# Patient Record
Sex: FEMALE | Race: WHITE | NOT HISPANIC OR LATINO | Employment: OTHER | URBAN - METROPOLITAN AREA
[De-identification: names, ages, dates, MRNs, and addresses within clinical notes are randomized per-mention and may not be internally consistent; named-entity substitution may affect disease eponyms.]

---

## 2017-10-09 ENCOUNTER — HOSPITAL ENCOUNTER (OUTPATIENT)
Dept: GENERAL RADIOLOGY | Facility: HOSPITAL | Age: 77
Discharge: HOME | End: 2017-10-09
Admitting: ORTHOPAEDIC SURGERY

## 2017-10-09 ENCOUNTER — HOSPITAL ENCOUNTER (OUTPATIENT)
Dept: GENERAL RADIOLOGY | Facility: HOSPITAL | Age: 77
Discharge: HOME | End: 2017-10-09

## 2017-10-09 ENCOUNTER — APPOINTMENT (OUTPATIENT)
Dept: PREADMISSION TESTING | Facility: HOSPITAL | Age: 77
End: 2017-10-09

## 2017-10-09 VITALS
BODY MASS INDEX: 26.89 KG/M2 | HEART RATE: 57 BPM | SYSTOLIC BLOOD PRESSURE: 100 MMHG | RESPIRATION RATE: 20 BRPM | OXYGEN SATURATION: 94 % | TEMPERATURE: 98.5 F | HEIGHT: 61 IN | DIASTOLIC BLOOD PRESSURE: 61 MMHG | WEIGHT: 142.44 LBS

## 2017-10-09 LAB
ABO GROUP BLD: NORMAL
ALBUMIN SERPL-MCNC: 3.9 G/DL (ref 3.5–5.2)
ALBUMIN/GLOB SERPL: 1.5 G/DL
ALP SERPL-CCNC: 61 U/L (ref 39–117)
ALT SERPL W P-5'-P-CCNC: 7 U/L (ref 1–33)
ANION GAP SERPL CALCULATED.3IONS-SCNC: 9.7 MMOL/L
AST SERPL-CCNC: 13 U/L (ref 1–32)
BILIRUB SERPL-MCNC: 0.2 MG/DL (ref 0.1–1.2)
BILIRUB UR QL STRIP: NEGATIVE
BLD GP AB SCN SERPL QL: NEGATIVE
BUN BLD-MCNC: 20 MG/DL (ref 8–23)
BUN/CREAT SERPL: 12.8 (ref 7–25)
CALCIUM SPEC-SCNC: 8.8 MG/DL (ref 8.6–10.5)
CHLORIDE SERPL-SCNC: 102 MMOL/L (ref 98–107)
CLARITY UR: CLEAR
CO2 SERPL-SCNC: 30.3 MMOL/L (ref 22–29)
COLOR UR: YELLOW
CREAT BLD-MCNC: 1.56 MG/DL (ref 0.57–1)
DEPRECATED RDW RBC AUTO: 58.6 FL (ref 37–54)
ERYTHROCYTE [DISTWIDTH] IN BLOOD BY AUTOMATED COUNT: 15.2 % (ref 11.7–13)
GFR SERPL CREATININE-BSD FRML MDRD: 32 ML/MIN/1.73
GLOBULIN UR ELPH-MCNC: 2.6 GM/DL
GLUCOSE BLD-MCNC: 74 MG/DL (ref 65–99)
GLUCOSE UR STRIP-MCNC: NEGATIVE MG/DL
HCT VFR BLD AUTO: 31.8 % (ref 35.6–45.5)
HGB BLD-MCNC: 9.6 G/DL (ref 11.9–15.5)
HGB UR QL STRIP.AUTO: NEGATIVE
INR PPP: 0.98 (ref 0.9–1.1)
KETONES UR QL STRIP: NEGATIVE
LEUKOCYTE ESTERASE UR QL STRIP.AUTO: NEGATIVE
MCH RBC QN AUTO: 31.9 PG (ref 26.9–32)
MCHC RBC AUTO-ENTMCNC: 30.2 G/DL (ref 32.4–36.3)
MCV RBC AUTO: 105.6 FL (ref 80.5–98.2)
NITRITE UR QL STRIP: NEGATIVE
PH UR STRIP.AUTO: 6 [PH] (ref 5–8)
PLATELET # BLD AUTO: 120 10*3/MM3 (ref 140–500)
PMV BLD AUTO: 10.7 FL (ref 6–12)
POTASSIUM BLD-SCNC: 4.5 MMOL/L (ref 3.5–5.2)
PROT SERPL-MCNC: 6.5 G/DL (ref 6–8.5)
PROT UR QL STRIP: NEGATIVE
PROTHROMBIN TIME: 12.6 SECONDS (ref 11.7–14.2)
RBC # BLD AUTO: 3.01 10*6/MM3 (ref 3.9–5.2)
RH BLD: POSITIVE
SODIUM BLD-SCNC: 142 MMOL/L (ref 136–145)
SP GR UR STRIP: 1.01 (ref 1–1.03)
UROBILINOGEN UR QL STRIP: NORMAL
WBC NRBC COR # BLD: 5.05 10*3/MM3 (ref 4.5–10.7)

## 2017-10-09 PROCEDURE — 86850 RBC ANTIBODY SCREEN: CPT | Performed by: ORTHOPAEDIC SURGERY

## 2017-10-09 PROCEDURE — 80053 COMPREHEN METABOLIC PANEL: CPT | Performed by: ORTHOPAEDIC SURGERY

## 2017-10-09 PROCEDURE — 86900 BLOOD TYPING SEROLOGIC ABO: CPT | Performed by: ORTHOPAEDIC SURGERY

## 2017-10-09 PROCEDURE — 71020 HC CHEST PA AND LATERAL: CPT

## 2017-10-09 PROCEDURE — 36415 COLL VENOUS BLD VENIPUNCTURE: CPT

## 2017-10-09 PROCEDURE — 93005 ELECTROCARDIOGRAM TRACING: CPT

## 2017-10-09 PROCEDURE — 85610 PROTHROMBIN TIME: CPT | Performed by: ORTHOPAEDIC SURGERY

## 2017-10-09 PROCEDURE — 86901 BLOOD TYPING SEROLOGIC RH(D): CPT | Performed by: ORTHOPAEDIC SURGERY

## 2017-10-09 PROCEDURE — 85027 COMPLETE CBC AUTOMATED: CPT | Performed by: ORTHOPAEDIC SURGERY

## 2017-10-09 PROCEDURE — 81003 URINALYSIS AUTO W/O SCOPE: CPT | Performed by: ORTHOPAEDIC SURGERY

## 2017-10-09 PROCEDURE — 93010 ELECTROCARDIOGRAM REPORT: CPT | Performed by: INTERNAL MEDICINE

## 2017-10-09 PROCEDURE — 73560 X-RAY EXAM OF KNEE 1 OR 2: CPT

## 2017-10-09 RX ORDER — TRAZODONE HYDROCHLORIDE 100 MG/1
100 TABLET ORAL NIGHTLY
Status: ON HOLD | COMMUNITY
End: 2017-12-06

## 2017-10-09 RX ORDER — DIVALPROEX SODIUM 500 MG/1
500 TABLET, EXTENDED RELEASE ORAL EVERY 12 HOURS
Status: ON HOLD | COMMUNITY
End: 2019-06-17

## 2017-10-09 RX ORDER — CHLORHEXIDINE GLUCONATE 500 MG/1
CLOTH TOPICAL
Status: ON HOLD | COMMUNITY
End: 2017-11-17

## 2017-10-09 RX ORDER — LEVOTHYROXINE SODIUM 0.12 MG/1
125 TABLET ORAL EVERY MORNING
COMMUNITY
End: 2017-11-15

## 2017-10-09 RX ORDER — ZOLPIDEM TARTRATE 12.5 MG/1
12.5 TABLET, FILM COATED, EXTENDED RELEASE ORAL NIGHTLY PRN
COMMUNITY
End: 2017-11-15

## 2017-10-09 RX ORDER — DIVALPROEX SODIUM 250 MG
250 TABLET, EXTENDED RELEASE 24 HR ORAL DAILY
COMMUNITY
End: 2020-07-10

## 2017-10-09 RX ORDER — IRBESARTAN AND HYDROCHLOROTHIAZIDE 300; 12.5 MG/1; MG/1
1 TABLET, FILM COATED ORAL EVERY MORNING
Status: ON HOLD | COMMUNITY
End: 2017-11-17 | Stop reason: ALTCHOICE

## 2017-10-09 RX ORDER — GABAPENTIN 600 MG/1
300 TABLET ORAL 3 TIMES DAILY
COMMUNITY
End: 2017-11-15

## 2017-10-09 RX ORDER — HYDROCODONE BITARTRATE AND ACETAMINOPHEN 7.5; 325 MG/1; MG/1
1 TABLET ORAL EVERY 4 HOURS PRN
Status: ON HOLD | COMMUNITY
End: 2019-06-17

## 2017-10-09 RX ORDER — ALLOPURINOL 300 MG/1
300 TABLET ORAL EVERY MORNING
COMMUNITY
End: 2017-12-06 | Stop reason: HOSPADM

## 2017-10-09 RX ORDER — VENLAFAXINE HYDROCHLORIDE 150 MG/1
150 CAPSULE, EXTENDED RELEASE ORAL DAILY
COMMUNITY
End: 2017-12-06 | Stop reason: HOSPADM

## 2017-10-09 RX ORDER — ONDANSETRON 4 MG/1
4 TABLET, FILM COATED ORAL EVERY 8 HOURS PRN
COMMUNITY
End: 2017-12-06 | Stop reason: HOSPADM

## 2017-10-09 ASSESSMENT — KOOS JR: KOOS JR SCORE: 16

## 2017-10-09 NOTE — DISCHARGE INSTRUCTIONS
Take the following medications the morning of surgery with a small sip of water:    ATROVENT ,  IRBESARTAN,  SYNTHROID    MAY HAVE PAIN MEDICATION IF  NEEDED    General Instructions:  • Do not eat solid food after midnight the night before surgery.  • You may drink clear liquids day of surgery but must stop at least one hour before your hospital arrival time.  ( 0530 AM )  • It is beneficial for you to have a clear drink that contains carbohydrates the day of surgery.  We suggest a 20 ounce bottle of Gatorade or Powerade for non-diabetic patients     Clear liquids are liquids you can see through.  Nothing red in color.     Plain water                               Sports drinks  Sodas                                   Gelatin (Jell-O)  Fruit juices without pulp such as white grape juice and apple juice  Popsicles that contain no fruit or yogurt  Tea or coffee (no cream or milk added)  Gatorade / Powerade  G2 / Powerade Zero    • Bring any papers given to you in the doctor’s office.  • Wear clean comfortable clothes and socks.  • Do not wear contact lenses or make-up.  Bring a case for your glasses.   • Remove all piercings.  Leave jewelry and any other valuables at home.  • The Pre-Admission Testing nurse will instruct you to bring medications if unable to obtain an accurate list in Pre-Admission Testing.        If you were given a blood bank ID arm band remember to bring it with you the day of surgery.    Preventing a Surgical Site Infection:  • For 2 to 3 days before surgery, avoid shaving with a razor because the razor can irritate skin and make it easier to develop an infection.  • The night prior to surgery sleep in a clean bed with clean clothing.  Do not allow pets to sleep with you.  • Shower on the morning of surgery using a fresh bar of anti-bacterial soap (such as Dial) and clean washcloth.  Dry with a clean towel and dress in clean clothing.  • Ask your surgeon if you will be receiving antibiotics prior  to surgery.  • Make sure you, your family, and all healthcare providers clean their hands with soap and water or an alcohol based hand  before caring for you or your wound.    Day of surgery:  Upon arrival, a Pre-op nurse and Anesthesiologist will review your health history, obtain vital signs, and answer questions you may have.  The only belongings needed at this time will be your home medications and if applicable your C-PAP/BI-PAP machine.  If you are staying overnight your family can leave the rest of your belongings in the car and bring them to your room later.  A Pre-op nurse will start an IV and you may receive medication in preparation for surgery, including something to help you relax.  Your family will be able to see you in the Pre-op area.  While you are in surgery your family should notify the waiting room  if they leave the waiting room area and provide a contact phone number.    Please be aware that surgery does come with discomfort.  We want to make every effort to control your discomfort so please discuss any uncontrolled symptoms with your nurse.   Your doctor will most likely have prescribed pain medications.      If you are staying overnight following surgery, you will be transported to your hospital room following the recovery period.  [facility] has all private rooms.    If you have any questions please call Pre-Admission Testing at [phone].      2% CHLORAHEXIDINE GLUCONATE* CLOTH  Preparing or “prepping” skin before surgery can reduce the risk of infection at the surgical site. To make the process easier, [facility] has chosen disposable cloths moistened with a rinse-free, 2% Chlorhexidine Gluconate (CHG) antiseptic solution. The steps below outline the prepping process and should be carefully followed.        Use the prep cloth on the area that is circled in the diagram             Directions Night before Surgery  1) Shower using a fresh bar  of anti-bacterial soap (such as Dial) and clean washcloth.  Use a clean towel to completely dry your skin.  2) Do not use any lotions, oils or creams on your skin.  3) Open the package and remove 1 cloth, wipe your skin for 30 seconds in a circular motion.  Allow to dry for 3 minutes.  4) Repeat #3 with second cloth.  5) Do not touch your eyes, ears, or mouth with the prep cloth.  6) Allow the wet prep solution to air dry.  7) Discard the prep cloth and wash your hands with soap and water.   8) Dress in clean bed clothes and sleep on fresh clean bed sheets.   9) You may experience some temporary itching after the prep.    Directions Day of Surgery  1) Repeat steps 1,2,3,4,5,6,7, and 9.   2) Dress in clean clothes before coming to the hospital.    BACTROBAN NASAL OINTMENT  There are many germs normally in your nose. Bactroban is an ointment that will help reduce these germs. Please follow these instructions for Bactroban use:  X 2    _1___The day before surgery in the morning  Chyt__[id number]______    2____The day before surgery in the evening              Ossv__[id number]______    __3__The day of surgery in the morning    Rjac__[id number]______    **Squirt ½ package of Bactroban Ointment onto a cotton applicator and apply to inside of 1st nostril.  Squirt the remaining Bactroban and apply to the inside of the other nostril.    Deductibles and co-payments are collected on the day of service. Please be prepared to pay the required co-pay, deductible or deposit on the day of service as defined by your plan.

## 2017-10-11 ENCOUNTER — TRANSCRIBE ORDERS (OUTPATIENT)
Dept: ADMINISTRATIVE | Facility: HOSPITAL | Age: 77
End: 2017-10-11

## 2017-10-11 DIAGNOSIS — D64.9 LOW HEMOGLOBIN: Primary | ICD-10-CM

## 2017-10-19 ENCOUNTER — HOSPITAL ENCOUNTER (OUTPATIENT)
Dept: INFUSION THERAPY | Facility: HOSPITAL | Age: 77
Discharge: HOME | End: 2017-10-19
Attending: ORTHOPAEDIC SURGERY | Admitting: ORTHOPAEDIC SURGERY

## 2017-10-19 VITALS
OXYGEN SATURATION: 90 % | DIASTOLIC BLOOD PRESSURE: 62 MMHG | HEART RATE: 67 BPM | TEMPERATURE: 99.3 F | RESPIRATION RATE: 20 BRPM | SYSTOLIC BLOOD PRESSURE: 128 MMHG

## 2017-10-19 DIAGNOSIS — D64.9 ANEMIA, UNSPECIFIED TYPE: ICD-10-CM

## 2017-10-19 DIAGNOSIS — M25.569 ARTHRALGIA OF KNEE, UNSPECIFIED LATERALITY: Primary | ICD-10-CM

## 2017-10-19 PROCEDURE — 63410000001 EPOETIN ALFA PER 1000 UNITS: Performed by: ORTHOPAEDIC SURGERY

## 2017-10-19 PROCEDURE — 96372 THER/PROPH/DIAG INJ SC/IM: CPT

## 2017-10-19 RX ADMIN — ERYTHROPOIETIN 2000 UNITS: 2000 INJECTION, SOLUTION INTRAVENOUS; SUBCUTANEOUS at 14:38

## 2017-10-19 NOTE — PATIENT INSTRUCTIONS
Epoetin Tavo injection  What is this medicine?  EPOETIN TAVO (e CT e tin AL fa) helps your body make more red blood cells. This medicine is used to treat anemia caused by chronic kidney failure, cancer chemotherapy, or HIV-therapy. It may also be used before surgery if you have anemia.  This medicine may be used for other purposes; ask your health care provider or pharmacist if you have questions.  COMMON BRAND NAME(S): Epogen, Procrit  What should I tell my health care provider before I take this medicine?  They need to know if you have any of these conditions:  -blood clotting disorders  -cancer patient not on chemotherapy  -cystic fibrosis  -heart disease, such as angina or heart failure  -hemoglobin level of 12 g/dL or greater  -high blood pressure  -low levels of folate, iron, or vitamin B12  -seizures  -an unusual or allergic reaction to erythropoietin, albumin, benzyl alcohol, hamster proteins, other medicines, foods, dyes, or preservatives  -pregnant or trying to get pregnant  -breast-feeding  How should I use this medicine?  This medicine is for injection into a vein or under the skin. It is usually given by a health care professional in a hospital or clinic setting.  If you get this medicine at home, you will be taught how to prepare and give this medicine. Use exactly as directed. Take your medicine at regular intervals. Do not take your medicine more often than directed.  It is important that you put your used needles and syringes in a special sharps container. Do not put them in a trash can. If you do not have a sharps container, call your pharmacist or healthcare provider to get one.  Talk to your pediatrician regarding the use of this medicine in children. While this drug may be prescribed for selected conditions, precautions do apply.  Overdosage: If you think you have taken too much of this medicine contact a poison control center or emergency room at once.  NOTE: This medicine is only for you. Do  not share this medicine with others.  What if I miss a dose?  If you miss a dose, take it as soon as you can. If it is almost time for your next dose, take only that dose. Do not take double or extra doses.  What may interact with this medicine?  Do not take this medicine with any of the following medications:  -darbepoetin rafia  This list may not describe all possible interactions. Give your health care provider a list of all the medicines, herbs, non-prescription drugs, or dietary supplements you use. Also tell them if you smoke, drink alcohol, or use illegal drugs. Some items may interact with your medicine.  What should I watch for while using this medicine?  Visit your prescriber or health care professional for regular checks on your progress and for the needed blood tests and blood pressure measurements. It is especially important for the doctor to make sure your hemoglobin level is in the desired range, to limit the risk of potential side effects and to give you the best benefit. Keep all appointments for any recommended tests. Check your blood pressure as directed. Ask your doctor what your blood pressure should be and when you should contact him or her.  As your body makes more red blood cells, you may need to take iron, folic acid, or vitamin B supplements. Ask your doctor or health care provider which products are right for you. If you have kidney disease continue dietary restrictions, even though this medication can make you feel better. Talk with your doctor or health care professional about the foods you eat and the vitamins that you take.  What side effects may I notice from receiving this medicine?  Side effects that you should report to your doctor or health care professional as soon as possible:  -allergic reactions like skin rash, itching or hives, swelling of the face, lips, or tongue  -breathing problems  -changes in vision  -chest pain  -confusion, trouble speaking or understanding  -feeling  faint or lightheaded, falls  -high blood pressure  -muscle aches or pains  -pain, swelling, warmth in the leg  -rapid weight gain  -severe headaches  -sudden numbness or weakness of the face, arm or leg  -trouble walking, dizziness, loss of balance or coordination  -seizures (convulsions)  -swelling of the ankles, feet, hands  -unusually weak or tired  Side effects that usually do not require medical attention (report to your doctor or health care professional if they continue or are bothersome):  -diarrhea  -fever, chills (flu-like symptoms)  -headaches  -nausea, vomiting  -redness, stinging, or swelling at site where injected  This list may not describe all possible side effects. Call your doctor for medical advice about side effects. You may report side effects to FDA at 7-077-FDA-3097.  Where should I keep my medicine?  Keep out of the reach of children.  Store in a refrigerator between 2 and 8 degrees C (36 and 46 degrees F). Do not freeze or shake. Throw away any unused portion if using a single-dose vial. Multi-dose vials can be kept in the refrigerator for up to 21 days after the initial dose. Throw away unused medicine.  NOTE: This sheet is a summary. It may not cover all possible information. If you have questions about this medicine, talk to your doctor, pharmacist, or health care provider.     © 2017, [employer]. (2009-12-01 10:25:44)

## 2017-10-26 ENCOUNTER — HOSPITAL ENCOUNTER (OUTPATIENT)
Dept: INFUSION THERAPY | Facility: HOSPITAL | Age: 77
Discharge: HOME | End: 2017-10-26
Attending: ORTHOPAEDIC SURGERY | Admitting: ORTHOPAEDIC SURGERY

## 2017-10-26 VITALS
SYSTOLIC BLOOD PRESSURE: 131 MMHG | TEMPERATURE: 98.7 F | OXYGEN SATURATION: 100 % | HEART RATE: 69 BPM | RESPIRATION RATE: 20 BRPM | DIASTOLIC BLOOD PRESSURE: 72 MMHG

## 2017-10-26 DIAGNOSIS — D64.9 ANEMIA, UNSPECIFIED TYPE: ICD-10-CM

## 2017-10-26 PROCEDURE — 96372 THER/PROPH/DIAG INJ SC/IM: CPT

## 2017-10-26 PROCEDURE — 63410000001 EPOETIN ALFA PER 1000 UNITS: Performed by: ORTHOPAEDIC SURGERY

## 2017-10-26 RX ADMIN — ERYTHROPOIETIN 2000 UNITS: 2000 INJECTION, SOLUTION INTRAVENOUS; SUBCUTANEOUS at 14:53

## 2017-10-26 NOTE — PROGRESS NOTES
Pt tolerated injection without complaints.  Injection site x 1 with band aide applied. Pt D/C per ambulation @ 4366.

## 2017-11-02 ENCOUNTER — HOSPITAL ENCOUNTER (OUTPATIENT)
Dept: INFUSION THERAPY | Facility: HOSPITAL | Age: 77
Discharge: HOME | End: 2017-11-02
Attending: ORTHOPAEDIC SURGERY | Admitting: ORTHOPAEDIC SURGERY

## 2017-11-02 VITALS
HEART RATE: 82 BPM | TEMPERATURE: 99.6 F | RESPIRATION RATE: 20 BRPM | SYSTOLIC BLOOD PRESSURE: 131 MMHG | DIASTOLIC BLOOD PRESSURE: 66 MMHG | OXYGEN SATURATION: 93 %

## 2017-11-02 DIAGNOSIS — D64.9 ANEMIA, UNSPECIFIED TYPE: ICD-10-CM

## 2017-11-02 PROCEDURE — 63410000001 EPOETIN ALFA PER 1000 UNITS: Performed by: ORTHOPAEDIC SURGERY

## 2017-11-02 PROCEDURE — 96372 THER/PROPH/DIAG INJ SC/IM: CPT

## 2017-11-02 RX ADMIN — ERYTHROPOIETIN 2000 UNITS: 2000 INJECTION, SOLUTION INTRAVENOUS; SUBCUTANEOUS at 15:46

## 2017-11-15 RX ORDER — GABAPENTIN 300 MG/1
300 CAPSULE ORAL 3 TIMES DAILY
Status: ON HOLD | COMMUNITY
End: 2017-12-06

## 2017-11-15 RX ORDER — OMEPRAZOLE 20 MG/1
20 CAPSULE, DELAYED RELEASE ORAL DAILY PRN
Status: ON HOLD | COMMUNITY
End: 2019-06-17

## 2017-11-15 RX ORDER — BUDESONIDE AND FORMOTEROL FUMARATE DIHYDRATE 160; 4.5 UG/1; UG/1
2 AEROSOL RESPIRATORY (INHALATION) 2 TIMES DAILY
Status: ON HOLD | COMMUNITY
End: 2019-06-17

## 2017-11-15 RX ORDER — LEVOTHYROXINE SODIUM 88 UG/1
88 TABLET ORAL
Status: ON HOLD | COMMUNITY
End: 2021-10-19

## 2017-11-15 RX ORDER — ALBUTEROL SULFATE 1.25 MG/3ML
1 SOLUTION RESPIRATORY (INHALATION) EVERY 4 HOURS
Status: ON HOLD | COMMUNITY
End: 2019-06-17

## 2017-11-16 ENCOUNTER — HOSPITAL ENCOUNTER (INPATIENT)
Facility: HOSPITAL | Age: 77
LOS: 6 days | Discharge: NURSING, REHAB OR LONG-TERM CARE | End: 2017-11-22
Attending: ORTHOPAEDIC SURGERY | Admitting: ORTHOPAEDIC SURGERY

## 2017-11-16 ENCOUNTER — APPOINTMENT (OUTPATIENT)
Dept: GENERAL RADIOLOGY | Facility: HOSPITAL | Age: 77
End: 2017-11-16

## 2017-11-16 ENCOUNTER — ANESTHESIA EVENT (OUTPATIENT)
Dept: PERIOP | Facility: HOSPITAL | Age: 77
End: 2017-11-16

## 2017-11-16 ENCOUNTER — ANESTHESIA (OUTPATIENT)
Dept: PERIOP | Facility: HOSPITAL | Age: 77
End: 2017-11-16

## 2017-11-16 DIAGNOSIS — R26.2 DIFFICULTY WALKING: Primary | ICD-10-CM

## 2017-11-16 PROBLEM — M17.9 OA (OSTEOARTHRITIS) OF KNEE: Status: ACTIVE | Noted: 2017-11-16

## 2017-11-16 LAB
ABO GROUP BLD: NORMAL
ALBUMIN SERPL-MCNC: 3.9 G/DL (ref 3.5–5.2)
ALBUMIN/GLOB SERPL: 1.3 G/DL
ALP SERPL-CCNC: 67 U/L (ref 39–117)
ALT SERPL W P-5'-P-CCNC: 7 U/L (ref 1–33)
ANION GAP SERPL CALCULATED.3IONS-SCNC: 12.2 MMOL/L
AST SERPL-CCNC: 13 U/L (ref 1–32)
BASOPHILS # BLD AUTO: 0.01 10*3/MM3 (ref 0–0.2)
BASOPHILS NFR BLD AUTO: 0.2 % (ref 0–1.5)
BILIRUB SERPL-MCNC: 0.4 MG/DL (ref 0.1–1.2)
BILIRUB UR QL STRIP: NEGATIVE
BLD GP AB SCN SERPL QL: NEGATIVE
BUN BLD-MCNC: 15 MG/DL (ref 8–23)
BUN/CREAT SERPL: 12.4 (ref 7–25)
CALCIUM SPEC-SCNC: 8.9 MG/DL (ref 8.6–10.5)
CHLORIDE SERPL-SCNC: 101 MMOL/L (ref 98–107)
CLARITY UR: CLEAR
CO2 SERPL-SCNC: 28.8 MMOL/L (ref 22–29)
COLOR UR: YELLOW
CREAT BLD-MCNC: 1.21 MG/DL (ref 0.57–1)
DEPRECATED RDW RBC AUTO: 58.6 FL (ref 37–54)
EOSINOPHIL # BLD AUTO: 0.12 10*3/MM3 (ref 0–0.7)
EOSINOPHIL NFR BLD AUTO: 2.3 % (ref 0.3–6.2)
ERYTHROCYTE [DISTWIDTH] IN BLOOD BY AUTOMATED COUNT: 15.6 % (ref 11.7–13)
GFR SERPL CREATININE-BSD FRML MDRD: 43 ML/MIN/1.73
GLOBULIN UR ELPH-MCNC: 3 GM/DL
GLUCOSE BLD-MCNC: 94 MG/DL (ref 65–99)
GLUCOSE UR STRIP-MCNC: NEGATIVE MG/DL
HCT VFR BLD AUTO: 32.2 % (ref 35.6–45.5)
HGB BLD-MCNC: 10.1 G/DL (ref 11.9–15.5)
HGB UR QL STRIP.AUTO: NEGATIVE
IMM GRANULOCYTES # BLD: 0.02 10*3/MM3 (ref 0–0.03)
IMM GRANULOCYTES NFR BLD: 0.4 % (ref 0–0.5)
INR PPP: 0.97 (ref 0.9–1.1)
KETONES UR QL STRIP: NEGATIVE
LEUKOCYTE ESTERASE UR QL STRIP.AUTO: NEGATIVE
LYMPHOCYTES # BLD AUTO: 1.6 10*3/MM3 (ref 0.9–4.8)
LYMPHOCYTES NFR BLD AUTO: 30.4 % (ref 19.6–45.3)
MCH RBC QN AUTO: 32.5 PG (ref 26.9–32)
MCHC RBC AUTO-ENTMCNC: 31.4 G/DL (ref 32.4–36.3)
MCV RBC AUTO: 103.5 FL (ref 80.5–98.2)
MONOCYTES # BLD AUTO: 0.69 10*3/MM3 (ref 0.2–1.2)
MONOCYTES NFR BLD AUTO: 13.1 % (ref 5–12)
NEUTROPHILS # BLD AUTO: 2.83 10*3/MM3 (ref 1.9–8.1)
NEUTROPHILS NFR BLD AUTO: 53.6 % (ref 42.7–76)
NITRITE UR QL STRIP: NEGATIVE
NRBC BLD MANUAL-RTO: 0 /100 WBC (ref 0–0)
PH UR STRIP.AUTO: 6.5 [PH] (ref 5–8)
PLATELET # BLD AUTO: 155 10*3/MM3 (ref 140–500)
PMV BLD AUTO: 9.3 FL (ref 6–12)
POTASSIUM BLD-SCNC: 4.1 MMOL/L (ref 3.5–5.2)
PROT SERPL-MCNC: 6.9 G/DL (ref 6–8.5)
PROT UR QL STRIP: NEGATIVE
PROTHROMBIN TIME: 12.5 SECONDS (ref 11.7–14.2)
RBC # BLD AUTO: 3.11 10*6/MM3 (ref 3.9–5.2)
RH BLD: POSITIVE
SODIUM BLD-SCNC: 142 MMOL/L (ref 136–145)
SP GR UR STRIP: 1.01 (ref 1–1.03)
UROBILINOGEN UR QL STRIP: NORMAL
WBC NRBC COR # BLD: 5.27 10*3/MM3 (ref 4.5–10.7)

## 2017-11-16 PROCEDURE — 86850 RBC ANTIBODY SCREEN: CPT | Performed by: ORTHOPAEDIC SURGERY

## 2017-11-16 PROCEDURE — 25010000002 PROPOFOL 10 MG/ML EMULSION: Performed by: NURSE ANESTHETIST, CERTIFIED REGISTERED

## 2017-11-16 PROCEDURE — 25010000002 HYDROMORPHONE PER 4 MG: Performed by: NURSE ANESTHETIST, CERTIFIED REGISTERED

## 2017-11-16 PROCEDURE — C1713 ANCHOR/SCREW BN/BN,TIS/BN: HCPCS | Performed by: ORTHOPAEDIC SURGERY

## 2017-11-16 PROCEDURE — 25010000002 FENTANYL CITRATE (PF) 100 MCG/2ML SOLUTION: Performed by: ANESTHESIOLOGY

## 2017-11-16 PROCEDURE — 0SRC0J9 REPLACEMENT OF RIGHT KNEE JOINT WITH SYNTHETIC SUBSTITUTE, CEMENTED, OPEN APPROACH: ICD-10-PCS | Performed by: ORTHOPAEDIC SURGERY

## 2017-11-16 PROCEDURE — 73560 X-RAY EXAM OF KNEE 1 OR 2: CPT

## 2017-11-16 PROCEDURE — 25010000002 EPINEPHRINE PER 0.1 MG: Performed by: ORTHOPAEDIC SURGERY

## 2017-11-16 PROCEDURE — 85025 COMPLETE CBC W/AUTO DIFF WBC: CPT | Performed by: ORTHOPAEDIC SURGERY

## 2017-11-16 PROCEDURE — 25010000002 FENTANYL CITRATE (PF) 100 MCG/2ML SOLUTION: Performed by: NURSE ANESTHETIST, CERTIFIED REGISTERED

## 2017-11-16 PROCEDURE — 25010000002 MIDAZOLAM PER 1 MG: Performed by: ANESTHESIOLOGY

## 2017-11-16 PROCEDURE — C1776 JOINT DEVICE (IMPLANTABLE): HCPCS | Performed by: ORTHOPAEDIC SURGERY

## 2017-11-16 PROCEDURE — 94799 UNLISTED PULMONARY SVC/PX: CPT

## 2017-11-16 PROCEDURE — 94640 AIRWAY INHALATION TREATMENT: CPT

## 2017-11-16 PROCEDURE — 25010000002 KETOROLAC TROMETHAMINE PER 15 MG: Performed by: ORTHOPAEDIC SURGERY

## 2017-11-16 PROCEDURE — 25010000002 ONDANSETRON PER 1 MG: Performed by: NURSE ANESTHETIST, CERTIFIED REGISTERED

## 2017-11-16 PROCEDURE — 81003 URINALYSIS AUTO W/O SCOPE: CPT | Performed by: ORTHOPAEDIC SURGERY

## 2017-11-16 PROCEDURE — 80053 COMPREHEN METABOLIC PANEL: CPT | Performed by: ORTHOPAEDIC SURGERY

## 2017-11-16 PROCEDURE — 85610 PROTHROMBIN TIME: CPT | Performed by: ORTHOPAEDIC SURGERY

## 2017-11-16 PROCEDURE — 86901 BLOOD TYPING SEROLOGIC RH(D): CPT | Performed by: ORTHOPAEDIC SURGERY

## 2017-11-16 PROCEDURE — 86900 BLOOD TYPING SEROLOGIC ABO: CPT | Performed by: ORTHOPAEDIC SURGERY

## 2017-11-16 PROCEDURE — 25010000002 ROPIVACAINE PER 1 MG: Performed by: ORTHOPAEDIC SURGERY

## 2017-11-16 PROCEDURE — 25010000002 DEXAMETHASONE PER 1 MG: Performed by: NURSE ANESTHETIST, CERTIFIED REGISTERED

## 2017-11-16 DEVICE — STEM TIB PRI FINN 46X40MM: Type: IMPLANTABLE DEVICE | Site: KNEE | Status: FUNCTIONAL

## 2017-11-16 DEVICE — CAP TOTL KN CMT PREMIUM: Type: IMPLANTABLE DEVICE | Site: KNEE | Status: FUNCTIONAL

## 2017-11-16 DEVICE — TRY TIB INTERLK PRI 67MM: Type: IMPLANTABLE DEVICE | Site: KNEE | Status: FUNCTIONAL

## 2017-11-16 DEVICE — CMT BONE PALACOS 120001: Type: IMPLANTABLE DEVICE | Site: PATELLA | Status: FUNCTIONAL

## 2017-11-16 DEVICE — COMP FEM/KN VANGUARD INTLK CR 60MM NS RT: Type: IMPLANTABLE DEVICE | Site: KNEE | Status: FUNCTIONAL

## 2017-11-16 DEVICE — PAT 3PEG THN 28X6.2 28MM: Type: IMPLANTABLE DEVICE | Site: PATELLA | Status: FUNCTIONAL

## 2017-11-16 DEVICE — BEAR TIB/KN VANGUARD AS 10X67MM NS: Type: IMPLANTABLE DEVICE | Site: KNEE | Status: FUNCTIONAL

## 2017-11-16 RX ORDER — LIDOCAINE HYDROCHLORIDE 20 MG/ML
INJECTION, SOLUTION INFILTRATION; PERINEURAL AS NEEDED
Status: DISCONTINUED | OUTPATIENT
Start: 2017-11-16 | End: 2017-11-16 | Stop reason: SURG

## 2017-11-16 RX ORDER — IRBESARTAN AND HYDROCHLOROTHIAZIDE 300; 12.5 MG/1; MG/1
1 TABLET, FILM COATED ORAL EVERY MORNING
Status: DISCONTINUED | OUTPATIENT
Start: 2017-11-17 | End: 2017-11-16 | Stop reason: CLARIF

## 2017-11-16 RX ORDER — TRANEXAMIC ACID 100 MG/ML
INJECTION, SOLUTION INTRAVENOUS AS NEEDED
Status: DISCONTINUED | OUTPATIENT
Start: 2017-11-16 | End: 2017-11-16 | Stop reason: SURG

## 2017-11-16 RX ORDER — ACETAMINOPHEN 325 MG/1
325 TABLET ORAL EVERY 4 HOURS PRN
Status: DISCONTINUED | OUTPATIENT
Start: 2017-11-16 | End: 2017-11-22 | Stop reason: HOSPADM

## 2017-11-16 RX ORDER — MIDAZOLAM HYDROCHLORIDE 1 MG/ML
1 INJECTION INTRAMUSCULAR; INTRAVENOUS
Status: DISCONTINUED | OUTPATIENT
Start: 2017-11-16 | End: 2017-11-16 | Stop reason: HOSPADM

## 2017-11-16 RX ORDER — OXYCODONE HYDROCHLORIDE AND ACETAMINOPHEN 5; 325 MG/1; MG/1
2 TABLET ORAL EVERY 4 HOURS PRN
Status: DISCONTINUED | OUTPATIENT
Start: 2017-11-16 | End: 2017-11-17

## 2017-11-16 RX ORDER — SODIUM CHLORIDE, SODIUM LACTATE, POTASSIUM CHLORIDE, CALCIUM CHLORIDE 600; 310; 30; 20 MG/100ML; MG/100ML; MG/100ML; MG/100ML
9 INJECTION, SOLUTION INTRAVENOUS CONTINUOUS
Status: DISCONTINUED | OUTPATIENT
Start: 2017-11-16 | End: 2017-11-22 | Stop reason: HOSPADM

## 2017-11-16 RX ORDER — ONDANSETRON 2 MG/ML
4 INJECTION INTRAMUSCULAR; INTRAVENOUS ONCE AS NEEDED
Status: DISCONTINUED | OUTPATIENT
Start: 2017-11-16 | End: 2017-11-16 | Stop reason: HOSPADM

## 2017-11-16 RX ORDER — UREA 10 %
1 LOTION (ML) TOPICAL NIGHTLY PRN
Status: DISCONTINUED | OUTPATIENT
Start: 2017-11-16 | End: 2017-11-22 | Stop reason: HOSPADM

## 2017-11-16 RX ORDER — SENNA AND DOCUSATE SODIUM 50; 8.6 MG/1; MG/1
2 TABLET, FILM COATED ORAL 2 TIMES DAILY
Status: DISCONTINUED | OUTPATIENT
Start: 2017-11-16 | End: 2017-11-22 | Stop reason: HOSPADM

## 2017-11-16 RX ORDER — FENTANYL CITRATE 50 UG/ML
50 INJECTION, SOLUTION INTRAMUSCULAR; INTRAVENOUS
Status: DISCONTINUED | OUTPATIENT
Start: 2017-11-16 | End: 2017-11-16 | Stop reason: HOSPADM

## 2017-11-16 RX ORDER — DIAZEPAM 5 MG/1
5 TABLET ORAL EVERY 6 HOURS PRN
Status: DISPENSED | OUTPATIENT
Start: 2017-11-16 | End: 2017-11-20

## 2017-11-16 RX ORDER — ALBUTEROL SULFATE 1.25 MG/3ML
1 SOLUTION RESPIRATORY (INHALATION) EVERY 4 HOURS
Status: DISCONTINUED | OUTPATIENT
Start: 2017-11-16 | End: 2017-11-22 | Stop reason: HOSPADM

## 2017-11-16 RX ORDER — ONDANSETRON 4 MG/1
4 TABLET, ORALLY DISINTEGRATING ORAL EVERY 6 HOURS PRN
Status: DISCONTINUED | OUTPATIENT
Start: 2017-11-16 | End: 2017-11-22 | Stop reason: HOSPADM

## 2017-11-16 RX ORDER — OXYCODONE HYDROCHLORIDE AND ACETAMINOPHEN 5; 325 MG/1; MG/1
1 TABLET ORAL EVERY 4 HOURS PRN
Status: DISCONTINUED | OUTPATIENT
Start: 2017-11-16 | End: 2017-11-17

## 2017-11-16 RX ORDER — PROMETHAZINE HYDROCHLORIDE 25 MG/ML
12.5 INJECTION, SOLUTION INTRAMUSCULAR; INTRAVENOUS ONCE AS NEEDED
Status: DISCONTINUED | OUTPATIENT
Start: 2017-11-16 | End: 2017-11-16 | Stop reason: HOSPADM

## 2017-11-16 RX ORDER — HYDROCHLOROTHIAZIDE 25 MG/1
12.5 TABLET ORAL EVERY MORNING
Status: DISCONTINUED | OUTPATIENT
Start: 2017-11-17 | End: 2017-11-22 | Stop reason: HOSPADM

## 2017-11-16 RX ORDER — CETIRIZINE HYDROCHLORIDE 10 MG/1
10 TABLET ORAL DAILY
Status: DISCONTINUED | OUTPATIENT
Start: 2017-11-16 | End: 2017-11-22 | Stop reason: HOSPADM

## 2017-11-16 RX ORDER — CELECOXIB 200 MG/1
200 CAPSULE ORAL ONCE
Status: COMPLETED | OUTPATIENT
Start: 2017-11-16 | End: 2017-11-16

## 2017-11-16 RX ORDER — SODIUM CHLORIDE 0.9 % (FLUSH) 0.9 %
1-10 SYRINGE (ML) INJECTION AS NEEDED
Status: DISCONTINUED | OUTPATIENT
Start: 2017-11-16 | End: 2017-11-16 | Stop reason: HOSPADM

## 2017-11-16 RX ORDER — PANTOPRAZOLE SODIUM 40 MG/1
40 TABLET, DELAYED RELEASE ORAL EVERY MORNING
Status: DISCONTINUED | OUTPATIENT
Start: 2017-11-17 | End: 2017-11-22 | Stop reason: HOSPADM

## 2017-11-16 RX ORDER — FAMOTIDINE 10 MG/ML
20 INJECTION, SOLUTION INTRAVENOUS ONCE
Status: COMPLETED | OUTPATIENT
Start: 2017-11-16 | End: 2017-11-16

## 2017-11-16 RX ORDER — FLUMAZENIL 0.1 MG/ML
0.2 INJECTION INTRAVENOUS AS NEEDED
Status: DISCONTINUED | OUTPATIENT
Start: 2017-11-16 | End: 2017-11-16 | Stop reason: HOSPADM

## 2017-11-16 RX ORDER — PROMETHAZINE HYDROCHLORIDE 25 MG/1
25 TABLET ORAL ONCE AS NEEDED
Status: DISCONTINUED | OUTPATIENT
Start: 2017-11-16 | End: 2017-11-16 | Stop reason: HOSPADM

## 2017-11-16 RX ORDER — ONDANSETRON 2 MG/ML
INJECTION INTRAMUSCULAR; INTRAVENOUS AS NEEDED
Status: DISCONTINUED | OUTPATIENT
Start: 2017-11-16 | End: 2017-11-16 | Stop reason: SURG

## 2017-11-16 RX ORDER — FENTANYL CITRATE 50 UG/ML
INJECTION, SOLUTION INTRAMUSCULAR; INTRAVENOUS AS NEEDED
Status: DISCONTINUED | OUTPATIENT
Start: 2017-11-16 | End: 2017-11-16 | Stop reason: SURG

## 2017-11-16 RX ORDER — FERROUS SULFATE 325(65) MG
325 TABLET ORAL
Status: DISCONTINUED | OUTPATIENT
Start: 2017-11-17 | End: 2017-11-22 | Stop reason: HOSPADM

## 2017-11-16 RX ORDER — SODIUM CHLORIDE 0.9 % (FLUSH) 0.9 %
1-10 SYRINGE (ML) INJECTION AS NEEDED
Status: DISCONTINUED | OUTPATIENT
Start: 2017-11-16 | End: 2017-11-22 | Stop reason: HOSPADM

## 2017-11-16 RX ORDER — ASPIRIN 325 MG
325 TABLET, DELAYED RELEASE (ENTERIC COATED) ORAL 2 TIMES DAILY WITH MEALS
Status: DISCONTINUED | OUTPATIENT
Start: 2017-11-16 | End: 2017-11-22 | Stop reason: HOSPADM

## 2017-11-16 RX ORDER — LABETALOL HYDROCHLORIDE 5 MG/ML
5 INJECTION, SOLUTION INTRAVENOUS
Status: DISCONTINUED | OUTPATIENT
Start: 2017-11-16 | End: 2017-11-16 | Stop reason: HOSPADM

## 2017-11-16 RX ORDER — GLYCOPYRROLATE 0.2 MG/ML
INJECTION INTRAMUSCULAR; INTRAVENOUS AS NEEDED
Status: DISCONTINUED | OUTPATIENT
Start: 2017-11-16 | End: 2017-11-16 | Stop reason: SURG

## 2017-11-16 RX ORDER — CLINDAMYCIN PHOSPHATE 900 MG/50ML
900 INJECTION INTRAVENOUS ONCE
Status: DISCONTINUED | OUTPATIENT
Start: 2017-11-16 | End: 2017-11-16

## 2017-11-16 RX ORDER — DIAZEPAM 5 MG/ML
5 INJECTION, SOLUTION INTRAMUSCULAR; INTRAVENOUS 2 TIMES DAILY PRN
Status: DISCONTINUED | OUTPATIENT
Start: 2017-11-16 | End: 2017-11-17 | Stop reason: RX

## 2017-11-16 RX ORDER — DIPHENHYDRAMINE HCL 25 MG
50 CAPSULE ORAL EVERY 6 HOURS PRN
Status: DISCONTINUED | OUTPATIENT
Start: 2017-11-16 | End: 2017-11-22 | Stop reason: HOSPADM

## 2017-11-16 RX ORDER — HYDROMORPHONE HCL 110MG/55ML
PATIENT CONTROLLED ANALGESIA SYRINGE INTRAVENOUS AS NEEDED
Status: DISCONTINUED | OUTPATIENT
Start: 2017-11-16 | End: 2017-11-16 | Stop reason: SURG

## 2017-11-16 RX ORDER — ONDANSETRON 2 MG/ML
4 INJECTION INTRAMUSCULAR; INTRAVENOUS EVERY 6 HOURS PRN
Status: DISCONTINUED | OUTPATIENT
Start: 2017-11-16 | End: 2017-11-22 | Stop reason: HOSPADM

## 2017-11-16 RX ORDER — DIVALPROEX SODIUM 500 MG/1
500 TABLET, EXTENDED RELEASE ORAL NIGHTLY
Status: DISCONTINUED | OUTPATIENT
Start: 2017-11-16 | End: 2017-11-22 | Stop reason: HOSPADM

## 2017-11-16 RX ORDER — HYDRALAZINE HYDROCHLORIDE 20 MG/ML
5 INJECTION INTRAMUSCULAR; INTRAVENOUS
Status: DISCONTINUED | OUTPATIENT
Start: 2017-11-16 | End: 2017-11-16 | Stop reason: HOSPADM

## 2017-11-16 RX ORDER — NALOXONE HCL 0.4 MG/ML
0.2 VIAL (ML) INJECTION AS NEEDED
Status: DISCONTINUED | OUTPATIENT
Start: 2017-11-16 | End: 2017-11-16 | Stop reason: HOSPADM

## 2017-11-16 RX ORDER — ONDANSETRON 4 MG/1
4 TABLET, FILM COATED ORAL EVERY 6 HOURS PRN
Status: DISCONTINUED | OUTPATIENT
Start: 2017-11-16 | End: 2017-11-22 | Stop reason: HOSPADM

## 2017-11-16 RX ORDER — KETOROLAC TROMETHAMINE 30 MG/ML
15 INJECTION, SOLUTION INTRAMUSCULAR; INTRAVENOUS EVERY 8 HOURS PRN
Status: DISPENSED | OUTPATIENT
Start: 2017-11-16 | End: 2017-11-19

## 2017-11-16 RX ORDER — MORPHINE SULFATE 2 MG/ML
6 INJECTION, SOLUTION INTRAMUSCULAR; INTRAVENOUS
Status: DISCONTINUED | OUTPATIENT
Start: 2017-11-16 | End: 2017-11-22 | Stop reason: HOSPADM

## 2017-11-16 RX ORDER — ALLOPURINOL 300 MG/1
300 TABLET ORAL EVERY MORNING
Status: DISCONTINUED | OUTPATIENT
Start: 2017-11-17 | End: 2017-11-22 | Stop reason: HOSPADM

## 2017-11-16 RX ORDER — ONDANSETRON 4 MG/1
4 TABLET, FILM COATED ORAL EVERY 8 HOURS PRN
Status: DISCONTINUED | OUTPATIENT
Start: 2017-11-16 | End: 2017-11-22 | Stop reason: HOSPADM

## 2017-11-16 RX ORDER — BUDESONIDE AND FORMOTEROL FUMARATE DIHYDRATE 160; 4.5 UG/1; UG/1
2 AEROSOL RESPIRATORY (INHALATION) 2 TIMES DAILY
Status: DISCONTINUED | OUTPATIENT
Start: 2017-11-16 | End: 2017-11-20

## 2017-11-16 RX ORDER — LOSARTAN POTASSIUM 100 MG/1
100 TABLET ORAL EVERY MORNING
Status: DISCONTINUED | OUTPATIENT
Start: 2017-11-17 | End: 2017-11-22 | Stop reason: HOSPADM

## 2017-11-16 RX ORDER — DIVALPROEX SODIUM 250 MG/1
250 TABLET, EXTENDED RELEASE ORAL EVERY MORNING
Status: DISCONTINUED | OUTPATIENT
Start: 2017-11-17 | End: 2017-11-22 | Stop reason: HOSPADM

## 2017-11-16 RX ORDER — BUPIVACAINE HYDROCHLORIDE AND EPINEPHRINE 5; 5 MG/ML; UG/ML
INJECTION, SOLUTION EPIDURAL; INTRACAUDAL; PERINEURAL AS NEEDED
Status: DISCONTINUED | OUTPATIENT
Start: 2017-11-16 | End: 2017-11-16 | Stop reason: SURG

## 2017-11-16 RX ORDER — GABAPENTIN 300 MG/1
300 CAPSULE ORAL 3 TIMES DAILY
Status: DISCONTINUED | OUTPATIENT
Start: 2017-11-16 | End: 2017-11-22 | Stop reason: HOSPADM

## 2017-11-16 RX ORDER — ACETAMINOPHEN 325 MG/1
650 TABLET ORAL EVERY 4 HOURS PRN
Status: DISCONTINUED | OUTPATIENT
Start: 2017-11-16 | End: 2017-11-22 | Stop reason: HOSPADM

## 2017-11-16 RX ORDER — CLINDAMYCIN PHOSPHATE 900 MG/50ML
900 INJECTION INTRAVENOUS EVERY 8 HOURS
Status: COMPLETED | OUTPATIENT
Start: 2017-11-16 | End: 2017-11-17

## 2017-11-16 RX ORDER — TRAZODONE HYDROCHLORIDE 100 MG/1
100 TABLET ORAL NIGHTLY
Status: DISCONTINUED | OUTPATIENT
Start: 2017-11-16 | End: 2017-11-22 | Stop reason: HOSPADM

## 2017-11-16 RX ORDER — BISACODYL 10 MG
10 SUPPOSITORY, RECTAL RECTAL DAILY PRN
Status: DISCONTINUED | OUTPATIENT
Start: 2017-11-16 | End: 2017-11-22 | Stop reason: HOSPADM

## 2017-11-16 RX ORDER — PROMETHAZINE HYDROCHLORIDE 25 MG/1
25 SUPPOSITORY RECTAL ONCE AS NEEDED
Status: DISCONTINUED | OUTPATIENT
Start: 2017-11-16 | End: 2017-11-16 | Stop reason: HOSPADM

## 2017-11-16 RX ORDER — SODIUM CHLORIDE 450 MG/100ML
100 INJECTION, SOLUTION INTRAVENOUS CONTINUOUS
Status: DISCONTINUED | OUTPATIENT
Start: 2017-11-16 | End: 2017-11-22 | Stop reason: HOSPADM

## 2017-11-16 RX ORDER — DOCUSATE SODIUM 100 MG/1
100 CAPSULE, LIQUID FILLED ORAL 2 TIMES DAILY PRN
Status: DISCONTINUED | OUTPATIENT
Start: 2017-11-16 | End: 2017-11-22 | Stop reason: HOSPADM

## 2017-11-16 RX ORDER — MIDAZOLAM HYDROCHLORIDE 1 MG/ML
2 INJECTION INTRAMUSCULAR; INTRAVENOUS
Status: DISCONTINUED | OUTPATIENT
Start: 2017-11-16 | End: 2017-11-16 | Stop reason: HOSPADM

## 2017-11-16 RX ORDER — DEXAMETHASONE SODIUM PHOSPHATE 10 MG/ML
INJECTION INTRAMUSCULAR; INTRAVENOUS AS NEEDED
Status: DISCONTINUED | OUTPATIENT
Start: 2017-11-16 | End: 2017-11-16 | Stop reason: SURG

## 2017-11-16 RX ORDER — ACETAMINOPHEN 500 MG
1000 TABLET ORAL ONCE
Status: COMPLETED | OUTPATIENT
Start: 2017-11-16 | End: 2017-11-16

## 2017-11-16 RX ORDER — DIPHENHYDRAMINE HYDROCHLORIDE 50 MG/ML
12.5 INJECTION INTRAMUSCULAR; INTRAVENOUS
Status: DISCONTINUED | OUTPATIENT
Start: 2017-11-16 | End: 2017-11-16 | Stop reason: HOSPADM

## 2017-11-16 RX ORDER — HYDROMORPHONE HYDROCHLORIDE 1 MG/ML
0.5 INJECTION, SOLUTION INTRAMUSCULAR; INTRAVENOUS; SUBCUTANEOUS
Status: DISCONTINUED | OUTPATIENT
Start: 2017-11-16 | End: 2017-11-16 | Stop reason: HOSPADM

## 2017-11-16 RX ORDER — LEVOTHYROXINE SODIUM 88 UG/1
88 TABLET ORAL
Status: DISCONTINUED | OUTPATIENT
Start: 2017-11-17 | End: 2017-11-22 | Stop reason: HOSPADM

## 2017-11-16 RX ORDER — PROPOFOL 10 MG/ML
VIAL (ML) INTRAVENOUS AS NEEDED
Status: DISCONTINUED | OUTPATIENT
Start: 2017-11-16 | End: 2017-11-16 | Stop reason: SURG

## 2017-11-16 RX ORDER — NALOXONE HCL 0.4 MG/ML
0.4 VIAL (ML) INJECTION
Status: DISCONTINUED | OUTPATIENT
Start: 2017-11-16 | End: 2017-11-22 | Stop reason: HOSPADM

## 2017-11-16 RX ORDER — VENLAFAXINE HYDROCHLORIDE 150 MG/1
150 CAPSULE, EXTENDED RELEASE ORAL DAILY
Status: DISCONTINUED | OUTPATIENT
Start: 2017-11-16 | End: 2017-11-22 | Stop reason: HOSPADM

## 2017-11-16 RX ORDER — EPHEDRINE SULFATE 50 MG/ML
5 INJECTION, SOLUTION INTRAVENOUS ONCE AS NEEDED
Status: DISCONTINUED | OUTPATIENT
Start: 2017-11-16 | End: 2017-11-16 | Stop reason: HOSPADM

## 2017-11-16 RX ADMIN — ACETAMINOPHEN 1000 MG: 500 TABLET ORAL at 11:27

## 2017-11-16 RX ADMIN — FENTANYL CITRATE 100 MCG: 50 INJECTION, SOLUTION INTRAMUSCULAR; INTRAVENOUS at 16:54

## 2017-11-16 RX ADMIN — FENTANYL CITRATE 50 MCG: 50 INJECTION INTRAMUSCULAR; INTRAVENOUS at 18:11

## 2017-11-16 RX ADMIN — VENLAFAXINE HYDROCHLORIDE 150 MG: 150 CAPSULE, EXTENDED RELEASE ORAL at 22:00

## 2017-11-16 RX ADMIN — DIVALPROEX SODIUM 500 MG: 500 TABLET, EXTENDED RELEASE ORAL at 22:00

## 2017-11-16 RX ADMIN — GLYCOPYRROLATE 0.1 MG: 0.2 INJECTION INTRAMUSCULAR; INTRAVENOUS at 15:39

## 2017-11-16 RX ADMIN — SODIUM CHLORIDE 100 ML/HR: 4.5 INJECTION, SOLUTION INTRAVENOUS at 21:00

## 2017-11-16 RX ADMIN — BUPIVACAINE HYDROCHLORIDE AND EPINEPHRINE BITARTRATE 20 ML: 5; .0091 INJECTION, SOLUTION EPIDURAL; INTRACAUDAL; PERINEURAL at 13:23

## 2017-11-16 RX ADMIN — MIDAZOLAM 1 MG: 1 INJECTION INTRAMUSCULAR; INTRAVENOUS at 12:11

## 2017-11-16 RX ADMIN — FAMOTIDINE 20 MG: 10 INJECTION, SOLUTION INTRAVENOUS at 12:11

## 2017-11-16 RX ADMIN — BUDESONIDE AND FORMOTEROL FUMARATE DIHYDRATE 2 PUFF: 160; 4.5 AEROSOL RESPIRATORY (INHALATION) at 21:55

## 2017-11-16 RX ADMIN — PROPOFOL 120 MG: 10 INJECTION, EMULSION INTRAVENOUS at 14:54

## 2017-11-16 RX ADMIN — CEFAZOLIN SODIUM 2 G: 1 INJECTION, POWDER, FOR SOLUTION INTRAMUSCULAR; INTRAVENOUS at 16:22

## 2017-11-16 RX ADMIN — MIDAZOLAM 1 MG: 1 INJECTION INTRAMUSCULAR; INTRAVENOUS at 13:19

## 2017-11-16 RX ADMIN — TRANEXAMIC ACID 1000 MG: 100 INJECTION, SOLUTION INTRAVENOUS at 15:58

## 2017-11-16 RX ADMIN — FENTANYL CITRATE 50 MCG: 50 INJECTION INTRAMUSCULAR; INTRAVENOUS at 19:22

## 2017-11-16 RX ADMIN — SODIUM CHLORIDE, POTASSIUM CHLORIDE, SODIUM LACTATE AND CALCIUM CHLORIDE 9 ML/HR: 600; 310; 30; 20 INJECTION, SOLUTION INTRAVENOUS at 12:11

## 2017-11-16 RX ADMIN — ALBUTEROL SULFATE 1.25 MG: 1.25 SOLUTION RESPIRATORY (INHALATION) at 23:54

## 2017-11-16 RX ADMIN — ALBUTEROL SULFATE 1.25 MG: 1.25 SOLUTION RESPIRATORY (INHALATION) at 21:56

## 2017-11-16 RX ADMIN — HYDROMORPHONE HYDROCHLORIDE 0.4 MG: 2 INJECTION, SOLUTION INTRAMUSCULAR; INTRAVENOUS; SUBCUTANEOUS at 17:01

## 2017-11-16 RX ADMIN — FENTANYL CITRATE 100 MCG: 50 INJECTION, SOLUTION INTRAMUSCULAR; INTRAVENOUS at 15:26

## 2017-11-16 RX ADMIN — ONDANSETRON 4 MG: 2 INJECTION INTRAMUSCULAR; INTRAVENOUS at 16:36

## 2017-11-16 RX ADMIN — HYDROMORPHONE HYDROCHLORIDE 0.2 MG: 2 INJECTION, SOLUTION INTRAMUSCULAR; INTRAVENOUS; SUBCUTANEOUS at 16:58

## 2017-11-16 RX ADMIN — DIAZEPAM 5 MG: 5 TABLET ORAL at 23:00

## 2017-11-16 RX ADMIN — ASPIRIN 325 MG: 325 TABLET, DELAYED RELEASE ORAL at 22:00

## 2017-11-16 RX ADMIN — FENTANYL CITRATE 50 MCG: 50 INJECTION, SOLUTION INTRAMUSCULAR; INTRAVENOUS at 15:31

## 2017-11-16 RX ADMIN — SODIUM CHLORIDE, POTASSIUM CHLORIDE, SODIUM LACTATE AND CALCIUM CHLORIDE: 600; 310; 30; 20 INJECTION, SOLUTION INTRAVENOUS at 16:50

## 2017-11-16 RX ADMIN — DEXAMETHASONE SODIUM PHOSPHATE 8 MG: 10 INJECTION INTRAMUSCULAR; INTRAVENOUS at 16:11

## 2017-11-16 RX ADMIN — CELECOXIB 200 MG: 200 CAPSULE ORAL at 11:27

## 2017-11-16 RX ADMIN — FENTANYL CITRATE 50 MCG: 50 INJECTION INTRAMUSCULAR; INTRAVENOUS at 13:20

## 2017-11-16 RX ADMIN — TRAZODONE HYDROCHLORIDE 100 MG: 100 TABLET, FILM COATED ORAL at 22:00

## 2017-11-16 RX ADMIN — CETIRIZINE HYDROCHLORIDE 10 MG: 10 TABLET, FILM COATED ORAL at 22:00

## 2017-11-16 RX ADMIN — OXYCODONE HYDROCHLORIDE AND ACETAMINOPHEN 2 TABLET: 5; 325 TABLET ORAL at 20:56

## 2017-11-16 RX ADMIN — GABAPENTIN 300 MG: 300 CAPSULE ORAL at 22:00

## 2017-11-16 RX ADMIN — DOCUSATE SODIUM -SENNOSIDES 2 TABLET: 50; 8.6 TABLET, COATED ORAL at 22:00

## 2017-11-16 RX ADMIN — LIDOCAINE HYDROCHLORIDE 60 MG: 20 INJECTION, SOLUTION INFILTRATION; PERINEURAL at 14:54

## 2017-11-16 RX ADMIN — CLINDAMYCIN PHOSPHATE 900 MG: 18 INJECTION, SOLUTION INTRAMUSCULAR; INTRAVENOUS at 22:00

## 2017-11-16 RX ADMIN — CEFAZOLIN SODIUM 2 G: 1 INJECTION, POWDER, FOR SOLUTION INTRAMUSCULAR; INTRAVENOUS at 15:15

## 2017-11-16 NOTE — ANESTHESIA POSTPROCEDURE EVALUATION
"Patient: Josephine Wolf    Procedure Summary     Date Anesthesia Start Anesthesia Stop Room / Location    11/16/17 1447 1701 [facility] / [facility]       Procedure Diagnosis Surgeon Provider    RT TOTAL KNEE ARTHROPLASTY (Right Knee) No diagnosis on file. MD Ross Abbasi MD          Anesthesia Type: general  Last vitals  BP   120/57 (11/16/17 1815)   Temp   36.9 °C (98.4 °F) (11/16/17 1651)   Pulse   76 (11/16/17 1815)   Resp   16 (11/16/17 1815)     SpO2   97 % (11/16/17 1815)     Post Anesthesia Care and Evaluation    Patient location during evaluation: bedside  Patient participation: complete - patient participated  Level of consciousness: awake  Pain score: 2  Pain management: adequate  Airway patency: patent  Anesthetic complications: No anesthetic complications    Cardiovascular status: acceptable  Respiratory status: acceptable  Hydration status: acceptable    Comments: /57  Pulse 76  Temp 36.9 °C (98.4 °F) (Oral)   Resp 16  Ht 61\" (154.9 cm)  Wt 143 lb (64.9 kg)  SpO2 97%  BMI 27.02 kg/m2        "

## 2017-11-16 NOTE — PLAN OF CARE
Problem: Perioperative Period (Adult)  Goal: Signs and Symptoms of Listed Potential Problems Will be Absent or Manageable (Perioperative Period)  Outcome: Ongoing (interventions implemented as appropriate)    11/16/17 5238   Perioperative Period   Problems Assessed (Perioperative Period) all   Problems Present (Perioperative Period) pain

## 2017-11-16 NOTE — ANESTHESIA PROCEDURE NOTES
Airway  Urgency: elective    Date/Time: 11/16/2017 2:57 PM    General Information and Staff    Patient location during procedure: OR  Anesthesiologist: JAY ALEGRIA  CRNA: SEPIDEH RUIZ    Indications and Patient Condition  Indications for airway management: airway protection    Preoxygenated: yes  Mask difficulty assessment: 1 - vent by mask    Final Airway Details  Final airway type: supraglottic airway      Successful airway: unique  Size 4  Airway Seal Pressure (cm H2O): 60    Number of attempts at approach: 1    Additional Comments  SIVI.  OU TAPED.  LMA PLACED WITH EASE.  APPEARS ATRAUMATIC.  +ETCO.  +SV

## 2017-11-16 NOTE — OP NOTE
Operative Note    Patient Name:  Josephine Wolf  YOB: 1940  Medical Records Number:  [id number]    Date of Procedure:  11/16/2017    Pre-operative Diagnosis:  Primary Osteoarthritis Right Knee    Post-operative Diagnosis:  Primary Osteoarthritis Right Knee    Procedure Performed:  Right Total Knee Arthroplasty    Implants:  Biomet Vanguard TKA, 60 Femoral Component, 67 Tibial Tray with a 40 mm Modular Stem, 28 3-Peg Patella, 10 AS Polyethylene Insert    Surgeon:  Kashif Perez M.D.    Assistant: Maurilio Green (who was present during the critical portions of the case, thereby decreasing operative time and patient morbidity)    Anesthetic Type:  General    Estimated Blood Loss:  250cc's    Specimens:   Order Name Source Comment Collection Info Order Time   COMPREHENSIVE METABOLIC PANEL   Collected By: Isabela Brice RN 11/16/2017 10:36 AM   PROTIME-INR   Collected By: Isabela Brice RN 11/16/2017 10:36 AM   URINALYSIS W/ CULTURE IF INDICATED Urine, Clean Catch  Collected By: Isabela Brice RN 11/16/2017 10:36 AM   TYPE AND SCREEN   Collected By: Isabela Brice RN 11/16/2017 10:36 AM       No Complications      Indications for Procedure:  Josephine Wolf is a 77 y.o. female suffering from end stage degenerative changes in the right knee.  The patients pain is becoming disabling, despite extensive conservative care, including NSAIDS, therapy and injections.  The risks, benefits and alternatives were discussed and the patient wishes to proceed with right total knee arthroplasty.      Procedure Performed:    After informed consent was obtained and pre-operative IV Kefzol given, prior to tourniquet inflation, the patient was taken to the operating room and placed supine on the operating table.  After general anesthesia induced, the patient's right lower extremity was prepped with chloraprep and draped in a sterile fashion.    A midline incision was made overlying the right knee and we sharply  dissected down to expose the parapatellar retinaculum.  A mid-vastus, muscle sparing, parapatellar arthrotomy was performed and we elevated the soft tissue both medially and laterally.  The menisci and ACL were excised.  We everted the patella and measured this to be 22 mm and we removed 7 mm of bone down to 15mm.  We measured the patella to be 28 and drilled our three drill holes.  We protected the patella with the patella protector and turned our attention to the femur and the tibia.    We drilled drill holes into the femoral and the tibial intramedullary canals and proceeded to irrigate the canals to remove the fatty marrow.  We used the intramedullary stefan and the 5 degree valgus cut block to make our distal femoral cut.  Once we had a smooth surface, we measured the femur to be 60.  We assured we had rotational alignment using the epicondylar axis, then we used the four-in-one cut block to make our anterior, posterior, anterior and posterior chamfer cuts.  We placed our femoral trial, had excellent fit, and extended the knee and marked [place]'s line on the tibia.      We then turned our attention to the tibia, where we irrigated the canal again.  We used the intramedullary stefan and the 3 degree posterior sloped cut block to remove 2 mm of bone from the effected side of the tibia.  Prior to making our cut, we assured we had rotational alignment using the external guide and [place]'s line.  Once we had a smooth surface, we measured the tibia to be 67.  We then removed soft tissue and bony debris from the posterior aspect of the knee.    We placed our trial implants and had excellent fit, range of motion, stability and ligament balance, throughout a complete arc of motion with a 10 AS polyethylene liner.  We removed our trial implants, punched the tibial keel, copiously irrigated the knee, gave 1 gm of IV Tranxemic Acid, then cemented our implants in place using palacos cement.  Once the cement cured, we  "trialed again with the 10 and 12 AS, standard and posterior lipped polyethylene liners.  The 10 AS gave us the best range of motion, stability and ligament balance, throughout a complete arc of motion.  We removed the trials, copiously irrigated the knee, gave IV antibiotics and local anesthetic, then placed our permanent polyethylene liner.  We placed a 1/8\" hemovac drain, then closed the arthrotomy with #1 Vicryl pop-off sutures.  The subcutaneous tissue was closed with 2-0 vicryl pop-off sutures.  The skin was closed with staples.  We placed a sterile dressing of Xeroform, 4x4's, abdominal pads, cast padding and an Ace Wrap.  The patient was then awakened from general anesthesia and taken to the recovery room in stable condition.    The patient will be started on Aspirin 325mg twice daily for DVT prophylaxis.  IV antibiotics will be discontinued within 24 hours of surgery.  Immediately prior to surgery, there were no acute Thromboembolic nor Cardiovascular risk factors.  An updated Medical Reconciliation form is on the chart.  "

## 2017-11-16 NOTE — ANESTHESIA PROCEDURE NOTES
Peripheral Block    Patient location during procedure: holding area  Reason for block: at surgeon's request and post-op pain management  Performed by  Anesthesiologist: ELDER PAIZ  Preanesthetic Checklist  Completed: patient identified, site marked, surgical consent, pre-op evaluation, timeout performed, IV checked, risks and benefits discussed and monitors and equipment checked  Prep:  Pt Position: supine  Sterile barriers:gloves  Prep: ChloraPrep  Patient monitoring: blood pressure monitoring, continuous pulse oximetry and EKG  Procedure  Sedation:yes    Guidance:ultrasound guided  ULTRASOUND INTERPRETATION.  Using ultrasound guidance a 21 G gauge needle was placed in close proximity to the femoral nerve, at which point, under ultrasound guidance anesthetic was injected in the area of the nerve and spread of the anesthesia was seen on ultrasound in close proximity thereto.  There were no abnormalities seen on ultrasound; a digital image was taken; and the patient tolerated the procedure with no complications. Images:still images obtained    Laterality:right  Block Type:adductor canal block  Injection Technique:single-shot  Medications  Local Injected:bupivacaine 0.5% with epinephrine Local Amount Injected:20mL

## 2017-11-16 NOTE — ANESTHESIA PREPROCEDURE EVALUATION
Anesthesia Evaluation     Patient summary reviewed and Nursing notes reviewed   no history of anesthetic complications:  NPO Solid Status: > 8 hours       Airway   Mallampati: II  TM distance: >3 FB  Neck ROM: full  no difficulty expected  Dental - normal exam   (+) poor dentition    Pulmonary - normal exam   (+) a smoker Former, COPD mild, shortness of breath, sleep apnea,   Cardiovascular - normal exam  Exercise tolerance: good (4-7 METS)    ECG reviewed  Rhythm: regular  Rate: normal    (+) hypertension,       Neuro/Psych  (+) headaches, psychiatric history Bipolar,      ROS Comment: Hx/o IC aneurysm coiling 2013  GI/Hepatic/Renal/Endo    (+)  GERD well controlled, hypothyroidism,     Musculoskeletal     (+) neck pain,   Abdominal  - normal exam   Substance History - negative use     OB/GYN          Other   (+) arthritis                                     Anesthesia Plan    ASA 3     general   (GA w/ AC block)  intravenous induction   Anesthetic plan and risks discussed with patient and child.    Plan discussed with CRNA and attending.

## 2017-11-16 NOTE — PLAN OF CARE
Problem: Patient Care Overview (Adult)  Goal: Plan of Care Review  Outcome: Ongoing (interventions implemented as appropriate)    11/16/17 1125   Coping/Psychosocial Response Interventions   Plan Of Care Reviewed With patient   Patient Care Overview   Progress no change       Goal: Adult Individualization and Mutuality  Outcome: Ongoing (interventions implemented as appropriate)    11/16/17 1125   Individualization   Patient Specific Preferences Adrián       Goal: Discharge Needs Assessment  Outcome: Ongoing (interventions implemented as appropriate)    Problem: Perioperative Period (Adult)  Goal: Signs and Symptoms of Listed Potential Problems Will be Absent or Manageable (Perioperative Period)  Outcome: Ongoing (interventions implemented as appropriate)    11/16/17 1125   Perioperative Period   Problems Assessed (Perioperative Period) all   Problems Present (Perioperative Period) pain

## 2017-11-16 NOTE — H&P
"History and Physical    Patient Name:  Josephine Wolf  YOB: 1940    Medical Records Number:  [id number]    Date of Admission:  11/16/2017 10:19 AM    Chief Complaint:  OA (osteoarthritis) of knee [M17.10]    Josephine Wolf is a 77 y.o. female who presents c/o severe right knee pain.  The pain has been on and off for many years, worsening to the point where the pain is becoming disabling.  The pain is a constant dull ache with occasional sharp, stabbing pain.  The patient has failed conservative treatment and would like to proceed with right total knee arthroplasty.    /58 (BP Location: Right arm, Patient Position: Lying)  Pulse 73  Temp 98.5 °F (36.9 °C) (Oral)   Resp 20  Ht 61\" (154.9 cm)  Wt 143 lb (64.9 kg)  SpO2 (!) 89% Comment: put pt on O2 for comfort - pt also uses O@ at night  BMI 27.02 kg/m2    Past Medical History:    Past Medical History:   Diagnosis Date   • Acid reflux    • Anemia    • Arthritis    • Bipolar 1 disorder, depressed    • Cataract     MINDY   • Chronic nausea    • Chronic pain of right knee    • Continuous leakage of urine     USES DEPENDS   • COPD (chronic obstructive pulmonary disease)     USES O2 2 LMP PER NC AT NIGHT   • Disease of thyroid gland     HYPOTHYROIDISM   • Diverticulosis    • Fibromyalgia     DX 1994   • Frequent episodes of bronchitis    • Hypertension    • Migraines    • Neck pain    • On home oxygen therapy     2L NC AT NIGHT   • Short of breath on exertion        Social History:    Social History     Social History   • Marital status:      Spouse name: N/A   • Number of children: N/A   • Years of education: N/A     Occupational History   • Not on file.     Social History Main Topics   • Smoking status: Former Smoker     Packs/day: 1.00     Years: 10.00     Types: Cigarettes     Start date: 1959     Quit date: 1969   • Smokeless tobacco: Never Used   • Alcohol use No   • Drug use: No   • Sexual activity: Defer     Other Topics " Concern   • Not on file     Social History Narrative       Family History:    Family History   Problem Relation Age of Onset   • Malig Hyperthermia Neg Hx        Current Medications:    Current Facility-Administered Medications:   •  ceFAZolin (ANCEF) in SWFI 2 g/20ml IV PUSH syringe, 2 g, Intravenous, Once, Kashif Perez MD  •  clindamycin (CLEOCIN) 900 mg in dextrose 5% 50 mL IVPB (premix), 900 mg, Intravenous, Once, Kashif Perez MD  •  fentaNYL citrate (PF) (SUBLIMAZE) injection 50 mcg, 50 mcg, Intravenous, Q10 Min PRN, Gabino Zafar MD  •  lactated ringers infusion, 9 mL/hr, Intravenous, Continuous, Gabino Zafar MD, Last Rate: 9 mL/hr at 11/16/17 1211, 9 mL/hr at 11/16/17 1211  •  midazolam (VERSED) injection 1 mg, 1 mg, Intravenous, Q5 Min PRN, 1 mg at 11/16/17 1211 **OR** midazolam (VERSED) injection 2 mg, 2 mg, Intravenous, Q5 Min PRN, Gabino Zafar MD  •  ropivacaine (NAROPIN) 50 mL, ketorolac (TORADOL) 30 mg, EPINEPHrine PF (ADRENALIN) 0.3 mg in sodium chloride 0.9 % 101.3 mL, , Injection, Once, Kashif Perez MD  •  sodium chloride 0.9 % flush 1-10 mL, 1-10 mL, Intravenous, PRN, Gabino Zafar MD    Allergies:    Allergies   Allergen Reactions   • Morphine And Related Hallucinations     CONFUSION       Review of Systems:   HEENT: Patient denies any headaches, vision changes, change in hearing, or tinnitus, Patient denies any rhinorrhea,epistaxis, sinus pain, mouth or dental problems, sore throat or hoarseness, or dysphagia  Pulmonary: Patient denies any cough, congestion, SOA, or wheezing  Cardiovascular: Patient denies any chest pain, dyspnea, palpitations, weakness, intolerance of exercise, varicosities, swelling of extremities, known murmur  Gastrointestinal:  Patient denies nausea, vomiting, diarrhea, constipation, loss  of appetite, change in appetite, dysphagia, gas, heartburn, melena, change in bowel habits, use of laxatives or other drugs to alter the function of the  gastrointestinal tract.  Genital/Urinary: Patient denies dysuria, change in color of urine, change in frequency of urination, pain with urgency, incontinence, retention, or nocturia.  Musculoskeletal: Patient denies increased warmth; redness; or swelling of joints; limitation of function; deformity; crepitation: pain in a joint or an extremity, the neck, or the back, especially with movement.  Neurological: Patient denies dizziness, tremor, ataxia, difficulty in speaking, change in speech, paresthesia, loss of sensation, seizures, syncope, changes in memory.  Endocrine system: Patient denies tremors, palpitations, intolerance of heat or cold, polyuria, polydipsia, polyphagia, diaphoresis, exophthalmos, or goiter.  Psychological: Patient denies thoughts/plans or harming self or other; depression,  insomnia, night terrors, tiffanie, memory loss, disorientation.  Skin: Patient denies any bruising, rashes, discoloration, pruritus, wounds, ulcers, decubiti, changes in the hair or nails  Hematopoietic: Patient denies history of spontaneous or excessive bleeding, epistaxis, hematuria, melena, fatigue, enlarged or tender lymph nodes, pallor, history of anemia.      Physical Exam:   Awake, A&O x3, affect normal, no acute distress  Ambulating with a limp due to knee pain  Knee ROM is limited due to pain (5-115)  Strength is 4/5 in the quad, hamstring and calf  Cap refill is normal, Sensation intact    Card:  RR, HD Stable  Pulm:  Regular breathing, no S.O.A  Abd:  Soft, NT, ND    Lab Results (last 24 hours)     Procedure Component Value Units Date/Time    Urinalysis With / Culture If Indicated - Urine, Clean Catch [[id number]]  (Normal) Collected:  11/16/17 1037    Specimen:  Urine from Urine, Clean Catch Updated:  11/16/17 1058     Color, UA Yellow     Appearance, UA Clear     pH, UA 6.5     Specific Gravity, UA 1.012     Glucose, UA Negative     Ketones, UA Negative     Bilirubin, UA Negative     Blood, UA Negative     Protein,  UA Negative     Leuk Esterase, UA Negative     Nitrite, UA Negative     Urobilinogen, UA 0.2 E.U./dL    Narrative:       Urine microscopic not indicated.    CBC & Differential [[id number]] Collected:  11/16/17 1037    Specimen:  Blood Updated:  11/16/17 1130    Narrative:       The following orders were created for panel order CBC & Differential.  Procedure                               Abnormality         Status                     ---------                               -----------         ------                     Scan Slide[[id number]]                                                                  CBC Auto Differential[[id number]]        Abnormal            Final result                 Please view results for these tests on the individual orders.    CBC Auto Differential [[id number]]  (Abnormal) Collected:  11/16/17 1037    Specimen:  Blood Updated:  11/16/17 1130     WBC 5.27 10*3/mm3      RBC 3.11 (L) 10*6/mm3      Hemoglobin 10.1 (L) g/dL      Hematocrit 32.2 (L) %      .5 (H) fL      MCH 32.5 (H) pg      MCHC 31.4 (L) g/dL      RDW 15.6 (H) %      RDW-SD 58.6 (H) fl      MPV 9.3 fL      Platelets 155 10*3/mm3      Neutrophil % 53.6 %      Lymphocyte % 30.4 %      Monocyte % 13.1 (H) %      Eosinophil % 2.3 %      Basophil % 0.2 %      Immature Grans % 0.4 %      Neutrophils, Absolute 2.83 10*3/mm3      Lymphocytes, Absolute 1.60 10*3/mm3      Monocytes, Absolute 0.69 10*3/mm3      Eosinophils, Absolute 0.12 10*3/mm3      Basophils, Absolute 0.01 10*3/mm3      Immature Grans, Absolute 0.02 10*3/mm3      nRBC 0.0 /100 WBC     Protime-INR [[id number]]  (Normal) Collected:  11/16/17 1037    Specimen:  Blood Updated:  11/16/17 1131     Protime 12.5 Seconds      INR 0.97    Comprehensive Metabolic Panel [[id number]]  (Abnormal) Collected:  11/16/17 1037    Specimen:  Blood Updated:  11/16/17 1146     Glucose 94 mg/dL      BUN 15 mg/dL      Creatinine 1.21 (H) mg/dL      Sodium 142 mmol/L      Potassium 4.1  mmol/L      Chloride 101 mmol/L      CO2 28.8 mmol/L      Calcium 8.9 mg/dL      Total Protein 6.9 g/dL      Albumin 3.90 g/dL      ALT (SGPT) 7 U/L      AST (SGOT) 13 U/L      Alkaline Phosphatase 67 U/L      Total Bilirubin 0.4 mg/dL      eGFR Non African Amer 43 (L) mL/min/1.73      Globulin 3.0 gm/dL      A/G Ratio 1.3 g/dL      BUN/Creatinine Ratio 12.4     Anion Gap 12.2 mmol/L     Narrative:       The MDRD GFR formula is only valid for adults with stable renal function between ages 18 and 70.          No results found.    Assessment:  End-stage Primary Right Knee Osteoarthritis    Plan:  Patient's pain is becoming disabling, despite extensive conservative treatment.  Radiographs reveal end-stage degenerative changes.  The risks of surgery, including, but not limited to, heart attack, stroke, dying, DVT, arthrofibrosis and infection were discussed.  The alternatives and benefits were also discussed.  All questions answered and the patient wishes to proceed with right total knee arthroplasty.

## 2017-11-17 LAB
ANION GAP SERPL CALCULATED.3IONS-SCNC: 14.9 MMOL/L
BUN BLD-MCNC: 19 MG/DL (ref 8–23)
BUN/CREAT SERPL: 13 (ref 7–25)
CALCIUM SPEC-SCNC: 8.4 MG/DL (ref 8.6–10.5)
CHLORIDE SERPL-SCNC: 100 MMOL/L (ref 98–107)
CO2 SERPL-SCNC: 24.1 MMOL/L (ref 22–29)
CREAT BLD-MCNC: 1.46 MG/DL (ref 0.57–1)
GFR SERPL CREATININE-BSD FRML MDRD: 35 ML/MIN/1.73
GLUCOSE BLD-MCNC: 153 MG/DL (ref 65–99)
HCT VFR BLD AUTO: 28.1 % (ref 35.6–45.5)
HGB BLD-MCNC: 8.8 G/DL (ref 11.9–15.5)
POTASSIUM BLD-SCNC: 4.3 MMOL/L (ref 3.5–5.2)
SODIUM BLD-SCNC: 139 MMOL/L (ref 136–145)

## 2017-11-17 PROCEDURE — 94799 UNLISTED PULMONARY SVC/PX: CPT

## 2017-11-17 PROCEDURE — 80048 BASIC METABOLIC PNL TOTAL CA: CPT | Performed by: ORTHOPAEDIC SURGERY

## 2017-11-17 PROCEDURE — 97110 THERAPEUTIC EXERCISES: CPT

## 2017-11-17 PROCEDURE — 85014 HEMATOCRIT: CPT | Performed by: ORTHOPAEDIC SURGERY

## 2017-11-17 PROCEDURE — 85018 HEMOGLOBIN: CPT | Performed by: ORTHOPAEDIC SURGERY

## 2017-11-17 PROCEDURE — 25010000002 KETOROLAC TROMETHAMINE PER 15 MG: Performed by: ORTHOPAEDIC SURGERY

## 2017-11-17 PROCEDURE — 97161 PT EVAL LOW COMPLEX 20 MIN: CPT

## 2017-11-17 PROCEDURE — 97150 GROUP THERAPEUTIC PROCEDURES: CPT

## 2017-11-17 RX ORDER — AMLODIPINE BESYLATE 10 MG/1
10 TABLET ORAL DAILY
COMMUNITY
End: 2017-12-06 | Stop reason: HOSPADM

## 2017-11-17 RX ORDER — VALSARTAN AND HYDROCHLOROTHIAZIDE 320; 12.5 MG/1; MG/1
1 TABLET, FILM COATED ORAL DAILY
COMMUNITY
End: 2017-12-06 | Stop reason: HOSPADM

## 2017-11-17 RX ORDER — MONTELUKAST SODIUM 10 MG/1
10 TABLET ORAL EVERY EVENING
COMMUNITY
End: 2017-12-06 | Stop reason: HOSPADM

## 2017-11-17 RX ORDER — HYDROCODONE BITARTRATE AND ACETAMINOPHEN 7.5; 325 MG/1; MG/1
1 TABLET ORAL EVERY 4 HOURS PRN
Status: DISCONTINUED | OUTPATIENT
Start: 2017-11-17 | End: 2017-11-22 | Stop reason: HOSPADM

## 2017-11-17 RX ORDER — HYDROCODONE BITARTRATE AND ACETAMINOPHEN 7.5; 325 MG/1; MG/1
2 TABLET ORAL EVERY 4 HOURS PRN
Status: DISCONTINUED | OUTPATIENT
Start: 2017-11-17 | End: 2017-11-22 | Stop reason: HOSPADM

## 2017-11-17 RX ADMIN — CLINDAMYCIN PHOSPHATE 900 MG: 18 INJECTION, SOLUTION INTRAMUSCULAR; INTRAVENOUS at 06:02

## 2017-11-17 RX ADMIN — ALBUTEROL SULFATE 1.25 MG: 1.25 SOLUTION RESPIRATORY (INHALATION) at 07:46

## 2017-11-17 RX ADMIN — CETIRIZINE HYDROCHLORIDE 10 MG: 10 TABLET, FILM COATED ORAL at 09:16

## 2017-11-17 RX ADMIN — GABAPENTIN 300 MG: 300 CAPSULE ORAL at 20:46

## 2017-11-17 RX ADMIN — GABAPENTIN 300 MG: 300 CAPSULE ORAL at 09:15

## 2017-11-17 RX ADMIN — HYDROCODONE BITARTRATE AND ACETAMINOPHEN 2 TABLET: 7.5; 325 TABLET ORAL at 17:25

## 2017-11-17 RX ADMIN — VENLAFAXINE HYDROCHLORIDE 150 MG: 150 CAPSULE, EXTENDED RELEASE ORAL at 09:16

## 2017-11-17 RX ADMIN — ASPIRIN 325 MG: 325 TABLET, DELAYED RELEASE ORAL at 09:17

## 2017-11-17 RX ADMIN — DOCUSATE SODIUM -SENNOSIDES 2 TABLET: 50; 8.6 TABLET, COATED ORAL at 17:24

## 2017-11-17 RX ADMIN — HYDROCODONE BITARTRATE AND ACETAMINOPHEN 1 TABLET: 7.5; 325 TABLET ORAL at 08:45

## 2017-11-17 RX ADMIN — DIVALPROEX SODIUM 500 MG: 500 TABLET, EXTENDED RELEASE ORAL at 20:46

## 2017-11-17 RX ADMIN — BUDESONIDE AND FORMOTEROL FUMARATE DIHYDRATE 2 PUFF: 160; 4.5 AEROSOL RESPIRATORY (INHALATION) at 19:53

## 2017-11-17 RX ADMIN — DIVALPROEX SODIUM 250 MG: 250 TABLET, FILM COATED, EXTENDED RELEASE ORAL at 09:15

## 2017-11-17 RX ADMIN — ALBUTEROL SULFATE 1.25 MG: 1.25 SOLUTION RESPIRATORY (INHALATION) at 19:53

## 2017-11-17 RX ADMIN — OXYCODONE HYDROCHLORIDE AND ACETAMINOPHEN 2 TABLET: 5; 325 TABLET ORAL at 02:55

## 2017-11-17 RX ADMIN — GABAPENTIN 300 MG: 300 CAPSULE ORAL at 16:03

## 2017-11-17 RX ADMIN — PANTOPRAZOLE SODIUM 40 MG: 40 TABLET, DELAYED RELEASE ORAL at 06:58

## 2017-11-17 RX ADMIN — HYDROCODONE BITARTRATE AND ACETAMINOPHEN 2 TABLET: 7.5; 325 TABLET ORAL at 22:26

## 2017-11-17 RX ADMIN — TRAZODONE HYDROCHLORIDE 100 MG: 100 TABLET, FILM COATED ORAL at 20:46

## 2017-11-17 RX ADMIN — OXYCODONE HYDROCHLORIDE AND ACETAMINOPHEN 2 TABLET: 5; 325 TABLET ORAL at 02:54

## 2017-11-17 RX ADMIN — ALBUTEROL SULFATE 1.25 MG: 1.25 SOLUTION RESPIRATORY (INHALATION) at 04:02

## 2017-11-17 RX ADMIN — DOCUSATE SODIUM -SENNOSIDES 2 TABLET: 50; 8.6 TABLET, COATED ORAL at 09:16

## 2017-11-17 RX ADMIN — ALBUTEROL SULFATE 1.25 MG: 1.25 SOLUTION RESPIRATORY (INHALATION) at 11:40

## 2017-11-17 RX ADMIN — HYDROCODONE BITARTRATE AND ACETAMINOPHEN 2 TABLET: 7.5; 325 TABLET ORAL at 13:20

## 2017-11-17 RX ADMIN — KETOROLAC TROMETHAMINE 15 MG: 30 INJECTION, SOLUTION INTRAMUSCULAR at 16:03

## 2017-11-17 RX ADMIN — HYDROCODONE BITARTRATE AND ACETAMINOPHEN 1 TABLET: 7.5; 325 TABLET ORAL at 07:06

## 2017-11-17 RX ADMIN — BUDESONIDE AND FORMOTEROL FUMARATE DIHYDRATE 2 PUFF: 160; 4.5 AEROSOL RESPIRATORY (INHALATION) at 07:46

## 2017-11-17 RX ADMIN — MUPIROCIN: 20 OINTMENT TOPICAL at 17:25

## 2017-11-17 RX ADMIN — DIAZEPAM 5 MG: 5 TABLET ORAL at 13:24

## 2017-11-17 RX ADMIN — MUPIROCIN: 20 OINTMENT TOPICAL at 09:17

## 2017-11-17 RX ADMIN — ASPIRIN 325 MG: 325 TABLET, DELAYED RELEASE ORAL at 17:25

## 2017-11-17 RX ADMIN — LEVOTHYROXINE SODIUM 88 MCG: 88 TABLET ORAL at 06:59

## 2017-11-17 RX ADMIN — ALLOPURINOL 300 MG: 300 TABLET ORAL at 09:16

## 2017-11-17 RX ADMIN — FERROUS SULFATE TAB 325 MG (65 MG ELEMENTAL FE) 325 MG: 325 (65 FE) TAB at 09:16

## 2017-11-17 NOTE — DISCHARGE PLACEMENT REQUEST
"Raheem Roberts (77 y.o. Female)     Date of Birth Social Security Number Address Home Phone MRN    1940  [id number]   [place] [id number] [phone] [id number]    Sikhism Marital Status          Islam        Admission Date Admission Type Admitting Provider Attending Provider Department, Room/Bed    11/16/17 Elective Kashif Perez MD Goodin, Robert A, MD [address], P784/1    Discharge Date Discharge Disposition Discharge Destination                      Attending Provider: Kashif Perez MD     Allergies:  Morphine And Related    Isolation:  None   Infection:  None   Code Status:  FULL    Ht:  61\" (154.9 cm)   Wt:  143 lb (64.9 kg)    Admission Cmt:  None   Principal Problem:  None                Active Insurance as of 11/16/2017     Primary Coverage     Payor Plan Insurance Group Employer/Plan Group    [employer] [place] [employer]ARE REPLACEMENT [facility]      Payor Plan Address Payor Plan Phone Number Effective From Effective To    [address] [address] [phone] 10/9/2017     [place] [address]       Subscriber Name Subscriber Birth Date Member ID       RAHEEM ROBERTS 1940 [id number]           Secondary Coverage     Payor Plan Insurance Group Employer/Plan Group    [place]      Payor Plan Address Payor Plan Phone Number Effective From Effective To    [address] [phone] 7/3/2016     [place] [address]       Subscriber Name Subscriber Birth Date Member ID       RAHEEM ROBERTS 1940 [id number]                 Emergency Contacts      (Rel.) Home Phone Work Phone Mobile Phone    Chino Roberts (Son) [phone] -- --    James Roberts (Son) [phone] -- [phone]              "

## 2017-11-17 NOTE — PLAN OF CARE
Problem: Patient Care Overview (Adult)  Goal: Plan of Care Review    11/17/17 1024   Outcome Evaluation   Outcome Summary/Follow up Plan Pt is POD 1 R TKA. She was independent with 4WW prior. Pt plans to d/c to SNF for rehab. Pt demonstrates increased pain wiht mobility, decreased R knee ROM/strength, decreased balance. She is a fall risk. WIll see pt to improve independence.          Problem: Inpatient Physical Therapy  Goal: Bed Mobility Goal LTG- PT    11/17/17 1024   Bed Mobility PT LTG   Bed Mobility PT LTG, Date Established 11/17/17   Bed Mobility PT LTG, Time to Achieve 3 days   Bed Mobility PT LTG, Activity Type all bed mobility   Bed Mobility PT LTG, [place] Level independent       Goal: Transfer Training Goal 1 LTG- PT    11/17/17 1024   Transfer Training PT LTG   Transfer Training PT LTG, Date Established 11/17/17   Transfer Training PT LTG, Time to Achieve 3 days   Transfer Training PT LTG, Activity Type bed to chair /chair to bed   Transfer Training PT LTG, [place] Level supervision required   Transfer Training PT LTG, Assist Device walker, rolling       Goal: Gait Training Goal LTG- PT    11/17/17 1024   Gait Training PT LTG   Gait Training Goal PT LTG, Date Established 11/17/17   Gait Training Goal PT LTG, Time to Achieve 3 days   Gait Training Goal PT LTG, [place] Level supervision required   Gait Training Goal PT LTG, Assist Device walker, rolling   Gait Training Goal PT LTG, Distance to Achieve 100       Goal: Patient Education Goal LTG- PT    11/17/17 1024   Patient Education PT LTG   Patient Education PT LTG, Date Established 11/17/17   Patient Education PT LTG, Time to Achieve 3 days   Patient Education PT LTG, Education Type HEP   Patient Education PT LTG, Education Understanding demonstrate adequately;verbalize understanding

## 2017-11-17 NOTE — THERAPY EVALUATION
Acute Care - Physical Therapy Initial Evaluation  [facility]     Patient Name: Josephine Wolf  : 1940  MRN: [id number]  Today's Date: 2017   Onset of Illness/Injury or Date of Surgery Date:  (POD 1 R TKA)     Referring Physician: Ana      Admit Date: 2017     Visit Dx:    ICD-10-CM ICD-9-CM   1. Difficulty walking R26.2 719.7     Patient Active Problem List   Diagnosis   • Anemia   • OA (osteoarthritis) of knee     Past Medical History:   Diagnosis Date   • Acid reflux    • Anemia    • Arthritis    • Bipolar 1 disorder, depressed    • Cataract     MINDY   • Chronic nausea    • Chronic pain of right knee    • Continuous leakage of urine     USES DEPENDS   • COPD (chronic obstructive pulmonary disease)     USES O2 2 LMP PER NC AT NIGHT   • Disease of thyroid gland     HYPOTHYROIDISM   • Diverticulosis    • Fibromyalgia     DX    • Frequent episodes of bronchitis    • Hypertension    • Migraines    • Neck pain    • On home oxygen therapy     2L NC AT NIGHT   • Short of breath on exertion      Past Surgical History:   Procedure Laterality Date   • CEREBRAL ANEURYSM REPAIR      WITH STENT   • HYSTERECTOMY     • LAPAROSCOPIC CHOLECYSTECTOMY     • MN TOTAL KNEE ARTHROPLASTY Right 2017    Procedure: RT TOTAL KNEE ARTHROPLASTY;  Surgeon: Kashif Perez MD;  Location: [facility] OR;  Service: Orthopedics          PT ASSESSMENT (last 72 hours)      PT Evaluation       17 1000 17 0327    Rehab Evaluation    Document Type evaluation  -MG     Subjective Information agree to therapy;no complaints  -MG     Patient Effort, Rehab Treatment good  -MG     Symptoms Noted During/After Treatment none  -MG     General Information    Onset of Illness/Injury or Date of Surgery Date --   POD 1 R TKA  -MG     Referring Physician Ana  -     Precautions/Limitations fall precautions  -MG     Prior Level of Function independent:  -MG     Equipment Currently Used at Home --   4WW  -MG  walker, rolling  -JF    Plans/Goals Discussed With patient;family  -MG     Barriers to Rehab none identified  -MG     Living Environment    Transportation Available  car;family or friend will provide  -JF    Living Environment Comment lives alone, no stairs   -MG     Clinical Impression    Patient/Family Goals Statement get stronger  -MG     Criteria for Skilled Therapeutic Interventions Met yes;treatment indicated  -MG     Impairments Found (describe specific impairments) aerobic capacity/endurance;gait, locomotion, and balance;ROM;neuromotor development and sensory integration  -MG     Rehab Potential good, to achieve stated therapy goals  -MG     Pain Assessment    Pain Assessment 0-10  -MG     Pain Score 9  -MG     Post Pain Score 9  -MG     Pain Location Knee  -MG     Pain Orientation Right  -MG     Pain Intervention(s) Repositioned;Ambulation/increased activity  -MG     Cognitive Assessment/Intervention    Current Cognitive/Communication Assessment functional  -MG     Orientation Status oriented x 4  -MG     Follows Commands/Answers Questions 100% of the time  -MG     Personal Safety WNL/WFL  -MG     Personal Safety Interventions fall prevention program maintained;gait belt  -MG     ROM (Range of Motion)    General ROM Detail 0-80deg  -MG     MMT (Manual Muscle Testing)    General MMT Assessment Detail R knee post op weakness  -MG     Bed Mobility, Assessment/Treatment    Bed Mob, Supine to Sit, [place] minimum assist (75% patient effort)  -MG     Bed Mob, Sit to Supine, [place] not tested  -MG     Transfer Assessment/Treatment    Transfers, Sit-Stand [place] minimum assist (75% patient effort)  -MG     Transfers, Stand-Sit [place] minimum assist (75% patient effort)  -MG     Transfers, Sit-Stand-Sit, Assist Device rolling walker  -MG     Transfer, Impairments strength decreased;impaired balance  -MG     Gait Assessment/Treatment    Gait, [place] Level minimum assist (75% patient  effort);2 person assist required  -MG     Gait, Assistive Device rolling walker  -MG     Gait, Distance (Feet) 30  -MG     Gait, Gait Deviations shilpa decreased;step length decreased;antalgic;right:  -MG     Gait, Safety Issues step length decreased;weight-shifting ability decreased;balance decreased during turns;loses balance backward  -MG     Gait, Impairments strength decreased;impaired balance  -MG     Therapy Exercises    Exercise Protocols total knee  -MG     Total Knee Exercises right:;10 reps;completed protocol  -MG     Positioning and Restraints    Pre-Treatment Position in bed  -MG     Post Treatment Position chair  -MG     In Chair reclined;call light within reach;encouraged to call for assist;with family/caregiver  -MG       11/16/17 1120 11/15/17 1207    General Information    Equipment Currently Used at Home walker, rolling  -KR none  -LM    Living Environment    Lives With alone  -KR alone  -LM    Living Arrangements apartment  -KR apartment  -LM    Home Accessibility no concerns  -KR no concerns  -LM    Stair Railings at Home  none  -LM    Type of Financial/Environmental Concern  none  -LM    Transportation Available family or friend will provide  -KR car  -LM      User Key  (r) = Recorded By, (t) = Taken By, (c) = Cosigned By    Initials Name Provider Type    MITESH Brice, RN Registered Nurse    JAVI Traylor, RN Registered Nurse    LACHO Lozoya, RN Registered Nurse    MG Megan Gosselin, MARCO Physical Therapist          Physical Therapy Education     Title: PT OT SLP Therapies (Done)     Topic: Physical Therapy (Done)     Point: Mobility training (Done)    Learning Progress Summary    Learner Readiness Method Response Comment Documented by Status   Patient Acceptance E AQUILES GOMEZ 11/17/17 1024 Done               Point: Home exercise program (Done)    Learning Progress Summary    Learner Readiness Method Response Comment Documented by Status   Patient Acceptance E JASONNR  MG 11/17/17 1024  Done               Point: Body mechanics (Done)    Learning Progress Summary    Learner Readiness Method Response Comment Documented by Status   Patient Acceptance E JASONNR  MG 11/17/17 1024 Done               Point: Precautions (Done)    Learning Progress Summary    Learner Readiness Method Response Comment Documented by Status   Patient Acceptance E AQUILES GOMEZ  MG 11/17/17 1024 Done                      User Key     Initials Effective Dates Name Provider Type Discipline    MG 09/13/17 -  Megan Gosselin, PT Physical Therapist PT                PT Recommendation and Plan  Anticipated Discharge Disposition: skilled nursing facility  Planned Therapy Interventions: balance training, bed mobility training, gait training, home exercise program, patient/family education, ROM (Range of Motion), strengthening, transfer training  PT Frequency: 2 times/day  Plan of Care Review  Outcome Summary/Follow up Plan: Pt is POD 1 R TKA. She was independent with 4WW prior. Pt plans to d/c to SNF for rehab. Pt demonstrates increased pain wiht mobility, decreased R knee ROM/strength, decreased balance. She is a fall risk. WIll see pt to improve independence.           IP PT Goals       11/17/17 1024          Bed Mobility PT LTG    Bed Mobility PT LTG, Date Established 11/17/17  -MG      Bed Mobility PT LTG, Time to Achieve 3 days  -MG      Bed Mobility PT LTG, Activity Type all bed mobility  -MG      Bed Mobility PT LTG, [place] Level independent  -MG      Transfer Training PT LTG    Transfer Training PT LTG, Date Established 11/17/17  -MG      Transfer Training PT LTG, Time to Achieve 3 days  -MG      Transfer Training PT LTG, Activity Type bed to chair /chair to bed  -MG      Transfer Training PT LTG, [place] Level supervision required  -MG      Transfer Training PT LTG, Assist Device walker, rolling  -MG      Gait Training PT LTG    Gait Training Goal PT LTG, Date Established 11/17/17  -MG      Gait Training Goal PT LTG, Time to  Achieve 3 days  -MG      Gait Training Goal PT LTG, [place] Level supervision required  -MG      Gait Training Goal PT LTG, Assist Device walker, rolling  -MG      Gait Training Goal PT LTG, Distance to Achieve 100  -MG      Patient Education PT LTG    Patient Education PT LTG, Date Established 11/17/17  -MG      Patient Education PT LTG, Time to Achieve 3 days  -MG      Patient Education PT LTG, Education Type HEP  -MG      Patient Education PT LTG, Education Understanding demonstrate adequately;verbalize understanding  -MG        User Key  (r) = Recorded By, (t) = Taken By, (c) = Cosigned By    Initials Name Provider Type    MG Megan Gosselin, PT Physical Therapist                Outcome Measures       11/17/17 1000          How much help from another person do you currently need...    Turning from your back to your side while in flat bed without using bedrails? 3  -MG      Moving from lying on back to sitting on the side of a flat bed without bedrails? 3  -MG      Moving to and from a bed to a chair (including a wheelchair)? 3  -MG      Standing up from a chair using your arms (e.g., wheelchair, bedside chair)? 3  -MG      Climbing 3-5 steps with a railing? 2  -MG      To walk in hospital room? 2  -MG      AM-PAC 6 Clicks Score 16  -MG      Functional Assessment    Outcome Measure Options AM-PAC 6 Clicks Basic Mobility (PT)  -MG        User Key  (r) = Recorded By, (t) = Taken By, (c) = Cosigned By    Initials Name Provider Type    MG Megan Gosselin, PT Physical Therapist           Time Calculation:         PT Charges       11/17/17 1032          Time Calculation    Start Time 1014  -MG      Stop Time 1030  -MG      Time Calculation (min) 16 min  -MG      PT Received On 11/17/17  -MG      PT - Next Appointment 11/17/17  -MG      PT Goal Re-Cert Due Date 11/20/17  -MG        User Key  (r) = Recorded By, (t) = Taken By, (c) = Cosigned By    Initials Name Provider Type    MG Megan Gosselin, PT Physical Therapist           Therapy Charges for Today     Code Description Service Date Service Provider Modifiers Qty    12241056823 HC PT EVAL LOW COMPLEXITY 2 11/17/2017 Megan Gosselin, PT GP 1    59428934605 HC PT THER PROC EA 15 MIN 11/17/2017 Megan Gosselin, PT GP 1    72261187880 HC PT THER SUPP EA 15 MIN 11/17/2017 Megan Gosselin, PT GP 1          PT G-Codes  Outcome Measure Options: AM-PAC 6 Clicks Basic Mobility (PT)      Megan Gosselin, PT  11/17/2017

## 2017-11-17 NOTE — THERAPY TREATMENT NOTE
Acute Care - Physical Therapy Treatment Note  [facility]     Patient Name: Josephine Wolf  : 1940  MRN: [id number]  Today's Date: 2017  Onset of Illness/Injury or Date of Surgery Date:  (POD 1 R TKA)     Referring Physician: Ana    Admit Date: 2017    Visit Dx:    ICD-10-CM ICD-9-CM   1. Difficulty walking R26.2 719.7     Patient Active Problem List   Diagnosis   • Anemia   • OA (osteoarthritis) of knee               Adult Rehabilitation Note       17 1636          Rehab Assessment/Intervention    Discipline physical therapy assistant  -      Document Type therapy note (daily note)  -      Subjective Information agree to therapy;complains of;weakness;fatigue;pain;swelling  -      Precautions/Limitations fall precautions  -      Precautions/Limitations, Hearing hearing impairment, bilaterally  -      Specific Treatment Considerations very shaky/nervous; needs one on one educ today  -      Recorded by [DIONTE] Marybeth Hayes PTA      Pain Assessment    Pain Assessment 0-10  -      Pain Score 7  -      Pain Type Surgical pain  -      Pain Location Knee  -      Pain Orientation Right  -      Pain Intervention(s) Medication (See MAR);Repositioned   nsg to get fresh ice pack  -      Response to Interventions esperanza  -      Recorded by [JM] Marybeth Hayes PTA      Bed Mobility, Assessment/Treatment    Bed Mobility, Comment in chair  -      Recorded by [JM] Marybeth Hayes PTA      Transfer Assessment/Treatment    Transfers, Sit-Stand [place] minimum assist (75% patient effort);contact guard assist;verbal cues required  -      Transfers, Stand-Sit [place] contact guard assist;verbal cues required  -      Transfers, Sit-Stand-Sit, Assist Device rolling walker  -      Transfer, Impairments strength decreased;impaired balance;pain  -      Transfer, Comment post lean noted  -      Recorded by [JM] Marybeth Hayes PTA      Gait Assessment/Treatment     Gait, [place] Level minimum assist (75% patient effort);contact guard assist;verbal cues required  -JM      Gait, Assistive Device rolling walker  -JM      Gait, Distance (Feet) 45  -JM      Gait, Gait Deviations antalgic;forward flexed posture;step length decreased;narrow base  -JM      Gait, Safety Issues step length decreased;balance decreased during turns;sequencing ability decreased;weight-shifting ability decreased;loses balance backward  -JM      Gait, Impairments strength decreased;impaired balance;pain  -JM      Recorded by [DIONTE] Marybeth Hayes PTA      Therapy Exercises    Exercise Protocols total knee  -JM      Total Knee Exercises right:;15 reps;completed protocol;with assist;SLR   to initiate  -JM      Recorded by [DIONTE] Marybeth Hayes PTA      Positioning and Restraints    Pre-Treatment Position sitting in chair/recliner  -JM      In Chair reclined;call light within reach;encouraged to call for assist   instructed pt to call for assist for all mobility  -JM      Recorded by [DIONTE] Marybeth Hayes PTA        User Key  (r) = Recorded By, (t) = Taken By, (c) = Cosigned By    Initials Name Effective Dates    DIONTE Hayes PTA 02/18/16 -                 IP PT Goals       11/17/17 1024          Bed Mobility PT LTG    Bed Mobility PT LTG, Date Established 11/17/17  -MG      Bed Mobility PT LTG, Time to Achieve 3 days  -MG      Bed Mobility PT LTG, Activity Type all bed mobility  -MG      Bed Mobility PT LTG, [place] Level independent  -MG      Transfer Training PT LTG    Transfer Training PT LTG, Date Established 11/17/17  -MG      Transfer Training PT LTG, Time to Achieve 3 days  -MG      Transfer Training PT LTG, Activity Type bed to chair /chair to bed  -MG      Transfer Training PT LTG, [place] Level supervision required  -MG      Transfer Training PT LTG, Assist Device walker, rolling  -MG      Gait Training PT LTG    Gait Training Goal PT LTG, Date Established 11/17/17  -MG       Gait Training Goal PT LTG, Time to Achieve 3 days  -MG      Gait Training Goal PT LTG, [place] Level supervision required  -MG      Gait Training Goal PT LTG, Assist Device walker, rolling  -MG      Gait Training Goal PT LTG, Distance to Achieve 100  -MG      Patient Education PT LTG    Patient Education PT LTG, Date Established 11/17/17  -MG      Patient Education PT LTG, Time to Achieve 3 days  -MG      Patient Education PT LTG, Education Type HEP  -MG      Patient Education PT LTG, Education Understanding demonstrate adequately;verbalize understanding  -MG        User Key  (r) = Recorded By, (t) = Taken By, (c) = Cosigned By    Initials Name Provider Type    MG Megan Gosselin, PT Physical Therapist          Physical Therapy Education     Title: PT OT SLP Therapies (Done)     Topic: Physical Therapy (Done)     Point: Mobility training (Done)    Learning Progress Summary    Learner Readiness Method Response Comment Documented by Status   Patient Acceptance E VU,NR  MG 11/17/17 1024 Done               Point: Home exercise program (Done)    Learning Progress Summary    Learner Readiness Method Response Comment Documented by Status   Patient Acceptance E VU,NR  MG 11/17/17 1024 Done               Point: Body mechanics (Done)    Learning Progress Summary    Learner Readiness Method Response Comment Documented by Status   Patient Acceptance E VU,NR  MG 11/17/17 1024 Done               Point: Precautions (Done)    Learning Progress Summary    Learner Readiness Method Response Comment Documented by Status   Patient Acceptance E VU,NR  MG 11/17/17 1024 Done                      User Key     Initials Effective Dates Name Provider Type [facility]    MG 09/13/17 -  Megan Gosselin, PT Physical Therapist PT                    PT Recommendation and Plan  Anticipated Discharge Disposition: skilled nursing facility  Planned Therapy Interventions: balance training, bed mobility training, gait training, home exercise program,  patient/family education, ROM (Range of Motion), strengthening, transfer training  PT Frequency: 2 times/day             Outcome Measures       11/17/17 1000          How much help from another person do you currently need...    Turning from your back to your side while in flat bed without using bedrails? 3  -MG      Moving from lying on back to sitting on the side of a flat bed without bedrails? 3  -MG      Moving to and from a bed to a chair (including a wheelchair)? 3  -MG      Standing up from a chair using your arms (e.g., wheelchair, bedside chair)? 3  -MG      Climbing 3-5 steps with a railing? 2  -MG      To walk in hospital room? 2  -MG      AM-PAC 6 Clicks Score 16  -MG      Functional Assessment    Outcome Measure Options AM-PAC 6 Clicks Basic Mobility (PT)  -MG        User Key  (r) = Recorded By, (t) = Taken By, (c) = Cosigned By    Initials Name Provider Type    MG Megan Gosselin, PT Physical Therapist           Time Calculation:         PT Charges       11/17/17 1641 11/17/17 1032       Time Calculation    Start Time 1505  - 1014  -MG     Stop Time 1600  - 1030  -MG     Time Calculation (min) 55 min  - 16 min  -MG     PT Received On 11/17/17  - 11/17/17  -MG     PT - Next Appointment 11/18/17  - 11/17/17  -MG     PT Goal Re-Cert Due Date  11/20/17  -MG       User Key  (r) = Recorded By, (t) = Taken By, (c) = Cosigned By    Initials Name Provider Type    DIONTE Hayes PTA Physical Therapy Assistant    MG Megan Gosselin, PT Physical Therapist          Therapy Charges for Today     Code Description Service Date Service Provider Modifiers Qty    27625801505 HC PT THER PROC EA 15 MIN 11/17/2017 Marybeth Hayes PTA GP 2    42807322660 HC PT THER PROC GROUP 11/17/2017 Marybeth Hayes PTA GP 1          PT G-Codes  Outcome Measure Options: AM-PAC 6 Clicks Basic Mobility (PT)    Marybeth Hayes PTA  11/17/2017

## 2017-11-17 NOTE — PLAN OF CARE
Problem: Patient Care Overview (Adult)  Goal: Plan of Care Review  Outcome: Ongoing (interventions implemented as appropriate)    11/17/17 0327   Coping/Psychosocial Response Interventions   Plan Of Care Reviewed With patient   Patient Care Overview   Progress progress toward functional goals as expected   Outcome Evaluation   Outcome Summary/Follow up Plan Pt is a post op of a rt total knee. Dressing is clean dry and intact. Pt continues with the ACE wrap and the drain. Pt came up to the floor in a lot of pain. Pt has been up to the bedside commode and has voided. Pt educated on importance of monitoring blood pressure related to comorbidity of HTN. Pt verbalized understanding.       Goal: Adult Individualization and Mutuality  Outcome: Ongoing (interventions implemented as appropriate)  Goal: Discharge Needs Assessment  Outcome: Ongoing (interventions implemented as appropriate)    Problem: Perioperative Period (Adult)  Goal: Signs and Symptoms of Listed Potential Problems Will be Absent or Manageable (Perioperative Period)  Outcome: Ongoing (interventions implemented as appropriate)  Goal: Signs and Symptoms of Listed Potential Problems Will be Absent or Manageable (Perioperative Period)  Outcome: Ongoing (interventions implemented as appropriate)    Problem: Pain, Acute (Adult)  Goal: Identify Related Risk Factors and Signs and Symptoms  Outcome: Ongoing (interventions implemented as appropriate)  Goal: Acceptable Pain Control/Comfort Level  Outcome: Ongoing (interventions implemented as appropriate)    Problem: Fall Risk (Adult)  Goal: Identify Related Risk Factors and Signs and Symptoms  Outcome: Ongoing (interventions implemented as appropriate)  Goal: Absence of Falls  Outcome: Ongoing (interventions implemented as appropriate)

## 2017-11-17 NOTE — PROGRESS NOTES
"Ortho POD 1    Patients Name:  Josephine Wolf  YOB: 1940  Medical Records Number:  [id number]    No complaints except pain    BP 92/49 (BP Location: Right arm, Patient Position: Lying)  Pulse 69  Temp 97.5 °F (36.4 °C) (Oral)   Resp 16  Ht 61\" (154.9 cm)  Wt 143 lb (64.9 kg)  SpO2 95%  BMI 27.02 kg/m2    RLE:  NVI, calf nontender, sensation intact  No signs of DVT    Incision: clean, intact    Lab Results (last 24 hours)     Procedure Component Value Units Date/Time    Urinalysis With / Culture If Indicated - Urine, Clean Catch [[id number]]  (Normal) Collected:  11/16/17 1037    Specimen:  Urine from Urine, Clean Catch Updated:  11/16/17 1058     Color, UA Yellow     Appearance, UA Clear     pH, UA 6.5     Specific Gravity, UA 1.012     Glucose, UA Negative     Ketones, UA Negative     Bilirubin, UA Negative     Blood, UA Negative     Protein, UA Negative     Leuk Esterase, UA Negative     Nitrite, UA Negative     Urobilinogen, UA 0.2 E.U./dL    Narrative:       Urine microscopic not indicated.    CBC & Differential [[id number]] Collected:  11/16/17 1037    Specimen:  Blood Updated:  11/16/17 1130    Narrative:       The following orders were created for panel order CBC & Differential.  Procedure                               Abnormality         Status                     ---------                               -----------         ------                     Scan Slide[[id number]]                                                                  CBC Auto Differential[[id number]]        Abnormal            Final result                 Please view results for these tests on the individual orders.    CBC Auto Differential [[id number]]  (Abnormal) Collected:  11/16/17 1037    Specimen:  Blood Updated:  11/16/17 1130     WBC 5.27 10*3/mm3      RBC 3.11 (L) 10*6/mm3      Hemoglobin 10.1 (L) g/dL      Hematocrit 32.2 (L) %      .5 (H) fL      MCH 32.5 (H) pg      MCHC 31.4 (L) g/dL      RDW 15.6 " (H) %      RDW-SD 58.6 (H) fl      MPV 9.3 fL      Platelets 155 10*3/mm3      Neutrophil % 53.6 %      Lymphocyte % 30.4 %      Monocyte % 13.1 (H) %      Eosinophil % 2.3 %      Basophil % 0.2 %      Immature Grans % 0.4 %      Neutrophils, Absolute 2.83 10*3/mm3      Lymphocytes, Absolute 1.60 10*3/mm3      Monocytes, Absolute 0.69 10*3/mm3      Eosinophils, Absolute 0.12 10*3/mm3      Basophils, Absolute 0.01 10*3/mm3      Immature Grans, Absolute 0.02 10*3/mm3      nRBC 0.0 /100 WBC     Protime-INR [[id number]]  (Normal) Collected:  11/16/17 1037    Specimen:  Blood Updated:  11/16/17 1131     Protime 12.5 Seconds      INR 0.97    Comprehensive Metabolic Panel [[id number]]  (Abnormal) Collected:  11/16/17 1037    Specimen:  Blood Updated:  11/16/17 1146     Glucose 94 mg/dL      BUN 15 mg/dL      Creatinine 1.21 (H) mg/dL      Sodium 142 mmol/L      Potassium 4.1 mmol/L      Chloride 101 mmol/L      CO2 28.8 mmol/L      Calcium 8.9 mg/dL      Total Protein 6.9 g/dL      Albumin 3.90 g/dL      ALT (SGPT) 7 U/L      AST (SGOT) 13 U/L      Alkaline Phosphatase 67 U/L      Total Bilirubin 0.4 mg/dL      eGFR Non African Amer 43 (L) mL/min/1.73      Globulin 3.0 gm/dL      A/G Ratio 1.3 g/dL      BUN/Creatinine Ratio 12.4     Anion Gap 12.2 mmol/L     Narrative:       The MDRD GFR formula is only valid for adults with stable renal function between ages 18 and 70.    Hemoglobin & Hematocrit, Blood [[id number]]  (Abnormal) Collected:  11/17/17 0413    Specimen:  Blood Updated:  11/17/17 0511     Hemoglobin 8.8 (L) g/dL      Hematocrit 28.1 (L) %     Basic Metabolic Panel [[id number]]  (Abnormal) Collected:  11/17/17 0413    Specimen:  Blood Updated:  11/17/17 0539     Glucose 153 (H) mg/dL      BUN 19 mg/dL      Creatinine 1.46 (H) mg/dL      Sodium 139 mmol/L      Potassium 4.3 mmol/L      Chloride 100 mmol/L      CO2 24.1 mmol/L      Calcium 8.4 (L) mg/dL      eGFR Non African Amer 35 (L) mL/min/1.73      BUN/Creatinine  Ratio 13.0     Anion Gap 14.9 mmol/L     Narrative:       The MDRD GFR formula is only valid for adults with stable renal function between ages 18 and 70.          Xr Knee 1 Or 2 View Right    Result Date: 11/16/2017  Narrative: 2 VIEW PORTABLE RIGHT KNEE  HISTORY: Knee replacement for osteoarthritis.  FINDINGS: The patient has had recent total knee replacement and the alignment appears satisfactory.  This report was finalized on 11/16/2017 6:55 PM by Dr. Festus Cummins MD.        S/p Right TKA  PT-WBAT with walker  ASA for DVT prophylaxis

## 2017-11-17 NOTE — PROGRESS NOTES
Continued Stay Note  [facility]     Patient Name: Josephine Wolf  MRN: [id number]  Today's Date: 11/17/2017    Admit Date: 11/16/2017          Discharge Plan       11/17/17 1217    Case Management/Social Work Plan    Plan [facility] pending [facility] pre-cert    Patient/Family In Agreement With Plan yes    Additional Comments IMM letter signed.  Facesheet verified.  Spoke with patient in room.  Introduced self and explained role. Patient lives in an apartment, but is pre-registered at [facility].  Spoke with [facility] and they have initiated pre-cert.  Transfer packet started and in CCP office.                 Discharge Codes     None            Maeve Young RN

## 2017-11-17 NOTE — PROGRESS NOTES
Clinical Pharmacy Services: Medication History    Josephine Wolf is a 77 y.o. female presenting to [facility] for OA (osteoarthritis) of knee [M17.10]    She  has a past medical history of Acid reflux; Anemia; Arthritis; Bipolar 1 disorder, depressed; Cataract; Chronic nausea; Chronic pain of right knee; Continuous leakage of urine; COPD (chronic obstructive pulmonary disease); Disease of thyroid gland; Diverticulosis; Fibromyalgia; Frequent episodes of bronchitis; Hypertension; Migraines; Neck pain; On home oxygen therapy; and Short of breath on exertion.    Allergies as of 09/21/2017 - Car as Reviewed 07/23/2016   Allergen Reaction Noted   • Morphine and related  07/03/2016       Medication information was obtained from: Patient and Pt's pharmacy    Pharmacy and Phone Number: [facility] ([phone])    Prior to Admission Medications     Prescriptions Last Dose Informant Patient Reported? Taking?    albuterol (ACCUNEB) 1.25 MG/3ML nebulizer solution 11/15/2017 Self Yes Yes    Take 1 ampule by nebulization Every 4 (Four) Hours.    allopurinol (ZYLOPRIM) 300 MG tablet 11/16/2017 Pharmacy Yes Yes    Take 300 mg by mouth Every Morning.    amLODIPine (NORVASC) 10 MG tablet  Pharmacy Yes Yes    Take 10 mg by mouth Daily.    budesonide-formoterol (SYMBICORT) 160-4.5 MCG/ACT inhaler 11/16/2017 Pharmacy Yes Yes    Inhale 2 puffs 2 (Two) Times a Day.    Cetirizine HCl 10 MG capsule 11/14/2017 Self Yes Yes    Take 1 tablet by mouth Daily.    Cholecalciferol (VITAMIN D3) 5000 units capsule capsule  Self Yes Yes    Take 5,000 Units by mouth Daily.    divalproex (DEPAKOTE ER) 250 MG 24 hr tablet 11/15/2017 Pharmacy Yes Yes    Take 250 mg by mouth Every Morning.    divalproex (DEPAKOTE ER) 500 MG 24 hr tablet 11/15/2017 Pharmacy Yes Yes    Take 500 mg by mouth Every Night.    gabapentin (NEURONTIN) 300 MG capsule 11/15/2017 Pharmacy Yes Yes    Take 300 mg by mouth 3 (Three) Times a Day.     HYDROcodone-acetaminophen (NORCO) 7.5-325 MG per tablet 11/15/2017 Pharmacy Yes Yes    Take 1 tablet by mouth Every 4 (Four) Hours As Needed for Moderate Pain .    levothyroxine (SYNTHROID, LEVOTHROID) 88 MCG tablet 11/16/2017 Pharmacy Yes Yes    Take 88 mcg by mouth Daily.    montelukast (SINGULAIR) 10 MG tablet  Pharmacy Yes Yes    Take 10 mg by mouth Every Evening.    omeprazole (priLOSEC) 20 MG capsule 11/15/2017 Pharmacy Yes Yes    Take 20 mg by mouth Daily.    ondansetron (ZOFRAN) 4 MG tablet 11/15/2017 Pharmacy Yes Yes    Take 4 mg by mouth Every 8 (Eight) Hours As Needed for Nausea or Vomiting.    traZODone (DESYREL) 100 MG tablet 11/15/2017 Pharmacy Yes Yes    Take 100 mg by mouth Every Night.    valsartan-hydrochlorothiazide (DIOVAN-HCT) 320-12.5 MG per tablet  Pharmacy Yes Yes    Take 1 tablet by mouth Daily.    venlafaxine XR (EFFEXOR XR) 150 MG 24 hr capsule 11/15/2017 Pharmacy Yes Yes    Take 150 mg by mouth Daily.            Medication notes: Based on information obtained from pt and pt's pharmacy, following changes have been made to pt's PTA med list.  1. Pt takes amlodipine 10 mg once daily at home (verified with pharmacy)  2. Pt takes montelukast 10 mg every evening at home (verified with pharmacy)  3. Pt takes valsartan/HCTZ 320/12.5 mg once daily at home (verified with pharmacy)    This medication list is complete to the best of my knowledge as of 11/17/2017    Please call pharmacy if questions.    Aby aWlls, PharmD   11/17/2017 1:11 PM

## 2017-11-17 NOTE — PROGRESS NOTES
Continued Stay Note  [facility]     Patient Name: Josephine Wolf  MRN: [id number]  Today's Date: 11/17/2017    Admit Date: 11/16/2017          Discharge Plan       11/17/17 1606    Case Management/Social Work Plan    Plan [facility] pending [facility] pre-cert    Patient/Family In Agreement With Plan yes    Additional Comments [facility] is still waiting on pre-cert.  CCP will follow up on Monday. Transfer packet in CCP office.                Discharge Codes     None            Maeve Young RN

## 2017-11-18 LAB
HCT VFR BLD AUTO: 25.3 % (ref 35.6–45.5)
HGB BLD-MCNC: 7.8 G/DL (ref 11.9–15.5)

## 2017-11-18 PROCEDURE — 94799 UNLISTED PULMONARY SVC/PX: CPT

## 2017-11-18 PROCEDURE — 97150 GROUP THERAPEUTIC PROCEDURES: CPT

## 2017-11-18 PROCEDURE — 85018 HEMOGLOBIN: CPT | Performed by: ORTHOPAEDIC SURGERY

## 2017-11-18 PROCEDURE — 85014 HEMATOCRIT: CPT | Performed by: ORTHOPAEDIC SURGERY

## 2017-11-18 PROCEDURE — 97110 THERAPEUTIC EXERCISES: CPT

## 2017-11-18 RX ORDER — PSEUDOEPHEDRINE HCL 30 MG
100 TABLET ORAL 2 TIMES DAILY PRN
Qty: 40 CAPSULE | Refills: 1 | Status: ON HOLD | OUTPATIENT
Start: 2017-11-18 | End: 2019-06-17

## 2017-11-18 RX ORDER — HYDROCODONE BITARTRATE AND ACETAMINOPHEN 7.5; 325 MG/1; MG/1
1-2 TABLET ORAL EVERY 4 HOURS PRN
Qty: 80 TABLET | Refills: 0 | Status: SHIPPED | OUTPATIENT
Start: 2017-11-18 | End: 2017-12-06 | Stop reason: HOSPADM

## 2017-11-18 RX ADMIN — ALBUTEROL SULFATE 1.25 MG: 1.25 SOLUTION RESPIRATORY (INHALATION) at 20:28

## 2017-11-18 RX ADMIN — ALBUTEROL SULFATE 1.25 MG: 1.25 SOLUTION RESPIRATORY (INHALATION) at 13:26

## 2017-11-18 RX ADMIN — MUPIROCIN: 20 OINTMENT TOPICAL at 09:23

## 2017-11-18 RX ADMIN — DOCUSATE SODIUM -SENNOSIDES 2 TABLET: 50; 8.6 TABLET, COATED ORAL at 09:22

## 2017-11-18 RX ADMIN — DIVALPROEX SODIUM 500 MG: 500 TABLET, EXTENDED RELEASE ORAL at 20:00

## 2017-11-18 RX ADMIN — GABAPENTIN 300 MG: 300 CAPSULE ORAL at 20:00

## 2017-11-18 RX ADMIN — DIVALPROEX SODIUM 250 MG: 250 TABLET, FILM COATED, EXTENDED RELEASE ORAL at 09:23

## 2017-11-18 RX ADMIN — GABAPENTIN 300 MG: 300 CAPSULE ORAL at 16:17

## 2017-11-18 RX ADMIN — CETIRIZINE HYDROCHLORIDE 10 MG: 10 TABLET, FILM COATED ORAL at 09:22

## 2017-11-18 RX ADMIN — HYDROCODONE BITARTRATE AND ACETAMINOPHEN 2 TABLET: 7.5; 325 TABLET ORAL at 16:18

## 2017-11-18 RX ADMIN — TRAZODONE HYDROCHLORIDE 100 MG: 100 TABLET, FILM COATED ORAL at 20:00

## 2017-11-18 RX ADMIN — VENLAFAXINE HYDROCHLORIDE 150 MG: 150 CAPSULE, EXTENDED RELEASE ORAL at 09:22

## 2017-11-18 RX ADMIN — ASPIRIN 325 MG: 325 TABLET, DELAYED RELEASE ORAL at 16:20

## 2017-11-18 RX ADMIN — ALLOPURINOL 300 MG: 300 TABLET ORAL at 09:23

## 2017-11-18 RX ADMIN — PANTOPRAZOLE SODIUM 40 MG: 40 TABLET, DELAYED RELEASE ORAL at 07:02

## 2017-11-18 RX ADMIN — HYDROCODONE BITARTRATE AND ACETAMINOPHEN 2 TABLET: 7.5; 325 TABLET ORAL at 08:06

## 2017-11-18 RX ADMIN — LEVOTHYROXINE SODIUM 88 MCG: 88 TABLET ORAL at 07:02

## 2017-11-18 RX ADMIN — ASPIRIN 325 MG: 325 TABLET, DELAYED RELEASE ORAL at 09:22

## 2017-11-18 RX ADMIN — HYDROCODONE BITARTRATE AND ACETAMINOPHEN 2 TABLET: 7.5; 325 TABLET ORAL at 03:55

## 2017-11-18 RX ADMIN — BUDESONIDE AND FORMOTEROL FUMARATE DIHYDRATE 2 PUFF: 160; 4.5 AEROSOL RESPIRATORY (INHALATION) at 08:35

## 2017-11-18 RX ADMIN — HYDROCODONE BITARTRATE AND ACETAMINOPHEN 2 TABLET: 7.5; 325 TABLET ORAL at 12:15

## 2017-11-18 RX ADMIN — ALBUTEROL SULFATE 1.25 MG: 1.25 SOLUTION RESPIRATORY (INHALATION) at 08:34

## 2017-11-18 RX ADMIN — HYDROCODONE BITARTRATE AND ACETAMINOPHEN 2 TABLET: 7.5; 325 TABLET ORAL at 20:09

## 2017-11-18 RX ADMIN — GABAPENTIN 300 MG: 300 CAPSULE ORAL at 10:49

## 2017-11-18 RX ADMIN — FERROUS SULFATE TAB 325 MG (65 MG ELEMENTAL FE) 325 MG: 325 (65 FE) TAB at 09:22

## 2017-11-18 NOTE — NURSING NOTE
PT reported bruise to right hand. Observed slight purplish bruising to posterior right hand at 5th knuckle.  PT denies pain/discomfort. Does not recall bumping hand. IV site in LUE. Elevated right hand and applied ice.

## 2017-11-18 NOTE — PLAN OF CARE
Problem: Patient Care Overview (Adult)  Goal: Plan of Care Review  Outcome: Ongoing (interventions implemented as appropriate)    11/18/17 1154   Coping/Psychosocial Response Interventions   Plan Of Care Reviewed With patient   Outcome Evaluation   Outcome Summary/Follow up Plan Pt w/ increase pain today, but improved w/ activity. Increased ambulation distance and exercise tolerance this am. Progressing well w/ PT.

## 2017-11-18 NOTE — THERAPY TREATMENT NOTE
Acute Care - Physical Therapy Treatment Note  [facility]     Patient Name: Josephine Wolf  : 1940  MRN: [id number]  Today's Date: 2017  Onset of Illness/Injury or Date of Surgery Date:  (POD 1 R TKA)     Referring Physician: Ana    Admit Date: 2017    Visit Dx:    ICD-10-CM ICD-9-CM   1. Difficulty walking R26.2 719.7     Patient Active Problem List   Diagnosis   • Anemia   • OA (osteoarthritis) of knee               Adult Rehabilitation Note       17 1052 17 1636       Rehab Assessment/Intervention    Discipline physical therapy assistant  -RW physical therapy assistant  -     Document Type therapy note (daily note)  -RW therapy note (daily note)  -     Subjective Information agree to therapy;complains of;pain  -RW agree to therapy;complains of;weakness;fatigue;pain;swelling  -     Patient Effort, Rehab Treatment good  -RW      Precautions/Limitations fall precautions  -RW fall precautions  -JM     Precautions/Limitations, Hearing hearing impairment, bilaterally  -RW hearing impairment, bilaterally  -     Specific Treatment Considerations  very shaky/nervous; needs one on one educ today  -     Recorded by [RW] Alise Alejo PTA [JM] Marybeth Hayes PTA     Pain Assessment    Pain Assessment 0-10  -RW 0-10  -JM     Pain Score 7  -RW 7  -JM     Pain Type Acute pain;Surgical pain  -RW Surgical pain  -JM     Pain Location Knee  -RW Knee  -JM     Pain Orientation Right  -RW Right  -JM     Pain Intervention(s) Medication (See MAR);Repositioned;Ambulation/increased activity  -RW Medication (See MAR);Repositioned   nsg to get fresh ice pack  -     Response to Interventions improved  -RW esperanza  -JM     Recorded by [RW] Alise Alejo PTA [JM] Marybeth Hayes PTA     Cognitive Assessment/Intervention    Current Cognitive/Communication Assessment functional  -RW      Orientation Status oriented x 4  -RW      Follows Commands/Answers Questions 100% of the time  -RW       Personal Safety WNL/WFL  -RW      Personal Safety Interventions fall prevention program maintained;gait belt;nonskid shoes/slippers when out of bed  -RW      Recorded by [RW] Alise Alejo PTA      ROM (Range of Motion)    General ROM Detail R knee 9-80'  -RW      Recorded by [RW] Alise Alejo PTA      Bed Mobility, Assessment/Treatment    Bed Mob, Supine to Sit, [place] not tested  -RW      Bed Mob, Sit to Supine, [place] not tested  -RW      Bed Mobility, Comment Pt up in chair  -RW in chair  -JM     Recorded by [RW] Alise Alejo PTA [JM] Marybeth Hayes PTA     Transfer Assessment/Treatment    Transfers, Sit-Stand [place] minimum assist (75% patient effort);verbal cues required  -RW minimum assist (75% patient effort);contact guard assist;verbal cues required  -     Transfers, Stand-Sit [place] contact guard assist;verbal cues required  -RW contact guard assist;verbal cues required  -     Transfers, Sit-Stand-Sit, Assist Device rolling walker  -RW rolling walker  -     Transfer, Impairments strength decreased;impaired balance  -RW strength decreased;impaired balance;pain  -     Transfer, Comment  post lean noted  -JM     Recorded by [RW] Alise Alejo PTA [JM] Marybeth Hayes PTA     Gait Assessment/Treatment    Gait, [place] Level contact guard assist;minimum assist (75% patient effort);verbal cues required  -RW minimum assist (75% patient effort);contact guard assist;verbal cues required  -     Gait, Assistive Device rolling walker  -RW rolling walker  -     Gait, Distance (Feet) 75  -RW 45  -JM     Gait, Gait Pattern Analysis swing-through gait  -RW      Gait, Gait Deviations forward flexed posture;right:;antalgic;shilpa decreased;bilateral:;limb motion velocity decreased  -RW antalgic;forward flexed posture;step length decreased;narrow base  -     Gait, Safety Issues  step length decreased;balance decreased during turns;sequencing ability  decreased;weight-shifting ability decreased;loses balance backward  -JM     Gait, Impairments strength decreased;impaired balance  -RW strength decreased;impaired balance;pain  -JM     Recorded by [RW] Alise Alejo PTA [JM] Marybeth Hayes PTA     Therapy Exercises    Exercise Protocols total knee  -RW total knee  -JM     Total Knee Exercises right:;20 reps;completed protocol   cues to stay awake and on task  -RW right:;15 reps;completed protocol;with assist;SLR   to initiate  -JM     Recorded by [RW] Alise Alejo PTA [JM] Marybeth Hayes PTA     Positioning and Restraints    Pre-Treatment Position sitting in chair/recliner  -RW sitting in chair/recliner  -JM     Post Treatment Position chair  -RW      In Chair reclined;call light within reach;encouraged to call for assist   ice applied to R knee  -RW reclined;call light within reach;encouraged to call for assist   instructed pt to call for assist for all mobility  -JM     Recorded by [RW] Alise Alejo PTA [JM] Marybeth Hayes PTA       User Key  (r) = Recorded By, (t) = Taken By, (c) = Cosigned By    Initials Name Effective Dates    DIONTE Hayes PTA 02/18/16 -     RW Alise Alejo PTA 04/06/16 -                 IP PT Goals       11/17/17 1024          Bed Mobility PT LTG    Bed Mobility PT LTG, Date Established 11/17/17  -MG      Bed Mobility PT LTG, Time to Achieve 3 days  -MG      Bed Mobility PT LTG, Activity Type all bed mobility  -MG      Bed Mobility PT LTG, [place] Level independent  -MG      Transfer Training PT LTG    Transfer Training PT LTG, Date Established 11/17/17  -MG      Transfer Training PT LTG, Time to Achieve 3 days  -MG      Transfer Training PT LTG, Activity Type bed to chair /chair to bed  -MG      Transfer Training PT LTG, [place] Level supervision required  -MG      Transfer Training PT LTG, Assist Device walker, rolling  -MG      Gait Training PT LTG    Gait Training Goal PT LTG, Date Established 11/17/17   -MG      Gait Training Goal PT LTG, Time to Achieve 3 days  -MG      Gait Training Goal PT LTG, [place] Level supervision required  -MG      Gait Training Goal PT LTG, Assist Device walker, rolling  -MG      Gait Training Goal PT LTG, Distance to Achieve 100  -MG      Patient Education PT LTG    Patient Education PT LTG, Date Established 11/17/17  -MG      Patient Education PT LTG, Time to Achieve 3 days  -MG      Patient Education PT LTG, Education Type HEP  -MG      Patient Education PT LTG, Education Understanding demonstrate adequately;verbalize understanding  -MG        User Key  (r) = Recorded By, (t) = Taken By, (c) = Cosigned By    Initials Name Provider Type    MG Megan Gosselin, PT Physical Therapist          Physical Therapy Education     Title: PT OT SLP Therapies (Done)     Topic: Physical Therapy (Done)     Point: Mobility training (Done)    Learning Progress Summary    Learner Readiness Method Response Comment Documented by Status   Patient Acceptance E,TB,D VU,NR  RW 11/18/17 1153 Done    Acceptance E VU,NR  MG 11/17/17 1024 Done               Point: Home exercise program (Done)    Learning Progress Summary    Learner Readiness Method Response Comment Documented by Status   Patient Acceptance E,TB,D VU,NR  RW 11/18/17 1153 Done    Acceptance E VU,NR  MG 11/17/17 1024 Done               Point: Body mechanics (Done)    Learning Progress Summary    Learner Readiness Method Response Comment Documented by Status   Patient Acceptance E,TB,D VU,NR  RW 11/18/17 1153 Done    Acceptance E VU,NR  MG 11/17/17 1024 Done               Point: Precautions (Done)    Learning Progress Summary    Learner Readiness Method Response Comment Documented by Status   Patient Acceptance E,TB,D VU,NR  RW 11/18/17 1153 Done    Acceptance E VU,NR  MG 11/17/17 1024 Done                      User Key     Initials Effective Dates Name Provider Type Discipline     04/06/16 -  Alise Alejo, PTA Physical Therapy Assistant PT     MG 09/13/17 -  Megan Gosselin, PT Physical Therapist PT                    PT Recommendation and Plan  Anticipated Discharge Disposition: skilled nursing facility  Planned Therapy Interventions: balance training, bed mobility training, gait training, home exercise program, patient/family education, ROM (Range of Motion), strengthening, transfer training  PT Frequency: 2 times/day  Plan of Care Review  Plan Of Care Reviewed With: patient  Outcome Summary/Follow up Plan: Pt w/ increase pain today, but improved w/ activity. Increased ambulation distance and exercise tolerance this am. Progressing well w/ PT.          Outcome Measures       11/18/17 1052 11/17/17 1000       How much help from another person do you currently need...    Turning from your back to your side while in flat bed without using bedrails? 3  -RW 3  -MG     Moving from lying on back to sitting on the side of a flat bed without bedrails? 3  -RW 3  -MG     Moving to and from a bed to a chair (including a wheelchair)? 3  -RW 3  -MG     Standing up from a chair using your arms (e.g., wheelchair, bedside chair)? 3  -RW 3  -MG     Climbing 3-5 steps with a railing? 2  -RW 2  -MG     To walk in hospital room? 3  -RW 2  -MG     AM-PAC 6 Clicks Score 17  -RW 16  -MG     Functional Assessment    Outcome Measure Options AM-PAC 6 Clicks Basic Mobility (PT)  -RW AM-PAC 6 Clicks Basic Mobility (PT)  -MG       User Key  (r) = Recorded By, (t) = Taken By, (c) = Cosigned By    Initials Name Provider Type     Alise Alejo, PTA Physical Therapy Assistant     Megan Gosselin, PT Physical Therapist           Time Calculation:         PT Charges       11/18/17 1151          Time Calculation    Start Time 1052  -RW      Stop Time 1135  -RW      Time Calculation (min) 43 min  -RW      PT Received On 11/18/17  -RW      PT - Next Appointment 11/18/17  -        User Key  (r) = Recorded By, (t) = Taken By, (c) = Cosigned By    Initials Name Provider Type      Alise Alejo PTA Physical Therapy Assistant          Therapy Charges for Today     Code Description Service Date Service Provider Modifiers Qty    24640471829 HC PT THER PROC EA 15 MIN 11/18/2017 Alise Alejo PTA GP 1    52442050226 HC PT THER PROC GROUP 11/18/2017 Alise Alejo PTA GP 1          PT G-Codes  Outcome Measure Options: AM-PAC 6 Clicks Basic Mobility (PT)    Alise Alejo PTA  11/18/2017

## 2017-11-18 NOTE — PLAN OF CARE
Problem: Patient Care Overview (Adult)  Goal: Plan of Care Review  Outcome: Ongoing (interventions implemented as appropriate)    11/18/17 0415 11/18/17 1154 11/18/17 1541   Coping/Psychosocial Response Interventions   Plan Of Care Reviewed With --  patient --    Patient Care Overview   Progress progress toward functional goals as expected --  --    Outcome Evaluation   Outcome Summary/Follow up Plan --  --  Patient worked with pt in gym x 2 today. Pain still 5 - 7 on scale, 10 with movement. Patient does get groggy b/t doses and sleeps. Room air during day. changed dressing. DC on monday to rehab. Edcuated patient on her HTN and medications and patient acknowledged understanding.        Goal: Adult Individualization and Mutuality  Outcome: Ongoing (interventions implemented as appropriate)  Goal: Discharge Needs Assessment  Outcome: Ongoing (interventions implemented as appropriate)    Problem: Perioperative Period (Adult)  Goal: Signs and Symptoms of Listed Potential Problems Will be Absent or Manageable (Perioperative Period)  Outcome: Outcome(s) achieved Date Met:  11/18/17  Goal: Signs and Symptoms of Listed Potential Problems Will be Absent or Manageable (Perioperative Period)  Outcome: Outcome(s) achieved Date Met:  11/18/17    Problem: Pain, Acute (Adult)  Goal: Acceptable Pain Control/Comfort Level  Outcome: Ongoing (interventions implemented as appropriate)    Problem: Fall Risk (Adult)  Goal: Absence of Falls  Outcome: Ongoing (interventions implemented as appropriate)

## 2017-11-18 NOTE — THERAPY TREATMENT NOTE
Acute Care - Physical Therapy Treatment Note  [facility]     Patient Name: Josephine Wolf  : 1940  MRN: [id number]  Today's Date: 2017  Onset of Illness/Injury or Date of Surgery Date:  (POD 1 R TKA)     Referring Physician: Ana    Admit Date: 2017    Visit Dx:    ICD-10-CM ICD-9-CM   1. Difficulty walking R26.2 719.7     Patient Active Problem List   Diagnosis   • Anemia   • OA (osteoarthritis) of knee               Adult Rehabilitation Note       17 1400 17 1052 17 1636    Rehab Assessment/Intervention    Discipline physical therapy assistant  -RW physical therapy assistant  -RW physical therapy assistant  -    Document Type therapy note (daily note)  -RW therapy note (daily note)  - therapy note (daily note)  -    Subjective Information agree to therapy;complains of;pain  -RW agree to therapy;complains of;pain  -RW agree to therapy;complains of;weakness;fatigue;pain;swelling  -    Patient Effort, Rehab Treatment good  -RW good  -RW     Precautions/Limitations fall precautions  -RW fall precautions  -RW fall precautions  -    Precautions/Limitations, Hearing hearing impairment, bilaterally  -RW hearing impairment, bilaterally  -RW hearing impairment, bilaterally  -JM    Specific Treatment Considerations   very shaky/nervous; needs one on one educ today  -    Recorded by [RW] Alise Alejo, PTA [RW] Alise Alejo, PTA [JM] Marybeth Hayes PTA    Pain Assessment    Pain Assessment 0-10  -RW 0-10  -RW 0-10  -JM    Pain Score 10  -RW 7  -RW 7  -JM    Pain Type Acute pain;Surgical pain  -RW Acute pain;Surgical pain  -RW Surgical pain  -JM    Pain Location Knee  -RW Knee  -RW Knee  -JM    Pain Orientation Right  -RW Right  -RW Right  -JM    Pain Intervention(s) Medication (See MAR);Repositioned;Ambulation/increased activity  -RW Medication (See MAR);Repositioned;Ambulation/increased activity  -RW Medication (See MAR);Repositioned   nsg to get fresh ice pack   -JM    Response to Interventions tolerated, unchanged  -RW improved  -RW esperanza  -JM    Recorded by [RW] Alise Alejo PTA [RW] Alise Alejo PTA [JM] Marybeth Hayes PTA    Cognitive Assessment/Intervention    Current Cognitive/Communication Assessment functional  -RW functional  -RW     Orientation Status oriented x 4  -RW oriented x 4  -RW     Follows Commands/Answers Questions 100% of the time  -% of the time  -RW     Personal Safety WNL/WFL  -RW WNL/WFL  -RW     Personal Safety Interventions fall prevention program maintained;gait belt;nonskid shoes/slippers when out of bed  -RW fall prevention program maintained;gait belt;nonskid shoes/slippers when out of bed  -RW     Recorded by [RW] Alise Alejo PTA [RW] Alise Alejo PTA     ROM (Range of Motion)    General ROM Detail  R knee 9-80'  -RW     Recorded by  [RW] Alise Alejo PTA     Bed Mobility, Assessment/Treatment    Bed Mob, Supine to Sit, [place] not tested  -RW not tested  -RW     Bed Mob, Sit to Supine, [place] not tested  -RW not tested  -RW     Bed Mobility, Comment Pt up in chair  -RW Pt up in chair  -RW in chair  -JM    Recorded by [RW] Alise Alejo PTA [RW] Alise Alejo PTA [JM] Marybeth Hayes PTA    Transfer Assessment/Treatment    Transfers, Sit-Stand [place] contact guard assist;verbal cues required  -RW minimum assist (75% patient effort);verbal cues required  -RW minimum assist (75% patient effort);contact guard assist;verbal cues required  -JM    Transfers, Stand-Sit [place] contact guard assist;verbal cues required  -RW contact guard assist;verbal cues required  -RW contact guard assist;verbal cues required  -JM    Transfers, Sit-Stand-Sit, Assist Device rolling walker  -RW rolling walker  -RW rolling walker  -JM    Transfer, Impairments  strength decreased;impaired balance  -RW strength decreased;impaired balance;pain  -JM    Transfer, Comment   post lean noted  -JM    Recorded by [RW]  Alise Alejo PTA [RW] Alise Alejo PTA [JM] Marybeth Hayes PTA    Gait Assessment/Treatment    Gait, [place] Level contact guard assist;verbal cues required  -RW contact guard assist;minimum assist (75% patient effort);verbal cues required  -RW minimum assist (75% patient effort);contact guard assist;verbal cues required  -JM    Gait, Assistive Device rolling walker  -RW rolling walker  -RW rolling walker  -JM    Gait, Distance (Feet) 40  -RW 75  -RW 45  -JM    Gait, Gait Pattern Analysis swing-through gait  -RW swing-through gait  -RW     Gait, Gait Deviations forward flexed posture;right:;antalgic;shilpa decreased  -RW forward flexed posture;right:;antalgic;shilpa decreased;bilateral:;limb motion velocity decreased  -RW antalgic;forward flexed posture;step length decreased;narrow base  -JM    Gait, Safety Issues   step length decreased;balance decreased during turns;sequencing ability decreased;weight-shifting ability decreased;loses balance backward  -    Gait, Impairments  strength decreased;impaired balance  -RW strength decreased;impaired balance;pain  -JM    Recorded by [RW] Alise Alejo PTA [RW] Alise Alejo PTA [JM] Marybeth Hayes PTA    Therapy Exercises    Exercise Protocols total knee  -RW total knee  -RW total knee  -JM    Total Knee Exercises right:;25 reps;completed protocol  -RW right:;20 reps;completed protocol   cues to stay awake and on task  -RW right:;15 reps;completed protocol;with assist;SLR   to initiate  -JM    Recorded by [RW] Alise Alejo PTA [RW] Alise Alejo PTA [JM] Marybeth Hayes PTA    Positioning and Restraints    Pre-Treatment Position sitting in chair/recliner  -RW sitting in chair/recliner  -RW sitting in chair/recliner  -JM    Post Treatment Position chair  -RW chair  -RW     In Chair reclined;call light within reach;encouraged to call for assist;with family/caregiver  -RW reclined;call light within reach;encouraged to call for assist   ice  applied to R knee  -RW reclined;call light within reach;encouraged to call for assist   instructed pt to call for assist for all mobility  -JM    Recorded by [RW] Alise Alejo, PTA [RW] Alise Alejo, PTA [JM] Marybeth Hayes, SHANNEN      User Key  (r) = Recorded By, (t) = Taken By, (c) = Cosigned By    Initials Name Effective Dates     Marybeth Hayes, PTA 02/18/16 -     RW Alise Alejo, PTA 04/06/16 -                 IP PT Goals       11/17/17 1024          Bed Mobility PT LTG    Bed Mobility PT LTG, Date Established 11/17/17  -MG      Bed Mobility PT LTG, Time to Achieve 3 days  -MG      Bed Mobility PT LTG, Activity Type all bed mobility  -MG      Bed Mobility PT LTG, [place] Level independent  -MG      Transfer Training PT LTG    Transfer Training PT LTG, Date Established 11/17/17  -MG      Transfer Training PT LTG, Time to Achieve 3 days  -MG      Transfer Training PT LTG, Activity Type bed to chair /chair to bed  -MG      Transfer Training PT LTG, [place] Level supervision required  -MG      Transfer Training PT LTG, Assist Device walker, rolling  -MG      Gait Training PT LTG    Gait Training Goal PT LTG, Date Established 11/17/17  -MG      Gait Training Goal PT LTG, Time to Achieve 3 days  -MG      Gait Training Goal PT LTG, [place] Level supervision required  -MG      Gait Training Goal PT LTG, Assist Device walker, rolling  -MG      Gait Training Goal PT LTG, Distance to Achieve 100  -MG      Patient Education PT LTG    Patient Education PT LTG, Date Established 11/17/17  -MG      Patient Education PT LTG, Time to Achieve 3 days  -MG      Patient Education PT LTG, Education Type HEP  -MG      Patient Education PT LTG, Education Understanding demonstrate adequately;verbalize understanding  -MG        User Key  (r) = Recorded By, (t) = Taken By, (c) = Cosigned By    Initials Name Provider Type    MG Megan Gosselin, PT Physical Therapist          Physical Therapy Education     Title: PT  OT SLP Therapies (Done)     Topic: Physical Therapy (Done)     Point: Mobility training (Done)    Learning Progress Summary    Learner Readiness Method Response Comment Documented by Status   Patient Acceptance E,TB,D VU,NR   11/18/17 1153 Done    Acceptance E VU,NR  MG 11/17/17 1024 Done               Point: Home exercise program (Done)    Learning Progress Summary    Learner Readiness Method Response Comment Documented by Status   Patient Acceptance E,TB,D VU,NR   11/18/17 1153 Done    Acceptance E VU,NR  MG 11/17/17 1024 Done               Point: Body mechanics (Done)    Learning Progress Summary    Learner Readiness Method Response Comment Documented by Status   Patient Acceptance E,TB,D VU,NR   11/18/17 1153 Done    Acceptance E VU,NR  MG 11/17/17 1024 Done               Point: Precautions (Done)    Learning Progress Summary    Learner Readiness Method Response Comment Documented by Status   Patient Acceptance E,TB,D VU,NR   11/18/17 1153 Done    Acceptance E VU,NR  MG 11/17/17 1024 Done                      User Key     Initials Effective Dates Name Provider Type Discipline     04/06/16 -  Alise Alejo PTA Physical Therapy Assistant PT     09/13/17 -  Megan Gosselin, PT Physical Therapist PT                    PT Recommendation and Plan  Anticipated Discharge Disposition: skilled nursing facility  Planned Therapy Interventions: balance training, bed mobility training, gait training, home exercise program, patient/family education, ROM (Range of Motion), strengthening, transfer training  PT Frequency: 2 times/day  Plan of Care Review  Plan Of Care Reviewed With: patient  Outcome Summary/Follow up Plan: Pt w/ increase pain today, but improved w/ activity. Increased ambulation distance and exercise tolerance this am. Progressing well w/ PT.          Outcome Measures       11/18/17 1052 11/17/17 1000       How much help from another person do you currently need...    Turning from your back to your  side while in flat bed without using bedrails? 3  -RW 3  -MG     Moving from lying on back to sitting on the side of a flat bed without bedrails? 3  -RW 3  -MG     Moving to and from a bed to a chair (including a wheelchair)? 3  -RW 3  -MG     Standing up from a chair using your arms (e.g., wheelchair, bedside chair)? 3  -RW 3  -MG     Climbing 3-5 steps with a railing? 2  -RW 2  -MG     To walk in hospital room? 3  -RW 2  -MG     AM-PAC 6 Clicks Score 17  -RW 16  -MG     Functional Assessment    Outcome Measure Options AM-PAC 6 Clicks Basic Mobility (PT)  -RW AM-PAC 6 Clicks Basic Mobility (PT)  -MG       User Key  (r) = Recorded By, (t) = Taken By, (c) = Cosigned By    Initials Name Provider Type    RW Alise Alejo PTA Physical Therapy Assistant    MG Megan Gosselin, PT Physical Therapist           Time Calculation:         PT Charges       11/18/17 1407 11/18/17 1151       Time Calculation    Start Time 1402  -RW 1052  -RW     Stop Time 1426  -RW 1135  -RW     Time Calculation (min) 24 min  -RW 43 min  -RW     PT Received On 11/18/17  -RW 11/18/17  -RW     PT - Next Appointment 11/19/17  - 11/18/17  -RW       User Key  (r) = Recorded By, (t) = Taken By, (c) = Cosigned By    Initials Name Provider Type    RW Alise Alejo PTA Physical Therapy Assistant          Therapy Charges for Today     Code Description Service Date Service Provider Modifiers Qty    95435773708 HC PT THER PROC EA 15 MIN 11/18/2017 Alise Alejo PTA GP 1    77450054225 HC PT THER PROC GROUP 11/18/2017 Alise Alejo PTA GP 1    82882156002 HC PT THER PROC EA 15 MIN 11/18/2017 Alise Alejo PTA GP 1    08142929178 HC PT THER PROC GROUP 11/18/2017 Alise Alejo PTA GP 1          PT G-Codes  Outcome Measure Options: AM-PAC 6 Clicks Basic Mobility (PT)    Alise Alejo PTA  11/18/2017

## 2017-11-18 NOTE — PLAN OF CARE
Problem: Patient Care Overview (Adult)  Goal: Plan of Care Review  Outcome: Ongoing (interventions implemented as appropriate)    11/18/17 0415   Coping/Psychosocial Response Interventions   Plan Of Care Reviewed With patient   Patient Care Overview   Progress progress toward functional goals as expected   Outcome Evaluation   Outcome Summary/Follow up Plan VSS. Neurovascular assessments WNL. Voiding well. Ambulating with assist X1 using walker and gait belt. Voices some increased aching but states well controlled with PO analgesics. VErbalizes understandig of b/p medications and monitoring of b/p. Plans for Rehab upon d/c         Problem: Perioperative Period (Adult)  Goal: Signs and Symptoms of Listed Potential Problems Will be Absent or Manageable (Perioperative Period)  Outcome: Ongoing (interventions implemented as appropriate)    11/18/17 0415   Perioperative Period   Problems Assessed (Perioperative Period) all   Problems Present (Perioperative Period) pain         Problem: Pain, Acute (Adult)  Goal: Identify Related Risk Factors and Signs and Symptoms  Outcome: Outcome(s) achieved Date Met:  11/18/17 11/18/17 0415   Pain, Acute   Related Risk Factors (Acute Pain) surgery   Signs and Symptoms (Acute Pain) verbalization of pain descriptors       Goal: Acceptable Pain Control/Comfort Level  Outcome: Ongoing (interventions implemented as appropriate)    11/18/17 0415   Pain, Acute (Adult)   Acceptable Pain Control/Comfort Level achieves outcome         Problem: Fall Risk (Adult)  Goal: Identify Related Risk Factors and Signs and Symptoms  Outcome: Outcome(s) achieved Date Met:  11/18/17 11/18/17 0415   Fall Risk   Fall Risk: Related Risk Factors age-related changes;gait/mobility problems;fatigue/slow reaction   Fall Risk: Signs and Symptoms presence of risk factors       Goal: Absence of Falls  Outcome: Ongoing (interventions implemented as appropriate)    11/18/17 0415   Fall Risk (Adult)   Absence of  Falls achieves outcome

## 2017-11-18 NOTE — PROGRESS NOTES
"Ortho POD 2    Patient Name:  Josephine Wolf  YOB: 1940  Medical Records Number:  [id number]    No complaints except pain    /57 (BP Location: Right arm, Patient Position: Sitting)  Pulse 72  Temp 98 °F (36.7 °C) (Oral)   Resp 16  Ht 61\" (154.9 cm)  Wt 143 lb (64.9 kg)  SpO2 91%  BMI 27.02 kg/m2    RLE:  NVI, calf nontender, sensation intact  No signs of DVT    Incision: clean, no infection    Lab Results (last 24 hours)     Procedure Component Value Units Date/Time    Hemoglobin & Hematocrit, Blood [[id number]]  (Abnormal) Collected:  11/18/17 0334    Specimen:  Blood Updated:  11/18/17 0357     Hemoglobin 7.8 (L) g/dL      Hematocrit 25.3 (L) %           S/p Right TKA  WBAT with walker  ASA for DVT prophylaxis  Hgb 7.8 noted, pre operatively she is around 9, will watch  Home today after PT, if comfortable  "

## 2017-11-18 NOTE — DISCHARGE SUMMARY
Discharge Summary    Patient Name:  Josephine Wolf  YOB: 1940  Medical Records Number:  [id number]    Date of Admission:  11/16/2017  Date of Discharge:  11/18/2017    Primary Discharge Diagnosis:  OA (osteoarthritis) of knee [M17.10]    Secondary Discharge Diagnosis:    Problem List Items Addressed This Visit     None          Procedures Performed:  Right Total Knee Arthroplasty      Hospital Course:    Josephine Wolf is a 77 y.o.  female who underwent successful right tka on 11/16/2017.  Josephine Wolf was started on Aspirin 325mg po twice daily immediately post-operatively for DVT prophylaxis.  On post-op day 1 the patients dressing was changed and their incision was clean, with no signs of infection and their calf was soft, with no signs of DVT.  The patient progressed well with physical therapy and the patients hemoglobin remained stable. On post-operative day 2 the patient was felt ready for discharge.      Total Knee Joint Replacement Discharge Instructions:    I. ACTIVITIES:  1. Exercises:  ? Complete exercise program as taught by the hospital physical therapist 2 times per day  ? Exercise program will be advanced by the physical therapist  ? During the day be up ambulating every 2 hours (while awake) for short distances  ? Complete the ankle pump exercises at least 10 times per hour (while awake)  ? Elevate legs most of the day the first week post operatively and thereafter elevate legs when in bed and for at least 30 minutes during the day. Caution must be taken to avoid pillow placement under the bend of the knee as this can led to flexion contractures of the knee.  ? Use cold packs 20-30 minutes approximately 5 times per day. This should be done before and after completing your exercises and at any time you are experiencing pain/ stiffness in your operative extremity.      2. Activities of Daily Living:  ? No tub baths, hot tubs, or swimming pools for 4 weeks  ? May shower and  let water run over the incision on post-operative day #5 if no drainage. Do not scrub or rub the incision. Simply let the water run over the incision and pat dry.    II. Restrictions  ? Do not cross legs or kneel  ? Your surgeon will discuss with you when you will be able to drive again.  ? Weight bearing is as tolerated  ? First week stay inside on even terrain. May go up and down stairs one stair at a time utilizing the hand rail  ? After one week, you may venture outside.    III. Precautions:  ? Everyone that comes near you should wash their hands  ? No elective dental, genital-urinary, or colon procedures or surgical procedures for 12 weeks after surgery unless absolutely necessary.  ?  If dental work or surgical procedure is deemed absolutely necessary, you will need to contact your surgeon as you will need to take antibiotics 1 hour prior to any dental work (including teeth cleanings).  ? Please discuss with your surgeon prophylactic antibiotics as the length of time this intervention will be necessary for you varies with each patient’s health history and situation.  ? Avoid sick people. If you must be around someone who is ill, they should wear a mask.  ? Avoid visits to the Emergency Room or Urgent Care unless you are having a life threatening event.   ? If ordered stockings are to be placed on in the morning and removed at night. Monitor the stockings to ensure that any swelling is not causing the stockings to become too tight. In this case, remove stockings immediately.    IV. INCISION CARE:  ? Wash your hands prior to dressing changes  ? Change the dressing as needed to keep incision clean and dry. Utilize dry gauze and paper tape. Avoid touching the side of the gauze that goes against the incision with your hands.  ? No creams or ointments to the incision  ? May remove dressing once the incision is free of drainage  ? Do not touch or pick at the incision  ? Check incision every day and notify surgeon  immediately if any of the following signs or symptoms are noted:  o Increase in redness  o Increase in swelling around the incision and of the entire extremity  o Increase in pain  o Drainage oozing from the incision  o Pulling apart of the edges of the incision  o Increase in overall body temperature (greater than 100.5 degrees)  ? Your surgeon will instruct you regarding suture or staple removal    V. Medications:   1. Anticoagulants: You will be discharged on an anticoagulant. This is a prophylactic medication that helps prevent blood clots during your post-operative period. The type and length of dosage varies based on your individual needs, procedure performed, and surgeon’s preference.  ? While taking the anticoagulant, you should avoid taking any additional aspirin, ibuprofen (Advil or Motrin), Aleve (Naprosyn) or other non-steroidal anti-inflammatory medications.   ? Notify surgeon immediately if any elizabeth bleeding is noted in the urine, stool, emesis, or from the nose or the incision. Blood in the stool will often appear as black rather than red. Blood in urine may appear as pink. Blood in emesis may appear as brown/black like coffee grounds.  ? You will need to apply pressure for longer periods of time to any cuts or abrasions to stop bleeding  ? Avoid alcohol while taking anticoagulants    2. Stool Softeners: You will be at greater risk of constipation after surgery due to being less mobile and the pain medications.   ? Take stool softeners as instructed by your surgeon while on pain medications. Over the counter Colace 100 mg 1-2 capsules twice daily.   ? If stools become too loose or too frequent, please decreases the dosage or stop the stool softener.  ? If constipation occurs despite use of stool softeners, you are to continue the stool softeners and add a laxative (Milk of Magnesia 1 ounce daily as needed)  ? Drink plenty of fluids, and eat fruits and vegetables during your recovery time    3. Pain  Medications utilized after surgery are narcotics and the law requires that the following information be given to all patients that are prescribed narcotics:  ? CLASSIFICATION: Pain medications are called Opioids and are narcotics  ? LEGALITIES: It is illegal to share narcotics with others and to drive within 24 hours of taking narcotics  ? POTENTIAL SIDE EFFECTS: Potential side effects of opioids include: nausea, vomiting, itching, dizziness, drowsiness, dry mouth, constipation, and difficulty urinating.  ? POTENTIAL ADVERSE EFFECTS:   o Opioid tolerance can develop with use of pain medications and this simply means that it requires more and more of the medication to control pain; however, this is seen more in patients that use opioids for longer periods of time.  o Opioid dependence can develop with use of Opioids and this simply means that to stop the medication can cause withdrawal symptoms; however, this is seen with patients that use Opioids for longer periods of time.  o Opioid addiction can develop with use of Opioids and the incidence of this is very unlikely in patients who take the medications as ordered and stop the medications as instructed.  o Opioid overdose can be dangerous, but is unlikely when the medication is taken as ordered and stopped when ordered. It is important not to mix opioids with alcohol or with and type of sedative such as Benadryl as this can lead to over sedation and respiratory difficulty.  ? DOSAGE:   o Pain medications will need to be taken consistently for the first week to decrease pain and promote adequate pain relief and participation in physical therapy.  o After the initial surgical pain begins to resolve, you may begin to decrease the pain medication. By the end of 6-8 weeks, you should be off of pain medications.  o Refills will not be given by the office during evening hours, on weekends, or after 6-8 weeks post-op.  o To seek refills on pain medications during the  initial 6 week post-operative period, you must call the office 48 hours in advance to request the refill. The office will then notify you when to  the prescription. DO NOT wait until you are out of the medication to request a refill.    V. FOLLOW-UP VISITS:  ? You will need to follow up in the office with your surgeon in 3 weeks. Please call this number [phone] to schedule this appointment.  If you have any concerns or suspected complications prior to your follow up visit, please call your surgeons office. Do not wait until your appointment time if you suspect complications. These will need to be addressed in the office promptly.      Discharge Medications:     1) Norco 7.5/325 mg  1-2 po q 4-6 hours for pain control  2)  Aspirin 325 mg po twice daily for 2 weeks, then once daily for 4 weeks.    CC:Bill Martino MD:Kashif Perez MD

## 2017-11-19 LAB
ARTERIAL PATENCY WRIST A: ABNORMAL
ATMOSPHERIC PRESS: 749.4 MMHG
BASE EXCESS BLDA CALC-SCNC: 4 MMOL/L (ref 0–2)
BDY SITE: ABNORMAL
CREAT BLD-MCNC: 1.09 MG/DL (ref 0.57–1)
GAS FLOW AIRWAY: 3 LPM
GFR SERPL CREATININE-BSD FRML MDRD: 49 ML/MIN/1.73
HCO3 BLDA-SCNC: 29.9 MMOL/L (ref 22–28)
HCT VFR BLD AUTO: 27.7 % (ref 35.6–45.5)
HGB BLD-MCNC: 8.8 G/DL (ref 11.9–15.5)
MODALITY: ABNORMAL
PCO2 BLDA: 49.8 MM HG (ref 35–45)
PH BLDA: 7.39 PH UNITS (ref 7.35–7.45)
PO2 BLDA: 70.9 MM HG (ref 80–100)
SAO2 % BLDCOA: 93.5 % (ref 92–99)
SET MECH RESP RATE: 20

## 2017-11-19 PROCEDURE — 94799 UNLISTED PULMONARY SVC/PX: CPT

## 2017-11-19 PROCEDURE — 82803 BLOOD GASES ANY COMBINATION: CPT

## 2017-11-19 PROCEDURE — 97150 GROUP THERAPEUTIC PROCEDURES: CPT

## 2017-11-19 PROCEDURE — 85018 HEMOGLOBIN: CPT | Performed by: ORTHOPAEDIC SURGERY

## 2017-11-19 PROCEDURE — 97110 THERAPEUTIC EXERCISES: CPT

## 2017-11-19 PROCEDURE — 85014 HEMATOCRIT: CPT | Performed by: ORTHOPAEDIC SURGERY

## 2017-11-19 PROCEDURE — 36600 WITHDRAWAL OF ARTERIAL BLOOD: CPT

## 2017-11-19 PROCEDURE — 82565 ASSAY OF CREATININE: CPT | Performed by: ORTHOPAEDIC SURGERY

## 2017-11-19 RX ADMIN — ALBUTEROL SULFATE 1.25 MG: 1.25 SOLUTION RESPIRATORY (INHALATION) at 16:16

## 2017-11-19 RX ADMIN — ALBUTEROL SULFATE 1.25 MG: 1.25 SOLUTION RESPIRATORY (INHALATION) at 10:45

## 2017-11-19 RX ADMIN — HYDROCODONE BITARTRATE AND ACETAMINOPHEN 2 TABLET: 7.5; 325 TABLET ORAL at 14:51

## 2017-11-19 RX ADMIN — HYDROCODONE BITARTRATE AND ACETAMINOPHEN 2 TABLET: 7.5; 325 TABLET ORAL at 04:26

## 2017-11-19 RX ADMIN — CETIRIZINE HYDROCHLORIDE 10 MG: 10 TABLET, FILM COATED ORAL at 08:16

## 2017-11-19 RX ADMIN — LOSARTAN POTASSIUM 100 MG: 100 TABLET, FILM COATED ORAL at 08:16

## 2017-11-19 RX ADMIN — HYDROCODONE BITARTRATE AND ACETAMINOPHEN 2 TABLET: 7.5; 325 TABLET ORAL at 08:15

## 2017-11-19 RX ADMIN — ALBUTEROL SULFATE 1.25 MG: 1.25 SOLUTION RESPIRATORY (INHALATION) at 21:59

## 2017-11-19 RX ADMIN — ASPIRIN 325 MG: 325 TABLET, DELAYED RELEASE ORAL at 18:23

## 2017-11-19 RX ADMIN — VENLAFAXINE HYDROCHLORIDE 150 MG: 150 CAPSULE, EXTENDED RELEASE ORAL at 08:16

## 2017-11-19 RX ADMIN — PANTOPRAZOLE SODIUM 40 MG: 40 TABLET, DELAYED RELEASE ORAL at 06:43

## 2017-11-19 RX ADMIN — GABAPENTIN 300 MG: 300 CAPSULE ORAL at 14:54

## 2017-11-19 RX ADMIN — FERROUS SULFATE TAB 325 MG (65 MG ELEMENTAL FE) 325 MG: 325 (65 FE) TAB at 08:16

## 2017-11-19 RX ADMIN — BUDESONIDE AND FORMOTEROL FUMARATE DIHYDRATE 2 PUFF: 160; 4.5 AEROSOL RESPIRATORY (INHALATION) at 11:29

## 2017-11-19 RX ADMIN — ALLOPURINOL 300 MG: 300 TABLET ORAL at 08:17

## 2017-11-19 RX ADMIN — DIVALPROEX SODIUM 250 MG: 250 TABLET, FILM COATED, EXTENDED RELEASE ORAL at 08:16

## 2017-11-19 RX ADMIN — HYDROCODONE BITARTRATE AND ACETAMINOPHEN 1 TABLET: 7.5; 325 TABLET ORAL at 21:29

## 2017-11-19 RX ADMIN — GABAPENTIN 300 MG: 300 CAPSULE ORAL at 08:16

## 2017-11-19 RX ADMIN — BUDESONIDE AND FORMOTEROL FUMARATE DIHYDRATE 2 PUFF: 160; 4.5 AEROSOL RESPIRATORY (INHALATION) at 21:59

## 2017-11-19 RX ADMIN — DIVALPROEX SODIUM 500 MG: 500 TABLET, EXTENDED RELEASE ORAL at 21:38

## 2017-11-19 RX ADMIN — DOCUSATE SODIUM -SENNOSIDES 2 TABLET: 50; 8.6 TABLET, COATED ORAL at 08:16

## 2017-11-19 RX ADMIN — HYDROCHLOROTHIAZIDE 12.5 MG: 25 TABLET ORAL at 08:15

## 2017-11-19 RX ADMIN — ASPIRIN 325 MG: 325 TABLET, DELAYED RELEASE ORAL at 08:17

## 2017-11-19 RX ADMIN — LEVOTHYROXINE SODIUM 88 MCG: 88 TABLET ORAL at 06:43

## 2017-11-19 NOTE — THERAPY TREATMENT NOTE
Acute Care - Physical Therapy Treatment Note  [facility]     Patient Name: Josephine Wolf  : 1940  MRN: [id number]  Today's Date: 2017  Onset of Illness/Injury or Date of Surgery Date:  (POD 1 R TKA)     Referring Physician: Ana    Admit Date: 2017    Visit Dx:    ICD-10-CM ICD-9-CM   1. Difficulty walking R26.2 719.7     Patient Active Problem List   Diagnosis   • Anemia   • OA (osteoarthritis) of knee               Adult Rehabilitation Note       17 1300 17 0900 17 1400    Rehab Assessment/Intervention    Discipline physical therapy assistant  -RW physical therapy assistant  -RW physical therapy assistant  -RW    Document Type therapy note (daily note)  -RW therapy note (daily note)  -RW therapy note (daily note)  -RW    Subjective Information agree to therapy  -RW agree to therapy  -RW agree to therapy;complains of;pain  -RW    Patient Effort, Rehab Treatment good  -RW good  -RW good  -RW    Precautions/Limitations fall precautions  -RW fall precautions  -RW fall precautions  -RW    Precautions/Limitations, Hearing hearing impairment, bilaterally  -RW hearing impairment, bilaterally  -RW hearing impairment, bilaterally  -RW    Recorded by [RW] Alise Alejo PTA [RW] Alise Alejo, SHANNEN [RW] Alise Alejo PTA    Pain Assessment    Pain Assessment 0-10  -RW 0-10  -RW 0-10  -RW    Pain Score 4  -RW 6  -RW 10  -RW    Pain Type Acute pain;Surgical pain  -RW Acute pain;Surgical pain  -RW Acute pain;Surgical pain  -RW    Pain Location Knee  -RW Knee  -RW Knee  -RW    Pain Orientation Right  -RW Right  -RW Right  -RW    Pain Intervention(s) Medication (See MAR);Repositioned;Ambulation/increased activity  -RW Medication (See MAR);Repositioned;Ambulation/increased activity  -RW Medication (See MAR);Repositioned;Ambulation/increased activity  -RW    Response to Interventions tolerated  -RW tolerated  -RW tolerated, unchanged  -RW    Recorded by [RW] Alise Alejo, PTA  [RW] Alise Alejo PTA [RW] Alise Alejo PTA    Cognitive Assessment/Intervention    Current Cognitive/Communication Assessment functional  -RW functional  -RW functional  -RW    Orientation Status oriented x 4  -RW oriented x 4  -RW oriented x 4  -RW    Follows Commands/Answers Questions 100% of the time  -% of the time  -% of the time  -RW    Personal Safety WNL/WFL  -RW WNL/WFL  -RW WNL/WFL  -RW    Personal Safety Interventions fall prevention program maintained;gait belt;nonskid shoes/slippers when out of bed  -RW fall prevention program maintained;gait belt;nonskid shoes/slippers when out of bed  -RW fall prevention program maintained;gait belt;nonskid shoes/slippers when out of bed  -RW    Recorded by [RW] Alise Alejo PTA [RW] Alise Alejo PTA [RW] Alise Alejo PTA    ROM (Range of Motion)    General ROM Detail  R knee 11-81'  -RW     Recorded by  [RW] Alise Alejo PTA     Bed Mobility, Assessment/Treatment    Bed Mob, Supine to Sit, [place] not tested  -RW not tested  -RW not tested  -RW    Bed Mob, Sit to Supine, [place] not tested  -RW not tested  -RW not tested  -RW    Bed Mobility, Comment Pt up in chair  -RW Pt up in chair  -RW Pt up in chair  -RW    Recorded by [RW] Alise Alejo PTA [RW] Alise Alejo, SHANNEN [RW] Alise Alejo PTA    Transfer Assessment/Treatment    Transfers, Sit-Stand [place] contact guard assist;verbal cues required  -RW contact guard assist;verbal cues required  -RW contact guard assist;verbal cues required  -RW    Transfers, Stand-Sit [place] contact guard assist;verbal cues required  -RW contact guard assist;verbal cues required  -RW contact guard assist;verbal cues required  -RW    Transfers, Sit-Stand-Sit, Assist Device rolling walker  -RW rolling walker  -RW rolling walker  -RW    Toilet Transfer, [place] minimum assist (75% patient effort);verbal cues required  -RW contact guard assist;verbal cues required   -RW     Toilet Transfer, Assistive Device rolling walker  -RW rolling walker;other (see comments)   L grab bar  -RW     Transfer, Impairments strength decreased  -RW strength decreased  -RW     Transfer, Comment stood x2  -RW stood x3  -RW     Recorded by [RW] Alise Alejo PTA [RW] Alise Alejo PTA [RW] Alise Alejo PTA    Gait Assessment/Treatment    Gait, [place] Level contact guard assist;verbal cues required  -RW contact guard assist;verbal cues required  -RW contact guard assist;verbal cues required  -RW    Gait, Assistive Device rolling walker  -RW rolling walker  -RW rolling walker  -RW    Gait, Distance (Feet) 55  -RW 70   ambulated additional 30' to restroom in gym  -RW 40  -RW    Gait, Gait Pattern Analysis swing-through gait  -RW swing-through gait  -RW swing-through gait  -RW    Gait, Gait Deviations forward flexed posture;right:;antalgic;[place]il[place] decreased;step length decreased  -RW forward flexed posture;right:;antalgic;[place]il[place] decreased;limb motion velocity decreased  -RW forward flexed posture;right:;antalgic;shilpa decreased  -RW    Gait, Impairments  strength decreased;pain;impaired balance  -RW     Gait, Comment Pt declined further ambulation  -RW      Recorded by [RW] Alise Alejo PTA [RW] Alise Alejo PTA [RW] Alise Alejo PTA    Therapy Exercises    Exercise Protocols total knee  -RW total knee  -RW total knee  -RW    Total Knee Exercises right:;30 reps;completed protocol   cues to stay awake and on task  -RW right:;30 reps;completed protocol   cues to stay on task  -RW right:;25 reps;completed protocol  -RW    Recorded by [RW] Alise Alejo PTA [RW] Alise Alejo PTA [RW] Alise Alejo PTA    Positioning and Restraints    Pre-Treatment Position sitting in chair/recliner  -RW sitting in chair/recliner  -RW sitting in chair/recliner  -RW    Post Treatment Position other  -RW chair  -RW chair  -RW    In Chair  reclined;call light within reach;encouraged to  call for assist  -RW reclined;call light within reach;encouraged to call for assist;with family/caregiver  -RW    Other Position return to room with caregiver   Family pushed pt back to room in chair  -RW      Recorded by [RW] Alise Alejo PTA [RW] Alise Alejo PTA [RW] Alise Alejo PTA      11/18/17 1052 11/17/17 1636       Rehab Assessment/Intervention    Discipline physical therapy assistant  -RW physical therapy assistant  -     Document Type therapy note (daily note)  -RW therapy note (daily note)  -     Subjective Information agree to therapy;complains of;pain  -RW agree to therapy;complains of;weakness;fatigue;pain;swelling  -     Patient Effort, Rehab Treatment good  -RW      Precautions/Limitations fall precautions  -RW fall precautions  -JM     Precautions/Limitations, Hearing hearing impairment, bilaterally  -RW hearing impairment, bilaterally  -JM     Specific Treatment Considerations  very shaky/nervous; needs one on one educ today  -JM     Recorded by [RW] Alise Alejo PTA [JM] Marybeth Hayes PTA     Pain Assessment    Pain Assessment 0-10  -RW 0-10  -JM     Pain Score 7  -RW 7  -JM     Pain Type Acute pain;Surgical pain  -RW Surgical pain  -JM     Pain Location Knee  -RW Knee  -JM     Pain Orientation Right  -RW Right  -JM     Pain Intervention(s) Medication (See MAR);Repositioned;Ambulation/increased activity  -RW Medication (See MAR);Repositioned   nsg to get fresh ice pack  -     Response to Interventions improved  -RW esperanza  -JM     Recorded by [RW] Alise Alejo PTA [JM] Marybeth Hayes PTA     Cognitive Assessment/Intervention    Current Cognitive/Communication Assessment functional  -RW      Orientation Status oriented x 4  -RW      Follows Commands/Answers Questions 100% of the time  -RW      Personal Safety WNL/WFL  -RW      Personal Safety Interventions fall prevention program maintained;gait belt;nonskid shoes/slippers when out of bed  -RW      Recorded by [RW]  Alise Alejo PTA      ROM (Range of Motion)    General ROM Detail R knee 9-80'  -RW      Recorded by [RW] Alise Alejo PTA      Bed Mobility, Assessment/Treatment    Bed Mob, Supine to Sit, [place] not tested  -RW      Bed Mob, Sit to Supine, [place] not tested  -RW      Bed Mobility, Comment Pt up in chair  -RW in chair  -JM     Recorded by [RW] Alise Alejo PTA [JM] Marybeth Hayes PTA     Transfer Assessment/Treatment    Transfers, Sit-Stand [place] minimum assist (75% patient effort);verbal cues required  -RW minimum assist (75% patient effort);contact guard assist;verbal cues required  -     Transfers, Stand-Sit [place] contact guard assist;verbal cues required  -RW contact guard assist;verbal cues required  -     Transfers, Sit-Stand-Sit, Assist Device rolling walker  -RW rolling walker  -     Transfer, Impairments strength decreased;impaired balance  -RW strength decreased;impaired balance;pain  -     Transfer, Comment  post lean noted  -JM     Recorded by [RW] Alise Alejo PTA [JM] Marybeth Hayes PTA     Gait Assessment/Treatment    Gait, [place] Level contact guard assist;minimum assist (75% patient effort);verbal cues required  -RW minimum assist (75% patient effort);contact guard assist;verbal cues required  -     Gait, Assistive Device rolling walker  -RW rolling walker  -     Gait, Distance (Feet) 75  -RW 45  -JM     Gait, Gait Pattern Analysis swing-through gait  -      Gait, Gait Deviations forward flexed posture;right:;antalgic;shilpa decreased;bilateral:;limb motion velocity decreased  -RW antalgic;forward flexed posture;step length decreased;narrow base  -     Gait, Safety Issues  step length decreased;balance decreased during turns;sequencing ability decreased;weight-shifting ability decreased;loses balance backward  -     Gait, Impairments strength decreased;impaired balance  -RW strength decreased;impaired balance;pain  -      Recorded by [RW] Alise Alejo PTA [JM] Marybeth Hayes PTA     Therapy Exercises    Exercise Protocols total knee  -RW total knee  -JM     Total Knee Exercises right:;20 reps;completed protocol   cues to stay awake and on task  -RW right:;15 reps;completed protocol;with assist;SLR   to initiate  -JM     Recorded by [RW] Alise Aleoj PTA [JM] Marybeth Hayes PTA     Positioning and Restraints    Pre-Treatment Position sitting in chair/recliner  -RW sitting in chair/recliner  -JM     Post Treatment Position chair  -RW      In Chair reclined;call light within reach;encouraged to call for assist   ice applied to R knee  -RW reclined;call light within reach;encouraged to call for assist   instructed pt to call for assist for all mobility  -JM     Recorded by [RW] Alise Alejo PTA [JM] Marybeth Hayes PTA       User Key  (r) = Recorded By, (t) = Taken By, (c) = Cosigned By    Initials Name Effective Dates    DIONTE Hayes PTA 02/18/16 -     RW Alise Alejo PTA 04/06/16 -                 IP PT Goals       11/17/17 1024          Bed Mobility PT LTG    Bed Mobility PT LTG, Date Established 11/17/17  -MG      Bed Mobility PT LTG, Time to Achieve 3 days  -MG      Bed Mobility PT LTG, Activity Type all bed mobility  -MG      Bed Mobility PT LTG, [place] Level independent  -MG      Transfer Training PT LTG    Transfer Training PT LTG, Date Established 11/17/17  -MG      Transfer Training PT LTG, Time to Achieve 3 days  -MG      Transfer Training PT LTG, Activity Type bed to chair /chair to bed  -MG      Transfer Training PT LTG, [place] Level supervision required  -MG      Transfer Training PT LTG, Assist Device walker, rolling  -MG      Gait Training PT LTG    Gait Training Goal PT LTG, Date Established 11/17/17  -MG      Gait Training Goal PT LTG, Time to Achieve 3 days  -MG      Gait Training Goal PT LTG, [place] Level supervision required  -MG      Gait Training Goal PT LTG, Assist  Device walker, rolling  -MG      Gait Training Goal PT LTG, Distance to Achieve 100  -MG      Patient Education PT LTG    Patient Education PT LTG, Date Established 11/17/17  -MG      Patient Education PT LTG, Time to Achieve 3 days  -MG      Patient Education PT LTG, Education Type HEP  -MG      Patient Education PT LTG, Education Understanding demonstrate adequately;verbalize understanding  -MG        User Key  (r) = Recorded By, (t) = Taken By, (c) = Cosigned By    Initials Name Provider Type    MG Megan Gosselin, PT Physical Therapist          Physical Therapy Education     Title: PT OT SLP Therapies (Done)     Topic: Physical Therapy (Done)     Point: Mobility training (Done)    Learning Progress Summary    Learner Readiness Method Response Comment Documented by Status   Patient Acceptance E,TB,D VU,NR  RW 11/19/17 0939 Done    Acceptance E,TB,D VU,NR  RW 11/18/17 1153 Done    Acceptance E VU,NR  MG 11/17/17 1024 Done               Point: Home exercise program (Done)    Learning Progress Summary    Learner Readiness Method Response Comment Documented by Status   Patient Acceptance E,TB,D VU,NR  RW 11/19/17 0939 Done    Acceptance E,TB,D VU,NR  RW 11/18/17 1153 Done    Acceptance E VU,NR  MG 11/17/17 1024 Done               Point: Body mechanics (Done)    Learning Progress Summary    Learner Readiness Method Response Comment Documented by Status   Patient Acceptance E,TB,D VU,NR  RW 11/19/17 0939 Done    Acceptance E,TB,D VU,NR  RW 11/18/17 1153 Done    Acceptance E VU,NR  MG 11/17/17 1024 Done               Point: Precautions (Done)    Learning Progress Summary    Learner Readiness Method Response Comment Documented by Status   Patient Acceptance E,TB,D VU,NR  RW 11/19/17 0939 Done    Acceptance E,TB,D VU,NR  RW 11/18/17 1153 Done    Acceptance E VU,NR  MG 11/17/17 1024 Done                      User Key     Initials Effective Dates Name Provider Type Discipline     04/06/16 -  Alise Alejo, PTA Physical  Therapy Assistant PT     09/13/17 -  Megan Gosselin, PT Physical Therapist PT                    PT Recommendation and Plan  Anticipated Discharge Disposition: skilled nursing facility  Planned Therapy Interventions: balance training, bed mobility training, gait training, home exercise program, patient/family education, ROM (Range of Motion), strengthening, transfer training  PT Frequency: 2 times/day  Plan of Care Review  Plan Of Care Reviewed With: patient  Outcome Summary/Follow up Plan: Pt pain seemed better controlled today. Increased exercise tolerance. Progressing well w/ PT.           Outcome Measures       11/19/17 0900 11/18/17 1052 11/17/17 1000    How much help from another person do you currently need...    Turning from your back to your side while in flat bed without using bedrails? 3  -RW 3  -RW 3  -MG    Moving from lying on back to sitting on the side of a flat bed without bedrails? 3  -RW 3  -RW 3  -MG    Moving to and from a bed to a chair (including a wheelchair)? 3  -RW 3  -RW 3  -MG    Standing up from a chair using your arms (e.g., wheelchair, bedside chair)? 3  -RW 3  -RW 3  -MG    Climbing 3-5 steps with a railing? 3  -RW 2  -RW 2  -MG    To walk in hospital room? 3  -RW 3  -RW 2  -MG    AM-PAC 6 Clicks Score 18  -RW 17  -RW 16  -MG    Functional Assessment    Outcome Measure Options AM-PAC 6 Clicks Basic Mobility (PT)  -RW AM-PAC 6 Clicks Basic Mobility (PT)  -RW AM-PAC 6 Clicks Basic Mobility (PT)  -MG      User Key  (r) = Recorded By, (t) = Taken By, (c) = Cosigned By    Initials Name Provider Type     Alise Alejo PTA Physical Therapy Assistant     Megan Gosselin, PT Physical Therapist           Time Calculation:         PT Charges       11/19/17 1314 11/19/17 0938       Time Calculation    Start Time 1306  -RW 0832  -RW     Stop Time 1400  -RW 0920  -RW     Time Calculation (min) 54 min  -RW 48 min  -RW     PT Received On 11/19/17  -RW 11/19/17  -RW     PT - Next Appointment  11/20/17  - 11/19/17  -       User Key  (r) = Recorded By, (t) = Taken By, (c) = Cosigned By    Initials Name Provider Type     Alise Alejo PTA Physical Therapy Assistant          Therapy Charges for Today     Code Description Service Date Service Provider Modifiers Qty    78018110376 HC PT THER PROC EA 15 MIN 11/18/2017 Alise Alejo, PTA GP 1    84825733917 HC PT THER PROC GROUP 11/18/2017 Alise Alejo, PTA GP 1    73389554046 HC PT THER PROC EA 15 MIN 11/18/2017 Alise Alejo, PTA GP 1    59223776546 HC PT THER PROC GROUP 11/18/2017 Alise Alejo, PTA GP 1    80107706871 HC PT THER PROC EA 15 MIN 11/19/2017 Alise Alejo, PTA GP 2    27554662372 HC PT THER PROC GROUP 11/19/2017 Alise Alejo, PTA GP 1    24034573727 HC PT THER PROC EA 15 MIN 11/19/2017 Alise Alejo, PTA GP 1    80954757165 HC PT THER PROC GROUP 11/19/2017 Alise Alejo, PTA GP 1          PT G-Codes  Outcome Measure Options: AM-PAC 6 Clicks Basic Mobility (PT)    Alise Alejo PTA  11/19/2017

## 2017-11-19 NOTE — THERAPY TREATMENT NOTE
Acute Care - Physical Therapy Treatment Note  [facility]     Patient Name: Josephine Wolf  : 1940  MRN: [id number]  Today's Date: 2017  Onset of Illness/Injury or Date of Surgery Date:  (POD 1 R TKA)     Referring Physician: Ana    Admit Date: 2017    Visit Dx:    ICD-10-CM ICD-9-CM   1. Difficulty walking R26.2 719.7     Patient Active Problem List   Diagnosis   • Anemia   • OA (osteoarthritis) of knee               Adult Rehabilitation Note       17 0900 17 1400 17 1052    Rehab Assessment/Intervention    Discipline physical therapy assistant  -RW physical therapy assistant  -RW physical therapy assistant  -RW    Document Type therapy note (daily note)  -RW therapy note (daily note)  -RW therapy note (daily note)  -RW    Subjective Information agree to therapy  -RW agree to therapy;complains of;pain  -RW agree to therapy;complains of;pain  -RW    Patient Effort, Rehab Treatment good  -RW good  -RW good  -RW    Precautions/Limitations fall precautions  -RW fall precautions  -RW fall precautions  -RW    Precautions/Limitations, Hearing hearing impairment, bilaterally  -RW hearing impairment, bilaterally  -RW hearing impairment, bilaterally  -RW    Recorded by [RW] Alise Alejo, PTA [RW] Alise Alejo, PTA [RW] Alise Alejo, PTA    Pain Assessment    Pain Assessment 0-10  -RW 0-10  -RW 0-10  -RW    Pain Score 6  -RW 10  -RW 7  -RW    Pain Type Acute pain;Surgical pain  -RW Acute pain;Surgical pain  -RW Acute pain;Surgical pain  -RW    Pain Location Knee  -RW Knee  -RW Knee  -RW    Pain Orientation Right  -RW Right  -RW Right  -RW    Pain Intervention(s) Medication (See MAR);Repositioned;Ambulation/increased activity  -RW Medication (See MAR);Repositioned;Ambulation/increased activity  -RW Medication (See MAR);Repositioned;Ambulation/increased activity  -RW    Response to Interventions tolerated  -RW tolerated, unchanged  -RW improved  -RW    Recorded by [RW]  Alise Alejo PTA [RW] Alise Alejo PTA [RW] Alise Alejo PTA    Cognitive Assessment/Intervention    Current Cognitive/Communication Assessment functional  -RW functional  -RW functional  -RW    Orientation Status oriented x 4  -RW oriented x 4  -RW oriented x 4  -RW    Follows Commands/Answers Questions 100% of the time  -% of the time  -% of the time  -RW    Personal Safety WNL/WFL  -RW WNL/WFL  -RW WNL/WFL  -RW    Personal Safety Interventions fall prevention program maintained;gait belt;nonskid shoes/slippers when out of bed  -RW fall prevention program maintained;gait belt;nonskid shoes/slippers when out of bed  -RW fall prevention program maintained;gait belt;nonskid shoes/slippers when out of bed  -RW    Recorded by [RW] Alise Alejo PTA [RW] Alise Alejo PTA [RW] Alise Alejo PTA    ROM (Range of Motion)    General ROM Detail R knee 11-81'  -RW  R knee 9-80'  -RW    Recorded by [RW] Alise Alejo PTA  [RW] Alise Alejo PTA    Bed Mobility, Assessment/Treatment    Bed Mob, Supine to Sit, [place] not tested  -RW not tested  -RW not tested  -RW    Bed Mob, Sit to Supine, [place] not tested  -RW not tested  -RW not tested  -RW    Bed Mobility, Comment Pt up in chair  -RW Pt up in chair  -RW Pt up in chair  -RW    Recorded by [RW] Alise Alejo PTA [RW] Alise Alejo, SHANNEN [RW] Alise Alejo PTA    Transfer Assessment/Treatment    Transfers, Sit-Stand [place] contact guard assist;verbal cues required  -RW contact guard assist;verbal cues required  -RW minimum assist (75% patient effort);verbal cues required  -RW    Transfers, Stand-Sit [place] contact guard assist;verbal cues required  -RW contact guard assist;verbal cues required  -RW contact guard assist;verbal cues required  -RW    Transfers, Sit-Stand-Sit, Assist Device rolling walker  -RW rolling walker  -RW rolling walker  -RW    Toilet Transfer, [place] contact guard assist;verbal  cues required  -RW      Toilet Transfer, Assistive Device rolling walker;other (see comments)   L grab bar  -RW      Transfer, Impairments strength decreased  -RW  strength decreased;impaired balance  -RW    Transfer, Comment stood x3  -RW      Recorded by [RW] Alise Alejo PTA [RW] Alise Alejo PTA [RW] Alise Alejo PTA    Gait Assessment/Treatment    Gait, [place] Level contact guard assist;verbal cues required  -RW contact guard assist;verbal cues required  -RW contact guard assist;minimum assist (75% patient effort);verbal cues required  -RW    Gait, Assistive Device rolling walker  -RW rolling walker  -RW rolling walker  -RW    Gait, Distance (Feet) 70   ambulated additional 30' to restroom in gym  -RW 40  -RW 75  -RW    Gait, Gait Pattern Analysis swing-through gait  -RW swing-through gait  -RW swing-through gait  -RW    Gait, Gait Deviations forward flexed posture;right:;antalgic;shil[place] decreased;limb motion velocity decreased  -RW forward flexed posture;right:;antalgic;[place]il[place] decreased  -RW forward flexed posture;right:;antalgic;shil[place] decreased;bilateral:;limb motion velocity decreased  -RW    Gait, Impairments strength decreased;pain;impaired balance  -RW  strength decreased;impaired balance  -RW    Recorded by [RW] Alise Alejo PTA [RW] Alise Alejo PTA [RW] Alise Alejo PTA    Therapy Exercises    Exercise Protocols total knee  -RW total knee  -RW total knee  -RW    Total Knee Exercises right:;30 reps;completed protocol   cues to stay on task  -RW right:;25 reps;completed protocol  -RW right:;20 reps;completed protocol   cues to stay awake and on task  -RW    Recorded by [RW] Alise Alejo PTA [RW] Alise Alejo PTA [RW] Alise Alejo PTA    Positioning and Restraints    Pre-Treatment Position sitting in chair/recliner  -RW sitting in chair/recliner  -RW sitting in chair/recliner  -RW    Post Treatment Position chair  -RW chair  -RW chair  -RW    In Chair  reclined;call light within reach;encouraged to call for assist  -RW reclined;call light within reach;encouraged to call for assist;with family/caregiver  -RW reclined;call light within reach;encouraged to call for assist   ice applied to R knee  -RW    Recorded by [RW] Alise Alejo PTA [RW] Alise Alejo PTA [RW] Alise Alejo, PTA      11/17/17 5920          Rehab Assessment/Intervention    Discipline physical therapy assistant  -      Document Type therapy note (daily note)  -      Subjective Information agree to therapy;complains of;weakness;fatigue;pain;swelling  -      Precautions/Limitations fall precautions  -      Precautions/Limitations, Hearing hearing impairment, bilaterally  -      Specific Treatment Considerations very shaky/nervous; needs one on one educ today  -      Recorded by [] Marybeth Hayes PTA      Pain Assessment    Pain Assessment 0-10  -      Pain Score 7  -      Pain Type Surgical pain  -      Pain Location Knee  -      Pain Orientation Right  -      Pain Intervention(s) Medication (See MAR);Repositioned   nsg to get fresh ice pack  -      Response to Interventions esperanza  -JM      Recorded by [JM] Marybeth Hayes PTA      Bed Mobility, Assessment/Treatment    Bed Mobility, Comment in chair  -JM      Recorded by [DIONTE] Marybeth Hayes PTA      Transfer Assessment/Treatment    Transfers, Sit-Stand [place] minimum assist (75% patient effort);contact guard assist;verbal cues required  -      Transfers, Stand-Sit [place] contact guard assist;verbal cues required  -      Transfers, Sit-Stand-Sit, Assist Device rolling walker  -      Transfer, Impairments strength decreased;impaired balance;pain  -      Transfer, Comment post lean noted  -JM      Recorded by [JM] Marybeth Hayes PTA      Gait Assessment/Treatment    Gait, [place] Level minimum assist (75% patient effort);contact guard assist;verbal cues required  -      Gait, Assistive  Device rolling walker  -JM      Gait, Distance (Feet) 45  -JM      Gait, Gait Deviations antalgic;forward flexed posture;step length decreased;narrow base  -JM      Gait, Safety Issues step length decreased;balance decreased during turns;sequencing ability decreased;weight-shifting ability decreased;loses balance backward  -JM      Gait, Impairments strength decreased;impaired balance;pain  -JM      Recorded by [DIONTE] Marybeth Hayes PTA      Therapy Exercises    Exercise Protocols total knee  -JM      Total Knee Exercises right:;15 reps;completed protocol;with assist;SLR   to initiate  -JM      Recorded by [DIONTE] Marybeth Hayes PTA      Positioning and Restraints    Pre-Treatment Position sitting in chair/recliner  -JM      In Chair reclined;call light within reach;encouraged to call for assist   instructed pt to call for assist for all mobility  -JM      Recorded by [DIONTE] Marybeth Hayes PTA        User Key  (r) = Recorded By, (t) = Taken By, (c) = Cosigned By    Initials Name Effective Dates     Marybeth Hayes, PTA 02/18/16 -     RW Alise Alejo, [facility] [place] 04/06/16 -                 IP PT Goals       11/17/17 1024          Bed Mobility PT LTG    Bed Mobility PT LTG, Date Established 11/17/17  -MG      Bed Mobility PT LTG, Time to Achieve 3 days  -MG      Bed Mobility PT LTG, Activity Type all bed mobility  -MG      Bed Mobility PT LTG, [place] Level independent  -MG      Transfer Training PT LTG    Transfer Training PT LTG, Date Established 11/17/17  -MG      Transfer Training PT LTG, Time to Achieve 3 days  -MG      Transfer Training PT LTG, Activity Type bed to chair /chair to bed  -MG      Transfer Training PT LTG, [place] Level supervision required  -MG      Transfer Training PT LTG, Assist Device walker, rolling  -MG      Gait Training PT LTG    Gait Training Goal PT LTG, Date Established 11/17/17  -MG      Gait Training Goal PT LTG, Time to Achieve 3 days  -MG      Gait Training Goal PT LTG,  [place] Level supervision required  -MG      Gait Training Goal PT LTG, Assist Device walker, rolling  -MG      Gait Training Goal PT LTG, Distance to Achieve 100  -MG      Patient Education PT LTG    Patient Education PT LTG, Date Established 11/17/17  -MG      Patient Education PT LTG, Time to Achieve 3 days  -MG      Patient Education PT LTG, Education Type HEP  -MG      Patient Education PT LTG, Education Understanding demonstrate adequately;verbalize understanding  -MG        User Key  (r) = Recorded By, (t) = Taken By, (c) = Cosigned By    Initials Name Provider Type    MG Megan Gosselin, PT Physical Therapist          Physical Therapy Education     Title: PT OT SLP Therapies (Done)     Topic: Physical Therapy (Done)     Point: Mobility training (Done)    Learning Progress Summary    Learner Readiness Method Response Comment Documented by Status   Patient Acceptance E,TB,D VU,NR   11/19/17 0939 Done    Acceptance E,TB,D VU,NR  RW 11/18/17 1153 Done    Acceptance E VU,NR  MG 11/17/17 1024 Done               Point: Home exercise program (Done)    Learning Progress Summary    Learner Readiness Method Response Comment Documented by Status   Patient Acceptance E,TB,D VU,NR   11/19/17 0939 Done    Acceptance E,TB,D VU,NR  RW 11/18/17 1153 Done    Acceptance E VU,NR  MG 11/17/17 1024 Done               Point: Body mechanics (Done)    Learning Progress Summary    Learner Readiness Method Response Comment Documented by Status   Patient Acceptance E,TB,D VU,NR  RW 11/19/17 0939 Done    Acceptance E,TB,D VU,NR  RW 11/18/17 1153 Done    Acceptance E VU,NR  MG 11/17/17 1024 Done               Point: Precautions (Done)    Learning Progress Summary    Learner Readiness Method Response Comment Documented by Status   Patient Acceptance E,TB,D VU,NR  RW 11/19/17 0939 Done    Acceptance E,TB,D VU,NR  RW 11/18/17 1153 Done    Acceptance E VU,NR  MG 11/17/17 1024 Done                      User Key     Initials Effective  Dates Name Provider Type Discipline    RW 04/06/16 -  Alise Alejo PTA Physical Therapy Assistant PT     09/13/17 -  Megan Gosselin, PT Physical Therapist PT                    PT Recommendation and Plan  Anticipated Discharge Disposition: skilled nursing facility  Planned Therapy Interventions: balance training, bed mobility training, gait training, home exercise program, patient/family education, ROM (Range of Motion), strengthening, transfer training  PT Frequency: 2 times/day  Plan of Care Review  Plan Of Care Reviewed With: patient  Outcome Summary/Follow up Plan: Pt pain seemed better controlled today. Increased exercise tolerance. Progressing well w/ PT.           Outcome Measures       11/19/17 0900 11/18/17 1052 11/17/17 1000    How much help from another person do you currently need...    Turning from your back to your side while in flat bed without using bedrails? 3  -RW 3  -RW 3  -MG    Moving from lying on back to sitting on the side of a flat bed without bedrails? 3  -RW 3  -RW 3  -MG    Moving to and from a bed to a chair (including a wheelchair)? 3  -RW 3  -RW 3  -MG    Standing up from a chair using your arms (e.g., wheelchair, bedside chair)? 3  -RW 3  -RW 3  -MG    Climbing 3-5 steps with a railing? 3  -RW 2  -RW 2  -MG    To walk in hospital room? 3  -RW 3  -RW 2  -MG    AM-PAC 6 Clicks Score 18  -RW 17  -RW 16  -MG    Functional Assessment    Outcome Measure Options AM-PAC 6 Clicks Basic Mobility (PT)  -RW AM-PAC 6 Clicks Basic Mobility (PT)  -RW AM-PAC 6 Clicks Basic Mobility (PT)  -MG      User Key  (r) = Recorded By, (t) = Taken By, (c) = Cosigned By    Initials Name Provider Type    RW Alise Alejo PTA Physical Therapy Assistant     Megan Gosselin, [place] Physical Therapist           Time Calculation:         PT Charges       11/19/17 0938          Time Calculation    Start Time 0832  -RW      Stop Time 0920  -RW      Time Calculation (min) 48 min  -RW      PT Received On 11/19/17   -      PT - Next Appointment 11/19/17  -        User Key  (r) = Recorded By, (t) = Taken By, (c) = Cosigned By    Initials Name Provider Type     Alise Alejo PTA Physical Therapy Assistant          Therapy Charges for Today     Code Description Service Date Service Provider Modifiers Qty    61733600101 HC PT THER PROC EA 15 MIN 11/18/2017 Alise Alejo PTA GP 1    81409107220 HC PT THER PROC GROUP 11/18/2017 Alise Alejo PTA GP 1    26660947640 HC PT THER PROC EA 15 MIN 11/18/2017 Alise Alejo, SHANNEN GP 1    67602074946 HC PT THER PROC GROUP 11/18/2017 Alise Alejo, SHANNEN GP 1    60465850604 HC PT THER PROC EA 15 MIN 11/19/2017 Alise Alejo PTA GP 2    06254733246 HC PT THER PROC GROUP 11/19/2017 Alise Alejo, SHANNEN GP 1          PT G-Codes  Outcome Measure Options: AM-PAC 6 Clicks Basic Mobility (PT)    Alise Alejo PTA  11/19/2017

## 2017-11-19 NOTE — PLAN OF CARE
Problem: Patient Care Overview (Adult)  Goal: Plan of Care Review  Outcome: Ongoing (interventions implemented as appropriate)    11/19/17 7692   Coping/Psychosocial Response Interventions   Plan Of Care Reviewed With patient   Outcome Evaluation   Outcome Summary/Follow up Plan Pt pain seemed better controlled today. Increased exercise tolerance. Progressing well w/ PT.

## 2017-11-19 NOTE — PLAN OF CARE
Problem: Patient Care Overview (Adult)  Goal: Plan of Care Review  Outcome: Ongoing (interventions implemented as appropriate)    11/19/17 0355   Coping/Psychosocial Response Interventions   Plan Of Care Reviewed With patient   Patient Care Overview   Progress improving   Outcome Evaluation   Outcome Summary/Follow up Plan vss, dsg c/d/i, neurovascular status wnl, voiding well, reports adequate pain control, using oxygen at night, educated on importance of monitoring b/p prior to taking b/p meds, DCP to rehab on monday       Goal: Adult Individualization and Mutuality  Outcome: Ongoing (interventions implemented as appropriate)  Goal: Discharge Needs Assessment  Outcome: Ongoing (interventions implemented as appropriate)    Problem: Pain, Acute (Adult)  Goal: Acceptable Pain Control/Comfort Level  Outcome: Ongoing (interventions implemented as appropriate)    Problem: Fall Risk (Adult)  Goal: Absence of Falls  Outcome: Ongoing (interventions implemented as appropriate)    Problem: Knee Replacement, Total (Adult)  Goal: Signs and Symptoms of Listed Potential Problems Will be Absent or Manageable (Knee Replacement, Total)  Outcome: Ongoing (interventions implemented as appropriate)

## 2017-11-19 NOTE — PLAN OF CARE
Problem: Patient Care Overview (Adult)  Goal: Plan of Care Review  Outcome: Ongoing (interventions implemented as appropriate)    11/19/17 1451   Coping/Psychosocial Response Interventions   Plan Of Care Reviewed With patient   Patient Care Overview   Progress improving   Outcome Evaluation   Outcome Summary/Follow up Plan Patient is doing well with ambulating and PT. VSS, Increased pain level but better control with po pain meds. Plan for dc tomorrow to rehab. educated patient on monitoring bp and meds due to hx of HTN       Goal: Adult Individualization and Mutuality  Outcome: Ongoing (interventions implemented as appropriate)  Goal: Discharge Needs Assessment  Outcome: Ongoing (interventions implemented as appropriate)    Problem: Pain, Acute (Adult)  Goal: Acceptable Pain Control/Comfort Level  Outcome: Ongoing (interventions implemented as appropriate)    Problem: Fall Risk (Adult)  Goal: Absence of Falls  Outcome: Ongoing (interventions implemented as appropriate)    Problem: Knee Replacement, Total (Adult)  Goal: Signs and Symptoms of Listed Potential Problems Will be Absent or Manageable (Knee Replacement, Total)  Outcome: Ongoing (interventions implemented as appropriate)

## 2017-11-20 LAB
HCT VFR BLD AUTO: 24.6 % (ref 35.6–45.5)
HGB BLD-MCNC: 7.9 G/DL (ref 11.9–15.5)

## 2017-11-20 PROCEDURE — 85018 HEMOGLOBIN: CPT | Performed by: ORTHOPAEDIC SURGERY

## 2017-11-20 PROCEDURE — 94799 UNLISTED PULMONARY SVC/PX: CPT

## 2017-11-20 PROCEDURE — 97110 THERAPEUTIC EXERCISES: CPT

## 2017-11-20 PROCEDURE — 85014 HEMATOCRIT: CPT | Performed by: ORTHOPAEDIC SURGERY

## 2017-11-20 RX ORDER — BUDESONIDE AND FORMOTEROL FUMARATE DIHYDRATE 160; 4.5 UG/1; UG/1
2 AEROSOL RESPIRATORY (INHALATION)
Status: DISCONTINUED | OUTPATIENT
Start: 2017-11-20 | End: 2017-11-21

## 2017-11-20 RX ADMIN — CETIRIZINE HYDROCHLORIDE 10 MG: 10 TABLET, FILM COATED ORAL at 08:05

## 2017-11-20 RX ADMIN — ALBUTEROL SULFATE 1.25 MG: 1.25 SOLUTION RESPIRATORY (INHALATION) at 00:37

## 2017-11-20 RX ADMIN — HYDROCODONE BITARTRATE AND ACETAMINOPHEN 1 TABLET: 7.5; 325 TABLET ORAL at 17:49

## 2017-11-20 RX ADMIN — DIVALPROEX SODIUM 500 MG: 500 TABLET, EXTENDED RELEASE ORAL at 20:39

## 2017-11-20 RX ADMIN — ASPIRIN 325 MG: 325 TABLET, DELAYED RELEASE ORAL at 08:05

## 2017-11-20 RX ADMIN — ALBUTEROL SULFATE 1.25 MG: 1.25 SOLUTION RESPIRATORY (INHALATION) at 04:13

## 2017-11-20 RX ADMIN — DIVALPROEX SODIUM 250 MG: 250 TABLET, FILM COATED, EXTENDED RELEASE ORAL at 08:02

## 2017-11-20 RX ADMIN — LOSARTAN POTASSIUM 100 MG: 100 TABLET, FILM COATED ORAL at 08:03

## 2017-11-20 RX ADMIN — TRAZODONE HYDROCHLORIDE 100 MG: 100 TABLET, FILM COATED ORAL at 20:38

## 2017-11-20 RX ADMIN — ALLOPURINOL 300 MG: 300 TABLET ORAL at 08:05

## 2017-11-20 RX ADMIN — ALBUTEROL SULFATE 1.25 MG: 1.25 SOLUTION RESPIRATORY (INHALATION) at 08:28

## 2017-11-20 RX ADMIN — HYDROCODONE BITARTRATE AND ACETAMINOPHEN 2 TABLET: 7.5; 325 TABLET ORAL at 13:15

## 2017-11-20 RX ADMIN — FERROUS SULFATE TAB 325 MG (65 MG ELEMENTAL FE) 325 MG: 325 (65 FE) TAB at 08:05

## 2017-11-20 RX ADMIN — GABAPENTIN 300 MG: 300 CAPSULE ORAL at 08:05

## 2017-11-20 RX ADMIN — ASPIRIN 325 MG: 325 TABLET, DELAYED RELEASE ORAL at 17:49

## 2017-11-20 RX ADMIN — HYDROCODONE BITARTRATE AND ACETAMINOPHEN 2 TABLET: 7.5; 325 TABLET ORAL at 01:35

## 2017-11-20 RX ADMIN — BUDESONIDE AND FORMOTEROL FUMARATE DIHYDRATE 2 PUFF: 160; 4.5 AEROSOL RESPIRATORY (INHALATION) at 23:33

## 2017-11-20 RX ADMIN — GABAPENTIN 300 MG: 300 CAPSULE ORAL at 17:49

## 2017-11-20 RX ADMIN — DOCUSATE SODIUM -SENNOSIDES 2 TABLET: 50; 8.6 TABLET, COATED ORAL at 17:49

## 2017-11-20 RX ADMIN — DOCUSATE SODIUM -SENNOSIDES 2 TABLET: 50; 8.6 TABLET, COATED ORAL at 08:02

## 2017-11-20 RX ADMIN — VENLAFAXINE HYDROCHLORIDE 150 MG: 150 CAPSULE, EXTENDED RELEASE ORAL at 08:02

## 2017-11-20 RX ADMIN — HYDROCHLOROTHIAZIDE 12.5 MG: 25 TABLET ORAL at 08:03

## 2017-11-20 RX ADMIN — ALBUTEROL SULFATE 1.25 MG: 1.25 SOLUTION RESPIRATORY (INHALATION) at 23:33

## 2017-11-20 RX ADMIN — PANTOPRAZOLE SODIUM 40 MG: 40 TABLET, DELAYED RELEASE ORAL at 06:44

## 2017-11-20 RX ADMIN — GABAPENTIN 300 MG: 300 CAPSULE ORAL at 20:39

## 2017-11-20 RX ADMIN — LEVOTHYROXINE SODIUM 88 MCG: 88 TABLET ORAL at 06:44

## 2017-11-20 RX ADMIN — BUDESONIDE AND FORMOTEROL FUMARATE DIHYDRATE 2 PUFF: 160; 4.5 AEROSOL RESPIRATORY (INHALATION) at 08:34

## 2017-11-20 RX ADMIN — HYDROCODONE BITARTRATE AND ACETAMINOPHEN 2 TABLET: 7.5; 325 TABLET ORAL at 08:05

## 2017-11-20 NOTE — PROGRESS NOTES
"Ortho POD 4    Patient Name:  Josephine Wolf  YOB: 1940  Medical Records Number:  [id number]    No complaints    /66 (BP Location: Right arm, Patient Position: Sitting)  Pulse 70  Temp 97.3 °F (36.3 °C) (Oral)   Resp 16  Ht 61\" (154.9 cm)  Wt 143 lb (64.9 kg)  SpO2 96%  BMI 27.02 kg/m2    RLE:  NVI, calf nontender, sensation intact  No signs of DVT    Incision: clean, no infection    Lab Results (last 24 hours)     Procedure Component Value Units Date/Time    Blood Gas, Arterial [[id number]]  (Abnormal) Collected:  11/19/17 1721    Specimen:  Arterial Blood Updated:  11/19/17 1724     Site Arterial: right brachial     Zaki's Test N/A     pH, Arterial 7.387 pH units      pCO2, Arterial 49.8 (H) mm Hg      pO2, Arterial 70.9 (L) mm Hg      HCO3, Arterial 29.9 (H) mmol/L      Base Excess, Arterial 4.0 (H) mmol/L      O2 Saturation Calculated 93.5 %      Barometric Pressure for Blood Gas 749.4 mmHg      Modality Cannula     Flow Rate 3 lpm      Set [facility] Resp Rate 20    Narrative:       97sat Meter: [id number] : [id number] Anali Nuno    Hemoglobin & Hematocrit, Blood [[id number]]  (Abnormal) Collected:  11/20/17 0356    Specimen:  Blood Updated:  11/20/17 0424     Hemoglobin 7.9 (L) g/dL      Hematocrit 24.6 (L) %           S/p Right TKA  WBAT with walker  ASA for DVT prophylaxis  Rehab today  "

## 2017-11-20 NOTE — PAYOR COMM NOTE
"UR CONTACT:     BREANN              P: [phone]  F:  [phone]        Raheem Roberts (77 y.o. Female)     Date of Birth Social Security Number Address Home Phone MRN    1940  [id number]   [facility] [address] [phone] [id number]    Anabaptist Marital Status          Alevism        Admission Date Admission Type Admitting Provider Attending Provider Department, Room/Bed    11/16/17 Elective Kashif Perez MD Goodin, Robert A, MD [facility] [address], P877/1    Discharge Date Discharge Disposition Discharge Destination         Skilled Nursing Facility (DC - External)             Attending Provider: Kashif Perez MD     Allergies:  Morphine And Related    Isolation:  None   Infection:  None   Code Status:  FULL    Ht:  61\" (154.9 cm)   Wt:  143 lb (64.9 kg)    Admission Cmt:  None   Principal Problem:  None                Active Insurance as of 11/16/2017     Primary Coverage     Payor Plan Insurance Group Employer/Plan Group    [facility] MEDICARE REPLACEMENT [facility]      Payor Plan Address Payor Plan Phone Number Effective From Effective To    [address] [address] [phone] 10/9/2017     [place] [address]       Subscriber Name Subscriber Birth Date Member ID       RAHEEM ROBERTS 1940 [id number]           Secondary Coverage     Payor Plan Insurance Group Employer/Plan Group    [place]      Payor Plan Address Payor Plan Phone Number Effective From Effective To    [address] [phone] 7/3/2016     [place] [address]       Subscriber Name Subscriber Birth Date Member ID       RAHEEM ROBERTS 1940 [id number]                 Emergency Contacts      (Rel.) Home Phone Work Phone Mobile Phone    LucianoChino daniel (Son) [phone] -- --    MorganvilleJames (Son) [phone] -- [phone]            Lines, Drains & Airways    Active LDAs     Name:   Placement date:   Placement time:   " Site:   Days:    Peripheral IV Line - Single Lumen 11/16/17 1113  18 gauge  11/16/17    1113      3                Hospital Medications (active)       Dose Frequency Start End    acetaminophen (TYLENOL) tablet 325 mg 325 mg Every 4 Hours PRN 11/16/2017     Sig - Route: Take 1 tablet by mouth Every 4 (Four) Hours As Needed for Fever (greater than 101 F). - Oral    acetaminophen (TYLENOL) tablet 650 mg 650 mg Every 4 Hours PRN 11/16/2017     Sig - Route: Take 2 tablets by mouth Every 4 (Four) Hours As Needed for Mild Pain . - Oral    albuterol (PROVENTIL) nebulizer solution 0.042% 1.25 mg/3mL 1 ampule Every 4 Hours 11/16/2017     Sig - Route: Take 3 mL by nebulization Every 4 (Four) Hours. - Nebulization    allopurinol (ZYLOPRIM) tablet 300 mg 300 mg Every Morning 11/17/2017     Sig - Route: Take 1 tablet by mouth Every Morning. - Oral    aspirin EC tablet 325 mg 325 mg 2 Times Daily With Meals 11/16/2017     Sig - Route: Take 1 tablet by mouth 2 (Two) Times a Day With Meals. - Oral    bisacodyl (DULCOLAX) suppository 10 mg 10 mg Daily PRN 11/16/2017     Sig - Route: Insert 1 suppository into the rectum Daily As Needed for Constipation. - Rectal    budesonide-formoterol (SYMBICORT) 160-4.5 MCG/ACT inhaler 2 puff 2 puff 2 Times Daily 11/16/2017     Sig - Route: Inhale 2 puffs 2 (Two) Times a Day. - Inhalation    cetirizine (zyrTEC) tablet 10 mg 10 mg Daily 11/16/2017     Sig - Route: Take 1 tablet by mouth Daily. - Oral    diazePAM (VALIUM) tablet 5 mg 5 mg Every 6 Hours PRN 11/16/2017 11/20/2017    Sig - Route: Take 1 tablet by mouth Every 6 (Six) Hours As Needed for Muscle Spasms. - Oral    diphenhydrAMINE (BENADRYL) capsule 50 mg 50 mg Every 6 Hours PRN 11/16/2017     Sig - Route: Take 2 capsules by mouth Every 6 (Six) Hours As Needed for Itching. - Oral    divalproex (DEPAKOTE) 24 hr tablet 250 mg 250 mg Every Morning 11/17/2017     Sig - Route: Take 1 tablet by mouth Every Morning. - Oral    divalproex (DEPAKOTE)  "24 hr tablet 500 mg 500 mg Nightly 11/16/2017     Sig - Route: Take 1 tablet by mouth Every Night. - Oral    docusate sodium (COLACE) capsule 100 mg 100 mg 2 Times Daily PRN 11/16/2017     Sig - Route: Take 1 capsule by mouth 2 (Two) Times a Day As Needed for Constipation. - Oral    ferrous sulfate tablet 325 mg 325 mg Daily With Breakfast 11/17/2017     Sig - Route: Take 1 tablet by mouth Daily With Breakfast. - Oral    gabapentin (NEURONTIN) capsule 300 mg 300 mg 3 Times Daily 11/16/2017     Sig - Route: Take 1 capsule by mouth 3 (Three) Times a Day. - Oral    hydrochlorothiazide (HYDRODIURIL) tablet 12.5 mg 12.5 mg Every Morning 11/17/2017     Sig - Route: Take 0.5 tablets by mouth Every Morning. - Oral    Linked Group 1:  \"And\" Linked Group Details        HYDROcodone-acetaminophen (NORCO) 7.5-325 MG per tablet 1 tablet 1 tablet Every 4 Hours PRN 11/17/2017 11/27/2017    Sig - Route: Take 1 tablet by mouth Every 4 (Four) Hours As Needed for Moderate Pain . - Oral    Linked Group 2:  \"Or\" Linked Group Details        HYDROcodone-acetaminophen (NORCO) 7.5-325 MG per tablet 2 tablet 2 tablet Every 4 Hours PRN 11/17/2017 11/27/2017    Sig - Route: Take 2 tablets by mouth Every 4 (Four) Hours As Needed for Moderate Pain . - Oral    Linked Group 2:  \"Or\" Linked Group Details        ketorolac (TORADOL) injection 15 mg 15 mg Every 8 Hours PRN 11/16/2017 11/19/2017    Sig - Route: Infuse 15 mg into a venous catheter Every 8 (Eight) Hours As Needed for Moderate Pain . - Intravenous    lactated ringers infusion 9 mL/hr Continuous 11/16/2017     Sig - Route: Infuse 9 mL/hr into a venous catheter Continuous. - Intravenous    levothyroxine (SYNTHROID, LEVOTHROID) tablet 88 mcg 88 mcg Every Early Morning 11/17/2017     Sig - Route: Take 1 tablet by mouth Every Morning. - Oral    losartan (COZAAR) tablet 100 mg 100 mg Every Morning 11/17/2017     Sig - Route: Take 1 tablet by mouth Every Morning. - Oral    Linked Group 1:  \"And\" " "Linked Group Details        magnesium hydroxide (MILK OF MAGNESIA) suspension 2400 mg/10mL 10 mL 10 mL Daily PRN 11/16/2017     Sig - Route: Take 10 mL by mouth Daily As Needed for Constipation. - Oral    melatonin tablet 1 mg 1 mg Nightly PRN 11/16/2017     Sig - Route: Take 1 tablet by mouth At Night As Needed for Sleep. - Oral    Morphine sulfate (PF) injection 6 mg 6 mg Every 2 Hours PRN 11/16/2017 11/26/2017    Sig - Route: Infuse 3 mL into a venous catheter Every 2 (Two) Hours As Needed for Severe Pain . - Intravenous    Linked Group 3:  \"And\" Linked Group Details        naloxone (NARCAN) injection 0.4 mg 0.4 mg Every 5 Minutes PRN 11/16/2017     Sig - Route: Infuse 1 mL into a venous catheter Every 5 (Five) Minutes As Needed for Respiratory Depression. - Intravenous    Linked Group 3:  \"And\" Linked Group Details        ondansetron (ZOFRAN) injection 4 mg 4 mg Every 6 Hours PRN 11/16/2017     Sig - Route: Infuse 2 mL into a venous catheter Every 6 (Six) Hours As Needed for Nausea or Vomiting. - Intravenous    Linked Group 4:  \"Or\" Linked Group Details        ondansetron (ZOFRAN) tablet 4 mg 4 mg Every 8 Hours PRN 11/16/2017     Sig - Route: Take 1 tablet by mouth Every 8 (Eight) Hours As Needed for Nausea or Vomiting. - Oral    ondansetron (ZOFRAN) tablet 4 mg 4 mg Every 6 Hours PRN 11/16/2017     Sig - Route: Take 1 tablet by mouth Every 6 (Six) Hours As Needed for Nausea or Vomiting. - Oral    Linked Group 4:  \"Or\" Linked Group Details        ondansetron ODT (ZOFRAN-ODT) disintegrating tablet 4 mg 4 mg Every 6 Hours PRN 11/16/2017     Sig - Route: Take 1 tablet by mouth Every 6 (Six) Hours As Needed for Nausea or Vomiting. - Oral    Linked Group 4:  \"Or\" Linked Group Details        pantoprazole (PROTONIX) EC tablet 40 mg 40 mg Every Morning 11/17/2017     Sig - Route: Take 1 tablet by mouth Every Morning. - Oral    sennosides-docusate sodium (SENOKOT-S) 8.6-50 MG tablet 2 tablet 2 tablet 2 Times Daily " "11/16/2017     Sig - Route: Take 2 tablets by mouth 2 (Two) Times a Day. - Oral    sodium chloride 0.45 % infusion 100 mL/hr Continuous 11/16/2017     Sig - Route: Infuse 100 mL/hr into a venous catheter Continuous. - Intravenous    sodium chloride 0.9 % flush 1-10 mL 1-10 mL As Needed 11/16/2017     Sig - Route: Infuse 1-10 mL into a venous catheter As Needed for Line Care. - Intravenous    traZODone (DESYREL) tablet 100 mg 100 mg Nightly 11/16/2017     Sig - Route: Take 1 tablet by mouth Every Night. - Oral    venlafaxine XR (EFFEXOR-XR) 24 hr capsule 150 mg 150 mg Daily 11/16/2017     Sig - Route: Take 1 capsule by mouth Daily. - Oral             Physician Progress Notes       Kashif Perez MD at 11/17/2017  7:48 AM  Version 1 of 1         Ortho POD 1    Patients Name:  Josephine Wolf  YOB: 1940  Medical Records Number:  [id number]    No complaints except pain    BP 92/49 (BP Location: Right arm, Patient Position: Lying)  Pulse 69  Temp 97.5 °F (36.4 °C) (Oral)   Resp 16  Ht 61\" (154.9 cm)  Wt 143 lb (64.9 kg)  SpO2 95%  BMI 27.02 kg/m2    RLE:  NVI, calf nontender, sensation intact  No signs of DVT    Incision: clean, intact    Lab Results (last 24 hours)     Procedure Component Value Units Date/Time    Urinalysis With / Culture If Indicated - Urine, Clean Catch [[id number]]  (Normal) Collected:  11/16/17 1037    Specimen:  Urine from Urine, Clean Catch Updated:  11/16/17 1058     Color, UA Yellow     Appearance, UA Clear     pH, UA 6.5     Specific Gravity, UA 1.012     Glucose, UA Negative     Ketones, UA Negative     Bilirubin, UA Negative     Blood, UA Negative     Protein, UA Negative     Leuk Esterase, UA Negative     Nitrite, UA Negative     Urobilinogen, UA 0.2 E.U./dL    Narrative:       Urine microscopic not indicated.    CBC & Differential [[id number]] Collected:  11/16/17 1037    Specimen:  Blood Updated:  11/16/17 1130    Narrative:       The following orders were created " for panel order CBC & Differential.  Procedure                               Abnormality         Status                     ---------                               -----------         ------                     Scan Slide[[id number]]                                                                  CBC Auto Differential[[id number]]        Abnormal            Final result                 Please view results for these tests on the individual orders.    CBC Auto Differential [[id number]]  (Abnormal) Collected:  11/16/17 1037    Specimen:  Blood Updated:  11/16/17 1130     WBC 5.27 10*3/mm3      RBC 3.11 (L) 10*6/mm3      Hemoglobin 10.1 (L) g/dL      Hematocrit 32.2 (L) %      .5 (H) fL      MCH 32.5 (H) pg      MCHC 31.4 (L) g/dL      RDW 15.6 (H) %      RDW-SD 58.6 (H) fl      MPV 9.3 fL      Platelets 155 10*3/mm3      Neutrophil % 53.6 %      Lymphocyte % 30.4 %      Monocyte % 13.1 (H) %      Eosinophil % 2.3 %      Basophil % 0.2 %      Immature Grans % 0.4 %      Neutrophils, Absolute 2.83 10*3/mm3      Lymphocytes, Absolute 1.60 10*3/mm3      Monocytes, Absolute 0.69 10*3/mm3      Eosinophils, Absolute 0.12 10*3/mm3      Basophils, Absolute 0.01 10*3/mm3      Immature Grans, Absolute 0.02 10*3/mm3      nRBC 0.0 /100 WBC     Protime-INR [[id number]]  (Normal) Collected:  11/16/17 1037    Specimen:  Blood Updated:  11/16/17 1131     Protime 12.5 Seconds      INR 0.97    Comprehensive Metabolic Panel [[id number]]  (Abnormal) Collected:  11/16/17 1037    Specimen:  Blood Updated:  11/16/17 1146     Glucose 94 mg/dL      BUN 15 mg/dL      Creatinine 1.21 (H) mg/dL      Sodium 142 mmol/L      Potassium 4.1 mmol/L      Chloride 101 mmol/L      CO2 28.8 mmol/L      Calcium 8.9 mg/dL      Total Protein 6.9 g/dL      Albumin 3.90 g/dL      ALT (SGPT) 7 U/L      AST (SGOT) 13 U/L      Alkaline Phosphatase 67 U/L      Total Bilirubin 0.4 mg/dL      eGFR Non African Amer 43 (L) mL/min/1.73      Globulin 3.0 gm/dL      A/G  "Ratio 1.3 g/dL      BUN/Creatinine Ratio 12.4     Anion Gap 12.2 mmol/L     Narrative:       The MDRD GFR formula is only valid for adults with stable renal function between ages 18 and 70.    Hemoglobin & Hematocrit, Blood [[id number]]  (Abnormal) Collected:  11/17/17 0413    Specimen:  Blood Updated:  11/17/17 0511     Hemoglobin 8.8 (L) g/dL      Hematocrit 28.1 (L) %     Basic Metabolic Panel [[id number]]  (Abnormal) Collected:  11/17/17 0413    Specimen:  Blood Updated:  11/17/17 0539     Glucose 153 (H) mg/dL      BUN 19 mg/dL      Creatinine 1.46 (H) mg/dL      Sodium 139 mmol/L      Potassium 4.3 mmol/L      Chloride 100 mmol/L      CO2 24.1 mmol/L      Calcium 8.4 (L) mg/dL      eGFR Non African Amer 35 (L) mL/min/1.73      BUN/Creatinine Ratio 13.0     Anion Gap 14.9 mmol/L     Narrative:       The MDRD GFR formula is only valid for adults with stable renal function between ages 18 and 70.          Xr Knee 1 Or 2 View Right    Result Date: 11/16/2017  Narrative: 2 VIEW PORTABLE RIGHT KNEE  HISTORY: Knee replacement for osteoarthritis.  FINDINGS: The patient has had recent total knee replacement and the alignment appears satisfactory.  This report was finalized on 11/16/2017 6:55 PM by Dr. Festus Cummins MD.        S/p Right TKA  PT-WBAT with walker  ASA for DVT prophylaxis     Electronically signed by Kashif Perez MD at 11/17/2017  7:48 AM      Kashif Perez MD at 11/18/2017  7:35 AM  Version 1 of 1         Ortho POD 2    Patient Name:  Josephine Wolf  YOB: 1940  Medical Records Number:  [id number]    No complaints except pain    /57 (BP Location: Right arm, Patient Position: Sitting)  Pulse 72  Temp 98 °F (36.7 °C) (Oral)   Resp 16  Ht 61\" (154.9 cm)  Wt 143 lb (64.9 kg)  SpO2 91%  BMI 27.02 kg/m2    RLE:  NVI, calf nontender, sensation intact  No signs of DVT    Incision: clean, no infection    Lab Results (last 24 hours)     Procedure Component Value Units Date/Time " "   Hemoglobin & Hematocrit, Blood [[id number]]  (Abnormal) Collected:  11/18/17 0334    Specimen:  Blood Updated:  11/18/17 0357     Hemoglobin 7.8 (L) g/dL      Hematocrit 25.3 (L) %           S/p Right TKA  WBAT with walker  ASA for DVT prophylaxis  Hgb 7.8 noted, pre operatively she is around 9, will watch  Home today after PT, if comfortable     Electronically signed by Kashif Perez MD at 11/18/2017  7:36 AM      Kashif Perez MD at 11/20/2017  8:01 AM  Version 1 of 1         Ortho POD 4    Patient Name:  Josephine Wolf  YOB: 1940  Medical Records Number:  [id number]    No complaints    /66 (BP Location: Right arm, Patient Position: Sitting)  Pulse 70  Temp 97.3 °F (36.3 °C) (Oral)   Resp 16  Ht 61\" (154.9 cm)  Wt 143 lb (64.9 kg)  SpO2 96%  BMI 27.02 kg/m2    RLE:  NVI, calf nontender, sensation intact  No signs of DVT    Incision: clean, no infection    Lab Results (last 24 hours)     Procedure Component Value Units Date/Time    Blood Gas, Arterial [[id number]]  (Abnormal) Collected:  11/19/17 1721    Specimen:  Arterial Blood Updated:  11/19/17 1724     Site Arterial: right brachial     Zaki's Test N/A     pH, Arterial 7.387 pH units      pCO2, Arterial 49.8 (H) mm Hg      pO2, Arterial 70.9 (L) mm Hg      HCO3, Arterial 29.9 (H) mmol/L      Base Excess, Arterial 4.0 (H) mmol/L      O2 Saturation Calculated 93.5 %      Barometric Pressure for Blood Gas 749.4 mmHg      Modality Cannula     Flow Rate 3 lpm      Set [facility] Resp Rate 20    Narrative:       97sat Meter: [id number] : [id number] Anali Nuno    Hemoglobin & Hematocrit, Blood [[id number]]  (Abnormal) Collected:  11/20/17 0356    Specimen:  Blood Updated:  11/20/17 0424     Hemoglobin 7.9 (L) g/dL      Hematocrit 24.6 (L) %           S/p Right TKA  WBAT with walker  ASA for DVT prophylaxis  Rehab today     Electronically signed by Kashif Perez MD at 11/20/2017  8:01 AM        "

## 2017-11-20 NOTE — PLAN OF CARE
Problem: Patient Care Overview (Adult)  Goal: Plan of Care Review  Outcome: Ongoing (interventions implemented as appropriate)    11/20/17 0251   Coping/Psychosocial Response Interventions   Plan Of Care Reviewed With patient   Patient Care Overview   Progress improving   Outcome Evaluation   Outcome Summary/Follow up Plan pain controlled during shift with PO pain meds; ambulates with assist x 1; voiding; vss; educated pt on importance of BP monitoring r/t hx of HTN and O2 monitoring r/t hx of COPD; plan to d/c to rehab in AM       Goal: Adult Individualization and Mutuality  Outcome: Ongoing (interventions implemented as appropriate)    Problem: Pain, Acute (Adult)  Goal: Acceptable Pain Control/Comfort Level  Outcome: Ongoing (interventions implemented as appropriate)    Problem: Fall Risk (Adult)  Goal: Absence of Falls  Outcome: Ongoing (interventions implemented as appropriate)    Problem: Knee Replacement, Total (Adult)  Goal: Signs and Symptoms of Listed Potential Problems Will be Absent or Manageable (Knee Replacement, Total)  Outcome: Ongoing (interventions implemented as appropriate)

## 2017-11-20 NOTE — PLAN OF CARE
Problem: Inpatient Physical Therapy  Goal: Bed Mobility Goal LTG- PT    11/20/17 0920   Bed Mobility PT LTG   Bed Mobility PT LTG, Date Established 11/20/17   Bed Mobility PT LTG, Outcome goal not met       Goal: Transfer Training Goal 1 LTG- PT    11/20/17 0920   Transfer Training PT LTG   Transfer Training PT LTG, Outcome goal not met       Goal: Gait Training Goal LTG- PT    11/20/17 0920   Gait Training PT LTG   Gait Training Goal PT LTG, Outcome goal not met       Goal: Patient Education Goal LTG- PT    11/20/17 0920   Patient Education PT LTG   Patient Education PT LTG Outcome goal met

## 2017-11-20 NOTE — PROGRESS NOTES
Continued Stay Note  [facility]     Patient Name: Josephine Wolf  MRN: [id number]  Today's Date: 11/20/2017    Admit Date: 11/16/2017          Discharge Plan       11/20/17 1515    Case Management/Social Work Plan    Plan [facility] pending [facility] pre-cert    Patient/Family In Agreement With Plan yes    Additional Comments Spoke with patient at bedside, she is aware that still waiting for pre-cert.  Spoke with Chandrika/[facility] - she will notify CCP or RN as soon as pre-cert approved.  Packet in cubbie at nurses station.               Discharge Codes     None        Expected Discharge Date and Time     Expected Discharge Date Expected Discharge Time    Nov 20, 2017             Becky S. [place], RN

## 2017-11-20 NOTE — PLAN OF CARE
Problem: Patient Care Overview (Adult)  Goal: Discharge Needs Assessment  Outcome: Ongoing (interventions implemented as appropriate)    11/17/17 1216 11/18/17 1541 11/20/17 0936   Discharge Needs Assessment   Concerns To Be Addressed discharge planning concerns --  --    Readmission Within The Last 30 Days no previous admission in last 30 days --  --    Discharge Facility/Level Of Care Needs nursing facility, skilled  (Spoke with patient and she is pre-registered at [facility]. Chandrika aware of admit and pre-cert has been initiated ) --  --    Discharge Disposition --  rehab facility;still a patient --    Discharge Planning Comments --  --  pt to be d/c'd today to rehab pending precert. pt ambulating well with therapy. pain is controlled witih po pain medication. vss. dressing c/d/i. O2-2L-nc in place. discussed wtih pt the importance of O2 administration and increased oxygenation saturations with pt h/o COPD. educated pt on the importace of BP monitoring as taking medications as prescribed with a h/o of HTN. pt demonstrated understanding of the use of IS. will cont. to monitor    Current Health   Anticipated Changes Related to Illness --  --  inability to care for self   Self-Care   Equipment Currently Used at Home --  --  none   Living Environment   Transportation Available car;family or friend will provide --  --          Problem: Pain, Acute (Adult)  Goal: Identify Related Risk Factors and Signs and Symptoms  Outcome: Ongoing (interventions implemented as appropriate)  Goal: Acceptable Pain Control/Comfort Level  Outcome: Ongoing (interventions implemented as appropriate)    Problem: Fall Risk (Adult)  Goal: Identify Related Risk Factors and Signs and Symptoms  Outcome: Ongoing (interventions implemented as appropriate)  Goal: Absence of Falls  Outcome: Ongoing (interventions implemented as appropriate)    Problem: Knee Replacement, Total (Adult)  Goal: Signs and Symptoms of Listed Potential Problems Will be  Absent or Manageable (Knee Replacement, Total)  Outcome: Ongoing (interventions implemented as appropriate)

## 2017-11-20 NOTE — THERAPY DISCHARGE NOTE
Acute Care - Physical Therapy Treatment Note/Discharge  [facility]     Patient Name: Josephine Wolf  : 1940  MRN: [id number]  Today's Date: 2017  Onset of Illness/Injury or Date of Surgery Date:  (POD 1 R TKA)     Referring Physician: Ana    Admit Date: 2017    Visit Dx:    ICD-10-CM ICD-9-CM   1. Difficulty walking R26.2 719.7     Patient Active Problem List   Diagnosis   • Anemia   • OA (osteoarthritis) of knee       Physical Therapy Education     Title: PT OT SLP Therapies (Done)     Topic: Physical Therapy (Done)     Point: Mobility training (Done)    Learning Progress Summary    Learner Readiness Method Response Comment Documented by Status   Patient Acceptance E,D VU,NR  MS 17 09 Done    Acceptance E,TB,D VU,NR   17 0939 Done    Acceptance E,TB,D VU,NR   17 1153 Done    Acceptance E VU,NR  MG 17 1024 Done               Point: Home exercise program (Done)    Learning Progress Summary    Learner Readiness Method Response Comment Documented by Status   Patient Acceptance E,D VU,NR  MS 17 Done    Acceptance E,TB,D VU,NR   17 0939 Done    Acceptance E,TB,D VU,NR   17 1153 Done    Acceptance E VU,NR  MG 17 1024 Done               Point: Body mechanics (Done)    Learning Progress Summary    Learner Readiness Method Response Comment Documented by Status   Patient Acceptance E,D VU,NR  MS 17 Done    Acceptance E,TB,D VU,NR   17 0939 Done    Acceptance E,TB,D VU,NR   17 1153 Done    Acceptance E VU,NR  MG 17 1024 Done               Point: Precautions (Done)    Learning Progress Summary    Learner Readiness Method Response Comment Documented by Status   Patient Acceptance E,D VU,NR  MS 17 0920 Done    Acceptance E,TB,D VU,NR   17 0939 Done    Acceptance E,TB,D VU,NR  RW 17 1153 Done    Acceptance E VU,NR  MG 17 1024 Done                      User Key     Initials Effective  Dates Name Provider Type Discipline    MS 12/01/15 -  Jorge A NICO Cecilio, PT Physical Therapist PT    RW 04/06/16 -  Alise Alejo, PTA Physical Therapy Assistant PT    MG 09/13/17 -  Megan Gosselin, PT Physical Therapist PT                    IP PT Goals       11/20/17 0920 11/17/17 1024       Bed Mobility PT LTG    Bed Mobility PT LTG, Date Established 11/20/17  -MS 11/17/17  -MG     Bed Mobility PT LTG, Time to Achieve  3 days  -MG     Bed Mobility PT LTG, Activity Type  all bed mobility  -MG     Bed Mobility PT LTG, Mobile Level  independent  -MG     Bed Mobility PT LTG, Outcome goal not met  -MS      Transfer Training PT LTG    Transfer Training PT LTG, Date Established  11/17/17  -MG     Transfer Training PT LTG, Time to Achieve  3 days  -MG     Transfer Training PT LTG, Activity Type  bed to chair /chair to bed  -MG     Transfer Training PT LTG, Mobile Level  supervision required  -MG     Transfer Training PT LTG, Assist Device  walker, rolling  -MG     Transfer Training PT LTG, Outcome goal not met  -MS      Gait Training PT LTG    Gait Training Goal PT LTG, Date Established  11/17/17  -MG     Gait Training Goal PT LTG, Time to Achieve  3 days  -MG     Gait Training Goal PT LTG, Mobile Level  supervision required  -MG     Gait Training Goal PT LTG, Assist Device  walker, rolling  -MG     Gait Training Goal PT LTG, Distance to Achieve  100  -MG     Gait Training Goal PT LTG, Outcome goal not met  -MS      Patient Education PT LTG    Patient Education PT LTG, Date Established  11/17/17  -MG     Patient Education PT LTG, Time to Achieve  3 days  -MG     Patient Education PT LTG, Education Type  HEP  -MG     Patient Education PT LTG, Education Understanding  demonstrate adequately;verbalize understanding  -MG     Patient Education PT LTG Outcome goal met  -MS        User Key  (r) = Recorded By, (t) = Taken By, (c) = Cosigned By    Initials Name Provider Type    MS Jorge A Hernandes, PT Physical  Therapist    MG Megan Gosselin, PT Physical Therapist              Adult Rehabilitation Note       11/20/17 0917 11/19/17 1300 11/19/17 0900    Rehab Assessment/Intervention    Discipline physical therapist  -MS physical therapy assistant  -RW physical therapy assistant  -RW    Document Type therapy note (daily note);discharge summary  -MS therapy note (daily note)  -RW therapy note (daily note)  -RW    Subjective Information agree to therapy;complains of;weakness;fatigue;pain  -MS agree to therapy  -RW agree to therapy  -RW    Patient Effort, Rehab Treatment good  -MS good  -RW good  -RW    Symptoms Noted Comment Pt. reports pain/soreness in her Right knee this AM as well as overall fatigue.  -MS      Precautions/Limitations fall precautions   Pt. on 2 liters oxygen.  -MS fall precautions  -RW fall precautions  -RW    Precautions/Limitations, Hearing hearing impairment, bilaterally  -MS hearing impairment, bilaterally  -RW hearing impairment, bilaterally  -RW    Recorded by [MS] Jorge A Hernandes, PT [RW] Alise Alejo, PTA [RW] Alise Alejo PTA    Pain Assessment    Pain Assessment 0-10  -MS 0-10  -RW 0-10  -RW    Pain Score 3  -MS 4  -RW 6  -RW    Post Pain Score 3  -MS      Pain Type Acute pain;Surgical pain  -MS Acute pain;Surgical pain  -RW Acute pain;Surgical pain  -RW    Pain Location Knee  -MS Knee  -RW Knee  -RW    Pain Orientation Right  -MS Right  -RW Right  -RW    Pain Intervention(s)  Medication (See MAR);Repositioned;Ambulation/increased activity  -RW Medication (See MAR);Repositioned;Ambulation/increased activity  -RW    Response to Interventions  tolerated  -RW tolerated  -RW    Recorded by [MS] Jorge A Hernandes, PT [RW] Alise Alejo, PTA [RW] Alise Alejo PTA    Cognitive Assessment/Intervention    Current Cognitive/Communication Assessment functional  -MS functional  -RW functional  -RW    Orientation Status oriented x 4  -MS oriented x 4  -RW oriented x 4  -RW    Follows  Commands/Answers Questions 100% of the time  -% of the time  -% of the time  -RW    Personal Safety WNL/WFL  -MS WNL/WFL  -RW WNL/WFL  -RW    Personal Safety Interventions fall prevention program maintained;gait belt;nonskid shoes/slippers when out of bed;supervised activity  -MS fall prevention program maintained;gait belt;nonskid shoes/slippers when out of bed  -RW fall prevention program maintained;gait belt;nonskid shoes/slippers when out of bed  -RW    Recorded by [MS] Jorge A Hernandes, PT [RW] Alise Alejo PTA [RW] Alise Alejo PTA    ROM (Range of Motion)    General ROM Detail Right knee AROM (8, 77)  -MS  R knee 11-81'  -RW    Recorded by [MS] Jorge A Hernandes, PT  [RW] Alise Alejo PTA    Bed Mobility, Assessment/Treatment    Bed Mob, Supine to Sit, [place]  not tested  -RW not tested  -RW    Bed Mob, Sit to Supine, [place]  not tested  -RW not tested  -RW    Bed Mobility, Comment Pt. up in the chair this AM.  -MS Pt up in chair  -RW Pt up in chair  -RW    Recorded by [MS] Jorge A Hernandes, PT [RW] Alise Alejo, PTA [RW] Alise Alejo PTA    Transfer Assessment/Treatment    Transfers, Sit-Stand [place] contact guard assist  -MS contact guard assist;verbal cues required  -RW contact guard assist;verbal cues required  -RW    Transfers, Stand-Sit [place] contact guard assist  -MS contact guard assist;verbal cues required  -RW contact guard assist;verbal cues required  -RW    Transfers, Sit-Stand-Sit, Assist Device rolling walker  -MS rolling walker  -RW rolling walker  -RW    Toilet Transfer, [place]  minimum assist (75% patient effort);verbal cues required  -RW contact guard assist;verbal cues required  -RW    Toilet Transfer, Assistive Device  rolling walker  -RW rolling walker;other (see comments)   L grab bar  -RW    Transfer, Impairments  strength decreased  -RW strength decreased  -RW    Transfer, Comment  stood x2  -RW stood x3  -RW    Recorded by  [MS] Jorge A Hernandes, PT [RW] Alise Alejo, SHANNEN [RW] Alise Alejo PTA    Gait Assessment/Treatment    Gait, [place] Level contact guard assist  -MS contact guard assist;verbal cues required  -RW contact guard assist;verbal cues required  -RW    Gait, Assistive Device rolling walker  -MS rolling walker  -RW rolling walker  -RW    Gait, Distance (Feet) 70  -MS 55  -RW 70   ambulated additional 30' to restroom in gym  -RW    Gait, Gait Pattern Analysis  swing-through gait  -RW swing-through gait  -RW    Gait, Gait Deviations right:;antalgic;shilpa decreased;forward flexed posture;step length decreased  -MS forward flexed posture;right:;antalgic;shilpa decreased;step length decreased  -RW forward flexed posture;right:;antalgic;shilpa decreased;limb motion velocity decreased  -RW    Gait, Safety Issues supplemental O2   Pt. on 2 liters oxygen.  -MS      Gait, Impairments   strength decreased;pain;impaired balance  -RW    Gait, Comment Pt. requires verbal/tactile cues for posture correction and for proper gait sequencing with use of the RWX.  -MS Pt declined further ambulation  -RW     Recorded by [MS] Jorge A Hernandes, PT [RW] Alise Alejo, PTA [RW] Alise Alejo PTA    Therapy Exercises    Exercise Protocols total knee  -MS total knee  -RW total knee  -RW    Total Knee Exercises right:;30 reps;completed protocol;with assist  -MS right:;30 reps;completed protocol   cues to stay awake and on task  -RW right:;30 reps;completed protocol   cues to stay on task  -RW    Recorded by [MS] Jorge A Hernandes, PT [RW] Alise Alejo, SHANNEN [RW] Alise Alejo PTA    Positioning and Restraints    Pre-Treatment Position sitting in chair/recliner  -MS sitting in chair/recliner  -RW sitting in chair/recliner  -RW    Post Treatment Position chair  -MS other  -RW chair  -RW    In Chair notified nsg;reclined;sitting;call light within reach;encouraged to call for assist   All lines intact.  -MS  reclined;call light  within reach;encouraged to call for assist  -RW    Other Position  return to room with caregiver   Family pushed pt back to room in chair  -RW     Recorded by [MS] Jorge A Hernandes, PT [RW] Alise Alejo PTA [RW] Alise Alejo PTA      11/18/17 1400 11/18/17 1052 11/17/17 1636    Rehab Assessment/Intervention    Discipline physical therapy assistant  -RW physical therapy assistant  -RW physical therapy assistant  -    Document Type therapy note (daily note)  -RW therapy note (daily note)  -RW therapy note (daily note)  -    Subjective Information agree to therapy;complains of;pain  -RW agree to therapy;complains of;pain  -RW agree to therapy;complains of;weakness;fatigue;pain;swelling  -    Patient Effort, Rehab Treatment good  -RW good  -RW     Precautions/Limitations fall precautions  -RW fall precautions  -RW fall precautions  -JM    Precautions/Limitations, Hearing hearing impairment, bilaterally  -RW hearing impairment, bilaterally  -RW hearing impairment, bilaterally  -JM    Specific Treatment Considerations   very shaky/nervous; needs one on one educ today  -    Recorded by [RW] Alise Alejo PTA [RW] Alise Alejo PTA [JM] Marybeth Hayes PTA    Pain Assessment    Pain Assessment 0-10  -RW 0-10  -RW 0-10  -JM    Pain Score 10  -RW 7  -RW 7  -JM    Pain Type Acute pain;Surgical pain  -RW Acute pain;Surgical pain  -RW Surgical pain  -JM    Pain Location Knee  -RW Knee  -RW Knee  -JM    Pain Orientation Right  -RW Right  -RW Right  -JM    Pain Intervention(s) Medication (See MAR);Repositioned;Ambulation/increased activity  -RW Medication (See MAR);Repositioned;Ambulation/increased activity  -RW Medication (See MAR);Repositioned   nsg to get fresh ice pack  -    Response to Interventions tolerated, unchanged  -RW improved  -RW esperanza  -JM    Recorded by [RW] Alise Aljeo PTA [RW] Alise Alejo PTA [JM] Marybeth Hayes PTA    Cognitive Assessment/Intervention    Current  Cognitive/Communication Assessment functional  -RW functional  -RW     Orientation Status oriented x 4  -RW oriented x 4  -RW     Follows Commands/Answers Questions 100% of the time  -% of the time  -RW     Personal Safety WNL/WFL  -RW WNL/WFL  -RW     Personal Safety Interventions fall prevention program maintained;gait belt;nonskid shoes/slippers when out of bed  -RW fall prevention program maintained;gait belt;nonskid shoes/slippers when out of bed  -RW     Recorded by [RW] Alise Alejo PTA [RW] Alise Alejo PTA     ROM (Range of Motion)    General ROM Detail  R knee 9-80'  -RW     Recorded by  [RW] Alise Alejo PTA     Bed Mobility, Assessment/Treatment    Bed Mob, Supine to Sit, [place] not tested  -RW not tested  -RW     Bed Mob, Sit to Supine, [place] not tested  -RW not tested  -RW     Bed Mobility, Comment Pt up in chair  -RW Pt up in chair  -RW in chair  -JM    Recorded by [RW] Alise Alejo PTA [RW] Alise Alejo PTA [JM] Marybeth Hayes PTA    Transfer Assessment/Treatment    Transfers, Sit-Stand [place] contact guard assist;verbal cues required  -RW minimum assist (75% patient effort);verbal cues required  -RW minimum assist (75% patient effort);contact guard assist;verbal cues required  -JM    Transfers, Stand-Sit [place] contact guard assist;verbal cues required  -RW contact guard assist;verbal cues required  -RW contact guard assist;verbal cues required  -JM    Transfers, Sit-Stand-Sit, Assist Device rolling walker  -RW rolling walker  -RW rolling walker  -JM    Transfer, Impairments  strength decreased;impaired balance  -RW strength decreased;impaired balance;pain  -JM    Transfer, Comment   post lean noted  -JM    Recorded by [RW] Alise Alejo PTA [RW] Alise Alejo PTA [JM] Marybeth Hayes PTA    Gait Assessment/Treatment    Gait, [place] Level contact guard assist;verbal cues required  -RW contact guard assist;minimum assist (75% patient  effort);verbal cues required  -RW minimum assist (75% patient effort);contact guard assist;verbal cues required  -JM    Gait, Assistive Device rolling walker  -RW rolling walker  -RW rolling walker  -JM    Gait, Distance (Feet) 40  -RW 75  -RW 45  -JM    Gait, Gait Pattern Analysis swing-through gait  -RW swing-through gait  -RW     Gait, Gait Deviations forward flexed posture;right:;antalgic;shilpa decreased  -RW forward flexed posture;right:;antalgic;shilpa decreased;bilateral:;limb motion velocity decreased  -RW antalgic;forward flexed posture;step length decreased;narrow base  -JM    Gait, Safety Issues   step length decreased;balance decreased during turns;sequencing ability decreased;weight-shifting ability decreased;loses balance backward  -JM    Gait, Impairments  strength decreased;impaired balance  -RW strength decreased;impaired balance;pain  -JM    Recorded by [RW] Alise Alejo PTA [RW] Alise Alejo PTA [JM] Marybeth Hayes PTA    Therapy Exercises    Exercise Protocols total knee  -RW total knee  -RW total knee  -JM    Total Knee Exercises right:;25 reps;completed protocol  -RW right:;20 reps;completed protocol   cues to stay awake and on task  -RW right:;15 reps;completed protocol;with assist;SLR   to initiate  -JM    Recorded by [RW] Alise Alejo PTA [RW] Alise Alejo PTA [] Marybeth Hayes PTA    Positioning and Restraints    Pre-Treatment Position sitting in chair/recliner  -RW sitting in chair/recliner  -RW sitting in chair/recliner  -JM    Post Treatment Position chair  -RW chair  -RW     In Chair reclined;call light within reach;encouraged to call for assist;with family/caregiver  -RW reclined;call light within reach;encouraged to call for assist   ice applied to R knee  -RW reclined;call light within reach;encouraged to call for assist   instructed pt to call for assist for all mobility  -JM    Recorded by [RW] Alise Alejo PTA [RW] Alise Alejo PTA [] Marybeth  Freddy, PTA      User Key  (r) = Recorded By, (t) = Taken By, (c) = Cosigned By    Initials Name Effective Dates    DIONTE Marybeth Freddy, PTA 02/18/16 -     MS Jorge A NICO Hernandes, PT 12/01/15 -     RW Alise NICO Alejo, PTA 04/06/16 -           PT Recommendation and Plan  Anticipated Discharge Disposition: skilled nursing facility  Planned Therapy Interventions: balance training, bed mobility training, gait training, home exercise program, patient/family education, ROM (Range of Motion), strengthening, transfer training  PT Frequency: 2 times/day             Outcome Measures       11/20/17 0900 11/19/17 0900 11/18/17 1052    How much help from another person do you currently need...    Turning from your back to your side while in flat bed without using bedrails? 3  -MS 3  -RW 3  -RW    Moving from lying on back to sitting on the side of a flat bed without bedrails? 3  -MS 3  -RW 3  -RW    Moving to and from a bed to a chair (including a wheelchair)? 3  -MS 3  -RW 3  -RW    Standing up from a chair using your arms (e.g., wheelchair, bedside chair)? 3  -MS 3  -RW 3  -RW    Climbing 3-5 steps with a railing? 3  -MS 3  -RW 2  -RW    To walk in hospital room? 3  -MS 3  -RW 3  -RW    AM-PAC 6 Clicks Score 18  -MS 18  -RW 17  -RW    Functional Assessment    Outcome Measure Options AM-PAC 6 Clicks Basic Mobility (PT)  -MS AM-PAC 6 Clicks Basic Mobility (PT)  -RW AM-PAC 6 Clicks Basic Mobility (PT)  -RW      11/17/17 1000          How much help from another person do you currently need...    Turning from your back to your side while in flat bed without using bedrails? 3  -MG      Moving from lying on back to sitting on the side of a flat bed without bedrails? 3  -MG      Moving to and from a bed to a chair (including a wheelchair)? 3  -MG      Standing up from a chair using your arms (e.g., wheelchair, bedside chair)? 3  -MG      Climbing 3-5 steps with a railing? 2  -MG      To walk in hospital room? 2  -MG      AM-PAC 6 Clicks  Score 16  -MG      Functional Assessment    Outcome Measure Options AM-PAC 6 Clicks Basic Mobility (PT)  -MG        User Key  (r) = Recorded By, (t) = Taken By, (c) = Cosigned By    Initials Name Provider Type    MS Jorge A Hernandes, PT Physical Therapist    RW Alise Alejo, PTA Physical Therapy Assistant    MG Megan Gosselin, PT Physical Therapist           Time Calculation:         PT Charges       11/20/17 0921          Time Calculation    Start Time 0851  -MS      Stop Time 0915  -MS      Time Calculation (min) 24 min  -MS      PT Received On 11/20/17  -MS        User Key  (r) = Recorded By, (t) = Taken By, (c) = Cosigned By    Initials Name Provider Type    MS Jorge A Hernandes, PT Physical Therapist          Therapy Charges for Today     Code Description Service Date Service Provider Modifiers Qty    63399769650 HC PT THER PROC EA 15 MIN 11/20/2017 Jorge A Hernandes, PT GP 2          PT G-Codes  Outcome Measure Options: AM-PAC 6 Clicks Basic Mobility (PT)    PT Discharge Summary  Reason for Discharge: Discharge from facility  Outcomes Achieved: Refer to plan of care for updates on goals achieved  Discharge Destination: SNF    Jorge A Hernandes, PT  11/20/2017

## 2017-11-21 LAB
HCT VFR BLD AUTO: 25.6 % (ref 35.6–45.5)
HGB BLD-MCNC: 8.1 G/DL (ref 11.9–15.5)

## 2017-11-21 PROCEDURE — 94799 UNLISTED PULMONARY SVC/PX: CPT

## 2017-11-21 PROCEDURE — 85018 HEMOGLOBIN: CPT | Performed by: ORTHOPAEDIC SURGERY

## 2017-11-21 PROCEDURE — 97110 THERAPEUTIC EXERCISES: CPT

## 2017-11-21 PROCEDURE — 85014 HEMATOCRIT: CPT | Performed by: ORTHOPAEDIC SURGERY

## 2017-11-21 PROCEDURE — 97150 GROUP THERAPEUTIC PROCEDURES: CPT

## 2017-11-21 RX ORDER — BUDESONIDE AND FORMOTEROL FUMARATE DIHYDRATE 160; 4.5 UG/1; UG/1
2 AEROSOL RESPIRATORY (INHALATION)
Status: DISCONTINUED | OUTPATIENT
Start: 2017-11-21 | End: 2017-11-22 | Stop reason: HOSPADM

## 2017-11-21 RX ADMIN — GABAPENTIN 300 MG: 300 CAPSULE ORAL at 21:01

## 2017-11-21 RX ADMIN — ASPIRIN 325 MG: 325 TABLET, DELAYED RELEASE ORAL at 10:22

## 2017-11-21 RX ADMIN — LOSARTAN POTASSIUM 100 MG: 100 TABLET, FILM COATED ORAL at 10:23

## 2017-11-21 RX ADMIN — ALBUTEROL SULFATE 1.25 MG: 1.25 SOLUTION RESPIRATORY (INHALATION) at 10:12

## 2017-11-21 RX ADMIN — DIVALPROEX SODIUM 250 MG: 250 TABLET, FILM COATED, EXTENDED RELEASE ORAL at 10:23

## 2017-11-21 RX ADMIN — HYDROCODONE BITARTRATE AND ACETAMINOPHEN 2 TABLET: 7.5; 325 TABLET ORAL at 05:50

## 2017-11-21 RX ADMIN — CETIRIZINE HYDROCHLORIDE 10 MG: 10 TABLET, FILM COATED ORAL at 10:23

## 2017-11-21 RX ADMIN — ASPIRIN 325 MG: 325 TABLET, DELAYED RELEASE ORAL at 17:34

## 2017-11-21 RX ADMIN — GABAPENTIN 300 MG: 300 CAPSULE ORAL at 17:32

## 2017-11-21 RX ADMIN — VENLAFAXINE HYDROCHLORIDE 150 MG: 150 CAPSULE, EXTENDED RELEASE ORAL at 10:23

## 2017-11-21 RX ADMIN — HYDROCHLOROTHIAZIDE 12.5 MG: 25 TABLET ORAL at 10:24

## 2017-11-21 RX ADMIN — DIVALPROEX SODIUM 500 MG: 500 TABLET, EXTENDED RELEASE ORAL at 21:01

## 2017-11-21 RX ADMIN — ALBUTEROL SULFATE 1.25 MG: 1.25 SOLUTION RESPIRATORY (INHALATION) at 19:18

## 2017-11-21 RX ADMIN — HYDROCODONE BITARTRATE AND ACETAMINOPHEN 2 TABLET: 7.5; 325 TABLET ORAL at 18:10

## 2017-11-21 RX ADMIN — PANTOPRAZOLE SODIUM 40 MG: 40 TABLET, DELAYED RELEASE ORAL at 06:28

## 2017-11-21 RX ADMIN — LEVOTHYROXINE SODIUM 88 MCG: 88 TABLET ORAL at 06:28

## 2017-11-21 RX ADMIN — BUDESONIDE AND FORMOTEROL FUMARATE DIHYDRATE 2 PUFF: 160; 4.5 AEROSOL RESPIRATORY (INHALATION) at 10:12

## 2017-11-21 RX ADMIN — FERROUS SULFATE TAB 325 MG (65 MG ELEMENTAL FE) 325 MG: 325 (65 FE) TAB at 10:23

## 2017-11-21 RX ADMIN — ALBUTEROL SULFATE 1.25 MG: 1.25 SOLUTION RESPIRATORY (INHALATION) at 16:59

## 2017-11-21 RX ADMIN — TRAZODONE HYDROCHLORIDE 100 MG: 100 TABLET, FILM COATED ORAL at 21:01

## 2017-11-21 RX ADMIN — HYDROCODONE BITARTRATE AND ACETAMINOPHEN 2 TABLET: 7.5; 325 TABLET ORAL at 12:37

## 2017-11-21 RX ADMIN — ALLOPURINOL 300 MG: 300 TABLET ORAL at 10:23

## 2017-11-21 RX ADMIN — GABAPENTIN 300 MG: 300 CAPSULE ORAL at 10:22

## 2017-11-21 RX ADMIN — BUDESONIDE AND FORMOTEROL FUMARATE DIHYDRATE 2 PUFF: 160; 4.5 AEROSOL RESPIRATORY (INHALATION) at 19:18

## 2017-11-21 NOTE — PROGRESS NOTES
"Ortho POD 5    Patient Name:  Josephine Wolf  YOB: 1940  Medical Records Number:  [id number]    No complaints    /53 (BP Location: Left arm, Patient Position: Sitting)  Pulse 69  Temp 98.1 °F (36.7 °C) (Oral)   Resp 20  Ht 61\" (154.9 cm)  Wt 143 lb (64.9 kg)  SpO2 93%  BMI 27.02 kg/m2    RLE:  NVI, calf nontender, sensation intact  No signs of DVT    Incision: clean, no infection    Lab Results (last 24 hours)     Procedure Component Value Units Date/Time    Hemoglobin & Hematocrit, Blood [[id number]]  (Abnormal) Collected:  11/21/17 0340    Specimen:  Blood Updated:  11/21/17 0437     Hemoglobin 8.1 (L) g/dL      Hematocrit 25.6 (L) %           S/p Right TKA  WBAT with walker  ASA for DVT prophylaxis  Rehab when approved by Insurance  "

## 2017-11-21 NOTE — PLAN OF CARE
Problem: Patient Care Overview (Adult)  Goal: Plan of Care Review  Outcome: Ongoing (interventions implemented as appropriate)    11/21/17 2533   Outcome Evaluation   Outcome Summary/Follow up Plan Patient is doing well with ambulation and working with pt. minimal pain reported, VSS, plan is to go to rehab once precert is obtained. educated patient on monitoring bp and meds due to hx of HTN.       Goal: Adult Individualization and Mutuality  Outcome: Ongoing (interventions implemented as appropriate)  Goal: Discharge Needs Assessment  Outcome: Ongoing (interventions implemented as appropriate)    Problem: Pain, Acute (Adult)  Goal: Acceptable Pain Control/Comfort Level  Outcome: Ongoing (interventions implemented as appropriate)    Problem: Fall Risk (Adult)  Goal: Absence of Falls  Outcome: Ongoing (interventions implemented as appropriate)    Problem: Knee Replacement, Total (Adult)  Goal: Signs and Symptoms of Listed Potential Problems Will be Absent or Manageable (Knee Replacement, Total)  Outcome: Ongoing (interventions implemented as appropriate)

## 2017-11-21 NOTE — PLAN OF CARE
"Problem: Patient Care Overview (Adult)  Goal: Plan of Care Review  Outcome: Ongoing (interventions implemented as appropriate)    11/21/17 0536   Coping/Psychosocial Response Interventions   Plan Of Care Reviewed With patient   Patient Care Overview   Progress improving   Outcome Evaluation   Outcome Summary/Follow up Plan VSS, O2 dependant at home, voiding per BRP, pt reports several loose stools, requests stool softners to be held, dressing CDI, plan to dc to reha once precert obtained, educated on the importance of BP monitoring related to history of HTN       Goal: Adult Individualization and Mutuality  Outcome: Ongoing (interventions implemented as appropriate)    11/19/17 0355 11/20/17 0251   Individualization   Patient Specific Preferences prefers to be called \"Adrián' --    Patient Specific Goals --  control pain and increase mobility   Patient Specific Interventions --  administer pain meds PRN and assist with ambulation       Goal: Discharge Needs Assessment  Outcome: Ongoing (interventions implemented as appropriate)    11/21/17 0536   Discharge Needs Assessment   Concerns To Be Addressed basic needs concerns   Readmission Within The Last 30 Days no previous admission in last 30 days   Equipment Needed After Discharge walker, rolling;wound care supplies   Discharge Facility/Level Of Care Needs acute rehab   Discharge Disposition still a patient   Current Health   Anticipated Changes Related to Illness inability to care for self   Self-Care   Equipment Currently Used at Home none   Living Environment   Transportation Available car;family or friend will provide         Problem: Fall Risk (Adult)  Goal: Identify Related Risk Factors and Signs and Symptoms  Outcome: Outcome(s) achieved Date Met:  11/21/17 11/21/17 0536   Fall Risk   Fall Risk: Related Risk Factors culprit medication(s);gait/mobility problems;environment unfamiliar   Fall Risk: Signs and Symptoms presence of risk factors       Goal: Absence " of Falls  Outcome: Ongoing (interventions implemented as appropriate)    11/21/17 0536   Fall Risk (Adult)   Absence of Falls achieves outcome         Problem: Knee Replacement, Total (Adult)  Goal: Signs and Symptoms of Listed Potential Problems Will be Absent or Manageable (Knee Replacement, Total)  Outcome: Ongoing (interventions implemented as appropriate)    11/21/17 0536   Knee Replacement, Total   Problems Assessed (Total Knee Replacement) all   Problems Present (Total Knee Replacement) pain;decreased range of motion;functional decline/self care deficit;situational response

## 2017-11-21 NOTE — PLAN OF CARE
Problem: Patient Care Overview (Adult)  Goal: Plan of Care Review    11/21/17 1543   Coping/Psychosocial Response Interventions   Plan Of Care Reviewed With patient   Patient Care Overview   Progress no change   Outcome Evaluation   Outcome Summary/Follow up Plan Pt able to ambulate 75ft RW CGA as well as transfer CGA secondary to mildly off balance with intial standing. Pt fatigued quickly and reported high pain levels this afternoon. Pt may continue to benefit from skilled PT for strengthening, stretching and improved gait pattern.

## 2017-11-21 NOTE — THERAPY TREATMENT NOTE
Acute Care - Physical Therapy Treatment Note  [facility]     Patient Name: Josephine Wolf  : 1940  MRN: [id number]  Today's Date: 2017  Onset of Illness/Injury or Date of Surgery Date:  (POD 1 R TKA)     Referring Physician: Ana    Admit Date: 2017    Visit Dx:    ICD-10-CM ICD-9-CM   1. Difficulty walking R26.2 719.7     Patient Active Problem List   Diagnosis   • Anemia   • OA (osteoarthritis) of knee               Adult Rehabilitation Note       17 1100 17 0917 17 1300    Rehab Assessment/Intervention    Discipline physical therapy assistant  -JM physical therapist  -MS physical therapy assistant  -RW    Document Type therapy note (daily note)  - therapy note (daily note);discharge summary  -MS therapy note (daily note)  -RW    Subjective Information agree to therapy;complains of;fatigue;pain  - agree to therapy;complains of;weakness;fatigue;pain  -MS agree to therapy  -RW    Patient Effort, Rehab Treatment  good  -MS good  -RW    Symptoms Noted Comment  Pt. reports pain/soreness in her Right knee this AM as well as overall fatigue.  -MS     Precautions/Limitations fall precautions  - fall precautions   Pt. on 2 liters oxygen.  -MS fall precautions  -RW    Precautions/Limitations, Hearing hearing impairment, bilaterally  -JM hearing impairment, bilaterally  -MS hearing impairment, bilaterally  -RW    Recorded by [JM] Marybeth Hayes, PTA [MS] Jorge A Hernandes, PT [RW] Alise Alejo, PTA    Pain Assessment    Pain Assessment 0-10  - 0-10  -MS 0-10  -RW    Pain Score 5  -JM 3  -MS 4  -RW    Post Pain Score  3  -MS     Pain Type Surgical pain  -JM Acute pain;Surgical pain  -MS Acute pain;Surgical pain  -RW    Pain Location Knee  -JM Knee  -MS Knee  -RW    Pain Orientation Right  -JM Right  -MS Right  -RW    Pain Intervention(s) Medication (See MAR);Repositioned;Cold applied  -JM  Medication (See MAR);Repositioned;Ambulation/increased activity  -RW    Response  to Interventions esperanza  -JM  tolerated  -RW    Recorded by [JM] Marybeth Hayes PTA [MS] Jorge A Hernandes, PT [RW] Alise Alejo PTA    Cognitive Assessment/Intervention    Current Cognitive/Communication Assessment  functional  -MS functional  -RW    Orientation Status  oriented x 4  -MS oriented x 4  -RW    Follows Commands/Answers Questions  100% of the time  -% of the time  -RW    Personal Safety  WNL/WFL  -MS WNL/WFL  -RW    Personal Safety Interventions  fall prevention program maintained;gait belt;nonskid shoes/slippers when out of bed;supervised activity  -MS fall prevention program maintained;gait belt;nonskid shoes/slippers when out of bed  -RW    Recorded by  [MS] Jorge A Hernandes, PT [RW] Alise Alejo PTA    ROM (Range of Motion)    General ROM Detail -14-86  - Right knee AROM (8, 77)  -MS     Recorded by [JM] Marybeth Hayes PTA [MS] Jorge A Hernandes PT     Bed Mobility, Assessment/Treatment    Bed Mobility, Scoot/Bridge, [place] conditional independence;verbal cues required;nonverbal cues required (demo/gesture)  -      Bed Mob, Supine to Sit, [place]   not tested  -RW    Bed Mob, Sit to Supine, [place]   not tested  -RW    Bed Mobility, Comment in chair  - Pt. up in the chair this AM.  -MS Pt up in chair  -RW    Recorded by [JM] Marybeth Hayes PTA [MS] Jorge A Hernandes, PT [RW] Alise Alejo PTA    Transfer Assessment/Treatment    Transfers, Sit-Stand [place] contact guard assist;verbal cues required   cues for hand placement  - contact guard assist  -MS contact guard assist;verbal cues required  -RW    Transfers, Stand-Sit [place] stand by assist;verbal cues required  - contact guard assist  -MS contact guard assist;verbal cues required  -    Transfers, Sit-Stand-Sit, Assist Device rolling walker  - rolling walker  -MS rolling walker  -RW    Toilet Transfer, [place]   minimum assist (75% patient effort);verbal cues required  -    Toilet  Transfer, Assistive Device   rolling walker  -RW    Transfer, Impairments strength decreased;pain  -JM  strength decreased  -RW    Transfer, Comment   stood x2  -RW    Recorded by [JM] Marybeth Hayes PTA [MS] Jorge A Hernandes, PT [RW] Alise Alejo PTA    Gait Assessment/Treatment    Gait, [place] Level contact guard assist  -JM contact guard assist  -MS contact guard assist;verbal cues required  -RW    Gait, Assistive Device rolling walker  -JM rolling walker  -MS rolling walker  -RW    Gait, Distance (Feet) 85  -JM 70  -MS 55  -RW    Gait, Gait Pattern Analysis   swing-through gait  -RW    Gait, Gait Deviations antalgic;shilpa decreased;step length decreased  - right:;antalgic;shilpa decreased;forward flexed posture;step length decreased  -MS forward flexed posture;right:;antalgic;shilpa decreased;step length decreased  -RW    Gait, Safety Issues  supplemental O2   Pt. on 2 liters oxygen.  -MS     Gait, Comment fatigues quickly, one standing rest req  - Pt. requires verbal/tactile cues for posture correction and for proper gait sequencing with use of the RWX.  -MS Pt declined further ambulation  -RW    Recorded by [JM] Marybeth Hayes PTA [MS] Jorge A Hernandes, PT [RW] Alise Alejo PTA    Therapy Exercises    Exercise Protocols total knee  -JM total knee  -MS total knee  -RW    Total Knee Exercises right:;30 reps;completed protocol   cues req to continue  -JM right:;30 reps;completed protocol;with assist  -MS right:;30 reps;completed protocol   cues to stay awake and on task  -RW    Recorded by [JM] Marybeth Hayes PTA [MS] Jorge A Hernandes, PT [RW] Alise Alejo PTA    Positioning and Restraints    Pre-Treatment Position sitting in chair/recliner  -JM sitting in chair/recliner  -MS sitting in chair/recliner  -RW    Post Treatment Position  chair  -MS other  -RW    In Chair reclined;call light within reach;encouraged to call for assist  -JM notified nsg;reclined;sitting;call light within  reach;encouraged to call for assist   All lines intact.  -MS     Other Position   return to room with caregiver   Family pushed pt back to room in chair  -RW    Recorded by [JM] Marybeth Hayes PTA [MS] Jorge A Hernandes PT [RW] Alise Alejo PTA      11/19/17 0900 11/18/17 1400       Rehab Assessment/Intervention    Discipline physical therapy assistant  -RW physical therapy assistant  -RW     Document Type therapy note (daily note)  -RW therapy note (daily note)  -RW     Subjective Information agree to therapy  -RW agree to therapy;complains of;pain  -RW     Patient Effort, Rehab Treatment good  -RW good  -RW     Precautions/Limitations fall precautions  -RW fall precautions  -RW     Precautions/Limitations, Hearing hearing impairment, bilaterally  -RW hearing impairment, bilaterally  -RW     Recorded by [RW] Alise Alejo PTA [RW] Alise Alejo PTA     Pain Assessment    Pain Assessment 0-10  -RW 0-10  -RW     Pain Score 6  -RW 10  -RW     Pain Type Acute pain;Surgical pain  -RW Acute pain;Surgical pain  -RW     Pain Location Knee  -RW Knee  -RW     Pain Orientation Right  -RW Right  -RW     Pain Intervention(s) Medication (See MAR);Repositioned;Ambulation/increased activity  -RW Medication (See MAR);Repositioned;Ambulation/increased activity  -RW     Response to Interventions tolerated  -RW tolerated, unchanged  -RW     Recorded by [RW] Alise Alejo PTA [RW] Alise Alejo PTA     Cognitive Assessment/Intervention    Current Cognitive/Communication Assessment functional  -RW functional  -RW     Orientation Status oriented x 4  -RW oriented x 4  -RW     Follows Commands/Answers Questions 100% of the time  -% of the time  -RW     Personal Safety WNL/WFL  -RW WNL/WFL  -RW     Personal Safety Interventions fall prevention program maintained;gait belt;nonskid shoes/slippers when out of bed  -RW fall prevention program maintained;gait belt;nonskid shoes/slippers when out of bed  -RW     Recorded  by [RW] Alise Alejo PTA [RW] Alise Alejo PTA     ROM (Range of Motion)    General ROM Detail R knee 11-81'  -RW      Recorded by [RW] Alise Alejo PTA      Bed Mobility, Assessment/Treatment    Bed Mob, Supine to Sit, [place] not tested  -RW not tested  -RW     Bed Mob, Sit to Supine, [place] not tested  -RW not tested  -RW     Bed Mobility, Comment Pt up in chair  -RW Pt up in chair  -RW     Recorded by [RW] Alise Alejo PTA [RW] Alise Alejo PTA     Transfer Assessment/Treatment    Transfers, Sit-Stand [place] contact guard assist;verbal cues required  -RW contact guard assist;verbal cues required  -RW     Transfers, Stand-Sit [place] contact guard assist;verbal cues required  -RW contact guard assist;verbal cues required  -RW     Transfers, Sit-Stand-Sit, Assist Device rolling walker  -RW rolling walker  -RW     Toilet Transfer, [place] contact guard assist;verbal cues required  -RW      Toilet Transfer, Assistive Device rolling walker;other (see comments)   L grab bar  -RW      Transfer, Impairments strength decreased  -RW      Transfer, Comment stood x3  -RW      Recorded by [RW] Alise Alejo PTA [RW] Alise Alejo PTA     Gait Assessment/Treatment    Gait, [place] Level contact guard assist;verbal cues required  -RW contact guard assist;verbal cues required  -RW     Gait, Assistive Device rolling walker  -RW rolling walker  -RW     Gait, Distance (Feet) 70   ambulated additional 30' to restroom in gym  -RW 40  -RW     Gait, Gait Pattern Analysis swing-through gait  -RW swing-through gait  -RW     Gait, Gait Deviations forward flexed posture;right:;antalgic;shilpa decreased;limb motion velocity decreased  -RW forward flexed posture;right:;antalgic;shilpa decreased  -RW     Gait, Impairments strength decreased;pain;impaired balance  -RW      Recorded by [RW] Alise Alejo PTA [RW] Alise Alejo PTA     Therapy Exercises    Exercise Protocols  total knee  -RW total knee  -RW     Total Knee Exercises right:;30 reps;completed protocol   cues to stay on task  -RW right:;25 reps;completed protocol  -RW     Recorded by [RW] Alise Alejo PTA [RW] Alise Alejo PTA     Positioning and Restraints    Pre-Treatment Position sitting in chair/recliner  -RW sitting in chair/recliner  -RW     Post Treatment Position chair  -RW chair  -RW     In Chair reclined;call light within reach;encouraged to call for assist  -RW reclined;call light within reach;encouraged to call for assist;with family/caregiver  -RW     Recorded by [RW] Alise Alejo PTA [RW] Alise Alejo PTA       User Key  (r) = Recorded By, (t) = Taken By, (c) = Cosigned By    Initials Name Effective Dates    JM Marybeth Hayes, PTA 02/18/16 -     MS Jorge A WALSH Cecilio, PT 12/01/15 -     RW Alise Alejo, PTA 04/06/16 -                 IP PT Goals       11/20/17 0920 11/17/17 1024       Bed Mobility PT LTG    Bed Mobility PT LTG, Date Established 11/20/17  -MS 11/17/17  -MG     Bed Mobility PT LTG, Time to Achieve  3 days  -MG     Bed Mobility PT LTG, Activity Type  all bed mobility  -MG     Bed Mobility PT LTG, [place] Level  independent  -MG     Bed Mobility PT LTG, Outcome goal not met  -MS      Transfer Training PT LTG    Transfer Training PT LTG, Date Established  11/17/17  -MG     Transfer Training PT LTG, Time to Achieve  3 days  -MG     Transfer Training PT LTG, Activity Type  bed to chair /chair to bed  -MG     Transfer Training PT LTG, [place] Level  supervision required  -MG     Transfer Training PT LTG, Assist Device  walker, rolling  -MG     Transfer Training PT LTG, Outcome goal not met  -MS      Gait Training PT LTG    Gait Training Goal PT LTG, Date Established  11/17/17  -MG     Gait Training Goal PT LTG, Time to Achieve  3 days  -MG     Gait Training Goal PT LTG, [place] Level  supervision required  -MG     Gait Training Goal PT LTG, Assist Device  walker, rolling   -MG     Gait Training Goal PT LTG, Distance to Achieve  100  -MG     Gait Training Goal PT LTG, Outcome goal not met  -MS      Patient Education PT LTG    Patient Education PT LTG, Date Established  11/17/17  -MG     Patient Education PT LTG, Time to Achieve  3 days  -MG     Patient Education PT LTG, Education Type  HEP  -MG     Patient Education PT LTG, Education Understanding  demonstrate adequately;verbalize understanding  -MG     Patient Education PT LTG Outcome goal met  -MS        User Key  (r) = Recorded By, (t) = Taken By, (c) = Cosigned By    Initials Name Provider Type    MS Jorge A WALSH Cecilio, PT Physical Therapist    MG Megan Gosselin, PT Physical Therapist          Physical Therapy Education     Title: PT OT SLP Therapies (Resolved)     Topic: Physical Therapy (Resolved)     Point: Mobility training (Resolved)    Learning Progress Summary    Learner Readiness Method Response Comment Documented by Status   Patient Acceptance E,TB,D VU,NR   11/21/17 1108 Done    Acceptance E,D VU,NR  MS 11/20/17 0920 Done    Acceptance E,TB,D VU,NR   11/19/17 0939 Done    Acceptance E,TB,D VU,NR   11/18/17 1153 Done    Acceptance E VU,NR  MG 11/17/17 1024 Done               Point: Home exercise program (Resolved)    Learning Progress Summary    Learner Readiness Method Response Comment Documented by Status   Patient Acceptance E,TB,D VU,NR   11/21/17 1108 Done    Acceptance E,D VU,NR  MS 11/20/17 0920 Done    Acceptance E,TB,D VU,NR   11/19/17 0939 Done    Acceptance E,TB,D VU,NR   11/18/17 1153 Done    Acceptance E VU,NR  MG 11/17/17 1024 Done               Point: Body mechanics (Resolved)    Learning Progress Summary    Learner Readiness Method Response Comment Documented by Status   Patient Acceptance E,TB,D VU,NR   11/21/17 1108 Done    Acceptance E,D VU,NR  MS 11/20/17 0920 Done    Acceptance E,TB,D VU,NR   11/19/17 0939 Done    Acceptance E,TB,D VU,NR  RW 11/18/17 1153 Done    Acceptance E VU,NR  MG  11/17/17 1024 Done               Point: Precautions (Resolved)    Learning Progress Summary    Learner Readiness Method Response Comment Documented by Status   Patient Acceptance E,TB,D VU,NR  JM 11/21/17 1108 Done    Acceptance E,D VU,NR  MS 11/20/17 0920 Done    Acceptance E,TB,D VU,NR  RW 11/19/17 0939 Done    Acceptance E,TB,D VU,NR  RW 11/18/17 1153 Done    Acceptance E VU,NR  MG 11/17/17 1024 Done                      User Key     Initials Effective Dates Name Provider Type Discipline     02/18/16 -  Marybeth Hayes, PTA Physical Therapy Assistant PT    MS 12/01/15 -  Jorge A Hernandes, PT Physical Therapist PT     04/06/16 -  Alise Alejo PTA Physical Therapy Assistant PT     09/13/17 -  Megan Gosselin, PT Physical Therapist PT                    PT Recommendation and Plan  Anticipated Discharge Disposition: skilled nursing facility  Planned Therapy Interventions: balance training, bed mobility training, gait training, home exercise program, patient/family education, ROM (Range of Motion), strengthening, transfer training  PT Frequency: 2 times/day             Outcome Measures       11/21/17 1100 11/20/17 0900 11/19/17 0900    How much help from another person do you currently need...    Turning from your back to your side while in flat bed without using bedrails? 4  - 3  -MS 3  -RW    Moving from lying on back to sitting on the side of a flat bed without bedrails? 3  - 3  -MS 3  -RW    Moving to and from a bed to a chair (including a wheelchair)? 3  - 3  -MS 3  -RW    Standing up from a chair using your arms (e.g., wheelchair, bedside chair)? 3  - 3  -MS 3  -RW    Climbing 3-5 steps with a railing? 3  - 3  -MS 3  -RW    To walk in hospital room? 3  - 3  -MS 3  -RW    AM-PAC 6 Clicks Score 19  - 18  -MS 18  -RW    Functional Assessment    Outcome Measure Options  AM-PAC 6 Clicks Basic Mobility (PT)  -MS AM-PAC 6 Clicks Basic Mobility (PT)  -RW      User Key  (r) = Recorded By, (t) =  Taken By, (c) = Cosigned By    Initials Name Provider Type    DIONTE Hayes PTA Physical Therapy Assistant    MS Jorge A WALSH Cecilio, PT Physical Therapist    RW Alise Alejo, PTA Physical Therapy Assistant           Time Calculation:         PT Charges       11/21/17 1109          Time Calculation    Start Time 0855  -      Stop Time 0945  -      Time Calculation (min) 50 min  -DIONTE      PT Received On 11/21/17  -DIONTE        User Key  (r) = Recorded By, (t) = Taken By, (c) = Cosigned By    Initials Name Provider Type    DIONTE Hayes PTA Physical Therapy Assistant          Therapy Charges for Today     Code Description Service Date Service Provider Modifiers Qty    69326761929 HC PT THER PROC EA 15 MIN 11/21/2017 Marybeth Hayes PTA GP 2    51744122711 HC PT THER PROC GROUP 11/21/2017 Marybeth Hayes PTA GP 1          PT G-Codes  Outcome Measure Options: AM-PAC 6 Clicks Basic Mobility (PT)    Marybeth Hayes PTA  11/21/2017

## 2017-11-21 NOTE — THERAPY TREATMENT NOTE
Acute Care - Physical Therapy Treatment Note  [facility]     Patient Name: Josephine Wolf  : 1940  MRN: [id number]  Today's Date: 2017  Onset of Illness/Injury or Date of Surgery Date:  (POD 1 R TKA)     Referring Physician: Ana    Admit Date: 2017    Visit Dx:    ICD-10-CM ICD-9-CM   1. Difficulty walking R26.2 719.7     Patient Active Problem List   Diagnosis   • Anemia   • OA (osteoarthritis) of knee               Adult Rehabilitation Note       17 1300 17 1100 17 0917    Rehab Assessment/Intervention    Discipline physical therapist  -AA physical therapy assistant  -JM physical therapist  -MS    Document Type therapy note (daily note)  -AA therapy note (daily note)  - therapy note (daily note);discharge summary  -MS    Subjective Information agree to therapy;complains of;fatigue;pain  -AA agree to therapy;complains of;fatigue;pain  - agree to therapy;complains of;weakness;fatigue;pain  -MS    Patient Effort, Rehab Treatment good  -AA  good  -MS    Symptoms Noted During/After Treatment none  -AA      Symptoms Noted Comment   Pt. reports pain/soreness in her Right knee this AM as well as overall fatigue.  -MS    Precautions/Limitations fall precautions  -AA fall precautions  - fall precautions   Pt. on 2 liters oxygen.  -MS    Precautions/Limitations, Hearing  hearing impairment, bilaterally  -JM hearing impairment, bilaterally  -MS    Patient Response to Treatment tolerated  -AA      Recorded by [AA] Mari Price, PT [JM] Marybeth Hayes, PTA [MS] Jorge A Hernandes, PT    Pain Assessment    Pain Assessment 0-10  -AA 0-10  -JM 0-10  -MS    Pain Score 9  -AA 5  -JM 3  -MS    Post Pain Score   3  -MS    Pain Type Surgical pain  -AA Surgical pain  -JM Acute pain;Surgical pain  -MS    Pain Location Knee  -AA Knee  -JM Knee  -MS    Pain Orientation Right  -AA Right  -JM Right  -MS    Pain Intervention(s) Medication (See MAR);Ambulation/increased activity  -AA  Medication (See MAR);Repositioned;Cold applied  -     Response to Interventions tolerated  -AA esperanza  -JM     Recorded by [AA] Mari Price PT [JM] Marybeth Hayes PTA [MS] Jorge A Hernandes, PT    Cognitive Assessment/Intervention    Current Cognitive/Communication Assessment functional  -AA  functional  -MS    Orientation Status oriented x 4  -AA  oriented x 4  -MS    Follows Commands/Answers Questions 100% of the time  -AA  100% of the time  -MS    Personal Safety WNL/WFL  -AA  WNL/WFL  -MS    Personal Safety Interventions gait belt;fall prevention program maintained;nonskid shoes/slippers when out of bed  -AA  fall prevention program maintained;gait belt;nonskid shoes/slippers when out of bed;supervised activity  -MS    Recorded by [AA] Mari Price PT  [MS] Jorge A Hernandes, PT    ROM (Range of Motion)    General ROM Detail  -14-86  - Right knee AROM (8, 77)  -MS    Recorded by  [JM] Marybeth Hayes PTA [MS] Jorge A Hernandes, PT    Bed Mobility, Assessment/Treatment    Bed Mobility, Scoot/Bridge, Sampson  conditional independence;verbal cues required;nonverbal cues required (demo/gesture)  -     Bed Mob, Supine to Sit, [place] not tested  -AA      Bed Mob, Sit to Supine, [place] not tested  -AA      Bed Mobility, Comment in chair  -AA in chair  - Pt. up in the chair this AM.  -MS    Recorded by [AA] Mari Price PT [JM] Marybeth Hayes PTA [MS] Jorge A Hernandes, PT    Transfer Assessment/Treatment    Transfers, Sit-Stand [place] contact guard assist;verbal cues required  - contact guard assist;verbal cues required   cues for hand placement  - contact guard assist  -MS    Transfers, Stand-Sit [place] contact guard assist;verbal cues required  - stand by assist;verbal cues required  - contact guard assist  -MS    Transfers, Sit-Stand-Sit, Assist Device rolling walker  -AA rolling walker  - rolling walker  -MS    Transfer, Safety Issues  weight-shifting ability decreased  -AA      Transfer, Impairments strength decreased;pain;impaired balance  -AA strength decreased;pain  -JM     Transfer, Comment Pt minimally off balance with initial standing  -AA      Recorded by [AA] Mari Price, PT [JM] Marybeth Haeys PTA [MS] Jorge A Hernandes, PT    Gait Assessment/Treatment    Gait, [place] Level contact guard assist  -AA contact guard assist  -JM contact guard assist  -MS    Gait, Assistive Device rolling walker  -AA rolling walker  -JM rolling walker  -MS    Gait, Distance (Feet) 75  -AA 85  -JM 70  -MS    Gait, Gait Deviations antalgic;right:;shilpa decreased;decreased heel strike;weight-shifting ability decreased  -AA antalgic;shilpa decreased;step length decreased  - right:;antalgic;shilpa decreased;forward flexed posture;step length decreased  -MS    Gait, Safety Issues weight-shifting ability decreased  -AA  supplemental O2   Pt. on 2 liters oxygen.  -MS    Gait, Impairments strength decreased;impaired balance;pain  -AA      Gait, Comment Pt fatigued quickly and reported pain post exercise  -AA fatigues quickly, one standing rest req  - Pt. requires verbal/tactile cues for posture correction and for proper gait sequencing with use of the RWX.  -MS    Recorded by [AA] Mari Price PT [JM] Marybeth Hayes PTA [MS] Jorge A Hernandes, PT    Therapy Exercises    Exercise Protocols total knee  -AA total knee  -JM total knee  -MS    Total Knee Exercises right:;20 reps;completed protocol  -AA right:;30 reps;completed protocol   cues req to continue  - right:;30 reps;completed protocol;with assist  -MS    Recorded by [AA] Mari Price, PT [JM] Marybeth Hayes PTA [MS] Jorge A Hernandes, PT    Positioning and Restraints    Pre-Treatment Position sitting in chair/recliner  -AA sitting in chair/recliner  -JM sitting in chair/recliner  -MS    Post Treatment Position chair  -AA  chair  -MS    In Chair sitting;reclined;call light  within reach;encouraged to call for assist  -AA reclined;call light within reach;encouraged to call for assist  -JM notified nsg;reclined;sitting;call light within reach;encouraged to call for assist   All lines intact.  -MS    Recorded by [AA] Mari Price, PT [JM] Marybeth Hayes PTA [MS] Jorge A Pulidoer, PT      11/19/17 1300 11/19/17 0900       Rehab Assessment/Intervention    Discipline physical therapy assistant  -RW physical therapy assistant  -RW     Document Type therapy note (daily note)  -RW therapy note (daily note)  -RW     Subjective Information agree to therapy  -RW agree to therapy  -RW     Patient Effort, Rehab Treatment good  -RW good  -RW     Precautions/Limitations fall precautions  -RW fall precautions  -RW     Precautions/Limitations, Hearing hearing impairment, bilaterally  -RW hearing impairment, bilaterally  -RW     Recorded by [RW] Alise Alejo PTA [RW] Alise Alejo PTA     Pain Assessment    Pain Assessment 0-10  -RW 0-10  -RW     Pain Score 4  -RW 6  -RW     Pain Type Acute pain;Surgical pain  -RW Acute pain;Surgical pain  -RW     Pain Location Knee  -RW Knee  -RW     Pain Orientation Right  -RW Right  -RW     Pain Intervention(s) Medication (See MAR);Repositioned;Ambulation/increased activity  -RW Medication (See MAR);Repositioned;Ambulation/increased activity  -RW     Response to Interventions tolerated  -RW tolerated  -RW     Recorded by [RW] Alise Alejo PTA [RW] Alise Alejo PTA     Cognitive Assessment/Intervention    Current Cognitive/Communication Assessment functional  -RW functional  -RW     Orientation Status oriented x 4  -RW oriented x 4  -RW     Follows Commands/Answers Questions 100% of the time  -% of the time  -RW     Personal Safety WNL/WFL  -RW WNL/WFL  -RW     Personal Safety Interventions fall prevention program maintained;gait belt;nonskid shoes/slippers when out of bed  -RW fall prevention program maintained;gait belt;nonskid  shoes/slippers when out of bed  -RW     Recorded by [RW] Alise Alejo PTA [RW] Alise Alejo PTA     ROM (Range of Motion)    General ROM Detail  R knee 11-81'  -RW     Recorded by  [RW] Alise Alejo PTA     Bed Mobility, Assessment/Treatment    Bed Mob, Supine to Sit, [place] not tested  -RW not tested  -RW     Bed Mob, Sit to Supine, [place] not tested  -RW not tested  -RW     Bed Mobility, Comment Pt up in chair  -RW Pt up in chair  -RW     Recorded by [RW] Alise Alejo PTA [RW] Alise Alejo PTA     Transfer Assessment/Treatment    Transfers, Sit-Stand [place] contact guard assist;verbal cues required  -RW contact guard assist;verbal cues required  -RW     Transfers, Stand-Sit [place] contact guard assist;verbal cues required  -RW contact guard assist;verbal cues required  -RW     Transfers, Sit-Stand-Sit, Assist Device rolling walker  -RW rolling walker  -RW     Toilet Transfer, [place] minimum assist (75% patient effort);verbal cues required  -RW contact guard assist;verbal cues required  -RW     Toilet Transfer, Assistive Device rolling walker  -RW rolling walker;other (see comments)   L grab bar  -RW     Transfer, Impairments strength decreased  -RW strength decreased  -RW     Transfer, Comment stood x2  -RW stood x3  -RW     Recorded by [RW] Alise Alejo PTA [RW] Alise Alejo PTA     Gait Assessment/Treatment    Gait, [place] Level contact guard assist;verbal cues required  -RW contact guard assist;verbal cues required  -RW     Gait, Assistive Device rolling walker  -RW rolling walker  -RW     Gait, Distance (Feet) 55  -RW 70   ambulated additional 30' to restroom in gym  -RW     Gait, Gait Pattern Analysis swing-through gait  -RW swing-through gait  -RW     Gait, Gait Deviations forward flexed posture;right:;antalgic;shilpa decreased;step length decreased  -RW forward flexed posture;right:;antalgic;shilpa decreased;limb motion velocity decreased   -RW     Gait, Impairments  strength decreased;pain;impaired balance  -RW     Gait, Comment Pt declined further ambulation  -RW      Recorded by [RW] Alise Alejo PTA [RW] Alise Alejo PTA     Therapy Exercises    Exercise Protocols total knee  -RW total knee  -RW     Total Knee Exercises right:;30 reps;completed protocol   cues to stay awake and on task  -RW right:;30 reps;completed protocol   cues to stay on task  -RW     Recorded by [RW] Alise Alejo, SHANNEN [RW] Alise Alejo PTA     Positioning and Restraints    Pre-Treatment Position sitting in chair/recliner  -RW sitting in chair/recliner  -RW     Post Treatment Position other  -RW chair  -RW     In Chair  reclined;call light within reach;encouraged to call for assist  -RW     Other Position return to room with caregiver   Family pushed pt back to room in chair  -RW      Recorded by [RW] Alise Alejo, SHANNEN [RW] Alise Alejo PTA       User Key  (r) = Recorded By, (t) = Taken By, (c) = Cosigned By    Initials Name Effective Dates    JM Marybeth Hayes, PTA 02/18/16 -     MS Jorge A NICO Hernandes, PT 12/01/15 -     RW Alise Alejo, PTA 04/06/16 -     AA Mari Price, PT 09/05/17 -                 IP PT Goals       11/20/17 0920 11/17/17 1024       Bed Mobility PT LTG    Bed Mobility PT LTG, Date Established 11/20/17  -MS 11/17/17  -MG     Bed Mobility PT LTG, Time to Achieve  3 days  -MG     Bed Mobility PT LTG, Activity Type  all bed mobility  -MG     Bed Mobility PT LTG, [place] Level  independent  -MG     Bed Mobility PT LTG, Outcome goal not met  -MS      Transfer Training PT LTG    Transfer Training PT LTG, Date Established  11/17/17  -MG     Transfer Training PT LTG, Time to Achieve  3 days  -MG     Transfer Training PT LTG, Activity Type  bed to chair /chair to bed  -MG     Transfer Training PT LTG, [place] Level  supervision required  -MG     Transfer Training PT LTG, Assist Device  walker, rolling  -MG     Transfer Training  PT LTG, Outcome goal not met  -MS      Gait Training PT LTG    Gait Training Goal PT LTG, Date Established  11/17/17  -MG     Gait Training Goal PT LTG, Time to Achieve  3 days  -MG     Gait Training Goal PT LTG, [place] Level  supervision required  -MG     Gait Training Goal PT LTG, Assist Device  walker, rolling  -MG     Gait Training Goal PT LTG, Distance to Achieve  100  -MG     Gait Training Goal PT LTG, Outcome goal not met  -MS      Patient Education PT LTG    Patient Education PT LTG, Date Established  11/17/17  -MG     Patient Education PT LTG, Time to Achieve  3 days  -MG     Patient Education PT LTG, Education Type  HEP  -MG     Patient Education PT LTG, Education Understanding  demonstrate adequately;verbalize understanding  -MG     Patient Education PT LTG Outcome goal met  -MS        User Key  (r) = Recorded By, (t) = Taken By, (c) = Cosigned By    Initials Name Provider Type    MS Jorge A WALSH Cecilio, PT Physical Therapist    MG Megan Gosselin, PT Physical Therapist          Physical Therapy Education     Title: PT OT SLP Therapies (Done)     Topic: Physical Therapy (Done)     Point: Mobility training (Done)    Learning Progress Summary    Learner Readiness Method Response Comment Documented by Status   Patient Acceptance E VU,NR   11/21/17 1543 Done    Acceptance E,TB,D VU,NR   11/21/17 1108 Done    Acceptance E,D VU,NR  MS 11/20/17 0920 Done    Acceptance E,TB,D VU,NR   11/19/17 0939 Done    Acceptance E,TB,D VU,NR   11/18/17 1153 Done    Acceptance E VU,NR   11/17/17 1024 Done               Point: Home exercise program (Done)    Learning Progress Summary    Learner Readiness Method Response Comment Documented by Status   Patient Acceptance E VU,NR   11/21/17 1543 Done    Acceptance E,TB,D VU,NR   11/21/17 1108 Done    Acceptance E,D VU,NR  MS 11/20/17 0920 Done    Acceptance E,TB,D VU,NR   11/19/17 0939 Done    Acceptance E,TB,D VU,NR   11/18/17 1153 Done    Acceptance E VU,NR    11/17/17 1024 Done               Point: Body mechanics (Done)    Learning Progress Summary    Learner Readiness Method Response Comment Documented by Status   Patient Acceptance E VU,NR   11/21/17 1543 Done    Acceptance E,TB,D VU,NR   11/21/17 1108 Done    Acceptance E,D VU,NR  MS 11/20/17 0920 Done    Acceptance E,TB,D VU,NR   11/19/17 0939 Done    Acceptance E,TB,D VU,NR   11/18/17 1153 Done    Acceptance E VU,NR   11/17/17 1024 Done               Point: Precautions (Done)    Learning Progress Summary    Learner Readiness Method Response Comment Documented by Status   Patient Acceptance E VU,NR   11/21/17 1543 Done    Acceptance E,TB,D VU,NR   11/21/17 1108 Done    Acceptance E,D VU,NR  MS 11/20/17 0920 Done    Acceptance E,TB,D VU,NR   11/19/17 0939 Done    Acceptance E,TB,D VU,NR   11/18/17 1153 Done    Acceptance E VU,NR   11/17/17 1024 Done                      User Key     Initials Effective Dates Name Provider Type Discipline     02/18/16 -  Marybeth Hayes, PTA Physical Therapy Assistant PT    MS 12/01/15 -  Jorge A Hernandes, PT Physical Therapist PT     04/06/16 -  Alise Alejo, PTA Physical Therapy Assistant PT     09/05/17 -  Mari Price, PT Physical Therapist PT     09/13/17 -  Megan Gosselin, PT Physical Therapist PT                    PT Recommendation and Plan  Anticipated Discharge Disposition: skilled nursing facility  Planned Therapy Interventions: balance training, bed mobility training, gait training, home exercise program, patient/family education, ROM (Range of Motion), strengthening, transfer training  PT Frequency: 2 times/day  Plan of Care Review  Plan Of Care Reviewed With: patient  Progress: no change  Outcome Summary/Follow up Plan: Pt able to ambulate 75ft RW CGA as well as transfer CGA secondary to mildly off balance with intial standing. Pt fatigued quickly and reported high pain levels this afternoon. Pt may continue to benefit from skilled  PT for strengthening, stretching and improved gait pattern.          Outcome Measures       11/21/17 1500 11/21/17 1100 11/20/17 0900    How much help from another person do you currently need...    Turning from your back to your side while in flat bed without using bedrails? 4  -AA 4  -JM 3  -MS    Moving from lying on back to sitting on the side of a flat bed without bedrails? 3  -AA 3  -JM 3  -MS    Moving to and from a bed to a chair (including a wheelchair)? 3  -AA 3  -JM 3  -MS    Standing up from a chair using your arms (e.g., wheelchair, bedside chair)? 3  -AA 3  -JM 3  -MS    Climbing 3-5 steps with a railing? 2  -AA 3  -JM 3  -MS    To walk in hospital room? 3  -AA 3  -JM 3  -MS    AM-PAC 6 Clicks Score 18  -AA 19  -JM 18  -MS    Functional Assessment    Outcome Measure Options AM-PAC 6 Clicks Basic Mobility (PT)  -AA  AM-PAC 6 Clicks Basic Mobility (PT)  -MS      11/19/17 0900          How much help from another person do you currently need...    Turning from your back to your side while in flat bed without using bedrails? 3  -RW      Moving from lying on back to sitting on the side of a flat bed without bedrails? 3  -RW      Moving to and from a bed to a chair (including a wheelchair)? 3  -RW      Standing up from a chair using your arms (e.g., wheelchair, bedside chair)? 3  -RW      Climbing 3-5 steps with a railing? 3  -RW      To walk in hospital room? 3  -RW      AM-PAC 6 Clicks Score 18  -RW      Functional Assessment    Outcome Measure Options AM-PAC 6 Clicks Basic Mobility (PT)  -RW        User Key  (r) = Recorded By, (t) = Taken By, (c) = Cosigned By    Initials Name Provider Type    DIONTE Hayes, PTA Physical Therapy Assistant    MS Jorge A Hernandes, PT Physical Therapist    RW Alise Alejo, PTA Physical Therapy Assistant    MARYLIN Price, PT Physical Therapist           Time Calculation:         PT Charges       11/21/17 1545 11/21/17 1109       Time Calculation    Start Time  1300  - 0855  -     Stop Time 1415  -AA 0945  -     Time Calculation (min) 75 min  -AA 50 min  -     PT Received On 11/21/17  -AA 11/21/17  -DIONTE     PT - Next Appointment 11/22/17  -        User Key  (r) = Recorded By, (t) = Taken By, (c) = Cosigned By    Initials Name Provider Type    DIONTE Hayes, PTA Physical Therapy Assistant    MARYLIN Price, PT Physical Therapist          Therapy Charges for Today     Code Description Service Date Service Provider Modifiers Qty    13759803542 HC PT THER PROC EA 15 MIN 11/21/2017 Mari Price, PT GP 1    66907879245 HC PT THER PROC GROUP 11/21/2017 Mari Price, PT GP 1          PT G-Codes  Outcome Measure Options: AM-PAC 6 Clicks Basic Mobility (PT)    Mari Price, PT  11/21/2017

## 2017-11-22 VITALS
HEIGHT: 61 IN | DIASTOLIC BLOOD PRESSURE: 60 MMHG | WEIGHT: 143 LBS | BODY MASS INDEX: 27 KG/M2 | SYSTOLIC BLOOD PRESSURE: 117 MMHG | TEMPERATURE: 98.2 F | HEART RATE: 64 BPM | RESPIRATION RATE: 20 BRPM | OXYGEN SATURATION: 94 %

## 2017-11-22 LAB
HCT VFR BLD AUTO: 24.6 % (ref 35.6–45.5)
HGB BLD-MCNC: 7.8 G/DL (ref 11.9–15.5)

## 2017-11-22 PROCEDURE — 85014 HEMATOCRIT: CPT | Performed by: ORTHOPAEDIC SURGERY

## 2017-11-22 PROCEDURE — 94799 UNLISTED PULMONARY SVC/PX: CPT

## 2017-11-22 PROCEDURE — 97150 GROUP THERAPEUTIC PROCEDURES: CPT

## 2017-11-22 PROCEDURE — 97110 THERAPEUTIC EXERCISES: CPT

## 2017-11-22 PROCEDURE — 85018 HEMOGLOBIN: CPT | Performed by: ORTHOPAEDIC SURGERY

## 2017-11-22 RX ADMIN — ALLOPURINOL 300 MG: 300 TABLET ORAL at 08:31

## 2017-11-22 RX ADMIN — ALBUTEROL SULFATE 1.25 MG: 1.25 SOLUTION RESPIRATORY (INHALATION) at 10:02

## 2017-11-22 RX ADMIN — PANTOPRAZOLE SODIUM 40 MG: 40 TABLET, DELAYED RELEASE ORAL at 06:39

## 2017-11-22 RX ADMIN — ALBUTEROL SULFATE 1.25 MG: 1.25 SOLUTION RESPIRATORY (INHALATION) at 13:34

## 2017-11-22 RX ADMIN — FERROUS SULFATE TAB 325 MG (65 MG ELEMENTAL FE) 325 MG: 325 (65 FE) TAB at 08:31

## 2017-11-22 RX ADMIN — HYDROCODONE BITARTRATE AND ACETAMINOPHEN 1 TABLET: 7.5; 325 TABLET ORAL at 17:36

## 2017-11-22 RX ADMIN — HYDROCODONE BITARTRATE AND ACETAMINOPHEN 1 TABLET: 7.5; 325 TABLET ORAL at 12:29

## 2017-11-22 RX ADMIN — VENLAFAXINE HYDROCHLORIDE 150 MG: 150 CAPSULE, EXTENDED RELEASE ORAL at 08:31

## 2017-11-22 RX ADMIN — BUDESONIDE AND FORMOTEROL FUMARATE DIHYDRATE 2 PUFF: 160; 4.5 AEROSOL RESPIRATORY (INHALATION) at 10:03

## 2017-11-22 RX ADMIN — GABAPENTIN 300 MG: 300 CAPSULE ORAL at 08:33

## 2017-11-22 RX ADMIN — DIVALPROEX SODIUM 250 MG: 250 TABLET, FILM COATED, EXTENDED RELEASE ORAL at 08:31

## 2017-11-22 RX ADMIN — CETIRIZINE HYDROCHLORIDE 10 MG: 10 TABLET, FILM COATED ORAL at 08:31

## 2017-11-22 RX ADMIN — ASPIRIN 325 MG: 325 TABLET, DELAYED RELEASE ORAL at 17:36

## 2017-11-22 RX ADMIN — HYDROCODONE BITARTRATE AND ACETAMINOPHEN 1 TABLET: 7.5; 325 TABLET ORAL at 01:14

## 2017-11-22 RX ADMIN — GABAPENTIN 300 MG: 300 CAPSULE ORAL at 17:36

## 2017-11-22 RX ADMIN — ASPIRIN 325 MG: 325 TABLET, DELAYED RELEASE ORAL at 08:31

## 2017-11-22 RX ADMIN — HYDROCHLOROTHIAZIDE 12.5 MG: 25 TABLET ORAL at 08:30

## 2017-11-22 RX ADMIN — HYDROCODONE BITARTRATE AND ACETAMINOPHEN 1 TABLET: 7.5; 325 TABLET ORAL at 08:30

## 2017-11-22 RX ADMIN — LOSARTAN POTASSIUM 100 MG: 100 TABLET, FILM COATED ORAL at 08:31

## 2017-11-22 RX ADMIN — LEVOTHYROXINE SODIUM 88 MCG: 88 TABLET ORAL at 06:39

## 2017-11-22 NOTE — PLAN OF CARE
"Problem: Patient Care Overview (Adult)  Goal: Plan of Care Review  Outcome: Ongoing (interventions implemented as appropriate)    11/22/17 0505   Coping/Psychosocial Response Interventions   Plan Of Care Reviewed With patient   Patient Care Overview   Progress improving   Outcome Evaluation   Outcome Summary/Follow up Plan VSS, pt reports diarhhea resolved, pt reports adequate pain relief, dressing CDI, ambulating at increased distances, voiding per BRP, possible DC to rehab later today pending precert, educated on the importance of BP monitoring related to history of HTN       Goal: Adult Individualization and Mutuality  Outcome: Ongoing (interventions implemented as appropriate)    11/19/17 0355 11/20/17 0251   Individualization   Patient Specific Preferences prefers to be called \"Adrián' --    Patient Specific Goals --  control pain and increase mobility   Patient Specific Interventions --  administer pain meds PRN and assist with ambulation         11/20/17 0251   Individualization   Patient Specific Goals control pain and increase mobility   Patient Specific Interventions administer pain meds PRN and assist with ambulation       Goal: Discharge Needs Assessment  Outcome: Ongoing (interventions implemented as appropriate)    11/17/17 0327 11/21/17 0536   Discharge Needs Assessment   Concerns To Be Addressed --  basic needs concerns   Readmission Within The Last 30 Days --  no previous admission in last 30 days   Equipment Needed After Discharge --  walker, rolling;wound care supplies   Discharge Facility/Level Of Care Needs --  acute rehab   Discharge Disposition --  still a patient   Current Health   Outpatient/Agency/Support Group Needs homecare agency (specify level of care) --    Anticipated Changes Related to Illness --  inability to care for self   Self-Care   Equipment Currently Used at Home --  none   Living Environment   Transportation Available --  car;family or friend will provide         Problem: Fall " Risk (Adult)  Goal: Absence of Falls  Outcome: Ongoing (interventions implemented as appropriate)    11/22/17 0505   Fall Risk (Adult)   Absence of Falls achieves outcome         Problem: Knee Replacement, Total (Adult)  Goal: Signs and Symptoms of Listed Potential Problems Will be Absent or Manageable (Knee Replacement, Total)  Outcome: Ongoing (interventions implemented as appropriate)    11/22/17 0505   Knee Replacement, Total   Problems Assessed (Total Knee Replacement) all   Problems Present (Total Knee Replacement) pain;decreased range of motion;functional decline/self care deficit;situational response

## 2017-11-22 NOTE — PROGRESS NOTES
"Ortho POD 6    Patient Name:  Josephine Wolf  YOB: 1940  Medical Records Number:  [id number]    No complaints    /66 (BP Location: Left arm, Patient Position: Lying)  Pulse 62  Temp 96.6 °F (35.9 °C) (Oral)   Resp 16  Ht 61\" (154.9 cm)  Wt 143 lb (64.9 kg)  SpO2 95%  BMI 27.02 kg/m2    RLE:  NVI, calf nontender, sensation intact  No signs of DVT    Incision: clean, no infection    Lab Results (last 24 hours)     Procedure Component Value Units Date/Time    Hemoglobin & Hematocrit, Blood [[id number]]  (Abnormal) Collected:  11/22/17 0307    Specimen:  Blood Updated:  11/22/17 0416     Hemoglobin 7.8 (L) g/dL      Hematocrit 24.6 (L) %           S/p Right TKA  WBAT with walker  ASA for DVT prophylaxis  Rehab today  "

## 2017-11-22 NOTE — THERAPY TREATMENT NOTE
Acute Care - Physical Therapy Treatment Note  [facility]     Patient Name: Josephine Wolf  : 1940  MRN: [id number]  Today's Date: 2017  Onset of Illness/Injury or Date of Surgery Date:  (POD 1 R TKA)     Referring Physician: Ana    Admit Date: 2017    Visit Dx:    ICD-10-CM ICD-9-CM   1. Difficulty walking R26.2 719.7     Patient Active Problem List   Diagnosis   • Anemia   • OA (osteoarthritis) of knee               Adult Rehabilitation Note       17 1506 17 1239 17 1300    Rehab Assessment/Intervention    Discipline physical therapist  -MS physical therapy assistant  - physical therapist  -AA    Document Type therapy note (daily note)  -MS therapy note (daily note)  - therapy note (daily note)  -AA    Subjective Information agree to therapy;complains of;pain  -MS agree to therapy;complains of;weakness;fatigue;pain;swelling  - agree to therapy;complains of;fatigue;pain  -AA    Patient Effort, Rehab Treatment good  -MS  good  -AA    Symptoms Noted During/After Treatment fatigue  -MS  none  -AA    Precautions/Limitations fall precautions  -MS fall precautions  - fall precautions  -AA    Precautions/Limitations, Hearing hearing impairment, bilaterally  -MS hearing impairment, bilaterally  -JM     Patient Response to Treatment   tolerated  -AA    Recorded by [MS] Jorge A Hernandes, PT [JM] Marybeth Hayes, PTA [AA] Mari Price, MARCO    Pain Assessment    Pain Assessment 0-10  -MS 0-10  -JM 0-10  -AA    Pain Score 4  -MS 6  -JM 9  -AA    Post Pain Score 4  -MS 9  -JM     Pain Type Acute pain;Surgical pain  -MS Surgical pain  -JM Surgical pain  -AA    Pain Location Knee  -MS Knee  - Knee  -AA    Pain Orientation Right  -MS Right  -JM Right  -AA    Pain Intervention(s) Medication (See MAR);Cold applied;Repositioned;Elevated;Rest  -MS Medication (See MAR);Repositioned;Cold applied  - Medication (See MAR);Ambulation/increased activity  -AA    Response to  Interventions  [place]  -JM tolerated  -AA    Recorded by [MS] Jorge A Hernnades, PT [JM] Marybeth Hayes PTA [AA] Mari Price, PT    Cognitive Assessment/Intervention    Current Cognitive/Communication Assessment functional  -MS  functional  -AA    Orientation Status oriented x 4  -MS  oriented x 4  -AA    Follows Commands/Answers Questions 100% of the time  -MS  100% of the time  -AA    Personal Safety WNL/WFL  -MS  WNL/WFL  -AA    Personal Safety Interventions fall prevention program maintained;gait belt;nonskid shoes/slippers when out of bed;supervised activity  -MS  gait belt;fall prevention program maintained;nonskid shoes/slippers when out of bed  -AA    Recorded by [MS] Jorge A Hernandes, PT  [AA] Mari Price, PT    ROM (Range of Motion)    General ROM Detail  -5-85  -     Recorded by  [JM] Marybeth Hayes PTA     Bed Mobility, Assessment/Treatment    Bed Mobility, Scoot/Bridge, [place]  conditional independence;verbal cues required;nonverbal cues required (demo/gesture)  -     Bed Mob, Supine to Sit, [place]   not tested  -AA    Bed Mob, Sit to Supine, [place]   not tested  -AA    Bed Mobility, Comment Up in chair this PM for the gym.  -MS  in chair  -AA    Recorded by [MS] Jorge A Hernandes, PT [JM] Marybeth Hayes, PTA [AA] Mari Price, PT    Transfer Assessment/Treatment    Transfers, Sit-Stand [place] contact guard assist  -MS contact guard assist;verbal cues required   cues for hand placement  - contact guard assist;verbal cues required  -AA    Transfers, Stand-Sit [place] contact guard assist  -MS stand by assist;verbal cues required  - contact guard assist;verbal cues required  -AA    Transfers, Sit-Stand-Sit, Assist Device rolling walker  -MS rolling walker  - rolling walker  -AA    Transfer, Safety Issues  balance decreased during turns;weight-shifting ability decreased  - weight-shifting ability decreased  -AA    Transfer, Impairments  strength  decreased;pain  -JM strength decreased;pain;impaired balance  -AA    Transfer, Comment   Pt minimally off balance with initial standing  -AA    Recorded by [MS] Jorge A Hernandes, PT [JM] Marybeth Hayes PTA [AA] Mari Price, PT    Gait Assessment/Treatment    Gait, [place] Level contact guard assist  -MS contact guard assist  -JM contact guard assist  -AA    Gait, Assistive Device rolling walker  -MS rolling walker  -JM rolling walker  -AA    Gait, Distance (Feet) 90  -MS 90  -JM 75  -AA    Gait, Gait Deviations right:;antalgic;shilpa decreased;forward flexed posture  -MS antalgic;shilpa decreased;forward flexed posture;step length decreased  - antalgic;right:;shilpa decreased;decreased heel strike;weight-shifting ability decreased  -AA    Gait, Safety Issues  balance decreased during turns;sequencing ability decreased;step length decreased  - weight-shifting ability decreased  -AA    Gait, Impairments  strength decreased;coordination impaired;pain  -JM strength decreased;impaired balance;pain  -AA    Gait, Comment Verbal/tactile cues for posture correction.  -MS jane by end of amb, wanted to improve over yesterday  - Pt fatigued quickly and reported pain post exercise  -AA    Recorded by [MS] Jorge A Hernandes, PT [JM] Marybeth Hayes PTA [AA] Mari Price, PT    Therapy Exercises    Exercise Protocols total knee  -MS total knee  -JM total knee  -AA    Total Knee Exercises right:;30 reps;completed protocol  -MS right:;30 reps;completed protocol   cues req to continue  -JM right:;20 reps;completed protocol  -AA    Recorded by [MS] Jorge A Hernandes, PT [JM] Marybeth Hayes, PTA [AA] Mari Price, PT    Positioning and Restraints    Pre-Treatment Position sitting in chair/recliner  -MS sitting in chair/recliner  -JM sitting in chair/recliner  -AA    Post Treatment Position chair  -MS  chair  -AA    In Chair notified nsg;reclined;sitting;call light within reach;encouraged to call for  assist  -MS reclined;call light within reach;encouraged to call for assist   no alarm  -JM sitting;reclined;call light within reach;encouraged to call for assist  -AA    Recorded by [MS] Jorge A Hernandes, PT [JM] Marybeth Hayes PTA [AA] Marisa Price, PT      11/21/17 1100 11/20/17 0917       Rehab Assessment/Intervention    Discipline physical therapy assistant  - physical therapist  -MS     Document Type therapy note (daily note)  - therapy note (daily note);discharge summary  -MS     Subjective Information agree to therapy;complains of;fatigue;pain  - agree to therapy;complains of;weakness;fatigue;pain  -MS     Patient Effort, Rehab Treatment  good  -MS     Symptoms Noted Comment  Pt. reports pain/soreness in her Right knee this AM as well as overall fatigue.  -MS     Precautions/Limitations fall precautions  - fall precautions   Pt. on 2 liters oxygen.  -MS     Precautions/Limitations, Hearing hearing impairment, bilaterally  - hearing impairment, bilaterally  -MS     Recorded by [JM] Marybeth Hayes PTA [MS] Jorge A Hernandes, PT     Pain Assessment    Pain Assessment 0-10  -JM 0-10  -MS     Pain Score 5  -JM 3  -MS     Post Pain Score  3  -MS     Pain Type Surgical pain  - Acute pain;Surgical pain  -MS     Pain Location Knee  - Knee  -MS     Pain Orientation Right  -JM Right  -MS     Pain Intervention(s) Medication (See MAR);Repositioned;Cold applied  -      Response to Interventions esperanza  -JM      Recorded by [JM] Marybeth Hayes PTA [MS] Jorge A Hernandes, PT     Cognitive Assessment/Intervention    Current Cognitive/Communication Assessment  functional  -MS     Orientation Status  oriented x 4  -MS     Follows Commands/Answers Questions  100% of the time  -MS     Personal Safety  WNL/WFL  -MS     Personal Safety Interventions  fall prevention program maintained;gait belt;nonskid shoes/slippers when out of bed;supervised activity  -MS     Recorded by  [MS] Jorge A Hernandes, PT     ROM  (Range of Motion)    General ROM Detail -14-86  - Right knee AROM (8, 77)  -MS     Recorded by [JM] Marybeth Hayes PTA [MS] Jorge A Hernandes, PT     Bed Mobility, Assessment/Treatment    Bed Mobility, Scoot/Bridge, [place] conditional independence;verbal cues required;nonverbal cues required (demo/gesture)  -      Bed Mobility, Comment in chair  - Pt. up in the chair this AM.  -MS     Recorded by [JM] Marybeth Hayes PTA [MS] Jorge A Hernandes, PT     Transfer Assessment/Treatment    Transfers, Sit-Stand [place] contact guard assist;verbal cues required   cues for hand placement  - contact guard assist  -MS     Transfers, Stand-Sit [place] stand by assist;verbal cues required  - contact guard assist  -MS     Transfers, Sit-Stand-Sit, Assist Device rolling walker  - rolling walker  -MS     Transfer, Impairments strength decreased;pain  -JM      Recorded by [JM] Marybeth Hayes PTA [MS] Jorge A Hernandes, PT     Gait Assessment/Treatment    Gait, [place] Level contact guard assist  - contact guard assist  -MS     Gait, Assistive Device rolling walker  - rolling walker  -MS     Gait, Distance (Feet) 85  - 70  -MS     Gait, Gait Deviations antalgic;shilpa decreased;step length decreased  - right:;antalgic;shilpa decreased;forward flexed posture;step length decreased  -MS     Gait, Safety Issues  supplemental O2   Pt. on 2 liters oxygen.  -MS     Gait, Comment fatigues quickly, one standing rest req  - Pt. requires verbal/tactile cues for posture correction and for proper gait sequencing with use of the RWX.  -MS     Recorded by [JM] Marybeth Hayes PTA [MS] Jorge A Hernandes, PT     Therapy Exercises    Exercise Protocols total knee  - total knee  -MS     Total Knee Exercises right:;30 reps;completed protocol   cues req to continue  - right:;30 reps;completed protocol;with assist  -MS     Recorded by [JM] Marybeth Hayes PTA [MS] Jorge A Hernandes, PT     Positioning and  Restraints    Pre-Treatment Position sitting in chair/recliner  -JM sitting in chair/recliner  -MS     Post Treatment Position  chair  -MS     In Chair reclined;call light within reach;encouraged to call for assist  -JM notified nsg;reclined;sitting;call light within reach;encouraged to call for assist   All lines intact.  -MS     Recorded by [JM] Marybeth Hayes, PTA [MS] Jorge A Hernandes, PT       User Key  (r) = Recorded By, (t) = Taken By, (c) = Cosigned By    Initials Name Effective Dates    DIONTE Hayes, PTA 02/18/16 -     MS Jorge A Hernandes, PT 12/01/15 -     AA Mari Price, PT 09/05/17 -                 IP PT Goals       11/20/17 0920 11/17/17 1024       Bed Mobility PT LTG    Bed Mobility PT LTG, Date Established 11/20/17  -MS 11/17/17  -MG     Bed Mobility PT LTG, Time to Achieve  3 days  -MG     Bed Mobility PT LTG, Activity Type  all bed mobility  -MG     Bed Mobility PT LTG, [place] Level  independent  -MG     Bed Mobility PT LTG, Outcome goal not met  -MS      Transfer Training PT LTG    Transfer Training PT LTG, Date Established  11/17/17  -MG     Transfer Training PT LTG, Time to Achieve  3 days  -MG     Transfer Training PT LTG, Activity Type  bed to chair /chair to bed  -MG     Transfer Training PT LTG, [place] Level  supervision required  -MG     Transfer Training PT LTG, Assist Device  walker, rolling  -MG     Transfer Training PT LTG, Outcome goal not met  -MS      Gait Training PT LTG    Gait Training Goal PT LTG, Date Established  11/17/17  -MG     Gait Training Goal PT LTG, Time to Achieve  3 days  -MG     Gait Training Goal PT LTG, [place] Level  supervision required  -MG     Gait Training Goal PT LTG, Assist Device  walker, rolling  -MG     Gait Training Goal PT LTG, Distance to Achieve  100  -MG     Gait Training Goal PT LTG, Outcome goal not met  -MS      Patient Education PT LTG    Patient Education PT LTG, Date Established  11/17/17  -MG     Patient  Education PT LTG, Time to Achieve  3 days  -MG     Patient Education PT LTG, Education Type  HEP  -MG     Patient Education PT LTG, Education Understanding  demonstrate adequately;verbalize understanding  -MG     Patient Education PT LTG Outcome goal met  -MS        User Key  (r) = Recorded By, (t) = Taken By, (c) = Cosigned By    Initials Name Provider Type    MS Jorge A Hernandes, PT Physical Therapist    MG Megan Gosselin, PT Physical Therapist          Physical Therapy Education     Title: PT OT SLP Therapies (Done)     Topic: Physical Therapy (Done)     Point: Mobility training (Done)    Learning Progress Summary    Learner Readiness Method Response Comment Documented by Status   Patient Acceptance E,D VU,NR  MS 11/22/17 1508 Done    Acceptance E,TB,D VU,NR cues for each exer, pt also wants to push herself on amb dist, but educ offered on importance of safe amb w/o sequencing and step length issues  11/22/17 1244 Done    Acceptance E VU,NR   11/21/17 1543 Done    Acceptance E,TB,D VU,NR   11/21/17 1108 Done    Acceptance E,D VU,NR  MS 11/20/17 0920 Done    Acceptance E,TB,D VU,NR   11/19/17 0939 Done    Acceptance E,TB,D VU,NR   11/18/17 1153 Done    Acceptance E VU,NR   11/17/17 1024 Done               Point: Home exercise program (Done)    Learning Progress Summary    Learner Readiness Method Response Comment Documented by Status   Patient Acceptance E,D VU,NR  MS 11/22/17 1508 Done    Acceptance E,TB,D VU,NR cues for each exer, pt also wants to push herself on amb dist, but educ offered on importance of safe amb w/o sequencing and step length issues  11/22/17 1244 Done    Acceptance E VU,NR   11/21/17 1543 Done    Acceptance E,TB,D VU,NR   11/21/17 1108 Done    Acceptance E,D VU,NR  MS 11/20/17 0920 Done    Acceptance E,TB,D VU,NR   11/19/17 0939 Done    Acceptance E,TB,D VU,NR   11/18/17 1153 Done    Acceptance E VU,NR   11/17/17 1024 Done               Point: Body mechanics (Done)     Learning Progress Summary    Learner Readiness Method Response Comment Documented by Status   Patient Acceptance E,D VU,NR  MS 11/22/17 1508 Done    Acceptance E,TB,D VU,NR cues for each exer, pt also wants to push herself on amb dist, but educ offered on importance of safe amb w/o sequencing and step length issues  11/22/17 1244 Done    Acceptance E VU,NR   11/21/17 1543 Done    Acceptance E,TB,D VU,NR   11/21/17 1108 Done    Acceptance E,D VU,NR  MS 11/20/17 0920 Done    Acceptance E,TB,D VU,NR   11/19/17 0939 Done    Acceptance E,TB,D VU,NR   11/18/17 1153 Done    Acceptance E VU,NR   11/17/17 1024 Done               Point: Precautions (Done)    Learning Progress Summary    Learner Readiness Method Response Comment Documented by Status   Patient Acceptance E,D VU,NR  MS 11/22/17 1508 Done    Acceptance E,TB,D VU,NR cues for each exer, pt also wants to push herself on amb dist, but educ offered on importance of safe amb w/o sequencing and step length issues  11/22/17 1244 Done    Acceptance E VU,NR   11/21/17 1543 Done    Acceptance E,TB,D VU,NR   11/21/17 1108 Done    Acceptance E,D VU,NR  MS 11/20/17 0920 Done    Acceptance E,TB,D VU,NR   11/19/17 0939 Done    Acceptance E,TB,D VU,NR   11/18/17 1153 Done    Acceptance E VU,NR   11/17/17 1024 Done                      User Key     Initials Effective Dates Name Provider Type Discipline     02/18/16 -  Marybeth Hayes, PTA Physical Therapy Assistant PT    MS 12/01/15 -  Jorge A Hernandes, PT Physical Therapist PT     04/06/16 -  Alise Alejo, PTA Physical Therapy Assistant PT     09/05/17 -  Mari Price, PT Physical Therapist PT     09/13/17 -  Megan Gosselin, PT Physical Therapist PT                    PT Recommendation and Plan  Anticipated Discharge Disposition: skilled nursing facility  Planned Therapy Interventions: balance training, bed mobility training, gait training, home exercise program, patient/family education,  ROM (Range of Motion), strengthening, transfer training  PT Frequency: 2 times/day  Plan of Care Review  Plan Of Care Reviewed With: patient  Outcome Summary/Follow up Plan: Improved tolerance to functional activity this PM with a continued progression in ther. ex. protocol and independence.  Pt. able to ambulate 90 feet with use of RWX and verbal/tactile cues for posture correction.          Outcome Measures       11/22/17 1500 11/22/17 1200 11/21/17 1500    How much help from another person do you currently need...    Turning from your back to your side while in flat bed without using bedrails? 3  -MS 3  -JM 4  -AA    Moving from lying on back to sitting on the side of a flat bed without bedrails? 3  -MS 3  -JM 3  -AA    Moving to and from a bed to a chair (including a wheelchair)? 3  -MS 3  -JM 3  -AA    Standing up from a chair using your arms (e.g., wheelchair, bedside chair)? 3  -MS 3  -JM 3  -AA    Climbing 3-5 steps with a railing? 3  -MS 2  -JM 2  -AA    To walk in hospital room? 3  -MS 3  -JM 3  -AA    AM-PAC 6 Clicks Score 18  -MS 17  -JM 18  -AA    Functional Assessment    Outcome Measure Options AM-PAC 6 Clicks Basic Mobility (PT)  -MS  AM-PAC 6 Clicks Basic Mobility (PT)  -AA      11/21/17 1100 11/20/17 0900       How much help from another person do you currently need...    Turning from your back to your side while in flat bed without using bedrails? 4  -JM 3  -MS     Moving from lying on back to sitting on the side of a flat bed without bedrails? 3  -JM 3  -MS     Moving to and from a bed to a chair (including a wheelchair)? 3  -JM 3  -MS     Standing up from a chair using your arms (e.g., wheelchair, bedside chair)? 3  -JM 3  -MS     Climbing 3-5 steps with a railing? 3  -JM 3  -MS     To walk in hospital room? 3  -JM 3  -MS     AM-PAC 6 Clicks Score 19  -JM 18  -MS     Functional Assessment    Outcome Measure Options  AM-PAC 6 Clicks Basic Mobility (PT)  -MS       User Key  (r) = Recorded By, (t) =  Taken By, (c) = Cosigned By    Initials Name Provider Type    DIONTE Hayes PTA Physical Therapy Assistant    MS Jorge A Hernandes, PT Physical Therapist    MARYLIN Price, PT Physical Therapist           Time Calculation:         PT Charges       11/22/17 1509 11/22/17 1246       Time Calculation    Start Time 1418  -MS 1028  -JM     Stop Time 1500  -MS 1120  -JM     Time Calculation (min) 42 min  -MS 52 min  -JM     PT Received On 11/22/17  -MS 11/22/17  -DIONTE     PT - Next Appointment 11/23/17  -MS 11/22/17  -DIONTE       User Key  (r) = Recorded By, (t) = Taken By, (c) = Cosigned By    Initials Name Provider Type    DIONTE Hayes PTA Physical Therapy Assistant    MS Jorge A Hernandes, PT Physical Therapist          Therapy Charges for Today     Code Description Service Date Service Provider Modifiers Qty    39269453064 HC PT THER PROC EA 15 MIN 11/22/2017 Jorge A Hernandes, PT GP 1    30742547979 HC PT THER PROC GROUP 11/22/2017 Jorge A Hernandes, PT GP 1          PT G-Codes  Outcome Measure Options: AM-PAC 6 Clicks Basic Mobility (PT)    Jorge A Hernandes, PT  11/22/2017

## 2017-11-22 NOTE — PROGRESS NOTES
Continued Stay Note  [facility]     Patient Name: Josephine Wolf  MRN: [id number]  Today's Date: 11/22/2017    Admit Date: 11/16/2017          Discharge Plan       11/22/17 1600    Case Management/Social Work Plan    Additional Comments Per Gina with [employer] precert has still not been obtained. Pt has [facility]/[place] Medicaid. CP to f/u Friday. Insurance will be closed Thursday and Friday due to the Thanksgiving holiday.              Discharge Codes     None        Expected Discharge Date and Time     Expected Discharge Date Expected Discharge Time    Nov 22, 2017             Litzy Barrera RN

## 2017-11-22 NOTE — PLAN OF CARE
Problem: Patient Care Overview (Adult)  Goal: Plan of Care Review  Outcome: Ongoing (interventions implemented as appropriate)    11/22/17 1243   Coping/Psychosocial Response Interventions   Plan Of Care Reviewed With patient   Patient Care Overview   Progress improving   Outcome Evaluation   Outcome Summary/Follow up Plan incr amb dist , but very weak/shaky when finished

## 2017-11-22 NOTE — PLAN OF CARE
Problem: Patient Care Overview (Adult)  Goal: Plan of Care Review    11/22/17 1501   Coping/Psychosocial Response Interventions   Plan Of Care Reviewed With patient   Outcome Evaluation   Outcome Summary/Follow up Plan Improved tolerance to functional activity this PM with a continued progression in ther. ex. protocol and independence. Pt. able to ambulate 90 feet with use of RWX and verbal/tactile cues for posture correction.

## 2017-11-22 NOTE — THERAPY TREATMENT NOTE
Acute Care - Physical Therapy Treatment Note  [facility]     Patient Name: Josephine Wolf  : 1940  MRN: [id number]  Today's Date: 2017  Onset of Illness/Injury or Date of Surgery Date:  (POD 1 R TKA)     Referring Physician: Ana    Admit Date: 2017    Visit Dx:    ICD-10-CM ICD-9-CM   1. Difficulty walking R26.2 719.7     Patient Active Problem List   Diagnosis   • Anemia   • OA (osteoarthritis) of knee               Adult Rehabilitation Note       17 1239 17 1300 17 1100    Rehab Assessment/Intervention    Discipline physical therapy assistant  - physical therapist  -AA physical therapy assistant  -    Document Type therapy note (daily note)  - therapy note (daily note)  -AA therapy note (daily note)  -    Subjective Information agree to therapy;complains of;weakness;fatigue;pain;swelling  - agree to therapy;complains of;fatigue;pain  -AA agree to therapy;complains of;fatigue;pain  -    Patient Effort, Rehab Treatment  good  -AA     Symptoms Noted During/After Treatment  none  -AA     Precautions/Limitations fall precautions  - fall precautions  -AA fall precautions  -    Precautions/Limitations, Hearing hearing impairment, bilaterally  -  hearing impairment, bilaterally  -    Patient Response to Treatment  tolerated  -AA     Recorded by [JM] Marybeth Hayes, PTA [AA] Mari Price, PT [] Marybeth Hayes, PTA    Pain Assessment    Pain Assessment 0-10  -JM 0-10  -AA 0-10  -JM    Pain Score 6  -JM 9  -AA 5  -JM    Post Pain Score 9  -JM      Pain Type Surgical pain  -JM Surgical pain  -AA Surgical pain  -    Pain Location Knee  -JM Knee  -AA Knee  -JM    Pain Orientation Right  -JM Right  -AA Right  -    Pain Intervention(s) Medication (See MAR);Repositioned;Cold applied  -JM Medication (See MAR);Ambulation/increased activity  -AA Medication (See MAR);Repositioned;Cold applied  -    Response to Interventions esperanza  -JM tolerated  -AA esperanza   -    Recorded by [JM] Marybeth Hayes PTA [AA] Mari Price PT [JM] Marybeth Hayes PTA    Cognitive Assessment/Intervention    Current Cognitive/Communication Assessment  functional  -AA     Orientation Status  oriented x 4  -AA     Follows Commands/Answers Questions  100% of the time  -     Personal Safety  WNL/WFL  -AA     Personal Safety Interventions  gait belt;fall prevention program maintained;nonskid shoes/slippers when out of bed  -AA     Recorded by  [AA] Mari Price PT     ROM (Range of Motion)    General ROM Detail -5-85  -JM  -14-86  -JM    Recorded by [JM] Marybeth Hayes PTA  [JM] Marybeth Hayes PTA    Bed Mobility, Assessment/Treatment    Bed Mobility, Scoot/Bridge, [place] conditional independence;verbal cues required;nonverbal cues required (demo/gesture)  -  conditional independence;verbal cues required;nonverbal cues required (demo/gesture)  -    Bed Mob, Supine to Sit, [place]  not tested  -AA     Bed Mob, Sit to Supine, [place]  not tested  -AA     Bed Mobility, Comment  in chair  -AA in chair  -JM    Recorded by [JM] Marybeth Hayes PTA [AA] Mari Price PT [JM] Marybeth Hayes PTA    Transfer Assessment/Treatment    Transfers, Sit-Stand [place] contact guard assist;verbal cues required   cues for hand placement  - contact guard assist;verbal cues required  -AA contact guard assist;verbal cues required   cues for hand placement  -    Transfers, Stand-Sit [place] stand by assist;verbal cues required  - contact guard assist;verbal cues required  - stand by assist;verbal cues required  -    Transfers, Sit-Stand-Sit, Assist Device rolling walker  - rolling walker  -AA rolling walker  -    Transfer, Safety Issues balance decreased during turns;weight-shifting ability decreased  - weight-shifting ability decreased  -     Transfer, Impairments strength decreased;pain  - strength decreased;pain;impaired balance  -  strength decreased;pain  -JM    Transfer, Comment  Pt minimally off balance with initial standing  -AA     Recorded by [JM] Marybeth Hayes PTA [AA] Mari Price PT [] Marybeth Hayes PTA    Gait Assessment/Treatment    Gait, [place] Level contact guard assist  -JM contact guard assist  -AA contact guard assist  -JM    Gait, Assistive Device rolling walker  -JM rolling walker  -AA rolling walker  -JM    Gait, Distance (Feet) 90  -JM 75  -AA 85  -JM    Gait, Gait Deviations antalgic;shilpa decreased;forward flexed posture;step length decreased  - antalgic;right:;shilpa decreased;decreased heel strike;weight-shifting ability decreased  -AA antalgic;shilpa decreased;step length decreased  -    Gait, Safety Issues balance decreased during turns;sequencing ability decreased;step length decreased  - weight-shifting ability decreased  -AA     Gait, Impairments strength decreased;coordination impaired;pain  -JM strength decreased;impaired balance;pain  -AA     Gait, Comment shaky by end of amb, wanted to improve over yesterday  - Pt fatigued quickly and reported pain post exercise  -AA fatigues quickly, one standing rest req  -JM    Recorded by [] Marybeth Hayes PTA [AA] Mari Price, PT [JM] Marybeth Hayes PTA    Therapy Exercises    Exercise Protocols total knee  - total knee  -AA total knee  -    Total Knee Exercises right:;30 reps;completed protocol   cues req to continue  - right:;20 reps;completed protocol  -AA right:;30 reps;completed protocol   cues req to continue  -    Recorded by [] Marybeth Hayes PTA [AA] Mari Price, PT [JM] Marybeth Hayes PTA    Positioning and Restraints    Pre-Treatment Position sitting in chair/recliner  - sitting in chair/recliner  -AA sitting in chair/recliner  -    Post Treatment Position  chair  -AA     In Chair reclined;call light within reach;encouraged to call for assist   no alarm  - sitting;reclined;call light within  reach;encouraged to call for assist  -AA reclined;call light within reach;encouraged to call for assist  -DIONTE    Recorded by [JM] Marybeth Hayes PTA [AA] Mari Price, PT [JM] Marybeth Hayes, PTA      11/20/17 0917 11/19/17 1300       Rehab Assessment/Intervention    Discipline physical therapist  -MS physical therapy assistant  -RW     Document Type therapy note (daily note);discharge summary  -MS therapy note (daily note)  -RW     Subjective Information agree to therapy;complains of;weakness;fatigue;pain  -MS agree to therapy  -RW     Patient Effort, Rehab Treatment good  -MS good  -RW     Symptoms Noted Comment Pt. reports pain/soreness in her Right knee this AM as well as overall fatigue.  -MS      Precautions/Limitations fall precautions   Pt. on 2 liters oxygen.  -MS fall precautions  -RW     Precautions/Limitations, Hearing hearing impairment, bilaterally  -MS hearing impairment, bilaterally  -RW     Recorded by [MS] Jorge A Hernandes, PT [RW] Alise Aeljo PTA     Pain Assessment    Pain Assessment 0-10  -MS 0-10  -RW     Pain Score 3  -MS 4  -RW     Post Pain Score 3  -MS      Pain Type Acute pain;Surgical pain  -MS Acute pain;Surgical pain  -RW     Pain Location Knee  -MS Knee  -RW     Pain Orientation Right  -MS Right  -RW     Pain Intervention(s)  Medication (See MAR);Repositioned;Ambulation/increased activity  -RW     Response to Interventions  tolerated  -RW     Recorded by [MS] Jorge A Hernandes, PT [RW] Alise Alejo PTA     Cognitive Assessment/Intervention    Current Cognitive/Communication Assessment functional  -MS functional  -RW     Orientation Status oriented x 4  -MS oriented x 4  -RW     Follows Commands/Answers Questions 100% of the time  -% of the time  -RW     Personal Safety WNL/WFL  -MS WNL/WFL  -RW     Personal Safety Interventions fall prevention program maintained;gait belt;nonskid shoes/slippers when out of bed;supervised activity  -MS fall prevention program  maintained;gait belt;nonskid shoes/slippers when out of bed  -RW     Recorded by [MS] Jorge A Hernandes, PT [RW] Alise Alejo PTA     ROM (Range of Motion)    General ROM Detail Right knee AROM (8, 77)  -MS      Recorded by [MS] Jorge A Hernandes PT      Bed Mobility, Assessment/Treatment    Bed Mob, Supine to Sit, [place]  not tested  -RW     Bed Mob, Sit to Supine, [place]  not tested  -RW     Bed Mobility, Comment Pt. up in the chair this AM.  -MS Pt up in chair  -RW     Recorded by [MS] Jorge A Hernandes PT [RW] Alise Alejo PTA     Transfer Assessment/Treatment    Transfers, Sit-Stand [place] contact guard assist  -MS contact guard assist;verbal cues required  -RW     Transfers, Stand-Sit [place] contact guard assist  -MS contact guard assist;verbal cues required  -RW     Transfers, Sit-Stand-Sit, Assist Device rolling walker  -MS rolling walker  -RW     Toilet Transfer, [place]  minimum assist (75% patient effort);verbal cues required  -RW     Toilet Transfer, Assistive Device  rolling walker  -RW     Transfer, Impairments  strength decreased  -RW     Transfer, Comment  stood x2  -RW     Recorded by [MS] Jorge A Hernandes, PT [RW] Alise Alejo PTA     Gait Assessment/Treatment    Gait, [place] Level contact guard assist  -MS contact guard assist;verbal cues required  -RW     Gait, Assistive Device rolling walker  -MS rolling walker  -RW     Gait, Distance (Feet) 70  -MS 55  -RW     Gait, Gait Pattern Analysis  swing-through gait  -RW     Gait, Gait Deviations right:;antalgic;shilpa decreased;forward flexed posture;step length decreased  -MS forward flexed posture;right:;antalgic;shilpa decreased;step length decreased  -RW     Gait, Safety Issues supplemental O2   Pt. on 2 liters oxygen.  -MS      Gait, Comment Pt. requires verbal/tactile cues for posture correction and for proper gait sequencing with use of the RWX.  -MS Pt declined further ambulation  -RW     Recorded  by [MS] Jorge A Hernandes, PT [RW] Alise Alejo PTA     Therapy Exercises    Exercise Protocols total knee  -MS total knee  -RW     Total Knee Exercises right:;30 reps;completed protocol;with assist  -MS right:;30 reps;completed protocol   cues to stay awake and on task  -RW     Recorded by [MS] Jorge A Hernandes, PT [RW] Alise Alejo PTA     Positioning and Restraints    Pre-Treatment Position sitting in chair/recliner  -MS sitting in chair/recliner  -RW     Post Treatment Position chair  -MS other  -RW     In Chair notified nsg;reclined;sitting;call light within reach;encouraged to call for assist   All lines intact.  -MS      Other Position  return to room with caregiver   Family pushed pt back to room in chair  -RW     Recorded by [MS] Jorge A Hernandes, PT [RW] Alise Alejo PTA       User Key  (r) = Recorded By, (t) = Taken By, (c) = Cosigned By    Initials Name Effective Dates    JM Marybeth Hayes, PTA 02/18/16 -     MS Jorge A Hernandes, PT 12/01/15 -     RW Alise Alejo, PTA 04/06/16 -     AA Mari Price, PT 09/05/17 -                 IP PT Goals       11/20/17 0920 11/17/17 1024       Bed Mobility PT LTG    Bed Mobility PT LTG, Date Established 11/20/17  -MS 11/17/17  -MG     Bed Mobility PT LTG, Time to Achieve  3 days  -MG     Bed Mobility PT LTG, Activity Type  all bed mobility  -MG     Bed Mobility PT LTG, [place] Level  independent  -MG     Bed Mobility PT LTG, Outcome goal not met  -MS      Transfer Training PT LTG    Transfer Training PT LTG, Date Established  11/17/17  -MG     Transfer Training PT LTG, Time to Achieve  3 days  -MG     Transfer Training PT LTG, Activity Type  bed to chair /chair to bed  -MG     Transfer Training PT LTG, [place] Level  supervision required  -MG     Transfer Training PT LTG, Assist Device  walker, rolling  -MG     Transfer Training PT LTG, Outcome goal not met  -MS      Gait Training PT LTG    Gait Training Goal PT LTG, Date Established   11/17/17  -MG     Gait Training Goal PT LTG, Time to Achieve  3 days  -MG     Gait Training Goal PT LTG, [place] Level  supervision required  -MG     Gait Training Goal PT LTG, Assist Device  walker, rolling  -MG     Gait Training Goal PT LTG, Distance to Achieve  100  -MG     Gait Training Goal PT LTG, Outcome goal not met  -MS      Patient Education PT LTG    Patient Education PT LTG, Date Established  11/17/17  -MG     Patient Education PT LTG, Time to Achieve  3 days  -MG     Patient Education PT LTG, Education Type  HEP  -MG     Patient Education PT LTG, Education Understanding  demonstrate adequately;verbalize understanding  -MG     Patient Education PT LTG Outcome goal met  -MS        User Key  (r) = Recorded By, (t) = Taken By, (c) = Cosigned By    Initials Name Provider Type    MS Jorge A WALSH Cecilio, PT Physical Therapist    MG Megan Gosselin, PT Physical Therapist          Physical Therapy Education     Title: PT OT SLP Therapies (Done)     Topic: Physical Therapy (Done)     Point: Mobility training (Done)    Learning Progress Summary    Learner Readiness Method Response Comment Documented by Status   Patient Acceptance E,TB,D VU,NR cues for each exer, pt also wants to push herself on amb dist, but educ offered on importance of safe amb w/o sequencing and step length issues  11/22/17 1244 Done    Acceptance E VU,NR  AA 11/21/17 1543 Done    Acceptance E,TB,D VU,NR   11/21/17 1108 Done    Acceptance E,D VU,NR  MS 11/20/17 0920 Done    Acceptance E,TB,D VU,NR   11/19/17 0939 Done    Acceptance E,TB,D VU,NR   11/18/17 1153 Done    Acceptance E VU,NR  MG 11/17/17 1024 Done               Point: Home exercise program (Done)    Learning Progress Summary    Learner Readiness Method Response Comment Documented by Status   Patient Acceptance E,TB,D VU,NR cues for each exer, pt also wants to push herself on amb dist, but educ offered on importance of safe amb w/o sequencing and step length issues   11/22/17 1244 Done    Acceptance E VU,NR   11/21/17 1543 Done    Acceptance E,TB,D VU,NR   11/21/17 1108 Done    Acceptance E,D VU,NR  MS 11/20/17 0920 Done    Acceptance E,TB,D VU,NR   11/19/17 0939 Done    Acceptance E,TB,D VU,NR   11/18/17 1153 Done    Acceptance E VU,NR  MG 11/17/17 1024 Done               Point: Body mechanics (Done)    Learning Progress Summary    Learner Readiness Method Response Comment Documented by Status   Patient Acceptance E,TB,D VU,NR cues for each exer, pt also wants to push herself on amb dist, but educ offered on importance of safe amb w/o sequencing and step length issues  11/22/17 1244 Done    Acceptance E VU,NR   11/21/17 1543 Done    Acceptance E,TB,D VU,NR   11/21/17 1108 Done    Acceptance E,D VU,NR  MS 11/20/17 0920 Done    Acceptance E,TB,D VU,NR   11/19/17 0939 Done    Acceptance E,TB,D VU,NR   11/18/17 1153 Done    Acceptance E VU,NR   11/17/17 1024 Done               Point: Precautions (Done)    Learning Progress Summary    Learner Readiness Method Response Comment Documented by Status   Patient Acceptance E,TB,D VU,NR cues for each exer, pt also wants to push herself on amb dist, but educ offered on importance of safe amb w/o sequencing and step length issues  11/22/17 1244 Done    Acceptance E VU,NR   11/21/17 1543 Done    Acceptance E,TB,D VU,NR   11/21/17 1108 Done    Acceptance E,D VU,NR  MS 11/20/17 0920 Done    Acceptance E,TB,D VU,NR   11/19/17 0939 Done    Acceptance E,TB,D VU,NR   11/18/17 1153 Done    Acceptance E VU,NR   11/17/17 1024 Done                      User Key     Initials Effective Dates Name Provider Type Discipline     02/18/16 -  Marybeth Hayes, PTA Physical Therapy Assistant PT    MS 12/01/15 -  Jorge A Hernandes, PT Physical Therapist PT     04/06/16 -  Alise Alejo, PTA Physical Therapy Assistant PT     09/05/17 -  Mari Price, PT Physical Therapist PT    MG 09/13/17 -  Megan Gosselin, PT Physical  Therapist PT                    PT Recommendation and Plan  Anticipated Discharge Disposition: skilled nursing facility  Planned Therapy Interventions: balance training, bed mobility training, gait training, home exercise program, patient/family education, ROM (Range of Motion), strengthening, transfer training  PT Frequency: 2 times/day  Plan of Care Review  Plan Of Care Reviewed With: patient  Progress: improving  Outcome Summary/Follow up Plan: incr amb dist , but very weak/shaky when finished          Outcome Measures       11/22/17 1200 11/21/17 1500 11/21/17 1100    How much help from another person do you currently need...    Turning from your back to your side while in flat bed without using bedrails? 3  -JM 4  -AA 4  -JM    Moving from lying on back to sitting on the side of a flat bed without bedrails? 3  -JM 3  -AA 3  -JM    Moving to and from a bed to a chair (including a wheelchair)? 3  -JM 3  -AA 3  -JM    Standing up from a chair using your arms (e.g., wheelchair, bedside chair)? 3  -JM 3  -AA 3  -JM    Climbing 3-5 steps with a railing? 2  -JM 2  -AA 3  -JM    To walk in hospital room? 3  -JM 3  -AA 3  -JM    AM-PAC 6 Clicks Score 17  -JM 18  -AA 19  -JM    Functional Assessment    Outcome Measure Options  AM-PAC 6 Clicks Basic Mobility (PT)  -AA       11/20/17 0900          How much help from another person do you currently need...    Turning from your back to your side while in flat bed without using bedrails? 3  -MS      Moving from lying on back to sitting on the side of a flat bed without bedrails? 3  -MS      Moving to and from a bed to a chair (including a wheelchair)? 3  -MS      Standing up from a chair using your arms (e.g., wheelchair, bedside chair)? 3  -MS      Climbing 3-5 steps with a railing? 3  -MS      To walk in hospital room? 3  -MS      AM-PAC 6 Clicks Score 18  -MS      Functional Assessment    Outcome Measure Options AM-PAC 6 Clicks Basic Mobility (PT)  -MS        User Key  (r)  = Recorded By, (t) = Taken By, (c) = Cosigned By    Initials Name Provider Type    DIONTE Hayes PTA Physical Therapy Assistant    MS Jorge A L Cecilio, PT Physical Therapist    MARYLIN Price, PT Physical Therapist           Time Calculation:         PT Charges       11/22/17 1246          Time Calculation    Start Time 1028  -      Stop Time 1120  -      Time Calculation (min) 52 min  -      PT Received On 11/22/17  -DIONTE      PT - Next Appointment 11/22/17  -DIONTE        User Key  (r) = Recorded By, (t) = Taken By, (c) = Cosigned By    Initials Name Provider Type    DIONTE Hayes PTA Physical Therapy Assistant          Therapy Charges for Today     Code Description Service Date Service Provider Modifiers Qty    66835449234 HC PT THER PROC EA 15 MIN 11/21/2017 Marybeth Hayes PTA GP 2    23524332506 HC PT THER PROC GROUP 11/21/2017 Marybeth Hayes PTA GP 1    50672306056 HC PT THER PROC EA 15 MIN 11/22/2017 Marybeth Hayes PTA GP 1    76000110020 HC PT THER PROC GROUP 11/22/2017 Marybeth Hayes PTA GP 1          PT G-Codes  Outcome Measure Options: AM-PAC 6 Clicks Basic Mobility (PT)    Marybeth Hayes PTA  11/22/2017

## 2017-11-27 NOTE — PAYOR COMM NOTE
"Raheem Roberts (77 y.o. Female)     Date of Birth Social Security Number Address Home Phone MRN    1940  [address]   [place] [id number] [phone] [id number]    [place] Marital Status          [facility]        Admission Date Admission Type Admitting Provider Attending Provider Department, Room/Bed    11/16/17 Elective Kashif Perez MD  [facility] [address] [place], P877/1    Discharge Date Discharge Disposition Discharge Destination        11/22/2017 Skilled Nursing Facility (DC - External)             Attending Provider: (none)    Allergies:  Morphine And Related    Isolation:  None   Infection:  None   Code Status:  Prior    Ht:  61\" (154.9 cm)   Wt:  143 lb (64.9 kg)    Admission Cmt:  None   Principal Problem:  None                Active Insurance as of 11/16/2017     Primary Coverage     Payor Plan Insurance Group Employer/Plan Group    [facility] MEDICARE REPLACEMENT [facility]      Payor Plan Address Payor Plan Phone Number Effective From Effective To    [address] [address] [phone] 10/9/2017     [place] [address]       Subscriber Name Subscriber Birth Date Member ID       RAHEEM ROBERTS 1940 [id number]           Secondary Coverage     Payor Plan Insurance Group Employer/Plan Group    [place]      Payor Plan Address Payor Plan Phone Number Effective From Effective To    [address] [phone] 7/3/2016     [place] [address]       Subscriber Name Subscriber Birth Date Member ID       RAHEEM ROBERTS 1940 [id number]                 Emergency Contacts      (Rel.) Home Phone Work Phone Mobile Phone    Chino Roberts (Son) [phone] -- --    James Roberts (Son) [phone] -- [phone]               Physician Progress Notes            Kashif Perez MD at 11/18/2017  7:35 AM  Version 1 of 1         Ortho POD 2    Patient Name:  Raheem Roberts  Date of Birth:  " "1940  Medical Records Number:  [id number]    No complaints except pain    /57 (BP Location: Right arm, Patient Position: Sitting)  Pulse 72  Temp 98 °F (36.7 °C) (Oral)   Resp 16  Ht 61\" (154.9 cm)  Wt 143 lb (64.9 kg)  SpO2 91%  BMI 27.02 kg/m2    RLE:  NVI, calf nontender, sensation intact  No signs of DVT    Incision: clean, no infection    Lab Results (last 24 hours)     Procedure Component Value Units Date/Time    Hemoglobin & Hematocrit, Blood [[id number]]  (Abnormal) Collected:  11/18/17 0334    Specimen:  Blood Updated:  11/18/17 0357     Hemoglobin 7.8 (L) g/dL      Hematocrit 25.3 (L) %           S/p Right TKA  WBAT with walker  ASA for DVT prophylaxis  Hgb 7.8 noted, pre operatively she is around 9, will watch  Home today after PT, if comfortable     Electronically signed by Kashif Perez MD at 11/18/2017  7:36 AM      Kashif Perez MD at 11/20/2017  8:01 AM  Version 1 of 1         Ortho POD 4    Patient Name:  Josephine Wolf  YOB: 1940  Medical Records Number:  [id number]    No complaints    /66 (BP Location: Right arm, Patient Position: Sitting)  Pulse 70  Temp 97.3 °F (36.3 °C) (Oral)   Resp 16  Ht 61\" (154.9 cm)  Wt 143 lb (64.9 kg)  SpO2 96%  BMI 27.02 kg/m2    RLE:  NVI, calf nontender, sensation intact  No signs of DVT    Incision: clean, no infection    Lab Results (last 24 hours)     Procedure Component Value Units Date/Time    Blood Gas, Arterial [[id number]]  (Abnormal) Collected:  11/19/17 1721    Specimen:  Arterial Blood Updated:  11/19/17 1724     Site Arterial: right brachial     Zaki's Test N/A     pH, Arterial 7.387 pH units      pCO2, Arterial 49.8 (H) mm Hg      pO2, Arterial 70.9 (L) mm Hg      HCO3, Arterial 29.9 (H) mmol/L      Base Excess, Arterial 4.0 (H) mmol/L      O2 Saturation Calculated 93.5 %      Barometric Pressure for Blood Gas 749.4 mmHg      Modality Cannula     Flow Rate 3 lpm      Set Mech Resp Rate 20    " "Narrative:       97sat Meter: [id number] : [id number] Anali Nuno    Hemoglobin & Hematocrit, Blood [[id number]]  (Abnormal) Collected:  11/20/17 0356    Specimen:  Blood Updated:  11/20/17 0424     Hemoglobin 7.9 (L) g/dL      Hematocrit 24.6 (L) %           S/p Right TKA  WBAT with walker  ASA for DVT prophylaxis  Rehab today     Electronically signed by Kashif Perez MD at 11/20/2017  8:01 AM      Kashif Perez MD at 11/21/2017  5:39 PM  Version 1 of 1         Ortho POD 5    Patient Name:  Josephine Wolf  YOB: 1940  Medical Records Number:  [id number]    No complaints    /53 (BP Location: Left arm, Patient Position: Sitting)  Pulse 69  Temp 98.1 °F (36.7 °C) (Oral)   Resp 20  Ht 61\" (154.9 cm)  Wt 143 lb (64.9 kg)  SpO2 93%  BMI 27.02 kg/m2    RLE:  NVI, calf nontender, sensation intact  No signs of DVT    Incision: clean, no infection    Lab Results (last 24 hours)     Procedure Component Value Units Date/Time    Hemoglobin & Hematocrit, Blood [[id number]]  (Abnormal) Collected:  11/21/17 0340    Specimen:  Blood Updated:  11/21/17 0437     Hemoglobin 8.1 (L) g/dL      Hematocrit 25.6 (L) %           S/p Right TKA  WBAT with walker  ASA for DVT prophylaxis  Rehab when approved by Insurance     Electronically signed by Kashif Perez MD at 11/21/2017  5:40 PM      Kashif Perez MD at 11/22/2017  7:20 AM  Version 1 of 1         Ortho POD 6    Patient Name:  Josephine Wolf  YOB: 1940  Medical Records Number:  [id number]    No complaints    /66 (BP Location: Left arm, Patient Position: Lying)  Pulse 62  Temp 96.6 °F (35.9 °C) (Oral)   Resp 16  Ht 61\" (154.9 cm)  Wt 143 lb (64.9 kg)  SpO2 95%  BMI 27.02 kg/m2    RLE:  NVI, calf nontender, sensation intact  No signs of DVT    Incision: clean, no infection    Lab Results (last 24 hours)     Procedure Component Value Units Date/Time    Hemoglobin & Hematocrit, Blood [[id number]]  (Abnormal) " Collected:  11/22/17 0307    Specimen:  Blood Updated:  11/22/17 0416     Hemoglobin 7.8 (L) g/dL      Hematocrit 24.6 (L) %           S/p Right TKA  WBAT with walker  ASA for DVT prophylaxis  Rehab today     Electronically signed by Kashif Perez MD at 11/22/2017  7:20 AM

## 2017-11-27 NOTE — PAYOR COMM NOTE
"Macon, Raheem PRANAV (77 y.o. Female)     Date of Birth Social Security Number Address Home Phone MRN    1940  [address]   [place] [address] [phone] [id number]    Ep[employer]op[place] Marital Status          [facility]        Admission Date Admission Type Admitting Provider Attending Provider Department, Room/Bed    11/16/17 Elective Kashif Perez MD  [facility] 8 [place], P877/1    Discharge Date Discharge Disposition Discharge Destination        11/22/2017 Skilled Nursing Facility (DC - External)             Attending Provider: (none)    Allergies:  Morphine And Related    Isolation:  None   Infection:  None   Code Status:  Prior    Ht:  61\" (154.9 cm)   Wt:  143 lb (64.9 kg)    Admission Cmt:  None   Principal Problem:  None                Active Insurance as of 11/16/2017     Primary Coverage     Payor Plan Insurance Group Employer/Plan Group    [facility] MEDICARE REPLACEMENT [facility]      Payor Plan Address Payor Plan Phone Number Effective From Effective To    [address] [address] [phone] 10/9/2017     [place] [address]       Subscriber Name Subscriber Birth Date Member ID       RAHEEM ROBERTS 1940 [id number]           Secondary Coverage     Payor Plan Insurance Group Employer/Plan Group    [place]      Payor Plan Address Payor Plan Phone Number Effective From Effective To    [address] [phone] 7/3/2016     [place] [address]       Subscriber Name Subscriber Birth Date Member ID       RAHEEM ROBERTS 1940 [id number]                 Emergency Contacts      (Rel.) Home Phone Work Phone Mobile Phone    Chino Roberts (Son) [phone] -- --    James Roberts (Son) [phone] -- [phone]            "

## 2017-12-02 ENCOUNTER — APPOINTMENT (OUTPATIENT)
Dept: CT IMAGING | Facility: HOSPITAL | Age: 77
End: 2017-12-02

## 2017-12-02 ENCOUNTER — HOSPITAL ENCOUNTER (INPATIENT)
Facility: HOSPITAL | Age: 77
LOS: 4 days | Discharge: HOME HEALTH | End: 2017-12-06
Attending: EMERGENCY MEDICINE | Admitting: INTERNAL MEDICINE

## 2017-12-02 ENCOUNTER — APPOINTMENT (OUTPATIENT)
Dept: GENERAL RADIOLOGY | Facility: HOSPITAL | Age: 77
End: 2017-12-02

## 2017-12-02 ENCOUNTER — LAB REQUISITION (OUTPATIENT)
Dept: LAB | Facility: HOSPITAL | Age: 77
End: 2017-12-02

## 2017-12-02 DIAGNOSIS — R79.89 ELEVATED D-DIMER: ICD-10-CM

## 2017-12-02 DIAGNOSIS — A41.9 SEPSIS, DUE TO UNSPECIFIED ORGANISM: ICD-10-CM

## 2017-12-02 DIAGNOSIS — N17.9 ACUTE KIDNEY INJURY (HCC): ICD-10-CM

## 2017-12-02 DIAGNOSIS — Z00.00 ROUTINE GENERAL MEDICAL EXAMINATION AT A HEALTH CARE FACILITY: ICD-10-CM

## 2017-12-02 DIAGNOSIS — R09.02 HYPOXIA: Primary | ICD-10-CM

## 2017-12-02 DIAGNOSIS — T81.40XA POSTOPERATIVE INFECTION, INITIAL ENCOUNTER: ICD-10-CM

## 2017-12-02 LAB
ALBUMIN SERPL-MCNC: 3.3 G/DL (ref 3.5–5.2)
ALBUMIN/GLOB SERPL: 1 G/DL
ALP SERPL-CCNC: 92 U/L (ref 39–117)
ALT SERPL W P-5'-P-CCNC: 12 U/L (ref 1–33)
ANION GAP SERPL CALCULATED.3IONS-SCNC: 15.6 MMOL/L
ANISOCYTOSIS BLD QL: NORMAL
AST SERPL-CCNC: 16 U/L (ref 1–32)
ATMOSPHERIC PRESS: 758.1 MMHG
BASE EXCESS BLDV CALC-SCNC: -0.7 MMOL/L
BASOPHILS # BLD AUTO: 0.02 10*3/MM3 (ref 0–0.2)
BASOPHILS # BLD AUTO: 0.02 10*3/MM3 (ref 0–0.2)
BASOPHILS NFR BLD AUTO: 0.3 % (ref 0–1.5)
BASOPHILS NFR BLD AUTO: 0.3 % (ref 0–1.5)
BDY SITE: ABNORMAL
BILIRUB SERPL-MCNC: <0.2 MG/DL (ref 0.1–1.2)
BUN BLD-MCNC: 37 MG/DL (ref 8–23)
BUN/CREAT SERPL: 9.3 (ref 7–25)
CALCIUM SPEC-SCNC: 8 MG/DL (ref 8.6–10.5)
CHLORIDE SERPL-SCNC: 96 MMOL/L (ref 98–107)
CO2 SERPL-SCNC: 25.4 MMOL/L (ref 22–29)
CREAT BLD-MCNC: 3.96 MG/DL (ref 0.57–1)
D DIMER PPP FEU-MCNC: 2.93 MCGFEU/ML (ref 0–0.49)
D-LACTATE SERPL-SCNC: 0.5 MMOL/L (ref 0.5–2)
DEPRECATED RDW RBC AUTO: 62.8 FL (ref 37–54)
DEPRECATED RDW RBC AUTO: 63.7 FL (ref 37–54)
EOSINOPHIL # BLD AUTO: 0.19 10*3/MM3 (ref 0–0.7)
EOSINOPHIL # BLD AUTO: 0.2 10*3/MM3 (ref 0–0.7)
EOSINOPHIL NFR BLD AUTO: 2.8 % (ref 0.3–6.2)
EOSINOPHIL NFR BLD AUTO: 2.8 % (ref 0.3–6.2)
ERYTHROCYTE [DISTWIDTH] IN BLOOD BY AUTOMATED COUNT: 16.1 % (ref 11.7–13)
ERYTHROCYTE [DISTWIDTH] IN BLOOD BY AUTOMATED COUNT: 16.3 % (ref 11.7–13)
FLUAV AG NPH QL: NEGATIVE
FLUBV AG NPH QL IA: NEGATIVE
GAS FLOW AIRWAY: 4 LPM
GFR SERPL CREATININE-BSD FRML MDRD: 11 ML/MIN/1.73
GLOBULIN UR ELPH-MCNC: 3.3 GM/DL
GLUCOSE BLD-MCNC: 91 MG/DL (ref 65–99)
HCO3 BLDV-SCNC: 25.7 MMOL/L (ref 22–28)
HCT VFR BLD AUTO: 23.5 % (ref 35.6–45.5)
HCT VFR BLD AUTO: 24 % (ref 35.6–45.5)
HGB BLD-MCNC: 7.4 G/DL (ref 11.9–15.5)
HGB BLD-MCNC: 7.5 G/DL (ref 11.9–15.5)
IMM GRANULOCYTES # BLD: 0.05 10*3/MM3 (ref 0–0.03)
IMM GRANULOCYTES # BLD: 0.06 10*3/MM3 (ref 0–0.03)
IMM GRANULOCYTES NFR BLD: 0.7 % (ref 0–0.5)
IMM GRANULOCYTES NFR BLD: 0.9 % (ref 0–0.5)
INR PPP: 1.09 (ref 0.9–1.1)
LYMPHOCYTES # BLD AUTO: 1.43 10*3/MM3 (ref 0.9–4.8)
LYMPHOCYTES # BLD AUTO: 1.6 10*3/MM3 (ref 0.9–4.8)
LYMPHOCYTES NFR BLD AUTO: 21.3 % (ref 19.6–45.3)
LYMPHOCYTES NFR BLD AUTO: 22.7 % (ref 19.6–45.3)
MACROCYTES BLD QL SMEAR: NORMAL
MCH RBC QN AUTO: 33.5 PG (ref 26.9–32)
MCH RBC QN AUTO: 33.8 PG (ref 26.9–32)
MCHC RBC AUTO-ENTMCNC: 31.3 G/DL (ref 32.4–36.3)
MCHC RBC AUTO-ENTMCNC: 31.5 G/DL (ref 32.4–36.3)
MCV RBC AUTO: 107.1 FL (ref 80.5–98.2)
MCV RBC AUTO: 107.3 FL (ref 80.5–98.2)
MODALITY: ABNORMAL
MONOCYTES # BLD AUTO: 0.77 10*3/MM3 (ref 0.2–1.2)
MONOCYTES # BLD AUTO: 0.83 10*3/MM3 (ref 0.2–1.2)
MONOCYTES NFR BLD AUTO: 10.9 % (ref 5–12)
MONOCYTES NFR BLD AUTO: 12.4 % (ref 5–12)
NEUTROPHILS # BLD AUTO: 4.19 10*3/MM3 (ref 1.9–8.1)
NEUTROPHILS # BLD AUTO: 4.39 10*3/MM3 (ref 1.9–8.1)
NEUTROPHILS NFR BLD AUTO: 62.4 % (ref 42.7–76)
NEUTROPHILS NFR BLD AUTO: 62.5 % (ref 42.7–76)
NT-PROBNP SERPL-MCNC: 2752 PG/ML (ref 0–1800)
PCO2 BLDV: 51.7 MM HG (ref 41–51)
PH BLDV: 7.3 PH UNITS (ref 7.31–7.41)
PLAT MORPH BLD: NORMAL
PLATELET # BLD AUTO: 260 10*3/MM3 (ref 140–500)
PLATELET # BLD AUTO: 265 10*3/MM3 (ref 140–500)
PMV BLD AUTO: 9.2 FL (ref 6–12)
PMV BLD AUTO: 9.5 FL (ref 6–12)
PO2 BLDV: 33.2 MM HG (ref 35–45)
POLYCHROMASIA BLD QL SMEAR: NORMAL
POTASSIUM BLD-SCNC: 4.4 MMOL/L (ref 3.5–5.2)
PROCALCITONIN SERPL-MCNC: 0.16 NG/ML (ref 0.1–0.25)
PROT SERPL-MCNC: 6.6 G/DL (ref 6–8.5)
PROTHROMBIN TIME: 13.7 SECONDS (ref 11.7–14.2)
RBC # BLD AUTO: 2.19 10*6/MM3 (ref 3.9–5.2)
RBC # BLD AUTO: 2.24 10*6/MM3 (ref 3.9–5.2)
SAO2 % BLDCOA: 57 % (ref 92–99)
SODIUM BLD-SCNC: 137 MMOL/L (ref 136–145)
TOTAL RATE: 20 BREATHS/MINUTE
WBC MORPH BLD: NORMAL
WBC NRBC COR # BLD: 6.71 10*3/MM3 (ref 4.5–10.7)
WBC NRBC COR # BLD: 7.04 10*3/MM3 (ref 4.5–10.7)

## 2017-12-02 PROCEDURE — 82803 BLOOD GASES ANY COMBINATION: CPT

## 2017-12-02 PROCEDURE — 93010 ELECTROCARDIOGRAM REPORT: CPT | Performed by: INTERNAL MEDICINE

## 2017-12-02 PROCEDURE — 83605 ASSAY OF LACTIC ACID: CPT | Performed by: EMERGENCY MEDICINE

## 2017-12-02 PROCEDURE — 87804 INFLUENZA ASSAY W/OPTIC: CPT | Performed by: EMERGENCY MEDICINE

## 2017-12-02 PROCEDURE — 82962 GLUCOSE BLOOD TEST: CPT

## 2017-12-02 PROCEDURE — 85025 COMPLETE CBC W/AUTO DIFF WBC: CPT | Performed by: EMERGENCY MEDICINE

## 2017-12-02 PROCEDURE — 80053 COMPREHEN METABOLIC PANEL: CPT | Performed by: EMERGENCY MEDICINE

## 2017-12-02 PROCEDURE — 85007 BL SMEAR W/DIFF WBC COUNT: CPT

## 2017-12-02 PROCEDURE — 99284 EMERGENCY DEPT VISIT MOD MDM: CPT

## 2017-12-02 PROCEDURE — 71250 CT THORAX DX C-: CPT

## 2017-12-02 PROCEDURE — 83880 ASSAY OF NATRIURETIC PEPTIDE: CPT | Performed by: EMERGENCY MEDICINE

## 2017-12-02 PROCEDURE — 84145 PROCALCITONIN (PCT): CPT | Performed by: EMERGENCY MEDICINE

## 2017-12-02 PROCEDURE — 25010000002 PIPERACILLIN SOD-TAZOBACTAM PER 1 G: Performed by: EMERGENCY MEDICINE

## 2017-12-02 PROCEDURE — 85379 FIBRIN DEGRADATION QUANT: CPT | Performed by: EMERGENCY MEDICINE

## 2017-12-02 PROCEDURE — 71010 HC CHEST PA OR AP: CPT

## 2017-12-02 PROCEDURE — 93005 ELECTROCARDIOGRAM TRACING: CPT | Performed by: EMERGENCY MEDICINE

## 2017-12-02 PROCEDURE — 85025 COMPLETE CBC W/AUTO DIFF WBC: CPT

## 2017-12-02 PROCEDURE — 85610 PROTHROMBIN TIME: CPT | Performed by: EMERGENCY MEDICINE

## 2017-12-02 PROCEDURE — 25010000002 ENOXAPARIN PER 10 MG: Performed by: EMERGENCY MEDICINE

## 2017-12-02 PROCEDURE — 87040 BLOOD CULTURE FOR BACTERIA: CPT | Performed by: EMERGENCY MEDICINE

## 2017-12-02 RX ORDER — SODIUM CHLORIDE 0.9 % (FLUSH) 0.9 %
10 SYRINGE (ML) INJECTION AS NEEDED
Status: DISCONTINUED | OUTPATIENT
Start: 2017-12-02 | End: 2017-12-06 | Stop reason: HOSPADM

## 2017-12-02 RX ORDER — SULFAMETHOXAZOLE AND TRIMETHOPRIM 800; 160 MG/1; MG/1
1 TABLET ORAL 2 TIMES DAILY
COMMUNITY
End: 2017-12-06 | Stop reason: HOSPADM

## 2017-12-02 RX ORDER — SODIUM CHLORIDE 9 MG/ML
150 INJECTION, SOLUTION INTRAVENOUS CONTINUOUS
Status: DISCONTINUED | OUTPATIENT
Start: 2017-12-02 | End: 2017-12-03

## 2017-12-02 RX ORDER — FERROUS SULFATE 325(65) MG
325 TABLET ORAL 2 TIMES DAILY
Status: ON HOLD | COMMUNITY
End: 2019-06-17

## 2017-12-02 RX ORDER — PANTOPRAZOLE SODIUM 40 MG/1
40 TABLET, DELAYED RELEASE ORAL DAILY
COMMUNITY
End: 2017-12-06 | Stop reason: HOSPADM

## 2017-12-02 RX ADMIN — ENOXAPARIN SODIUM 60 MG: 60 INJECTION SUBCUTANEOUS at 23:30

## 2017-12-02 RX ADMIN — SODIUM CHLORIDE 125 ML/HR: 9 INJECTION, SOLUTION INTRAVENOUS at 22:58

## 2017-12-02 RX ADMIN — TAZOBACTAM SODIUM AND PIPERACILLIN SODIUM 3.38 G: 375; 3 INJECTION, SOLUTION INTRAVENOUS at 23:29

## 2017-12-03 ENCOUNTER — APPOINTMENT (OUTPATIENT)
Dept: NUCLEAR MEDICINE | Facility: HOSPITAL | Age: 77
End: 2017-12-03

## 2017-12-03 ENCOUNTER — APPOINTMENT (OUTPATIENT)
Dept: CARDIOLOGY | Facility: HOSPITAL | Age: 77
End: 2017-12-03
Attending: INTERNAL MEDICINE

## 2017-12-03 ENCOUNTER — APPOINTMENT (OUTPATIENT)
Dept: ULTRASOUND IMAGING | Facility: HOSPITAL | Age: 77
End: 2017-12-03

## 2017-12-03 LAB
ABO GROUP BLD: NORMAL
ANION GAP SERPL CALCULATED.3IONS-SCNC: 14.7 MMOL/L
APTT PPP: 51.9 SECONDS (ref 22.7–35.4)
BASOPHILS # BLD AUTO: 0.02 10*3/MM3 (ref 0–0.2)
BASOPHILS NFR BLD AUTO: 0.4 % (ref 0–1.5)
BH CV LOWER VASCULAR LEFT COMMON FEMORAL AUGMENT: NORMAL
BH CV LOWER VASCULAR LEFT COMMON FEMORAL COMPETENT: NORMAL
BH CV LOWER VASCULAR LEFT COMMON FEMORAL COMPRESS: NORMAL
BH CV LOWER VASCULAR LEFT COMMON FEMORAL PHASIC: NORMAL
BH CV LOWER VASCULAR LEFT COMMON FEMORAL SPONT: NORMAL
BH CV LOWER VASCULAR RIGHT COMMON FEMORAL AUGMENT: NORMAL
BH CV LOWER VASCULAR RIGHT COMMON FEMORAL COMPETENT: NORMAL
BH CV LOWER VASCULAR RIGHT COMMON FEMORAL COMPRESS: NORMAL
BH CV LOWER VASCULAR RIGHT COMMON FEMORAL PHASIC: NORMAL
BH CV LOWER VASCULAR RIGHT COMMON FEMORAL SPONT: NORMAL
BH CV LOWER VASCULAR RIGHT DISTAL FEMORAL COMPRESS: NORMAL
BH CV LOWER VASCULAR RIGHT GASTRONEMIUS COMPRESS: NORMAL
BH CV LOWER VASCULAR RIGHT GREATER SAPH AK COMPRESS: NORMAL
BH CV LOWER VASCULAR RIGHT GREATER SAPH BK COMPRESS: NORMAL
BH CV LOWER VASCULAR RIGHT LESSER SAPH COMPRESS: NORMAL
BH CV LOWER VASCULAR RIGHT MID FEMORAL AUGMENT: NORMAL
BH CV LOWER VASCULAR RIGHT MID FEMORAL COMPETENT: NORMAL
BH CV LOWER VASCULAR RIGHT MID FEMORAL COMPRESS: NORMAL
BH CV LOWER VASCULAR RIGHT MID FEMORAL PHASIC: NORMAL
BH CV LOWER VASCULAR RIGHT MID FEMORAL SPONT: NORMAL
BH CV LOWER VASCULAR RIGHT PERONEAL COMPRESS: NORMAL
BH CV LOWER VASCULAR RIGHT POPLITEAL AUGMENT: NORMAL
BH CV LOWER VASCULAR RIGHT POPLITEAL COMPETENT: NORMAL
BH CV LOWER VASCULAR RIGHT POPLITEAL COMPRESS: NORMAL
BH CV LOWER VASCULAR RIGHT POPLITEAL PHASIC: NORMAL
BH CV LOWER VASCULAR RIGHT POPLITEAL SPONT: NORMAL
BH CV LOWER VASCULAR RIGHT POSTERIOR TIBIAL COMPRESS: NORMAL
BH CV LOWER VASCULAR RIGHT PROXIMAL FEMORAL COMPRESS: NORMAL
BH CV LOWER VASCULAR RIGHT SAPHENOFEMORAL JUNCTION COMPRESS: NORMAL
BH CV LOWER VASCULAR RIGHT SAPHENOFEMORAL JUNCTION PHASIC: NORMAL
BH CV LOWER VASCULAR RIGHT SAPHENOFEMORAL JUNCTION SPONT: NORMAL
BILIRUB UR QL STRIP: NEGATIVE
BLD GP AB SCN SERPL QL: NEGATIVE
BUN BLD-MCNC: 32 MG/DL (ref 8–23)
BUN/CREAT SERPL: 10.8 (ref 7–25)
CALCIUM SPEC-SCNC: 7.1 MG/DL (ref 8.6–10.5)
CHLORIDE SERPL-SCNC: 105 MMOL/L (ref 98–107)
CK MB SERPL-CCNC: 2.62 NG/ML
CK SERPL-CCNC: 106 U/L (ref 20–180)
CK SERPL-CCNC: 107 U/L (ref 20–180)
CLARITY UR: CLEAR
CO2 SERPL-SCNC: 18.3 MMOL/L (ref 22–29)
COLOR UR: YELLOW
CREAT BLD-MCNC: 2.96 MG/DL (ref 0.57–1)
CREAT UR-MCNC: 75.3 MG/DL
D-LACTATE SERPL-SCNC: 0.6 MMOL/L (ref 0.5–2)
DEPRECATED RDW RBC AUTO: 63 FL (ref 37–54)
EOSINOPHIL # BLD AUTO: 0.16 10*3/MM3 (ref 0–0.7)
EOSINOPHIL NFR BLD AUTO: 3.4 % (ref 0.3–6.2)
ERYTHROCYTE [DISTWIDTH] IN BLOOD BY AUTOMATED COUNT: 16.2 % (ref 11.7–13)
GFR SERPL CREATININE-BSD FRML MDRD: 15 ML/MIN/1.73
GLUCOSE BLD-MCNC: 71 MG/DL (ref 65–99)
GLUCOSE BLDC GLUCOMTR-MCNC: 101 MG/DL (ref 70–130)
GLUCOSE BLDC GLUCOMTR-MCNC: 136 MG/DL (ref 70–130)
GLUCOSE BLDC GLUCOMTR-MCNC: 77 MG/DL (ref 70–130)
GLUCOSE BLDC GLUCOMTR-MCNC: 82 MG/DL (ref 70–130)
GLUCOSE BLDC GLUCOMTR-MCNC: 93 MG/DL (ref 70–130)
GLUCOSE BLDC GLUCOMTR-MCNC: 93 MG/DL (ref 70–130)
GLUCOSE UR STRIP-MCNC: NEGATIVE MG/DL
HCT VFR BLD AUTO: 19.6 % (ref 35.6–45.5)
HCT VFR BLD AUTO: 26.1 % (ref 35.6–45.5)
HGB BLD-MCNC: 6.3 G/DL (ref 11.9–15.5)
HGB BLD-MCNC: 8.4 G/DL (ref 11.9–15.5)
HGB UR QL STRIP.AUTO: NEGATIVE
IMM GRANULOCYTES # BLD: 0.04 10*3/MM3 (ref 0–0.03)
IMM GRANULOCYTES NFR BLD: 0.8 % (ref 0–0.5)
INR PPP: 1.14 (ref 0.9–1.1)
INR PPP: 1.18 (ref 0.9–1.1)
KETONES UR QL STRIP: NEGATIVE
LEUKOCYTE ESTERASE UR QL STRIP.AUTO: NEGATIVE
LYMPHOCYTES # BLD AUTO: 1.34 10*3/MM3 (ref 0.9–4.8)
LYMPHOCYTES NFR BLD AUTO: 28.3 % (ref 19.6–45.3)
MAGNESIUM SERPL-MCNC: 1.8 MG/DL (ref 1.6–2.4)
MCH RBC QN AUTO: 34.4 PG (ref 26.9–32)
MCHC RBC AUTO-ENTMCNC: 32.1 G/DL (ref 32.4–36.3)
MCV RBC AUTO: 107.1 FL (ref 80.5–98.2)
MONOCYTES # BLD AUTO: 0.49 10*3/MM3 (ref 0.2–1.2)
MONOCYTES NFR BLD AUTO: 10.3 % (ref 5–12)
NEUTROPHILS # BLD AUTO: 2.69 10*3/MM3 (ref 1.9–8.1)
NEUTROPHILS NFR BLD AUTO: 56.8 % (ref 42.7–76)
NITRITE UR QL STRIP: NEGATIVE
NT-PROBNP SERPL-MCNC: 3198 PG/ML (ref 0–1800)
PH UR STRIP.AUTO: 6.5 [PH] (ref 5–8)
PHOSPHATE SERPL-MCNC: 4.9 MG/DL (ref 2.5–4.5)
PLATELET # BLD AUTO: 213 10*3/MM3 (ref 140–500)
PMV BLD AUTO: 9.5 FL (ref 6–12)
POTASSIUM BLD-SCNC: 5.1 MMOL/L (ref 3.5–5.2)
PROT UR QL STRIP: NEGATIVE
PROTHROMBIN TIME: 14.2 SECONDS (ref 11.7–14.2)
PROTHROMBIN TIME: 14.6 SECONDS (ref 11.7–14.2)
RBC # BLD AUTO: 1.83 10*6/MM3 (ref 3.9–5.2)
RH BLD: POSITIVE
SODIUM BLD-SCNC: 138 MMOL/L (ref 136–145)
SODIUM UR-SCNC: 91 MMOL/L
SP GR UR STRIP: 1.02 (ref 1–1.03)
TROPONIN T SERPL-MCNC: <0.01 NG/ML (ref 0–0.03)
UROBILINOGEN UR QL STRIP: NORMAL
VANCOMYCIN SERPL-MCNC: 12.9 MCG/ML (ref 5–40)
VANCOMYCIN SERPL-MCNC: <4 MCG/ML (ref 5–40)
WBC NRBC COR # BLD: 4.74 10*3/MM3 (ref 4.5–10.7)

## 2017-12-03 PROCEDURE — 84300 ASSAY OF URINE SODIUM: CPT | Performed by: INTERNAL MEDICINE

## 2017-12-03 PROCEDURE — 80048 BASIC METABOLIC PNL TOTAL CA: CPT | Performed by: INTERNAL MEDICINE

## 2017-12-03 PROCEDURE — 94799 UNLISTED PULMONARY SVC/PX: CPT

## 2017-12-03 PROCEDURE — 25810000003 SODIUM CHLORIDE 0.9 % WITH KCL 20 MEQ 20-0.9 MEQ/L-% SOLUTION: Performed by: INTERNAL MEDICINE

## 2017-12-03 PROCEDURE — 85610 PROTHROMBIN TIME: CPT | Performed by: INTERNAL MEDICINE

## 2017-12-03 PROCEDURE — 76775 US EXAM ABDO BACK WALL LIM: CPT

## 2017-12-03 PROCEDURE — 25010000002 HEPARIN (PORCINE) PER 1000 UNITS: Performed by: INTERNAL MEDICINE

## 2017-12-03 PROCEDURE — A9558 XE133 XENON 10MCI: HCPCS | Performed by: INTERNAL MEDICINE

## 2017-12-03 PROCEDURE — 81003 URINALYSIS AUTO W/O SCOPE: CPT | Performed by: INTERNAL MEDICINE

## 2017-12-03 PROCEDURE — 25010000002 VANCOMYCIN 10 G RECONSTITUTED SOLUTION: Performed by: INTERNAL MEDICINE

## 2017-12-03 PROCEDURE — 83605 ASSAY OF LACTIC ACID: CPT | Performed by: INTERNAL MEDICINE

## 2017-12-03 PROCEDURE — 85018 HEMOGLOBIN: CPT | Performed by: INTERNAL MEDICINE

## 2017-12-03 PROCEDURE — 82550 ASSAY OF CK (CPK): CPT | Performed by: INTERNAL MEDICINE

## 2017-12-03 PROCEDURE — 85025 COMPLETE CBC W/AUTO DIFF WBC: CPT | Performed by: INTERNAL MEDICINE

## 2017-12-03 PROCEDURE — 80202 ASSAY OF VANCOMYCIN: CPT | Performed by: INTERNAL MEDICINE

## 2017-12-03 PROCEDURE — 84100 ASSAY OF PHOSPHORUS: CPT | Performed by: INTERNAL MEDICINE

## 2017-12-03 PROCEDURE — 86923 COMPATIBILITY TEST ELECTRIC: CPT

## 2017-12-03 PROCEDURE — 82570 ASSAY OF URINE CREATININE: CPT | Performed by: INTERNAL MEDICINE

## 2017-12-03 PROCEDURE — 82553 CREATINE MB FRACTION: CPT | Performed by: INTERNAL MEDICINE

## 2017-12-03 PROCEDURE — 93971 EXTREMITY STUDY: CPT

## 2017-12-03 PROCEDURE — 0 TECHNETIUM ALBUMIN AGGREGATED: Performed by: INTERNAL MEDICINE

## 2017-12-03 PROCEDURE — A9540 TC99M MAA: HCPCS | Performed by: INTERNAL MEDICINE

## 2017-12-03 PROCEDURE — 85730 THROMBOPLASTIN TIME PARTIAL: CPT | Performed by: INTERNAL MEDICINE

## 2017-12-03 PROCEDURE — 36430 TRANSFUSION BLD/BLD COMPNT: CPT

## 2017-12-03 PROCEDURE — 86900 BLOOD TYPING SEROLOGIC ABO: CPT | Performed by: INTERNAL MEDICINE

## 2017-12-03 PROCEDURE — 83880 ASSAY OF NATRIURETIC PEPTIDE: CPT | Performed by: INTERNAL MEDICINE

## 2017-12-03 PROCEDURE — 82962 GLUCOSE BLOOD TEST: CPT

## 2017-12-03 PROCEDURE — P9016 RBC LEUKOCYTES REDUCED: HCPCS

## 2017-12-03 PROCEDURE — 84484 ASSAY OF TROPONIN QUANT: CPT | Performed by: INTERNAL MEDICINE

## 2017-12-03 PROCEDURE — 78582 LUNG VENTILAT&PERFUS IMAGING: CPT

## 2017-12-03 PROCEDURE — 85014 HEMATOCRIT: CPT | Performed by: INTERNAL MEDICINE

## 2017-12-03 PROCEDURE — 94640 AIRWAY INHALATION TREATMENT: CPT

## 2017-12-03 PROCEDURE — 83735 ASSAY OF MAGNESIUM: CPT | Performed by: INTERNAL MEDICINE

## 2017-12-03 PROCEDURE — 87086 URINE CULTURE/COLONY COUNT: CPT | Performed by: INTERNAL MEDICINE

## 2017-12-03 PROCEDURE — 86900 BLOOD TYPING SEROLOGIC ABO: CPT

## 2017-12-03 PROCEDURE — 86850 RBC ANTIBODY SCREEN: CPT | Performed by: INTERNAL MEDICINE

## 2017-12-03 PROCEDURE — 25010000002 VANCOMYCIN: Performed by: EMERGENCY MEDICINE

## 2017-12-03 PROCEDURE — 25010000002 PIPERACILLIN SOD-TAZOBACTAM PER 1 G: Performed by: INTERNAL MEDICINE

## 2017-12-03 PROCEDURE — 86901 BLOOD TYPING SEROLOGIC RH(D): CPT | Performed by: INTERNAL MEDICINE

## 2017-12-03 PROCEDURE — 0 XENON XE 133: Performed by: INTERNAL MEDICINE

## 2017-12-03 RX ORDER — ONDANSETRON 2 MG/ML
4 INJECTION INTRAMUSCULAR; INTRAVENOUS EVERY 6 HOURS PRN
Status: DISCONTINUED | OUTPATIENT
Start: 2017-12-03 | End: 2017-12-06

## 2017-12-03 RX ORDER — FENTANYL CITRATE 50 UG/ML
25 INJECTION, SOLUTION INTRAMUSCULAR; INTRAVENOUS
Status: DISCONTINUED | OUTPATIENT
Start: 2017-12-03 | End: 2017-12-03

## 2017-12-03 RX ORDER — SODIUM CHLORIDE 0.9 % (FLUSH) 0.9 %
1-10 SYRINGE (ML) INJECTION AS NEEDED
Status: DISCONTINUED | OUTPATIENT
Start: 2017-12-03 | End: 2017-12-06

## 2017-12-03 RX ORDER — IPRATROPIUM BROMIDE AND ALBUTEROL SULFATE 2.5; .5 MG/3ML; MG/3ML
3 SOLUTION RESPIRATORY (INHALATION)
Status: DISCONTINUED | OUTPATIENT
Start: 2017-12-03 | End: 2017-12-06

## 2017-12-03 RX ORDER — ONDANSETRON 4 MG/1
4 TABLET, FILM COATED ORAL EVERY 6 HOURS PRN
Status: DISCONTINUED | OUTPATIENT
Start: 2017-12-03 | End: 2017-12-06

## 2017-12-03 RX ORDER — BUDESONIDE AND FORMOTEROL FUMARATE DIHYDRATE 160; 4.5 UG/1; UG/1
2 AEROSOL RESPIRATORY (INHALATION)
Status: DISCONTINUED | OUTPATIENT
Start: 2017-12-03 | End: 2017-12-06 | Stop reason: HOSPADM

## 2017-12-03 RX ORDER — HEPARIN SODIUM 5000 [USP'U]/ML
5000 INJECTION, SOLUTION INTRAVENOUS; SUBCUTANEOUS EVERY 12 HOURS SCHEDULED
Status: DISCONTINUED | OUTPATIENT
Start: 2017-12-03 | End: 2017-12-03

## 2017-12-03 RX ORDER — FENTANYL CITRATE 50 UG/ML
50 INJECTION, SOLUTION INTRAMUSCULAR; INTRAVENOUS
Status: DISCONTINUED | OUTPATIENT
Start: 2017-12-03 | End: 2017-12-06

## 2017-12-03 RX ORDER — DIVALPROEX SODIUM 250 MG/1
250 TABLET, EXTENDED RELEASE ORAL DAILY
Status: DISCONTINUED | OUTPATIENT
Start: 2017-12-03 | End: 2017-12-06 | Stop reason: HOSPADM

## 2017-12-03 RX ORDER — DIVALPROEX SODIUM 500 MG/1
500 TABLET, EXTENDED RELEASE ORAL NIGHTLY
Status: DISCONTINUED | OUTPATIENT
Start: 2017-12-03 | End: 2017-12-04

## 2017-12-03 RX ORDER — LEVOTHYROXINE SODIUM 88 UG/1
88 TABLET ORAL
Status: DISCONTINUED | OUTPATIENT
Start: 2017-12-03 | End: 2017-12-06 | Stop reason: HOSPADM

## 2017-12-03 RX ORDER — SODIUM BICARBONATE 650 MG/1
650 TABLET ORAL 3 TIMES DAILY
Status: DISCONTINUED | OUTPATIENT
Start: 2017-12-03 | End: 2017-12-06

## 2017-12-03 RX ORDER — SODIUM CHLORIDE AND POTASSIUM CHLORIDE 150; 900 MG/100ML; MG/100ML
75 INJECTION, SOLUTION INTRAVENOUS CONTINUOUS
Status: DISCONTINUED | OUTPATIENT
Start: 2017-12-03 | End: 2017-12-04

## 2017-12-03 RX ORDER — ONDANSETRON 4 MG/1
4 TABLET, ORALLY DISINTEGRATING ORAL EVERY 6 HOURS PRN
Status: DISCONTINUED | OUTPATIENT
Start: 2017-12-03 | End: 2017-12-06

## 2017-12-03 RX ORDER — SODIUM CHLORIDE 9 MG/ML
200 INJECTION, SOLUTION INTRAVENOUS CONTINUOUS
Status: ACTIVE | OUTPATIENT
Start: 2017-12-03 | End: 2017-12-04

## 2017-12-03 RX ORDER — SODIUM CHLORIDE 9 MG/ML
125 INJECTION, SOLUTION INTRAVENOUS CONTINUOUS
Status: DISCONTINUED | OUTPATIENT
Start: 2017-12-03 | End: 2017-12-03

## 2017-12-03 RX ORDER — HYDROCODONE BITARTRATE AND ACETAMINOPHEN 7.5; 325 MG/1; MG/1
1 TABLET ORAL EVERY 6 HOURS PRN
Status: DISCONTINUED | OUTPATIENT
Start: 2017-12-03 | End: 2017-12-05

## 2017-12-03 RX ADMIN — SODIUM CHLORIDE 500 ML: 9 INJECTION, SOLUTION INTRAVENOUS at 03:10

## 2017-12-03 RX ADMIN — HYDROCODONE BITARTRATE AND ACETAMINOPHEN 1 TABLET: 7.5; 325 TABLET ORAL at 10:47

## 2017-12-03 RX ADMIN — LEVOTHYROXINE SODIUM 88 MCG: 88 TABLET ORAL at 06:45

## 2017-12-03 RX ADMIN — TAZOBACTAM SODIUM AND PIPERACILLIN SODIUM 3.38 G: 375; 3 INJECTION, SOLUTION INTRAVENOUS at 22:14

## 2017-12-03 RX ADMIN — VANCOMYCIN HYDROCHLORIDE 1250 MG: 1 INJECTION, POWDER, LYOPHILIZED, FOR SOLUTION INTRAVENOUS at 05:40

## 2017-12-03 RX ADMIN — BUDESONIDE AND FORMOTEROL FUMARATE DIHYDRATE 2 PUFF: 160; 4.5 AEROSOL RESPIRATORY (INHALATION) at 07:23

## 2017-12-03 RX ADMIN — DIVALPROEX SODIUM 500 MG: 500 TABLET, FILM COATED, EXTENDED RELEASE ORAL at 21:05

## 2017-12-03 RX ADMIN — DIVALPROEX SODIUM 250 MG: 250 TABLET, FILM COATED, EXTENDED RELEASE ORAL at 10:35

## 2017-12-03 RX ADMIN — HEPARIN SODIUM 5000 UNITS: 5000 INJECTION, SOLUTION INTRAVENOUS; SUBCUTANEOUS at 10:33

## 2017-12-03 RX ADMIN — XENON XE-133 6.5 MILLICURIE: 10 GAS RESPIRATORY (INHALATION) at 01:25

## 2017-12-03 RX ADMIN — VANCOMYCIN HYDROCHLORIDE 1250 MG: 10 INJECTION, POWDER, LYOPHILIZED, FOR SOLUTION INTRAVENOUS at 21:05

## 2017-12-03 RX ADMIN — Medication 1 DOSE: at 01:30

## 2017-12-03 RX ADMIN — SODIUM BICARBONATE 650 MG: 650 TABLET ORAL at 21:00

## 2017-12-03 RX ADMIN — DIVALPROEX SODIUM 500 MG: 500 TABLET, FILM COATED, EXTENDED RELEASE ORAL at 03:19

## 2017-12-03 RX ADMIN — HYDROCODONE BITARTRATE AND ACETAMINOPHEN 1 TABLET: 7.5; 325 TABLET ORAL at 23:04

## 2017-12-03 RX ADMIN — SODIUM CHLORIDE 1000 ML: 9 INJECTION, SOLUTION INTRAVENOUS at 00:39

## 2017-12-03 RX ADMIN — SODIUM CHLORIDE 150 ML/HR: 9 INJECTION, SOLUTION INTRAVENOUS at 08:42

## 2017-12-03 RX ADMIN — POTASSIUM CHLORIDE AND SODIUM CHLORIDE 75 ML/HR: 900; 150 INJECTION, SOLUTION INTRAVENOUS at 20:15

## 2017-12-03 RX ADMIN — IPRATROPIUM BROMIDE AND ALBUTEROL SULFATE 3 ML: .5; 3 SOLUTION RESPIRATORY (INHALATION) at 07:23

## 2017-12-03 RX ADMIN — TAZOBACTAM SODIUM AND PIPERACILLIN SODIUM 3.38 G: 375; 3 INJECTION, SOLUTION INTRAVENOUS at 10:33

## 2017-12-03 RX ADMIN — IPRATROPIUM BROMIDE AND ALBUTEROL SULFATE 3 ML: .5; 3 SOLUTION RESPIRATORY (INHALATION) at 20:37

## 2017-12-03 RX ADMIN — BUDESONIDE AND FORMOTEROL FUMARATE DIHYDRATE 2 PUFF: 160; 4.5 AEROSOL RESPIRATORY (INHALATION) at 20:37

## 2017-12-03 RX ADMIN — HYDROCODONE BITARTRATE AND ACETAMINOPHEN 1 TABLET: 7.5; 325 TABLET ORAL at 16:28

## 2017-12-03 RX ADMIN — IPRATROPIUM BROMIDE AND ALBUTEROL SULFATE 3 ML: .5; 3 SOLUTION RESPIRATORY (INHALATION) at 11:03

## 2017-12-03 NOTE — H&P
Group: [facility]         H/p NOTE    Patient Identification:  Josephine Wolf  77 y.o.  female  1940  [id number]                 CC:     History of Present Illness:  Very pleasant 77-year-old female discharged about 2 weeks ago from the hospital status post right total knee replacement.  Patient was discharged to rehabilitation and was doing reasonably well working with physical therapy.  She presented to the emergency room with confusion and intermittent upper body tremors.  Family also reported right knee swelling and pain.  She has known history of COPD and uses oxygen at baseline.  Per family she was not discharged home with oxygen at the rehabilitation center.  Currently patient is awake and complains of right knee pain.  She denies any chest pain or shortness of breath at this time.  No fever chills as such was reported.      Review of Systems  Constitutional: Positive for fatigue. Negative for chills and fever.   HENT: Negative.  Negative for sore throat.    Eyes: Negative.    Respiratory: Positive for shortness of breath. Negative for cough.    Cardiovascular: Positive for leg swelling (R knee with erythema ). Negative for chest pain.   Gastrointestinal: Negative.    Genitourinary: Negative.  Negative for dysuria.   Musculoskeletal: Negative.  Negative for back pain.   Skin: Negative.  Negative for rash.   Neurological: Positive for tremors. Negative for headaches.   Psychiatric/Behavioral: Positive for confusion.   Past Medical History:  Past Medical History:   Diagnosis Date   • Acid reflux    • Anemia    • Arthritis    • Bipolar 1 disorder, depressed    • Cataract     MINDY   • Chronic nausea    • Chronic pain of right knee    • Continuous leakage of urine     USES DEPENDS   • COPD (chronic obstructive pulmonary disease)     USES O2 2 LMP PER NC AT NIGHT   • Disease of thyroid gland     HYPOTHYROIDISM   • Diverticulosis    • Fibromyalgia     DX 1994   • Frequent episodes of bronchitis     • Hypertension    • Migraines    • Neck pain    • On home oxygen therapy     2L NC AT NIGHT   • Short of breath on exertion        Past Surgical History:  Past Surgical History:   Procedure Laterality Date   • CEREBRAL ANEURYSM REPAIR  2000'S    WITH STENT   • HYSTERECTOMY     • LAPAROSCOPIC CHOLECYSTECTOMY     • [place] TOTAL KNEE ARTHROPLASTY Right 11/16/2017    Procedure: RT TOTAL KNEE ARTHROPLASTY;  Surgeon: Kashif Perez MD;  Location: [facility];  Service: Orthopedics        Home Meds:  Prescriptions Prior to Admission   Medication Sig Dispense Refill Last Dose   • albuterol (ACCUNEB) 1.25 MG/3ML nebulizer solution Take 1 ampule by nebulization Every 4 (Four) Hours.   11/15/2017 at 2100   • allopurinol (ZYLOPRIM) 300 MG tablet Take 300 mg by mouth Every Morning.   11/16/2017 at 0800   • amLODIPine (NORVASC) 10 MG tablet Take 10 mg by mouth Daily.      • aspirin  MG EC tablet Take 1 tablet by mouth 2 (Two) Times a Day With Meals. 60 tablet 0    • budesonide-formoterol (SYMBICORT) 160-4.5 MCG/ACT inhaler Inhale 2 puffs 2 (Two) Times a Day.   11/16/2017 at 0730   • Cetirizine HCl 10 MG capsule Take 1 tablet by mouth Daily.   11/14/2017   • Cholecalciferol (VITAMIN D3) 5000 units capsule capsule Take 5,000 Units by mouth Daily.      • divalproex (DEPAKOTE ER) 250 MG 24 hr tablet Take 250 mg by mouth Every Morning.   11/15/2017 at 1200   • divalproex (DEPAKOTE ER) 500 MG 24 hr tablet Take 500 mg by mouth Every Night.   11/15/2017 at 2100   • docusate sodium 100 MG capsule Take 100 mg by mouth 2 (Two) Times a Day As Needed for Constipation. 40 capsule 1    • ferrous sulfate 325 (65 FE) MG tablet Take 325 mg by mouth 2 (Two) Times a Day.      • gabapentin (NEURONTIN) 300 MG capsule Take 300 mg by mouth 3 (Three) Times a Day.   11/15/2017 at 2100   • HYDROcodone-acetaminophen (NORCO) 7.5-325 MG per tablet Take 1-2 tablets by mouth Every 4 (Four) Hours As Needed for Moderate Pain  (Pain). 80 tablet 0    •  "levothyroxine (SYNTHROID, LEVOTHROID) 88 MCG tablet Take 88 mcg by mouth Daily.   11/16/2017 at 0700   • montelukast (SINGULAIR) 10 MG tablet Take 10 mg by mouth Every Evening.      • ondansetron (ZOFRAN) 4 MG tablet Take 4 mg by mouth Every 8 (Eight) Hours As Needed for Nausea or Vomiting.   11/15/2017 at 0800   • pantoprazole (PROTONIX) 40 MG EC tablet Take 40 mg by mouth Daily.      • sulfamethoxazole-trimethoprim (BACTRIM DS,SEPTRA DS) 800-160 MG per tablet Take 1 tablet by mouth 2 (Two) Times a Day.      • valsartan-hydrochlorothiazide (DIOVAN-HCT) 320-12.5 MG per tablet Take 1 tablet by mouth Daily.      • venlafaxine XR (EFFEXOR XR) 150 MG 24 hr capsule Take 150 mg by mouth Daily.   11/15/2017 at 2100   • HYDROcodone-acetaminophen (NORCO) 7.5-325 MG per tablet Take 1 tablet by mouth Every 4 (Four) Hours As Needed for Moderate Pain .   11/15/2017 at 0800   • omeprazole (priLOSEC) 20 MG capsule Take 20 mg by mouth Daily.   11/15/2017 at 1200   • traZODone (DESYREL) 100 MG tablet Take 100 mg by mouth Every Night.   11/15/2017 at 2100       Allergies:  Allergies   Allergen Reactions   • Morphine And Related Hallucinations     CONFUSION       Social History:   Social History     Social History   • Marital status:      Spouse name: N/A   • Number of children: N/A   • Years of education: N/A     Occupational History   • Not on file.     Social History Main Topics   • Smoking status: Former Smoker     Packs/day: 1.00     Years: 10.00     Types: Cigarettes     Start date: 1959     Quit date: 1969   • Smokeless tobacco: Never Used   • Alcohol use No   • Drug use: No   • Sexual activity: Defer     Other Topics Concern   • Not on file     Social History Narrative       Family History:  Family History   Problem Relation Age of Onset   • Malig Hyperthermia Neg Hx        Physical Exam:  BP 99/51  Pulse 81  Temp 98.2 °F (36.8 °C) (Oral)   Resp 18  Ht 62\" (157.5 cm)  Wt 140 lb (63.5 kg)  SpO2 91%  BMI 25.61 " kg/m2 Body mass index is 25.61 kg/(m^2). 91% 140 lb (63.5 kg)  Physical Exam  Elderly female resting comfortably no distress no labored breathing  Oral cavity moist mucous membrane  Neck is supple no bruit no adenopathy  Chest diminished breath sounds no wheezing or rales  CVS regular rate and rhythm with 2 x 6 systolic ejection murmur left parasternal border  Abdomen is soft nontender bowel sounds positive  Extremities right knee with erythema and increased swelling and tenderness.  Steri-Strips in place  CNS no deficits no myoclonic tremors noted on the upper extremities  No hallucination and delusional joint deformities other than as mentioned above    LABS:  Lab Results   Component Value Date    CALCIUM 8.0 (L) 12/02/2017     Results from last 7 days  Lab Units 12/02/17 2152 12/02/17  1515   SODIUM mmol/L 137  --    POTASSIUM mmol/L 4.4  --    CHLORIDE mmol/L 96*  --    CO2 mmol/L 25.4  --    BUN mg/dL 37*  --    CREATININE mg/dL 3.96*  --    GLUCOSE mg/dL 91  --    CALCIUM mg/dL 8.0*  --    WBC 10*3/mm3 7.04 6.71   HEMOGLOBIN g/dL 7.5* 7.4*   PLATELETS 10*3/mm3 260 265   ALT (SGPT) U/L 12  --    AST (SGOT) U/L 16  --    PROBNP pg/mL 2752.0*  --    PROCALCITONIN ng/mL 0.16  --      Lab Results   Component Value Date    CKTOTAL 351 (H) 05/15/2015    TROPONINT <0.010 07/23/2016               Results from last 7 days  Lab Units 12/02/17  2152   PROCALCITONIN ng/mL 0.16   LACTATE mmol/L 0.5       Results from last 7 days  Lab Units 12/02/17  2159   FLOW RATE lpm 4   MODALITY  Cannula   O2 SATURATION CALC % 57.0*           Results from last 7 days  Lab Units 12/02/17  2152   INR  1.09         Lab Results   Component Value Date    TSH 1.83 05/13/2015     Estimated Creatinine Clearance: 10.8 mL/min (by C-G formula based on Cr of 3.96).         Imaging: I personally visualized the images of scans/x-rays performed within last 3 days.      Assessment:  Sepsis  Suspect right knee postop infection  Hypoxic acute hypoxemic  respiratory failure  Elevated dimer rule out PE  Acute renal failure  COPD      Recommendations:  Initiate antibiotics with Zosyn and vancomycin  IV fluids will be given  Creatinine will be monitored  Due to elevated creatinine CT PE protocol cannot be done at this time.  VQ scan has been ordered per emergency room.  Will start patient empirically on full dose Lovenox be discontinued once pulmonary embolism has been ruled out.  We will order Doppler lower extremities on the right side  Oxygen to keep sats above 90%  Duo nebs  Discussed plan of care in detail with patient's family              John Ca MD  12/3/2017  12:12 AM      Much of this encounter note is an electronic transcription/translation of spoken language to printed text using Dragon Software.

## 2017-12-03 NOTE — PLAN OF CARE
Problem: Patient Care Overview (Adult)  Goal: Plan of Care Review  Outcome: Ongoing (interventions implemented as appropriate)    12/03/17 1727   Coping/Psychosocial Response Interventions   Plan Of Care Reviewed With patient;family   Outcome Evaluation   Outcome Summary/Follow up Plan Pt was transfused 2 unit PRBC with repeat hemoglobin 8.4. Creatine improved slightly. Renal consulted for possible CHRIS. Tolerating diet well. BP improved through shift with MAP >65. Frequent voidingper bedpan. Knee pain manged with home dose of lortab.    Patient Care Overview   Progress improving       Goal: Adult Individualization and Mutuality  Outcome: Ongoing (interventions implemented as appropriate)  Goal: Discharge Needs Assessment  Outcome: Ongoing (interventions implemented as appropriate)    Problem: Fall Risk (Adult)  Goal: Identify Related Risk Factors and Signs and Symptoms  Outcome: Ongoing (interventions implemented as appropriate)    12/03/17 1727   Fall Risk   Fall Risk: Related Risk Factors gait/mobility problems;sleep pattern alteration;slipper/uneven surfaces;environment unfamiliar   Fall Risk: Signs and Symptoms presence of risk factors       Goal: Absence of Falls  Outcome: Ongoing (interventions implemented as appropriate)    12/03/17 1727   Fall Risk (Adult)   Absence of Falls making progress toward outcome         Problem: Pain, Acute (Adult)  Goal: Identify Related Risk Factors and Signs and Symptoms  Outcome: Ongoing (interventions implemented as appropriate)    12/03/17 1727   Pain, Acute   Signs and Symptoms (Acute Pain) verbalization of pain descriptors       Goal: Acceptable Pain Control/Comfort Level  Outcome: Ongoing (interventions implemented as appropriate)    12/03/17 1727   Pain, Acute (Adult)   Acceptable Pain Control/Comfort Level making progress toward outcome         Problem: Infection, Risk/Actual (Adult)  Goal: Identify Related Risk Factors and Signs and Symptoms  Outcome: Ongoing  (interventions implemented as appropriate)    12/03/17 1727   Infection, Risk/Actual   Infection, Risk/Actual: Related Risk Factors medication effects;treatment plan   Signs and Symptoms (Infection, Risk/Actual) lab value changes       Goal: Infection Prevention/Resolution  Outcome: Ongoing (interventions implemented as appropriate)    12/03/17 1727   Infection, Risk/Actual (Adult)   Infection Prevention/Resolution making progress toward outcome         Problem: Pressure Ulcer Risk (Gopi Scale) (Adult,Obstetrics,Pediatric)  Goal: Identify Related Risk Factors and Signs and Symptoms  Outcome: Ongoing (interventions implemented as appropriate)    12/03/17 1727   Pressure Ulcer Risk (Gopi Scale)   Related Risk Factors (Pressure Ulcer Risk (Gopi Scale)) critical care admission;medication       Goal: Skin Integrity  Outcome: Ongoing (interventions implemented as appropriate)    12/03/17 1727   Pressure Ulcer Risk (Gopi Scale) (Adult,Obstetrics,Pediatric)   Skin Integrity making progress toward outcome

## 2017-12-03 NOTE — ED NOTES
Right knee incision red with swelling and warm to touch.  MD notified     Josue Myles, RN  12/02/17 8403

## 2017-12-03 NOTE — PLAN OF CARE
Problem: Patient Care Overview (Adult)  Goal: Plan of Care Review  Outcome: Ongoing (interventions implemented as appropriate)  Goal: Adult Individualization and Mutuality  Outcome: Ongoing (interventions implemented as appropriate)  Goal: Discharge Needs Assessment  Outcome: Ongoing (interventions implemented as appropriate)    Problem: Fall Risk (Adult)  Goal: Identify Related Risk Factors and Signs and Symptoms  Outcome: Ongoing (interventions implemented as appropriate)  Goal: Absence of Falls  Outcome: Ongoing (interventions implemented as appropriate)    Problem: Pain, Acute (Adult)  Goal: Identify Related Risk Factors and Signs and Symptoms  Outcome: Ongoing (interventions implemented as appropriate)  Goal: Acceptable Pain Control/Comfort Level  Outcome: Ongoing (interventions implemented as appropriate)    Problem: Infection, Risk/Actual (Adult)  Goal: Identify Related Risk Factors and Signs and Symptoms  Outcome: Ongoing (interventions implemented as appropriate)  Goal: Infection Prevention/Resolution  Outcome: Ongoing (interventions implemented as appropriate)

## 2017-12-03 NOTE — CONSULTS
Referring Provider: DR. NESTOR HUSTON  Reason for Consultation: ARF    Subjective     Chief complaint   Chief Complaint   Patient presents with   • Shortness of Breath       History of present illness:  This is a 77-year-old white female with a prior history of kidney disease.  She presents to the hospital with complaints of shortness of breath and knee pain beginning several days ago.  She states that she has become progressively confused and disoriented.  In the emergency room she was found to to be hypotensive with blood low-grade temp of 99.2.  She is received liters of fluid was 2 units of packed red blood cells as resuscitation.  Receding this hospitalization the patient had a history of right total knee replacement and was transferred to rehabilitation facility 2 weeks ago.  She has been treated with Bactrim for a urinary tract infection area and she also has a history of hypertension and COPD.  She is currently treated with valsartan and hydrochlorothiazide for management of hypertension.  She has previously been admitted to the hospital for acute renal failure elated to hypotension and urinary tract infection.  This was back in 2015.    Past Medical History:   Diagnosis Date   • Acid reflux    • Anemia    • Arthritis    • Bipolar 1 disorder, depressed    • Cataract     MINDY   • Chronic nausea    • Chronic pain of right knee    • Continuous leakage of urine     USES DEPENDS   • COPD (chronic obstructive pulmonary disease)     USES O2 2 LMP PER NC AT NIGHT   • Disease of thyroid gland     HYPOTHYROIDISM   • Diverticulosis    • Fibromyalgia     DX 1994   • Frequent episodes of bronchitis    • Hypertension    • Migraines    • Neck pain    • On home oxygen therapy     2L NC AT NIGHT   • Short of breath on exertion      Past Surgical History:   Procedure Laterality Date   • CEREBRAL ANEURYSM REPAIR  2000'S    WITH STENT   • HYSTERECTOMY     • LAPAROSCOPIC CHOLECYSTECTOMY     • IA TOTAL KNEE ARTHROPLASTY Right  11/16/2017    Procedure: RT TOTAL KNEE ARTHROPLASTY;  Surgeon: Kashif Perez MD;  Location: [facility] OR;  Service: Orthopedics     Family History   Problem Relation Age of Onset   • Malig Hyperthermia Neg Hx        Social History   Substance Use Topics   • Smoking status: Former Smoker     Packs/day: 1.00     Years: 10.00     Types: Cigarettes     Start date: 1959     Quit date: 1969   • Smokeless tobacco: Never Used   • Alcohol use No     Prescriptions Prior to Admission   Medication Sig Dispense Refill Last Dose   • albuterol (ACCUNEB) 1.25 MG/3ML nebulizer solution Take 1 ampule by nebulization Every 4 (Four) Hours.   11/15/2017 at 2100   • allopurinol (ZYLOPRIM) 300 MG tablet Take 300 mg by mouth Every Morning.   11/16/2017 at 0800   • amLODIPine (NORVASC) 10 MG tablet Take 10 mg by mouth Daily.      • aspirin  MG EC tablet Take 1 tablet by mouth 2 (Two) Times a Day With Meals. 60 tablet 0    • budesonide-formoterol (SYMBICORT) 160-4.5 MCG/ACT inhaler Inhale 2 puffs 2 (Two) Times a Day.   11/16/2017 at 0730   • Cetirizine HCl 10 MG capsule Take 1 tablet by mouth Daily.   11/14/2017   • Cholecalciferol (VITAMIN D3) 5000 units capsule capsule Take 5,000 Units by mouth Daily.      • divalproex (DEPAKOTE ER) 250 MG 24 hr tablet Take 250 mg by mouth Every Morning.   11/15/2017 at 1200   • divalproex (DEPAKOTE ER) 500 MG 24 hr tablet Take 500 mg by mouth Every Night.   11/15/2017 at 2100   • docusate sodium 100 MG capsule Take 100 mg by mouth 2 (Two) Times a Day As Needed for Constipation. 40 capsule 1    • ferrous sulfate 325 (65 FE) MG tablet Take 325 mg by mouth 2 (Two) Times a Day.      • gabapentin (NEURONTIN) 300 MG capsule Take 300 mg by mouth 3 (Three) Times a Day.   11/15/2017 at 2100   • HYDROcodone-acetaminophen (NORCO) 7.5-325 MG per tablet Take 1-2 tablets by mouth Every 4 (Four) Hours As Needed for Moderate Pain  (Pain). 80 tablet 0    • levothyroxine (SYNTHROID, LEVOTHROID) 88 MCG tablet  Take 88 mcg by mouth Daily.   11/16/2017 at 0700   • montelukast (SINGULAIR) 10 MG tablet Take 10 mg by mouth Every Evening.      • ondansetron (ZOFRAN) 4 MG tablet Take 4 mg by mouth Every 8 (Eight) Hours As Needed for Nausea or Vomiting.   11/15/2017 at 0800   • pantoprazole (PROTONIX) 40 MG EC tablet Take 40 mg by mouth Daily.      • sulfamethoxazole-trimethoprim (BACTRIM DS,SEPTRA DS) 800-160 MG per tablet Take 1 tablet by mouth 2 (Two) Times a Day.      • valsartan-hydrochlorothiazide (DIOVAN-HCT) 320-12.5 MG per tablet Take 1 tablet by mouth Daily.      • venlafaxine XR (EFFEXOR XR) 150 MG 24 hr capsule Take 150 mg by mouth Daily.   11/15/2017 at 2100   • HYDROcodone-acetaminophen (NORCO) 7.5-325 MG per tablet Take 1 tablet by mouth Every 4 (Four) Hours As Needed for Moderate Pain .   11/15/2017 at 0800   • omeprazole (priLOSEC) 20 MG capsule Take 20 mg by mouth Daily.   11/15/2017 at 1200   • traZODone (DESYREL) 100 MG tablet Take 100 mg by mouth Every Night.   11/15/2017 at 2100     Allergies:  Morphine and related    Review of Systems  Constitutional: Negative for fever. Negative for chills, diaphoresis, And unexpected weight change.  Positive for fatigue   HENT: Negative for congestion and hearing loss.   Eyes: Negative for redness and visual disturbance.   Respiratory: Increased shortness of breath without cough or chest tightness. Negative for chest pain .    Cardiovascular: Negative for chest pain and palpitations.  Or irregular heartbeats  Gastrointestinal: Loss of appetite with no or vomiting Negative for abdominal distention, abdominal pain and blood in stool.   Endocrine: Negative for cold intolerance and heat intolerance.   Genitourinary: Negative for difficulty urinating, dysuria and frequency.   Musculoskeletal: Severe knee pain but otherwise no arthralgias or myalgias, or back pain  Skin: Negative for color change, rash and wound.   Neurological: Negative for syncope, weakness and headaches.  "  Hematological: Negative for adenopathy. Does not bruise/bleed easily.   Psychiatric/Behavioral: Positive for confusion. The patient is not nervous/anxious.     Objective     Vital Signs  Temp:  [98.2 °F (36.8 °C)-99.5 °F (37.5 °C)] 98.2 °F (36.8 °C)  Heart Rate:  [66-87] 73  Resp:  [16-20] 18  BP: ()/(28-89) 97/48    Flowsheet Rows         First Filed Value    Admission Height  62\" (157.5 cm) Documented at 12/02/2017 2104    Admission Weight  140 lb (63.5 kg) Documented at 12/02/2017 2104           I/O this shift:  In: 4013 [P.O.:480; I.V.:2933; Blood:600]  Out: 1050 [Urine:1050]  I/O last 3 completed shifts:  In: 540 [Blood:540]  Out: 275 [Urine:275]    Intake/Output Summary (Last 24 hours) at 12/03/17 1856  Last data filed at 12/03/17 1655   Gross per 24 hour   Intake             4553 ml   Output             1325 ml   Net             3228 ml       Physical Exam:   General Appearance: alert, appears stated age and cooperative  Head: normocephalic, without obvious abnormality and atraumatic  Eyes: lids and lashes normal, conjunctivae and sclerae normal, no icterus, no pallor, corneas clear and PERRLA  Throat: no oral lesions, no thrush, oral mucosa moist and poor oral dentition  Neck: no adenopathy, suppple, trachea midline, no thyromegaly, no carotid bruit and no JVD  Back: no kyphosis present, no scoliosis present, no skin lesions, erythema, or scars, no tenderness to percussion or palpation, range of motion normal and No presacral edema  Lungs: respirations regular, respirations even, respirations unlabored and Occasional wheezes but no rales or rhonchi  Heart: regular rhythm & normal rate, normal S1, S2, no murmur, no jasson, no rub and no click  Abdomen: normal bowel sounds, no masses, no hepatomegaly, no splenomegaly, soft non-tender, no guarding and no rebound tenderness  Extremities: no edema, no cyanosis, no redness and Right knee is limited related to pain there is a clean midline incision with " associated swelling over the right knee  Pulses: Pulses palpable and equal bilaterally  Skin: no bleeding, bruising or rash  Lymph Nodes: no palpable adenopathy  Neurologic: Mental Status orientated to person, place, time and situation  Psych: normal    Results Review:  Results for orders placed or performed during the hospital encounter of 12/02/17   Blood Culture - Blood,   Result Value Ref Range    Blood Culture No growth at less than 24 hours    Blood Culture - Blood,   Result Value Ref Range    Blood Culture No growth at less than 24 hours    Influenza Antigen, Rapid - Swab, Nasopharynx   Result Value Ref Range    Influenza A Ag, EIA Negative Negative    Influenza B Ag, EIA Negative Negative   Comprehensive Metabolic Panel   Result Value Ref Range    Glucose 91 65 - 99 mg/dL    BUN 37 (H) 8 - 23 mg/dL    Creatinine 3.96 (H) 0.57 - 1.00 mg/dL    Sodium 137 136 - 145 mmol/L    Potassium 4.4 3.5 - 5.2 mmol/L    Chloride 96 (L) 98 - 107 mmol/L    CO2 25.4 22.0 - 29.0 mmol/L    Calcium 8.0 (L) 8.6 - 10.5 mg/dL    Total Protein 6.6 6.0 - 8.5 g/dL    Albumin 3.30 (L) 3.50 - 5.20 g/dL    ALT (SGPT) 12 1 - 33 U/L    AST (SGOT) 16 1 - 32 U/L    Alkaline Phosphatase 92 39 - 117 U/L    Total Bilirubin <0.2 0.1 - 1.2 mg/dL    eGFR Non African Amer 11 (L) >60 mL/min/1.73    Globulin 3.3 gm/dL    A/G Ratio 1.0 g/dL    BUN/Creatinine Ratio 9.3 7.0 - 25.0    Anion Gap 15.6 mmol/L   Protime-INR   Result Value Ref Range    Protime 13.7 11.7 - 14.2 Seconds    INR 1.09 0.90 - 1.10   Procalcitonin   Result Value Ref Range    Procalcitonin 0.16 0.10 - 0.25 ng/mL   Lactic Acid, Plasma   Result Value Ref Range    Lactate 0.5 0.5 - 2.0 mmol/L   BNP   Result Value Ref Range    proBNP 2752.0 (H) 0.0 - 1800.0 pg/mL   D-dimer, Quantitative   Result Value Ref Range    D-Dimer, Quantitative 2.93 (H) 0.00 - 0.49 MCGFEU/mL   CBC Auto Differential   Result Value Ref Range    WBC 7.04 4.50 - 10.70 10*3/mm3    RBC 2.24 (L) 3.90 - 5.20 10*6/mm3     Hemoglobin 7.5 (L) 11.9 - 15.5 g/dL    Hematocrit 24.0 (L) 35.6 - 45.5 %    .1 (H) 80.5 - 98.2 fL    MCH 33.5 (H) 26.9 - 32.0 pg    MCHC 31.3 (L) 32.4 - 36.3 g/dL    RDW 16.1 (H) 11.7 - 13.0 %    RDW-SD 62.8 (H) 37.0 - 54.0 fl    MPV 9.2 6.0 - 12.0 fL    Platelets 260 140 - 500 10*3/mm3    Neutrophil % 62.4 42.7 - 76.0 %    Lymphocyte % 22.7 19.6 - 45.3 %    Monocyte % 10.9 5.0 - 12.0 %    Eosinophil % 2.8 0.3 - 6.2 %    Basophil % 0.3 0.0 - 1.5 %    Immature Grans % 0.9 (H) 0.0 - 0.5 %    Neutrophils, Absolute 4.39 1.90 - 8.10 10*3/mm3    Lymphocytes, Absolute 1.60 0.90 - 4.80 10*3/mm3    Monocytes, Absolute 0.77 0.20 - 1.20 10*3/mm3    Eosinophils, Absolute 0.20 0.00 - 0.70 10*3/mm3    Basophils, Absolute 0.02 0.00 - 0.20 10*3/mm3    Immature Grans, Absolute 0.06 (H) 0.00 - 0.03 10*3/mm3   Blood Gas, Venous   Result Value Ref Range    Site Arterial: right radial     pH, Venous 7.304 (L) 7.310 - 7.410 pH Units    pCO2, Venous 51.7 (H) 41.0 - 51.0 mm Hg    pO2, Venous 33.2 (L) 35.0 - 45.0 mm Hg    HCO3, Venous 25.7 22.0 - 28.0 mmol/L    Base Excess, Venous -0.7 mmol/L    O2 Saturation Calculated 57.0 (L) 92.0 - 99.0 %    Barometric Pressure for Blood Gas 758.1 mmHg    Modality Cannula     Flow Rate 4 lpm    Rate 20 Breaths/minute   Protime-INR   Result Value Ref Range    Protime 14.6 (H) 11.7 - 14.2 Seconds    INR 1.18 (H) 0.90 - 1.10   aPTT   Result Value Ref Range    PTT 51.9 (H) 22.7 - 35.4 seconds   CBC Auto Differential   Result Value Ref Range    WBC 4.74 4.50 - 10.70 10*3/mm3    RBC 1.83 (L) 3.90 - 5.20 10*6/mm3    Hemoglobin 6.3 (C) 11.9 - 15.5 g/dL    Hematocrit 19.6 (C) 35.6 - 45.5 %    .1 (H) 80.5 - 98.2 fL    MCH 34.4 (H) 26.9 - 32.0 pg    MCHC 32.1 (L) 32.4 - 36.3 g/dL    RDW 16.2 (H) 11.7 - 13.0 %    RDW-SD 63.0 (H) 37.0 - 54.0 fl    MPV 9.5 6.0 - 12.0 fL    Platelets 213 140 - 500 10*3/mm3    Neutrophil % 56.8 42.7 - 76.0 %    Lymphocyte % 28.3 19.6 - 45.3 %    Monocyte % 10.3 5.0 - 12.0  %    Eosinophil % 3.4 0.3 - 6.2 %    Basophil % 0.4 0.0 - 1.5 %    Immature Grans % 0.8 (H) 0.0 - 0.5 %    Neutrophils, Absolute 2.69 1.90 - 8.10 10*3/mm3    Lymphocytes, Absolute 1.34 0.90 - 4.80 10*3/mm3    Monocytes, Absolute 0.49 0.20 - 1.20 10*3/mm3    Eosinophils, Absolute 0.16 0.00 - 0.70 10*3/mm3    Basophils, Absolute 0.02 0.00 - 0.20 10*3/mm3    Immature Grans, Absolute 0.04 (H) 0.00 - 0.03 10*3/mm3   Vancomycin, Random   Result Value Ref Range    Vancomycin Random <4.00 (L) 5.00 - 40.00 mcg/mL   BNP   Result Value Ref Range    proBNP 3198.0 (H) 0.0 - 1800.0 pg/mL   CK   Result Value Ref Range    Creatine Kinase 107 20 - 180 U/L   Lactic Acid, Plasma   Result Value Ref Range    Lactate 0.6 0.5 - 2.0 mmol/L   Hemoglobin & Hematocrit, Blood   Result Value Ref Range    Hemoglobin 8.4 (L) 11.9 - 15.5 g/dL    Hematocrit 26.1 (L) 35.6 - 45.5 %   Magnesium   Result Value Ref Range    Magnesium 1.8 1.6 - 2.4 mg/dL   Phosphorus   Result Value Ref Range    Phosphorus 4.9 (H) 2.5 - 4.5 mg/dL   Protime-INR   Result Value Ref Range    Protime 14.2 11.7 - 14.2 Seconds    INR 1.14 (H) 0.90 - 1.10   Troponin   Result Value Ref Range    Troponin T <0.010 0.000 - 0.030 ng/mL   CK   Result Value Ref Range    Creatine Kinase 106 20 - 180 U/L   CK-MB   Result Value Ref Range    CKMB 2.62 <=5.30 ng/mL   Sodium, Urine, Random - Urine, Clean Catch   Result Value Ref Range    Sodium, Urine 91 mmol/L   Creatinine, Urine, Random - Urine, Clean Catch   Result Value Ref Range    Creatinine, Urine 75.3 mg/dL   Urinalysis With / Culture If Indicated - Urine, Clean Catch   Result Value Ref Range    Color, UA Yellow Yellow, Straw    Appearance, UA Clear Clear    pH, UA 6.5 5.0 - 8.0    Specific Gravity, UA 1.016 1.005 - 1.030    Glucose, UA Negative Negative    Ketones, UA Negative Negative    Bilirubin, UA Negative Negative    Blood, UA Negative Negative    Protein, UA Negative Negative    Leuk Esterase, UA Negative Negative    Nitrite,  UA Negative Negative    Urobilinogen, UA 0.2 E.U./dL 0.2 - 1.0 E.U./dL   Basic Metabolic Panel   Result Value Ref Range    Glucose 71 65 - 99 mg/dL    BUN 32 (H) 8 - 23 mg/dL    Creatinine 2.96 (H) 0.57 - 1.00 mg/dL    Sodium 138 136 - 145 mmol/L    Potassium 5.1 3.5 - 5.2 mmol/L    Chloride 105 98 - 107 mmol/L    CO2 18.3 (L) 22.0 - 29.0 mmol/L    Calcium 7.1 (L) 8.6 - 10.5 mg/dL    eGFR Non African Amer 15 (L) >60 mL/min/1.73    BUN/Creatinine Ratio 10.8 7.0 - 25.0    Anion Gap 14.7 mmol/L   POC Glucose Fingerstick   Result Value Ref Range    Glucose 93 70 - 130 mg/dL   POC Glucose Fingerstick   Result Value Ref Range    Glucose 136 (H) 70 - 130 mg/dL   POC Glucose Fingerstick   Result Value Ref Range    Glucose 82 70 - 130 mg/dL   POC Glucose Fingerstick   Result Value Ref Range    Glucose 77 70 - 130 mg/dL   POC Glucose Fingerstick   Result Value Ref Range    Glucose 93 70 - 130 mg/dL   Type & Screen   Result Value Ref Range    ABO Type O     RH type Positive     Antibody Screen Negative    Prepare RBC, 2 Units   Result Value Ref Range    Product Code F4242G72     Unit Number [id number]     UNIT  ABO O     UNIT  RH POS     Dispense Status IS     Blood Type OPOS     Blood Expiration Date 201712182359     Blood Type Barcode 5100     Product Code D6572Y83     Unit Number [id number]     UNIT  ABO O     UNIT  RH POS     Dispense Status PT     Blood Type OPOS     Blood Expiration Date 201712182359     Blood Type Barcode 5100    Duplex Venous Lower Extremity - Right CAR   Result Value Ref Range    Right Common Femoral Spont Y     Right Common Femoral Phasic Y     Right Common Femoral Augment Y     Right Common Femoral Competent Y     Right Common Femoral Compress C     Right Saphenofemoral Junction Spont Y     Right Saphenofemoral Junction Phasic Y     Right Saphenofemoral Junction Compress C     Right Proximal Femoral Compress C     Right Mid Femoral Spont Y     Right Mid Femoral Phasic Y     Right Mid  Femoral Augment Y     Right Mid Femoral Competent Y     Right Mid Femoral Compress C     Right Distal Femoral Compress C     Right Popliteal Spont Y     Right Popliteal Phasic Y     Right Popliteal Augment Y     Right Popliteal Competent Y     Right Popliteal Compress C     Right Posterior Tibial Compress C     Right Peroneal Compress C     Right GastronemiusSoleal Compress C     Right Greater Saph AK Compress C     Right Greater Saph BK Compress C     Right Lesser Saph Compress C     Left Common Femoral Spont Y     Left Common Femoral Phasic Y     Left Common Femoral Augment Y     Left Common Femoral Competent Y     Left Common Femoral Compress C      Imaging Results (last 72 hours)     Procedure Component Value Units Date/Time    XR Chest 1 View [[id number]] Collected:  12/02/17 2222     Updated:  12/02/17 2222    Narrative:       X-RAY CHEST 1 VIEW.     HISTORY: Shortness of breath.     COMPARISON: 10/09/2017.     FINDINGS:  Cardiomediastinal silhouette is within normal limits.         There is no consolidation or effusion. Low lung volumes and mild  bibasilar atelectasis.          Impression:       No acute findings.           CT Chest Without Contrast [[id number]] Collected:  12/02/17 2352     Updated:  12/02/17 2353    Narrative:       CT SCAN OF THE CHEST WITHOUT CONTRAST.     TECHNIQUE: Radiation dose reduction techniques were utilized, including  automated exposure control and exposure modulation based on body size.  Routine axial images of the chest were obtained with reconstructed  images.     HISTORY: Shortness of breath.     COMPARISON: 08/27/2002.     FINDINGS:   There is no mediastinal lymphadenopathy or pericardial effusion.  Aneurysmal descending thoracic aorta measures 3.4 cm, severe  atherosclerotic disease.     No consolidation or effusion. Mild emphysema and mild bibasilar  atelectasis.     In the right lung base anteriorly along the mediastinum there is a new 2  x 1 cm reniform soft tissue nodule  which may be further evaluated.     Upper abdominal viscera are essentially unremarkable.  Postcholecystectomy. 3.2 cm infrarenal aortic dilatation.     Cortical lucencies of the sternum, concerning for age-indeterminate  fracture.       Impression:       1.  No consolidation or effusion. Mild emphysema and bibasilar mild  atelectasis.  2.  New 2 cm soft tissue nodule in the right lung base, further  evaluation is recommended, neoplasm to be excluded.  3.  Severe atherosclerotic disease. 3.4 cm aneurysmal descending  thoracic aorta. 3.2 cm aneurysmal descending abdominal aorta.  4.  Age-indeterminate fractures of the sternum.             NM Lung Ventilation Perfusion [[id number]] Collected:  12/03/17 0202     Updated:  12/03/17 0219    Narrative:       NUCLEAR MEDICINE LUNG VENTILATION/PERFUSION.     HISTORY: Shortness of breath.     COMPARISON: No prior studies for comparison.     FINDINGS:     Patient was given 6.5 mCi of xenon-133 and 5.4 mCi of technetium 99 MAA.        There is slightly decreased perfusion to the right lung base compared to  the left. A cholangiogram demonstrates similar activity distribution.  Minimal residual tracer is seen on the washout images.     Perfusion images do not demonstrate a large mismatch defect.       Impression:       Low probability for pulmonary embolism.                  budesonide-formoterol 2 puff Inhalation BID - RT   divalproex 250 mg Oral Daily   divalproex 500 mg Oral Nightly   ipratropium-albuterol 3 mL Nebulization 4x Daily - RT   levothyroxine 88 mcg Oral Q AM   piperacillin-tazobactam 3.375 g Intravenous Q12H       norepinephrine 0.02-0.3 mcg/kg/min    Pharmacy to dose vancomycin     sodium chloride 200 mL/hr Last Rate: 200 mL/hr (12/03/17 1430)       Assessment/Plan   Acute renal failure which appears to be multifactorial  Metabolic acidosis  Hyperkalemia  Osteoarthritis of the knee status post total knee replacement  COPD  Hypertension    Recommendations:  Agree  with IV fluids  Urinalysis is bland and would suggest ATN or dehydration as a possibility.  We'll obtain ultrasound the kidneys to complete evaluation.  Would avoid Bactrim, hydrochlorothiazide and valsartan for now.  We'll monitor fluid and electrolyte status  We'll follow along with you  Acute for allowing us to participate in this patient's care    I discussed the patients findings and my recommendations with patient, family and nursing staff    Kelton Barreto MD  12/03/17  6:56 PM

## 2017-12-03 NOTE — PROGRESS NOTES
Dr. JUNIOR Stone    [facility] INTENSIVE CARE    12/3/2017    Patient ID:  Name:  Josephine Wolf  MRN:  [id number]  1940  77 y.o.  female            CC/Reason for visit: Follow-up for hypotension, acute blood loss anemia, and many other medical problems listed below.    HPI: The patient is actually quite lethargic.  She is arousable, but does not maintain her eyes open and does not communicate verbally with me.  Family is at bedside.  Nurse is at bedside.  We discussed the patient in detail.  I have reviewed the entire chart including Dr. Efrain Peterson's dictation in the emergency room as well as my colleague's dictation, Dr. Ca at midnight.  The patient remains hypotensive in spite of 2 units of packed red cell transfusion.  She remains on a few liters of oxygen.  Quite somnolent.  Orthopedics rounded on the patient and looked at the right knee and it appears to be in good condition, very doubtful that that is a source of sepsis.    Vitals:  Vitals:    12/03/17 1135 12/03/17 1150 12/03/17 1159 12/03/17 1205   BP: (!) 88/49 105/43 100/48 100/48   Pulse: 72 77 74 73   Resp:   18    Temp:   99.1 °F (37.3 °C)    TempSrc:   Oral    SpO2: 92% 96% 91% 96%   Weight:       Height:               Body mass index is 25.61 kg/(m^2).    Intake/Output Summary (Last 24 hours) at 12/03/17 1253  Last data filed at 12/03/17 1200   Gross per 24 hour   Intake             3795 ml   Output              725 ml   Net             3070 ml       Exam:  GEN:  Arousable but somnolent.  Does not remain awake unless stimulated.  Makes some eye contact but does not verbally communicate with me.  EYES:   anicteric sclera bilat  ENT:    External ears/nose normal, OP clear  NECK:  Supple, midline trachea  LUNGS: Diminished breath sounds but no crackles or wheezing, nonlabored breathing  CV:  Normal S1S2, without murmur, no edema  ABD:  Non tender, no enlarged liver or masses  EXT:  Right knee incision appears well-healed.   There is no erythema of the right knee joint although there is some soft tissue swelling which is expected postoperatively 2 weeks out.  I don't palpate any fluctuation of the joint.  There is some tenderness when I palpate the joint and the patient does grimace a little bit.  The left joint appears normal.  Range of motion is limited in the right knee joint but normal in the left joint of the knee.    Scheduled meds:    budesonide-formoterol 2 puff Inhalation BID - RT   divalproex 250 mg Oral Daily   divalproex 500 mg Oral Nightly   heparin (porcine) 5,000 Units Subcutaneous Q12H   ipratropium-albuterol 3 mL Nebulization 4x Daily - RT   levothyroxine 88 mcg Oral Q AM   piperacillin-tazobactam 3.375 g Intravenous Q12H     IV meds:                        norepinephrine 0.02-0.3 mcg/kg/min    Pharmacy to dose vancomycin     sodium chloride 125 mL/hr Last Rate: 125 mL/hr (12/03/17 0217)       Data Review:   I reviewed the patient's medications and new clinical results.  Lab Results   Component Value Date    CALCIUM 8.0 (L) 12/02/2017    MG 2.0 05/14/2015    MG 1.5 (L) 05/13/2015       Results from last 7 days  Lab Units 12/03/17  0253 12/02/17  2152 12/02/17  1515   SODIUM mmol/L  --  137  --    POTASSIUM mmol/L  --  4.4  --    CHLORIDE mmol/L  --  96*  --    CO2 mmol/L  --  25.4  --    BUN mg/dL  --  37*  --    CREATININE mg/dL  --  3.96*  --    CALCIUM mg/dL  --  8.0*  --    BILIRUBIN mg/dL  --  <0.2  --    ALK PHOS U/L  --  92  --    ALT (SGPT) U/L  --  12  --    AST (SGOT) U/L  --  16  --    GLUCOSE mg/dL  --  91  --    WBC 10*3/mm3 4.74 7.04 6.71   HEMOGLOBIN g/dL 6.3* 7.5* 7.4*   PLATELETS 10*3/mm3 213 260 265   INR  1.18* 1.09  --    PROBNP pg/mL  --  2752.0*  --    PROCALCITONIN ng/mL  --  0.16  --        Results from last 7 days  Lab Units 12/02/17  2253 12/02/17 2153   BLOODCX  No growth at less than 24 hours No growth at less than 24 hours                 Results from last 7 days  Lab Units 12/02/17  2150    FLOW RATE lpm 4   MODALITY  Cannula   O2 SATURATION CALC % 57.0*         ASSESSMENT:   Acute hypoxia  Sepsis criteria, but unsure the patient truly has an infection.  Hypotension due to dehydration versus medication induced, versus blood loss  Acute kidney injury  Altered mental status, most likely metabolic encephalopathy  Acute blood loss anemia on chronic multifactorial anemia  Status post right total knee replacement 2 weeks ago  History of COPD      PLAN:  This patient remains a little lethargic.  She remains hypotensive in spite of blood transfusion.  I am going to continue with IV fluid resuscitation and monitor urine output closely.  If her hypotension is not improved, I will order an echocardiogram.  We will check her cardiac enzymes now to make sure there was no myocardial infarction.    Her creatinine is quite elevated.  That is a new problem.  I will consult nephrology.  We have stopped all home medications which could be nephrotoxic, including Bactrim.  I am ordering a stat CPK level.    Her anemia is quite puzzling.  I'm not sure why or where she has lost the blood, but on physical exam I cannot elicit any pain on the abdominal exam to suspect retroperitoneal bleed.    The knee does not appear infected.  I'm not sure this is hypotension due to sepsis.  This may be hypotension due to prerenal intravascular depletion of volume, or secondary to medications.  We will continue empiric antibiotics and fluids for now.  We will reassess sepsis criteria again in 24 hours.    Nuclear ventilation perfusion scan of the lungs does not suggest pulmonary embolism.  Therefore full anticoagulation has been discontinued.  Low-dose heparin will be used for DVT prophylaxis, but renally adjusted.    Patient remains critically ill.  I explained this very clearly to the family and we will continue to monitor closely.    Total critical care time 35 minutes, excluding any separately billable procedure time    Param Stone,  MD  12/3/2017

## 2017-12-03 NOTE — ED NOTES
EMS was called for SOB to [facility]. The staff had the pt on a nonrebreather with 3L. EMS remove the nonrebreather and placed on 4L nasal canula and pt started feeling better and O2 sats improved.     Jeff Palomares RN  12/02/17 0552

## 2017-12-03 NOTE — CONSULTS
Reason for Consultation: possible infected right knee    Patient Care Team:  Bill Martino MD as PCP - General (Internal Medicine)  No Known Provider as PCP - Family Medicine    Chief complaint hypotension and SOA    Subjective .     History of present illness:  The patient is a 78 y/o female who had a right TKA by Dr. Perez 11/16/17. She was in rehab and readmitted to [facility] via the ER due to complaints of SOA. Apparently her hgb at rehab had been around 6 g/dL.  Her son states she has been confused.  Orthopedics was consulted due to some swelling and redness around her post-operative knee.    Review of Systems  Pertinent items are noted in HPI    History  Past Medical History:   Diagnosis Date   • Acid reflux    • Anemia    • Arthritis    • Bipolar 1 disorder, depressed    • Cataract     MINDY   • Chronic nausea    • Chronic pain of right knee    • Continuous leakage of urine     USES DEPENDS   • COPD (chronic obstructive pulmonary disease)     USES O2 2 LMP PER NC AT NIGHT   • Disease of thyroid gland     HYPOTHYROIDISM   • Diverticulosis    • Fibromyalgia     DX 1994   • Frequent episodes of bronchitis    • Hypertension    • Migraines    • Neck pain    • On home oxygen therapy     2L NC AT NIGHT   • Short of breath on exertion    ,   Past Surgical History:   Procedure Laterality Date   • CEREBRAL ANEURYSM REPAIR  2000'S    WITH STENT   • HYSTERECTOMY     • LAPAROSCOPIC CHOLECYSTECTOMY     • [place] TOTAL KNEE ARTHROPLASTY Right 11/16/2017    Procedure: RT TOTAL KNEE ARTHROPLASTY;  Surgeon: Kashif Perez MD;  Location: [facility];  Service: Orthopedics   ,   Family History   Problem Relation Age of Onset   • Malig Hyperthermia Neg Hx    ,   Social History   Substance Use Topics   • Smoking status: Former Smoker     Packs/day: 1.00     Years: 10.00     Types: Cigarettes     Start date: 1959     Quit date: 1969   • Smokeless tobacco: Never Used   • Alcohol use No   ,   Prescriptions Prior to Admission    Medication Sig Dispense Refill Last Dose   • albuterol (ACCUNEB) 1.25 MG/3ML nebulizer solution Take 1 ampule by nebulization Every 4 (Four) Hours.   11/15/2017 at 2100   • allopurinol (ZYLOPRIM) 300 MG tablet Take 300 mg by mouth Every Morning.   11/16/2017 at 0800   • amLODIPine (NORVASC) 10 MG tablet Take 10 mg by mouth Daily.      • aspirin  MG EC tablet Take 1 tablet by mouth 2 (Two) Times a Day With Meals. 60 tablet 0    • budesonide-formoterol (SYMBICORT) 160-4.5 MCG/ACT inhaler Inhale 2 puffs 2 (Two) Times a Day.   11/16/2017 at 0730   • Cetirizine HCl 10 MG capsule Take 1 tablet by mouth Daily.   11/14/2017   • Cholecalciferol (VITAMIN D3) 5000 units capsule capsule Take 5,000 Units by mouth Daily.      • divalproex (DEPAKOTE ER) 250 MG 24 hr tablet Take 250 mg by mouth Every Morning.   11/15/2017 at 1200   • divalproex (DEPAKOTE ER) 500 MG 24 hr tablet Take 500 mg by mouth Every Night.   11/15/2017 at 2100   • docusate sodium 100 MG capsule Take 100 mg by mouth 2 (Two) Times a Day As Needed for Constipation. 40 capsule 1    • ferrous sulfate 325 (65 FE) MG tablet Take 325 mg by mouth 2 (Two) Times a Day.      • gabapentin (NEURONTIN) 300 MG capsule Take 300 mg by mouth 3 (Three) Times a Day.   11/15/2017 at 2100   • HYDROcodone-acetaminophen (NORCO) 7.5-325 MG per tablet Take 1-2 tablets by mouth Every 4 (Four) Hours As Needed for Moderate Pain  (Pain). 80 tablet 0    • levothyroxine (SYNTHROID, LEVOTHROID) 88 MCG tablet Take 88 mcg by mouth Daily.   11/16/2017 at 0700   • montelukast (SINGULAIR) 10 MG tablet Take 10 mg by mouth Every Evening.      • ondansetron (ZOFRAN) 4 MG tablet Take 4 mg by mouth Every 8 (Eight) Hours As Needed for Nausea or Vomiting.   11/15/2017 at 0800   • pantoprazole (PROTONIX) 40 MG EC tablet Take 40 mg by mouth Daily.      • sulfamethoxazole-trimethoprim (BACTRIM DS,SEPTRA DS) 800-160 MG per tablet Take 1 tablet by mouth 2 (Two) Times a Day.      •  valsartan-hydrochlorothiazide (DIOVAN-HCT) 320-12.5 MG per tablet Take 1 tablet by mouth Daily.      • venlafaxine XR (EFFEXOR XR) 150 MG 24 hr capsule Take 150 mg by mouth Daily.   11/15/2017 at 2100   • HYDROcodone-acetaminophen (NORCO) 7.5-325 MG per tablet Take 1 tablet by mouth Every 4 (Four) Hours As Needed for Moderate Pain .   11/15/2017 at 0800   • omeprazole (priLOSEC) 20 MG capsule Take 20 mg by mouth Daily.   11/15/2017 at 1200   • traZODone (DESYREL) 100 MG tablet Take 100 mg by mouth Every Night.   11/15/2017 at 2100   , Scheduled Meds:    budesonide-formoterol 2 puff Inhalation BID - RT   divalproex 250 mg Oral Daily   divalproex 500 mg Oral Nightly   heparin (porcine) 5,000 Units Subcutaneous Q12H   ipratropium-albuterol 3 mL Nebulization 4x Daily - RT   levothyroxine 88 mcg Oral Q AM   piperacillin-tazobactam 3.375 g Intravenous Q12H   , Continuous Infusions:    norepinephrine 0.02-0.3 mcg/kg/min    Pharmacy to dose vancomycin     sodium chloride 150 mL/hr Last Rate: 150 mL/hr (12/03/17 0331)   sodium chloride 125 mL/hr Last Rate: 125 mL/hr (12/03/17 0217)   , PRN Meds:  fentaNYL citrate (PF)  •  Pharmacy to dose vancomycin  •  Insert peripheral IV **AND** sodium chloride and Allergies:  Morphine and related    Objective     Vital Signs   Temp:  [98.2 °F (36.8 °C)-99.2 °F (37.3 °C)] 99 °F (37.2 °C)  Heart Rate:  [70-81] 75  Resp:  [16-20] 18  BP: ()/(28-89) 87/44    Physical Exam:     General Appearance: appears stated age and is in mild distress   Head: normocephalic, without obvious abnormality and atraumatic  Extremities: moves extremities well, no edema, no cyanosis, no redness, no tenderness and Ruthann's sign negative. There is some swelling around her post-op right knee without fluctuance.   Pulses: Pulses palpable and equal bilaterally  Skin: no bleeding, bruising or rash and well approximated anterior knee incision  Neurologic: Mental Status alertness awake, Motor strength is unable to  be assessed as pt will not follow commands but she moves upper and lower extremities well  Psych: unable to follow commands or answer questions    Results Review:   I reviewed the patient's new clinical results.      Assessment/Plan     Active Problems:    Hypoxia  Anemia  Possible sepsis of unknown origin. 2 weeks s/p right TKA. Blood cultures and urine cultures pending. Pt on vancomycin.  Her knee wound looks great.  No sign of infection or problem with it.      I discussed the patients findings and my recommendations with family, nursing staff and Dr. Martinez. We will monitor culture results. Continue vancomycin for the time being.     PLAN:  Being transfused  No need for antibiotics with regard to the knee    YUSUF Moreira  12/03/17  8:15 AM

## 2017-12-03 NOTE — PROGRESS NOTES
"Pharmacy to Dose Vancomycin IV    Day 1 ADDENDUM  Consult for Dr. Ca  Treating: Sepsis  Goal random: 15-20 mcg/mL  Duration: 7 days    Current Vancomycin dose: Intermittent    IV Anti-Infectives     Ordered     Dose/Rate Route Frequency Start Stop    12/03/17 0044  piperacillin-tazobactam (ZOSYN) 3.375 g in iso-osmotic dextrose 50 ml (premix)     Ordering Provider:  John Ca MD    3.375 g  over 4 Hours Intravenous Every 12 Hours 12/03/17 0900 12/10/17 0859    12/03/17 0022  Pharmacy to dose vancomycin     Ordering Provider:  John Ca MD     Does not apply Continuous PRN 12/03/17 0021 12/10/17 0020    12/02/17 2254  piperacillin-tazobactam (ZOSYN) 3.375 g in iso-osmotic dextrose 50 ml (premix)     Ordering Provider:  Efrain Peterson MD    3.375 g Intravenous Once 12/02/17 2256 12/02/17 2329    12/02/17 2254  vancomycin 1250 mg/250 mL 0.9% NS IVPB ([facility])     Ordering Provider:  Efrain Peterson MD    20 mg/kg × 63.5 kg Intravenous Once 12/02/17 2256 12/03/17 0540      Relevant clinical data and objective history reviewed:  77 y.o. female 62\" (157.5 cm) 140 lb (63.5 kg)  Body mass index is 25.61 kg/(m^2).    Results from last 7 days  Lab Units 12/02/17  2152   CREATININE mg/dL 3.96*     Estimated Creatinine Clearance: 10.8 mL/min (by C-G formula based on Cr of 3.96).    Lab Results   Component Value Date    WBC 4.74 12/03/2017    WBC 7.04 12/02/2017     Temp:  [98.2 °F (36.8 °C)-99.5 °F (37.5 °C)] 99.5 °F (37.5 °C)  Heart Rate:  [70-87] 73  Resp:  [16-20] 16  BP: ()/(28-89) 103/47    Intake/Output Summary (Last 24 hours) at 12/03/17 1114  Last data filed at 12/03/17 1053   Gross per 24 hour   Intake             3065 ml   Output              725 ml   Net             2340 ml     Baseline cultures/labs/radiology:  12/02 Blood cx x2: NG < 24hrs  12/02 Influenza Antigen A/B: Neg  12/02 PCT: 0.16  12/02 Lactic acid: 0.5  12/03 Urine cx: In process    Assessment/Plan:   PMH: Acid reflux; Anemia; Arthritis; " Bipolar 1 disorder, depressed; Cataract; Chronic nausea; Chronic pain of right knee; Continuous leakage of urine; COPD; Disease of thyroid gland; Diverticulosis; Fibromyalgia; Frequent episodes of bronchitis; Hypertension; Migraines; Neck pain; On home oxygen therapy; and Short of breath on exertion.    PMH:  s/p Right total knee replacement on 11/16/17. Discharged to rehab. Presented w confusion and intermittent upper body tremors.    Will check Vanc random level TODAY at 1800. Will leave for evening pharmacist to evaluate results    Lab Results   Component Value Date    VANCO RANDOM <4.00 mcg/mL (prior to any dose given) 12/03/2017 @ 02:53      Vancomycin Dosing History:  12/03 02:53 Vanc random: <4.0 mcg/mL  12/03 0540 Vancomycin 1250 mg IV x1 (19.7 mg/Kg)    Thank you for your consult,    Veronica Rivas RPH  12/03/17 11:14 AM

## 2017-12-03 NOTE — SIGNIFICANT NOTE
notified Dr. Rubin that SBP and MAP were still very low, even after 1000 Bolus given in the ICU. Critical lab values : hgb: 6.3/hct:19.6. 2 units of blood ordered, levophed if needed after transfusion of MAP is still below 65. VQ scan showed low probability of PE, per orders, heparin gtt not needed. Will give heparin subq. Ordered to change fluids of NS from 125 to 150

## 2017-12-03 NOTE — ED PROVIDER NOTES
"EMERGENCY DEPARTMENT ENCOUNTER    CHIEF COMPLAINT  Chief Complaint: SOB  History given by: pt/ family present at bedside   History limited by: N/A  Room Number: 28/28  PMD: Bill Martino MD      HPI:  Pt is a 77 y.o. female who presents via EMS complaining of worsening constant SOB PTA. Pt also c/o R knee swelling/erythema and confusion. Family reports that pt had a recent arthroplasty performed. Family states that pt is normally on oxygen at night. Family reports that pt has been fatigued/ lethargic and has been experiencing intermittent tremors. Pt has MD2U. Pt has a hx of COPD.     Duration: PTA  Onset: gradual   Timing: constant   Location: N/A  Radiation: N/A  Quality: \"SOB\"  Intensity/Severity: moderate   Progression: worsening   Associated Symptoms: confusion, R knee swelling, erythema, fatigue, tremors   Aggravating Factors: None reported   Alleviating Factors: None reported   Previous Episodes: Pt has a hx of COPD and had a recent R knee arthroplasty performed.   Treatment before arrival: Pt is normally on oxygen at night, otherwise no reported medications PTA.     PAST MEDICAL HISTORY  Active Ambulatory Problems     Diagnosis Date Noted   • Anemia 10/19/2017   • OA (osteoarthritis) of knee 11/16/2017     Resolved Ambulatory Problems     Diagnosis Date Noted   • No Resolved Ambulatory Problems     Past Medical History:   Diagnosis Date   • Acid reflux    • Anemia    • Arthritis    • Bipolar 1 disorder, depressed    • Cataract    • Chronic nausea    • Chronic pain of right knee    • Continuous leakage of urine    • COPD (chronic obstructive pulmonary disease)    • Disease of thyroid gland    • Diverticulosis    • Fibromyalgia    • Frequent episodes of bronchitis    • Hypertension    • Migraines    • Neck pain    • On home oxygen therapy    • Short of breath on exertion        PAST SURGICAL HISTORY  Past Surgical History:   Procedure Laterality Date   • CEREBRAL ANEURYSM REPAIR  2000'S    WITH STENT "   • HYSTERECTOMY     • LAPAROSCOPIC CHOLECYSTECTOMY     • HI TOTAL KNEE ARTHROPLASTY Right 11/16/2017    Procedure: RT TOTAL KNEE ARTHROPLASTY;  Surgeon: Kashif Perez MD;  Location: [facility] OR;  Service: Orthopedics       FAMILY HISTORY  Family History   Problem Relation Age of Onset   • Malig Hyperthermia Neg Hx        SOCIAL HISTORY  Social History     Social History   • Marital status:      Spouse name: N/A   • Number of children: N/A   • Years of education: N/A     Occupational History   • Not on file.     Social History Main Topics   • Smoking status: Former Smoker     Packs/day: 1.00     Years: 10.00     Types: Cigarettes     Start date: 1959     Quit date: 1969   • Smokeless tobacco: Never Used   • Alcohol use No   • Drug use: No   • Sexual activity: Defer     Other Topics Concern   • Not on file     Social History Narrative       ALLERGIES  Morphine and related    REVIEW OF SYSTEMS  Review of Systems   Constitutional: Positive for fatigue. Negative for chills and fever.   HENT: Negative.  Negative for sore throat.    Eyes: Negative.    Respiratory: Positive for shortness of breath. Negative for cough.    Cardiovascular: Positive for leg swelling (R knee with erythema ). Negative for chest pain.   Gastrointestinal: Negative.    Genitourinary: Negative.  Negative for dysuria.   Musculoskeletal: Negative.  Negative for back pain.   Skin: Negative.  Negative for rash.   Neurological: Positive for tremors. Negative for headaches.   Psychiatric/Behavioral: Positive for confusion.       PHYSICAL EXAM  ED Triage Vitals   Temp Heart Rate Resp BP SpO2   12/02/17 2104 12/02/17 2104 12/02/17 2104 12/02/17 2104 12/02/17 2104   98.2 °F (36.8 °C) 74 18 105/84 96 %      Temp src Heart Rate Source Patient Position BP Location FiO2 (%)   12/02/17 2104 12/02/17 2104 -- -- --   Oral Monitor          Physical Exam   Constitutional: She is well-developed, well-nourished, and in no distress. No distress.   HENT:    Head: Normocephalic and atraumatic.   Eyes: EOM are normal. Pupils are equal, round, and reactive to light.   Neck: Normal range of motion. Neck supple.   Cardiovascular: Normal rate and regular rhythm.    Murmur (2/6 systolic heard best at left PMI) heard.  Pulmonary/Chest: Effort normal and breath sounds normal. No respiratory distress.   Abdominal: Soft. There is no tenderness. There is no rebound and no guarding.   Musculoskeletal: Normal range of motion. She exhibits edema (R Knee swelling ).   Neurological: She is alert. She has normal sensation and normal strength.   Skin: Skin is warm and dry. No rash noted. There is erythema.   R leg shows healing surgical incision  R knee has surrounding erythema and warmth   RLE appears to be swollen with contralateral appendage     Psychiatric: Mood and affect normal.   Nursing note and vitals reviewed.      LAB RESULTS  Lab Results (last 24 hours)     Procedure Component Value Units Date/Time    CBC & Differential [[id number]] Collected:  12/02/17 1515    Specimen:  Blood Updated:  12/02/17 1810    Narrative:       The following orders were created for panel order CBC & Differential.  Procedure                               Abnormality         Status                     ---------                               -----------         ------                     Scan Slide[[id number]]                                       Final result               CBC Auto Differential[[id number]]        Abnormal            Final result                 Please view results for these tests on the individual orders.    CBC Auto Differential [[id number]]  (Abnormal) Collected:  12/02/17 1515    Specimen:  Blood Updated:  12/02/17 1810     WBC 6.71 10*3/mm3      RBC 2.19 (L) 10*6/mm3      Hemoglobin 7.4 (L) g/dL      Hematocrit 23.5 (L) %      .3 (H) fL      MCH 33.8 (H) pg      MCHC 31.5 (L) g/dL      RDW 16.3 (H) %      RDW-SD 63.7 (H) fl      MPV 9.5 fL      Platelets 265 10*3/mm3       Neutrophil % 62.5 %      Lymphocyte % 21.3 %      Monocyte % 12.4 (H) %      Eosinophil % 2.8 %      Basophil % 0.3 %      Immature Grans % 0.7 (H) %      Neutrophils, Absolute 4.19 10*3/mm3      Lymphocytes, Absolute 1.43 10*3/mm3      Monocytes, Absolute 0.83 10*3/mm3      Eosinophils, Absolute 0.19 10*3/mm3      Basophils, Absolute 0.02 10*3/mm3      Immature Grans, Absolute 0.05 (H) 10*3/mm3     Scan Slide [[id number]] Collected:  12/02/17 1515    Specimen:  Blood Updated:  12/02/17 1810     Anisocytosis Mod/2+     Macrocytes Large/3+     Polychromasia Slight/1+     WBC Morphology Normal     Platelet Morphology Normal    CBC & Differential [[id number]] Collected:  12/02/17 2152    Specimen:  Blood Updated:  12/02/17 2213    Narrative:       The following orders were created for panel order CBC & Differential.  Procedure                               Abnormality         Status                     ---------                               -----------         ------                     Scan Slide[[id number]]                                                                  CBC Auto Differential[[id number]]        Abnormal            Final result                 Please view results for these tests on the individual orders.    Comprehensive Metabolic Panel [[id number]]  (Abnormal) Collected:  12/02/17 2152    Specimen:  Blood Updated:  12/02/17 2240     Glucose 91 mg/dL      BUN 37 (H) mg/dL      Creatinine 3.96 (H) mg/dL      Sodium 137 mmol/L      Potassium 4.4 mmol/L      Chloride 96 (L) mmol/L      CO2 25.4 mmol/L      Calcium 8.0 (L) mg/dL      Total Protein 6.6 g/dL      Albumin 3.30 (L) g/dL      ALT (SGPT) 12 U/L      AST (SGOT) 16 U/L      Alkaline Phosphatase 92 U/L      Total Bilirubin <0.2 mg/dL      eGFR Non African Amer 11 (L) mL/min/1.73      Globulin 3.3 gm/dL      A/G Ratio 1.0 g/dL      BUN/Creatinine Ratio 9.3     Anion Gap 15.6 mmol/L     Narrative:       The MDRD GFR formula is only valid for adults with  "stable renal function between ages 18 and 70.    Protime-INR [[id number]]  (Normal) Collected:  12/02/17 2152    Specimen:  Blood Updated:  12/02/17 2219     Protime 13.7 Seconds      INR 1.09    Procalcitonin [[id number]]  (Normal) Collected:  12/02/17 2152    Specimen:  Blood Updated:  12/02/17 2240     Procalcitonin 0.16 ng/mL     Narrative:       As a Marker for Sepsis (Non-Neonates):   1. <0.5 ng/mL represents a low risk of severe sepsis and/or septic shock.  1. >2 ng/mL represents a high risk of severe sepsis and/or septic shock.    As a Marker for Lower Respiratory Tract Infections that require antibiotic therapy:  PCT on Admission     Antibiotic Therapy             6-12 Hrs later  > 0.5                Strongly Recommended            >0.25 - <0.5         Recommended  0.1 - 0.25           Discouraged                   Remeasure/reassess PCT  <0.1                 Strongly Discouraged          Remeasure/reassess PCT      As 28 day mortality risk marker: \"Change in Procalcitonin Result\" (> 80 % or <=80 %) if Day 0 (or Day 1) and Day 4 values are available. Refer to [email or web address]-pct-[email or web address]/   Change in PCT <=80 %   A decrease of PCT levels below or equal to 80 % defines a positive change in PCT test result representing a higher risk for 28-day all-cause mortality of patients diagnosed with severe sepsis or septic shock.  Change in PCT > 80 %   A decrease of PCT levels of more than 80 % defines a negative change in PCT result representing a lower risk for 28-day all-cause mortality of patients diagnosed with severe sepsis or septic shock.                Lactic Acid, Plasma [[id number]]  (Normal) Collected:  12/02/17 2152    Specimen:  Blood Updated:  12/02/17 2218     Lactate 0.5 mmol/L     BNP [[id number]]  (Abnormal) Collected:  12/02/17 2152    Specimen:  Blood Updated:  12/02/17 2240     proBNP 2752.0 (H) pg/mL     Narrative:       Among patients with dyspnea, NT-proBNP is highly sensitive for the " detection of acute congestive heart failure. In addition NT-proBNP of <300 pg/ml effectively rules out acute congestive heart failure with 99% negative predictive value.    D-dimer, Quantitative [[id number]]  (Abnormal) Collected:  12/02/17 2152    Specimen:  Blood Updated:  12/02/17 2221     D-Dimer, Quantitative 2.93 (H) MCGFEU/mL     Narrative:       The Stago D-Dimer test used in conjunction with a clinical pretest probability (PTP) assessment model, has been approved by the FDA to rule out the presence of venous thromboembolism (VTE) in outpatients suspected of deep venous thrombosis (DVT) or pulmonary embolism (PE).     CBC Auto Differential [[id number]]  (Abnormal) Collected:  12/02/17 2152    Specimen:  Blood Updated:  12/02/17 2213     WBC 7.04 10*3/mm3      RBC 2.24 (L) 10*6/mm3      Hemoglobin 7.5 (L) g/dL      Hematocrit 24.0 (L) %      .1 (H) fL      MCH 33.5 (H) pg      MCHC 31.3 (L) g/dL      RDW 16.1 (H) %      RDW-SD 62.8 (H) fl      MPV 9.2 fL      Platelets 260 10*3/mm3      Neutrophil % 62.4 %      Lymphocyte % 22.7 %      Monocyte % 10.9 %      Eosinophil % 2.8 %      Basophil % 0.3 %      Immature Grans % 0.9 (H) %      Neutrophils, Absolute 4.39 10*3/mm3      Lymphocytes, Absolute 1.60 10*3/mm3      Monocytes, Absolute 0.77 10*3/mm3      Eosinophils, Absolute 0.20 10*3/mm3      Basophils, Absolute 0.02 10*3/mm3      Immature Grans, Absolute 0.06 (H) 10*3/mm3     Blood Culture - Blood, [[id number]] Collected:  12/02/17 2153    Specimen:  Blood from Arm, Left Updated:  12/02/17 2203    Blood Gas, Venous [[id number]]  (Abnormal) Collected:  12/02/17 2159    Specimen:  Venous Blood Updated:  12/02/17 2203     Site Arterial: right radial     pH, Venous 7.304 (L) pH Units      pCO2, Venous 51.7 (H) mm Hg      pO2, Venous 33.2 (L) mm Hg      HCO3, Venous 25.7 mmol/L      Base Excess, Venous -0.7 mmol/L      O2 Saturation Calculated 57.0 (L) %      Barometric Pressure for Blood Gas 758.1 mmHg       Modality Cannula     Flow Rate 4 lpm      Rate 20 Breaths/minute     Narrative:       sat 93 Meter: [id number] : [id number] Jessee Marcusauhien    Influenza Antigen, Rapid - Swab, Nasopharynx [[id number]]  (Normal) Collected:  12/02/17 2203    Specimen:  Swab from Nasopharynx Updated:  12/02/17 2245     Influenza A Ag, EIA Negative     Influenza B Ag, EIA Negative    Blood Culture - Blood, [[id number]] Collected:  12/02/17 2253    Specimen:  Blood from Arm, Right Updated:  12/02/17 2259          I ordered the above labs and reviewed the results    RADIOLOGY  XR Chest 1 View   Preliminary Result   No acute findings.              CT Chest Without Contrast    (Results Pending)   NM Lung Ventilation Perfusion    (Results Pending)        I ordered the above noted radiological studies. Interpreted by radiologist. Reviewed by me in PACS.       PROCEDURES  Critical Care  Performed by: CINTHIA SMITH  Authorized by: CINTHIA SMITH     Critical care provider statement:     Critical care time (minutes):  45    Critical care was necessary to treat or prevent imminent or life-threatening deterioration of the following conditions:  Respiratory failure, metabolic crisis and renal failure    Critical care was time spent personally by me on the following activities:  Development of treatment plan with patient or surrogate, discussions with consultants, evaluation of patient's response to treatment, examination of patient, ordering and performing treatments and interventions, ordering and review of laboratory studies, ordering and review of radiographic studies, re-evaluation of patient's condition and review of old charts        EKG           EKG time: 2144  Rhythm/Rate: sinus rhythm, 76  P waves and KS: normal   QRS, axis: normal    ST and T waves: Normal      Interpreted Contemporaneously by me, independently viewed  Unchanged compared to prior 10/9/17      PROGRESS AND CONSULTS  ED Course     2137  Ordered IVF, labs, Chest XR, and  CT abdomen/ pelvis for further evaluation.     2152  Evaluated pt and discussed that pt will be admitted. Plan to review labs and radiology studies. Pt and family understand and agree to admission plan, all questions addressed at this time.     2200  Nursing staff reports that pt is 90% [O2 Sats] on 4L.     2227  Ordered CT Chest for further evaluation.    2254  Ordered Lovenox, vancomycin, and Zosyn. Placed consult to pulmonology.     2305  Discussed pt's case with Dr. Ca [pulmonology] who agrees to admit.     2308  Pt is being admitted to hospital before all the radiology studies have been reviewed. These studies will be reviewed by pulmonology in ICU.     MEDICAL DECISION MAKING  Results were reviewed/discussed with the patient and they were also made aware of online access. Pt also made aware that some labs, such as cultures, will not be resulted during ER visit and follow up with PMD is necessary.     MDM  Number of Diagnoses or Management Options     Amount and/or Complexity of Data Reviewed  Clinical lab tests: ordered and reviewed (D-dimer = 2.93, proBNP = 2752, BUN = 37, Creatinine = 3.96, Calcium = 8.0, GFR = 11, HGB = 7.5 (pt is chronically anemic)  )  Tests in the radiology section of CPT®: reviewed and ordered (Chest XR: NAD)  Tests in the medicine section of CPT®: ordered and reviewed (Refer to procedure )  Discussion of test results with the performing providers: yes (Dr. Ca (pulmonology))  Decide to obtain previous medical records or to obtain history from someone other than the patient: yes  Independent visualization of images, tracings, or specimens: yes    Critical Care  Total time providing critical care: 30-74 minutes         DIAGNOSIS  Final diagnoses:   Hypoxia   Postoperative infection, initial encounter   Sepsis, due to unspecified organism   Elevated d-dimer   Acute kidney injury       DISPOSITION  ADMISSION    Discussed treatment plan and reason for admission with pt/family and  admitting physician.  Pt/family voiced understanding of the plan for admission for further testing/treatment as needed.         Latest Documented Vital Signs:  As of 11:10 PM  BP- 117/43 HR- 79 Temp- 98.2 °F (36.8 °C) (Oral) O2 sat- 95%    --  Documentation assistance provided by meche Camargo for Dr. Peterson.  Information recorded by the scribe was done at my direction and has been verified and validated by me.          Saran Camargo  12/02/17 0677       Efrain Peterson MD  12/03/17 7290

## 2017-12-03 NOTE — PROGRESS NOTES
"Pharmacokinetic Consult - Vancomycin Dosing (Initial Note)    Josephine Wolf has a consult for pharmacy to dose vancomycin for Sepsis.  Pharmacy dosing vancomycin per Dr. Ca's request.   Goal trough: 15-20 mg/L       Relevant clinical data and objective history reviewed:  77 y.o. female 62\" (157.5 cm) 140 lb (63.5 kg)    Past Medical History:   Diagnosis Date   • Acid reflux    • Anemia    • Arthritis    • Bipolar 1 disorder, depressed    • Cataract     MINDY   • Chronic nausea    • Chronic pain of right knee    • Continuous leakage of urine     USES DEPENDS   • COPD (chronic obstructive pulmonary disease)     USES O2 2 LMP PER NC AT NIGHT   • Disease of thyroid gland     HYPOTHYROIDISM   • Diverticulosis    • Fibromyalgia     DX 1994   • Frequent episodes of bronchitis    • Hypertension    • Migraines    • Neck pain    • On home oxygen therapy     2L NC AT NIGHT   • Short of breath on exertion      Creatinine   Date Value Ref Range Status   12/02/2017 3.96 (H) 0.57 - 1.00 mg/dL Final   11/19/2017 1.09 (H) 0.57 - 1.00 mg/dL Final   11/17/2017 1.46 (H) 0.57 - 1.00 mg/dL Final     BUN   Date Value Ref Range Status   12/02/2017 37 (H) 8 - 23 mg/dL Final     Estimated Creatinine Clearance: 10.8 mL/min (by C-G formula based on Cr of 3.96).    Lab Results   Component Value Date    WBC 7.04 12/02/2017     Temp Readings from Last 3 Encounters:   12/02/17 98.2 °F (36.8 °C) (Oral)   11/22/17 98.2 °F (36.8 °C) (Oral)   11/02/17 99.6 °F (37.6 °C) (Oral)          Assessment/Plan  Patient has active order for Vancomycin 1250 mg tonight.  In light of patient renal function,  will get vanc random level with AM labs Pharmacy will continue to follow daily while on vancomycin and adjust as needed.     Hetal Young [facility]    "

## 2017-12-04 LAB
ABO + RH BLD: NORMAL
ABO + RH BLD: NORMAL
ANION GAP SERPL CALCULATED.3IONS-SCNC: 10.4 MMOL/L
BACTERIA SPEC AEROBE CULT: NO GROWTH
BASOPHILS # BLD AUTO: 0.02 10*3/MM3 (ref 0–0.2)
BASOPHILS NFR BLD AUTO: 0.5 % (ref 0–1.5)
BH BB BLOOD EXPIRATION DATE: NORMAL
BH BB BLOOD EXPIRATION DATE: NORMAL
BH BB BLOOD TYPE BARCODE: 5100
BH BB BLOOD TYPE BARCODE: 5100
BH BB DISPENSE STATUS: NORMAL
BH BB DISPENSE STATUS: NORMAL
BH BB PRODUCT CODE: NORMAL
BH BB PRODUCT CODE: NORMAL
BH BB UNIT NUMBER: NORMAL
BH BB UNIT NUMBER: NORMAL
BUN BLD-MCNC: 28 MG/DL (ref 8–23)
BUN/CREAT SERPL: 12.9 (ref 7–25)
CALCIUM SPEC-SCNC: 8.1 MG/DL (ref 8.6–10.5)
CHLORIDE SERPL-SCNC: 109 MMOL/L (ref 98–107)
CO2 SERPL-SCNC: 21.6 MMOL/L (ref 22–29)
CREAT BLD-MCNC: 2.17 MG/DL (ref 0.57–1)
DEPRECATED RDW RBC AUTO: 68.2 FL (ref 37–54)
EOSINOPHIL # BLD AUTO: 0.1 10*3/MM3 (ref 0–0.7)
EOSINOPHIL NFR BLD AUTO: 2.5 % (ref 0.3–6.2)
ERYTHROCYTE [DISTWIDTH] IN BLOOD BY AUTOMATED COUNT: 18 % (ref 11.7–13)
FERRITIN SERPL-MCNC: 132.7 NG/ML (ref 13–150)
GFR SERPL CREATININE-BSD FRML MDRD: 22 ML/MIN/1.73
GLUCOSE BLD-MCNC: 70 MG/DL (ref 65–99)
GLUCOSE BLDC GLUCOMTR-MCNC: 94 MG/DL (ref 70–130)
HCT VFR BLD AUTO: 28.9 % (ref 35.6–45.5)
HGB BLD-MCNC: 9.1 G/DL (ref 11.9–15.5)
IMM GRANULOCYTES # BLD: 0.03 10*3/MM3 (ref 0–0.03)
IMM GRANULOCYTES NFR BLD: 0.8 % (ref 0–0.5)
IRON 24H UR-MRATE: 25 MCG/DL (ref 37–145)
IRON SATN MFR SERPL: 8 % (ref 20–50)
LYMPHOCYTES # BLD AUTO: 0.83 10*3/MM3 (ref 0.9–4.8)
LYMPHOCYTES NFR BLD AUTO: 21 % (ref 19.6–45.3)
MCH RBC QN AUTO: 32.6 PG (ref 26.9–32)
MCHC RBC AUTO-ENTMCNC: 31.5 G/DL (ref 32.4–36.3)
MCV RBC AUTO: 103.6 FL (ref 80.5–98.2)
MONOCYTES # BLD AUTO: 0.37 10*3/MM3 (ref 0.2–1.2)
MONOCYTES NFR BLD AUTO: 9.3 % (ref 5–12)
NEUTROPHILS # BLD AUTO: 2.61 10*3/MM3 (ref 1.9–8.1)
NEUTROPHILS NFR BLD AUTO: 65.9 % (ref 42.7–76)
PLATELET # BLD AUTO: 189 10*3/MM3 (ref 140–500)
PMV BLD AUTO: 9.6 FL (ref 6–12)
POTASSIUM BLD-SCNC: 5.3 MMOL/L (ref 3.5–5.2)
RBC # BLD AUTO: 2.79 10*6/MM3 (ref 3.9–5.2)
SODIUM BLD-SCNC: 141 MMOL/L (ref 136–145)
TIBC SERPL-MCNC: 298 MCG/DL (ref 298–536)
TRANSFERRIN SERPL-MCNC: 200 MG/DL (ref 200–360)
UNIT  ABO: NORMAL
UNIT  ABO: NORMAL
UNIT  RH: NORMAL
UNIT  RH: NORMAL
VANCOMYCIN SERPL-MCNC: 26.5 MCG/ML (ref 5–40)
WBC NRBC COR # BLD: 3.96 10*3/MM3 (ref 4.5–10.7)

## 2017-12-04 PROCEDURE — 82962 GLUCOSE BLOOD TEST: CPT

## 2017-12-04 PROCEDURE — 84466 ASSAY OF TRANSFERRIN: CPT | Performed by: INTERNAL MEDICINE

## 2017-12-04 PROCEDURE — 80048 BASIC METABOLIC PNL TOTAL CA: CPT | Performed by: INTERNAL MEDICINE

## 2017-12-04 PROCEDURE — 86334 IMMUNOFIX E-PHORESIS SERUM: CPT | Performed by: INTERNAL MEDICINE

## 2017-12-04 PROCEDURE — 25010000002 FENTANYL CITRATE (PF) 100 MCG/2ML SOLUTION: Performed by: INTERNAL MEDICINE

## 2017-12-04 PROCEDURE — 85025 COMPLETE CBC W/AUTO DIFF WBC: CPT | Performed by: INTERNAL MEDICINE

## 2017-12-04 PROCEDURE — 83540 ASSAY OF IRON: CPT | Performed by: INTERNAL MEDICINE

## 2017-12-04 PROCEDURE — 94799 UNLISTED PULMONARY SVC/PX: CPT

## 2017-12-04 PROCEDURE — 94760 N-INVAS EAR/PLS OXIMETRY 1: CPT

## 2017-12-04 PROCEDURE — 97162 PT EVAL MOD COMPLEX 30 MIN: CPT

## 2017-12-04 PROCEDURE — 25010000002 ONDANSETRON PER 1 MG: Performed by: INTERNAL MEDICINE

## 2017-12-04 PROCEDURE — 99222 1ST HOSP IP/OBS MODERATE 55: CPT | Performed by: PSYCHIATRY & NEUROLOGY

## 2017-12-04 PROCEDURE — 82728 ASSAY OF FERRITIN: CPT | Performed by: INTERNAL MEDICINE

## 2017-12-04 PROCEDURE — 97110 THERAPEUTIC EXERCISES: CPT

## 2017-12-04 PROCEDURE — 25010000002 ENOXAPARIN PER 10 MG: Performed by: INTERNAL MEDICINE

## 2017-12-04 PROCEDURE — 80202 ASSAY OF VANCOMYCIN: CPT | Performed by: INTERNAL MEDICINE

## 2017-12-04 RX ORDER — LOPERAMIDE HYDROCHLORIDE 2 MG/1
4 CAPSULE ORAL 4 TIMES DAILY PRN
Status: DISCONTINUED | OUTPATIENT
Start: 2017-12-04 | End: 2017-12-06 | Stop reason: HOSPADM

## 2017-12-04 RX ORDER — DIVALPROEX SODIUM 250 MG/1
250 TABLET, EXTENDED RELEASE ORAL NIGHTLY
Status: DISCONTINUED | OUTPATIENT
Start: 2017-12-04 | End: 2017-12-06 | Stop reason: HOSPADM

## 2017-12-04 RX ORDER — LOPERAMIDE HYDROCHLORIDE 2 MG/1
2 CAPSULE ORAL 4 TIMES DAILY PRN
Status: DISCONTINUED | OUTPATIENT
Start: 2017-12-04 | End: 2017-12-04

## 2017-12-04 RX ORDER — SODIUM CHLORIDE 9 MG/ML
75 INJECTION, SOLUTION INTRAVENOUS CONTINUOUS
Status: DISCONTINUED | OUTPATIENT
Start: 2017-12-04 | End: 2017-12-05

## 2017-12-04 RX ADMIN — DIVALPROEX SODIUM 250 MG: 250 TABLET, FILM COATED, EXTENDED RELEASE ORAL at 10:09

## 2017-12-04 RX ADMIN — LOPERAMIDE HYDROCHLORIDE 2 MG: 2 CAPSULE ORAL at 12:42

## 2017-12-04 RX ADMIN — LOPERAMIDE HYDROCHLORIDE 2 MG: 2 CAPSULE ORAL at 11:59

## 2017-12-04 RX ADMIN — SODIUM BICARBONATE 650 MG: 650 TABLET ORAL at 20:56

## 2017-12-04 RX ADMIN — IPRATROPIUM BROMIDE AND ALBUTEROL SULFATE 3 ML: .5; 3 SOLUTION RESPIRATORY (INHALATION) at 11:45

## 2017-12-04 RX ADMIN — IPRATROPIUM BROMIDE AND ALBUTEROL SULFATE 3 ML: .5; 3 SOLUTION RESPIRATORY (INHALATION) at 15:04

## 2017-12-04 RX ADMIN — HYDROCODONE BITARTRATE AND ACETAMINOPHEN 1 TABLET: 7.5; 325 TABLET ORAL at 18:16

## 2017-12-04 RX ADMIN — SODIUM BICARBONATE 650 MG: 650 TABLET ORAL at 16:14

## 2017-12-04 RX ADMIN — IPRATROPIUM BROMIDE AND ALBUTEROL SULFATE 3 ML: .5; 3 SOLUTION RESPIRATORY (INHALATION) at 07:49

## 2017-12-04 RX ADMIN — FENTANYL CITRATE 50 MCG: 50 INJECTION INTRAMUSCULAR; INTRAVENOUS at 22:33

## 2017-12-04 RX ADMIN — ENOXAPARIN SODIUM 30 MG: 30 INJECTION SUBCUTANEOUS at 20:56

## 2017-12-04 RX ADMIN — SODIUM CHLORIDE 75 ML/HR: 9 INJECTION, SOLUTION INTRAVENOUS at 07:56

## 2017-12-04 RX ADMIN — BUDESONIDE AND FORMOTEROL FUMARATE DIHYDRATE 2 PUFF: 160; 4.5 AEROSOL RESPIRATORY (INHALATION) at 07:52

## 2017-12-04 RX ADMIN — LEVOTHYROXINE SODIUM 88 MCG: 88 TABLET ORAL at 06:15

## 2017-12-04 RX ADMIN — SODIUM BICARBONATE 650 MG: 650 TABLET ORAL at 10:09

## 2017-12-04 RX ADMIN — HYDROCODONE BITARTRATE AND ACETAMINOPHEN 1 TABLET: 7.5; 325 TABLET ORAL at 12:45

## 2017-12-04 RX ADMIN — DIVALPROEX SODIUM 250 MG: 250 TABLET, FILM COATED, EXTENDED RELEASE ORAL at 20:56

## 2017-12-04 RX ADMIN — ONDANSETRON 4 MG: 2 INJECTION INTRAMUSCULAR; INTRAVENOUS at 11:22

## 2017-12-04 NOTE — PROGRESS NOTES
"Pharmacokinetic Consult - Vancomycin Dosing (Follow-up Note)    Josephine Wolf is a 77 y.o. female who is on day 1 pharmacy to dose vancomycin for sepsis.  Pharmacy dosing vancomycin per Dr. Ca's request.   Other antimicrobials: Zosyn  Goal trough: 15-20 mg/L    Current Vancomycin dose: intermittent     Relevant clinical data and objective history reviewed:  62\" (157.5 cm)  140 lb (63.5 kg)  Body mass index is 25.61 kg/(m^2).     She has a past medical history of Acid reflux; Anemia; Arthritis; Bipolar 1 disorder, depressed; Cataract; Chronic nausea; Chronic pain of right knee; Continuous leakage of urine; COPD (chronic obstructive pulmonary disease); Disease of thyroid gland; Diverticulosis; Fibromyalgia; Frequent episodes of bronchitis; Hypertension; Migraines; Neck pain; On home oxygen therapy; and Short of breath on exertion.    Allergies as of 12/02/2017 - Car as Reviewed 12/02/2017   Allergen Reaction Noted   • Morphine and related Hallucinations 07/03/2016     Vital Signs (last 24 hours)       12/02 0700  -  12/03 0659 12/03 0700  -  12/03 1935   Most Recent    Temp (°F) 98.2 -  99.2    98.2 -  99.5     98.2 (36.8)    Heart Rate 70 -  81    66 -  87     73    Resp 16 -  20    16 -  18     18    BP (!)55/28 -  117/43    (!)85/56 -  128/59     97/48    SpO2 (%) (!)82 -  97    (!)69 -  100     92        Estimated Creatinine Clearance: 14.4 mL/min (by C-G formula based on Cr of 2.96).    Results from last 7 days  Lab Units 12/03/17  1211 12/02/17  2152   CREATININE mg/dL 2.96* 3.96*       Results from last 7 days  Lab Units 12/03/17  0253 12/02/17  2152 12/02/17  1515   WBC 10*3/mm3 4.74 7.04 6.71     Baseline culture/source/susceptibility:   BCx x2 (12/2): NGTD  UCx in process     IV Anti-Infectives     Ordered     Dose/Rate Route Frequency Start Stop    12/03/17 0044  piperacillin-tazobactam (ZOSYN) 3.375 g in iso-osmotic dextrose 50 ml (premix)     Ordering Provider:  John Ca MD    3.375 " g  over 4 Hours Intravenous Every 12 Hours 12/03/17 0900 12/10/17 0859    12/03/17 0022  Pharmacy to dose vancomycin     Ordering Provider:  John Ca MD     Does not apply Continuous PRN 12/03/17 0021 12/10/17 0020    12/02/17 2254  piperacillin-tazobactam (ZOSYN) 3.375 g in iso-osmotic dextrose 50 ml (premix)     Ordering Provider:  Efrain Peterson MD    3.375 g Intravenous Once 12/02/17 2256 12/02/17 2329    12/02/17 2254  vancomycin 1250 mg/250 mL 0.9% NS IVPB ([facility])     Ordering Provider:  Efrain Peterson MD    20 mg/kg × 63.5 kg Intravenous Once 12/02/17 2256 12/03/17 0540         No results found for: MICHELLE  Lab Results   Component Value Date    VANCORANDOM 12.90 12/03/2017     Vancomycin Dosing History:  12/03 02:53 Vanc random: <4.0 mcg/mL  12/03 0540 Vancomycin 1250 mg IV x1 (19.7 mg/Kg)  12/03 1829 serum level 12.9 (~12.5h after dose)    Assessment/Plan  Vanc random level low. SCr decreased significantly past 24h.     1) Vancomycin 1250mg (20mg/kg) IV x1. Random with AM labs.   2) Will monitor serum creatinine daily while unstable.  3) Encourage hydration as allowed by MD to help prevent toxic accumulation; monitor for s/sxn of toxicity including increase in SCr and decrease in UOP.    Pharmacy will continue to follow daily while on vancomycin and adjust as needed.     Thank you for this consult,    Wellington Haines, PharmD, DASHA

## 2017-12-04 NOTE — THERAPY EVALUATION
Acute Care - Physical Therapy Initial Evaluation  [facility]     Patient Name: Josephine Wolf  : 1940  MRN: [id number]  Today's Date: 2017   Onset of Illness/Injury or Date of Surgery Date: 17            Admit Date: 2017     Visit Dx:    ICD-10-CM ICD-9-CM   1. Hypoxia R09.02 799.02   2. Postoperative infection, initial encounter T81.4XXA 998.59   3. Sepsis, due to unspecified organism A41.9 038.9     995.91   4. Elevated d-dimer R79.89 790.92   5. Acute kidney injury N17.9 584.9     Patient Active Problem List   Diagnosis   • Anemia   • OA (osteoarthritis) of knee   • Hypoxia     Past Medical History:   Diagnosis Date   • Acid reflux    • Anemia    • Arthritis    • Bipolar 1 disorder, depressed    • Cataract     MINDY   • Chronic nausea    • Chronic pain of right knee    • Continuous leakage of urine     USES DEPENDS   • COPD (chronic obstructive pulmonary disease)     USES O2 2 LMP PER NC AT NIGHT   • Disease of thyroid gland     HYPOTHYROIDISM   • Diverticulosis    • Fibromyalgia     DX    • Frequent episodes of bronchitis    • Hypertension    • Migraines    • Neck pain    • On home oxygen therapy     2L NC AT NIGHT   • Short of breath on exertion      Past Surgical History:   Procedure Laterality Date   • CEREBRAL ANEURYSM REPAIR      WITH STENT   • HYSTERECTOMY     • LAPAROSCOPIC CHOLECYSTECTOMY     • TX TOTAL KNEE ARTHROPLASTY Right 2017    Procedure: RT TOTAL KNEE ARTHROPLASTY;  Surgeon: Kashif Perez MD;  Location: [facility];  Service: Orthopedics          PT ASSESSMENT (last 72 hours)      PT Evaluation       17 1102 17 1800    Rehab Evaluation    Document Type evaluation  -CH     Subjective Information agree to therapy  -CH     Patient Effort, Rehab Treatment good  -CH     Symptoms Noted During/After Treatment none  -CH     General Information    Onset of Illness/Injury or Date of Surgery Date 17  -CH     General Observations supine in  bed, pt sleeping but easily awakens, family present, no acute distress noted at rest  -     Pertinent History Of Current Problem pt admitted from SNF with SOA, respiratory failure, and sepsis, pt is 2 weeks post TKA   -     Precautions/Limitations fall precautions  -     Prior Level of Function min assist:;gait;transfer;bed mobility;ADL's   walks with walker since knee sx  -     Equipment Currently Used at Home walker, rolling  - walker, standard  -BS    Plans/Goals Discussed With patient  -     Barriers to Rehab medically complex  -     Living Environment    Living Arrangements  apartment  -    Home Accessibility  no concerns  -    Stair Railings at Home  none  -    Type of Financial/Environmental Concern  none  -    Transportation Available  car;family or friend will provide  -    Living Environment Comment  currently at rehab  -    Clinical Impression    Patient/Family Goals Statement to return to [facility]  -     Criteria for Skilled Therapeutic Interventions Met treatment indicated  -     Impairments Found (describe specific impairments) gait, locomotion, and balance;muscle performance  -     Rehab Potential good, to achieve stated therapy goals  -     Pain Assessment    Pain Assessment No/denies pain  -     Cognitive Assessment/Intervention    Current Cognitive/Communication Assessment functional  -     Orientation Status oriented x 4  -     Follows Commands/Answers Questions 100% of the time  -     Personal Safety WNL/WFL  -     Personal Safety Interventions fall prevention program maintained;gait belt;nonskid shoes/slippers when out of bed  -     ROM (Range of Motion)    General ROM no range of motion deficits identified   R knee painful at end range  -     MMT (Manual Muscle Testing)    General MMT Assessment other (see comments)   generalized weakness noted with functional mobility  -     Bed Mobility, Assessment/Treatment    Bed Mob, Supine to Sit, [place]  verbal cues required;nonverbal cues required (demo/gesture);minimum assist (75% patient effort);2 person assist required  -     Bed Mob, Sit to Supine, [place] verbal cues required;nonverbal cues required (demo/gesture);minimum assist (75% patient effort);2 person assist required  -CH     Transfer Assessment/Treatment    Transfers, Sit-Stand [place] verbal cues required;nonverbal cues required (demo/gesture);minimum assist (75% patient effort);2 person assist required;hand held assist  -CH     Transfers, Stand-Sit [place] verbal cues required;nonverbal cues required (demo/gesture);minimum assist (75% patient effort);2 person assist required;hand held assist  -     Transfers, Sit-Stand-Sit, Assist Device rolling walker  -     Transfer, Comment tremors noted while sitting EOB  -     Gait Assessment/Treatment    Gait, [place] Level verbal cues required;nonverbal cues required (demo/gesture);minimum assist (75% patient effort);2 person assist required  -     Gait, Assistive Device rolling walker  -     Gait, Distance (Feet) 3   x2, several steps forward and back, then 5 side steps to HOB  -     Gait, Gait Deviations shilpa decreased;forward flexed posture;step length decreased;stride length decreased  -     Gait, Safety Issues step length decreased;balance decreased during turns  -     Gait, Impairments strength decreased;impaired balance  -     Motor Skills/Interventions    Additional Documentation Balance Skills Training (Group)  -     Balance Skills Training    Standing-Level of Assistance Minimum assistance;x2  -CH     Static Standing Balance Support assistive device  -     Gait Balance-Level of Assistance Minimum assistance;x2  -CH     Gait Balance Support assistive device  -     Therapy Exercises    Right Lower Extremity AAROM:;10 reps;ankle pumps/circles;heel slides  -     Positioning and Restraints    Pre-Treatment Position in bed  -     Post Treatment Position  bed  -     In Bed supine;call light within reach;encouraged to call for assist;with family/caregiver;notified nsg  -       12/03/17 0000       Living Environment    Lives With alone;other (see comments)   curerntly at rehab center for knee rehab  -       User Key  (r) = Recorded By, (t) = Taken By, (c) = Cosigned By    Initials Name Provider Type     Bridgett Stevens, PT Physical Therapist    ANGELICA Bose, RN Registered Nurse    DIONTE Garcia, RN Registered Nurse          Physical Therapy Education     Title: PT OT SLP Therapies (Done)     Topic: Physical Therapy (Done)     Point: Mobility training (Done)    Learning Progress Summary    Learner Readiness Method Response Comment Documented by Status   Patient Acceptance E,TB,D VU,NR   12/04/17 1323 Done               Point: Home exercise program (Done)    Learning Progress Summary    Learner Readiness Method Response Comment Documented by Status   Patient Acceptance E,TB,D VU,NR   12/04/17 1323 Done               Point: Body mechanics (Done)    Learning Progress Summary    Learner Readiness Method Response Comment Documented by Status   Patient Acceptance E,TB,D VU,NR   12/04/17 1323 Done               Point: Precautions (Done)    Learning Progress Summary    Learner Readiness Method Response Comment Documented by Status   Patient Acceptance E,TB,D VU,NR   12/04/17 1323 Done                      User Key     Initials Effective Dates Name Provider Type [facility] 12/01/15 -  Bridgett Stevens, PT Physical Therapist PT                PT Recommendation and Plan  Anticipated Discharge Disposition: skilled nursing facility  Planned Therapy Interventions: balance training, bed mobility training, gait training, home exercise program, patient/family education, ROM (Range of Motion), transfer training  PT Frequency: daily  Plan of Care Review  Plan Of Care Reviewed With: patient  Outcome Summary/Follow up Plan: Pt presents with  impaired funcitonal mobility and gait secondary to generalized weakness, impaired balance, and decreased activity tolerance s/p PNA. Pt may benefit from skilled PT to address these deficits.          IP PT Goals       12/04/17 1323          Bed Mobility PT LTG    Bed Mobility PT LTG, Time to Achieve 1 wk  -CH      Bed Mobility PT LTG, Activity Type all bed mobility  -CH      Bed Mobility PT LTG, [place] Level contact guard assist  -CH      Transfer Training PT LTG    Transfer Training PT LTG, Time to Achieve 1 wk  -CH      Transfer Training PT LTG, Activity Type all transfers  -CH      Transfer Training PT LTG, [place] Level contact guard assist  -CH      Transfer Training PT LTG, Assist Device walker, rolling  -CH      Gait Training PT LTG    Gait Training Goal PT LTG, Time to Achieve 1 wk  -CH      Gait Training Goal PT LTG, [place] Level contact guard assist  -CH      Gait Training Goal PT LTG, Assist Device walker, rolling  -CH      Gait Training Goal PT LTG, Distance to Achieve 150  -CH        User Key  (r) = Recorded By, (t) = Taken By, (c) = Cosigned By    Initials Name Provider Type    CH Bridgett Stevens, PT Physical Therapist                Outcome Measures       12/04/17 1300          How much help from another person do you currently need...    Turning from your back to your side while in flat bed without using bedrails? 3  -CH      Moving from lying on back to sitting on the side of a flat bed without bedrails? 3  -CH      Moving to and from a bed to a chair (including a wheelchair)? 3  -CH      Standing up from a chair using your arms (e.g., wheelchair, bedside chair)? 3  -CH      Climbing 3-5 steps with a railing? 2  -CH      To walk in hospital room? 2  -CH      AM-PAC 6 Clicks Score 16  -CH      Functional Assessment    Outcome Measure Options AM-PAC 6 Clicks Basic Mobility (PT)  -CH        User Key  (r) = Recorded By, (t) = Taken By, (c) = Cosigned By    Initials Name Provider Type      Bridgett Stevens, PT Physical Therapist           Time Calculation:         PT Charges       12/04/17 1325          Time Calculation    Start Time 1052  -      Stop Time 1102  -      Time Calculation (min) 10 min  -      PT Received On 12/04/17  -      PT - Next Appointment 12/05/17  -      PT Goal Re-Cert Due Date 12/11/17  -        User Key  (r) = Recorded By, (t) = Taken By, (c) = Cosigned By    Initials Name Provider Type     Bridgett Stevens, PT Physical Therapist          Therapy Charges for Today     Code Description Service Date Service Provider Modifiers Qty    83617472350 HC PT EVAL MOD COMPLEXITY 2 12/4/2017 Bridgett Stevens, PT GP 1    95245418471 HC PT THER PROC EA 15 MIN 12/4/2017 Bridgett Stevens, PT GP 1    20127814258 HC PT THER SUPP EA 15 MIN 12/4/2017 Bridgett Stevens, PT GP 1          PT G-Codes  Outcome Measure Options: AM-PAC 6 Clicks Basic Mobility (PT)      Bridgett Stevens, PT  12/4/2017

## 2017-12-04 NOTE — PLAN OF CARE
Problem: Patient Care Overview (Adult)  Goal: Plan of Care Review  Outcome: Ongoing (interventions implemented as appropriate)  Goal: Adult Individualization and Mutuality  Outcome: Ongoing (interventions implemented as appropriate)  Goal: Discharge Needs Assessment  Outcome: Ongoing (interventions implemented as appropriate)    Problem: Fall Risk (Adult)  Goal: Identify Related Risk Factors and Signs and Symptoms  Outcome: Ongoing (interventions implemented as appropriate)  Goal: Absence of Falls  Outcome: Ongoing (interventions implemented as appropriate)    Problem: Pain, Acute (Adult)  Goal: Identify Related Risk Factors and Signs and Symptoms  Outcome: Ongoing (interventions implemented as appropriate)  Goal: Acceptable Pain Control/Comfort Level  Outcome: Ongoing (interventions implemented as appropriate)    Problem: Infection, Risk/Actual (Adult)  Goal: Infection Prevention/Resolution  Outcome: Ongoing (interventions implemented as appropriate)    Problem: Pressure Ulcer Risk (Gopi Scale) (Adult,Obstetrics,Pediatric)  Goal: Identify Related Risk Factors and Signs and Symptoms  Outcome: Ongoing (interventions implemented as appropriate)  Goal: Skin Integrity  Outcome: Ongoing (interventions implemented as appropriate)

## 2017-12-04 NOTE — PLAN OF CARE
Problem: Patient Care Overview (Adult)  Goal: Plan of Care Review  Outcome: Ongoing (interventions implemented as appropriate)    12/04/17 1549   Coping/Psychosocial Response Interventions   Plan Of Care Reviewed With patient   Outcome Evaluation   Outcome Summary/Follow up Plan pt admitted around 1430 from the icu with hypoxia; on 4LNC (2@home); c/o diarrhea although none since on 4S; had a total R knee on 11/17 and while in rehab had acute resp failure and was admitted to the icu; pt has ticks and tremors visable; hx of bipolar; neuro consulted but found no deficit; cond code; fluids@75; allgy to morphine;    Patient Care Overview   Progress improving         Problem: Fall Risk (Adult)  Goal: Absence of Falls  Outcome: Ongoing (interventions implemented as appropriate)    Problem: Pain, Acute (Adult)  Goal: Acceptable Pain Control/Comfort Level  Outcome: Ongoing (interventions implemented as appropriate)    Problem: Infection, Risk/Actual (Adult)  Goal: Infection Prevention/Resolution  Outcome: Ongoing (interventions implemented as appropriate)    Problem: Pressure Ulcer Risk (Gopi Scale) (Adult,Obstetrics,Pediatric)  Goal: Identify Related Risk Factors and Signs and Symptoms  Outcome: Ongoing (interventions implemented as appropriate)  Goal: Skin Integrity  Outcome: Ongoing (interventions implemented as appropriate)

## 2017-12-04 NOTE — CONSULTS
NEUROLOGY CONSULTATION        PATIENT Josephine Wolf    1940   MRN [id number]   ADMIT DATE 2017   LENGTH OF STAY 2 days   ATTENDING Param Stone MD       HISTORY OF PRESENT ILLNESS:As is a 77-year-old woman who was admitted small sepsis.  She had a recent total knee replacement and there was initial concern about septic knee but that ended up not being the case.  Resolve also hypoxic and hypotensive and reviewing the chart it appears to be that this was mainly a volume depletion.  Stabilized medically and is being moved out of the ICU.    I was asked to see the patient by the family.  Noted involuntary movements for perhaps 6 months have worsened while she is in the hospital.  Describe twitching movements of the trunk and upper extremities predominantly.  He also described tremor.  Both of these movements have worsened while she's been in the hospital.  As also been slightly confused in the hospital.      CHIEF COMPLAINT: Shortness of Breath      DIAGNOSIS: Hypoxia [R09.02]  Acute kidney injury [N17.9]  Elevated d-dimer [R79.89]  Postoperative infection, initial encounter [T81.4XXA]  Sepsis, due to unspecified organism [A41.9]      PAST MEDICAL HISTORY:   Past Medical History:   Diagnosis Date   • Acid reflux    • Anemia    • Arthritis    • Bipolar 1 disorder, depressed    • Cataract     MINDY   • Chronic nausea    • Chronic pain of right knee    • Continuous leakage of urine     USES DEPENDS   • COPD (chronic obstructive pulmonary disease)     USES O2 2 LMP PER NC AT NIGHT   • Disease of thyroid gland     HYPOTHYROIDISM   • Diverticulosis    • Fibromyalgia     DX    • Frequent episodes of bronchitis    • Hypertension    • Migraines    • Neck pain    • On home oxygen therapy     2L NC AT NIGHT   • Short of breath on exertion        PAST SURGICAL HISTORY:  Past Surgical History:   Procedure Laterality Date   • CEREBRAL ANEURYSM REPAIR      WITH STENT   • HYSTERECTOMY     • LAPAROSCOPIC  "CHOLECYSTECTOMY     • SD TOTAL KNEE ARTHROPLASTY Right 11/16/2017    Procedure: RT TOTAL KNEE ARTHROPLASTY;  Surgeon: Kashif Perez MD;  Location: [facility] MAIN OR;  Service: Orthopedics       CURRENT MEDICATIONS:  Scheduled Medications:  budesonide-formoterol 2 puff Inhalation BID - RT   divalproex 250 mg Oral Daily   divalproex 250 mg Oral Nightly   enoxaparin 30 mg Subcutaneous Nightly   ipratropium-albuterol 3 mL Nebulization 4x Daily - RT   levothyroxine 88 mcg Oral Q AM   sodium bicarbonate 650 mg Oral TID     Infusions:   sodium chloride 200 mL/hr Last Rate: 200 mL/hr (12/03/17 1430)   sodium chloride 75 mL/hr Last Rate: 75 mL/hr (12/04/17 0756)     PRN Medications:  fentaNYL citrate (PF)  •  HYDROcodone-acetaminophen  •  loperamide  •  ondansetron **OR** ondansetron ODT **OR** ondansetron  •  sodium chloride  •  Insert peripheral IV **AND** sodium chloride    SOCIAL HISTORY:  Social History     Social History   • Marital status:      Spouse name: N/A   • Number of children: N/A   • Years of education: N/A     Social History Main Topics   • Smoking status: Former Smoker     Packs/day: 1.00     Years: 10.00     Types: Cigarettes     Start date: 1959     Quit date: 1969   • Smokeless tobacco: Never Used   • Alcohol use No   • Drug use: No   • Sexual activity: Defer     Other Topics Concern   • None     Social History Narrative       FAMILY HISTORY:   I have reviewed this patient's family history and it is not significant to the present illness.      REVIEW OF SYSTEMS: 14 system review performed and all other systems are negative except for Shortness of Breath         PHYSICAL EXAM:  GENERAL: Reveals a man/woman who appears his/her stated age, in no acute distress.  VITAL SIGNS: /82  Pulse 99  Temp 98.6 °F (37 °C) (Oral)   Resp 16  Ht 62\" (157.5 cm)  Wt 140 lb (63.5 kg)  SpO2 90%  BMI 25.61 kg/m2  NECK: Carotids are equal without bruits.   HEART: Regular rate and rhythm with no significant " murmur or gallop.   EXTREMITIES: Nonedematous. Pulses are intact. There is no rash. There is no hepatosplenomegaly. No respiratory distress. There is no lymphadenopathy. There are no signs of cutaneous embolization.  NEUROLOGIC: Awake, alert and oriented x3.  She did not know the name of president.  No aphasia or dysarthria. Visual fields are full. Disks are flat. Cranial nerves II through XII are intact. Power is normal in all 4 extremities. Tone is normal. Reflexes are 2 and symmetric in the upper extremities.  Knee jerks are on the left and not tested on the right jerks are absent bilaterally both toes are downgoing.  Toes are downgoing. Sensations intact pinprick and joint position sense in all 4 extremities. Cerebellar function and gait are normal.  She has a very minimal tremor with position both upper extremities.  First came in the room she was doing some multifocal twitching of the shoulders but as I distracted her that stopped.  She did appear to demonstrate asterixis both upper extremities though I couldn't further exclude a functional component.      DIAGNOSTIC DATA:   Labs reviewed and revealed no significant abnormalities.     Radiology images - no neuroimaging has been performed this admission.      IMPRESSION: I see no evidence of significant neurologic disorder.  Nurses observed that the degree of involuntary movements seem to be proportionate to the number of visitors.  Therefore a functional component may well be present.  ID and the my exam will she demonstrated was a mild intentional tremor which doesn't warrant any intervention.  In addition she appears to have bilateral asterixis which is usually a metabolic dysfunction such as uremia or or liver failure or medication related.  Her degree of renal function is not enough to do this and I don't see evidence of hepatic dysfunction.  However she is on medications that could cause asterixis such as her opioids.  Also worthy of note that Depakote  can cause tremor.    Otherwise there is no evidence of a degenerative neurologic disorder such as Parkinson's disease.  Addition I don't think she has a primary tic disorder.    In addition she does not appear to be encephalopathic at this point.    I will start by reducing the Depakote.  I will drop it from 750 mg a day to 250 twice a day.  Check it Depakote level in the morning but one can have a tremor without a higher level.  Also minimize her opioids any other sedating medication.  If this persists she'll have to see an outpatient neurologist MJ srivastava and try different medication trials if indicated.      Thank you for allowing me to see this patient.    Stanford Hartley MD  12/4/2017  2:10 PM

## 2017-12-04 NOTE — PAYOR COMM NOTE
"Luciano Raheem ROCK (77 y.o. Female)             ATTENTION;   SEPIDEH MENA LPN, INITIAL CLINICALS FOR YOUR REVIEW, PATIENT IN ICU LEVEL OF CARE.           REPLY TO UR DEPT, RINA DAMON N  OR UR  [phone]       Date of Birth Social Security Number Address Home Phone MRN    1940  [id number]   [place] [facility] [address] [phone] [id number]    Samaritan Marital Status          Mormon        Admission Date Admission Type Admitting Provider Attending Provider Department, Room/Bed    12/2/17 Emergency John Ca MD Anaya, Param GOMEZ MD [facility] INTENSIVE CARE, 380/1    Discharge Date Discharge Disposition Discharge Destination                      Attending Provider: Param Stone MD     Allergies:  Morphine And Related    Isolation:  None   Infection:  None   Code Status:  Conditional    Ht:  62\" (157.5 cm)   Wt:  140 lb (63.5 kg)    Admission Cmt:  None   Principal Problem:  None                Active Insurance as of 12/2/2017     Primary Coverage     Payor Plan Insurance Group Employer/Plan Group    [facility]      Payor Plan Address Payor Plan Phone Number Effective From Effective To    [address] [address] [phone] 10/9/2017     [place] [address]       Subscriber Name Subscriber Birth Date Member ID       RAHEEM ROBERTS 1940 [id number]           Secondary Coverage     Payor Plan Insurance Group Employer/Plan Group    [place]      Payor Plan Address Payor Plan Phone Number Effective From Effective To    [address] [phone] 7/3/2016     [place] [address]       Subscriber Name Subscriber Birth Date Member ID       RAHEEM ROBERTS 1940 [id number]                 Emergency Contacts      (Rel.) Home Phone Work Phone Mobile Phone    Chino Roberts (Son) [phone] -- --    LucianoMaureeny (Son) [phone] -- [phone]    "       LOC:Acute Adult-General Medical by Mirian Hernandez, RN           Reviewed on 12/3/2017 by Mirian Hernandez, RN        Created Using Review Status Review Entered       InterQual® In Primary 12/3/2017       Criteria Set Name - Subset Criteria Status       LOC:Acute Adult-General Medical Critical Met       Details         REVIEW SUMMARY     Patient: RAHEEM ROBERTS  Review Number: [id number]  Review Status: In Primary     Condition Specific: Yes        OUTCOMES  Outcome Type: Primary           REVIEW DETAILS     Product: LOC:Acute Adult  Subset: General Medical      (Symptom or finding within 24h)         (Excludes PO medications unless noted)          [X] Select Day, One:              [X] Episode Day 1, One:                  [X] CRITICAL, >= One:                      [X] General, >= One:                          [X] IV medication administration, Both:                              [X] Medication, >= One:                                  [X] Vasoactive or inotrope                              [X] Administration, >= One:                                  [X] Continuous and monitoring q1-2h     Version: InterQual® 2016.3  InterQual® and CareEnhance®  © 2016 [employer] and/or one of its subsidiaries.  All Rights Reserved.  CPT only © 2015 American Medical Association.  All Rights Reserved.              Additional Notes       12/3/17 0804       Inpatient bridge order 12/2/17 2306 needs co-signature. Note in EPIC to update MD. Mirian Hernandez, RN       12/4/2017  Inpatient order cosigned by Dr. Stone. Miriam Nixon, RN                History & Physical      John Ca MD at 12/3/2017 12:11 AM          Group: [facility]         H/p NOTE    Patient Identification:  Raheem Roberts  77 y.o.  female  1940  [id number]                 CC:     History of Present Illness:  Very pleasant 77-year-old female discharged about 2 weeks ago from the hospital status post right total  knee replacement.  Patient was discharged to rehabilitation and was doing reasonably well working with physical therapy.  She presented to the emergency room with confusion and intermittent upper body tremors.  Family also reported right knee swelling and pain.  She has known history of COPD and uses oxygen at baseline.  Per family she was not discharged home with oxygen at the rehabilitation center.  Currently patient is awake and complains of right knee pain.  She denies any chest pain or shortness of breath at this time.  No fever chills as such was reported.      Review of Systems  Constitutional: Positive for fatigue. Negative for chills and fever.   HENT: Negative.  Negative for sore throat.    Eyes: Negative.    Respiratory: Positive for shortness of breath. Negative for cough.    Cardiovascular: Positive for leg swelling (R knee with erythema ). Negative for chest pain.   Gastrointestinal: Negative.    Genitourinary: Negative.  Negative for dysuria.   Musculoskeletal: Negative.  Negative for back pain.   Skin: Negative.  Negative for rash.   Neurological: Positive for tremors. Negative for headaches.   Psychiatric/Behavioral: Positive for confusion.   Past Medical History:  Past Medical History:   Diagnosis Date   • Acid reflux    • Anemia    • Arthritis    • Bipolar 1 disorder, depressed    • Cataract     MINDY   • Chronic nausea    • Chronic pain of right knee    • Continuous leakage of urine     USES DEPENDS   • COPD (chronic obstructive pulmonary disease)     USES O2 2 LMP PER NC AT NIGHT   • Disease of thyroid gland     HYPOTHYROIDISM   • Diverticulosis    • Fibromyalgia     DX 1994   • Frequent episodes of bronchitis    • Hypertension    • Migraines    • Neck pain    • On home oxygen therapy     2L NC AT NIGHT   • Short of breath on exertion        Past Surgical History:  Past Surgical History:   Procedure Laterality Date   • CEREBRAL ANEURYSM REPAIR  2000'S    WITH STENT   • HYSTERECTOMY     •  LAPAROSCOPIC CHOLECYSTECTOMY     • KY TOTAL KNEE ARTHROPLASTY Right 11/16/2017    Procedure: RT TOTAL KNEE ARTHROPLASTY;  Surgeon: Kashif Perez MD;  Location: [facility] OR;  Service: Orthopedics        Home Meds:  Prescriptions Prior to Admission   Medication Sig Dispense Refill Last Dose   • albuterol (ACCUNEB) 1.25 MG/3ML nebulizer solution Take 1 ampule by nebulization Every 4 (Four) Hours.   11/15/2017 at 2100   • allopurinol (ZYLOPRIM) 300 MG tablet Take 300 mg by mouth Every Morning.   11/16/2017 at 0800   • amLODIPine (NORVASC) 10 MG tablet Take 10 mg by mouth Daily.      • aspirin  MG EC tablet Take 1 tablet by mouth 2 (Two) Times a Day With Meals. 60 tablet 0    • budesonide-formoterol (SYMBICORT) 160-4.5 MCG/ACT inhaler Inhale 2 puffs 2 (Two) Times a Day.   11/16/2017 at 0730   • Cetirizine HCl 10 MG capsule Take 1 tablet by mouth Daily.   11/14/2017   • Cholecalciferol (VITAMIN D3) 5000 units capsule capsule Take 5,000 Units by mouth Daily.      • divalproex (DEPAKOTE ER) 250 MG 24 hr tablet Take 250 mg by mouth Every Morning.   11/15/2017 at 1200   • divalproex (DEPAKOTE ER) 500 MG 24 hr tablet Take 500 mg by mouth Every Night.   11/15/2017 at 2100   • docusate sodium 100 MG capsule Take 100 mg by mouth 2 (Two) Times a Day As Needed for Constipation. 40 capsule 1    • ferrous sulfate 325 (65 FE) MG tablet Take 325 mg by mouth 2 (Two) Times a Day.      • gabapentin (NEURONTIN) 300 MG capsule Take 300 mg by mouth 3 (Three) Times a Day.   11/15/2017 at 2100   • HYDROcodone-acetaminophen (NORCO) 7.5-325 MG per tablet Take 1-2 tablets by mouth Every 4 (Four) Hours As Needed for Moderate Pain  (Pain). 80 tablet 0    • levothyroxine (SYNTHROID, LEVOTHROID) 88 MCG tablet Take 88 mcg by mouth Daily.   11/16/2017 at 0700   • montelukast (SINGULAIR) 10 MG tablet Take 10 mg by mouth Every Evening.      • ondansetron (ZOFRAN) 4 MG tablet Take 4 mg by mouth Every 8 (Eight) Hours As Needed for Nausea or  "Vomiting.   11/15/2017 at 0800   • pantoprazole (PROTONIX) 40 MG EC tablet Take 40 mg by mouth Daily.      • sulfamethoxazole-trimethoprim (BACTRIM DS,SEPTRA DS) 800-160 MG per tablet Take 1 tablet by mouth 2 (Two) Times a Day.      • valsartan-hydrochlorothiazide (DIOVAN-HCT) 320-12.5 MG per tablet Take 1 tablet by mouth Daily.      • venlafaxine XR (EFFEXOR XR) 150 MG 24 hr capsule Take 150 mg by mouth Daily.   11/15/2017 at 2100   • HYDROcodone-acetaminophen (NORCO) 7.5-325 MG per tablet Take 1 tablet by mouth Every 4 (Four) Hours As Needed for Moderate Pain .   11/15/2017 at 0800   • omeprazole (priLOSEC) 20 MG capsule Take 20 mg by mouth Daily.   11/15/2017 at 1200   • traZODone (DESYREL) 100 MG tablet Take 100 mg by mouth Every Night.   11/15/2017 at 2100       Allergies:  Allergies   Allergen Reactions   • Morphine And Related Hallucinations     CONFUSION       Social History:   Social History     Social History   • Marital status:      Spouse name: N/A   • Number of children: N/A   • Years of education: N/A     Occupational History   • Not on file.     Social History Main Topics   • Smoking status: Former Smoker     Packs/day: 1.00     Years: 10.00     Types: Cigarettes     Start date: 1959     Quit date: 1969   • Smokeless tobacco: Never Used   • Alcohol use No   • Drug use: No   • Sexual activity: Defer     Other Topics Concern   • Not on file     Social History Narrative       Family History:  Family History   Problem Relation Age of Onset   • Malig Hyperthermia Neg Hx        Physical Exam:  BP 99/51  Pulse 81  Temp 98.2 °F (36.8 °C) (Oral)   Resp 18  Ht 62\" (157.5 cm)  Wt 140 lb (63.5 kg)  SpO2 91%  BMI 25.61 kg/m2 Body mass index is 25.61 kg/(m^2). 91% 140 lb (63.5 kg)  Physical Exam  Elderly female resting comfortably no distress no labored breathing  Oral cavity moist mucous membrane  Neck is supple no bruit no adenopathy  Chest diminished breath sounds no wheezing or rales  CVS regular " rate and rhythm with 2 x 6 systolic ejection murmur left parasternal border  Abdomen is soft nontender bowel sounds positive  Extremities right knee with erythema and increased swelling and tenderness.  Steri-Strips in place  CNS no deficits no myoclonic tremors noted on the upper extremities  No hallucination and delusional joint deformities other than as mentioned above    LABS:  Lab Results   Component Value Date    CALCIUM 8.0 (L) 12/02/2017     Results from last 7 days  Lab Units 12/02/17 2152 12/02/17  1515   SODIUM mmol/L 137  --    POTASSIUM mmol/L 4.4  --    CHLORIDE mmol/L 96*  --    CO2 mmol/L 25.4  --    BUN mg/dL 37*  --    CREATININE mg/dL 3.96*  --    GLUCOSE mg/dL 91  --    CALCIUM mg/dL 8.0*  --    WBC 10*3/mm3 7.04 6.71   HEMOGLOBIN g/dL 7.5* 7.4*   PLATELETS 10*3/mm3 260 265   ALT (SGPT) U/L 12  --    AST (SGOT) U/L 16  --    PROBNP pg/mL 2752.0*  --    PROCALCITONIN ng/mL 0.16  --      Lab Results   Component Value Date    CKTOTAL 351 (H) 05/15/2015    TROPONINT <0.010 07/23/2016               Results from last 7 days  Lab Units 12/02/17  2152   PROCALCITONIN ng/mL 0.16   LACTATE mmol/L 0.5       Results from last 7 days  Lab Units 12/02/17  2159   FLOW RATE lpm 4   MODALITY  Cannula   O2 SATURATION CALC % 57.0*           Results from last 7 days  Lab Units 12/02/17  2152   INR  1.09         Lab Results   Component Value Date    TSH 1.83 05/13/2015     Estimated Creatinine Clearance: 10.8 mL/min (by C-G formula based on Cr of 3.96).         Imaging: I personally visualized the images of scans/x-rays performed within last 3 days.      Assessment:  Sepsis  Suspect right knee postop infection  Hypoxic acute hypoxemic respiratory failure  Elevated dimer rule out PE  Acute renal failure  COPD      Recommendations:  Initiate antibiotics with Zosyn and vancomycin  IV fluids will be given  Creatinine will be monitored  Due to elevated creatinine CT PE protocol cannot be done at this time.  VQ scan has  "been ordered per emergency room.  Will start patient empirically on full dose Lovenox be discontinued once pulmonary embolism has been ruled out.  We will order Doppler lower extremities on the right side  Oxygen to keep sats above 90%  Duo nebs  Discussed plan of care in detail with patient's family              John Ca MD  12/3/2017  12:12 AM      Much of this encounter note is an electronic transcription/translation of spoken language to printed text using Dragon Software.     Electronically signed by John Ca MD at 12/3/2017 12:20 AM           Emergency Department Notes      Jeff Palomares RN at 12/2/2017  9:02 PM          EMS was called for SOB to [facility]. The staff had the pt on a nonrebreather with 3L. EMS remove the nonrebreather and placed on 4L nasal canula and pt started feeling better and O2 sats improved.     Jeff Palomares RN  12/02/17 0535       Electronically signed by Jeff Palomares RN at 12/2/2017  9:04 PM      Efrain Peterson MD at 12/2/2017  9:34 PM      Procedure Orders:    1. Critical Care [[id number]] ordered by Efrain Peterson MD at 12/02/17 4473                EMERGENCY DEPARTMENT ENCOUNTER    CHIEF COMPLAINT  Chief Complaint: SOB  History given by: pt/ family present at bedside   History limited by: N/A  Room Number: 28/28  PMD: Bill Martino MD      HPI:  Pt is a 77 y.o. female who presents via EMS complaining of worsening constant SOB PTA. Pt also c/o R knee swelling/erythema and confusion. Family reports that pt had a recent arthroplasty performed. Family states that pt is normally on oxygen at night. Family reports that pt has been fatigued/ lethargic and has been experiencing intermittent tremors. Pt has MD2U. Pt has a hx of COPD.     Duration: PTA  Onset: gradual   Timing: constant   Location: N/A  Radiation: N/A  Quality: \"SOB\"  Intensity/Severity: moderate   Progression: worsening   Associated Symptoms: confusion, R " knee swelling, erythema, fatigue, tremors   Aggravating Factors: None reported   Alleviating Factors: None reported   Previous Episodes: Pt has a hx of COPD and had a recent R knee arthroplasty performed.   Treatment before arrival: Pt is normally on oxygen at night, otherwise no reported medications PTA.     PAST MEDICAL HISTORY  Active Ambulatory Problems     Diagnosis Date Noted   • Anemia 10/19/2017   • OA (osteoarthritis) of knee 11/16/2017     Resolved Ambulatory Problems     Diagnosis Date Noted   • No Resolved Ambulatory Problems     Past Medical History:   Diagnosis Date   • Acid reflux    • Anemia    • Arthritis    • Bipolar 1 disorder, depressed    • Cataract    • Chronic nausea    • Chronic pain of right knee    • Continuous leakage of urine    • COPD (chronic obstructive pulmonary disease)    • Disease of thyroid gland    • Diverticulosis    • Fibromyalgia    • Frequent episodes of bronchitis    • Hypertension    • Migraines    • Neck pain    • On home oxygen therapy    • Short of breath on exertion        PAST SURGICAL HISTORY  Past Surgical History:   Procedure Laterality Date   • CEREBRAL ANEURYSM REPAIR  2000'S    WITH STENT   • HYSTERECTOMY     • LAPAROSCOPIC CHOLECYSTECTOMY     • KY TOTAL KNEE ARTHROPLASTY Right 11/16/2017    Procedure: RT TOTAL KNEE ARTHROPLASTY;  Surgeon: Kashif Perez MD;  Location: [facility];  Service: Orthopedics       FAMILY HISTORY  Family History   Problem Relation Age of Onset   • Malig Hyperthermia Neg Hx        SOCIAL HISTORY  Social History     Social History   • Marital status:      Spouse name: N/A   • Number of children: N/A   • Years of education: N/A     Occupational History   • Not on file.     Social History Main Topics   • Smoking status: Former Smoker     Packs/day: 1.00     Years: 10.00     Types: Cigarettes     Start date: 1959     Quit date: 1969   • Smokeless tobacco: Never Used   • Alcohol use No   • Drug use: No   • Sexual activity: Defer      Other Topics Concern   • Not on file     Social History Narrative       ALLERGIES  Morphine and related    REVIEW OF SYSTEMS  Review of Systems   Constitutional: Positive for fatigue. Negative for chills and fever.   HENT: Negative.  Negative for sore throat.    Eyes: Negative.    Respiratory: Positive for shortness of breath. Negative for cough.    Cardiovascular: Positive for leg swelling (R knee with erythema ). Negative for chest pain.   Gastrointestinal: Negative.    Genitourinary: Negative.  Negative for dysuria.   Musculoskeletal: Negative.  Negative for back pain.   Skin: Negative.  Negative for rash.   Neurological: Positive for tremors. Negative for headaches.   Psychiatric/Behavioral: Positive for confusion.       PHYSICAL EXAM  ED Triage Vitals   Temp Heart Rate Resp BP SpO2   12/02/17 2104 12/02/17 2104 12/02/17 2104 12/02/17 2104 12/02/17 2104   98.2 °F (36.8 °C) 74 18 105/84 96 %      Temp src Heart Rate Source Patient Position BP Location FiO2 (%)   12/02/17 2104 12/02/17 2104 -- -- --   Oral Monitor          Physical Exam   Constitutional: She is well-developed, well-nourished, and in no distress. No distress.   HENT:   Head: Normocephalic and atraumatic.   Eyes: EOM are normal. Pupils are equal, round, and reactive to light.   Neck: Normal range of motion. Neck supple.   Cardiovascular: Normal rate and regular rhythm.    Murmur (2/6 systolic heard best at left PMI) heard.  Pulmonary/Chest: Effort normal and breath sounds normal. No respiratory distress.   Abdominal: Soft. There is no tenderness. There is no rebound and no guarding.   Musculoskeletal: Normal range of motion. She exhibits edema (R Knee swelling ).   Neurological: She is alert. She has normal sensation and normal strength.   Skin: Skin is warm and dry. No rash noted. There is erythema.   R leg shows healing surgical incision  R knee has surrounding erythema and warmth   RLE appears to be swollen with contralateral appendage      Psychiatric: Mood and affect normal.   Nursing note and vitals reviewed.      LAB RESULTS  Lab Results (last 24 hours)     Procedure Component Value Units Date/Time    CBC & Differential [[id number]] Collected:  12/02/17 1515    Specimen:  Blood Updated:  12/02/17 1810    Narrative:       The following orders were created for panel order CBC & Differential.  Procedure                               Abnormality         Status                     ---------                               -----------         ------                     Scan Slide[[id number]]                                       Final result               CBC Auto Differential[[id number]]        Abnormal            Final result                 Please view results for these tests on the individual orders.    CBC Auto Differential [[id number]]  (Abnormal) Collected:  12/02/17 1515    Specimen:  Blood Updated:  12/02/17 1810     WBC 6.71 10*3/mm3      RBC 2.19 (L) 10*6/mm3      Hemoglobin 7.4 (L) g/dL      Hematocrit 23.5 (L) %      .3 (H) fL      MCH 33.8 (H) pg      MCHC 31.5 (L) g/dL      RDW 16.3 (H) %      RDW-SD 63.7 (H) fl      MPV 9.5 fL      Platelets 265 10*3/mm3      Neutrophil % 62.5 %      Lymphocyte % 21.3 %      Monocyte % 12.4 (H) %      Eosinophil % 2.8 %      Basophil % 0.3 %      Immature Grans % 0.7 (H) %      Neutrophils, Absolute 4.19 10*3/mm3      Lymphocytes, Absolute 1.43 10*3/mm3      Monocytes, Absolute 0.83 10*3/mm3      Eosinophils, Absolute 0.19 10*3/mm3      Basophils, Absolute 0.02 10*3/mm3      Immature Grans, Absolute 0.05 (H) 10*3/mm3     Scan Slide [[id number]] Collected:  12/02/17 1515    Specimen:  Blood Updated:  12/02/17 1810     Anisocytosis Mod/2+     Macrocytes Large/3+     Polychromasia Slight/1+     WBC Morphology Normal     Platelet Morphology Normal    CBC & Differential [[id number]] Collected:  12/02/17 2152    Specimen:  Blood Updated:  12/02/17 2213    Narrative:       The following orders were created for  panel order CBC & Differential.  Procedure                               Abnormality         Status                     ---------                               -----------         ------                     Scan Slide[[id number]]                                                                  CBC Auto Differential[[id number]]        Abnormal            Final result                 Please view results for these tests on the individual orders.    Comprehensive Metabolic Panel [[id number]]  (Abnormal) Collected:  12/02/17 2152    Specimen:  Blood Updated:  12/02/17 2240     Glucose 91 mg/dL      BUN 37 (H) mg/dL      Creatinine 3.96 (H) mg/dL      Sodium 137 mmol/L      Potassium 4.4 mmol/L      Chloride 96 (L) mmol/L      CO2 25.4 mmol/L      Calcium 8.0 (L) mg/dL      Total Protein 6.6 g/dL      Albumin 3.30 (L) g/dL      ALT (SGPT) 12 U/L      AST (SGOT) 16 U/L      Alkaline Phosphatase 92 U/L      Total Bilirubin <0.2 mg/dL      eGFR Non African Amer 11 (L) mL/min/1.73      Globulin 3.3 gm/dL      A/G Ratio 1.0 g/dL      BUN/Creatinine Ratio 9.3     Anion Gap 15.6 mmol/L     Narrative:       The MDRD GFR formula is only valid for adults with stable renal function between ages 18 and 70.    Protime-INR [[id number]]  (Normal) Collected:  12/02/17 2152    Specimen:  Blood Updated:  12/02/17 2219     Protime 13.7 Seconds      INR 1.09    Procalcitonin [[id number]]  (Normal) Collected:  12/02/17 2152    Specimen:  Blood Updated:  12/02/17 2240     Procalcitonin 0.16 ng/mL     Narrative:       As a Marker for Sepsis (Non-Neonates):   1. <0.5 ng/mL represents a low risk of severe sepsis and/or septic shock.  1. >2 ng/mL represents a high risk of severe sepsis and/or septic shock.    As a Marker for Lower Respiratory Tract Infections that require antibiotic therapy:  PCT on Admission     Antibiotic Therapy             6-12 Hrs later  > 0.5                Strongly Recommended            >0.25 - <0.5         Recommended  0.1 -  "0.25           Discouraged                   Remeasure/reassess PCT  <0.1                 Strongly Discouraged          Remeasure/reassess PCT      As 28 day mortality risk marker: \"Change in Procalcitonin Result\" (> 80 % or <=80 %) if Day 0 (or Day 1) and Day 4 values are available. Refer to [email or web address] – [email or web address]/   Change in PCT <=80 %   A decrease of PCT levels below or equal to 80 % defines a positive change in PCT test result representing a higher risk for 28-day all-cause mortality of patients diagnosed with severe sepsis or septic shock.  Change in PCT > 80 %   A decrease of PCT levels of more than 80 % defines a negative change in PCT result representing a lower risk for 28-day all-cause mortality of patients diagnosed with severe sepsis or septic shock.                Lactic Acid, Plasma [[id number]]  (Normal) Collected:  12/02/17 2152    Specimen:  Blood Updated:  12/02/17 2218     Lactate 0.5 mmol/L     BNP [[id number]]  (Abnormal) Collected:  12/02/17 2152    Specimen:  Blood Updated:  12/02/17 2240     proBNP 2752.0 (H) pg/mL     Narrative:       Among patients with dyspnea, NT-proBNP is highly sensitive for the detection of acute congestive heart failure. In addition NT-proBNP of <300 pg/ml effectively rules out acute congestive heart failure with 99% negative predictive value.    D-dimer, Quantitative [[id number]]  (Abnormal) Collected:  12/02/17 2152    Specimen:  Blood Updated:  12/02/17 2221     D-Dimer, Quantitative 2.93 (H) MCGFEU/mL     Narrative:       The Stago D-Dimer test used in conjunction with a clinical pretest probability (PTP) assessment model, has been approved by the FDA to rule out the presence of venous thromboembolism (VTE) in outpatients suspected of deep venous thrombosis (DVT) or pulmonary embolism (PE).     CBC Auto Differential [[id number]]  (Abnormal) Collected:  12/02/17 2152    Specimen:  Blood Updated:  12/02/17 2213     WBC 7.04 10*3/mm3      RBC 2.24 (L) " 10*6/mm3      Hemoglobin 7.5 (L) g/dL      Hematocrit 24.0 (L) %      .1 (H) fL      MCH 33.5 (H) pg      MCHC 31.3 (L) g/dL      RDW 16.1 (H) %      RDW-SD 62.8 (H) fl      MPV 9.2 fL      Platelets 260 10*3/mm3      Neutrophil % 62.4 %      Lymphocyte % 22.7 %      Monocyte % 10.9 %      Eosinophil % 2.8 %      Basophil % 0.3 %      Immature Grans % 0.9 (H) %      Neutrophils, Absolute 4.39 10*3/mm3      Lymphocytes, Absolute 1.60 10*3/mm3      Monocytes, Absolute 0.77 10*3/mm3      Eosinophils, Absolute 0.20 10*3/mm3      Basophils, Absolute 0.02 10*3/mm3      Immature Grans, Absolute 0.06 (H) 10*3/mm3     Blood Culture - Blood, [[id number]] Collected:  12/02/17 2153    Specimen:  Blood from Arm, Left Updated:  12/02/17 2203    Blood Gas, Venous [[id number]]  (Abnormal) Collected:  12/02/17 2159    Specimen:  Venous Blood Updated:  12/02/17 2203     Site Arterial: right radial     pH, Venous 7.304 (L) pH Units      pCO2, Venous 51.7 (H) mm Hg      pO2, Venous 33.2 (L) mm Hg      HCO3, Venous 25.7 mmol/L      Base Excess, Venous -0.7 mmol/L      O2 Saturation Calculated 57.0 (L) %      Barometric Pressure for Blood Gas 758.1 mmHg      Modality Cannula     Flow Rate 4 lpm      Rate 20 Breaths/minute     Narrative:       sat 93 Meter: [id number] : [id number] Jessee Bautista    Influenza Antigen, Rapid - Swab, Nasopharynx [[id number]]  (Normal) Collected:  12/02/17 2203    Specimen:  Swab from Nasopharynx Updated:  12/02/17 2245     Influenza A Ag, EIA Negative     Influenza B Ag, EIA Negative    Blood Culture - Blood, [[id number]] Collected:  12/02/17 2253    Specimen:  Blood from Arm, Right Updated:  12/02/17 2259          I ordered the above labs and reviewed the results    RADIOLOGY  XR Chest 1 View   Preliminary Result   No acute findings.              CT Chest Without Contrast    (Results Pending)   NM Lung Ventilation Perfusion    (Results Pending)        I ordered the above noted radiological studies.  Interpreted by radiologist. Reviewed by me in PACS.       PROCEDURES  Critical Care  Performed by: CINTHIA SMITH  Authorized by: CINTHIA SMITH     Critical care provider statement:     Critical care time (minutes):  45    Critical care was necessary to treat or prevent imminent or life-threatening deterioration of the following conditions:  Respiratory failure, metabolic crisis and renal failure    Critical care was time spent personally by me on the following activities:  Development of treatment plan with patient or surrogate, discussions with consultants, evaluation of patient's response to treatment, examination of patient, ordering and performing treatments and interventions, ordering and review of laboratory studies, ordering and review of radiographic studies, re-evaluation of patient's condition and review of old charts        EKG           EKG time: 2144  Rhythm/Rate: sinus rhythm, 76  P waves and WA: normal   QRS, axis: normal    ST and T waves: Normal      Interpreted Contemporaneously by me, independently viewed  Unchanged compared to prior 10/9/17      PROGRESS AND CONSULTS  ED Course     2137  Ordered IVF, labs, Chest XR, and CT abdomen/ pelvis for further evaluation.     2152  Evaluated pt and discussed that pt will be admitted. Plan to review labs and radiology studies. Pt and family understand and agree to admission plan, all questions addressed at this time.     2200  Nursing staff reports that pt is 90% [O2 Sats] on 4L.     2227  Ordered CT Chest for further evaluation.    2254  Ordered Lovenox, vancomycin, and Zosyn. Placed consult to pulmonology.     2305  Discussed pt's case with Dr. Ca [pulmonology] who agrees to admit.     2308  Pt is being admitted to hospital before all the radiology studies have been reviewed. These studies will be reviewed by pulmonology in ICU.     MEDICAL DECISION MAKING  Results were reviewed/discussed with the patient and they were also made aware of online  access. Pt also made aware that some labs, such as cultures, will not be resulted during ER visit and follow up with PMD is necessary.     MDM  Number of Diagnoses or Management Options     Amount and/or Complexity of Data Reviewed  Clinical lab tests: ordered and reviewed (D-dimer = 2.93, proBNP = 2752, BUN = 37, Creatinine = 3.96, Calcium = 8.0, GFR = 11, HGB = 7.5 (pt is chronically anemic)  )  Tests in the radiology section of CPT®:  reviewed and ordered (Chest XR: NAD)  Tests in the medicine section of CPT®:  ordered and reviewed (Refer to procedure )  Discussion of test results with the performing providers: yes (Dr. Ca (pulmonology))  Decide to obtain previous medical records or to obtain history from someone other than the patient: yes  Independent visualization of images, tracings, or specimens: yes    Critical Care  Total time providing critical care: 30-74 minutes         DIAGNOSIS  Final diagnoses:   Hypoxia   Postoperative infection, initial encounter   Sepsis, due to unspecified organism   Elevated d-dimer   Acute kidney injury       DISPOSITION  ADMISSION    Discussed treatment plan and reason for admission with pt/family and admitting physician.  Pt/family voiced understanding of the plan for admission for further testing/treatment as needed.         Latest Documented Vital Signs:  As of 11:10 PM  BP- 117/43 HR- 79 Temp- 98.2 °F (36.8 °C) (Oral) O2 sat- 95%    --  Documentation assistance provided by meche Camargo for Dr. Peterson.  Information recorded by the scribe was done at my direction and has been verified and validated by me.          Saran Camargo  12/02/17 9857       Efrain Peterson MD  12/03/17 1128       Electronically signed by Efrain Peterson MD at 12/3/2017  1:53 AM      Josue Myles RN at 12/2/2017  9:50 PM          Right knee incision red with swelling and warm to touch.  MD notified     Josue Myles RN  12/02/17 [id number]       Electronically signed by Josue MATIAS  CARMINA Myles at 12/2/2017 10:35 PM        Lines, Drains & Airways    Active LDAs     Name:   Placement date:   Placement time:   Site:   Days:    Peripheral IV Line - Single Lumen 12/02/17 2153 median cubital vein (antecubital fossa), left 20 gauge;1 in length  12/02/17 2153      1         Inactive LDAs     Name:   Placement date:   Placement time:   Removal date:   Removal time:   Site:   Days:    [REMOVED] Peripheral IV Line - Single Lumen 12/03/17 0200 basilic vein (medial side of arm), right 20 gauge  12/03/17    0200    12/03/17    1855      less than 1    [REMOVED] Peripheral IV Line - Single Lumen 12/03/17 1620  20 gauge  12/03/17    1620 12/04/17    1115      less than 1                Hospital Medications (all)       Dose Frequency Start End    budesonide-formoterol (SYMBICORT) 160-4.5 MCG/ACT inhaler 2 puff 2 puff 2 Times Daily - RT 12/3/2017     Sig - Route: Inhale 2 puffs 2 (Two) Times a Day. - Inhalation    divalproex (DEPAKOTE) 24 hr tablet 250 mg 250 mg Daily 12/3/2017     Sig - Route: Take 1 tablet by mouth Daily. - Oral    divalproex (DEPAKOTE) 24 hr tablet 500 mg 500 mg Nightly 12/3/2017     Sig - Route: Take 1 tablet by mouth Every Night. - Oral    enoxaparin (LOVENOX) syringe 30 mg 30 mg Nightly 12/4/2017     Sig - Route: Inject 0.3 mL under the skin Every Night. - Subcutaneous    enoxaparin (LOVENOX) syringe 60 mg 1 mg/kg × 63.5 kg Once 12/2/2017 12/2/2017    Sig - Route: Inject 0.6 mL under the skin 1 (One) Time. - Subcutaneous    fentaNYL citrate (PF) (SUBLIMAZE) injection 50 mcg 50 mcg Every 1 Hour PRN 12/3/2017 12/13/2017    Sig - Route: Infuse 1 mL into a venous catheter Every 1 (One) Hour As Needed for Severe Pain . - Intravenous    HYDROcodone-acetaminophen (NORCO) 7.5-325 MG per tablet 1 tablet 1 tablet Every 6 Hours PRN 12/3/2017 12/13/2017    Sig - Route: Take 1 tablet by mouth Every 6 (Six) Hours As Needed for Moderate Pain . - Oral    ipratropium-albuterol (DUO-NEB) nebulizer  "solution 3 mL 3 mL 4 Times Daily - RT 12/3/2017     Sig - Route: Take 3 mL by nebulization 4 (Four) Times a Day. - Nebulization    levothyroxine (SYNTHROID, LEVOTHROID) tablet 88 mcg 88 mcg Every Early Morning 12/3/2017     Sig - Route: Take 1 tablet by mouth Every Morning. - Oral    loperamide (IMODIUM) capsule 2 mg 2 mg 4 Times Daily PRN 12/4/2017     Sig - Route: Take 1 capsule by mouth 4 (Four) Times a Day As Needed for Diarrhea. - Oral    ondansetron (ZOFRAN) injection 4 mg 4 mg Every 6 Hours PRN 12/3/2017     Sig - Route: Infuse 2 mL into a venous catheter Every 6 (Six) Hours As Needed for Nausea or Vomiting. - Intravenous    Linked Group 1:  \"Or\" Linked Group Details        ondansetron (ZOFRAN) tablet 4 mg 4 mg Every 6 Hours PRN 12/3/2017     Sig - Route: Take 1 tablet by mouth Every 6 (Six) Hours As Needed for Nausea or Vomiting. - Oral    Linked Group 1:  \"Or\" Linked Group Details        ondansetron ODT (ZOFRAN-ODT) disintegrating tablet 4 mg 4 mg Every 6 Hours PRN 12/3/2017     Sig - Route: Take 1 tablet by mouth Every 6 (Six) Hours As Needed for Nausea or Vomiting. - Oral    Linked Group 1:  \"Or\" Linked Group Details        piperacillin-tazobactam (ZOSYN) 3.375 g in iso-osmotic dextrose 50 ml (premix) 3.375 g Once 12/2/2017 12/2/2017    Sig - Route: Infuse 50 mL into a venous catheter 1 (One) Time. - Intravenous    sodium bicarbonate tablet 650 mg 650 mg 3 Times Daily 12/3/2017     Sig - Route: Take 1 tablet by mouth 3 (Three) Times a Day. - Oral    sodium chloride 0.9 % bolus 1,000 mL 1,000 mL Once 12/3/2017 12/3/2017    Sig - Route: Infuse 1,000 mL into a venous catheter 1 (One) Time. - Intravenous    sodium chloride 0.9 % bolus 500 mL 500 mL Once 12/3/2017 12/3/2017    Sig - Route: Infuse 500 mL into a venous catheter 1 (One) Time. - Intravenous    sodium chloride 0.9 % flush 1-10 mL 1-10 mL As Needed 12/3/2017     Sig - Route: Infuse 1-10 mL into a venous catheter As Needed for Line Care. - " "Intravenous    sodium chloride 0.9 % flush 10 mL 10 mL As Needed 12/2/2017     Sig - Route: Infuse 10 mL into a venous catheter As Needed for Line Care. - Intravenous    Linked Group 2:  \"And\" Linked Group Details        sodium chloride 0.9 % infusion 200 mL/hr Continuous 12/3/2017 12/4/2017    Sig - Route: Infuse 200 mL/hr into a venous catheter Continuous. - Intravenous    sodium chloride 0.9 % infusion 75 mL/hr Continuous 12/4/2017     Sig - Route: Infuse 75 mL/hr into a venous catheter Continuous. - Intravenous    technetium albumin aggregated (MAA) solution 1 dose 1 dose Once in Imaging 12/3/2017 12/3/2017    Sig - Route: Infuse 1 dose into a venous catheter Once. - Intravenous    vancomycin 1250 mg/250 mL 0.9% NS IVPB ([facility]) 20 mg/kg × 63.5 kg Once 12/2/2017 12/3/2017    Sig - Route: Infuse 250 mL into a venous catheter 1 (One) Time. - Intravenous    vancomycin 1250 mg/250 mL 0.9% NS IVPB ([facility]) 1,250 mg Once 12/3/2017 12/3/2017    Sig - Route: Infuse 250 mL into a venous catheter 1 (One) Time. - Intravenous    xenon xe 133 gas 10 mCi 6.5 millicurie 6.5 millicurie Once in Imaging 12/3/2017 12/3/2017    Sig - Route: Inhale 6.5 millicuries Once. - Inhalation    fentaNYL citrate (PF) (SUBLIMAZE) injection 25 mcg (Discontinued) 25 mcg Every 1 Hour PRN 12/3/2017 12/3/2017    Sig - Route: Infuse 0.5 mL into a venous catheter Every 1 (One) Hour As Needed for Severe Pain . - Intravenous    heparin (porcine) 5000 UNIT/ML injection 5,000 Units (Discontinued) 5,000 Units Every 12 Hours Scheduled 12/3/2017 12/3/2017    Sig - Route: Inject 1 mL under the skin Every 12 (Twelve) Hours. - Subcutaneous    heparin 88297 units/250 mL (100 units/mL) in 0.45 % NaCl infusion (Discontinued) 12 Units/kg/hr × 63.5 kg Titrated 12/3/2017 12/3/2017    Sig - Route: Infuse 762 Units/hr into a venous catheter Dose Adjusted By Provider As Needed. - Intravenous    norepinephrine (LEVOPHED) 8 mg/250 mL (32 mcg/mL) in sodium chloride 0.9% " infusion (premix) (Discontinued) 0.02-0.3 mcg/kg/min × 63.5 kg Titrated 12/3/2017 12/4/2017    Sig - Route: Infuse 1.27-19.05 mcg/min into a venous catheter Dose Adjusted By Provider As Needed. - Intravenous    Pharmacy to dose vancomycin (Discontinued)  Continuous PRN 12/3/2017 12/4/2017    Sig - Route: Continuous As Needed for Consult. - Does not apply    piperacillin-tazobactam (ZOSYN) 3.375 g in iso-osmotic dextrose 50 ml (premix) (Discontinued) 3.375 g Once 12/3/2017 12/3/2017    Sig - Route: Infuse 50 mL into a venous catheter 1 (One) Time. - Intravenous    Reason for Discontinue: Duplicate order    piperacillin-tazobactam (ZOSYN) 3.375 g in iso-osmotic dextrose 50 ml (premix) (Discontinued) 3.375 g Every 12 Hours 12/3/2017 12/4/2017    Sig - Route: Infuse 50 mL into a venous catheter Every 12 (Twelve) Hours. - Intravenous    piperacillin-tazobactam (ZOSYN) in iso-osmotic dextrose IVPB 2.25 g (premix) (Discontinued) 2.25 g Every 8 Hours 12/3/2017 12/3/2017    Sig - Route: Infuse 50 mL into a venous catheter Every 8 (Eight) Hours. - Intravenous    Reason for Discontinue: Formulary change    sodium chloride 0.9 % infusion (Discontinued) 150 mL/hr Continuous 12/2/2017 12/3/2017    Sig - Route: Infuse 150 mL/hr into a venous catheter Continuous. - Intravenous    sodium chloride 0.9 % infusion (Discontinued) 125 mL/hr Continuous 12/3/2017 12/3/2017    Sig - Route: Infuse 125 mL/hr into a venous catheter Continuous. - Intravenous    sodium chloride 0.9 % with KCl 20 mEq/L infusion (Discontinued) 75 mL/hr Continuous 12/3/2017 12/4/2017    Sig - Route: Infuse 75 mL/hr into a venous catheter Continuous. - Intravenous    vancomycin 1250 mg/250 mL 0.9% NS IVPB ([facility]) (Discontinued) 1,250 mg Once 12/3/2017 12/3/2017    Sig - Route: Infuse 250 mL into a venous catheter 1 (One) Time. - Intravenous    Reason for Discontinue: Duplicate order          Orders (last 72 hrs)     Start     Ordered    12/05/17 0430  Uric Acid   Morning Draw      12/04/17 0634    12/05/17 0430  Renal Function Panel  Morning Draw      12/04/17 0634    12/05/17 0430  Magnesium  Morning Draw      12/04/17 0634    12/05/17 0430  CBC & Differential  Morning Draw      12/04/17 0634    12/04/17 2100  enoxaparin (LOVENOX) syringe 30 mg  Nightly      12/04/17 0939    12/04/17 1102  Inpatient Consult to Neurology  Once     Provider:  Stanford Hartley MD    12/04/17 1102    12/04/17 1101  loperamide (IMODIUM) capsule 2 mg  4 Times Daily PRN      12/04/17 1102    12/04/17 0940  PT Consult: Eval & Treat  Once      12/04/17 0939    12/04/17 0715  sodium chloride 0.9 % infusion  Continuous      12/04/17 0634    12/04/17 0635  Immunofixation, Serum  Once      12/04/17 0634    12/04/17 0635  Immunofixation, Urine -  Once      12/04/17 0634    12/04/17 0635  Iron Profile  Once      12/04/17 0634    12/04/17 0635  Ferritin  Once      12/04/17 0634    12/04/17 0600  Basic Metabolic Panel  Morning Draw      12/03/17 1910    12/04/17 0600  CBC (No Diff)  Morning Draw,   Status:  Canceled      12/03/17 1910    12/04/17 0600  Vancomycin, Random  Morning Draw      12/03/17 1946    12/04/17 0600  CBC & Differential  Morning Draw      12/03/17 1946    12/04/17 0600  Basic Metabolic Panel  Morning Draw,   Status:  Canceled      12/03/17 1946    12/04/17 0600  CBC Auto Differential  PROCEDURE ONCE      12/04/17 0001    12/04/17 0430  CBC & Differential  Daily,   Status:  Canceled     Comments:  Discontinue After Heparin Stopped    12/03/17 0022    12/04/17 0430  aPTT  Daily,   Status:  Canceled     Comments:  Cancel If Patient Has Infusion Changes    12/03/17 0022    12/03/17 2100  sodium bicarbonate tablet 650 mg  3 Times Daily      12/03/17 1910    12/03/17 2030  vancomycin 1250 mg/250 mL 0.9% NS IVPB ([facility])  Once      12/03/17 1946 12/03/17 1947  POC Glucose Fingerstick  Once      12/03/17 1946 12/03/17 1945  sodium chloride 0.9 % with KCl 20 mEq/L infusion  Continuous,    Status:  Discontinued      12/03/17 1910    12/03/17 1933  US Renal Bilateral  1 Time Imaging      12/03/17 1910    12/03/17 1911  US Renal Limited  1 Time Imaging,   Status:  Canceled      12/03/17 1910    12/03/17 1800  Vancomycin, Random  Timed      12/03/17 1129    12/03/17 1734  Inpatient Consult to Case Management   Once     Provider:  (Not yet assigned)    12/03/17 1733    12/03/17 1524  POC Glucose Fingerstick  Once      12/03/17 1523    12/03/17 1452  Sodium, Urine, Random - Urine, Clean Catch  Once      12/03/17 1451    12/03/17 1452  Creatinine, Urine, Random - Urine, Clean Catch  Once      12/03/17 1451    12/03/17 1452  Urinalysis With / Culture If Indicated - Urine, Clean Catch  Once      12/03/17 1451    12/03/17 1452  Basic Metabolic Panel  Once      12/03/17 1451    12/03/17 1345  sodium chloride 0.9 % infusion  Continuous      12/03/17 1301    12/03/17 1302  Inpatient Consult to Nephrology  Once     Specialty:  Nephrology  Provider:  Rojas Patterson MD    12/03/17 1302    12/03/17 1156  Duplex Venous Lower Extremity - Right CAR  Once      12/03/17 1156    12/03/17 1151  Troponin  Now Then Every 6 Hours,   Status:  Canceled      12/03/17 1150    12/03/17 1151  CK  STAT      12/03/17 1150    12/03/17 1151  CK-MB  STAT      12/03/17 1150    12/03/17 1136  Daily Weights  Daily      12/03/17 1135    12/03/17 1135  Vital Signs Per hospital policy  Per Hospital Policy      12/03/17 1135    12/03/17 1135  Cardiac Monitoring  Continuous      12/03/17 1135    12/03/17 1135  Continuous Pulse Oximetry  Continuous      12/03/17 1135    12/03/17 1135  Use Mobility Guidelines for Advancement of Activity  Until Discontinued      12/03/17 1135    12/03/17 1135  Height and weight  Once      12/03/17 1135    12/03/17 1135  Intake and Output  Every Shift      12/03/17 1135    12/03/17 1135  If Patient has BG of < 80 and is symptomatic but not on an IV insulin protocol then use the Adult Hypoglycemia  Treatment Orders.  Once      12/03/17 1135    12/03/17 1135  POC Glucose Fingerstick  Once     Comments:  On arrival to unit. If >180, call admitting MD for orders.    12/03/17 1135    12/03/17 1135  Saline Lock  Continuous     Comments:  For standing ICU/CCU standing orders    12/03/17 1135    12/03/17 1135  Oxygen Therapy- Nasal Cannula; Titrate for SPO2: 90%, Greater Than or Equal To  Continuous     Comments:  For unresponsiveness, acute dyspnea, cyanosis or suspected hypoxemia    12/03/17 1135    12/03/17 1135  Continuous Pulse Oximetry  Continuous,   Status:  Canceled     Comments:  Unresponsiveness, Acute Dyspnea, Cyanosis, Hypoxemia    12/03/17 1135    12/03/17 1135  ACLS Protocol For Life Threatening Dysrhythmias (If Not DNR Order)  Continuous      12/03/17 1135    12/03/17 1135  Notify Provider if ACLS Protocol Activated  Once      12/03/17 1135    12/03/17 1135  Place Order to Consult Intensivist For Critical Care Management (If Patient Not Admitted to Cardiology for Primary Cardiology Condition)  Continuous     Comments:  For standing ICU/CCU standing orders    12/03/17 1135    12/03/17 1135  Notify All Physicians When Patient is Transferred  Once     Comments:  For standing ICU/CCU standing orders    12/03/17 1135    12/03/17 1135  POC Glucose Fingerstick  Once      12/03/17 1135    12/03/17 1135  Insert Peripheral IV  Once      12/03/17 1135    12/03/17 1135  Saline Lock & Maintain IV Access  Continuous,   Status:  Canceled      12/03/17 1135    12/03/17 1135  VTE Risk Assessment - Moderate Risk  Once      12/03/17 1135    12/03/17 1135  Pharmacologic VTE Prophylaxis Not Indicated: Already Ordered in This Admission  Once      12/03/17 1135    12/03/17 1135  Mechanical VTE Prophylaxis Not Indicated: Already Ordered in This Admission  Once      12/03/17 1135    12/03/17 1135  Diet Regular  Diet Effective Now      12/03/17 1135    12/03/17 1135  BNP  STAT      12/03/17 1135    12/03/17 1135  CK  STAT       12/03/17 1135    12/03/17 1135  Lactic Acid, Plasma  STAT      12/03/17 1135    12/03/17 1135  Hemoglobin & Hematocrit, Blood  STAT      12/03/17 1135    12/03/17 1135  Magnesium  STAT      12/03/17 1135    12/03/17 1135  Phosphorus  STAT      12/03/17 1135    12/03/17 1135  Protime-INR  STAT      12/03/17 1135    12/03/17 1134  sodium chloride 0.9 % flush 1-10 mL  As Needed      12/03/17 1135    12/03/17 1134  ondansetron (ZOFRAN) tablet 4 mg  Every 6 Hours PRN      12/03/17 1135    12/03/17 1134  ondansetron ODT (ZOFRAN-ODT) disintegrating tablet 4 mg  Every 6 Hours PRN      12/03/17 1135    12/03/17 1134  ondansetron (ZOFRAN) injection 4 mg  Every 6 Hours PRN      12/03/17 1135    12/03/17 1133  fentaNYL citrate (PF) (SUBLIMAZE) injection 50 mcg  Every 1 Hour PRN      12/03/17 1133    12/03/17 1130  VTE Risk Assessment - Moderate Risk  Once      12/03/17 1129    12/03/17 1130  Pharmacologic VTE Prophylaxis Not Indicated: Already Ordered in This Admission  Once      12/03/17 1129    12/03/17 1130  Mechanical VTE Prophylaxis Not Indicated: Already Ordered in This Admission  Once      12/03/17 1129    12/03/17 1115  vancomycin 1250 mg/250 mL 0.9% NS IVPB ([facility])  Once,   Status:  Discontinued      12/03/17 1044    12/03/17 1058  POC Glucose Fingerstick  Once      12/03/17 1057    12/03/17 1042  HYDROcodone-acetaminophen (NORCO) 7.5-325 MG per tablet 1 tablet  Every 6 Hours PRN      12/03/17 1042    12/03/17 0900  divalproex (DEPAKOTE) 24 hr tablet 250 mg  Daily      12/03/17 0024    12/03/17 0900  piperacillin-tazobactam (ZOSYN) 3.375 g in iso-osmotic dextrose 50 ml (premix)  Every 12 Hours,   Status:  Discontinued      12/03/17 0044    12/03/17 0900  heparin (porcine) 5000 UNIT/ML injection 5,000 Units  Every 12 Hours Scheduled,   Status:  Discontinued      12/03/17 0558    12/03/17 0830  ipratropium-albuterol (DUO-NEB) nebulizer solution 3 mL  4 Times Daily - RT      12/03/17 0023    12/03/17 0730  POC Glucose  Fingerstick  Once      12/03/17 0729    12/03/17 0606  Urine Culture - Urine, Urine, Clean Catch  Once      12/03/17 0606    12/03/17 0600  levothyroxine (SYNTHROID, LEVOTHROID) tablet 88 mcg  Every Early Morning      12/03/17 0024    12/03/17 0600  Vancomycin, Random  Morning Draw      12/03/17 0105    12/03/17 0500  piperacillin-tazobactam (ZOSYN) in iso-osmotic dextrose IVPB 2.25 g (premix)  Every 8 Hours,   Status:  Discontinued      12/03/17 0022    12/03/17 0453  POC Glucose Fingerstick  Once      12/03/17 0452    12/03/17 0345  sodium chloride 0.9 % bolus 500 mL  Once      12/03/17 0309    12/03/17 0345  norepinephrine (LEVOPHED) 8 mg/250 mL (32 mcg/mL) in sodium chloride 0.9% infusion (premix)  Titrated,   Status:  Discontinued      12/03/17 0313    12/03/17 0341  Prepare RBC, 2 Units  Blood - Once      12/03/17 0341    12/03/17 0340  Transfuse RBC, 2 Units Infuse Each Unit Over: 4H  Transfusion      12/03/17 0341    12/03/17 0340  Verify Informed Consent for Blood Product Administration  Once      12/03/17 0341    12/03/17 0337  Transfusion Documentation:, 2 Units  Transfusion,   Status:  Canceled      12/03/17 0337    12/03/17 0329  Type & Screen  Once      12/03/17 0328    12/03/17 0309  Scan Slide  Once,   Status:  Canceled     Comments:  Prior to Initial Heparin Bolus    12/03/17 0308    12/03/17 0215  xenon xe 133 gas 10 mCi 6.5 millicurie  Once in Imaging      12/03/17 0132    12/03/17 0215  technetium albumin aggregated (MAA) solution 1 dose  Once in Imaging      12/03/17 0132    12/03/17 0105  Conditional Code  Continuous      12/03/17 0106    12/03/17 0100  piperacillin-tazobactam (ZOSYN) 3.375 g in iso-osmotic dextrose 50 ml (premix)  Once,   Status:  Discontinued      12/03/17 0022    12/03/17 0100  heparin 46753 units/250 mL (100 units/mL) in 0.45 % NaCl infusion  Titrated,   Status:  Discontinued      12/03/17 0022    12/03/17 0100  sodium chloride 0.9 % infusion  Continuous,   Status:   Discontinued      12/03/17 0022    12/03/17 0100  divalproex (DEPAKOTE) 24 hr tablet 500 mg  Nightly      12/03/17 0024    12/03/17 0100  sodium chloride 0.9 % bolus 1,000 mL  Once      12/03/17 0037    12/03/17 0045  budesonide-formoterol (SYMBICORT) 160-4.5 MCG/ACT inhaler 2 puff  2 Times Daily - RT      12/03/17 0024    12/03/17 0023  fentaNYL citrate (PF) (SUBLIMAZE) injection 25 mcg  Every 1 Hour PRN,   Status:  Discontinued      12/03/17 0023    12/03/17 0023  Adjust Heparin Rate Based on aPTT Using Nomogram  Continuous,   Status:  Canceled      12/03/17 0022 12/03/17 0023  Verify enoxaparin (Lovenox) or fondaparinux (Arixtra) Orders Are Discontinued Upon Initiation of Heparin Protocol  Once      12/03/17 0022    12/03/17 0023  CBC & Differential  Once     Comments:  Prior to Initial Heparin Bolus    12/03/17 0022    12/03/17 0023  RN To Release aPTT Order 6 Hours After Heparin Bolus & 6 Hours After Any Heparin Rate Change  Continuous,   Status:  Canceled      12/03/17 0022    12/03/17 0023  Protime-INR  Once     Comments:  Prior to Initial Heparin Bolus    12/03/17 0022    12/03/17 0023  aPTT  Once     Comments:  Prior to Initial Heparin Bolus    12/03/17 0022    12/03/17 0023  CBC Auto Differential  PROCEDURE ONCE     Comments:  Prior to Initial Heparin Bolus    12/03/17 0022    12/03/17 0022  aPTT  As Needed,   Status:  Canceled      12/03/17 0022    12/03/17 0022  Inpatient Consult to Orthopedic Surgery  Once     Specialty:  Orthopedic Surgery  Provider:  Kashif Perez MD    12/03/17 0022    12/03/17 0021  Pharmacy to dose vancomycin  Continuous PRN,   Status:  Discontinued      12/03/17 0022    12/03/17 0007  POC Glucose Fingerstick  Once      12/03/17 0006    12/02/17 2309  Critical Care  Once     Comments:  This order was created via procedure documentation    12/02/17 2309 12/02/17 2306  Inpatient Admission  Once      12/02/17 2306 12/02/17 2256  piperacillin-tazobactam (ZOSYN) 3.375 g in  iso-osmotic dextrose 50 ml (premix)  Once      12/02/17 2254    12/02/17 2256  vancomycin 1250 mg/250 mL 0.9% NS IVPB ([facility])  Once      12/02/17 2254    12/02/17 2256  enoxaparin (LOVENOX) syringe 60 mg  Once      12/02/17 2254    12/02/17 2255  Pulmonology (on-call MD unless specified)  Once     Specialty:  Pulmonary Disease  Provider:  John Ca MD    12/02/17 2254    12/02/17 2254  NM Lung Ventilation Perfusion  1 Time Imaging      12/02/17 2254    12/02/17 2251  CT Chest Without Contrast  1 Time Imaging      12/02/17 2227 12/02/17 2237  sodium chloride 0.9 % infusion  Continuous,   Status:  Discontinued      12/02/17 2235 12/02/17 2226  CT Angiogram Chest With Contrast  1 Time Imaging,   Status:  Canceled      12/02/17 2227    12/02/17 2210  Scan Slide  Once,   Status:  Canceled      12/02/17 2209 12/02/17 2204  Blood Gas, Venous  Once      12/02/17 2203    12/02/17 2138  Blood Gas, Arterial  STAT,   Status:  Canceled      12/02/17 2137    12/02/17 2137  XR Chest 1 View  1 Time Imaging      12/02/17 2137 12/02/17 2137  Insert peripheral IV  Once      12/02/17 2137    12/02/17 2137  BNP  Once      12/02/17 2137    12/02/17 2137  D-dimer, Quantitative  Once      12/02/17 2137    12/02/17 2137  CT Abdomen Pelvis With Contrast  1 Time Imaging,   Status:  Canceled     Comments:  IV CONTRAST ONLY      12/02/17 2137    12/02/17 2136  sodium chloride 0.9 % flush 10 mL  As Needed      12/02/17 2137    12/02/17 2136  CBC & Differential  Once      12/02/17 2137 12/02/17 2136  Comprehensive Metabolic Panel  Once      12/02/17 2137    12/02/17 2136  Protime-INR  Once      12/02/17 2137    12/02/17 2136  Procalcitonin  Once      12/02/17 2137    12/02/17 2136  Lactic Acid, Plasma  Once      12/02/17 2137 12/02/17 2136  Blood Culture - Blood,  Once      12/02/17 2137 12/02/17 2136  Blood Culture - Blood,  Once      12/02/17 2137 12/02/17 2136  Influenza Antigen, Rapid - Swab, Nasopharynx  Once       12/02/17 2137 12/02/17 2136  Blood Gas, Arterial  Once,   Status:  Canceled      12/02/17 2137 12/02/17 2136  ECG 12 Lead  Once      12/02/17 2137 12/02/17 2136  CBC Auto Differential  PROCEDURE ONCE      12/02/17 2137    Unscheduled  ECG 12 Lead  As Needed     Comments:  For standing ICU/CCU Standing Orders.  Nurse to Release if Patient Experiences Acute Chest Pain or Dysrhythmias    12/03/17 1135    Unscheduled  Potassium  As Needed     Comments:  Sudden Ventricular Dysrhythmias.   Notify Physician.    12/03/17 1135    Unscheduled  Magnesium  As Needed     Comments:  For ICU/CCU Standing Orders    12/03/17 1135    --  sulfamethoxazole-trimethoprim (BACTRIM DS,SEPTRA DS) 800-160 MG per tablet  2 Times Daily      12/02/17 2323    --  ferrous sulfate 325 (65 FE) MG tablet  2 Times Daily      12/02/17 2323    --  pantoprazole (PROTONIX) 40 MG EC tablet  Daily      12/02/17 2323             Physician Progress Notes (last 72 hours) (Notes from 12/1/2017 11:49 AM through 12/4/2017 11:49 AM)      Param Stone MD at 12/3/2017 12:53 PM  Version 2 of 2           Dr. JUNIOR Stone    [facility] INTENSIVE CARE    12/3/2017    Patient ID:  Name:  Josephine Wolf  MRN:  [id number]  1940  77 y.o.  female            CC/Reason for visit: Follow-up for hypotension, acute blood loss anemia, and many other medical problems listed below.    HPI: The patient is actually quite lethargic.  She is arousable, but does not maintain her eyes open and does not communicate verbally with me.  Family is at bedside.  Nurse is at bedside.  We discussed the patient in detail.  I have reviewed the entire chart including Dr. Efrain Peterson's dictation in the emergency room as well as my colleague's dictation, Dr. Ca at midnight.  The patient remains hypotensive in spite of 2 units of packed red cell transfusion.  She remains on a few liters of oxygen.  Quite somnolent.  Orthopedics rounded on the patient and looked at  the right knee and it appears to be in good condition, very doubtful that that is a source of sepsis.    Vitals:  Vitals:    12/03/17 1135 12/03/17 1150 12/03/17 1159 12/03/17 1205   BP: (!) 88/49 105/43 100/48 100/48   Pulse: 72 77 74 73   Resp:   18    Temp:   99.1 °F (37.3 °C)    TempSrc:   Oral    SpO2: 92% 96% 91% 96%   Weight:       Height:               Body mass index is 25.61 kg/(m^2).    Intake/Output Summary (Last 24 hours) at 12/03/17 1253  Last data filed at 12/03/17 1200   Gross per 24 hour   Intake             3795 ml   Output              725 ml   Net             3070 ml       Exam:  GEN:  Arousable but somnolent.  Does not remain awake unless stimulated.  Makes some eye contact but does not verbally communicate with me.  EYES:   anicteric sclera bilat  ENT:    External ears/nose normal, OP clear  NECK:  Supple, midline trachea  LUNGS: Diminished breath sounds but no crackles or wheezing, nonlabored breathing  CV:  Normal S1S2, without murmur, no edema  ABD:  Non tender, no enlarged liver or masses  EXT:  Right knee incision appears well-healed.  There is no erythema of the right knee joint although there is some soft tissue swelling which is expected postoperatively 2 weeks out.  I don't palpate any fluctuation of the joint.  There is some tenderness when I palpate the joint and the patient does grimace a little bit.  The left joint appears normal.  Range of motion is limited in the right knee joint but normal in the left joint of the knee.    Scheduled meds:    budesonide-formoterol 2 puff Inhalation BID - RT   divalproex 250 mg Oral Daily   divalproex 500 mg Oral Nightly   heparin (porcine) 5,000 Units Subcutaneous Q12H   ipratropium-albuterol 3 mL Nebulization 4x Daily - RT   levothyroxine 88 mcg Oral Q AM   piperacillin-tazobactam 3.375 g Intravenous Q12H     IV meds:                        norepinephrine 0.02-0.3 mcg/kg/min    Pharmacy to dose vancomycin     sodium chloride 125 mL/hr Last Rate:  125 mL/hr (12/03/17 0217)       Data Review:   I reviewed the patient's medications and new clinical results.  Lab Results   Component Value Date    CALCIUM 8.0 (L) 12/02/2017    MG 2.0 05/14/2015    MG 1.5 (L) 05/13/2015       Results from last 7 days  Lab Units 12/03/17  0253 12/02/17  2152 12/02/17  1515   SODIUM mmol/L  --  137  --    POTASSIUM mmol/L  --  4.4  --    CHLORIDE mmol/L  --  96*  --    CO2 mmol/L  --  25.4  --    BUN mg/dL  --  37*  --    CREATININE mg/dL  --  3.96*  --    CALCIUM mg/dL  --  8.0*  --    BILIRUBIN mg/dL  --  <0.2  --    ALK PHOS U/L  --  92  --    ALT (SGPT) U/L  --  12  --    AST (SGOT) U/L  --  16  --    GLUCOSE mg/dL  --  91  --    WBC 10*3/mm3 4.74 7.04 6.71   HEMOGLOBIN g/dL 6.3* 7.5* 7.4*   PLATELETS 10*3/mm3 213 260 265   INR  1.18* 1.09  --    PROBNP pg/mL  --  2752.0*  --    PROCALCITONIN ng/mL  --  0.16  --        Results from last 7 days  Lab Units 12/02/17  2253 12/02/17  2153   BLOODCX  No growth at less than 24 hours No growth at less than 24 hours                 Results from last 7 days  Lab Units 12/02/17  2159   FLOW RATE lpm 4   MODALITY  Cannula   O2 SATURATION CALC % 57.0*         ASSESSMENT:   Acute hypoxia  Sepsis criteria, but unsure the patient truly has an infection.  Hypotension due to dehydration versus medication induced, versus blood loss  Acute kidney injury  Altered mental status, most likely metabolic encephalopathy  Acute blood loss anemia on chronic multifactorial anemia  Status post right total knee replacement 2 weeks ago  History of COPD      PLAN:  This patient remains a little lethargic.  She remains hypotensive in spite of blood transfusion.  I am going to continue with IV fluid resuscitation and monitor urine output closely.  If her hypotension is not improved, I will order an echocardiogram.  We will check her cardiac enzymes now to make sure there was no myocardial infarction.    Her creatinine is quite elevated.  That is a new problem.  I  will consult nephrology.  We have stopped all home medications which could be nephrotoxic, including Bactrim.  I am ordering a stat CPK level.    Her anemia is quite puzzling.  I'm not sure why or where she has lost the blood, but on physical exam I cannot elicit any pain on the abdominal exam to suspect retroperitoneal bleed.    The knee does not appear infected.  I'm not sure this is hypotension due to sepsis.  This may be hypotension due to prerenal intravascular depletion of volume, or secondary to medications.  We will continue empiric antibiotics and fluids for now.  We will reassess sepsis criteria again in 24 hours.    Nuclear ventilation perfusion scan of the lungs does not suggest pulmonary embolism.  Therefore full anticoagulation has been discontinued.  Low-dose heparin will be used for DVT prophylaxis, but renally adjusted.    Patient remains critically ill.  I explained this very clearly to the family and we will continue to monitor closely.    Total critical care time 35 minutes, excluding any separately billable procedure time    Param Stone MD  12/3/2017     Electronically signed by Param Stone MD at 12/3/2017  1:02 PM      Param Stone MD at 12/3/2017 12:53 PM  Version 1 of 2           Dr. JUNIOR Stone    [facility] INTENSIVE CARE    12/3/2017    Patient ID:  Name:  Josephine Wolf  MRN:  [id number]  1940  77 y.o.  female            CC/Reason for visit: Follow-up for hypotension, acute blood loss anemia, and many other medical problems listed below.    HPI: The patient is actually quite lethargic.  She is arousable, but does not maintain her eyes open and does not communicate verbally with me.  Family is at bedside.  Nurse is at bedside.  We discussed the patient in detail.  I have reviewed the entire chart including Dr. Efrain Peterson's dictation in the emergency room as well as my colleague's dictation, Dr. Ca at midnight.  The patient remains hypotensive in spite  of 2 units of packed red cell transfusion.  She remains on a few liters of oxygen.  Quite somnolent.  Orthopedics rounded on the patient and looked at the right knee and it appears to be in good condition, very doubtful that that is a source of sepsis.    Vitals:  Vitals:    12/03/17 1135 12/03/17 1150 12/03/17 1159 12/03/17 1205   BP: (!) 88/49 105/43 100/48 100/48   Pulse: 72 77 74 73   Resp:   18    Temp:   99.1 °F (37.3 °C)    TempSrc:   Oral    SpO2: 92% 96% 91% 96%   Weight:       Height:               Body mass index is 25.61 kg/(m^2).    Intake/Output Summary (Last 24 hours) at 12/03/17 1253  Last data filed at 12/03/17 1200   Gross per 24 hour   Intake             3795 ml   Output              725 ml   Net             3070 ml       Exam:  GEN:  Arousable but somnolent.  Does not remain awake unless stimulated.  Makes some eye contact but does not verbally communicate with me.  EYES:   anicteric sclera bilat  ENT:    External ears/nose normal, OP clear  NECK:  Supple, midline trachea  LUNGS: Diminished breath sounds but no crackles or wheezing, nonlabored breathing  CV:  Normal S1S2, without murmur, no edema  ABD:  Non tender, no enlarged liver or masses  EXT:  Right knee incision appears well-healed.  There is no erythema of the right knee joint although there is some soft tissue swelling which is expected postoperatively 2 weeks out.  I don't palpate any fluctuation of the joint.  There is some tenderness when I palpate the joint and the patient does grimace a little bit.  The left joint appears normal.  Range of motion is limited in the right knee joint but normal in the left joint of the knee.    Scheduled meds:    budesonide-formoterol 2 puff Inhalation BID - RT   divalproex 250 mg Oral Daily   divalproex 500 mg Oral Nightly   heparin (porcine) 5,000 Units Subcutaneous Q12H   ipratropium-albuterol 3 mL Nebulization 4x Daily - RT   levothyroxine 88 mcg Oral Q AM   piperacillin-tazobactam 3.375 g  Intravenous Q12H     IV meds:                        norepinephrine 0.02-0.3 mcg/kg/min    Pharmacy to dose vancomycin     sodium chloride 125 mL/hr Last Rate: 125 mL/hr (12/03/17 0217)       Data Review:   I reviewed the patient's medications and new clinical results.  Lab Results   Component Value Date    CALCIUM 8.0 (L) 12/02/2017    MG 2.0 05/14/2015    MG 1.5 (L) 05/13/2015       Results from last 7 days  Lab Units 12/03/17  0253 12/02/17 2152 12/02/17  1515   SODIUM mmol/L  --  137  --    POTASSIUM mmol/L  --  4.4  --    CHLORIDE mmol/L  --  96*  --    CO2 mmol/L  --  25.4  --    BUN mg/dL  --  37*  --    CREATININE mg/dL  --  3.96*  --    CALCIUM mg/dL  --  8.0*  --    BILIRUBIN mg/dL  --  <0.2  --    ALK PHOS U/L  --  92  --    ALT (SGPT) U/L  --  12  --    AST (SGOT) U/L  --  16  --    GLUCOSE mg/dL  --  91  --    WBC 10*3/mm3 4.74 7.04 6.71   HEMOGLOBIN g/dL 6.3* 7.5* 7.4*   PLATELETS 10*3/mm3 213 260 265   INR  1.18* 1.09  --    PROBNP pg/mL  --  2752.0*  --    PROCALCITONIN ng/mL  --  0.16  --        Results from last 7 days  Lab Units 12/02/17  2253 12/02/17 2153   BLOODCX  No growth at less than 24 hours No growth at less than 24 hours                 Results from last 7 days  Lab Units 12/02/17 2159   FLOW RATE lpm 4   MODALITY  Cannula   O2 SATURATION CALC % 57.0*         ASSESSMENT:   Acute hypoxia  Sepsis criteria, but unsure the patient truly has an infection.  Hypotension due to dehydration versus medication induced, versus blood loss  Acute kidney injury  Altered mental status, most likely metabolic encephalopathy  Acute blood loss anemia on chronic multifactorial anemia  Status post right total knee replacement 2 weeks ago  History of COPD      PLAN:  This patient remains a little lethargic.  She remains hypotensive in spite of blood transfusion.  I am going to continue with IV fluid resuscitation and monitor urine output closely.  Her creatinine is quite elevated.  That is a new problem.  I  will consult nephrology.    Her anemia is quite puzzling.  I'm not sure why or where she has lost the blood, but on physical exam I cannot elicit any pain on the abdominal exam to suspect retroperitoneal bleed.    The knee does not appear infected.  I'm not sure this is hypotension due to sepsis.  This may be hypotension due to prerenal intravascular depletion of volume, or secondary to medications.  We will continue empiric antibiotics and fluids for now.  We will reassess sepsis criteria again in 24 hours.    Nuclear ventilation perfusion scan of the lungs does not suggest pulmonary embolism.  Therefore full anticoagulation has been discontinued.  Low-dose heparin will be used for DVT prophylaxis, but renally adjusted.    Patient remains critically ill.  I explained this very clearly to the family and we will continue to monitor closely.    Total critical care time 35 minutes, excluding any separately billable procedure time    Param Stone MD  12/3/2017     Electronically signed by Param Stone MD at 12/3/2017 12:59 PM      Willie Bangura MD at 12/4/2017  6:25 AM  Version 1 of 1            LOS: 2 days    Patient Care Team:  Bill Martino MD as PCP - General (Internal Medicine)  No Known Provider as PCP - Family Medicine    Chief Complaint:    Chief Complaint   Patient presents with   • Shortness of Breath       Subjective follow up acute kidney injury on chronic kidney disease.    Interval History:   Patient is feeling weak, has mild nausea but no vomiting, complaining of mild shortness of air but she appears to be very comfortable no chest pain.  No abdominal pain, she had urinary frequency voiding approximately 200-300 cc every 2 hours but no dysuria or gross hematuria.      Review of Systems:   As noted above    Objective     Vital Signs  Temp:  [98.2 °F (36.8 °C)-99.5 °F (37.5 °C)] 98.4 °F (36.9 °C)  Heart Rate:  [66-97] 97  Resp:  [16-18] 18  BP: ()/(38-79) 121/66    Flowsheet  "Rows         First Filed Value    Admission Height  62\" (157.5 cm) Documented at 12/02/2017 2104    Admission Weight  140 lb (63.5 kg) Documented at 12/02/2017 2104          I/O this shift:  In: -   Out: 1250 [Urine:1250]  I/O last 3 completed shifts:  In: 4913 [P.O.:840; I.V.:2933; Blood:1140]  Out: 1325 [Urine:1325]    Intake/Output Summary (Last 24 hours) at 12/04/17 0626  Last data filed at 12/04/17 0400   Gross per 24 hour   Intake             4373 ml   Output             2300 ml   Net             2073 ml       Physical Exam:  General Appearance: alert, oriented x 3, no acute distress, Appears to be chronically ill  Skin: warm and dry  HEENT: Nonicteric sclerae, oral mucosa normal,   Neck: supple, no JVD, trachea midline  Lungs: Lateral rhonchi, unlabored breathing effort  Heart: RRR, normal S1 and S2, no S3, no rub  Abdomen: soft, non-tender,  present bowel sounds to auscultation  : no palpable bladder,  Extremities: no edema, cyanosis or clubbing  Neuro: normal speech and mental status      Results Review:      Results from last 7 days  Lab Units 12/04/17  0537 12/03/17  1211 12/02/17  2152   SODIUM mmol/L 141 138 137   POTASSIUM mmol/L 5.3* 5.1 4.4   CHLORIDE mmol/L 109* 105 96*   CO2 mmol/L 21.6* 18.3* 25.4   BUN mg/dL 28* 32* 37*   CREATININE mg/dL 2.17* 2.96* 3.96*   CALCIUM mg/dL 8.1* 7.1* 8.0*   BILIRUBIN mg/dL  --   --  <0.2   ALK PHOS U/L  --   --  92   ALT (SGPT) U/L  --   --  12   AST (SGOT) U/L  --   --  16   GLUCOSE mg/dL 70 71 91       Estimated Creatinine Clearance: 19.6 mL/min (by C-G formula based on Cr of 2.17).      Results from last 7 days  Lab Units 12/03/17  1211   MAGNESIUM mg/dL 1.8   PHOSPHORUS mg/dL 4.9*               Results from last 7 days  Lab Units 12/04/17  0537 12/03/17  1211 12/03/17  0253 12/02/17  2152 12/02/17  1515   WBC 10*3/mm3 3.96*  --  4.74 7.04 6.71   HEMOGLOBIN g/dL 9.1* 8.4* 6.3* 7.5* 7.4*   PLATELETS 10*3/mm3 189  --  213 260 265         Results from last 7 " days  Lab Units 12/03/17  1211 12/03/17  0253 12/02/17  2152   INR  1.14* 1.18* 1.09         Imaging Results (last 24 hours)     Procedure Component Value Units Date/Time    US Renal Bilateral [[id number]] Collected:  12/03/17 2207     Updated:  12/03/17 2207    Narrative:       BILATERAL RENAL ULTRASOUND     CLINICAL HISTORY: ARF; R09.02-Hypoxemia; T81.4XXA-Infection following a  procedure, initial encounter; A41.9-Sepsis, unspecified organism;  R79.89-Other specified abnormal findings of blood chemistry; N17.9-Acute  kidney failure, unspecified.     FINDINGS: Longitudinal and transverse images of the kidneys were  obtained. The right kidney measures 9.9 cm in length. The left kidney  measures 10.1 cm in length. Cortical echogenicity is normal. No cystic  or solid renal mass. No hydronephrosis. Limited urinary bladder images  are unremarkable.       Impression:       Somewhat small but otherwise normal-appearing kidneys.                 budesonide-formoterol 2 puff Inhalation BID - RT   divalproex 250 mg Oral Daily   divalproex 500 mg Oral Nightly   ipratropium-albuterol 3 mL Nebulization 4x Daily - RT   levothyroxine 88 mcg Oral Q AM   piperacillin-tazobactam 3.375 g Intravenous Q12H   sodium bicarbonate 650 mg Oral TID       norepinephrine 0.02-0.3 mcg/kg/min    Pharmacy to dose vancomycin     sodium chloride 200 mL/hr Last Rate: 200 mL/hr (12/03/17 1430)   sodium chloride 0.9 % with KCl 20 mEq 75 mL/hr Last Rate: 75 mL/hr (12/03/17 2015)       Medication Review:   Current Facility-Administered Medications   Medication Dose Route Frequency Provider Last Rate Last Dose   • budesonide-formoterol (SYMBICORT) 160-4.5 MCG/ACT inhaler 2 puff  2 puff Inhalation BID - RT John Ca MD   2 puff at 12/03/17 2037   • divalproex (DEPAKOTE) 24 hr tablet 250 mg  250 mg Oral Daily John Ca MD   250 mg at 12/03/17 1035   • divalproex (DEPAKOTE) 24 hr tablet 500 mg  500 mg Oral Nightly John Ca MD   500 mg at  12/03/17 2105   • fentaNYL citrate (PF) (SUBLIMAZE) injection 50 mcg  50 mcg Intravenous Q1H PRN Param H. MD Bertha       • HYDROcodone-acetaminophen (NORCO) 7.5-325 MG per tablet 1 tablet  1 tablet Oral Q6H PRN Param H. MD Bertha   1 tablet at 12/03/17 2304   • ipratropium-albuterol (DUO-NEB) nebulizer solution 3 mL  3 mL Nebulization 4x Daily - RT John Ca MD   3 mL at 12/03/17 2037   • levothyroxine (SYNTHROID, LEVOTHROID) tablet 88 mcg  88 mcg Oral Q AM John Ca MD   88 mcg at 12/04/17 0615   • norepinephrine (LEVOPHED) 8 mg/250 mL (32 mcg/mL) in sodium chloride 0.9% infusion (premix)  0.02-0.3 mcg/kg/min Intravenous Titrated John Ca MD       • ondansetron (ZOFRAN) tablet 4 mg  4 mg Oral Q6H PRN Param H. MD Bertha        Or   • ondansetron ODT (ZOFRAN-ODT) disintegrating tablet 4 mg  4 mg Oral Q6H PRN Param H. MD Bertha        Or   • ondansetron (ZOFRAN) injection 4 mg  4 mg Intravenous Q6H PRN Param H. MD Bertha       • Pharmacy to dose vancomycin   Does not apply Continuous PRN John Ca MD       • piperacillin-tazobactam (ZOSYN) 3.375 g in iso-osmotic dextrose 50 ml (premix)  3.375 g Intravenous Q12H John Ca MD   3.375 g at 12/03/17 2214   • sodium bicarbonate tablet 650 mg  650 mg Oral TID Kelton Barreto MD   650 mg at 12/03/17 2100   • sodium chloride 0.9 % flush 1-10 mL  1-10 mL Intravenous PRN Param Stone MD       • sodium chloride 0.9 % flush 10 mL  10 mL Intravenous PRN Efrain Peterson MD       • sodium chloride 0.9 % infusion  200 mL/hr Intravenous Continuous Parambriana Stone  mL/hr at 12/03/17 1430 200 mL/hr at 12/03/17 1430   • sodium chloride 0.9 % with KCl 20 mEq/L infusion  75 mL/hr Intravenous Continuous Kelton Barreto MD 75 mL/hr at 12/03/17 2015 75 mL/hr at 12/03/17 2015       Assessment/Plan   1.  Acute kidney injury on chronic kidney disease and renal function is improving slowly, creatinine down to 2.17.  2.  Chronic kidney disease stage  III at baseline with relatively speaking small kidneys by ultrasounds.  3.  Metabolic acidosis, her total CO2 today is 21.6.    4.  Hyperkalemia, potassium is 5.3.  Patient is receiving IV fluid with potassium.  5.  Chronic anemia, hemoglobin today is 9.1.  6.  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  COPD  8.  Hypertension, reasonably controlled        Plan:  1.  Discontinue potassium and IV fluid, continue IV fluid normal saline at 75 cc an hour.  2.  Surveillance labs  3.  We'll check immunofixation iron stores          Willie Bangura MD  12/04/17  6:26 AM       Electronically signed by Willie Bangura MD at 12/4/2017  6:34 AM           Consult Notes (last 72 hours) (Notes from 12/1/2017 11:49 AM through 12/4/2017 11:49 AM)      Albert Martinez MD at 12/3/2017  8:15 AM  Version 2 of 2     Consult Orders:    1. Inpatient Consult to Orthopedic Surgery [[id number]] ordered by John Ca MD at 12/03/17 0022                      Reason for Consultation: possible infected right knee    Patient Care Team:  Bill Martino MD as PCP - General (Internal Medicine)  No Known Provider as PCP - Family Medicine    Chief complaint hypotension and SOA    Subjective .     History of present illness:  The patient is a 78 y/o female who had a right TKA by Dr. Perez 11/16/17. She was in rehab and readmitted to [facility] via the ER due to complaints of SOA. Apparently her hgb at rehab had been around 6 g/dL.  Her son states she has been confused.  Orthopedics was consulted due to some swelling and redness around her post-operative knee.    Review of Systems  Pertinent items are noted in HPI    History  Past Medical History:   Diagnosis Date   • Acid reflux    • Anemia    • Arthritis    • Bipolar 1 disorder, depressed    • Cataract     MINDY   • Chronic nausea    • Chronic pain of right knee    • Continuous leakage of urine     USES DEPENDS   • COPD (chronic obstructive pulmonary disease)      USES O2 2 LMP PER NC AT NIGHT   • Disease of thyroid gland     HYPOTHYROIDISM   • Diverticulosis    • Fibromyalgia     DX 1994   • Frequent episodes of bronchitis    • Hypertension    • Migraines    • Neck pain    • On home oxygen therapy     2L NC AT NIGHT   • Short of breath on exertion    ,   Past Surgical History:   Procedure Laterality Date   • CEREBRAL ANEURYSM REPAIR  2000'S    WITH STENT   • HYSTERECTOMY     • LAPAROSCOPIC CHOLECYSTECTOMY     • ND TOTAL KNEE ARTHROPLASTY Right 11/16/2017    Procedure: RT TOTAL KNEE ARTHROPLASTY;  Surgeon: Kashif Perez MD;  Location: [facility] OR;  Service: Orthopedics   ,   Family History   Problem Relation Age of Onset   • Malig Hyperthermia Neg Hx    ,   Social History   Substance Use Topics   • Smoking status: Former Smoker     Packs/day: 1.00     Years: 10.00     Types: Cigarettes     Start date: 1959     Quit date: 1969   • Smokeless tobacco: Never Used   • Alcohol use No   ,   Prescriptions Prior to Admission   Medication Sig Dispense Refill Last Dose   • albuterol (ACCUNEB) 1.25 MG/3ML nebulizer solution Take 1 ampule by nebulization Every 4 (Four) Hours.   11/15/2017 at 2100   • allopurinol (ZYLOPRIM) 300 MG tablet Take 300 mg by mouth Every Morning.   11/16/2017 at 0800   • amLODIPine (NORVASC) 10 MG tablet Take 10 mg by mouth Daily.      • aspirin  MG EC tablet Take 1 tablet by mouth 2 (Two) Times a Day With Meals. 60 tablet 0    • budesonide-formoterol (SYMBICORT) 160-4.5 MCG/ACT inhaler Inhale 2 puffs 2 (Two) Times a Day.   11/16/2017 at 0730   • Cetirizine HCl 10 MG capsule Take 1 tablet by mouth Daily.   11/14/2017   • Cholecalciferol (VITAMIN D3) 5000 units capsule capsule Take 5,000 Units by mouth Daily.      • divalproex (DEPAKOTE ER) 250 MG 24 hr tablet Take 250 mg by mouth Every Morning.   11/15/2017 at 1200   • divalproex (DEPAKOTE ER) 500 MG 24 hr tablet Take 500 mg by mouth Every Night.   11/15/2017 at 2100   • docusate sodium 100 MG capsule  Take 100 mg by mouth 2 (Two) Times a Day As Needed for Constipation. 40 capsule 1    • ferrous sulfate 325 (65 FE) MG tablet Take 325 mg by mouth 2 (Two) Times a Day.      • gabapentin (NEURONTIN) 300 MG capsule Take 300 mg by mouth 3 (Three) Times a Day.   11/15/2017 at 2100   • HYDROcodone-acetaminophen (NORCO) 7.5-325 MG per tablet Take 1-2 tablets by mouth Every 4 (Four) Hours As Needed for Moderate Pain  (Pain). 80 tablet 0    • levothyroxine (SYNTHROID, LEVOTHROID) 88 MCG tablet Take 88 mcg by mouth Daily.   11/16/2017 at 0700   • montelukast (SINGULAIR) 10 MG tablet Take 10 mg by mouth Every Evening.      • ondansetron (ZOFRAN) 4 MG tablet Take 4 mg by mouth Every 8 (Eight) Hours As Needed for Nausea or Vomiting.   11/15/2017 at 0800   • pantoprazole (PROTONIX) 40 MG EC tablet Take 40 mg by mouth Daily.      • sulfamethoxazole-trimethoprim (BACTRIM DS,SEPTRA DS) 800-160 MG per tablet Take 1 tablet by mouth 2 (Two) Times a Day.      • valsartan-hydrochlorothiazide (DIOVAN-HCT) 320-12.5 MG per tablet Take 1 tablet by mouth Daily.      • venlafaxine XR (EFFEXOR XR) 150 MG 24 hr capsule Take 150 mg by mouth Daily.   11/15/2017 at 2100   • HYDROcodone-acetaminophen (NORCO) 7.5-325 MG per tablet Take 1 tablet by mouth Every 4 (Four) Hours As Needed for Moderate Pain .   11/15/2017 at 0800   • omeprazole (priLOSEC) 20 MG capsule Take 20 mg by mouth Daily.   11/15/2017 at 1200   • traZODone (DESYREL) 100 MG tablet Take 100 mg by mouth Every Night.   11/15/2017 at 2100   , Scheduled Meds:    budesonide-formoterol 2 puff Inhalation BID - RT   divalproex 250 mg Oral Daily   divalproex 500 mg Oral Nightly   heparin (porcine) 5,000 Units Subcutaneous Q12H   ipratropium-albuterol 3 mL Nebulization 4x Daily - RT   levothyroxine 88 mcg Oral Q AM   piperacillin-tazobactam 3.375 g Intravenous Q12H   , Continuous Infusions:    norepinephrine 0.02-0.3 mcg/kg/min    Pharmacy to dose vancomycin     sodium chloride 150 mL/hr Last  Rate: 150 mL/hr (12/03/17 0331)   sodium chloride 125 mL/hr Last Rate: 125 mL/hr (12/03/17 0217)   , PRN Meds:  fentaNYL citrate (PF)  •  Pharmacy to dose vancomycin  •  Insert peripheral IV **AND** sodium chloride and Allergies:  Morphine and related    Objective     Vital Signs   Temp:  [98.2 °F (36.8 °C)-99.2 °F (37.3 °C)] 99 °F (37.2 °C)  Heart Rate:  [70-81] 75  Resp:  [16-20] 18  BP: ()/(28-89) 87/44    Physical Exam:     General Appearance: appears stated age and is in mild distress   Head: normocephalic, without obvious abnormality and atraumatic  Extremities: moves extremities well, no edema, no cyanosis, no redness, no tenderness and Ruthann's sign negative. There is some swelling around her post-op right knee without fluctuance.   Pulses: Pulses palpable and equal bilaterally  Skin: no bleeding, bruising or rash and well approximated anterior knee incision  Neurologic: Mental Status alertness awake, Motor strength is unable to be assessed as pt will not follow commands but she moves upper and lower extremities well  Psych: unable to follow commands or answer questions    Results Review:   I reviewed the patient's new clinical results.      Assessment/Plan     Active Problems:    Hypoxia  Anemia  Possible sepsis of unknown origin. 2 weeks s/p right TKA. Blood cultures and urine cultures pending. Pt on vancomycin.  Her knee wound looks great.  No sign of infection or problem with it.      I discussed the patients findings and my recommendations with family, nursing staff and Dr. Martinez. We will monitor culture results. Continue vancomycin for the time being.     PLAN:  Being transfused  No need for antibiotics with regard to the knee    YUSUF Moreira  12/03/17  8:15 AM             Electronically signed by Albert Martinez MD at 12/3/2017  9:19 AM

## 2017-12-04 NOTE — PLAN OF CARE
Problem: Patient Care Overview (Adult)  Goal: Plan of Care Review  Outcome: Ongoing (interventions implemented as appropriate)    12/04/17 1323   Coping/Psychosocial Response Interventions   Plan Of Care Reviewed With patient   Outcome Evaluation   Outcome Summary/Follow up Plan Pt presents with impaired funcitonal mobility and gait secondary to generalized weakness, impaired balance, and decreased activity tolerance s/p PNA. Pt may benefit from skilled PT to address these deficits.         Problem: Inpatient Physical Therapy  Goal: Bed Mobility Goal LTG- PT  Outcome: Ongoing (interventions implemented as appropriate)    12/04/17 1323   Bed Mobility PT LTG   Bed Mobility PT LTG, Time to Achieve 1 wk   Bed Mobility PT LTG, Activity Type all bed mobility   Bed Mobility PT LTG, Roca Level contact guard assist       Goal: Transfer Training Goal 1 LTG- PT  Outcome: Ongoing (interventions implemented as appropriate)    12/04/17 1323   Transfer Training PT LTG   Transfer Training PT LTG, Time to Achieve 1 wk   Transfer Training PT LTG, Activity Type all transfers   Transfer Training PT LTG, Roca Level contact guard assist   Transfer Training PT LTG, Assist Device walker, rolling       Goal: Gait Training Goal LTG- PT  Outcome: Ongoing (interventions implemented as appropriate)    12/04/17 1323   Gait Training PT LTG   Gait Training Goal PT LTG, Time to Achieve 1 wk   Gait Training Goal PT LTG, Roca Level contact guard assist   Gait Training Goal PT LTG, Assist Device walker, rolling   Gait Training Goal PT LTG, Distance to Achieve 150

## 2017-12-04 NOTE — DISCHARGE PLACEMENT REQUEST
"Raheem Roberts (77 y.o. Female)     Date of Birth Social Security Number Address Home Phone MRN    1940  [id number]   [place] [address] [phone] [id number]    [employer]ondoism Marital Status          Mandaen        Admission Date Admission Type Admitting Provider Attending Provider Department, Room/Bed    12/2/17 Emergency John Ca MD Anaya, Param GOMEZ MD [address], 402/1    Discharge Date Discharge Disposition Discharge Destination                      Attending Provider: Param Stone MD     Allergies:  Morphine And Related    Isolation:  None   Infection:  None   Code Status:  Conditional    Ht:  62\" (157.5 cm)   Wt:  140 lb (63.5 kg)    Admission Cmt:  None   Principal Problem:  None                Active Insurance as of 12/2/2017     Primary Coverage     Payor Plan Insurance Group Employer/Plan Group    [facility] MEDICARE REPLACEMENT [facility]      Payor Plan Address Payor Plan Phone Number Effective From Effective To    [address] [address] [phone] 10/9/2017     [place] [address]       Subscriber Name Subscriber Birth Date Member ID       RAHEEM ROBERTS 1940 [id number]           Secondary Coverage     Payor Plan Insurance Group Employer/Plan Group    [place]      Payor Plan Address Payor Plan Phone Number Effective From Effective To    [address] [phone] 7/3/2016     [place] [address]       Subscriber Name Subscriber Birth Date Member ID       RAHEEM ROBERTS 1940 [id number]                 Emergency Contacts      (Rel.) Home Phone Work Phone Mobile Phone    Chino Roberts (Son) [phone] -- --    LucianoMaureeny (Son) [phone] -- [phone]            "

## 2017-12-04 NOTE — PROGRESS NOTES
"   LOS: 2 days    Patient Care Team:  Bill Martino MD as PCP - General (Internal Medicine)  No Known Provider as PCP - Family Medicine    Chief Complaint:    Chief Complaint   Patient presents with   • Shortness of Breath       Subjective follow up acute kidney injury on chronic kidney disease.    Interval History:   Patient is feeling weak, has mild nausea but no vomiting, complaining of mild shortness of air but she appears to be very comfortable no chest pain.  No abdominal pain, she had urinary frequency voiding approximately 200-300 cc every 2 hours but no dysuria or gross hematuria.      Review of Systems:   As noted above    Objective     Vital Signs  Temp:  [98.2 °F (36.8 °C)-99.5 °F (37.5 °C)] 98.4 °F (36.9 °C)  Heart Rate:  [66-97] 97  Resp:  [16-18] 18  BP: ()/(38-79) 121/66    Flowsheet Rows         First Filed Value    Admission Height  62\" (157.5 cm) Documented at 12/02/2017 2104    Admission Weight  140 lb (63.5 kg) Documented at 12/02/2017 2104          I/O this shift:  In: -   Out: 1250 [Urine:1250]  I/O last 3 completed shifts:  In: 4913 [P.O.:840; I.V.:2933; Blood:1140]  Out: 1325 [Urine:1325]    Intake/Output Summary (Last 24 hours) at 12/04/17 0626  Last data filed at 12/04/17 0400   Gross per 24 hour   Intake             4373 ml   Output             2300 ml   Net             2073 ml       Physical Exam:  General Appearance: alert, oriented x 3, no acute distress, Appears to be chronically ill  Skin: warm and dry  HEENT: Nonicteric sclerae, oral mucosa normal,   Neck: supple, no JVD, trachea midline  Lungs: Lateral rhonchi, unlabored breathing effort  Heart: RRR, normal S1 and S2, no S3, no rub  Abdomen: soft, non-tender,  present bowel sounds to auscultation  : no palpable bladder,  Extremities: no edema, cyanosis or clubbing  Neuro: normal speech and mental status      Results Review:      Results from last 7 days  Lab Units 12/04/17  0537 12/03/17  1211 12/02/17  2152 "   SODIUM mmol/L 141 138 137   POTASSIUM mmol/L 5.3* 5.1 4.4   CHLORIDE mmol/L 109* 105 96*   CO2 mmol/L 21.6* 18.3* 25.4   BUN mg/dL 28* 32* 37*   CREATININE mg/dL 2.17* 2.96* 3.96*   CALCIUM mg/dL 8.1* 7.1* 8.0*   BILIRUBIN mg/dL  --   --  <0.2   ALK PHOS U/L  --   --  92   ALT (SGPT) U/L  --   --  12   AST (SGOT) U/L  --   --  16   GLUCOSE mg/dL 70 71 91       Estimated Creatinine Clearance: 19.6 mL/min (by C-G formula based on Cr of 2.17).      Results from last 7 days  Lab Units 12/03/17  1211   MAGNESIUM mg/dL 1.8   PHOSPHORUS mg/dL 4.9*               Results from last 7 days  Lab Units 12/04/17  0537 12/03/17  1211 12/03/17  0253 12/02/17  2152 12/02/17  1515   WBC 10*3/mm3 3.96*  --  4.74 7.04 6.71   HEMOGLOBIN g/dL 9.1* 8.4* 6.3* 7.5* 7.4*   PLATELETS 10*3/mm3 189  --  213 260 265         Results from last 7 days  Lab Units 12/03/17  1211 12/03/17  0253 12/02/17  2152   INR  1.14* 1.18* 1.09         Imaging Results (last 24 hours)     Procedure Component Value Units Date/Time    US Renal Bilateral [[id number]] Collected:  12/03/17 2207     Updated:  12/03/17 2207    Narrative:       BILATERAL RENAL ULTRASOUND     CLINICAL HISTORY: ARF; R09.02-Hypoxemia; T81.4XXA-Infection following a  procedure, initial encounter; A41.9-Sepsis, unspecified organism;  R79.89-Other specified abnormal findings of blood chemistry; N17.9-Acute  kidney failure, unspecified.     FINDINGS: Longitudinal and transverse images of the kidneys were  obtained. The right kidney measures 9.9 cm in length. The left kidney  measures 10.1 cm in length. Cortical echogenicity is normal. No cystic  or solid renal mass. No hydronephrosis. Limited urinary bladder images  are unremarkable.       Impression:       Somewhat small but otherwise normal-appearing kidneys.                 budesonide-formoterol 2 puff Inhalation BID - RT   divalproex 250 mg Oral Daily   divalproex 500 mg Oral Nightly   ipratropium-albuterol 3 mL Nebulization 4x Daily - RT    levothyroxine 88 mcg Oral Q AM   piperacillin-tazobactam 3.375 g Intravenous Q12H   sodium bicarbonate 650 mg Oral TID       norepinephrine 0.02-0.3 mcg/kg/min    Pharmacy to dose vancomycin     sodium chloride 200 mL/hr Last Rate: 200 mL/hr (12/03/17 1430)   sodium chloride 0.9 % with KCl 20 mEq 75 mL/hr Last Rate: 75 mL/hr (12/03/17 2015)       Medication Review:   Current Facility-Administered Medications   Medication Dose Route Frequency Provider Last Rate Last Dose   • budesonide-formoterol (SYMBICORT) 160-4.5 MCG/ACT inhaler 2 puff  2 puff Inhalation BID - RT John Ca MD   2 puff at 12/03/17 2037   • divalproex (DEPAKOTE) 24 hr tablet 250 mg  250 mg Oral Daily John Ca MD   250 mg at 12/03/17 1035   • divalproex (DEPAKOTE) 24 hr tablet 500 mg  500 mg Oral Nightly John Ca MD   500 mg at 12/03/17 2105   • fentaNYL citrate (PF) (SUBLIMAZE) injection 50 mcg  50 mcg Intravenous Q1H PRN Param HLucretia Stone MD       • HYDROcodone-acetaminophen (NORCO) 7.5-325 MG per tablet 1 tablet  1 tablet Oral Q6H PRN Param HLucretia Stone MD   1 tablet at 12/03/17 2304   • ipratropium-albuterol (DUO-NEB) nebulizer solution 3 mL  3 mL Nebulization 4x Daily - RT John Ca MD   3 mL at 12/03/17 2037   • levothyroxine (SYNTHROID, LEVOTHROID) tablet 88 mcg  88 mcg Oral Q AM John Ca MD   88 mcg at 12/04/17 0615   • norepinephrine (LEVOPHED) 8 mg/250 mL (32 mcg/mL) in sodium chloride 0.9% infusion (premix)  0.02-0.3 mcg/kg/min Intravenous Titrated John Ca MD       • ondansetron (ZOFRAN) tablet 4 mg  4 mg Oral Q6H PRN Param H. MD Bertha        Or   • ondansetron ODT (ZOFRAN-ODT) disintegrating tablet 4 mg  4 mg Oral Q6H PRN Param H. Stone, MD        Or   • ondansetron (ZOFRAN) injection 4 mg  4 mg Intravenous Q6H PRN Param Stone MD       • Pharmacy to dose vancomycin   Does not apply Continuous PRN John Ca MD       • piperacillin-tazobactam (ZOSYN) 3.375 g in iso-osmotic dextrose 50 ml (premix)   3.375 g Intravenous Q12H John Ca MD   3.375 g at 12/03/17 2214   • sodium bicarbonate tablet 650 mg  650 mg Oral TID Kelton Barreto MD   650 mg at 12/03/17 2100   • sodium chloride 0.9 % flush 1-10 mL  1-10 mL Intravenous PRN Param Stone MD       • sodium chloride 0.9 % flush 10 mL  10 mL Intravenous PRN Efrain Peterson MD       • sodium chloride 0.9 % infusion  200 mL/hr Intravenous Continuous Param Stone  mL/hr at 12/03/17 1430 200 mL/hr at 12/03/17 1430   • sodium chloride 0.9 % with KCl 20 mEq/L infusion  75 mL/hr Intravenous Continuous Kelton Barreto MD 75 mL/hr at 12/03/17 2015 75 mL/hr at 12/03/17 2015       Assessment/Plan   1.  Acute kidney injury on chronic kidney disease and renal function is improving slowly, creatinine down to 2.17.  2.  Chronic kidney disease stage III at baseline with relatively speaking small kidneys by ultrasounds.  3.  Metabolic acidosis, her total CO2 today is 21.6.    4.  Hyperkalemia, potassium is 5.3.  Patient is receiving IV fluid with potassium.  5.  Chronic anemia, hemoglobin today is 9.1.  6.  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  COPD  8.  Hypertension, reasonably controlled        Plan:  1.  Discontinue potassium and IV fluid, continue IV fluid normal saline at 75 cc an hour.  2.  Surveillance labs  3.  We'll check immunofixation iron stores          Willie Bangura MD  12/04/17  6:26 AM

## 2017-12-04 NOTE — PROGRESS NOTES
Dr. JUNIOR Stone    [facility] INTENSIVE CARE    12/4/2017    Patient ID:  Name:  Josephine Wolf  MRN:  [id number]  1940  77 y.o.  female            CC/Reason for visit: Follow-up for hypotension, acute blood loss anemia, and many other medical problems listed below    HPI: Patient's condition has improved significantly.  Having some exacerbation of her usual neurologic tics.  Family is requesting neurology consultation.  Discussed with nurse during rounds    Vitals:  Vitals:    12/04/17 0700 12/04/17 0705 12/04/17 0749 12/04/17 1145   BP:  123/54     Pulse:  87  70   Resp:   18 16   Temp: 98.6 °F (37 °C)      TempSrc: Oral      SpO2:  (!) 86% 90%    Weight:       Height:               Body mass index is 25.61 kg/(m^2).    Intake/Output Summary (Last 24 hours) at 12/04/17 1159  Last data filed at 12/04/17 1000   Gross per 24 hour   Intake             2855 ml   Output             2050 ml   Net              805 ml       Exam:  GEN:  No distress.  Occasionally has some jerking movements  LUNGS: Clear breath sounds bilat, nonlabored breathing  CV:  Normal S1S2, without murmur, no edema      Scheduled meds:    budesonide-formoterol 2 puff Inhalation BID - RT   divalproex 250 mg Oral Daily   divalproex 500 mg Oral Nightly   enoxaparin 30 mg Subcutaneous Nightly   ipratropium-albuterol 3 mL Nebulization 4x Daily - RT   levothyroxine 88 mcg Oral Q AM   sodium bicarbonate 650 mg Oral TID     IV meds:                        sodium chloride 200 mL/hr Last Rate: 200 mL/hr (12/03/17 1430)   sodium chloride 75 mL/hr Last Rate: 75 mL/hr (12/04/17 0756)       Data Review:   I reviewed the patient's medications and new clinical results.      Results from last 7 days  Lab Units 12/04/17  0537 12/03/17  1211 12/03/17  0253 12/02/17  2152   SODIUM mmol/L 141 138  --  137   POTASSIUM mmol/L 5.3* 5.1  --  4.4   CHLORIDE mmol/L 109* 105  --  96*   CO2 mmol/L 21.6* 18.3*  --  25.4   BUN mg/dL 28* 32*  --  37*    CREATININE mg/dL 2.17* 2.96*  --  3.96*   CALCIUM mg/dL 8.1* 7.1*  --  8.0*   BILIRUBIN mg/dL  --   --   --  <0.2   ALK PHOS U/L  --   --   --  92   ALT (SGPT) U/L  --   --   --  12   AST (SGOT) U/L  --   --   --  16   GLUCOSE mg/dL 70 71  --  91   WBC 10*3/mm3 3.96*  --  4.74 7.04   HEMOGLOBIN g/dL 9.1* 8.4* 6.3* 7.5*   PLATELETS 10*3/mm3 189  --  213 260   INR   --  1.14* 1.18* 1.09   PROBNP pg/mL  --  3198.0*  --  2752.0*   PROCALCITONIN ng/mL  --   --   --  0.16         ASSESSMENT:   Acute hypoxia  Sepsis criteria, but unsure the patient truly has an infection.  Hypotension due to dehydration versus medication induced, versus blood loss  Acute kidney injury  Altered mental status, most likely metabolic encephalopathy  Acute blood loss anemia on chronic multifactorial anemia  Status post right total knee replacement 2 weeks ago  History of COPD      PLAN:  Having some exacerbation of her usual neurologic tics.  We will obtain a neurology consultation.    Hyperkalemia will be watched closely.  Repeat electrolytes.  Appreciate input from neurology.  Creatinine seems to be improving on IV fluids.  Oral HCO3 was started today.    Anemia has stabilized after transfusion.  Blood pressure is also stabilized.    Start mobilizing the patient aggressively with physical therapy.    Continue to monitor white count and platelets closely because she is now starting to drop her white count and her platelets.    Doubt any sepsis.  Stop all antibiotics.    Param Stone MD  12/4/2017

## 2017-12-05 LAB
ALBUMIN SERPL-MCNC: 2.9 G/DL (ref 3.5–5.2)
ANION GAP SERPL CALCULATED.3IONS-SCNC: 13.9 MMOL/L
ANISOCYTOSIS BLD QL: NORMAL
BASOPHILS # BLD AUTO: 0.01 10*3/MM3 (ref 0–0.2)
BASOPHILS NFR BLD AUTO: 0.2 % (ref 0–1.5)
BUN BLD-MCNC: 22 MG/DL (ref 8–23)
BUN/CREAT SERPL: 18 (ref 7–25)
CALCIUM SPEC-SCNC: 8.8 MG/DL (ref 8.6–10.5)
CHLORIDE SERPL-SCNC: 109 MMOL/L (ref 98–107)
CO2 SERPL-SCNC: 18.1 MMOL/L (ref 22–29)
CREAT BLD-MCNC: 1.22 MG/DL (ref 0.57–1)
DEPRECATED RDW RBC AUTO: 67.1 FL (ref 37–54)
EOSINOPHIL # BLD AUTO: 0.02 10*3/MM3 (ref 0–0.7)
EOSINOPHIL NFR BLD AUTO: 0.4 % (ref 0.3–6.2)
ERYTHROCYTE [DISTWIDTH] IN BLOOD BY AUTOMATED COUNT: 17.4 % (ref 11.7–13)
GFR SERPL CREATININE-BSD FRML MDRD: 43 ML/MIN/1.73
GLUCOSE BLD-MCNC: 86 MG/DL (ref 65–99)
HCT VFR BLD AUTO: 30.6 % (ref 35.6–45.5)
HGB BLD-MCNC: 9.3 G/DL (ref 11.9–15.5)
HYPOCHROMIA BLD QL: NORMAL
IGA SERPL-MCNC: 176 MG/DL (ref 64–422)
IGG SERPL-MCNC: 682 MG/DL (ref 700–1600)
IGM SERPL-MCNC: 147 MG/DL (ref 26–217)
IMM GRANULOCYTES # BLD: 0.03 10*3/MM3 (ref 0–0.03)
IMM GRANULOCYTES NFR BLD: 0.6 % (ref 0–0.5)
LYMPHOCYTES # BLD AUTO: 0.8 10*3/MM3 (ref 0.9–4.8)
LYMPHOCYTES NFR BLD AUTO: 15.9 % (ref 19.6–45.3)
MACROCYTES BLD QL SMEAR: NORMAL
MAGNESIUM SERPL-MCNC: 1.6 MG/DL (ref 1.6–2.4)
MCH RBC QN AUTO: 32.5 PG (ref 26.9–32)
MCHC RBC AUTO-ENTMCNC: 30.4 G/DL (ref 32.4–36.3)
MCV RBC AUTO: 107 FL (ref 80.5–98.2)
MONOCYTES # BLD AUTO: 0.6 10*3/MM3 (ref 0.2–1.2)
MONOCYTES NFR BLD AUTO: 11.9 % (ref 5–12)
NEUTROPHILS # BLD AUTO: 3.57 10*3/MM3 (ref 1.9–8.1)
NEUTROPHILS NFR BLD AUTO: 71 % (ref 42.7–76)
PHOSPHATE SERPL-MCNC: 2.9 MG/DL (ref 2.5–4.5)
PLAT MORPH BLD: NORMAL
PLATELET # BLD AUTO: 190 10*3/MM3 (ref 140–500)
PMV BLD AUTO: 10 FL (ref 6–12)
POIKILOCYTOSIS BLD QL SMEAR: NORMAL
POTASSIUM BLD-SCNC: 4.8 MMOL/L (ref 3.5–5.2)
PROT PATTERN SERPL IFE-IMP: ABNORMAL
RBC # BLD AUTO: 2.86 10*6/MM3 (ref 3.9–5.2)
SODIUM BLD-SCNC: 141 MMOL/L (ref 136–145)
URATE SERPL-MCNC: 5 MG/DL (ref 2.4–5.7)
VALPROATE SERPL-MCNC: 32 MCG/ML (ref 50–125)
WBC MORPH BLD: NORMAL
WBC NRBC COR # BLD: 5.03 10*3/MM3 (ref 4.5–10.7)

## 2017-12-05 PROCEDURE — 25010000002 ONDANSETRON PER 1 MG: Performed by: INTERNAL MEDICINE

## 2017-12-05 PROCEDURE — 83735 ASSAY OF MAGNESIUM: CPT | Performed by: INTERNAL MEDICINE

## 2017-12-05 PROCEDURE — 85007 BL SMEAR W/DIFF WBC COUNT: CPT | Performed by: INTERNAL MEDICINE

## 2017-12-05 PROCEDURE — 25010000002 ENOXAPARIN PER 10 MG: Performed by: INTERNAL MEDICINE

## 2017-12-05 PROCEDURE — 85025 COMPLETE CBC W/AUTO DIFF WBC: CPT | Performed by: INTERNAL MEDICINE

## 2017-12-05 PROCEDURE — 99232 SBSQ HOSP IP/OBS MODERATE 35: CPT | Performed by: PSYCHIATRY & NEUROLOGY

## 2017-12-05 PROCEDURE — 86335 IMMUNFIX E-PHORSIS/URINE/CSF: CPT | Performed by: INTERNAL MEDICINE

## 2017-12-05 PROCEDURE — 80069 RENAL FUNCTION PANEL: CPT | Performed by: INTERNAL MEDICINE

## 2017-12-05 PROCEDURE — 80164 ASSAY DIPROPYLACETIC ACD TOT: CPT | Performed by: PSYCHIATRY & NEUROLOGY

## 2017-12-05 PROCEDURE — 97110 THERAPEUTIC EXERCISES: CPT

## 2017-12-05 PROCEDURE — 94799 UNLISTED PULMONARY SVC/PX: CPT

## 2017-12-05 PROCEDURE — 84550 ASSAY OF BLOOD/URIC ACID: CPT | Performed by: INTERNAL MEDICINE

## 2017-12-05 RX ORDER — ZOLPIDEM TARTRATE 5 MG/1
5 TABLET ORAL NIGHTLY PRN
Status: DISCONTINUED | OUTPATIENT
Start: 2017-12-05 | End: 2017-12-06

## 2017-12-05 RX ORDER — HYDROCODONE BITARTRATE AND ACETAMINOPHEN 7.5; 325 MG/1; MG/1
1 TABLET ORAL EVERY 4 HOURS PRN
Status: DISCONTINUED | OUTPATIENT
Start: 2017-12-05 | End: 2017-12-06 | Stop reason: HOSPADM

## 2017-12-05 RX ADMIN — HYDROCODONE BITARTRATE AND ACETAMINOPHEN 1 TABLET: 7.5; 325 TABLET ORAL at 00:28

## 2017-12-05 RX ADMIN — SODIUM BICARBONATE 650 MG: 650 TABLET ORAL at 20:00

## 2017-12-05 RX ADMIN — HYDROCODONE BITARTRATE AND ACETAMINOPHEN 1 TABLET: 7.5; 325 TABLET ORAL at 19:42

## 2017-12-05 RX ADMIN — LEVOTHYROXINE SODIUM 88 MCG: 88 TABLET ORAL at 06:07

## 2017-12-05 RX ADMIN — IPRATROPIUM BROMIDE AND ALBUTEROL SULFATE 3 ML: .5; 3 SOLUTION RESPIRATORY (INHALATION) at 19:29

## 2017-12-05 RX ADMIN — IPRATROPIUM BROMIDE AND ALBUTEROL SULFATE 3 ML: .5; 3 SOLUTION RESPIRATORY (INHALATION) at 00:03

## 2017-12-05 RX ADMIN — ONDANSETRON 4 MG: 2 INJECTION INTRAMUSCULAR; INTRAVENOUS at 04:22

## 2017-12-05 RX ADMIN — IPRATROPIUM BROMIDE AND ALBUTEROL SULFATE 3 ML: .5; 3 SOLUTION RESPIRATORY (INHALATION) at 07:18

## 2017-12-05 RX ADMIN — HYDROCODONE BITARTRATE AND ACETAMINOPHEN 1 TABLET: 7.5; 325 TABLET ORAL at 13:49

## 2017-12-05 RX ADMIN — ENOXAPARIN SODIUM 30 MG: 30 INJECTION SUBCUTANEOUS at 20:00

## 2017-12-05 RX ADMIN — DIVALPROEX SODIUM 250 MG: 250 TABLET, FILM COATED, EXTENDED RELEASE ORAL at 20:00

## 2017-12-05 RX ADMIN — LOPERAMIDE HYDROCHLORIDE 4 MG: 2 CAPSULE ORAL at 08:40

## 2017-12-05 RX ADMIN — IPRATROPIUM BROMIDE AND ALBUTEROL SULFATE 3 ML: .5; 3 SOLUTION RESPIRATORY (INHALATION) at 15:05

## 2017-12-05 RX ADMIN — ONDANSETRON 4 MG: 2 INJECTION INTRAMUSCULAR; INTRAVENOUS at 22:01

## 2017-12-05 RX ADMIN — SODIUM CHLORIDE 75 ML/HR: 9 INJECTION, SOLUTION INTRAVENOUS at 16:27

## 2017-12-05 RX ADMIN — HYDROCODONE BITARTRATE AND ACETAMINOPHEN 1 TABLET: 7.5; 325 TABLET ORAL at 08:24

## 2017-12-05 RX ADMIN — BUDESONIDE AND FORMOTEROL FUMARATE DIHYDRATE 2 PUFF: 160; 4.5 AEROSOL RESPIRATORY (INHALATION) at 10:57

## 2017-12-05 RX ADMIN — IPRATROPIUM BROMIDE AND ALBUTEROL SULFATE 3 ML: .5; 3 SOLUTION RESPIRATORY (INHALATION) at 10:59

## 2017-12-05 RX ADMIN — SODIUM BICARBONATE 650 MG: 650 TABLET ORAL at 16:27

## 2017-12-05 RX ADMIN — SODIUM CHLORIDE 75 ML/HR: 9 INJECTION, SOLUTION INTRAVENOUS at 04:12

## 2017-12-05 RX ADMIN — BUDESONIDE AND FORMOTEROL FUMARATE DIHYDRATE 2 PUFF: 160; 4.5 AEROSOL RESPIRATORY (INHALATION) at 19:29

## 2017-12-05 RX ADMIN — SODIUM BICARBONATE 650 MG: 650 TABLET ORAL at 08:24

## 2017-12-05 RX ADMIN — DIVALPROEX SODIUM 250 MG: 250 TABLET, FILM COATED, EXTENDED RELEASE ORAL at 08:24

## 2017-12-05 NOTE — PLAN OF CARE
Problem: Patient Care Overview (Adult)  Goal: Plan of Care Review  Outcome: Ongoing (interventions implemented as appropriate)    12/05/17 0917   Coping/Psychosocial Response Interventions   Plan Of Care Reviewed With patient   Outcome Evaluation   Outcome Summary/Follow up Plan Pt increased with bed mobility, transfer and amb safety and I with RWX   Patient Care Overview   Progress improving

## 2017-12-05 NOTE — PROGRESS NOTES
"   LOS: 3 days    Patient Care Team:  Bill Maritno MD as PCP - General (Internal Medicine)  No Known Provider as PCP - Family Medicine    Chief Complaint:    Chief Complaint   Patient presents with   • Shortness of Breath       Subjective follow up acute kidney injury on chronic kidney disease 2    Interval History:   Shaking, restless.  No fever. Feels hot then cold. Ate a little more today. Drinking ok. Son thought she had panic attack earlier. O2 increased to 3 liters from 2.   Not soa now. Coughing.       Objective     Vital Signs  Temp:  [98.4 °F (36.9 °C)-99.1 °F (37.3 °C)] 98.6 °F (37 °C)  Heart Rate:  [65-90] 88  Resp:  [18-22] 18  BP: (129-145)/(62-67) 129/67    Flowsheet Rows         First Filed Value    Admission Height  62\" (157.5 cm) Documented at 12/02/2017 2104    Admission Weight  140 lb (63.5 kg) Documented at 12/02/2017 2104          I/O this shift:  In: 550 [P.O.:550]  Out: 3 [Stool:3]  I/O last 3 completed shifts:  In: 2639 [P.O.:480; I.V.:2159]  Out: 1450 [Urine:1450]    Intake/Output Summary (Last 24 hours) at 12/05/17 1848  Last data filed at 12/05/17 1624   Gross per 24 hour   Intake             1882 ml   Output                3 ml   Net             1879 ml       Physical Exam:  General Appearance: alert, restless.  Nasal 02  Skin: warm and dry  HEENT: Nonicteric sclerae, oral mucosa normal,   Neck: supple, no JVD, trachea midline  Lungs:Clear to auscultation  Heart: RRR, normal S1 and S2, no S3, no rub  Abdomen: soft, non-tender,  +bs  Extremities: no edema, cyanosis or clubbing        Results Review:      Results from last 7 days  Lab Units 12/05/17  0427 12/04/17  0537 12/03/17  1211 12/02/17  2152   SODIUM mmol/L 141 141 138 137   POTASSIUM mmol/L 4.8 5.3* 5.1 4.4   CHLORIDE mmol/L 109* 109* 105 96*   CO2 mmol/L 18.1* 21.6* 18.3* 25.4   BUN mg/dL 22 28* 32* 37*   CREATININE mg/dL 1.22* 2.17* 2.96* 3.96*   CALCIUM mg/dL 8.8 8.1* 7.1* 8.0*   BILIRUBIN mg/dL  --   --   --  <0.2 "   ALK PHOS U/L  --   --   --  92   ALT (SGPT) U/L  --   --   --  12   AST (SGOT) U/L  --   --   --  16   GLUCOSE mg/dL 86 70 71 91       Estimated Creatinine Clearance: 36.8 mL/min (by C-G formula based on Cr of 1.22).      Results from last 7 days  Lab Units 12/05/17  0427 12/03/17  1211   MAGNESIUM mg/dL 1.6 1.8   PHOSPHORUS mg/dL 2.9 4.9*         Results from last 7 days  Lab Units 12/05/17  0427   URIC ACID mg/dL 5.0         Results from last 7 days  Lab Units 12/05/17  0427 12/04/17  0537 12/03/17  1211 12/03/17  0253 12/02/17  2152 12/02/17  1515   WBC 10*3/mm3 5.03 3.96*  --  4.74 7.04 6.71   HEMOGLOBIN g/dL 9.3* 9.1* 8.4* 6.3* 7.5* 7.4*   PLATELETS 10*3/mm3 190 189  --  213 260 265         Results from last 7 days  Lab Units 12/03/17  1211 12/03/17  0253 12/02/17  2152   INR  1.14* 1.18* 1.09         Imaging Results (last 24 hours)     Procedure Component Value Units Date/Time    US Renal Bilateral [[id number]] Collected:  12/03/17 2207     Updated:  12/05/17 0321    Narrative:       BILATERAL RENAL ULTRASOUND     CLINICAL HISTORY: ARF; R09.02-Hypoxemia; T81.4XXA-Infection following a  procedure, initial encounter; A41.9-Sepsis, unspecified organism;  R79.89-Other specified abnormal findings of blood chemistry; N17.9-Acute  kidney failure, unspecified.     FINDINGS: Longitudinal and transverse images of the kidneys were  obtained. The right kidney measures 9.9 cm in length. The left kidney  measures 10.1 cm in length. Cortical echogenicity is normal. No cystic  or solid renal mass. No hydronephrosis. Limited urinary bladder images  are unremarkable.       Impression:       Somewhat small but otherwise normal-appearing kidneys.      This report was finalized on 12/5/2017 3:18 AM by Jorge A Hylton MD.             budesonide-formoterol 2 puff Inhalation BID - RT   divalproex 250 mg Oral Daily   divalproex 250 mg Oral Nightly   enoxaparin 30 mg Subcutaneous Nightly   ipratropium-albuterol 3 mL Nebulization 4x  Daily - RT   levothyroxine 88 mcg Oral Q AM   sodium bicarbonate 650 mg Oral TID       sodium chloride 75 mL/hr Last Rate: 75 mL/hr (12/05/17 1627)       Medication Review:   Current Facility-Administered Medications   Medication Dose Route Frequency Provider Last Rate Last Dose   • budesonide-formoterol (SYMBICORT) 160-4.5 MCG/ACT inhaler 2 puff  2 puff Inhalation BID - RT John Ca MD   2 puff at 12/05/17 1057   • divalproex (DEPAKOTE) 24 hr tablet 250 mg  250 mg Oral Daily John Ca MD   250 mg at 12/05/17 0824   • divalproex (DEPAKOTE) 24 hr tablet 250 mg  250 mg Oral Nightly Stanford Hartley MD   250 mg at 12/04/17 2056   • enoxaparin (LOVENOX) syringe 30 mg  30 mg Subcutaneous Nightly Param HLucretia Stone MD   30 mg at 12/04/17 2056   • fentaNYL citrate (PF) (SUBLIMAZE) injection 50 mcg  50 mcg Intravenous Q1H PRN Param H. MD Bertha   50 mcg at 12/04/17 2233   • HYDROcodone-acetaminophen (NORCO) 7.5-325 MG per tablet 1 tablet  1 tablet Oral Q4H PRN Param H. MD Bertha   1 tablet at 12/05/17 1349   • ipratropium-albuterol (DUO-NEB) nebulizer solution 3 mL  3 mL Nebulization 4x Daily - RT John Ca MD   3 mL at 12/05/17 1505   • levothyroxine (SYNTHROID, LEVOTHROID) tablet 88 mcg  88 mcg Oral Q AM John Ca MD   88 mcg at 12/05/17 0607   • loperamide (IMODIUM) capsule 4 mg  4 mg Oral 4x Daily PRN Param HLucretia Stone MD   4 mg at 12/05/17 0840   • ondansetron (ZOFRAN) tablet 4 mg  4 mg Oral Q6H PRN Param H. MD Bertha        Or   • ondansetron ODT (ZOFRAN-ODT) disintegrating tablet 4 mg  4 mg Oral Q6H PRN Param H. MD Bertha        Or   • ondansetron (ZOFRAN) injection 4 mg  4 mg Intravenous Q6H PRN Param Stone MD   4 mg at 12/05/17 0422   • sodium bicarbonate tablet 650 mg  650 mg Oral TID Kelton Barreto MD   650 mg at 12/05/17 1627   • sodium chloride 0.9 % flush 1-10 mL  1-10 mL Intravenous PRN Param Stone MD       • sodium chloride 0.9 % flush 10 mL  10 mL Intravenous PRN Efrain  MD Nicholas       • sodium chloride 0.9 % infusion  75 mL/hr Intravenous Continuous Willie Bangura MD 75 mL/hr at 12/05/17 1627 75 mL/hr at 12/05/17 1627       Assessment/Plan   1.  Acute kidney injury on chronic kidney disease and renal function is improved.  creatinine down to 1.22. Metabolic acidosis.  Bicarb down some with AG 13.   2.  Chronic kidney disease stage III at baseline small kidneys by ultrasound.  3.  Hypoxic respiratory failure. Dr. Hernandez ordered CXR for am.   4.  Hyperkalemia, K down to 4.8.   5.  Chronic anemia, hemoglobin stable.  6.  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  COPD  8.  Hypertension, controlled.   9. Hypothyroid on replacement.     Plan:  1.  Dc IVF  2. Recheck chem in am.   3. DW son.       Isabela Vega MD  12/05/17  6:48 PM

## 2017-12-05 NOTE — PROGRESS NOTES
Exercise Oximetry    Patient Name:Josephine Wolf   MRN: [id number]   Date: 12/05/17             ROOM AIR BASELINE   SpO2%  93   Heart Rate    Blood Pressure      EXERCISE ON ROOM AIR SpO2% EXERCISE ON O2 @  LPM SpO2%   1 MINUTE   93 1 MINUTE    2 MINUTES   89 2 MINUTES    3 MINUTES   91 3 MINUTES    4 MINUTES   87 4 MINUTES                      2    93   5 MINUTES  5 MINUTES                      2    94   6 MINUTES  6 MINUTES                        2    93              Distance Walked   Distance Walked   Dyspnea (Aidan Scale)   Dyspnea (Aidan Scale)     none   Fatigue (Aidan Scale)   Fatigue (Aidan Scale)       none   SpO2% Post Exercise   SpO2% Post Exercise     94   HR Post Exercise   HR Post Exercise   Time to Recovery   Time to Recovery              immediate     Comments: requires 2lpm with walk and walker

## 2017-12-05 NOTE — PLAN OF CARE
Problem: Patient Care Overview (Adult)  Goal: Plan of Care Review  Outcome: Ongoing (interventions implemented as appropriate)    12/05/17 1553   Coping/Psychosocial Response Interventions   Plan Of Care Reviewed With patient   Outcome Evaluation   Outcome Summary/Follow up Plan Patient is alert and confused at times. She has had pain most of today and treated with meds. Her family is present, O2 decreased to 3L and she is tolerating this well. Will continue to monitor.   Patient Care Overview   Progress improving       Goal: Adult Individualization and Mutuality  Outcome: Ongoing (interventions implemented as appropriate)  Goal: Discharge Needs Assessment  Outcome: Ongoing (interventions implemented as appropriate)    Problem: Fall Risk (Adult)  Goal: Identify Related Risk Factors and Signs and Symptoms  Outcome: Ongoing (interventions implemented as appropriate)  Goal: Absence of Falls  Outcome: Ongoing (interventions implemented as appropriate)    Problem: Pain, Acute (Adult)  Goal: Identify Related Risk Factors and Signs and Symptoms  Outcome: Ongoing (interventions implemented as appropriate)  Goal: Acceptable Pain Control/Comfort Level  Outcome: Ongoing (interventions implemented as appropriate)    Problem: Infection, Risk/Actual (Adult)  Goal: Identify Related Risk Factors and Signs and Symptoms  Outcome: Ongoing (interventions implemented as appropriate)  Goal: Infection Prevention/Resolution  Outcome: Ongoing (interventions implemented as appropriate)    Problem: Pressure Ulcer Risk (Gopi Scale) (Adult,Obstetrics,Pediatric)  Goal: Identify Related Risk Factors and Signs and Symptoms  Outcome: Ongoing (interventions implemented as appropriate)  Goal: Skin Integrity  Outcome: Ongoing (interventions implemented as appropriate)    Problem: Skin Integrity Impairment, Risk/Actual (Adult)  Goal: Identify Related Risk Factors and Signs and Symptoms  Outcome: Ongoing (interventions implemented as  appropriate)  Goal: Skin Integrity/Wound Healing  Outcome: Ongoing (interventions implemented as appropriate)

## 2017-12-05 NOTE — THERAPY TREATMENT NOTE
Acute Care - Physical Therapy Treatment Note  [facility]     Patient Name: Josephine Wolf  : 1940  MRN: [id number]  Today's Date: 2017  Onset of Illness/Injury or Date of Surgery Date: 17          Admit Date: 2017    Visit Dx:    ICD-10-CM ICD-9-CM   1. Hypoxia R09.02 799.02   2. Postoperative infection, initial encounter T81.4XXA 998.59   3. Sepsis, due to unspecified organism A41.9 038.9     995.91   4. Elevated d-dimer R79.89 790.92   5. Acute kidney injury N17.9 584.9     Patient Active Problem List   Diagnosis   • Anemia   • OA (osteoarthritis) of knee   • Hypoxia               Adult Rehabilitation Note       17 0900          Rehab Assessment/Intervention    Discipline physical therapy assistant  -CW      Document Type therapy note (daily note)  -CW      Subjective Information agree to therapy;complains of;fatigue  -CW      Patient Effort, Rehab Treatment good  -CW      Precautions/Limitations fall precautions;oxygen therapy device and L/min  -CW      Recorded by [CW] Scotty Cheek PTA      Vital Signs    O2 Delivery Pre Treatment supplemental O2  -CW      O2 Delivery Intra Treatment supplemental O2  -CW      O2 Delivery Post Treatment supplemental O2  -CW      Recorded by [CW] Scotty Cheek PTA      Pain Assessment    Pain Assessment No/denies pain  -CW      Recorded by [CW] Scotty Cheek PTA      Cognitive Assessment/Intervention    Current Cognitive/Communication Assessment functional  -CW      Orientation Status oriented x 4  -CW      Follows Commands/Answers Questions 100% of the time  -CW      Personal Safety WNL/WFL  -CW      Personal Safety Interventions fall prevention program maintained;gait belt;muscle strengthening facilitated;nonskid shoes/slippers when out of bed  -CW      Recorded by [CW] Scotty Cheek PTA      Bed Mobility, Assessment/Treatment    Bed Mob, Supine to Sit, [place] minimum assist (75% patient effort)  -CW      Bed  Mob, Sit to Supine, [place] minimum assist (75% patient effort)  -CW      Recorded by [CW] Scotty Cheek PTA      Transfer Assessment/Treatment    Transfers, Sit-Stand [place] contact guard assist  -CW      Transfers, Stand-Sit [place] contact guard assist  -CW      Transfers, Sit-Stand-Sit, Assist Device rolling walker  -CW      Recorded by [CW] Scotty Cheek PTA      Gait Assessment/Treatment    Gait, [place] Level contact guard assist  -CW      Gait, Assistive Device rolling walker  -CW      Gait, Distance (Feet) 80  -CW      Gait, Gait Deviations shilpa decreased;step length decreased;stride length decreased  -CW      Recorded by [CW] Scotty Cheek PTA      Therapy Exercises    Bilateral Lower Extremities AROM:;10 reps;sitting;ankle pumps/circles;hip flexion;LAQ  -CW      Recorded by [CW] Scotty Cheek PTA      Positioning and Restraints    Pre-Treatment Position in bed  -CW      Post Treatment Position bed  -CW      In Bed notified nsg;supine;call light within reach;encouraged to call for assist;with family/caregiver  -CW      Recorded by [CW] Scotty Cheek PTA        User Key  (r) = Recorded By, (t) = Taken By, (c) = Cosigned By    Initials Name Effective Dates    CW Scotty Cheek PTA 12/13/16 -                 IP PT Goals       12/04/17 1323          Bed Mobility PT LTG    Bed Mobility PT LTG, Time to Achieve 1 wk  -CH      Bed Mobility PT LTG, Activity Type all bed mobility  -CH      Bed Mobility PT LTG, [place] Level contact guard assist  -CH      Transfer Training PT LTG    Transfer Training PT LTG, Time to Achieve 1 wk  -CH      Transfer Training PT LTG, Activity Type all transfers  -CH      Transfer Training PT LTG, [place] Level contact guard assist  -CH      Transfer Training PT LTG, Assist Device walker, rolling  -CH      Gait Training PT LTG    Gait Training Goal PT LTG, Time to Achieve 1 wk  -CH      Gait Training Goal PT LTG,  [place] Level contact guard assist  -CH      Gait Training Goal PT LTG, Assist Device walker, rolling  -CH      Gait Training Goal PT LTG, Distance to Achieve 150  -CH        User Key  (r) = Recorded By, (t) = Taken By, (c) = Cosigned By    Initials Name Provider Type     Bridgett Stevens, PT Physical Therapist          Physical Therapy Education     Title: PT OT SLP Therapies (Done)     Topic: Physical Therapy (Done)     Point: Mobility training (Done)    Learning Progress Summary    Learner Readiness Method Response Comment Documented by Status   Patient Acceptance E,TB DU,VU   12/05/17 0917 Done    Eager E VU   12/04/17 1555 Done    Acceptance E,TB,D VU,NR   12/04/17 1323 Done               Point: Home exercise program (Done)    Learning Progress Summary    Learner Readiness Method Response Comment Documented by Status   Patient Acceptance E,TB DU,VU   12/05/17 0917 Done    Eager E VU  PJ 12/04/17 1555 Done    Acceptance E,TB,D VU,NR   12/04/17 1323 Done               Point: Body mechanics (Done)    Learning Progress Summary    Learner Readiness Method Response Comment Documented by Status   Patient Acceptance E,TB DU,VU   12/05/17 0917 Done    Eager E VU   12/04/17 1555 Done    Acceptance E,TB,D VU,NR   12/04/17 1323 Done               Point: Precautions (Done)    Learning Progress Summary    Learner Readiness Method Response Comment Documented by Status   Patient Acceptance E,TB DU,VU   12/05/17 0917 Done    Eager E VU   12/04/17 1555 Done    Acceptance E,TB,D VU,NR   12/04/17 1323 Done                      User Key     Initials Effective Dates Name Provider Type Discipline     12/01/15 -  Bridgett Stevens, PT Physical Therapist PT    PJ 06/16/16 -  Yamileth Aaron, RN Registered Nurse Nurse     12/13/16 -  Scotty Cheek PTA Physical Therapy Assistant PT                    PT Recommendation and Plan  Anticipated Discharge Disposition: skilled nursing facility  Planned  Therapy Interventions: balance training, bed mobility training, gait training, home exercise program, patient/family education, ROM (Range of Motion), transfer training  PT Frequency: daily  Plan of Care Review  Plan Of Care Reviewed With: patient  Progress: improving  Outcome Summary/Follow up Plan: Pt increased with bed mobility, transfer and amb safety and I with RWX          Outcome Measures       12/05/17 0900 12/04/17 1300       How much help from another person do you currently need...    Turning from your back to your side while in flat bed without using bedrails? 3  -CW 3  -CH     Moving from lying on back to sitting on the side of a flat bed without bedrails? 3  -CW 3  -CH     Moving to and from a bed to a chair (including a wheelchair)? 3  -CW 3  -CH     Standing up from a chair using your arms (e.g., wheelchair, bedside chair)? 3  -CW 3  -CH     Climbing 3-5 steps with a railing? 2  -CW 2  -CH     To walk in hospital room? 3  -CW 2  -CH     AM-PAC 6 Clicks Score 17  -CW 16  -CH     Functional Assessment    Outcome Measure Options AM-PAC 6 Clicks Basic Mobility (PT)  -CW AM-PAC 6 Clicks Basic Mobility (PT)  -CH       User Key  (r) = Recorded By, (t) = Taken By, (c) = Cosigned By    Initials Name Provider Type    ROSIBEL Stevens, PT Physical Therapist    CW Scotty Cheek PTA Physical Therapy Assistant           Time Calculation:         PT Charges       12/05/17 0918          Time Calculation    Start Time 0900  -CW      Stop Time 0918  -CW      Time Calculation (min) 18 min  -CW      PT Received On 12/05/17  -CW      PT - Next Appointment 12/06/17  -CW        User Key  (r) = Recorded By, (t) = Taken By, (c) = Cosigned By    Initials Name Provider Type    CW Scotty Cheek PTA Physical Therapy Assistant          Therapy Charges for Today     Code Description Service Date Service Provider Modifiers Qty    99280688266 HC PT THER PROC EA 15 MIN 12/5/2017 Scotty Cheek PTA GP 1     [id number]  PT THER SUPP EA 15 MIN 12/5/2017 Scotty Cheek, PTA GP 1          PT G-Codes  Outcome Measure Options: AM-PAC 6 Clicks Basic Mobility (PT)    Scotty Cheek PTA  12/5/2017

## 2017-12-05 NOTE — PLAN OF CARE
Problem: Patient Care Overview (Adult)  Goal: Plan of Care Review  Outcome: Ongoing (interventions implemented as appropriate)    12/05/17 0603   Coping/Psychosocial Response Interventions   Plan Of Care Reviewed With patient;son   Outcome Evaluation   Outcome Summary/Follow up Plan vitals stable. pt expressed pain and nausea. meds given per orders. will continue to monitor.    Patient Care Overview   Progress improving         Problem: Fall Risk (Adult)  Goal: Identify Related Risk Factors and Signs and Symptoms  Outcome: Ongoing (interventions implemented as appropriate)  Goal: Absence of Falls  Outcome: Ongoing (interventions implemented as appropriate)    Problem: Pain, Acute (Adult)  Goal: Identify Related Risk Factors and Signs and Symptoms  Outcome: Ongoing (interventions implemented as appropriate)  Goal: Acceptable Pain Control/Comfort Level  Outcome: Ongoing (interventions implemented as appropriate)    Problem: Infection, Risk/Actual (Adult)  Goal: Identify Related Risk Factors and Signs and Symptoms  Outcome: Ongoing (interventions implemented as appropriate)  Goal: Infection Prevention/Resolution  Outcome: Ongoing (interventions implemented as appropriate)    Problem: Skin Integrity Impairment, Risk/Actual (Adult)  Goal: Identify Related Risk Factors and Signs and Symptoms  Outcome: Ongoing (interventions implemented as appropriate)  Goal: Skin Integrity/Wound Healing  Outcome: Ongoing (interventions implemented as appropriate)

## 2017-12-05 NOTE — PROGRESS NOTES
Discharge Planning Assessment  Psychiatric     Patient Name: Josephine Wolf  MRN: [id number]  Today's Date: 12/5/2017    Admit Date: 12/2/2017          Discharge Needs Assessment       12/05/17 1000    Living Environment    Lives With alone   adult son will be staying with patient at discharge    Living Arrangements apartment   has elevator access to apt    Home Accessibility no concerns    Stair Railings at Home none    Type of Financial/Environmental Concern none    Transportation Available car;family or friend will provide    Living Environment    Provides Primary Care For no one    Quality Of Family Relationships supportive    Discharge Needs Assessment    Concerns To Be Addressed basic needs concerns    Readmission Within The Last 30 Days current reason for admission unrelated to previous admission    Anticipated Changes Related to Illness inability to care for self    Equipment Currently Used at Home rollator;nebulizer;oxygen;shower chair    Equipment Needed After Discharge none    Discharge Facility/Level Of Care Needs home with home health    Discharge Disposition home healthcare service            Discharge Plan       12/05/17 1002    Case Management/Social Work Plan    Plan Home with [facility] and adult son will be staying with patient at discharge    Patient/Family In Agreement With Plan yes    Additional Comments Introduced self and explained role of CCP, IMM checked and facesheet verified with patient, who is alert and oriented x 4, at bedside and son, James, with patient's permission.  Patient states she lives alone in a 2nd floor apartment and has elevator access and currently has oxygen 2L continuous from [employer] and nebulizer, rollator walker,  and shower chair and denies DME needs at discharge.  Patient states she was at [facility] and was supposed to have been discharged from there 12/4/17 and denies need to return to SNF at discharge and plans to return home with adult sonJames  staying with her at discharge and wants [facility] (Kandy notified at [phone]) at discharge.  Patient states she uses [facility] on [address], [place], [place] and denies any issues with affording or obtaining medications.  Patient states her son, James will provide transportation at discharge.  CCP will continue to follow and assist as needed.....Krista BoggsRN,CCP         Discharge Placement     Facility/Agency Request Status Selected? Address Phone Number Fax Number    [facility] Pending - No Request Sent     [id number] , [address], [place] [address] [phone]                Demographic Summary       12/05/17 0958    Referral Information    Admission Type inpatient    Arrived From skilled nursing facility    Contact Information    Permission Granted to Share Information With family/designee   James Wolf(son) [phone]    Primary Care Physician Information    Name MD2U            Functional Status       12/05/17 0959    Functional Status Current    Ambulation 3-->assistive equipment and person    Transferring 3-->assistive equipment and person    Toileting 3-->assistive equipment and person    Bathing 2-->assistive person    Dressing 2-->assistive person    Eating 0-->independent    Communication 0-->understands/communicates without difficulty    Change in Functional Status Since Onset of Current Illness/Injury no    Functional Status Prior    Ambulation 3-->assistive equipment and person    Transferring 3-->assistive equipment and person    Toileting 3-->assistive equipment and person    Bathing 2-->assistive person    Dressing 2-->assistive person    Eating 0-->independent    IADL    Medications independent    Meal Preparation assistive person    Housekeeping assistive person    Laundry assistive person    Shopping assistive person    Oral Care independent    Activity Tolerance    Current Activity Limitations none    Usual Activity Tolerance moderate    Current  Activity Tolerance fair    Cognitive/Perceptual/Developmental    Current Mental Status/Cognitive Functioning no deficits noted    Recent Changes in Mental Status/Cognitive Functioning no changes    Developmental Stage (Eriksson's Stages of Development) Stage 8 (65 years-death/Late Adulthood) Integrity vs. Despair            Psychosocial     None            Abuse/Neglect     None            Legal     None            Substance Abuse     None            Patient Forms     None          Krista Boggs RN

## 2017-12-05 NOTE — PROGRESS NOTES
Dr. JUNIOR Stone    [address]    12/5/2017    Patient ID:  Name:  Josephine Wolf  MRN:  [id number]  1940  77 y.o.  female            CC/Reason for visit: Follow-up for hypotension, acute blood loss anemia, and many other medical problems listed below    HPI: The patient feels better.  She says she ambulated earlier.  She is still requiring a couple of liters of supplemental oxygen to keep saturation above 90%.  She denies cough, fever, chills or sputum.    Vitals:  Vitals:    12/05/17 0823 12/05/17 1100 12/05/17 1107 12/05/17 1338   BP: 136/65   129/67   BP Location: Right arm   Right arm   Patient Position: Sitting   Lying   Pulse: 88 89  75   Resp: 22 20  18   Temp: 98.4 °F (36.9 °C)   98.6 °F (37 °C)   TempSrc: Oral   Oral   SpO2: 97% 98% 98% 90%   Weight:       Height:               Body mass index is 28.71 kg/(m^2).    Intake/Output Summary (Last 24 hours) at 12/05/17 1433  Last data filed at 12/05/17 1338   Gross per 24 hour   Intake             1782 ml   Output                3 ml   Net             1779 ml       Exam:  GEN:  No distress, appears stated age  EYES:   EOM-i, anicteric sclera bilat  ENT:    External ears/nose normal, OP clear  NECK:  Supple, midline trachea  LUNGS: Clear breath sounds bilat, nonlabored breathing  CV:  Normal S1S2, without murmur, no edema  ABD:  Non tender, no enlarged liver or masses  EXT:  Right knee shows some mild erythema around incision but otherwise no fluctuation on palpation.  Some soft tissue swelling which is expected 2 weeks after total knee arthroplasty on the right side.    Scheduled meds:    budesonide-formoterol 2 puff Inhalation BID - RT   divalproex 250 mg Oral Daily   divalproex 250 mg Oral Nightly   enoxaparin 30 mg Subcutaneous Nightly   ipratropium-albuterol 3 mL Nebulization 4x Daily - RT   levothyroxine 88 mcg Oral Q AM   sodium bicarbonate 650 mg Oral TID     IV meds:                        sodium chloride 75 mL/hr Last Rate:  75 mL/hr (12/05/17 0412)       Data Review:   I reviewed the patient's medications and new clinical results.  Lab Results   Component Value Date    CALCIUM 8.8 12/05/2017    PHOS 2.9 12/05/2017    MG 1.6 12/05/2017    MG 1.8 12/03/2017    MG 2.0 05/14/2015       Results from last 7 days  Lab Units 12/05/17  0427 12/04/17  0537 12/03/17  1211 12/03/17  0253 12/02/17  2152   SODIUM mmol/L 141 141 138  --  137   POTASSIUM mmol/L 4.8 5.3* 5.1  --  4.4   CHLORIDE mmol/L 109* 109* 105  --  96*   CO2 mmol/L 18.1* 21.6* 18.3*  --  25.4   BUN mg/dL 22 28* 32*  --  37*   CREATININE mg/dL 1.22* 2.17* 2.96*  --  3.96*   CALCIUM mg/dL 8.8 8.1* 7.1*  --  8.0*   BILIRUBIN mg/dL  --   --   --   --  <0.2   ALK PHOS U/L  --   --   --   --  92   ALT (SGPT) U/L  --   --   --   --  12   AST (SGOT) U/L  --   --   --   --  16   GLUCOSE mg/dL 86 70 71  --  91   WBC 10*3/mm3 5.03 3.96*  --  4.74 7.04   HEMOGLOBIN g/dL 9.3* 9.1* 8.4* 6.3* 7.5*   PLATELETS 10*3/mm3 190 189  --  213 260   INR   --   --  1.14* 1.18* 1.09   PROBNP pg/mL  --   --  3198.0*  --  2752.0*   PROCALCITONIN ng/mL  --   --   --   --  0.16         ASSESSMENT:   Acute hypoxia  Sepsis criteria, but unsure the patient truly has an infection.  Hypotension due to dehydration versus medication induced, versus blood loss  Acute kidney injury  Altered mental status, most likely metabolic encephalopathy  Acute blood loss anemia on chronic multifactorial anemia  Status post right total knee replacement 2 weeks ago  History of COPD      PLAN:  She remains hypoxic but I don't have a clear explanation.  I will order a chest x-ray.  On admission her chest x-ray was negative.  Continue to wean oxygen as tolerated.    Her creatinine has normalized.  Appreciate input from nephrology.    Neurology saw the patient at the family's request.  They are ordering a CT scan of the head.  It seems mostly to be metabolic encephalopathy, which is now much better.    I hope to discharge her tomorrow  if possible.      Param Stone MD  12/5/2017

## 2017-12-05 NOTE — DISCHARGE PLACEMENT REQUEST
"[place], Arheem PRANAV (77 y.o. Female)     Date of Birth Social Security Number Address Home Phone MRN    1940  [id number]   [place] [address] [phone] [id number]    Gnosticist Marital Status          Anabaptism        Admission Date Admission Type Admitting Provider Attending Provider Department, Room/Bed    12/2/17 Emergency John Ca MD Anaya, Param GOMEZ MD [address], 402/1    Discharge Date Discharge Disposition Discharge Destination                      Attending Provider: Param Stone MD     Allergies:  Morphine And Related    Isolation:  None   Infection:  None   Code Status:  Conditional    Ht:  157.5 cm (62\")   Wt:  71.2 kg (156 lb 15.5 oz)    Admission Cmt:  None   Principal Problem:  None                Active Insurance as of 12/2/2017     Primary Coverage     Payor Plan Insurance Group Employer/Plan Group    [employer] [place] [employer]ARE REPLACEMENT [facility]      Payor Plan Address Payor Plan Phone Number Effective From Effective To    [address] [address] [phone] 10/9/2017     [place] [address]       Subscriber Name Subscriber Birth Date Member ID       RAHEEM ROBERTS 1940 [id number]           Secondary Coverage     Payor Plan Insurance Group Employer/Plan Group    [place]      Payor Plan Address Payor Plan Phone Number Effective From Effective To    [address] [phone] 7/3/2016     [place] [address]       Subscriber Name Subscriber Birth Date Member ID       RAHEEM ROBERTS 1940 [id number]                 Emergency Contacts      (Rel.) Home Phone Work Phone Mobile Phone    Chino Roberts (Son) [phone] -- --    James Roberts (Son) [phone] -- [phone]              "

## 2017-12-05 NOTE — PROGRESS NOTES
Patient Identification:  NAME:  Josephine Wolf  Age:  77 y.o.   Sex:  female   :  1940   MRN:  [id number]       Chief complaint: Tremor, metabolic encephalopathy    History of present illness:  He still a little confused but seems to be doing better seems to be more alert and the tremulousness and twitching appears to be a lot better according to her daughter we have not performed a CT of the head     Past medical history:  Past Medical History:   Diagnosis Date   • Acid reflux    • Anemia    • Arthritis    • Bipolar 1 disorder, depressed    • Cataract     MINDY   • Chronic nausea    • Chronic pain of right knee    • Continuous leakage of urine     USES DEPENDS   • COPD (chronic obstructive pulmonary disease)     USES O2 2 LMP PER NC AT NIGHT   • Disease of thyroid gland     HYPOTHYROIDISM   • Diverticulosis    • Fibromyalgia     DX    • Frequent episodes of bronchitis    • Hypertension    • Migraines    • Neck pain    • On home oxygen therapy     2L NC AT NIGHT   • Short of breath on exertion        Allergies:  Morphine and related    Home medications:  Prescriptions Prior to Admission   Medication Sig Dispense Refill Last Dose   • albuterol (ACCUNEB) 1.25 MG/3ML nebulizer solution Take 1 ampule by nebulization Every 4 (Four) Hours.   11/15/2017 at 2100   • allopurinol (ZYLOPRIM) 300 MG tablet Take 300 mg by mouth Every Morning.   2017 at 0800   • amLODIPine (NORVASC) 10 MG tablet Take 10 mg by mouth Daily.      • aspirin  MG EC tablet Take 1 tablet by mouth 2 (Two) Times a Day With Meals. 60 tablet 0    • budesonide-formoterol (SYMBICORT) 160-4.5 MCG/ACT inhaler Inhale 2 puffs 2 (Two) Times a Day.   2017 at 0730   • Cetirizine HCl 10 MG capsule Take 1 tablet by mouth Daily.   2017   • Cholecalciferol (VITAMIN D3) 5000 units capsule capsule Take 5,000 Units by mouth Daily.      • divalproex (DEPAKOTE ER) 250 MG 24 hr tablet Take 250 mg by mouth Every Morning.   11/15/2017 at  1200   • divalproex (DEPAKOTE ER) 500 MG 24 hr tablet Take 500 mg by mouth Every Night.   11/15/2017 at 2100   • docusate sodium 100 MG capsule Take 100 mg by mouth 2 (Two) Times a Day As Needed for Constipation. 40 capsule 1    • ferrous sulfate 325 (65 FE) MG tablet Take 325 mg by mouth 2 (Two) Times a Day.      • gabapentin (NEURONTIN) 300 MG capsule Take 300 mg by mouth 3 (Three) Times a Day.   11/15/2017 at 2100   • HYDROcodone-acetaminophen (NORCO) 7.5-325 MG per tablet Take 1-2 tablets by mouth Every 4 (Four) Hours As Needed for Moderate Pain  (Pain). 80 tablet 0    • levothyroxine (SYNTHROID, LEVOTHROID) 88 MCG tablet Take 88 mcg by mouth Daily.   11/16/2017 at 0700   • montelukast (SINGULAIR) 10 MG tablet Take 10 mg by mouth Every Evening.      • ondansetron (ZOFRAN) 4 MG tablet Take 4 mg by mouth Every 8 (Eight) Hours As Needed for Nausea or Vomiting.   11/15/2017 at 0800   • pantoprazole (PROTONIX) 40 MG EC tablet Take 40 mg by mouth Daily.      • sulfamethoxazole-trimethoprim (BACTRIM DS,SEPTRA DS) 800-160 MG per tablet Take 1 tablet by mouth 2 (Two) Times a Day.      • valsartan-hydrochlorothiazide (DIOVAN-HCT) 320-12.5 MG per tablet Take 1 tablet by mouth Daily.      • venlafaxine XR (EFFEXOR XR) 150 MG 24 hr capsule Take 150 mg by mouth Daily.   11/15/2017 at 2100   • HYDROcodone-acetaminophen (NORCO) 7.5-325 MG per tablet Take 1 tablet by mouth Every 4 (Four) Hours As Needed for Moderate Pain .   11/15/2017 at 0800   • omeprazole (priLOSEC) 20 MG capsule Take 20 mg by mouth Daily.   11/15/2017 at 1200   • traZODone (DESYREL) 100 MG tablet Take 100 mg by mouth Every Night.   11/15/2017 at 2100        Hospital medications:    budesonide-formoterol 2 puff Inhalation BID - RT   divalproex 250 mg Oral Daily   divalproex 250 mg Oral Nightly   enoxaparin 30 mg Subcutaneous Nightly   ipratropium-albuterol 3 mL Nebulization 4x Daily - RT   levothyroxine 88 mcg Oral Q AM   sodium bicarbonate 650 mg Oral TID        sodium chloride 75 mL/hr Last Rate: 75 mL/hr (12/05/17 0412)     fentaNYL citrate (PF)  •  HYDROcodone-acetaminophen  •  loperamide  •  ondansetron **OR** ondansetron ODT **OR** ondansetron  •  sodium chloride  •  Insert peripheral IV **AND** sodium chloride      Objective:  Vitals Ranges:   Temp:  [97.9 °F (36.6 °C)-99.1 °F (37.3 °C)] 98.6 °F (37 °C)  Heart Rate:  [65-90] 75  Resp:  [16-22] 18  BP: (122-145)/(48-67) 129/67      Physical Exam:  More alert less myoclonus reflexes absent throughout toes downgoing bilaterally    Results review:   I reviewed the patient's new clinical results.    Data review:  Lab Results (last 24 hours)     Procedure Component Value Units Date/Time    Immunofixation, Serum [[id number]] Collected:  12/04/17 1503    Specimen:  Blood Updated:  12/04/17 1525    Blood Culture - Blood, [[id number]]  (Normal) Collected:  12/02/17 2153    Specimen:  Blood from Arm, Left Updated:  12/04/17 2216     Blood Culture No growth at 2 days    Blood Culture - Blood, [[id number]]  (Normal) Collected:  12/02/17 2253    Specimen:  Blood from Arm, Right Updated:  12/04/17 2301     Blood Culture No growth at 2 days    Immunofixation, Urine - [[id number]] Collected:  12/05/17 0102    Specimen:  Urine Updated:  12/05/17 0105    CBC & Differential [[id number]] Collected:  12/05/17 0427    Specimen:  Blood Updated:  12/05/17 0508    Narrative:       The following orders were created for panel order CBC & Differential.  Procedure                               Abnormality         Status                     ---------                               -----------         ------                     Scan Slide[[id number]]                                       Final result               CBC Auto Differential[[id number]]        Abnormal            Final result                 Please view results for these tests on the individual orders.    CBC Auto Differential [[id number]]  (Abnormal) Collected:  12/05/17 0427    Specimen:   Blood Updated:  12/05/17 0508     WBC 5.03 10*3/mm3      RBC 2.86 (L) 10*6/mm3      Hemoglobin 9.3 (L) g/dL      Hematocrit 30.6 (L) %      .0 (H) fL      MCH 32.5 (H) pg      MCHC 30.4 (L) g/dL      RDW 17.4 (H) %      RDW-SD 67.1 (H) fl      MPV 10.0 fL      Platelets 190 10*3/mm3      Neutrophil % 71.0 %      Lymphocyte % 15.9 (L) %      Monocyte % 11.9 %      Eosinophil % 0.4 %      Basophil % 0.2 %      Immature Grans % 0.6 (H) %      Neutrophils, Absolute 3.57 10*3/mm3      Lymphocytes, Absolute 0.80 (L) 10*3/mm3      Monocytes, Absolute 0.60 10*3/mm3      Eosinophils, Absolute 0.02 10*3/mm3      Basophils, Absolute 0.01 10*3/mm3      Immature Grans, Absolute 0.03 10*3/mm3     Scan Slide [[id number]] Collected:  12/05/17 0427    Specimen:  Blood Updated:  12/05/17 0508     Anisocytosis Mod/2+     Hypochromia Slight/1+     Macrocytes Mod/2+     Poikilocytes Mod/2+     WBC Morphology Normal     Platelet Morphology Normal    Magnesium [[id number]]  (Normal) Collected:  12/05/17 0427    Specimen:  Blood Updated:  12/05/17 0534     Magnesium 1.6 mg/dL     Uric Acid [[id number]]  (Normal) Collected:  12/05/17 0427    Specimen:  Blood Updated:  12/05/17 0534     Uric Acid 5.0 mg/dL     Valproic Acid Level, Total [[id number]]  (Abnormal) Collected:  12/05/17 0427    Specimen:  Blood Updated:  12/05/17 0534     Valproic Acid 32.0 (L) mcg/mL     Renal Function Panel [[id number]]  (Abnormal) Collected:  12/05/17 0427    Specimen:  Blood Updated:  12/05/17 0541     Glucose 86 mg/dL      BUN 22 mg/dL      Creatinine 1.22 (H) mg/dL      Sodium 141 mmol/L      Potassium 4.8 mmol/L      Chloride 109 (H) mmol/L      CO2 18.1 (L) mmol/L      Calcium 8.8 mg/dL      Albumin 2.90 (L) g/dL      Phosphorus 2.9 mg/dL      Anion Gap 13.9 mmol/L      BUN/Creatinine Ratio 18.0     eGFR Non African Amer 43 (L) mL/min/1.73     Narrative:       The MDRD GFR formula is only valid for adults with stable renal function between ages 18 and  70.           Imaging:  Imaging Results (last 24 hours)     Procedure Component Value Units Date/Time    US Renal Bilateral [[id number]] Collected:  12/03/17 2207     Updated:  12/05/17 0321    Narrative:       BILATERAL RENAL ULTRASOUND     CLINICAL HISTORY: ARF; R09.02-Hypoxemia; T81.4XXA-Infection following a  procedure, initial encounter; A41.9-Sepsis, unspecified organism;  R79.89-Other specified abnormal findings of blood chemistry; N17.9-Acute  kidney failure, unspecified.     FINDINGS: Longitudinal and transverse images of the kidneys were  obtained. The right kidney measures 9.9 cm in length. The left kidney  measures 10.1 cm in length. Cortical echogenicity is normal. No cystic  or solid renal mass. No hydronephrosis. Limited urinary bladder images  are unremarkable.       Impression:       Somewhat small but otherwise normal-appearing kidneys.      This report was finalized on 12/5/2017 3:18 AM by Jorge A Hylton MD.                Assessment and Plan:     Active Problems:    Hypoxia    The muscle twitches and myoclonus appears to be better and her metabolic encephalopathy has also improved dramatically.  The metabolic encephalopathy also appears to be a bit better I have reviewed the long list of medication that she took at home and currently  Her medicines have been cut back since she came to the hospital and at this point I think she is on an okay dose of the Depakote, as certainly the Depakote itself can cause some tremulousness.  Be on the safe side I would like to check a plain CAT scan of the brain but otherwise I do not believe this patient is any evidence of acute stroke or intracranial hemorrhage  For now, there is not a lot to add I will sign off in follow-up when necessary reconsult thank you    Kelton Chacon MD  12/05/17  2:06 PM

## 2017-12-06 ENCOUNTER — APPOINTMENT (OUTPATIENT)
Dept: GENERAL RADIOLOGY | Facility: HOSPITAL | Age: 77
End: 2017-12-06
Attending: INTERNAL MEDICINE

## 2017-12-06 ENCOUNTER — APPOINTMENT (OUTPATIENT)
Dept: CT IMAGING | Facility: HOSPITAL | Age: 77
End: 2017-12-06

## 2017-12-06 VITALS
HEIGHT: 62 IN | OXYGEN SATURATION: 94 % | BODY MASS INDEX: 29.04 KG/M2 | SYSTOLIC BLOOD PRESSURE: 162 MMHG | HEART RATE: 96 BPM | TEMPERATURE: 97.8 F | WEIGHT: 157.8 LBS | RESPIRATION RATE: 20 BRPM | DIASTOLIC BLOOD PRESSURE: 72 MMHG

## 2017-12-06 PROBLEM — L03.90 CELLULITIS OF SKIN: Status: ACTIVE | Noted: 2017-12-06

## 2017-12-06 PROBLEM — A41.9 SEPSIS: Status: ACTIVE | Noted: 2017-12-06

## 2017-12-06 PROBLEM — N17.9 ACUTE KIDNEY INJURY (HCC): Status: ACTIVE | Noted: 2017-12-06

## 2017-12-06 LAB
ALBUMIN SERPL-MCNC: 2.8 G/DL (ref 3.5–5.2)
ALBUMIN/GLOB SERPL: 0.9 G/DL
ALP SERPL-CCNC: 91 U/L (ref 39–117)
ALT SERPL W P-5'-P-CCNC: 15 U/L (ref 1–33)
ANION GAP SERPL CALCULATED.3IONS-SCNC: 14.6 MMOL/L
AST SERPL-CCNC: 33 U/L (ref 1–32)
BASOPHILS # BLD AUTO: 0.03 10*3/MM3 (ref 0–0.2)
BASOPHILS NFR BLD AUTO: 0.7 % (ref 0–1.5)
BILIRUB SERPL-MCNC: 0.3 MG/DL (ref 0.1–1.2)
BUN BLD-MCNC: 13 MG/DL (ref 8–23)
BUN/CREAT SERPL: 13 (ref 7–25)
CALCIUM SPEC-SCNC: 8.7 MG/DL (ref 8.6–10.5)
CHLORIDE SERPL-SCNC: 106 MMOL/L (ref 98–107)
CO2 SERPL-SCNC: 21.4 MMOL/L (ref 22–29)
CREAT BLD-MCNC: 1 MG/DL (ref 0.57–1)
DEPRECATED RDW RBC AUTO: 65.4 FL (ref 37–54)
EOSINOPHIL # BLD AUTO: 0.04 10*3/MM3 (ref 0–0.7)
EOSINOPHIL NFR BLD AUTO: 0.9 % (ref 0.3–6.2)
ERYTHROCYTE [DISTWIDTH] IN BLOOD BY AUTOMATED COUNT: 17 % (ref 11.7–13)
GFR SERPL CREATININE-BSD FRML MDRD: 54 ML/MIN/1.73
GLOBULIN UR ELPH-MCNC: 3.2 GM/DL
GLUCOSE BLD-MCNC: 82 MG/DL (ref 65–99)
HCT VFR BLD AUTO: 29.2 % (ref 35.6–45.5)
HGB BLD-MCNC: 8.8 G/DL (ref 11.9–15.5)
IMM GRANULOCYTES # BLD: 0.05 10*3/MM3 (ref 0–0.03)
IMM GRANULOCYTES NFR BLD: 1.2 % (ref 0–0.5)
INTERPRETATION UR IFE-IMP: NORMAL
LYMPHOCYTES # BLD AUTO: 0.96 10*3/MM3 (ref 0.9–4.8)
LYMPHOCYTES NFR BLD AUTO: 22.1 % (ref 19.6–45.3)
MCH RBC QN AUTO: 32 PG (ref 26.9–32)
MCHC RBC AUTO-ENTMCNC: 30.1 G/DL (ref 32.4–36.3)
MCV RBC AUTO: 106.2 FL (ref 80.5–98.2)
MONOCYTES # BLD AUTO: 0.6 10*3/MM3 (ref 0.2–1.2)
MONOCYTES NFR BLD AUTO: 13.8 % (ref 5–12)
NEUTROPHILS # BLD AUTO: 2.66 10*3/MM3 (ref 1.9–8.1)
NEUTROPHILS NFR BLD AUTO: 61.3 % (ref 42.7–76)
NRBC BLD MANUAL-RTO: 0 /100 WBC (ref 0–0)
PLATELET # BLD AUTO: 195 10*3/MM3 (ref 140–500)
PMV BLD AUTO: 10 FL (ref 6–12)
POTASSIUM BLD-SCNC: 4.2 MMOL/L (ref 3.5–5.2)
PROT SERPL-MCNC: 6 G/DL (ref 6–8.5)
RBC # BLD AUTO: 2.75 10*6/MM3 (ref 3.9–5.2)
SODIUM BLD-SCNC: 142 MMOL/L (ref 136–145)
TSH SERPL DL<=0.05 MIU/L-ACNC: 2.91 MIU/ML (ref 0.27–4.2)
WBC NRBC COR # BLD: 4.34 10*3/MM3 (ref 4.5–10.7)

## 2017-12-06 PROCEDURE — 94799 UNLISTED PULMONARY SVC/PX: CPT

## 2017-12-06 PROCEDURE — 71020 HC CHEST PA AND LATERAL: CPT

## 2017-12-06 PROCEDURE — 85025 COMPLETE CBC W/AUTO DIFF WBC: CPT | Performed by: INTERNAL MEDICINE

## 2017-12-06 PROCEDURE — 84443 ASSAY THYROID STIM HORMONE: CPT | Performed by: INTERNAL MEDICINE

## 2017-12-06 PROCEDURE — 80053 COMPREHEN METABOLIC PANEL: CPT | Performed by: INTERNAL MEDICINE

## 2017-12-06 PROCEDURE — 70450 CT HEAD/BRAIN W/O DYE: CPT

## 2017-12-06 RX ORDER — CEPHALEXIN 500 MG/1
500 CAPSULE ORAL EVERY 12 HOURS SCHEDULED
Qty: 14 CAPSULE | Refills: 0 | Status: SHIPPED | OUTPATIENT
Start: 2017-12-06 | End: 2017-12-13

## 2017-12-06 RX ORDER — CEPHALEXIN 500 MG/1
500 CAPSULE ORAL EVERY 12 HOURS SCHEDULED
Status: DISCONTINUED | OUTPATIENT
Start: 2017-12-06 | End: 2017-12-06

## 2017-12-06 RX ORDER — GABAPENTIN 300 MG/1
300 CAPSULE ORAL 2 TIMES DAILY PRN
Qty: 30 CAPSULE | Refills: 0 | Status: SHIPPED | OUTPATIENT
Start: 2017-12-06 | End: 2017-12-11

## 2017-12-06 RX ORDER — CEPHALEXIN 500 MG/1
500 CAPSULE ORAL EVERY 12 HOURS SCHEDULED
Status: DISCONTINUED | OUTPATIENT
Start: 2017-12-06 | End: 2017-12-06 | Stop reason: HOSPADM

## 2017-12-06 RX ORDER — TRAZODONE HYDROCHLORIDE 100 MG/1
50 TABLET ORAL NIGHTLY PRN
Qty: 7 TABLET | Refills: 0 | Status: SHIPPED | OUTPATIENT
Start: 2017-12-06 | End: 2020-03-15 | Stop reason: HOSPADM

## 2017-12-06 RX ADMIN — BUDESONIDE AND FORMOTEROL FUMARATE DIHYDRATE 2 PUFF: 160; 4.5 AEROSOL RESPIRATORY (INHALATION) at 11:50

## 2017-12-06 RX ADMIN — LOPERAMIDE HYDROCHLORIDE 4 MG: 2 CAPSULE ORAL at 13:21

## 2017-12-06 RX ADMIN — DIVALPROEX SODIUM 250 MG: 250 TABLET, FILM COATED, EXTENDED RELEASE ORAL at 08:37

## 2017-12-06 RX ADMIN — IPRATROPIUM BROMIDE AND ALBUTEROL SULFATE 3 ML: .5; 3 SOLUTION RESPIRATORY (INHALATION) at 11:47

## 2017-12-06 RX ADMIN — LEVOTHYROXINE SODIUM 88 MCG: 88 TABLET ORAL at 06:08

## 2017-12-06 RX ADMIN — HYDROCODONE BITARTRATE AND ACETAMINOPHEN 1 TABLET: 7.5; 325 TABLET ORAL at 08:41

## 2017-12-06 RX ADMIN — ZOLPIDEM TARTRATE 5 MG: 5 TABLET ORAL at 00:15

## 2017-12-06 RX ADMIN — HYDROCODONE BITARTRATE AND ACETAMINOPHEN 1 TABLET: 7.5; 325 TABLET ORAL at 02:39

## 2017-12-06 RX ADMIN — SODIUM BICARBONATE 650 MG: 650 TABLET ORAL at 08:37

## 2017-12-06 NOTE — PROGRESS NOTES
Continued Stay Note  [facility]     Patient Name: Josephine Wolf  MRN: [id number]  Today's Date: 12/6/2017    Admit Date: 12/2/2017          Discharge Plan       12/06/17 1112    Case Management/Social Work Plan    Plan Home with [facility] and son will be staying with patient at discharge    Patient/Family In Agreement With Plan yes    Additional Comments Spoke with patient, who is alert and oriented x 4, at bedside and she states she wants to use [facility](Gwendolyn notifed at [phone]) as she has used them in the past.  Left message with Kandy at [facility] at [phone] to cancel referral.....Krista Boggs RN              Discharge Codes     None        Expected Discharge Date and Time     Expected Discharge Date Expected Discharge Time    Dec 6, 2017             Krista Boggs RN

## 2017-12-06 NOTE — PLAN OF CARE
Problem: Patient Care Overview (Adult)  Goal: Plan of Care Review  Outcome: Ongoing (interventions implemented as appropriate)    12/06/17 1324   Coping/Psychosocial Response Interventions   Plan Of Care Reviewed With patient   Outcome Evaluation   Outcome Summary/Follow up Plan Pt is more alert today. Mild pain noted and treated. VSS, CT of head ordered MD has results. Family is present. Possible discharge, awaiting md orders.   Patient Care Overview   Progress improving       Goal: Adult Individualization and Mutuality  Outcome: Ongoing (interventions implemented as appropriate)  Goal: Discharge Needs Assessment  Outcome: Ongoing (interventions implemented as appropriate)    Problem: Fall Risk (Adult)  Goal: Identify Related Risk Factors and Signs and Symptoms  Outcome: Ongoing (interventions implemented as appropriate)  Goal: Absence of Falls  Outcome: Ongoing (interventions implemented as appropriate)    Problem: Pain, Acute (Adult)  Goal: Identify Related Risk Factors and Signs and Symptoms  Outcome: Ongoing (interventions implemented as appropriate)  Goal: Acceptable Pain Control/Comfort Level  Outcome: Ongoing (interventions implemented as appropriate)    Problem: Infection, Risk/Actual (Adult)  Goal: Identify Related Risk Factors and Signs and Symptoms  Outcome: Ongoing (interventions implemented as appropriate)  Goal: Infection Prevention/Resolution  Outcome: Ongoing (interventions implemented as appropriate)    Problem: Pressure Ulcer Risk (Gopi Scale) (Adult,Obstetrics,Pediatric)  Goal: Identify Related Risk Factors and Signs and Symptoms  Outcome: Ongoing (interventions implemented as appropriate)  Goal: Skin Integrity  Outcome: Ongoing (interventions implemented as appropriate)    Problem: Skin Integrity Impairment, Risk/Actual (Adult)  Goal: Identify Related Risk Factors and Signs and Symptoms  Outcome: Ongoing (interventions implemented as appropriate)  Goal: Skin Integrity/Wound Healing  Outcome:  Ongoing (interventions implemented as appropriate)

## 2017-12-06 NOTE — SIGNIFICANT NOTE
12/06/17 1313   Rehab Treatment   Discipline physical therapy assistant   Treatment Not Performed (Pt to d/c home today)

## 2017-12-06 NOTE — PROGRESS NOTES
Continued Stay Note  [facility]     Patient Name: Josephine Wolf  MRN: [id number]  Today's Date: 12/6/2017    Admit Date: 12/2/2017          Discharge Plan       12/06/17 1538    Case Management/Social Work Plan    Plan Home with [facility] and son    Additional Comments [facility] delivered portable oxygen to patient in room 402.......Krista Boggs RN,CCP                       Discharge Codes     None        Expected Discharge Date and Time     Expected Discharge Date Expected Discharge Time    Dec 6, 2017             Krista Boggs RN

## 2017-12-06 NOTE — PROGRESS NOTES
Continued Stay Note  [facility]     Patient Name: Josephine Wolf  MRN: [id number]  Today's Date: 12/6/2017    Admit Date: 12/2/2017          Discharge Plan       12/06/17 1329    Case Management/Social Work Plan    [facility]    Patient/Family In Agreement With Plan yes    Additional Comments DME noted for oxygen 2L continuous at discharge and patient states she has oxygen 2L at night from [facility].  Spoke with Ed at [facility] at [phone] to inform of DME of oxygen 2L continuous and need for portable oxygen for discharge home today.  Faxed DME with walking oximetry results, facesheet and discharge summary to [phone] and [phone] with  receipt confirmation.. Informed CARMINA Robles that [facility] will deliver portable oxygen prior to discharge today.....Krista Boggs RN,CCP                   Discharge Codes     None        Expected Discharge Date and Time     Expected Discharge Date Expected Discharge Time    Dec 6, 2017             Krista Boggs RN

## 2017-12-06 NOTE — SIGNIFICANT NOTE
12/06/17 0847   Rehab Treatment   Discipline physical therapist   Treatment Not Performed patient/family declined treatment  (patient states she would rather sleep this morning, Margaret GREWAL in room and aware. will try again this afternoon )   Recommendation   PT - Next Appointment 12/06/17 12/06/17 0847   Rehab Treatment   Discipline physical therapist   Treatment Not Performed patient/family declined treatment  (patient states she would rather sleep this morning, Margaret GREWAL in room and aware. will try again this afternoon )   Recommendation   PT - Next Appointment 12/06/17

## 2017-12-06 NOTE — PROGRESS NOTES
Continued Stay Note  [facility]     Patient Name: Josephine Wolf  MRN: [id number]  Today's Date: 12/6/2017    Admit Date: 12/2/2017          Discharge Plan       12/06/17 1447    Case Management/Social Work Plan    Plan Home with [facility] and son and [facility] to provide continuous oxygen at 2L    Patient/Family In Agreement With Plan yes    Additional Comments Spoke with Ed at [facility] and he states patient is not current with them.  Spoke with patient and son, Chino at bedside and they are unsure of supplier of patient's current oxygen and son is going to patient's house to find name on oxygen tank.   Chino, son, called [facility] and states [facility] is the supplier for patient's oxygen.  Spoke with Edmar at [facility] at [phone] to inform of DME for continuous oxygen at 2L and he states patient is current with them and he will  deliver portable oxygen tank to patient's room today for discharge.  Informed CARMINA Robles that [facility] will be delivering portable oxygen to patient today for discharge......Krista Boggs RN,CCP                   Discharge Codes     None        Expected Discharge Date and Time     Expected Discharge Date Expected Discharge Time    Dec 6, 2017             Krista Boggs, RN

## 2017-12-06 NOTE — PROGRESS NOTES
"   LOS: 4 days    Patient Care Team:  Bill Martino MD as PCP - General (Internal Medicine)  No Known Provider as PCP - Family Medicine    Chief Complaint:    Chief Complaint   Patient presents with   • Shortness of Breath       Subjective follow up acute kidney injury on chronic kidney disease 2    Interval History:   Moving in bed, trying to get comfortable.  Ate some breakfast. Less cough today.     Objective     Vital Signs  Temp:  [97.8 °F (36.6 °C)-98.6 °F (37 °C)] 97.8 °F (36.6 °C)  Heart Rate:  [75-94] 94  Resp:  [18-20] 18  BP: (129-162)/(52-72) 162/72    Flowsheet Rows         First Filed Value    Admission Height  62\" (157.5 cm) Documented at 12/02/2017 2104    Admission Weight  140 lb (63.5 kg) Documented at 12/02/2017 2104             I/O last 3 completed shifts:  In: 1882 [P.O.:1030; I.V.:852]  Out: 3 [Stool:3]    Intake/Output Summary (Last 24 hours) at 12/06/17 0857  Last data filed at 12/05/17 1624   Gross per 24 hour   Intake              550 ml   Output                0 ml   Net              550 ml       Physical Exam:  General Appearance: alert, restless.  Nasal 02  Skin: warm and dry  HEENT: Nonicteric sclerae, oral mucosa normal,   Neck: supple, no JVD, trachea midline  Lungs:Clear to auscultation  Heart: RRR, normal S1 and S2, no S3, no rub  Abdomen: soft, non-tender,  +bs  Extremities: no edema.  Right knee incision.         Results Review:      Results from last 7 days  Lab Units 12/06/17  0248 12/05/17  0427 12/04/17  0537  12/02/17  2152   SODIUM mmol/L 142 141 141  < > 137   POTASSIUM mmol/L 4.2 4.8 5.3*  < > 4.4   CHLORIDE mmol/L 106 109* 109*  < > 96*   CO2 mmol/L 21.4* 18.1* 21.6*  < > 25.4   BUN mg/dL 13 22 28*  < > 37*   CREATININE mg/dL 1.00 1.22* 2.17*  < > 3.96*   CALCIUM mg/dL 8.7 8.8 8.1*  < > 8.0*   BILIRUBIN mg/dL 0.3  --   --   --  <0.2   ALK PHOS U/L 91  --   --   --  92   ALT (SGPT) U/L 15  --   --   --  12   AST (SGOT) U/L 33*  --   --   --  16   GLUCOSE mg/dL 82 " 86 70  < > 91   < > = values in this interval not displayed.    Estimated Creatinine Clearance: 45 mL/min (by C-G formula based on Cr of 1).      Results from last 7 days  Lab Units 12/05/17  0427 12/03/17  1211   MAGNESIUM mg/dL 1.6 1.8   PHOSPHORUS mg/dL 2.9 4.9*         Results from last 7 days  Lab Units 12/05/17  0427   URIC ACID mg/dL 5.0         Results from last 7 days  Lab Units 12/06/17  0248 12/05/17  0427 12/04/17  0537 12/03/17  1211 12/03/17  0253 12/02/17  2152   WBC 10*3/mm3 4.34* 5.03 3.96*  --  4.74 7.04   HEMOGLOBIN g/dL 8.8* 9.3* 9.1* 8.4* 6.3* 7.5*   PLATELETS 10*3/mm3 195 190 189  --  213 260         Results from last 7 days  Lab Units 12/03/17  1211 12/03/17  0253 12/02/17  2152   INR  1.14* 1.18* 1.09         Imaging Results (last 24 hours)     Procedure Component Value Units Date/Time    NM Lung Ventilation Perfusion [[id number]] Collected:  12/03/17 0202     Updated:  12/06/17 0207    Narrative:       NUCLEAR MEDICINE LUNG VENTILATION/PERFUSION.     HISTORY: Shortness of breath.     COMPARISON: No prior studies for comparison.     FINDINGS:     Patient was given 6.5 mCi of xenon-133 and 5.4 mCi of technetium 99 MAA.        There is slightly decreased perfusion to the right lung base compared to  the left. A cholangiogram demonstrates similar activity distribution.  Minimal residual tracer is seen on the washout images.     Perfusion images do not demonstrate a large mismatch defect.       Impression:       Low probability for pulmonary embolism.      This report was finalized on 12/6/2017 2:04 AM by Dr. Janet Lawrence MD.       CT Chest Without Contrast [[id number]] Collected:  12/02/17 2352     Updated:  12/06/17 0207    Narrative:       CT SCAN OF THE CHEST WITHOUT CONTRAST.     TECHNIQUE: Radiation dose reduction techniques were utilized, including  automated exposure control and exposure modulation based on body size.  Routine axial images of the chest were obtained with  reconstructed  images.     HISTORY: Shortness of breath.     COMPARISON: 08/27/2002.     FINDINGS:   There is no mediastinal lymphadenopathy or pericardial effusion.  Aneurysmal descending thoracic aorta measures 3.4 cm, severe  atherosclerotic disease.     No consolidation or effusion. Mild emphysema and mild bibasilar  atelectasis.     In the right lung base anteriorly along the mediastinum there is a new 2  x 1 cm reniform soft tissue nodule which may be further evaluated.     Upper abdominal viscera are essentially unremarkable.  Postcholecystectomy. 3.2 cm infrarenal aortic dilatation.     Cortical lucencies of the sternum, concerning for age-indeterminate  fracture.       Impression:       1.  No consolidation or effusion. Mild emphysema and bibasilar mild  atelectasis.  2.  New 2 cm soft tissue nodule in the right lung base, further  evaluation is recommended, neoplasm to be excluded.  3.  Severe atherosclerotic disease. 3.4 cm aneurysmal descending  thoracic aorta. 3.2 cm aneurysmal descending abdominal aorta.  4.  Age-indeterminate fractures of the sternum.     This report was finalized on 12/6/2017 2:04 AM by Dr. Janet Lawrence MD.       XR Chest 1 View [[id number]] Collected:  12/02/17 2222     Updated:  12/06/17 0208    Narrative:       X-RAY CHEST 1 VIEW.     HISTORY: Shortness of breath.     COMPARISON: 10/09/2017.     FINDINGS:  Cardiomediastinal silhouette is within normal limits.         There is no consolidation or effusion. Low lung volumes and mild  bibasilar atelectasis.          Impression:       No acute findings.         This report was finalized on 12/6/2017 2:05 AM by Dr. Janet Lawrence MD.       CT Head Without Contrast [[id number]] Updated:  12/06/17 0758          budesonide-formoterol 2 puff Inhalation BID - RT   divalproex 250 mg Oral Daily   divalproex 250 mg Oral Nightly   enoxaparin 30 mg Subcutaneous Nightly   ipratropium-albuterol 3 mL Nebulization 4x Daily - RT   levothyroxine 88  mcg Oral Q AM   sodium bicarbonate 650 mg Oral TID          Medication Review:   Current Facility-Administered Medications   Medication Dose Route Frequency Provider Last Rate Last Dose   • budesonide-formoterol (SYMBICORT) 160-4.5 MCG/ACT inhaler 2 puff  2 puff Inhalation BID - RT John Ca MD   2 puff at 12/05/17 1929   • divalproex (DEPAKOTE) 24 hr tablet 250 mg  250 mg Oral Daily oJhn Ca MD   250 mg at 12/06/17 0837   • divalproex (DEPAKOTE) 24 hr tablet 250 mg  250 mg Oral Nightly Stanford Hartley MD   250 mg at 12/05/17 2000   • enoxaparin (LOVENOX) syringe 30 mg  30 mg Subcutaneous Nightly Param H. MD Bertha   30 mg at 12/05/17 2000   • fentaNYL citrate (PF) (SUBLIMAZE) injection 50 mcg  50 mcg Intravenous Q1H PRN Param H. MD Bertha   50 mcg at 12/04/17 2233   • HYDROcodone-acetaminophen (NORCO) 7.5-325 MG per tablet 1 tablet  1 tablet Oral Q4H PRN Param H. MD Bertha   1 tablet at 12/06/17 0841   • ipratropium-albuterol (DUO-NEB) nebulizer solution 3 mL  3 mL Nebulization 4x Daily - RT John Ca MD   3 mL at 12/05/17 1929   • levothyroxine (SYNTHROID, LEVOTHROID) tablet 88 mcg  88 mcg Oral Q AM John Ca MD   88 mcg at 12/06/17 0608   • loperamide (IMODIUM) capsule 4 mg  4 mg Oral 4x Daily PRN Param H. MD Bertha   4 mg at 12/05/17 0840   • ondansetron (ZOFRAN) tablet 4 mg  4 mg Oral Q6H PRN Param H. MD Bertha        Or   • ondansetron ODT (ZOFRAN-ODT) disintegrating tablet 4 mg  4 mg Oral Q6H PRN Param H. MD Bertha        Or   • ondansetron (ZOFRAN) injection 4 mg  4 mg Intravenous Q6H PRN Param H. MD Bertha   4 mg at 12/05/17 2201   • sodium bicarbonate tablet 650 mg  650 mg Oral TID Kelton Barreto MD   650 mg at 12/06/17 0837   • sodium chloride 0.9 % flush 1-10 mL  1-10 mL Intravenous PRN Param Stone MD       • sodium chloride 0.9 % flush 10 mL  10 mL Intravenous PRN Efrain Peterson MD       • zolpidem (AMBIEN) tablet 5 mg  5 mg Oral Nightly PRN John Ca MD   5 mg  at 12/06/17 0015       Assessment/Plan   1.  Acute kidney injury on chronic kidney disease . Resolved. K normal. Crt 1.0  2.  Chronic kidney disease stage II at baseline small kidneys by ultrasound.  3.  Hypoxic respiratory failure. Dr. Hernandez ordered CXR for this am.   4.  Hyperkalemia. Resolved.   5.  Chronic anemia, hemoglobin 8.8 from 9.3.  6.  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  COPD  8.  Hypertension, controlled.   9. Hypothyroid on replacement. Compensated.     Plan:  1.  Will sign off.  Please call with questions.   2. DW son    Isabela Vega MD  12/06/17  8:57 AM

## 2017-12-06 NOTE — PLAN OF CARE
Problem: Patient Care Overview (Adult)  Goal: Plan of Care Review  Outcome: Ongoing (interventions implemented as appropriate)    12/06/17 0314   Coping/Psychosocial Response Interventions   Plan Of Care Reviewed With patient   Outcome Evaluation   Outcome Summary/Follow up Plan Patient is intermittently confused to situation; pt has had constant pain in R knee from previous TKA ; still in need of oxygen; pulm ordered cxr; neuro ordered head ct; called MD for nightly PRN sleep aide;    Patient Care Overview   Progress no change       Goal: Adult Individualization and Mutuality  Outcome: Ongoing (interventions implemented as appropriate)  Goal: Discharge Needs Assessment  Outcome: Ongoing (interventions implemented as appropriate)    Problem: Fall Risk (Adult)  Goal: Identify Related Risk Factors and Signs and Symptoms  Outcome: Ongoing (interventions implemented as appropriate)  Goal: Absence of Falls  Outcome: Ongoing (interventions implemented as appropriate)    Problem: Pain, Acute (Adult)  Goal: Identify Related Risk Factors and Signs and Symptoms  Outcome: Ongoing (interventions implemented as appropriate)  Goal: Acceptable Pain Control/Comfort Level  Outcome: Ongoing (interventions implemented as appropriate)    Problem: Infection, Risk/Actual (Adult)  Goal: Identify Related Risk Factors and Signs and Symptoms  Outcome: Ongoing (interventions implemented as appropriate)  Goal: Infection Prevention/Resolution  Outcome: Ongoing (interventions implemented as appropriate)    Problem: Pressure Ulcer Risk (Gopi Scale) (Adult,Obstetrics,Pediatric)  Goal: Identify Related Risk Factors and Signs and Symptoms  Outcome: Ongoing (interventions implemented as appropriate)  Goal: Skin Integrity  Outcome: Ongoing (interventions implemented as appropriate)    Problem: Skin Integrity Impairment, Risk/Actual (Adult)  Goal: Identify Related Risk Factors and Signs and Symptoms  Outcome: Ongoing (interventions implemented as  appropriate)  Goal: Skin Integrity/Wound Healing  Outcome: Ongoing (interventions implemented as appropriate)

## 2017-12-06 NOTE — DISCHARGE SUMMARY
Patient Identification:  Name: Josephine Wolf  Age: 77 y.o.  Sex: female  :  1940  MRN: [id number]  Primary Care Physician: Bill Martino MD    Admit date: 2017  Discharge date and time: 2017   Discharged Condition: good    Discharge Diagnoses:    Hypoxia    Cellulitis of skin    Acute kidney injury    Sepsis   Acute hypoxia  Sepsis criteria, but unsure the patient truly has an infection.  Hypotension due to dehydration versus medication induced, versus blood loss  Acute kidney injury  Altered mental status, most likely metabolic encephalopathy  Acute blood loss anemia on chronic multifactorial anemia  Status post right total knee replacement 2 weeks ago  History of COPD      Hospital Course: Josephine Wolf presented to [facility]  with acute confusion.  She was at the rehabilitation facility.  She was sent over to our emergency room because of confusion and hypoxia.  She was admitted to the intensive care unit and was treated for suspected sepsis.  The source was possible cellulitis of the right knee.  Initially she received intravenous antibiotics, however orthopedics evaluated her and stated that the right knee looked good and was not the source of sepsis.  Antibiotics were discontinued.    The patient did quite well and improved.  She started participating with physical therapy and was transferred out of the ICU to the general medical floor under telemetry monitoring.  There she continued to improve.  Today she tells me she feels like she can function independently at home.  Nurse also confirmed this stating that she has been able to groom herself, ambulate and needed minimal assistance.    The patient's knee is still somewhat erythematous, but there is mild blotchy macular erythema along the incision site of the knee, without any fluctuation or fluid palpable.  There is no drainage or ulceration of the incision.  There is no redness on the lateral  aspects of the knee.  She does have reasonable range of motion in the right knee.  She has been walking with a walker.    She does have oxygen at home.  She usually uses oxygen when she sleeps at night.  She was advised to use it during the day, if needed, and she was advised to purchase pulse oximeter and monitor her oxygen saturation and use oxygen if it registers lower than 89% at rest or during exertion.    Her acute kidney injury resolved after IV fluids.  Nephrology was consulted and they followed the patient.  She received IV fluids and oral bicarbonate.    She was diagnosed with acute blood loss anemia on chronic anemia when she was admitted.  She received transfusion of packed red cells and her hemoglobin has now stabilized at 9.3 g/dL.    Her COPD has remained stable.  She has remained on Symbicort which is her home inhaler.  She also received DuoNeb 4 times a day while she was here.  There is no wheezing on exam.    She is being discharged home today with home health agency.  She should follow-up with orthopedics in one week.  She should follow-up with her regular primary care physician within one week.  No need to follow-up with me or nephrology.      Consults:   IP CONSULT TO PULMONOLOGY  IP CONSULT TO ORTHOPEDIC SURGERY  IP CONSULT TO NEPHROLOGY  IP CONSULT TO CASE MANAGEMENT   IP CONSULT TO NEUROLOGY    Significant Diagnostic Studies:   Imaging Results (most recent)     Procedure Component Value Units Date/Time    US Renal Bilateral [[id number]] Collected:  12/03/17 2207     Updated:  12/05/17 0321    Narrative:       BILATERAL RENAL ULTRASOUND     CLINICAL HISTORY: ARF; R09.02-Hypoxemia; T81.4XXA-Infection following a  procedure, initial encounter; A41.9-Sepsis, unspecified organism;  R79.89-Other specified abnormal findings of blood chemistry; N17.9-Acute  kidney failure, unspecified.     FINDINGS: Longitudinal and transverse images of the kidneys were  obtained. The right kidney  measures 9.9 cm in length. The left kidney  measures 10.1 cm in length. Cortical echogenicity is normal. No cystic  or solid renal mass. No hydronephrosis. Limited urinary bladder images  are unremarkable.       Impression:       Somewhat small but otherwise normal-appearing kidneys.      This report was finalized on 12/5/2017 3:18 AM by Jorge A Hylton MD.       NM Lung Ventilation Perfusion [[id number]] Collected:  12/03/17 0202     Updated:  12/06/17 0207    Narrative:       NUCLEAR MEDICINE LUNG VENTILATION/PERFUSION.     HISTORY: Shortness of breath.     COMPARISON: No prior studies for comparison.     FINDINGS:     Patient was given 6.5 mCi of xenon-133 and 5.4 mCi of technetium 99 MAA.        There is slightly decreased perfusion to the right lung base compared to  the left. A cholangiogram demonstrates similar activity distribution.  Minimal residual tracer is seen on the washout images.     Perfusion images do not demonstrate a large mismatch defect.       Impression:       Low probability for pulmonary embolism.      This report was finalized on 12/6/2017 2:04 AM by Dr. Janet Lawrence MD.       CT Chest Without Contrast [[id number]] Collected:  12/02/17 2352     Updated:  12/06/17 0207    Narrative:       CT SCAN OF THE CHEST WITHOUT CONTRAST.     TECHNIQUE: Radiation dose reduction techniques were utilized, including  automated exposure control and exposure modulation based on body size.  Routine axial images of the chest were obtained with reconstructed  images.     HISTORY: Shortness of breath.     COMPARISON: 08/27/2002.     FINDINGS:   There is no mediastinal lymphadenopathy or pericardial effusion.  Aneurysmal descending thoracic aorta measures 3.4 cm, severe  atherosclerotic disease.     No consolidation or effusion. Mild emphysema and mild bibasilar  atelectasis.     In the right lung base anteriorly along the mediastinum there is a new 2  x 1 cm reniform soft tissue nodule which may be further  evaluated.     Upper abdominal viscera are essentially unremarkable.  Postcholecystectomy. 3.2 cm infrarenal aortic dilatation.     Cortical lucencies of the sternum, concerning for age-indeterminate  fracture.       Impression:       1.  No consolidation or effusion. Mild emphysema and bibasilar mild  atelectasis.  2.  New 2 cm soft tissue nodule in the right lung base, further  evaluation is recommended, neoplasm to be excluded.  3.  Severe atherosclerotic disease. 3.4 cm aneurysmal descending  thoracic aorta. 3.2 cm aneurysmal descending abdominal aorta.  4.  Age-indeterminate fractures of the sternum.     This report was finalized on 12/6/2017 2:04 AM by Dr. Janet Lawrence MD.       XR Chest 1 View [[id number]] Collected:  12/02/17 2222     Updated:  12/06/17 0208    Narrative:       X-RAY CHEST 1 VIEW.     HISTORY: Shortness of breath.     COMPARISON: 10/09/2017.     FINDINGS:  Cardiomediastinal silhouette is within normal limits.         There is no consolidation or effusion. Low lung volumes and mild  bibasilar atelectasis.          Impression:       No acute findings.         This report was finalized on 12/6/2017 2:05 AM by Dr. Janet Lawrence MD.       CT Head Without Contrast [[id number]] Collected:  12/06/17 0945     Updated:  12/06/17 0958    Narrative:       CT SCAN OF THE HEAD WITHOUT CONTRAST     CLINICAL HISTORY: Postop knee surgery. tics, tremors, and dizziness.     TECHNIQUE: CT scan of the head was obtained with 3 mm axial images. No  intravenous contrast was administered.     COMPARISON: CT scan of the head dated 07/03/2016.     FINDINGS:  The ventricles, sulci, and cisterns are age appropriate. The basal  ganglia and thalami are remarkable for old lacunar disease within the  right caudate and thalamus. The posterior fossa structures are within  normal limits.       Impression:       Old lacunar disease is appreciated within the right caudate and  thalamus. Otherwise, no acute intracranial  pathology is evident.     Radiation dose reduction techniques were utilized, including automated  exposure control and exposure modulation based on body size.     This report was finalized on 12/6/2017 9:55 AM by Dr. Vasquez Rowley MD.       XR Chest PA & Lateral [[id number]] Collected:  12/06/17 1155     Updated:  12/06/17 1155    Narrative:       2 VIEWS CHEST     HISTORY: Hypoxemia.     COMPARISON: Chest x-ray 12/02/2017.     FINDINGS: The heart is within normal limits in size. Bibasilar  atelectasis/infiltrate is noted more prominent as compared to prior  examination. Bilateral pleural fluid collections are noted, slightly  larger on the left. There is minimal cephalization of the pulmonary  vasculature.                 Results from last 7 days  Lab Units 12/03/17  0839 12/02/17  2253 12/02/17  2153   BLOODCX   --  No growth at 3 days No growth at 3 days   URINECX  No growth  --   --      TEST  RESULTS PENDING AT DISCHARGE   Order Current Status    Immunofixation, Urine - In process    Blood Culture - Blood, Preliminary result    Blood Culture - Blood, Preliminary result          Discharge Exam:  Alert and oriented x 4, in NAD  Supple neck, midline trach  RRR, no m/r/g, no edema  Barrel chest, diminished but Clear bilaterally, no wheezing, nonlabored  Right knee shows some blotchy macular erythema along the incision site, but there is no oozing, no drainage, no ulceration of the incision.  It is well healed.  She has reasonable range of motion.  There is no erythema or swelling on the lateral aspects of the knee.  There is no fluctuation suggesting any fluid in the joint.  The left knee joint is normal in appearance and range of motion.     Disposition:  Home    Patient Instructions:        Your medication list      START taking these medications       Instructions Last Dose Given Next Dose Due    cephalexin 500 MG capsule   Commonly known as:  KEFLEX        Take 1 capsule by mouth Every 12 (Twelve) Hours for 14  doses. Indications: Infection of the Skin and/or Related Soft Tissue           CHANGE how you take these medications       Instructions Last Dose Given Next Dose Due    gabapentin 300 MG capsule   Commonly known as:  NEURONTIN   What changed:    - when to take this  - reasons to take this        Take 1 capsule by mouth 2 (Two) Times a Day As Needed (for pain) for up to 5 days.         HYDROcodone-acetaminophen 7.5-325 MG per tablet   Commonly known as:  NORCO   What changed:  Another medication with the same name was removed. Continue taking this medication, and follow the directions you see here.              traZODone 100 MG tablet   Commonly known as:  DESYREL   What changed:    - how much to take  - when to take this  - reasons to take this        Take 0.5 tablets by mouth At Night As Needed for Sleep.           CONTINUE taking these medications       Instructions Last Dose Given Next Dose Due    albuterol 1.25 MG/3ML nebulizer solution   Commonly known as:  ACCUNEB              aspirin 325 MG EC tablet        Take 1 tablet by mouth 2 (Two) Times a Day With Meals.         budesonide-formoterol 160-4.5 MCG/ACT inhaler   Commonly known as:  SYMBICORT              DEPAKOTE  MG 24 hr tablet   Generic drug:  divalproex              DEPAKOTE  MG 24 hr tablet   Generic drug:  divalproex              docusate sodium 100 MG capsule        Take 100 mg by mouth 2 (Two) Times a Day As Needed for Constipation.         ferrous sulfate 325 (65 FE) MG tablet              levothyroxine 88 MCG tablet   Commonly known as:  SYNTHROID, LEVOTHROID              omeprazole 20 MG capsule   Commonly known as:  priLOSEC              vitamin D3 5000 units capsule capsule                STOP taking these medications          allopurinol 300 MG tablet   Commonly known as:  ZYLOPRIM           amLODIPine 10 MG tablet   Commonly known as:  NORVASC           Cetirizine HCl 10 MG capsule           EFFEXOR  MG 24 hr capsule    Generic drug:  venlafaxine XR           montelukast 10 MG tablet   Commonly known as:  SINGULAIR           ondansetron 4 MG tablet   Commonly known as:  ZOFRAN           pantoprazole 40 MG EC tablet   Commonly known as:  PROTONIX           sulfamethoxazole-trimethoprim 800-160 MG per tablet   Commonly known as:  BACTRIM DS,SEPTRA DS           valsartan-hydrochlorothiazide 320-12.5 MG per tablet   Commonly known as:  DIOVAN-HCT                Where to Get Your Medications      These medications were sent to [facility] [address] [place] [address] - [address] AT [facility] - [phone]  - [phone] FX  [address], [facility] [address]     Phone:  [phone]    • cephalexin 500 MG capsule   • traZODone 100 MG tablet         You can get these medications from any pharmacy     Bring a paper prescription for each of these medications    • gabapentin 300 MG capsule                 Medication Reconciliation: Please see electronically completed Med Rec.    Total time spent discharging patient including evaluation, medication reconciliation, arranging follow up, and post hospitalization instructions and education total time exceeds 30 minutes.    Signed:  Param Stone MD  12/6/2017  12:51 PM

## 2017-12-07 LAB
BACTERIA SPEC AEROBE CULT: NORMAL
BACTERIA SPEC AEROBE CULT: NORMAL

## 2017-12-07 NOTE — PROGRESS NOTES
Case Management Discharge Note    Final Note: Discharged 12/6/17 to home with [facility]    Discharge Placement     Facility/Agency Request Status Selected? Address Phone Number Fax Number    [facility] Accepted    Yes [address], [facility] [address] [phone]    [facility] Accepted     [address], [facility] [address] [phone]        Other: Other (son provided transportation at discharge)    Discharge Codes: 06  Discharged/transferred to home under care of organized home health service organization in anticipation of skilled care

## 2017-12-13 ENCOUNTER — TRANSCRIBE ORDERS (OUTPATIENT)
Dept: ADMINISTRATIVE | Facility: HOSPITAL | Age: 77
End: 2017-12-13

## 2017-12-13 DIAGNOSIS — R91.1 PULMONARY NODULE: Primary | ICD-10-CM

## 2017-12-21 ENCOUNTER — HOSPITAL ENCOUNTER (OUTPATIENT)
Dept: PET IMAGING | Facility: HOSPITAL | Age: 77
Discharge: HOME | End: 2017-12-21
Attending: INTERNAL MEDICINE | Admitting: INTERNAL MEDICINE

## 2017-12-21 ENCOUNTER — HOSPITAL ENCOUNTER (OUTPATIENT)
Dept: PET IMAGING | Facility: HOSPITAL | Age: 77
Discharge: HOME | End: 2017-12-21
Attending: INTERNAL MEDICINE

## 2017-12-21 DIAGNOSIS — R91.1 PULMONARY NODULE: ICD-10-CM

## 2017-12-21 LAB — GLUCOSE BLDC GLUCOMTR-MCNC: 79 MG/DL (ref 70–130)

## 2017-12-21 PROCEDURE — 82962 GLUCOSE BLOOD TEST: CPT

## 2017-12-21 PROCEDURE — 78815 PET IMAGE W/CT SKULL-THIGH: CPT

## 2017-12-21 PROCEDURE — 0 FLUDEOXYGLUCOSE F18 SOLUTION: Performed by: INTERNAL MEDICINE

## 2017-12-21 PROCEDURE — A9552 F18 FDG: HCPCS | Performed by: INTERNAL MEDICINE

## 2017-12-21 RX ADMIN — FLUDEOXYGLUCOSE F18 1 DOSE: 300 INJECTION INTRAVENOUS at 09:57

## 2018-01-12 ENCOUNTER — TRANSCRIBE ORDERS (OUTPATIENT)
Dept: ADMINISTRATIVE | Facility: HOSPITAL | Age: 78
End: 2018-01-12

## 2018-01-12 DIAGNOSIS — R91.1 PULMONARY NODULE: Primary | ICD-10-CM

## 2018-03-01 ENCOUNTER — HOSPITAL ENCOUNTER (EMERGENCY)
Facility: HOSPITAL | Age: 78
Discharge: HOME | End: 2018-03-01
Attending: EMERGENCY MEDICINE | Admitting: EMERGENCY MEDICINE

## 2018-03-01 ENCOUNTER — APPOINTMENT (OUTPATIENT)
Dept: GENERAL RADIOLOGY | Facility: HOSPITAL | Age: 78
End: 2018-03-01

## 2018-03-01 VITALS
BODY MASS INDEX: 28.32 KG/M2 | OXYGEN SATURATION: 94 % | DIASTOLIC BLOOD PRESSURE: 49 MMHG | TEMPERATURE: 99.2 F | WEIGHT: 150 LBS | SYSTOLIC BLOOD PRESSURE: 107 MMHG | HEART RATE: 77 BPM | RESPIRATION RATE: 16 BRPM | HEIGHT: 61 IN

## 2018-03-01 DIAGNOSIS — J44.1 COPD WITH EXACERBATION (HCC): Primary | ICD-10-CM

## 2018-03-01 LAB
ALBUMIN SERPL-MCNC: 3.6 G/DL (ref 3.5–5.2)
ALBUMIN/GLOB SERPL: 1.4 G/DL
ALP SERPL-CCNC: 94 U/L (ref 39–117)
ALT SERPL W P-5'-P-CCNC: 28 U/L (ref 1–33)
ANION GAP SERPL CALCULATED.3IONS-SCNC: 10 MMOL/L
ARTERIAL PATENCY WRIST A: POSITIVE
AST SERPL-CCNC: 24 U/L (ref 1–32)
ATMOSPHERIC PRESS: 745.8 MMHG
BASE EXCESS BLDA CALC-SCNC: 7.5 MMOL/L (ref 0–2)
BASOPHILS # BLD AUTO: 0.01 10*3/MM3 (ref 0–0.2)
BASOPHILS NFR BLD AUTO: 0.2 % (ref 0–1.5)
BDY SITE: ABNORMAL
BILIRUB SERPL-MCNC: 0.4 MG/DL (ref 0.1–1.2)
BUN BLD-MCNC: 15 MG/DL (ref 8–23)
BUN/CREAT SERPL: 10.8 (ref 7–25)
CALCIUM SPEC-SCNC: 8.5 MG/DL (ref 8.6–10.5)
CHLORIDE SERPL-SCNC: 100 MMOL/L (ref 98–107)
CO2 SERPL-SCNC: 30 MMOL/L (ref 22–29)
CREAT BLD-MCNC: 1.39 MG/DL (ref 0.57–1)
DEPRECATED RDW RBC AUTO: 57.5 FL (ref 37–54)
EOSINOPHIL # BLD AUTO: 0.32 10*3/MM3 (ref 0–0.7)
EOSINOPHIL NFR BLD AUTO: 5.2 % (ref 0.3–6.2)
ERYTHROCYTE [DISTWIDTH] IN BLOOD BY AUTOMATED COUNT: 14.9 % (ref 11.7–13)
FLUAV AG NPH QL: NEGATIVE
FLUBV AG NPH QL IA: NEGATIVE
GAS FLOW AIRWAY: 3 LPM
GFR SERPL CREATININE-BSD FRML MDRD: 37 ML/MIN/1.73
GLOBULIN UR ELPH-MCNC: 2.5 GM/DL
GLUCOSE BLD-MCNC: 87 MG/DL (ref 65–99)
HCO3 BLDA-SCNC: 32.9 MMOL/L (ref 22–28)
HCT VFR BLD AUTO: 28.5 % (ref 35.6–45.5)
HGB BLD-MCNC: 9.1 G/DL (ref 11.9–15.5)
IMM GRANULOCYTES # BLD: 0.02 10*3/MM3 (ref 0–0.03)
IMM GRANULOCYTES NFR BLD: 0.3 % (ref 0–0.5)
LYMPHOCYTES # BLD AUTO: 0.94 10*3/MM3 (ref 0.9–4.8)
LYMPHOCYTES NFR BLD AUTO: 15.2 % (ref 19.6–45.3)
MCH RBC QN AUTO: 33.8 PG (ref 26.9–32)
MCHC RBC AUTO-ENTMCNC: 31.9 G/DL (ref 32.4–36.3)
MCV RBC AUTO: 105.9 FL (ref 80.5–98.2)
MODALITY: ABNORMAL
MONOCYTES # BLD AUTO: 0.8 10*3/MM3 (ref 0.2–1.2)
MONOCYTES NFR BLD AUTO: 12.9 % (ref 5–12)
NEUTROPHILS # BLD AUTO: 4.09 10*3/MM3 (ref 1.9–8.1)
NEUTROPHILS NFR BLD AUTO: 66.2 % (ref 42.7–76)
PCO2 BLDA: 49.8 MM HG (ref 35–45)
PH BLDA: 7.43 PH UNITS (ref 7.35–7.45)
PLAT MORPH BLD: NORMAL
PLATELET # BLD AUTO: 128 10*3/MM3 (ref 140–500)
PMV BLD AUTO: 10 FL (ref 6–12)
PO2 BLDA: 74.7 MM HG (ref 80–100)
POTASSIUM BLD-SCNC: 4.4 MMOL/L (ref 3.5–5.2)
PROT SERPL-MCNC: 6.1 G/DL (ref 6–8.5)
RBC # BLD AUTO: 2.69 10*6/MM3 (ref 3.9–5.2)
SAO2 % BLDCOA: 94.9 % (ref 92–99)
SODIUM BLD-SCNC: 140 MMOL/L (ref 136–145)
STOMATOCYTES BLD QL SMEAR: NORMAL
TOTAL RATE: 18 BREATHS/MINUTE
TROPONIN T SERPL-MCNC: <0.01 NG/ML (ref 0–0.03)
WBC MORPH BLD: NORMAL
WBC NRBC COR # BLD: 6.18 10*3/MM3 (ref 4.5–10.7)

## 2018-03-01 PROCEDURE — 94799 UNLISTED PULMONARY SVC/PX: CPT

## 2018-03-01 PROCEDURE — 87804 INFLUENZA ASSAY W/OPTIC: CPT | Performed by: PHYSICIAN ASSISTANT

## 2018-03-01 PROCEDURE — 94640 AIRWAY INHALATION TREATMENT: CPT

## 2018-03-01 PROCEDURE — 93010 ELECTROCARDIOGRAM REPORT: CPT | Performed by: INTERNAL MEDICINE

## 2018-03-01 PROCEDURE — 93005 ELECTROCARDIOGRAM TRACING: CPT | Performed by: PHYSICIAN ASSISTANT

## 2018-03-01 PROCEDURE — 25010000002 METHYLPREDNISOLONE PER 125 MG: Performed by: PHYSICIAN ASSISTANT

## 2018-03-01 PROCEDURE — 96374 THER/PROPH/DIAG INJ IV PUSH: CPT

## 2018-03-01 PROCEDURE — 85007 BL SMEAR W/DIFF WBC COUNT: CPT | Performed by: PHYSICIAN ASSISTANT

## 2018-03-01 PROCEDURE — 71046 X-RAY EXAM CHEST 2 VIEWS: CPT

## 2018-03-01 PROCEDURE — 99284 EMERGENCY DEPT VISIT MOD MDM: CPT

## 2018-03-01 PROCEDURE — 36600 WITHDRAWAL OF ARTERIAL BLOOD: CPT

## 2018-03-01 PROCEDURE — 82803 BLOOD GASES ANY COMBINATION: CPT

## 2018-03-01 PROCEDURE — 85025 COMPLETE CBC W/AUTO DIFF WBC: CPT | Performed by: PHYSICIAN ASSISTANT

## 2018-03-01 PROCEDURE — 84484 ASSAY OF TROPONIN QUANT: CPT | Performed by: PHYSICIAN ASSISTANT

## 2018-03-01 PROCEDURE — 80053 COMPREHEN METABOLIC PANEL: CPT | Performed by: PHYSICIAN ASSISTANT

## 2018-03-01 RX ORDER — ALBUTEROL SULFATE 2.5 MG/3ML
2.5 SOLUTION RESPIRATORY (INHALATION) ONCE
Status: COMPLETED | OUTPATIENT
Start: 2018-03-01 | End: 2018-03-01

## 2018-03-01 RX ORDER — METHYLPREDNISOLONE SODIUM SUCCINATE 125 MG/2ML
125 INJECTION, POWDER, LYOPHILIZED, FOR SOLUTION INTRAMUSCULAR; INTRAVENOUS ONCE
Status: COMPLETED | OUTPATIENT
Start: 2018-03-01 | End: 2018-03-01

## 2018-03-01 RX ORDER — PREDNISONE 20 MG/1
20 TABLET ORAL 2 TIMES DAILY
Qty: 10 TABLET | Refills: 0 | Status: SHIPPED | OUTPATIENT
Start: 2018-03-01 | End: 2018-03-06

## 2018-03-01 RX ADMIN — SODIUM CHLORIDE 1000 ML: 9 INJECTION, SOLUTION INTRAVENOUS at 10:58

## 2018-03-01 RX ADMIN — ALBUTEROL SULFATE 2.5 MG: 2.5 SOLUTION RESPIRATORY (INHALATION) at 12:19

## 2018-03-01 RX ADMIN — METHYLPREDNISOLONE SODIUM SUCCINATE 125 MG: 125 INJECTION, POWDER, FOR SOLUTION INTRAMUSCULAR; INTRAVENOUS at 11:43

## 2018-03-01 RX ADMIN — ALBUTEROL SULFATE 2.5 MG: 2.5 SOLUTION RESPIRATORY (INHALATION) at 11:00

## 2018-03-01 NOTE — ED PROVIDER NOTES
PCP: MADAIU    Patient presents to the ED c/o SOA, dry cough and generalized myalgias, worsening over the past 2 days. There is no known ill contact. [facility] referred her to the ED for further workup.    PHYSICAL EXAM    Constitutional: appears comfortable in NAD  Cardio: RRR  Pulmonary: mild wheezing bilaterally    EKG         EKG time: 1023  Rhythm/Rate: NSR rate63  P waves and TX: nml  QRS, axis: nml   ST and T waves: nml     Interpreted Contemporaneously by me, independently viewed  unchanged compared to prior 12/02/17      Plan to consult pulmonology prior to disposition.   I supervised care provided by the midlevel provider.    We have discussed this patient's history, physical exam, and treatment plan.   I have reviewed the note and personally saw and examined the patient and agree with the plan of care.    Documentation assistance provided by meche Buckley for .  Information recorded by the scribe was done at my direction and has been verified and validated by me.       Mariangel Buckley  03/01/18 8235       Pancho Infante MD  03/01/18 5181

## 2018-03-01 NOTE — ED NOTES
"Pt reports cough, SOA, 5/10 generalized chest pain, chills, generalized weakness since yesterday afternoon; pt states: \"I think I'm coming down with the flu.\"  Pt has history of COPD, usually on 2L NC at home.  On assessment, pt in no apparent distress; respirations deep and even, 16BPM; expiratory wheeze auscultated RML, RLL, LLL a/p; HR regular, 70 apical.  Pt has occasional dry cough.  O2 sats 93% 2L NC.  Droplet precautions in place, pt positioned for comfort.     Clay Blandon RN  03/01/18 1003    "

## 2018-03-01 NOTE — DISCHARGE INSTRUCTIONS
PLEASE READ AND REVIEW ALL DISCHARGE INSTRUCTIONS.     Please follow up with your primary care physician for any further evaluation/treatment and further management of your blood pressure.     Recheck in emergency department for any worsening and/or concerning symptoms.     Take all prescribed medicine as written and continue chronic medication.    Use albuterol every four hours as needed for three days.  Take new prescriptions as written.

## 2018-03-01 NOTE — ED PROVIDER NOTES
EMERGENCY DEPARTMENT ENCOUNTER    CHIEF COMPLAINT  Chief Complaint: flu symptoms  History given by: patient  History limited by: nothing  Room Number: 38/38  PMD: Bill Martino MD      HPI:  Pt is a 77 y.o. female who presents with flu symptoms which include a cough, sore throat, sternal CP that radiates over both ribs, lightheadedness, SOA, and generalized myalgias for 2 days but has began worsening today. Pt denies a fever. Pt reports being around her daughter who was presenting flu like symptoms. She has a history of COPD and is usually on 2L oxygen at home all the time. Pt is at 96% on 2L in the ED at this time. Also a history of HTN and fibromyalgia.  She is a smoker.    Duration: 2 days  Timing: constant  Quality: flu symptoms  Intensity/Severity: moderate  Progression: worsening  Associated Symptoms: cough, sore throat, sternal CP that radiates over both ribs, lightheadedness, SOA, and generalized myalgias  Aggravating Factors: none stated  Alleviating Factors: none stated  Previous Episodes: Pt does not report having previous episodes.  Treatment before arrival: Pt does not report treatment PTA.       MEDICAL RECORD REVIEW  COPD, HTN, Fibromyalgia.  She had a hospital admission on 12/2/17 for hypoxia and sepsis with cellulitis of the right knee.      PAST MEDICAL HISTORY  Active Ambulatory Problems     Diagnosis Date Noted   • Anemia 10/19/2017   • OA (osteoarthritis) of knee 11/16/2017   • Hypoxia 12/02/2017   • Cellulitis of skin 12/06/2017   • Acute kidney injury 12/06/2017   • Sepsis 12/06/2017     Resolved Ambulatory Problems     Diagnosis Date Noted   • No Resolved Ambulatory Problems     Past Medical History:   Diagnosis Date   • Acid reflux    • Anemia    • Arthritis    • Bipolar 1 disorder, depressed    • Cataract    • Chronic nausea    • Chronic pain of right knee    • Continuous leakage of urine    • COPD (chronic obstructive pulmonary disease)    • Disease of thyroid gland    •  Diverticulosis    • Fibromyalgia    • Frequent episodes of bronchitis    • Hypertension    • Migraines    • Neck pain    • On home oxygen therapy    • Short of breath on exertion        PAST SURGICAL HISTORY  Past Surgical History:   Procedure Laterality Date   • CEREBRAL ANEURYSM REPAIR  2000'S    WITH STENT   • HYSTERECTOMY     • LAPAROSCOPIC CHOLECYSTECTOMY     • IN TOTAL KNEE ARTHROPLASTY Right 11/16/2017    Procedure: RT TOTAL KNEE ARTHROPLASTY;  Surgeon: Kashif Perez MD;  Location: [facility] OR;  Service: Orthopedics       FAMILY HISTORY  Family History   Problem Relation Age of Onset   • Malig Hyperthermia Neg Hx        SOCIAL HISTORY  Social History     Social History   • Marital status:      Spouse name: N/A   • Number of children: N/A   • Years of education: N/A     Occupational History   • Not on file.     Social History Main Topics   • Smoking status: Former Smoker     Packs/day: 1.00     Years: 10.00     Types: Cigarettes     Start date: 1959     Quit date: 1969   • Smokeless tobacco: Never Used   • Alcohol use No   • Drug use: No   • Sexual activity: Defer     Other Topics Concern   • Not on file     Social History Narrative       ALLERGIES  Morphine and related    REVIEW OF SYSTEMS  Review of Systems   Constitutional: Negative for fever.   HENT: Positive for sore throat.    Eyes: Negative.    Respiratory: Positive for cough and shortness of breath.    Cardiovascular: Positive for chest pain (sternal, radiates over both ribs).   Gastrointestinal: Negative for abdominal pain, diarrhea and vomiting.   Genitourinary: Negative for dysuria.   Musculoskeletal: Positive for myalgias (generalized). Negative for neck pain.   Skin: Negative for rash.   Allergic/Immunologic: Negative.    Neurological: Positive for light-headedness. Negative for weakness, numbness and headaches.   Hematological: Negative.    Psychiatric/Behavioral: Negative.    All other systems reviewed and are  negative.      PHYSICAL EXAM  ED Triage Vitals   Temp Heart Rate Resp BP SpO2   03/01/18 0948 03/01/18 0928 03/01/18 0944 03/01/18 0928 03/01/18 0928   99.2 °F (37.3 °C) 79 16 108/53 95 %      Temp src Heart Rate Source Patient Position BP Location FiO2 (%)   03/01/18 0948 03/01/18 0944 -- -- --   Tympanic Monitor          Physical Exam   Constitutional: She is oriented to person, place, and time and well-developed, well-nourished, and in no distress. No distress.   HENT:   Head: Normocephalic and atraumatic.   Mouth/Throat: Oropharynx is clear and moist.   Eyes: EOM are normal.   Neck: Normal range of motion. Neck supple.   Cardiovascular: Normal rate and regular rhythm.    Pulmonary/Chest: She is in respiratory distress (tacypnic). She has wheezes. She exhibits no tenderness.   Abdominal: Soft. She exhibits no distension. There is no tenderness. There is no rebound.   Musculoskeletal: Normal range of motion. She exhibits no edema.   Lymphadenopathy:     She has no cervical adenopathy.   Neurological: She is alert and oriented to person, place, and time.   Skin: Skin is warm and dry. No rash noted. No pallor.   Psychiatric: Mood, memory, affect and judgment normal.   Nursing note and vitals reviewed.      LAB RESULTS  Recent Results (from the past 24 hour(s))   Comprehensive Metabolic Panel    Collection Time: 03/01/18 10:11 AM   Result Value Ref Range    Glucose 87 65 - 99 mg/dL    BUN 15 8 - 23 mg/dL    Creatinine 1.39 (H) 0.57 - 1.00 mg/dL    Sodium 140 136 - 145 mmol/L    Potassium 4.4 3.5 - 5.2 mmol/L    Chloride 100 98 - 107 mmol/L    CO2 30.0 (H) 22.0 - 29.0 mmol/L    Calcium 8.5 (L) 8.6 - 10.5 mg/dL    Total Protein 6.1 6.0 - 8.5 g/dL    Albumin 3.60 3.50 - 5.20 g/dL    ALT (SGPT) 28 1 - 33 U/L    AST (SGOT) 24 1 - 32 U/L    Alkaline Phosphatase 94 39 - 117 U/L    Total Bilirubin 0.4 0.1 - 1.2 mg/dL    eGFR Non African Amer 37 (L) >60 mL/min/1.73    Globulin 2.5 gm/dL    A/G Ratio 1.4 g/dL    BUN/Creatinine  Ratio 10.8 7.0 - 25.0    Anion Gap 10.0 mmol/L   CBC Auto Differential    Collection Time: 03/01/18 10:11 AM   Result Value Ref Range    WBC 6.18 4.50 - 10.70 10*3/mm3    RBC 2.69 (L) 3.90 - 5.20 10*6/mm3    Hemoglobin 9.1 (L) 11.9 - 15.5 g/dL    Hematocrit 28.5 (L) 35.6 - 45.5 %    .9 (H) 80.5 - 98.2 fL    MCH 33.8 (H) 26.9 - 32.0 pg    MCHC 31.9 (L) 32.4 - 36.3 g/dL    RDW 14.9 (H) 11.7 - 13.0 %    RDW-SD 57.5 (H) 37.0 - 54.0 fl    MPV 10.0 6.0 - 12.0 fL    Platelets 128 (L) 140 - 500 10*3/mm3    Neutrophil % 66.2 42.7 - 76.0 %    Lymphocyte % 15.2 (L) 19.6 - 45.3 %    Monocyte % 12.9 (H) 5.0 - 12.0 %    Eosinophil % 5.2 0.3 - 6.2 %    Basophil % 0.2 0.0 - 1.5 %    Immature Grans % 0.3 0.0 - 0.5 %    Neutrophils, Absolute 4.09 1.90 - 8.10 10*3/mm3    Lymphocytes, Absolute 0.94 0.90 - 4.80 10*3/mm3    Monocytes, Absolute 0.80 0.20 - 1.20 10*3/mm3    Eosinophils, Absolute 0.32 0.00 - 0.70 10*3/mm3    Basophils, Absolute 0.01 0.00 - 0.20 10*3/mm3    Immature Grans, Absolute 0.02 0.00 - 0.03 10*3/mm3   Troponin    Collection Time: 03/01/18 10:11 AM   Result Value Ref Range    Troponin T <0.010 0.000 - 0.030 ng/mL   Scan Slide    Collection Time: 03/01/18 10:11 AM   Result Value Ref Range    Stomatocytes Slight/1+ None Seen    WBC Morphology Normal Normal    Platelet Morphology Normal Normal   Influenza Antigen, Rapid - Swab, Nasopharynx    Collection Time: 03/01/18 10:14 AM   Result Value Ref Range    Influenza A Ag, EIA Negative Negative    Influenza B Ag, EIA Negative Negative   Blood Gas, Arterial    Collection Time: 03/01/18 10:56 AM   Result Value Ref Range    Site Arterial: right radial     Zaki's Test Positive     pH, Arterial 7.427 7.350 - 7.450 pH units    pCO2, Arterial 49.8 (H) 35.0 - 45.0 mm Hg    pO2, Arterial 74.7 (L) 80.0 - 100.0 mm Hg    HCO3, Arterial 32.9 (H) 22.0 - 28.0 mmol/L    Base Excess, Arterial 7.5 (H) 0.0 - 2.0 mmol/L    O2 Saturation Calculated 94.9 92.0 - 99.0 %    Barometric  Pressure for Blood Gas 745.8 mmHg    Modality Cannula     Flow Rate 3 lpm    Rate 18 Breaths/minute       I ordered the above labs and reviewed the results    RADIOLOGY  XR Chest 2 View   Final Result   XR CHEST 2 VW-     Clinical: Chest pain and cough     COMPARISON 12/06/2017     FINDINGS: Cardiac size within normal limits. There is atherosclerotic  calcification of the aorta, the mediastinum is otherwise unremarkable.  No pleural effusion, pulmonary edema or lung consolidation. There are  monitoring leads superimposing the chest.     CONCLUSION: No active disease of the chest     This report was finalized on 3/1/2018 10:49 AM by Dr. Mynor Vera MD.       I ordered the above noted radiological studies and reviewed the images on the PACS system.      EKG  ekg was interpreted by Dr. Infante      PROCEDURES      COURSE & MEDICAL DECISION MAKING  Pertinent Labs and Imaging studies that were ordered and reviewed are noted above.  Results were reviewed/discussed with the patient and they were also made aware of online assess.  Pt also made aware that some labs, such as cultures, will not be resulted during ER visit and follow up with PMD is necessary.       PROGRESS AND CONSULTS    Progress Notes:    ED Course     1108 Reviewed pt's history and workup with Dr. Infante.  After a bedside evaluation; Dr Infante agrees with the plan of care    1128 Placing a call to Dr. Ca (pulmonology) for consult. Ordering steroids and breathing treatment.    1202 Discussed pt's case, labs, and imaging results with Dr. Ca (pulmonology) who agrees to d/c pt home.    1208 The patient's history, physical exam, and lab findings were discussed with the physician, who also performed a face to face history and physical exam.  I discussed all results and noted any abnormalities with patient.  Discussed absoute need to recheck abnormalities with their family physician.  I answered any of the patient's questions.  Discussed plan  "for discharge after 2nd albuterol treatment, as there is no emergent indication for admission.  Pt is agreeable and understands need for follow up and repeat testing.  Pt is aware that discharge does not mean that nothing is wrong but it indicates no emergency is present and they must continue care with their family physician.  Pt is discharged with instructions to follow up with primary care doctor to have their blood pressure rechecked.     MEDICATIONS GIVEN IN ER  Medications   albuterol (PROVENTIL) nebulizer solution 0.083% 2.5 mg/3mL (not administered)   albuterol (PROVENTIL) nebulizer solution 0.083% 2.5 mg/3mL (2.5 mg Nebulization Given 3/1/18 1100)   sodium chloride 0.9 % bolus 1,000 mL (1,000 mL Intravenous New Bag 3/1/18 1058)   methylPREDNISolone sodium succinate (SOLU-Medrol) injection 125 mg (125 mg Intravenous Given 3/1/18 1143)       /54  Pulse 72  Temp 99.2 °F (37.3 °C) (Tympanic)   Resp 16  Ht 154.9 cm (61\")  Wt 68 kg (150 lb)  SpO2 94%  BMI 28.34 kg/m2      DIAGNOSIS  Final diagnoses:   COPD with exacerbation       FOLLOW UP   John Ca MD  [address] [address]  [phone]            RX     Medication List      New Prescriptions          predniSONE 20 MG tablet   Commonly known as:  DELTASONE   Take 1 tablet by mouth 2 (Two) Times a Day for 5 days.         Stop          ferrous sulfate 325 (65 FE) MG tablet           Documentation assistance provided by meche Ramirez for Elo Cruz PA-C.  Information recorded by the scribe was done at my direction and has been verified and validated by me.  Electronically signed by Altagracia Ramirez on 3/1/2018 at time 12:13 PM     Altagracia Ramirez  03/01/18 1214       Elo Cruz PA-C  03/01/18 1247    "

## 2018-04-05 ENCOUNTER — HOSPITAL ENCOUNTER (EMERGENCY)
Facility: HOSPITAL | Age: 78
Discharge: HOME | End: 2018-04-05
Attending: EMERGENCY MEDICINE | Admitting: EMERGENCY MEDICINE

## 2018-04-05 VITALS
WEIGHT: 141 LBS | TEMPERATURE: 98.4 F | RESPIRATION RATE: 16 BRPM | DIASTOLIC BLOOD PRESSURE: 96 MMHG | OXYGEN SATURATION: 95 % | HEIGHT: 61 IN | SYSTOLIC BLOOD PRESSURE: 168 MMHG | BODY MASS INDEX: 26.62 KG/M2 | HEART RATE: 82 BPM

## 2018-04-05 DIAGNOSIS — I10 UNCONTROLLED HYPERTENSION: Primary | ICD-10-CM

## 2018-04-05 PROCEDURE — 93010 ELECTROCARDIOGRAM REPORT: CPT | Performed by: INTERNAL MEDICINE

## 2018-04-05 PROCEDURE — 93005 ELECTROCARDIOGRAM TRACING: CPT | Performed by: EMERGENCY MEDICINE

## 2018-04-05 PROCEDURE — 99283 EMERGENCY DEPT VISIT LOW MDM: CPT

## 2018-04-05 RX ORDER — LISINOPRIL 10 MG/1
10 TABLET ORAL DAILY
Qty: 60 TABLET | Refills: 0 | Status: SHIPPED | OUTPATIENT
Start: 2018-04-05 | End: 2018-06-26 | Stop reason: HOSPADM

## 2018-04-05 RX ORDER — IRBESARTAN AND HYDROCHLOROTHIAZIDE 150; 12.5 MG/1; MG/1
1 TABLET, FILM COATED ORAL DAILY
COMMUNITY
End: 2018-04-05

## 2018-04-05 RX ORDER — LISINOPRIL 10 MG/1
10 TABLET ORAL DAILY
COMMUNITY
End: 2018-04-05

## 2018-04-05 RX ORDER — AMLODIPINE BESYLATE 10 MG/1
10 TABLET ORAL DAILY
COMMUNITY
End: 2018-04-05 | Stop reason: SDUPTHER

## 2018-04-05 RX ORDER — IRBESARTAN AND HYDROCHLOROTHIAZIDE 150; 12.5 MG/1; MG/1
2 TABLET, FILM COATED ORAL DAILY
Qty: 60 TABLET | Refills: 0 | Status: SHIPPED | OUTPATIENT
Start: 2018-04-05 | End: 2020-05-25 | Stop reason: HOSPADM

## 2018-04-05 NOTE — ED PROVIDER NOTES
" U EMERGENCY DEPARTMENT ENCOUNTER    CHIEF COMPLAINT  Chief Complaint: abnormal EKG  History given by: patient  History limited by: nothing  U Room Number: 52/52  PMD: Bill Martino MD      HPI:  Pt is a 77 y.o. female who presents to the ED with a reported abnormal EKG. Pt states that the EKG was performed yesterday for further evaluation of her elevated BP and that she received a call earlier today telling her to come to the ED immediately for further evaluation of her abnormal EKG. Pt states that she has had HAs and BUE \"heaviness\" for the last several week secondary to HTN. Pt states that her BP has been elevated up to 170's/100's. Pt also complains of feeling anxious after receiving the call regarding her EKG earlier today.    Onset: gradual  Duration: one week  Severity: moderate  Associated symptoms: BUE \"heaviness\", HA, anxiety  Previous treatment: Pt states that the EKG was performed yesterday for further evaluation of her elevated BP and that she received a call earlier today telling her to come to the ED immediately for further evaluation of her abnormal EKG.     PAST MEDICAL HISTORY  Active Ambulatory Problems     Diagnosis Date Noted   • Anemia 10/19/2017   • OA (osteoarthritis) of knee 11/16/2017   • Hypoxia 12/02/2017   • Cellulitis of skin 12/06/2017   • Acute kidney injury 12/06/2017   • Sepsis 12/06/2017     Resolved Ambulatory Problems     Diagnosis Date Noted   • No Resolved Ambulatory Problems     Past Medical History:   Diagnosis Date   • Acid reflux    • Anemia    • Arthritis    • Bipolar 1 disorder, depressed    • Cataract    • Chronic nausea    • Chronic pain of right knee    • Continuous leakage of urine    • COPD (chronic obstructive pulmonary disease)    • Disease of thyroid gland    • Diverticulosis    • Fibromyalgia    • Frequent episodes of bronchitis    • Hypertension    • Migraines    • Neck pain    • On home oxygen therapy    • Short of breath on exertion        PAST " "SURGICAL HISTORY  Past Surgical History:   Procedure Laterality Date   • CEREBRAL ANEURYSM REPAIR  2000'S    WITH STENT   • HYSTERECTOMY     • LAPAROSCOPIC CHOLECYSTECTOMY     • NM TOTAL KNEE ARTHROPLASTY Right 11/16/2017    Procedure: RT TOTAL KNEE ARTHROPLASTY;  Surgeon: Kashif Perez MD;  Location: [facility] OR;  Service: Orthopedics       FAMILY HISTORY  Family History   Problem Relation Age of Onset   • Malig Hyperthermia Neg Hx        SOCIAL HISTORY  Social History     Social History   • Marital status:      Spouse name: N/A   • Number of children: N/A   • Years of education: N/A     Occupational History   • Not on file.     Social History Main Topics   • Smoking status: Former Smoker     Packs/day: 1.00     Years: 10.00     Types: Cigarettes     Start date: 1959     Quit date: 1969   • Smokeless tobacco: Never Used   • Alcohol use No   • Drug use: No   • Sexual activity: Defer     Other Topics Concern   • Not on file     Social History Narrative   • No narrative on file       ALLERGIES  Morphine and related    REVIEW OF SYSTEMS  Review of Systems   Constitutional: Negative for chills and fever.   HENT: Negative for sore throat.    Respiratory: Negative for cough.    Gastrointestinal: Negative for nausea and vomiting.   Genitourinary: Negative for dysuria.   Musculoskeletal: Positive for myalgias (BUE \"heaviness\"). Negative for back pain.   Neurological: Positive for headaches.   Psychiatric/Behavioral: The patient is nervous/anxious.        PHYSICAL EXAM  ED Triage Vitals   Temp Heart Rate Resp BP SpO2   04/05/18 1758 04/05/18 1750 04/05/18 1750 04/05/18 1750 04/05/18 1750   98.4 °F (36.9 °C) 82 20 140/76 93 %      Temp src Heart Rate Source Patient Position BP Location FiO2 (%)   04/05/18 1758 -- -- -- --   Oral           Physical Exam   Constitutional: No distress.   Pt appears elderly   HENT:   Head: Normocephalic and atraumatic.   Eyes: EOM are normal. Pupils are equal, round, and reactive to " light.   Neck: Normal range of motion. Neck supple.   Cardiovascular: Normal rate, regular rhythm and normal heart sounds.    Pulmonary/Chest: Effort normal and breath sounds normal. No respiratory distress.   Abdominal: Soft. There is no tenderness. There is no rebound and no guarding.   Musculoskeletal: Normal range of motion. She exhibits no edema.   Neurological: She is alert. She has normal sensation and normal strength.   Skin: Skin is warm and dry. No rash noted.   Nursing note and vitals reviewed.    PROCEDURES  Procedures  EKG           EKG time: 1814  Rhythm/Rate: NSR, 82  P waves and TX: normal  QRS, axis: LVH   ST and T waves: non-specific ST changes in inferior leads     Interpreted Contemporaneously by me, independently viewed  unchanged compared to prior on 3/1/18      PROGRESS AND CONSULTS  ED Course     1825- Notified pt that her EKG is unchanged from her prior EKGs. Discussed the plan to confirm the pt's BP medications with her pharmacy prior to discharging the pt home with changes to her BP medications. Pt understands and agrees with the plan, all questions answered.      1838-Rechecked pt. Pt is resting comfortably. Notified pt that I am going to discharge the pt home on an increased doses of Avolide and lisinopril. I instructed the pt to take her BP medications at the same time every day and to follow up with cardiology in about one week for her uncontrolled HTN. Pt understands and agrees with the plan, all questions answered.      MEDICAL DECISION MAKING  Results were reviewed/discussed with the patient and they were also made aware of online access. Pt also made aware that follow up with PMD is necessary.     MDM  Number of Diagnoses or Management Options  Uncontrolled hypertension:      Amount and/or Complexity of Data Reviewed  Tests in the medicine section of CPT®: ordered and reviewed (See EKG procedure note)  Decide to obtain previous medical records or to obtain history from someone  other than the patient: yes  Review and summarize past medical records: yes (Pt was last seen in the ED on 3/1/18 for a COPD exacerbation)  Independent visualization of images, tracings, or specimens: yes    Patient Progress  Patient progress: stable         DIAGNOSIS  Final diagnoses:   Uncontrolled hypertension       DISPOSITION  DISCHARGE    Patient discharged in stable condition.    Reviewed implications of results, diagnosis, meds, responsibility to follow up, warning signs and symptoms of possible worsening, potential complications and reasons to return to ER, including fever, worsening pain or any concerns.    Patient/Family voiced understanding of above instructions.    Discussed plan for discharge, as there is no emergent indication for admission. Patient referred to primary care provider for BP management due to today's BP. Pt/family is agreeable and understands need for follow up and repeat testing.  Pt is aware that discharge does not mean that nothing is wrong but it indicates no emergency is present that requires admission and they must continue care with follow-up as given below or physician of their choice.     FOLLOW-UP  [facility] CARD[facility]  [address]  [facility] [facility] [id number] [phone]  Schedule an appointment as soon as possible for a visit  for follow up of your hypertension    Bill Martino MD  [address]  [facility] [address]  [phone]    Call  As needed, If symptoms worsen         Medication List      Changed    irbesartan-hydrochlorothiazide 150-12.5 MG tablet  Commonly known as:  AVALIDE  Take 2 tablets by mouth Daily.  What changed:  how much to take        Stop    amLODIPine 10 MG tablet  Commonly known as:  NORVASC              Latest Documented Vital Signs:  As of 6:47 PM  BP- 168/96 HR- 82 Temp- 98.4 °F (36.9 °C) (Oral) O2 sat- 95%    --  Documentation assistance provided by meche Henning for  Dr. Swift.  Information recorded by the scribe was done at my direction and has been verified and validated by me.     Patricia Henning  04/05/18 1847       Chandan Swift MD  04/05/18 2010

## 2018-05-21 ENCOUNTER — HOSPITAL ENCOUNTER (EMERGENCY)
Facility: HOSPITAL | Age: 78
Discharge: HOME | End: 2018-05-21
Attending: EMERGENCY MEDICINE | Admitting: EMERGENCY MEDICINE

## 2018-05-21 ENCOUNTER — APPOINTMENT (OUTPATIENT)
Dept: GENERAL RADIOLOGY | Facility: HOSPITAL | Age: 78
End: 2018-05-21

## 2018-05-21 VITALS
OXYGEN SATURATION: 94 % | BODY MASS INDEX: 19.83 KG/M2 | HEIGHT: 61 IN | HEART RATE: 68 BPM | RESPIRATION RATE: 15 BRPM | DIASTOLIC BLOOD PRESSURE: 68 MMHG | WEIGHT: 105 LBS | TEMPERATURE: 99.1 F | SYSTOLIC BLOOD PRESSURE: 134 MMHG

## 2018-05-21 DIAGNOSIS — J44.1 COPD EXACERBATION (HCC): Primary | ICD-10-CM

## 2018-05-21 DIAGNOSIS — S23.29XA COSTOCHONDRAL SEPARATION, INITIAL ENCOUNTER: ICD-10-CM

## 2018-05-21 LAB
ALBUMIN SERPL-MCNC: 4.1 G/DL (ref 3.5–5.2)
ALBUMIN/GLOB SERPL: 1.6 G/DL
ALP SERPL-CCNC: 95 U/L (ref 39–117)
ALT SERPL W P-5'-P-CCNC: 6 U/L (ref 1–33)
ANION GAP SERPL CALCULATED.3IONS-SCNC: 11.1 MMOL/L
AST SERPL-CCNC: 11 U/L (ref 1–32)
BASOPHILS # BLD AUTO: 0.02 10*3/MM3 (ref 0–0.2)
BASOPHILS NFR BLD AUTO: 0.3 % (ref 0–1.5)
BILIRUB SERPL-MCNC: 0.3 MG/DL (ref 0.1–1.2)
BILIRUB UR QL STRIP: NEGATIVE
BUN BLD-MCNC: 33 MG/DL (ref 8–23)
BUN/CREAT SERPL: 19.2 (ref 7–25)
CALCIUM SPEC-SCNC: 8.7 MG/DL (ref 8.6–10.5)
CHLORIDE SERPL-SCNC: 100 MMOL/L (ref 98–107)
CLARITY UR: CLEAR
CO2 SERPL-SCNC: 27.9 MMOL/L (ref 22–29)
COLOR UR: YELLOW
CREAT BLD-MCNC: 1.72 MG/DL (ref 0.57–1)
DEPRECATED RDW RBC AUTO: 56.1 FL (ref 37–54)
EOSINOPHIL # BLD AUTO: 0.18 10*3/MM3 (ref 0–0.7)
EOSINOPHIL NFR BLD AUTO: 3 % (ref 0.3–6.2)
ERYTHROCYTE [DISTWIDTH] IN BLOOD BY AUTOMATED COUNT: 14.1 % (ref 11.7–13)
GFR SERPL CREATININE-BSD FRML MDRD: 29 ML/MIN/1.73
GLOBULIN UR ELPH-MCNC: 2.6 GM/DL
GLUCOSE BLD-MCNC: 94 MG/DL (ref 65–99)
GLUCOSE UR STRIP-MCNC: NEGATIVE MG/DL
HCT VFR BLD AUTO: 34.2 % (ref 35.6–45.5)
HGB BLD-MCNC: 10.6 G/DL (ref 11.9–15.5)
HGB UR QL STRIP.AUTO: NEGATIVE
HOLD SPECIMEN: NORMAL
HOLD SPECIMEN: NORMAL
IMM GRANULOCYTES # BLD: 0.02 10*3/MM3 (ref 0–0.03)
IMM GRANULOCYTES NFR BLD: 0.3 % (ref 0–0.5)
KETONES UR QL STRIP: NEGATIVE
LEUKOCYTE ESTERASE UR QL STRIP.AUTO: NEGATIVE
LYMPHOCYTES # BLD AUTO: 1.94 10*3/MM3 (ref 0.9–4.8)
LYMPHOCYTES NFR BLD AUTO: 32.6 % (ref 19.6–45.3)
MCH RBC QN AUTO: 33.4 PG (ref 26.9–32)
MCHC RBC AUTO-ENTMCNC: 31 G/DL (ref 32.4–36.3)
MCV RBC AUTO: 107.9 FL (ref 80.5–98.2)
MONOCYTES # BLD AUTO: 0.69 10*3/MM3 (ref 0.2–1.2)
MONOCYTES NFR BLD AUTO: 11.6 % (ref 5–12)
NEUTROPHILS # BLD AUTO: 3.12 10*3/MM3 (ref 1.9–8.1)
NEUTROPHILS NFR BLD AUTO: 52.5 % (ref 42.7–76)
NITRITE UR QL STRIP: NEGATIVE
NT-PROBNP SERPL-MCNC: 88.2 PG/ML (ref 0–1800)
PH UR STRIP.AUTO: 6 [PH] (ref 5–8)
PLATELET # BLD AUTO: 135 10*3/MM3 (ref 140–500)
PMV BLD AUTO: 9.3 FL (ref 6–12)
POTASSIUM BLD-SCNC: 4.3 MMOL/L (ref 3.5–5.2)
PROT SERPL-MCNC: 6.7 G/DL (ref 6–8.5)
PROT UR QL STRIP: NEGATIVE
RBC # BLD AUTO: 3.17 10*6/MM3 (ref 3.9–5.2)
SODIUM BLD-SCNC: 139 MMOL/L (ref 136–145)
SP GR UR STRIP: 1.01 (ref 1–1.03)
TROPONIN T SERPL-MCNC: <0.01 NG/ML (ref 0–0.03)
UROBILINOGEN UR QL STRIP: NORMAL
WBC NRBC COR # BLD: 5.95 10*3/MM3 (ref 4.5–10.7)
WHOLE BLOOD HOLD SPECIMEN: NORMAL
WHOLE BLOOD HOLD SPECIMEN: NORMAL

## 2018-05-21 PROCEDURE — 83880 ASSAY OF NATRIURETIC PEPTIDE: CPT

## 2018-05-21 PROCEDURE — 81003 URINALYSIS AUTO W/O SCOPE: CPT

## 2018-05-21 PROCEDURE — 80053 COMPREHEN METABOLIC PANEL: CPT

## 2018-05-21 PROCEDURE — 93005 ELECTROCARDIOGRAM TRACING: CPT | Performed by: EMERGENCY MEDICINE

## 2018-05-21 PROCEDURE — 84484 ASSAY OF TROPONIN QUANT: CPT

## 2018-05-21 PROCEDURE — 93010 ELECTROCARDIOGRAM REPORT: CPT | Performed by: INTERNAL MEDICINE

## 2018-05-21 PROCEDURE — 93005 ELECTROCARDIOGRAM TRACING: CPT

## 2018-05-21 PROCEDURE — 85025 COMPLETE CBC W/AUTO DIFF WBC: CPT

## 2018-05-21 PROCEDURE — 71046 X-RAY EXAM CHEST 2 VIEWS: CPT

## 2018-05-21 PROCEDURE — 99284 EMERGENCY DEPT VISIT MOD MDM: CPT

## 2018-05-21 RX ORDER — SODIUM CHLORIDE 0.9 % (FLUSH) 0.9 %
10 SYRINGE (ML) INJECTION AS NEEDED
Status: DISCONTINUED | OUTPATIENT
Start: 2018-05-21 | End: 2018-05-22 | Stop reason: HOSPADM

## 2018-05-21 RX ORDER — METHYLPREDNISOLONE 4 MG/1
TABLET ORAL
Qty: 1 EACH | Refills: 0 | Status: SHIPPED | OUTPATIENT
Start: 2018-05-21 | End: 2018-06-26 | Stop reason: HOSPADM

## 2018-05-21 RX ORDER — DOXYCYCLINE 100 MG/1
100 CAPSULE ORAL 2 TIMES DAILY
Qty: 20 CAPSULE | Refills: 0 | Status: SHIPPED | OUTPATIENT
Start: 2018-05-21 | End: 2018-06-26 | Stop reason: HOSPADM

## 2018-05-21 NOTE — ED NOTES
Pt complains of right flank/back pain and a nonproductive cough since last night.     Fatemeh Moreira RN  05/21/18 6997

## 2018-05-21 NOTE — ED TRIAGE NOTES
Called EMS for shortness of breath, with a non productive cough. Patient states her cough started last night with some pains in her side this AM from coughing so much.

## 2018-05-22 NOTE — ED PROVIDER NOTES
EMERGENCY DEPARTMENT ENCOUNTER    CHIEF COMPLAINT  Chief Complaint: R flank pain  History given by: Pt  History limited by: nothing  Room Number: 38/38  PMD: Bill Martino MD      HPI:  Pt is a 77 y.o. female who presents with a hx of COPD, complaining of R flank pain which began this morning upon waking up. Pt also complains of a nonproductive cough and SOA but denies fever, diaphoresis, chills, CP, abd pain, N/V/D. Pt has been compliant with her albuterol as prescribed. Pt has a flu vaccination this year.  The flank pain is more in her lower lateral chest, and is worsened with coughing and palpation.    Duration:  Since this morning, PTA  Onset: gradual  Timing: ongoing  Location: R flank  Radiation: none  Quality: pain  Intensity/Severity: moderate  Progression: unchanged  Associated Symptoms: cough, SOA  Aggravating Factors: none  Alleviating Factors: none  Previous Episodes: none  Treatment before arrival: nothing    PAST MEDICAL HISTORY  Active Ambulatory Problems     Diagnosis Date Noted   • Anemia 10/19/2017   • OA (osteoarthritis) of knee 11/16/2017   • Hypoxia 12/02/2017   • Cellulitis of skin 12/06/2017   • Acute kidney injury 12/06/2017   • Sepsis 12/06/2017     Resolved Ambulatory Problems     Diagnosis Date Noted   • No Resolved Ambulatory Problems     Past Medical History:   Diagnosis Date   • Acid reflux    • Anemia    • Arthritis    • Bipolar 1 disorder, depressed    • Cataract    • Chronic nausea    • Chronic pain of right knee    • Continuous leakage of urine    • COPD (chronic obstructive pulmonary disease)    • Disease of thyroid gland    • Diverticulosis    • Fibromyalgia    • Frequent episodes of bronchitis    • Hypertension    • Migraines    • Neck pain    • On home oxygen therapy    • Short of breath on exertion        PAST SURGICAL HISTORY  Past Surgical History:   Procedure Laterality Date   • CEREBRAL ANEURYSM REPAIR  2000'S    WITH STENT   • HYSTERECTOMY     • LAPAROSCOPIC  CHOLECYSTECTOMY     • UT TOTAL KNEE ARTHROPLASTY Right 11/16/2017    Procedure: RT TOTAL KNEE ARTHROPLASTY;  Surgeon: Kashif Perez MD;  Location: [facility] MAIN OR;  Service: Orthopedics       FAMILY HISTORY  Family History   Problem Relation Age of Onset   • Malig Hyperthermia Neg Hx        SOCIAL HISTORY  Social History     Social History   • Marital status:      Spouse name: N/A   • Number of children: N/A   • Years of education: N/A     Occupational History   • Not on file.     Social History Main Topics   • Smoking status: Former Smoker     Packs/day: 1.00     Years: 10.00     Types: Cigarettes     Start date: 1959     Quit date: 1969   • Smokeless tobacco: Never Used   • Alcohol use No   • Drug use: No   • Sexual activity: Defer     Other Topics Concern   • Not on file     Social History Narrative   • No narrative on file       ALLERGIES  Morphine and related    REVIEW OF SYSTEMS  Review of Systems   Constitutional: Negative for chills, diaphoresis and fever.   HENT: Negative for sore throat.    Eyes: Negative.    Respiratory: Positive for cough and shortness of breath.    Cardiovascular: Negative for chest pain.   Gastrointestinal: Negative for abdominal pain, diarrhea and vomiting.   Genitourinary: Positive for flank pain (R). Negative for dysuria.   Musculoskeletal: Negative for neck pain.   Skin: Negative for rash.   Allergic/Immunologic: Negative.    Neurological: Negative for weakness, numbness and headaches.   Hematological: Negative.    Psychiatric/Behavioral: Negative.    All other systems reviewed and are negative.      PHYSICAL EXAM  ED Triage Vitals   Temp Heart Rate Resp BP SpO2   05/21/18 1726 05/21/18 1726 05/21/18 1726 05/21/18 1726 05/21/18 1726   99.1 °F (37.3 °C) 80 18 119/61 95 %      Temp src Heart Rate Source Patient Position BP Location FiO2 (%)   05/21/18 1726 05/21/18 1904 05/21/18 2005 05/21/18 2005 --   Tympanic Monitor Sitting Left arm        Physical Exam   Constitutional:  She is oriented to person, place, and time and well-developed, well-nourished, and in no distress.   Eyes: EOM are normal.   Neck: Normal range of motion.   Cardiovascular: Normal rate and regular rhythm.    Pulmonary/Chest: Effort normal. No respiratory distress. She has wheezes (scattered). She exhibits tenderness (R infra-lateral - there is a tender area suspicious for costochondral separation).   Neurological: She is alert and oriented to person, place, and time. She has normal sensation and normal strength.   Skin: Skin is warm and dry.   Psychiatric: Affect normal.   Nursing note and vitals reviewed.      LAB RESULTS  Lab Results (last 24 hours)     Procedure Component Value Units Date/Time    CBC & Differential [[id number]] Collected:  05/21/18 1953    Specimen:  Blood Updated:  05/21/18 2010    Narrative:       The following orders were created for panel order CBC & Differential.  Procedure                               Abnormality         Status                     ---------                               -----------         ------                     Scan Slide[[id number]]                                                                  CBC Auto Differential[[id number]]        Abnormal            Final result                 Please view results for these tests on the individual orders.    Comprehensive Metabolic Panel [[id number]]  (Abnormal) Collected:  05/21/18 1953    Specimen:  Blood Updated:  05/21/18 2102     Glucose 94 mg/dL      BUN 33 (H) mg/dL      Creatinine 1.72 (H) mg/dL      Sodium 139 mmol/L      Potassium 4.3 mmol/L      Chloride 100 mmol/L      CO2 27.9 mmol/L      Calcium 8.7 mg/dL      Total Protein 6.7 g/dL      Albumin 4.10 g/dL      ALT (SGPT) 6 U/L      AST (SGOT) 11 U/L      Alkaline Phosphatase 95 U/L      Total Bilirubin 0.3 mg/dL      eGFR Non African Amer 29 (L) mL/min/1.73      Globulin 2.6 gm/dL      A/G Ratio 1.6 g/dL      BUN/Creatinine Ratio 19.2     Anion Gap 11.1 mmol/L      Narrative:       The MDRD GFR formula is only valid for adults with stable renal function between ages 18 and 70.    BNP [[id number]]  (Normal) Collected:  05/21/18 1953    Specimen:  Blood Updated:  05/21/18 2053     proBNP 88.2 pg/mL     Narrative:       Among patients with dyspnea, NT-proBNP is highly sensitive for the detection of acute congestive heart failure. In addition NT-proBNP of <300 pg/ml effectively rules out acute congestive heart failure with 99% negative predictive value.    Troponin [[id number]]  (Normal) Collected:  05/21/18 1953    Specimen:  Blood Updated:  05/21/18 2056     Troponin T <0.010 ng/mL     Narrative:       Troponin T Reference Ranges:  Less than 0.03 ng/mL:    Negative for AMI  0.03 to 0.09 ng/mL:      Indeterminant for AMI  Greater than 0.09 ng/mL: Positive for AMI    CBC Auto Differential [[id number]]  (Abnormal) Collected:  05/21/18 1953    Specimen:  Blood Updated:  05/21/18 2010     WBC 5.95 10*3/mm3      RBC 3.17 (L) 10*6/mm3      Hemoglobin 10.6 (L) g/dL      Hematocrit 34.2 (L) %      .9 (H) fL      MCH 33.4 (H) pg      MCHC 31.0 (L) g/dL      RDW 14.1 (H) %      RDW-SD 56.1 (H) fl      MPV 9.3 fL      Platelets 135 (L) 10*3/mm3      Neutrophil % 52.5 %      Lymphocyte % 32.6 %      Monocyte % 11.6 %      Eosinophil % 3.0 %      Basophil % 0.3 %      Immature Grans % 0.3 %      Neutrophils, Absolute 3.12 10*3/mm3      Lymphocytes, Absolute 1.94 10*3/mm3      Monocytes, Absolute 0.69 10*3/mm3      Eosinophils, Absolute 0.18 10*3/mm3      Basophils, Absolute 0.02 10*3/mm3      Immature Grans, Absolute 0.02 10*3/mm3     Urinalysis With / Culture If Indicated - Urine, Clean Catch [[id number]]  (Normal) Collected:  05/21/18 1959    Specimen:  Urine from Urine, Clean Catch Updated:  05/21/18 2025     Color, UA Yellow     Appearance, UA Clear     pH, UA 6.0     Specific Gravity, UA 1.014     Glucose, UA Negative     Ketones, UA Negative     Bilirubin, UA Negative     Blood, UA  Negative     Protein, UA Negative     Leuk Esterase, UA Negative     Nitrite, UA Negative     Urobilinogen, UA 0.2 E.U./dL    Narrative:       Urine microscopic not indicated.          I ordered the above labs and reviewed the results    RADIOLOGY  XR Chest 2 View   Final Result   Small likely atelectasis at the left base. Borderline heart   size. Tortuous aorta. Follow-up/further evaluation can be obtained as   clinically indicated.       This report was finalized on 5/21/2018 5:58 PM by Dr. Ross Phillips M.D.             I ordered the above noted radiological studies. Interpreted by radiologist. Reviewed by me in PACS.     PROCEDURES  Procedures  EKG           EKG time: 2012  Rhythm/Rate: NSR, 67  P waves and AR: nml  QRS, axis: nml   ST and T waves: nml     Interpreted Contemporaneously by me, independently viewed  Insignificantly changed compared to prior 4/5/18    PROGRESS AND CONSULTS      1731: Ordered blood work, EKG, NPO diet, BNP, troponin, and CXR for further evaluation.     1959: Ordered UA for further evaluation.     2200: Notified pt of imaging and lab results. Notified pt of plan to prescribe Medrol, Monodox, and Tussionex. Instructed pt to f/u with pulmonology. RTER earning given if pt experiences fever, chills, or any worsening symptoms. Pt is comfortable with being discharged home at this time. Pt understands and agrees with the plan, all questions answered.    MEDICAL DECISION MAKING  Results were reviewed/discussed with the patient and they were also made aware of online access. Pt also made aware that some labs, such as cultures, will not be resulted during ER visit and follow up with PMD is necessary.     MDM  Number of Diagnoses or Management Options  COPD exacerbation:   Costochondral separation, initial encounter:      Amount and/or Complexity of Data Reviewed  Clinical lab tests: reviewed and ordered (Troponin negative. BNP=88.2. RBC=3.17)  Tests in the radiology section of CPT®:  reviewed and ordered (CXR: Small likely atelectasis at the left base. Borderline heart size. Tortuous aorta. Follow-up/further evaluation can be obtained as clinically indicated.  )  Tests in the medicine section of CPT®: reviewed and ordered (See EKG note.)  Decide to obtain previous medical records or to obtain history from someone other than the patient: yes  Independent visualization of images, tracings, or specimens: yes    Patient Progress  Patient progress: stable         DIAGNOSIS  Final diagnoses:   COPD exacerbation   Costochondral separation, initial encounter       DISPOSITION  DISCHARGE    Patient discharged in stable condition.    Reviewed implications of results, diagnosis, meds, responsibility to follow up, warning signs and symptoms of possible worsening, potential complications and reasons to return to ER.    Patient/Family voiced understanding of above instructions.    Discussed plan for discharge, as there is no emergent indication for admission. Patient referred to primary care provider for BP management due to today's BP. Pt/family is agreeable and understands need for follow up and repeat testing.  Pt is aware that discharge does not mean that nothing is wrong but it indicates no emergency is present that requires admission and they must continue care with follow-up as given below or physician of their choice.     FOLLOW-UP  John Ca MD  [address] [address] [phone]    Schedule an appointment as soon as possible for a visit       [facility] Emergency Department  [address] [place] [address] [phone]    If symptoms worsen         Medication List      New Prescriptions    doxycycline 100 MG capsule  Commonly known as:  MONODOX  Take 1 capsule by mouth 2 (Two) Times a Day.     HYDROcod Polst-CPM Polst ER 10-8 MG/5ML ER suspension  Commonly known as:  TUSSIONEX PENNKINETIC ER  Take 5 mL by mouth Every 12 (Twelve) Hours As  Needed for Cough.     MethylPREDNISolone 4 MG tablet  Commonly known as:  MEDROL (DIANELYS)  Take as directed on package instructions.        Latest Documented Vital Signs:  As of 10:08 PM  BP- 134/68 HR- 68 Temp- 99.1 °F (37.3 °C) (Tympanic) O2 sat- 94%    --    Documentation assistance provided by meche Chua for Dr. Martin.  Information recorded by the scribe was done at my direction and has been verified and validated by me.     Kwadwo Chua  05/21/18 [id number]       Hemanth Martin MD  05/22/18 1691

## 2018-06-24 ENCOUNTER — APPOINTMENT (OUTPATIENT)
Dept: CT IMAGING | Facility: HOSPITAL | Age: 78
End: 2018-06-24

## 2018-06-24 ENCOUNTER — HOSPITAL ENCOUNTER (OUTPATIENT)
Facility: HOSPITAL | Age: 78
Setting detail: OBSERVATION
Discharge: HOME | End: 2018-06-26
Attending: EMERGENCY MEDICINE | Admitting: INTERNAL MEDICINE

## 2018-06-24 ENCOUNTER — APPOINTMENT (OUTPATIENT)
Dept: GENERAL RADIOLOGY | Facility: HOSPITAL | Age: 78
End: 2018-06-24

## 2018-06-24 DIAGNOSIS — R11.10 VOMITING AND DIARRHEA: ICD-10-CM

## 2018-06-24 DIAGNOSIS — N17.9 AKI (ACUTE KIDNEY INJURY) (HCC): ICD-10-CM

## 2018-06-24 DIAGNOSIS — I95.1 ORTHOSTATIC HYPOTENSION: Primary | ICD-10-CM

## 2018-06-24 DIAGNOSIS — R19.7 VOMITING AND DIARRHEA: ICD-10-CM

## 2018-06-24 DIAGNOSIS — E86.9 VOLUME DEPLETION: ICD-10-CM

## 2018-06-24 DIAGNOSIS — D70.9 NEUTROPENIA, UNSPECIFIED TYPE (HCC): ICD-10-CM

## 2018-06-24 DIAGNOSIS — R26.2 DIFFICULTY WALKING: ICD-10-CM

## 2018-06-24 PROBLEM — I10 HYPERTENSION: Status: ACTIVE | Noted: 2018-06-24

## 2018-06-24 PROBLEM — J44.9 COPD (CHRONIC OBSTRUCTIVE PULMONARY DISEASE) (HCC): Status: ACTIVE | Noted: 2018-06-24

## 2018-06-24 PROBLEM — E07.9 DISEASE OF THYROID GLAND: Status: ACTIVE | Noted: 2018-06-24

## 2018-06-24 PROBLEM — E86.0 DEHYDRATION: Status: ACTIVE | Noted: 2018-06-24

## 2018-06-24 LAB
ALBUMIN SERPL-MCNC: 4.2 G/DL (ref 3.5–5.2)
ALBUMIN/GLOB SERPL: 1.4 G/DL
ALP SERPL-CCNC: 87 U/L (ref 39–117)
ALT SERPL W P-5'-P-CCNC: 7 U/L (ref 1–33)
ANION GAP SERPL CALCULATED.3IONS-SCNC: 14.8 MMOL/L
AST SERPL-CCNC: 13 U/L (ref 1–32)
BASOPHILS # BLD AUTO: 0.03 10*3/MM3 (ref 0–0.2)
BASOPHILS NFR BLD AUTO: 0.5 % (ref 0–1.5)
BILIRUB SERPL-MCNC: 0.5 MG/DL (ref 0.1–1.2)
BILIRUB UR QL STRIP: NEGATIVE
BUN BLD-MCNC: 31 MG/DL (ref 8–23)
BUN/CREAT SERPL: 15.4 (ref 7–25)
CALCIUM SPEC-SCNC: 8.9 MG/DL (ref 8.6–10.5)
CHLORIDE SERPL-SCNC: 97 MMOL/L (ref 98–107)
CLARITY UR: CLEAR
CO2 SERPL-SCNC: 25.2 MMOL/L (ref 22–29)
COLOR UR: YELLOW
CREAT BLD-MCNC: 2.01 MG/DL (ref 0.57–1)
DEPRECATED RDW RBC AUTO: 54.5 FL (ref 37–54)
EOSINOPHIL # BLD AUTO: 0.14 10*3/MM3 (ref 0–0.7)
EOSINOPHIL NFR BLD AUTO: 2.4 % (ref 0.3–6.2)
ERYTHROCYTE [DISTWIDTH] IN BLOOD BY AUTOMATED COUNT: 14.3 % (ref 11.7–13)
GFR SERPL CREATININE-BSD FRML MDRD: 24 ML/MIN/1.73
GLOBULIN UR ELPH-MCNC: 2.9 GM/DL
GLUCOSE BLD-MCNC: 99 MG/DL (ref 65–99)
GLUCOSE UR STRIP-MCNC: NEGATIVE MG/DL
HCT VFR BLD AUTO: 36.7 % (ref 35.6–45.5)
HGB BLD-MCNC: 11.8 G/DL (ref 11.9–15.5)
HGB UR QL STRIP.AUTO: NEGATIVE
HOLD SPECIMEN: NORMAL
HOLD SPECIMEN: NORMAL
IMM GRANULOCYTES # BLD: 0.04 10*3/MM3 (ref 0–0.03)
IMM GRANULOCYTES NFR BLD: 0.7 % (ref 0–0.5)
KETONES UR QL STRIP: NEGATIVE
LEUKOCYTE ESTERASE UR QL STRIP.AUTO: NEGATIVE
LIPASE SERPL-CCNC: 53 U/L (ref 13–60)
LYMPHOCYTES # BLD AUTO: 1.95 10*3/MM3 (ref 0.9–4.8)
LYMPHOCYTES NFR BLD AUTO: 34.1 % (ref 19.6–45.3)
MCH RBC QN AUTO: 33.2 PG (ref 26.9–32)
MCHC RBC AUTO-ENTMCNC: 32.2 G/DL (ref 32.4–36.3)
MCV RBC AUTO: 103.4 FL (ref 80.5–98.2)
MONOCYTES # BLD AUTO: 0.55 10*3/MM3 (ref 0.2–1.2)
MONOCYTES NFR BLD AUTO: 9.6 % (ref 5–12)
NEUTROPHILS # BLD AUTO: 3.05 10*3/MM3 (ref 1.9–8.1)
NEUTROPHILS NFR BLD AUTO: 53.4 % (ref 42.7–76)
NITRITE UR QL STRIP: NEGATIVE
NT-PROBNP SERPL-MCNC: 93 PG/ML (ref 0–1800)
PH UR STRIP.AUTO: <=5 [PH] (ref 5–8)
PLATELET # BLD AUTO: 187 10*3/MM3 (ref 140–500)
PMV BLD AUTO: 9.2 FL (ref 6–12)
POTASSIUM BLD-SCNC: 4 MMOL/L (ref 3.5–5.2)
PROT SERPL-MCNC: 7.1 G/DL (ref 6–8.5)
PROT UR QL STRIP: NEGATIVE
RBC # BLD AUTO: 3.55 10*6/MM3 (ref 3.9–5.2)
SODIUM BLD-SCNC: 137 MMOL/L (ref 136–145)
SP GR UR STRIP: 1.01 (ref 1–1.03)
TROPONIN T SERPL-MCNC: <0.01 NG/ML (ref 0–0.03)
UROBILINOGEN UR QL STRIP: NORMAL
VALPROATE SERPL-MCNC: 56 MCG/ML (ref 50–125)
WBC NRBC COR # BLD: 5.72 10*3/MM3 (ref 4.5–10.7)
WHOLE BLOOD HOLD SPECIMEN: NORMAL
WHOLE BLOOD HOLD SPECIMEN: NORMAL

## 2018-06-24 PROCEDURE — 99284 EMERGENCY DEPT VISIT MOD MDM: CPT

## 2018-06-24 PROCEDURE — 83690 ASSAY OF LIPASE: CPT | Performed by: PHYSICIAN ASSISTANT

## 2018-06-24 PROCEDURE — 93005 ELECTROCARDIOGRAM TRACING: CPT | Performed by: PHYSICIAN ASSISTANT

## 2018-06-24 PROCEDURE — G0378 HOSPITAL OBSERVATION PER HR: HCPCS

## 2018-06-24 PROCEDURE — 93010 ELECTROCARDIOGRAM REPORT: CPT | Performed by: INTERNAL MEDICINE

## 2018-06-24 PROCEDURE — 85025 COMPLETE CBC W/AUTO DIFF WBC: CPT | Performed by: PHYSICIAN ASSISTANT

## 2018-06-24 PROCEDURE — 84484 ASSAY OF TROPONIN QUANT: CPT | Performed by: PHYSICIAN ASSISTANT

## 2018-06-24 PROCEDURE — 71045 X-RAY EXAM CHEST 1 VIEW: CPT

## 2018-06-24 PROCEDURE — 74176 CT ABD & PELVIS W/O CONTRAST: CPT

## 2018-06-24 PROCEDURE — 80164 ASSAY DIPROPYLACETIC ACD TOT: CPT | Performed by: PHYSICIAN ASSISTANT

## 2018-06-24 PROCEDURE — 80053 COMPREHEN METABOLIC PANEL: CPT | Performed by: PHYSICIAN ASSISTANT

## 2018-06-24 PROCEDURE — 96360 HYDRATION IV INFUSION INIT: CPT

## 2018-06-24 PROCEDURE — 25010000002 IOPAMIDOL 61 % SOLUTION: Performed by: EMERGENCY MEDICINE

## 2018-06-24 PROCEDURE — 81003 URINALYSIS AUTO W/O SCOPE: CPT | Performed by: PHYSICIAN ASSISTANT

## 2018-06-24 PROCEDURE — 96361 HYDRATE IV INFUSION ADD-ON: CPT

## 2018-06-24 PROCEDURE — 83880 ASSAY OF NATRIURETIC PEPTIDE: CPT | Performed by: PHYSICIAN ASSISTANT

## 2018-06-24 PROCEDURE — 96372 THER/PROPH/DIAG INJ SC/IM: CPT

## 2018-06-24 PROCEDURE — 25010000002 HEPARIN (PORCINE) PER 1000 UNITS: Performed by: INTERNAL MEDICINE

## 2018-06-24 RX ORDER — FERROUS SULFATE 325(65) MG
325 TABLET ORAL
Status: DISCONTINUED | OUTPATIENT
Start: 2018-06-25 | End: 2018-06-26 | Stop reason: HOSPADM

## 2018-06-24 RX ORDER — ONDANSETRON 2 MG/ML
4 INJECTION INTRAMUSCULAR; INTRAVENOUS ONCE
Status: DISCONTINUED | OUTPATIENT
Start: 2018-06-24 | End: 2018-06-26 | Stop reason: HOSPADM

## 2018-06-24 RX ORDER — SODIUM CHLORIDE 0.9 % (FLUSH) 0.9 %
1-10 SYRINGE (ML) INJECTION AS NEEDED
Status: DISCONTINUED | OUTPATIENT
Start: 2018-06-24 | End: 2018-06-26 | Stop reason: HOSPADM

## 2018-06-24 RX ORDER — PANTOPRAZOLE SODIUM 40 MG/1
40 TABLET, DELAYED RELEASE ORAL EVERY MORNING
Status: DISCONTINUED | OUTPATIENT
Start: 2018-06-25 | End: 2018-06-26 | Stop reason: HOSPADM

## 2018-06-24 RX ORDER — SODIUM CHLORIDE 9 MG/ML
125 INJECTION, SOLUTION INTRAVENOUS CONTINUOUS
Status: DISCONTINUED | OUTPATIENT
Start: 2018-06-24 | End: 2018-06-24

## 2018-06-24 RX ORDER — ASPIRIN 325 MG
325 TABLET, DELAYED RELEASE (ENTERIC COATED) ORAL DAILY
Status: DISCONTINUED | OUTPATIENT
Start: 2018-06-25 | End: 2018-06-26 | Stop reason: HOSPADM

## 2018-06-24 RX ORDER — DIVALPROEX SODIUM 500 MG/1
500 TABLET, EXTENDED RELEASE ORAL EVERY 12 HOURS
Status: DISCONTINUED | OUTPATIENT
Start: 2018-06-24 | End: 2018-06-26 | Stop reason: HOSPADM

## 2018-06-24 RX ORDER — ONDANSETRON 2 MG/ML
4 INJECTION INTRAMUSCULAR; INTRAVENOUS EVERY 6 HOURS PRN
Status: DISCONTINUED | OUTPATIENT
Start: 2018-06-24 | End: 2018-06-26 | Stop reason: HOSPADM

## 2018-06-24 RX ORDER — BUDESONIDE AND FORMOTEROL FUMARATE DIHYDRATE 160; 4.5 UG/1; UG/1
2 AEROSOL RESPIRATORY (INHALATION)
Status: DISCONTINUED | OUTPATIENT
Start: 2018-06-24 | End: 2018-06-26 | Stop reason: HOSPADM

## 2018-06-24 RX ORDER — HYDROCODONE BITARTRATE AND ACETAMINOPHEN 7.5; 325 MG/1; MG/1
1 TABLET ORAL EVERY 4 HOURS PRN
Status: DISCONTINUED | OUTPATIENT
Start: 2018-06-24 | End: 2018-06-26 | Stop reason: HOSPADM

## 2018-06-24 RX ORDER — HEPARIN SODIUM 5000 [USP'U]/ML
5000 INJECTION, SOLUTION INTRAVENOUS; SUBCUTANEOUS EVERY 12 HOURS SCHEDULED
Status: DISCONTINUED | OUTPATIENT
Start: 2018-06-24 | End: 2018-06-26 | Stop reason: HOSPADM

## 2018-06-24 RX ORDER — TRAZODONE HYDROCHLORIDE 50 MG/1
50 TABLET ORAL NIGHTLY PRN
Status: DISCONTINUED | OUTPATIENT
Start: 2018-06-24 | End: 2018-06-26 | Stop reason: HOSPADM

## 2018-06-24 RX ORDER — ALBUTEROL SULFATE 1.25 MG/3ML
1 SOLUTION RESPIRATORY (INHALATION) EVERY 4 HOURS
Status: DISCONTINUED | OUTPATIENT
Start: 2018-06-24 | End: 2018-06-26 | Stop reason: HOSPADM

## 2018-06-24 RX ORDER — SODIUM CHLORIDE 9 MG/ML
100 INJECTION, SOLUTION INTRAVENOUS CONTINUOUS
Status: DISCONTINUED | OUTPATIENT
Start: 2018-06-24 | End: 2018-06-26 | Stop reason: HOSPADM

## 2018-06-24 RX ORDER — LEVOTHYROXINE SODIUM 88 UG/1
88 TABLET ORAL
Status: DISCONTINUED | OUTPATIENT
Start: 2018-06-25 | End: 2018-06-26 | Stop reason: HOSPADM

## 2018-06-24 RX ORDER — MELATONIN
5000 DAILY
Status: DISCONTINUED | OUTPATIENT
Start: 2018-06-25 | End: 2018-06-26 | Stop reason: HOSPADM

## 2018-06-24 RX ADMIN — SODIUM CHLORIDE 100 ML/HR: 9 INJECTION, SOLUTION INTRAVENOUS at 20:41

## 2018-06-24 RX ADMIN — SODIUM CHLORIDE 1000 ML: 9 INJECTION, SOLUTION INTRAVENOUS at 16:01

## 2018-06-24 RX ADMIN — HEPARIN SODIUM 5000 UNITS: 5000 INJECTION, SOLUTION INTRAVENOUS; SUBCUTANEOUS at 22:48

## 2018-06-24 RX ADMIN — TRAZODONE HYDROCHLORIDE 50 MG: 50 TABLET ORAL at 23:13

## 2018-06-24 RX ADMIN — SODIUM CHLORIDE 125 ML/HR: 9 INJECTION, SOLUTION INTRAVENOUS at 16:06

## 2018-06-24 RX ADMIN — DIVALPROEX SODIUM 500 MG: 500 TABLET, EXTENDED RELEASE ORAL at 23:13

## 2018-06-24 RX ADMIN — IOPAMIDOL 85 ML: 612 INJECTION, SOLUTION INTRAVENOUS at 15:37

## 2018-06-25 PROBLEM — N18.30 STAGE 3 CHRONIC KIDNEY DISEASE: Status: ACTIVE | Noted: 2018-06-25

## 2018-06-25 LAB
ALBUMIN SERPL-MCNC: 3.6 G/DL (ref 3.5–5.2)
ALBUMIN/GLOB SERPL: 1.6 G/DL
ALP SERPL-CCNC: 72 U/L (ref 39–117)
ALT SERPL W P-5'-P-CCNC: 7 U/L (ref 1–33)
ANION GAP SERPL CALCULATED.3IONS-SCNC: 12.3 MMOL/L
AST SERPL-CCNC: 11 U/L (ref 1–32)
BASOPHILS # BLD AUTO: 0.03 10*3/MM3 (ref 0–0.2)
BASOPHILS NFR BLD AUTO: 0.7 % (ref 0–1.5)
BILIRUB SERPL-MCNC: 0.4 MG/DL (ref 0.1–1.2)
BUN BLD-MCNC: 24 MG/DL (ref 8–23)
BUN/CREAT SERPL: 15 (ref 7–25)
CALCIUM SPEC-SCNC: 8.1 MG/DL (ref 8.6–10.5)
CHLORIDE SERPL-SCNC: 107 MMOL/L (ref 98–107)
CO2 SERPL-SCNC: 22.7 MMOL/L (ref 22–29)
CREAT BLD-MCNC: 1.6 MG/DL (ref 0.57–1)
DEPRECATED RDW RBC AUTO: 54.2 FL (ref 37–54)
EOSINOPHIL # BLD AUTO: 0.11 10*3/MM3 (ref 0–0.7)
EOSINOPHIL NFR BLD AUTO: 2.6 % (ref 0.3–6.2)
ERYTHROCYTE [DISTWIDTH] IN BLOOD BY AUTOMATED COUNT: 14.3 % (ref 11.7–13)
GFR SERPL CREATININE-BSD FRML MDRD: 31 ML/MIN/1.73
GLOBULIN UR ELPH-MCNC: 2.2 GM/DL
GLUCOSE BLD-MCNC: 89 MG/DL (ref 65–99)
HCT VFR BLD AUTO: 32.2 % (ref 35.6–45.5)
HGB BLD-MCNC: 10.3 G/DL (ref 11.9–15.5)
IMM GRANULOCYTES # BLD: 0.03 10*3/MM3 (ref 0–0.03)
IMM GRANULOCYTES NFR BLD: 0.7 % (ref 0–0.5)
LYMPHOCYTES # BLD AUTO: 2.26 10*3/MM3 (ref 0.9–4.8)
LYMPHOCYTES NFR BLD AUTO: 53.4 % (ref 19.6–45.3)
MCH RBC QN AUTO: 33.3 PG (ref 26.9–32)
MCHC RBC AUTO-ENTMCNC: 32 G/DL (ref 32.4–36.3)
MCV RBC AUTO: 104.2 FL (ref 80.5–98.2)
MONOCYTES # BLD AUTO: 0.36 10*3/MM3 (ref 0.2–1.2)
MONOCYTES NFR BLD AUTO: 8.5 % (ref 5–12)
NEUTROPHILS # BLD AUTO: 1.47 10*3/MM3 (ref 1.9–8.1)
NEUTROPHILS NFR BLD AUTO: 34.8 % (ref 42.7–76)
NRBC BLD MANUAL-RTO: 0 /100 WBC (ref 0–0)
PLATELET # BLD AUTO: 159 10*3/MM3 (ref 140–500)
PMV BLD AUTO: 9.2 FL (ref 6–12)
POTASSIUM BLD-SCNC: 4.1 MMOL/L (ref 3.5–5.2)
PROT SERPL-MCNC: 5.8 G/DL (ref 6–8.5)
RBC # BLD AUTO: 3.09 10*6/MM3 (ref 3.9–5.2)
SODIUM BLD-SCNC: 142 MMOL/L (ref 136–145)
WBC NRBC COR # BLD: 4.23 10*3/MM3 (ref 4.5–10.7)

## 2018-06-25 PROCEDURE — G8979 MOBILITY GOAL STATUS: HCPCS

## 2018-06-25 PROCEDURE — 97161 PT EVAL LOW COMPLEX 20 MIN: CPT

## 2018-06-25 PROCEDURE — G0378 HOSPITAL OBSERVATION PER HR: HCPCS

## 2018-06-25 PROCEDURE — 94799 UNLISTED PULMONARY SVC/PX: CPT

## 2018-06-25 PROCEDURE — 97110 THERAPEUTIC EXERCISES: CPT

## 2018-06-25 PROCEDURE — 85025 COMPLETE CBC W/AUTO DIFF WBC: CPT | Performed by: INTERNAL MEDICINE

## 2018-06-25 PROCEDURE — 96361 HYDRATE IV INFUSION ADD-ON: CPT

## 2018-06-25 PROCEDURE — 99204 OFFICE O/P NEW MOD 45 MIN: CPT | Performed by: INTERNAL MEDICINE

## 2018-06-25 PROCEDURE — 25010000002 HEPARIN (PORCINE) PER 1000 UNITS: Performed by: INTERNAL MEDICINE

## 2018-06-25 PROCEDURE — 80053 COMPREHEN METABOLIC PANEL: CPT | Performed by: INTERNAL MEDICINE

## 2018-06-25 PROCEDURE — 96372 THER/PROPH/DIAG INJ SC/IM: CPT

## 2018-06-25 PROCEDURE — G8978 MOBILITY CURRENT STATUS: HCPCS

## 2018-06-25 RX ORDER — DICYCLOMINE HYDROCHLORIDE 10 MG/1
10 CAPSULE ORAL 4 TIMES DAILY PRN
Status: DISCONTINUED | OUTPATIENT
Start: 2018-06-25 | End: 2018-06-26 | Stop reason: HOSPADM

## 2018-06-25 RX ORDER — CALCIUM POLYCARBOPHIL 625 MG 625 MG/1
1250 TABLET ORAL DAILY
Status: DISCONTINUED | OUTPATIENT
Start: 2018-06-25 | End: 2018-06-26 | Stop reason: HOSPADM

## 2018-06-25 RX ADMIN — HEPARIN SODIUM 5000 UNITS: 5000 INJECTION, SOLUTION INTRAVENOUS; SUBCUTANEOUS at 08:06

## 2018-06-25 RX ADMIN — PANTOPRAZOLE SODIUM 40 MG: 40 TABLET, DELAYED RELEASE ORAL at 06:44

## 2018-06-25 RX ADMIN — ALBUTEROL SULFATE 1.25 MG: 1.25 SOLUTION RESPIRATORY (INHALATION) at 19:46

## 2018-06-25 RX ADMIN — HYDROCODONE BITARTRATE AND ACETAMINOPHEN 1 TABLET: 7.5; 325 TABLET ORAL at 19:58

## 2018-06-25 RX ADMIN — BUDESONIDE AND FORMOTEROL FUMARATE DIHYDRATE 2 PUFF: 160; 4.5 AEROSOL RESPIRATORY (INHALATION) at 19:47

## 2018-06-25 RX ADMIN — LEVOTHYROXINE SODIUM 88 MCG: 88 TABLET ORAL at 06:44

## 2018-06-25 RX ADMIN — ALBUTEROL SULFATE 1.25 MG: 1.25 SOLUTION RESPIRATORY (INHALATION) at 03:51

## 2018-06-25 RX ADMIN — SODIUM CHLORIDE 100 ML/HR: 9 INJECTION, SOLUTION INTRAVENOUS at 12:41

## 2018-06-25 RX ADMIN — DIVALPROEX SODIUM 500 MG: 500 TABLET, EXTENDED RELEASE ORAL at 12:05

## 2018-06-25 RX ADMIN — CALCIUM POLYCARBOPHIL 1250 MG: 625 TABLET, FILM COATED ORAL at 15:16

## 2018-06-25 RX ADMIN — DICYCLOMINE HYDROCHLORIDE 10 MG: 10 CAPSULE ORAL at 15:16

## 2018-06-25 RX ADMIN — SODIUM CHLORIDE 100 ML/HR: 9 INJECTION, SOLUTION INTRAVENOUS at 02:33

## 2018-06-25 RX ADMIN — HEPARIN SODIUM 5000 UNITS: 5000 INJECTION, SOLUTION INTRAVENOUS; SUBCUTANEOUS at 20:02

## 2018-06-25 RX ADMIN — ALBUTEROL SULFATE 1.25 MG: 1.25 SOLUTION RESPIRATORY (INHALATION) at 10:36

## 2018-06-25 RX ADMIN — SODIUM CHLORIDE 100 ML/HR: 9 INJECTION, SOLUTION INTRAVENOUS at 22:52

## 2018-06-25 RX ADMIN — HYDROCODONE BITARTRATE AND ACETAMINOPHEN 1 TABLET: 7.5; 325 TABLET ORAL at 12:43

## 2018-06-25 RX ADMIN — ASPIRIN 325 MG: 325 TABLET, DELAYED RELEASE ORAL at 08:06

## 2018-06-25 RX ADMIN — VITAMIN D, TAB 1000IU (100/BT) 5000 UNITS: 25 TAB at 08:06

## 2018-06-25 RX ADMIN — ALBUTEROL SULFATE 1.25 MG: 1.25 SOLUTION RESPIRATORY (INHALATION) at 14:38

## 2018-06-25 RX ADMIN — FERROUS SULFATE TAB 325 MG (65 MG ELEMENTAL FE) 325 MG: 325 (65 FE) TAB at 08:06

## 2018-06-26 VITALS
HEART RATE: 66 BPM | SYSTOLIC BLOOD PRESSURE: 145 MMHG | TEMPERATURE: 97.8 F | HEIGHT: 67 IN | OXYGEN SATURATION: 98 % | WEIGHT: 142 LBS | BODY MASS INDEX: 22.29 KG/M2 | RESPIRATION RATE: 16 BRPM | DIASTOLIC BLOOD PRESSURE: 73 MMHG

## 2018-06-26 LAB
ANION GAP SERPL CALCULATED.3IONS-SCNC: 10.6 MMOL/L
BASOPHILS # BLD AUTO: 0.02 10*3/MM3 (ref 0–0.2)
BASOPHILS NFR BLD AUTO: 0.7 % (ref 0–1.5)
BUN BLD-MCNC: 16 MG/DL (ref 8–23)
BUN/CREAT SERPL: 14 (ref 7–25)
CALCIUM SPEC-SCNC: 8.4 MG/DL (ref 8.6–10.5)
CHLORIDE SERPL-SCNC: 109 MMOL/L (ref 98–107)
CO2 SERPL-SCNC: 23.4 MMOL/L (ref 22–29)
CREAT BLD-MCNC: 1.14 MG/DL (ref 0.57–1)
DEPRECATED RDW RBC AUTO: 52.8 FL (ref 37–54)
EOSINOPHIL # BLD AUTO: 0.12 10*3/MM3 (ref 0–0.7)
EOSINOPHIL NFR BLD AUTO: 4.4 % (ref 0.3–6.2)
ERYTHROCYTE [DISTWIDTH] IN BLOOD BY AUTOMATED COUNT: 14 % (ref 11.7–13)
FERRITIN SERPL-MCNC: 50.68 NG/ML (ref 13–150)
GFR SERPL CREATININE-BSD FRML MDRD: 46 ML/MIN/1.73
GLUCOSE BLD-MCNC: 78 MG/DL (ref 65–99)
HCT VFR BLD AUTO: 30.8 % (ref 35.6–45.5)
HGB BLD-MCNC: 9.9 G/DL (ref 11.9–15.5)
IMM GRANULOCYTES # BLD: 0.02 10*3/MM3 (ref 0–0.03)
IMM GRANULOCYTES NFR BLD: 0.7 % (ref 0–0.5)
IRON 24H UR-MRATE: 93 MCG/DL (ref 37–145)
IRON SATN MFR SERPL: 29 % (ref 20–50)
LYMPHOCYTES # BLD AUTO: 1.37 10*3/MM3 (ref 0.9–4.8)
LYMPHOCYTES NFR BLD AUTO: 50.6 % (ref 19.6–45.3)
MCH RBC QN AUTO: 33.7 PG (ref 26.9–32)
MCHC RBC AUTO-ENTMCNC: 32.1 G/DL (ref 32.4–36.3)
MCV RBC AUTO: 104.8 FL (ref 80.5–98.2)
MONOCYTES # BLD AUTO: 0.27 10*3/MM3 (ref 0.2–1.2)
MONOCYTES NFR BLD AUTO: 10 % (ref 5–12)
NEUTROPHILS # BLD AUTO: 0.91 10*3/MM3 (ref 1.9–8.1)
NEUTROPHILS NFR BLD AUTO: 33.6 % (ref 42.7–76)
PLATELET # BLD AUTO: 122 10*3/MM3 (ref 140–500)
PMV BLD AUTO: 9.6 FL (ref 6–12)
POTASSIUM BLD-SCNC: 4.2 MMOL/L (ref 3.5–5.2)
RBC # BLD AUTO: 2.94 10*6/MM3 (ref 3.9–5.2)
SODIUM BLD-SCNC: 143 MMOL/L (ref 136–145)
TIBC SERPL-MCNC: 323 MCG/DL
TRANSFERRIN SERPL-MCNC: 217 MG/DL (ref 200–360)
WBC NRBC COR # BLD: 2.71 10*3/MM3 (ref 4.5–10.7)

## 2018-06-26 PROCEDURE — 96372 THER/PROPH/DIAG INJ SC/IM: CPT

## 2018-06-26 PROCEDURE — 85025 COMPLETE CBC W/AUTO DIFF WBC: CPT | Performed by: INTERNAL MEDICINE

## 2018-06-26 PROCEDURE — 84466 ASSAY OF TRANSFERRIN: CPT | Performed by: INTERNAL MEDICINE

## 2018-06-26 PROCEDURE — 25010000002 HEPARIN (PORCINE) PER 1000 UNITS: Performed by: INTERNAL MEDICINE

## 2018-06-26 PROCEDURE — 96361 HYDRATE IV INFUSION ADD-ON: CPT

## 2018-06-26 PROCEDURE — G0378 HOSPITAL OBSERVATION PER HR: HCPCS

## 2018-06-26 PROCEDURE — 82728 ASSAY OF FERRITIN: CPT | Performed by: INTERNAL MEDICINE

## 2018-06-26 PROCEDURE — 83540 ASSAY OF IRON: CPT | Performed by: INTERNAL MEDICINE

## 2018-06-26 PROCEDURE — 99213 OFFICE O/P EST LOW 20 MIN: CPT | Performed by: INTERNAL MEDICINE

## 2018-06-26 PROCEDURE — 80048 BASIC METABOLIC PNL TOTAL CA: CPT | Performed by: INTERNAL MEDICINE

## 2018-06-26 RX ORDER — CALCIUM POLYCARBOPHIL 625 MG 625 MG/1
1250 TABLET ORAL DAILY
Qty: 60 TABLET | Refills: 0 | Status: ON HOLD | OUTPATIENT
Start: 2018-06-26 | End: 2019-06-17

## 2018-06-26 RX ORDER — DICYCLOMINE HYDROCHLORIDE 10 MG/1
10 CAPSULE ORAL 4 TIMES DAILY PRN
Qty: 30 CAPSULE | Refills: 0 | Status: SHIPPED | OUTPATIENT
Start: 2018-06-26 | End: 2020-03-15 | Stop reason: HOSPADM

## 2018-06-26 RX ADMIN — VITAMIN D, TAB 1000IU (100/BT) 5000 UNITS: 25 TAB at 07:30

## 2018-06-26 RX ADMIN — ASPIRIN 325 MG: 325 TABLET, DELAYED RELEASE ORAL at 07:30

## 2018-06-26 RX ADMIN — DIVALPROEX SODIUM 500 MG: 500 TABLET, EXTENDED RELEASE ORAL at 00:49

## 2018-06-26 RX ADMIN — PANTOPRAZOLE SODIUM 40 MG: 40 TABLET, DELAYED RELEASE ORAL at 06:21

## 2018-06-26 RX ADMIN — LEVOTHYROXINE SODIUM 88 MCG: 88 TABLET ORAL at 06:21

## 2018-06-26 RX ADMIN — TRAZODONE HYDROCHLORIDE 50 MG: 50 TABLET ORAL at 00:49

## 2018-06-26 RX ADMIN — HEPARIN SODIUM 5000 UNITS: 5000 INJECTION, SOLUTION INTRAVENOUS; SUBCUTANEOUS at 07:30

## 2018-06-26 RX ADMIN — FERROUS SULFATE TAB 325 MG (65 MG ELEMENTAL FE) 325 MG: 325 (65 FE) TAB at 07:30

## 2018-06-26 RX ADMIN — CALCIUM POLYCARBOPHIL 1250 MG: 625 TABLET, FILM COATED ORAL at 07:30

## 2018-09-20 ENCOUNTER — TRANSCRIBE ORDERS (OUTPATIENT)
Dept: ADMINISTRATIVE | Facility: HOSPITAL | Age: 78
End: 2018-09-20

## 2018-09-20 DIAGNOSIS — R30.0 DYSURIA: Primary | ICD-10-CM

## 2018-09-26 ENCOUNTER — HOSPITAL ENCOUNTER (OUTPATIENT)
Dept: CT IMAGING | Facility: HOSPITAL | Age: 78
Discharge: HOME | End: 2018-09-26
Admitting: NURSE PRACTITIONER

## 2018-09-26 DIAGNOSIS — R30.0 DYSURIA: ICD-10-CM

## 2018-09-26 PROCEDURE — 74176 CT ABD & PELVIS W/O CONTRAST: CPT

## 2019-01-01 NOTE — PROGRESS NOTES
Patient tolerated injection without complaints.  Injection site checked, no s/s of reaction. Patient discharged at 1510 per ambulation.    1. I was told the name of the doctor(s) who took care of my child while in the hospital.    2. I have been told about any important findings on my child's plan of care.    3. The doctor clearly explained my child's diagnosis and other possible diagnoses that were considered.    4. My child's doctor explained all the tests that were done and their results (if available). I understand that some of the test results may not be ready before we go home and I was told how I can get these results. I understand that a summary of my child's hospitalization and important test results will be shared with my child's outpatient doctor.    5. My child's doctor talked to me about what I need to do when we go home.    6. I understand what signs and symptoms to watch for. I understand what symptoms I would need to call my doctor for and/or return to the hospital.    7. I have the phone number to call the hospital for results and/or questions after I leave the hospital.

## 2019-05-28 VITALS
OXYGEN SATURATION: 97 % | HEIGHT: 62 IN | DIASTOLIC BLOOD PRESSURE: 63 MMHG | SYSTOLIC BLOOD PRESSURE: 115 MMHG | TEMPERATURE: 99.5 F | OXYGEN SATURATION: 94 % | SYSTOLIC BLOOD PRESSURE: 131 MMHG | HEART RATE: 96 BPM | OXYGEN SATURATION: 91 % | DIASTOLIC BLOOD PRESSURE: 61 MMHG | SYSTOLIC BLOOD PRESSURE: 139 MMHG | DIASTOLIC BLOOD PRESSURE: 58 MMHG | DIASTOLIC BLOOD PRESSURE: 71 MMHG | RESPIRATION RATE: 14 BRPM | RESPIRATION RATE: 22 BRPM | SYSTOLIC BLOOD PRESSURE: 103 MMHG | SYSTOLIC BLOOD PRESSURE: 114 MMHG | SYSTOLIC BLOOD PRESSURE: 147 MMHG | HEART RATE: 86 BPM | HEART RATE: 87 BPM | DIASTOLIC BLOOD PRESSURE: 82 MMHG | HEART RATE: 97 BPM | HEART RATE: 83 BPM | RESPIRATION RATE: 20 BRPM | RESPIRATION RATE: 6 BRPM | HEART RATE: 89 BPM | HEART RATE: 94 BPM | SYSTOLIC BLOOD PRESSURE: 140 MMHG | OXYGEN SATURATION: 99 % | DIASTOLIC BLOOD PRESSURE: 83 MMHG | HEART RATE: 77 BPM | RESPIRATION RATE: 28 BRPM | DIASTOLIC BLOOD PRESSURE: 70 MMHG | RESPIRATION RATE: 21 BRPM | RESPIRATION RATE: 18 BRPM | TEMPERATURE: 99.8 F | SYSTOLIC BLOOD PRESSURE: 128 MMHG | SYSTOLIC BLOOD PRESSURE: 121 MMHG | OXYGEN SATURATION: 60 % | DIASTOLIC BLOOD PRESSURE: 65 MMHG | DIASTOLIC BLOOD PRESSURE: 47 MMHG | DIASTOLIC BLOOD PRESSURE: 66 MMHG | RESPIRATION RATE: 13 BRPM | SYSTOLIC BLOOD PRESSURE: 158 MMHG | SYSTOLIC BLOOD PRESSURE: 111 MMHG | DIASTOLIC BLOOD PRESSURE: 53 MMHG | HEART RATE: 85 BPM | OXYGEN SATURATION: 88 % | RESPIRATION RATE: 17 BRPM | WEIGHT: 130 LBS | OXYGEN SATURATION: 85 % | RESPIRATION RATE: 61 BRPM | OXYGEN SATURATION: 90 % | DIASTOLIC BLOOD PRESSURE: 59 MMHG | SYSTOLIC BLOOD PRESSURE: 124 MMHG | DIASTOLIC BLOOD PRESSURE: 67 MMHG | DIASTOLIC BLOOD PRESSURE: 75 MMHG | OXYGEN SATURATION: 98 % | SYSTOLIC BLOOD PRESSURE: 123 MMHG | HEART RATE: 70 BPM | HEART RATE: 72 BPM

## 2019-05-28 PROBLEM — Z12.11 SCREENING FOR COLONIC NEOPLASIA: Status: ACTIVE | Noted: 2019-05-29

## 2019-05-28 PROBLEM — R13.10 DYSPHAGIA: Status: ACTIVE | Noted: 2019-05-29

## 2019-05-29 ENCOUNTER — OFFICE (AMBULATORY)
Dept: URBAN - METROPOLITAN AREA PATHOLOGY 4 | Facility: PATHOLOGY | Age: 79
End: 2019-05-29
Payer: MEDICAID

## 2019-05-29 ENCOUNTER — AMBULATORY SURGICAL CENTER (AMBULATORY)
Dept: URBAN - METROPOLITAN AREA SURGERY 17 | Facility: SURGERY | Age: 79
End: 2019-05-29
Payer: MEDICAID

## 2019-05-29 DIAGNOSIS — K20.8 OTHER ESOPHAGITIS: ICD-10-CM

## 2019-05-29 DIAGNOSIS — Z12.11 ENCOUNTER FOR SCREENING FOR MALIGNANT NEOPLASM OF COLON: ICD-10-CM

## 2019-05-29 DIAGNOSIS — K57.30 DIVERTICULOSIS OF LARGE INTESTINE WITHOUT PERFORATION OR ABS: ICD-10-CM

## 2019-05-29 DIAGNOSIS — D12.0 BENIGN NEOPLASM OF CECUM: ICD-10-CM

## 2019-05-29 DIAGNOSIS — K21.0 GASTRO-ESOPHAGEAL REFLUX DISEASE WITH ESOPHAGITIS: ICD-10-CM

## 2019-05-29 DIAGNOSIS — R13.10 DYSPHAGIA, UNSPECIFIED: ICD-10-CM

## 2019-05-29 LAB
GI HISTOLOGY: A. UNSPECIFIED: (no result)
GI HISTOLOGY: B. UNSPECIFIED: (no result)
GI HISTOLOGY: PDF REPORT: (no result)

## 2019-05-29 PROCEDURE — 88305 TISSUE EXAM BY PATHOLOGIST: CPT | Performed by: INTERNAL MEDICINE

## 2019-05-29 PROCEDURE — 88312 SPECIAL STAINS GROUP 1: CPT | Performed by: INTERNAL MEDICINE

## 2019-05-29 PROCEDURE — G0121 COLON CA SCRN NOT HI RSK IND: HCPCS | Performed by: INTERNAL MEDICINE

## 2019-05-29 PROCEDURE — 43239 EGD BIOPSY SINGLE/MULTIPLE: CPT | Mod: 59 | Performed by: INTERNAL MEDICINE

## 2019-05-29 RX ORDER — PANTOPRAZOLE SODIUM 40 MG/1
40 TABLET, DELAYED RELEASE ORAL
Qty: 30 | Refills: 2 | Status: ACTIVE
Start: 2019-05-29

## 2019-06-16 ENCOUNTER — HOSPITAL ENCOUNTER (OUTPATIENT)
Facility: HOSPITAL | Age: 79
Setting detail: OBSERVATION
Discharge: HOME | End: 2019-06-18
Attending: EMERGENCY MEDICINE | Admitting: HOSPITALIST

## 2019-06-16 ENCOUNTER — APPOINTMENT (OUTPATIENT)
Dept: CT IMAGING | Facility: HOSPITAL | Age: 79
End: 2019-06-16

## 2019-06-16 ENCOUNTER — APPOINTMENT (OUTPATIENT)
Dept: GENERAL RADIOLOGY | Facility: HOSPITAL | Age: 79
End: 2019-06-16

## 2019-06-16 DIAGNOSIS — R09.02 HYPOXIA: ICD-10-CM
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
## 2019-06-16 DIAGNOSIS — S32.010A CLOSED COMPRESSION FRACTURE OF FIRST LUMBAR VERTEBRA, INITIAL ENCOUNTER: Primary | ICD-10-CM

## 2019-06-16 DIAGNOSIS — E86.0 DEHYDRATION: ICD-10-CM

## 2019-06-16 DIAGNOSIS — R55 NEAR SYNCOPE: ICD-10-CM

## 2019-06-16 PROBLEM — E87.1 HYPONATREMIA: Status: ACTIVE | Noted: 2019-06-16

## 2019-06-16 PROBLEM — J96.11 CHRONIC RESPIRATORY FAILURE WITH HYPOXIA: Status: ACTIVE | Noted: 2017-12-02

## 2019-06-16 LAB
ALBUMIN SERPL-MCNC: 3.8 G/DL (ref 3.5–5.2)
ALBUMIN/GLOB SERPL: 1.5 G/DL
ALP SERPL-CCNC: 85 U/L (ref 39–117)
ALT SERPL W P-5'-P-CCNC: 14 U/L (ref 1–33)
ANION GAP SERPL CALCULATED.3IONS-SCNC: 11 MMOL/L
AST SERPL-CCNC: 23 U/L (ref 1–32)
BASOPHILS # BLD MANUAL: 0.08 10*3/MM3 (ref 0–0.2)
BASOPHILS NFR BLD AUTO: 1 % (ref 0–1.5)
BILIRUB SERPL-MCNC: 0.2 MG/DL (ref 0.2–1.2)
BILIRUB UR QL STRIP: NEGATIVE
BUN BLD-MCNC: 34 MG/DL (ref 8–23)
BUN/CREAT SERPL: 18.9 (ref 7–25)
CALCIUM SPEC-SCNC: 8.8 MG/DL (ref 8.6–10.5)
CHLORIDE SERPL-SCNC: 98 MMOL/L (ref 98–107)
CLARITY UR: CLEAR
CO2 SERPL-SCNC: 26 MMOL/L (ref 22–29)
COLOR UR: YELLOW
CREAT BLD-MCNC: 1.8 MG/DL (ref 0.57–1)
D DIMER PPP FEU-MCNC: 3.68 MCGFEU/ML (ref 0–0.49)
DEPRECATED RDW RBC AUTO: 50.1 FL (ref 37–54)
EOSINOPHIL # BLD MANUAL: 0.17 10*3/MM3 (ref 0–0.4)
EOSINOPHIL NFR BLD MANUAL: 2 % (ref 0.3–6.2)
ERYTHROCYTE [DISTWIDTH] IN BLOOD BY AUTOMATED COUNT: 12.9 % (ref 12.3–15.4)
GFR SERPL CREATININE-BSD FRML MDRD: 27 ML/MIN/1.73
GLOBULIN UR ELPH-MCNC: 2.6 GM/DL
GLUCOSE BLD-MCNC: 122 MG/DL (ref 65–99)
GLUCOSE UR STRIP-MCNC: NEGATIVE MG/DL
HCT VFR BLD AUTO: 31.3 % (ref 34–46.6)
HGB BLD-MCNC: 9.7 G/DL (ref 12–15.9)
HGB UR QL STRIP.AUTO: NEGATIVE
HOLD SPECIMEN: NORMAL
KETONES UR QL STRIP: NEGATIVE
LEUKOCYTE ESTERASE UR QL STRIP.AUTO: NEGATIVE
LYMPHOCYTES # BLD MANUAL: 1.77 10*3/MM3 (ref 0.7–3.1)
LYMPHOCYTES NFR BLD MANUAL: 21 % (ref 19.6–45.3)
LYMPHOCYTES NFR BLD MANUAL: 7 % (ref 5–12)
MCH RBC QN AUTO: 33 PG (ref 26.6–33)
MCHC RBC AUTO-ENTMCNC: 31 G/DL (ref 31.5–35.7)
MCV RBC AUTO: 106.5 FL (ref 79–97)
MONOCYTES # BLD AUTO: 0.59 10*3/MM3 (ref 0.1–0.9)
NEUTROPHILS # BLD AUTO: 5.81 10*3/MM3 (ref 1.7–7)
NEUTROPHILS NFR BLD MANUAL: 69 % (ref 42.7–76)
NITRITE UR QL STRIP: NEGATIVE
PH UR STRIP.AUTO: 5.5 [PH] (ref 5–8)
PLAT MORPH BLD: NORMAL
PLATELET # BLD AUTO: 212 10*3/MM3 (ref 140–450)
PMV BLD AUTO: 9.9 FL (ref 6–12)
POTASSIUM BLD-SCNC: 4.6 MMOL/L (ref 3.5–5.2)
PROT SERPL-MCNC: 6.4 G/DL (ref 6–8.5)
PROT UR QL STRIP: NEGATIVE
RBC # BLD AUTO: 2.94 10*6/MM3 (ref 3.77–5.28)
RBC MORPH BLD: NORMAL
SODIUM BLD-SCNC: 135 MMOL/L (ref 136–145)
SP GR UR STRIP: 1.01 (ref 1–1.03)
TROPONIN T SERPL-MCNC: <0.01 NG/ML (ref 0–0.03)
UROBILINOGEN UR QL STRIP: NORMAL
VALPROATE SERPL-MCNC: 38 MCG/ML (ref 50–125)
WBC MORPH BLD: NORMAL
WBC NRBC COR # BLD: 8.42 10*3/MM3 (ref 3.4–10.8)
WHOLE BLOOD HOLD SPECIMEN: NORMAL
WHOLE BLOOD HOLD SPECIMEN: NORMAL

## 2019-06-16 PROCEDURE — 96361 HYDRATE IV INFUSION ADD-ON: CPT

## 2019-06-16 PROCEDURE — 25010000002 HYDROMORPHONE 1 MG/ML SOLUTION: Performed by: NURSE PRACTITIONER

## 2019-06-16 PROCEDURE — 25010000002 ONDANSETRON PER 1 MG: Performed by: NURSE PRACTITIONER

## 2019-06-16 PROCEDURE — 85007 BL SMEAR W/DIFF WBC COUNT: CPT | Performed by: NURSE PRACTITIONER

## 2019-06-16 PROCEDURE — 94799 UNLISTED PULMONARY SVC/PX: CPT

## 2019-06-16 PROCEDURE — 73521 X-RAY EXAM HIPS BI 2 VIEWS: CPT

## 2019-06-16 PROCEDURE — 80164 ASSAY DIPROPYLACETIC ACD TOT: CPT | Performed by: NURSE PRACTITIONER

## 2019-06-16 PROCEDURE — 72131 CT LUMBAR SPINE W/O DYE: CPT

## 2019-06-16 PROCEDURE — G0378 HOSPITAL OBSERVATION PER HR: HCPCS

## 2019-06-16 PROCEDURE — 85025 COMPLETE CBC W/AUTO DIFF WBC: CPT | Performed by: NURSE PRACTITIONER

## 2019-06-16 PROCEDURE — 99285 EMERGENCY DEPT VISIT HI MDM: CPT

## 2019-06-16 PROCEDURE — 36415 COLL VENOUS BLD VENIPUNCTURE: CPT | Performed by: INTERNAL MEDICINE

## 2019-06-16 PROCEDURE — 93005 ELECTROCARDIOGRAM TRACING: CPT | Performed by: NURSE PRACTITIONER

## 2019-06-16 PROCEDURE — 80053 COMPREHEN METABOLIC PANEL: CPT | Performed by: NURSE PRACTITIONER

## 2019-06-16 PROCEDURE — 72110 X-RAY EXAM L-2 SPINE 4/>VWS: CPT

## 2019-06-16 PROCEDURE — 25010000002 HYDROMORPHONE PER 4 MG: Performed by: NURSE PRACTITIONER

## 2019-06-16 PROCEDURE — 85379 FIBRIN DEGRADATION QUANT: CPT | Performed by: INTERNAL MEDICINE

## 2019-06-16 PROCEDURE — 71046 X-RAY EXAM CHEST 2 VIEWS: CPT

## 2019-06-16 PROCEDURE — 93010 ELECTROCARDIOGRAM REPORT: CPT | Performed by: INTERNAL MEDICINE

## 2019-06-16 PROCEDURE — 96376 TX/PRO/DX INJ SAME DRUG ADON: CPT

## 2019-06-16 PROCEDURE — 84484 ASSAY OF TROPONIN QUANT: CPT | Performed by: NURSE PRACTITIONER

## 2019-06-16 PROCEDURE — 81003 URINALYSIS AUTO W/O SCOPE: CPT | Performed by: NURSE PRACTITIONER

## 2019-06-16 PROCEDURE — 96374 THER/PROPH/DIAG INJ IV PUSH: CPT

## 2019-06-16 PROCEDURE — 96375 TX/PRO/DX INJ NEW DRUG ADDON: CPT

## 2019-06-16 RX ORDER — IPRATROPIUM BROMIDE AND ALBUTEROL SULFATE 2.5; .5 MG/3ML; MG/3ML
3 SOLUTION RESPIRATORY (INHALATION)
Status: DISCONTINUED | OUTPATIENT
Start: 2019-06-16 | End: 2019-06-18 | Stop reason: HOSPADM

## 2019-06-16 RX ORDER — ONDANSETRON 4 MG/1
4 TABLET, FILM COATED ORAL EVERY 6 HOURS PRN
Status: DISCONTINUED | OUTPATIENT
Start: 2019-06-16 | End: 2019-06-18 | Stop reason: HOSPADM

## 2019-06-16 RX ORDER — SODIUM CHLORIDE 0.9 % (FLUSH) 0.9 %
10 SYRINGE (ML) INJECTION AS NEEDED
Status: DISCONTINUED | OUTPATIENT
Start: 2019-06-16 | End: 2019-06-16

## 2019-06-16 RX ORDER — BISACODYL 5 MG/1
5 TABLET, DELAYED RELEASE ORAL DAILY PRN
Status: DISCONTINUED | OUTPATIENT
Start: 2019-06-16 | End: 2019-06-18 | Stop reason: HOSPADM

## 2019-06-16 RX ORDER — HYDROMORPHONE HYDROCHLORIDE 1 MG/ML
0.5 INJECTION, SOLUTION INTRAMUSCULAR; INTRAVENOUS; SUBCUTANEOUS
Status: DISCONTINUED | OUTPATIENT
Start: 2019-06-16 | End: 2019-06-18 | Stop reason: HOSPADM

## 2019-06-16 RX ORDER — GABAPENTIN 300 MG/1
300 CAPSULE ORAL 3 TIMES DAILY
COMMUNITY

## 2019-06-16 RX ORDER — SODIUM CHLORIDE 0.9 % (FLUSH) 0.9 %
3-10 SYRINGE (ML) INJECTION AS NEEDED
Status: DISCONTINUED | OUTPATIENT
Start: 2019-06-16 | End: 2019-06-18 | Stop reason: HOSPADM

## 2019-06-16 RX ORDER — CALCIUM CARBONATE 200(500)MG
2 TABLET,CHEWABLE ORAL 2 TIMES DAILY PRN
Status: DISCONTINUED | OUTPATIENT
Start: 2019-06-16 | End: 2019-06-18 | Stop reason: HOSPADM

## 2019-06-16 RX ORDER — SODIUM CHLORIDE 0.9 % (FLUSH) 0.9 %
3 SYRINGE (ML) INJECTION EVERY 12 HOURS SCHEDULED
Status: DISCONTINUED | OUTPATIENT
Start: 2019-06-16 | End: 2019-06-18 | Stop reason: HOSPADM

## 2019-06-16 RX ORDER — DOCUSATE SODIUM 100 MG/1
100 CAPSULE, LIQUID FILLED ORAL 2 TIMES DAILY
Status: DISCONTINUED | OUTPATIENT
Start: 2019-06-16 | End: 2019-06-18 | Stop reason: HOSPADM

## 2019-06-16 RX ORDER — OLANZAPINE 5 MG/1
5 TABLET ORAL NIGHTLY
COMMUNITY
End: 2020-03-15 | Stop reason: HOSPADM

## 2019-06-16 RX ORDER — SODIUM CHLORIDE 9 MG/ML
100 INJECTION, SOLUTION INTRAVENOUS CONTINUOUS
Status: DISCONTINUED | OUTPATIENT
Start: 2019-06-16 | End: 2019-06-17

## 2019-06-16 RX ORDER — ONDANSETRON 2 MG/ML
4 INJECTION INTRAMUSCULAR; INTRAVENOUS ONCE
Status: COMPLETED | OUTPATIENT
Start: 2019-06-16 | End: 2019-06-16

## 2019-06-16 RX ORDER — FUROSEMIDE 20 MG/1
20 TABLET ORAL DAILY
COMMUNITY
End: 2020-03-12

## 2019-06-16 RX ORDER — HYDROMORPHONE HYDROCHLORIDE 1 MG/ML
0.5 INJECTION, SOLUTION INTRAMUSCULAR; INTRAVENOUS; SUBCUTANEOUS ONCE
Status: COMPLETED | OUTPATIENT
Start: 2019-06-16 | End: 2019-06-16

## 2019-06-16 RX ORDER — DULOXETIN HYDROCHLORIDE 60 MG/1
60 CAPSULE, DELAYED RELEASE ORAL DAILY
COMMUNITY

## 2019-06-16 RX ORDER — ACETAMINOPHEN 325 MG/1
650 TABLET ORAL EVERY 4 HOURS PRN
Status: DISCONTINUED | OUTPATIENT
Start: 2019-06-16 | End: 2019-06-18 | Stop reason: HOSPADM

## 2019-06-16 RX ORDER — NALOXONE HCL 0.4 MG/ML
0.4 VIAL (ML) INJECTION
Status: DISCONTINUED | OUTPATIENT
Start: 2019-06-16 | End: 2019-06-18 | Stop reason: HOSPADM

## 2019-06-16 RX ORDER — UREA 10 %
3 LOTION (ML) TOPICAL NIGHTLY
Status: ON HOLD | COMMUNITY
End: 2021-10-19

## 2019-06-16 RX ORDER — FEBUXOSTAT 40 MG/1
40 TABLET, FILM COATED ORAL DAILY
COMMUNITY
End: 2020-03-15 | Stop reason: HOSPADM

## 2019-06-16 RX ORDER — ONDANSETRON 4 MG/1
4 TABLET, FILM COATED ORAL EVERY 8 HOURS PRN
COMMUNITY
End: 2020-03-12

## 2019-06-16 RX ORDER — LANOLIN ALCOHOL/MO/W.PET/CERES
1000 CREAM (GRAM) TOPICAL DAILY
COMMUNITY
End: 2023-03-03 | Stop reason: HOSPADM

## 2019-06-16 RX ORDER — SODIUM CHLORIDE 0.9 % (FLUSH) 0.9 %
10 SYRINGE (ML) INJECTION AS NEEDED
Status: DISCONTINUED | OUTPATIENT
Start: 2019-06-16 | End: 2019-06-18 | Stop reason: HOSPADM

## 2019-06-16 RX ORDER — CHOLECALCIFEROL (VITAMIN D3) 125 MCG
5 CAPSULE ORAL NIGHTLY PRN
Status: DISCONTINUED | OUTPATIENT
Start: 2019-06-16 | End: 2019-06-18 | Stop reason: HOSPADM

## 2019-06-16 RX ORDER — AMLODIPINE BESYLATE 10 MG/1
10 TABLET ORAL DAILY
COMMUNITY
End: 2020-05-25 | Stop reason: HOSPADM

## 2019-06-16 RX ORDER — HYDROCODONE BITARTRATE AND ACETAMINOPHEN 5; 325 MG/1; MG/1
1 TABLET ORAL EVERY 4 HOURS PRN
Status: DISCONTINUED | OUTPATIENT
Start: 2019-06-16 | End: 2019-06-18

## 2019-06-16 RX ORDER — HYDROCODONE BITARTRATE AND ACETAMINOPHEN 7.5; 325 MG/1; MG/1
1 TABLET ORAL ONCE
Status: COMPLETED | OUTPATIENT
Start: 2019-06-16 | End: 2019-06-16

## 2019-06-16 RX ORDER — ONDANSETRON 2 MG/ML
4 INJECTION INTRAMUSCULAR; INTRAVENOUS EVERY 6 HOURS PRN
Status: DISCONTINUED | OUTPATIENT
Start: 2019-06-16 | End: 2019-06-18 | Stop reason: HOSPADM

## 2019-06-16 RX ADMIN — DOCUSATE SODIUM 100 MG: 100 CAPSULE, LIQUID FILLED ORAL at 23:44

## 2019-06-16 RX ADMIN — SODIUM CHLORIDE 1000 ML: 9 INJECTION, SOLUTION INTRAVENOUS at 18:39

## 2019-06-16 RX ADMIN — HYDROCODONE BITARTRATE AND ACETAMINOPHEN 1 TABLET: 5; 325 TABLET ORAL at 23:44

## 2019-06-16 RX ADMIN — HYDROCODONE BITARTRATE AND ACETAMINOPHEN 1 TABLET: 7.5; 325 TABLET ORAL at 19:45

## 2019-06-16 RX ADMIN — SODIUM CHLORIDE 100 ML/HR: 9 INJECTION, SOLUTION INTRAVENOUS at 22:53

## 2019-06-16 RX ADMIN — HYDROMORPHONE HYDROCHLORIDE 0.5 MG: 1 INJECTION, SOLUTION INTRAMUSCULAR; INTRAVENOUS; SUBCUTANEOUS at 16:51

## 2019-06-16 RX ADMIN — SODIUM CHLORIDE, PRESERVATIVE FREE 3 ML: 5 INJECTION INTRAVENOUS at 22:54

## 2019-06-16 RX ADMIN — HYDROMORPHONE HYDROCHLORIDE 1 MG: 1 INJECTION, SOLUTION INTRAMUSCULAR; INTRAVENOUS; SUBCUTANEOUS at 18:57

## 2019-06-16 RX ADMIN — ONDANSETRON 4 MG: 2 INJECTION INTRAMUSCULAR; INTRAVENOUS at 16:51

## 2019-06-16 NOTE — ED NOTES
Report received from CARMINA Weldon. Pt resting with eyes closed in no obvious distress. O2 sats 88% on 3LPM. O2 adjusted up to 4 LPM. Will continue to monitor.     Jennifer Sanders RN  06/16/19 [id number]

## 2019-06-16 NOTE — ED TRIAGE NOTES
Fall (not sure if trip or dizzy).  O2 sats  have been dropping post esophagus dilation.  She is on O2 prn.  No c/o soa.  C/o right hip pain and lower back pain.

## 2019-06-16 NOTE — ED PROVIDER NOTES
EMERGENCY DEPARTMENT ENCOUNTER    Room Number:  27/27  Date seen:  6/16/2019  Time seen: 4:27 PM  PCP: Bill Martino MD   Pulmonologist: Dr. aC    HPI:  Chief complaint: lower back pain  Context:Josephine Wolf is a 79 y.o. female who presents to the ED with c/o bilateral lower back pain s/p a fall PTA while she was helping her impaired daughter get up from the toilet. The patient states she fell backwards and landed directly on her buttocks. The patient is unsure of the exact mechanism of the fall. The denies hitting her head but also complains of right hip pain. The patient also denies neck pain, LOC, headache, left hip pain, or any other sustaining injuries. Patient denies fever, urinary/fecal incontinence, or weakness. The patient states she was able to bear weight after the fall. The patient is chronically on 2L of oxygen at night d/t her hx of COPD but has recently been wearing her oxygen as needed during the day s/p esophagus dilation. The patient denies worsening of chronic shortness of breath. The patient denies hx of back pain or back surgery. There are no other complaints at this time. The patient arrived to the ED with a C-collar in place.    Timing: constant  Duration: began PTA  Location: bilateral lower back  Radiation: none specified  Quality: pain  Intensity/Severity: moderate  Associated Symptoms: right hip pain  Aggravating Factors: none specified  Alleviating Factors: none specified  Previous Episodes: The patient denies hx of back pain or back surgery.  Treatment before arrival: The patient arrived to the ED with a C-collar in place.    MEDICAL RECORD REVIEW  The patient was last hospitalized at [facility] on 06/24/18 for orthostatic hypotension. The patient also has a history of a right cerebral aneurysm without rupture.    ALLERGIES  Morphine and related    PAST MEDICAL HISTORY  Active Ambulatory Problems     Diagnosis Date Noted   • Anemia 10/19/2017   • OA (osteoarthritis) of knee  2017   • Hypoxia 2017   • Cellulitis of skin 2017   • Acute kidney injury (CMS/[facility]) 2017   • Sepsis (CMS/HCC) 2017   • Orthostatic hypotension 2018   • Disease of thyroid gland 2018   • COPD (chronic obstructive pulmonary disease) (CMS/[facility]) 2018   • Hypertension 2018   • Dehydration 2018   • Stage 3 chronic kidney disease (CMS/HCC) 2018     Resolved Ambulatory Problems     Diagnosis Date Noted   • No Resolved Ambulatory Problems     Past Medical History:   Diagnosis Date   • Acid reflux    • Anemia    • Arthritis    • Bipolar 1 disorder, depressed (CMS/[facility])    • Cataract    • Chronic nausea    • Chronic pain of right knee    • Continuous leakage of urine    • COPD (chronic obstructive pulmonary disease) (CMS/[facility])    • Disease of thyroid gland    • Diverticulosis    • Fibromyalgia    • Frequent episodes of bronchitis    • Hypertension    • Migraines    • Neck pain    • On home oxygen therapy    • Short of breath on exertion        PAST SURGICAL HISTORY  Past Surgical History:   Procedure Laterality Date   • CEREBRAL ANEURYSM REPAIR      WITH STENT   • HYSTERECTOMY     • LAPAROSCOPIC CHOLECYSTECTOMY     • OR TOTAL KNEE ARTHROPLASTY Right 2017    Procedure: RT TOTAL KNEE ARTHROPLASTY;  Surgeon: Kashif Perez MD;  Location: [facility];  Service: Orthopedics       FAMILY HISTORY  Family History   Problem Relation Age of Onset   • Malig Hyperthermia Neg Hx        SOCIAL HISTORY  Social History     Socioeconomic History   • Marital status:      Spouse name: Not on file   • Number of children: Not on file   • Years of education: Not on file   • Highest education level: Not on file   Tobacco Use   • Smoking status: Former Smoker     Packs/day: 1.00     Years: 10.00     Pack years: 10.00     Types: Cigarettes     Start date:      Last attempt to quit:      Years since quittin.4   • Smokeless tobacco: Never Used   Substance  and Sexual Activity   • Alcohol use: No   • Drug use: No   • Sexual activity: Defer       REVIEW OF SYSTEMS  Review of Systems   Constitutional: Negative for activity change, appetite change, diaphoresis and fever.   HENT: Negative for trouble swallowing.    Eyes: Negative for visual disturbance.   Respiratory: Negative for cough, chest tightness, shortness of breath (worsening of chronic) and wheezing.    Cardiovascular: Negative for chest pain, palpitations and leg swelling.   Gastrointestinal: Negative for abdominal pain, diarrhea, nausea and vomiting.   Genitourinary: Negative for dysuria.   Musculoskeletal: Positive for arthralgias (right hip) and back pain (bilateral lower). Negative for neck pain.   Skin: Negative for rash.   Neurological: Negative for dizziness, syncope, speech difficulty, light-headedness and headaches.       PHYSICAL EXAM  ED Triage Vitals [06/16/19 1556]   Temp Heart Rate Resp BP SpO2   98.9 °F (37.2 °C) 64 16 103/62 92 %      Temp src Heart Rate Source Patient Position BP Location FiO2 (%)   Tympanic -- -- -- --     Physical Exam   Constitutional: She is oriented to person, place, and time. She does not have a sickly appearance.   The patient appears uncomfortable.   HENT:   Head: Normocephalic and atraumatic.   Mouth/Throat: Uvula is midline, oropharynx is clear and moist and mucous membranes are normal.   Eyes: EOM are normal. Pupils are equal, round, and reactive to light.   Neck: Normal range of motion. Neck supple.   Cardiovascular: Normal rate, regular rhythm, S1 normal, S2 normal and normal heart sounds. Exam reveals no gallop and no friction rub.   No murmur heard.  Pulmonary/Chest: Effort normal and breath sounds normal. She has no decreased breath sounds. She has no wheezes. She has no rhonchi. She has no rales.   Abdominal: Soft. Normal appearance and bowel sounds are normal. There is no tenderness. There is no rebound and no guarding.   Musculoskeletal:   Pain with SLR on the  right. There is no pelvic pain. There is no step off.  Pedal pushes strong bilaterally,  No loss of sensation to either outer or inner thigh bilaterally.    Neurological: She is alert and oriented to person, place, and time. GCS score is 15.   Skin: Skin is warm, dry and intact.   Psychiatric: Affect and judgment normal.   Nursing note and vitals reviewed.      LAB RESULTS  Recent Results (from the past 24 hour(s))   Light Blue Top    Collection Time: 06/16/19  4:12 PM   Result Value Ref Range    Extra Tube hold for add-on    Green Top (Gel)    Collection Time: 06/16/19  4:12 PM   Result Value Ref Range    Extra Tube Hold for add-ons.    Lavender Top    Collection Time: 06/16/19  4:12 PM   Result Value Ref Range    Extra Tube hold for add-on    Valproic Acid Level, Total    Collection Time: 06/16/19  4:12 PM   Result Value Ref Range    Valproic Acid 38.0 (L) 50.0 - 125.0 mcg/mL   Comprehensive Metabolic Panel    Collection Time: 06/16/19  4:12 PM   Result Value Ref Range    Glucose 122 (H) 65 - 99 mg/dL    BUN 34 (H) 8 - 23 mg/dL    Creatinine 1.80 (H) 0.57 - 1.00 mg/dL    Sodium 135 (L) 136 - 145 mmol/L    Potassium 4.6 3.5 - 5.2 mmol/L    Chloride 98 98 - 107 mmol/L    CO2 26.0 22.0 - 29.0 mmol/L    Calcium 8.8 8.6 - 10.5 mg/dL    Total Protein 6.4 6.0 - 8.5 g/dL    Albumin 3.80 3.50 - 5.20 g/dL    ALT (SGPT) 14 1 - 33 U/L    AST (SGOT) 23 1 - 32 U/L    Alkaline Phosphatase 85 39 - 117 U/L    Total Bilirubin 0.2 0.2 - 1.2 mg/dL    eGFR Non African Amer 27 (L) >60 mL/min/1.73    Globulin 2.6 gm/dL    A/G Ratio 1.5 g/dL    BUN/Creatinine Ratio 18.9 7.0 - 25.0    Anion Gap 11.0 mmol/L   Troponin    Collection Time: 06/16/19  4:12 PM   Result Value Ref Range    Troponin T <0.010 0.000 - 0.030 ng/mL   CBC Auto Differential    Collection Time: 06/16/19  4:12 PM   Result Value Ref Range    WBC 8.42 3.40 - 10.80 10*3/mm3    RBC 2.94 (L) 3.77 - 5.28 10*6/mm3    Hemoglobin 9.7 (L) 12.0 - 15.9 g/dL    Hematocrit 31.3 (L)  34.0 - 46.6 %    .5 (H) 79.0 - 97.0 fL    MCH 33.0 26.6 - 33.0 pg    MCHC 31.0 (L) 31.5 - 35.7 g/dL    RDW 12.9 12.3 - 15.4 %    RDW-SD 50.1 37.0 - 54.0 fl    MPV 9.9 6.0 - 12.0 fL    Platelets 212 140 - 450 10*3/mm3   Manual Differential    Collection Time: 06/16/19  4:12 PM   Result Value Ref Range    Neutrophil % 69.0 42.7 - 76.0 %    Lymphocyte % 21.0 19.6 - 45.3 %    Monocyte % 7.0 5.0 - 12.0 %    Eosinophil % 2.0 0.3 - 6.2 %    Basophil % 1.0 0.0 - 1.5 %    Neutrophils Absolute 5.81 1.70 - 7.00 10*3/mm3    Lymphocytes Absolute 1.77 0.70 - 3.10 10*3/mm3    Monocytes Absolute 0.59 0.10 - 0.90 10*3/mm3    Eosinophils Absolute 0.17 0.00 - 0.40 10*3/mm3    Basophils Absolute 0.08 0.00 - 0.20 10*3/mm3    RBC Morphology Normal Normal    WBC Morphology Normal Normal    Platelet Morphology Normal Normal   Urinalysis With Microscopic If Indicated (No Culture) - Urine, Clean Catch    Collection Time: 06/16/19  4:57 PM   Result Value Ref Range    Color, UA Yellow Yellow, Straw    Appearance, UA Clear Clear    pH, UA 5.5 5.0 - 8.0    Specific Gravity, UA 1.013 1.005 - 1.030    Glucose, UA Negative Negative    Ketones, UA Negative Negative    Bilirubin, UA Negative Negative    Blood, UA Negative Negative    Protein, UA Negative Negative    Leuk Esterase, UA Negative Negative    Nitrite, UA Negative Negative    Urobilinogen, UA 0.2 E.U./dL 0.2 - 1.0 E.U./dL       I ordered the above labs and reviewed the results    RADIOLOGY  CT Lumbar Spine Without Contrast   Final Result   1. Minimal acute compression fracture of the L1 superior endplate with   up to 10% height loss, no retropulsion or bony canal narrowing.   2. 8mm sclerotic sacral lesion as discussed. Favor bone island       This report was finalized on 6/16/2019 7:32 PM by Jorge A Hylton M.D.          XR Chest 2 View    (Results Pending)   XR Hips Bilateral With or Without Pelvis 2 View    (Results Pending)   XR Spine Lumbar 4+ View    (Results Pending)       I  ordered the above noted radiological studies and reviewed the images on the PACS system.  Spoke with Dr. Acevedo (Radiology) regarding CT/MRI scan results    MEDICATIONS GIVEN IN ER  Medications   sodium chloride 0.9 % flush 10 mL (not administered)   HYDROmorphone (DILAUDID) injection 0.5 mg (0.5 mg Intravenous Given 6/16/19 1651)   ondansetron (ZOFRAN) injection 4 mg (4 mg Intravenous Given 6/16/19 1651)   sodium chloride 0.9 % bolus 1,000 mL (1,000 mL Intravenous New Bag 6/16/19 1839)   HYDROmorphone (DILAUDID) injection 1 mg (1 mg Intravenous Given 6/16/19 1857)   HYDROcodone-acetaminophen (NORCO) 7.5-325 MG per tablet 1 tablet (1 tablet Oral Given 6/16/19 1945)       EKG  Interpreted by ED Physician (Dr. Vela)    PROCEDURES  Procedures    COURSE & MEDICAL DECISION MAKING  Pertinent Labs and Imaging studies that were ordered and reviewed are noted above.  Results were reviewed/discussed with the patient and they were also made aware of online access.  Pt also made aware that some labs, such as cultures, will not be resulted during ER visit and follow up with PMD is necessary.     PROGRESS AND CONSULTS    Progress Notes:  1627 Initial encounter. Patient is stable. BP- 103/62 HR- 64 Temp- 98.9 °F (37.2 °C) (Tympanic) O2 sat- 91% on 4L. Discussed the plan to obtain labs, Bilateral Hip XR, L Spine XR, EKG, and CXR for further evalaution. Pt understands and agrees with the plan, all questions answered.    1637 Ordered Bilateral Hips XR, L Spine XR, CXR, UA, EKG, and blood work for further evaluation. Also ordered Dilaudid for pain and Zofran for nausea.    1829 Reviewed pt's history and workup with Dr. Dane MD.  After a bedside evaluation, Dr. Vela agrees with the plan of care.    1832 Ordered IV Fluids for hydration.    1851 Received a call from Dr. Acevedo (Radiology) and discussed patient's case. He stated the L Spine XR shows a possible L1 abnormality. Will order CT L Spine for further evaluation.    1852 Rechecked  "the patient who reports her lower back pain has only mildly improved after receiving Dilaudid in the ED. Patient is stable. BP- 123/61 HR- 72 Temp- 98.9 °F (37.2 °C) (Tympanic) O2 sat- 92%. Informed the patient of her negative Bilateral Hips XR and CXR. Also informed the patient of her blood work which showed mild dehydration and her L Spine XR which showed a possible L1 abnormality. Discussed the plan to obtain a CT L Spine for further evaluation. Also discussed the plan for admission for further evaluation and management. Pt understands and agrees with the plan, all questions answered.    1853 Ordered Dilaudid for pain management. Also ordered CT L Spine for further evaluation.    1938 Ordered Norco for pain. Placed call to [facility] for admission.    1940 Rechecked the patient who is resting comfortably and in NAD. Vital signs are stable.  BP- 122/65 HR- 68 Temp- 98.9 °F (37.2 °C) (Tympanic) O2 sat- 91%. Informed the patient of her CT L Spine which showed an acute, minimal impaction fracture of the L1 superior endplate. Discussed the plan for admission for further evaluation and management. Pt understands and agrees with the plan, all questions answered.    2018 Received a call from Dr. Mello ([facility]) and discussed pt's case. Dr. Mello agreed with plan to admit the patient to [facility] for further evaluation and management.    2020 Based on the patient's lab findings and presenting symptoms, the doctor and I feel it is appropriate to admit the patient for further management, evaluation, and treatment.  I have discussed this with the admitting team.  I have also discussed this with the patient/family.  They are in agreement with admission.        Disposition vitals:  /65   Pulse 68   Temp 98.9 °F (37.2 °C) (Tympanic)   Resp 18   Ht 154.9 cm (61\")   Wt 67.6 kg (149 lb)   SpO2 91%   BMI 28.15 kg/m²       DIAGNOSIS  Final diagnoses:   Hypoxia   Dehydration   Fall at home, initial encounter   Closed compression " fracture of first lumbar vertebra, initial encounter (CMS/[facility])   Near syncope     ADMISSION TO TELE OBS    Discussed treatment plan and reason for admission with pt/family and admitting physician.  Pt/family voiced understanding of the plan for admission for further testing/treatment as needed.    Documentation assistance provided by meche Gatica for LG Leal.  Information recorded by the meche was done at my direction and has been verified and validated by me.       Alejandra Gatica  06/16/19 2023       Araseli Conn APRN  06/16/19 2055

## 2019-06-16 NOTE — ED PROVIDER NOTES
"The HEATHER and I have discussed this patient's history, physical exam, and treatment plan. I have reviewed the documentation and personally had a face to face interaction with the patient  I affirm the documentation and agree with the treatment and plan.  The following describes my personal findings.    EKG          EKG time: 1654  Rhythm/Rate: sinus rate in the 70s  P waves and RI: normal  QRS, axis: normal   ST and T waves: nonspecific T wave changes in anterior leads     Interpreted Contemporaneously by me, independently viewed  Unchanged compared to prior 6/24/18    The patient presents complaining of a fall while helping her daughter in the bathroom today. Pt admits to feeling light headed before the fall and reports that she \"thinks her oxygen ran low\". Pt denies any cardiac history, denies new weakness/numbness/incontinence.    Limited physical exam:  Patient is nontoxic appearing  HEENT: no obvious trauma, cspine nonTTPalp  Lungs/cardiovascular: Heart is RRR. Lungs are decreased breath sounds bilateral bases but no wheezes or rales. Chest is nontender to palpation. PT pulses intact  Abdomen: soft and nontender  Back/extremities: FROM, trace edema bilaterally    Discussed that the pt will most likely be admitted. Pt understands and agrees with the plan. All questions have been answered.      Documentation assistance provided by meche Kaur for Dr Vela.  Information recorded by the scribe was done at my direction and has been verified and validated by me.           Loni Kaur  06/16/19 [id number]       Gwendolyn Vela MD  06/18/19 1345    "

## 2019-06-17 ENCOUNTER — APPOINTMENT (OUTPATIENT)
Dept: CARDIOLOGY | Facility: HOSPITAL | Age: 79
End: 2019-06-17

## 2019-06-17 PROBLEM — M80.00XA OSTEOPOROSIS WITH PATHOLOGICAL FRACTURE: Status: ACTIVE | Noted: 2019-06-17

## 2019-06-17 LAB
ANION GAP SERPL CALCULATED.3IONS-SCNC: 11.6 MMOL/L
BASOPHILS # BLD AUTO: 0.04 10*3/MM3 (ref 0–0.2)
BASOPHILS NFR BLD AUTO: 0.6 % (ref 0–1.5)
BH CV LOWER VASCULAR LEFT COMMON FEMORAL AUGMENT: NORMAL
BH CV LOWER VASCULAR LEFT COMMON FEMORAL COMPETENT: NORMAL
BH CV LOWER VASCULAR LEFT COMMON FEMORAL COMPRESS: NORMAL
BH CV LOWER VASCULAR LEFT COMMON FEMORAL PHASIC: NORMAL
BH CV LOWER VASCULAR LEFT COMMON FEMORAL SPONT: NORMAL
BH CV LOWER VASCULAR LEFT DISTAL FEMORAL COMPRESS: NORMAL
BH CV LOWER VASCULAR LEFT GASTRONEMIUS COMPRESS: NORMAL
BH CV LOWER VASCULAR LEFT GREATER SAPH AK COMPRESS: NORMAL
BH CV LOWER VASCULAR LEFT GREATER SAPH BK COMPRESS: NORMAL
BH CV LOWER VASCULAR LEFT MID FEMORAL AUGMENT: NORMAL
BH CV LOWER VASCULAR LEFT MID FEMORAL COMPETENT: NORMAL
BH CV LOWER VASCULAR LEFT MID FEMORAL COMPRESS: NORMAL
BH CV LOWER VASCULAR LEFT MID FEMORAL PHASIC: NORMAL
BH CV LOWER VASCULAR LEFT MID FEMORAL SPONT: NORMAL
BH CV LOWER VASCULAR LEFT PERONEAL COMPRESS: NORMAL
BH CV LOWER VASCULAR LEFT POPLITEAL AUGMENT: NORMAL
BH CV LOWER VASCULAR LEFT POPLITEAL COMPETENT: NORMAL
BH CV LOWER VASCULAR LEFT POPLITEAL COMPRESS: NORMAL
BH CV LOWER VASCULAR LEFT POPLITEAL PHASIC: NORMAL
BH CV LOWER VASCULAR LEFT POPLITEAL SPONT: NORMAL
BH CV LOWER VASCULAR LEFT POSTERIOR TIBIAL COMPRESS: NORMAL
BH CV LOWER VASCULAR LEFT PROXIMAL FEMORAL COMPRESS: NORMAL
BH CV LOWER VASCULAR LEFT SAPHENOFEMORAL JUNCTION COMPRESS: NORMAL
BH CV LOWER VASCULAR LEFT SAPHENOFEMORAL JUNCTION PHASIC: NORMAL
BH CV LOWER VASCULAR LEFT SAPHENOFEMORAL JUNCTION SPONT: NORMAL
BH CV LOWER VASCULAR RIGHT COMMON FEMORAL AUGMENT: NORMAL
BH CV LOWER VASCULAR RIGHT COMMON FEMORAL COMPETENT: NORMAL
BH CV LOWER VASCULAR RIGHT COMMON FEMORAL COMPRESS: NORMAL
BH CV LOWER VASCULAR RIGHT COMMON FEMORAL PHASIC: NORMAL
BH CV LOWER VASCULAR RIGHT COMMON FEMORAL SPONT: NORMAL
BH CV LOWER VASCULAR RIGHT DISTAL FEMORAL COMPRESS: NORMAL
BH CV LOWER VASCULAR RIGHT GASTRONEMIUS COMPRESS: NORMAL
BH CV LOWER VASCULAR RIGHT GREATER SAPH AK COMPRESS: NORMAL
BH CV LOWER VASCULAR RIGHT GREATER SAPH BK COMPRESS: NORMAL
BH CV LOWER VASCULAR RIGHT MID FEMORAL AUGMENT: NORMAL
BH CV LOWER VASCULAR RIGHT MID FEMORAL COMPETENT: NORMAL
BH CV LOWER VASCULAR RIGHT MID FEMORAL COMPRESS: NORMAL
BH CV LOWER VASCULAR RIGHT MID FEMORAL PHASIC: NORMAL
BH CV LOWER VASCULAR RIGHT MID FEMORAL SPONT: NORMAL
BH CV LOWER VASCULAR RIGHT PERONEAL COMPRESS: NORMAL
BH CV LOWER VASCULAR RIGHT POPLITEAL AUGMENT: NORMAL
BH CV LOWER VASCULAR RIGHT POPLITEAL COMPETENT: NORMAL
BH CV LOWER VASCULAR RIGHT POPLITEAL COMPRESS: NORMAL
BH CV LOWER VASCULAR RIGHT POPLITEAL PHASIC: NORMAL
BH CV LOWER VASCULAR RIGHT POPLITEAL SPONT: NORMAL
BH CV LOWER VASCULAR RIGHT POSTERIOR TIBIAL COMPRESS: NORMAL
BH CV LOWER VASCULAR RIGHT PROXIMAL FEMORAL COMPRESS: NORMAL
BH CV LOWER VASCULAR RIGHT SAPHENOFEMORAL JUNCTION COMPRESS: NORMAL
BH CV LOWER VASCULAR RIGHT SAPHENOFEMORAL JUNCTION PHASIC: NORMAL
BH CV LOWER VASCULAR RIGHT SAPHENOFEMORAL JUNCTION SPONT: NORMAL
BUN BLD-MCNC: 28 MG/DL (ref 8–23)
BUN/CREAT SERPL: 19.9 (ref 7–25)
CALCIUM SPEC-SCNC: 8.5 MG/DL (ref 8.6–10.5)
CHLORIDE SERPL-SCNC: 105 MMOL/L (ref 98–107)
CO2 SERPL-SCNC: 25.4 MMOL/L (ref 22–29)
CREAT BLD-MCNC: 1.41 MG/DL (ref 0.57–1)
DEPRECATED RDW RBC AUTO: 50.1 FL (ref 37–54)
EOSINOPHIL # BLD AUTO: 0.15 10*3/MM3 (ref 0–0.4)
EOSINOPHIL NFR BLD AUTO: 2.4 % (ref 0.3–6.2)
ERYTHROCYTE [DISTWIDTH] IN BLOOD BY AUTOMATED COUNT: 12.6 % (ref 12.3–15.4)
GFR SERPL CREATININE-BSD FRML MDRD: 36 ML/MIN/1.73
GLUCOSE BLD-MCNC: 99 MG/DL (ref 65–99)
HCT VFR BLD AUTO: 32.7 % (ref 34–46.6)
HGB BLD-MCNC: 10 G/DL (ref 12–15.9)
IMM GRANULOCYTES # BLD AUTO: 0.09 10*3/MM3 (ref 0–0.05)
IMM GRANULOCYTES NFR BLD AUTO: 1.4 % (ref 0–0.5)
LYMPHOCYTES # BLD AUTO: 1.31 10*3/MM3 (ref 0.7–3.1)
LYMPHOCYTES NFR BLD AUTO: 20.9 % (ref 19.6–45.3)
MCH RBC QN AUTO: 33.2 PG (ref 26.6–33)
MCHC RBC AUTO-ENTMCNC: 30.6 G/DL (ref 31.5–35.7)
MCV RBC AUTO: 108.6 FL (ref 79–97)
MONOCYTES # BLD AUTO: 0.56 10*3/MM3 (ref 0.1–0.9)
MONOCYTES NFR BLD AUTO: 8.9 % (ref 5–12)
NEUTROPHILS # BLD AUTO: 4.12 10*3/MM3 (ref 1.7–7)
NEUTROPHILS NFR BLD AUTO: 65.8 % (ref 42.7–76)
NRBC BLD AUTO-RTO: 0 /100 WBC (ref 0–0.2)
PLATELET # BLD AUTO: 213 10*3/MM3 (ref 140–450)
PMV BLD AUTO: 9.2 FL (ref 6–12)
POTASSIUM BLD-SCNC: 4.7 MMOL/L (ref 3.5–5.2)
RBC # BLD AUTO: 3.01 10*6/MM3 (ref 3.77–5.28)
SODIUM BLD-SCNC: 142 MMOL/L (ref 136–145)
WBC NRBC COR # BLD: 6.27 10*3/MM3 (ref 3.4–10.8)

## 2019-06-17 PROCEDURE — 97110 THERAPEUTIC EXERCISES: CPT

## 2019-06-17 PROCEDURE — 25010000002 ENOXAPARIN PER 10 MG: Performed by: INTERNAL MEDICINE

## 2019-06-17 PROCEDURE — 94799 UNLISTED PULMONARY SVC/PX: CPT

## 2019-06-17 PROCEDURE — G0378 HOSPITAL OBSERVATION PER HR: HCPCS

## 2019-06-17 PROCEDURE — 94640 AIRWAY INHALATION TREATMENT: CPT

## 2019-06-17 PROCEDURE — 96372 THER/PROPH/DIAG INJ SC/IM: CPT

## 2019-06-17 PROCEDURE — 97162 PT EVAL MOD COMPLEX 30 MIN: CPT

## 2019-06-17 PROCEDURE — 80048 BASIC METABOLIC PNL TOTAL CA: CPT | Performed by: INTERNAL MEDICINE

## 2019-06-17 PROCEDURE — 96361 HYDRATE IV INFUSION ADD-ON: CPT

## 2019-06-17 PROCEDURE — 93970 EXTREMITY STUDY: CPT

## 2019-06-17 PROCEDURE — 85025 COMPLETE CBC W/AUTO DIFF WBC: CPT | Performed by: INTERNAL MEDICINE

## 2019-06-17 PROCEDURE — 99203 OFFICE O/P NEW LOW 30 MIN: CPT | Performed by: ORTHOPAEDIC SURGERY

## 2019-06-17 RX ORDER — GABAPENTIN 100 MG/1
200 CAPSULE ORAL NIGHTLY
Status: DISCONTINUED | OUTPATIENT
Start: 2019-06-17 | End: 2019-06-18 | Stop reason: HOSPADM

## 2019-06-17 RX ORDER — DULOXETIN HYDROCHLORIDE 60 MG/1
60 CAPSULE, DELAYED RELEASE ORAL DAILY
Status: DISCONTINUED | OUTPATIENT
Start: 2019-06-17 | End: 2019-06-18 | Stop reason: HOSPADM

## 2019-06-17 RX ORDER — DICYCLOMINE HYDROCHLORIDE 10 MG/1
10 CAPSULE ORAL 4 TIMES DAILY PRN
Status: DISCONTINUED | OUTPATIENT
Start: 2019-06-17 | End: 2019-06-18 | Stop reason: HOSPADM

## 2019-06-17 RX ORDER — CHOLECALCIFEROL (VITAMIN D3) 125 MCG
1000 CAPSULE ORAL DAILY
Status: DISCONTINUED | OUTPATIENT
Start: 2019-06-17 | End: 2019-06-18 | Stop reason: HOSPADM

## 2019-06-17 RX ORDER — LEVOTHYROXINE SODIUM 88 UG/1
88 TABLET ORAL
Status: DISCONTINUED | OUTPATIENT
Start: 2019-06-17 | End: 2019-06-18 | Stop reason: HOSPADM

## 2019-06-17 RX ORDER — OLANZAPINE 5 MG/1
5 TABLET ORAL NIGHTLY
Status: DISCONTINUED | OUTPATIENT
Start: 2019-06-17 | End: 2019-06-18 | Stop reason: HOSPADM

## 2019-06-17 RX ORDER — UREA 10 %
3 LOTION (ML) TOPICAL NIGHTLY
Status: DISCONTINUED | OUTPATIENT
Start: 2019-06-17 | End: 2019-06-18 | Stop reason: HOSPADM

## 2019-06-17 RX ORDER — DIVALPROEX SODIUM 250 MG/1
250 TABLET, EXTENDED RELEASE ORAL EVERY 12 HOURS
Status: DISCONTINUED | OUTPATIENT
Start: 2019-06-17 | End: 2019-06-18 | Stop reason: HOSPADM

## 2019-06-17 RX ORDER — AMLODIPINE BESYLATE 10 MG/1
10 TABLET ORAL DAILY
Status: DISCONTINUED | OUTPATIENT
Start: 2019-06-17 | End: 2019-06-18 | Stop reason: HOSPADM

## 2019-06-17 RX ORDER — TRAZODONE HYDROCHLORIDE 50 MG/1
50 TABLET ORAL NIGHTLY
Status: DISCONTINUED | OUTPATIENT
Start: 2019-06-17 | End: 2019-06-18 | Stop reason: HOSPADM

## 2019-06-17 RX ORDER — FEBUXOSTAT 40 MG/1
40 TABLET, FILM COATED ORAL DAILY
Status: DISCONTINUED | OUTPATIENT
Start: 2019-06-17 | End: 2019-06-18 | Stop reason: HOSPADM

## 2019-06-17 RX ADMIN — AMLODIPINE BESYLATE 10 MG: 10 TABLET ORAL at 08:53

## 2019-06-17 RX ADMIN — Medication 3 MG: at 20:52

## 2019-06-17 RX ADMIN — DULOXETINE HYDROCHLORIDE 60 MG: 60 CAPSULE, DELAYED RELEASE ORAL at 08:53

## 2019-06-17 RX ADMIN — TRAZODONE HYDROCHLORIDE 50 MG: 50 TABLET ORAL at 20:51

## 2019-06-17 RX ADMIN — ENOXAPARIN SODIUM 70 MG: 80 INJECTION SUBCUTANEOUS at 01:49

## 2019-06-17 RX ADMIN — HYDROCODONE BITARTRATE AND ACETAMINOPHEN 1 TABLET: 5; 325 TABLET ORAL at 13:57

## 2019-06-17 RX ADMIN — HYDROCODONE BITARTRATE AND ACETAMINOPHEN 1 TABLET: 5; 325 TABLET ORAL at 06:53

## 2019-06-17 RX ADMIN — OLANZAPINE 5 MG: 5 TABLET, FILM COATED ORAL at 20:51

## 2019-06-17 RX ADMIN — ENOXAPARIN SODIUM 70 MG: 80 INJECTION SUBCUTANEOUS at 23:39

## 2019-06-17 RX ADMIN — DIVALPROEX SODIUM 250 MG: 250 TABLET, FILM COATED, EXTENDED RELEASE ORAL at 04:09

## 2019-06-17 RX ADMIN — SODIUM CHLORIDE, PRESERVATIVE FREE 3 ML: 5 INJECTION INTRAVENOUS at 20:52

## 2019-06-17 RX ADMIN — HYDROCODONE BITARTRATE AND ACETAMINOPHEN 1 TABLET: 5; 325 TABLET ORAL at 19:50

## 2019-06-17 RX ADMIN — GABAPENTIN 200 MG: 100 CAPSULE ORAL at 20:51

## 2019-06-17 RX ADMIN — DOCUSATE SODIUM 100 MG: 100 CAPSULE, LIQUID FILLED ORAL at 08:53

## 2019-06-17 RX ADMIN — LEVOTHYROXINE SODIUM 88 MCG: 88 TABLET ORAL at 05:27

## 2019-06-17 RX ADMIN — IPRATROPIUM BROMIDE AND ALBUTEROL SULFATE 3 ML: 2.5; .5 SOLUTION RESPIRATORY (INHALATION) at 15:38

## 2019-06-17 RX ADMIN — FEBUXOSTAT 40 MG: 40 TABLET ORAL at 08:53

## 2019-06-17 RX ADMIN — DIVALPROEX SODIUM 250 MG: 250 TABLET, FILM COATED, EXTENDED RELEASE ORAL at 13:57

## 2019-06-17 RX ADMIN — IPRATROPIUM BROMIDE AND ALBUTEROL SULFATE 3 ML: 2.5; .5 SOLUTION RESPIRATORY (INHALATION) at 12:00

## 2019-06-17 RX ADMIN — SODIUM CHLORIDE, PRESERVATIVE FREE 3 ML: 5 INJECTION INTRAVENOUS at 08:53

## 2019-06-17 RX ADMIN — IPRATROPIUM BROMIDE AND ALBUTEROL SULFATE 3 ML: 2.5; .5 SOLUTION RESPIRATORY (INHALATION) at 07:38

## 2019-06-17 RX ADMIN — DOCUSATE SODIUM 100 MG: 100 CAPSULE, LIQUID FILLED ORAL at 20:52

## 2019-06-17 RX ADMIN — IPRATROPIUM BROMIDE AND ALBUTEROL SULFATE 3 ML: 2.5; .5 SOLUTION RESPIRATORY (INHALATION) at 01:08

## 2019-06-17 RX ADMIN — SODIUM CHLORIDE 100 ML/HR: 9 INJECTION, SOLUTION INTRAVENOUS at 11:35

## 2019-06-17 RX ADMIN — Medication 1000 MCG: at 08:53

## 2019-06-17 NOTE — PROGRESS NOTES
Name: Josephine Wolf ADMIT: 2019   : 1940  PCP: Bill Martino MD    MRN: [id number] LOS: 0 days   AGE/SEX: 79 y.o. female  ROOM: [facility]     Subjective   Subjective   Pain persists but mainly with movement.     Objective   Objective   Vital Signs  Temp:  [98 °F (36.7 °C)-98.9 °F (37.2 °C)] 98 °F (36.7 °C)  Heart Rate:  [64-86] 72  Resp:  [16-20] 18  BP: ()/(53-65) 114/53  SpO2:  [76 %-94 %] 91 %  on  Flow (L/min):  [3.5-5] 3.5;   Device (Oxygen Therapy): humidified;nasal cannula  Body mass index is 29.52 kg/m².  Physical Exam   Constitutional: She is oriented to person, place, and time. No distress.   Cardiovascular: Normal rate and regular rhythm.   No murmur heard.  Pulmonary/Chest: Effort normal and breath sounds normal.   Abdominal: Soft. Bowel sounds are normal. She exhibits no distension. There is no tenderness.   Musculoskeletal: Normal range of motion. She exhibits no edema.   Neurological: She is alert and oriented to person, place, and time.   Skin: Skin is warm and dry. She is not diaphoretic.       Results Review:       I reviewed the patient's new clinical results.  Results from last 7 days   Lab Units 19  0501 19  1612   WBC 10*3/mm3 6.27 8.42   HEMOGLOBIN g/dL 10.0* 9.7*   PLATELETS 10*3/mm3 213 212     Results from last 7 days   Lab Units 19  0501 19  1612   SODIUM mmol/L 142 135*   POTASSIUM mmol/L 4.7 4.6   CHLORIDE mmol/L 105 98   CO2 mmol/L 25.4 26.0   BUN mg/dL 28* 34*   CREATININE mg/dL 1.41* 1.80*   GLUCOSE mg/dL 99 122*   Estimated Creatinine Clearance: 31.3 mL/min (A) (by C-G formula based on SCr of 1.41 mg/dL (H)).  Results from last 7 days   Lab Units 19  1612   ALBUMIN g/dL 3.80   BILIRUBIN mg/dL 0.2   ALK PHOS U/L 85   AST (SGOT) U/L 23   ALT (SGPT) U/L 14     Results from last 7 days   Lab Units 19  0501 19  1612   CALCIUM mg/dL 8.5* 8.8   ALBUMIN g/dL  --  3.80       No results found for: HGBA1C,  POCGLU      amLODIPine 10 mg Oral Daily   divalproex 250 mg Oral Q12H   docusate sodium 100 mg Oral BID   DULoxetine 60 mg Oral Daily   enoxaparin 1 mg/kg Subcutaneous Q24H   febuxostat 40 mg Oral Daily   gabapentin 200 mg Oral Nightly   ipratropium-albuterol 3 mL Nebulization 4x Daily - RT   levothyroxine 88 mcg Oral Q AM   melatonin 3 mg Oral Nightly   OLANZapine 5 mg Oral Nightly   sodium chloride 3 mL Intravenous Q12H   traZODone 50 mg Oral Nightly   vitamin B-12 1,000 mcg Oral Daily       sodium chloride 100 mL/hr Last Rate: 100 mL/hr (06/17/19 1354)   Diet Regular       Assessment/Plan     Active Hospital Problems    Diagnosis  POA   • **Closed compression fracture of L1 lumbar vertebra (CMS/HCC) [S32.010A]  Yes   • Osteoporosis with pathological fracture [M80.00XA]  Yes   • Hyponatremia [E87.1]  Yes   • Stage 3 chronic kidney disease (CMS/HCC) [N18.3]  Yes   • COPD (chronic obstructive pulmonary disease) (CMS/HCC) [J44.9]  Yes   • Hypertension [I10]  Yes   • Acute kidney injury (CMS/HCC) [N17.9]  Yes   • Chronic respiratory failure with hypoxia (CMS/[facility]) [J96.11]  Yes      Resolved Hospital Problems   No resolved problems to display.       Ms. Wolf is a 79 y.o. former smoker with a history of COPD, CKD 3 and chronic respiratory failure who has been admitted with compression fracture of L1.    · Appreciate evaluation by Dr. Lama.  Will see how she does with conservative treatment only.  Continue back brace, analgesics, therapy etc.  · This was a very low impact injury and suggestive of osteoporosis.  This should be monitored in the outpatient setting.  Will check vitamin D level.  · If fails conservative management will consult pulmonary for possible surgical clearance.  · Renal function improved.  Continue monitoring.  · Continue Lortab for pain.  · Venous Doppler negative for DVT.  Unsure significance of elevated d-dimer.  Will check V/Q study.  Creatinine still elevated.    VTE Prophylaxis - Full  dose Lovenox  Code Status - Full code  Disposition - TBD based on how she responds to conservative treatment.      Rishabh Yun MD  [facility]  06/17/19  2:28 PM

## 2019-06-17 NOTE — PROGRESS NOTES
Orthopedic Consult      Patient: Josephine Wolf    Date of Admission: 6/16/2019  3:57 PM    YOB: 1940    Medical Record Number: [id number]    Attending Physician: Rishabh Yun MD    Consulting Physician: Rishabh Yun MD      Chief Complaints: Low back pain      History of Present Illness: 79 y.o. female admitted to [facility] to services of Rishabh Yun MD with low back pain after falling about 36 hours ago slipped and caught herself but did land on the floor.  The pain is mostly upper lumbar but some lumbosacral pain as well no numbness tingling weakness or leg pain and she has some serious pulmonary issues and wants to avoid surgery if possible.  Does live independently      Allergies:   Allergies   Allergen Reactions   • Morphine And Related Hallucinations     CONFUSION       Medications:   Home Medications:  No current facility-administered medications on file prior to encounter.      Current Outpatient Medications on File Prior to Encounter   Medication Sig   • amLODIPine (NORVASC) 10 MG tablet Take 10 mg by mouth Daily.   • dicyclomine (BENTYL) 10 MG capsule Take 1 capsule by mouth 4 (Four) Times a Day As Needed (diarrhea, cramping).   • divalproex (DEPAKOTE ER) 250 MG 24 hr tablet Take 250 mg by mouth Every 12 (Twelve) Hours.   • DULoxetine (CYMBALTA) 60 MG capsule Take 60 mg by mouth Daily.   • febuxostat (ULORIC) 40 MG tablet Take 40 mg by mouth Daily.   • furosemide (LASIX) 20 MG tablet Take 20 mg by mouth Daily.   • gabapentin (NEURONTIN) 300 MG capsule Take 300 mg by mouth 3 (Three) Times a Day.   • irbesartan-hydrochlorothiazide (AVALIDE) 150-12.5 MG tablet Take 2 tablets by mouth Daily. (Patient taking differently: Take 1 tablet by mouth Every 12 (Twelve) Hours.)   • levothyroxine (SYNTHROID, LEVOTHROID) 88 MCG tablet Take 88 mcg by mouth Every Morning Before Breakfast.   • melatonin 1 MG tablet Take 3 mg by mouth Every Night.   • OLANZapine (zyPREXA) 5 MG tablet Take 5 mg  by mouth Every Night.   • ondansetron (ZOFRAN) 4 MG tablet Take 4 mg by mouth Every 8 (Eight) Hours As Needed for Nausea or Vomiting.   • traZODone (DESYREL) 100 MG tablet Take 0.5 tablets by mouth At Night As Needed for Sleep. (Patient taking differently: Take 50 mg by mouth Every Night.)   • vitamin B-12 (CYANOCOBALAMIN) 1000 MCG tablet Take 1,000 mcg by mouth 2 (Two) Times a Day.   • [DISCONTINUED] albuterol (ACCUNEB) 1.25 MG/3ML nebulizer solution Take 1 ampule by nebulization Every 4 (Four) Hours.   • [DISCONTINUED] aspirin  MG EC tablet Take 1 tablet by mouth 2 (Two) Times a Day With Meals. (Patient taking differently: Take 325 mg by mouth Daily.)   • [DISCONTINUED] budesonide-formoterol (SYMBICORT) 160-4.5 MCG/ACT inhaler Inhale 2 puffs 2 (Two) Times a Day.   • [DISCONTINUED] calcium polycarbophil (FIBERCON) 625 MG tablet Take 2 tablets by mouth Daily.   • [DISCONTINUED] Cholecalciferol (VITAMIN D3) 5000 units capsule capsule Take 5,000 Units by mouth Daily.   • [DISCONTINUED] divalproex (DEPAKOTE ER) 500 MG 24 hr tablet Take 500 mg by mouth Every 12 (Twelve) Hours.   • [DISCONTINUED] docusate sodium 100 MG capsule Take 100 mg by mouth 2 (Two) Times a Day As Needed for Constipation.   • [DISCONTINUED] ferrous sulfate 325 (65 FE) MG tablet Take 325 mg by mouth 2 (Two) Times a Day.   • [DISCONTINUED] HYDROcod Polst-CPM Polst ER (TUSSIONEX PENNKINETIC ER) 10-8 MG/5ML ER suspension Take 5 mL by mouth Every 12 (Twelve) Hours As Needed for Cough.   • [DISCONTINUED] HYDROcodone-acetaminophen (NORCO) 7.5-325 MG per tablet Take 1 tablet by mouth Every 4 (Four) Hours As Needed for Moderate Pain .   • [DISCONTINUED] omeprazole (priLOSEC) 20 MG capsule Take 20 mg by mouth Daily As Needed.     Current Medications:  Scheduled Meds:  amLODIPine 10 mg Oral Daily   divalproex 250 mg Oral Q12H   docusate sodium 100 mg Oral BID   DULoxetine 60 mg Oral Daily   enoxaparin 1 mg/kg Subcutaneous Q24H   febuxostat 40 mg Oral  Daily   gabapentin 200 mg Oral Nightly   ipratropium-albuterol 3 mL Nebulization 4x Daily - RT   levothyroxine 88 mcg Oral Q AM   melatonin 3 mg Oral Nightly   OLANZapine 5 mg Oral Nightly   sodium chloride 3 mL Intravenous Q12H   traZODone 50 mg Oral Nightly   vitamin B-12 1,000 mcg Oral Daily     Continuous Infusions:  sodium chloride 100 mL/hr Last Rate: 100 mL/hr (19 1354)     PRN Meds:.•  acetaminophen  •  bisacodyl  •  calcium carbonate  •  dicyclomine  •  HYDROcodone-acetaminophen  •  HYDROmorphone **AND** naloxone  •  melatonin  •  ondansetron **OR** ondansetron  •  [COMPLETED] Insert peripheral IV **AND** sodium chloride  •  sodium chloride    Past Medical History:   Diagnosis Date   • Acid reflux    • Anemia    • Arthritis    • Bipolar 1 disorder, depressed (CMS/HCC)    • Cataract     MINDY   • Chronic nausea    • Chronic pain of right knee    • Continuous leakage of urine     USES DEPENDS   • COPD (chronic obstructive pulmonary disease) (CMS/HCC)     USES O2 2 LMP PER NC AT NIGHT   • Disease of thyroid gland     HYPOTHYROIDISM   • Diverticulosis    • Fibromyalgia     DX    • Frequent episodes of bronchitis    • Hypertension    • Migraines    • Neck pain    • On home oxygen therapy     2L NC AT NIGHT   • Short of breath on exertion      Past Surgical History:   Procedure Laterality Date   • CEREBRAL ANEURYSM REPAIR      WITH STENT   • HYSTERECTOMY     • LAPAROSCOPIC CHOLECYSTECTOMY     • CA TOTAL KNEE ARTHROPLASTY Right 2017    Procedure: RT TOTAL KNEE ARTHROPLASTY;  Surgeon: Kashif Perez MD;  Location: [facility];  Service: Orthopedics     Social History     Occupational History   • Not on file   Tobacco Use   • Smoking status: Former Smoker     Packs/day: 1.00     Years: 10.00     Pack years: 10.00     Types: Cigarettes     Start date:      Last attempt to quit:      Years since quittin.4   • Smokeless tobacco: Never Used   Substance and Sexual Activity   •  Alcohol use: No   • Drug use: No   • Sexual activity: Defer    Social History     Social History Narrative   • Not on file     Family History   Problem Relation Age of Onset   • Malig Hyperthermia Neg Hx          Review of Systems:     Constitutional:  Denies fever, shaking or chills   Eyes:  Denies change in visual acuity   HEENT:  Denies nasal congestion or sore throat   Respiratory:  Denies cough or shortness of breath   Cardiovascular:  Denies chest pain or edema  Endocrine: Denies tremors, palpitations, intolerance of heat or cold, polyuria, polydipsia.  GI:  Denies abdominal pain, nausea, vomiting, bloody stools or diarrhea  :  Denies frequency, urgency, incontinence, retention, or nocturia.  Musculoskeletal:  Denies numbness tingling or loss of motor function except as above  Integument:  Denies rash, lesion or ulceration   Neurologic:  Denies headache or focal weakness, deficits  Heme:  Denies epistaxis, spontaneous or excessive bleeding, epistaxis, hematuria, melena, fatigue, enlarged or tender lymph nodes.      All other pertinent positives and negatives as noted above in HPI.    Physical Exam: 79 y.o. female    General:  Awake, alert. No acute distress.      Head/Neck:  Normocephalic, atraumatic.  Conjunctiva and sclera clear.  Hearing adequate for the exam.  Neck is supple with normal ROM.    Psych:  Affect and demeanor appropriate.    CV:  Regular rate and rhythm.  Hemodynamically stable.    Lungs:  Good chest expansion, breathing unlabored.    Abdomen:  Soft.  Non-tender, non-distended.    Extremities:      The back is tender mostly at the upper lumbar area but significantly at the lumbosacral junction as well the skin about the area is intact.  Lower extremities she has intact sensation and strength.  Reflexes were not tested    All other extremities atraumatic without gross abnormality.       Diagnostic Tests:    Admission on 06/16/2019   Component Date Value Ref Range Status   • Extra Tube  06/16/2019 hold for add-on   Final    Auto resulted   • Extra Tube 06/16/2019 Hold for add-ons.   Final    Auto resulted.   • Extra Tube 06/16/2019 hold for add-on   Final    Auto resulted   • Valproic Acid 06/16/2019 38.0* 50.0 - 125.0 mcg/mL Final   • Glucose 06/16/2019 122* 65 - 99 mg/dL Final   • BUN 06/16/2019 34* 8 - 23 mg/dL Final   • Creatinine 06/16/2019 1.80* 0.57 - 1.00 mg/dL Final   • Sodium 06/16/2019 135* 136 - 145 mmol/L Final   • Potassium 06/16/2019 4.6  3.5 - 5.2 mmol/L Final   • Chloride 06/16/2019 98  98 - 107 mmol/L Final   • CO2 06/16/2019 26.0  22.0 - 29.0 mmol/L Final   • Calcium 06/16/2019 8.8  8.6 - 10.5 mg/dL Final   • Total Protein 06/16/2019 6.4  6.0 - 8.5 g/dL Final   • Albumin 06/16/2019 3.80  3.50 - 5.20 g/dL Final   • ALT (SGPT) 06/16/2019 14  1 - 33 U/L Final   • AST (SGOT) 06/16/2019 23  1 - 32 U/L Final   • Alkaline Phosphatase 06/16/2019 85  39 - 117 U/L Final   • Total Bilirubin 06/16/2019 0.2  0.2 - 1.2 mg/dL Final   • eGFR Non African Amer 06/16/2019 27* >60 mL/min/1.73 Final   • Globulin 06/16/2019 2.6  gm/dL Final   • A/G Ratio 06/16/2019 1.5  g/dL Final   • BUN/Creatinine Ratio 06/16/2019 18.9  7.0 - 25.0 Final   • Anion Gap 06/16/2019 11.0  mmol/L Final   • Color, UA 06/16/2019 Yellow  Yellow, Straw Final   • Appearance, UA 06/16/2019 Clear  Clear Final   • pH, UA 06/16/2019 5.5  5.0 - 8.0 Final   • Specific Gravity, UA 06/16/2019 1.013  1.005 - 1.030 Final   • Glucose, UA 06/16/2019 Negative  Negative Final   • Ketones, UA 06/16/2019 Negative  Negative Final   • Bilirubin, UA 06/16/2019 Negative  Negative Final   • Blood, UA 06/16/2019 Negative  Negative Final   • Protein, UA 06/16/2019 Negative  Negative Final   • Leuk Esterase, UA 06/16/2019 Negative  Negative Final   • Nitrite, UA 06/16/2019 Negative  Negative Final   • Urobilinogen, UA 06/16/2019 0.2 E.U./dL  0.2 - 1.0 E.U./dL Final   • Troponin T 06/16/2019 <0.010  0.000 - 0.030 ng/mL Final   • WBC 06/16/2019 8.42   3.40 - 10.80 10*3/mm3 Final   • RBC 06/16/2019 2.94* 3.77 - 5.28 10*6/mm3 Final   • Hemoglobin 06/16/2019 9.7* 12.0 - 15.9 g/dL Final   • Hematocrit 06/16/2019 31.3* 34.0 - 46.6 % Final   • MCV 06/16/2019 106.5* 79.0 - 97.0 fL Final   • MCH 06/16/2019 33.0  26.6 - 33.0 pg Final   • MCHC 06/16/2019 31.0* 31.5 - 35.7 g/dL Final   • RDW 06/16/2019 12.9  12.3 - 15.4 % Final   • RDW-SD 06/16/2019 50.1  37.0 - 54.0 fl Final   • MPV 06/16/2019 9.9  6.0 - 12.0 fL Final   • Platelets 06/16/2019 212  140 - 450 10*3/mm3 Final   • Neutrophil % 06/16/2019 69.0  42.7 - 76.0 % Final   • Lymphocyte % 06/16/2019 21.0  19.6 - 45.3 % Final   • Monocyte % 06/16/2019 7.0  5.0 - 12.0 % Final   • Eosinophil % 06/16/2019 2.0  0.3 - 6.2 % Final   • Basophil % 06/16/2019 1.0  0.0 - 1.5 % Final   • Neutrophils Absolute 06/16/2019 5.81  1.70 - 7.00 10*3/mm3 Final   • Lymphocytes Absolute 06/16/2019 1.77  0.70 - 3.10 10*3/mm3 Final   • Monocytes Absolute 06/16/2019 0.59  0.10 - 0.90 10*3/mm3 Final   • Eosinophils Absolute 06/16/2019 0.17  0.00 - 0.40 10*3/mm3 Final   • Basophils Absolute 06/16/2019 0.08  0.00 - 0.20 10*3/mm3 Final   • RBC Morphology 06/16/2019 Normal  Normal Final   • WBC Morphology 06/16/2019 Normal  Normal Final   • Platelet Morphology 06/16/2019 Normal  Normal Final   • D-Dimer, Quantitative 06/16/2019 3.68* 0.00 - 0.49 MCGFEU/mL Final   • Glucose 06/17/2019 99  65 - 99 mg/dL Final   • BUN 06/17/2019 28* 8 - 23 mg/dL Final   • Creatinine 06/17/2019 1.41* 0.57 - 1.00 mg/dL Final   • Sodium 06/17/2019 142  136 - 145 mmol/L Final   • Potassium 06/17/2019 4.7  3.5 - 5.2 mmol/L Final   • Chloride 06/17/2019 105  98 - 107 mmol/L Final   • CO2 06/17/2019 25.4  22.0 - 29.0 mmol/L Final   • Calcium 06/17/2019 8.5* 8.6 - 10.5 mg/dL Final   • eGFR Non African Amer 06/17/2019 36* >60 mL/min/1.73 Final   • BUN/Creatinine Ratio 06/17/2019 19.9  7.0 - 25.0 Final   • Anion Gap 06/17/2019 11.6  mmol/L Final   • WBC 06/17/2019 6.27  3.40 -  10.80 10*3/mm3 Final   • RBC 06/17/2019 3.01* 3.77 - 5.28 10*6/mm3 Final   • Hemoglobin 06/17/2019 10.0* 12.0 - 15.9 g/dL Final   • Hematocrit 06/17/2019 32.7* 34.0 - 46.6 % Final   • MCV 06/17/2019 108.6* 79.0 - 97.0 fL Final   • MCH 06/17/2019 33.2* 26.6 - 33.0 pg Final   • MCHC 06/17/2019 30.6* 31.5 - 35.7 g/dL Final   • RDW 06/17/2019 12.6  12.3 - 15.4 % Final   • RDW-SD 06/17/2019 50.1  37.0 - 54.0 fl Final   • MPV 06/17/2019 9.2  6.0 - 12.0 fL Final   • Platelets 06/17/2019 213  140 - 450 10*3/mm3 Final   • Neutrophil % 06/17/2019 65.8  42.7 - 76.0 % Final   • Lymphocyte % 06/17/2019 20.9  19.6 - 45.3 % Final   • Monocyte % 06/17/2019 8.9  5.0 - 12.0 % Final   • Eosinophil % 06/17/2019 2.4  0.3 - 6.2 % Final   • Basophil % 06/17/2019 0.6  0.0 - 1.5 % Final   • Immature Grans % 06/17/2019 1.4* 0.0 - 0.5 % Final   • Neutrophils, Absolute 06/17/2019 4.12  1.70 - 7.00 10*3/mm3 Final   • Lymphocytes, Absolute 06/17/2019 1.31  0.70 - 3.10 10*3/mm3 Final   • Monocytes, Absolute 06/17/2019 0.56  0.10 - 0.90 10*3/mm3 Final   • Eosinophils, Absolute 06/17/2019 0.15  0.00 - 0.40 10*3/mm3 Final   • Basophils, Absolute 06/17/2019 0.04  0.00 - 0.20 10*3/mm3 Final   • Immature Grans, Absolute 06/17/2019 0.09* 0.00 - 0.05 10*3/mm3 Final   • nRBC 06/17/2019 0.0  0.0 - 0.2 /100 WBC Final   • Right Common Femoral Spont 06/17/2019 Y   Final   • Right Common Femoral Phasic 06/17/2019 Y   Final   • Right Common Femoral Augment 06/17/2019 Y   Final   • Right Common Femoral Competent 06/17/2019 Y   Final   • Right Common Femoral Compress 06/17/2019 C   Final   • Right Saphenofemoral Junction Spont 06/17/2019 Y   Final   • Right Saphenofemoral Junction Phas* 06/17/2019 Y   Final   • Right Saphenofemoral Junction Comp* 06/17/2019 C   Final   • Right Proximal Femoral Compress 06/17/2019 C   Final   • Right Mid Femoral Spont 06/17/2019 Y   Final   • Right Mid Femoral Phasic 06/17/2019 Y   Final   • Right Mid Femoral Augment 06/17/2019 Y    Final   • Right Mid Femoral Competent 06/17/2019 Y   Final   • Right Mid Femoral Compress 06/17/2019 C   Final   • Right Distal Femoral Compress 06/17/2019 C   Final   • Right Popliteal Spont 06/17/2019 Y   Final   • Right Popliteal Phasic 06/17/2019 Y   Final   • Right Popliteal Augment 06/17/2019 Y   Final   • Right Popliteal Competent 06/17/2019 Y   Final   • Right Popliteal Compress 06/17/2019 C   Final   • Right Posterior Tibial Compress 06/17/2019 C   Final   • Right Peroneal Compress 06/17/2019 C   Final   • Right GastronemiusSoleal Compress 06/17/2019 C   Final   • Right Greater Saph AK Compress 06/17/2019 C   Final   • Right Greater Saph BK Compress 06/17/2019 C   Final   • Left Common Femoral Spont 06/17/2019 Y   Final   • Left Common Femoral Phasic 06/17/2019 Y   Final   • Left Common Femoral Augment 06/17/2019 Y   Final   • Left Common Femoral Competent 06/17/2019 Y   Final   • Left Common Femoral Compress 06/17/2019 C   Final   • Left Saphenofemoral Junction Spont 06/17/2019 Y   Final   • Left Saphenofemoral Junction Phasic 06/17/2019 Y   Final   • Left Saphenofemoral Junction Compr* 06/17/2019 C   Final   • Left Proximal Femoral Compress 06/17/2019 C   Final   • Left Mid Femoral Spont 06/17/2019 Y   Final   • Left Mid Femoral Phasic 06/17/2019 Y   Final   • Left Mid Femoral Augment 06/17/2019 Y   Final   • Left Mid Femoral Competent 06/17/2019 Y   Final   • Left Mid Femoral Compress 06/17/2019 C   Final   • Left Distal Femoral Compress 06/17/2019 C   Final   • Left Popliteal Spont 06/17/2019 Y   Final   • Left Popliteal Phasic 06/17/2019 Y   Final   • Left Popliteal Augment 06/17/2019 Y   Final   • Left Popliteal Competent 06/17/2019 Y   Final   • Left Popliteal Compress 06/17/2019 C   Final   • Left Posterior Tibial Compress 06/17/2019 C   Final   • Left Peroneal Compress 06/17/2019 C   Final   • Left GastronemiusSoleal Compress 06/17/2019 C   Final   • Left Greater Saph AK Compress 06/17/2019 C    Final   • Left Greater Saph BK Compress 06/17/2019 C   Final     Lab Results (last 24 hours)     Procedure Component Value Units Date/Time    CBC & Differential [[id number]] Collected:  06/17/19 0501    Specimen:  Blood Updated:  06/17/19 0603    Narrative:       The following orders were created for panel order CBC & Differential.  Procedure                               Abnormality         Status                     ---------                               -----------         ------                     CBC Auto Differential[[id number]]        Abnormal            Final result                 Please view results for these tests on the individual orders.    CBC Auto Differential [[id number]]  (Abnormal) Collected:  06/17/19 0501    Specimen:  Blood Updated:  06/17/19 0603     WBC 6.27 10*3/mm3      RBC 3.01 10*6/mm3      Hemoglobin 10.0 g/dL      Hematocrit 32.7 %      .6 fL      MCH 33.2 pg      MCHC 30.6 g/dL      RDW 12.6 %      RDW-SD 50.1 fl      MPV 9.2 fL      Platelets 213 10*3/mm3      Neutrophil % 65.8 %      Lymphocyte % 20.9 %      Monocyte % 8.9 %      Eosinophil % 2.4 %      Basophil % 0.6 %      Immature Grans % 1.4 %      Neutrophils, Absolute 4.12 10*3/mm3      Lymphocytes, Absolute 1.31 10*3/mm3      Monocytes, Absolute 0.56 10*3/mm3      Eosinophils, Absolute 0.15 10*3/mm3      Basophils, Absolute 0.04 10*3/mm3      Immature Grans, Absolute 0.09 10*3/mm3      nRBC 0.0 /100 WBC     Basic Metabolic Panel [[id number]]  (Abnormal) Collected:  06/17/19 0501    Specimen:  Blood Updated:  06/17/19 0552     Glucose 99 mg/dL      BUN 28 mg/dL      Creatinine 1.41 mg/dL      Sodium 142 mmol/L      Potassium 4.7 mmol/L      Chloride 105 mmol/L      CO2 25.4 mmol/L      Calcium 8.5 mg/dL      eGFR Non African Amer 36 mL/min/1.73      BUN/Creatinine Ratio 19.9     Anion Gap 11.6 mmol/L     Narrative:       GFR Normal >60  Chronic Kidney Disease <60  Kidney Failure <15    D-dimer, Quantitative [[id number]]   (Abnormal) Collected:  06/16/19 2319    Specimen:  Blood Updated:  06/16/19 2355     D-Dimer, Quantitative 3.68 MCGFEU/mL     Narrative:       The [employer]ago D-Dimer test used in conjunction with a clinical pretest probability (PTP) assessment model, has been approved by the FDA to rule out the presence of venous thromboembolism (VTE) in outpatients suspected of deep venous thrombosis (DVT) or pulmonary embolism (PE). The cut-off for negative predictive value is <0.50 MCGFEU/mL.    Valproic Acid Level, Total [[id number]]  (Abnormal) Collected:  06/16/19 1612    Specimen:  Blood Updated:  06/16/19 1735     Valproic Acid 38.0 mcg/mL     Comprehensive Metabolic Panel [[id number]]  (Abnormal) Collected:  06/16/19 1612    Specimen:  Blood Updated:  06/16/19 1734     Glucose 122 mg/dL      BUN 34 mg/dL      Creatinine 1.80 mg/dL      Sodium 135 mmol/L      Potassium 4.6 mmol/L      Chloride 98 mmol/L      CO2 26.0 mmol/L      Calcium 8.8 mg/dL      Total Protein 6.4 g/dL      Albumin 3.80 g/dL      ALT (SGPT) 14 U/L      AST (SGOT) 23 U/L      Alkaline Phosphatase 85 U/L      Total Bilirubin 0.2 mg/dL      eGFR Non African Amer 27 mL/min/1.73      Globulin 2.6 gm/dL      A/G Ratio 1.5 g/dL      BUN/Creatinine Ratio 18.9     Anion Gap 11.0 mmol/L     Narrative:       GFR Normal >60  Chronic Kidney Disease <60  Kidney Failure <15    Troponin [[id number]]  (Normal) Collected:  06/16/19 1612    Specimen:  Blood Updated:  06/16/19 1734     Troponin T <0.010 ng/mL     Narrative:       Troponin T Reference Range:  <= 0.03 ng/mL-   Negative for AMI  >0.03 ng/mL-     Abnormal for myocardial necrosis.  Clinicians would have to utilize clinical acumen, EKG, Troponin and serial changes to determine if it is an Acute Myocardial Infarction or myocardial injury due to an underlying chronic condition.     Urinalysis With Microscopic If Indicated (No Culture) - Urine, Clean Catch [[id number]]  (Normal) Collected:  06/16/19 1657    Specimen:   Urine, Clean Catch Updated:  06/16/19 1721     Color, UA Yellow     Appearance, UA Clear     pH, UA 5.5     Specific Gravity, UA 1.013     Glucose, UA Negative     Ketones, UA Negative     Bilirubin, UA Negative     Blood, UA Negative     Protein, UA Negative     Leuk Esterase, UA Negative     Nitrite, UA Negative     Urobilinogen, UA 0.2 E.U./dL    Narrative:       Urine microscopic not indicated.    [place] Draw [[id number]] Collected:  06/16/19 1612    Specimen:  Blood Updated:  06/16/19 1715    Narrative:       The following orders were created for panel order [place] Draw.  Procedure                               Abnormality         Status                     ---------                               -----------         ------                     Light Blue Top[[id number]]                                   Final result               Green Top (Gel)[[id number]]                                  Final result               Lavender Top[[id number]]                                     Final result                 Please view results for these tests on the individual orders.    Light Blue Top [[id number]] Collected:  06/16/19 1612    Specimen:  Blood Updated:  06/16/19 1715     Extra Tube hold for add-on     Comment: Auto resulted       Green Top (Gel) [[id number]] Collected:  06/16/19 1612    Specimen:  Blood Updated:  06/16/19 1715     Extra Tube Hold for add-ons.     Comment: Auto resulted.       Lavender Top [[id number]] Collected:  06/16/19 1612    Specimen:  Blood Updated:  06/16/19 1715     Extra Tube hold for add-on     Comment: Auto resulted       CBC & Differential [[id number]] Collected:  06/16/19 1612    Specimen:  Blood Updated:  06/16/19 1714    Narrative:       The following orders were created for panel order CBC & Differential.  Procedure                               Abnormality         Status                     ---------                               -----------         ------                     CBC Auto  Differential[[id number]]        Abnormal            Final result                 Please view results for these tests on the individual orders.    CBC Auto Differential [[id number]]  (Abnormal) Collected:  06/16/19 1612    Specimen:  Blood Updated:  06/16/19 1714     WBC 8.42 10*3/mm3      RBC 2.94 10*6/mm3      Hemoglobin 9.7 g/dL      Hematocrit 31.3 %      .5 fL      MCH 33.0 pg      MCHC 31.0 g/dL      RDW 12.9 %      RDW-SD 50.1 fl      MPV 9.9 fL      Platelets 212 10*3/mm3     Manual Differential [[id number]]  (Normal) Collected:  06/16/19 1612    Specimen:  Blood Updated:  06/16/19 1714     Neutrophil % 69.0 %      Lymphocyte % 21.0 %      Monocyte % 7.0 %      Eosinophil % 2.0 %      Basophil % 1.0 %      Neutrophils Absolute 5.81 10*3/mm3      Lymphocytes Absolute 1.77 10*3/mm3      Monocytes Absolute 0.59 10*3/mm3      Eosinophils Absolute 0.17 10*3/mm3      Basophils Absolute 0.08 10*3/mm3      RBC Morphology Normal     WBC Morphology Normal     Platelet Morphology Normal          Imaging: Plain film x-rays show multilevel degenerative change but fairly well-maintained posture.  Hint of endplate fracture at the upper aspect of L1 is noted but not confirmed.  CT scan tends to suggest acute fracture at the cephalad endplate of L1 along with some noted degenerative stenotic changes.    Assessment: Probable L1 osteoporotic compression fracture good position and I think with a day of therapy she will be able to ambulate and we can treat her in a brace    Plan: For now ambulation and brace treatment with close follow-up.  If she does feel to improve I would want to get an MRI scan of the lumbar spine before contemplating kyphoplasty    Date: 6/17/2019    Kevin Lama MD    CC: Rishabh Yun MD

## 2019-06-17 NOTE — THERAPY EVALUATION
Acute Care - Physical Therapy Initial Evaluation  [facility]     Patient Name: Josephine Wolf  : 1940  MRN: [id number]  Today's Date: 2019   Onset of Illness/Injury or Date of Surgery: 19            Admit Date: 2019    Visit Dx:     ICD-10-CM ICD-9-CM   1. Closed compression fracture of first lumbar vertebra, initial encounter (CMS/[facility], located [facility]) S32.010A 805.4   2. Hypoxia R09.02 799.02   3. Dehydration E86.0 276.51   4. Fall at home, initial encounter W19.XXXA E888.9    Y92.009 E849.0   5. Near syncope R55 780.2     Patient Active Problem List   Diagnosis   • Anemia   • OA (osteoarthritis) of knee   • Chronic respiratory failure with hypoxia (CMS/[facility], located [facility])   • Cellulitis of skin   • Acute kidney injury (CMS/[facility], located [facility])   • Sepsis (CMS/[facility], located [facility])   • Orthostatic hypotension   • Disease of thyroid gland   • COPD (chronic obstructive pulmonary disease) (CMS/[facility] located [facility])   • Hypertension   • Dehydration   • Stage 3 chronic kidney disease (CMS/[facility] located [facility])   • Closed compression fracture of L1 lumbar vertebra (CMS/[facility] located [facility])   • Hyponatremia     Past Medical History:   Diagnosis Date   • Acid reflux    • Anemia    • Arthritis    • Bipolar 1 disorder, depressed (CMS/[facility])    • Cataract     MINDY   • Chronic nausea    • Chronic pain of right knee    • Continuous leakage of urine     USES DEPENDS   • COPD (chronic obstructive pulmonary disease) (CMS/[facility] located [facility])     USES O2 2 LMP PER NC AT NIGHT   • Disease of thyroid gland     HYPOTHYROIDISM   • Diverticulosis    • Fibromyalgia     DX    • Frequent episodes of bronchitis    • Hypertension    • Migraines    • Neck pain    • On home oxygen therapy     2L NC AT NIGHT   • Short of breath on exertion      Past Surgical History:   Procedure Laterality Date   • CEREBRAL ANEURYSM REPAIR      WITH STENT   • HYSTERECTOMY     • LAPAROSCOPIC CHOLECYSTECTOMY     • SD TOTAL KNEE ARTHROPLASTY Right 2017    Procedure: RT TOTAL KNEE ARTHROPLASTY;  Surgeon: Kashif Perez MD;  Location: [facility]  OR;  Service: Orthopedics        PT ASSESSMENT (last 12 hours)      Physical Therapy Evaluation     Row Name 06/17/19 1128          PT Evaluation Time/Intention    Subjective Information  complains of;pain  -     Document Type  evaluation  -     Mode of Treatment  physical therapy  -     Patient Effort  good  -     Symptoms Noted During/After Treatment  increased pain  -     Row Name 06/17/19 1128          General Information    Onset of Illness/Injury or Date of Surgery  06/16/19  -     Patient Observations  alert;cooperative;agree to therapy  -     Patient/Family Observations  pt supine in bed, no acute distress noted at rest  -     Prior Level of Function  independent:;gait;transfer;bed mobility;ADL's  -     Equipment Currently Used at Home  none  -     Pertinent History of Current Functional Problem  pt admitted with fall resulting in L1 compression fracture  -     Existing Precautions/Restrictions  fall;brace worn when out of bed warm and form for comfort  -     Barriers to Rehab  medically complex  -     Row Name 06/17/19 1128          Relationship/Environment    Lives With  child(ree), dependent  -     Row Name 06/17/19 1128          Resource/Environmental Concerns    Current Living Arrangements  home/apartment/condo  -     Row Name 06/17/19 1128          Cognitive Assessment/Interventions    Additional Documentation  Cognitive Assessment/Intervention (Group)  -     Row Name 06/17/19 1128          Cognitive Assessment/Intervention- PT/OT    Orientation Status (Cognition)  oriented x 4  -CH     Follows Commands (Cognition)  WFL  -     Personal Safety Interventions  fall prevention program maintained;gait belt;nonskid shoes/slippers when out of bed  -     Row Name 06/17/19 1128          Bed Mobility Assessment/Treatment    Bed Mobility Assessment/Treatment  supine-sit;sit-supine  -     Supine-Sit Raymond (Bed Mobility)  verbal cues;nonverbal cues  (demo/gesture);moderate assist (50% patient effort);2 person assist  -     Sit-Supine Wallace (Bed Mobility)  verbal cues;nonverbal cues (demo/gesture);moderate assist (50% patient effort);2 person assist  -     Comment (Bed Mobility)  log rolling  -     Row Name 06/17/19 1128          Transfer Assessment/Treatment    Transfer Assessment/Treatment  sit-stand transfer;stand-sit transfer  -     Sit-Stand Wallace (Transfers)  verbal cues;nonverbal cues (demo/gesture);minimum assist (75% patient effort);2 person assist  -CH     Stand-Sit Wallace (Transfers)  verbal cues;nonverbal cues (demo/gesture);minimum assist (75% patient effort);2 person assist  -     Row Name 06/17/19 1128          Sit-Stand Transfer    Assistive Device (Sit-Stand Transfers)  -- HHA  -     Row Name 06/17/19 1128          Stand-Sit Transfer    Assistive Device (Stand-Sit Transfers)  -- A  -[facility] Name 06/17/19 1128          Gait/Stairs Assessment/Training    Wallace Level (Gait)  verbal cues;nonverbal cues (demo/gesture);minimum assist (75% patient effort);2 person assist  -     Assistive Device (Gait)  -- HHA  -     Distance in Feet (Gait)  25  -     Deviations/Abnormal Patterns (Gait)  shilpa decreased;gait speed decreased;stride length decreased;antalgic  -     Comment (Gait/Stairs)  gait distance limited by pain  -     Row Name 06/17/19 1128          General ROM    GENERAL ROM COMMENTS  AROM WFL for age  -[facility] Name 06/17/19 1128          MMT (Manual Muscle Testing)    General MMT Comments  B LE weakness noted with mobility  -[facility] Name 06/17/19 1128          Motor Assessment/Intervention    Additional Documentation  Balance (Group)  -     Row Name 06/17/19 1128          Balance    Balance  static standing balance;dynamic standing balance  -[facility] Name 06/17/19 1128          Static Standing Balance    Level of Wallace (Supported Standing, Static Balance)  minimal assist, 75%  patient effort  -[facility] Name 06/17/19 1128          Dynamic Standing Balance    Level of [place], Reaches Outside Midline (Standing, Dynamic Balance)  minimal assist, 75% patient effort  -[facility] Name 06/17/19 1128          Pain Assessment    Additional Documentation  Pain Scale: Numbers Pre/Post-Treatment (Group)  -[facility] Name 06/17/19 1128          Pain Scale: Numbers Pre/Post-Treatment    Pain Scale: Numbers, Pretreatment  9/10  -     Pain Location  back  -     Pain Intervention(s)  Repositioned  -[facility] Name 06/17/19 1128          Plan of Care Review    Plan of Care Reviewed With  patient  -[facility] Name 06/17/19 1128          Physical Therapy Clinical Impression    Patient/Family Goals Statement (PT Clinical Impression)  to return to [facility]  -     Criteria for Skilled Interventions Met (PT Clinical Impression)  treatment indicated  -     Impairments Found (describe specific impairments)  gait, locomotion, and balance;muscle performance  -     Rehab Potential (PT Clinical Summary)  good, to achieve stated therapy goals  -CH     Row Name 06/17/19 1128          Physical Therapy Goals    Bed Mobility Goal Selection (PT)  bed mobility, PT goal 1  -     Transfer Goal Selection (PT)  transfer, PT goal 1  -     Gait Training Goal Selection (PT)  gait training, PT goal 1  -[facility] Name 06/17/19 1128          Bed Mobility Goal 1 (PT)    Activity/Assistive Device (Bed Mobility Goal 1, PT)  bed mobility activities, all  -CH     [place] Level/Cues Needed (Bed Mobility Goal 1, PT)  supervision required  -CH     Time Frame (Bed Mobility Goal 1, PT)  1 week  -CH     Row Name 06/17/19 1128          Transfer Goal 1 (PT)    Activity/Assistive Device (Transfer Goal 1, PT)  transfers, all  -CH     [place] Level/Cues Needed (Transfer Goal 1, PT)  supervision required  -     Time Frame (Transfer Goal 1, PT)  1 week  -[facility] Name 06/17/19 1128          Gait Training Goal 1 (PT)     Activity/Assistive Device (Gait Training Goal 1, PT)  gait (walking locomotion)  -     [place] Level (Gait Training Goal 1, PT)  supervision required  -     Distance (Gait Goal 1, PT)  150  -     Time Frame (Gait Training Goal 1, PT)  1 week  -     Row Name 06/17/19 1128          Positioning and Restraints    Pre-Treatment Position  in bed  -     Post Treatment Position  bed  -     In Bed  side lying left;call light within reach;encouraged to call for assist [employer] representative present fitting for warm and form  -       User Key  (r) = Recorded By, (t) = Taken By, (c) = Cosigned By    Initials Name Provider Type     Bridgett Stevens, PT Physical Therapist        Physical Therapy Education     Title: PT OT SLP Therapies (In Progress)     Topic: Physical Therapy (In Progress)     Point: Mobility training (Done)     Learning Progress Summary           Patient Acceptance, E,TB,D, VU,NR by  at 6/17/2019  5:20 PM                   Point: Body mechanics (Done)     Learning Progress Summary           Patient Acceptance, E,TB,D, VU,NR by  at 6/17/2019  5:20 PM                   Point: Precautions (Done)     Learning Progress Summary           Patient Acceptance, E,TB,D, VU,NR by  at 6/17/2019  5:20 PM                               User Key     Initials Effective Dates Name Provider Type [facility] 04/03/18 -  Bridgett Stevens, PT Physical Therapist PT              PT Recommendation and Plan  Anticipated Discharge Disposition (PT): home with home health, skilled nursing facility(pending progress)  Planned Therapy Interventions (PT Eval): balance training, bed mobility training, gait training, home exercise program, patient/family education, transfer training  Therapy Frequency (PT Clinical Impression): daily  Outcome Summary/Treatment Plan (PT)  Anticipated Discharge Disposition (PT): home with home health, skilled nursing facility(pending progress)  Plan of Care Reviewed With:  patient  Outcome Summary: Pt presents with impaired functional mobility and gait secondary to pain, generalized weakness, and decreased activity tolerance post L 2 compression fracture. Pt may benefit from skilled PT to address strength, mobility, and gait.  Outcome Measures     Row Name 06/17/19 1700             How much help from another person do you currently need...    Turning from your back to your side while in flat bed without using bedrails?  3  -CH      Moving from lying on back to sitting on the side of a flat bed without bedrails?  3  -CH      Moving to and from a bed to a chair (including a wheelchair)?  3  -CH      Standing up from a chair using your arms (e.g., wheelchair, bedside chair)?  3  -CH      Climbing 3-5 steps with a railing?  2  -CH      To walk in hospital room?  3  -CH      AM-PAC 6 Clicks Score  17  -         Functional Assessment    Outcome Measure Options  AM-PAC 6 Clicks Basic Mobility (PT)  -        User Key  (r) = Recorded By, (t) = Taken By, (c) = Cosigned By    Initials Name Provider Type     Bridgett Stevens PT Physical Therapist         Time Calculation:   PT Charges     Row Name 06/17/19 1157             Time Calculation    Start Time  1116  -      Stop Time  1128  -      Time Calculation (min)  12 min  -      PT Received On  06/17/19  -      PT - Next Appointment  06/18/19  -      PT Goal Re-Cert Due Date  06/24/19  -         Time Calculation- PT    Total Timed Code Minutes- PT  8 minute(s)  -        User Key  (r) = Recorded By, (t) = Taken By, (c) = Cosigned By    Initials Name Provider Type     Bridgett Stevens PT Physical Therapist        Therapy Charges for Today     Code Description Service Date Service Provider Modifiers Qty    50941546714 HC PT EVAL MOD COMPLEXITY 2 6/17/2019 Bridgett Stevens, PT GP 1    55434232567 HC PT THER PROC EA 15 MIN 6/17/2019 Bridgett Stevens PT GP 1    72142375003 HC PT THER SUPP EA 15 MIN 6/17/2019 Rodney  Bridgett FLANNERY, PT GP 1          PT G-Codes  Outcome Measure Options: AM-PAC 6 Clicks Basic Mobility (PT)  AM-PAC 6 Clicks Score: 17      Bridgett Stevens, PT  6/17/2019

## 2019-06-17 NOTE — PROGRESS NOTES
"Pharmacy Consult - Lovenox    Josephine Wolf has been consulted for pharmacy to dose enoxaparin for DVT/PE per Dr Mello' request.         Relevant clinical data and objective history reviewed:  79 y.o. female 157.5 cm (62\") 73.2 kg (161 lb 6 oz)    Home Anticoagulation:      Past Medical History:   Diagnosis Date   • Acid reflux    • Anemia    • Arthritis    • Bipolar 1 disorder, depressed (CMS/[facility])    • Cataract     MINDY   • Chronic nausea    • Chronic pain of right knee    • Continuous leakage of urine     USES DEPENDS   • COPD (chronic obstructive pulmonary disease) (CMS/[facility])     USES O2 2 LMP PER NC AT NIGHT   • Disease of thyroid gland     HYPOTHYROIDISM   • Diverticulosis    • Fibromyalgia     DX 1994   • Frequent episodes of bronchitis    • Hypertension    • Migraines    • Neck pain    • On home oxygen therapy     2L NC AT NIGHT   • Short of breath on exertion      is allergic to morphine and related.    Lab Results   Component Value Date    WBC 8.42 06/16/2019    HGB 9.7 (L) 06/16/2019    HCT 31.3 (L) 06/16/2019    .5 (H) 06/16/2019     06/16/2019     Lab Results   Component Value Date    GLUCOSE 122 (H) 06/16/2019    CALCIUM 8.8 06/16/2019     (L) 06/16/2019    K 4.6 06/16/2019    CO2 26.0 06/16/2019    CL 98 06/16/2019    BUN 34 (H) 06/16/2019    CREATININE 1.80 (H) 06/16/2019    EGFRIFNONA 27 (L) 06/16/2019    BCR 18.9 06/16/2019    ANIONGAP 11.0 06/16/2019       Estimated Creatinine Clearance: 24.5 mL/min (A) (by C-G formula based on SCr of 1.8 mg/dL (H)).    Active Inpatient Anticoagulation Orders:      Assessment/Plan    Will start patient on 70 mg (1 mg/kg) subcutaneous every 24 hours, adjusted for renal function.       Pharmacy will discontinue the Pharmacy to Dose consult at this time. Renal function will continue to be monitored and dosing adjustments will be made by pharmacy based on renal function if necessary    Albert Munoz, [facility]      "

## 2019-06-17 NOTE — ED NOTES
/  Nursing report ED to floor  Josephine Wolf  79 y.o.  female    HPI (triage note):   Chief Complaint   Patient presents with   • Fall       Admitting doctor:   Jorge A Mello MD    Admitting diagnosis:   The primary encounter diagnosis was Closed compression fracture of first lumbar vertebra, initial encounter (CMS/[facility]). Diagnoses of Hypoxia, Dehydration, Fall at home, initial encounter, and Near syncope were also pertinent to this visit.    Code status:   Current Code Status     Date Active Code Status Order ID Comments User Context       Prior          Allergies:   Morphine and related    Weight:       06/16/19  1605   Weight: 67.6 kg (149 lb)       Most recent vitals:   Vitals:    06/16/19 1945 06/16/19 1958 06/16/19 2028 06/16/19 2029   BP:  93/64 96/55    Pulse: 86   68   Resp:       Temp:       TempSrc:       SpO2: 94%   91%   Weight:       Height:           Active LDAs/IV Access:   Lines, Drains & Airways    Active LDAs     Name:   Placement date:   Placement time:   Site:   Days:    Peripheral IV 06/16/19 1616 Left Antecubital   06/16/19    1616    Antecubital   less than 1                Labs (abnormal labs have a star):   Labs Reviewed   VALPROIC ACID LEVEL, TOTAL - Abnormal; Notable for the following components:       Result Value    Valproic Acid 38.0 (*)     All other components within normal limits   COMPREHENSIVE METABOLIC PANEL - Abnormal; Notable for the following components:    Glucose 122 (*)     BUN 34 (*)     Creatinine 1.80 (*)     Sodium 135 (*)     eGFR Non  Amer 27 (*)     All other components within normal limits    Narrative:     GFR Normal >60  Chronic Kidney Disease <60  Kidney Failure <15   CBC WITH AUTO DIFFERENTIAL - Abnormal; Notable for the following components:    RBC 2.94 (*)     Hemoglobin 9.7 (*)     Hematocrit 31.3 (*)     .5 (*)     MCHC 31.0 (*)     All other components within normal limits   URINALYSIS W/ MICROSCOPIC IF INDICATED (NO CULTURE) - Normal     Narrative:     Urine microscopic not indicated.   TROPONIN (IN-HOUSE) - Normal    Narrative:     Troponin T Reference Range:  <= 0.03 ng/mL-   Negative for AMI  >0.03 ng/mL-     Abnormal for myocardial necrosis.  Clinicians would have to utilize clinical acumen, EKG, Troponin and serial changes to determine if it is an Acute Myocardial Infarction or myocardial injury due to an underlying chronic condition.    MANUAL DIFFERENTIAL - Normal   RAINBOW DRAW    Narrative:     The following orders were created for panel order [place] Draw.  Procedure                               Abnormality         Status                     ---------                               -----------         ------                     Light Blue Top[[id number]]                                   Final result               Green Top (Gel)[[id number]]                                  Final result               Lavender Top[[id number]]                                     Final result                 Please view results for these tests on the individual orders.   LIGHT BLUE TOP   GREEN TOP   LAVENDER TOP   CBC AND DIFFERENTIAL    Narrative:     The following orders were created for panel order CBC & Differential.  Procedure                               Abnormality         Status                     ---------                               -----------         ------                     CBC Auto Differential[[id number]]        Abnormal            Final result                 Please view results for these tests on the individual orders.       EKG:   ECG 12 Lead   Preliminary Result   HEART RATE= 70  bpm   RR Interval= 864  ms   SD Interval= 143  ms   P Horizontal Axis= 7  deg   P Front Axis= 52  deg   QRSD Interval= 76  ms   QT Interval= 360  ms   QRS Axis= 6  deg   T Wave Axis= 27  deg   - BORDERLINE ECG -   Sinus rhythm   Borderline T abnormalities, anterior leads   Electronically Signed By:    Date and Time of Study: 2019-06-16 16:54:05          Meds  given in ED:   Medications   sodium chloride 0.9 % flush 10 mL (not administered)   HYDROmorphone (DILAUDID) injection 0.5 mg (0.5 mg Intravenous Given 6/16/19 1651)   ondansetron (ZOFRAN) injection 4 mg (4 mg Intravenous Given 6/16/19 1651)   sodium chloride 0.9 % bolus 1,000 mL (1,000 mL Intravenous New Bag 6/16/19 1839)   HYDROmorphone (DILAUDID) injection 1 mg (1 mg Intravenous Given 6/16/19 1857)   HYDROcodone-acetaminophen (NORCO) 7.5-325 MG per tablet 1 tablet (1 tablet Oral Given 6/16/19 1945)       Imaging results:  Xr Chest 2 View    Result Date: 6/16/2019  No active disease is seen in the chest with no significant change when compared to prior chest x-ray 05/21/2018. There is some linear atelectasis or scarring at the lung bases bilaterally.  LUMBAR SPINE PLAIN FILM SERIES: A total of 5 views of the lumbar spine are submitted for interpretation including AP bilateral oblique and lateral views of the entire lumbar spine and coned down views of the lateral view of the lumbosacral junction. This is correlated to a prior lumbar spine plain films series 07/03/2016.  FINDINGS: There is a dextroscoliotic curvature of the lumbar spine with its apex at the L3 lumbar level. There is disc space narrowing and degenerative endplate changes most pronounced in the central left side of the endplates and anterior endplates at L1-L2, L2- 3 and L3-4. On the lateral view of the entire lumbar spine there is a suggestion of a mild compression deformity involving the superior body and endplate of L1 that could potentially be acute to subacute in nature that is new since plain film series 07/03/2016. The remainder of the lumbar vertebral body heights are well-maintained.  IMPRESSION: 1. There is a suggestion of mild compression deformity involving the superior body and endplate of the L1 lumbar vertebrae with about 20% loss of intervertebral body height that is new when compared to 07/03/2016 and could be acute to subacute  nature, could be further assessed with a lumbar spine CT or MRI if clinically indicated. 2. There is stable lumbar spondylosis with a rotary dextroscoliotic curvature of the lumbar spine apex at the L3 lumbar level with degenerative disc and endplate changes at L1-L4.  PELVIS AND BILATERAL HIPS: AP view of the pelvis and AP and frog-leg lateral views of both the right and left hips are submitted for interpretation. I see no acute fracture or malalignment or osseous abnormality in the bones of the pelvis or the right or left hip.  The results and recommendation were communicated to Araseli Conn, the nurse practitioner in the emergency room taking care of the patient, by telephone 06/16/2019 at 6:50 PM.      Ct Lumbar Spine Without Contrast    Result Date: 6/16/2019  1. Minimal acute compression fracture of the L1 superior endplate with up to 10% height loss, no retropulsion or bony canal narrowing. 2. 8mm sclerotic sacral lesion as discussed. Favor bone island  This report was finalized on 6/16/2019 7:32 PM by Jorge A Hylton M.D.      Xr Spine Lumbar 4+ View    Result Date: 6/16/2019  No active disease is seen in the chest with no significant change when compared to prior chest x-ray 05/21/2018. There is some linear atelectasis or scarring at the lung bases bilaterally.  LUMBAR SPINE PLAIN FILM SERIES: A total of 5 views of the lumbar spine are submitted for interpretation including AP bilateral oblique and lateral views of the entire lumbar spine and coned down views of the lateral view of the lumbosacral junction. This is correlated to a prior lumbar spine plain films series 07/03/2016.  FINDINGS: There is a dextroscoliotic curvature of the lumbar spine with its apex at the L3 lumbar level. There is disc space narrowing and degenerative endplate changes most pronounced in the central left side of the endplates and anterior endplates at L1-L2, L2- 3 and L3-4. On the lateral view of the entire lumbar spine there  is a suggestion of a mild compression deformity involving the superior body and endplate of L1 that could potentially be acute to subacute in nature that is new since plain film series 07/03/2016. The remainder of the lumbar vertebral body heights are well-maintained.  IMPRESSION: 1. There is a suggestion of mild compression deformity involving the superior body and endplate of the L1 lumbar vertebrae with about 20% loss of intervertebral body height that is new when compared to 07/03/2016 and could be acute to subacute nature, could be further assessed with a lumbar spine CT or MRI if clinically indicated. 2. There is stable lumbar spondylosis with a rotary dextroscoliotic curvature of the lumbar spine apex at the L3 lumbar level with degenerative disc and endplate changes at L1-L4.  PELVIS AND BILATERAL HIPS: AP view of the pelvis and AP and frog-leg lateral views of both the right and left hips are submitted for interpretation. I see no acute fracture or malalignment or osseous abnormality in the bones of the pelvis or the right or left hip.  The results and recommendation were communicated to Araseli Conn, the nurse practitioner in the emergency room taking care of the patient, by telephone 06/16/2019 at 6:50 PM.      Xr Hips Bilateral With Or Without Pelvis 2 View    Result Date: 6/16/2019  No active disease is seen in the chest with no significant change when compared to prior chest x-ray 05/21/2018. There is some linear atelectasis or scarring at the lung bases bilaterally.  LUMBAR SPINE PLAIN FILM SERIES: A total of 5 views of the lumbar spine are submitted for interpretation including AP bilateral oblique and lateral views of the entire lumbar spine and coned down views of the lateral view of the lumbosacral junction. This is correlated to a prior lumbar spine plain films series 07/03/2016.  FINDINGS: There is a dextroscoliotic curvature of the lumbar spine with its apex at the L3 lumbar level. There is  disc space narrowing and degenerative endplate changes most pronounced in the central left side of the endplates and anterior endplates at L1-L2, L2- 3 and L3-4. On the lateral view of the entire lumbar spine there is a suggestion of a mild compression deformity involving the superior body and endplate of L1 that could potentially be acute to subacute in nature that is new since plain film series 07/03/2016. The remainder of the lumbar vertebral body heights are well-maintained.  IMPRESSION: 1. There is a suggestion of mild compression deformity involving the superior body and endplate of the L1 lumbar vertebrae with about 20% loss of intervertebral body height that is new when compared to 07/03/2016 and could be acute to subacute nature, could be further assessed with a lumbar spine CT or MRI if clinically indicated. 2. There is stable lumbar spondylosis with a rotary dextroscoliotic curvature of the lumbar spine apex at the L3 lumbar level with degenerative disc and endplate changes at L1-L4.  PELVIS AND BILATERAL HIPS: AP view of the pelvis and AP and frog-leg lateral views of both the right and left hips are submitted for interpretation. I see no acute fracture or malalignment or osseous abnormality in the bones of the pelvis or the right or left hip.  The results and recommendation were communicated to Araseli Conn, the nurse practitioner in the emergency room taking care of the patient, by telephone 06/16/2019 at 6:50 PM.        Ambulatory status:   - ambulates at home unassisted    Social issues:   Social History     Socioeconomic History   • Marital status:      Spouse name: Not on file   • Number of children: Not on file   • Years of education: Not on file   • Highest education level: Not on file   Tobacco Use   • Smoking status: Former Smoker     Packs/day: 1.00     Years: 10.00     Pack years: 10.00     Types: Cigarettes     Start date: 1959     Last attempt to quit: 1969     Years since  quittin.4   • Smokeless tobacco: Never Used   Substance and Sexual Activity   • Alcohol use: No   • Drug use: No   • Sexual activity: Jennifer Lawrence RN  19

## 2019-06-17 NOTE — ED NOTES
Attempted to call report to the floor, nurse is busy with another pt and will call back.     Jennfier Sanders, RN  06/16/19 7533

## 2019-06-17 NOTE — PLAN OF CARE
Problem: Patient Care Overview  Goal: Plan of Care Review  Outcome: Ongoing (interventions implemented as appropriate)   06/17/19 7318   Coping/Psychosocial   Plan of Care Reviewed With patient   OTHER   Outcome Summary Pt presents with impaired functional mobility and gait secondary to pain, generalized weakness, and decreased activity tolerance post L 2 compression fracture. Pt may benefit from skilled PT to address strength, mobility, and gait.

## 2019-06-17 NOTE — H&P
"    Patient Name:  Josephine Wolf  YOB: 1940  MRN:  [id number]  Admit Date:  6/16/2019  Patient Care Team:  Bill Martino MD as PCP - General (Internal Medicine)      Subjective   History Present Illness     Chief Complaint   Patient presents with   • Fall       Ms. Wolf is a 79 y.o. former smoker with a history of COPD on 2L NC chronically and CKD stage 3 with previous baseline creatinine of around 1.1 that presents to [facility] complaining of lower back pain following a fall today. She states that she was helping her handicapped daughter in the bathroom and her legs suddenly \"gave out\".  She fell to the floor in a seated position. She did not pass out or hit her head.  She did not have dizziness/light-headedness prior to the fall.  She denies bladder/bowel incontinence, leg pain, and saddle anesthesia.  She does have some increased shortness of breath from baseline but no cough or noticeable wheezing.  She did have some hypoxemia in the ER on her 2L NC and her oxygen was increased to 4L, now satting in the low-mid90s.      History of Present Illness  Review of Systems   Constitutional: Negative for chills and fever.   HENT: Negative for congestion, nosebleeds and sore throat.    Eyes: Negative for redness and visual disturbance.   Respiratory: Positive for shortness of breath. Negative for cough, choking and wheezing.    Cardiovascular: Negative for chest pain, palpitations and leg swelling.   Gastrointestinal: Negative for abdominal pain, blood in stool, nausea and vomiting.   Endocrine: Negative for cold intolerance and heat intolerance.   Genitourinary: Negative for difficulty urinating, dysuria and hematuria.   Musculoskeletal: Positive for back pain. Negative for myalgias and neck pain.   Skin: Negative for pallor and rash.   Neurological: Negative for dizziness, syncope, weakness, light-headedness and headaches.   Hematological: Negative for adenopathy. " Does not bruise/bleed easily.   Psychiatric/Behavioral: Negative for confusion and decreased concentration.        Personal History     Past Medical History:   Diagnosis Date   • Acid reflux    • Anemia    • Arthritis    • Bipolar 1 disorder, depressed (CMS/HCC)    • Cataract     MINDY   • Chronic nausea    • Chronic pain of right knee    • Continuous leakage of urine     USES DEPENDS   • COPD (chronic obstructive pulmonary disease) (CMS/[facility])     USES O2 2 LMP PER NC AT NIGHT   • Disease of thyroid gland     HYPOTHYROIDISM   • Diverticulosis    • Fibromyalgia     DX    • Frequent episodes of bronchitis    • Hypertension    • Migraines    • Neck pain    • On home oxygen therapy     2L NC AT NIGHT   • Short of breath on exertion      Past Surgical History:   Procedure Laterality Date   • CEREBRAL ANEURYSM REPAIR      WITH STENT   • HYSTERECTOMY     • LAPAROSCOPIC CHOLECYSTECTOMY     • LA TOTAL KNEE ARTHROPLASTY Right 2017    Procedure: RT TOTAL KNEE ARTHROPLASTY;  Surgeon: Kashif Perez MD;  Location: [facility];  Service: Orthopedics     Family History   Problem Relation Age of Onset   • Malig Hyperthermia Neg Hx      Social History     Tobacco Use   • Smoking status: Former Smoker     Packs/day: 1.00     Years: 10.00     Pack years: 10.00     Types: Cigarettes     Start date:      Last attempt to quit:      Years since quittin.4   • Smokeless tobacco: Never Used   Substance Use Topics   • Alcohol use: No   • Drug use: No       (Not in a hospital admission)  Allergies:    Allergies   Allergen Reactions   • Morphine And Related Hallucinations     CONFUSION       Objective    Objective     Vital Signs  Temp:  [98.9 °F (37.2 °C)] 98.9 °F (37.2 °C)  Heart Rate:  [64-86] 69  Resp:  [16-18] 18  BP: ()/(55-65) 131/58  SpO2:  [76 %-94 %] 93 %  on  Flow (L/min):  [4] 4;   Device (Oxygen Therapy): nasal cannula  Body mass index is 28.15 kg/m².    Physical Exam   Constitutional: She is  oriented to person, place, and time. No distress.   HENT:   Head: Normocephalic and atraumatic.   Mouth/Throat: Oropharynx is clear and moist.   Eyes: Conjunctivae and EOM are normal. Pupils are equal, round, and reactive to light.   Neck: Normal range of motion. Neck supple.   Cardiovascular: Normal rate, regular rhythm and intact distal pulses.   Pulmonary/Chest: Effort normal. No respiratory distress. She has wheezes (minimal, scattered). She has no rales.   Overall good air movement   Abdominal: Soft. Bowel sounds are normal. There is no tenderness.   Musculoskeletal: She exhibits tenderness (L1). She exhibits no edema.   Neurological: She is alert and oriented to person, place, and time. She has normal strength. No cranial nerve deficit or sensory deficit.   Skin: Skin is warm and dry. She is not diaphoretic.   Psychiatric: She has a normal mood and affect. Her behavior is normal.   Nursing note and vitals reviewed.    Results Review:  I reviewed the patient's new clinical results.  I reviewed the patient's new imaging results and agree with the interpretation.  I reviewed the patient's other test results and agree with the interpretation  I personally viewed and interpreted the patient's EKG/Telemetry data  Discussed with ED provider.    Lab Results (last 24 hours)     Procedure Component Value Units Date/Time    Valproic Acid Level, Total [[id number]]  (Abnormal) Collected:  06/16/19 1612    Specimen:  Blood Updated:  06/16/19 1735     Valproic Acid 38.0 mcg/mL     CBC & Differential [[id number]] Collected:  06/16/19 1612    Specimen:  Blood Updated:  06/16/19 1714    Narrative:       The following orders were created for panel order CBC & Differential.  Procedure                               Abnormality         Status                     ---------                               -----------         ------                     CBC Auto Differential[[id number]]        Abnormal            Final result                  Please view results for these tests on the individual orders.    Comprehensive Metabolic Panel [[id number]]  (Abnormal) Collected:  06/16/19 1612    Specimen:  Blood Updated:  06/16/19 1734     Glucose 122 mg/dL      BUN 34 mg/dL      Creatinine 1.80 mg/dL      Sodium 135 mmol/L      Potassium 4.6 mmol/L      Chloride 98 mmol/L      CO2 26.0 mmol/L      Calcium 8.8 mg/dL      Total Protein 6.4 g/dL      Albumin 3.80 g/dL      ALT (SGPT) 14 U/L      AST (SGOT) 23 U/L      Alkaline Phosphatase 85 U/L      Total Bilirubin 0.2 mg/dL      eGFR Non African Amer 27 mL/min/1.73      Globulin 2.6 gm/dL      A/G Ratio 1.5 g/dL      BUN/Creatinine Ratio 18.9     Anion Gap 11.0 mmol/L     Narrative:       GFR Normal >60  Chronic Kidney Disease <60  Kidney Failure <15    Troponin [[id number]]  (Normal) Collected:  06/16/19 1612    Specimen:  Blood Updated:  06/16/19 1734     Troponin T <0.010 ng/mL     Narrative:       Troponin T Reference Range:  <= 0.03 ng/mL-   Negative for AMI  >0.03 ng/mL-     Abnormal for myocardial necrosis.  Clinicians would have to utilize clinical acumen, EKG, Troponin and serial changes to determine if it is an Acute Myocardial Infarction or myocardial injury due to an underlying chronic condition.     CBC Auto Differential [[id number]]  (Abnormal) Collected:  06/16/19 1612    Specimen:  Blood Updated:  06/16/19 1714     WBC 8.42 10*3/mm3      RBC 2.94 10*6/mm3      Hemoglobin 9.7 g/dL      Hematocrit 31.3 %      .5 fL      MCH 33.0 pg      MCHC 31.0 g/dL      RDW 12.9 %      RDW-SD 50.1 fl      MPV 9.9 fL      Platelets 212 10*3/mm3     Manual Differential [[id number]]  (Normal) Collected:  06/16/19 1612    Specimen:  Blood Updated:  06/16/19 1714     Neutrophil % 69.0 %      Lymphocyte % 21.0 %      Monocyte % 7.0 %      Eosinophil % 2.0 %      Basophil % 1.0 %      Neutrophils Absolute 5.81 10*3/mm3      Lymphocytes Absolute 1.77 10*3/mm3      Monocytes Absolute 0.59 10*3/mm3       Eosinophils Absolute 0.17 10*3/mm3      Basophils Absolute 0.08 10*3/mm3      RBC Morphology Normal     WBC Morphology Normal     Platelet Morphology Normal    Urinalysis With Microscopic If Indicated (No Culture) - Urine, Clean Catch [[id number]]  (Normal) Collected:  06/16/19 1657    Specimen:  Urine, Clean Catch Updated:  06/16/19 1721     Color, UA Yellow     Appearance, UA Clear     pH, UA 5.5     Specific Gravity, UA 1.013     Glucose, UA Negative     Ketones, UA Negative     Bilirubin, UA Negative     Blood, UA Negative     Protein, UA Negative     Leuk Esterase, UA Negative     Nitrite, UA Negative     Urobilinogen, UA 0.2 E.U./dL    Narrative:       Urine microscopic not indicated.          Imaging Results (last 24 hours)     Procedure Component Value Units Date/Time    XR Hips Bilateral With or Without Pelvis 2 View [[id number]] Collected:  06/16/19 2025     Updated:  06/16/19 2026    Narrative:       PA AND LATERAL CHEST X-RAY, 4 VIEW LUMBAR SPINE PLAIN FILM SERIES,  PELVIS AND BILATERAL HIPS 06/16/2019     CLINICAL HISTORY: Patient fell, difficulty ambulating, has low back pain  and bilateral hip pain.     CHEST: The cardiomediastinal silhouette and the pulmonary vasculature  are within normal limits. There is some linear stranding at the lung  bases, likely linear areas of atelectasis and/or scarring. The remainder  of the lungs are clear, costophrenic angles are sharp.       Impression:       No active disease is seen in the chest with no significant change when  compared to prior chest x-ray 05/21/2018. There is some linear  atelectasis or scarring at the lung bases bilaterally.      LUMBAR SPINE PLAIN FILM SERIES: A total of 5 views of the lumbar spine  are submitted for interpretation including AP bilateral oblique and  lateral views of the entire lumbar spine and coned down views of the  lateral view of the lumbosacral junction. This is correlated to a prior  lumbar spine plain films series  07/03/2016.     FINDINGS: There is a dextroscoliotic curvature of the lumbar spine with  its apex at the L3 lumbar level. There is disc space narrowing and  degenerative endplate changes most pronounced in the central left side  of the endplates and anterior endplates at L1-L2, L2- 3 and L3-4. On the  lateral view of the entire lumbar spine there is a suggestion of a mild  compression deformity involving the superior body and endplate of L1  that could potentially be acute to subacute in nature that is new since  plain film series 07/03/2016. The remainder of the lumbar vertebral body  heights are well-maintained.     IMPRESSION:  1. There is a suggestion of mild compression deformity involving the  superior body and endplate of the L1 lumbar vertebrae with about 20%  loss of intervertebral body height that is new when compared to  07/03/2016 and could be acute to subacute nature, could be further  assessed with a lumbar spine CT or MRI if clinically indicated.  2. There is stable lumbar spondylosis with a rotary dextroscoliotic  curvature of the lumbar spine apex at the L3 lumbar level with  degenerative disc and endplate changes at L1-L4.     PELVIS AND BILATERAL HIPS: AP view of the pelvis and AP and frog-leg  lateral views of both the right and left hips are submitted for  interpretation. I see no acute fracture or malalignment or osseous  abnormality in the bones of the pelvis or the right or left hip.      The results and recommendation were communicated to Araseli Conn,  the nurse practitioner in the emergency room taking care of the patient,  by telephone 06/16/2019 at 6:50 PM.        XR Spine Lumbar 4+ View [[id number]] Collected:  06/16/19 2025     Updated:  06/16/19 2026    Narrative:       PA AND LATERAL CHEST X-RAY, 4 VIEW LUMBAR SPINE PLAIN FILM SERIES,  PELVIS AND BILATERAL HIPS 06/16/2019     CLINICAL HISTORY: Patient fell, difficulty ambulating, has low back pain  and bilateral hip pain.      CHEST: The cardiomediastinal silhouette and the pulmonary vasculature  are within normal limits. There is some linear stranding at the lung  bases, likely linear areas of atelectasis and/or scarring. The remainder  of the lungs are clear, costophrenic angles are sharp.       Impression:       No active disease is seen in the chest with no significant change when  compared to prior chest x-ray 05/21/2018. There is some linear  atelectasis or scarring at the lung bases bilaterally.      LUMBAR SPINE PLAIN FILM SERIES: A total of 5 views of the lumbar spine  are submitted for interpretation including AP bilateral oblique and  lateral views of the entire lumbar spine and coned down views of the  lateral view of the lumbosacral junction. This is correlated to a prior  lumbar spine plain films series 07/03/2016.     FINDINGS: There is a dextroscoliotic curvature of the lumbar spine with  its apex at the L3 lumbar level. There is disc space narrowing and  degenerative endplate changes most pronounced in the central left side  of the endplates and anterior endplates at L1-L2, L2- 3 and L3-4. On the  lateral view of the entire lumbar spine there is a suggestion of a mild  compression deformity involving the superior body and endplate of L1  that could potentially be acute to subacute in nature that is new since  plain film series 07/03/2016. The remainder of the lumbar vertebral body  heights are well-maintained.     IMPRESSION:  1. There is a suggestion of mild compression deformity involving the  superior body and endplate of the L1 lumbar vertebrae with about 20%  loss of intervertebral body height that is new when compared to  07/03/2016 and could be acute to subacute nature, could be further  assessed with a lumbar spine CT or MRI if clinically indicated.  2. There is stable lumbar spondylosis with a rotary dextroscoliotic  curvature of the lumbar spine apex at the L3 lumbar level with  degenerative disc and endplate  changes at L1-L4.     PELVIS AND BILATERAL HIPS: AP view of the pelvis and AP and frog-leg  lateral views of both the right and left hips are submitted for  interpretation. I see no acute fracture or malalignment or osseous  abnormality in the bones of the pelvis or the right or left hip.      The results and recommendation were communicated to Araseli Conn,  the nurse practitioner in the emergency room taking care of the patient,  by telephone 06/16/2019 at 6:50 PM.        XR Chest 2 View [[id number]] Collected:  06/16/19 2025     Updated:  06/16/19 2026    Narrative:       PA AND LATERAL CHEST X-RAY, 4 VIEW LUMBAR SPINE PLAIN FILM SERIES,  PELVIS AND BILATERAL HIPS 06/16/2019     CLINICAL HISTORY: Patient fell, difficulty ambulating, has low back pain  and bilateral hip pain.     CHEST: The cardiomediastinal silhouette and the pulmonary vasculature  are within normal limits. There is some linear stranding at the lung  bases, likely linear areas of atelectasis and/or scarring. The remainder  of the lungs are clear, costophrenic angles are sharp.       Impression:       No active disease is seen in the chest with no significant change when  compared to prior chest x-ray 05/21/2018. There is some linear  atelectasis or scarring at the lung bases bilaterally.      LUMBAR SPINE PLAIN FILM SERIES: A total of 5 views of the lumbar spine  are submitted for interpretation including AP bilateral oblique and  lateral views of the entire lumbar spine and coned down views of the  lateral view of the lumbosacral junction. This is correlated to a prior  lumbar spine plain films series 07/03/2016.     FINDINGS: There is a dextroscoliotic curvature of the lumbar spine with  its apex at the L3 lumbar level. There is disc space narrowing and  degenerative endplate changes most pronounced in the central left side  of the endplates and anterior endplates at L1-L2, L2- 3 and L3-4. On the  lateral view of the entire lumbar spine  there is a suggestion of a mild  compression deformity involving the superior body and endplate of L1  that could potentially be acute to subacute in nature that is new since  plain film series 07/03/2016. The remainder of the lumbar vertebral body  heights are well-maintained.     IMPRESSION:  1. There is a suggestion of mild compression deformity involving the  superior body and endplate of the L1 lumbar vertebrae with about 20%  loss of intervertebral body height that is new when compared to  07/03/2016 and could be acute to subacute nature, could be further  assessed with a lumbar spine CT or MRI if clinically indicated.  2. There is stable lumbar spondylosis with a rotary dextroscoliotic  curvature of the lumbar spine apex at the L3 lumbar level with  degenerative disc and endplate changes at L1-L4.     PELVIS AND BILATERAL HIPS: AP view of the pelvis and AP and frog-leg  lateral views of both the right and left hips are submitted for  interpretation. I see no acute fracture or malalignment or osseous  abnormality in the bones of the pelvis or the right or left hip.      The results and recommendation were communicated to Araseli Conn,  the nurse practitioner in the emergency room taking care of the patient,  by telephone 06/16/2019 at 6:50 PM.        CT Lumbar Spine Without Contrast [[id number]] Collected:  06/16/19 1923     Updated:  06/16/19 1935    Narrative:       NONCONTRAST CT SCAN LUMBAR SPINE     CLINICAL HISTORY: Spine fracture, traumatic, lumbar     COMPARISON: None.     TECHNIQUE: Radiation dose reduction techniques were utilized, including  automated exposure control and exposure modulation based on body size.  Axial noncontrast images of the lumbar spine were obtained without  contrast. Sagittal reformatted images were supplemented.     FINDINGS:  There is an acute, minimal impaction fracture of the L1  superior endplate. Height loss is very mild, 10% on the sagittal  reformatted images. There  is no retropulsion or bony canal narrowing.  The remaining lumbar vertebrae are intact. There is mild S-shaped  thoracolumbar scoliosis on the coronal reformatted images. On the  sagittal images, there is approximately 2-3 mm of retrolisthesis at  L3-4. Normal alignment otherwise.     The L2-L5 vertebrae are well-maintained in height.  Multilevel  degenerative changes are present. These are particularly pronounced at  L3-4, left lateral aspect followed by L2-3 and L1 to. There are  hypertrophic changes in the mid and lower lumbar posterior elements.  There are broad-based disc protrusions, particularly at L3-4 and L4-5  which extend into the neural foramina bilaterally. Smaller protrusions  at L1-L2 and L2-L3. There is multilevel canal and foraminal narrowing.     There is an 8 mm sclerotic lesion in the left first sacral ala. A small  bone island is favored, particularly if there is no malignancy history.  It was present on a prior pelvis CT from 06/24/2018 which would support  benign etiology.. Suggest follow-up.          Impression:       1. Minimal acute compression fracture of the L1 superior endplate with  up to 10% height loss, no retropulsion or bony canal narrowing.  2. 8mm sclerotic sacral lesion as discussed. Favor bone island     This report was finalized on 6/16/2019 7:32 PM by Jorge A Hylton M.D.                  ECG 12 Lead   Preliminary Result   HEART RATE= 70  bpm   RR Interval= 864  ms   KS Interval= 143  ms   P Horizontal Axis= 7  deg   P Front Axis= 52  deg   QRSD Interval= 76  ms   QT Interval= 360  ms   QRS Axis= 6  deg   T Wave Axis= 27  deg   - BORDERLINE ECG -   Sinus rhythm   Borderline T abnormalities, anterior leads   Electronically Signed By:    Date and Time of Study: 2019-06-16 16:54:05           Assessment/Plan     Active Hospital Problems    Diagnosis POA   • **Closed compression fracture of L1 lumbar vertebra (CMS/HCC) [S32.010A] Yes   • Hyponatremia [E87.1] Yes   • Stage 3 chronic  kidney disease (CMS/[facility]) [N18.3] Yes   • COPD (chronic obstructive pulmonary disease) (CMS/[facility]) [J44.9] Yes     USES O2 2 LMP PER NC AT NIGHT     • Hypertension [I10] Yes   • Acute kidney injury (CMS/[facility]) [N17.9] Yes   • Chronic respiratory failure with hypoxia (CMS/[facility]) [J96.11] Yes   L1 Compression Fracture  - provide pain control,  Physical therapy  - will ask spine surgery for opinion-if intervention recommended will need to optimize her pulmonary function first    Hypoxemia  - has chronic hypoxic respiratory failure and is on 2L NC at home-received pain meds in ER but does not look sedated  - CXR clear  - she is now requiring 4-5L NC and is subjectively a little more short of breath but no chest pain; hypoxemia seems out of proportion to her lung exam as she is moving a decent amount of air and has no evidence of COPD exacerbation  - d-dimer is elevated as expected-cannot get CTA of the chest at present due to her renal function-will cover with therapeutic lovenox for the time being and check BLE venous dopplers with plan to follow up Cr in AM and check CTA of the chest if improved, if not can consider V/Q scan but interpretation may be complicated by COPD  - will schedule duo-nebs, incentive spirometry  - wean oxygen as tolerated    CHRIS on CKD  - unsure of baseline, Cr 1 year ago was 1.14  - will give gentle IVF and follow BMP  - check PVR    I discussed the patients findings and my recommendations with patient and nursing staff.    VTE Prophylaxis - Lovenox 30 mg SC daily.  Code Status - Full code.       Jorge A Mello MD  [facility]  06/16/19  10:22 PM

## 2019-06-17 NOTE — PLAN OF CARE
Problem: Patient Care Overview  Goal: Plan of Care Review  Outcome: Ongoing (interventions implemented as appropriate)   06/17/19 2277   Coping/Psychosocial   Plan of Care Reviewed With patient   Plan of Care Review   Progress no change   OTHER   Outcome Summary pt admitted tonight via the ER due to a compression fx in lumbar spine; PO pain med given x1; pt unable to void and bladder scanned and showed 665; I&O cath performed; pt is on 4L N/C; ortho to see her today; will continue to monitor       Problem: Fall Risk (Adult)  Goal: Absence of Fall  Outcome: Ongoing (interventions implemented as appropriate)      Problem: Pain, Acute (Adult)  Goal: Acceptable Pain Control/Comfort Level  Outcome: Ongoing (interventions implemented as appropriate)

## 2019-06-17 NOTE — ED NOTES
Awaiting clean bed status. Pt updated on status. Report given to floor as documented.     Jennifer Sanders, RN  06/16/19 3669

## 2019-06-17 NOTE — PROGRESS NOTES
"Discharge Planning Assessment  [facility]     Patient Name: Josephine Wolf  MRN: [id number]  Today's Date: 6/17/2019    Admit Date: 6/16/2019    Discharge Needs Assessment     Row Name 06/17/19 1640       Living Environment    Lives With  alone    Current Living Arrangements  home/apartment/condo    Primary Care Provided by  self    Family Caregiver if Needed  child(ree), adult    Quality of Family Relationships  helpful;involved;supportive    Able to Return to Prior Arrangements  yes       Transition Planning    Patient/Family Anticipates Transition to  home;home with help/services    Transportation Anticipated  family or friend will provide       Discharge Needs Assessment    Equipment Currently Used at Home  oxygen    Offered/Gave Vendor List  no Denies needs at this time        Discharge Plan     Row Name 06/17/19 1642       Plan    Plan  Home (Poss HH)    Patient/Family in Agreement with Plan  yes    Plan Comments  VALENCIA noted. Introduced self and role of CCP. Face sheet verified. Patient lives alone in a second floor apartment. States there is an elevator that she uses. Once inside the apartment, there are no steps. Prior to admission, patient was independent with ADL's  States she does not drive but that her sons provide transportation. Patient states she has been on O2 at 2L every night for \"a long time\". Patient thinks [facility] is where she gets her O2 supplies from. Patient stated she has used [facility] in th past.  Has used a rehab in the past but was unsure of the name of the facililty.  Patient plans to return home at [place]. Will F/U regarding HH.            Demographic Summary     Row Name 06/17/19 1638       General Information    Admission Type  observation    Arrived From  home    Required Notices Provided  Observation Status Notice    Reason for Consult  discharge planning    Preferred Language  English     Used During This Interaction  no       Contact Information    " Permission Granted to Share Info With  family/designee        Functional Status     Row Name 06/17/19 1639       Functional Status    Usual Activity Tolerance  good    Current Activity Tolerance  good       Functional Status, IADL    Medications  independent    Meal Preparation  independent    Housekeeping  independent    Laundry  independent    Shopping  independent;assistive person       Mental Status    General Appearance WDL  WDL        Psychosocial     Row Name 06/17/19 163       Values/Beliefs    Spiritual, Cultural Beliefs, Faith Practices, Values that Affect Care  no       Emotion Mood WDL    Emotion/Mood/Affect WDL  WDL       Speech WDL    Speech WDL  WDL       Intellectual Performance WDL    Level of Consciousness  Alert       Coping/Stress    Reaction to Health Status  adjusting    Understanding of Condition and Treatment  adequate understanding of medical condition;adequate understanding of treatment       Developmental Stage (Eriksson's)    Developmental Stage  Stage 8 (65 years-death/Late Adulthood) Integrity vs. Despair          Nurys Flaherty, RN

## 2019-06-18 ENCOUNTER — APPOINTMENT (OUTPATIENT)
Dept: NUCLEAR MEDICINE | Facility: HOSPITAL | Age: 79
End: 2019-06-18

## 2019-06-18 ENCOUNTER — APPOINTMENT (OUTPATIENT)
Dept: GENERAL RADIOLOGY | Facility: HOSPITAL | Age: 79
End: 2019-06-18

## 2019-06-18 VITALS
BODY MASS INDEX: 29.7 KG/M2 | TEMPERATURE: 98.6 F | DIASTOLIC BLOOD PRESSURE: 50 MMHG | SYSTOLIC BLOOD PRESSURE: 114 MMHG | RESPIRATION RATE: 18 BRPM | HEART RATE: 79 BPM | OXYGEN SATURATION: 90 % | HEIGHT: 62 IN | WEIGHT: 161.38 LBS

## 2019-06-18 LAB
ANION GAP SERPL CALCULATED.3IONS-SCNC: 12.7 MMOL/L
BUN BLD-MCNC: 16 MG/DL (ref 8–23)
BUN/CREAT SERPL: 15.4 (ref 7–25)
CALCIUM SPEC-SCNC: 9.1 MG/DL (ref 8.6–10.5)
CHLORIDE SERPL-SCNC: 100 MMOL/L (ref 98–107)
CO2 SERPL-SCNC: 26.3 MMOL/L (ref 22–29)
CREAT BLD-MCNC: 1.04 MG/DL (ref 0.57–1)
GFR SERPL CREATININE-BSD FRML MDRD: 51 ML/MIN/1.73
GLUCOSE BLD-MCNC: 89 MG/DL (ref 65–99)
GLUCOSE BLDC GLUCOMTR-MCNC: 88 MG/DL (ref 70–130)
POTASSIUM BLD-SCNC: 4.2 MMOL/L (ref 3.5–5.2)
SODIUM BLD-SCNC: 139 MMOL/L (ref 136–145)

## 2019-06-18 PROCEDURE — G0378 HOSPITAL OBSERVATION PER HR: HCPCS

## 2019-06-18 PROCEDURE — 94799 UNLISTED PULMONARY SVC/PX: CPT

## 2019-06-18 PROCEDURE — 80048 BASIC METABOLIC PNL TOTAL CA: CPT | Performed by: INTERNAL MEDICINE

## 2019-06-18 PROCEDURE — 0 XENON XE 133: Performed by: HOSPITALIST

## 2019-06-18 PROCEDURE — 99212 OFFICE O/P EST SF 10 MIN: CPT | Performed by: ORTHOPAEDIC SURGERY

## 2019-06-18 PROCEDURE — A9558 XE133 XENON 10MCI: HCPCS | Performed by: HOSPITALIST

## 2019-06-18 PROCEDURE — 0 TECHNETIUM ALBUMIN AGGREGATED: Performed by: HOSPITALIST

## 2019-06-18 PROCEDURE — A9540 TC99M MAA: HCPCS | Performed by: HOSPITALIST

## 2019-06-18 PROCEDURE — 36415 COLL VENOUS BLD VENIPUNCTURE: CPT | Performed by: INTERNAL MEDICINE

## 2019-06-18 PROCEDURE — 78582 LUNG VENTILAT&PERFUS IMAGING: CPT

## 2019-06-18 PROCEDURE — 97110 THERAPEUTIC EXERCISES: CPT

## 2019-06-18 PROCEDURE — 82962 GLUCOSE BLOOD TEST: CPT

## 2019-06-18 PROCEDURE — 71046 X-RAY EXAM CHEST 2 VIEWS: CPT

## 2019-06-18 RX ORDER — HYDROCODONE BITARTRATE AND ACETAMINOPHEN 7.5; 325 MG/1; MG/1
1 TABLET ORAL EVERY 6 HOURS PRN
Status: DISCONTINUED | OUTPATIENT
Start: 2019-06-18 | End: 2019-06-18 | Stop reason: HOSPADM

## 2019-06-18 RX ORDER — ECHINACEA PURPUREA EXTRACT 125 MG
1 TABLET ORAL AS NEEDED
Status: DISCONTINUED | OUTPATIENT
Start: 2019-06-18 | End: 2019-06-18 | Stop reason: HOSPADM

## 2019-06-18 RX ADMIN — XENON XE-133 4.4 MILLICURIE: 10 GAS RESPIRATORY (INHALATION) at 11:15

## 2019-06-18 RX ADMIN — AMLODIPINE BESYLATE 10 MG: 10 TABLET ORAL at 08:33

## 2019-06-18 RX ADMIN — Medication 1000 MCG: at 08:33

## 2019-06-18 RX ADMIN — FEBUXOSTAT 40 MG: 40 TABLET ORAL at 08:33

## 2019-06-18 RX ADMIN — IPRATROPIUM BROMIDE AND ALBUTEROL SULFATE 3 ML: 2.5; .5 SOLUTION RESPIRATORY (INHALATION) at 12:37

## 2019-06-18 RX ADMIN — IPRATROPIUM BROMIDE AND ALBUTEROL SULFATE 3 ML: 2.5; .5 SOLUTION RESPIRATORY (INHALATION) at 08:21

## 2019-06-18 RX ADMIN — DULOXETINE HYDROCHLORIDE 60 MG: 60 CAPSULE, DELAYED RELEASE ORAL at 08:33

## 2019-06-18 RX ADMIN — HYDROCODONE BITARTRATE AND ACETAMINOPHEN 1 TABLET: 5; 325 TABLET ORAL at 08:36

## 2019-06-18 RX ADMIN — SODIUM CHLORIDE, PRESERVATIVE FREE 3 ML: 5 INJECTION INTRAVENOUS at 08:33

## 2019-06-18 RX ADMIN — HYDROCODONE BITARTRATE AND ACETAMINOPHEN 1 TABLET: 7.5; 325 TABLET ORAL at 14:01

## 2019-06-18 RX ADMIN — LEVOTHYROXINE SODIUM 88 MCG: 88 TABLET ORAL at 05:33

## 2019-06-18 RX ADMIN — DIVALPROEX SODIUM 250 MG: 250 TABLET, FILM COATED, EXTENDED RELEASE ORAL at 15:11

## 2019-06-18 RX ADMIN — Medication 1 DOSE: at 11:20

## 2019-06-18 RX ADMIN — DIVALPROEX SODIUM 250 MG: 250 TABLET, FILM COATED, EXTENDED RELEASE ORAL at 03:42

## 2019-06-18 NOTE — PLAN OF CARE
Problem: Patient Care Overview  Goal: Plan of Care Review   06/18/19 0958   OTHER   Outcome Summary Pt has vastly improved mobility. Able to ambulate with walker without physical assist. Recommend pt use walker at home initially. No further acute care PT needs. Recommend home when medically able.

## 2019-06-18 NOTE — DISCHARGE SUMMARY
Patient Name: Josephine Wolf  : 1940  MRN: [id number]    Date of Admission: 2019  Date of Discharge:  2019  Primary Care Physician: Bill Martino MD      Chief Complaint:   Fall      Discharge Diagnoses     Active Hospital Problems    Diagnosis  POA   • **Closed compression fracture of L1 lumbar vertebra (CMS/[facility]) [S32.010A]  Yes   • Osteoporosis with pathological fracture [M80.00XA]  Yes   • Hyponatremia [E87.1]  Yes   • Stage 3 chronic kidney disease (CMS/HCC) [N18.3]  Yes   • COPD (chronic obstructive pulmonary disease) (CMS/HCC) [J44.9]  Yes   • Hypertension [I10]  Yes   • Acute kidney injury (CMS/HCC) [N17.9]  Yes   • Chronic respiratory failure with hypoxia (CMS/[facility]) [J96.11]  Yes      Resolved Hospital Problems   No resolved problems to display.        Hospital Course     Ms. Wolf is a 79 y.o. female former smoker with a history of oxygen dependent COPD as well as stage III CKD who presented to [facility] initially complaining of low back pain after a fall.  Please see the admitting history and physical for further details.  She was found to have hypoxia and a vertebral compression fracture and was admitted to the hospital for further evaluation and treatment.  She was seen by Dr. Kevin Lama from spine surgery.  All imaging studies outlined below.  They recommended nonoperative management with a back brace, analgesics and physical therapy.  Fortunately she is done very well with this and today her back pain is dramatically improved.  Dr. Lama has cleared her for discharge but will follow up with her as an outpatient and will plan repeat x-rays to make sure her symptoms are stable and there is no evidence of kyphotic collapse.  Her hypoxia has returned to baseline of 2 L.  No acute pulmonary issues identified.  She did have an elevated d-dimer but follow-up Doppler study lower extremities negative and V/Q study was read as normal.      Day of  Discharge     HPI:   Back pain much improved today.  Pain is controlled with her home dose [place].    Physical Exam:  Temp:  [98.6 °F (37 °C)-99.6 °F (37.6 °C)] 98.6 °F (37 °C)  Heart Rate:  [69-92] 79  Resp:  [16-18] 18  BP: (114-156)/(50-66) 114/50  Body mass index is 29.52 kg/m².  Physical Exam   Constitutional: She is oriented to person, place, and time. No distress.   Cardiovascular: Normal rate and regular rhythm.   No murmur heard.  Pulmonary/Chest: Effort normal and breath sounds normal.   Abdominal: Soft. Bowel sounds are normal. She exhibits no distension. There is no tenderness.   Musculoskeletal: Normal range of motion. She exhibits no edema.   Neurological: She is alert and oriented to person, place, and time.   Skin: Skin is warm and dry. She is not diaphoretic.       Consultants     Consult Orders (all) (From admission, onward)    Start     Ordered    06/16/19 2220  Inpatient Orthopedic Surgery Consult  Once     Specialty:  Orthopedic Surgery  Provider:  Kevin Lama MD    06/16/19 2219          Procedures     Imaging Results (all)     Procedure Component Value Units Date/Time    NM Lung Ventilation Perfusion [[id number]] Collected:  06/18/19 1241     Updated:  06/18/19 1405    Narrative:       NUCLEAR MEDICINE VQ SCAN     HISTORY: Hypoxia, elevated d-dimer.     TECHNIQUE: VQ scan was performed using 4.9 mCi of IV technetium MAA and  4.4 mCi of inhaled xenon-133, administered with a rebreathing device.  This is correlated with VQ scan 12/03/2017 and a chest x-ray performed  today and reported separately.     FINDINGS: There appears to be some air trapping on the ventilation  images. Activity is otherwise distributed normally throughout the lungs.     Perfusion images show homogeneous, normal distribution of radiotracer  uptake throughout both lungs. There is no segmental, subsegmental, or  nonsegmental perfusion defect.       Impression:       Air trapping is compatible with the reported history  of  COPD. VQ scan is otherwise normal.     This report was finalized on 6/18/2019 2:02 PM by Dr. Kelton Sesay M.D.       XR Chest PA & Lateral [[id number]] Collected:  06/18/19 1221     Updated:  06/18/19 1225    Narrative:       XR CHEST PA AND LATERAL-     HISTORY: Female who is 79 years-old,  hypoxia     TECHNIQUE: Frontal and lateral views of the chest     COMPARISON: 06/16/2019     FINDINGS: Heart size is borderline. Aorta is tortuous. Pulmonary  vasculature is unremarkable. Minimal likely atelectasis or scar at the  left base. No pleural effusion, or pneumothorax. Eventration of the  right hemidiaphragm is noted. No acute osseous process.       Impression:       Minimal likely atelectasis or scar at the left base.  Borderline heart size. Tortuous aorta. Follow-up as clinically  indicated.     This report was finalized on 6/18/2019 12:22 PM by Dr. Ross Phillips M.D.       XR Hips Bilateral With or Without Pelvis 2 View [[id number]] Collected:  06/16/19 2025     Updated:  06/17/19 1258    Narrative:       PA AND LATERAL CHEST X-RAY, 4 VIEW LUMBAR SPINE PLAIN FILM SERIES,  PELVIS AND BILATERAL HIPS 06/16/2019     CLINICAL HISTORY: Patient fell, difficulty ambulating, has low back pain  and bilateral hip pain.     CHEST: The cardiomediastinal silhouette and the pulmonary vasculature  are within normal limits. There is some linear stranding at the lung  bases, likely linear areas of atelectasis and/or scarring. The remainder  of the lungs are clear, costophrenic angles are sharp.       Impression:       No active disease is seen in the chest with no significant change when  compared to prior chest x-ray 05/21/2018. There is some linear  atelectasis or scarring at the lung bases bilaterally.      LUMBAR SPINE PLAIN FILM SERIES: A total of 5 views of the lumbar spine  are submitted for interpretation including AP bilateral oblique and  lateral views of the entire lumbar spine and coned down views of  the  lateral view of the lumbosacral junction. This is correlated to a prior  lumbar spine plain films series 07/03/2016.     FINDINGS: There is a dextroscoliotic curvature of the lumbar spine with  its apex at the L3 lumbar level. There is disc space narrowing and  degenerative endplate changes most pronounced in the central left side  of the endplates and anterior endplates at L1-L2, L2- 3 and L3-4. On the  lateral view of the entire lumbar spine there is a suggestion of a mild  compression deformity involving the superior body and endplate of L1  that could potentially be acute to subacute in nature that is new since  plain film series 07/03/2016. The remainder of the lumbar vertebral body  heights are well-maintained.     IMPRESSION:  1. There is a suggestion of mild compression deformity involving the  superior body and endplate of the L1 lumbar vertebrae with about 20%  loss of intervertebral body height that is new when compared to  07/03/2016 and could be acute to subacute nature, could be further  assessed with a lumbar spine CT or MRI if clinically indicated.  2. There is stable lumbar spondylosis with a rotary dextroscoliotic  curvature of the lumbar spine apex at the L3 lumbar level with  degenerative disc and endplate changes at L1-L4.     PELVIS AND BILATERAL HIPS: AP view of the pelvis and AP and frog-leg  lateral views of both the right and left hips are submitted for  interpretation. I see no acute fracture or malalignment or osseous  abnormality in the bones of the pelvis or the right or left hip.      The results and recommendation were communicated to Araseli Conn,  the nurse practitioner in the emergency room taking care of the patient,  by telephone 06/16/2019 at 6:50 PM.      This report was finalized on 6/17/2019 12:55 PM by Dr. Kashif Acevedo M.D.       XR Spine Lumbar 4+ View [[id number]] Collected:  06/16/19 2025     Updated:  06/17/19 1258    Narrative:       PA AND LATERAL CHEST X-RAY,  4 VIEW LUMBAR SPINE PLAIN FILM SERIES,  PELVIS AND BILATERAL HIPS 06/16/2019     CLINICAL HISTORY: Patient fell, difficulty ambulating, has low back pain  and bilateral hip pain.     CHEST: The cardiomediastinal silhouette and the pulmonary vasculature  are within normal limits. There is some linear stranding at the lung  bases, likely linear areas of atelectasis and/or scarring. The remainder  of the lungs are clear, costophrenic angles are sharp.       Impression:       No active disease is seen in the chest with no significant change when  compared to prior chest x-ray 05/21/2018. There is some linear  atelectasis or scarring at the lung bases bilaterally.      LUMBAR SPINE PLAIN FILM SERIES: A total of 5 views of the lumbar spine  are submitted for interpretation including AP bilateral oblique and  lateral views of the entire lumbar spine and coned down views of the  lateral view of the lumbosacral junction. This is correlated to a prior  lumbar spine plain films series 07/03/2016.     FINDINGS: There is a dextroscoliotic curvature of the lumbar spine with  its apex at the L3 lumbar level. There is disc space narrowing and  degenerative endplate changes most pronounced in the central left side  of the endplates and anterior endplates at L1-L2, L2- 3 and L3-4. On the  lateral view of the entire lumbar spine there is a suggestion of a mild  compression deformity involving the superior body and endplate of L1  that could potentially be acute to subacute in nature that is new since  plain film series 07/03/2016. The remainder of the lumbar vertebral body  heights are well-maintained.     IMPRESSION:  1. There is a suggestion of mild compression deformity involving the  superior body and endplate of the L1 lumbar vertebrae with about 20%  loss of intervertebral body height that is new when compared to  07/03/2016 and could be acute to subacute nature, could be further  assessed with a lumbar spine CT or MRI if  clinically indicated.  2. There is stable lumbar spondylosis with a rotary dextroscoliotic  curvature of the lumbar spine apex at the L3 lumbar level with  degenerative disc and endplate changes at L1-L4.     PELVIS AND BILATERAL HIPS: AP view of the pelvis and AP and frog-leg  lateral views of both the right and left hips are submitted for  interpretation. I see no acute fracture or malalignment or osseous  abnormality in the bones of the pelvis or the right or left hip.      The results and recommendation were communicated to Araseli Conn,  the nurse practitioner in the emergency room taking care of the patient,  by telephone 06/16/2019 at 6:50 PM.      This report was finalized on 6/17/2019 12:55 PM by Dr. Kashif Acevedo M.D.       XR Chest 2 View [[id number]] Collected:  06/16/19 2025     Updated:  06/17/19 1258    Narrative:       PA AND LATERAL CHEST X-RAY, 4 VIEW LUMBAR SPINE PLAIN FILM SERIES,  PELVIS AND BILATERAL HIPS 06/16/2019     CLINICAL HISTORY: Patient fell, difficulty ambulating, has low back pain  and bilateral hip pain.     CHEST: The cardiomediastinal silhouette and the pulmonary vasculature  are within normal limits. There is some linear stranding at the lung  bases, likely linear areas of atelectasis and/or scarring. The remainder  of the lungs are clear, costophrenic angles are sharp.       Impression:       No active disease is seen in the chest with no significant change when  compared to prior chest x-ray 05/21/2018. There is some linear  atelectasis or scarring at the lung bases bilaterally.      LUMBAR SPINE PLAIN FILM SERIES: A total of 5 views of the lumbar spine  are submitted for interpretation including AP bilateral oblique and  lateral views of the entire lumbar spine and coned down views of the  lateral view of the lumbosacral junction. This is correlated to a prior  lumbar spine plain films series 07/03/2016.     FINDINGS: There is a dextroscoliotic curvature of the lumbar spine  with  its apex at the L3 lumbar level. There is disc space narrowing and  degenerative endplate changes most pronounced in the central left side  of the endplates and anterior endplates at L1-L2, L2- 3 and L3-4. On the  lateral view of the entire lumbar spine there is a suggestion of a mild  compression deformity involving the superior body and endplate of L1  that could potentially be acute to subacute in nature that is new since  plain film series 07/03/2016. The remainder of the lumbar vertebral body  heights are well-maintained.     IMPRESSION:  1. There is a suggestion of mild compression deformity involving the  superior body and endplate of the L1 lumbar vertebrae with about 20%  loss of intervertebral body height that is new when compared to  07/03/2016 and could be acute to subacute nature, could be further  assessed with a lumbar spine CT or MRI if clinically indicated.  2. There is stable lumbar spondylosis with a rotary dextroscoliotic  curvature of the lumbar spine apex at the L3 lumbar level with  degenerative disc and endplate changes at L1-L4.     PELVIS AND BILATERAL HIPS: AP view of the pelvis and AP and frog-leg  lateral views of both the right and left hips are submitted for  interpretation. I see no acute fracture or malalignment or osseous  abnormality in the bones of the pelvis or the right or left hip.      The results and recommendation were communicated to Araseli Conn,  the nurse practitioner in the emergency room taking care of the patient,  by telephone 06/16/2019 at 6:50 PM.      This report was finalized on 6/17/2019 12:55 PM by Dr. Kashif Acevedo M.D.       CT Lumbar Spine Without Contrast [[id number]] Collected:  06/16/19 1923     Updated:  06/16/19 1935    Narrative:       NONCONTRAST CT SCAN LUMBAR SPINE     CLINICAL HISTORY: Spine fracture, traumatic, lumbar     COMPARISON: None.     TECHNIQUE: Radiation dose reduction techniques were utilized, including  automated exposure  control and exposure modulation based on body size.  Axial noncontrast images of the lumbar spine were obtained without  contrast. Sagittal reformatted images were supplemented.     FINDINGS:  There is an acute, minimal impaction fracture of the L1  superior endplate. Height loss is very mild, 10% on the sagittal  reformatted images. There is no retropulsion or bony canal narrowing.  The remaining lumbar vertebrae are intact. There is mild S-shaped  thoracolumbar scoliosis on the coronal reformatted images. On the  sagittal images, there is approximately 2-3 mm of retrolisthesis at  L3-4. Normal alignment otherwise.     The L2-L5 vertebrae are well-maintained in height.  Multilevel  degenerative changes are present. These are particularly pronounced at  L3-4, left lateral aspect followed by L2-3 and L1 to. There are  hypertrophic changes in the mid and lower lumbar posterior elements.  There are broad-based disc protrusions, particularly at L3-4 and L4-5  which extend into the neural foramina bilaterally. Smaller protrusions  at L1-L2 and L2-L3. There is multilevel canal and foraminal narrowing.     There is an 8 mm sclerotic lesion in the left first sacral ala. A small  bone island is favored, particularly if there is no malignancy history.  It was present on a prior pelvis CT from 06/24/2018 which would support  benign etiology.. Suggest follow-up.          Impression:       1. Minimal acute compression fracture of the L1 superior endplate with  up to 10% height loss, no retropulsion or bony canal narrowing.  2. 8mm sclerotic sacral lesion as discussed. Favor bone island     This report was finalized on 6/16/2019 7:32 PM by Jorge A Hylton M.D.             Results for orders placed during the hospital encounter of 06/16/19   Duplex Venous Lower Extremity - Bilateral CAR    Narrative · Normal bilateral lower extremity venous duplex scan.             Pertinent Labs     Results from last 7 days   Lab Units  06/17/19  0501 06/16/19  1612   WBC 10*3/mm3 6.27 8.42   HEMOGLOBIN g/dL 10.0* 9.7*   PLATELETS 10*3/mm3 213 212     Results from last 7 days   Lab Units 06/18/19  0514 06/17/19  0501 06/16/19  1612   SODIUM mmol/L 139 142 135*   POTASSIUM mmol/L 4.2 4.7 4.6   CHLORIDE mmol/L 100 105 98   CO2 mmol/L 26.3 25.4 26.0   BUN mg/dL 16 28* 34*   CREATININE mg/dL 1.04* 1.41* 1.80*   GLUCOSE mg/dL 89 99 122*   Estimated Creatinine Clearance: 42.4 mL/min (A) (by C-G formula based on SCr of 1.04 mg/dL (H)).  Results from last 7 days   Lab Units 06/16/19  1612   ALBUMIN g/dL 3.80   BILIRUBIN mg/dL 0.2   ALK PHOS U/L 85   AST (SGOT) U/L 23   ALT (SGPT) U/L 14     Results from last 7 days   Lab Units 06/18/19  0514 06/17/19  0501 06/16/19  1612   CALCIUM mg/dL 9.1 8.5* 8.8   ALBUMIN g/dL  --   --  3.80       Results from last 7 days   Lab Units 06/16/19  2319 06/16/19  1612   TROPONIN T ng/mL  --  <0.010   D DIMER QUANT MCGFEU/mL 3.68*  --            Invalid input(s): LDLCALC        Test Results Pending at Discharge   None    Discharge Details        Discharge Medications      Changes to Medications      Instructions Start Date   irbesartan-hydrochlorothiazide 150-12.5 MG tablet  Commonly known as:  AVALIDE  What changed:    · how much to take  · when to take this   2 tablets, Oral, Daily      traZODone 100 MG tablet  Commonly known as:  DESYREL  What changed:  when to take this   50 mg, Oral, Nightly PRN         Continue These Medications      Instructions Start Date   amLODIPine 10 MG tablet  Commonly known as:  NORVASC   10 mg, Oral, Daily      DEPAKOTE  MG 24 hr tablet  Generic drug:  divalproex   250 mg, Oral, Every 12 Hours      dicyclomine 10 MG capsule  Commonly known as:  BENTYL   10 mg, Oral, 4 Times Daily PRN      DULoxetine 60 MG capsule  Commonly known as:  CYMBALTA   60 mg, Oral, Daily      febuxostat 40 MG tablet  Commonly known as:  ULORIC   40 mg, Oral, Daily      furosemide 20 MG tablet  Commonly known as:   LASIX   20 mg, Oral, Daily      gabapentin 300 MG capsule  Commonly known as:  NEURONTIN   300 mg, Oral, 3 Times Daily      levothyroxine 88 MCG tablet  Commonly known as:  SYNTHROID, LEVOTHROID   88 mcg, Oral, Every Morning Before Breakfast      melatonin 1 MG tablet   3 mg, Oral, Nightly      OLANZapine 5 MG tablet  Commonly known as:  zyPREXA   5 mg, Oral, Nightly      ondansetron 4 MG tablet  Commonly known as:  ZOFRAN   4 mg, Oral, Every 8 Hours PRN      vitamin B-12 1000 MCG tablet  Commonly known as:  CYANOCOBALAMIN   1,000 mcg, Oral, 2 Times Daily             Allergies   Allergen Reactions   • Morphine And Related Hallucinations     CONFUSION         Discharge Disposition:  Home or Self Care    Discharge Diet:  Diet Order   Procedures   • Diet Regular       Discharge Activity:   Activity Instructions     Discharge Activity      1. To wear back brace anytime OOB except when going to bathroom.    2. Avoid forward bending and twisting at the waist.   3. No lifting greater than 5 pounds  4.  Ambulate twice a day, increasing distance daily  5.  No pushing or pulling          CODE STATUS:    Code Status and Medical Interventions:   Ordered at: 06/16/19 2200     Level Of Support Discussed With:    Patient     Code Status:    CPR     Medical Interventions (Level of Support Prior to Arrest):    Full       No future appointments.  Additional Instructions for the Follow-ups that You Need to Schedule     Discharge Follow-up with Specialty: Orthopedics; 2 Weeks   As directed      Specialty:  Orthopedics    Follow Up:  2 Weeks    Follow Up Details:  Return to the office to see Dr. Kevin Lama           Follow-up Information     Bill Martino MD Follow up.    Specialty:  Internal Medicine  Contact information:  [address]  [place] [id number] [phone]             Kevin Lama MD Follow up in 2 week(s).    Specialty:  Orthopedic Surgery  Contact information:  [address]  [facility] [address]  [phone]                   Additional Instructions for the Follow-ups that You Need to Schedule     Discharge Follow-up with Specialty: Orthopedics; 2 Weeks   As directed      Specialty:  Orthopedics    Follow Up:  2 Weeks    Follow Up Details:  Return to the office to see Dr. Kevin Lama             Electronically signed by Rishabh Yun MD, 6/18/2019, 3:31 PM

## 2019-06-18 NOTE — NURSING NOTE
S/W Pushpa in radiology and she states that a 2 view CXR needs to be done within 24 hours of VQ scan.  Will notify Dr. Yun. Mavis Pop RN

## 2019-06-18 NOTE — THERAPY DISCHARGE NOTE
Acute Care - Physical Therapy Treatment Note/Discharge  [facility]     Patient Name: Josephine Wolf  : 1940  MRN: [id number]  Today's Date: 2019  Onset of Illness/Injury or Date of Surgery: 19          Admit Date: 2019    Visit Dx:    ICD-10-CM ICD-9-CM   1. Closed compression fracture of first lumbar vertebra, initial encounter (CMS/[facility]) S32.010A 805.4   2. Hypoxia R09.02 799.02   3. Dehydration E86.0 276.51   4. Fall at home, initial encounter W19.XXXA E888.9    Y92.009 E849.0   5. Near syncope R55 780.2     Patient Active Problem List   Diagnosis   • Anemia   • OA (osteoarthritis) of knee   • Chronic respiratory failure with hypoxia (CMS/[facility])   • Cellulitis of skin   • Acute kidney injury (CMS/[facility])   • Sepsis (CMS/[facility])   • Orthostatic hypotension   • Disease of thyroid gland   • COPD (chronic obstructive pulmonary disease) (CMS/[facility])   • Hypertension   • Dehydration   • Stage 3 chronic kidney disease (CMS/[facility])   • Closed compression fracture of L1 lumbar vertebra (CMS/[facility])   • Hyponatremia   • Osteoporosis with pathological fracture       Physical Therapy Education     Title: PT OT SLP Therapies (Resolved)     Topic: Physical Therapy (Resolved)     Point: Mobility training (Resolved)     Learning Progress Summary           Patient Acceptance, E, VU by MA at 2019  9:58 AM    Acceptance, E,TB,D, VU,NR by  at 2019  5:20 PM                   Point: Home exercise program (Resolved)     Learning Progress Summary           Patient Acceptance, E, VU by MA at 2019  9:58 AM                   Point: Body mechanics (Resolved)     Learning Progress Summary           Patient Acceptance, E, VU by MA at 2019  9:58 AM    Acceptance, E,TB,D, VU,NR by  at 2019  5:20 PM                   Point: Precautions (Resolved)     Learning Progress Summary           Patient Acceptance, E, VU by MA at 2019  9:58 AM    Acceptance, E,TB,D, VU,NR by  at 2019  5:20 PM                                User Key     Initials Effective Dates Name Provider Type Discipline    CH 04/03/18 -  Bridgett Stevens, PT Physical Therapist PT    MA 10/19/18 -  Celia Mishra PT Physical Therapist PT              Rehab Goal Summary     Row Name 06/18/19 0900             Physical Therapy Goals    Bed Mobility Goal Selection (PT)  bed mobility, PT goal 1  -MA      Transfer Goal Selection (PT)  transfer, PT goal 1  -MA      Gait Training Goal Selection (PT)  gait training, PT goal 1  -MA         Bed Mobility Goal 1 (PT)    Progress/Outcomes (Bed Mobility Goal 1, PT)  goal met  -MA         Transfer Goal 1 (PT)    Progress/Outcome (Transfer Goal 1, PT)  goal met  -MA         Gait Training Goal 1 (PT)    Progress/Outcome (Gait Training Goal 1, PT)  goal met  -MA        User Key  (r) = Recorded By, (t) = Taken By, (c) = Cosigned By    Initials Name Provider Type Discipline    MA Celia Mishra, PT Physical Therapist PT        Therapy Treatment  Rehabilitation Treatment Summary     Row Name 06/18/19 0948             Treatment Time/Intention    Discipline  physical therapist  -MA      Document Type  discharge treatment  -MA      Subjective Information  no complaints  -MA      Mode of Treatment  physical therapy;individual therapy  -MA      Patient/Family Observations  up in chair, no acute distress, sleeps in recliner at home   -MA      Patient Effort  good  -MA      Existing Precautions/Restrictions  fall brace for comfort   -MA      Recorded by [MA] Celia Mishra, PT 06/18/19 0957      Row Name 06/18/19 0948             Cognitive Assessment/Intervention- PT/OT    Orientation Status (Cognition)  oriented x 4  -MA      Follows Commands (Cognition)  WNL  -MA      Personal Safety Interventions  fall prevention program maintained;gait belt;muscle strengthening facilitated;nonskid shoes/slippers when out of bed;supervised activity  -MA      Recorded by [MA] Celia Mishra, PT 06/18/19 0957      Row Name 06/18/19 0948              Bed Mobility Assessment/Treatment    Comment (Bed Mobility)  NT- UIC, pt sleeps in recliner at home   -MA      Recorded by [MA] Celia Misrha, PT 06/18/19 0957      Row Name 06/18/19 0948             Sit-Stand Transfer    Sit-Stand [place] (Transfers)  supervision  -MA      Assistive Device (Sit-Stand Transfers)  walker, front-wheeled  -MA      Recorded by [MA] Celia Mishra, PT 06/18/19 0957      Row Name 06/18/19 0948             Stand-Sit Transfer    Stand-Sit [place] (Transfers)  supervision  -MA      Assistive Device (Stand-Sit Transfers)  walker, front-wheeled  -MA      Recorded by [MA] Celia Mishra, PT 06/18/19 0957      Row Name 06/18/19 0948             Gait/Stairs Assessment/Training    [place] Level (Gait)  supervision  -MA      Assistive Device (Gait)  walker, front-wheeled  -MA      Distance in Feet (Gait)  300  -MA      Pattern (Gait)  step-through  -MA      Comment (Gait/Stairs)  no LOB or deviations. Able to walk short distance without AD SBA  -MA      Recorded by [MA] Celia Mishra, PT 06/18/19 0957      Row Name 06/18/19 0948             Positioning and Restraints    Pre-Treatment Position  sitting in chair/recliner  -MA      Post Treatment Position  chair  -MA      In Chair  call light within reach;encouraged to call for assist no alarm on when entering room   -MA      Recorded by [MA] Celia Mishra, PT 06/18/19 0957      Row Name 06/18/19 0948             Pain Assessment    Additional Documentation  Pain Scale: Word Pre/Post-Treatment (Group)  -MA      Recorded by [MA] Celia Mishra, PT 06/18/19 0957      Row Name 06/18/19 0948             Pain Scale: Word Pre/Post-Treatment    Pain: Word Scale, Pretreatment  2 - mild pain  -MA      Pain: Word Scale, Post-Treatment  2 - mild pain  -MA      Pre/Post Treatment Pain Comment  back pain   -MA      Recorded by [MA] Celia Mishra, PT 06/18/19 0957      Row Name 06/18/19 0948             Outcome Summary/Treatment Plan (PT)    Anticipated  Discharge Disposition (PT)  home  -MA      Recorded by [MA] Celia Mishra, PT 06/18/19 0957        User Key  (r) = Recorded By, (t) = Taken By, (c) = Cosigned By    Initials Name Effective Dates Discipline    Celia Chatman, PT 10/19/18 -  PT             PT Recommendation and Plan  Anticipated Discharge Disposition (PT): home  Outcome Summary/Treatment Plan (PT)  Anticipated Discharge Disposition (PT): home  Outcome Summary: Pt has vastly improved mobility. Able to ambulate with walker without physical assist. Recommend pt use walker at home initially. No further acute care PT needs. Recommend home when medically able.     Outcome Measures     Row Name 06/18/19 0900 06/17/19 1700          How much help from another person do you currently need...    Turning from your back to your side while in flat bed without using bedrails?  4  -MA  3  -CH     Moving from lying on back to sitting on the side of a flat bed without bedrails?  4  -MA  3  -CH     Moving to and from a bed to a chair (including a wheelchair)?  4  -MA  3  -CH     Standing up from a chair using your arms (e.g., wheelchair, bedside chair)?  4  -MA  3  -CH     Climbing 3-5 steps with a railing?  3  -MA  2  -CH     To walk in hospital room?  4  -MA  3  -CH     AM-PAC 6 Clicks Score  23  -MA  17  -CH        Functional Assessment    Outcome Measure Options  AM-PAC 6 Clicks Basic Mobility (PT)  -MA  AM-PAC 6 Clicks Basic Mobility (PT)  -CH       User Key  (r) = Recorded By, (t) = Taken By, (c) = Cosigned By    Initials Name Provider Type    CH Bridgett Stevens, PT Physical Therapist    Celia Chatman, PT Physical Therapist           Time Calculation:   PT Charges     Row Name 06/18/19 0959             Time Calculation    Start Time  0937  -MA      Stop Time  0948  -MA      Time Calculation (min)  11 min  -MA      PT Received On  06/18/19  -MA         Time Calculation- PT    Total Timed Code Minutes- PT  11 minute(s)  -MA        User Key  (r) = Recorded By,  (t) = Taken By, (c) = Cosigned By    Initials Name Provider Type    Celia Chatman, PT Physical Therapist        Therapy Charges for Today     Code Description Service Date Service Provider Modifiers Qty    18682475215 HC PT THER PROC EA 15 MIN 6/18/2019 Celia Mishra, PT GP 1          PT G-Codes  Outcome Measure Options: AM-PAC 6 Clicks Basic Mobility (PT)  AM-PAC 6 Clicks Score: 23    PT Discharge Summary  Anticipated Discharge Disposition (PT): home    Celia Mishra PT  6/18/2019

## 2019-06-18 NOTE — DISCHARGE PLACEMENT REQUEST
"Raheem Roberts (79 y.o. Female)     Date of Birth Social Security Number Address Home Phone MRN    1940  [address]    [facility] [address] [phone] [id number]    [employer] Marital Status          Cheondoism        Admission Date Admission Type Admitting Provider Attending Provider Department, Room/Bed    6/16/19 Emergency Jorge A Mello MD Ray, Jonathan, MD [address], P596/1    Discharge Date Discharge Disposition Discharge Destination                       Attending Provider:  Rishabh Yun MD    Allergies:  Morphine And Related    Isolation:  None   Infection:  None   Code Status:  CPR    Ht:  157.5 cm (62\")   Wt:  73.2 kg (161 lb 6 oz)    Admission Cmt:  None   Principal Problem:  Closed compression fracture of L1 lumbar vertebra (CMS/[facility]) [S32.010A]                 Active Insurance as of 6/16/2019     Primary Coverage     Payor Plan Insurance Group Employer/Plan Group    [facility] MEDICARE REPLACEMENT [facility]      Payor Plan Address Payor Plan Phone Number Payor Plan Fax Number Effective Dates    [address] [address] [phone]  10/9/2017 - None Entered    [facility] [id number]       Subscriber Name Subscriber Birth Date Member ID       RAHEEM ROBERTS 1940 [id number]           Secondary Coverage     Payor Plan Insurance Group Employer/Plan Group    [place]      Payor Plan Address Payor Plan Phone Number Payor Plan Fax Number Effective Dates    [address] [phone]  7/3/2016 - None Entered    [facility] [id number]       Subscriber Name Subscriber Birth Date Member ID       ALEJANDRARAHEEM QUACH 1940 [id number]                 Emergency Contacts      (Rel.) Home Phone Work Phone Mobile Phone    Chino Roberts (Son) [phone] -- --    James Roberts (Son) [phone] -- [phone]              "

## 2019-06-18 NOTE — PROGRESS NOTES
Her back pain is significantly better and she was able to ambulate quite well yesterday in the brace.  She moves the legs well and describes no leg pain.  She states the back pain is tolerable and in vision going home in her current condition.  She could go home after physical therapy today if they think she is able and if not we can keep her till tomorrow.  I think we will be able to treat this without surgery but I want to see her back in 2 weeks for follow-up.  I told her indications for kyphoplasty at this point would be intractable pain, which appears unlikely, and kyphotic collapse for which we will simply have to monitor her with subsequent x-rays as an outpatient..

## 2019-06-18 NOTE — PROGRESS NOTES
Continued Stay Note  [facility]     Patient Name: Josephine Wolf  MRN: [id number]  Today's Date: 6/18/2019    Admit Date: 6/16/2019    Discharge Plan     Row Name 06/18/19 1243       Plan    Plan  Home with [facility]    Patient/Family in Agreement with Plan  yes    Plan Comments  Spoke with patient regarding DC plans. Patient states she does have a walker at home. Patient requested [facility] for PT. Referral made. Dina notified        Discharge Codes    No documentation.       Expected Discharge Date and Time     Expected Discharge Date Expected Discharge Time    Jun 18, 2019             Nurys Flaherty RN

## 2019-06-19 NOTE — PROGRESS NOTES
Case Management Discharge Note    Final Note: Discharged with [facility]. Family provided transportaion         Transportation Services  Private: Car    Final Discharge Disposition Code: 06 - home with home health care

## 2020-03-12 ENCOUNTER — APPOINTMENT (OUTPATIENT)
Dept: GENERAL RADIOLOGY | Facility: HOSPITAL | Age: 80
End: 2020-03-12

## 2020-03-12 ENCOUNTER — HOSPITAL ENCOUNTER (INPATIENT)
Facility: HOSPITAL | Age: 80
LOS: 3 days | Discharge: HOME | End: 2020-03-15
Attending: EMERGENCY MEDICINE | Admitting: INTERNAL MEDICINE

## 2020-03-12 DIAGNOSIS — J96.21 ACUTE ON CHRONIC RESPIRATORY FAILURE WITH HYPOXIA AND HYPERCAPNIA (HCC): ICD-10-CM

## 2020-03-12 DIAGNOSIS — J96.02 ACUTE RESPIRATORY ACIDOSIS (HCC): Primary | ICD-10-CM

## 2020-03-12 DIAGNOSIS — J96.22 ACUTE ON CHRONIC RESPIRATORY FAILURE WITH HYPOXIA AND HYPERCAPNIA (HCC): ICD-10-CM

## 2020-03-12 DIAGNOSIS — N19 RENAL FAILURE, UNSPECIFIED CHRONICITY: ICD-10-CM

## 2020-03-12 PROBLEM — E66.9 OBESITY (BMI 30-39.9): Status: ACTIVE | Noted: 2020-03-12

## 2020-03-12 PROBLEM — J96.20 ACUTE-ON-CHRONIC RESPIRATORY FAILURE (HCC): Status: ACTIVE | Noted: 2020-03-12

## 2020-03-12 LAB
ALBUMIN SERPL-MCNC: 3.9 G/DL (ref 3.5–5.2)
ALBUMIN/GLOB SERPL: 1.3 G/DL
ALP SERPL-CCNC: 108 U/L (ref 39–117)
ALT SERPL W P-5'-P-CCNC: 23 U/L (ref 1–33)
ANION GAP SERPL CALCULATED.3IONS-SCNC: 14.1 MMOL/L (ref 5–15)
ARTERIAL PATENCY WRIST A: POSITIVE
ARTERIAL PATENCY WRIST A: POSITIVE
AST SERPL-CCNC: 27 U/L (ref 1–32)
ATMOSPHERIC PRESS: 746.8 MMHG
ATMOSPHERIC PRESS: 747.7 MMHG
B PARAPERT DNA SPEC QL NAA+PROBE: NOT DETECTED
B PERT DNA SPEC QL NAA+PROBE: NOT DETECTED
BASE EXCESS BLDA CALC-SCNC: 1 MMOL/L (ref 0–2)
BASE EXCESS BLDA CALC-SCNC: 2.9 MMOL/L (ref 0–2)
BASOPHILS # BLD AUTO: 0.04 10*3/MM3 (ref 0–0.2)
BASOPHILS NFR BLD AUTO: 0.6 % (ref 0–1.5)
BDY SITE: ABNORMAL
BDY SITE: ABNORMAL
BILIRUB SERPL-MCNC: 0.2 MG/DL (ref 0.2–1.2)
BILIRUB UR QL STRIP: NEGATIVE
BUN BLD-MCNC: 38 MG/DL (ref 8–23)
BUN/CREAT SERPL: 12.3 (ref 7–25)
C PNEUM DNA NPH QL NAA+NON-PROBE: NOT DETECTED
CALCIUM SPEC-SCNC: 8.2 MG/DL (ref 8.6–10.5)
CHLORIDE SERPL-SCNC: 97 MMOL/L (ref 98–107)
CLARITY UR: CLEAR
CO2 SERPL-SCNC: 26.9 MMOL/L (ref 22–29)
COLOR UR: YELLOW
CREAT BLD-MCNC: 3.1 MG/DL (ref 0.57–1)
D-LACTATE SERPL-SCNC: 1.4 MMOL/L (ref 0.5–2)
DEPRECATED RDW RBC AUTO: 49.5 FL (ref 37–54)
EOSINOPHIL # BLD AUTO: 0.14 10*3/MM3 (ref 0–0.4)
EOSINOPHIL NFR BLD AUTO: 1.9 % (ref 0.3–6.2)
ERYTHROCYTE [DISTWIDTH] IN BLOOD BY AUTOMATED COUNT: 13.9 % (ref 12.3–15.4)
FLUAV H1 2009 PAND RNA NPH QL NAA+PROBE: NOT DETECTED
FLUAV H1 HA GENE NPH QL NAA+PROBE: NOT DETECTED
FLUAV H3 RNA NPH QL NAA+PROBE: NOT DETECTED
FLUAV SUBTYP SPEC NAA+PROBE: NOT DETECTED
FLUBV RNA ISLT QL NAA+PROBE: NOT DETECTED
GFR SERPL CREATININE-BSD FRML MDRD: 15 ML/MIN/1.73
GLOBULIN UR ELPH-MCNC: 3.1 GM/DL
GLUCOSE BLD-MCNC: 105 MG/DL (ref 65–99)
GLUCOSE BLDC GLUCOMTR-MCNC: 163 MG/DL (ref 70–130)
GLUCOSE BLDC GLUCOMTR-MCNC: 99 MG/DL (ref 70–130)
GLUCOSE UR STRIP-MCNC: NEGATIVE MG/DL
HADV DNA SPEC NAA+PROBE: NOT DETECTED
HCO3 BLDA-SCNC: 28.6 MMOL/L (ref 22–28)
HCO3 BLDA-SCNC: 30.3 MMOL/L (ref 22–28)
HCOV 229E RNA SPEC QL NAA+PROBE: NOT DETECTED
HCOV HKU1 RNA SPEC QL NAA+PROBE: NOT DETECTED
HCOV NL63 RNA SPEC QL NAA+PROBE: NOT DETECTED
HCOV OC43 RNA SPEC QL NAA+PROBE: NOT DETECTED
HCT VFR BLD AUTO: 30 % (ref 34–46.6)
HGB BLD-MCNC: 9.9 G/DL (ref 12–15.9)
HGB UR QL STRIP.AUTO: NEGATIVE
HMPV RNA NPH QL NAA+NON-PROBE: NOT DETECTED
HOROWITZ INDEX BLD+IHG-RTO: 40 %
HOROWITZ INDEX BLD+IHG-RTO: 60 %
HPIV1 RNA SPEC QL NAA+PROBE: NOT DETECTED
HPIV2 RNA SPEC QL NAA+PROBE: NOT DETECTED
HPIV3 RNA NPH QL NAA+PROBE: NOT DETECTED
HPIV4 P GENE NPH QL NAA+PROBE: NOT DETECTED
IMM GRANULOCYTES # BLD AUTO: 0.13 10*3/MM3 (ref 0–0.05)
IMM GRANULOCYTES NFR BLD AUTO: 1.8 % (ref 0–0.5)
KETONES UR QL STRIP: NEGATIVE
LEUKOCYTE ESTERASE UR QL STRIP.AUTO: NEGATIVE
LYMPHOCYTES # BLD AUTO: 2.21 10*3/MM3 (ref 0.7–3.1)
LYMPHOCYTES NFR BLD AUTO: 30.6 % (ref 19.6–45.3)
M PNEUMO IGG SER IA-ACNC: NOT DETECTED
MCH RBC QN AUTO: 32.4 PG (ref 26.6–33)
MCHC RBC AUTO-ENTMCNC: 33 G/DL (ref 31.5–35.7)
MCV RBC AUTO: 98 FL (ref 79–97)
MODALITY: ABNORMAL
MODALITY: ABNORMAL
MONOCYTES # BLD AUTO: 0.81 10*3/MM3 (ref 0.1–0.9)
MONOCYTES NFR BLD AUTO: 11.2 % (ref 5–12)
NEUTROPHILS # BLD AUTO: 3.9 10*3/MM3 (ref 1.7–7)
NEUTROPHILS NFR BLD AUTO: 53.9 % (ref 42.7–76)
NITRITE UR QL STRIP: NEGATIVE
NRBC BLD AUTO-RTO: 0 /100 WBC (ref 0–0.2)
NT-PROBNP SERPL-MCNC: 242.6 PG/ML (ref 5–1800)
O2 A-A PPRESDIFF RESPIRATORY: 0.2 MMHG
O2 A-A PPRESDIFF RESPIRATORY: 0.3 MMHG
PCO2 BLDA: 60.7 MM HG (ref 35–45)
PCO2 BLDA: 61.8 MM HG (ref 35–45)
PH BLDA: 7.28 PH UNITS (ref 7.35–7.45)
PH BLDA: 7.3 PH UNITS (ref 7.35–7.45)
PH UR STRIP.AUTO: <=5 [PH] (ref 5–8)
PLATELET # BLD AUTO: 193 10*3/MM3 (ref 140–450)
PMV BLD AUTO: 9.4 FL (ref 6–12)
PO2 BLDA: 64.7 MM HG (ref 80–100)
PO2 BLDA: 72 MM HG (ref 80–100)
POTASSIUM BLD-SCNC: 4.4 MMOL/L (ref 3.5–5.2)
PROCALCITONIN SERPL-MCNC: 0.12 NG/ML (ref 0.1–0.25)
PROT SERPL-MCNC: 7 G/DL (ref 6–8.5)
PROT UR QL STRIP: NEGATIVE
RBC # BLD AUTO: 3.06 10*6/MM3 (ref 3.77–5.28)
RHINOVIRUS RNA SPEC NAA+PROBE: NOT DETECTED
RSV RNA NPH QL NAA+NON-PROBE: NOT DETECTED
SAO2 % BLDCOA: 88.8 % (ref 92–99)
SAO2 % BLDCOA: 91.9 % (ref 92–99)
SET MECH RESP RATE: 20
SET MECH RESP RATE: 24
SODIUM BLD-SCNC: 138 MMOL/L (ref 136–145)
SP GR UR STRIP: 1.02 (ref 1–1.03)
TOTAL RATE: 22 BREATHS/MINUTE
TOTAL RATE: 24 BREATHS/MINUTE
TROPONIN T SERPL-MCNC: <0.01 NG/ML (ref 0–0.03)
UROBILINOGEN UR QL STRIP: NORMAL
VENTILATOR MODE: ABNORMAL
VT ON VENT VENT: 577 ML
WBC NRBC COR # BLD: 7.23 10*3/MM3 (ref 3.4–10.8)

## 2020-03-12 PROCEDURE — 36600 WITHDRAWAL OF ARTERIAL BLOOD: CPT

## 2020-03-12 PROCEDURE — 25010000002 ENOXAPARIN PER 10 MG: Performed by: INTERNAL MEDICINE

## 2020-03-12 PROCEDURE — 94799 UNLISTED PULMONARY SVC/PX: CPT

## 2020-03-12 PROCEDURE — 87040 BLOOD CULTURE FOR BACTERIA: CPT | Performed by: INTERNAL MEDICINE

## 2020-03-12 PROCEDURE — 84145 PROCALCITONIN (PCT): CPT | Performed by: EMERGENCY MEDICINE

## 2020-03-12 PROCEDURE — 25010000002 METHYLPREDNISOLONE PER 125 MG: Performed by: INTERNAL MEDICINE

## 2020-03-12 PROCEDURE — 93010 ELECTROCARDIOGRAM REPORT: CPT | Performed by: INTERNAL MEDICINE

## 2020-03-12 PROCEDURE — 80053 COMPREHEN METABOLIC PANEL: CPT | Performed by: EMERGENCY MEDICINE

## 2020-03-12 PROCEDURE — 94660 CPAP INITIATION&MGMT: CPT

## 2020-03-12 PROCEDURE — 85025 COMPLETE CBC W/AUTO DIFF WBC: CPT | Performed by: EMERGENCY MEDICINE

## 2020-03-12 PROCEDURE — 82803 BLOOD GASES ANY COMBINATION: CPT

## 2020-03-12 PROCEDURE — 71045 X-RAY EXAM CHEST 1 VIEW: CPT

## 2020-03-12 PROCEDURE — 84484 ASSAY OF TROPONIN QUANT: CPT | Performed by: EMERGENCY MEDICINE

## 2020-03-12 PROCEDURE — 82962 GLUCOSE BLOOD TEST: CPT

## 2020-03-12 PROCEDURE — 94640 AIRWAY INHALATION TREATMENT: CPT

## 2020-03-12 PROCEDURE — 0100U HC BIOFIRE FILMARRAY RESP PANEL 2: CPT | Performed by: EMERGENCY MEDICINE

## 2020-03-12 PROCEDURE — 83605 ASSAY OF LACTIC ACID: CPT | Performed by: EMERGENCY MEDICINE

## 2020-03-12 PROCEDURE — 99285 EMERGENCY DEPT VISIT HI MDM: CPT

## 2020-03-12 PROCEDURE — 93005 ELECTROCARDIOGRAM TRACING: CPT | Performed by: EMERGENCY MEDICINE

## 2020-03-12 PROCEDURE — 83880 ASSAY OF NATRIURETIC PEPTIDE: CPT | Performed by: EMERGENCY MEDICINE

## 2020-03-12 PROCEDURE — 81003 URINALYSIS AUTO W/O SCOPE: CPT | Performed by: INTERNAL MEDICINE

## 2020-03-12 RX ORDER — DEXTROSE MONOHYDRATE 25 G/50ML
25 INJECTION, SOLUTION INTRAVENOUS
Status: DISCONTINUED | OUTPATIENT
Start: 2020-03-12 | End: 2020-03-15 | Stop reason: HOSPADM

## 2020-03-12 RX ORDER — SODIUM CHLORIDE 0.9 % (FLUSH) 0.9 %
10 SYRINGE (ML) INJECTION EVERY 12 HOURS SCHEDULED
Status: DISCONTINUED | OUTPATIENT
Start: 2020-03-12 | End: 2020-03-15 | Stop reason: HOSPADM

## 2020-03-12 RX ORDER — BISACODYL 5 MG/1
5 TABLET, DELAYED RELEASE ORAL DAILY PRN
Status: DISCONTINUED | OUTPATIENT
Start: 2020-03-12 | End: 2020-03-15 | Stop reason: HOSPADM

## 2020-03-12 RX ORDER — ONDANSETRON 4 MG/1
4 TABLET, FILM COATED ORAL EVERY 6 HOURS PRN
Status: DISCONTINUED | OUTPATIENT
Start: 2020-03-12 | End: 2020-03-15 | Stop reason: HOSPADM

## 2020-03-12 RX ORDER — GUAIFENESIN 600 MG/1
600 TABLET, EXTENDED RELEASE ORAL EVERY 12 HOURS SCHEDULED
Status: DISCONTINUED | OUTPATIENT
Start: 2020-03-12 | End: 2020-03-15 | Stop reason: HOSPADM

## 2020-03-12 RX ORDER — BISACODYL 10 MG
10 SUPPOSITORY, RECTAL RECTAL DAILY PRN
Status: DISCONTINUED | OUTPATIENT
Start: 2020-03-12 | End: 2020-03-15 | Stop reason: HOSPADM

## 2020-03-12 RX ORDER — SODIUM CHLORIDE 0.9 % (FLUSH) 0.9 %
10 SYRINGE (ML) INJECTION AS NEEDED
Status: DISCONTINUED | OUTPATIENT
Start: 2020-03-12 | End: 2020-03-15 | Stop reason: HOSPADM

## 2020-03-12 RX ORDER — HYDROCODONE BITARTRATE AND ACETAMINOPHEN 7.5; 325 MG/1; MG/1
1 TABLET ORAL EVERY 8 HOURS PRN
COMMUNITY
End: 2020-03-15 | Stop reason: HOSPADM

## 2020-03-12 RX ORDER — IPRATROPIUM BROMIDE AND ALBUTEROL SULFATE 2.5; .5 MG/3ML; MG/3ML
3 SOLUTION RESPIRATORY (INHALATION) EVERY 4 HOURS PRN
Status: DISCONTINUED | OUTPATIENT
Start: 2020-03-12 | End: 2020-03-15 | Stop reason: HOSPADM

## 2020-03-12 RX ORDER — MAGNESIUM SULFATE HEPTAHYDRATE 40 MG/ML
2 INJECTION, SOLUTION INTRAVENOUS AS NEEDED
Status: DISCONTINUED | OUTPATIENT
Start: 2020-03-12 | End: 2020-03-15 | Stop reason: HOSPADM

## 2020-03-12 RX ORDER — METHYLPREDNISOLONE SODIUM SUCCINATE 125 MG/2ML
60 INJECTION, POWDER, LYOPHILIZED, FOR SOLUTION INTRAMUSCULAR; INTRAVENOUS EVERY 12 HOURS
Status: DISCONTINUED | OUTPATIENT
Start: 2020-03-12 | End: 2020-03-13

## 2020-03-12 RX ORDER — NOREPINEPHRINE BIT/0.9 % NACL 8 MG/250ML
.02-.3 INFUSION BOTTLE (ML) INTRAVENOUS
Status: DISCONTINUED | OUTPATIENT
Start: 2020-03-12 | End: 2020-03-13

## 2020-03-12 RX ORDER — DIVALPROEX SODIUM 250 MG/1
500 TABLET, EXTENDED RELEASE ORAL 2 TIMES DAILY
Status: ON HOLD | COMMUNITY
End: 2020-08-05

## 2020-03-12 RX ORDER — NICOTINE POLACRILEX 4 MG
15 LOZENGE BUCCAL
Status: DISCONTINUED | OUTPATIENT
Start: 2020-03-12 | End: 2020-03-15 | Stop reason: HOSPADM

## 2020-03-12 RX ORDER — IPRATROPIUM BROMIDE AND ALBUTEROL SULFATE 2.5; .5 MG/3ML; MG/3ML
3 SOLUTION RESPIRATORY (INHALATION) ONCE
Status: COMPLETED | OUTPATIENT
Start: 2020-03-12 | End: 2020-03-12

## 2020-03-12 RX ORDER — POTASSIUM CHLORIDE 7.45 MG/ML
10 INJECTION INTRAVENOUS
Status: DISCONTINUED | OUTPATIENT
Start: 2020-03-12 | End: 2020-03-12

## 2020-03-12 RX ORDER — ALBUTEROL SULFATE 2.5 MG/3ML
2.5 SOLUTION RESPIRATORY (INHALATION) ONCE
Status: COMPLETED | OUTPATIENT
Start: 2020-03-12 | End: 2020-03-12

## 2020-03-12 RX ORDER — NITROGLYCERIN 0.4 MG/1
0.4 TABLET SUBLINGUAL
Status: DISCONTINUED | OUTPATIENT
Start: 2020-03-12 | End: 2020-03-15 | Stop reason: HOSPADM

## 2020-03-12 RX ORDER — ONDANSETRON 2 MG/ML
4 INJECTION INTRAMUSCULAR; INTRAVENOUS EVERY 6 HOURS PRN
Status: DISCONTINUED | OUTPATIENT
Start: 2020-03-12 | End: 2020-03-15 | Stop reason: HOSPADM

## 2020-03-12 RX ORDER — FLUTICASONE PROPIONATE 50 MCG
1 SPRAY, SUSPENSION (ML) NASAL DAILY
Status: ON HOLD | COMMUNITY
End: 2021-10-19

## 2020-03-12 RX ORDER — MAGNESIUM SULFATE HEPTAHYDRATE 40 MG/ML
4 INJECTION, SOLUTION INTRAVENOUS AS NEEDED
Status: DISCONTINUED | OUTPATIENT
Start: 2020-03-12 | End: 2020-03-15 | Stop reason: HOSPADM

## 2020-03-12 RX ORDER — IPRATROPIUM BROMIDE AND ALBUTEROL SULFATE 2.5; .5 MG/3ML; MG/3ML
3 SOLUTION RESPIRATORY (INHALATION)
Status: DISCONTINUED | OUTPATIENT
Start: 2020-03-12 | End: 2020-03-13

## 2020-03-12 RX ORDER — POTASSIUM CHLORIDE 1.5 G/1.77G
40 POWDER, FOR SOLUTION ORAL AS NEEDED
Status: DISCONTINUED | OUTPATIENT
Start: 2020-03-12 | End: 2020-03-12

## 2020-03-12 RX ORDER — POTASSIUM CHLORIDE 750 MG/1
40 CAPSULE, EXTENDED RELEASE ORAL AS NEEDED
Status: DISCONTINUED | OUTPATIENT
Start: 2020-03-12 | End: 2020-03-12

## 2020-03-12 RX ORDER — ACETAMINOPHEN 500 MG
1000 TABLET ORAL ONCE
Status: COMPLETED | OUTPATIENT
Start: 2020-03-12 | End: 2020-03-12

## 2020-03-12 RX ADMIN — ENOXAPARIN SODIUM 30 MG: 30 INJECTION SUBCUTANEOUS at 19:00

## 2020-03-12 RX ADMIN — SODIUM CHLORIDE, PRESERVATIVE FREE 10 ML: 5 INJECTION INTRAVENOUS at 21:52

## 2020-03-12 RX ADMIN — IPRATROPIUM BROMIDE AND ALBUTEROL SULFATE 3 ML: 2.5; .5 SOLUTION RESPIRATORY (INHALATION) at 23:56

## 2020-03-12 RX ADMIN — ACETAMINOPHEN 1000 MG: 500 TABLET, FILM COATED ORAL at 14:03

## 2020-03-12 RX ADMIN — IPRATROPIUM BROMIDE AND ALBUTEROL SULFATE 3 ML: 2.5; .5 SOLUTION RESPIRATORY (INHALATION) at 20:15

## 2020-03-12 RX ADMIN — IPRATROPIUM BROMIDE AND ALBUTEROL SULFATE 3 ML: 2.5; .5 SOLUTION RESPIRATORY (INHALATION) at 10:16

## 2020-03-12 RX ADMIN — METHYLPREDNISOLONE SODIUM SUCCINATE 60 MG: 125 INJECTION, POWDER, FOR SOLUTION INTRAMUSCULAR; INTRAVENOUS at 18:58

## 2020-03-12 RX ADMIN — ALBUTEROL SULFATE 2.5 MG: 2.5 SOLUTION RESPIRATORY (INHALATION) at 10:16

## 2020-03-12 RX ADMIN — GUAIFENESIN 600 MG: 600 TABLET, EXTENDED RELEASE ORAL at 23:20

## 2020-03-12 NOTE — PROGRESS NOTES
Clinical Pharmacy Services: Medication History    Josephine Wolf is a 79 y.o. female presenting to [facility] for   Chief Complaint   Patient presents with    Shortness of Breath       She  has a past medical history of Acid reflux, Anemia, Arthritis, Bipolar 1 disorder, depressed (CMS/[facility]), Cataract, Chronic nausea, Chronic pain of right knee, Continuous leakage of urine, COPD (chronic obstructive pulmonary disease) (CMS/[facility]), Disease of thyroid gland, Diverticulosis, Fibromyalgia, Frequent episodes of bronchitis, Hypertension, Migraines, Neck pain, On home oxygen therapy, and Short of breath on exertion.    Allergies as of 03/12/2020 - Reviewed 03/12/2020   Allergen Reaction Noted    Morphine and related Hallucinations 07/03/2016       Medication information was obtained from: pharmacy and patient family  Pharmacy and Phone Number:   [id number] - [place], [place] - [address] AT SEC [address] - [phone]  - [phone] FX  [address] [place] [address]  Phone: [phone] Fax: [phone]      Prior to Admission Medications       Prescriptions Last Dose Informant Patient Reported? Taking?    amLODIPine (NORVASC) 10 MG tablet  Nursing Home Yes Yes    Take 10 mg by mouth Daily.    dicyclomine (BENTYL) 10 MG capsule  Nursing Home No Yes    Take 1 capsule by mouth 4 (Four) Times a Day As Needed (diarrhea, cramping).    divalproex (DEPAKOTE ER) 250 MG 24 hr tablet  Pharmacy Yes Yes    Take 250 mg by mouth Daily.    divalproex (DEPAKOTE) 250 MG 24 hr tablet  Pharmacy Yes Yes    Take 500 mg by mouth Every Evening.    DULoxetine (CYMBALTA) 60 MG capsule  Pharmacy Yes Yes    Take 60 mg by mouth 2 (Two) Times a Day.    febuxostat (ULORIC) 40 MG tablet  Pharmacy Yes Yes    Take 40 mg by mouth Daily.    fluticasone (FLONASE) 50 MCG/ACT nasal spray  Pharmacy Yes Yes    1 spray into the nostril(s) as directed by provider Daily.    gabapentin  (NEURONTIN) 300 MG capsule  Pharmacy Yes Yes    Take 300 mg by mouth 3 (Three) Times a Day.    HYDROcodone-acetaminophen (NORCO) 7.5-325 MG per tablet  Pharmacy Yes Yes    Take 1 tablet by mouth Every 8 (Eight) Hours As Needed for Moderate Pain .    irbesartan-hydrochlorothiazide (AVALIDE) 150-12.5 MG tablet  Pharmacy No Yes    Take 2 tablets by mouth Daily.    Patient taking differently:  Take 1 tablet by mouth Every 12 (Twelve) Hours.    levothyroxine (SYNTHROID, LEVOTHROID) 88 MCG tablet  Pharmacy Yes Yes    Take 88 mcg by mouth Every Morning Before Breakfast.    melatonin 1 MG tablet  Self Yes Yes    Take 3 mg by mouth Every Night.    OLANZapine (zyPREXA) 5 MG tablet  Pharmacy Yes Yes    Take 5 mg by mouth Every Night.    traZODone (DESYREL) 100 MG tablet  Pharmacy No Yes    Take 0.5 tablets by mouth At Night As Needed for Sleep.    Patient taking differently:  Take 50 mg by mouth Every Night.    Umeclidinium Bromide (INCRUSE ELLIPTA) 62.5 MCG/INH aerosol powder   Pharmacy Yes Yes    Inhale 1 puff Daily.    vitamin B-12 (CYANOCOBALAMIN) 1000 MCG tablet  Self Yes Yes    Take 1,000 mcg by mouth 2 (Two) Times a Day.                Medication notes:     This medication list is complete to the best of my knowledge as of 3/12/2020    Please call if questions.  Marybeth Corbin [facility]  Medication History Technician  [phone]      3/12/2020 15:26

## 2020-03-12 NOTE — ED NOTES
Pt's other son, at bedside, states he does not know the pts medications. Family states the pt uses [facility] on [address] for her medications.      Mirian Rogers, RN  03/12/20 1489

## 2020-03-12 NOTE — H&P
History and Physical    Patient Name: Josephine Wolf  Age/Sex: 79 y.o. female  : 1940  MRN: [id number]    Date of Admission: 3/12/2020  Date of Encounter Visit: 20  Encounter Provider: Ling Pereira MD  Place of Service: [facility]   Patient Care Team:  Bill Martino MD as PCP - General (Internal Medicine)      Subjective:     Chief Complaint: Shortness of breath    History of Present Illness:  Josephine Wolf is a 79 y.o. female with history of COPD who presented to the emergency room with worsening shortness of breath of 24 hours duration.  Patient has chronic hypoxemia usually nocturnal on oxygen at night only with recent worsening dyspnea with exertion requiring oxygen supplementation at all times. this was associated with nonproductive cough with chest tightness, the cough and the tightness were going on for few days.  There is no fever or chills or night sweats, denies any chest pain or hemoptysis  No GI symptoms like nausea vomiting or diarrhea  She did have some worsening lower extremity edema  On arrival her sats were 70% on room air consistent with acute hypoxemia, she was given nebulizer treatment however this was not enough, patient was working hard on the breathing and ABG showed hypercapnia so she was started on noninvasive positive pressure ventilation with the BiPAP.  Her chest x-ray was negative for any pulmonary infiltrate and the patient has no known risk factor or exposure to COVID 19 and she is not aware of any recent sick contact.  The  mentioned that she had a recent procedure with what he described as a kyphoplasty done    Pulmonary Functions Testing Results:    No results found for: FEV1, FVC, GWV3ICV, TLC, DLCO    Review of Systems:   Review of Systems  Constitutional: Negative for chills and fever.   HENT: Negative for rhinorrhea and sore throat.    Eyes: Negative.    Respiratory: Positive for cough (nonproductive) and shortness of breath.     Cardiovascular: Negative for chest pain and leg swelling.   Gastrointestinal: Negative for abdominal pain, diarrhea, nausea and vomiting.   Genitourinary: Negative for dysuria.   Musculoskeletal: Negative for neck pain.   Skin: Negative for rash.   Allergic/Immunologic: Negative.    Neurological: Negative for weakness, numbness and headaches.   Hematological: Negative.    Psychiatric/Behavioral: Negative.    All other systems reviewed and are negative.  Past Medical History:  Past Medical History:   Diagnosis Date   • Acid reflux    • Anemia    • Arthritis    • Bipolar 1 disorder, depressed (CMS/formerly [facility])    • Cataract     MINDY   • Chronic nausea    • Chronic pain of right knee    • Continuous leakage of urine     USES DEPENDS   • COPD (chronic obstructive pulmonary disease) (CMS/formerly [facility])     USES O2 2 LMP PER NC AT NIGHT   • Disease of thyroid gland     HYPOTHYROIDISM   • Diverticulosis    • Fibromyalgia     DX 1994   • Frequent episodes of bronchitis    • Hypertension    • Migraines    • Neck pain    • On home oxygen therapy     2L NC AT NIGHT   • Short of breath on exertion        Past Surgical History:   Procedure Laterality Date   • CEREBRAL ANEURYSM REPAIR  2000'S    WITH STENT   • HYSTERECTOMY     • LAPAROSCOPIC CHOLECYSTECTOMY     • ID TOTAL KNEE ARTHROPLASTY Right 11/16/2017    Procedure: RT TOTAL KNEE ARTHROPLASTY;  Surgeon: Kashif Perez MD;  Location: [facility];  Service: Orthopedics       Home Medications:   Medications Prior to Admission   Medication Sig Dispense Refill Last Dose   • amLODIPine (NORVASC) 10 MG tablet Take 10 mg by mouth Daily.      • dicyclomine (BENTYL) 10 MG capsule Take 1 capsule by mouth 4 (Four) Times a Day As Needed (diarrhea, cramping). 30 capsule 0    • divalproex (DEPAKOTE ER) 250 MG 24 hr tablet Take 250 mg by mouth Daily.   2/28/2018 at Unknown time   • divalproex (DEPAKOTE) 250 MG 24 hr tablet Take 500 mg by mouth Every Evening.      • DULoxetine (CYMBALTA) 60 MG capsule  Take 60 mg by mouth 2 (Two) Times a Day.      • febuxostat (ULORIC) 40 MG tablet Take 40 mg by mouth Daily.      • fluticasone (FLONASE) 50 MCG/ACT nasal spray 1 spray into the nostril(s) as directed by provider Daily.      • gabapentin (NEURONTIN) 300 MG capsule Take 300 mg by mouth 3 (Three) Times a Day.      • HYDROcodone-acetaminophen (NORCO) 7.5-325 MG per tablet Take 1 tablet by mouth Every 8 (Eight) Hours As Needed for Moderate Pain .      • irbesartan-hydrochlorothiazide (AVALIDE) 150-12.5 MG tablet Take 2 tablets by mouth Daily. (Patient taking differently: Take 1 tablet by mouth Every 12 (Twelve) Hours.) 60 tablet 0 6/24/2018 at Unknown time   • levothyroxine (SYNTHROID, LEVOTHROID) 88 MCG tablet Take 88 mcg by mouth Every Morning Before Breakfast.   6/24/2018 at Unknown time   • melatonin 1 MG tablet Take 3 mg by mouth Every Night.      • OLANZapine (zyPREXA) 5 MG tablet Take 5 mg by mouth Every Night.      • traZODone (DESYREL) 100 MG tablet Take 0.5 tablets by mouth At Night As Needed for Sleep. (Patient taking differently: Take 50 mg by mouth Every Night.) 7 tablet 0 6/23/2018 at Unknown time   • Umeclidinium Bromide (INCRUSE ELLIPTA) 62.5 MCG/INH aerosol powder  Inhale 1 puff Daily.      • vitamin B-12 (CYANOCOBALAMIN) 1000 MCG tablet Take 1,000 mcg by mouth 2 (Two) Times a Day.          Inpatient Medications:  Scheduled Meds:   Continuous Infusions:   PRN Meds:.•  [COMPLETED] Insert peripheral IV **AND** sodium chloride    Allergies:  Allergies   Allergen Reactions   • Morphine And Related Hallucinations     CONFUSION       Past Social History:  Social History     Socioeconomic History   • Marital status:      Spouse name: Not on file   • Number of children: Not on file   • Years of education: Not on file   • Highest education level: Not on file   Tobacco Use   • Smoking status: Former Smoker     Packs/day: 1.00     Years: 10.00     Pack years: 10.00     Types: Cigarettes     Start date: 1959      Last attempt to quit: 1969     Years since quittin.2   • Smokeless tobacco: Never Used   Substance and Sexual Activity   • Alcohol use: No   • Drug use: No   • Sexual activity: Defer       Past Family History:  Family History   Problem Relation Age of Onset   • Malig Hyperthermia Neg Hx            Objective:   Temp:  [99.4 °F (37.4 °C)] 99.4 °F (37.4 °C)  Heart Rate:  [70-84] 82  Resp:  [18-26] 24  BP: (103-134)/(48-92) 113/55   SpO2:  [86 %-100 %] 100 %  on  Flow (L/min):  [4] 4 Device (Oxygen Therapy): other (see comments)  No intake or output data in the 24 hours ending 20 1602  Body mass index is 34.13 kg/m².      20  1001   Weight: 84.6 kg (186 lb 9.6 oz)     Weight change:     Physical Exam:   Physical Exam   General:    And respiratory distress but doing better since she was started on the BiPAP, awake and responsive, more interactive according to the                    Head:    Normocephalic, atraumatic.   Eyes:          Conjunctivae and sclerae normal, no icterus, PERRLA   Throat:   No oral lesions, no thrush, oral mucosa moist.    Neck:   Supple, trachea midline.  Patient has a webbed neck and puffiness on the supraclavicular area from her COPD and hyperinflation   Lungs:     Normal chest on inspection, patient has decreased breath sounds bilaterally, she was on the BiPAP, she has minimal crackles posteriorly, distant sounds with no significant prolongation of the expiratory phase..     Heart:    Regular rhythm and normal rate.  No murmurs, gallops, or rubs noted.   Abdomen:     Soft, non-tender, non-distended, positive bowel sounds.  Truncal obesity   Extremities:   No clubbing, cyanosis, 1+ bilateral lower extremities pitting edema ma.     Pulses:   Pulses palpable and equal bilaterally.    Skin:   No bleeding or rash.   Neuro:   Non-focal.  Moves all extremities well.    Psychiatric:   Normal mood and affect.     Lab Review:   Results from last 7 days   Lab Units  03/12/20  1002   SODIUM mmol/L 138   POTASSIUM mmol/L 4.4   CHLORIDE mmol/L 97*   CO2 mmol/L 26.9   BUN mg/dL 38*   CREATININE mg/dL 3.10*   GLUCOSE mg/dL 105*   CALCIUM mg/dL 8.2*   AST (SGOT) U/L 27   ALT (SGPT) U/L 23   ALBUMIN g/dL 3.90     Results from last 7 days   Lab Units 03/12/20  1002   TROPONIN T ng/mL <0.010     Results from last 7 days   Lab Units 03/12/20  1002   WBC 10*3/mm3 7.23   HEMOGLOBIN g/dL 9.9*   HEMATOCRIT % 30.0*   PLATELETS 10*3/mm3 193   MCV fL 98.0*   MCH pg 32.4   MCHC g/dL 33.0   RDW % 13.9   RDW-SD fl 49.5   MPV fL 9.4   NEUTROPHIL % % 53.9   LYMPHOCYTE % % 30.6   MONOCYTES % % 11.2   EOSINOPHIL % % 1.9   BASOPHIL % % 0.6   IMM GRAN % % 1.8*   NEUTROS ABS 10*3/mm3 3.90   LYMPHS ABS 10*3/mm3 2.21   MONOS ABS 10*3/mm3 0.81   EOS ABS 10*3/mm3 0.14   BASOS ABS 10*3/mm3 0.04   IMMATURE GRANS (ABS) 10*3/mm3 0.13*   NRBC /100 WBC 0.0                   Invalid input(s): LDLCALC  Results from last 7 days   Lab Units 03/12/20  1002   PROBNP pg/mL 242.6         Results from last 7 days   Lab Units 03/12/20  1117   PH, ARTERIAL pH units 7.281*   PCO2, ARTERIAL mm Hg 60.7*   PO2 ART mm Hg 64.7*   HCO3 ART mmol/L 28.6*     Results from last 7 days   Lab Units 03/12/20  1002   PROCALCITONIN ng/mL 0.12   LACTATE mmol/L 1.4                     Results from last 7 days   Lab Units 03/12/20  1003   ADENOVIRUS DETECTION BY PCR  Not Detected   CORONAVIRUS 229E  Not Detected   CORONAVIRUS HKU1  Not Detected   CORONAVIRUS NL63  Not Detected   CORONAVIRUS OC43  Not Detected   HUMAN METAPNEUMOVIRUS  Not Detected   HUMAN RHINOVIRUS/ENTEROVIRUS  Not Detected   INFLUENZA B PCR  Not Detected   PARAINFLUENZA 1  Not Detected   PARAINFLUENZA VIRUS 2  Not Detected   PARAINFLUENZA VIRUS 3  Not Detected   PARAINFLUENZA VIRUS 4  Not Detected   BORDETELLA PERTUSSIS PCR  Not Detected   BORDETELLA PARAPERTUSSIS PCR  Not Detected   [id number]  Not Detected   CHLAMYDOPHILA PNEUMONIAE PCR  Not Detected   MYCOPLAMA PNEUMO PCR   Not Detected   INFLUENZA A H3  Not Detected   INFLUENZA A H1  Not Detected   RSV, PCR  Not Detected           Imaging:  Imaging Results (Most Recent)     Procedure Component Value Units Date/Time    XR Chest 1 View [[id number]] Collected:  03/12/20 1206     Updated:  03/12/20 1211    Narrative:       XR CHEST 1 VW-     Clinical: Cough     COMPARISON 06/18/2019     FINDINGS: Very low lung volumes with linear opacities at both lung bases  suggesting likely discoid atelectasis. There could be some additional  superimposed infiltrate or consolidation along the left costophrenic  sulcus. There is crowding of the central bronchovascular markings  however no pulmonary edema. The cardiomediastinal silhouette is stable.  There is atherosclerotic calcification of the aorta. The remainder of  examination is unremarkable. PA and lateral view of the chest with  appropriate inspiratory effort may be helpful when patient's condition  permits.     This report was finalized on 3/12/2020 12:08 PM by Dr. Mynor Vera M.D.             I personally viewed and interpreted the patient's imaging studies: There is a definite lower lung volume bilaterally, with some bibasilar atelectasis, cannot rule out pneumonia.    Assessment:     1. Acute exacerbation of COPD   2. Acute hypercapnic respiratory failure with acute on chronic hypoxemic respiratory failure  3. Possible right-sided heart failure with no previous echo on records  4. Suspected pulmonary hypertension  5. Sepsis however patient has negative viral panel, she has no leukocytosis, her procalcitonin was negative and her lactic acid was negative.  6. CHRIS      Plan:     At this point we will hold on the antibiotic given the above labs and we will treat for COPD exacerbation with steroids BiPAP and bronchodilator therapy and steroids  Even though the patient ruled in for sepsis based on the tachycardia but this is due to the COPD exacerbation, no infection is suspected at this  point  Patient will be treated with the BiPAP and will try to get an ABG on the BiPAP and adjust the setting as needed and will try to see how she does off the BiPAP in the morning  Her blood pressure is borderline low and repeat was within acceptable level with mean above 65, will be reluctant to give IV hydration given the edema and the suspected pulmonary hypertension, may need to move down to the ICU later on depending on her response to the initial measures  I have decreased the EPAP/PEEP down to 8 to help some with the blood pressure control  The renal failure is expect to improve with hemodynamic support, consult renal if no better on follow up am labs     Prognosis is guarded at this point      Time: Critical care 33 min    Ling Pereira MD  [facility]   03/12/20  16:02    Dictated utilizing Dragon dictation

## 2020-03-12 NOTE — PLAN OF CARE
Problem: Patient Care Overview  Goal: Plan of Care Review  Outcome: Ongoing (interventions implemented as appropriate)  Flowsheets (Taken 3/12/2020 8571)  Progress: no change  Plan of Care Reviewed With: patient; son  Outcome Summary: hypotensive, positive sepsis screen - acute on chronic resp failure. Dr. Pereira aware/ at bedside. Monitoring BP and sats. Pt on mask vent- BiPap mode. Resp rate is 24-26 at rest. Monitoring closely. Family at bedside.  Goal: Individualization and Mutuality  Outcome: Ongoing (interventions implemented as appropriate)  Goal: Discharge Needs Assessment  Outcome: Ongoing (interventions implemented as appropriate)  Goal: Interprofessional Rounds/Family Conf  Outcome: Ongoing (interventions implemented as appropriate)     Problem: Fall Risk (Adult)  Goal: Identify Related Risk Factors and Signs and Symptoms  Outcome: Ongoing (interventions implemented as appropriate)  Goal: Absence of Fall  Outcome: Ongoing (interventions implemented as appropriate)  Goal: Identify Related Risk Factors and Signs and Symptoms  Outcome: Ongoing (interventions implemented as appropriate)  Goal: Absence of Fall  Outcome: Ongoing (interventions implemented as appropriate)     Problem: Skin Injury Risk (Adult)  Goal: Identify Related Risk Factors and Signs and Symptoms  Outcome: Ongoing (interventions implemented as appropriate)  Goal: Skin Health and Integrity  Outcome: Ongoing (interventions implemented as appropriate)     Problem: Breathing Pattern Ineffective (Adult)  Goal: Identify Related Risk Factors and Signs and Symptoms  Outcome: Ongoing (interventions implemented as appropriate)  Goal: Effective Oxygenation/Ventilation  Outcome: Ongoing (interventions implemented as appropriate)  Goal: Anxiety/Fear Reduction  Outcome: Ongoing (interventions implemented as appropriate)     Problem: Infection, Risk/Actual (Adult)  Goal: Identify Related Risk Factors and Signs and Symptoms  Outcome: Ongoing (interventions  implemented as appropriate)  Goal: Infection Prevention/Resolution  Outcome: Ongoing (interventions implemented as appropriate)

## 2020-03-12 NOTE — ED PROVIDER NOTES
EMERGENCY DEPARTMENT ENCOUNTER    Room Number:  15/15  Date of encounter:  3/12/2020  PCP: Bill Martino MD  Historian: Patient       HPI:  Chief Complaint: Shortness of breath  A complete HPI/ROS/PMH/PSH/SH/FH are unobtainable due to: None    Context: Josephine Wolf is a 79 y.o. female with a history of COPD who presents to the ED c/o increased shortness of breath since yesterday.  Patient is on 2 L of oxygen at night.  She reports that her symptoms worsen with exertion.  She is also had a nonproductive cough for several days.  She denies fever, chills, chest pain, nausea, vomiting, leg swelling, sore throat, or runny nose.  Oxygen saturations were noted to be 70% on room air.  She was given a nebulizer treatment by EMS with some improvement.  Patient does report some wheezing at home and she has used her home nebulizer machine as well.  Symptoms have been moderate in severity.  Patient denies any travel outside the local [place] area in the past 1 month.  She also denies any exposure to anyone known to have COVID-19 or other sick contact.    PAST MEDICAL HISTORY  Active Ambulatory Problems     Diagnosis Date Noted   • Anemia 10/19/2017   • OA (osteoarthritis) of knee 11/16/2017   • Chronic respiratory failure with hypoxia (CMS/[facility]) 12/02/2017   • Cellulitis of skin 12/06/2017   • Acute kidney injury (CMS/[facility]) 12/06/2017   • Sepsis (CMS/[facility]) 12/06/2017   • Orthostatic hypotension 06/24/2018   • Disease of thyroid gland 06/24/2018   • COPD (chronic obstructive pulmonary disease) (CMS/[facility]) 06/24/2018   • Hypertension 06/24/2018   • Dehydration 06/24/2018   • Stage 3 chronic kidney disease (CMS/[facility]) 06/25/2018   • Closed compression fracture of L1 lumbar vertebra 06/16/2019   • Hyponatremia 06/16/2019   • Osteoporosis with pathological fracture 06/17/2019     Resolved Ambulatory Problems     Diagnosis Date Noted   • No Resolved Ambulatory Problems     Past Medical History:   Diagnosis Date   • Acid  reflux    • Arthritis    • Bipolar 1 disorder, depressed (CMS/HCC)    • Cataract    • Chronic nausea    • Chronic pain of right knee    • Continuous leakage of urine    • Diverticulosis    • Fibromyalgia    • Frequent episodes of bronchitis    • Migraines    • Neck pain    • On home oxygen therapy    • Short of breath on exertion          PAST SURGICAL HISTORY  Past Surgical History:   Procedure Laterality Date   • CEREBRAL ANEURYSM REPAIR      WITH STENT   • HYSTERECTOMY     • LAPAROSCOPIC CHOLECYSTECTOMY     • CA TOTAL KNEE ARTHROPLASTY Right 2017    Procedure: RT TOTAL KNEE ARTHROPLASTY;  Surgeon: Kashif Perez MD;  Location: [facility];  Service: Orthopedics         FAMILY HISTORY  Family History   Problem Relation Age of Onset   • Malig Hyperthermia Neg Hx          SOCIAL HISTORY  Social History     Socioeconomic History   • Marital status:      Spouse name: Not on file   • Number of children: Not on file   • Years of education: Not on file   • Highest education level: Not on file   Tobacco Use   • Smoking status: Former Smoker     Packs/day: 1.00     Years: 10.00     Pack years: 10.00     Types: Cigarettes     Start date:      Last attempt to quit:      Years since quittin.2   • Smokeless tobacco: Never Used   Substance and Sexual Activity   • Alcohol use: No   • Drug use: No   • Sexual activity: Defer         ALLERGIES  Morphine and related       REVIEW OF SYSTEMS  Review of Systems   Constitutional: Negative for chills and fever.   HENT: Negative for rhinorrhea and sore throat.    Eyes: Negative.    Respiratory: Positive for cough (nonproductive) and shortness of breath.    Cardiovascular: Negative for chest pain and leg swelling.   Gastrointestinal: Negative for abdominal pain, diarrhea, nausea and vomiting.   Genitourinary: Negative for dysuria.   Musculoskeletal: Negative for neck pain.   Skin: Negative for rash.   Allergic/Immunologic: Negative.    Neurological:  Negative for weakness, numbness and headaches.   Hematological: Negative.    Psychiatric/Behavioral: Negative.    All other systems reviewed and are negative.          PHYSICAL EXAM    I have reviewed the triage vital signs and nursing notes.    ED Triage Vitals [03/12/20 0928]   Temp Heart Rate Resp BP SpO2   99.4 °F (37.4 °C) 84 18 134/92 (!) 88 %      Temp src Heart Rate Source Patient Position BP Location FiO2 (%)   Oral Monitor Lying -- --       Physical Exam  GENERAL: Mildly distressed  HENT: nares patent, oropharynx benign  EYES: no scleral icterus  CV: regular rhythm, regular rate  RESPIRATORY: normal effort, diffuse wheezes bilaterally, rales in both bases, patient is speaking in short phrases  ABDOMEN: soft, nontender, nondistended  MUSCULOSKELETAL: no deformity, trace pedal edema, no calf tenderness  NEURO: Strength, sensation, and coordination are grossly intact.  Speech and mentation are unremarkable  SKIN: warm, dry      LAB RESULTS  Recent Results (from the past 24 hour(s))   Comprehensive Metabolic Panel    Collection Time: 03/12/20 10:02 AM   Result Value Ref Range    Glucose 105 (H) 65 - 99 mg/dL    BUN 38 (H) 8 - 23 mg/dL    Creatinine 3.10 (H) 0.57 - 1.00 mg/dL    Sodium 138 136 - 145 mmol/L    Potassium 4.4 3.5 - 5.2 mmol/L    Chloride 97 (L) 98 - 107 mmol/L    CO2 26.9 22.0 - 29.0 mmol/L    Calcium 8.2 (L) 8.6 - 10.5 mg/dL    Total Protein 7.0 6.0 - 8.5 g/dL    Albumin 3.90 3.50 - 5.20 g/dL    ALT (SGPT) 23 1 - 33 U/L    AST (SGOT) 27 1 - 32 U/L    Alkaline Phosphatase 108 39 - 117 U/L    Total Bilirubin 0.2 0.2 - 1.2 mg/dL    eGFR Non African Amer 15 (L) >60 mL/min/1.73    Globulin 3.1 gm/dL    A/G Ratio 1.3 g/dL    BUN/Creatinine Ratio 12.3 7.0 - 25.0    Anion Gap 14.1 5.0 - 15.0 mmol/L   BNP    Collection Time: 03/12/20 10:02 AM   Result Value Ref Range    proBNP 242.6 5.0-1,800.0 pg/mL   Troponin    Collection Time: 03/12/20 10:02 AM   Result Value Ref Range    Troponin T <0.010 0.000 -  0.030 ng/mL   Lactic Acid, Plasma    Collection Time: 03/12/20 10:02 AM   Result Value Ref Range    Lactate 1.4 0.5 - 2.0 mmol/L   Procalcitonin    Collection Time: 03/12/20 10:02 AM   Result Value Ref Range    Procalcitonin 0.12 0.10 - 0.25 ng/mL   CBC Auto Differential    Collection Time: 03/12/20 10:02 AM   Result Value Ref Range    WBC 7.23 3.40 - 10.80 10*3/mm3    RBC 3.06 (L) 3.77 - 5.28 10*6/mm3    Hemoglobin 9.9 (L) 12.0 - 15.9 g/dL    Hematocrit 30.0 (L) 34.0 - 46.6 %    MCV 98.0 (H) 79.0 - 97.0 fL    MCH 32.4 26.6 - 33.0 pg    MCHC 33.0 31.5 - 35.7 g/dL    RDW 13.9 12.3 - 15.4 %    RDW-SD 49.5 37.0 - 54.0 fl    MPV 9.4 6.0 - 12.0 fL    Platelets 193 140 - 450 10*3/mm3    Neutrophil % 53.9 42.7 - 76.0 %    Lymphocyte % 30.6 19.6 - 45.3 %    Monocyte % 11.2 5.0 - 12.0 %    Eosinophil % 1.9 0.3 - 6.2 %    Basophil % 0.6 0.0 - 1.5 %    Immature Grans % 1.8 (H) 0.0 - 0.5 %    Neutrophils, Absolute 3.90 1.70 - 7.00 10*3/mm3    Lymphocytes, Absolute 2.21 0.70 - 3.10 10*3/mm3    Monocytes, Absolute 0.81 0.10 - 0.90 10*3/mm3    Eosinophils, Absolute 0.14 0.00 - 0.40 10*3/mm3    Basophils, Absolute 0.04 0.00 - 0.20 10*3/mm3    Immature Grans, Absolute 0.13 (H) 0.00 - 0.05 10*3/mm3    nRBC 0.0 0.0 - 0.2 /100 WBC   Respiratory Panel, PCR - Swab, Nasopharynx    Collection Time: 03/12/20 10:03 AM   Result Value Ref Range    ADENOVIRUS, PCR Not Detected Not Detected    Coronavirus 229E Not Detected Not Detected    Coronavirus HKU1 Not Detected Not Detected    Coronavirus NL63 Not Detected Not Detected    Coronavirus OC43 Not Detected Not Detected    Human Metapneumovirus Not Detected Not Detected    Human Rhinovirus/Enterovirus Not Detected Not Detected    Influenza B PCR Not Detected Not Detected    Parainfluenza Virus 1 Not Detected Not Detected    Parainfluenza Virus 2 Not Detected Not Detected    Parainfluenza Virus 3 Not Detected Not Detected    Parainfluenza Virus 4 Not Detected Not Detected    Bordetella pertussis  pcr Not Detected Not Detected    Influenza A H1 2009 PCR Not Detected Not Detected    Chlamydophila pneumoniae PCR Not Detected Not Detected    Mycoplasma pneumo by PCR Not Detected Not Detected    Influenza A PCR Not Detected Not Detected    Influenza A H3 Not Detected Not Detected    Influenza A H1 Not Detected Not Detected    RSV, PCR Not Detected Not Detected    Bordetella parapertussis PCR Not Detected Not Detected   Blood Gas, Arterial    Collection Time: 03/12/20 11:17 AM   Result Value Ref Range    Site Arterial: right radial     Zaki's Test Positive     pH, Arterial 7.281 (C) 7.350 - 7.450 pH units    pCO2, Arterial 60.7 (C) 35.0 - 45.0 mm Hg    pO2, Arterial 64.7 (L) 80.0 - 100.0 mm Hg    HCO3, Arterial 28.6 (H) 22.0 - 28.0 mmol/L    Base Excess, Arterial 1.0 0.0 - 2.0 mmol/L    O2 Saturation Calculated 88.8 (L) 92.0 - 99.0 %    A-a Gradiant 0.2 mmHg    Barometric Pressure for Blood Gas 746.8 mmHg    Modality BiPap     FIO2 60 %    Ventilator Mode NIV     Set [facility] Resp Rate 20     Rate 22 Breaths/minute       Ordered the above labs and independently reviewed the results.      RADIOLOGY  Xr Chest 1 View    Result Date: 3/12/2020  XR CHEST 1 VW-  Clinical: Cough  COMPARISON 06/18/2019  FINDINGS: Very low lung volumes with linear opacities at both lung bases suggesting likely discoid atelectasis. There could be some additional superimposed infiltrate or consolidation along the left costophrenic sulcus. There is crowding of the central bronchovascular markings however no pulmonary edema. The cardiomediastinal silhouette is stable. There is atherosclerotic calcification of the aorta. The remainder of examination is unremarkable. PA and lateral view of the chest with appropriate inspiratory effort may be helpful when patient's condition permits.  This report was finalized on 3/12/2020 12:08 PM by Dr. Mynor Vera M.D.        I ordered the above noted radiological studies. Reviewed by me and discussed with  radiologist.  See dictation for official radiology interpretation.      PROCEDURES  Critical Care  Performed by: Hemanth Balbuena MD  Authorized by: Hemanth Balbuena MD     Critical care provider statement:     Critical care time (minutes):  30    Critical care time was exclusive of:  Separately billable procedures and treating other patients    Critical care was necessary to treat or prevent imminent or life-threatening deterioration of the following conditions:  Renal failure and respiratory failure    Critical care was time spent personally by me on the following activities:  Development of treatment plan with patient or surrogate, discussions with consultants, evaluation of patient's response to treatment, examination of patient, obtaining history from patient or surrogate, ordering and performing treatments and interventions, ordering and review of laboratory studies, ordering and review of radiographic studies, pulse oximetry, re-evaluation of patient's condition and review of old charts    I assumed direction of critical care for this patient from another provider in my specialty: no            MEDICATIONS GIVEN IN ER    Medications   sodium chloride 0.9 % flush 10 mL (has no administration in time range)   ipratropium-albuterol (DUO-NEB) nebulizer solution 3 mL (3 mL Nebulization Given 3/12/20 1016)   albuterol (PROVENTIL) nebulizer solution 0.083% 2.5 mg/3mL (2.5 mg Nebulization Given 3/12/20 1016)   acetaminophen (TYLENOL) tablet 1,000 mg (1,000 mg Oral Given 3/12/20 1403)         PROGRESS, DATA ANALYSIS, CONSULTS, AND MEDICAL DECISION MAKING    All labs have been independently reviewed by me.  All radiology studies have been reviewed by me and discussed with radiologist dictating the report.   EKG's independently viewed and interpreted by me.  Discussion below represents my analysis of pertinent findings related to patient's condition, differential diagnosis, treatment plan and final  disposition.      ED Course as of Mar 12 1505   Thu Mar 12, 2020   1033 Patient is on 8 L per high flow cannula with sats only in the upper 80s.  She will be placed on BiPAP.    [WH]   1035 stable   Hemoglobin(!): 9.9 [WH]   1036 Old records reviewed.  Patient was last admitted here in June 2019 for an L1 compression fracture after falling.  She was admitted in 2017 for hypoxia    [WH]   1046 1.0 eight months ago   Creatinine(!): 3.10 [WH]   1057 EKG          EKG time: 10:46 AM  Rhythm/Rate: Sinus rhythm rate 73  P waves and FL: Normal  QRS, axis: Normal  ST and T waves: Nonspecific T wave changes anteriorly    Interpreted Contemporaneously by me, independently viewed  EKG is not significantly changed compared to prior EKG done 6/16/2019      [WH]   1125 Patient is breathing much more comfortably on BiPAP.  O2 sats are in the mid 90s.  Patient's family reports that she recently started taking a diuretic.  Her creatinine is elevated from baseline.  ABG was consistent with respiratory acidosis.  Still awaiting chest x-ray.    [WH]   1251 Case discussed with Dr. Pereira.  I discussed with him pertinent exam findings, test results, and ED course.  He agrees to admit the patient.    [WH]   1309 Patient is resting comfortably on BiPAP.  O2 sats 98%.  Heart rates in the 70s.  Patient informed of plan for admission.    [WH]   1504 Patient presented the ER complaining of worsening shortness of breath.  Oxygen saturations were in the 70s on room air.  Patient was afebrile.  Chest x-ray did not show any evidence for pneumonia.  Respiratory panel was negative.  Patient was placed on BiPAP.  ABG was consistent with respiratory acidosis.  Patient was given nebulizer treatments.  She was also found to have renal failure of unknown acuity.  Creatinine was normal 8 months ago.  Case was discussed with Dr. Anglin and he agreed to admit the patient.  Critical care was performed on this patient.    [WH]      ED Course User Index  [WH]  Hemanth Balbuena MD       AS OF 15:05 VITALS:    BP - 113/55  HR - 82  TEMP - 99.4 °F (37.4 °C) (Oral)  O2 SATS - 100%      DIAGNOSIS  Final diagnoses:   Acute respiratory acidosis   Acute on chronic respiratory failure with hypoxia and hypercapnia (CMS/HCC)   Renal failure, unspecified chronicity         DISPOSITION  ADMISSION TO [facility]    Discussed treatment plan and reason for admission with pt/family and admitting physician.  Pt/family voiced understanding of the plan for admission for further testing/treatment as needed.         Documentation assistance provided by meche [facility]atic[facility] for Dr. Sree MD.  Information recorded by the meche was done at my direction and has been verified and validated by me.             Alejandra Gtaica  03/12/20 [id number]       Hemanth Balbuena MD  03/12/20 [id number]

## 2020-03-12 NOTE — ED NOTES
Unable to review home medications d/t son does not have a list of medications and he or the pt do not remember the medications or the doses.      Mirian Rogers, RN  03/12/20 3998

## 2020-03-13 ENCOUNTER — APPOINTMENT (OUTPATIENT)
Dept: CARDIOLOGY | Facility: HOSPITAL | Age: 80
End: 2020-03-13

## 2020-03-13 ENCOUNTER — APPOINTMENT (OUTPATIENT)
Dept: ULTRASOUND IMAGING | Facility: HOSPITAL | Age: 80
End: 2020-03-13

## 2020-03-13 ENCOUNTER — APPOINTMENT (OUTPATIENT)
Dept: GENERAL RADIOLOGY | Facility: HOSPITAL | Age: 80
End: 2020-03-13

## 2020-03-13 PROBLEM — J44.1 COPD WITH ACUTE EXACERBATION (HCC): Status: ACTIVE | Noted: 2018-06-24

## 2020-03-13 PROBLEM — G47.34 NOCTURNAL HYPOXIA: Chronic | Status: ACTIVE | Noted: 2020-03-13

## 2020-03-13 PROBLEM — N18.2 CKD (CHRONIC KIDNEY DISEASE) STAGE 2, GFR 60-89 ML/MIN: Status: ACTIVE | Noted: 2020-03-13

## 2020-03-13 PROBLEM — J96.22 ACUTE ON CHRONIC RESPIRATORY FAILURE WITH HYPOXIA AND HYPERCAPNIA (HCC): Status: ACTIVE | Noted: 2020-03-12

## 2020-03-13 PROBLEM — J96.21 ACUTE ON CHRONIC RESPIRATORY FAILURE WITH HYPOXIA AND HYPERCAPNIA (HCC): Status: ACTIVE | Noted: 2020-03-12

## 2020-03-13 LAB
ALBUMIN SERPL-MCNC: 3.9 G/DL (ref 3.5–5.2)
ALBUMIN/GLOB SERPL: 1.4 G/DL
ALP SERPL-CCNC: 97 U/L (ref 39–117)
ALT SERPL W P-5'-P-CCNC: 21 U/L (ref 1–33)
ANION GAP SERPL CALCULATED.3IONS-SCNC: 15.7 MMOL/L (ref 5–15)
AORTIC DIMENSIONLESS INDEX: 0.8 (DI)
ARTERIAL PATENCY WRIST A: ABNORMAL
AST SERPL-CCNC: 23 U/L (ref 1–32)
ATMOSPHERIC PRESS: 756.8 MMHG
BASE EXCESS BLDA CALC-SCNC: 1.9 MMOL/L (ref 0–2)
BDY SITE: ABNORMAL
BH CV ECHO MEAS - AO MAX PG (FULL): 8.4 MMHG
BH CV ECHO MEAS - AO MAX PG: 21 MMHG
BH CV ECHO MEAS - AO MEAN PG (FULL): 6 MMHG
BH CV ECHO MEAS - AO MEAN PG: 14 MMHG
BH CV ECHO MEAS - AO ROOT AREA (BSA CORRECTED): 1.6
BH CV ECHO MEAS - AO ROOT AREA: 6.6 CM^2
BH CV ECHO MEAS - AO ROOT DIAM: 2.9 CM
BH CV ECHO MEAS - AO V2 MAX: 229 CM/SEC
BH CV ECHO MEAS - AO V2 MEAN: 177 CM/SEC
BH CV ECHO MEAS - AO V2 VTI: 40 CM
BH CV ECHO MEAS - AVA(I,A): 2.4 CM^2
BH CV ECHO MEAS - AVA(I,D): 2.4 CM^2
BH CV ECHO MEAS - AVA(V,A): 2.2 CM^2
BH CV ECHO MEAS - AVA(V,D): 2.2 CM^2
BH CV ECHO MEAS - BSA(HAYCOCK): 1.9 M^2
BH CV ECHO MEAS - BSA: 1.8 M^2
BH CV ECHO MEAS - BZI_BMI: 31.5 KILOGRAMS/M^2
BH CV ECHO MEAS - BZI_METRIC_HEIGHT: 157.5 CM
BH CV ECHO MEAS - BZI_METRIC_WEIGHT: 78 KG
BH CV ECHO MEAS - EDV(CUBED): 97.3 ML
BH CV ECHO MEAS - EDV(MOD-SP2): 62 ML
BH CV ECHO MEAS - EDV(MOD-SP4): 73 ML
BH CV ECHO MEAS - EDV(TEICH): 97.3 ML
BH CV ECHO MEAS - EF(CUBED): 85.8 %
BH CV ECHO MEAS - EF(MOD-BP): 68 %
BH CV ECHO MEAS - EF(MOD-SP2): 71 %
BH CV ECHO MEAS - EF(MOD-SP4): 67.1 %
BH CV ECHO MEAS - EF(TEICH): 79.3 %
BH CV ECHO MEAS - ESV(CUBED): 13.8 ML
BH CV ECHO MEAS - ESV(MOD-SP2): 18 ML
BH CV ECHO MEAS - ESV(MOD-SP4): 24 ML
BH CV ECHO MEAS - ESV(TEICH): 20.2 ML
BH CV ECHO MEAS - FS: 47.8 %
BH CV ECHO MEAS - IVS/LVPW: 0.8
BH CV ECHO MEAS - IVSD: 0.8 CM
BH CV ECHO MEAS - LAT PEAK E' VEL: 9.6 CM/SEC
BH CV ECHO MEAS - LV DIASTOLIC VOL/BSA (35-75): 40.7 ML/M^2
BH CV ECHO MEAS - LV MASS(C)D: 137.7 GRAMS
BH CV ECHO MEAS - LV MASS(C)DI: 76.8 GRAMS/M^2
BH CV ECHO MEAS - LV MAX PG: 12.5 MMHG
BH CV ECHO MEAS - LV MEAN PG: 8 MMHG
BH CV ECHO MEAS - LV SYSTOLIC VOL/BSA (12-30): 13.4 ML/M^2
BH CV ECHO MEAS - LV V1 MAX: 177 CM/SEC
BH CV ECHO MEAS - LV V1 MEAN: 135 CM/SEC
BH CV ECHO MEAS - LV V1 VTI: 33.8 CM
BH CV ECHO MEAS - LVIDD: 4.6 CM
BH CV ECHO MEAS - LVIDS: 2.4 CM
BH CV ECHO MEAS - LVLD AP2: 7.2 CM
BH CV ECHO MEAS - LVLD AP4: 7.3 CM
BH CV ECHO MEAS - LVLS AP2: 5.3 CM
BH CV ECHO MEAS - LVLS AP4: 6 CM
BH CV ECHO MEAS - LVOT AREA (M): 2.8 CM^2
BH CV ECHO MEAS - LVOT AREA: 2.8 CM^2
BH CV ECHO MEAS - LVOT DIAM: 1.9 CM
BH CV ECHO MEAS - LVPWD: 1 CM
BH CV ECHO MEAS - MED PEAK E' VEL: 8.4 CM/SEC
BH CV ECHO MEAS - MV A MAX VEL: 126 CM/SEC
BH CV ECHO MEAS - MV DEC SLOPE: 380 CM/SEC^2
BH CV ECHO MEAS - MV DEC TIME: 261 SEC
BH CV ECHO MEAS - MV E MAX VEL: 77.1 CM/SEC
BH CV ECHO MEAS - MV E/A: 0.61
BH CV ECHO MEAS - MV MAX PG: 7.3 MMHG
BH CV ECHO MEAS - MV MEAN PG: 3 MMHG
BH CV ECHO MEAS - MV P1/2T MAX VEL: 96.1 CM/SEC
BH CV ECHO MEAS - MV P1/2T: 74.1 MSEC
BH CV ECHO MEAS - MV V2 MAX: 135 CM/SEC
BH CV ECHO MEAS - MV V2 MEAN: 82.8 CM/SEC
BH CV ECHO MEAS - MV V2 VTI: 24.1 CM
BH CV ECHO MEAS - MVA P1/2T LCG: 2.3 CM^2
BH CV ECHO MEAS - MVA(P1/2T): 3 CM^2
BH CV ECHO MEAS - MVA(VTI): 4 CM^2
BH CV ECHO MEAS - RAP SYSTOLE: 3 MMHG
BH CV ECHO MEAS - RVSP: 30 MMHG
BH CV ECHO MEAS - SI(AO): 147.4 ML/M^2
BH CV ECHO MEAS - SI(CUBED): 46.6 ML/M^2
BH CV ECHO MEAS - SI(LVOT): 53.5 ML/M^2
BH CV ECHO MEAS - SI(MOD-SP2): 24.5 ML/M^2
BH CV ECHO MEAS - SI(MOD-SP4): 27.3 ML/M^2
BH CV ECHO MEAS - SI(TEICH): 43 ML/M^2
BH CV ECHO MEAS - SV(AO): 264.2 ML
BH CV ECHO MEAS - SV(CUBED): 83.5 ML
BH CV ECHO MEAS - SV(LVOT): 95.8 ML
BH CV ECHO MEAS - SV(MOD-SP2): 44 ML
BH CV ECHO MEAS - SV(MOD-SP4): 49 ML
BH CV ECHO MEAS - SV(TEICH): 77.2 ML
BH CV ECHO MEAS - TAPSE (>1.6): 1.7 CM2
BH CV ECHO MEAS - TR MAX VEL: 256 CM/SEC
BH CV ECHO MEASUREMENTS AVERAGE E/E' RATIO: 8.57
BH CV XLRA - RV BASE: 3.1 CM
BH CV XLRA - TDI S': 18 CM/SEC
BILIRUB SERPL-MCNC: 0.3 MG/DL (ref 0.2–1.2)
BUN BLD-MCNC: 44 MG/DL (ref 8–23)
BUN/CREAT SERPL: 13.9 (ref 7–25)
CALCIUM SPEC-SCNC: 8.1 MG/DL (ref 8.6–10.5)
CHLORIDE SERPL-SCNC: 96 MMOL/L (ref 98–107)
CO2 SERPL-SCNC: 24.3 MMOL/L (ref 22–29)
CREAT BLD-MCNC: 3.16 MG/DL (ref 0.57–1)
DEPRECATED RDW RBC AUTO: 48.7 FL (ref 37–54)
ERYTHROCYTE [DISTWIDTH] IN BLOOD BY AUTOMATED COUNT: 13.6 % (ref 12.3–15.4)
GAS FLOW AIRWAY: 4 LPM
GFR SERPL CREATININE-BSD FRML MDRD: 14 ML/MIN/1.73
GFR SERPL CREATININE-BSD FRML MDRD: ABNORMAL ML/MIN/{1.73_M2}
GLOBULIN UR ELPH-MCNC: 2.7 GM/DL
GLUCOSE BLD-MCNC: 168 MG/DL (ref 65–99)
GLUCOSE BLDC GLUCOMTR-MCNC: 134 MG/DL (ref 70–130)
GLUCOSE BLDC GLUCOMTR-MCNC: 159 MG/DL (ref 70–130)
GLUCOSE BLDC GLUCOMTR-MCNC: 179 MG/DL (ref 70–130)
GLUCOSE BLDC GLUCOMTR-MCNC: 187 MG/DL (ref 70–130)
GLUCOSE BLDC GLUCOMTR-MCNC: 195 MG/DL (ref 70–130)
HCO3 BLDA-SCNC: 28.3 MMOL/L (ref 22–28)
HCT VFR BLD AUTO: 28.9 % (ref 34–46.6)
HGB BLD-MCNC: 9.7 G/DL (ref 12–15.9)
LEFT ATRIUM VOLUME INDEX: 24 ML/M2
MCH RBC QN AUTO: 32.4 PG (ref 26.6–33)
MCHC RBC AUTO-ENTMCNC: 33.6 G/DL (ref 31.5–35.7)
MCV RBC AUTO: 96.7 FL (ref 79–97)
MODALITY: ABNORMAL
PCO2 BLDA: 52.3 MM HG (ref 35–45)
PH BLDA: 7.34 PH UNITS (ref 7.35–7.45)
PLATELET # BLD AUTO: 179 10*3/MM3 (ref 140–450)
PMV BLD AUTO: 9.7 FL (ref 6–12)
PO2 BLDA: 62.9 MM HG (ref 80–100)
POTASSIUM BLD-SCNC: 5 MMOL/L (ref 3.5–5.2)
PROCALCITONIN SERPL-MCNC: 0.08 NG/ML (ref 0.1–0.25)
PROT SERPL-MCNC: 6.6 G/DL (ref 6–8.5)
RBC # BLD AUTO: 2.99 10*6/MM3 (ref 3.77–5.28)
SAO2 % BLDCOA: 89.9 % (ref 92–99)
SET MECH RESP RATE: 16
SODIUM BLD-SCNC: 136 MMOL/L (ref 136–145)
TOTAL RATE: 16 BREATHS/MINUTE
WBC NRBC COR # BLD: 5.79 10*3/MM3 (ref 3.4–10.8)

## 2020-03-13 PROCEDURE — 94799 UNLISTED PULMONARY SVC/PX: CPT

## 2020-03-13 PROCEDURE — 80053 COMPREHEN METABOLIC PANEL: CPT | Performed by: INTERNAL MEDICINE

## 2020-03-13 PROCEDURE — 63710000001 INSULIN LISPRO (HUMAN) PER 5 UNITS: Performed by: INTERNAL MEDICINE

## 2020-03-13 PROCEDURE — 82803 BLOOD GASES ANY COMBINATION: CPT

## 2020-03-13 PROCEDURE — 36600 WITHDRAWAL OF ARTERIAL BLOOD: CPT

## 2020-03-13 PROCEDURE — 84145 PROCALCITONIN (PCT): CPT | Performed by: INTERNAL MEDICINE

## 2020-03-13 PROCEDURE — 97110 THERAPEUTIC EXERCISES: CPT

## 2020-03-13 PROCEDURE — 25010000002 ENOXAPARIN PER 10 MG: Performed by: INTERNAL MEDICINE

## 2020-03-13 PROCEDURE — 25010000002 METHYLPREDNISOLONE PER 125 MG: Performed by: INTERNAL MEDICINE

## 2020-03-13 PROCEDURE — 63710000001 PREDNISONE PER 1 MG: Performed by: INTERNAL MEDICINE

## 2020-03-13 PROCEDURE — 71045 X-RAY EXAM CHEST 1 VIEW: CPT

## 2020-03-13 PROCEDURE — 93306 TTE W/DOPPLER COMPLETE: CPT

## 2020-03-13 PROCEDURE — 86335 IMMUNFIX E-PHORSIS/URINE/CSF: CPT | Performed by: INTERNAL MEDICINE

## 2020-03-13 PROCEDURE — 82962 GLUCOSE BLOOD TEST: CPT

## 2020-03-13 PROCEDURE — 93306 TTE W/DOPPLER COMPLETE: CPT | Performed by: INTERNAL MEDICINE

## 2020-03-13 PROCEDURE — 85027 COMPLETE CBC AUTOMATED: CPT | Performed by: INTERNAL MEDICINE

## 2020-03-13 PROCEDURE — 76775 US EXAM ABDO BACK WALL LIM: CPT

## 2020-03-13 PROCEDURE — 97162 PT EVAL MOD COMPLEX 30 MIN: CPT

## 2020-03-13 RX ORDER — IPRATROPIUM BROMIDE AND ALBUTEROL SULFATE 2.5; .5 MG/3ML; MG/3ML
3 SOLUTION RESPIRATORY (INHALATION)
Status: DISCONTINUED | OUTPATIENT
Start: 2020-03-13 | End: 2020-03-15 | Stop reason: HOSPADM

## 2020-03-13 RX ORDER — PREDNISONE 20 MG/1
40 TABLET ORAL
Status: DISCONTINUED | OUTPATIENT
Start: 2020-03-13 | End: 2020-03-15 | Stop reason: HOSPADM

## 2020-03-13 RX ORDER — ACETAMINOPHEN 325 MG/1
650 TABLET ORAL EVERY 6 HOURS PRN
Status: DISCONTINUED | OUTPATIENT
Start: 2020-03-13 | End: 2020-03-15 | Stop reason: HOSPADM

## 2020-03-13 RX ORDER — SODIUM CHLORIDE 9 MG/ML
75 INJECTION, SOLUTION INTRAVENOUS ONCE
Status: COMPLETED | OUTPATIENT
Start: 2020-03-13 | End: 2020-03-13

## 2020-03-13 RX ADMIN — ACETAMINOPHEN 650 MG: 325 TABLET, FILM COATED ORAL at 20:05

## 2020-03-13 RX ADMIN — GUAIFENESIN 600 MG: 600 TABLET, EXTENDED RELEASE ORAL at 20:12

## 2020-03-13 RX ADMIN — GUAIFENESIN 600 MG: 600 TABLET, EXTENDED RELEASE ORAL at 08:46

## 2020-03-13 RX ADMIN — IPRATROPIUM BROMIDE AND ALBUTEROL SULFATE 3 ML: 2.5; .5 SOLUTION RESPIRATORY (INHALATION) at 15:40

## 2020-03-13 RX ADMIN — ENOXAPARIN SODIUM 30 MG: 30 INJECTION SUBCUTANEOUS at 17:32

## 2020-03-13 RX ADMIN — IPRATROPIUM BROMIDE AND ALBUTEROL SULFATE 3 ML: 2.5; .5 SOLUTION RESPIRATORY (INHALATION) at 23:49

## 2020-03-13 RX ADMIN — INSULIN LISPRO 5 UNITS: 100 INJECTION, SOLUTION INTRAVENOUS; SUBCUTANEOUS at 17:33

## 2020-03-13 RX ADMIN — IPRATROPIUM BROMIDE AND ALBUTEROL SULFATE 3 ML: 2.5; .5 SOLUTION RESPIRATORY (INHALATION) at 08:04

## 2020-03-13 RX ADMIN — IPRATROPIUM BROMIDE AND ALBUTEROL SULFATE 3 ML: 2.5; .5 SOLUTION RESPIRATORY (INHALATION) at 12:19

## 2020-03-13 RX ADMIN — INSULIN LISPRO 3 UNITS: 100 INJECTION, SOLUTION INTRAVENOUS; SUBCUTANEOUS at 20:12

## 2020-03-13 RX ADMIN — SODIUM CHLORIDE 75 ML/HR: 9 INJECTION, SOLUTION INTRAVENOUS at 15:07

## 2020-03-13 RX ADMIN — METHYLPREDNISOLONE SODIUM SUCCINATE 60 MG: 125 INJECTION, POWDER, FOR SOLUTION INTRAMUSCULAR; INTRAVENOUS at 06:51

## 2020-03-13 RX ADMIN — PREDNISONE 40 MG: 20 TABLET ORAL at 15:07

## 2020-03-13 NOTE — CONSULTS
Adult Nutrition  Assessment/PES    Patient Name:  Josephine Wolf  YOB: 1940  MRN: [id number]  Admit Date:  3/12/2020    Assessment Date:  3/13/2020    Comments:  Nutrition consult for diet education for obesity. Pt has gained 30 lbs ib the past 9 months.  Reports she has been on steroids. Has a ravenous appetite. Discussed strategies for weight loss and provided a DASH diet plan. Pt appreciative of information.     Reason for Assessment     Row Name 03/13/20 1336          Reason for Assessment    Reason For Assessment  physician consult     Diagnosis  pulmonary disease;renal disease resp failure, COPD, A/CKD     Identified At Risk by Screening Criteria  need for education           Anthropometrics     Row Name 03/13/20 1339          Admit Weight    Admit Weight  84.6 kg (186 lb 8.2 oz)        Usual Body Weight (UBW)    Weight Gain  unintentional     Weight Loss Time Frame  30lbs in 9 months        Body Mass Index (BMI)    BMI Assessment  BMI 30-34.9: obesity grade I         Labs/Tests/Procedures/Meds     Row Name 03/13/20 1339          Labs/Procedures/Meds    Lab Results Reviewed  reviewed, pertinent     Lab Results Comments  Glu, BUN, Cr,         Diagnostic Tests/Procedures    Diagnostic Test/Procedure Reviewed  reviewed, pertinent        Medications    Pertinent Medications Reviewed  reviewed, pertinent     Pertinent Medications Comments  insulin, prednisone         Physical Findings     Row Name 03/13/20 1341          Physical Findings    Overall Physical Appearance  obese;on oxygen therapy     Skin  other (see comments) b=16           Nutrition Prescription Ordered     Row Name 03/13/20 1341          Nutrition Prescription PO    Common Modifiers  Cardiac;Low Potassium         Evaluation of Received Nutrient/Fluid Intake     Row Name 03/13/20 1342          PO Evaluation    Number of Days PO Intake Evaluated  Insufficient Data         Problem/Interventions:  Problem 1     Row Name 03/13/20 3605           Nutrition Diagnoses Problem 1    Problem 1  Knowledge Deficit     Etiology (related to)  Medical Diagnosis     Pulmonary/Critical Care  COPD pm steroids     Signs/Symptoms (evidenced by)  Unintended Weight Change     Unintended Weight Change  Gain     Number of Pounds Gained  30lbs     Weight gain time period  9 months         Intervention Goal     Row Name 03/13/20 1350          Intervention Goal    General  Maintain nutrition;Meet nutritional needs for age/condition;Provide information regarding MNT for treatment/condition;Disease management/therapy;Improved nutrition related lab(s);Reduce/improve symptoms     PO  Tolerate PO     Weight  Appropriate weight loss         Nutrition Intervention     Row Name 03/13/20 1350          Nutrition Intervention    RD/Tech Action  Care plan reviewd;Follow Tx progress;Advise available snack;Advise alternate selection;Interview for preference           Education/Evaluation     Row Name 03/13/20 1350          Education    Education  Provided education regarding;Education topics     Provided education regarding  Key food habit change;Diet rationale     Education Topics  Cardiac heart health;Weight management - maintain weight loss        Monitor/Evaluation    Monitor  Per protocol;Symptoms;Skin status;I&O;PO intake;Pertinent labs;Weight     Education Follow-up  Reinforce PRN           Electronically signed by:  Kayleen Barboza RD  03/13/20 13:52

## 2020-03-13 NOTE — CONSULTS
ADDENDUM:  Patient later mentioned she is followed by Dr Patterson  Will defer renal consult to that group.      NEPHROLOGY CONSULTATION-----[facility]    [facility]  [phone]  Avi Aly MD    Patient Care Team:  Bill Martino MD as PCP - General (Internal Medicine)  Avi Aly MD as Consulting Physician (Nephrology)    CC/REASON FOR CONSULTATION: Elevated creatinine  Requesting Physician: Dr Mendoza    History of Present Illness   79 year old very pleasant female with PMHx HTN, COPD presented with increased SOA. She is admitted with COPD exacerbation. Her creatinine was 3.1 on admission, unchanged this am. She was hypotensive with Bps in 60s on admission. She was on an ARB and thiazide at home. Denies dysuria, gross hematuria. No vomiting or diarrhea. No recent iv contrast exposure. She was taking NSAIDS in form of ibuprofen at home as well. Reports diminished UOP last 2 days, but voided this am. UA negative for blood or protein.    Review of Systems   As noted above, otherwise 10 systems reviewed and were negative.    Past Medical History:   Diagnosis Date   • Acid reflux    • Anemia    • Arthritis    • Bipolar 1 disorder, depressed (CMS/[facility]ly [facility])    • Cataract     MINDY   • Chronic nausea    • Chronic pain of right knee    • Continuous leakage of urine     USES DEPENDS   • COPD (chronic obstructive pulmonary disease) (CMS/[facility])     USES O2 2 LMP PER NC AT NIGHT   • Disease of thyroid gland     HYPOTHYROIDISM   • Diverticulosis    • Fibromyalgia     DX 1994   • Frequent episodes of bronchitis    • Hypertension    • Migraines    • Neck pain    • On home oxygen therapy     2L NC AT NIGHT   • Short of breath on exertion        Past Surgical History:   Procedure Laterality Date   • CEREBRAL ANEURYSM REPAIR  2000'S    WITH STENT   • HYSTERECTOMY     • LAPAROSCOPIC CHOLECYSTECTOMY     • IA TOTAL KNEE ARTHROPLASTY Right 11/16/2017    Procedure: RT TOTAL KNEE ARTHROPLASTY;   Surgeon: aKshif Perez MD;  [facility]: [facility];  Service: Orthopedics       Family History   Problem Relation Age of Onset   • Malig Hyperthermia Neg Hx        Social History     Tobacco Use   • Smoking status: Former Smoker     Packs/day: 1.00     Years: 10.00     Pack years: 10.00     Types: Cigarettes     Start date:      Last attempt to quit: 1969     Years since quittin.2   • Smokeless tobacco: Never Used   Substance Use Topics   • Alcohol use: No   • Drug use: No       Home Meds:   Medications Prior to Admission   Medication Sig Dispense Refill Last Dose   • amLODIPine (NORVASC) 10 MG tablet Take 10 mg by mouth Daily.      • dicyclomine (BENTYL) 10 MG capsule Take 1 capsule by mouth 4 (Four) Times a Day As Needed (diarrhea, cramping). 30 capsule 0    • divalproex (DEPAKOTE ER) 250 MG 24 hr tablet Take 250 mg by mouth Daily.   2018 at Unknown time   • divalproex (DEPAKOTE) 250 MG 24 hr tablet Take 500 mg by mouth Every Evening.      • DULoxetine (CYMBALTA) 60 MG capsule Take 60 mg by mouth 2 (Two) Times a Day.      • febuxostat (ULORIC) 40 MG tablet Take 40 mg by mouth Daily.      • fluticasone (FLONASE) 50 MCG/ACT nasal spray 1 spray into the nostril(s) as directed by provider Daily.      • gabapentin (NEURONTIN) 300 MG capsule Take 300 mg by mouth 3 (Three) Times a Day.      • HYDROcodone-acetaminophen (NORCO) 7.5-325 MG per tablet Take 1 tablet by mouth Every 8 (Eight) Hours As Needed for Moderate Pain .      • irbesartan-hydrochlorothiazide (AVALIDE) 150-12.5 MG tablet Take 2 tablets by mouth Daily. (Patient taking differently: Take 1 tablet by mouth Every 12 (Twelve) Hours.) 60 tablet 0 2018 at Unknown time   • levothyroxine (SYNTHROID, LEVOTHROID) 88 MCG tablet Take 88 mcg by mouth Every Morning Before Breakfast.   2018 at Unknown time   • melatonin 1 MG tablet Take 3 mg by mouth Every Night.      • OLANZapine (zyPREXA) 5 MG tablet Take 5 mg by mouth Every Night.      •  traZODone (DESYREL) 100 MG tablet Take 0.5 tablets by mouth At Night As Needed for Sleep. (Patient taking differently: Take 50 mg by mouth Every Night.) 7 tablet 0 6/23/2018 at Unknown time   • Umeclidinium Bromide (INCRUSE ELLIPTA) 62.5 MCG/INH aerosol powder  Inhale 1 puff Daily.      • vitamin B-12 (CYANOCOBALAMIN) 1000 MCG tablet Take 1,000 mcg by mouth 2 (Two) Times a Day.          Scheduled Meds:    enoxaparin 30 mg Subcutaneous Q24H   guaiFENesin 600 mg Oral Q12H   insulin lispro 0-14 Units Subcutaneous 4x Daily With Meals & Nightly   ipratropium-albuterol 3 mL Nebulization Q6H - RT   methylPREDNISolone sodium succinate 60 mg Intravenous Q12H   sodium chloride 10 mL Intravenous Q12H       Continuous Infusions:    norepinephrine 0.02-0.3 mcg/kg/min       PRN Meds:  •  bisacodyl  •  bisacodyl  •  dextrose  •  dextrose  •  glucagon (human recombinant)  •  ipratropium-albuterol  •  magnesium sulfate **OR** magnesium sulfate **OR** magnesium sulfate  •  nitroglycerin  •  ondansetron **OR** ondansetron  •  [COMPLETED] Insert peripheral IV **AND** sodium chloride  •  sodium chloride    Allergies:  Morphine and related    OBJECTIVE    Vital Signs  Temp:  [97.4 °F (36.3 °C)-99.1 °F (37.3 °C)] 98.1 °F (36.7 °C)  Heart Rate:  [60-85] 75  Resp:  [22-26] 22  BP: ()/(26-91) 145/70    No intake/output data recorded.  I/O last 3 completed shifts:  In: 0   Out: 360 [Urine:360]    Physical Exam:  General Appearance: alert, appears stated age and cooperative  Head: normocephalic, without obvious abnormality and atraumatic  Eyes: conjunctivae and sclerae normal and no icterus  Neck: supple and no JVD  Lungs: clear to auscultation and respirations regular  Heart: regular rhythm & normal rate and normal S1, S2  Chest Wall: no abnormalities observed  Abdomen: normal bowel sounds and soft non-tender  Extremities: moves extremities well, no edema, no cyanosis and no redness  Skin: no bleeding, bruising or rash  Neurologic:  Alert, and oriented. No focal deficits    Results Review:    I reviewed the patient's new clinical results.    WBC WBC   Date Value Ref Range Status   03/13/2020 5.79 3.40 - 10.80 10*3/mm3 Final   03/12/2020 7.23 3.40 - 10.80 10*3/mm3 Final      HGB Hemoglobin   Date Value Ref Range Status   03/13/2020 9.7 (L) 12.0 - 15.9 g/dL Final   03/12/2020 9.9 (L) 12.0 - 15.9 g/dL Final      HCT Hematocrit   Date Value Ref Range Status   03/13/2020 28.9 (L) 34.0 - 46.6 % Final   03/12/2020 30.0 (L) 34.0 - 46.6 % Final      Platlets No results found for: LABPLAT   MCV MCV   Date Value Ref Range Status   03/13/2020 96.7 79.0 - 97.0 fL Final   03/12/2020 98.0 (H) 79.0 - 97.0 fL Final          Sodium Sodium   Date Value Ref Range Status   03/13/2020 136 136 - 145 mmol/L Final   03/12/2020 138 136 - 145 mmol/L Final      Potassium Potassium   Date Value Ref Range Status   03/13/2020 5.0 3.5 - 5.2 mmol/L Final   03/12/2020 4.4 3.5 - 5.2 mmol/L Final      Chloride Chloride   Date Value Ref Range Status   03/13/2020 96 (L) 98 - 107 mmol/L Final   03/12/2020 97 (L) 98 - 107 mmol/L Final      CO2 CO2   Date Value Ref Range Status   03/13/2020 24.3 22.0 - 29.0 mmol/L Final   03/12/2020 26.9 22.0 - 29.0 mmol/L Final      BUN BUN   Date Value Ref Range Status   03/13/2020 44 (H) 8 - 23 mg/dL Final   03/12/2020 38 (H) 8 - 23 mg/dL Final      Creatinine Creatinine   Date Value Ref Range Status   03/13/2020 3.16 (H) 0.57 - 1.00 mg/dL Final   03/12/2020 3.10 (H) 0.57 - 1.00 mg/dL Final      Calcium Calcium   Date Value Ref Range Status   03/13/2020 8.1 (L) 8.6 - 10.5 mg/dL Final   03/12/2020 8.2 (L) 8.6 - 10.5 mg/dL Final      PO4 No results found for: CAPO4   Albumin Albumin   Date Value Ref Range Status   03/13/2020 3.90 3.50 - 5.20 g/dL Final   03/12/2020 3.90 3.50 - 5.20 g/dL Final      Magnesium No results found for: MG   Uric Acid No results found for: URICACID       Imaging Results (Last 72 Hours)     Procedure Component Value Units  Date/Time    XR Chest 1 View [[id number]] Collected:  03/13/20 0638     Updated:  03/13/20 0658    Narrative:       EXAMINATION: SINGLE VIEW CHEST RADIOGRAPH     HISTORY: 79-year-old female with a history of respiratory failure.     FINDINGS: A semierect AP chest radiograph was obtained. Comparison is  made to a chest radiograph dated 03/12/2020. The patient is slightly  rotated to the right. The lungs have improved aeration when compared to  the prior examination, and there has been a reduction in bibasilar  opacification. Mild bibasilar volume loss remains.       Impression:       Improved aeration of both lungs with persistent mild  bibasilar atelectasis versus pneumonia.     This report was finalized on 3/13/2020 6:55 AM by Dr. Valeriy Fournier M.D.       XR Chest 1 View [[id number]] Collected:  03/12/20 1206     Updated:  03/12/20 1211    Narrative:       XR CHEST 1 VW-     Clinical: Cough     COMPARISON 06/18/2019     FINDINGS: Very low lung volumes with linear opacities at both lung bases  suggesting likely discoid atelectasis. There could be some additional  superimposed infiltrate or consolidation along the left costophrenic  sulcus. There is crowding of the central bronchovascular markings  however no pulmonary edema. The cardiomediastinal silhouette is stable.  There is atherosclerotic calcification of the aorta. The remainder of  examination is unremarkable. PA and lateral view of the chest with  appropriate inspiratory effort may be helpful when patient's condition  permits.     This report was finalized on 3/12/2020 12:08 PM by Dr. Mynor Vera M.D.               Results for orders placed during the hospital encounter of 03/12/20   XR Chest 1 View    Narrative EXAMINATION: SINGLE VIEW CHEST RADIOGRAPH     HISTORY: 79-year-old female with a history of respiratory failure.     FINDINGS: A semierect AP chest radiograph was obtained. Comparison is  made to a chest radiograph dated 03/12/2020. The patient  is slightly  rotated to the right. The lungs have improved aeration when compared to  the prior examination, and there has been a reduction in bibasilar  opacification. Mild bibasilar volume loss remains.       Impression Improved aeration of both lungs with persistent mild  bibasilar atelectasis versus pneumonia.     This report was finalized on 3/13/2020 6:55 AM by Dr. Valeriy Fournier M.D.      XR Chest 1 View    Narrative XR CHEST 1 VW-     Clinical: Cough     COMPARISON 06/18/2019     FINDINGS: Very low lung volumes with linear opacities at both lung bases  suggesting likely discoid atelectasis. There could be some additional  superimposed infiltrate or consolidation along the left costophrenic  sulcus. There is crowding of the central bronchovascular markings  however no pulmonary edema. The cardiomediastinal silhouette is stable.  There is atherosclerotic calcification of the aorta. The remainder of  examination is unremarkable. PA and lateral view of the chest with  appropriate inspiratory effort may be helpful when patient's condition  permits.     This report was finalized on 3/12/2020 12:08 PM by Dr. Mynor Vera M.D.            Results for orders placed during the hospital encounter of 06/16/19   Duplex Venous Lower Extremity - Bilateral CAR    Narrative · Normal bilateral lower extremity venous duplex scan.          ASSESSMENT / PLAN      Acute respiratory acidosis    Acute-on-chronic respiratory failure (CMS/HCC)    Obesity (BMI 30-39.9)    · CHRIS--likely pre-renal and or mild ATN related to hypotension, ARBs/NSAIDS. UA benign making any inflammatory process less likely. Will get renal US to exclude partial obstruction. Gently hydrate.  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  · HTN--BP ok now. Low on admission  · Acute COPD exacerbation  · Anemia--check ferritin/iron and screen for paraproteins        I discussed the patients findings and my  recommendations with patient, nursing staff and primary care team    Will follow along closely. Thank you for allowing us to see this patient in renal consultation.    [facility]  [phone]  MD Avi Sanchez MD  03/13/20  12:54

## 2020-03-13 NOTE — PROGRESS NOTES
Discharge Planning Assessment  [facility]     Patient Name: Josephine Wolf  MRN: [id number]  Today's Date: 3/13/2020    Admit Date: 3/12/2020    Discharge Needs Assessment     Row Name 03/13/20 1354       Living Environment    Lives With  alone    Current Living Arrangements  home/apartment/condo    Potentially Unsafe Housing Conditions  unable to assess    Primary Care Provided by  self    Provides Primary Care For  no one    Family Caregiver if Needed  child(ree), adult    Quality of Family Relationships  supportive    Able to Return to Prior Arrangements  yes       Resource/Environmental Concerns    Resource/Environmental Concerns  none       Transition Planning    Patient/Family Anticipates Transition to  home    Patient/Family Anticipated Services at Transition  none;home health care       Discharge Needs Assessment    Concerns to be Addressed  discharge planning    Equipment Currently Used at Home  oxygen;nebulizer    Anticipated Changes Related to Illness  none    Equipment Needed After Discharge  none        Discharge Plan     Row Name 03/13/20 3167       Plan    Plan  Home  F/U for HH if needed    Plan Comments  IMM noted.  CCP spoke with patient at bedside to discuss d/c planning. Face sheet verified. CCP role explained.  Pt emergency contact is her son Chino, [phone]  Pt PCP is Eunice CASTANON.  With [facility]      Pt is independent with ADL's.  She uses no DME to ambulate.  She lives in a second floor apartment alone.  It has an elevator.  Pt has no past Home Health History.  She has been to rehab at [facility].   She obtains her medications from [facility]'s  pharmacy on [address]    Plan is home no needs.   CCP following.        Destination      Coordination has not been started for this encounter.      Durable Medical Equipment      Coordination has not been started for this encounter.      Dialysis/Infusion      Coordination has not been  started for this encounter.      Home Medical Care      Coordination has not been started for this encounter.      Therapy      Coordination has not been started for this encounter.      Community Resources      Coordination has not been started for this encounter.          Demographic Summary    No documentation.       Functional Status    No documentation.       Psychosocial    No documentation.       Abuse/Neglect    No documentation.       Legal    No documentation.       Substance Abuse    No documentation.       Patient Forms    No documentation.           Renée Barrios RN

## 2020-03-13 NOTE — PLAN OF CARE
Problem: Patient Care Overview  Goal: Plan of Care Review  Flowsheets (Taken 3/13/2020 8571)  Outcome Summary: Pt admitted 3/12 w/ acute on chronic resp failure.  Pt reports independence living at home alone, no use of AD.  Son assists w/ groceries/errands.  Today pt demonstrates generalized weakness, impaired standing balance and gait pattern from baseline but able to ambulate 100' w/ RW and contact assist.  Educated on activity/walking recommendations and PT POC.  Hopeful for progress for DC to home w/ home PT.

## 2020-03-13 NOTE — THERAPY EVALUATION
Patient Name: Josephine Wolf  : 1940    MRN: [id number]                              Today's Date: 3/13/2020       Admit Date: 3/12/2020    Visit Dx:     ICD-10-CM ICD-9-CM   1. Acute respiratory acidosis E87.2 276.2   2. Acute on chronic respiratory failure with hypoxia and hypercapnia (CMS/HCC) J96.21 518.84    J96.22 786.09     799.02   3. Renal failure, unspecified chronicity N19 586     Patient Active Problem List   Diagnosis   • Anemia   • OA (osteoarthritis) of knee   • Chronic respiratory failure with hypoxia (CMS/[facility])   • Cellulitis of skin   • Acute kidney injury (CMS/[facility])   • Sepsis (CMS/[facility])   • Orthostatic hypotension   • Disease of thyroid gland   • COPD (chronic obstructive pulmonary disease) (CMS/[facility])   • Hypertension   • Dehydration   • Stage 3 chronic kidney disease (CMS/[facility])   • Closed compression fracture of L1 lumbar vertebra   • Hyponatremia   • Osteoporosis with pathological fracture   • Acute respiratory acidosis   • Acute-on-chronic respiratory failure (CMS/[facility])   • Obesity (BMI 30-39.9)     Past Medical History:   Diagnosis Date   • Acid reflux    • Anemia    • Arthritis    • Bipolar 1 disorder, depressed (CMS/[facility])    • Cataract     MINDY   • Chronic nausea    • Chronic pain of right knee    • Continuous leakage of urine     USES DEPENDS   • COPD (chronic obstructive pulmonary disease) (CMS/HCC)     USES O2 2 LMP PER NC AT NIGHT   • Disease of thyroid gland     HYPOTHYROIDISM   • Diverticulosis    • Fibromyalgia     DX    • Frequent episodes of bronchitis    • Hypertension    • Migraines    • Neck pain    • On home oxygen therapy     2L NC AT NIGHT   • Short of breath on exertion      Past Surgical History:   Procedure Laterality Date   • CEREBRAL ANEURYSM REPAIR      WITH STENT   • HYSTERECTOMY     • LAPAROSCOPIC CHOLECYSTECTOMY     • [place] TOTAL KNEE ARTHROPLASTY Right 2017    Procedure: RT TOTAL KNEE ARTHROPLASTY;  Surgeon: Kashif Perez MD;  Location[facility]  MAIN OR;  Service: Orthopedics     General Information     Row Name 03/13/20 1234          PT Evaluation Time/Intention    Document Type  evaluation  -AR     Mode of Treatment  physical therapy  -AR     Row Name 03/13/20 1234          General Information    Patient Profile Reviewed?  yes  -AR     Prior Level of Function  min assist: lives alone, no AD, son assists w/ grocerries/errands but pt goes too.   -AR     Existing Precautions/Restrictions  fall  -AR     Barriers to Rehab  none identified  -AR     Row Name 03/13/20 1234          Relationship/Environment    Lives With  alone  -AR     Row Name 03/13/20 1234          Resource/Environmental Concerns    Current Living Arrangements  home/apartment/condo  -AR     Row Name 03/13/20 1234          Home Main Entrance    Number of Stairs, Main Entrance  none  -AR     Row Name 03/13/20 1234          Stairs Within Home, Primary    Stairs, Within Home, Primary  elevator present  -AR     Number of Stairs, Within Home, Primary  none  -AR     Row Name 03/13/20 1234          Cognitive Assessment/Intervention- PT/OT    Orientation Status (Cognition)  oriented x 3  -AR     Row Name 03/13/20 1234          Safety Issues, Functional Mobility    Impairments Affecting Function (Mobility)  balance;endurance/activity tolerance;shortness of breath;strength;range of motion (ROM)  -AR       User Key  (r) = Recorded By, (t) = Taken By, (c) = Cosigned By    Initials Name Provider Type    AR Jenelle Chaves, PT Physical Therapist        Mobility     Row Name 03/13/20 1236          Bed Mobility Assessment/Treatment    Bed Mobility Assessment/Treatment  supine-sit;sit-supine  -AR     Supine-Sit [place] (Bed Mobility)  not tested  -AR     Sit-Supine [place] (Bed Mobility)  not tested  -AR     Row Name 03/13/20 1236          Sit-Stand Transfer    Sit-Stand [place] (Transfers)  contact guard  -AR     Assistive Device (Sit-Stand Transfers)  -- no AD  -AR     Row Name 03/13/20 1236           Gait/Stairs Assessment/Training    Gait/Stairs Assessment/Training  gait/ambulation independence  -AR     [place] Level (Gait)  contact guard;minimum assist (75% patient effort)  -AR     Assistive Device (Gait)  walker, front-wheeled  -AR     Distance in Feet (Gait)  min A without RW for 5' then got RW.  CGA with RW for 100', slow shuffling gait.  denies SOB.    -AR     Pattern (Gait)  swing-through  -AR     Deviations/Abnormal Patterns (Gait)  shilpa decreased;festinating/shuffling  -AR     Bilateral Gait Deviations  heel strike decreased;forward flexed posture  -AR       User Key  (r) = Recorded By, (t) = Taken By, (c) = Cosigned By    Initials Name Provider Type    AR Jenelle Chaves, PT Physical Therapist        Obj/Interventions     Row Name 03/13/20 1237          General ROM    GENERAL ROM COMMENTS  B LE WFL  -AR     Row Name 03/13/20 1237          MMT (Manual Muscle Testing)    General MMT Comments  R LE 4/5, LLE -4/5 limited by chronic L knee pain   -AR     Row Name 03/13/20 1237          Therapeutic Exercise    Comment (Therapeutic Exercise)  B Ap, LAQ 10x  -AR     Row Name 03/13/20 1237          Static Standing Balance    Level of [place] (Supported Standing, Static Balance)  contact guard assist  -AR     Assistive Device Utilized (Supported Standing, Static Balance)  walker, rolling  -AR     Row Name 03/13/20 1237          Dynamic Standing Balance    Level of [place], Reaches Outside Midline (Standing, Dynamic Balance)  contact guard assist  -AR     Assistive Device Utilized (Supported Standing, Dynamic Balance)  walker, rolling  -AR       User Key  (r) = Recorded By, (t) = Taken By, (c) = Cosigned By    Initials Name Provider Type    AR Jenelle Chaves, PT Physical Therapist        Goals/Plan     Row Name 03/13/20 1243          Bed Mobility Goal 1 (PT)    Activity/Assistive Device (Bed Mobility Goal 1, PT)  sit to supine/supine to sit  -AR     [place] Level/Cues Needed (Bed  Mobility Goal 1, PT)  supervision required  -AR     Time Frame (Bed Mobility Goal 1, PT)  1 week  -AR     Row Name 03/13/20 1243          Transfer Goal 1 (PT)    Activity/Assistive Device (Transfer Goal 1, PT)  sit-to-stand/stand-to-sit;walker, rolling  -AR     [place] Level/Cues Needed (Transfer Goal 1, PT)  supervision required  -AR     Time Frame (Transfer Goal 1, PT)  1 week  -AR     Row Name 03/13/20 1243          Gait Training Goal 1 (PT)    Activity/Assistive Device (Gait Training Goal 1, PT)  gait (walking locomotion);walker, rolling  -AR     [place] Level (Gait Training Goal 1, PT)  supervision required  -AR     Distance (Gait Goal 1, PT)  200  -AR     Time Frame (Gait Training Goal 1, PT)  1 week  -AR       User Key  (r) = Recorded By, (t) = Taken By, (c) = Cosigned By    Initials Name Provider Type    AR Jenelle Chaves, PT Physical Therapist        Clinical Impression     Row Name 03/13/20 1238          Pain Assessment    Additional Documentation  Pain Scale: Numbers Pre/Post-Treatment (Group)  -AR     Row Name 03/13/20 1238          Pain Scale: Numbers Pre/Post-Treatment    Pain Scale: Numbers, Pretreatment  1/10  -AR     Pain Scale: Numbers, Post-Treatment  2/10  -AR     Pain Location - Side  Left  -AR     Pain Location  knee  -AR     Pain Intervention(s)  Medication (See MAR);Repositioned  -AR     Row Name 03/13/20 1236          Plan of Care Review    Plan of Care Reviewed With  patient  -AR     Outcome Summary  Pt admitted 3/12 w/ acute on chronic resp failure.  Pt reports independence living at home alone, no use of AD.  Son assists w/ groceries/errands.  Today pt demonstrates generalized weakness, impaired standing balance and gait pattern from baseline but able to ambulate 100' w/ RW and contact assist.  Educated on activity/walking recommendations and PT POC.  Hopeful for progress for DC to home w/ home PT.    -AR     Row Name 03/13/20 1236          Physical Therapy Clinical Impression     Criteria for Skilled Interventions Met (PT Clinical Impression)  yes  -AR     Rehab Potential (PT Clinical Summary)  good, to achieve stated therapy goals  -AR     Row Name 03/13/20 1238          Vital Signs    Pre SpO2 (%)  93 4L   -AR     O2 Delivery Pre Treatment  supplemental O2  -AR     Intra SpO2 (%)  89  -AR     O2 Delivery Intra Treatment  supplemental O2  -AR     Post SpO2 (%)  95  -AR     O2 Delivery Post Treatment  supplemental O2  -AR     Row Name 03/13/20 1238          Positioning and Restraints    Pre-Treatment Position  sitting in chair/recliner no chair alarm on arrival  -AR     Post Treatment Position  chair  -AR     In Chair  notified nsg;reclined;sitting;encouraged to call for assist;call light within reach;with family/caregiver  -AR       User Key  (r) = Recorded By, (t) = Taken By, (c) = Cosigned By    Initials Name Provider Type    Jenelle Cope, PT Physical Therapist        Outcome Measures     Row Name 03/13/20 1246          How much help from another person do you currently need...    Turning from your back to your side while in flat bed without using bedrails?  4  -AR     Moving from lying on back to sitting on the side of a flat bed without bedrails?  3  -AR     Moving to and from a bed to a chair (including a wheelchair)?  3  -AR     Standing up from a chair using your arms (e.g., wheelchair, bedside chair)?  3  -AR     Climbing 3-5 steps with a railing?  2  -AR     To walk in hospital room?  3  -AR     AM-PAC 6 Clicks Score (PT)  18  -AR     Row Name 03/13/20 1246          Functional Assessment    Outcome Measure Options  AM-PAC 6 Clicks Basic Mobility (PT)  -AR       User Key  (r) = Recorded By, (t) = Taken By, (c) = Cosigned By    Initials Name Provider Type    Jenelle Cope, PT Physical Therapist          PT Recommendation and Plan  Planned Therapy Interventions (PT Eval): balance training, bed mobility training, gait training, home exercise program, patient/family  education, transfer training, ROM (range of motion), strengthening  Outcome Summary/Treatment Plan (PT)  Anticipated Discharge Disposition (PT): home with assist, home with home health  Plan of Care Reviewed With: patient  Outcome Summary: Pt admitted 3/12 w/ acute on chronic resp failure.  Pt reports independence living at home alone, no use of AD.  Son assists w/ groceries/errands.  Today pt demonstrates generalized weakness, impaired standing balance and gait pattern from baseline but able to ambulate 100' w/ RW and contact assist.  Educated on activity/walking recommendations and PT POC.  Hopeful for progress for DC to home w/ home PT.       Time Calculation:   PT Charges     Row Name 03/13/20 1233             Time Calculation    Start Time  1136  -AR      Stop Time  1201  -AR      Time Calculation (min)  25 min  -AR      PT Received On  03/13/20  -AR      PT - Next Appointment  03/14/20  -AR      PT Goal Re-Cert Due Date  03/20/20  -AR        User Key  (r) = Recorded By, (t) = Taken By, (c) = Cosigned By    Initials Name Provider Type    AR Jenelle Chaves, PT Physical Therapist        Therapy Charges for Today     Code Description Service Date Service Provider Modifiers Qty    24695422948 HC PT EVAL MOD COMPLEXITY 2 3/13/2020 Jenelle Chaves, PT GP 1    52540678583 HC PT THER PROC EA 15 MIN 3/13/2020 Jenelle Chaves, PT GP 1    59112990465 HC PT THER SUPP EA 15 MIN 3/13/2020 Jenelle Chaves, PT GP 2          PT G-Codes  Outcome Measure Options: AM-PAC 6 Clicks Basic Mobility (PT)  AM-PAC 6 Clicks Score (PT): 18    Jenelle Chaves PT  3/13/2020

## 2020-03-13 NOTE — PLAN OF CARE
Patient remains in CCU overnight.  UA and blood cultures performed d/t patient and son telling staff that she only completed 2 out of 6 days worth of antibiotics for a UTI diagnosed last week.  No abx ordered at this time based on UA.  On BIPAP most of night. BP borderline, but no levo needed.

## 2020-03-13 NOTE — PAYOR COMM NOTE
"Raheem Roberts (79 y.o. Female)                     ATTENTION;   INITIAL CLINICAL FOR NURSE REVIEW, REPLY TO [facility] DEPT, RINA DAMON LPN                 UR  [phone] ,            Date of Birth Social Security Number Address Home Phone MRN    1940  [id number]    [facility] [address] [phone] [id number]    Muslim Marital Status          Scientologist        Admission Date Admission Type Admitting Provider Attending Provider Department, Room/Bed    3/12/20 Emergency Ling Pereira MD Saad, Lebnan S, MD [facility] CORONARY CARE, N340/1    Discharge Date Discharge Disposition Discharge Destination                       Attending Provider:  Ling Pereira MD    Allergies:  Morphine And Related    Isolation:  None   Infection:  None   Code Status:  CPR    Ht:  157.5 cm (62.01\")   Wt:  78.2 kg (172 lb 6.4 oz)    Admission Cmt:  None   Principal Problem:  None                Active Insurance as of 3/12/2020     Primary Coverage     Payor Plan Insurance Group Employer/Plan Group    [facility] [employer] MEDICARE REPLACEMENT      Payor Plan Address Payor Plan Phone Number Payor Plan Fax Number Effective Dates    [address] [address] [phone]  10/9/2017 - None Entered    [facility] [id number]       Subscriber Name Subscriber Birth Date Member ID       RAHEEM ROBERTS 1940 [id number]           Secondary Coverage     Payor Plan Insurance Group Employer/Plan Group    [place]      Payor Plan Address Payor Plan Phone Number Payor Plan Fax Number Effective Dates    [address] [phone]  7/3/2016 - None Entered    [facility] [address]       Subscriber Name Subscriber Birth Date Member ID       RAHEEM ROBERTS 1940 [id number]                 Emergency Contacts      (Rel.) Home Phone Work Phone Mobile Phone    Chino Roberts (Son) [phone] -- --    James Roberts (Son) " [phone] -- [phone]               History & Physical      Ling Pereira MD at 20 1602            History and Physical    Patient Name: Josephine Wolf  Age/Sex: 79 y.o. female  : 1940  MRN: [id number]    Date of Admission: 3/12/2020  Date of Encounter Visit: 20  Encounter Provider: Ling Pereira MD  Place of Service: [facility]   Patient Care Team:  Bill Martino MD as PCP - General (Internal Medicine)      Subjective:     Chief Complaint: Shortness of breath    History of Present Illness:  Josephine Wolf is a 79 y.o. female with history of COPD who presented to the emergency room with worsening shortness of breath of 24 hours duration.  Patient has chronic hypoxemia usually nocturnal on oxygen at night only with recent worsening dyspnea with exertion requiring oxygen supplementation at all times. this was associated with nonproductive cough with chest tightness, the cough and the tightness were going on for few days.  There is no fever or chills or night sweats, denies any chest pain or hemoptysis  No GI symptoms like nausea vomiting or diarrhea  She did have some worsening lower extremity edema  On arrival her sats were 70% on room air consistent with acute hypoxemia, she was given nebulizer treatment however this was not enough, patient was working hard on the breathing and ABG showed hypercapnia so she was started on noninvasive positive pressure ventilation with the BiPAP.  Her chest x-ray was negative for any pulmonary infiltrate and the patient has no known risk factor or exposure to COVID 19 and she is not aware of any recent sick contact.  The  mentioned that she had a recent procedure with what he described as a kyphoplasty done    Pulmonary Functions Testing Results:    No results found for: FEV1, FVC, XSU6QIT, TLC, DLCO    Review of Systems:   Review of Systems  Constitutional: Negative for chills and fever.   HENT: Negative for rhinorrhea and  sore throat.    Eyes: Negative.    Respiratory: Positive for cough (nonproductive) and shortness of breath.    Cardiovascular: Negative for chest pain and leg swelling.   Gastrointestinal: Negative for abdominal pain, diarrhea, nausea and vomiting.   Genitourinary: Negative for dysuria.   Musculoskeletal: Negative for neck pain.   Skin: Negative for rash.   Allergic/Immunologic: Negative.    Neurological: Negative for weakness, numbness and headaches.   Hematological: Negative.    Psychiatric/Behavioral: Negative.    All other systems reviewed and are negative.  Past Medical History:  Past Medical History:   Diagnosis Date   • Acid reflux    • Anemia    • Arthritis    • Bipolar 1 disorder, depressed (CMS/[facility])    • Cataract     MINDY   • Chronic nausea    • Chronic pain of right knee    • Continuous leakage of urine     USES DEPENDS   • COPD (chronic obstructive pulmonary disease) (CMS/[facility])     USES O2 2 LMP PER NC AT NIGHT   • Disease of thyroid gland     HYPOTHYROIDISM   • Diverticulosis    • Fibromyalgia     DX 1994   • Frequent episodes of bronchitis    • Hypertension    • Migraines    • Neck pain    • On home oxygen therapy     2L NC AT NIGHT   • Short of breath on exertion        Past Surgical History:   Procedure Laterality Date   • CEREBRAL ANEURYSM REPAIR  2000'S    WITH STENT   • HYSTERECTOMY     • LAPAROSCOPIC CHOLECYSTECTOMY     • CA TOTAL KNEE ARTHROPLASTY Right 11/16/2017    Procedure: RT TOTAL KNEE ARTHROPLASTY;  Surgeon: Kashif Perez MD;  Location: [facility];  Service: Orthopedics       Home Medications:   Medications Prior to Admission   Medication Sig Dispense Refill Last Dose   • amLODIPine (NORVASC) 10 MG tablet Take 10 mg by mouth Daily.      • dicyclomine (BENTYL) 10 MG capsule Take 1 capsule by mouth 4 (Four) Times a Day As Needed (diarrhea, cramping). 30 capsule 0    • divalproex (DEPAKOTE ER) 250 MG 24 hr tablet Take 250 mg by mouth Daily.   2/28/2018 at Unknown time   • divalproex  (DEPAKOTE) 250 MG 24 hr tablet Take 500 mg by mouth Every Evening.      • DULoxetine (CYMBALTA) 60 MG capsule Take 60 mg by mouth 2 (Two) Times a Day.      • febuxostat (ULORIC) 40 MG tablet Take 40 mg by mouth Daily.      • fluticasone (FLONASE) 50 MCG/ACT nasal spray 1 spray into the nostril(s) as directed by provider Daily.      • gabapentin (NEURONTIN) 300 MG capsule Take 300 mg by mouth 3 (Three) Times a Day.      • HYDROcodone-acetaminophen (NORCO) 7.5-325 MG per tablet Take 1 tablet by mouth Every 8 (Eight) Hours As Needed for Moderate Pain .      • irbesartan-hydrochlorothiazide (AVALIDE) 150-12.5 MG tablet Take 2 tablets by mouth Daily. (Patient taking differently: Take 1 tablet by mouth Every 12 (Twelve) Hours.) 60 tablet 0 6/24/2018 at Unknown time   • levothyroxine (SYNTHROID, LEVOTHROID) 88 MCG tablet Take 88 mcg by mouth Every Morning Before Breakfast.   6/24/2018 at Unknown time   • melatonin 1 MG tablet Take 3 mg by mouth Every Night.      • OLANZapine (zyPREXA) 5 MG tablet Take 5 mg by mouth Every Night.      • traZODone (DESYREL) 100 MG tablet Take 0.5 tablets by mouth At Night As Needed for Sleep. (Patient taking differently: Take 50 mg by mouth Every Night.) 7 tablet 0 6/23/2018 at Unknown time   • Umeclidinium Bromide (INCRUSE ELLIPTA) 62.5 MCG/INH aerosol powder  Inhale 1 puff Daily.      • vitamin B-12 (CYANOCOBALAMIN) 1000 MCG tablet Take 1,000 mcg by mouth 2 (Two) Times a Day.          Inpatient Medications:  Scheduled Meds:   Continuous Infusions:   PRN Meds:.•  [COMPLETED] Insert peripheral IV **AND** sodium chloride    Allergies:  Allergies   Allergen Reactions   • Morphine And Related Hallucinations     CONFUSION       Past Social History:  Social History     Socioeconomic History   • Marital status:      Spouse name: Not on file   • Number of children: Not on file   • Years of education: Not on file   • Highest education level: Not on file   Tobacco Use   • Smoking status: Former  Smoker     Packs/day: 1.00     Years: 10.00     Pack years: 10.00     Types: Cigarettes     Start date:      Last attempt to quit:      Years since quittin.2   • Smokeless tobacco: Never Used   Substance and Sexual Activity   • Alcohol use: No   • Drug use: No   • Sexual activity: Defer       Past Family History:  Family History   Problem Relation Age of Onset   • Malig Hyperthermia Neg Hx            Objective:   Temp:  [99.4 °F (37.4 °C)] 99.4 °F (37.4 °C)  Heart Rate:  [70-84] 82  Resp:  [18-26] 24  BP: (103-134)/(48-92) 113/55   SpO2:  [86 %-100 %] 100 %  on  Flow (L/min):  [4] 4 Device (Oxygen Therapy): other (see comments)  No intake or output data in the 24 hours ending 20 1602  Body mass index is 34.13 kg/m².      20  1001   Weight: 84.6 kg (186 lb 9.6 oz)     Weight change:     Physical Exam:   Physical Exam   General:    And respiratory distress but doing better since she was started on the BiPAP, awake and responsive, more interactive according to the                    Head:    Normocephalic, atraumatic.   Eyes:          Conjunctivae and sclerae normal, no icterus, PERRLA   Throat:   No oral lesions, no thrush, oral mucosa moist.    Neck:   Supple, trachea midline.  Patient has a webbed neck and puffiness on the supraclavicular area from her COPD and hyperinflation   Lungs:     Normal chest on inspection, patient has decreased breath sounds bilaterally, she was on the BiPAP, she has minimal crackles posteriorly, distant sounds with no significant prolongation of the expiratory phase..     Heart:    Regular rhythm and normal rate.  No murmurs, gallops, or rubs noted.   Abdomen:     Soft, non-tender, non-distended, positive bowel sounds.  Truncal obesity   Extremities:   No clubbing, cyanosis, 1+ bilateral lower extremities pitting edema ma.     Pulses:   Pulses palpable and equal bilaterally.    Skin:   No bleeding or rash.   Neuro:   Non-focal.  Moves all extremities well.     Psychiatric:   Normal mood and affect.     Lab Review:   Results from last 7 days   Lab Units 03/12/20  1002   SODIUM mmol/L 138   POTASSIUM mmol/L 4.4   CHLORIDE mmol/L 97*   CO2 mmol/L 26.9   BUN mg/dL 38*   CREATININE mg/dL 3.10*   GLUCOSE mg/dL 105*   CALCIUM mg/dL 8.2*   AST (SGOT) U/L 27   ALT (SGPT) U/L 23   ALBUMIN g/dL 3.90     Results from last 7 days   Lab Units 03/12/20  1002   TROPONIN T ng/mL <0.010     Results from last 7 days   Lab Units 03/12/20  1002   WBC 10*3/mm3 7.23   HEMOGLOBIN g/dL 9.9*   HEMATOCRIT % 30.0*   PLATELETS 10*3/mm3 193   MCV fL 98.0*   MCH pg 32.4   MCHC g/dL 33.0   RDW % 13.9   RDW-SD fl 49.5   MPV fL 9.4   NEUTROPHIL % % 53.9   LYMPHOCYTE % % 30.6   MONOCYTES % % 11.2   EOSINOPHIL % % 1.9   BASOPHIL % % 0.6   IMM GRAN % % 1.8*   NEUTROS ABS 10*3/mm3 3.90   LYMPHS ABS 10*3/mm3 2.21   MONOS ABS 10*3/mm3 0.81   EOS ABS 10*3/mm3 0.14   BASOS ABS 10*3/mm3 0.04   IMMATURE GRANS (ABS) 10*3/mm3 0.13*   NRBC /100 WBC 0.0                   Invalid input(s): LDLCALC  Results from last 7 days   Lab Units 03/12/20  1002   PROBNP pg/mL 242.6         Results from last 7 days   Lab Units 03/12/20  1117   PH, ARTERIAL pH units 7.281*   PCO2, ARTERIAL mm Hg 60.7*   PO2 ART mm Hg 64.7*   HCO3 ART mmol/L 28.6*     Results from last 7 days   Lab Units 03/12/20  1002   PROCALCITONIN ng/mL 0.12   LACTATE mmol/L 1.4                     Results from last 7 days   Lab Units 03/12/20  1003   ADENOVIRUS DETECTION BY PCR  Not Detected   CORONAVIRUS 229E  Not Detected   CORONAVIRUS HKU1  Not Detected   CORONAVIRUS NL63  Not Detected   CORONAVIRUS OC43  Not Detected   HUMAN METAPNEUMOVIRUS  Not Detected   HUMAN RHINOVIRUS/ENTEROVIRUS  Not Detected   INFLUENZA B PCR  Not Detected   PARAINFLUENZA 1  Not Detected   PARAINFLUENZA VIRUS 2  Not Detected   PARAINFLUENZA VIRUS 3  Not Detected   PARAINFLUENZA VIRUS 4  Not Detected   BORDETELLA PERTUSSIS PCR  Not Detected   BORDETELLA PARAPERTUSSIS PCR  Not Detected    [id number]  Not Detected   CHLAMYDOPHILA PNEUMONIAE PCR  Not Detected   MYCOPLAMA PNEUMO PCR  Not Detected   INFLUENZA A H3  Not Detected   INFLUENZA A H1  Not Detected   RSV, PCR  Not Detected           Imaging:  Imaging Results (Most Recent)     Procedure Component Value Units Date/Time    XR Chest 1 View [[id number]] Collected:  03/12/20 1206     Updated:  03/12/20 1211    Narrative:       XR CHEST 1 VW-     Clinical: Cough     COMPARISON 06/18/2019     FINDINGS: Very low lung volumes with linear opacities at both lung bases  suggesting likely discoid atelectasis. There could be some additional  superimposed infiltrate or consolidation along the left costophrenic  sulcus. There is crowding of the central bronchovascular markings  however no pulmonary edema. The cardiomediastinal silhouette is stable.  There is atherosclerotic calcification of the aorta. The remainder of  examination is unremarkable. PA and lateral view of the chest with  appropriate inspiratory effort may be helpful when patient's condition  permits.     This report was finalized on 3/12/2020 12:08 PM by Dr. Mynor Vera M.D.             I personally viewed and interpreted the patient's imaging studies: There is a definite lower lung volume bilaterally, with some bibasilar atelectasis, cannot rule out pneumonia.    Assessment:     1. Acute exacerbation of COPD   2. Acute hypercapnic respiratory failure with acute on chronic hypoxemic respiratory failure  3. Possible right-sided heart failure with no previous echo on records  4. Suspected pulmonary hypertension  5. Sepsis however patient has negative viral panel, she has no leukocytosis, her procalcitonin was negative and her lactic acid was negative.  6. CHRIS      Plan:     At this point we will hold on the antibiotic given the above labs and we will treat for COPD exacerbation with steroids BiPAP and bronchodilator therapy and steroids  Even though the patient ruled in for sepsis based on the  tachycardia but this is due to the COPD exacerbation, no infection is suspected at this point  Patient will be treated with the BiPAP and will try to get an ABG on the BiPAP and adjust the setting as needed and will try to see how she does off the BiPAP in the morning  Her blood pressure is borderline low and repeat was within acceptable level with mean above 65, will be reluctant to give IV hydration given the edema and the suspected pulmonary hypertension, may need to move down to the ICU later on depending on her response to the initial measures  I have decreased the EPAP/PEEP down to 8 to help some with the blood pressure control  The renal failure is expect to improve with hemodynamic support, consult renal if no better on follow up am labs     Prognosis is guarded at this point      Time: Critical care 33 min    Ling Pereira MD  [facility]   03/12/20  16:02    Dictated utilizing Dragon dictation      Electronically signed by Ling Pereira MD at 03/12/20 1631          Emergency Department Notes      Matilda Hills RN at 03/12/20 0926        Pt arrives via EMS from home. Complains of SOA since yesterday. Pt is on 2L O2 all the time. EMS reports initial sats 85%. Received a duo-neb in route which helped some.     Electronically signed by Matilda Hills RN at 03/12/20 0928     Hemanth Balbuena MD at 03/12/20 1002      Procedure Orders    1. Critical Care [[id number]] ordered by Hemanth Balbuena MD at 03/12/20 1254                 EMERGENCY DEPARTMENT ENCOUNTER    Room Number:  15/15  Date of encounter:  3/12/2020  PCP: Bill Martino MD  Historian: Patient       HPI:  Chief Complaint: Shortness of breath  A complete HPI/ROS/PMH/PSH/SH/FH are unobtainable due to: None    Context: Josephine Wolf is a 79 y.o. female with a history of COPD who presents to the ED c/o increased shortness of breath since yesterday.  Patient is on 2 L of oxygen at night.  She reports that her  symptoms worsen with exertion.  She is also had a nonproductive cough for several days.  She denies fever, chills, chest pain, nausea, vomiting, leg swelling, sore throat, or runny nose.  Oxygen saturations were noted to be 70% on room air.  She was given a nebulizer treatment by EMS with some improvement.  Patient does report some wheezing at home and she has used her home nebulizer machine as well.  Symptoms have been moderate in severity.  Patient denies any travel outside the local [place] area in the past 1 month.  She also denies any exposure to anyone known to have COVID-19 or other sick contact.    PAST MEDICAL HISTORY  Active Ambulatory Problems     Diagnosis Date Noted   • Anemia 10/19/2017   • OA (osteoarthritis) of knee 11/16/2017   • Chronic respiratory failure with hypoxia (CMS/[facility]) 12/02/2017   • Cellulitis of skin 12/06/2017   • Acute kidney injury (CMS/[facility]) 12/06/2017   • Sepsis (CMS/[facility]) 12/06/2017   • Orthostatic hypotension 06/24/2018   • Disease of thyroid gland 06/24/2018   • COPD (chronic obstructive pulmonary disease) (CMS/[facility]) 06/24/2018   • Hypertension 06/24/2018   • Dehydration 06/24/2018   • Stage 3 chronic kidney disease (CMS/[facility]) 06/25/2018   • Closed compression fracture of L1 lumbar vertebra 06/16/2019   • Hyponatremia 06/16/2019   • Osteoporosis with pathological fracture 06/17/2019     Resolved Ambulatory Problems     Diagnosis Date Noted   • No Resolved Ambulatory Problems     Past Medical History:   Diagnosis Date   • Acid reflux    • Arthritis    • Bipolar 1 disorder, depressed (CMS/[facility])    • Cataract    • Chronic nausea    • Chronic pain of right knee    • Continuous leakage of urine    • Diverticulosis    • Fibromyalgia    • Frequent episodes of bronchitis    • Migraines    • Neck pain    • On home oxygen therapy    • Short of breath on exertion          PAST SURGICAL HISTORY  Past Surgical History:   Procedure Laterality Date   • CEREBRAL ANEURYSM REPAIR  2000'S    WITH  STENT   • HYSTERECTOMY     • LAPAROSCOPIC CHOLECYSTECTOMY     • [place] TOTAL KNEE ARTHROPLASTY Right 2017    Procedure: RT TOTAL KNEE ARTHROPLASTY;  Surgeon: Kashif Perez MD;  [facility];  Service: Orthopedics         FAMILY HISTORY  Family History   Problem Relation Age of Onset   • Malig Hyperthermia Neg Hx          SOCIAL HISTORY  Social History     Socioeconomic History   • Marital status:      Spouse name: Not on file   • Number of children: Not on file   • Years of education: Not on file   • Highest education level: Not on file   Tobacco Use   • Smoking status: Former Smoker     Packs/day: 1.00     Years: 10.00     Pack years: 10.00     Types: Cigarettes     Start date:      Last attempt to quit:      Years since quittin.2   • Smokeless tobacco: Never Used   Substance and Sexual Activity   • Alcohol use: No   • Drug use: No   • Sexual activity: Defer         ALLERGIES  Morphine and related       REVIEW OF SYSTEMS  Review of Systems   Constitutional: Negative for chills and fever.   HENT: Negative for rhinorrhea and sore throat.    Eyes: Negative.    Respiratory: Positive for cough (nonproductive) and shortness of breath.    Cardiovascular: Negative for chest pain and leg swelling.   Gastrointestinal: Negative for abdominal pain, diarrhea, nausea and vomiting.   Genitourinary: Negative for dysuria.   Musculoskeletal: Negative for neck pain.   Skin: Negative for rash.   Allergic/Immunologic: Negative.    Neurological: Negative for weakness, numbness and headaches.   Hematological: Negative.    Psychiatric/Behavioral: Negative.    All other systems reviewed and are negative.          PHYSICAL EXAM    I have reviewed the triage vital signs and nursing notes.    ED Triage Vitals [20 0928]   Temp Heart Rate Resp BP SpO2   99.4 °F (37.4 °C) 84 18 134/92 (!) 88 %      Temp src Heart Rate Source Patient Position BP Location FiO2 (%)   Oral Monitor Lying -- --       Physical  Exam  GENERAL: Mildly distressed  HENT: nares patent, oropharynx benign  EYES: no scleral icterus  CV: regular rhythm, regular rate  RESPIRATORY: normal effort, diffuse wheezes bilaterally, rales in both bases, patient is speaking in short phrases  ABDOMEN: soft, nontender, nondistended  MUSCULOSKELETAL: no deformity, trace pedal edema, no calf tenderness  NEURO: Strength, sensation, and coordination are grossly intact.  Speech and mentation are unremarkable  SKIN: warm, dry      LAB RESULTS  Recent Results (from the past 24 hour(s))   Comprehensive Metabolic Panel    Collection Time: 03/12/20 10:02 AM   Result Value Ref Range    Glucose 105 (H) 65 - 99 mg/dL    BUN 38 (H) 8 - 23 mg/dL    Creatinine 3.10 (H) 0.57 - 1.00 mg/dL    Sodium 138 136 - 145 mmol/L    Potassium 4.4 3.5 - 5.2 mmol/L    Chloride 97 (L) 98 - 107 mmol/L    CO2 26.9 22.0 - 29.0 mmol/L    Calcium 8.2 (L) 8.6 - 10.5 mg/dL    Total Protein 7.0 6.0 - 8.5 g/dL    Albumin 3.90 3.50 - 5.20 g/dL    ALT (SGPT) 23 1 - 33 U/L    AST (SGOT) 27 1 - 32 U/L    Alkaline Phosphatase 108 39 - 117 U/L    Total Bilirubin 0.2 0.2 - 1.2 mg/dL    eGFR Non African Amer 15 (L) >60 mL/min/1.73    Globulin 3.1 gm/dL    A/G Ratio 1.3 g/dL    BUN/Creatinine Ratio 12.3 7.0 - 25.0    Anion Gap 14.1 5.0 - 15.0 mmol/L   BNP    Collection Time: 03/12/20 10:02 AM   Result Value Ref Range    proBNP 242.6 5.0-1,800.0 pg/mL   Troponin    Collection Time: 03/12/20 10:02 AM   Result Value Ref Range    Troponin T <0.010 0.000 - 0.030 ng/mL   Lactic Acid, Plasma    Collection Time: 03/12/20 10:02 AM   Result Value Ref Range    Lactate 1.4 0.5 - 2.0 mmol/L   Procalcitonin    Collection Time: 03/12/20 10:02 AM   Result Value Ref Range    Procalcitonin 0.12 0.10 - 0.25 ng/mL   CBC Auto Differential    Collection Time: 03/12/20 10:02 AM   Result Value Ref Range    WBC 7.23 3.40 - 10.80 10*3/mm3    RBC 3.06 (L) 3.77 - 5.28 10*6/mm3    Hemoglobin 9.9 (L) 12.0 - 15.9 g/dL    Hematocrit 30.0 (L)  34.0 - 46.6 %    MCV 98.0 (H) 79.0 - 97.0 fL    MCH 32.4 26.6 - 33.0 pg    MCHC 33.0 31.5 - 35.7 g/dL    RDW 13.9 12.3 - 15.4 %    RDW-SD 49.5 37.0 - 54.0 fl    MPV 9.4 6.0 - 12.0 fL    Platelets 193 140 - 450 10*3/mm3    Neutrophil % 53.9 42.7 - 76.0 %    Lymphocyte % 30.6 19.6 - 45.3 %    Monocyte % 11.2 5.0 - 12.0 %    Eosinophil % 1.9 0.3 - 6.2 %    Basophil % 0.6 0.0 - 1.5 %    Immature Grans % 1.8 (H) 0.0 - 0.5 %    Neutrophils, Absolute 3.90 1.70 - 7.00 10*3/mm3    Lymphocytes, Absolute 2.21 0.70 - 3.10 10*3/mm3    Monocytes, Absolute 0.81 0.10 - 0.90 10*3/mm3    Eosinophils, Absolute 0.14 0.00 - 0.40 10*3/mm3    Basophils, Absolute 0.04 0.00 - 0.20 10*3/mm3    Immature Grans, Absolute 0.13 (H) 0.00 - 0.05 10*3/mm3    nRBC 0.0 0.0 - 0.2 /100 WBC   Respiratory Panel, PCR - Swab, Nasopharynx    Collection Time: 03/12/20 10:03 AM   Result Value Ref Range    ADENOVIRUS, PCR Not Detected Not Detected    Coronavirus 229E Not Detected Not Detected    Coronavirus HKU1 Not Detected Not Detected    Coronavirus NL63 Not Detected Not Detected    Coronavirus OC43 Not Detected Not Detected    Human Metapneumovirus Not Detected Not Detected    Human Rhinovirus/Enterovirus Not Detected Not Detected    Influenza B PCR Not Detected Not Detected    Parainfluenza Virus 1 Not Detected Not Detected    Parainfluenza Virus 2 Not Detected Not Detected    Parainfluenza Virus 3 Not Detected Not Detected    Parainfluenza Virus 4 Not Detected Not Detected    Bordetella pertussis pcr Not Detected Not Detected    Influenza A H1 2009 PCR Not Detected Not Detected    Chlamydophila pneumoniae PCR Not Detected Not Detected    Mycoplasma pneumo by PCR Not Detected Not Detected    Influenza A PCR Not Detected Not Detected    Influenza A H3 Not Detected Not Detected    Influenza A H1 Not Detected Not Detected    RSV, PCR Not Detected Not Detected    Bordetella parapertussis PCR Not Detected Not Detected   Blood Gas, Arterial    Collection Time:  03/12/20 11:17 AM   Result Value Ref Range    Site Arterial: right radial     Zaki's Test Positive     pH, Arterial 7.281 (C) 7.350 - 7.450 pH units    pCO2, Arterial 60.7 (C) 35.0 - 45.0 mm Hg    pO2, Arterial 64.7 (L) 80.0 - 100.0 mm Hg    HCO3, Arterial 28.6 (H) 22.0 - 28.0 mmol/L    Base Excess, Arterial 1.0 0.0 - 2.0 mmol/L    O2 Saturation Calculated 88.8 (L) 92.0 - 99.0 %    A-a Gradiant 0.2 mmHg    Barometric Pressure for Blood Gas 746.8 mmHg    Modality BiPap     FIO2 60 %    Ventilator Mode NIV     Set [facility] Resp Rate 20     Rate 22 Breaths/minute       Ordered the above labs and independently reviewed the results.      RADIOLOGY  Xr Chest 1 View    Result Date: 3/12/2020  XR CHEST 1 VW-  Clinical: Cough  COMPARISON 06/18/2019  FINDINGS: Very low lung volumes with linear opacities at both lung bases suggesting likely discoid atelectasis. There could be some additional superimposed infiltrate or consolidation along the left costophrenic sulcus. There is crowding of the central bronchovascular markings however no pulmonary edema. The cardiomediastinal silhouette is stable. There is atherosclerotic calcification of the aorta. The remainder of examination is unremarkable. PA and lateral view of the chest with appropriate inspiratory effort may be helpful when patient's condition permits.  This report was finalized on 3/12/2020 12:08 PM by Dr. Mynor Vera M.D.        I ordered the above noted radiological studies. Reviewed by me and discussed with radiologist.  See dictation for official radiology interpretation.      PROCEDURES  Critical Care  Performed by: Hemanth Balbuena MD  Authorized by: Hemanth Balbuena MD     Critical care provider statement:     Critical care time (minutes):  30    Critical care time was exclusive of:  Separately billable procedures and treating other patients    Critical care was necessary to treat or prevent imminent or life-threatening deterioration of the following  conditions:  Renal failure and respiratory failure    Critical care was time spent personally by me on the following activities:  Development of treatment plan with patient or surrogate, discussions with consultants, evaluation of patient's response to treatment, examination of patient, obtaining history from patient or surrogate, ordering and performing treatments and interventions, ordering and review of laboratory studies, ordering and review of radiographic studies, pulse oximetry, re-evaluation of patient's condition and review of old charts    I assumed direction of critical care for this patient from another provider in my specialty: no            MEDICATIONS GIVEN IN ER    Medications   sodium chloride 0.9 % flush 10 mL (has no administration in time range)   ipratropium-albuterol (DUO-NEB) nebulizer solution 3 mL (3 mL Nebulization Given 3/12/20 1016)   albuterol (PROVENTIL) nebulizer solution 0.083% 2.5 mg/3mL (2.5 mg Nebulization Given 3/12/20 1016)   acetaminophen (TYLENOL) tablet 1,000 mg (1,000 mg Oral Given 3/12/20 1403)         PROGRESS, DATA ANALYSIS, CONSULTS, AND MEDICAL DECISION MAKING    All labs have been independently reviewed by me.  All radiology studies have been reviewed by me and discussed with radiologist dictating the report.   EKG's independently viewed and interpreted by me.  Discussion below represents my analysis of pertinent findings related to patient's condition, differential diagnosis, treatment plan and final disposition.      ED Course as of Mar 12 1505   Thu Mar 12, 2020   1033 Patient is on 8 L per high flow cannula with sats only in the upper 80s.  She will be placed on BiPAP.    [WH]   1035 stable   Hemoglobin(!): 9.9 [WH]   1036 Old records reviewed.  Patient was last admitted here in June 2019 for an L1 compression fracture after falling.  She was admitted in 2017 for hypoxia    [WH]   1046 1.0 eight months ago   Creatinine(!): 3.10 [WH]   1057 EKG          EKG time:  10:46 AM  Rhythm/Rate: Sinus rhythm rate 73  P waves and [place]: Normal  QRS, axis: Normal  ST and T waves: Nonspecific T wave changes anteriorly    Interpreted Contemporaneously by me, independently viewed  EKG is not significantly changed compared to prior EKG done 6/16/2019      [WH]   1125 Patient is breathing much more comfortably on BiPAP.  O2 sats are in the mid 90s.  Patient's family reports that she recently started taking a diuretic.  Her creatinine is elevated from baseline.  ABG was consistent with respiratory acidosis.  Still awaiting chest x-ray.    [WH]   1251 Case discussed with Dr. Pereira.  I discussed with him pertinent exam findings, test results, and ED course.  He agrees to admit the patient.    [WH]   1309 Patient is resting comfortably on BiPAP.  O2 sats 98%.  Heart rates in the 70s.  Patient informed of plan for admission.    [WH]   1504 Patient presented the ER complaining of worsening shortness of breath.  Oxygen saturations were in the 70s on room air.  Patient was afebrile.  Chest x-ray did not show any evidence for pneumonia.  Respiratory panel was negative.  Patient was placed on BiPAP.  ABG was consistent with respiratory acidosis.  Patient was given nebulizer treatments.  She was also found to have renal failure of unknown acuity.  Creatinine was normal 8 months ago.  Case was discussed with Dr. Anglin and he agreed to admit the patient.  Critical care was performed on this patient.    [WH]      ED Course User Index  [WH] Hemanth Balbuena MD       AS OF 15:05 VITALS:    BP - 113/55  HR - 82  TEMP - 99.4 °F (37.4 °C) (Oral)  O2 SATS - 100%      DIAGNOSIS  Final diagnoses:   Acute respiratory acidosis   Acute on chronic respiratory failure with hypoxia and hypercapnia (CMS/HCC)   Renal failure, unspecified chronicity         DISPOSITION  ADMISSION TO TELE    Discussed treatment plan and reason for admission with pt/family and admitting physician.  Pt/family voiced understanding of the plan  for admission for further testing/treatment as needed.         Documentation assistance provided by meche Gatica for Dr. Sree MD.  Information recorded by the scribe was done at my direction and has been verified and validated by me.             Alejandra Gatica  03/12/20 1254       Hemanth Balbuena MD  03/12/20 1505      Electronically signed by Hemanth Balbuena MD at 03/12/20 1505     Mirian Rogers, RN at 03/12/20 1217        Unable to review home medications d/t son does not have a list of medications and he or the pt do not remember the medications or the doses.      Mirian Rogers RN  03/12/20 1219      Electronically signed by Mirian Rogers, RN at 03/12/20 1219     Mirian Rogers, RN at 03/12/20 1409        Pt's other son, at bedside, states he does not know the pts medications. Family states the pt uses [email or web address] for her medications.      Mirian Rogers RN  03/12/20 1410      Electronically signed by Mirian Rogers, RN at 03/12/20 1410       Vital Signs (last day)     Date/Time   Temp   Temp src   Pulse   Resp   BP   Patient Position   SpO2    03/13/20 1000   --   --   71   --   124/59   --   93    03/13/20 0900   --   --   68   --   121/52   --   93    03/13/20 0804   --   --   60   22   --   --   91    03/13/20 0800   --   --   63   --   119/77   --   96    03/13/20 0753   97.4 (36.3)   Oral   --   --   --   --   --    03/13/20 0730   --   --   73   --   122/52   --   97    03/13/20 0700   --   --   75   --   119/63   --   97    03/13/20 0630   --   --   62   --   112/53   --   96    03/13/20 0600   --   --   60   --   98/70   --   97    03/13/20 0530   --   --   60   --   105/44   --   99    03/13/20 0500   --   --   --   --   107/41   --   --    03/13/20 0430   --   --   64   --   102/40   --   97    03/13/20 0400   --   --   63   --   (!) 86/48   --   98    03/13/20 0336   --   --   76   24   --   --   93    03/13/20 0330   --   --   79   --   131/91   --   92     03/13/20 0325   97.7 (36.5)   Oral   --   --   --   --   --    03/13/20 0230   --   --   64   --   95/49   --   97    03/13/20 0200   --   --   63   --   96/44   --   98    03/13/20 0145   --   --   64   --   92/41   --   98    03/13/20 0132   --   --   65   --   (!) 88/58   --   99    03/13/20 0115   --   --   62   --   93/42   --   97    03/13/20 0045   --   --   --   --   (!) 107/39   --   98    03/13/20 0030   --   --   64   --   109/49   --   97    03/13/20 0000   --   --   --   --   95/57   --   --    03/12/20 2357   --   --   65   24   --   --   95    03/12/20 2329   98.3 (36.8)   Oral   --   --   --   --   --    03/12/20 2300   --   --   65   --   (!) 89/54   --   96    03/12/20 2245   --   --   65   --   105/57   --   96    03/12/20 2230   --   --   85   --   108/49   --   91    03/12/20 2215   --   --   --   --   (!) 88/40   --   --    03/12/20 2200   --   --   65   --   (!) 105/39   --   93    03/12/20 2147   --   --   --   --   (!) 80/50   --   95    03/12/20 2130   --   --   65   --   (!) 78/49   --   --    03/12/20 2115   --   --   --   --   94/59   --   --    03/12/20 2100   --   --   --   --   120/73   --   --    03/12/20 2045   --   --   68   --   90/46   --   94    03/12/20 2030   --   --   67   --   102/45   --   94    03/12/20 2020   --   --   65   --   --   --   91    03/12/20 2016   --   --   67   22   --   --   91    03/12/20 2015   --   --   --   --   102/43   --   (!) 89    03/12/20 2000   --   --   --   --   94/45   --   --    03/12/20 1938   97.7 (36.5)   Oral   --   --   --   --   --    03/12/20 1930   --   --   --   --   95/54   --   --    03/12/20 1915   --   --   65   --   92/47   --   92    03/12/20 1901   --   --   --   --   104/56   --   (!) 87    03/12/20 1845   --   --   65   --   91/41   --   91    03/12/20 1830   --   --   67   --   108/43   --   94    03/12/20 1745   --   --   65   --   111/66   --   96    03/12/20 1740   --   --   67   --   100/78   Lying   93    03/12/20 1722    --   --   --   --   (!) 81/50   --   --    03/12/20 1712   --   --   --   --   (!) 77/33   --   --    03/12/20 1707   --   --   --   --   (!) 76/52   --   --    03/12/20 1702   --   --   --   --   (!) 84/39   --   --    03/12/20 1658   --   --   --   --   (!) 87/42   --   --    03/12/20 1654   --   --   65   24   (!) 77/42   --   94    03/12/20 1652   --   --   --   --   (!) 70/40   --   --    03/12/20 1637   --   --   --   --   (!) 74/26   --   --    03/12/20 1612   --   --   --   --   (!) 89/56   Lying   --    03/12/20 1607   --   --   --   --   (!) 78/54   Lying   --    03/12/20 1555   --   --   --   --   99/59   Lying   96    03/12/20 1545   --   --   --   --   (!) 106/39   Lying   95    03/12/20 1544   --   --   --   --   (!) 68/40   Lying   --    03/12/20 1543   99.1 (37.3)   Oral   69   26   (!) 60/38   Lying   94    03/12/20 1416   --   --   82   --   --   --   --    03/12/20 1415   --   --   --   --   113/55   --   100    03/12/20 1345   --   --   73   24   107/88   --   97    03/12/20 11:56:15   --   --   70   26   103/48   --   94    03/12/20 1045   --   --   73   --   117/59   --   90    03/12/20 1016   --   --   73   20   --   --   (!) 86    03/12/20 0928   99.4 (37.4)   Oral   84   18   134/92   Lying   (!) 88              Oxygen Therapy (last day)     Date/Time   SpO2   Device (Oxygen Therapy)   Flow (L/min)   Oxygen Concentration (%)   ETCO2 (mmHg)    03/13/20 1000   93   --   --   --   --    03/13/20 0900   93   --   --   --   --    03/13/20 0804   91   nasal cannula   4   --   --    03/13/20 0800   96   --   --   --   --    03/13/20 0730   97   nasal cannula   4   --   --    03/13/20 0700   97   --   --   --   --    03/13/20 0630   96   --   --   --   --    03/13/20 0600   97   --   --   --   --    03/13/20 0530   99   --   --   --   --    03/13/20 0430   97   --   --   --   --    03/13/20 0400   98   NPPV/NIV   --   --   --    03/13/20 0336   93   NPPV/NIV   --   --   --    03/13/20 0330   92    --   --   --   --    03/13/20 0230   97   --   --   --   --    03/13/20 0200   98   --   --   --   --    03/13/20 0145   98   --   --   --   --    03/13/20 0132   99   --   --   --   --    03/13/20 0115   97   --   --   --   --    03/13/20 0045   98   --   --   --   --    03/13/20 0030   97   --   --   --   --    03/12/20 2357   95   NPPV/NIV   --   --   --    03/12/20 2300   96   --   --   --   --    03/12/20 2245   96   --   --   --   --    03/12/20 2230   91   --   --   --   --    03/12/20 2200   93   --   --   --   --    03/12/20 2147   95   --   --   --   --    03/12/20 2045   94   --   --   --   --    03/12/20 2030 94   --   --   --   --    03/12/20 2023   --   nasal cannula   4   --   --    03/12/20 2020 91   NPPV/NIV   --   --   --    03/12/20 2016 91   nasal cannula   4   --   --    03/12/20 2015   (!) 89   --   --   --   --    03/12/20 1915   92   --   --   --   --    03/12/20 1901   (!) 87   --   --   --   --    03/12/20 1845   91   --   --   --   --    03/12/20 1830   94   --   --   --   --    03/12/20 1755   --   nasal cannula   4   --   --    03/12/20 1745   96   --   --   --   --    03/12/20 1740   93   --   --   --   --    03/12/20 1738   --   NPPV/NIV   --   --   --    03/12/20 1654   94   ventilator   --   40   --    03/12/20 1555   96   ventilator   --   40   --    03/12/20 1549   --   ventilator   --   40   --    03/12/20 1545   95   ventilator   --   40   --    03/12/20 1543   94   ventilator   --   40   --    03/12/20 1415   100   --   --   --   --    03/12/20 1345   97   other (see comments)   --   --   --    03/12/20 11:56:15   94   --   --   --   --    03/12/20 1045   90   --   --   --   --    03/12/20 1016   (!) 86   nasal cannula   4   --   --    03/12/20 0928   (!) 88   --   4   --   --              Lines, Drains & Airways    Active LDAs     Name:   Placement date:   Placement time:   Site:   Days:    Peripheral IV 03/12/20 0955 Left Forearm   03/12/20 0955    Forearm   1     Peripheral IV 03/12/20 2200 Right Forearm   03/12/20 2200    Forearm   less than 1    External Urinary Catheter   03/12/20    1820    --   less than 1                  Current Facility-Administered Medications   Medication Dose Route Frequency Provider Last Rate Last Dose   • bisacodyl (DULCOLAX) EC tablet 5 mg  5 mg Oral Daily PRN Ling Pereira MD       • bisacodyl (DULCOLAX) suppository 10 mg  10 mg Rectal Daily PRN Ling Pereira MD       • dextrose (D50W) 25 g/ 50mL Intravenous Solution 25 g  25 g Intravenous Q15 Min PRN Ling Pereira MD       • dextrose (GLUTOSE) oral gel 15 g  15 g Oral Q15 Min PRN Ling Pereira MD       • enoxaparin (LOVENOX) syringe 30 mg  30 mg Subcutaneous Q24H Ling Pereira MD   30 mg at 03/12/20 1900   • glucagon (human recombinant) (GLUCAGEN DIAGNOSTIC) injection 1 mg  1 mg Subcutaneous Q15 Min PRN Ling Pereira MD       • guaiFENesin (MUCINEX) 12 hr tablet 600 mg  600 mg Oral Q12H Ling Pereira MD   600 mg at 03/13/20 0846   • insulin lispro (humaLOG) injection 0-14 Units  0-14 Units Subcutaneous 4x Daily With Meals & Nightly Ling Pereira MD   Stopped at 03/12/20 2151   • ipratropium-albuterol (DUO-NEB) nebulizer solution 3 mL  3 mL Nebulization Q6H - RT Ling Pereira MD   3 mL at 03/13/20 0804   • ipratropium-albuterol (DUO-NEB) nebulizer solution 3 mL  3 mL Nebulization Q4H PRN Ling Pereira MD       • Magnesium Sulfate 2 gram Bolus, followed by 8 gram infusion (total Mg dose 10 grams)- Mg less than or equal to 1mg/dL  2 g Intravenous PRN Ling Pereira MD        Or   • Magnesium Sulfate 2 gram / 50mL Infusion (GIVE X 3 BAGS TO EQUAL 6GM TOTAL DOSE) - Mg 1.1 - 1.5 mg/dl  2 g Intravenous PRN Ling Pereira MD        Or   • Magnesium Sulfate 4 gram infusion- Mg 1.6-1.9 mg/dL  4 g Intravenous PRN Ling Pereira MD       • methylPREDNISolone sodium succinate (SOLU-Medrol) injection 60 mg  60 mg Intravenous Q12H Ling Pereira MD   60 mg at 03/13/20 0651   •  nitroglycerin (NITROSTAT) SL tablet 0.4 mg  0.4 mg Sublingual Q5 Min PRN Ling Pereira MD       • norepinephrine (LEVOPHED) 8 mg/250 mL (32 mcg/mL) in sodium chloride 0.9% infusion (premix)  0.02-0.3 mcg/kg/min Intravenous Titrated Ling Pereira MD       • ondansetron (ZOFRAN) tablet 4 mg  4 mg Oral Q6H PRN Ling Pereira MD        Or   • ondansetron (ZOFRAN) injection 4 mg  4 mg Intravenous Q6H PRN Ling Pereira MD       • sodium chloride 0.9 % flush 10 mL  10 mL Intravenous PRN Hemanth Balbuena MD       • sodium chloride 0.9 % flush 10 mL  10 mL Intravenous Q12H Ling Pereira MD   10 mL at 03/12/20 2152   • sodium chloride 0.9 % flush 10 mL  10 mL Intravenous PRN Ling Pereira MD           Lab Results (last 24 hours)     Procedure Component Value Units Date/Time    Blood Gas, Arterial [[id number]]  (Abnormal) Collected:  03/13/20 0817    Specimen:  Arterial Blood Updated:  03/13/20 0820     Site Arterial: right brachial     Zaki's Test N/A     pH, Arterial 7.340 pH units      pCO2, Arterial 52.3 mm Hg      pO2, Arterial 62.9 mm Hg      HCO3, Arterial 28.3 mmol/L      Base Excess, Arterial 1.9 mmol/L      O2 Saturation Calculated 89.9 %      Barometric Pressure for Blood Gas 756.8 mmHg      Modality Cannula     Flow Rate 4 lpm      Set [facility] Resp Rate 16     Rate 16 Breaths/minute     POC Glucose Once [[id number]]  (Abnormal) Collected:  03/13/20 0754    Specimen:  Blood Updated:  03/13/20 0756     Glucose 134 mg/dL     Procalcitonin [[id number]]  (Abnormal) Collected:  03/13/20 0324    Specimen:  Blood Updated:  03/13/20 0549     Procalcitonin 0.08 ng/mL     Narrative:       As a Marker for Sepsis (Non-Neonates):   1. <0.5 ng/mL represents a low risk of severe sepsis and/or septic shock.  1. >2 ng/mL represents a high risk of severe sepsis and/or septic shock.    As a Marker for Lower Respiratory Tract Infections that require antibiotic therapy:  PCT on Admission     Antibiotic Therapy             6-12  "Hrs later  > 0.5                Strongly Recommended            >0.25 - <0.5         Recommended  0.1 - 0.25           Discouraged                   Remeasure/reassess PCT  <0.1                 Strongly Discouraged          Remeasure/reassess PCT      As 28 day mortality risk marker: \"Change in Procalcitonin Result\" (> 80 % or <=80 %) if Day 0 (or Day 1) and Day 4 values are available. Refer to http://www.[email or web address] Story[facility]ct-calculator.com/   Change in PCT <=80 %   A decrease of PCT levels below or equal to 80 % defines a positive change in PCT test result representing a higher risk for 28-day all-cause mortality of patients diagnosed with severe sepsis or septic shock.  Change in PCT > 80 %   A decrease of PCT levels of more than 80 % defines a negative change in PCT result representing a lower risk for 28-day all-cause mortality of patients diagnosed with severe sepsis or septic shock.                Results may be falsely decreased if patient taking Biotin.     Comprehensive Metabolic Panel [[id number]]  (Abnormal) Collected:  03/13/20 0324    Specimen:  Blood Updated:  03/13/20 0536     Glucose 168 mg/dL      BUN 44 mg/dL      Creatinine 3.16 mg/dL      Sodium 136 mmol/L      Potassium 5.0 mmol/L      Chloride 96 mmol/L      CO2 24.3 mmol/L      Calcium 8.1 mg/dL      Total Protein 6.6 g/dL      Albumin 3.90 g/dL      ALT (SGPT) 21 U/L      AST (SGOT) 23 U/L      Alkaline Phosphatase 97 U/L      Total Bilirubin 0.3 mg/dL      eGFR Non African Amer 14 mL/min/1.73      Comment: <15 Indicative of kidney failure.        eGFR   Amer --     Comment: <15 Indicative of kidney failure.        Globulin 2.7 gm/dL      A/G Ratio 1.4 g/dL      BUN/Creatinine Ratio 13.9     Anion Gap 15.7 mmol/L     Narrative:       GFR Normal >60  Chronic Kidney Disease <60  Kidney Failure <15      CBC (No Diff) [[id number]]  (Abnormal) Collected:  03/13/20 0324    Specimen:  Blood Updated:  03/13/20 0519     WBC 5.79 10*3/mm3      RBC 2.99 " 10*6/mm3      Hemoglobin 9.7 g/dL      Hematocrit 28.9 %      MCV 96.7 fL      MCH 32.4 pg      MCHC 33.6 g/dL      RDW 13.6 %      RDW-SD 48.7 fl      MPV 9.7 fL      Platelets 179 10*3/mm3     Urinalysis With Culture If Indicated - Urine, Catheter In/Out [[id number]]  (Normal) Collected:  03/12/20 2225    Specimen:  Urine, Catheter In/Out Updated:  03/12/20 2241     Color, UA Yellow     Appearance, UA Clear     pH, UA <=5.0     Specific Gravity, UA 1.021     Glucose, UA Negative     Ketones, UA Negative     Bilirubin, UA Negative     Blood, UA Negative     Protein, UA Negative     Leuk Esterase, UA Negative     Nitrite, UA Negative     Urobilinogen, UA 0.2 E.U./dL    Narrative:       Urine microscopic not indicated.    Blood Culture - Blood, Arm, Right [[id number]] Collected:  03/12/20 2205    Specimen:  Blood from Arm, Right Updated:  03/12/20 2234    Blood Culture - Blood, Arm, Left [[id number]] Collected:  03/12/20 2205    Specimen:  Blood from Arm, Left Updated:  03/12/20 2234    POC Glucose Once [[id number]]  (Abnormal) Collected:  03/12/20 2150    Specimen:  Blood Updated:  03/12/20 2151     Glucose 163 mg/dL     POC Glucose Once [[id number]]  (Normal) Collected:  03/12/20 1854    Specimen:  Blood Updated:  03/12/20 1856     Glucose 99 mg/dL     Blood Gas, Arterial [[id number]]  (Abnormal) Collected:  03/12/20 1747    Specimen:  Arterial Blood Updated:  03/12/20 1750     Site Arterial: left radial     Zaki's Test Positive     pH, Arterial 7.299 pH units      Comment: Critical:Notify Dr GARDUNO (12-Mar-20 17:49:07)Read back ok        pCO2, Arterial 61.8 mm Hg      Comment: Critical:Notify Dr GARDUNO (12-Mar-20 17:49:07)Read back ok        pO2, Arterial 72.0 mm Hg      HCO3, Arterial 30.3 mmol/L      Base Excess, Arterial 2.9 mmol/L      O2 Saturation Calculated 91.9 %      A-a Gradiant 0.3 mmHg      Barometric Pressure for Blood Gas 747.7 mmHg      Modality BiPap     FIO2 40 %      Set Tidal Volume 577     Set Mech Resp  Rate 24     Rate 24 Breaths/minute     Blood Gas, Arterial [[id number]]  (Abnormal) Collected:  03/12/20 1117    Specimen:  Arterial Blood Updated:  03/12/20 1128     Site Arterial: right radial     Zaki's Test Positive     pH, Arterial 7.281 pH units      Comment: Critical:Notify Dr DR PRAJAPATI (12-Mar-20 11:23:03)Read back ok        pCO2, Arterial 60.7 mm Hg      Comment: Critical:Notify Dr DR PRAJAPATI (12-Mar-20 11:23:03)Read back ok        pO2, Arterial 64.7 mm Hg      HCO3, Arterial 28.6 mmol/L      Base Excess, Arterial 1.0 mmol/L      O2 Saturation Calculated 88.8 %      A-a Gradiant 0.2 mmHg      Barometric Pressure for Blood Gas 746.8 mmHg      Modality BiPap     FIO2 60 %      Ventilator Mode NIV     Set [facility] Resp Rate 20     Rate 22 Breaths/minute     Respiratory Panel, PCR - Swab, Nasopharynx [[id number]]  (Normal) Collected:  03/12/20 1003    Specimen:  Swab from Nasopharynx Updated:  03/12/20 1105     ADENOVIRUS, PCR Not Detected     Coronavirus 229E Not Detected     Coronavirus HKU1 Not Detected     Coronavirus NL63 Not Detected     Coronavirus OC43 Not Detected     Human Metapneumovirus Not Detected     Human Rhinovirus/Enterovirus Not Detected     Influenza B PCR Not Detected     Parainfluenza Virus 1 Not Detected     Parainfluenza Virus 2 Not Detected     Parainfluenza Virus 3 Not Detected     Parainfluenza Virus 4 Not Detected     Bordetella pertussis pcr Not Detected     Influenza A H1 2009 PCR Not Detected     Chlamydophila pneumoniae PCR Not Detected     Mycoplasma pneumo by PCR Not Detected     Influenza A PCR Not Detected     Influenza A H3 Not Detected     Influenza A H1 Not Detected     RSV, PCR Not Detected     Bordetella parapertussis PCR Not Detected    Narrative:       The coronavirus on the RVP is NOT COVID-19 and is NOT indicative of infection with COVID-19.     Procalcitonin [[id number]]  (Normal) Collected:  03/12/20 1002    Specimen:  Blood Updated:  03/12/20 1043     Procalcitonin  "0.12 ng/mL     Narrative:       As a Marker for Sepsis (Non-Neonates):   1. <0.5 ng/mL represents a low risk of severe sepsis and/or septic shock.  1. >2 ng/mL represents a high risk of severe sepsis and/or septic shock.    As a Marker for Lower Respiratory Tract Infections that require antibiotic therapy:  PCT on Admission     Antibiotic Therapy             6-12 Hrs later  > 0.5                Strongly Recommended            >0.25 - <0.5         Recommended  0.1 - 0.25           Discouraged                   Remeasure/reassess PCT  <0.1                 Strongly Discouraged          Remeasure/reassess PCT      As 28 day mortality risk marker: \"Change in Procalcitonin Result\" (> 80 % or <=80 %) if Day 0 (or Day 1) and Day 4 values are available. Refer to http://www.[employer] Wirelesspct-calculator.com/   Change in PCT <=80 %   A decrease of PCT levels below or equal to 80 % defines a positive change in PCT test result representing a higher risk for 28-day all-cause mortality of patients diagnosed with severe sepsis or septic shock.  Change in PCT > 80 %   A decrease of PCT levels of more than 80 % defines a negative change in PCT result representing a lower risk for 28-day all-cause mortality of patients diagnosed with severe sepsis or septic shock.                Results may be falsely decreased if patient taking Biotin.     BNP [[id number]]  (Normal) Collected:  03/12/20 1002    Specimen:  Blood Updated:  03/12/20 1043     proBNP 242.6 pg/mL     Narrative:       Among patients with dyspnea, NT-proBNP is highly sensitive for the detection of acute congestive heart failure. In addition NT-proBNP of <300 pg/ml effectively rules out acute congestive heart failure with 99% negative predictive value.    Results may be falsely decreased if patient taking Biotin.      Troponin [[id number]]  (Normal) Collected:  03/12/20 1002    Specimen:  Blood Updated:  03/12/20 1043     Troponin T <0.010 ng/mL     Narrative:       Troponin T " Reference Range:  <= 0.03 ng/mL-   Negative for AMI  >0.03 ng/mL-     Abnormal for myocardial necrosis.  Clinicians would have to utilize clinical acumen, EKG, Troponin and serial changes to determine if it is an Acute Myocardial Infarction or myocardial injury due to an underlying chronic condition.       Results may be falsely decreased if patient taking Biotin.      Comprehensive Metabolic Panel [[id number]]  (Abnormal) Collected:  03/12/20 1002    Specimen:  Blood Updated:  03/12/20 1036     Glucose 105 mg/dL      BUN 38 mg/dL      Creatinine 3.10 mg/dL      Sodium 138 mmol/L      Potassium 4.4 mmol/L      Chloride 97 mmol/L      CO2 26.9 mmol/L      Calcium 8.2 mg/dL      Total Protein 7.0 g/dL      Albumin 3.90 g/dL      ALT (SGPT) 23 U/L      AST (SGOT) 27 U/L      Alkaline Phosphatase 108 U/L      Total Bilirubin 0.2 mg/dL      eGFR Non African Amer 15 mL/min/1.73      Globulin 3.1 gm/dL      A/G Ratio 1.3 g/dL      BUN/Creatinine Ratio 12.3     Anion Gap 14.1 mmol/L     Narrative:       GFR Normal >60  Chronic Kidney Disease <60  Kidney Failure <15      Lactic Acid, Plasma [[id number]]  (Normal) Collected:  03/12/20 1002    Specimen:  Blood Updated:  03/12/20 1030     Lactate 1.4 mmol/L     CBC & Differential [[id number]] Collected:  03/12/20 1002    Specimen:  Blood Updated:  03/12/20 1018    Narrative:       The following orders were created for panel order CBC & Differential.  Procedure                               Abnormality         Status                     ---------                               -----------         ------                     CBC Auto Differential[[id number]]        Abnormal            Final result                 Please view results for these tests on the individual orders.    CBC Auto Differential [[id number]]  (Abnormal) Collected:  03/12/20 1002    Specimen:  Blood Updated:  03/12/20 1018     WBC 7.23 10*3/mm3      RBC 3.06 10*6/mm3      Hemoglobin 9.9 g/dL      Hematocrit 30.0 %       MCV 98.0 fL      MCH 32.4 pg      MCHC 33.0 g/dL      RDW 13.9 %      RDW-SD 49.5 fl      MPV 9.4 fL      Platelets 193 10*3/mm3      Neutrophil % 53.9 %      Lymphocyte % 30.6 %      Monocyte % 11.2 %      Eosinophil % 1.9 %      Basophil % 0.6 %      Immature Grans % 1.8 %      Neutrophils, Absolute 3.90 10*3/mm3      Lymphocytes, Absolute 2.21 10*3/mm3      Monocytes, Absolute 0.81 10*3/mm3      Eosinophils, Absolute 0.14 10*3/mm3      Basophils, Absolute 0.04 10*3/mm3      Immature Grans, Absolute 0.13 10*3/mm3      nRBC 0.0 /100 WBC         Orders (last 24 hrs)      Start     Ordered    03/13/20 0821  Blood Gas, Arterial  Once      03/13/20 0817    03/13/20 0800  Adult Transthoracic Echo Complete W/ Cont if Necessary Per Protocol  Once      03/12/20 1634    03/13/20 0757  POC Glucose Once  Once      03/13/20 0754    03/13/20 0700  XR Chest 1 View  1 Time Imaging      03/12/20 1630    03/13/20 0600  Tobacco Cessation Education  Daily     Comments:  Document in Education Activity    03/12/20 1630    03/13/20 0600  Respiratory Treatment Education (MDI / Spacer / Nebulizer)  Daily     Comments:  Document in Education Activity    03/12/20 1630    03/13/20 0600  COPD Education  Daily     Comments:  Document in Education Activity    03/12/20 1630    03/13/20 0600  Blood Gas, Arterial  Morning Draw     Comments:  To be done off the biPAP      03/12/20 1630    03/13/20 0600  CBC (No Diff)  Morning Draw      03/12/20 1630    03/13/20 0600  Comprehensive Metabolic Panel  Morning Draw      03/12/20 1630    03/13/20 0600  Procalcitonin  Morning Draw      03/12/20 2059 03/13/20 0400  Bladder Scan  Every 6 Hours     Comments:  DC if voiding spontaneously    03/12/20 2059 03/12/20 2152  POC Glucose Once  Once      03/12/20 2150    03/12/20 2100  sodium chloride 0.9 % flush 10 mL  Every 12 Hours Scheduled      03/12/20 1630    03/12/20 2100  guaiFENesin (MUCINEX) 12 hr tablet 600 mg  Every 12 Hours Scheduled       03/12/20 1630 03/12/20 2100  Straight Cath  Once      03/12/20 2059 03/12/20 2058  Blood Culture - Blood,  Once      03/12/20 2059 03/12/20 2058  Blood Culture - Blood,  Once     Comments:  From a different site than #1.      03/12/20 2059 03/12/20 2058  Urinalysis With Culture If Indicated - Urine, Catheter In/Out  Once      03/12/20 2059 03/12/20 2000  Vital Signs  Every 4 Hours      03/12/20 1630 03/12/20 1900  ipratropium-albuterol (DUO-NEB) nebulizer solution 3 mL  Every 6 Hours - RT      03/12/20 1630 03/12/20 1857  POC Glucose Once  Once      03/12/20 1854 03/12/20 1830  norepinephrine (LEVOPHED) 8 mg/250 mL (32 mcg/mL) in sodium chloride 0.9% infusion (premix)  Titrated      03/12/20 1741    03/12/20 1800  Incentive Spirometry  Every 4 Hours While Awake      03/12/20 1630 03/12/20 1800  POC Glucose Q6H  Every 6 Hours      03/12/20 1630 03/12/20 1800  insulin lispro (humaLOG) injection 0-14 Units  4 Times Daily With Meals & Nightly      03/12/20 1630 03/12/20 1800  norepinephrine (LEVOPHED) 8,000 mcg in sodium chloride 0.9 % 250 mL (32 mcg/mL) infusion  Titrated,   Status:  Discontinued      03/12/20 1700    03/12/20 1751  Blood Gas, Arterial  Once      03/12/20 1747 03/12/20 1730  enoxaparin (LOVENOX) syringe 30 mg  Every 24 Hours      03/12/20 1630 03/12/20 1730  methylPREDNISolone sodium succinate (SOLU-Medrol) injection 60 mg  Every 12 Hours      03/12/20 1630 03/12/20 1730  azithromycin (ZITHROMAX) 500 mg 0.9% NaCl (Add-vantage) 250 mL  Every 24 Hours,   Status:  Discontinued      03/12/20 1630 03/12/20 1703  Transfer Patient  Once      03/12/20 1703    03/12/20 1633  Notify provider is been arterial pressure is below 65 mmHg on 2 consecutive checks  Nursing Communication  Once     Comments:  Notify provider is been arterial pressure is below 65 mmHg on 2 consecutive checks    03/12/20 1632    03/12/20 1631  Document Pulse Oximetry - On Room Air / Home O2  Level  Daily     Comments:  Reapply Oxygen if O2 Sat Drops Below 88%    03/12/20 1630    03/12/20 1631  Patient Currently On Electrolyte Replacement Protocol - Please Refer to MAR for Protocol Details  Misc Nursing Order (Specify)  Daily     Comments:  Patient Currently On Electrolyte Replacement Protocol - Please Refer to MAR for Protocol Details    03/12/20 1630    03/12/20 1631  Daily Weights  Daily      03/12/20 1630    03/12/20 1629  bisacodyl (DULCOLAX) suppository 10 mg  Daily PRN      03/12/20 1630    03/12/20 1629  bisacodyl (DULCOLAX) EC tablet 5 mg  Daily PRN      03/12/20 1630    03/12/20 1629  ondansetron (ZOFRAN) tablet 4 mg  Every 6 Hours PRN      03/12/20 1630    03/12/20 1629  ondansetron (ZOFRAN) injection 4 mg  Every 6 Hours PRN      03/12/20 1630    03/12/20 1629  Magnesium Sulfate 2 gram Bolus, followed by 8 gram infusion (total Mg dose 10 grams)- Mg less than or equal to 1mg/dL  As Needed      03/12/20 1630    03/12/20 1629  Magnesium Sulfate 2 gram / 50mL Infusion (GIVE X 3 BAGS TO EQUAL 6GM TOTAL DOSE) - Mg 1.1 - 1.5 mg/dl  As Needed      03/12/20 1630    03/12/20 1629  Magnesium Sulfate 4 gram infusion- Mg 1.6-1.9 mg/dL  As Needed      03/12/20 1630    03/12/20 1629  potassium chloride (MICRO-K) CR capsule 40 mEq  As Needed,   Status:  Discontinued      03/12/20 1630    03/12/20 1629  potassium chloride (KLOR-CON) packet 40 mEq  As Needed,   Status:  Discontinued      03/12/20 1630    03/12/20 1629  potassium chloride 10 mEq in 100 mL IVPB  Every 1 Hour PRN,   Status:  Discontinued      03/12/20 1630    03/12/20 1628  ipratropium-albuterol (DUO-NEB) nebulizer solution 3 mL  Every 4 Hours PRN      03/12/20 1630    03/12/20 1628  Telemetry - Maintain IV Access  Continuous      03/12/20 1630    03/12/20 1628  Continuous Cardiac Monitoring  Continuous      03/12/20 1630    03/12/20 1628  May Be Off Telemetry for Tests  Continuous      03/12/20 1630    03/12/20 1628  ACLS Protocol For Life  Threatening Dysrhythmias (Unless Code Status Indicates Otherwise)  Continuous      03/12/20 1630    03/12/20 1628  Notify Provider if ACLS Protocol Activated  Until Discontinued      03/12/20 1630    03/12/20 1628  Activity - Ad Jaqueline  Until Discontinued      03/12/20 1630    03/12/20 1628  NPO Diet NPO Except: Sips With Meds  Diet Effective Now      03/12/20 1630    03/12/20 1628  PT Consult: Eval & Treat As Tolerated; Functional Mobility Below Baseline  Once      03/12/20 1630    03/12/20 1627  nitroglycerin (NITROSTAT) SL tablet 0.4 mg  Every 5 Minutes PRN      03/12/20 1630    03/12/20 1626  Reason for COPD Admission: Bronchitis  Once      03/12/20 1630    03/12/20 1626  Code Status and Medical Interventions:  Continuous      03/12/20 1630    03/12/20 1626  Intake & Output  Every Shift      03/12/20 1630    03/12/20 1626  Weigh Patient  Once      03/12/20 1630    03/12/20 1626  Do NOT Hold Basal or Correction Scale Insulin When Patient is NPO, Hold Scheduled Mealtime (Bolus) Insulin if NPO  Continuous      03/12/20 1630    03/12/20 1626  Follow [facility] Hypoglycemia Standing Orders For Blood Glucose Less Than 70 mg/dL  Until Discontinued     Comments:  ALERT PATIENT - NOT NPO & CAN SAFELY SWALLOW  Administer 4 oz Fruit Juice OR 4 oz Regular Soda OR 8 oz Milk OR 15-30 grams (1 tube) of Glucose Gel.  Recheck Blood Glucose Approximately 15 Minutes After Ingestion, Repeat Treatment & Continue to Recheck Blood Sugar Approximately Every 15 Minutes Until Blood Glucose is 70 or Higher.  Once Blood Glucose is 70 or Higher & if It Will Be More Than 60 Minutes Until Next Meal, Provide Appropriate Snack (Including Carbohydrate Food) Based on Meal Plan Order. Give Meal Tray As Soon As Possible.    PATIENT HAS IV ACCESS - UNRESPONSIVE, NPO OR UNABLE TO SAFELY SWALLOW  Administer 25g (50ml) D50W IV Push.  Recheck Blood Glucose Approximately 15 Minutes After Administration, if Blood Glucose Remains Less Than 70, Repeat Treatment    Recheck Blood Glucose Approximately 15 Minutes After 2nd Administration, if Blood Glucose Remains Less Than 70 After 2nd Dose of D50W, Contact Provider for Further Treatment Orders & Consider Adding IVF With D5W for Maintenance    PATIENT WITHOUT IV ACCESS - UNRESPONSIVE, NPO OR UNABLE TO SAFELY SWALLOW  Administer 1mg Glucagon SQ & Establish IV Access.  Turn Patient on Side - Nausea / Vomiting May Occur.  Recheck Blood Glucose Approximately 15 Minutes After Administration.  If Blood Glucose Remains Less Than 70, Administer 25g D50W IV Push (50ml).  Recheck Blood Glucose Approximately 15 Minutes After Administration of D50W, if Blood Glucose Remains Less Than 70, Contact Provider for Further Treatment Orders & Consider Adding IVF With D5 for Maintenance    Document Event & Patient Response to Interventions in EMR, Document Medications on MAR  Notify Provider if Hypoglycemia Treatment Needed    03/12/20 1630    03/12/20 1626  Inpatient Nutrition Consult - BMI 30-39.9  Once     Provider:  (Not yet assigned)    03/12/20 1630    03/12/20 1626  Insert Peripheral IV  Once      03/12/20 1630    03/12/20 1626  Saline Lock & Maintain IV Access  Continuous,   Status:  Canceled      03/12/20 1630    03/12/20 1625  dextrose (GLUTOSE) oral gel 15 g  Every 15 Minutes PRN      03/12/20 1630    03/12/20 1625  dextrose (D50W) 25 g/ 50mL Intravenous Solution 25 g  Every 15 Minutes PRN      03/12/20 1630    03/12/20 1625  glucagon (human recombinant) (GLUCAGEN DIAGNOSTIC) injection 1 mg  Every 15 Minutes PRN      03/12/20 1630    03/12/20 1625  sodium chloride 0.9 % flush 10 mL  As Needed      03/12/20 1630    03/12/20 1350  acetaminophen (TYLENOL) tablet 1,000 mg  Once      03/12/20 1348    03/12/20 1255  Critical Care  Once     Comments:  This order was created via procedure documentation    03/12/20 1254    03/12/20 1254  Inpatient Admission  Once      03/12/20 1254    03/12/20 1220  Pulmonology (on-call MD unless specified)   Once     Specialty:  Pulmonary Disease  Provider:  (Not yet assigned)    03/12/20 1219    03/12/20 1138  XR Chest 1 View  1 Time Imaging      03/12/20 1137    03/12/20 1129  Blood Gas, Arterial  Once      03/12/20 1117    03/12/20 1033  BIPAP  Until Discontinued      03/12/20 1033    03/12/20 1033  Blood Gas, Arterial  Once      03/12/20 1033    03/12/20 1001  sodium chloride 0.9 % flush 10 mL  As Needed      03/12/20 1002    Unscheduled  Magnesium  As Needed      03/12/20 1630    Unscheduled  Potassium  As Needed      03/12/20 1630    Unscheduled  Telemetry - Pulse Oximetry  Continuous PRN     Comments:  If Patient Develops Unresponsiveness, Acute Dyspnea, Cyanosis or Suspected Hypoxemia Start Continuous Pulse Ox Monitoring, Apply Oxygen & Notify Provider    03/12/20 1630    Unscheduled  Oxygen Therapy- Nasal Cannula; Titrate for SPO2: 90% - 95%  Continuous PRN     Comments:  If Patient Develops Unresponsiveness, Acute Dyspnea, Cyanosis or Suspected Hypoxemia Start Continuous Pulse Ox Monitoring, Apply Oxygen & Notify Provider    03/12/20 1630    Unscheduled  ECG 12 Lead  As Needed     Comments:  Nurse to Release if Patient Expericences Acute Chest Pain or Dysrhythmias    03/12/20 1630    Unscheduled  Potassium  As Needed     Comments:  For Ventricular Arrhythmias      03/12/20 1630    Unscheduled  Magnesium  As Needed     Comments:  For Ventricular Arrhythmias      03/12/20 1630    Unscheduled  Troponin  As Needed     Comments:  For Chest Pain      03/12/20 1630    Unscheduled  Digoxin Level  As Needed     Comments:  For Atrial Arrhythmias      03/12/20 1630    Unscheduled  Blood Gas, Arterial  As Needed     Comments:  Per O2 PolicyNotify Physician      03/12/20 1630    --  divalproex (DEPAKOTE) 250 MG 24 hr tablet  Every Evening      03/12/20 1526    --  fluticasone (FLONASE) 50 MCG/ACT nasal spray  Daily      03/12/20 1526    --  Umeclidinium Bromide (INCRUSE ELLIPTA) 62.5 MCG/INH aerosol powder   Daily       03/12/20 1526    --  HYDROcodone-acetaminophen (NORCO) 7.5-325 MG per tablet  Every 8 Hours PRN      03/12/20 1524

## 2020-03-13 NOTE — PROGRESS NOTES
[facility]    Chart reviewed.  Patient was seen earlier today by a different nephrology group.  This patient has been seen in the office by my partner, Dr. Rojas Patterson, and was seen in the hospital in late 2017 by another partner, Dr. Kelton Barreto.  Our group will assume nephrology care.    --Carlos Barraza MD

## 2020-03-13 NOTE — PROGRESS NOTES
[facility]  [phone]  Santiago Mendoza MD    Subjective:  LOS: 1    States she feels better. Having chills at home but no fevers. Congested cough and wheezing. Not expectorating anything. Usually 2L O2 at night only. Past few weeks needing during day also - after exertion.    Objective   Vital Signs past 24hrs    Temp range: Temp (24hrs), Av.1 °F (36.7 °C), Min:97.4 °F (36.3 °C), Max:99.1 °F (37.3 °C)    BP range: BP: ()/(26-91) 145/70  Pulse range: Heart Rate:  [60-85] 75  Resp rate range: Resp:  [22-26] 22    Device (Oxygen Therapy): nasal cannulaFlow (L/min):  [4] 4  Oxygen range:SpO2:  [87 %-100 %] 94 %      78.2 kg (172 lb 6.4 oz); Body mass index is 31.52 kg/m².    Intake/Output Summary (Last 24 hours) at 3/13/2020 1256  Last data filed at 3/13/2020 0400  Gross per 24 hour   Intake 0 ml   Output 360 ml   Net -360 ml       Physical Exam   Constitutional: She appears well-developed.   HENT:   Head: Normocephalic.   Eyes: Pupils are equal, round, and reactive to light.   Cardiovascular: Normal rate and regular rhythm.   No murmur heard.  Pulmonary/Chest: Effort normal. No respiratory distress. She has decreased breath sounds. She has no wheezes (minimal if any). She has no rales.   Abdominal: Soft. Bowel sounds are normal. She exhibits no mass. There is no tenderness.   Musculoskeletal: She exhibits no edema.   Skin: No rash noted.     Results Review:    I have reviewed the laboratory and imaging data since the last note by [facility] physician.  My annotations are noted in assessment and plan.    Medication Review:  I have reviewed the current MAR.  My annotations are noted in assessment and plan.      norepinephrine 0.02-0.3 mcg/kg/min     Plan     Active Hospital Problems    Diagnosis  POA   • Acute on chronic respiratory failure with hypoxia and hypercapnia (CMS/HCC) [J96.21, J96.22]  Yes     Priority: High   • COPD with acute exacerbation (CMS/HCC) [J44.1]  Unknown     Priority: High   •  Acute kidney injury (CMS/HCC) [N17.9]  Yes     Priority: High   • CKD (chronic kidney disease) stage 2, GFR 60-89 ml/min [N18.2]  Unknown     Priority: Low   • Nocturnal hypoxia [G47.34]  Unknown   • Obesity (BMI 30-39.9) [E66.9]  Yes      Resolved Hospital Problems   No resolved problems to display.       THESE ARE NEW MEDICAL PROBLEMS TO ME.    Plan of Treatment  She feels much better than yesterday.  Her saturation without oxygen is about 88%.  At home she uses with sleep only.  On exam she may have a mild exacerbation though only minimal wheezing heard.  She did get IV steroids yesterday.  I will switch her to oral steroids today and change her Duo nebs to 4 times daily.  She already has a baseline tremor and I will avoid giving her every 4 hours nebs.    I suspect mostly a progression of underlying COPD perhaps with a combination of FERNANDEZ and OHV.  She appears to have chronic hypercapnia on blood gases.  For now continue noninvasive use at night.  Consider use of home trilogy device or similar at time of discharge based on blood gases just prior.    Patient states she had a negative previous sleep study for sleep apnea over 10 years ago at [facility].  She may need to have this repeated, only if she does not qualify for a trilogy based on chronic respiratory failure.    Acute kidney injury noted.  Her creatinine has jumped significantly from before.We will consult renal to see.  She has previously seen Dr. Vasquez team.    She looks good enough to transfer out.    Santiago Mendoza MD  03/13/20  12:56    Part of this note may be an electronic transcription/translation of spoken language to printed text using the Dragon Dictation System.

## 2020-03-14 LAB
ALBUMIN SERPL-MCNC: 3.7 G/DL (ref 3.5–5.2)
ANION GAP SERPL CALCULATED.3IONS-SCNC: 12.8 MMOL/L (ref 5–15)
BASOPHILS # BLD AUTO: 0.01 10*3/MM3 (ref 0–0.2)
BASOPHILS NFR BLD AUTO: 0.1 % (ref 0–1.5)
BUN BLD-MCNC: 50 MG/DL (ref 8–23)
BUN/CREAT SERPL: 25.3 (ref 7–25)
CALCIUM SPEC-SCNC: 8.7 MG/DL (ref 8.6–10.5)
CHLORIDE SERPL-SCNC: 103 MMOL/L (ref 98–107)
CO2 SERPL-SCNC: 22.2 MMOL/L (ref 22–29)
CREAT BLD-MCNC: 1.98 MG/DL (ref 0.57–1)
DEPRECATED RDW RBC AUTO: 50.1 FL (ref 37–54)
EOSINOPHIL # BLD AUTO: 0 10*3/MM3 (ref 0–0.4)
EOSINOPHIL NFR BLD AUTO: 0 % (ref 0.3–6.2)
ERYTHROCYTE [DISTWIDTH] IN BLOOD BY AUTOMATED COUNT: 14.2 % (ref 12.3–15.4)
FERRITIN SERPL-MCNC: 43.1 NG/ML (ref 13–150)
GFR SERPL CREATININE-BSD FRML MDRD: 24 ML/MIN/1.73
GLUCOSE BLD-MCNC: 123 MG/DL (ref 65–99)
GLUCOSE BLDC GLUCOMTR-MCNC: 116 MG/DL (ref 70–130)
GLUCOSE BLDC GLUCOMTR-MCNC: 148 MG/DL (ref 70–130)
GLUCOSE BLDC GLUCOMTR-MCNC: 149 MG/DL (ref 70–130)
GLUCOSE BLDC GLUCOMTR-MCNC: 153 MG/DL (ref 70–130)
HCT VFR BLD AUTO: 28 % (ref 34–46.6)
HGB BLD-MCNC: 9.3 G/DL (ref 12–15.9)
IMM GRANULOCYTES # BLD AUTO: 0.22 10*3/MM3 (ref 0–0.05)
IMM GRANULOCYTES NFR BLD AUTO: 2.1 % (ref 0–0.5)
IRON 24H UR-MRATE: 47 MCG/DL (ref 37–145)
IRON SATN MFR SERPL: 13 % (ref 20–50)
LYMPHOCYTES # BLD AUTO: 1.07 10*3/MM3 (ref 0.7–3.1)
LYMPHOCYTES NFR BLD AUTO: 10.3 % (ref 19.6–45.3)
MAGNESIUM SERPL-MCNC: 2.1 MG/DL (ref 1.6–2.4)
MCH RBC QN AUTO: 31.8 PG (ref 26.6–33)
MCHC RBC AUTO-ENTMCNC: 33.2 G/DL (ref 31.5–35.7)
MCV RBC AUTO: 95.9 FL (ref 79–97)
MONOCYTES # BLD AUTO: 0.81 10*3/MM3 (ref 0.1–0.9)
MONOCYTES NFR BLD AUTO: 7.8 % (ref 5–12)
NEUTROPHILS # BLD AUTO: 8.32 10*3/MM3 (ref 1.7–7)
NEUTROPHILS NFR BLD AUTO: 79.7 % (ref 42.7–76)
NRBC BLD AUTO-RTO: 0 /100 WBC (ref 0–0.2)
PHOSPHATE SERPL-MCNC: 3.3 MG/DL (ref 2.5–4.5)
PLATELET # BLD AUTO: 194 10*3/MM3 (ref 140–450)
PMV BLD AUTO: 9.4 FL (ref 6–12)
POTASSIUM BLD-SCNC: 4.5 MMOL/L (ref 3.5–5.2)
RBC # BLD AUTO: 2.92 10*6/MM3 (ref 3.77–5.28)
SODIUM BLD-SCNC: 138 MMOL/L (ref 136–145)
TIBC SERPL-MCNC: 355 MCG/DL (ref 298–536)
TRANSFERRIN SERPL-MCNC: 238 MG/DL (ref 200–360)
URATE SERPL-MCNC: 9 MG/DL (ref 2.4–5.7)
WBC NRBC COR # BLD: 10.43 10*3/MM3 (ref 3.4–10.8)

## 2020-03-14 PROCEDURE — 94799 UNLISTED PULMONARY SVC/PX: CPT

## 2020-03-14 PROCEDURE — 82728 ASSAY OF FERRITIN: CPT | Performed by: INTERNAL MEDICINE

## 2020-03-14 PROCEDURE — 83540 ASSAY OF IRON: CPT | Performed by: INTERNAL MEDICINE

## 2020-03-14 PROCEDURE — 25010000002 ENOXAPARIN PER 10 MG: Performed by: INTERNAL MEDICINE

## 2020-03-14 PROCEDURE — 82784 ASSAY IGA/IGD/IGG/IGM EACH: CPT | Performed by: INTERNAL MEDICINE

## 2020-03-14 PROCEDURE — 80069 RENAL FUNCTION PANEL: CPT | Performed by: INTERNAL MEDICINE

## 2020-03-14 PROCEDURE — 82962 GLUCOSE BLOOD TEST: CPT

## 2020-03-14 PROCEDURE — 86334 IMMUNOFIX E-PHORESIS SERUM: CPT | Performed by: INTERNAL MEDICINE

## 2020-03-14 PROCEDURE — 83735 ASSAY OF MAGNESIUM: CPT | Performed by: INTERNAL MEDICINE

## 2020-03-14 PROCEDURE — 85025 COMPLETE CBC W/AUTO DIFF WBC: CPT | Performed by: INTERNAL MEDICINE

## 2020-03-14 PROCEDURE — 63710000001 INSULIN LISPRO (HUMAN) PER 5 UNITS: Performed by: INTERNAL MEDICINE

## 2020-03-14 PROCEDURE — 84550 ASSAY OF BLOOD/URIC ACID: CPT | Performed by: INTERNAL MEDICINE

## 2020-03-14 PROCEDURE — 84466 ASSAY OF TRANSFERRIN: CPT | Performed by: INTERNAL MEDICINE

## 2020-03-14 PROCEDURE — 63710000001 PREDNISONE PER 1 MG: Performed by: INTERNAL MEDICINE

## 2020-03-14 RX ORDER — LEVOTHYROXINE SODIUM 88 UG/1
88 TABLET ORAL
Status: DISCONTINUED | OUTPATIENT
Start: 2020-03-15 | End: 2020-03-15 | Stop reason: HOSPADM

## 2020-03-14 RX ORDER — GABAPENTIN 300 MG/1
300 CAPSULE ORAL 3 TIMES DAILY
Status: DISCONTINUED | OUTPATIENT
Start: 2020-03-14 | End: 2020-03-15 | Stop reason: HOSPADM

## 2020-03-14 RX ORDER — LOPERAMIDE HYDROCHLORIDE 2 MG/1
2 CAPSULE ORAL 4 TIMES DAILY PRN
Status: DISCONTINUED | OUTPATIENT
Start: 2020-03-14 | End: 2020-03-15 | Stop reason: HOSPADM

## 2020-03-14 RX ORDER — DULOXETIN HYDROCHLORIDE 60 MG/1
60 CAPSULE, DELAYED RELEASE ORAL 2 TIMES DAILY
Status: DISCONTINUED | OUTPATIENT
Start: 2020-03-14 | End: 2020-03-15 | Stop reason: HOSPADM

## 2020-03-14 RX ORDER — LOPERAMIDE HYDROCHLORIDE 2 MG/1
4 CAPSULE ORAL ONCE
Status: COMPLETED | OUTPATIENT
Start: 2020-03-14 | End: 2020-03-14

## 2020-03-14 RX ADMIN — IPRATROPIUM BROMIDE AND ALBUTEROL SULFATE 3 ML: 2.5; .5 SOLUTION RESPIRATORY (INHALATION) at 11:27

## 2020-03-14 RX ADMIN — INSULIN LISPRO 3 UNITS: 100 INJECTION, SOLUTION INTRAVENOUS; SUBCUTANEOUS at 13:00

## 2020-03-14 RX ADMIN — IPRATROPIUM BROMIDE AND ALBUTEROL SULFATE 3 ML: 2.5; .5 SOLUTION RESPIRATORY (INHALATION) at 19:43

## 2020-03-14 RX ADMIN — IPRATROPIUM BROMIDE AND ALBUTEROL SULFATE 3 ML: 2.5; .5 SOLUTION RESPIRATORY (INHALATION) at 07:37

## 2020-03-14 RX ADMIN — GABAPENTIN 300 MG: 300 CAPSULE ORAL at 15:05

## 2020-03-14 RX ADMIN — LOPERAMIDE HYDROCHLORIDE 4 MG: 2 CAPSULE ORAL at 15:05

## 2020-03-14 RX ADMIN — SODIUM CHLORIDE, PRESERVATIVE FREE 10 ML: 5 INJECTION INTRAVENOUS at 09:36

## 2020-03-14 RX ADMIN — ENOXAPARIN SODIUM 30 MG: 30 INJECTION SUBCUTANEOUS at 18:12

## 2020-03-14 RX ADMIN — IPRATROPIUM BROMIDE AND ALBUTEROL SULFATE 3 ML: 2.5; .5 SOLUTION RESPIRATORY (INHALATION) at 15:40

## 2020-03-14 RX ADMIN — GUAIFENESIN 600 MG: 600 TABLET, EXTENDED RELEASE ORAL at 20:00

## 2020-03-14 RX ADMIN — GABAPENTIN 300 MG: 300 CAPSULE ORAL at 20:00

## 2020-03-14 RX ADMIN — PREDNISONE 40 MG: 20 TABLET ORAL at 09:35

## 2020-03-14 RX ADMIN — DULOXETINE HYDROCHLORIDE 60 MG: 60 CAPSULE, DELAYED RELEASE ORAL at 20:00

## 2020-03-14 RX ADMIN — GUAIFENESIN 600 MG: 600 TABLET, EXTENDED RELEASE ORAL at 09:35

## 2020-03-14 RX ADMIN — SODIUM CHLORIDE, PRESERVATIVE FREE 10 ML: 5 INJECTION INTRAVENOUS at 20:00

## 2020-03-14 NOTE — PROGRESS NOTES
[facility] INPATIENT PROGRESS NOTE         [address]    3/14/2020      PATIENT IDENTIFICATION:  Name: Josephine Wolf ADMIT: 3/12/2020   : 1940  PCP: Bill Martino MD    MRN: [id number] LOS: 2 days   AGE/SEX: 79 y.o. female  ROOM: [facility]                     LOS 2    Reason for visit: Follow-up exacerbation of COPD with respiratory failure.      SUBJECTIVE:      Says that she feels that her breathing has improved significantly.  On 2 L supplemental oxygen with saturation 90% at time of my visit.  Has oxygen at home but she usually only uses it at night but will likely require during the day as well at discharge.  Wheezing has improved.  Diminished breath sounds on auscultation but no current rhonchi or wheezing on my evaluation today.  Plan for sleep study after discharge.  Possibly home soon when okay with all.    Objective   OBJECTIVE:    Vital Sign Min/Max for last 24 hours  Temp  Min: 97.9 °F (36.6 °C)  Max: 99.6 °F (37.6 °C)   BP  Min: 115/60  Max: 144/67   Pulse  Min: 74  Max: 94   Resp  Min: 16  Max: 20   SpO2  Min: 87 %  Max: 97 %   No data recorded   Weight  Min: 84.6 kg (186 lb 6.4 oz)  Max: 84.6 kg (186 lb 6.4 oz)                         Body mass index is 34.08 kg/m².    Intake/Output Summary (Last 24 hours) at 3/14/2020 1415  Last data filed at 3/14/2020 0912  Gross per 24 hour   Intake 1595 ml   Output --   Net 1595 ml         Exam:  GEN:  No distress, appears stated age  EYES:   PERRL, anicteric sclera  ENT:    External ears/nose normal, OP clear  NECK:  No adenopathy, midline trachea  LUNGS: Normal chest on inspection, palpation and auscultation  CV:  Normal S1S2, without murmur  ABD:  Non tender, non distended, no hepatosplenomegaly, +BS  EXT:  No edema, cyanosis or clubbing    Scheduled meds:    DULoxetine 60 mg Oral BID   enoxaparin 30 mg Subcutaneous Q24H   gabapentin 300 mg Oral TID   guaiFENesin 600 mg Oral Q12H   insulin lispro 0-14 Units Subcutaneous  4x Daily With Meals & Nightly   ipratropium-albuterol 3 mL Nebulization 4x Daily - RT   [START ON 3/15/2020] levothyroxine 88 mcg Oral QAM AC   loperamide 4 mg Oral Once   predniSONE 40 mg Oral Daily With Breakfast   sodium chloride 10 mL Intravenous Q12H     IV meds:                         Data Review:  Results from last 7 days   Lab Units 03/14/20  0558 03/13/20  0324 03/12/20  1002   SODIUM mmol/L 138 136 138   POTASSIUM mmol/L 4.5 5.0 4.4   CHLORIDE mmol/L 103 96* 97*   CO2 mmol/L 22.2 24.3 26.9   BUN mg/dL 50* 44* 38*   CREATININE mg/dL 1.98* 3.16* 3.10*   GLUCOSE mg/dL 123* 168* 105*   CALCIUM mg/dL 8.7 8.1* 8.2*         Estimated Creatinine Clearance: 24 mL/min (A) (by C-G formula based on SCr of 1.98 mg/dL (H)).  Results from last 7 days   Lab Units 03/14/20  0558 03/13/20  0324 03/12/20  1002   WBC 10*3/mm3 10.43 5.79 7.23   HEMOGLOBIN g/dL 9.3* 9.7* 9.9*   PLATELETS 10*3/mm3 194 179 193         Results from last 7 days   Lab Units 03/13/20  0324 03/12/20  1002   ALT (SGPT) U/L 21 23   AST (SGOT) U/L 23 27     Results from last 7 days   Lab Units 03/13/20  0817 03/12/20  1747 03/12/20  1117   PH, ARTERIAL pH units 7.340* 7.299* 7.281*   PO2 ART mm Hg 62.9* 72.0* 64.7*   PCO2, ARTERIAL mm Hg 52.3* 61.8* 60.7*   HCO3 ART mmol/L 28.3* 30.3* 28.6*     Results from last 7 days   Lab Units 03/13/20  0324 03/12/20  1002   PROCALCITONIN ng/mL 0.08* 0.12   LACTATE mmol/L  --  1.4           2D echo 3/13/2020 reviewed: EF 68%.  Right ventricular systolic pressure 30 mmHg.      Chest x-ray 3/13/2020 reviewed shows improved aeration of both lungs with mild basilar atelectasis.    Microbiology reviewed: No growth to date.  )        Active Hospital Problems    Diagnosis  POA   • Nocturnal hypoxia [G47.34]  Unknown   • CKD (chronic kidney disease) stage 2, GFR 60-89 ml/min [N18.2]  Unknown   • Acute on chronic respiratory failure with hypoxia and hypercapnia (CMS/HCC) [J96.21, J96.22]  Yes   • Obesity (BMI 30-39.9)  [E66.9]  Yes   • COPD with acute exacerbation (CMS/HCC) [J44.1]  Unknown   • Acute kidney injury (CMS/HCC) [N17.9]  Yes      Resolved Hospital Problems   No resolved problems to display.       Assessment   ASSESSMENT:    Acute on chronic hypoxemic and hypercapnic respiratory failure  Acute exacerbation COPD  Acute kidney injury on chronic kidney disease stage II  Nocturnal hypoxemia  Obesity BMI greater than 30    PLAN:  Making good progress and home soon when okay with all.  Plan for sleep study for suspected apnea after discharge.    I am seeing the patient for the first time today.  All patient problems are new to me.      Car Amato MD  Pulmonary and Critical Care Medicine  [facility], [facility]  3/14/2020    14:15

## 2020-03-14 NOTE — PLAN OF CARE
Problem: Patient Care Overview  Goal: Plan of Care Review  Outcome: Ongoing (interventions implemented as appropriate)  Flowsheets (Taken 3/14/2020 2005)  Progress: no change  Plan of Care Reviewed With: patient  Outcome Summary: Pt Transfer this shift, VSS on 2 L NC and Bipap at HS, O2 goal 90-95%, Pt tolerating Regular Diet, up to BSC with SBA, Family at bedside

## 2020-03-14 NOTE — SIGNIFICANT NOTE
03/14/20 1438   Rehab Treatment   Discipline physical therapist   Reason Treatment Not Performed patient/family declined treatment  (pt declined PT today d/t having diarrhea and not thinking she would be able to walk in the hallway. will check back in tomorrow as time allows.)   Recommendation   PT - Next Appointment 03/15/20

## 2020-03-14 NOTE — PLAN OF CARE
Problem: Patient Care Overview  Goal: Plan of Care Review  Outcome: Ongoing (interventions implemented as appropriate)  Flowsheets (Taken 3/14/2020 1508 by Iris Degroot, RN)  Plan of Care Reviewed With: patient  Note:   VSS. O2 at 2L NC. Patient had diarrhea today and has been getting up to the bedside commode on her own. She reports she has diverticulitis and gets diarrhea like this sometimes at home. Gave her immodium and diarrhea has lessened. Continue to monitor.

## 2020-03-14 NOTE — PROGRESS NOTES
Patient states she will not wear the BIPAP tonight, encouraged patient to call if she changed her mind and I would be happy to place BIPAP on patient.

## 2020-03-15 VITALS
HEIGHT: 62 IN | HEART RATE: 68 BPM | RESPIRATION RATE: 18 BRPM | BODY MASS INDEX: 30.55 KG/M2 | OXYGEN SATURATION: 90 % | TEMPERATURE: 97.6 F | SYSTOLIC BLOOD PRESSURE: 131 MMHG | WEIGHT: 166.01 LBS | DIASTOLIC BLOOD PRESSURE: 63 MMHG

## 2020-03-15 LAB
ALBUMIN SERPL-MCNC: 3.7 G/DL (ref 3.5–5.2)
ANION GAP SERPL CALCULATED.3IONS-SCNC: 12.1 MMOL/L (ref 5–15)
BASOPHILS # BLD AUTO: 0.02 10*3/MM3 (ref 0–0.2)
BASOPHILS NFR BLD AUTO: 0.3 % (ref 0–1.5)
BUN BLD-MCNC: 38 MG/DL (ref 8–23)
BUN/CREAT SERPL: 26.4 (ref 7–25)
CALCIUM SPEC-SCNC: 8.8 MG/DL (ref 8.6–10.5)
CHLORIDE SERPL-SCNC: 106 MMOL/L (ref 98–107)
CO2 SERPL-SCNC: 24.9 MMOL/L (ref 22–29)
CREAT BLD-MCNC: 1.44 MG/DL (ref 0.57–1)
DEPRECATED RDW RBC AUTO: 50.2 FL (ref 37–54)
EOSINOPHIL # BLD AUTO: 0.03 10*3/MM3 (ref 0–0.4)
EOSINOPHIL NFR BLD AUTO: 0.4 % (ref 0.3–6.2)
ERYTHROCYTE [DISTWIDTH] IN BLOOD BY AUTOMATED COUNT: 14.1 % (ref 12.3–15.4)
GFR SERPL CREATININE-BSD FRML MDRD: 35 ML/MIN/1.73
GLUCOSE BLD-MCNC: 78 MG/DL (ref 65–99)
GLUCOSE BLDC GLUCOMTR-MCNC: 85 MG/DL (ref 70–130)
GLUCOSE BLDC GLUCOMTR-MCNC: 91 MG/DL (ref 70–130)
HCT VFR BLD AUTO: 29.2 % (ref 34–46.6)
HGB BLD-MCNC: 9.5 G/DL (ref 12–15.9)
IMM GRANULOCYTES # BLD AUTO: 0.29 10*3/MM3 (ref 0–0.05)
IMM GRANULOCYTES NFR BLD AUTO: 3.9 % (ref 0–0.5)
LYMPHOCYTES # BLD AUTO: 2.08 10*3/MM3 (ref 0.7–3.1)
LYMPHOCYTES NFR BLD AUTO: 28.3 % (ref 19.6–45.3)
MAGNESIUM SERPL-MCNC: 2 MG/DL (ref 1.6–2.4)
MCH RBC QN AUTO: 31.9 PG (ref 26.6–33)
MCHC RBC AUTO-ENTMCNC: 32.5 G/DL (ref 31.5–35.7)
MCV RBC AUTO: 98 FL (ref 79–97)
MONOCYTES # BLD AUTO: 0.65 10*3/MM3 (ref 0.1–0.9)
MONOCYTES NFR BLD AUTO: 8.8 % (ref 5–12)
NEUTROPHILS # BLD AUTO: 4.28 10*3/MM3 (ref 1.7–7)
NEUTROPHILS NFR BLD AUTO: 58.3 % (ref 42.7–76)
NRBC BLD AUTO-RTO: 0 /100 WBC (ref 0–0.2)
PHOSPHATE SERPL-MCNC: 3.5 MG/DL (ref 2.5–4.5)
PLATELET # BLD AUTO: 204 10*3/MM3 (ref 140–450)
PMV BLD AUTO: 9.4 FL (ref 6–12)
POTASSIUM BLD-SCNC: 4.2 MMOL/L (ref 3.5–5.2)
RBC # BLD AUTO: 2.98 10*6/MM3 (ref 3.77–5.28)
SODIUM BLD-SCNC: 143 MMOL/L (ref 136–145)
URATE SERPL-MCNC: 10 MG/DL (ref 2.4–5.7)
WBC NRBC COR # BLD: 7.35 10*3/MM3 (ref 3.4–10.8)

## 2020-03-15 PROCEDURE — 85025 COMPLETE CBC W/AUTO DIFF WBC: CPT | Performed by: INTERNAL MEDICINE

## 2020-03-15 PROCEDURE — 94799 UNLISTED PULMONARY SVC/PX: CPT

## 2020-03-15 PROCEDURE — 83735 ASSAY OF MAGNESIUM: CPT | Performed by: INTERNAL MEDICINE

## 2020-03-15 PROCEDURE — 97110 THERAPEUTIC EXERCISES: CPT

## 2020-03-15 PROCEDURE — 63710000001 PREDNISONE PER 1 MG: Performed by: INTERNAL MEDICINE

## 2020-03-15 PROCEDURE — 80069 RENAL FUNCTION PANEL: CPT | Performed by: INTERNAL MEDICINE

## 2020-03-15 PROCEDURE — 82962 GLUCOSE BLOOD TEST: CPT

## 2020-03-15 PROCEDURE — 84550 ASSAY OF BLOOD/URIC ACID: CPT | Performed by: INTERNAL MEDICINE

## 2020-03-15 RX ORDER — PREDNISONE 10 MG/1
TABLET ORAL
Qty: 31 TABLET | Refills: 0 | Status: SHIPPED | OUTPATIENT
Start: 2020-03-15 | End: 2020-05-25 | Stop reason: HOSPADM

## 2020-03-15 RX ADMIN — GABAPENTIN 300 MG: 300 CAPSULE ORAL at 08:46

## 2020-03-15 RX ADMIN — SODIUM CHLORIDE, PRESERVATIVE FREE 10 ML: 5 INJECTION INTRAVENOUS at 08:46

## 2020-03-15 RX ADMIN — IPRATROPIUM BROMIDE AND ALBUTEROL SULFATE 3 ML: 2.5; .5 SOLUTION RESPIRATORY (INHALATION) at 07:51

## 2020-03-15 RX ADMIN — LOPERAMIDE HYDROCHLORIDE 2 MG: 2 CAPSULE ORAL at 10:25

## 2020-03-15 RX ADMIN — LEVOTHYROXINE SODIUM 88 MCG: 88 TABLET ORAL at 06:54

## 2020-03-15 RX ADMIN — PREDNISONE 40 MG: 20 TABLET ORAL at 08:46

## 2020-03-15 RX ADMIN — GUAIFENESIN 600 MG: 600 TABLET, EXTENDED RELEASE ORAL at 08:46

## 2020-03-15 RX ADMIN — DULOXETINE HYDROCHLORIDE 60 MG: 60 CAPSULE, DELAYED RELEASE ORAL at 08:46

## 2020-03-15 RX ADMIN — IPRATROPIUM BROMIDE AND ALBUTEROL SULFATE 3 ML: 2.5; .5 SOLUTION RESPIRATORY (INHALATION) at 10:55

## 2020-03-15 NOTE — THERAPY TREATMENT NOTE
Patient Name: Josephine Wolf  : 1940    MRN: [id number]                              Today's Date: 3/15/2020       Admit Date: 3/12/2020    Visit Dx:     ICD-10-CM ICD-9-CM   1. Acute respiratory acidosis E87.2 276.2   2. Acute on chronic respiratory failure with hypoxia and hypercapnia (CMS/HCC) J96.21 518.84    J96.22 786.09     799.02   3. Renal failure, unspecified chronicity N19 586     Patient Active Problem List   Diagnosis   • Anemia   • OA (osteoarthritis) of knee   • Chronic respiratory failure with hypoxia (CMS/[facility])   • Cellulitis of skin   • Acute kidney injury (CMS/[facility])   • Sepsis (CMS/[facility])   • Orthostatic hypotension   • Disease of thyroid gland   • COPD with acute exacerbation (CMS/[facility])   • Hypertension   • Dehydration   • Stage 3 chronic kidney disease (CMS/[facility])   • Closed compression fracture of L1 lumbar vertebra   • Hyponatremia   • Osteoporosis with pathological fracture   • Acute on chronic respiratory failure with hypoxia and hypercapnia (CMS/[facility])   • Obesity (BMI 30-39.9)   • Nocturnal hypoxia   • CKD (chronic kidney disease) stage 2, GFR 60-89 ml/min     Past Medical History:   Diagnosis Date   • Acid reflux    • Anemia    • Arthritis    • Bipolar 1 disorder, depressed (CMS/[facility])    • Cataract     MINDY   • Chronic nausea    • Chronic pain of right knee    • Continuous leakage of urine     USES DEPENDS   • COPD (chronic obstructive pulmonary disease) (CMS/HCC)     USES O2 2 LMP PER NC AT NIGHT   • Disease of thyroid gland     HYPOTHYROIDISM   • Diverticulosis    • Fibromyalgia     DX    • Frequent episodes of bronchitis    • Hypertension    • Migraines    • Neck pain    • On home oxygen therapy     2L NC AT NIGHT   • Short of breath on exertion      Past Surgical History:   Procedure Laterality Date   • CEREBRAL ANEURYSM REPAIR      WITH STENT   • HYSTERECTOMY     • LAPAROSCOPIC CHOLECYSTECTOMY     • LA TOTAL KNEE ARTHROPLASTY Right 2017    Procedure: RT TOTAL KNEE  ARTHROPLASTY;  Surgeon: Kashif Perez MD;  Location: [facility] OR;  Service: Orthopedics     General Information     Row Name 03/15/20 0929          PT Evaluation Time/Intention    Document Type  therapy note (daily note)  -SI     Mode of Treatment  physical therapy  -SI     Row Name 03/15/20 0929          General Information    Existing Precautions/Restrictions  fall;other (see comments) O2 at home  -SI     Row Name 03/15/20 0929          Cognitive Assessment/Intervention- PT/OT    Orientation Status (Cognition)  oriented x 3  -SI     Row Name 03/15/20 0929          Safety Issues, Functional Mobility    Impairments Affecting Function (Mobility)  endurance/activity tolerance  -SI       User Key  (r) = Recorded By, (t) = Taken By, (c) = Cosigned By    Initials Name Provider Type    SI Selma Tabor PTA Physical Therapy Assistant        Mobility     Row Name 03/15/20 0930          Bed Mobility Assessment/Treatment    Bed Mobility Assessment/Treatment  bed mobility (all) activities  -SI     [place] Level (Bed Mobility)  independent  -SI     Supine-Sit [place] (Bed Mobility)  independent  -SI     Sit-Supine [place] (Bed Mobility)  independent  -SI     Assistive Device (Bed Mobility)  bed rails  -SI     Row Name 03/15/20 0930          Sit-Stand Transfer    Sit-Stand [place] (Transfers)  contact guard  -SI     Assistive Device (Sit-Stand Transfers)  walker, front-wheeled  -SI     Row Name 03/15/20 0930          Gait/Stairs Assessment/Training    Gait/Stairs Assessment/Training  gait/ambulation independence  -SI     [place] Level (Gait)  contact guard;minimum assist (75% patient effort)  -SI     Assistive Device (Gait)  walker, front-wheeled  -SI     Distance in Feet (Gait)  150  -SI     Pattern (Gait)  step-through  -SI     Deviations/Abnormal Patterns (Gait)  -- slow cautious gait  -SI       User Key  (r) = Recorded By, (t) = Taken By, (c) = Cosigned By    Initials Name Provider Type     Selma Rubio PTA Physical Therapy Assistant        Obj/Interventions     Row Name 03/15/20 0931          Dynamic Sitting Balance    Level of [place], Reaches Outside Midline (Sitting, Dynamic Balance)  independent  -SI     Row Name 03/15/20 0931          Static Standing Balance    Level of [place] (Supported Standing, Static Balance)  supervision  -     Row Name 03/15/20 0931          Dynamic Standing Balance    Level of [place], Reaches Outside Midline (Standing, Dynamic Balance)  contact guard assist  -SI     Time Able to Maintain Position, Reaches Outside Midline (Standing, Dynamic Balance)  more than 5 minutes  -SI     Assistive Device Utilized (Supported Standing, Dynamic Balance)  walker, rolling  -SI       User Key  (r) = Recorded By, (t) = Taken By, (c) = Cosigned By    Initials Name Provider Type    Selma Rubio PTA Physical Therapy Assistant        Goals/Plan     Row Name 03/15/20 0933          Bed Mobility Goal 1 (PT)    Activity/Assistive Device (Bed Mobility Goal 1, PT)  bed mobility activities, all  -SI     [place] Level/Cues Needed (Bed Mobility Goal 1, PT)  independent  -SI       User Key  (r) = Recorded By, (t) = Taken By, (c) = Cosigned By    Initials Name Provider Type    Selma Rubio PTA Physical Therapy Assistant        Clinical Impression     Row Name 03/15/20 0932          Pain Scale: Numbers Pre/Post-Treatment    Pain Scale: Numbers, Pretreatment  0/10 - no pain  -SI     Pain Scale: Numbers, Post-Treatment  0/10 - no pain  -SI     [facility] Name 03/15/20 0932          Plan of Care Review    Progress  improving  -[facility] Name 03/15/20 0932          Physical Therapy Clinical Impression    Rehab Potential (PT Clinical Summary)  good, to achieve stated therapy goals  -SI     Row Name 03/15/20 0932          Vital Signs    Pre SpO2 (%)  91  -SI     O2 Delivery Pre Treatment  supplemental O2  -SI     Intra SpO2 (%)  91  -SI     O2 Delivery Intra Treatment   supplemental O2  -SI     Post SpO2 (%)  94  -SI     O2 Delivery Post Treatment  supplemental O2  -SI     Row Name 03/15/20 0932          Positioning and Restraints    Pre-Treatment Position  in bed  -SI     Post Treatment Position  bed  -SI     In Bed  sitting EOB;call light within reach  -SI       User Key  (r) = Recorded By, (t) = Taken By, (c) = Cosigned By    Initials Name Provider Type    Selma Rubio PTA Physical Therapy Assistant        Outcome Measures     Row Name 03/15/20 0934          How much help from another person do you currently need...    Turning from your back to your side while in flat bed without using bedrails?  4  -SI     Moving from lying on back to sitting on the side of a flat bed without bedrails?  4  -SI     Moving to and from a bed to a chair (including a wheelchair)?  3  -SI     Standing up from a chair using your arms (e.g., wheelchair, bedside chair)?  4  -SI     Climbing 3-5 steps with a railing?  3  -SI     To walk in hospital room?  3  -SI     AM-PAC 6 Clicks Score (PT)  21  -SI       User Key  (r) = Recorded By, (t) = Taken By, (c) = Cosigned By    Initials Name Provider Type    Selma Rubio PTA Physical Therapy Assistant          PT Recommendation and Plan     Outcome Summary/Treatment Plan (PT)  Anticipated Discharge Disposition (PT): home with assist, home with home health  Plan of Care Reviewed With: patient  Progress: improving     Time Calculation:   PT Charges     Row Name 03/15/20 0936             Time Calculation    Start Time  0905  -SI      Stop Time  0925  -SI      Time Calculation (min)  20 min  -SI      PT Received On  03/15/20  -SI      PT - Next Appointment  03/16/20  -SI         Time Calculation- PT    Total Timed Code Minutes- PT  20 minute(s)  -SI        User Key  (r) = Recorded By, (t) = Taken By, (c) = Cosigned By    Initials Name Provider Type    Selma Rubio PTA Physical Therapy Assistant        Therapy Charges for Today     Code  Description Service Date Service Provider Modifiers Qty    [id number] HC PT THER PROC EA 15 MIN 3/15/2020 Selma Tabor, PTA GP 1          PT G-Codes  Outcome Measure Options: AM-PAC 6 Clicks Basic Mobility (PT)  AM-PAC 6 Clicks Score (PT): 21    Selma Tabor PTA  3/15/2020

## 2020-03-15 NOTE — DISCHARGE PLACEMENT REQUEST
"Raheem Wolf (79 y.o. Female)     Date of Birth Social Security Number Address Home Phone MRN    1940  [id number]    [facility] [facility] [place] [facility] [address] [phone] [id number]    Yazidism Marital Status          Gnosticist        Admission Date Admission Type Admitting Provider Attending Provider Department, Room/Bed    3/12/20 Emergency Ling Pereira MD Saad, Lebnan S, MD [address], [address]/1    Discharge Date Discharge Disposition Discharge Destination         Home or Self Care              Attending Provider:  Ling Pereira MD    Allergies:  Morphine And Related    Isolation:  None   Infection:  None   Code Status:  CPR    Ht:  157.5 cm (62.01\")   Wt:  75.3 kg (166 lb 0.1 oz)    Admission Cmt:  None   Principal Problem:  None                Active Insurance as of 3/12/2020     Primary Coverage     Payor Plan Insurance Group Employer/Plan Group    [facility] [employer] MEDICARE REPLACEMENT      Payor Plan Address Payor Plan Phone Number Payor Plan Fax Number Effective Dates    [address] [address] [phone]  10/9/2017 - None Entered    [facility] [id number]       Subscriber Name Subscriber Birth Date Member ID       ALEJANDRARAHEEM QUACH 1940 [id number]           Secondary Coverage     Payor Plan Insurance Group Employer/Plan Group    [place]      Payor Plan Address Payor Plan Phone Number Payor Plan Fax Number Effective Dates    [address] [phone]  7/3/2016 - None Entered    [facility] [id number]       Subscriber Name Subscriber Birth Date Member ID       ALEJANDRA,RAHEEM ROCK 1940 [id number]                 Emergency Contacts      (Rel.) Home Phone Work Phone Mobile Phone    [place] Wolf (Son) [phone] -- --    James Wolf (Son) [phone] -- [phone]              "

## 2020-03-15 NOTE — PROGRESS NOTES
Continued Stay Note  [facility]     Patient Name: Josephine Wolf  MRN: [id number]  Today's Date: 3/15/2020    Admit Date: 3/12/2020    Discharge Plan     Row Name 03/15/20 1235       Plan    Plan  Home with [facility] for home oxygen     Plan Comments  DME noted for oxygen 2L continuous.  CCP spoke with patient via phone in patient's room and she states she is current with [facility] with night oxygen and wants to use them for her continuous oxygen DME.  Spoke with Janelle with [facility] at [phone] to inform of DME for continuous oxygen 2L and for delivery of portable oxygen tank to patient today in room 520 and faxed DME with all required documents to [facility] with receipt confirmation.  Patient denies wanting or needing home health at discharge.  Patient states she plans to return home at discharge..... Krista Boggs RN,[facility]         Discharge Codes    No documentation.       Expected Discharge Date and Time     Expected Discharge Date Expected Discharge Time    Mar 15, 2020             Krista Boggs RN

## 2020-03-15 NOTE — DISCHARGE SUMMARY
PHYSICIAN DISCHARGE SUMMARY                                                                        [facility]    Date of admit: 3/12/2020  Date of Discharge:  3/15/2020    PCP: Bill Martino MD    Discharge Diagnosis:     Acute kidney injury (CMS/HCC)    COPD with acute exacerbation (CMS/HCC)    Acute on chronic respiratory failure with hypoxia and hypercapnia (CMS/HCC)    Obesity (BMI 30-39.9)    Nocturnal hypoxia    CKD (chronic kidney disease) stage 2, GFR 60-89 ml/min  Acute on chronic hypoxemic and hypercapnic respiratory failure  Acute exacerbation COPD  Acute kidney injury on chronic kidney disease stage II  Nocturnal hypoxemia  Obesity BMI greater than 30    Secondary Diagnoses:  Past Medical History:   Diagnosis Date   • Acid reflux    • Anemia    • Arthritis    • Bipolar 1 disorder, depressed (CMS/[facility])    • Cataract     MINDY   • Chronic nausea    • Chronic pain of right knee    • Continuous leakage of urine     USES DEPENDS   • COPD (chronic obstructive pulmonary disease) (CMS/HCC)     USES O2 2 LMP PER NC AT NIGHT   • Disease of thyroid gland     HYPOTHYROIDISM   • Diverticulosis    • Fibromyalgia     DX 1994   • Frequent episodes of bronchitis    • Hypertension    • Migraines    • Neck pain    • On home oxygen therapy     2L NC AT NIGHT   • Short of breath on exertion        Procedures Performed           Consults:       Hospital Course  Patient is a 79 y.o. female presented with per Dr. Pereira's H&P:  Chief Complaint: Shortness of breath     History of Present Illness:  Josephine Wolf is a 79 y.o. female with history of COPD who presented to the emergency room with worsening shortness of breath of 24 hours duration.  Patient has chronic hypoxemia usually nocturnal on oxygen at night only with recent worsening dyspnea with exertion requiring oxygen supplementation at all times. this was associated with  nonproductive cough with chest tightness, the cough and the tightness were going on for few days.  There is no fever or chills or night sweats, denies any chest pain or hemoptysis  No GI symptoms like nausea vomiting or diarrhea  She did have some worsening lower extremity edema  On arrival her sats were 70% on room air consistent with acute hypoxemia, she was given nebulizer treatment however this was not enough, patient was working hard on the breathing and ABG showed hypercapnia so she was started on noninvasive positive pressure ventilation with the BiPAP.  Her chest x-ray was negative for any pulmonary infiltrate and the patient has no known risk factor or exposure to COVID 19 and she is not aware of any recent sick contact.  The  mentioned that she had a recent procedure with what he described as a kyphoplasty done    Patient was admitted with acute exacerbation of COPD with respiratory failure.  Had associated acute kidney injury on chronic kidney disease.  H&P notes describe sepsis but they did not find any clear evidence of infection and it was felt that this was not true sepsis.  Symptoms related to COPD exacerbation.  She has significantly improved and is eager to go home.  Has been followed by nephrology.  Acute kidney injury is improving.  If cleared by nephrology plan for discharge home today.    Condition on Discharge:  Stable.      Vital Signs  Temp:  [97.6 °F (36.4 °C)-98.1 °F (36.7 °C)] 97.6 °F (36.4 °C)  Heart Rate:  [58-83] 65  Resp:  [16-18] 18  BP: (131-142)/(63-87) 131/63    Physical Exam:  General Appearance: no acute distress, appears comfortable  Heart: regular rate and rhythm  Lungs: clear to auscultation bilaterally, respirations unlabored  Abdomen: soft, nontender, nondistended, no guarding/rebound, nl BS  Extremeties: no edema, no cyanosis  Neurology: speech normal, mental status normal, moving all four extremities  Mental Status: alert, oriented        --  Consults:   IP CONSULT  TO PULMONOLOGY  IP CONSULT TO NUTRITION SERVICES  IP CONSULT TO NEPHROLOGY    Significant Discharge Diagnostics   Procedures Performed:         Pertinent Lab Results:  Results from last 7 days   Lab Units 03/15/20  0719 03/14/20  0558 03/13/20  0324 03/12/20  1002   SODIUM mmol/L 143 138 136 138   POTASSIUM mmol/L 4.2 4.5 5.0 4.4   CHLORIDE mmol/L 106 103 96* 97*   CO2 mmol/L 24.9 22.2 24.3 26.9   BUN mg/dL 38* 50* 44* 38*   CREATININE mg/dL 1.44* 1.98* 3.16* 3.10*   GLUCOSE mg/dL 78 123* 168* 105*   CALCIUM mg/dL 8.8 8.7 8.1* 8.2*   AST (SGOT) U/L  --   --  23 27   ALT (SGPT) U/L  --   --  21 23     Results from last 7 days   Lab Units 03/12/20  1002   TROPONIN T ng/mL <0.010     Results from last 7 days   Lab Units 03/15/20  0719 03/14/20  0558 03/13/20  0324 03/12/20  1002   WBC 10*3/mm3 7.35 10.43 5.79 7.23   HEMOGLOBIN g/dL 9.5* 9.3* 9.7* 9.9*   HEMATOCRIT % 29.2* 28.0* 28.9* 30.0*   PLATELETS 10*3/mm3 204 194 179 193   MCV fL 98.0* 95.9 96.7 98.0*   MCH pg 31.9 31.8 32.4 32.4   MCHC g/dL 32.5 33.2 33.6 33.0   RDW % 14.1 14.2 13.6 13.9   RDW-SD fl 50.2 50.1 48.7 49.5   MPV fL 9.4 9.4 9.7 9.4   NEUTROPHIL % % 58.3 79.7*  --  53.9   LYMPHOCYTE % % 28.3 10.3*  --  30.6   MONOCYTES % % 8.8 7.8  --  11.2   EOSINOPHIL % % 0.4 0.0*  --  1.9   BASOPHIL % % 0.3 0.1  --  0.6   IMM GRAN % % 3.9* 2.1*  --  1.8*   NEUTROS ABS 10*3/mm3 4.28 8.32*  --  3.90   LYMPHS ABS 10*3/mm3 2.08 1.07  --  2.21   MONOS ABS 10*3/mm3 0.65 0.81  --  0.81   EOS ABS 10*3/mm3 0.03 0.00  --  0.14   BASOS ABS 10*3/mm3 0.02 0.01  --  0.04   IMMATURE GRANS (ABS) 10*3/mm3 0.29* 0.22*  --  0.13*   NRBC /100 WBC 0.0 0.0  --  0.0         Results from last 7 days   Lab Units 03/15/20  0719 03/14/20  0558   MAGNESIUM mg/dL 2.0 2.1           Invalid input(s): LDLCALC  Results from last 7 days   Lab Units 03/12/20  1002   PROBNP pg/mL 242.6         Results from last 7 days   Lab Units 03/13/20  0817   PH, ARTERIAL pH units 7.340*   PO2 ART mm Hg 62.9*    PCO2, ARTERIAL mm Hg 52.3*   HCO3 ART mmol/L 28.3*     Results from last 7 days   Lab Units 03/13/20  0324 03/12/20  1002   PROCALCITONIN ng/mL 0.08* 0.12   LACTATE mmol/L  --  1.4             Results from last 7 days   Lab Units 03/12/20  2205   BLOODCX  No growth at 2 days  No growth at 2 days     Results from last 7 days   Lab Units 03/12/20  2225   NITRITE UA  Negative     Results from last 7 days   Lab Units 03/12/20  1003   ADENOVIRUS DETECTION BY PCR  Not Detected   CORONAVIRUS 229E  Not Detected   CORONAVIRUS HKU1  Not Detected   CORONAVIRUS NL63  Not Detected   CORONAVIRUS OC43  Not Detected   HUMAN METAPNEUMOVIRUS  Not Detected   HUMAN RHINOVIRUS/ENTEROVIRUS  Not Detected   INFLUENZA B PCR  Not Detected   PARAINFLUENZA 1  Not Detected   PARAINFLUENZA VIRUS 2  Not Detected   PARAINFLUENZA VIRUS 3  Not Detected   PARAINFLUENZA VIRUS 4  Not Detected   BORDETELLA PERTUSSIS PCR  Not Detected   CHLAMYDOPHILA PNEUMONIAE PCR  Not Detected   MYCOPLAMA PNEUMO PCR  Not Detected   INFLUENZA A PCR  Not Detected   INFLUENZA A H3  Not Detected   INFLUENZA A H1  Not Detected   RSV, PCR  Not Detected     Results from last 7 days   Lab Units 03/15/20  0719   URIC ACID mg/dL 10.0*       Imaging Results:  Imaging Results (All)     Procedure Component Value Units Date/Time    US Renal Bilateral [[id number]] Collected:  03/13/20 1835     Updated:  03/13/20 1839    Narrative:       US RENAL BILATERAL-     INDICATIONS: Acute kidney injury     TECHNIQUE: ULTRASOUND OF THE KIDNEYS AND URINARY BLADDER.     COMPARISON: 12/03/2017     FINDINGS:     The right kidney measures 8.8 cm centimeters, the left kidney measures  10.3 centimeters.     No renal lesion is identified. No hydronephrosis or echogenic  nephrolithiasis.     The urinary bladder is only partly filled, limiting its assessment. No  ureteral jets were observed during the exam. The urinary bladder  otherwise appears unremarkable.       Impression:       No hydronephrosis  or echogenic nephrolithiasis.        This report was finalized on 3/13/2020 6:36 PM by Dr. Ross Phillips M.D.       XR Chest 1 View [[id number]] Collected:  03/13/20 0638     Updated:  03/13/20 0658    Narrative:       EXAMINATION: SINGLE VIEW CHEST RADIOGRAPH     HISTORY: 79-year-old female with a history of respiratory failure.     FINDINGS: A semierect AP chest radiograph was obtained. Comparison is  made to a chest radiograph dated 03/12/2020. The patient is slightly  rotated to the right. The lungs have improved aeration when compared to  the prior examination, and there has been a reduction in bibasilar  opacification. Mild bibasilar volume loss remains.       Impression:       Improved aeration of both lungs with persistent mild  bibasilar atelectasis versus pneumonia.     This report was finalized on 3/13/2020 6:55 AM by Dr. Valeriy Fournier M.D.       XR Chest 1 View [[id number]] Collected:  03/12/20 1206     Updated:  03/12/20 1211    Narrative:       XR CHEST 1 VW-     Clinical: Cough     COMPARISON 06/18/2019     FINDINGS: Very low lung volumes with linear opacities at both lung bases  suggesting likely discoid atelectasis. There could be some additional  superimposed infiltrate or consolidation along the left costophrenic  sulcus. There is crowding of the central bronchovascular markings  however no pulmonary edema. The cardiomediastinal silhouette is stable.  There is atherosclerotic calcification of the aorta. The remainder of  examination is unremarkable. PA and lateral view of the chest with  appropriate inspiratory effort may be helpful when patient's condition  permits.     This report was finalized on 3/12/2020 12:08 PM by Dr. Mynor Vera M.D.           --    Discharge Disposition  Home or Self Care    Discharge Medications     Discharge Medications      New Medications      Instructions Start Date   predniSONE 10 MG tablet  Commonly known as:  DELTASONE   Take 4 tabs daily x 3 days, then  take 3 tabs daily x 3 days, then take 2 tabs daily x 3 days, then take 1 tab daily x 3 days         Changes to Medications      Instructions Start Date   irbesartan-hydrochlorothiazide 150-12.5 MG tablet  Commonly known as:  AVALIDE  What changed:    · how much to take  · when to take this   2 tablets, Oral, Daily         Continue These Medications      Instructions Start Date   amLODIPine 10 MG tablet  Commonly known as:  NORVASC   10 mg, Oral, Daily      DEPAKOTE  MG 24 hr tablet  Generic drug:  divalproex   250 mg, Oral, Daily      divalproex 250 MG 24 hr tablet  Commonly known as:  DEPAKOTE   500 mg, Oral, Every Evening      DULoxetine 60 MG capsule  Commonly known as:  CYMBALTA   60 mg, Oral, 2 Times Daily      fluticasone 50 MCG/ACT nasal spray  Commonly known as:  FLONASE   1 spray, Nasal, Daily      gabapentin 300 MG capsule  Commonly known as:  NEURONTIN   300 mg, Oral, 3 Times Daily      INCRUSE ELLIPTA 62.5 MCG/INH aerosol powder   Generic drug:  Umeclidinium Bromide   1 puff, Inhalation, Daily      levothyroxine 88 MCG tablet  Commonly known as:  SYNTHROID, LEVOTHROID   88 mcg, Oral, Every Morning Before Breakfast      melatonin 1 MG tablet   3 mg, Oral, Nightly      vitamin B-12 1000 MCG tablet  Commonly known as:  CYANOCOBALAMIN   1,000 mcg, Oral, 2 Times Daily         Stop These Medications    dicyclomine 10 MG capsule  Commonly known as:  BENTYL     febuxostat 40 MG tablet  Commonly known as:  ULORIC     HYDROcodone-acetaminophen 7.5-325 MG per tablet  Commonly known as:  NORCO     OLANZapine 5 MG tablet  Commonly known as:  zyPREXA     traZODone 100 MG tablet  Commonly known as:  DESYREL            Discharge Diet: regular diet    Activity at Discharge:     Follow-up Information     Bill Martino MD .    Specialty:  Internal Medicine  Contact information:  [address] [place] [address]  [phone]                 See primary care provider, Bill Martino  MD [place], in 1-2 weeks        Test Results Pending at Discharge   Order Current Status    Immunofixation, Serum In process    Immunofixation, Urine - Urine, Clean Catch In process    Blood Culture - Blood, Arm, Left Preliminary result    Blood Culture - Blood, Arm, Right Preliminary result           Car Amato MD  [facility], [facility]  Pulmonary and Critical Care Medicine  03/15/20  10:07    Time: greater than 30 minutes.        Total time spent discharging patient including evaluation, post hospitalization follow up, medication and post hospitalization instructions and education total time exceeds 30 minutes.

## 2020-03-15 NOTE — NURSING NOTE
Spoke with Dr. Bangura with Nephrology, pt is cleared for D/C and needs to F/U in 2-3 weeks with Dr. Patterson.

## 2020-03-15 NOTE — PROGRESS NOTES
"   LOS: 3 days    Patient Care Team:  Bill Martino MD as PCP - General (Internal Medicine)  Avi Aly MD as Consulting Physician (Nephrology)    Chief Complaint:    Chief Complaint   Patient presents with   • Shortness of Breath     Follow-up acute kidney injury on chronic kidney disease  Subjective     Interval History:   The patient is feeling much better today, no shortness of air, no orthopnea or PND, no chest pain, no dysuria or gross hematuria, no nausea or vomiting.    Review of Systems:   As noted above    Objective     Vital Signs  Temp:  [97.6 °F (36.4 °C)-98.1 °F (36.7 °C)] 97.6 °F (36.4 °C)  Heart Rate:  [58-83] 68  Resp:  [16-18] 18  BP: (131-142)/(63-87) 131/63    Flowsheet Rows      First Filed Value   Admission Height  157.5 cm (62\") Documented at 03/12/2020 1001   Admission Weight  84.6 kg (186 lb 9.6 oz) Documented at 03/12/2020 1001          I/O this shift:  In: 236 [P.O.:236]  Out: -   I/O last 3 completed shifts:  In: 1715 [P.O.:720; I.V.:995]  Out: -     Intake/Output Summary (Last 24 hours) at 3/15/2020 1241  Last data filed at 3/15/2020 0935  Gross per 24 hour   Intake 596 ml   Output --   Net 596 ml       Physical Exam:  General Appearance: alert, oriented x 3, no acute distress, appears to be chronically ill, obese  Skin: warm and dry  HEENT: pupils round and reactive to light, oral mucosa normal, nonicteric sclera  Neck: supple, no JVD, trachea midline  Lungs: Decreased breath sounds in the bases, unlabored breathing effort  Heart: RRR, normal S1 and S2, no S3, no rub  Abdomen: soft, non-tender,  present bowel sounds to auscultation  : no palpable bladder,  Extremities: no edema, cyanosis or clubbing  Neuro: normal speech and mental status        Results Review:    Results from last 7 days   Lab Units 03/15/20  0719 03/14/20  0558 03/13/20  0324 03/12/20  1002   SODIUM mmol/L 143 138 136 138   POTASSIUM mmol/L 4.2 4.5 5.0 4.4   CHLORIDE mmol/L 106 103 96* 97*   CO2 " mmol/L 24.9 22.2 24.3 26.9   BUN mg/dL 38* 50* 44* 38*   CREATININE mg/dL 1.44* 1.98* 3.16* 3.10*   CALCIUM mg/dL 8.8 8.7 8.1* 8.2*   BILIRUBIN mg/dL  --   --  0.3 0.2   ALK PHOS U/L  --   --  97 108   ALT (SGPT) U/L  --   --  21 23   AST (SGOT) U/L  --   --  23 27   GLUCOSE mg/dL 78 123* 168* 105*       Estimated Creatinine Clearance: 31.1 mL/min (A) (by C-G formula based on SCr of 1.44 mg/dL (H)).    Results from last 7 days   Lab Units 03/15/20  0719 03/14/20  0558   MAGNESIUM mg/dL 2.0 2.1   PHOSPHORUS mg/dL 3.5 3.3       Results from last 7 days   Lab Units 03/15/20  0719 03/14/20  0558   URIC ACID mg/dL 10.0* 9.0*       Results from last 7 days   Lab Units 03/15/20  0719 03/14/20  0558 03/13/20  0324 03/12/20  1002   WBC 10*3/mm3 7.35 10.43 5.79 7.23   HEMOGLOBIN g/dL 9.5* 9.3* 9.7* 9.9*   PLATELETS 10*3/mm3 204 194 179 193               Imaging Results (Last 24 Hours)     ** No results found for the last 24 hours. **          DULoxetine 60 mg Oral BID   enoxaparin 30 mg Subcutaneous Q24H   gabapentin 300 mg Oral TID   guaiFENesin 600 mg Oral Q12H   insulin lispro 0-14 Units Subcutaneous 4x Daily With Meals & Nightly   ipratropium-albuterol 3 mL Nebulization 4x Daily - RT   levothyroxine 88 mcg Oral QAM AC   predniSONE 40 mg Oral Daily With Breakfast   sodium chloride 10 mL Intravenous Q12H          Medication Review:   Current Facility-Administered Medications   Medication Dose Route Frequency Provider Last Rate Last Dose   • acetaminophen (TYLENOL) tablet 650 mg  650 mg Oral Q6H PRN Lnig Pereira MD   650 mg at 03/13/20 2005   • bisacodyl (DULCOLAX) EC tablet 5 mg  5 mg Oral Daily PRN Santiago Mendoza MD       • bisacodyl (DULCOLAX) suppository 10 mg  10 mg Rectal Daily PRN Santiago Mendoza MD       • dextrose (D50W) 25 g/ 50mL Intravenous Solution 25 g  25 g Intravenous Q15 Min PRN Santiago Mendoza MD       • dextrose (GLUTOSE) oral gel 15 g  15 g Oral Q15 Min PRN Santiago Mendoza MD       • DULoxetine (CYMBALTA)   capsule 60 mg  60 mg Oral BID Car Amato MD   60 mg at 03/15/20 0846   • enoxaparin (LOVENOX) syringe 30 mg  30 mg Subcutaneous Q24H Santiago Mendoza MD   30 mg at 03/14/20 1812   • gabapentin (NEURONTIN) capsule 300 mg  300 mg Oral TID Car Amato MD   300 mg at 03/15/20 0846   • glucagon (human recombinant) (GLUCAGEN DIAGNOSTIC) injection 1 mg  1 mg Subcutaneous Q15 Min PRN Santiago Mendoza MD       • guaiFENesin (MUCINEX) 12 hr tablet 600 mg  600 mg Oral Q12H Santiago Mendoza MD   600 mg at 03/15/20 0846   • insulin lispro (humaLOG) injection 0-14 Units  0-14 Units Subcutaneous 4x Daily With Meals & Nightly Santiago Mendoza MD   3 Units at 03/14/20 1300   • ipratropium-albuterol (DUO-NEB) nebulizer solution 3 mL  3 mL Nebulization Q4H PRN Santiago Mendoza MD       • ipratropium-albuterol (DUO-NEB) nebulizer solution 3 mL  3 mL Nebulization 4x Daily - RT Santiago Mendoza MD   3 mL at 03/15/20 1055   • levothyroxine (SYNTHROID, LEVOTHROID) tablet 88 mcg  88 mcg Oral QAM AC Car Amato MD   88 mcg at 03/15/20 0654   • loperamide (IMODIUM) capsule 2 mg  2 mg Oral 4x Daily PRN Ling Pereira MD   2 mg at 03/15/20 1025   • Magnesium Sulfate 2 gram Bolus, followed by 8 gram infusion (total Mg dose 10 grams)- Mg less than or equal to 1mg/dL  2 g Intravenous PRN Santiago Mendoza MD        Or   • Magnesium Sulfate 2 gram / 50mL Infusion (GIVE X 3 BAGS TO EQUAL 6GM TOTAL DOSE) - Mg 1.1 - 1.5 mg/dl  2 g Intravenous PRN Santiago Mendoza MD        Or   • Magnesium Sulfate 4 gram infusion- Mg 1.6-1.9 mg/dL  4 g Intravenous PRN Santiago Mendoza MD       • nitroglycerin (NITROSTAT) SL tablet 0.4 mg  0.4 mg Sublingual Q5 Min PRN Santiago Mendoza MD       • ondansetron (ZOFRAN) tablet 4 mg  4 mg Oral Q6H PRN Santiago Mendoza MD        Or   • ondansetron (ZOFRAN) injection 4 mg  4 mg Intravenous Q6H PRN Santiago Mendoza MD       • predniSONE (DELTASONE) tablet 40 mg  40 mg Oral Daily With Breakfast Santiago Mendoza MD   40 mg at 03/15/20 0846   • sodium  chloride 0.9 % flush 10 mL  10 mL Intravenous PRN Santiago Mendoza MD       • sodium chloride 0.9 % flush 10 mL  10 mL Intravenous Q12H Santiago Mendoza MD   10 mL at 03/15/20 0846   • sodium chloride 0.9 % flush 10 mL  10 mL Intravenous PRN Santiago Mendoza MD           Assessment/Plan   1.  Acute kidney injury on chronic kidney disease, improving since admission, creatinine down to 1.44, electrolytes within acceptable range.  2.  COPD  3.  Acute on chronic respiratory failure with hypoxia and hypercapnia  4.  Since shortness of air and orthopnea and PND improved since admission, and the chest x-ray started to clear and wondering if she had evidence of congestive heart failure and decreased cardiac output and with the improvement of that the renal function started to improve      Plan:  1.  Continue the same treatment  2.  The patient is cleared for discharge today follow-up with Dr. Patterson in 2 weeks            Willie Bangura MD  03/15/20  12:41

## 2020-03-15 NOTE — NURSING NOTE
At rest patient's O2 saturation is at 87% on room air. 2L NC was applied and patient's O2 saturation increased to 94%.

## 2020-03-15 NOTE — PROGRESS NOTES
[facility] INPATIENT PROGRESS NOTE         [address]    3/15/2020      PATIENT IDENTIFICATION:  Name: Josephine Wolf ADMIT: 3/12/2020   : 1940  PCP: Bill Martino MD    MRN: [id number] LOS: 3 days   AGE/SEX: 79 y.o. female  ROOM: [facility]                     LOS 3    Reason for visit: Follow-up exacerbation of COPD with respiratory failure.      SUBJECTIVE:      Awaiting input from nephrology.  If cleared by them plan for discharge home today.    Objective   OBJECTIVE:    Vital Sign Min/Max for last 24 hours  Temp  Min: 97.6 °F (36.4 °C)  Max: 98.1 °F (36.7 °C)   BP  Min: 131/63  Max: 142/67   Pulse  Min: 58  Max: 83   Resp  Min: 16  Max: 18   SpO2  Min: 91 %  Max: 94 %   No data recorded   Weight  Min: 75.3 kg (166 lb 0.1 oz)  Max: 75.3 kg (166 lb 0.1 oz)                         Body mass index is 30.36 kg/m².    Intake/Output Summary (Last 24 hours) at 3/15/2020 1002  Last data filed at 3/15/2020 0530  Gross per 24 hour   Intake 360 ml   Output --   Net 360 ml         Exam:  GEN:  No distress, appears stated age  EYES:   PERRL, anicteric sclera  ENT:    External ears/nose normal, OP clear  NECK:  No adenopathy, midline trachea  LUNGS: Normal chest on inspection, palpation and auscultation  CV:  Normal S1S2, without murmur  ABD:  Non tender, non distended, no hepatosplenomegaly, +BS  EXT:  No edema, cyanosis or clubbing    Scheduled meds:      DULoxetine 60 mg Oral BID   enoxaparin 30 mg Subcutaneous Q24H   gabapentin 300 mg Oral TID   guaiFENesin 600 mg Oral Q12H   insulin lispro 0-14 Units Subcutaneous 4x Daily With Meals & Nightly   ipratropium-albuterol 3 mL Nebulization 4x Daily - RT   levothyroxine 88 mcg Oral QAM AC   predniSONE 40 mg Oral Daily With Breakfast   sodium chloride 10 mL Intravenous Q12H     IV meds:                         Data Review:  Results from last 7 days   Lab Units 03/15/20  0719 20  0558 20  0324 20  1002   SODIUM mmol/L 143  138 136 138   POTASSIUM mmol/L 4.2 4.5 5.0 4.4   CHLORIDE mmol/L 106 103 96* 97*   CO2 mmol/L 24.9 22.2 24.3 26.9   BUN mg/dL 38* 50* 44* 38*   CREATININE mg/dL 1.44* 1.98* 3.16* 3.10*   GLUCOSE mg/dL 78 123* 168* 105*   CALCIUM mg/dL 8.8 8.7 8.1* 8.2*         Estimated Creatinine Clearance: 31.1 mL/min (A) (by C-G formula based on SCr of 1.44 mg/dL (H)).  Results from last 7 days   Lab Units 03/15/20  0719 03/14/20  0558 03/13/20  0324 03/12/20  1002   WBC 10*3/mm3 7.35 10.43 5.79 7.23   HEMOGLOBIN g/dL 9.5* 9.3* 9.7* 9.9*   PLATELETS 10*3/mm3 204 194 179 193         Results from last 7 days   Lab Units 03/13/20  0324 03/12/20  1002   ALT (SGPT) U/L 21 23   AST (SGOT) U/L 23 27     Results from last 7 days   Lab Units 03/13/20  0817 03/12/20  1747 03/12/20  1117   PH, ARTERIAL pH units 7.340* 7.299* 7.281*   PO2 ART mm Hg 62.9* 72.0* 64.7*   PCO2, ARTERIAL mm Hg 52.3* 61.8* 60.7*   HCO3 ART mmol/L 28.3* 30.3* 28.6*     Results from last 7 days   Lab Units 03/13/20  0324 03/12/20  1002   PROCALCITONIN ng/mL 0.08* 0.12   LACTATE mmol/L  --  1.4           2D echo 3/13/2020 reviewed: EF 68%.  Right ventricular systolic pressure 30 mmHg.      Chest x-ray 3/13/2020 reviewed shows improved aeration of both lungs with mild basilar atelectasis.    Microbiology reviewed: No growth to date.  )        Active Hospital Problems    Diagnosis  POA   • Nocturnal hypoxia [G47.34]  Unknown   • CKD (chronic kidney disease) stage 2, GFR 60-89 ml/min [N18.2]  Unknown   • Acute on chronic respiratory failure with hypoxia and hypercapnia (CMS/HCC) [J96.21, J96.22]  Yes   • Obesity (BMI 30-39.9) [E66.9]  Yes   • COPD with acute exacerbation (CMS/HCC) [J44.1]  Unknown   • Acute kidney injury (CMS/HCC) [N17.9]  Yes      Resolved Hospital Problems   No resolved problems to display.       Assessment   ASSESSMENT:    Acute on chronic hypoxemic and hypercapnic respiratory failure  Acute exacerbation COPD  Acute kidney injury on chronic kidney  disease stage II  Nocturnal hypoxemia  Obesity BMI greater than 30    PLAN:  Making good progress and home soon when okay with all.  Plan for sleep study for suspected apnea after discharge.          Car Amato MD  Pulmonary and Critical Care Medicine  [facility], [facility]  3/15/2020    10:02

## 2020-03-16 ENCOUNTER — READMISSION MANAGEMENT (OUTPATIENT)
Dept: CALL CENTER | Facility: HOSPITAL | Age: 80
End: 2020-03-16

## 2020-03-16 LAB
IGA SERPL-MCNC: 172 MG/DL (ref 64–422)
IGG SERPL-MCNC: 822 MG/DL (ref 700–1600)
IGM SERPL-MCNC: 145 MG/DL (ref 26–217)
PROT PATTERN SERPL IFE-IMP: NORMAL

## 2020-03-16 NOTE — OUTREACH NOTE
Prep Survey      Responses   [facility] patient discharged from?  [facility]   Is LACE score < 7 ?  No   Eligibility  Readm Mgmt   Discharge diagnosis  COPD,  CHRIS   Does the patient have one of the following disease processes/diagnoses(primary or secondary)?  COPD/Pneumonia   Does the patient have Home health ordered?  No   Is there a DME ordered?  Yes   What DME was ordered?  [employer]   Comments regarding appointments  call for apmts   Medication alerts for this patient  multiple changes see AVS   Prep survey completed?  Yes          Oksana Nina RN

## 2020-03-16 NOTE — PAYOR COMM NOTE
"Raheem Roberts (79 y.o. Female)     PLEASE SEE ATTACHED DC SUMMARY    REF#[id number]    THANK YOU    KAREN GAFFNEY LPN CCP    Date of Birth Social Security Number Address Home Phone MRN    [id number]  [id number]    [facility] [address] [phone] [id number]    Faith Marital Status          Amish        Admission Date Admission Type Admitting Provider Attending Provider Department, Room/Bed    3/12/20 Emergency Ling Pereira MD  [address], S520/1    Discharge Date Discharge Disposition Discharge Destination        3/15/2020 Home or Self Care              Attending Provider:  (none)   Allergies:  Morphine And Related    Isolation:  None   Infection:  None   Code Status:  Prior    Ht:  157.5 cm (62.01\")   Wt:  75.3 kg (166 lb 0.1 oz)    Admission Cmt:  None   Principal Problem:  None                Active Insurance as of 3/12/2020     Primary Coverage     Payor Plan Insurance Group Employer/Plan Group    [facility] MEDICARE REPLACEMENT [employer] MEDICARE REPLACEMENT      Payor Plan Address Payor Plan Phone Number Payor Plan Fax Number Effective Dates    [address] [phone]  10/9/2017 - None Entered    [facility] [id number]       Subscriber Name Subscriber Birth Date Member ID       RAHEEM ROBERTS 1940 [id number]           Secondary Coverage     Payor Plan Insurance Group Employer/Plan Group    [place]      Payor Plan Address Payor Plan Phone Number Payor Plan Fax Number Effective Dates    [address] [phone]  7/3/2016 - None Entered    [facility] [address]       Subscriber Name Subscriber Birth Date Member ID       RAHEEM ROBERTS 1940 [id number]                 Emergency Contacts      (Rel.) Home Phone Work Phone Mobile Phone    Chino Roberts (Son) [phone] -- --    James Roberts (Son) [phone] -- [phone]               Discharge Summary      Car Amato MD " at 03/15/20 [facility]                                                                             PHYSICIAN DISCHARGE SUMMARY                                                                        [facility]    Date of admit: 3/12/2020  Date of Discharge:  3/15/2020    PCP: Bill Martino MD    Discharge Diagnosis:     Acute kidney injury (CMS/HCC)    COPD with acute exacerbation (CMS/HCC)    Acute on chronic respiratory failure with hypoxia and hypercapnia (CMS/HCC)    Obesity (BMI 30-39.9)    Nocturnal hypoxia    CKD (chronic kidney disease) stage 2, GFR 60-89 ml/min  Acute on chronic hypoxemic and hypercapnic respiratory failure  Acute exacerbation COPD  Acute kidney injury on chronic kidney disease stage II  Nocturnal hypoxemia  Obesity BMI greater than 30    Secondary Diagnoses:  Past Medical History:   Diagnosis Date   • Acid reflux    • Anemia    • Arthritis    • Bipolar 1 disorder, depressed (CMS/HCC)    • Cataract     MINDY   • Chronic nausea    • Chronic pain of right knee    • Continuous leakage of urine     USES DEPENDS   • COPD (chronic obstructive pulmonary disease) (CMS/HCC)     USES O2 2 LMP PER NC AT NIGHT   • Disease of thyroid gland     HYPOTHYROIDISM   • Diverticulosis    • Fibromyalgia     DX 1994   • Frequent episodes of bronchitis    • Hypertension    • Migraines    • Neck pain    • On home oxygen therapy     2L NC AT NIGHT   • Short of breath on exertion        Procedures Performed           Consults:       Hospital Course  Patient is a 79 y.o. female presented with per Dr. Pereira's H&P:  Chief Complaint: Shortness of breath     History of Present Illness:  Josephine Wolf is a 79 y.o. female with history of COPD who presented to the emergency room with worsening shortness of breath of 24 hours duration.  Patient has chronic hypoxemia usually nocturnal on oxygen at night only with recent worsening dyspnea with exertion requiring oxygen supplementation at all times. this was  associated with nonproductive cough with chest tightness, the cough and the tightness were going on for few days.  There is no fever or chills or night sweats, denies any chest pain or hemoptysis  No GI symptoms like nausea vomiting or diarrhea  She did have some worsening lower extremity edema  On arrival her sats were 70% on room air consistent with acute hypoxemia, she was given nebulizer treatment however this was not enough, patient was working hard on the breathing and ABG showed hypercapnia so she was started on noninvasive positive pressure ventilation with the BiPAP.  Her chest x-ray was negative for any pulmonary infiltrate and the patient has no known risk factor or exposure to COVID 19 and she is not aware of any recent sick contact.  The  mentioned that she had a recent procedure with what he described as a kyphoplasty done    Patient was admitted with acute exacerbation of COPD with respiratory failure.  Had associated acute kidney injury on chronic kidney disease.  H&P notes describe sepsis but they did not find any clear evidence of infection and it was felt that this was not true sepsis.  Symptoms related to COPD exacerbation.  She has significantly improved and is eager to go home.  Has been followed by nephrology.  Acute kidney injury is improving.  If cleared by nephrology plan for discharge home today.    Condition on Discharge:  Stable.      Vital Signs  Temp:  [97.6 °F (36.4 °C)-98.1 °F (36.7 °C)] 97.6 °F (36.4 °C)  Heart Rate:  [58-83] 65  Resp:  [16-18] 18  BP: (131-142)/(63-87) 131/63    Physical Exam:  General Appearance: no acute distress, appears comfortable  Heart: regular rate and rhythm  Lungs: clear to auscultation bilaterally, respirations unlabored  Abdomen: soft, nontender, nondistended, no guarding/rebound, nl BS  Extremeties: no edema, no cyanosis  Neurology: speech normal, mental status normal, moving all four extremities  Mental Status: alert,  oriented        --  Consults:   IP CONSULT TO PULMONOLOGY  IP CONSULT TO NUTRITION SERVICES  IP CONSULT TO NEPHROLOGY    Significant Discharge Diagnostics   Procedures Performed:         Pertinent Lab Results:  Results from last 7 days   Lab Units 03/15/20  0719 03/14/20  0558 03/13/20  0324 03/12/20  1002   SODIUM mmol/L 143 138 136 138   POTASSIUM mmol/L 4.2 4.5 5.0 4.4   CHLORIDE mmol/L 106 103 96* 97*   CO2 mmol/L 24.9 22.2 24.3 26.9   BUN mg/dL 38* 50* 44* 38*   CREATININE mg/dL 1.44* 1.98* 3.16* 3.10*   GLUCOSE mg/dL 78 123* 168* 105*   CALCIUM mg/dL 8.8 8.7 8.1* 8.2*   AST (SGOT) U/L  --   --  23 27   ALT (SGPT) U/L  --   --  21 23     Results from last 7 days   Lab Units 03/12/20  1002   TROPONIN T ng/mL <0.010     Results from last 7 days   Lab Units 03/15/20  0719 03/14/20  0558 03/13/20  0324 03/12/20  1002   WBC 10*3/mm3 7.35 10.43 5.79 7.23   HEMOGLOBIN g/dL 9.5* 9.3* 9.7* 9.9*   HEMATOCRIT % 29.2* 28.0* 28.9* 30.0*   PLATELETS 10*3/mm3 204 194 179 193   MCV fL 98.0* 95.9 96.7 98.0*   MCH pg 31.9 31.8 32.4 32.4   MCHC g/dL 32.5 33.2 33.6 33.0   RDW % 14.1 14.2 13.6 13.9   RDW-SD fl 50.2 50.1 48.7 49.5   MPV fL 9.4 9.4 9.7 9.4   NEUTROPHIL % % 58.3 79.7*  --  53.9   LYMPHOCYTE % % 28.3 10.3*  --  30.6   MONOCYTES % % 8.8 7.8  --  11.2   EOSINOPHIL % % 0.4 0.0*  --  1.9   BASOPHIL % % 0.3 0.1  --  0.6   IMM GRAN % % 3.9* 2.1*  --  1.8*   NEUTROS ABS 10*3/mm3 4.28 8.32*  --  3.90   LYMPHS ABS 10*3/mm3 2.08 1.07  --  2.21   MONOS ABS 10*3/mm3 0.65 0.81  --  0.81   EOS ABS 10*3/mm3 0.03 0.00  --  0.14   BASOS ABS 10*3/mm3 0.02 0.01  --  0.04   IMMATURE GRANS (ABS) 10*3/mm3 0.29* 0.22*  --  0.13*   NRBC /100 WBC 0.0 0.0  --  0.0         Results from last 7 days   Lab Units 03/15/20  0719 03/14/20  0558   MAGNESIUM mg/dL 2.0 2.1           Invalid input(s): LDLCALC  Results from last 7 days   Lab Units 03/12/20  1002   PROBNP pg/mL 242.6         Results from last 7 days   Lab Units 03/13/20  0817   PH, ARTERIAL  pH units 7.340*   PO2 ART mm Hg 62.9*   PCO2, ARTERIAL mm Hg 52.3*   HCO3 ART mmol/L 28.3*     Results from last 7 days   Lab Units 03/13/20  0324 03/12/20  1002   PROCALCITONIN ng/mL 0.08* 0.12   LACTATE mmol/L  --  1.4             Results from last 7 days   Lab Units 03/12/20  2205   BLOODCX  No growth at 2 days  No growth at 2 days     Results from last 7 days   Lab Units 03/12/20  2225   NITRITE UA  Negative     Results from last 7 days   Lab Units 03/12/20  1003   ADENOVIRUS DETECTION BY PCR  Not Detected   CORONAVIRUS 229E  Not Detected   CORONAVIRUS HKU1  Not Detected   CORONAVIRUS NL63  Not Detected   CORONAVIRUS OC43  Not Detected   HUMAN METAPNEUMOVIRUS  Not Detected   HUMAN RHINOVIRUS/ENTEROVIRUS  Not Detected   INFLUENZA B PCR  Not Detected   PARAINFLUENZA 1  Not Detected   PARAINFLUENZA VIRUS 2  Not Detected   PARAINFLUENZA VIRUS 3  Not Detected   PARAINFLUENZA VIRUS 4  Not Detected   BORDETELLA PERTUSSIS PCR  Not Detected   CHLAMYDOPHILA PNEUMONIAE PCR  Not Detected   MYCOPLAMA PNEUMO PCR  Not Detected   INFLUENZA A PCR  Not Detected   INFLUENZA A H3  Not Detected   INFLUENZA A H1  Not Detected   RSV, PCR  Not Detected     Results from last 7 days   Lab Units 03/15/20  0719   URIC ACID mg/dL 10.0*       Imaging Results:  Imaging Results (All)     Procedure Component Value Units Date/Time    US Renal Bilateral [[id number]] Collected:  03/13/20 1835     Updated:  03/13/20 1839    Narrative:       US RENAL BILATERAL-     INDICATIONS: Acute kidney injury     TECHNIQUE: ULTRASOUND OF THE KIDNEYS AND URINARY BLADDER.     COMPARISON: 12/03/2017     FINDINGS:     The right kidney measures 8.8 cm centimeters, the left kidney measures  10.3 centimeters.     No renal lesion is identified. No hydronephrosis or echogenic  nephrolithiasis.     The urinary bladder is only partly filled, limiting its assessment. No  ureteral jets were observed during the exam. The urinary bladder  otherwise appears unremarkable.        Impression:       No hydronephrosis or echogenic nephrolithiasis.        This report was finalized on 3/13/2020 6:36 PM by Dr. Ross Phillips M.D.       XR Chest 1 View [[id number]] Collected:  03/13/20 0638     Updated:  03/13/20 0658    Narrative:       EXAMINATION: SINGLE VIEW CHEST RADIOGRAPH     HISTORY: 79-year-old female with a history of respiratory failure.     FINDINGS: A semierect AP chest radiograph was obtained. Comparison is  made to a chest radiograph dated 03/12/2020. The patient is slightly  rotated to the right. The lungs have improved aeration when compared to  the prior examination, and there has been a reduction in bibasilar  opacification. Mild bibasilar volume loss remains.       Impression:       Improved aeration of both lungs with persistent mild  bibasilar atelectasis versus pneumonia.     This report was finalized on 3/13/2020 6:55 AM by Dr. Valeriy Fournier M.D.       XR Chest 1 View [[id number]] Collected:  03/12/20 1206     Updated:  03/12/20 1211    Narrative:       XR CHEST 1 VW-     Clinical: Cough     COMPARISON 06/18/2019     FINDINGS: Very low lung volumes with linear opacities at both lung bases  suggesting likely discoid atelectasis. There could be some additional  superimposed infiltrate or consolidation along the left costophrenic  sulcus. There is crowding of the central bronchovascular markings  however no pulmonary edema. The cardiomediastinal silhouette is stable.  There is atherosclerotic calcification of the aorta. The remainder of  examination is unremarkable. PA and lateral view of the chest with  appropriate inspiratory effort may be helpful when patient's condition  permits.     This report was finalized on 3/12/2020 12:08 PM by Dr. Mynor Vera M.D.           --    Discharge Disposition  Home or Self Care    Discharge Medications     Discharge Medications      New Medications      Instructions Start Date   predniSONE 10 MG tablet  Commonly known as:   DELTASONE   Take 4 tabs daily x 3 days, then take 3 tabs daily x 3 days, then take 2 tabs daily x 3 days, then take 1 tab daily x 3 days         Changes to Medications      Instructions Start Date   irbesartan-hydrochlorothiazide 150-12.5 MG tablet  Commonly known as:  AVALIDE  What changed:    · how much to take  · when to take this   2 tablets, Oral, Daily         Continue These Medications      Instructions Start Date   amLODIPine 10 MG tablet  Commonly known as:  NORVASC   10 mg, Oral, Daily      DEPAKOTE  MG 24 hr tablet  Generic drug:  divalproex   250 mg, Oral, Daily      divalproex 250 MG 24 hr tablet  Commonly known as:  DEPAKOTE   500 mg, Oral, Every Evening      DULoxetine 60 MG capsule  Commonly known as:  CYMBALTA   60 mg, Oral, 2 Times Daily      fluticasone 50 MCG/ACT nasal spray  Commonly known as:  FLONASE   1 spray, Nasal, Daily      gabapentin 300 MG capsule  Commonly known as:  NEURONTIN   300 mg, Oral, 3 Times Daily      INCRUSE ELLIPTA 62.5 MCG/INH aerosol powder   Generic drug:  Umeclidinium Bromide   1 puff, Inhalation, Daily      levothyroxine 88 MCG tablet  Commonly known as:  SYNTHROID, LEVOTHROID   88 mcg, Oral, Every Morning Before Breakfast      melatonin 1 MG tablet   3 mg, Oral, Nightly      vitamin B-12 1000 MCG tablet  Commonly known as:  CYANOCOBALAMIN   1,000 mcg, Oral, 2 Times Daily         Stop These Medications    dicyclomine 10 MG capsule  Commonly known as:  BENTYL     febuxostat 40 MG tablet  Commonly known as:  ULORIC     HYDROcodone-acetaminophen 7.5-325 MG per tablet  Commonly known as:  NORCO     OLANZapine 5 MG tablet  Commonly known as:  zyPREXA     traZODone 100 MG tablet  Commonly known as:  DESYREL            Discharge Diet: regular diet    Activity at Discharge:     Follow-up Information     Bill Martino MD .    Specialty:  Internal Medicine  Contact information:  [address] [place] [address]  [phone]                 See  primary care provider, Bill Martino MD, in 1-2 weeks        Test Results Pending at Discharge   Order Current Status    Immunofixation, Serum In process    Immunofixation, Urine - Urine, Clean Catch In process    Blood Culture - Blood, Arm, Left Preliminary result    Blood Culture - Blood, Arm, Right Preliminary result           Car Amato MD  [facility], [facility]  Pulmonary and Critical Care Medicine  03/15/20  10:07    Time: greater than 30 minutes.        Total time spent discharging patient including evaluation, post hospitalization follow up, medication and post hospitalization instructions and education total time exceeds 30 minutes.      Electronically signed by Car Amato MD at 03/15/20 1658

## 2020-03-17 ENCOUNTER — READMISSION MANAGEMENT (OUTPATIENT)
Dept: CALL CENTER | Facility: HOSPITAL | Age: 80
End: 2020-03-17

## 2020-03-17 LAB
BACTERIA SPEC AEROBE CULT: NORMAL
BACTERIA SPEC AEROBE CULT: NORMAL
INTERPRETATION UR IFE-IMP: NORMAL

## 2020-03-17 NOTE — OUTREACH NOTE
COPD/PN Week 1 Survey      Responses   [facility] patient discharged from?  [facility]   Does the patient have one of the following disease processes/diagnoses(primary or secondary)?  COPD/Pneumonia   Is there a successful TCM telephone encounter documented?  No   Was the primary reason for admission:  COPD exacerbation   Week 1 attempt successful?  Yes   Call start time  1712   Call end time  1714   Discharge diagnosis  COPD,  CHRIS   Meds reviewed with patient/caregiver?  Yes   Is the patient having any side effects they believe may be caused by any medication additions or changes?  No   Does the patient have all medications ordered at discharge?  Yes   Is the patient taking all medications as directed (includes completed medication regime)?  Yes   Does the patient have a primary care provider?   Yes   Does the patient have an appointment with their PCP or pulmonologist within 7 days of discharge?  No   What is preventing the patient from scheduling follow up appointments within 7 days of discharge?  Haven't had time   Nursing Interventions  Advised patient to make appointment   Has the patient kept scheduled appointments due by today?  N/A   What DME was ordered?  [employer]   Has all DME been delivered?  Yes   Psychosocial issues?  No   Did the patient receive a copy of their discharge instructions?  Yes   Nursing interventions  Reviewed instructions with patient   What is the patient's perception of their health status since discharge?  Improving   Nursing Interventions  Nurse provided patient education   Are the patient's immunizations up to date?   Yes   Nursing interventions  Educated on importance of maintaining up to date immunizations as advised by provider   Is the patient/caregiver able to teach back the hierarchy of who to call/visit for symptoms/problems? PCP, Specialist, Home health nurse, Urgent Care, ED, 911  Yes   Is the patient able to teach back COPD zones?  Yes   Nursing interventions   Education provided on various zones   Patient reports what zone on this call?  Green Zone   Green Zone  Reports doing well, Breathing without shortness of breath, Usual activity and exercise level, Usual amount of phlegm/mucus without difficulty coughing up, Sleeping well, Appetite is good   Green Zone interventions:  Take daily medications, Avoid indoor/outdoor triggers, Use oxygen as prescribed   Week 1 call completed?  Yes          Efrain Hoang RN

## 2020-03-25 ENCOUNTER — READMISSION MANAGEMENT (OUTPATIENT)
Dept: CALL CENTER | Facility: HOSPITAL | Age: 80
End: 2020-03-25

## 2020-03-25 NOTE — OUTREACH NOTE
COPD/PN Week 2 Survey      Responses   [facility] patient discharged from?  [place]   Does the patient have one of the following disease processes/diagnoses(primary or secondary)?  COPD/Pneumonia   Was the primary reason for admission:  COPD exacerbation   Week 2 attempt successful?  No   Unsuccessful attempts  Attempt 1 [NO ANSWER, NO VM LEFT]          Abi Alcocer LPN

## 2020-03-30 ENCOUNTER — READMISSION MANAGEMENT (OUTPATIENT)
Dept: CALL CENTER | Facility: HOSPITAL | Age: 80
End: 2020-03-30

## 2020-03-30 NOTE — OUTREACH NOTE
COPD/PN Week 2 Survey      Responses   [facility] patient discharged from?  [place]   Does the patient have one of the following disease processes/diagnoses(primary or secondary)?  COPD/Pneumonia   Was the primary reason for admission:  COPD exacerbation   Week 2 attempt successful?  No   Unsuccessful attempts  Attempt 2          Isabela Jernigan LPN

## 2020-04-07 ENCOUNTER — READMISSION MANAGEMENT (OUTPATIENT)
Dept: CALL CENTER | Facility: HOSPITAL | Age: 80
End: 2020-04-07

## 2020-04-07 NOTE — OUTREACH NOTE
COPD/PN Week 3 Survey      Responses   [facility] patient discharged from?  [place]   COVID-19 Test Status  Not tested   Does the patient have one of the following disease processes/diagnoses(primary or secondary)?  COPD/Pneumonia   Was the primary reason for admission:  COPD exacerbation   Week 3 attempt successful?  No   Unsuccessful attempts  Attempt 1          Isabela Jernigan LPN

## 2020-04-08 ENCOUNTER — READMISSION MANAGEMENT (OUTPATIENT)
Dept: CALL CENTER | Facility: HOSPITAL | Age: 80
End: 2020-04-08

## 2020-04-08 NOTE — OUTREACH NOTE
COPD/PN Week 3 Survey      Responses   [facility] patient discharged from?  [place]   COVID-19 Test Status  Not tested   Does the patient have one of the following disease processes/diagnoses(primary or secondary)?  COPD/Pneumonia   Was the primary reason for admission:  COPD exacerbation   Week 3 attempt successful?  Yes   Call start time  1729   Call end time  1731   Discharge diagnosis  COPD,  CHRIS   Meds reviewed with patient/caregiver?  Yes   Is the patient taking all medications as directed (includes completed medication regime)?  Yes   Has the patient kept scheduled appointments due by today?  Yes   DME comments  Pt wears O2 all the time   What is the patient's perception of their health status since discharge?  Improving   If the patient is a current smoker, are they able to teach back resources for cessation?  -- [Nonsmoker]   Is the patient able to teach back COPD zones?  Yes   Nursing interventions  Education provided on various zones   Patient reports what zone on this call?  Green Zone   Green Zone  Reports doing well, Usual amount of phlegm/mucus without difficulty coughing up, Breathing without shortness of breath   Green Zone interventions:  Take daily medications, Avoid indoor/outdoor triggers, Do not smoke   Week 3 call completed?  Yes   Revoked  No further contact(revokes)-requires comment   Graduated/Revoked comments  Pt thanked me for the calls however she doesn't need another call.          Nelsy Brooks RN

## 2020-05-20 ENCOUNTER — HOSPITAL ENCOUNTER (INPATIENT)
Facility: HOSPITAL | Age: 80
LOS: 5 days | Discharge: HOME | End: 2020-05-25
Attending: EMERGENCY MEDICINE | Admitting: INTERNAL MEDICINE

## 2020-05-20 ENCOUNTER — APPOINTMENT (OUTPATIENT)
Dept: GENERAL RADIOLOGY | Facility: HOSPITAL | Age: 80
End: 2020-05-20

## 2020-05-20 DIAGNOSIS — J96.11 CHRONIC RESPIRATORY FAILURE WITH HYPOXIA (HCC): ICD-10-CM

## 2020-05-20 DIAGNOSIS — J96.21 ACUTE ON CHRONIC RESPIRATORY FAILURE WITH HYPOXIA (HCC): Primary | ICD-10-CM

## 2020-05-20 DIAGNOSIS — M17.9 OSTEOARTHRITIS OF KNEE, UNSPECIFIED LATERALITY, UNSPECIFIED OSTEOARTHRITIS TYPE: ICD-10-CM

## 2020-05-20 DIAGNOSIS — E87.79 OTHER HYPERVOLEMIA: ICD-10-CM

## 2020-05-20 DIAGNOSIS — Z20.822 SUSPECTED COVID-19 VIRUS INFECTION: ICD-10-CM

## 2020-05-20 DIAGNOSIS — N17.9 AKI (ACUTE KIDNEY INJURY) (HCC): ICD-10-CM

## 2020-05-20 DIAGNOSIS — E66.9 OBESITY (BMI 30-39.9): ICD-10-CM

## 2020-05-20 DIAGNOSIS — D64.9 ANEMIA, UNSPECIFIED TYPE: ICD-10-CM

## 2020-05-20 LAB
ABO GROUP BLD: NORMAL
ALBUMIN SERPL-MCNC: 3.1 G/DL (ref 3.5–5.2)
ALBUMIN/GLOB SERPL: 1 G/DL
ALP SERPL-CCNC: 51 U/L (ref 39–117)
ALT SERPL W P-5'-P-CCNC: 6 U/L (ref 1–33)
ANION GAP SERPL CALCULATED.3IONS-SCNC: 9.1 MMOL/L (ref 5–15)
ARTERIAL PATENCY WRIST A: POSITIVE
AST SERPL-CCNC: 8 U/L (ref 1–32)
ATMOSPHERIC PRESS: 750.3 MMHG
B PARAPERT DNA SPEC QL NAA+PROBE: NOT DETECTED
B PERT DNA SPEC QL NAA+PROBE: NOT DETECTED
BASE EXCESS BLDA CALC-SCNC: 3.7 MMOL/L (ref 0–2)
BASOPHILS # BLD AUTO: 0.02 10*3/MM3 (ref 0–0.2)
BASOPHILS NFR BLD AUTO: 0.3 % (ref 0–1.5)
BDY SITE: ABNORMAL
BILIRUB SERPL-MCNC: 0.3 MG/DL (ref 0.2–1.2)
BLD GP AB SCN SERPL QL: NEGATIVE
BUN BLD-MCNC: 47 MG/DL (ref 8–23)
BUN/CREAT SERPL: 16.6 (ref 7–25)
C PNEUM DNA NPH QL NAA+NON-PROBE: NOT DETECTED
CALCIUM SPEC-SCNC: 8.9 MG/DL (ref 8.6–10.5)
CHLORIDE SERPL-SCNC: 99 MMOL/L (ref 98–107)
CO2 SERPL-SCNC: 30.9 MMOL/L (ref 22–29)
CREAT BLD-MCNC: 2.83 MG/DL (ref 0.57–1)
D-LACTATE SERPL-SCNC: 0.7 MMOL/L (ref 0.5–2)
DEPRECATED RDW RBC AUTO: 49.1 FL (ref 37–54)
EOSINOPHIL # BLD AUTO: 0.04 10*3/MM3 (ref 0–0.4)
EOSINOPHIL NFR BLD AUTO: 0.5 % (ref 0.3–6.2)
ERYTHROCYTE [DISTWIDTH] IN BLOOD BY AUTOMATED COUNT: 14.1 % (ref 12.3–15.4)
FLUAV H1 2009 PAND RNA NPH QL NAA+PROBE: NOT DETECTED
FLUAV H1 HA GENE NPH QL NAA+PROBE: NOT DETECTED
FLUAV H3 RNA NPH QL NAA+PROBE: NOT DETECTED
FLUAV SUBTYP SPEC NAA+PROBE: NOT DETECTED
FLUBV RNA ISLT QL NAA+PROBE: NOT DETECTED
GAS FLOW AIRWAY: 4 LPM
GFR SERPL CREATININE-BSD FRML MDRD: 16 ML/MIN/1.73
GLOBULIN UR ELPH-MCNC: 3.1 GM/DL
GLUCOSE BLD-MCNC: 112 MG/DL (ref 65–99)
HADV DNA SPEC NAA+PROBE: NOT DETECTED
HCO3 BLDA-SCNC: 28.8 MMOL/L (ref 22–28)
HCOV 229E RNA SPEC QL NAA+PROBE: NOT DETECTED
HCOV HKU1 RNA SPEC QL NAA+PROBE: NOT DETECTED
HCOV NL63 RNA SPEC QL NAA+PROBE: NOT DETECTED
HCOV OC43 RNA SPEC QL NAA+PROBE: NOT DETECTED
HCT VFR BLD AUTO: 23 % (ref 34–46.6)
HGB BLD-MCNC: 7.9 G/DL (ref 12–15.9)
HMPV RNA NPH QL NAA+NON-PROBE: NOT DETECTED
HPIV1 RNA SPEC QL NAA+PROBE: NOT DETECTED
HPIV2 RNA SPEC QL NAA+PROBE: NOT DETECTED
HPIV3 RNA NPH QL NAA+PROBE: NOT DETECTED
HPIV4 P GENE NPH QL NAA+PROBE: NOT DETECTED
IMM GRANULOCYTES # BLD AUTO: 0.09 10*3/MM3 (ref 0–0.05)
IMM GRANULOCYTES NFR BLD AUTO: 1.2 % (ref 0–0.5)
LDH SERPL-CCNC: 148 U/L (ref 135–214)
LYMPHOCYTES # BLD AUTO: 1.29 10*3/MM3 (ref 0.7–3.1)
LYMPHOCYTES NFR BLD AUTO: 17.5 % (ref 19.6–45.3)
M PNEUMO IGG SER IA-ACNC: NOT DETECTED
MCH RBC QN AUTO: 33.1 PG (ref 26.6–33)
MCHC RBC AUTO-ENTMCNC: 34.3 G/DL (ref 31.5–35.7)
MCV RBC AUTO: 96.2 FL (ref 79–97)
MODALITY: ABNORMAL
MONOCYTES # BLD AUTO: 1.4 10*3/MM3 (ref 0.1–0.9)
MONOCYTES NFR BLD AUTO: 19 % (ref 5–12)
NEUTROPHILS # BLD AUTO: 4.54 10*3/MM3 (ref 1.7–7)
NEUTROPHILS NFR BLD AUTO: 61.5 % (ref 42.7–76)
NRBC BLD AUTO-RTO: 0 /100 WBC (ref 0–0.2)
PCO2 BLDA: 46 MM HG (ref 35–45)
PH BLDA: 7.41 PH UNITS (ref 7.35–7.45)
PLATELET # BLD AUTO: 145 10*3/MM3 (ref 140–450)
PMV BLD AUTO: 9.7 FL (ref 6–12)
PO2 BLDA: 73 MM HG (ref 80–100)
POTASSIUM BLD-SCNC: 3.5 MMOL/L (ref 3.5–5.2)
PROCALCITONIN SERPL-MCNC: 0.25 NG/ML (ref 0.1–0.25)
PROT SERPL-MCNC: 6.2 G/DL (ref 6–8.5)
RBC # BLD AUTO: 2.39 10*6/MM3 (ref 3.77–5.28)
RH BLD: POSITIVE
RHINOVIRUS RNA SPEC NAA+PROBE: NOT DETECTED
RSV RNA NPH QL NAA+NON-PROBE: NOT DETECTED
SAO2 % BLDCOA: 94.4 % (ref 92–99)
SARS-COV-2 RNA RESP QL NAA+PROBE: NOT DETECTED
SODIUM BLD-SCNC: 139 MMOL/L (ref 136–145)
T&S EXPIRATION DATE: NORMAL
TOTAL RATE: 20 BREATHS/MINUTE
WBC NRBC COR # BLD: 7.38 10*3/MM3 (ref 3.4–10.8)

## 2020-05-20 PROCEDURE — 81003 URINALYSIS AUTO W/O SCOPE: CPT | Performed by: INTERNAL MEDICINE

## 2020-05-20 PROCEDURE — 85025 COMPLETE CBC W/AUTO DIFF WBC: CPT | Performed by: EMERGENCY MEDICINE

## 2020-05-20 PROCEDURE — 93005 ELECTROCARDIOGRAM TRACING: CPT | Performed by: EMERGENCY MEDICINE

## 2020-05-20 PROCEDURE — 93005 ELECTROCARDIOGRAM TRACING: CPT | Performed by: INTERNAL MEDICINE

## 2020-05-20 PROCEDURE — 86850 RBC ANTIBODY SCREEN: CPT | Performed by: EMERGENCY MEDICINE

## 2020-05-20 PROCEDURE — 36415 COLL VENOUS BLD VENIPUNCTURE: CPT | Performed by: EMERGENCY MEDICINE

## 2020-05-20 PROCEDURE — 87040 BLOOD CULTURE FOR BACTERIA: CPT | Performed by: EMERGENCY MEDICINE

## 2020-05-20 PROCEDURE — 36600 WITHDRAWAL OF ARTERIAL BLOOD: CPT

## 2020-05-20 PROCEDURE — 93010 ELECTROCARDIOGRAM REPORT: CPT | Performed by: INTERNAL MEDICINE

## 2020-05-20 PROCEDURE — 99285 EMERGENCY DEPT VISIT HI MDM: CPT

## 2020-05-20 PROCEDURE — 25010000002 ENOXAPARIN PER 10 MG: Performed by: INTERNAL MEDICINE

## 2020-05-20 PROCEDURE — 82803 BLOOD GASES ANY COMBINATION: CPT

## 2020-05-20 PROCEDURE — 71045 X-RAY EXAM CHEST 1 VIEW: CPT

## 2020-05-20 PROCEDURE — 94799 UNLISTED PULMONARY SVC/PX: CPT

## 2020-05-20 PROCEDURE — 87635 SARS-COV-2 COVID-19 AMP PRB: CPT | Performed by: EMERGENCY MEDICINE

## 2020-05-20 PROCEDURE — 0100U HC BIOFIRE FILMARRAY RESP PANEL 2: CPT | Performed by: EMERGENCY MEDICINE

## 2020-05-20 PROCEDURE — 86901 BLOOD TYPING SEROLOGIC RH(D): CPT | Performed by: EMERGENCY MEDICINE

## 2020-05-20 PROCEDURE — 84145 PROCALCITONIN (PCT): CPT | Performed by: EMERGENCY MEDICINE

## 2020-05-20 PROCEDURE — 86900 BLOOD TYPING SEROLOGIC ABO: CPT | Performed by: EMERGENCY MEDICINE

## 2020-05-20 PROCEDURE — 83615 LACTATE (LD) (LDH) ENZYME: CPT | Performed by: EMERGENCY MEDICINE

## 2020-05-20 PROCEDURE — 25010000002 CEFTRIAXONE PER 250 MG: Performed by: EMERGENCY MEDICINE

## 2020-05-20 PROCEDURE — 83605 ASSAY OF LACTIC ACID: CPT | Performed by: EMERGENCY MEDICINE

## 2020-05-20 PROCEDURE — 80053 COMPREHEN METABOLIC PANEL: CPT | Performed by: EMERGENCY MEDICINE

## 2020-05-20 RX ORDER — CEFTRIAXONE SODIUM 1 G/50ML
1 INJECTION, SOLUTION INTRAVENOUS EVERY 24 HOURS
Status: DISCONTINUED | OUTPATIENT
Start: 2020-05-21 | End: 2020-05-21

## 2020-05-20 RX ORDER — SODIUM CHLORIDE 0.9 % (FLUSH) 0.9 %
10 SYRINGE (ML) INJECTION AS NEEDED
Status: DISCONTINUED | OUTPATIENT
Start: 2020-05-20 | End: 2020-05-25 | Stop reason: HOSPADM

## 2020-05-20 RX ORDER — NITROGLYCERIN 0.4 MG/1
0.4 TABLET SUBLINGUAL
Status: DISCONTINUED | OUTPATIENT
Start: 2020-05-20 | End: 2020-05-25 | Stop reason: HOSPADM

## 2020-05-20 RX ORDER — DIVALPROEX SODIUM 250 MG/1
250 TABLET, EXTENDED RELEASE ORAL DAILY
Status: DISCONTINUED | OUTPATIENT
Start: 2020-05-21 | End: 2020-05-25 | Stop reason: HOSPADM

## 2020-05-20 RX ORDER — DULOXETIN HYDROCHLORIDE 20 MG/1
20 CAPSULE, DELAYED RELEASE ORAL DAILY
Status: DISCONTINUED | OUTPATIENT
Start: 2020-05-22 | End: 2020-05-25 | Stop reason: HOSPADM

## 2020-05-20 RX ORDER — SODIUM CHLORIDE 0.9 % (FLUSH) 0.9 %
10 SYRINGE (ML) INJECTION EVERY 12 HOURS SCHEDULED
Status: DISCONTINUED | OUTPATIENT
Start: 2020-05-20 | End: 2020-05-25 | Stop reason: HOSPADM

## 2020-05-20 RX ORDER — ACETAMINOPHEN 160 MG/5ML
650 SOLUTION ORAL EVERY 4 HOURS PRN
Status: DISCONTINUED | OUTPATIENT
Start: 2020-05-20 | End: 2020-05-25 | Stop reason: HOSPADM

## 2020-05-20 RX ORDER — ACETAMINOPHEN 325 MG/1
650 TABLET ORAL EVERY 4 HOURS PRN
Status: DISCONTINUED | OUTPATIENT
Start: 2020-05-20 | End: 2020-05-25 | Stop reason: HOSPADM

## 2020-05-20 RX ORDER — DULOXETIN HYDROCHLORIDE 60 MG/1
60 CAPSULE, DELAYED RELEASE ORAL 2 TIMES DAILY
Status: DISCONTINUED | OUTPATIENT
Start: 2020-05-20 | End: 2020-05-20

## 2020-05-20 RX ORDER — LEVOTHYROXINE SODIUM 88 UG/1
88 TABLET ORAL
Status: DISCONTINUED | OUTPATIENT
Start: 2020-05-21 | End: 2020-05-25 | Stop reason: HOSPADM

## 2020-05-20 RX ORDER — ACETAMINOPHEN 650 MG/1
650 SUPPOSITORY RECTAL EVERY 4 HOURS PRN
Status: DISCONTINUED | OUTPATIENT
Start: 2020-05-20 | End: 2020-05-25 | Stop reason: HOSPADM

## 2020-05-20 RX ORDER — ALBUTEROL SULFATE 2.5 MG/3ML
2.5 SOLUTION RESPIRATORY (INHALATION) EVERY 6 HOURS PRN
Status: DISCONTINUED | OUTPATIENT
Start: 2020-05-20 | End: 2020-05-25 | Stop reason: HOSPADM

## 2020-05-20 RX ORDER — GABAPENTIN 300 MG/1
300 CAPSULE ORAL 3 TIMES DAILY
Status: DISCONTINUED | OUTPATIENT
Start: 2020-05-20 | End: 2020-05-20

## 2020-05-20 RX ORDER — GABAPENTIN 100 MG/1
100 CAPSULE ORAL 3 TIMES DAILY
Status: DISCONTINUED | OUTPATIENT
Start: 2020-05-20 | End: 2020-05-25 | Stop reason: HOSPADM

## 2020-05-20 RX ORDER — DIVALPROEX SODIUM 500 MG/1
500 TABLET, EXTENDED RELEASE ORAL EVERY EVENING
Status: DISCONTINUED | OUTPATIENT
Start: 2020-05-20 | End: 2020-05-25 | Stop reason: HOSPADM

## 2020-05-20 RX ORDER — CEFTRIAXONE SODIUM 1 G/50ML
1 INJECTION, SOLUTION INTRAVENOUS ONCE
Status: COMPLETED | OUTPATIENT
Start: 2020-05-20 | End: 2020-05-20

## 2020-05-20 RX ORDER — BISACODYL 5 MG/1
5 TABLET, DELAYED RELEASE ORAL DAILY PRN
Status: DISCONTINUED | OUTPATIENT
Start: 2020-05-20 | End: 2020-05-25 | Stop reason: HOSPADM

## 2020-05-20 RX ORDER — AMLODIPINE BESYLATE 10 MG/1
10 TABLET ORAL DAILY
Status: DISCONTINUED | OUTPATIENT
Start: 2020-05-21 | End: 2020-05-21

## 2020-05-20 RX ORDER — BISACODYL 10 MG
10 SUPPOSITORY, RECTAL RECTAL DAILY PRN
Status: DISCONTINUED | OUTPATIENT
Start: 2020-05-20 | End: 2020-05-25 | Stop reason: HOSPADM

## 2020-05-20 RX ORDER — ACETAMINOPHEN 500 MG
1000 TABLET ORAL ONCE
Status: COMPLETED | OUTPATIENT
Start: 2020-05-20 | End: 2020-05-20

## 2020-05-20 RX ORDER — UREA 10 %
3 LOTION (ML) TOPICAL NIGHTLY
Status: DISCONTINUED | OUTPATIENT
Start: 2020-05-20 | End: 2020-05-25 | Stop reason: HOSPADM

## 2020-05-20 RX ORDER — ONDANSETRON 2 MG/ML
4 INJECTION INTRAMUSCULAR; INTRAVENOUS EVERY 6 HOURS PRN
Status: DISCONTINUED | OUTPATIENT
Start: 2020-05-20 | End: 2020-05-25 | Stop reason: HOSPADM

## 2020-05-20 RX ORDER — FLUTICASONE PROPIONATE 50 MCG
1 SPRAY, SUSPENSION (ML) NASAL DAILY
Status: DISCONTINUED | OUTPATIENT
Start: 2020-05-21 | End: 2020-05-25 | Stop reason: HOSPADM

## 2020-05-20 RX ORDER — BUDESONIDE AND FORMOTEROL FUMARATE DIHYDRATE 160; 4.5 UG/1; UG/1
2 AEROSOL RESPIRATORY (INHALATION)
Status: DISCONTINUED | OUTPATIENT
Start: 2020-05-20 | End: 2020-05-25 | Stop reason: HOSPADM

## 2020-05-20 RX ORDER — ONDANSETRON 4 MG/1
4 TABLET, FILM COATED ORAL EVERY 6 HOURS PRN
Status: DISCONTINUED | OUTPATIENT
Start: 2020-05-20 | End: 2020-05-25 | Stop reason: HOSPADM

## 2020-05-20 RX ADMIN — Medication 3 MG: at 22:12

## 2020-05-20 RX ADMIN — ENOXAPARIN SODIUM 30 MG: 30 INJECTION SUBCUTANEOUS at 17:51

## 2020-05-20 RX ADMIN — CEFTRIAXONE SODIUM 1 G: 1 INJECTION, SOLUTION INTRAVENOUS at 10:07

## 2020-05-20 RX ADMIN — GABAPENTIN 100 MG: 100 CAPSULE ORAL at 22:12

## 2020-05-20 RX ADMIN — ACETAMINOPHEN 1000 MG: 500 TABLET, FILM COATED ORAL at 09:58

## 2020-05-20 RX ADMIN — SODIUM CHLORIDE, PRESERVATIVE FREE 10 ML: 5 INJECTION INTRAVENOUS at 22:12

## 2020-05-20 RX ADMIN — DIVALPROEX SODIUM 500 MG: 500 TABLET, EXTENDED RELEASE ORAL at 18:39

## 2020-05-21 ENCOUNTER — APPOINTMENT (OUTPATIENT)
Dept: CT IMAGING | Facility: HOSPITAL | Age: 80
End: 2020-05-21

## 2020-05-21 ENCOUNTER — APPOINTMENT (OUTPATIENT)
Dept: CARDIOLOGY | Facility: HOSPITAL | Age: 80
End: 2020-05-21

## 2020-05-21 LAB
ANION GAP SERPL CALCULATED.3IONS-SCNC: 11.5 MMOL/L (ref 5–15)
AORTIC DIMENSIONLESS INDEX: 0.7 (DI)
BH CV ECHO MEAS - ACS: 1.5 CM
BH CV ECHO MEAS - AO MAX PG: 34 MMHG
BH CV ECHO MEAS - AO MEAN PG (FULL): 9 MMHG
BH CV ECHO MEAS - AO MEAN PG: 16 MMHG
BH CV ECHO MEAS - AO V2 MAX: 292 CM/SEC
BH CV ECHO MEAS - AO V2 MEAN: 186 CM/SEC
BH CV ECHO MEAS - AO V2 VTI: 51.2 CM
BH CV ECHO MEAS - AVA(I,A): 2.1 CM^2
BH CV ECHO MEAS - AVA(I,D): 2.1 CM^2
BH CV ECHO MEAS - BSA(HAYCOCK): 1.9 M^2
BH CV ECHO MEAS - BSA: 1.8 M^2
BH CV ECHO MEAS - BZI_BMI: 33.1 KILOGRAMS/M^2
BH CV ECHO MEAS - BZI_METRIC_HEIGHT: 157.5 CM
BH CV ECHO MEAS - BZI_METRIC_WEIGHT: 82.1 KG
BH CV ECHO MEAS - EDV(CUBED): 103.8 ML
BH CV ECHO MEAS - EDV(MOD-SP2): 50 ML
BH CV ECHO MEAS - EDV(MOD-SP4): 46 ML
BH CV ECHO MEAS - EDV(TEICH): 102.4 ML
BH CV ECHO MEAS - EF(CUBED): 71.3 %
BH CV ECHO MEAS - EF(MOD-BP): 67 %
BH CV ECHO MEAS - EF(MOD-SP2): 68 %
BH CV ECHO MEAS - EF(MOD-SP4): 65.2 %
BH CV ECHO MEAS - EF(TEICH): 63 %
BH CV ECHO MEAS - ESV(CUBED): 29.8 ML
BH CV ECHO MEAS - ESV(MOD-SP2): 16 ML
BH CV ECHO MEAS - ESV(MOD-SP4): 16 ML
BH CV ECHO MEAS - ESV(TEICH): 37.9 ML
BH CV ECHO MEAS - FS: 34 %
BH CV ECHO MEAS - IVS/LVPW: 1
BH CV ECHO MEAS - IVSD: 1 CM
BH CV ECHO MEAS - LAT PEAK E' VEL: 12 CM/SEC
BH CV ECHO MEAS - LV DIASTOLIC VOL/BSA (35-75): 25.1 ML/M^2
BH CV ECHO MEAS - LV MASS(C)D: 164.5 GRAMS
BH CV ECHO MEAS - LV MASS(C)DI: 89.8 GRAMS/M^2
BH CV ECHO MEAS - LV MAX PG: 13 MMHG
BH CV ECHO MEAS - LV MEAN PG: 7 MMHG
BH CV ECHO MEAS - LV SYSTOLIC VOL/BSA (12-30): 8.7 ML/M^2
BH CV ECHO MEAS - LV V1 MAX: 182 CM/SEC
BH CV ECHO MEAS - LV V1 MEAN: 126 CM/SEC
BH CV ECHO MEAS - LV V1 VTI: 37.5 CM
BH CV ECHO MEAS - LVIDD: 4.7 CM
BH CV ECHO MEAS - LVIDS: 3.1 CM
BH CV ECHO MEAS - LVLD AP2: 6.3 CM
BH CV ECHO MEAS - LVLD AP4: 6.3 CM
BH CV ECHO MEAS - LVLS AP2: 5.4 CM
BH CV ECHO MEAS - LVLS AP4: 5.2 CM
BH CV ECHO MEAS - LVOT AREA (M): 2.8 CM^2
BH CV ECHO MEAS - LVOT AREA: 2.8 CM^2
BH CV ECHO MEAS - LVOT DIAM: 1.9 CM
BH CV ECHO MEAS - LVPWD: 1 CM
BH CV ECHO MEAS - MED PEAK E' VEL: 10 CM/SEC
BH CV ECHO MEAS - MV A DUR: 0.08 SEC
BH CV ECHO MEAS - MV A MAX VEL: 112 CM/SEC
BH CV ECHO MEAS - MV DEC SLOPE: 331 CM/SEC^2
BH CV ECHO MEAS - MV DEC TIME: 210 SEC
BH CV ECHO MEAS - MV E MAX VEL: 85.7 CM/SEC
BH CV ECHO MEAS - MV E/A: 0.77
BH CV ECHO MEAS - MV MEAN PG: 3 MMHG
BH CV ECHO MEAS - MV P1/2T MAX VEL: 102 CM/SEC
BH CV ECHO MEAS - MV P1/2T: 90.3 MSEC
BH CV ECHO MEAS - MV V2 MEAN: 75.9 CM/SEC
BH CV ECHO MEAS - MV V2 VTI: 30.7 CM
BH CV ECHO MEAS - MVA P1/2T LCG: 2.2 CM^2
BH CV ECHO MEAS - MVA(P1/2T): 2.4 CM^2
BH CV ECHO MEAS - MVA(VTI): 3.5 CM^2
BH CV ECHO MEAS - PA ACC SLOPE: 1145 CM/SEC^2
BH CV ECHO MEAS - PA ACC TIME: 0.12 SEC
BH CV ECHO MEAS - PA MAX PG: 6.3 MMHG
BH CV ECHO MEAS - PA PR(ACCEL): 26.8 MMHG
BH CV ECHO MEAS - PA V2 MAX: 125 CM/SEC
BH CV ECHO MEAS - QP/QS: 0.58
BH CV ECHO MEAS - RAP SYSTOLE: 8 MMHG
BH CV ECHO MEAS - RV MEAN PG: 2 MMHG
BH CV ECHO MEAS - RV V1 MEAN: 55.9 CM/SEC
BH CV ECHO MEAS - RV V1 VTI: 16.1 CM
BH CV ECHO MEAS - RVOT AREA: 3.8 CM^2
BH CV ECHO MEAS - RVOT DIAM: 2.2 CM
BH CV ECHO MEAS - RVSP: 45.7 MMHG
BH CV ECHO MEAS - SI(CUBED): 40.4 ML/M^2
BH CV ECHO MEAS - SI(LVOT): 58 ML/M^2
BH CV ECHO MEAS - SI(MOD-SP2): 18.6 ML/M^2
BH CV ECHO MEAS - SI(MOD-SP4): 16.4 ML/M^2
BH CV ECHO MEAS - SI(TEICH): 35.2 ML/M^2
BH CV ECHO MEAS - SV(CUBED): 74 ML
BH CV ECHO MEAS - SV(LVOT): 106.3 ML
BH CV ECHO MEAS - SV(MOD-SP2): 34 ML
BH CV ECHO MEAS - SV(MOD-SP4): 30 ML
BH CV ECHO MEAS - SV(RVOT): 61.2 ML
BH CV ECHO MEAS - SV(TEICH): 64.4 ML
BH CV ECHO MEAS - TAPSE (>1.6): 2 CM2
BH CV ECHO MEAS - TR MAX VEL: 307 CM/SEC
BH CV ECHO MEASUREMENTS AVERAGE E/E' RATIO: 7.79
BH CV VAS BP RIGHT ARM: NORMAL MMHG
BH CV XLRA - RV BASE: 3 CM
BH CV XLRA - RV LENGTH: 6 CM
BH CV XLRA - RV MID: 2 CM
BH CV XLRA - TDI S': 13 CM/SEC
BILIRUB UR QL STRIP: NEGATIVE
BUN BLD-MCNC: 44 MG/DL (ref 8–23)
BUN/CREAT SERPL: 21.8 (ref 7–25)
CALCIUM SPEC-SCNC: 8.6 MG/DL (ref 8.6–10.5)
CHLORIDE SERPL-SCNC: 100 MMOL/L (ref 98–107)
CLARITY UR: CLEAR
CO2 SERPL-SCNC: 27.5 MMOL/L (ref 22–29)
COLOR UR: YELLOW
CREAT BLD-MCNC: 2.02 MG/DL (ref 0.57–1)
DEPRECATED RDW RBC AUTO: 47.9 FL (ref 37–54)
ERYTHROCYTE [DISTWIDTH] IN BLOOD BY AUTOMATED COUNT: 14 % (ref 12.3–15.4)
GFR SERPL CREATININE-BSD FRML MDRD: 24 ML/MIN/1.73
GLUCOSE BLD-MCNC: 112 MG/DL (ref 65–99)
GLUCOSE UR STRIP-MCNC: NEGATIVE MG/DL
HCT VFR BLD AUTO: 21.8 % (ref 34–46.6)
HGB BLD-MCNC: 7.3 G/DL (ref 12–15.9)
HGB UR QL STRIP.AUTO: NEGATIVE
KETONES UR QL STRIP: NEGATIVE
LEFT ATRIUM VOLUME INDEX: 29 ML/M2
LEUKOCYTE ESTERASE UR QL STRIP.AUTO: NEGATIVE
MAXIMAL PREDICTED HEART RATE: 143 BPM
MCH RBC QN AUTO: 31.9 PG (ref 26.6–33)
MCHC RBC AUTO-ENTMCNC: 33.5 G/DL (ref 31.5–35.7)
MCV RBC AUTO: 95.2 FL (ref 79–97)
NITRITE UR QL STRIP: NEGATIVE
PH UR STRIP.AUTO: 5.5 [PH] (ref 5–8)
PLATELET # BLD AUTO: 148 10*3/MM3 (ref 140–450)
PMV BLD AUTO: 9.8 FL (ref 6–12)
POTASSIUM BLD-SCNC: 3.3 MMOL/L (ref 3.5–5.2)
PROCALCITONIN SERPL-MCNC: 0.21 NG/ML (ref 0.1–0.25)
PROT UR QL STRIP: NEGATIVE
RBC # BLD AUTO: 2.29 10*6/MM3 (ref 3.77–5.28)
SODIUM BLD-SCNC: 139 MMOL/L (ref 136–145)
SP GR UR STRIP: 1.01 (ref 1–1.03)
STRESS TARGET HR: 122 BPM
TSH SERPL DL<=0.05 MIU/L-ACNC: 3.57 UIU/ML (ref 0.27–4.2)
UROBILINOGEN UR QL STRIP: NORMAL
WBC NRBC COR # BLD: 5.88 10*3/MM3 (ref 3.4–10.8)

## 2020-05-21 PROCEDURE — 94799 UNLISTED PULMONARY SVC/PX: CPT

## 2020-05-21 PROCEDURE — 84443 ASSAY THYROID STIM HORMONE: CPT | Performed by: INTERNAL MEDICINE

## 2020-05-21 PROCEDURE — 94640 AIRWAY INHALATION TREATMENT: CPT

## 2020-05-21 PROCEDURE — 93306 TTE W/DOPPLER COMPLETE: CPT | Performed by: INTERNAL MEDICINE

## 2020-05-21 PROCEDURE — 84145 PROCALCITONIN (PCT): CPT | Performed by: INTERNAL MEDICINE

## 2020-05-21 PROCEDURE — 93306 TTE W/DOPPLER COMPLETE: CPT

## 2020-05-21 PROCEDURE — 25010000002 ENOXAPARIN PER 10 MG: Performed by: INTERNAL MEDICINE

## 2020-05-21 PROCEDURE — 71250 CT THORAX DX C-: CPT

## 2020-05-21 PROCEDURE — 85027 COMPLETE CBC AUTOMATED: CPT | Performed by: INTERNAL MEDICINE

## 2020-05-21 PROCEDURE — 97110 THERAPEUTIC EXERCISES: CPT

## 2020-05-21 PROCEDURE — 25010000002 CEFTRIAXONE PER 250 MG: Performed by: INTERNAL MEDICINE

## 2020-05-21 PROCEDURE — 80048 BASIC METABOLIC PNL TOTAL CA: CPT | Performed by: INTERNAL MEDICINE

## 2020-05-21 PROCEDURE — 97162 PT EVAL MOD COMPLEX 30 MIN: CPT

## 2020-05-21 RX ORDER — POTASSIUM CHLORIDE 1.5 G/1.77G
40 POWDER, FOR SOLUTION ORAL AS NEEDED
Status: DISCONTINUED | OUTPATIENT
Start: 2020-05-21 | End: 2020-05-25 | Stop reason: HOSPADM

## 2020-05-21 RX ORDER — POTASSIUM CHLORIDE 7.45 MG/ML
10 INJECTION INTRAVENOUS
Status: DISCONTINUED | OUTPATIENT
Start: 2020-05-21 | End: 2020-05-25 | Stop reason: HOSPADM

## 2020-05-21 RX ORDER — FUROSEMIDE 40 MG/1
40 TABLET ORAL ONCE
Status: DISCONTINUED | OUTPATIENT
Start: 2020-05-21 | End: 2020-05-21

## 2020-05-21 RX ORDER — POTASSIUM CHLORIDE 750 MG/1
40 CAPSULE, EXTENDED RELEASE ORAL AS NEEDED
Status: DISCONTINUED | OUTPATIENT
Start: 2020-05-21 | End: 2020-05-25 | Stop reason: HOSPADM

## 2020-05-21 RX ADMIN — DIVALPROEX SODIUM 500 MG: 500 TABLET, EXTENDED RELEASE ORAL at 17:03

## 2020-05-21 RX ADMIN — POTASSIUM CHLORIDE 40 MEQ: 10 CAPSULE, COATED, EXTENDED RELEASE ORAL at 20:56

## 2020-05-21 RX ADMIN — Medication 3 MG: at 20:56

## 2020-05-21 RX ADMIN — ENOXAPARIN SODIUM 30 MG: 30 INJECTION SUBCUTANEOUS at 17:03

## 2020-05-21 RX ADMIN — BUDESONIDE AND FORMOTEROL FUMARATE DIHYDRATE 2 PUFF: 160; 4.5 AEROSOL RESPIRATORY (INHALATION) at 20:59

## 2020-05-21 RX ADMIN — TIOTROPIUM BROMIDE INHALATION SPRAY 2 PUFF: 3.12 SPRAY, METERED RESPIRATORY (INHALATION) at 09:12

## 2020-05-21 RX ADMIN — GABAPENTIN 100 MG: 100 CAPSULE ORAL at 07:38

## 2020-05-21 RX ADMIN — SODIUM CHLORIDE, PRESERVATIVE FREE 10 ML: 5 INJECTION INTRAVENOUS at 20:56

## 2020-05-21 RX ADMIN — GABAPENTIN 100 MG: 100 CAPSULE ORAL at 20:56

## 2020-05-21 RX ADMIN — SODIUM CHLORIDE, PRESERVATIVE FREE 10 ML: 5 INJECTION INTRAVENOUS at 07:38

## 2020-05-21 RX ADMIN — GABAPENTIN 100 MG: 100 CAPSULE ORAL at 17:03

## 2020-05-21 RX ADMIN — DIVALPROEX SODIUM 250 MG: 250 TABLET, FILM COATED, EXTENDED RELEASE ORAL at 07:38

## 2020-05-21 RX ADMIN — AMLODIPINE BESYLATE 10 MG: 10 TABLET ORAL at 09:25

## 2020-05-21 RX ADMIN — CEFTRIAXONE SODIUM 1 G: 1 INJECTION, SOLUTION INTRAVENOUS at 07:38

## 2020-05-21 RX ADMIN — BUDESONIDE AND FORMOTEROL FUMARATE DIHYDRATE 2 PUFF: 160; 4.5 AEROSOL RESPIRATORY (INHALATION) at 09:12

## 2020-05-21 RX ADMIN — FLUTICASONE PROPIONATE 1 SPRAY: 50 SPRAY, METERED NASAL at 07:37

## 2020-05-21 RX ADMIN — LEVOTHYROXINE SODIUM 88 MCG: 88 TABLET ORAL at 06:25

## 2020-05-22 LAB
ALBUMIN SERPL-MCNC: 2.8 G/DL (ref 3.5–5.2)
ANION GAP SERPL CALCULATED.3IONS-SCNC: 9 MMOL/L (ref 5–15)
BUN BLD-MCNC: 35 MG/DL (ref 8–23)
BUN/CREAT SERPL: 22.6 (ref 7–25)
CALCIUM SPEC-SCNC: 8.9 MG/DL (ref 8.6–10.5)
CHLORIDE SERPL-SCNC: 102 MMOL/L (ref 98–107)
CO2 SERPL-SCNC: 30 MMOL/L (ref 22–29)
CREAT BLD-MCNC: 1.55 MG/DL (ref 0.57–1)
DEPRECATED RDW RBC AUTO: 49.3 FL (ref 37–54)
ERYTHROCYTE [DISTWIDTH] IN BLOOD BY AUTOMATED COUNT: 14.1 % (ref 12.3–15.4)
FERRITIN SERPL-MCNC: 149 NG/ML (ref 13–150)
GFR SERPL CREATININE-BSD FRML MDRD: 32 ML/MIN/1.73
GLUCOSE BLD-MCNC: 98 MG/DL (ref 65–99)
HCT VFR BLD AUTO: 25.7 % (ref 34–46.6)
HGB BLD-MCNC: 8.6 G/DL (ref 12–15.9)
IRON 24H UR-MRATE: 37 MCG/DL (ref 37–145)
IRON SATN MFR SERPL: 17 % (ref 20–50)
MCH RBC QN AUTO: 32.8 PG (ref 26.6–33)
MCHC RBC AUTO-ENTMCNC: 33.5 G/DL (ref 31.5–35.7)
MCV RBC AUTO: 98.1 FL (ref 79–97)
PHOSPHATE SERPL-MCNC: 2.8 MG/DL (ref 2.5–4.5)
PLATELET # BLD AUTO: 158 10*3/MM3 (ref 140–450)
PMV BLD AUTO: 9.7 FL (ref 6–12)
POTASSIUM BLD-SCNC: 3.5 MMOL/L (ref 3.5–5.2)
RBC # BLD AUTO: 2.62 10*6/MM3 (ref 3.77–5.28)
SODIUM BLD-SCNC: 141 MMOL/L (ref 136–145)
TIBC SERPL-MCNC: 215 MCG/DL (ref 298–536)
TRANSFERRIN SERPL-MCNC: 144 MG/DL (ref 200–360)
WBC NRBC COR # BLD: 5.72 10*3/MM3 (ref 3.4–10.8)

## 2020-05-22 PROCEDURE — 99222 1ST HOSP IP/OBS MODERATE 55: CPT | Performed by: INTERNAL MEDICINE

## 2020-05-22 PROCEDURE — 94799 UNLISTED PULMONARY SVC/PX: CPT

## 2020-05-22 PROCEDURE — 25010000002 ENOXAPARIN PER 10 MG: Performed by: INTERNAL MEDICINE

## 2020-05-22 PROCEDURE — 80069 RENAL FUNCTION PANEL: CPT | Performed by: INTERNAL MEDICINE

## 2020-05-22 PROCEDURE — 83540 ASSAY OF IRON: CPT | Performed by: INTERNAL MEDICINE

## 2020-05-22 PROCEDURE — 84466 ASSAY OF TRANSFERRIN: CPT | Performed by: INTERNAL MEDICINE

## 2020-05-22 PROCEDURE — 82728 ASSAY OF FERRITIN: CPT | Performed by: INTERNAL MEDICINE

## 2020-05-22 PROCEDURE — 85027 COMPLETE CBC AUTOMATED: CPT | Performed by: INTERNAL MEDICINE

## 2020-05-22 PROCEDURE — 97110 THERAPEUTIC EXERCISES: CPT

## 2020-05-22 RX ORDER — IRON POLYSACCHARIDE COMPLEX 150 MG
150 CAPSULE ORAL DAILY
Status: DISCONTINUED | OUTPATIENT
Start: 2020-05-22 | End: 2020-05-25 | Stop reason: HOSPADM

## 2020-05-22 RX ORDER — METOPROLOL SUCCINATE 25 MG/1
12.5 TABLET, EXTENDED RELEASE ORAL
Status: DISCONTINUED | OUTPATIENT
Start: 2020-05-22 | End: 2020-05-25 | Stop reason: HOSPADM

## 2020-05-22 RX ORDER — BUMETANIDE 0.25 MG/ML
2 INJECTION INTRAMUSCULAR; INTRAVENOUS EVERY 8 HOURS
Status: COMPLETED | OUTPATIENT
Start: 2020-05-22 | End: 2020-05-23

## 2020-05-22 RX ADMIN — DULOXETINE HYDROCHLORIDE 20 MG: 20 CAPSULE, DELAYED RELEASE ORAL at 09:14

## 2020-05-22 RX ADMIN — ENOXAPARIN SODIUM 30 MG: 30 INJECTION SUBCUTANEOUS at 17:09

## 2020-05-22 RX ADMIN — ACETAMINOPHEN 650 MG: 325 TABLET, FILM COATED ORAL at 02:07

## 2020-05-22 RX ADMIN — DIVALPROEX SODIUM 500 MG: 500 TABLET, EXTENDED RELEASE ORAL at 17:09

## 2020-05-22 RX ADMIN — POTASSIUM CHLORIDE 40 MEQ: 10 CAPSULE, COATED, EXTENDED RELEASE ORAL at 09:14

## 2020-05-22 RX ADMIN — TIOTROPIUM BROMIDE INHALATION SPRAY 2 PUFF: 3.12 SPRAY, METERED RESPIRATORY (INHALATION) at 07:11

## 2020-05-22 RX ADMIN — BUDESONIDE AND FORMOTEROL FUMARATE DIHYDRATE 2 PUFF: 160; 4.5 AEROSOL RESPIRATORY (INHALATION) at 07:11

## 2020-05-22 RX ADMIN — FLUTICASONE PROPIONATE 1 SPRAY: 50 SPRAY, METERED NASAL at 09:17

## 2020-05-22 RX ADMIN — POTASSIUM CHLORIDE 40 MEQ: 10 CAPSULE, COATED, EXTENDED RELEASE ORAL at 17:08

## 2020-05-22 RX ADMIN — ACETAMINOPHEN 650 MG: 325 TABLET, FILM COATED ORAL at 15:53

## 2020-05-22 RX ADMIN — Medication 150 MG: at 11:24

## 2020-05-22 RX ADMIN — GABAPENTIN 100 MG: 100 CAPSULE ORAL at 21:20

## 2020-05-22 RX ADMIN — BUDESONIDE AND FORMOTEROL FUMARATE DIHYDRATE 2 PUFF: 160; 4.5 AEROSOL RESPIRATORY (INHALATION) at 19:53

## 2020-05-22 RX ADMIN — METOPROLOL SUCCINATE 12.5 MG: 25 TABLET, FILM COATED, EXTENDED RELEASE ORAL at 11:25

## 2020-05-22 RX ADMIN — SODIUM CHLORIDE, PRESERVATIVE FREE 10 ML: 5 INJECTION INTRAVENOUS at 21:20

## 2020-05-22 RX ADMIN — BUMETANIDE 2 MG: 0.25 INJECTION INTRAMUSCULAR; INTRAVENOUS at 19:44

## 2020-05-22 RX ADMIN — GABAPENTIN 100 MG: 100 CAPSULE ORAL at 09:15

## 2020-05-22 RX ADMIN — BUMETANIDE 2 MG: 0.25 INJECTION INTRAMUSCULAR; INTRAVENOUS at 11:25

## 2020-05-22 RX ADMIN — LEVOTHYROXINE SODIUM 88 MCG: 88 TABLET ORAL at 06:56

## 2020-05-22 RX ADMIN — DIVALPROEX SODIUM 250 MG: 250 TABLET, FILM COATED, EXTENDED RELEASE ORAL at 09:14

## 2020-05-22 RX ADMIN — SODIUM CHLORIDE, PRESERVATIVE FREE 10 ML: 5 INJECTION INTRAVENOUS at 09:18

## 2020-05-22 RX ADMIN — GABAPENTIN 100 MG: 100 CAPSULE ORAL at 17:08

## 2020-05-22 RX ADMIN — Medication 3 MG: at 21:20

## 2020-05-23 LAB
ALBUMIN SERPL-MCNC: 2.8 G/DL (ref 3.5–5.2)
ANION GAP SERPL CALCULATED.3IONS-SCNC: 13.1 MMOL/L (ref 5–15)
BASOPHILS # BLD AUTO: 0.02 10*3/MM3 (ref 0–0.2)
BASOPHILS NFR BLD AUTO: 0.3 % (ref 0–1.5)
BUN BLD-MCNC: 31 MG/DL (ref 8–23)
BUN/CREAT SERPL: 21.4 (ref 7–25)
CALCIUM SPEC-SCNC: 9.1 MG/DL (ref 8.6–10.5)
CHLORIDE SERPL-SCNC: 97 MMOL/L (ref 98–107)
CO2 SERPL-SCNC: 30.9 MMOL/L (ref 22–29)
CREAT BLD-MCNC: 1.45 MG/DL (ref 0.57–1)
DEPRECATED RDW RBC AUTO: 49 FL (ref 37–54)
EOSINOPHIL # BLD AUTO: 0.15 10*3/MM3 (ref 0–0.4)
EOSINOPHIL NFR BLD AUTO: 2.4 % (ref 0.3–6.2)
ERYTHROCYTE [DISTWIDTH] IN BLOOD BY AUTOMATED COUNT: 14.3 % (ref 12.3–15.4)
GFR SERPL CREATININE-BSD FRML MDRD: 35 ML/MIN/1.73
GLUCOSE BLD-MCNC: 120 MG/DL (ref 65–99)
HCT VFR BLD AUTO: 26.7 % (ref 34–46.6)
HGB BLD-MCNC: 9.3 G/DL (ref 12–15.9)
LYMPHOCYTES # BLD AUTO: 1.22 10*3/MM3 (ref 0.7–3.1)
LYMPHOCYTES NFR BLD AUTO: 19.7 % (ref 19.6–45.3)
MAGNESIUM SERPL-MCNC: 1.6 MG/DL (ref 1.6–2.4)
MCH RBC QN AUTO: 33 PG (ref 26.6–33)
MCHC RBC AUTO-ENTMCNC: 34.8 G/DL (ref 31.5–35.7)
MCV RBC AUTO: 94.7 FL (ref 79–97)
MONOCYTES # BLD AUTO: 1.21 10*3/MM3 (ref 0.1–0.9)
MONOCYTES NFR BLD AUTO: 19.5 % (ref 5–12)
NEUTROPHILS # BLD AUTO: 3.47 10*3/MM3 (ref 1.7–7)
NEUTROPHILS NFR BLD AUTO: 56.2 % (ref 42.7–76)
PHOSPHATE SERPL-MCNC: 3.7 MG/DL (ref 2.5–4.5)
PLATELET # BLD AUTO: 192 10*3/MM3 (ref 140–450)
PMV BLD AUTO: 10.6 FL (ref 6–12)
POTASSIUM BLD-SCNC: 3.6 MMOL/L (ref 3.5–5.2)
RBC # BLD AUTO: 2.82 10*6/MM3 (ref 3.77–5.28)
SODIUM BLD-SCNC: 141 MMOL/L (ref 136–145)
URATE SERPL-MCNC: 7 MG/DL (ref 2.4–5.7)
WBC NRBC COR # BLD: 6.19 10*3/MM3 (ref 3.4–10.8)

## 2020-05-23 PROCEDURE — 83735 ASSAY OF MAGNESIUM: CPT | Performed by: INTERNAL MEDICINE

## 2020-05-23 PROCEDURE — 84550 ASSAY OF BLOOD/URIC ACID: CPT | Performed by: INTERNAL MEDICINE

## 2020-05-23 PROCEDURE — 94799 UNLISTED PULMONARY SVC/PX: CPT

## 2020-05-23 PROCEDURE — 85025 COMPLETE CBC W/AUTO DIFF WBC: CPT | Performed by: INTERNAL MEDICINE

## 2020-05-23 PROCEDURE — 25010000002 ENOXAPARIN PER 10 MG: Performed by: INTERNAL MEDICINE

## 2020-05-23 PROCEDURE — 99231 SBSQ HOSP IP/OBS SF/LOW 25: CPT | Performed by: NURSE PRACTITIONER

## 2020-05-23 PROCEDURE — 97110 THERAPEUTIC EXERCISES: CPT

## 2020-05-23 PROCEDURE — 25010000002 ONDANSETRON PER 1 MG: Performed by: INTERNAL MEDICINE

## 2020-05-23 PROCEDURE — 80069 RENAL FUNCTION PANEL: CPT | Performed by: INTERNAL MEDICINE

## 2020-05-23 RX ORDER — BUMETANIDE 2 MG/1
2 TABLET ORAL 2 TIMES DAILY
Status: DISCONTINUED | OUTPATIENT
Start: 2020-05-23 | End: 2020-05-24

## 2020-05-23 RX ADMIN — BUMETANIDE 2 MG: 2 TABLET ORAL at 21:25

## 2020-05-23 RX ADMIN — ENOXAPARIN SODIUM 30 MG: 30 INJECTION SUBCUTANEOUS at 17:39

## 2020-05-23 RX ADMIN — ONDANSETRON 4 MG: 2 INJECTION INTRAMUSCULAR; INTRAVENOUS at 17:39

## 2020-05-23 RX ADMIN — METOPROLOL SUCCINATE 12.5 MG: 25 TABLET, FILM COATED, EXTENDED RELEASE ORAL at 09:10

## 2020-05-23 RX ADMIN — Medication 3 MG: at 21:25

## 2020-05-23 RX ADMIN — SODIUM CHLORIDE, PRESERVATIVE FREE 10 ML: 5 INJECTION INTRAVENOUS at 09:11

## 2020-05-23 RX ADMIN — BUMETANIDE 2 MG: 0.25 INJECTION INTRAMUSCULAR; INTRAVENOUS at 02:32

## 2020-05-23 RX ADMIN — Medication 150 MG: at 09:11

## 2020-05-23 RX ADMIN — GABAPENTIN 100 MG: 100 CAPSULE ORAL at 09:11

## 2020-05-23 RX ADMIN — GABAPENTIN 100 MG: 100 CAPSULE ORAL at 21:26

## 2020-05-23 RX ADMIN — BUDESONIDE AND FORMOTEROL FUMARATE DIHYDRATE 2 PUFF: 160; 4.5 AEROSOL RESPIRATORY (INHALATION) at 19:52

## 2020-05-23 RX ADMIN — POTASSIUM CHLORIDE 40 MEQ: 10 CAPSULE, COATED, EXTENDED RELEASE ORAL at 21:25

## 2020-05-23 RX ADMIN — GABAPENTIN 100 MG: 100 CAPSULE ORAL at 17:39

## 2020-05-23 RX ADMIN — BUDESONIDE AND FORMOTEROL FUMARATE DIHYDRATE 2 PUFF: 160; 4.5 AEROSOL RESPIRATORY (INHALATION) at 07:43

## 2020-05-23 RX ADMIN — DIVALPROEX SODIUM 500 MG: 500 TABLET, EXTENDED RELEASE ORAL at 21:25

## 2020-05-23 RX ADMIN — TIOTROPIUM BROMIDE INHALATION SPRAY 2 PUFF: 3.12 SPRAY, METERED RESPIRATORY (INHALATION) at 07:42

## 2020-05-23 RX ADMIN — DIVALPROEX SODIUM 250 MG: 250 TABLET, FILM COATED, EXTENDED RELEASE ORAL at 09:10

## 2020-05-23 RX ADMIN — FLUTICASONE PROPIONATE 1 SPRAY: 50 SPRAY, METERED NASAL at 09:11

## 2020-05-23 RX ADMIN — BUMETANIDE 2 MG: 2 TABLET ORAL at 14:17

## 2020-05-23 RX ADMIN — SODIUM CHLORIDE, PRESERVATIVE FREE 10 ML: 5 INJECTION INTRAVENOUS at 21:26

## 2020-05-23 RX ADMIN — ONDANSETRON 4 MG: 2 INJECTION INTRAMUSCULAR; INTRAVENOUS at 09:52

## 2020-05-23 RX ADMIN — DULOXETINE HYDROCHLORIDE 20 MG: 20 CAPSULE, DELAYED RELEASE ORAL at 09:11

## 2020-05-23 RX ADMIN — POTASSIUM CHLORIDE 40 MEQ: 10 CAPSULE, COATED, EXTENDED RELEASE ORAL at 17:39

## 2020-05-23 RX ADMIN — LEVOTHYROXINE SODIUM 88 MCG: 88 TABLET ORAL at 07:14

## 2020-05-24 LAB
ANION GAP SERPL CALCULATED.3IONS-SCNC: 12.9 MMOL/L (ref 5–15)
BUN BLD-MCNC: 33 MG/DL (ref 8–23)
BUN/CREAT SERPL: 19.3 (ref 7–25)
CALCIUM SPEC-SCNC: 8.8 MG/DL (ref 8.6–10.5)
CHLORIDE SERPL-SCNC: 99 MMOL/L (ref 98–107)
CO2 SERPL-SCNC: 32.1 MMOL/L (ref 22–29)
CREAT BLD-MCNC: 1.71 MG/DL (ref 0.57–1)
GFR SERPL CREATININE-BSD FRML MDRD: 29 ML/MIN/1.73
GLUCOSE BLD-MCNC: 95 MG/DL (ref 65–99)
POTASSIUM BLD-SCNC: 4.6 MMOL/L (ref 3.5–5.2)
SODIUM BLD-SCNC: 144 MMOL/L (ref 136–145)

## 2020-05-24 PROCEDURE — 99231 SBSQ HOSP IP/OBS SF/LOW 25: CPT | Performed by: NURSE PRACTITIONER

## 2020-05-24 PROCEDURE — 94799 UNLISTED PULMONARY SVC/PX: CPT

## 2020-05-24 PROCEDURE — 25010000002 ENOXAPARIN PER 10 MG: Performed by: INTERNAL MEDICINE

## 2020-05-24 PROCEDURE — 80048 BASIC METABOLIC PNL TOTAL CA: CPT | Performed by: INTERNAL MEDICINE

## 2020-05-24 RX ORDER — BUMETANIDE 1 MG/1
1 TABLET ORAL 2 TIMES DAILY
Status: DISCONTINUED | OUTPATIENT
Start: 2020-05-24 | End: 2020-05-25

## 2020-05-24 RX ADMIN — GABAPENTIN 100 MG: 100 CAPSULE ORAL at 21:27

## 2020-05-24 RX ADMIN — DULOXETINE HYDROCHLORIDE 20 MG: 20 CAPSULE, DELAYED RELEASE ORAL at 08:25

## 2020-05-24 RX ADMIN — ACETAMINOPHEN 650 MG: 325 TABLET, FILM COATED ORAL at 00:44

## 2020-05-24 RX ADMIN — DIVALPROEX SODIUM 500 MG: 500 TABLET, EXTENDED RELEASE ORAL at 17:28

## 2020-05-24 RX ADMIN — ACETAMINOPHEN 650 MG: 325 TABLET, FILM COATED ORAL at 21:27

## 2020-05-24 RX ADMIN — Medication 3 MG: at 21:27

## 2020-05-24 RX ADMIN — BUMETANIDE 2 MG: 2 TABLET ORAL at 08:25

## 2020-05-24 RX ADMIN — TIOTROPIUM BROMIDE INHALATION SPRAY 2 PUFF: 3.12 SPRAY, METERED RESPIRATORY (INHALATION) at 08:56

## 2020-05-24 RX ADMIN — DIVALPROEX SODIUM 250 MG: 250 TABLET, FILM COATED, EXTENDED RELEASE ORAL at 08:25

## 2020-05-24 RX ADMIN — GABAPENTIN 100 MG: 100 CAPSULE ORAL at 17:28

## 2020-05-24 RX ADMIN — METOPROLOL SUCCINATE 12.5 MG: 25 TABLET, FILM COATED, EXTENDED RELEASE ORAL at 08:25

## 2020-05-24 RX ADMIN — BUDESONIDE AND FORMOTEROL FUMARATE DIHYDRATE 2 PUFF: 160; 4.5 AEROSOL RESPIRATORY (INHALATION) at 08:56

## 2020-05-24 RX ADMIN — BUDESONIDE AND FORMOTEROL FUMARATE DIHYDRATE 2 PUFF: 160; 4.5 AEROSOL RESPIRATORY (INHALATION) at 21:45

## 2020-05-24 RX ADMIN — ENOXAPARIN SODIUM 30 MG: 30 INJECTION SUBCUTANEOUS at 17:28

## 2020-05-24 RX ADMIN — GABAPENTIN 100 MG: 100 CAPSULE ORAL at 08:25

## 2020-05-24 RX ADMIN — SODIUM CHLORIDE, PRESERVATIVE FREE 10 ML: 5 INJECTION INTRAVENOUS at 21:27

## 2020-05-24 RX ADMIN — BUMETANIDE 1 MG: 1 TABLET ORAL at 19:45

## 2020-05-24 RX ADMIN — FLUTICASONE PROPIONATE 1 SPRAY: 50 SPRAY, METERED NASAL at 08:26

## 2020-05-24 RX ADMIN — LEVOTHYROXINE SODIUM 88 MCG: 88 TABLET ORAL at 06:36

## 2020-05-24 RX ADMIN — SODIUM CHLORIDE, PRESERVATIVE FREE 10 ML: 5 INJECTION INTRAVENOUS at 08:25

## 2020-05-24 RX ADMIN — Medication 150 MG: at 08:25

## 2020-05-25 VITALS
WEIGHT: 163.2 LBS | SYSTOLIC BLOOD PRESSURE: 104 MMHG | HEART RATE: 93 BPM | BODY MASS INDEX: 30.03 KG/M2 | RESPIRATION RATE: 18 BRPM | HEIGHT: 62 IN | TEMPERATURE: 98.2 F | OXYGEN SATURATION: 92 % | DIASTOLIC BLOOD PRESSURE: 54 MMHG

## 2020-05-25 LAB
ANION GAP SERPL CALCULATED.3IONS-SCNC: 11 MMOL/L (ref 5–15)
BACTERIA SPEC AEROBE CULT: NORMAL
BACTERIA SPEC AEROBE CULT: NORMAL
BUN BLD-MCNC: 33 MG/DL (ref 8–23)
BUN/CREAT SERPL: 19.9 (ref 7–25)
CALCIUM SPEC-SCNC: 8.7 MG/DL (ref 8.6–10.5)
CHLORIDE SERPL-SCNC: 96 MMOL/L (ref 98–107)
CO2 SERPL-SCNC: 34 MMOL/L (ref 22–29)
CREAT BLD-MCNC: 1.66 MG/DL (ref 0.57–1)
GFR SERPL CREATININE-BSD FRML MDRD: 30 ML/MIN/1.73
GLUCOSE BLD-MCNC: 84 MG/DL (ref 65–99)
POTASSIUM BLD-SCNC: 3.9 MMOL/L (ref 3.5–5.2)
SODIUM BLD-SCNC: 141 MMOL/L (ref 136–145)
URATE SERPL-MCNC: 8.2 MG/DL (ref 2.4–5.7)

## 2020-05-25 PROCEDURE — 84550 ASSAY OF BLOOD/URIC ACID: CPT | Performed by: INTERNAL MEDICINE

## 2020-05-25 PROCEDURE — 80048 BASIC METABOLIC PNL TOTAL CA: CPT | Performed by: INTERNAL MEDICINE

## 2020-05-25 PROCEDURE — 94799 UNLISTED PULMONARY SVC/PX: CPT

## 2020-05-25 RX ORDER — POTASSIUM CHLORIDE 750 MG/1
10 CAPSULE, EXTENDED RELEASE ORAL DAILY
Status: DISCONTINUED | OUTPATIENT
Start: 2020-05-25 | End: 2020-05-25 | Stop reason: HOSPADM

## 2020-05-25 RX ORDER — IRON POLYSACCHARIDE COMPLEX 150 MG
150 CAPSULE ORAL DAILY
Qty: 30 CAPSULE | Refills: 0 | Status: ON HOLD | OUTPATIENT
Start: 2020-05-25 | End: 2021-10-19

## 2020-05-25 RX ORDER — POTASSIUM CHLORIDE 750 MG/1
10 CAPSULE, EXTENDED RELEASE ORAL DAILY
Qty: 30 CAPSULE | Refills: 0 | Status: SHIPPED | OUTPATIENT
Start: 2020-05-25

## 2020-05-25 RX ORDER — BUMETANIDE 1 MG/1
1 TABLET ORAL DAILY
Qty: 30 TABLET | Refills: 0 | Status: SHIPPED | OUTPATIENT
Start: 2020-05-26

## 2020-05-25 RX ORDER — METOPROLOL SUCCINATE 25 MG/1
12.5 TABLET, EXTENDED RELEASE ORAL
Qty: 15 TABLET | Refills: 0 | Status: SHIPPED | OUTPATIENT
Start: 2020-05-25

## 2020-05-25 RX ORDER — BUMETANIDE 1 MG/1
1 TABLET ORAL DAILY
Status: DISCONTINUED | OUTPATIENT
Start: 2020-05-26 | End: 2020-05-25 | Stop reason: HOSPADM

## 2020-05-25 RX ADMIN — Medication 150 MG: at 07:50

## 2020-05-25 RX ADMIN — LEVOTHYROXINE SODIUM 88 MCG: 88 TABLET ORAL at 06:43

## 2020-05-25 RX ADMIN — SODIUM CHLORIDE, PRESERVATIVE FREE 10 ML: 5 INJECTION INTRAVENOUS at 07:51

## 2020-05-25 RX ADMIN — BUMETANIDE 1 MG: 1 TABLET ORAL at 07:50

## 2020-05-25 RX ADMIN — FLUTICASONE PROPIONATE 1 SPRAY: 50 SPRAY, METERED NASAL at 07:50

## 2020-05-25 RX ADMIN — TIOTROPIUM BROMIDE INHALATION SPRAY 2 PUFF: 3.12 SPRAY, METERED RESPIRATORY (INHALATION) at 06:58

## 2020-05-25 RX ADMIN — BUDESONIDE AND FORMOTEROL FUMARATE DIHYDRATE 2 PUFF: 160; 4.5 AEROSOL RESPIRATORY (INHALATION) at 06:58

## 2020-05-25 RX ADMIN — GABAPENTIN 100 MG: 100 CAPSULE ORAL at 07:49

## 2020-05-25 RX ADMIN — METOPROLOL SUCCINATE 12.5 MG: 25 TABLET, FILM COATED, EXTENDED RELEASE ORAL at 07:49

## 2020-05-25 RX ADMIN — DULOXETINE HYDROCHLORIDE 20 MG: 20 CAPSULE, DELAYED RELEASE ORAL at 07:50

## 2020-05-25 RX ADMIN — DIVALPROEX SODIUM 250 MG: 250 TABLET, FILM COATED, EXTENDED RELEASE ORAL at 07:49

## 2020-05-26 ENCOUNTER — READMISSION MANAGEMENT (OUTPATIENT)
Dept: CALL CENTER | Facility: HOSPITAL | Age: 80
End: 2020-05-26

## 2020-05-26 NOTE — OUTREACH NOTE
Prep Survey      Responses   [facility] facility patient discharged from?  [place]   Is LACE score < 7 ?  No   Eligibility  Readm Mgmt   Discharge diagnosis  A/C resp. failure with hypoxia, LVH, BLL pneumonia vs atelectasis, CHRIS on CKD, COPD with aE, morbid obesity, hypothyroidism, fibromyalgia, chronic hypoxemia, anemia, mod. pulmonary HTN, A/C diastolic CHF   COVID-19 Test Status  Negative   Does the patient have one of the following disease processes/diagnoses(primary or secondary)?  COPD/Pneumonia [And CHF]   Does the patient have Home health ordered?  Yes   What is the Home health agency?   [facility]   Is there a DME ordered?  No   Comments regarding appointments  Pt to schedule f/u appointments   Medication alerts for this patient  see AVS   Prep survey completed?  Yes          Maida Nesbitt RN

## 2020-05-29 ENCOUNTER — READMISSION MANAGEMENT (OUTPATIENT)
Dept: CALL CENTER | Facility: HOSPITAL | Age: 80
End: 2020-05-29

## 2020-05-29 NOTE — OUTREACH NOTE
COPD/PN Week 1 Survey      Responses   [facility] patient discharged from?  [place]   COVID-19 Test Status  Negative   Does the patient have one of the following disease processes/diagnoses(primary or secondary)?  COPD/Pneumonia   Is there a successful TCM telephone encounter documented?  No   Was the primary reason for admission:  COPD exacerbation   Week 1 attempt successful?  No   Unsuccessful attempts  Attempt 1          Isabela Jernigan, AUDREYN

## 2020-06-01 ENCOUNTER — READMISSION MANAGEMENT (OUTPATIENT)
Dept: CALL CENTER | Facility: HOSPITAL | Age: 80
End: 2020-06-01

## 2020-06-01 NOTE — OUTREACH NOTE
COPD/PN Week 1 Survey      Responses   [facility]   COVID-19 Test Status  Negative   Does the patient have one of the following disease processes/diagnoses(primary or secondary)?  COPD/Pneumonia   Is there a successful TCM telephone encounter documented?  No   Was the primary reason for admission:  COPD exacerbation   Week 1 attempt successful?  Yes   Call start time  1529   Call end time  1532   Discharge diagnosis  A/C resp. failure with hypoxia, LVH, BLL pneumonia vs atelectasis, CHRIS on CKD, COPD with aE, morbid obesity, hypothyroidism, fibromyalgia, chronic hypoxemia, anemia, mod. pulmonary HTN, A/C diastolic CHF   Is patient permission given to speak with other caregiver?  No   Meds reviewed with patient/caregiver?  Yes   Is the patient having any side effects they believe may be caused by any medication additions or changes?  No   Does the patient have all medications ordered at discharge?  Yes   Is the patient taking all medications as directed (includes completed medication regime)?  N/A   Comments regarding appointments  Pt to schedule f/u appointments   Does the patient have a primary care provider?   Yes   Does the patient have an appointment with their PCP or pulmonologist within 7 days of discharge?  Yes   Has the patient kept scheduled appointments due by today?  N/A   What is the Home health agency?   [facility]   Has home health visited the patient within 72 hours of discharge?  Yes   Pulse Ox monitoring  Intermittent   Pulse Ox device source  Other   O2 Sat comments  does not remember   Psychosocial issues?  No   Did the patient receive a copy of their discharge instructions?  Yes   Nursing interventions  Reviewed instructions with patient   What is the patient's perception of their health status since discharge?  Improving   Are the patient's immunizations up to date?   Yes   Is the patient/caregiver able to teach back the hierarchy of who to call/visit for  symptoms/problems? PCP, Specialist, Home health nurse, Urgent Care, ED, 911  Yes   Additional teach back comments  Is non smoker   Is the patient/caregiver able to teach back signs and symptoms of worsening condition:  Fever/chills, Shortness of breath, Chest pain   Is the patient/caregiver able to teach back importance of completing antibiotic course of treatment?  Yes   Week 1 call completed?  Yes   Wrap up additional comments  HH has started visits.  She has no complaints.  Is willing for us to call and check on her. She kept conversation short.           Kailee Silver RN

## 2020-06-08 ENCOUNTER — TRANSCRIBE ORDERS (OUTPATIENT)
Dept: ADMINISTRATIVE | Facility: HOSPITAL | Age: 80
End: 2020-06-08

## 2020-06-08 DIAGNOSIS — J90 PLEURAL EFFUSION: Primary | ICD-10-CM

## 2020-06-09 ENCOUNTER — READMISSION MANAGEMENT (OUTPATIENT)
Dept: CALL CENTER | Facility: HOSPITAL | Age: 80
End: 2020-06-09

## 2020-06-09 NOTE — OUTREACH NOTE
COPD/PN Week 2 Survey      Responses   [facility] patient discharged from?  [facility]   COVID-19 Test Status  Negative   Does the patient have one of the following disease processes/diagnoses(primary or secondary)?  COPD/Pneumonia   Was the primary reason for admission:  COPD exacerbation   Week 2 attempt successful?  No   Unsuccessful attempts  Attempt 1          Isabela Jernigan LPN

## 2020-06-11 ENCOUNTER — READMISSION MANAGEMENT (OUTPATIENT)
Dept: CALL CENTER | Facility: HOSPITAL | Age: 80
End: 2020-06-11

## 2020-06-11 NOTE — OUTREACH NOTE
COPD/PN Week 2 Survey      Responses   [facility] patient discharged from?  [place]   COVID-19 Test Status  Negative   Does the patient have one of the following disease processes/diagnoses(primary or secondary)?  COPD/Pneumonia   Was the primary reason for admission:  COPD exacerbation   Week 2 attempt successful?  Yes   Call start time  1424   Call end time  1426   Discharge diagnosis  A/C resp. failure with hypoxia, LVH, BLL pneumonia vs atelectasis, CHRIS on CKD, COPD with aE, morbid obesity, hypothyroidism, fibromyalgia, chronic hypoxemia, anemia, mod. pulmonary HTN, A/C diastolic CHF   Meds reviewed with patient/caregiver?  Yes   Is the patient taking all medications as directed (includes completed medication regime)?  Yes   Comments regarding PCP  6/11/20   Has the patient kept scheduled appointments due by today?  Yes   What is the Home health agency?   [facility]   Pulse Ox monitoring  Intermittent   Pulse Ox device source  Patient   What is the patient's perception of their health status since discharge?  Returned to baseline/stable   Week 2 call completed?  Yes   Revoked  No further contact(revokes)-requires comment   Graduated/Revoked comments  Back baseline.          Nelsy Brooks RN

## 2020-07-10 ENCOUNTER — OFFICE VISIT (OUTPATIENT)
Dept: CARDIOLOGY | Facility: CLINIC | Age: 80
End: 2020-07-10

## 2020-07-10 VITALS
HEART RATE: 65 BPM | HEIGHT: 62 IN | BODY MASS INDEX: 30.18 KG/M2 | WEIGHT: 164 LBS | DIASTOLIC BLOOD PRESSURE: 79 MMHG | SYSTOLIC BLOOD PRESSURE: 134 MMHG

## 2020-07-10 DIAGNOSIS — I50.32 CHRONIC DIASTOLIC HEART FAILURE (HCC): Primary | ICD-10-CM

## 2020-07-10 PROCEDURE — 99442 PR PHYS/QHP TELEPHONE EVALUATION 11-20 MIN: CPT | Performed by: INTERNAL MEDICINE

## 2020-07-10 RX ORDER — ALLOPURINOL 100 MG/1
100 TABLET ORAL DAILY
COMMUNITY

## 2020-07-10 NOTE — PROGRESS NOTES
[facility]      Patient Name: Josephine Wolf  :1940  Age: 80 y.o.  Encounter Provider:  Kelton Isaac Jr, MD      Chief Complaint:   Chief Complaint   Patient presents with   • Hypertension     hospital follow up      This patient has consented to a telehealth visit via telephone. The visit was scheduled as a telephone visit to comply with patient safety concerns in accordance with CDC recommendations.  All vitals recorded within this visit are reported by the patient.  I spent 30 minutes in total including but not limited to the 15 minutes spent in direct conversation with this patient.       HPI  Josephine Wolf is a 80 y.o. female with history of COPD  (home oxygen use), hypertension, CKD and bipolar disorder. She presented to [facility] ED 20 with reports of shortness of breath and associated cough, chills and BLE edema. She was noted to be hypoxic upon EMS arrival to her home and placed on CPAP. She was febrile at 101 upon arrival to the ED. COVID testing was completed and negative. She was found to have CHRIS with presumed pneumonia and started on IV antibiotics. Nephrology followed for A/CKD diuretic management. An echocardiogram performed demonstarted normal LSVF with EF 67% with a grade I diastolic dysfunction and mild RSVP of 45.7. An echocardiogram recently performed in March was concerning for hypertrophic cardiomyopathy. He states she had seen Dr. La with [facility] back in  for hypertension but denies any significant cardiac history.     Repeat echocardiogram did not show any evidence of LVOT obstruction and the phenotype was not concerning for hypertrophic cardiomyopathy.  She did have an intra-cavitary gradient and was started on low-dose beta-blocker.  Since discharge she is felt better although has higher oxygen requirements.  She has no significant swelling and is tolerating beta-blocker well.  She does note exertional dyspnea  "which is been ongoing but she feels it is worse over the last 4 to 5 months.  She denies any chest pain or palpitations.  No dizziness or syncope.  She is responding well to Bumex therapy.  She is not particularly mindful of dietary sodium or volume intake.  Social and family history reviewed and are not pertinent to this clinic visit.    The following portions of the patient's history were reviewed and updated as appropriate: allergies, current medications, past family history, past medical history, past social history, past surgical history and problem list.      Review of Systems   Constitution: Positive for malaise/fatigue. Negative for chills and fever.   HENT: Negative for hoarse voice and sore throat.    Eyes: Negative for double vision and photophobia.   Cardiovascular: Positive for dyspnea on exertion. Negative for chest pain, leg swelling, near-syncope, orthopnea, palpitations, paroxysmal nocturnal dyspnea and syncope.   Respiratory: Positive for shortness of breath. Negative for cough and wheezing.    Skin: Negative for poor wound healing and rash.   Musculoskeletal: Negative for arthritis and joint swelling.   Gastrointestinal: Negative for bloating, abdominal pain, hematemesis and hematochezia.   Neurological: Negative for dizziness and focal weakness.   Psychiatric/Behavioral: Negative for depression and suicidal ideas.       OBJECTIVE:   Vital Signs  Vitals:    07/10/20 1007   BP: 134/79   Pulse: 65     Estimated body mass index is 30 kg/m² as calculated from the following:    Height as of this encounter: 157.5 cm (62\").    Weight as of this encounter: 74.4 kg (164 lb).    Physical Exam   Vitals reviewed.      Procedures          ASSESSMENT:     Chronic diastolic heart failure  Hypertension  COPD on home oxygen    PLAN OF CARE:     1. Chronic diastolic heart failure -volume status seems well controlled and she is tolerating beta-blocker well.  There was question of hypertrophic cardiomyopathy but I " believe her gradient is secondary to hyperdynamic ventricle.  Tolerating current medications well.  She needs to be more vigilant about low sodium diet and keeping daily volume to 2 L or less.  Long discussion about lifestyle modifications and she will continue to make improvements.  No medication changes today.  I want to see the patient back in 3 months.  2. Hypertension -fair control on today's measurement.  Twice daily log.  Sodium restriction as above.  3. Dyslipidemia    Return to clinic 3 months             Discharge Medications           Accurate as of July 10, 2020 10:17 AM. If you have any questions, ask your nurse or doctor.               Changes to Medications      Instructions Start Date   divalproex 250 MG 24 hr tablet  Commonly known as:  DEPAKOTE  What changed:  Another medication with the same name was removed. Continue taking this medication, and follow the directions you see here.  Changed by:  Kelton Isaac Jr, MD   500 mg, Oral, Every Evening         Continue These Medications      Instructions Start Date   allopurinol 100 MG tablet  Commonly known as:  ZYLOPRIM   100 mg, Oral, Daily      bumetanide 1 MG tablet  Commonly known as:  BUMEX   1 mg, Oral, Daily      DULoxetine 60 MG capsule  Commonly known as:  CYMBALTA   60 mg, Oral, 2 Times Daily      fluticasone 50 MCG/ACT nasal spray  Commonly known as:  FLONASE   1 spray, Nasal, Daily      gabapentin 300 MG capsule  Commonly known as:  NEURONTIN   300 mg, Oral, 3 Times Daily      Incruse Ellipta 62.5 MCG/INH aerosol powder   Generic drug:  Umeclidinium Bromide   1 puff, Inhalation, Daily      iron polysaccharides 150 MG capsule  Commonly known as:  NIFEREX   150 mg, Oral, Daily      levothyroxine 88 MCG tablet  Commonly known as:  SYNTHROID, LEVOTHROID   88 mcg, Oral, Every Morning Before Breakfast      melatonin 1 MG tablet   3 mg, Oral, Nightly      metoprolol succinate XL 25 MG 24 hr tablet  Commonly known as:  TOPROL-XL   12.5 mg, Oral,  Every 24 Hours Scheduled      potassium chloride 10 MEQ CR capsule  Commonly known as:  MICRO-K   10 mEq, Oral, Daily      vitamin B-12 1000 MCG tablet  Commonly known as:  CYANOCOBALAMIN   1,000 mcg, Oral, 2 Times Daily             Thank you for allowing me to participate in the care of your patient,      Sincerely,   Kelton Isaac Jr, MD  [facility]  07/10/20  10:17

## 2020-08-04 ENCOUNTER — APPOINTMENT (OUTPATIENT)
Dept: GENERAL RADIOLOGY | Facility: HOSPITAL | Age: 80
End: 2020-08-04

## 2020-08-04 ENCOUNTER — HOSPITAL ENCOUNTER (INPATIENT)
Facility: HOSPITAL | Age: 80
LOS: 3 days | Discharge: HOME HEALTH | End: 2020-08-08
Attending: EMERGENCY MEDICINE | Admitting: INTERNAL MEDICINE

## 2020-08-04 DIAGNOSIS — J44.1 COPD WITH ACUTE EXACERBATION (HCC): Primary | ICD-10-CM

## 2020-08-04 DIAGNOSIS — R06.89 HYPERCAPNIA: ICD-10-CM

## 2020-08-04 LAB
ARTERIAL PATENCY WRIST A: POSITIVE
ATMOSPHERIC PRESS: 750.7 MMHG
BASE EXCESS BLDA CALC-SCNC: 7.3 MMOL/L (ref 0–2)
BDY SITE: ABNORMAL
GAS FLOW AIRWAY: 3 LPM
HCO3 BLDA-SCNC: 34.2 MMOL/L (ref 22–28)
MODALITY: ABNORMAL
PCO2 BLDA: 62.4 MM HG (ref 35–45)
PH BLDA: 7.35 PH UNITS (ref 7.35–7.45)
PO2 BLDA: 75.3 MM HG (ref 80–100)
SAO2 % BLDCOA: 93.5 % (ref 92–99)
TOTAL RATE: 22 BREATHS/MINUTE

## 2020-08-04 PROCEDURE — 82803 BLOOD GASES ANY COMBINATION: CPT

## 2020-08-04 PROCEDURE — 36600 WITHDRAWAL OF ARTERIAL BLOOD: CPT

## 2020-08-04 PROCEDURE — 93010 ELECTROCARDIOGRAM REPORT: CPT | Performed by: INTERNAL MEDICINE

## 2020-08-04 PROCEDURE — 71045 X-RAY EXAM CHEST 1 VIEW: CPT

## 2020-08-04 PROCEDURE — 99285 EMERGENCY DEPT VISIT HI MDM: CPT

## 2020-08-04 PROCEDURE — 93005 ELECTROCARDIOGRAM TRACING: CPT | Performed by: EMERGENCY MEDICINE

## 2020-08-04 PROCEDURE — 0202U NFCT DS 22 TRGT SARS-COV-2: CPT | Performed by: EMERGENCY MEDICINE

## 2020-08-04 RX ORDER — METHYLPREDNISOLONE SODIUM SUCCINATE 125 MG/2ML
125 INJECTION, POWDER, LYOPHILIZED, FOR SOLUTION INTRAMUSCULAR; INTRAVENOUS ONCE
Status: COMPLETED | OUTPATIENT
Start: 2020-08-04 | End: 2020-08-05

## 2020-08-04 RX ORDER — BUDESONIDE AND FORMOTEROL FUMARATE DIHYDRATE 80; 4.5 UG/1; UG/1
2 AEROSOL RESPIRATORY (INHALATION) ONCE
Status: COMPLETED | OUTPATIENT
Start: 2020-08-04 | End: 2020-08-05

## 2020-08-05 LAB
ALBUMIN SERPL-MCNC: 3.9 G/DL (ref 3.5–5.2)
ALBUMIN/GLOB SERPL: 1.5 G/DL
ALP SERPL-CCNC: 59 U/L (ref 39–117)
ALT SERPL W P-5'-P-CCNC: 6 U/L (ref 1–33)
ANION GAP SERPL CALCULATED.3IONS-SCNC: 9.1 MMOL/L (ref 5–15)
AST SERPL-CCNC: 11 U/L (ref 1–32)
B PARAPERT DNA SPEC QL NAA+PROBE: NOT DETECTED
B PERT DNA SPEC QL NAA+PROBE: NOT DETECTED
BASOPHILS # BLD AUTO: 0.03 10*3/MM3 (ref 0–0.2)
BASOPHILS NFR BLD AUTO: 0.8 % (ref 0–1.5)
BILIRUB SERPL-MCNC: 0.2 MG/DL (ref 0–1.2)
BUN SERPL-MCNC: 24 MG/DL (ref 8–23)
BUN/CREAT SERPL: 15.6 (ref 7–25)
C PNEUM DNA NPH QL NAA+NON-PROBE: NOT DETECTED
CALCIUM SPEC-SCNC: 9 MG/DL (ref 8.6–10.5)
CHLORIDE SERPL-SCNC: 98 MMOL/L (ref 98–107)
CO2 SERPL-SCNC: 33.9 MMOL/L (ref 22–29)
CREAT SERPL-MCNC: 1.54 MG/DL (ref 0.57–1)
D-LACTATE SERPL-SCNC: 1.3 MMOL/L (ref 0.5–2)
DEPRECATED RDW RBC AUTO: 54.3 FL (ref 37–54)
EOSINOPHIL # BLD AUTO: 0.07 10*3/MM3 (ref 0–0.4)
EOSINOPHIL NFR BLD AUTO: 1.8 % (ref 0.3–6.2)
ERYTHROCYTE [DISTWIDTH] IN BLOOD BY AUTOMATED COUNT: 14.3 % (ref 12.3–15.4)
FLUAV H1 2009 PAND RNA NPH QL NAA+PROBE: NOT DETECTED
FLUAV H1 HA GENE NPH QL NAA+PROBE: NOT DETECTED
FLUAV H3 RNA NPH QL NAA+PROBE: NOT DETECTED
FLUAV SUBTYP SPEC NAA+PROBE: NOT DETECTED
FLUBV RNA ISLT QL NAA+PROBE: NOT DETECTED
GFR SERPL CREATININE-BSD FRML MDRD: 33 ML/MIN/1.73
GLOBULIN UR ELPH-MCNC: 2.6 GM/DL
GLUCOSE BLDC GLUCOMTR-MCNC: 174 MG/DL (ref 70–130)
GLUCOSE BLDC GLUCOMTR-MCNC: 182 MG/DL (ref 70–130)
GLUCOSE BLDC GLUCOMTR-MCNC: 185 MG/DL (ref 70–130)
GLUCOSE BLDC GLUCOMTR-MCNC: 193 MG/DL (ref 70–130)
GLUCOSE SERPL-MCNC: 122 MG/DL (ref 65–99)
HADV DNA SPEC NAA+PROBE: NOT DETECTED
HCOV 229E RNA SPEC QL NAA+PROBE: NOT DETECTED
HCOV HKU1 RNA SPEC QL NAA+PROBE: NOT DETECTED
HCOV NL63 RNA SPEC QL NAA+PROBE: NOT DETECTED
HCOV OC43 RNA SPEC QL NAA+PROBE: NOT DETECTED
HCT VFR BLD AUTO: 28.7 % (ref 34–46.6)
HGB BLD-MCNC: 9.3 G/DL (ref 12–15.9)
HMPV RNA NPH QL NAA+NON-PROBE: NOT DETECTED
HPIV1 RNA SPEC QL NAA+PROBE: NOT DETECTED
HPIV2 RNA SPEC QL NAA+PROBE: NOT DETECTED
HPIV3 RNA NPH QL NAA+PROBE: NOT DETECTED
HPIV4 P GENE NPH QL NAA+PROBE: NOT DETECTED
IMM GRANULOCYTES # BLD AUTO: 0.09 10*3/MM3 (ref 0–0.05)
IMM GRANULOCYTES NFR BLD AUTO: 2.3 % (ref 0–0.5)
LYMPHOCYTES # BLD AUTO: 1.34 10*3/MM3 (ref 0.7–3.1)
LYMPHOCYTES NFR BLD AUTO: 33.9 % (ref 19.6–45.3)
M PNEUMO IGG SER IA-ACNC: NOT DETECTED
MCH RBC QN AUTO: 33.3 PG (ref 26.6–33)
MCHC RBC AUTO-ENTMCNC: 32.4 G/DL (ref 31.5–35.7)
MCV RBC AUTO: 102.9 FL (ref 79–97)
MONOCYTES # BLD AUTO: 0.44 10*3/MM3 (ref 0.1–0.9)
MONOCYTES NFR BLD AUTO: 11.1 % (ref 5–12)
NEUTROPHILS NFR BLD AUTO: 1.98 10*3/MM3 (ref 1.7–7)
NEUTROPHILS NFR BLD AUTO: 50.1 % (ref 42.7–76)
NRBC BLD AUTO-RTO: 0.3 /100 WBC (ref 0–0.2)
NT-PROBNP SERPL-MCNC: 245.5 PG/ML (ref 0–1800)
PLATELET # BLD AUTO: 147 10*3/MM3 (ref 140–450)
PMV BLD AUTO: 9.4 FL (ref 6–12)
POTASSIUM SERPL-SCNC: 3.8 MMOL/L (ref 3.5–5.2)
PROCALCITONIN SERPL-MCNC: 0.07 NG/ML (ref 0–0.25)
PROT SERPL-MCNC: 6.5 G/DL (ref 6–8.5)
RBC # BLD AUTO: 2.79 10*6/MM3 (ref 3.77–5.28)
RHINOVIRUS RNA SPEC NAA+PROBE: NOT DETECTED
RSV RNA NPH QL NAA+NON-PROBE: NOT DETECTED
SARS-COV-2 RNA NPH QL NAA+NON-PROBE: NOT DETECTED
SODIUM SERPL-SCNC: 141 MMOL/L (ref 136–145)
TROPONIN T SERPL-MCNC: <0.01 NG/ML (ref 0–0.03)
VALPROATE SERPL-MCNC: 80 MCG/ML (ref 50–125)
WBC # BLD AUTO: 3.95 10*3/MM3 (ref 3.4–10.8)

## 2020-08-05 PROCEDURE — 85025 COMPLETE CBC W/AUTO DIFF WBC: CPT | Performed by: EMERGENCY MEDICINE

## 2020-08-05 PROCEDURE — 83880 ASSAY OF NATRIURETIC PEPTIDE: CPT | Performed by: EMERGENCY MEDICINE

## 2020-08-05 PROCEDURE — 83605 ASSAY OF LACTIC ACID: CPT | Performed by: EMERGENCY MEDICINE

## 2020-08-05 PROCEDURE — 80164 ASSAY DIPROPYLACETIC ACD TOT: CPT | Performed by: INTERNAL MEDICINE

## 2020-08-05 PROCEDURE — 84145 PROCALCITONIN (PCT): CPT | Performed by: EMERGENCY MEDICINE

## 2020-08-05 PROCEDURE — 82962 GLUCOSE BLOOD TEST: CPT

## 2020-08-05 PROCEDURE — 25010000002 ENOXAPARIN PER 10 MG: Performed by: INTERNAL MEDICINE

## 2020-08-05 PROCEDURE — 94640 AIRWAY INHALATION TREATMENT: CPT

## 2020-08-05 PROCEDURE — 94799 UNLISTED PULMONARY SVC/PX: CPT

## 2020-08-05 PROCEDURE — 25010000002 METHYLPREDNISOLONE PER 40 MG: Performed by: INTERNAL MEDICINE

## 2020-08-05 PROCEDURE — 80053 COMPREHEN METABOLIC PANEL: CPT | Performed by: EMERGENCY MEDICINE

## 2020-08-05 PROCEDURE — 25010000002 METHYLPREDNISOLONE PER 125 MG: Performed by: EMERGENCY MEDICINE

## 2020-08-05 PROCEDURE — 84484 ASSAY OF TROPONIN QUANT: CPT | Performed by: EMERGENCY MEDICINE

## 2020-08-05 RX ORDER — DIVALPROEX SODIUM 250 MG/1
250 TABLET, EXTENDED RELEASE ORAL EVERY MORNING
Status: DISCONTINUED | OUTPATIENT
Start: 2020-08-05 | End: 2020-08-08 | Stop reason: HOSPADM

## 2020-08-05 RX ORDER — DIVALPROEX SODIUM 250 MG/1
250 TABLET, EXTENDED RELEASE ORAL EVERY MORNING
Status: ON HOLD | COMMUNITY
End: 2022-09-29

## 2020-08-05 RX ORDER — ACETAMINOPHEN 325 MG/1
650 TABLET ORAL EVERY 4 HOURS PRN
Status: DISCONTINUED | OUTPATIENT
Start: 2020-08-05 | End: 2020-08-08 | Stop reason: HOSPADM

## 2020-08-05 RX ORDER — DIVALPROEX SODIUM 500 MG/1
500 TABLET, EXTENDED RELEASE ORAL EVERY EVENING
Status: DISCONTINUED | OUTPATIENT
Start: 2020-08-05 | End: 2020-08-05

## 2020-08-05 RX ORDER — DIVALPROEX SODIUM 500 MG/1
500 TABLET, EXTENDED RELEASE ORAL EVERY EVENING
Status: DISCONTINUED | OUTPATIENT
Start: 2020-08-05 | End: 2020-08-08 | Stop reason: HOSPADM

## 2020-08-05 RX ORDER — LEVOTHYROXINE SODIUM 88 UG/1
88 TABLET ORAL
Status: DISCONTINUED | OUTPATIENT
Start: 2020-08-05 | End: 2020-08-08 | Stop reason: HOSPADM

## 2020-08-05 RX ORDER — IPRATROPIUM BROMIDE AND ALBUTEROL SULFATE 2.5; .5 MG/3ML; MG/3ML
3 SOLUTION RESPIRATORY (INHALATION)
Status: DISCONTINUED | OUTPATIENT
Start: 2020-08-05 | End: 2020-08-08 | Stop reason: HOSPADM

## 2020-08-05 RX ORDER — SODIUM CHLORIDE 0.9 % (FLUSH) 0.9 %
10 SYRINGE (ML) INJECTION EVERY 12 HOURS SCHEDULED
Status: DISCONTINUED | OUTPATIENT
Start: 2020-08-05 | End: 2020-08-08 | Stop reason: HOSPADM

## 2020-08-05 RX ORDER — ALUMINA, MAGNESIA, AND SIMETHICONE 2400; 2400; 240 MG/30ML; MG/30ML; MG/30ML
15 SUSPENSION ORAL EVERY 6 HOURS PRN
Status: DISCONTINUED | OUTPATIENT
Start: 2020-08-05 | End: 2020-08-08 | Stop reason: HOSPADM

## 2020-08-05 RX ORDER — DIVALPROEX SODIUM 500 MG/1
500 TABLET, EXTENDED RELEASE ORAL EVERY EVENING
Status: ON HOLD | COMMUNITY
End: 2021-10-19

## 2020-08-05 RX ORDER — ACETAMINOPHEN 325 MG/1
650 TABLET ORAL EVERY 6 HOURS PRN
Status: ON HOLD | COMMUNITY
End: 2021-10-19

## 2020-08-05 RX ORDER — BUMETANIDE 1 MG/1
1 TABLET ORAL DAILY
Status: DISCONTINUED | OUTPATIENT
Start: 2020-08-05 | End: 2020-08-08 | Stop reason: HOSPADM

## 2020-08-05 RX ORDER — DIVALPROEX SODIUM 250 MG/1
250 TABLET, EXTENDED RELEASE ORAL EVERY EVENING
Status: DISCONTINUED | OUTPATIENT
Start: 2020-08-05 | End: 2020-08-05

## 2020-08-05 RX ORDER — UREA 10 %
3 LOTION (ML) TOPICAL NIGHTLY
Status: DISCONTINUED | OUTPATIENT
Start: 2020-08-05 | End: 2020-08-08 | Stop reason: HOSPADM

## 2020-08-05 RX ORDER — METHYLPREDNISOLONE SODIUM SUCCINATE 40 MG/ML
40 INJECTION, POWDER, LYOPHILIZED, FOR SOLUTION INTRAMUSCULAR; INTRAVENOUS EVERY 12 HOURS
Status: DISCONTINUED | OUTPATIENT
Start: 2020-08-05 | End: 2020-08-07

## 2020-08-05 RX ORDER — IPRATROPIUM BROMIDE AND ALBUTEROL SULFATE 2.5; .5 MG/3ML; MG/3ML
3 SOLUTION RESPIRATORY (INHALATION) EVERY 4 HOURS PRN
COMMUNITY

## 2020-08-05 RX ORDER — METOPROLOL SUCCINATE 25 MG/1
12.5 TABLET, EXTENDED RELEASE ORAL
Status: DISCONTINUED | OUTPATIENT
Start: 2020-08-05 | End: 2020-08-08 | Stop reason: HOSPADM

## 2020-08-05 RX ORDER — POTASSIUM CHLORIDE 750 MG/1
10 CAPSULE, EXTENDED RELEASE ORAL DAILY
Status: DISCONTINUED | OUTPATIENT
Start: 2020-08-05 | End: 2020-08-08 | Stop reason: HOSPADM

## 2020-08-05 RX ORDER — ALLOPURINOL 100 MG/1
100 TABLET ORAL DAILY
Status: DISCONTINUED | OUTPATIENT
Start: 2020-08-05 | End: 2020-08-08 | Stop reason: HOSPADM

## 2020-08-05 RX ORDER — SODIUM CHLORIDE 0.9 % (FLUSH) 0.9 %
10 SYRINGE (ML) INJECTION AS NEEDED
Status: DISCONTINUED | OUTPATIENT
Start: 2020-08-05 | End: 2020-08-08 | Stop reason: HOSPADM

## 2020-08-05 RX ADMIN — SODIUM CHLORIDE, PRESERVATIVE FREE 10 ML: 5 INJECTION INTRAVENOUS at 21:07

## 2020-08-05 RX ADMIN — DIVALPROEX SODIUM 500 MG: 500 TABLET, EXTENDED RELEASE ORAL at 18:17

## 2020-08-05 RX ADMIN — METHYLPREDNISOLONE SODIUM SUCCINATE 40 MG: 40 INJECTION, POWDER, FOR SOLUTION INTRAMUSCULAR; INTRAVENOUS at 14:44

## 2020-08-05 RX ADMIN — IPRATROPIUM BROMIDE AND ALBUTEROL SULFATE 3 ML: 2.5; .5 SOLUTION RESPIRATORY (INHALATION) at 08:47

## 2020-08-05 RX ADMIN — ALLOPURINOL 100 MG: 100 TABLET ORAL at 09:22

## 2020-08-05 RX ADMIN — ACETAMINOPHEN 650 MG: 325 TABLET, FILM COATED ORAL at 18:17

## 2020-08-05 RX ADMIN — IPRATROPIUM BROMIDE AND ALBUTEROL SULFATE 3 ML: 2.5; .5 SOLUTION RESPIRATORY (INHALATION) at 14:50

## 2020-08-05 RX ADMIN — DIVALPROEX SODIUM 250 MG: 250 TABLET, EXTENDED RELEASE ORAL at 09:23

## 2020-08-05 RX ADMIN — BUMETANIDE 1 MG: 1 TABLET ORAL at 09:22

## 2020-08-05 RX ADMIN — Medication 3 MG: at 21:04

## 2020-08-05 RX ADMIN — METHYLPREDNISOLONE SODIUM SUCCINATE 125 MG: 125 INJECTION, POWDER, FOR SOLUTION INTRAMUSCULAR; INTRAVENOUS at 01:08

## 2020-08-05 RX ADMIN — BUDESONIDE AND FORMOTEROL FUMARATE DIHYDRATE 2 PUFF: 80; 4.5 AEROSOL RESPIRATORY (INHALATION) at 00:14

## 2020-08-05 RX ADMIN — METOPROLOL SUCCINATE 12.5 MG: 25 TABLET, EXTENDED RELEASE ORAL at 09:22

## 2020-08-05 RX ADMIN — LEVOTHYROXINE SODIUM 88 MCG: 88 TABLET ORAL at 09:22

## 2020-08-05 RX ADMIN — ENOXAPARIN SODIUM 30 MG: 30 INJECTION SUBCUTANEOUS at 09:23

## 2020-08-05 RX ADMIN — IPRATROPIUM BROMIDE AND ALBUTEROL SULFATE 3 ML: 2.5; .5 SOLUTION RESPIRATORY (INHALATION) at 11:08

## 2020-08-05 RX ADMIN — SODIUM CHLORIDE, PRESERVATIVE FREE 10 ML: 5 INJECTION INTRAVENOUS at 09:27

## 2020-08-05 RX ADMIN — POTASSIUM CHLORIDE 10 MEQ: 10 CAPSULE, COATED, EXTENDED RELEASE ORAL at 09:23

## 2020-08-05 NOTE — ED PROVIDER NOTES
EMERGENCY DEPARTMENT ENCOUNTER    Room Number:  12/12  Date of encounter:  8/5/2020  PCP: Bill Martino MD  Historian: Patient      HPI:  Chief Complaint: Shortness of breath  A complete HPI/ROS/PMH/PSH/SH/FH are unobtainable due to: None    Context: Josephine Wolf is a 80 y.o. female who presents to the ED c/o moderate severity shortness of breath over the last few days which is worsening.  It is worse with activity, and she says that when she sits down she just instantly falls asleep.  She says the last time this happened her CO2 was high and she had to be admitted to hospital.    Patient has history of COPD and is on 2 L of oxygen at home.  She is not a smoker and is increased her oxygen to 3 L at home.  She is had a little bit of a nonproductive cough, no fever, no chest pain.    Patient is had no weight gain or extremity swelling.  She is had no COVID-19 exposures.      PAST MEDICAL HISTORY  Active Ambulatory Problems     Diagnosis Date Noted   • Anemia 10/19/2017   • OA (osteoarthritis) of knee 11/16/2017   • Chronic respiratory failure with hypoxia (CMS/[facility]) 12/02/2017   • Cellulitis of skin 12/06/2017   • Acute kidney injury (CMS/[facility]) 12/06/2017   • Sepsis (CMS/[facility]) 12/06/2017   • Orthostatic hypotension 06/24/2018   • Disease of thyroid gland 06/24/2018   • COPD with acute exacerbation (CMS/[facility]) 06/24/2018   • Hypertension 06/24/2018   • Dehydration 06/24/2018   • Stage 3 chronic kidney disease (CMS/HCC) 06/25/2018   • Closed compression fracture of L1 lumbar vertebra 06/16/2019   • Hyponatremia 06/16/2019   • Osteoporosis with pathological fracture 06/17/2019   • Acute on chronic respiratory failure with hypoxia and hypercapnia (CMS/HCC) 03/12/2020   • Obesity (BMI 30-39.9) 03/12/2020   • Nocturnal hypoxia 03/13/2020   • CKD (chronic kidney disease) stage 2, GFR 60-89 ml/min 03/13/2020   • Acute on chronic respiratory failure with hypoxia (CMS/HCC) 05/20/2020     Resolved Ambulatory Problems      Diagnosis Date Noted   • No Resolved Ambulatory Problems     Past Medical History:   Diagnosis Date   • Acid reflux    • Arthritis    • Bipolar 1 disorder, depressed (CMS/[facility])    • Cataract    • Chronic nausea    • Chronic pain of right knee    • Continuous leakage of urine    • COPD (chronic obstructive pulmonary disease) (CMS/HCC)    • Diverticulosis    • Fibromyalgia    • Frequent episodes of bronchitis    • Hypothyroidism    • Migraines    • Neck pain    • On home oxygen therapy    • Short of breath on exertion          PAST SURGICAL HISTORY  Past Surgical History:   Procedure Laterality Date   • CEREBRAL ANEURYSM REPAIR      WITH STENT   • HYSTERECTOMY     • LAPAROSCOPIC CHOLECYSTECTOMY     • DE TOTAL KNEE ARTHROPLASTY Right 2017    Procedure: RT TOTAL KNEE ARTHROPLASTY;  Surgeon: Kashif Perez MD;  Location: [facility];  Service: Orthopedics         FAMILY HISTORY  Family History   Problem Relation Age of Onset   • Malig Hyperthermia Neg Hx          SOCIAL HISTORY  Social History     Socioeconomic History   • Marital status:      Spouse name: Not on file   • Number of children: Not on file   • Years of education: Not on file   • Highest education level: Not on file   Tobacco Use   • Smoking status: Former Smoker     Packs/day: 1.00     Years: 10.00     Pack years: 10.00     Types: Cigarettes     Start date:      Last attempt to quit:      Years since quittin.6   • Smokeless tobacco: Never Used   Substance and Sexual Activity   • Alcohol use: No   • Drug use: No   • Sexual activity: Defer         ALLERGIES  Morphine and related        REVIEW OF SYSTEMS  Review of Systems     All systems reviewed and negative except for those discussed in HPI.       PHYSICAL EXAM    I have reviewed the triage vital signs and nursing notes.    ED Triage Vitals [20 2251]   Temp Heart Rate Resp BP SpO2   99.4 °F (37.4 °C) 68 22 136/83 94 %      Temp src Heart Rate Source Patient  Position BP Location FiO2 (%)   Tympanic Monitor Sitting Right arm --       Physical Exam  GENERAL: Mild distress  HENT: nares patent, no JVD  EYES: no scleral icterus  CV: regular rhythm, regular rate  RESPIRATORY: Mild tachypnea, diffuse wheezes and diminished breath sounds throughout.  No accessory muscle use  ABDOMEN: soft  MUSCULOSKELETAL: no deformity, no calf tenderness or pedal edema.  Distal pulses symmetric and intact  NEURO: alert, moves all extremities, follows commands  SKIN: warm, dry        LAB RESULTS  Recent Results (from the past 24 hour(s))   Blood Gas, Arterial    Collection Time: 08/04/20 11:23 PM   Result Value Ref Range    Site Arterial: right radial     Zaki's Test Positive     pH, Arterial 7.346 (L) 7.350 - 7.450 pH units    pCO2, Arterial 62.4 (C) 35.0 - 45.0 mm Hg    pO2, Arterial 75.3 (L) 80.0 - 100.0 mm Hg    HCO3, Arterial 34.2 (H) 22.0 - 28.0 mmol/L    Base Excess, Arterial 7.3 (H) 0.0 - 2.0 mmol/L    O2 Saturation Calculated 93.5 92.0 - 99.0 %    Barometric Pressure for Blood Gas 750.7 mmHg    Modality Cannula     Flow Rate 3 lpm    Rate 22 Breaths/minute   Respiratory Panel PCR w/COVID-19(SARS-CoV-2) ANISH/VALERIA/MAXIMILIAN In-House, NP Swab [facility], 3-4 Hr TAT - Swab, Nasopharynx    Collection Time: 08/04/20 11:47 PM   Result Value Ref Range    ADENOVIRUS, PCR Not Detected Not Detected    Coronavirus 229E Not Detected Not Detected    Coronavirus HKU1 Not Detected Not Detected    Coronavirus NL63 Not Detected Not Detected    Coronavirus OC43 Not Detected Not Detected    COVID19 Not Detected Not Detected - Ref. Range    Human Metapneumovirus Not Detected Not Detected    Human Rhinovirus/Enterovirus Not Detected Not Detected    Influenza A PCR Not Detected Not Detected    Influenza A H1 Not Detected Not Detected    Influenza A H1 2009 PCR Not Detected Not Detected    Influenza A H3 Not Detected Not Detected    Influenza B PCR Not Detected Not Detected    Parainfluenza Virus 1 Not Detected Not  Detected    Parainfluenza Virus 2 Not Detected Not Detected    Parainfluenza Virus 3 Not Detected Not Detected    Parainfluenza Virus 4 Not Detected Not Detected    RSV, PCR Not Detected Not Detected    Bordetella pertussis pcr Not Detected Not Detected    Bordetella parapertussis PCR Not Detected Not Detected    Chlamydophila pneumoniae PCR Not Detected Not Detected    Mycoplasma pneumo by PCR Not Detected Not Detected   Comprehensive Metabolic Panel    Collection Time: 08/05/20 12:25 AM   Result Value Ref Range    Glucose 122 (H) 65 - 99 mg/dL    BUN 24 (H) 8 - 23 mg/dL    Creatinine 1.54 (H) 0.57 - 1.00 mg/dL    Sodium 141 136 - 145 mmol/L    Potassium 3.8 3.5 - 5.2 mmol/L    Chloride 98 98 - 107 mmol/L    CO2 33.9 (H) 22.0 - 29.0 mmol/L    Calcium 9.0 8.6 - 10.5 mg/dL    Total Protein 6.5 6.0 - 8.5 g/dL    Albumin 3.90 3.50 - 5.20 g/dL    ALT (SGPT) 6 1 - 33 U/L    AST (SGOT) 11 1 - 32 U/L    Alkaline Phosphatase 59 39 - 117 U/L    Total Bilirubin 0.2 0.0 - 1.2 mg/dL    eGFR Non African Amer 33 (L) >60 mL/min/1.73    Globulin 2.6 gm/dL    A/G Ratio 1.5 g/dL    BUN/Creatinine Ratio 15.6 7.0 - 25.0    Anion Gap 9.1 5.0 - 15.0 mmol/L   Procalcitonin    Collection Time: 08/05/20 12:25 AM   Result Value Ref Range    Procalcitonin 0.07 0.00 - 0.25 ng/mL   Lactic Acid, Plasma    Collection Time: 08/05/20 12:25 AM   Result Value Ref Range    Lactate 1.3 0.5 - 2.0 mmol/L   Troponin    Collection Time: 08/05/20 12:25 AM   Result Value Ref Range    Troponin T <0.010 0.000 - 0.030 ng/mL   BNP    Collection Time: 08/05/20 12:25 AM   Result Value Ref Range    proBNP 245.5 0.0-1,800.0 pg/mL   CBC Auto Differential    Collection Time: 08/05/20 12:25 AM   Result Value Ref Range    WBC 3.95 3.40 - 10.80 10*3/mm3    RBC 2.79 (L) 3.77 - 5.28 10*6/mm3    Hemoglobin 9.3 (L) 12.0 - 15.9 g/dL    Hematocrit 28.7 (L) 34.0 - 46.6 %    .9 (H) 79.0 - 97.0 fL    MCH 33.3 (H) 26.6 - 33.0 pg    MCHC 32.4 31.5 - 35.7 g/dL    RDW 14.3  12.3 - 15.4 %    RDW-SD 54.3 (H) 37.0 - 54.0 fl    MPV 9.4 6.0 - 12.0 fL    Platelets 147 140 - 450 10*3/mm3    Neutrophil % 50.1 42.7 - 76.0 %    Lymphocyte % 33.9 19.6 - 45.3 %    Monocyte % 11.1 5.0 - 12.0 %    Eosinophil % 1.8 0.3 - 6.2 %    Basophil % 0.8 0.0 - 1.5 %    Immature Grans % 2.3 (H) 0.0 - 0.5 %    Neutrophils, Absolute 1.98 1.70 - 7.00 10*3/mm3    Lymphocytes, Absolute 1.34 0.70 - 3.10 10*3/mm3    Monocytes, Absolute 0.44 0.10 - 0.90 10*3/mm3    Eosinophils, Absolute 0.07 0.00 - 0.40 10*3/mm3    Basophils, Absolute 0.03 0.00 - 0.20 10*3/mm3    Immature Grans, Absolute 0.09 (H) 0.00 - 0.05 10*3/mm3    nRBC 0.3 (H) 0.0 - 0.2 /100 WBC       Ordered the above labs and independently reviewed the results.        RADIOLOGY  Xr Chest 1 View    Result Date: 8/4/2020  PORTABLE CHEST 04/20/2020 AT 11:00 PM  CLINICAL HISTORY: Dyspnea  Compared to the previous chest dated 05/20/2020.  The lungs are slightly better inflated. There is mild bibasilar atelectasis that appears improved no acute focal infiltrates are identified. There are no pleural effusions. The heart is borderline enlarged and unchanged.  IMPRESSIONS: Poor lung expansion. No evidence of acute disease within the chest.  This report was finalized on 8/4/2020 11:47 PM by Dr. Chino Tran M.D.        I ordered the above noted radiological studies. Reviewed by me and discussed with radiologist.  See dictation for official radiology interpretation.      PROCEDURES    Procedures      MEDICATIONS GIVEN IN ER    Medications   methylPREDNISolone sodium succinate (SOLU-Medrol) injection 125 mg (125 mg Intravenous Given 8/5/20 0108)   budesonide-formoterol (SYMBICORT) 80-4.5 MCG/ACT inhaler 2 puff (2 puffs Inhalation Given 8/5/20 0014)         PROGRESS, DATA ANALYSIS, CONSULTS, AND MEDICAL DECISION MAKING    All labs have been independently reviewed by me.  All radiology studies have been reviewed by me and discussed with radiologist dictating the report.    EKG's independently viewed and interpreted by me.  Discussion below represents my analysis of pertinent findings related to patient's condition, differential diagnosis, treatment plan and final disposition.        ED Course as of Aug 05 0402   Wed Aug 05, 2020   0359 CBC shows normal white count, stable chronic anemia    [DP]   0400 Chemistry shows mild to moderate stable CKD and slightly elevated CO2    [DP]   0400 Troponin and proBNP normal    [DP]   0400 Procalcitonin lactate negative    [DP]   0400 Respiratory PCR including COVID-19 negative    [DP]   0400 Chest x-ray shows hypoinflation but no acute pulmonary infiltrates    [DP]   0400 ABG 7.35/60 2.4/70 5.3/34.2 on 3 L by nasal cannula\    Compared to previous ABGs in epic, including from her admission in May for COPD exacerbation, this is consistent with her admitting ABG and worse than baseline    [DP]   0401 Patient was given IV Solu-Medrol and Symbicort inhaler.  She has some mild improvement, but still felt quite dyspneic and was wheezing    [DP]   0401 Spoke with Dr. Camacho from pulmonary who agrees to admit the patient to a telemetry bed for further pulmonary management    [DP]   0401 EKG at 2303  Sinus rhythm at 74  Normal WA, LVH with strain.  No acute ST segment abnormalities to suggest ischemia and this is unchanged compared to May 20, 2020    [DP]      ED Course User Index  [DP] Chandan Swift MD           PPE: Both the patient and I wore a surgical mask throughout the entire patient encounter. I wore protective goggles.     AS OF 04:02 VITALS:    BP - 135/51  HR - 73  TEMP - 99.4 °F (37.4 °C) (Tympanic)  O2 SATS - 91%        DIAGNOSIS  Final diagnoses:   COPD with acute exacerbation (CMS/[facility])   Hypercapnia         DISPOSITION  Admit to telemetry           Chandan Swift MD  08/05/20 0402

## 2020-08-05 NOTE — H&P
[facility]    Patient Care Team:  Bill Martino MD as PCP - General (Internal Medicine)  Avi Aly MD as Consulting Physician (Nephrology)    CC: Increased confusion, weakness, shortness of breath    HPI: Patient is a 80-year-old obese white female with a past medical history significant for COPD, chronic hypoxic respiratory failure on 2 L nasal cannula, chronic kidney disease, chronic diastolic heart failure who sees Dr. Perea in the clinic who was admitted a couple of times earlier this year with mild COPD exacerbation and respiratory failure who comes to the ER complaining of increasing confusion, weakness and states that she falls asleep having a conversation.  She denies any worsening cough or sputum production.  Did have some shortness of breath but no obvious wheezing.  States that she is compliant with her inhaler regimen.  Work-up in the ER showed acute on chronic hypercapnic respiratory failure.  We have been asked admit the patient to the hospital for further management.    Patient states that she is compliant with her medications and has not taken any new sedative medications.  She does not use any long-term pain medication.  She was previously diagnosed with sleep apnea several years back and states that she could not tolerate CPAP at that point.    I have reviewed (if any available) last discharge summaries as well as the outpatient notes from the patients other consultants available in the [facility] system as well previous notes from our group and summarized above    Objective     I have discussed the case with ED physician     Records Reviewed  Old medical records.  Nursing notes.  Previous radiology studies.    Review of Systems:  Constitutional: No fever, chills, significant weight loss or loss of appetite.   ENMT: No sinus congestion, post nasal drip, epistaxis, sore throat  Cardiovascular: No chest pain, palpitation or legs swelling.    Respiratory: No  hemoptysis, orthopnea, PND.  Gastrointestinal: No constipation, diarrhea or abdominal pain   Neurology: No headache, focal weakness, numbness or dizziness.   Musculoskeletal: No joint stiffness or swelling.   Psychiatry: No agitation or behavioral changes  Lymphatic: No swollen neck glands.  Integumentary: No rash.    Past Medical History:   Diagnosis Date   • Acid reflux    • Acute kidney injury (CMS/HCC)    • Anemia    • Arthritis    • Bipolar 1 disorder, depressed (CMS/HCC)    • Cataract     MINDY   • Chronic nausea    • Chronic pain of right knee    • Continuous leakage of urine     USES DEPENDS   • COPD (chronic obstructive pulmonary disease) (CMS/HCC)     USES O2 2 LMP PER NC AT NIGHT   • Disease of thyroid gland     HYPOTHYROIDISM   • Diverticulosis    • Fibromyalgia     DX 1994   • Frequent episodes of bronchitis    • Hypertension    • Hypothyroidism    • Migraines    • Neck pain    • On home oxygen therapy     2L NC AT NIGHT   • Orthostatic hypotension    • Short of breath on exertion    • Stage 3 chronic kidney disease (CMS/HCC)        Past Surgical History:   Procedure Laterality Date   • CEREBRAL ANEURYSM REPAIR  2000'S    WITH STENT   • HYSTERECTOMY     • LAPAROSCOPIC CHOLECYSTECTOMY     • WI TOTAL KNEE ARTHROPLASTY Right 11/16/2017    Procedure: RT TOTAL KNEE ARTHROPLASTY;  Surgeon: Kashif Perez MD;  Location: [facility];  Service: Orthopedics       Prior to Admission Medications     Prescriptions Last Dose Informant Patient Reported? Taking?    allopurinol (ZYLOPRIM) 100 MG tablet   Yes No    Take 100 mg by mouth Daily.    bumetanide (BUMEX) 1 MG tablet   No No    Take 1 tablet by mouth Daily.    divalproex (DEPAKOTE) 250 MG 24 hr tablet  Pharmacy Yes No    Take 500 mg by mouth Every Evening.    DULoxetine (CYMBALTA) 60 MG capsule  Pharmacy Yes No    Take 60 mg by mouth 2 (Two) Times a Day.    fluticasone (FLONASE) 50 MCG/ACT nasal spray  Pharmacy Yes No    1 spray into the nostril(s) as  directed by provider Daily.    gabapentin (NEURONTIN) 300 MG capsule  Pharmacy Yes No    Take 300 mg by mouth 3 (Three) Times a Day.    iron polysaccharides (NIFEREX) 150 MG capsule   No No    Take 1 capsule by mouth Daily.    levothyroxine (SYNTHROID, LEVOTHROID) 88 MCG tablet  Pharmacy Yes No    Take 88 mcg by mouth Every Morning Before Breakfast.    melatonin 1 MG tablet  Self Yes No    Take 3 mg by mouth Every Night.    metoprolol succinate XL (TOPROL-XL) 25 MG 24 hr tablet   No No    Take 0.5 tablets by mouth Daily.    potassium chloride (MICRO-K) 10 MEQ CR capsule   No No    Take 1 capsule by mouth Daily.    Umeclidinium Bromide (INCRUSE ELLIPTA) 62.5 MCG/INH aerosol powder   Pharmacy Yes No    Inhale 1 puff Daily.    vitamin B-12 (CYANOCOBALAMIN) 1000 MCG tablet  Self Yes No    Take 1,000 mcg by mouth 2 (Two) Times a Day.          Morphine and related    Family History   Problem Relation Age of Onset   • Malig Hyperthermia Neg Hx        Social History     Socioeconomic History   • Marital status:      Spouse name: Not on file   • Number of children: Not on file   • Years of education: Not on file   • Highest education level: Not on file   Tobacco Use   • Smoking status: Former Smoker     Packs/day: 1.00     Years: 10.00     Pack years: 10.00     Types: Cigarettes     Start date:      Last attempt to quit:      Years since quittin.6   • Smokeless tobacco: Never Used   Substance and Sexual Activity   • Alcohol use: No   • Drug use: No   • Sexual activity: Defer       Physical Exam    Temp:  [99.4 °F (37.4 °C)] 99.4 °F (37.4 °C)  Heart Rate:  [68-81] 73  Resp:  [16-22] 16  BP: (108-153)/(51-83) 148/79    PHYSICAL EXAM   Constitutional: Middle aged pt in bed, No acute respiratory distress, no accessory muscle use  Head: - NCAT  Eyes: No pallor, Anicteric conjunctiva, EOMI.  ENMT:  Mallampati 4, no oral thrush. Moist MM.   NECK: Trachea midline, No thyromegaly, no palpable cervical  "LNpathy  Heart: RRR, no murmur. No pedal edema   Lungs: YAMINI +, distant breath sounds no wheezes/ crackles heard    Abdomen: Soft. No tenderness, guarding or rigidity. No palpable masses  Extremities: Extremities warm and well perfused. No cyanosis/ clubbing  Neuro: Conscious, answers appropriately but some drowsiness, no gross focal neuro deficits  Psych: Mood and affect appropriate    LABS and IMAGING DATA:    Lab Results (last 24 hours)     Procedure Component Value Units Date/Time    Procalcitonin [[id number]]  (Normal) Collected:  08/05/20 0025    Specimen:  Blood Updated:  08/05/20 0113     Procalcitonin 0.07 ng/mL     Narrative:       As a Marker for Sepsis (Non-Neonates):   1. <0.5 ng/mL represents a low risk of severe sepsis and/or septic shock.  1. >2 ng/mL represents a high risk of severe sepsis and/or septic shock.    As a Marker for Lower Respiratory Tract Infections that require antibiotic therapy:  PCT on Admission     Antibiotic Therapy             6-12 Hrs later  > 0.5                Strongly Recommended            >0.25 - <0.5         Recommended  0.1 - 0.25           Discouraged                   Remeasure/reassess PCT  <0.1                 Strongly Discouraged          Remeasure/reassess PCT      As 28 day mortality risk marker: \"Change in Procalcitonin Result\" (> 80 % or <=80 %) if Day 0 (or Day 1) and Day 4 values are available. Refer to http://www.[employer]-pct-calculator.com/   Change in PCT <=80 %   A decrease of PCT levels below or equal to 80 % defines a positive change in PCT test result representing a higher risk for 28-day all-cause mortality of patients diagnosed with severe sepsis or septic shock.  Change in PCT > 80 %   A decrease of PCT levels of more than 80 % defines a negative change in PCT result representing a lower risk for 28-day all-cause mortality of patients diagnosed with severe sepsis or septic shock.                Results may be falsely decreased if patient taking Biotin.  "    Comprehensive Metabolic Panel [[id number]]  (Abnormal) Collected:  08/05/20 0025    Specimen:  Blood Updated:  08/05/20 0106     Glucose 122 mg/dL      BUN 24 mg/dL      Creatinine 1.54 mg/dL      Sodium 141 mmol/L      Potassium 3.8 mmol/L      Chloride 98 mmol/L      CO2 33.9 mmol/L      Calcium 9.0 mg/dL      Total Protein 6.5 g/dL      Albumin 3.90 g/dL      ALT (SGPT) 6 U/L      AST (SGOT) 11 U/L      Alkaline Phosphatase 59 U/L      Total Bilirubin 0.2 mg/dL      eGFR Non African Amer 33 mL/min/1.73      Globulin 2.6 gm/dL      A/G Ratio 1.5 g/dL      BUN/Creatinine Ratio 15.6     Anion Gap 9.1 mmol/L     Narrative:       GFR Normal >60  Chronic Kidney Disease <60  Kidney Failure <15      Troponin [[id number]]  (Normal) Collected:  08/05/20 0025    Specimen:  Blood Updated:  08/05/20 0106     Troponin T <0.010 ng/mL     Narrative:       Troponin T Reference Range:  <= 0.03 ng/mL-   Negative for AMI  >0.03 ng/mL-     Abnormal for myocardial necrosis.  Clinicians would have to utilize clinical acumen, EKG, Troponin and serial changes to determine if it is an Acute Myocardial Infarction or myocardial injury due to an underlying chronic condition.       Results may be falsely decreased if patient taking Biotin.      BNP [[id number]]  (Normal) Collected:  08/05/20 0025    Specimen:  Blood Updated:  08/05/20 0104     proBNP 245.5 pg/mL     Narrative:       Among patients with dyspnea, NT-proBNP is highly sensitive for the detection of acute congestive heart failure. In addition NT-proBNP of <300 pg/ml effectively rules out acute congestive heart failure with 99% negative predictive value.    Results may be falsely decreased if patient taking Biotin.      Respiratory Panel PCR w/COVID-19(SARS-CoV-2) ANISH/VALERIA/MAXIMILIAN In-House, NP Swab in [facility], 3-4 Hr TAT - Swab, Nasopharynx [[id number]]  (Normal) Collected:  08/04/20 6237    Specimen:  Swab from Nasopharynx Updated:  08/05/20 0101     ADENOVIRUS, PCR Not Detected      Coronavirus 229E Not Detected     Coronavirus HKU1 Not Detected     Coronavirus NL63 Not Detected     Coronavirus OC43 Not Detected     COVID19 Not Detected     Human Metapneumovirus Not Detected     Human Rhinovirus/Enterovirus Not Detected     Influenza A PCR Not Detected     Influenza A H1 Not Detected     Influenza A H1 2009 PCR Not Detected     Influenza A H3 Not Detected     Influenza B PCR Not Detected     Parainfluenza Virus 1 Not Detected     Parainfluenza Virus 2 Not Detected     Parainfluenza Virus 3 Not Detected     Parainfluenza Virus 4 Not Detected     RSV, PCR Not Detected     Bordetella pertussis pcr Not Detected     Bordetella parapertussis PCR Not Detected     Chlamydophila pneumoniae PCR Not Detected     Mycoplasma pneumo by PCR Not Detected    Narrative:       Fact sheet for providers: https://docs.[employer]/wp-content/uploads/TZP7743-7212-VW2.1-EUA-Provider-Fact-Sheet-3.pdf    Fact sheet for patients: https://docs.[employer]/wp-content/uploads/ZLJ5356-9886-IJ3.1-EUA-Patient-Fact-Sheet-1.pdf    Lactic Acid, Plasma [[id number]]  (Normal) Collected:  08/05/20 0025    Specimen:  Blood Updated:  08/05/20 0053     Lactate 1.3 mmol/L     CBC & Differential [[id number]] Collected:  08/05/20 0025    Specimen:  Blood Updated:  08/05/20 0050    Narrative:       The following orders were created for panel order CBC & Differential.  Procedure                               Abnormality         Status                     ---------                               -----------         ------                     CBC Auto Differential[[id number]]        Abnormal            Final result                 Please view results for these tests on the individual orders.    CBC Auto Differential [[id number]]  (Abnormal) Collected:  08/05/20 0025    Specimen:  Blood Updated:  08/05/20 0050     WBC 3.95 10*3/mm3      RBC 2.79 10*6/mm3      Hemoglobin 9.3 g/dL      Hematocrit 28.7 %      .9 fL      MCH 33.3 pg      MCHC  32.4 g/dL      RDW 14.3 %      RDW-SD 54.3 fl      MPV 9.4 fL      Platelets 147 10*3/mm3      Neutrophil % 50.1 %      Lymphocyte % 33.9 %      Monocyte % 11.1 %      Eosinophil % 1.8 %      Basophil % 0.8 %      Immature Grans % 2.3 %      Neutrophils, Absolute 1.98 10*3/mm3      Lymphocytes, Absolute 1.34 10*3/mm3      Monocytes, Absolute 0.44 10*3/mm3      Eosinophils, Absolute 0.07 10*3/mm3      Basophils, Absolute 0.03 10*3/mm3      Immature Grans, Absolute 0.09 10*3/mm3      nRBC 0.3 /100 WBC     Blood Gas, Arterial [[id number]]  (Abnormal) Collected:  08/04/20 2323    Specimen:  Arterial Blood Updated:  08/04/20 2327     Site Arterial: right radial     Zaki's Test Positive     pH, Arterial 7.346 pH units      pCO2, Arterial 62.4 mm Hg      Comment: Critical:Notify Dr MITCHELL (04-Aug-20 23:25:31)Read back ok        pO2, Arterial 75.3 mm Hg      HCO3, Arterial 34.2 mmol/L      Base Excess, Arterial 7.3 mmol/L      O2 Saturation Calculated 93.5 %      Barometric Pressure for Blood Gas 750.7 mmHg      Modality Cannula     Flow Rate 3 lpm      Rate 22 Breaths/minute         Lab Results   Component Value Date    CKTOTAL 107 12/03/2017    CKTOTAL 106 12/03/2017    CKMB 2.62 12/03/2017    TROPONINT <0.010 08/05/2020         Results from last 7 days   Lab Units 08/05/20  0025   PROCALCITONIN ng/mL 0.07   LACTATE mmol/L 1.3         Results from last 7 days   Lab Units 08/04/20  2323   PH, ARTERIAL pH units 7.346*   PO2 ART mm Hg 75.3*   PCO2, ARTERIAL mm Hg 62.4*   HCO3 ART mmol/L 34.2*        I have personally reviewed the chest imaging from the last 3 days, agree with assessment of the radiologist and summarized it below    Assessment     ASSESSMENT:  Acute on chronic hypercapnic respiratory failure  Acute metabolic encephalopathy  Chronic hypoxic respiratory failure (2 L nasal cannula)  COPD mild exacerbation -Dr. Amato  Chronic diastolic heart failure  CKD 3  Chronic iron/B12 deficiency  anemia  Hypothyroidism  Chronic pain syndrome on Neurontin   Migraine headaches  FERNANDEZ previously intolerant to PA P  Morbid obesity  Depression/bipolar disorder    PLAN:  Patient's encephalopathy is likely from acute on chronic hypercapnia which is multifactorial due to baseline COPD and suspected undiagnosed sleep apnea with worsening now.  Will consider noninvasive ventilator given progressive worsening and chronic hypercapnia with recurrent admissions to the hospital.  We will try BiPAP tonight and consider assured volume ventilation if unsuccessful.    Patient not on any narcotics but does have Neurontin on board which is not helping as well.  Will get TSH to evaluate for control of hypothyroidism.  Will also get valproic acid level  COPD in mild exacerbation and will give steroids and bronchodilators for now and taper down quickly  Continue with supplemental oxygen to keep sats around 90%  Restart home medications  Continue with home dose diuretics.    I have discussed my findings and recommendations with patient and nursing staff.     Paul Barber MD  8/5/2020  04:20

## 2020-08-05 NOTE — PROGRESS NOTES
I spent ~15 minutes counseling the patient on COPD management and educating on COPD medications.      Counseling points included but were not limited to:      -  How to monitor for worsening COPD symptoms and what to do if these symptoms occur (I.e. Contact your primary care physician if you begin to notice: increased shortness of breath, using rescue inhaler more often, increased chest tightness/wheezing, increased cough/mucus production, fever/signs of infection)  - Education in regards to different types of triggers of COPD exacerbations (cleaning agents, weather, allergies, smoking etc)  -  The difference between as needed and maintenance COPD medications and why it is important to take the maintenance doses EVERY DAY  - The importance of contacting your primary care physician if the COPD medications are or become unaffordable instead of just stopping them    Patient was provided with [employer] Patient Guide to COPD book and directed to the medications they are currently taking.  The patient expressed understanding and did not have any further questions.    Kyara Phillips, Pharm.D., BCPS  8/5/2020  13:52

## 2020-08-05 NOTE — PAYOR COMM NOTE
"Raheem Roberts (80 y.o. Female)       PLEASE SEE ATTACHED CLINICAL REVIEW.     PLEASE CALL  OR  [phone] WITH INPT AUTH AND DAYS APPROVED.     THANK YOU    KAREN GAFFNEY LPN CCP    Date of Birth Social Security Number Address Home Phone MRN    1940  [id number]    [place] [address] [phone] [id number]    Episcopal Marital Status          Quaker        Admission Date Admission Type Admitting Provider Attending Provider Department, Room/Bed    8/4/20 Emergency Paul Barber MD Nidadavolu, Vinay Gopal, MD [address], [address]    Discharge Date Discharge Disposition Discharge Destination                       Attending Provider:  Paul Barber MD    Allergies:  Morphine And Related    Isolation:  None   Infection:  None   Code Status:  CPR    Ht:  157.5 cm (62\")   Wt:  80.7 kg (177 lb 14.4 oz)    Admission Cmt:  None   Principal Problem:  None                Active Insurance as of 8/4/2020     Primary Coverage     Payor Plan Insurance Group Employer/Plan Group    [employer] MED[place]ARE [place]PLAC[place] WELLCARE MEDICARE REPLACEMENT      Payor Plan Address Payor Plan Phone Number Payor Plan Fax Number Effective Dates    [address] [address] [phone]  10/9/2017 - None Entered    [facility] [address]       Subscriber Name Subscriber Birth Date Member ID       RAHEEM ROBERTS 1940 [id number]           Secondary Coverage     Payor Plan Insurance Group Employer/Plan Group    [place]      Payor Plan Address Payor Plan Phone Number Payor Plan Fax Number Effective Dates    [address] [phone]  7/3/2016 - None Entered    [facility] [id number]       Subscriber Name Subscriber Birth Date Member ID       RAHEEM ROBERTS 1940 [id number]                 Emergency Contacts      (Rel.) Home Phone Work Phone Mobile Phone    Chino Roberts (Son) [phone] -- --    " James Wolf (Son) [phone] -- [phone]               History & Physical      Paul Barber MD at 08/05/20 0420          [facility]    Patient Care Team:  Bill Martino MD as PCP - General (Internal Medicine)  Avi Aly MD as Consulting Physician (Nephrology)    CC: Increased confusion, weakness, shortness of breath    HPI: Patient is a 80-year-old obese white female with a past medical history significant for COPD, chronic hypoxic respiratory failure on 2 L nasal cannula, chronic kidney disease, chronic diastolic heart failure who sees Dr. Perea in the clinic who was admitted a couple of times earlier this year with mild COPD exacerbation and respiratory failure who comes to the ER complaining of increasing confusion, weakness and states that she falls asleep having a conversation.  She denies any worsening cough or sputum production.  Did have some shortness of breath but no obvious wheezing.  States that she is compliant with her inhaler regimen.  Work-up in the ER showed acute on chronic hypercapnic respiratory failure.  We have been asked admit the patient to the hospital for further management.    Patient states that she is compliant with her medications and has not taken any new sedative medications.  She does not use any long-term pain medication.  She was previously diagnosed with sleep apnea several years back and states that she could not tolerate CPAP at that point.    I have reviewed (if any available) last discharge summaries as well as the outpatient notes from the patients other consultants available in the [facility] system as well previous notes from our group and summarized above    Objective     I have discussed the case with ED physician     Records Reviewed  Old medical records.  Nursing notes.  Previous radiology studies.    Review of Systems:  Constitutional: No fever, chills, significant weight loss or loss of appetite.   ENMT: No sinus  congestion, post nasal drip, epistaxis, sore throat  Cardiovascular: No chest pain, palpitation or legs swelling.    Respiratory: No hemoptysis, orthopnea, PND.  Gastrointestinal: No constipation, diarrhea or abdominal pain   Neurology: No headache, focal weakness, numbness or dizziness.   Musculoskeletal: No joint stiffness or swelling.   Psychiatry: No agitation or behavioral changes  Lymphatic: No swollen neck glands.  Integumentary: No rash.    Past Medical History:   Diagnosis Date   • Acid reflux    • Acute kidney injury (CMS/HCC)    • Anemia    • Arthritis    • Bipolar 1 disorder, depressed (CMS/HCC)    • Cataract     MINDY   • Chronic nausea    • Chronic pain of right knee    • Continuous leakage of urine     USES DEPENDS   • COPD (chronic obstructive pulmonary disease) (CMS/HCC)     USES O2 2 LMP PER NC AT NIGHT   • Disease of thyroid gland     HYPOTHYROIDISM   • Diverticulosis    • Fibromyalgia     DX 1994   • Frequent episodes of bronchitis    • Hypertension    • Hypothyroidism    • Migraines    • Neck pain    • On home oxygen therapy     2L NC AT NIGHT   • Orthostatic hypotension    • Short of breath on exertion    • Stage 3 chronic kidney disease (CMS/HCC)        Past Surgical History:   Procedure Laterality Date   • CEREBRAL ANEURYSM REPAIR  2000'S    WITH STENT   • HYSTERECTOMY     • LAPAROSCOPIC CHOLECYSTECTOMY     • FL TOTAL KNEE ARTHROPLASTY Right 11/16/2017    Procedure: RT TOTAL KNEE ARTHROPLASTY;  Surgeon: Kashif Perez MD;  Location: [facility];  Service: Orthopedics       Prior to Admission Medications     Prescriptions Last Dose Informant Patient Reported? Taking?    allopurinol (ZYLOPRIM) 100 MG tablet   Yes No    Take 100 mg by mouth Daily.    bumetanide (BUMEX) 1 MG tablet   No No    Take 1 tablet by mouth Daily.    divalproex (DEPAKOTE) 250 MG 24 hr tablet  Pharmacy Yes No    Take 500 mg by mouth Every Evening.    DULoxetine (CYMBALTA) 60 MG capsule  Pharmacy Yes No    Take 60 mg by  mouth 2 (Two) Times a Day.    fluticasone (FLONASE) 50 MCG/ACT nasal spray  Pharmacy Yes No    1 spray into the nostril(s) as directed by provider Daily.    gabapentin (NEURONTIN) 300 MG capsule  Pharmacy Yes No    Take 300 mg by mouth 3 (Three) Times a Day.    iron polysaccharides (NIFEREX) 150 MG capsule   No No    Take 1 capsule by mouth Daily.    levothyroxine (SYNTHROID, LEVOTHROID) 88 MCG tablet  Pharmacy Yes No    Take 88 mcg by mouth Every Morning Before Breakfast.    melatonin 1 MG tablet  Self Yes No    Take 3 mg by mouth Every Night.    metoprolol succinate XL (TOPROL-XL) 25 MG 24 hr tablet   No No    Take 0.5 tablets by mouth Daily.    potassium chloride (MICRO-K) 10 MEQ CR capsule   No No    Take 1 capsule by mouth Daily.    Umeclidinium Bromide (INCRUSE ELLIPTA) 62.5 MCG/INH aerosol powder   Pharmacy Yes No    Inhale 1 puff Daily.    vitamin B-12 (CYANOCOBALAMIN) 1000 MCG tablet  Self Yes No    Take 1,000 mcg by mouth 2 (Two) Times a Day.          Morphine and related    Family History   Problem Relation Age of Onset   • Malig Hyperthermia Neg Hx        Social History     Socioeconomic History   • Marital status:      Spouse name: Not on file   • Number of children: Not on file   • Years of education: Not on file   • Highest education level: Not on file   Tobacco Use   • Smoking status: Former Smoker     Packs/day: 1.00     Years: 10.00     Pack years: 10.00     Types: Cigarettes     Start date:      Last attempt to quit:      Years since quittin.6   • Smokeless tobacco: Never Used   Substance and Sexual Activity   • Alcohol use: No   • Drug use: No   • Sexual activity: Defer       Physical Exam    Temp:  [99.4 °F (37.4 °C)] 99.4 °F (37.4 °C)  Heart Rate:  [68-81] 73  Resp:  [16-22] 16  BP: (108-153)/(51-83) 148/79    PHYSICAL EXAM   Constitutional: Middle aged pt in bed, No acute respiratory distress, no accessory muscle use  Head: - NCAT  Eyes: No pallor, Anicteric conjunctiva,  EOMI.  ENMT:  Mallampati  4, no oral thrush. Moist MM.   NECK: Trachea midline, No thyromegaly, no palpable cervical LNpathy  Heart: RRR, no murmur. No pedal edema   Lungs: YAMINI +, distant breath sounds no wheezes/ crackles heard    Abdomen: Soft. No tenderness, guarding or rigidity. No palpable masses  Extremities: Extremities warm and well perfused. No cyanosis/ clubbing  Neuro: Conscious, answers appropriately but some drowsiness, no gross focal neuro deficits  Psych: Mood and affect appropriate      I have personally reviewed the chest imaging from the last 3 days, agree with assessment of the radiologist and summarized it below    Assessment     ASSESSMENT:  Acute on chronic hypercapnic respiratory failure  Acute metabolic encephalopathy  Chronic hypoxic respiratory failure (2 L nasal cannula)  COPD mild exacerbation -Dr. Amato  Chronic diastolic heart failure  CKD 3  Chronic iron/B12 deficiency anemia  Hypothyroidism  Chronic pain syndrome on Neurontin   Migraine headaches  FERNANDEZ previously intolerant to PA P  Morbid obesity  Depression/bipolar disorder    PLAN:  Patient's encephalopathy is likely from acute on chronic hypercapnia which is multifactorial due to baseline COPD and suspected undiagnosed sleep apnea with worsening now.  Will consider noninvasive ventilator given progressive worsening and chronic hypercapnia with recurrent admissions to the hospital.  We will try BiPAP tonight and consider assured volume ventilation if unsuccessful.    Patient not on any narcotics but does have Neurontin on board which is not helping as well.  Will get TSH to evaluate for control of hypothyroidism.  Will also get valproic acid level  COPD in mild exacerbation and will give steroids and bronchodilators for now and taper down quickly  Continue with supplemental oxygen to keep sats around 90%  Restart home medications  Continue with home dose diuretics.    I have discussed my findings and recommendations with patient  and nursing staff.     Paul Barber MD  8/5/2020  04:20    Electronically signed by Paul Barber MD at 08/05/20 0526          Emergency Department Notes      Arianna Montana, RN at 08/04/20 2252        Pt to ED from home per [facility] with reports of hx of COPD, air trapping, and being sleepy today. Pt states when she become sleepy like this, it is because her CO2 levels are rising and she needs to be admitted to hospital.    All triage performed with this RN wearing appropriate PPE.  Pt placed in mask upon arrival to ED.    Electronically signed by Arianna Montana RN at 08/04/20 2253     Arianna Montana RN at 08/04/20 2255        Pt to  12, report given to Marcela GREWAL.     Arianna Montana RN  08/04/20 2255      Electronically signed by Arianna Montana RN at 08/04/20 2255     Chandan Swift MD at 08/04/20 2256           EMERGENCY DEPARTMENT ENCOUNTER    Room Number:  12/12  Date of encounter:  8/5/2020  PCP: Bill Martino MD  Historian: Patient      HPI:  Chief Complaint: Shortness of breath  A complete HPI/ROS/PMH/PSH/SH/FH are unobtainable due to: None    Context: Josephine Wolf is a 80 y.o. female who presents to the ED c/o moderate severity shortness of breath over the last few days which is worsening.  It is worse with activity, and she says that when she sits down she just instantly falls asleep.  She says the last time this happened her CO2 was high and she had to be admitted to hospital.    Patient has history of COPD and is on 2 L of oxygen at home.  She is not a smoker and is increased her oxygen to 3 L at home.  She is had a little bit of a nonproductive cough, no fever, no chest pain.    Patient is had no weight gain or extremity swelling.  She is had no COVID-19 exposures.      PAST MEDICAL HISTORY  Active Ambulatory Problems     Diagnosis Date Noted   • Anemia 10/19/2017   • OA (osteoarthritis) of knee 11/16/2017   • Chronic respiratory failure with hypoxia (CMS/HCC)  12/02/2017   • Cellulitis of skin 12/06/2017   • Acute kidney injury (CMS/[facility]) 12/06/2017   • Sepsis (CMS/[facility]) 12/06/2017   • Orthostatic hypotension 06/24/2018   • Disease of thyroid gland 06/24/2018   • COPD with acute exacerbation (CMS/[facility]) 06/24/2018   • Hypertension 06/24/2018   • Dehydration 06/24/2018   • Stage 3 chronic kidney disease (CMS/[facility]) 06/25/2018   • Closed compression fracture of L1 lumbar vertebra 06/16/2019   • Hyponatremia 06/16/2019   • Osteoporosis with pathological fracture 06/17/2019   • Acute on chronic respiratory failure with hypoxia and hypercapnia (CMS/[facility]) 03/12/2020   • Obesity (BMI 30-39.9) 03/12/2020   • Nocturnal hypoxia 03/13/2020   • CKD (chronic kidney disease) stage 2, GFR 60-89 ml/min 03/13/2020   • Acute on chronic respiratory failure with hypoxia (CMS/[facility]) 05/20/2020     Resolved Ambulatory Problems     Diagnosis Date Noted   • No Resolved Ambulatory Problems     Past Medical History:   Diagnosis Date   • Acid reflux    • Arthritis    • Bipolar 1 disorder, depressed (CMS/[facility])    • Cataract    • Chronic nausea    • Chronic pain of right knee    • Continuous leakage of urine    • COPD (chronic obstructive pulmonary disease) (CMS/[facility])    • Diverticulosis    • Fibromyalgia    • Frequent episodes of bronchitis    • Hypothyroidism    • Migraines    • Neck pain    • On home oxygen therapy    • Short of breath on exertion          PAST SURGICAL HISTORY  Past Surgical History:   Procedure Laterality Date   • CEREBRAL ANEURYSM REPAIR  2000'S    WITH STENT   • HYSTERECTOMY     • LAPAROSCOPIC CHOLECYSTECTOMY     • [place] TOTAL KNEE ARTHROPLASTY Right 11/16/2017    Procedure: RT TOTAL KNEE ARTHROPLASTY;  Surgeon: Kashif Perez MD;  Location: [facility];  Service: Orthopedics         FAMILY HISTORY  Family History   Problem Relation Age of Onset   • Malig Hyperthermia Neg Hx          SOCIAL HISTORY  Social History     Socioeconomic History   • Marital status:      Spouse name:  Not on file   • Number of children: Not on file   • Years of education: Not on file   • Highest education level: Not on file   Tobacco Use   • Smoking status: Former Smoker     Packs/day: 1.00     Years: 10.00     Pack years: 10.00     Types: Cigarettes     Start date:      Last attempt to quit:      Years since quittin.6   • Smokeless tobacco: Never Used   Substance and Sexual Activity   • Alcohol use: No   • Drug use: No   • Sexual activity: Defer         ALLERGIES  Morphine and related        REVIEW OF SYSTEMS  Review of Systems     All systems reviewed and negative except for those discussed in HPI.       PHYSICAL EXAM    I have reviewed the triage vital signs and nursing notes.    ED Triage Vitals [20 2251]   Temp Heart Rate Resp BP SpO2   99.4 °F (37.4 °C) 68 22 136/83 94 %      Temp src Heart Rate Source Patient Position BP Location FiO2 (%)   Tympanic Monitor Sitting Right arm --       Physical Exam  GENERAL: Mild distress  HENT: nares patent, no JVD  EYES: no scleral icterus  CV: regular rhythm, regular rate  RESPIRATORY: Mild tachypnea, diffuse wheezes and diminished breath sounds throughout.  No accessory muscle use  ABDOMEN: soft  MUSCULOSKELETAL: no deformity, no calf tenderness or pedal edema.  Distal pulses symmetric and intact  NEURO: alert, moves all extremities, follows commands  SKIN: warm, dry          I ordered the above noted radiological studies. Reviewed by me and discussed with radiologist.  See dictation for official radiology interpretation.      PROCEDURES    Procedures      MEDICATIONS GIVEN IN ER    Medications   methylPREDNISolone sodium succinate (SOLU-Medrol) injection 125 mg (125 mg Intravenous Given 20 010)   budesonide-formoterol (SYMBICORT) 80-4.5 MCG/ACT inhaler 2 puff (2 puffs Inhalation Given 20 0014)         PROGRESS, DATA ANALYSIS, CONSULTS, AND MEDICAL DECISION MAKING    All labs have been independently reviewed by me.  All radiology studies have  been reviewed by me and discussed with radiologist dictating the report.   EKG's independently viewed and interpreted by me.  Discussion below represents my analysis of pertinent findings related to patient's condition, differential diagnosis, treatment plan and final disposition.        ED Course as of Aug 05 0402   Wed Aug 05, 2020   0359 CBC shows normal white count, stable chronic anemia    [DP]   0400 Chemistry shows mild to moderate stable CKD and slightly elevated CO2    [DP]   0400 Troponin and proBNP normal    [DP]   0400 Procalcitonin lactate negative    [DP]   0400 Respiratory PCR including COVID-19 negative    [DP]   0400 Chest x-ray shows hypoinflation but no acute pulmonary infiltrates    [DP]   0400 ABG 7.35/60 2.4/70 5.3/34.2 on 3 L by nasal cannula\    Compared to previous ABGs in epic, including from her admission in May for COPD exacerbation, this is consistent with her admitting ABG and worse than baseline    [DP]   0401 Patient was given IV Solu-Medrol and Symbicort inhaler.  She has some mild improvement, but still felt quite dyspneic and was wheezing    [DP]   0401 Spoke with Dr. Camacho from pulmonary who agrees to admit the patient to a telemetry bed for further pulmonary management    [DP]   0401 EKG at 2303  Sinus rhythm at 74  Normal W[place], LVH with strain.  No acute ST segment abnormalities to suggest ischemia and this is unchanged compared to May 20, 2020    [DP]      ED Course User Index  [DP] Chandan Swift MD           PPE: Both the patient and I wore a surgical mask throughout the entire patient encounter. I wore protective goggles.     AS OF 04:02 VITALS:    BP - 135/51  HR - 73  TEMP - 99.4 °F (37.4 °C) (Tympanic)  O2 SATS - 91%        DIAGNOSIS  Final diagnoses:   COPD with acute exacerbation (CMS/[facility])   Hypercapnia         DISPOSITION  Admit to telemetry           Chandan Swift MD  08/05/20 0402      Electronically signed by Chandan Swift MD at 08/05/20 0402     Jay Jay  "Marcela, RN at 08/04/20 2326        Pt c/o cough x3 days that worsened today and SOA that started today. Pt denies any chest pain, fever, chills, or sick contacts. States hx of COPD. Pt also c/o tiredness stating \"everytime I sit down I go to sleep.\" Pt currently alert and oriented and able to speak in full sentences with ease. Pt on 3L NC and states is always on 3L NC at home. NAD.    Pt wearing mask. This RN wearing full PPE, including mask, gown, and eye protection, during all pt care.       Marcela Goyal RN  08/04/20 2329      Electronically signed by Marcela Goyal RN at 08/04/20 2329     Marcela Goyal RN at 08/05/20 0410          Nursing report ED to floor  Josephine Wolf  80 y.o.  female    HPI (triage note):   Chief Complaint   Patient presents with   • Shortness of Breath   • Altered Mental Status       Admitting doctor:   Paul Barber MD    Admitting diagnosis:   The primary encounter diagnosis was COPD with acute exacerbation (CMS/[facility]). A diagnosis of Hypercapnia was also pertinent to this visit.    Code status:   Current Code Status     Date Active Code Status Order ID Comments User Context       Prior          Allergies:   Morphine and related    Weight:   There were no vitals filed for this visit.    Most recent vitals:   Vitals:    08/05/20 0300 08/05/20 0330 08/05/20 0331 08/05/20 0400   BP: 153/83 140/67  148/79   Pulse:   71 73   Resp:    16   Temp:       TempSrc:       SpO2:  92% 93% 94%   Height:           Active LDAs/IV Access:   Lines, Drains & Airways    Active LDAs     Name:   Placement date:   Placement time:   Site:   Days:    Peripheral IV 08/04/20 2251 Left Antecubital   08/04/20 2251    Antecubital   less than 1    Peripheral IV 08/05/20 0025 Right Forearm   08/05/20    0025    Forearm   less than 1                  EKG:   ECG 12 Lead   Preliminary Result   HEART RATE= 75  bpm   RR Interval= 796  ms   NM Interval= 154  ms   P Horizontal Axis= -3  deg   P Front Axis= 41  " deg   QRSD Interval= 76  ms   QT Interval= 373  ms   QRS Axis= -6  deg   T Wave Axis= 1  deg   - ABNORMAL ECG -   Sinus rhythm   Low voltage, precordial leads   LVH by voltage   Nonspecific T abnormalities, anterior leads   Electronically Signed By:    Date and Time of Study: 2020 23:03:09          Meds given in ED:   Medications   methylPREDNISolone sodium succinate (SOLU-Medrol) injection 125 mg (125 mg Intravenous Given 20 0108)   budesonide-formoterol (SYMBICORT) 80-4.5 MCG/ACT inhaler 2 puff (2 puffs Inhalation Given 20 0014)       Imaging results:  No radiology results for the last day    Ambulatory status:   - Pt able to use bedside commode independently in the ED.    Social issues:   Social History     Socioeconomic History   • Marital status:      Spouse name: Not on file   • Number of children: Not on file   • Years of education: Not on file   • Highest education level: Not on file   Tobacco Use   • Smoking status: Former Smoker     Packs/day: 1.00     Years: 10.00     Pack years: 10.00     Types: Cigarettes     Start date:      Last attempt to quit:      Years since quittin.6   • Smokeless tobacco: Never Used   Substance and Sexual Activity   • Alcohol use: No   • Drug use: No   • Sexual activity: Defer          Marcela Goyal RN  20      Electronically signed by Marcela Goyal RN at 20       {Outbreak/Travel/Exposure Documentation......;  Question Available Choices Patient Response   Outbreak Screen: Do you currently have a new onset of the following symptoms?        Fever/Chils, Cough, Shortness of air, Loss of taste or smell, No, Unknown  No (20 272)   Outbreak Screen: In the last 14 days, have you had contact with anyone who is ill, has show any of the symptoms listed above and/or has been diagnosis with the 2019 Novel Coronavirus? This includes any immediate household members but excludes any patients with whom you have been in contact  within your normal work duties wearing proper PPE, if you are a healthcare worker.  Yes, No, Unknown              No (08/05/20 0449)   Outbreak Screen: Who was notified?    Free text  (not recorded)   Travel Screen: Have you traveled in the last month? If so, to what country have you traveled? If [place] what state? Yes, No, Unknown  List of all countries  List of all States No (08/05/20 0449)  (not recorded)  (not recorded)   Infection Risk: Do you currently have the following symptoms?  (If cough is selected, the Tuberculosis Screen is performed.) Cough, Fever, Rash, No No (08/05/20 0449)   Tuberculosis Screen: Do you have any of the following Tuberculosis Risks?  · Have you lived or spent time with anyone who had or may have TB?  · Have you lived in or visited any of the following areas for more than one month: [place], [place], [place], [place] or [place] or [place]?  · Do you have HIV/AIDS?  · Have you lived in or worked in a nursing home, homeless shelter, correctional facility, or substance abuse treatment facility?   · No    If Yes do you have any of the following symptoms? Yes responses display to the right    If Yes, symptoms listed are:  Cough greater than or equal to 3 weeks, Loss of appetite, Unexplained weight loss, Night sweats, Bloody sputum or hemoptysis, Hoarseness, Fever, Fatigue, Chest pain, No No (08/05/20 0449)  (not recorded)   Exposure Screen: Have you been exposed to any of these contagious diseases in the last month? Measles, Chickenpox, Meningitis, Pertussis, Whooping Cough, No No (08/05/20 0449)       Oxygen Therapy (since admission)     Date/Time   SpO2   Device (Oxygen Therapy)   Flow (L/min)   Oxygen Concentration (%)   ETCO2 (mmHg)    08/05/20 0847   90   nasal cannula   3   --   --    08/05/20 0700   92   nasal cannula   3   --   --    08/05/20 0457   --   nasal cannula   3   --   --    08/05/20 0443   92   room air   --   --   --    08/05/20 0400   94   --   --    --   --    08/05/20 0331   93   --   --   --   --    08/05/20 0330   92   --   --   --   --    08/05/20 0230   91   --   --   --   --    08/05/20 0109   97   --   --   --   --    08/05/20 0014   93   nasal cannula   3   --   --    08/04/20 2350   96   --   --   --   --    08/04/20 2331   96   nasal cannula   3   --   --    08/04/20 2330   96   --   --   --   --    08/04/20 2307   95   --   --   --   --    08/04/20 2251   94   --   2   --   59            Lines, Drains & Airways    Active LDAs     Name:   Placement date:   Placement time:   Site:   Days:    Peripheral IV 08/04/20 2251 Left Antecubital   08/04/20 2251    Antecubital   less than 1    Peripheral IV 08/05/20 0025 Right Forearm   08/05/20 0025    Forearm   less than 1         Inactive LDAs     None                Lab Results (all)     Procedure Component Value Units Date/Time    POC Glucose Once [[id number]]  (Abnormal) Collected:  08/05/20 0559    Specimen:  Blood Updated:  08/05/20 0601     Glucose 185 mg/dL     Valproic Acid Level, Total [[id number]]  (Normal) Collected:  08/05/20 0025    Specimen:  Blood Updated:  08/05/20 0455     Valproic Acid 80.0 mcg/mL     Procalcitonin [[id number]]  (Normal) Collected:  08/05/20 0025    Specimen:  Blood Updated:  08/05/20 0113     Procalcitonin 0.07 ng/mL     Narrative:       Comprehensive Metabolic Panel [[id number]]  (Abnormal) Collected:  08/05/20 0025    Specimen:  Blood Updated:  08/05/20 0106     Glucose 122 mg/dL      BUN 24 mg/dL      Creatinine 1.54 mg/dL      Sodium 141 mmol/L      Potassium 3.8 mmol/L      Chloride 98 mmol/L      CO2 33.9 mmol/L      Calcium 9.0 mg/dL      Total Protein 6.5 g/dL      Albumin 3.90 g/dL      ALT (SGPT) 6 U/L      AST (SGOT) 11 U/L      Alkaline Phosphatase 59 U/L      Total Bilirubin 0.2 mg/dL      eGFR Non African Amer 33 mL/min/1.73      Globulin 2.6 gm/dL      A/G Ratio 1.5 g/dL      BUN/Creatinine Ratio 15.6     Anion Gap 9.1 mmol/L     Narrative:       GFR Normal  >60  Chronic Kidney Disease <60  Kidney Failure <15      Troponin [[id number]]  (Normal) Collected:  08/05/20 0025    Specimen:  Blood Updated:  08/05/20 0106     Troponin T <0.010 ng/mL     Narrative:       BNP [[id number]]  (Normal) Collected:  08/05/20 0025    Specimen:  Blood Updated:  08/05/20 0104     proBNP 245.5 pg/mL     Narrative:       Respiratory Panel PCR w/COVID-19(SARS-CoV-2) [facility], NP Swab in UTM/VTP, 3-4 Hr TAT - Swab, Nasopharynx [[id number]]  (Normal) Collected:  08/04/20 2347    Specimen:  Swab from Nasopharynx Updated:  08/05/20 0101     ADENOVIRUS, PCR Not Detected     Coronavirus 229E Not Detected     Coronavirus HKU1 Not Detected     Coronavirus NL63 Not Detected     Coronavirus OC43 Not Detected     COVID19 Not Detected     Human Metapneumovirus Not Detected     Human Rhinovirus/Enterovirus Not Detected     Influenza A PCR Not Detected     Influenza A H1 Not Detected     Influenza A H1 2009 PCR Not Detected     Influenza A H3 Not Detected     Influenza B PCR Not Detected     Parainfluenza Virus 1 Not Detected     Parainfluenza Virus 2 Not Detected     Parainfluenza Virus 3 Not Detected     Parainfluenza Virus 4 Not Detected     RSV, PCR Not Detected     Bordetella pertussis pcr Not Detected     Bordetella parapertussis PCR Not Detected     Chlamydophila pneumoniae PCR Not Detected     Mycoplasma pneumo by PCR Not Detected    Narrative:       Lactic Acid, Plasma [[id number]]  (Normal) Collected:  08/05/20 0025    Specimen:  Blood Updated:  08/05/20 0053     Lactate 1.3 mmol/L     CBC & Differential [[id number]] Collected:  08/05/20 0025    Specimen:  Blood Updated:  08/05/20 0050    Narrative:       CBC Auto Differential [[id number]]  (Abnormal) Collected:  08/05/20 0025    Specimen:  Blood Updated:  08/05/20 0050     WBC 3.95 10*3/mm3      RBC 2.79 10*6/mm3      Hemoglobin 9.3 g/dL      Hematocrit 28.7 %      .9 fL      MCH 33.3 pg      MCHC 32.4 g/dL      RDW 14.3 %       RDW-SD 54.3 fl      MPV 9.4 fL      Platelets 147 10*3/mm3      Neutrophil % 50.1 %      Lymphocyte % 33.9 %      Monocyte % 11.1 %      Eosinophil % 1.8 %      Basophil % 0.8 %      Immature Grans % 2.3 %      Neutrophils, Absolute 1.98 10*3/mm3      Lymphocytes, Absolute 1.34 10*3/mm3      Monocytes, Absolute 0.44 10*3/mm3      Eosinophils, Absolute 0.07 10*3/mm3      Basophils, Absolute 0.03 10*3/mm3      Immature Grans, Absolute 0.09 10*3/mm3      nRBC 0.3 /100 WBC     Blood Gas, Arterial [[id number]]  (Abnormal) Collected:  08/04/20 2323    Specimen:  Arterial Blood Updated:  08/04/20 2327     Site Arterial: right radial     Zaki's Test Positive     pH, Arterial 7.346 pH units      pCO2, Arterial 62.4 mm Hg      Comment: Critical:Notify Dr MITCHELL (04-Aug-20 23:25:31)Read back ok        pO2, Arterial 75.3 mm Hg      HCO3, Arterial 34.2 mmol/L      Base Excess, Arterial 7.3 mmol/L      O2 Saturation Calculated 93.5 %      Barometric Pressure for Blood Gas 750.7 mmHg      Modality Cannula     Flow Rate 3 lpm      Rate 22 Breaths/minute           Imaging Results (All)     Procedure Component Value Units Date/Time    XR Chest 1 View [[id number]] Collected:  08/04/20 2345     Updated:  08/04/20 2350    Narrative:       PORTABLE CHEST 04/20/2020 AT 11:00 PM     CLINICAL HISTORY: Dyspnea     Compared to the previous chest dated 05/20/2020.     The lungs are slightly better inflated. There is mild bibasilar  atelectasis that appears improved no acute focal infiltrates are  identified. There are no pleural effusions. The heart is borderline  enlarged and unchanged.     IMPRESSIONS: Poor lung expansion. No evidence of acute disease within  the chest.     This report was finalized on 8/4/2020 11:47 PM by Dr. Chino Tran M.D.             ECG/EMG Results (all)     Procedure Component Value Units Date/Time    ECG 12 Lead [[id number]] Collected:  08/04/20 2303     Updated:  08/04/20 2304    Narrative:       HEART RATE= 75   bpm  RR Interval= 796  ms  SC Interval= 154  ms  P Horizontal Axis= -3  deg  P Front Axis= 41  deg  QRSD Interval= 76  ms  QT Interval= 373  ms  QRS Axis= -6  deg  T Wave Axis= 1  deg  - ABNORMAL ECG -  Sinus rhythm  Low voltage, precordial leads  LVH by voltage  Nonspecific T abnormalities, anterior leads  Electronically Signed By:   Date and Time of Study: 2020-08-04 23:03:09          Orders (all)      Start     Ordered    08/05/20 0800  Vital Signs  Every 4 Hours      08/05/20 0418    08/05/20 0700  POC Glucose 4x Daily AC & at Bedtime  4 Times Daily Before Meals & at Bedtime      08/05/20 0419    08/05/20 0431  NIPPV (CPAP or BIPAP)  At Bedtime & As Needed-RT      08/05/20 0430    08/05/20 0420  Daily Weights  Daily      08/05/20 0419    08/05/20 0419  Diet Regular  Diet Effective Now      08/05/20 0419    08/05/20 0419  Valproic Acid Level, Total  Once      08/05/20 0419    08/05/20 0418  Code Status and Medical Interventions:  Continuous      08/05/20 0418    08/05/20 0418  Pulse Oximetry, Continuous  Continuous      08/05/20 0418    08/05/20 0418  Cardiac Monitoring  Continuous      08/05/20 0418    08/05/20 0418  Intake & Output  Every Shift      08/05/20 0419    08/05/20 0418  Weigh Patient  Once      08/05/20 0419    08/05/20 0418  Fall Precautions  Continuous      08/05/20 0419    08/05/20 0418  Tobacco Cessation Education  Once      08/05/20 0419    08/05/20 0418  Notify Physician (With Default Parameters)  Until Discontinued      08/05/20 0419    08/05/20 0418  Incentive Spirometry  Every Hour While Awake      08/05/20 0419    08/05/20 0418  Oxygen Therapy- Nasal Cannula; Titrate for SPO2: 90% - 95%  Continuous      08/05/20 0419    08/05/20 0418  Advance Diet as Tolerated  Until Discontinued      08/05/20 0419    08/05/20 0418  Insert Peripheral IV  Once      08/05/20 0419    08/05/20 0418  Saline Lock & Maintain IV Access  Continuous      08/05/20 0419    08/05/20 0418  Tobacco Cessation Education   Prior to Discharge      08/05/20 0419    08/05/20 0312  Inpatient Admission  Once      08/05/20 0311    08/05/20 0244  Discontinue Patient Isolation  Once      08/05/20 0243    08/05/20 0135  Pulmonology (on-call MD unless specified)  Once     Specialty:  Pulmonary Disease  Provider:  Paul Barber MD    08/05/20 0134    08/04/20 2258  Respiratory Panel PCR w/COVID-19(SARS-CoV-2) [facility], NP Swab in UTM/VTP, 3-4 Hr TAT - Swab, Nasopharynx  STAT      08/04/20 2257                  All medication doses during the admission are shown, including meds that are no longer on order.   Scheduled Meds Sorted by Name   for Josephine Wolf as of 8/3/20 through 8/5/20     1 Day 3 Days 7 Days 10 Days < Today >    Legend:                          Inactive     Active     Other Encounter    Linked               Medications 08/03/20 08/04/20 08/05/20   allopurinol (ZYLOPRIM) tablet 100 mg   Dose: 100 mg  Freq: Daily Route: PO  Start: 08/05/20 0900    Admin Instructions:   ([facility]) Take with food if GI upset occurs.      0922            budesonide-formoterol (SYMBICORT) 80-4.5 MCG/ACT inhaler 2 puff   Dose: 2 puff  Freq: Once Route: IN  Start: 08/04/20 2357 End: 08/05/20 0014    Admin Instructions:   Shake well. Rinse mouth after use, do not swallow water. Send aerosols to pharmacy in ziplock bag for proper disposal.      0014            bumetanide (BUMEX) tablet 1 mg   Dose: 1 mg  Freq: Daily Route: PO  Start: 08/05/20 0900    Admin Instructions:   Take with food if GI upset occurs.      0922            divalproex (DEPAKOTE) 24 hr tablet 250 mg   Dose: 250 mg  Freq: Every Morning Route: PO  Start: 08/05/20 0700    Admin Instructions:   Swallow tablets whole. Do not crush, split or chew.      0923            And  divalproex (DEPAKOTE) 24 hr tablet 500 mg   Dose: 500 mg  Freq: Every Evening Route: PO  Start: 08/05/20 1700    Admin Instructions:   Swallow tablets whole. Do not crush, split or chew.      1700             divalproex (DEPAKOTE) 24 hr tablet 250 mg   Dose: 250 mg  Freq: Every Evening Route: PO  Start: 08/05/20 1700 End: 08/05/20 0525    Admin Instructions:   1qam 2qpm  Swallow tablets whole. Do not crush, split or chew.      0525-D/C'd          divalproex (DEPAKOTE) 24 hr tablet 500 mg   Dose: 500 mg  Freq: Every Evening Route: PO  Start: 08/05/20 1700 End: 08/05/20 0518    Admin Instructions:   Swallow tablets whole. Do not crush, split or chew.      0518-D/C'd          enoxaparin (LOVENOX) syringe 30 mg   Dose: 30 mg  Freq: Every 24 Hours Route: SC  Indications of Use: PROPHYLAXIS OF VENOUS THROMBOEMBOLISM  Start: 08/05/20 0900    Admin Instructions:   Give subcutaneous in abdomen only. Do not massage site after injection.      0923            ipratropium-albuterol (DUO-NEB) nebulizer solution 3 mL   Dose: 3 mL  Freq: 4 Times Daily - RT Route: NEBULIZATION  Start: 08/05/20 0830      0847   1230   1630     2030            levothyroxine (SYNTHROID, LEVOTHROID) tablet 88 mcg   Dose: 88 mcg  Freq: Every Morning Before Breakfast Route: PO  Start: 08/05/20 0730    Admin Instructions:   Take on empty stomach.      0922            melatonin tablet 3 mg   Dose: 3 mg  Freq: Nightly Route: PO  Start: 08/05/20 2100      2100            methylPREDNISolone sodium succinate (SOLU-Medrol) injection 125 mg   Dose: 125 mg  Freq: Once Route: IV  Start: 08/04/20 2357 End: 08/05/20 0108    Admin Instructions:   Caution: Look alike/sound alike drug alert      0108            methylPREDNISolone sodium succinate (SOLU-Medrol) injection 40 mg   Dose: 40 mg  Freq: Every 12 Hours Route: IV  Start: 08/05/20 1300    Admin Instructions:   Caution: Look alike/sound alike drug alert      1300            metoprolol succinate XL (TOPROL-XL) 24 hr tablet 12.5 mg   Dose: 12.5 mg  Freq: Every 24 Hours Scheduled Route: PO  Start: 08/05/20 0900    Admin Instructions:   Do not crush or chew.      0922            potassium chloride (MICRO-K) CR  capsule 10 mEq   Dose: 10 mEq  Freq: Daily Route: PO  Start: 08/05/20 0900    Admin Instructions:   Do not crush. Take with food.      0923            sodium chloride 0.9 % flush 10 mL   Dose: 10 mL  Freq: Every 12 Hours Scheduled Route: IV  Start: 08/05/20 0900      0927   2100          Medications 08/03/20 08/04/20 08/05/20       Continuous Meds Sorted by Name   for Josephine Wolf as of 8/3/20 through 8/5/20    Legend:                          Inactive     Active     Other Encounter    Linked               Medications 08/03/20 08/04/20 08/05/20       PRN Meds Sorted by Name   for Josephine Wolf as of 8/3/20 through 8/5/20    Legend:                          Inactive     Active     Other Encounter    Linked               Medications 08/03/20 08/04/20 08/05/20   aluminum-magnesium hydroxide-simethicone (MAALOX MAX) 400-400-40 MG/5ML suspension 15 mL   Dose: 15 mL  Freq: Every 6 Hours PRN Route: PO  PRN Reason: Heartburn  Start: 08/05/20 0417    Admin Instructions:   Maximum 60 mL in 24 hours.         magnesium hydroxide (MILK OF MAGNESIA) suspension 2400 mg/10mL 10 mL   Dose: 10 mL  Freq: Daily PRN Route: PO  PRN Reason: Constipation  Start: 08/05/20 0417         sodium chloride 0.9 % flush 10 mL   Dose: 10 mL  Freq: As Needed Route: IV  PRN Reason: Line Care  Start: 08/05/20 0417

## 2020-08-05 NOTE — PROGRESS NOTES
Discharge Planning Assessment  [facility]     Patient Name: Josephine Wolf  MRN: [id number]  Today's Date: 8/5/2020    Admit Date: 8/4/2020    Discharge Needs Assessment     Row Name 08/05/20 0948       Living Environment    Lives With  alone    Name(s) of Who Lives With Patient  second floor aprartment    Current Living Arrangements  home/apartment/condo    Primary Care Provided by  self    Provides Primary Care For  no one, unable/limited ability to care for self    Family Caregiver if Needed  child(ree), adult    Family Caregiver Names  Sons James and Chino    Quality of Family Relationships  helpful;involved;supportive    Able to Return to Prior Arrangements  yes       Resource/Environmental Concerns    Resource/Environmental Concerns  none    Transportation Concerns  car, none       Transition Planning    Patient/Family Anticipates Transition to  home    Patient/Family Anticipated Services at Transition  none    Transportation Anticipated  family or friend will provide       Discharge Needs Assessment    Readmission Within the Last 30 Days  no previous admission in last 30 days    Concerns to be Addressed  denies needs/concerns at this time    Equipment Currently Used at Home  walker, rolling;nebulizer;oxygen    Anticipated Changes Related to Illness  none    Equipment Needed After Discharge  none        Discharge Plan     Row Name 08/05/20 0949       Plan    Plan  Home, Currently denies having any needs    Patient/Family in Agreement with Plan  yes    Plan Comments  Met with patient at the bedside. Explained CCP role and verified facesheet. Patient lives alone in a second floor apartment. Denies difficulty navigating the steps. She is IADLs but doesnt drive. Patient has home oxygen from [facility] and normally wears 3L NC, she has a walker and nebulizer. She has used [facility] in the past and has been to [facility]. Patient's two sons assist with transportation. James lives in [facility] and Chino lives in  [place]. Patient plans to return home at [place] and currently denies having any needs. CCP to follow. Viridiana oRdriguez RN            Demographic Summary     Row Name 08/05/20 0950       General Information    Admission Type  inpatient    Arrived From  emergency department    Referral Source  admission list    Reason for Consult  discharge planning    Preferred Language  English       Contact Information    Permission Granted to Share Info With  family/designee        Functional Status     Row Name 08/05/20 0903       Functional Status    Usual Activity Tolerance  good    Current Activity Tolerance  good       Functional Status, IADL    Medications  independent    Meal Preparation  independent    Housekeeping  independent    Laundry  independent    Shopping  independent       Mental Status    General Appearance WDL  WDL       Mental Status Summary    Recent Changes in Mental Status/Cognitive Functioning  no changes        Psychosocial     Row Name 08/05/20 0925       Behavior WDL    Behavior WDL  WDL       Emotion Mood WDL    Emotion/Mood/Affect WDL  WDL       Speech WDL    Speech WDL  WDL       Perceptual State WDL    Perceptual State WDL  WDL       Thought Process WDL    Thought Process WDL  WDL       Intellectual Performance WDL    Intellectual Performance WDL  WDL       Coping/Stress    Major Change/Loss/Stressor  none    Sources of Support  adult child(ree)    Understanding of Condition and Treatment  adequate understanding of medical condition;adequate understanding of treatment       Developmental Stage (Eriksson's)    Developmental Stage  Stage 8 (65 years-death/Late Adulthood) Integrity vs. Despair        Abuse/Neglect     Row Name 08/05/20 0996       Personal Safety    Feels Unsafe at Home or Work/School  no    Feels Threatened by Someone  no    Does Anyone Try to Keep You From Having Contact with Others or Doing Things Outside Your Home?  no    Physical Signs of Abuse Present  no                Viridiana Rodriguez  RN

## 2020-08-05 NOTE — ED NOTES
Nursing report ED to floor  Josephine Wolf  80 y.o.  female    HPI (triage note):   Chief Complaint   Patient presents with   • Shortness of Breath   • Altered Mental Status       Admitting doctor:   Paul Barber MD    Admitting diagnosis:   The primary encounter diagnosis was COPD with acute exacerbation (CMS/HCC). A diagnosis of Hypercapnia was also pertinent to this visit.    Code status:   Current Code Status     Date Active Code Status Order ID Comments User Context       Prior          Allergies:   Morphine and related    Weight:   There were no vitals filed for this visit.    Most recent vitals:   Vitals:    08/05/20 0300 08/05/20 0330 08/05/20 0331 08/05/20 0400   BP: 153/83 140/67  148/79   Pulse:   71 73   Resp:    16   Temp:       TempSrc:       SpO2:  92% 93% 94%   Height:           Active LDAs/IV Access:   Lines, Drains & Airways    Active LDAs     Name:   Placement date:   Placement time:   Site:   Days:    Peripheral IV 08/04/20 2251 Left Antecubital   08/04/20 2251    Antecubital   less than 1    Peripheral IV 08/05/20 0025 Right Forearm   08/05/20    0025    Forearm   less than 1                Labs (abnormal labs have a star):   Labs Reviewed   COMPREHENSIVE METABOLIC PANEL - Abnormal; Notable for the following components:       Result Value    Glucose 122 (*)     BUN 24 (*)     Creatinine 1.54 (*)     CO2 33.9 (*)     eGFR Non  Amer 33 (*)     All other components within normal limits    Narrative:     GFR Normal >60  Chronic Kidney Disease <60  Kidney Failure <15     CBC WITH AUTO DIFFERENTIAL - Abnormal; Notable for the following components:    RBC 2.79 (*)     Hemoglobin 9.3 (*)     Hematocrit 28.7 (*)     .9 (*)     MCH 33.3 (*)     RDW-SD 54.3 (*)     Immature Grans % 2.3 (*)     Immature Grans, Absolute 0.09 (*)     nRBC 0.3 (*)     All other components within normal limits   BLOOD GAS, ARTERIAL - Abnormal; Notable for the following components:    pH, Arterial  "7.346 (*)     pCO2, Arterial 62.4 (*)     pO2, Arterial 75.3 (*)     HCO3, Arterial 34.2 (*)     Base Excess, Arterial 7.3 (*)     All other components within normal limits   RESPIRATORY PANEL PCR W/ COVID-19 (SARS-COV-2) [facility]-HOUSE, NP SWAB IN UTM/VTP, 3-4 HR TAT - Normal    Narrative:     Fact sheet for providers: https://docs.[employer]/wp-content/uploads/PED7321-9880-NL0.1-EUA-Provider-Fact-Sheet-3.pdf    Fact sheet for patients: https://docs.[employer]/wp-content/uploads/ADF2388-9102-QF4.1-EUA-Patient-Fact-Sheet-1.pdf   PROCALCITONIN - Normal    Narrative:     As a Marker for Sepsis (Non-Neonates):   1. <0.5 ng/mL represents a low risk of severe sepsis and/or septic shock.  1. >2 ng/mL represents a high risk of severe sepsis and/or septic shock.    As a Marker for Lower Respiratory Tract Infections that require antibiotic therapy:  PCT on Admission     Antibiotic Therapy             6-12 Hrs later  > 0.5                Strongly Recommended            >0.25 - <0.5         Recommended  0.1 - 0.25           Discouraged                   Remeasure/reassess PCT  <0.1                 Strongly Discouraged          Remeasure/reassess PCT      As 28 day mortality risk marker: \"Change in Procalcitonin Result\" (> 80 % or <=80 %) if Day 0 (or Day 1) and Day 4 values are available. Refer to http://www.[employer]-pct-calculator.com/   Change in PCT <=80 %   A decrease of PCT levels below or equal to 80 % defines a positive change in PCT test result representing a higher risk for 28-day all-cause mortality of patients diagnosed with severe sepsis or septic shock.  Change in PCT > 80 %   A decrease of PCT levels of more than 80 % defines a negative change in PCT result representing a lower risk for 28-day all-cause mortality of patients diagnosed with severe sepsis or septic shock.                Results may be falsely decreased if patient taking Biotin.    LACTIC ACID, PLASMA - Normal   TROPONIN (IN-HOUSE) - " Normal    Narrative:     Troponin T Reference Range:  <= 0.03 ng/mL-   Negative for AMI  >0.03 ng/mL-     Abnormal for myocardial necrosis.  Clinicians would have to utilize clinical acumen, EKG, Troponin and serial changes to determine if it is an Acute Myocardial Infarction or myocardial injury due to an underlying chronic condition.       Results may be falsely decreased if patient taking Biotin.     BNP (IN-HOUSE) - Normal    Narrative:     Among patients with dyspnea, NT-proBNP is highly sensitive for the detection of acute congestive heart failure. In addition NT-proBNP of <300 pg/ml effectively rules out acute congestive heart failure with 99% negative predictive value.    Results may be falsely decreased if patient taking Biotin.     BLOOD GAS, ARTERIAL   CBC AND DIFFERENTIAL    Narrative:     The following orders were created for panel order CBC & Differential.  Procedure                               Abnormality         Status                     ---------                               -----------         ------                     CBC Auto Differential[[id number]]        Abnormal            Final result                 Please view results for these tests on the individual orders.       EKG:   ECG 12 Lead   Preliminary Result   HEART RATE= 75  bpm   RR Interval= 796  ms   NJ Interval= 154  ms   P Horizontal Axis= -3  deg   P Front Axis= 41  deg   QRSD Interval= 76  ms   QT Interval= 373  ms   QRS Axis= -6  deg   T Wave Axis= 1  deg   - ABNORMAL ECG -   Sinus rhythm   Low voltage, precordial leads   LVH by voltage   Nonspecific T abnormalities, anterior leads   Electronically Signed By:    Date and Time of Study: 2020-08-04 23:03:09          Meds given in ED:   Medications   methylPREDNISolone sodium succinate (SOLU-Medrol) injection 125 mg (125 mg Intravenous Given 8/5/20 0108)   budesonide-formoterol (SYMBICORT) 80-4.5 MCG/ACT inhaler 2 puff (2 puffs Inhalation Given 8/5/20 0014)       Imaging results:  No  radiology results for the last day    Ambulatory status:   - Pt able to use bedside commode independently in the ED.    Social issues:   Social History     Socioeconomic History   • Marital status:      Spouse name: Not on file   • Number of children: Not on file   • Years of education: Not on file   • Highest education level: Not on file   Tobacco Use   • Smoking status: Former Smoker     Packs/day: 1.00     Years: 10.00     Pack years: 10.00     Types: Cigarettes     Start date:      Last attempt to quit:      Years since quittin.6   • Smokeless tobacco: Never Used   Substance and Sexual Activity   • Alcohol use: No   • Drug use: No   • Sexual activity: Defer          Marcela Goyal RN  20 0411

## 2020-08-05 NOTE — ED TRIAGE NOTES
Pt to ED from home per [facility] with reports of hx of COPD, air trapping, and being sleepy today. Pt states when she become sleepy like this, it is because her CO2 levels are rising and she needs to be admitted to hospital.    All triage performed with this RN wearing appropriate PPE.  Pt placed in mask upon arrival to ED.

## 2020-08-05 NOTE — ED NOTES
"Pt c/o cough x3 days that worsened today and SOA that started today. Pt denies any chest pain, fever, chills, or sick contacts. States hx of COPD. Pt also c/o tiredness stating \"everytime I sit down I go to sleep.\" Pt currently alert and oriented and able to speak in full sentences with ease. Pt on 3L NC and states is always on 3L NC at home. NAD.    Pt wearing mask. This RN wearing full PPE, including mask, gown, and eye protection, during all pt care.       Marcela Goyal, RN  08/04/20 0784    "

## 2020-08-06 LAB
ANION GAP SERPL CALCULATED.3IONS-SCNC: 12.6 MMOL/L (ref 5–15)
BASOPHILS # BLD AUTO: 0 10*3/MM3 (ref 0–0.2)
BASOPHILS NFR BLD AUTO: 0 % (ref 0–1.5)
BUN SERPL-MCNC: 32 MG/DL (ref 8–23)
BUN/CREAT SERPL: 23 (ref 7–25)
CALCIUM SPEC-SCNC: 9.5 MG/DL (ref 8.6–10.5)
CHLORIDE SERPL-SCNC: 101 MMOL/L (ref 98–107)
CO2 SERPL-SCNC: 29.4 MMOL/L (ref 22–29)
CREAT SERPL-MCNC: 1.39 MG/DL (ref 0.57–1)
DEPRECATED RDW RBC AUTO: 51 FL (ref 37–54)
EOSINOPHIL # BLD AUTO: 0 10*3/MM3 (ref 0–0.4)
EOSINOPHIL NFR BLD AUTO: 0 % (ref 0.3–6.2)
ERYTHROCYTE [DISTWIDTH] IN BLOOD BY AUTOMATED COUNT: 14 % (ref 12.3–15.4)
GFR SERPL CREATININE-BSD FRML MDRD: 37 ML/MIN/1.73
GLUCOSE BLDC GLUCOMTR-MCNC: 147 MG/DL (ref 70–130)
GLUCOSE BLDC GLUCOMTR-MCNC: 157 MG/DL (ref 70–130)
GLUCOSE BLDC GLUCOMTR-MCNC: 166 MG/DL (ref 70–130)
GLUCOSE BLDC GLUCOMTR-MCNC: 170 MG/DL (ref 70–130)
GLUCOSE SERPL-MCNC: 154 MG/DL (ref 65–99)
HCT VFR BLD AUTO: 27.7 % (ref 34–46.6)
HGB BLD-MCNC: 9.2 G/DL (ref 12–15.9)
IMM GRANULOCYTES # BLD AUTO: 0.23 10*3/MM3 (ref 0–0.05)
IMM GRANULOCYTES NFR BLD AUTO: 3.1 % (ref 0–0.5)
LYMPHOCYTES # BLD AUTO: 0.76 10*3/MM3 (ref 0.7–3.1)
LYMPHOCYTES NFR BLD AUTO: 10.4 % (ref 19.6–45.3)
MCH RBC QN AUTO: 33.5 PG (ref 26.6–33)
MCHC RBC AUTO-ENTMCNC: 33.2 G/DL (ref 31.5–35.7)
MCV RBC AUTO: 100.7 FL (ref 79–97)
MONOCYTES # BLD AUTO: 0.24 10*3/MM3 (ref 0.1–0.9)
MONOCYTES NFR BLD AUTO: 3.3 % (ref 5–12)
NEUTROPHILS NFR BLD AUTO: 6.1 10*3/MM3 (ref 1.7–7)
NEUTROPHILS NFR BLD AUTO: 83.2 % (ref 42.7–76)
NRBC BLD AUTO-RTO: 0 /100 WBC (ref 0–0.2)
PLATELET # BLD AUTO: 156 10*3/MM3 (ref 140–450)
PMV BLD AUTO: 9.7 FL (ref 6–12)
POTASSIUM SERPL-SCNC: 4 MMOL/L (ref 3.5–5.2)
RBC # BLD AUTO: 2.75 10*6/MM3 (ref 3.77–5.28)
SODIUM SERPL-SCNC: 143 MMOL/L (ref 136–145)
WBC # BLD AUTO: 7.33 10*3/MM3 (ref 3.4–10.8)

## 2020-08-06 PROCEDURE — 85025 COMPLETE CBC W/AUTO DIFF WBC: CPT | Performed by: INTERNAL MEDICINE

## 2020-08-06 PROCEDURE — 94799 UNLISTED PULMONARY SVC/PX: CPT

## 2020-08-06 PROCEDURE — 25010000002 ENOXAPARIN PER 10 MG: Performed by: INTERNAL MEDICINE

## 2020-08-06 PROCEDURE — 25010000002 METHYLPREDNISOLONE PER 40 MG: Performed by: INTERNAL MEDICINE

## 2020-08-06 PROCEDURE — 82962 GLUCOSE BLOOD TEST: CPT

## 2020-08-06 PROCEDURE — 36415 COLL VENOUS BLD VENIPUNCTURE: CPT | Performed by: INTERNAL MEDICINE

## 2020-08-06 PROCEDURE — 80048 BASIC METABOLIC PNL TOTAL CA: CPT | Performed by: INTERNAL MEDICINE

## 2020-08-06 RX ORDER — LOPERAMIDE HYDROCHLORIDE 2 MG/1
4 CAPSULE ORAL 3 TIMES DAILY PRN
Status: DISCONTINUED | OUTPATIENT
Start: 2020-08-06 | End: 2020-08-08 | Stop reason: HOSPADM

## 2020-08-06 RX ORDER — IPRATROPIUM BROMIDE AND ALBUTEROL SULFATE 2.5; .5 MG/3ML; MG/3ML
3 SOLUTION RESPIRATORY (INHALATION) EVERY 4 HOURS PRN
Status: DISCONTINUED | OUTPATIENT
Start: 2020-08-06 | End: 2020-08-08 | Stop reason: HOSPADM

## 2020-08-06 RX ADMIN — IPRATROPIUM BROMIDE AND ALBUTEROL SULFATE 3 ML: 2.5; .5 SOLUTION RESPIRATORY (INHALATION) at 10:32

## 2020-08-06 RX ADMIN — ACETAMINOPHEN 650 MG: 325 TABLET, FILM COATED ORAL at 22:26

## 2020-08-06 RX ADMIN — SODIUM CHLORIDE, PRESERVATIVE FREE 10 ML: 5 INJECTION INTRAVENOUS at 08:32

## 2020-08-06 RX ADMIN — SODIUM CHLORIDE, PRESERVATIVE FREE 10 ML: 5 INJECTION INTRAVENOUS at 22:27

## 2020-08-06 RX ADMIN — IPRATROPIUM BROMIDE AND ALBUTEROL SULFATE 3 ML: 2.5; .5 SOLUTION RESPIRATORY (INHALATION) at 20:33

## 2020-08-06 RX ADMIN — Medication 3 MG: at 22:26

## 2020-08-06 RX ADMIN — DIVALPROEX SODIUM 500 MG: 500 TABLET, EXTENDED RELEASE ORAL at 17:26

## 2020-08-06 RX ADMIN — METHYLPREDNISOLONE SODIUM SUCCINATE 40 MG: 40 INJECTION, POWDER, FOR SOLUTION INTRAMUSCULAR; INTRAVENOUS at 13:00

## 2020-08-06 RX ADMIN — LOPERAMIDE HYDROCHLORIDE 4 MG: 2 CAPSULE ORAL at 22:26

## 2020-08-06 RX ADMIN — METHYLPREDNISOLONE SODIUM SUCCINATE 40 MG: 40 INJECTION, POWDER, FOR SOLUTION INTRAMUSCULAR; INTRAVENOUS at 00:34

## 2020-08-06 RX ADMIN — BUMETANIDE 1 MG: 1 TABLET ORAL at 08:32

## 2020-08-06 RX ADMIN — POTASSIUM CHLORIDE 10 MEQ: 10 CAPSULE, COATED, EXTENDED RELEASE ORAL at 08:32

## 2020-08-06 RX ADMIN — DIVALPROEX SODIUM 250 MG: 250 TABLET, EXTENDED RELEASE ORAL at 06:28

## 2020-08-06 RX ADMIN — METOPROLOL SUCCINATE 12.5 MG: 25 TABLET, EXTENDED RELEASE ORAL at 08:32

## 2020-08-06 RX ADMIN — ENOXAPARIN SODIUM 30 MG: 30 INJECTION SUBCUTANEOUS at 08:32

## 2020-08-06 RX ADMIN — ALLOPURINOL 100 MG: 100 TABLET ORAL at 08:32

## 2020-08-06 RX ADMIN — IPRATROPIUM BROMIDE AND ALBUTEROL SULFATE 3 ML: 2.5; .5 SOLUTION RESPIRATORY (INHALATION) at 14:54

## 2020-08-06 RX ADMIN — LEVOTHYROXINE SODIUM 88 MCG: 88 TABLET ORAL at 06:31

## 2020-08-06 RX ADMIN — IPRATROPIUM BROMIDE AND ALBUTEROL SULFATE 3 ML: 2.5; .5 SOLUTION RESPIRATORY (INHALATION) at 06:59

## 2020-08-06 NOTE — PROGRESS NOTES
[facility] INPATIENT PROGRESS NOTE         [address]    2020      PATIENT IDENTIFICATION:  Name: Josephine Wolf ADMIT: 2020   : 1940  PCP: Bill Martino MD    MRN: [id number] LOS: 1 days   AGE/SEX: 80 y.o. female  ROOM: [facility]                     LOS 1    Reason for visit: Follow-up weakness and shortness of breath with respiratory failure and COPD exacerbation      SUBJECTIVE:      Resting comfortably.  Mildly confused.  No chest pain or productive cough currently.  On 2 L supplemental oxygen    Objective   OBJECTIVE:    Vital Sign Min/Max for last 24 hours  Temp  Min: 97.1 °F (36.2 °C)  Max: 99.1 °F (37.3 °C)   BP  Min: 90/69  Max: 137/64   Pulse  Min: 61  Max: 102   Resp  Min: 16  Max: 18   SpO2  Min: 90 %  Max: 94 %   No data recorded   Weight  Min: 78.5 kg (173 lb 1.6 oz)  Max: 78.5 kg (173 lb 1.6 oz)                         Body mass index is 31.66 kg/m².    Intake/Output Summary (Last 24 hours) at 2020 1243  Last data filed at 2020 1246  Gross per 24 hour   Intake 180 ml   Output --   Net 180 ml         Exam:  GEN:  No distress, appears stated age  EYES:   PERRL, anicteric sclera  ENT:    External ears/nose normal, OP clear  NECK:  No adenopathy, midline trachea  LUNGS: Normal chest on inspection, palpation and diminished breath sounds with mild wheezing on auscultation  CV:  Normal S1S2, without murmur  ABD:  Non tender, non distended, no hepatosplenomegaly, +BS  EXT:  No edema, cyanosis or clubbing    Scheduled meds:    allopurinol 100 mg Oral Daily   bumetanide 1 mg Oral Daily   divalproex 250 mg Oral QAM   And      divalproex 500 mg Oral Q PM   enoxaparin 30 mg Subcutaneous Q24H   ipratropium-albuterol 3 mL Nebulization 4x Daily - RT   levothyroxine 88 mcg Oral QAM AC   melatonin 3 mg Oral Nightly   methylPREDNISolone sodium succinate 40 mg Intravenous Q12H   metoprolol succinate XL 12.5 mg Oral Q24H   potassium chloride 10 mEq Oral Daily   sodium  chloride 10 mL Intravenous Q12H     IV meds:                         Data Review:  Results from last 7 days   Lab Units 08/06/20  0623 08/05/20  0025   SODIUM mmol/L 143 141   POTASSIUM mmol/L 4.0 3.8   CHLORIDE mmol/L 101 98   CO2 mmol/L 29.4* 33.9*   BUN mg/dL 32* 24*   CREATININE mg/dL 1.39* 1.54*   GLUCOSE mg/dL 154* 122*   CALCIUM mg/dL 9.5 9.0         Estimated Creatinine Clearance: 32.4 mL/min (A) (by C-G formula based on SCr of 1.39 mg/dL (H)).  Results from last 7 days   Lab Units 08/06/20  0623 08/05/20  0025   WBC 10*3/mm3 7.33 3.95   HEMOGLOBIN g/dL 9.2* 9.3*   PLATELETS 10*3/mm3 156 147         Results from last 7 days   Lab Units 08/05/20  0025   ALT (SGPT) U/L 6   AST (SGOT) U/L 11     Results from last 7 days   Lab Units 08/04/20  2323   PH, ARTERIAL pH units 7.346*   PO2 ART mm Hg 75.3*   PCO2, ARTERIAL mm Hg 62.4*   HCO3 ART mmol/L 34.2*     Results from last 7 days   Lab Units 08/05/20  0025   PROCALCITONIN ng/mL 0.07   LACTATE mmol/L 1.3         Chest x-ray 8/4/2020 reviewed        Microbiology reviewed  )        Active Hospital Problems    Diagnosis  POA   • COPD with acute exacerbation (CMS/[facility]) [J44.1]  Yes      Resolved Hospital Problems   No resolved problems to display.       Assessment   ASSESSMENT:    Acute on chronic hypercapnic respiratory failure  Acute metabolic encephalopathy  Chronic hypoxic respiratory failure (2 L nasal cannula)  COPD with acute exacerbation  Chronic diastolic heart failure  CKD 3  Chronic iron/B12 deficiency anemia  Hypothyroidism  Chronic pain syndrome on Neurontin   Migraine headaches  FERNANDEZ previously intolerant to PA P  Morbid obesity  Depression/bipolar disorder    PLAN:  Encourage pulmonary toilet.  Bronchodilators and steroid with taper for COPD exacerbation.  BiPAP for sleep apnea.  Goal saturation 88 to 92% given CO2 retention.  Continue with diuretics.  DVT prophylaxis.        Car Amato MD  Pulmonary and Critical Care Medicine  [facility]  [facility], [facility]  8/6/2020    12:43

## 2020-08-06 NOTE — PLAN OF CARE
Problem: Patient Care Overview  Goal: Plan of Care Review  Outcome: Ongoing (interventions implemented as appropriate)  Flowsheets (Taken 8/6/2020 2148)  Progress: no change  Plan of Care Reviewed With: patient  Outcome Summary: Pt tolerating 3LNC. HA improved after Tylenol given. NAD. Slept throughout most of the shift.

## 2020-08-07 LAB
ANION GAP SERPL CALCULATED.3IONS-SCNC: 11.3 MMOL/L (ref 5–15)
BUN SERPL-MCNC: 38 MG/DL (ref 8–23)
BUN/CREAT SERPL: 26 (ref 7–25)
CALCIUM SPEC-SCNC: 9.2 MG/DL (ref 8.6–10.5)
CHLORIDE SERPL-SCNC: 101 MMOL/L (ref 98–107)
CO2 SERPL-SCNC: 30.7 MMOL/L (ref 22–29)
CREAT SERPL-MCNC: 1.46 MG/DL (ref 0.57–1)
DACRYOCYTES BLD QL SMEAR: ABNORMAL
DEPRECATED RDW RBC AUTO: 51.2 FL (ref 37–54)
ERYTHROCYTE [DISTWIDTH] IN BLOOD BY AUTOMATED COUNT: 14 % (ref 12.3–15.4)
GFR SERPL CREATININE-BSD FRML MDRD: 35 ML/MIN/1.73
GLUCOSE BLDC GLUCOMTR-MCNC: 110 MG/DL (ref 70–130)
GLUCOSE BLDC GLUCOMTR-MCNC: 129 MG/DL (ref 70–130)
GLUCOSE BLDC GLUCOMTR-MCNC: 131 MG/DL (ref 70–130)
GLUCOSE BLDC GLUCOMTR-MCNC: 140 MG/DL (ref 70–130)
GLUCOSE BLDC GLUCOMTR-MCNC: 171 MG/DL (ref 70–130)
GLUCOSE SERPL-MCNC: 157 MG/DL (ref 65–99)
HCT VFR BLD AUTO: 27.6 % (ref 34–46.6)
HGB BLD-MCNC: 9.1 G/DL (ref 12–15.9)
HYPOCHROMIA BLD QL: ABNORMAL
LYMPHOCYTES # BLD MANUAL: 0.9 10*3/MM3 (ref 0.7–3.1)
LYMPHOCYTES NFR BLD MANUAL: 12 % (ref 19.6–45.3)
LYMPHOCYTES NFR BLD MANUAL: 2 % (ref 5–12)
MCH RBC QN AUTO: 33.1 PG (ref 26.6–33)
MCHC RBC AUTO-ENTMCNC: 33 G/DL (ref 31.5–35.7)
MCV RBC AUTO: 100.4 FL (ref 79–97)
METAMYELOCYTES NFR BLD MANUAL: 1 % (ref 0–0)
MONOCYTES # BLD AUTO: 0.15 10*3/MM3 (ref 0.1–0.9)
NEUTROPHILS # BLD AUTO: 6.37 10*3/MM3 (ref 1.7–7)
NEUTROPHILS NFR BLD MANUAL: 85 % (ref 42.7–76)
NRBC SPEC MANUAL: 2 /100 WBC (ref 0–0.2)
PLAT MORPH BLD: NORMAL
PLATELET # BLD AUTO: 164 10*3/MM3 (ref 140–450)
PMV BLD AUTO: 9.9 FL (ref 6–12)
POTASSIUM SERPL-SCNC: 3.7 MMOL/L (ref 3.5–5.2)
RBC # BLD AUTO: 2.75 10*6/MM3 (ref 3.77–5.28)
SODIUM SERPL-SCNC: 143 MMOL/L (ref 136–145)
WBC # BLD AUTO: 7.49 10*3/MM3 (ref 3.4–10.8)
WBC MORPH BLD: NORMAL

## 2020-08-07 PROCEDURE — 63710000001 PREDNISONE PER 1 MG: Performed by: INTERNAL MEDICINE

## 2020-08-07 PROCEDURE — 94799 UNLISTED PULMONARY SVC/PX: CPT

## 2020-08-07 PROCEDURE — 82962 GLUCOSE BLOOD TEST: CPT

## 2020-08-07 PROCEDURE — 85007 BL SMEAR W/DIFF WBC COUNT: CPT | Performed by: INTERNAL MEDICINE

## 2020-08-07 PROCEDURE — 85025 COMPLETE CBC W/AUTO DIFF WBC: CPT | Performed by: INTERNAL MEDICINE

## 2020-08-07 PROCEDURE — 25010000002 ENOXAPARIN PER 10 MG: Performed by: INTERNAL MEDICINE

## 2020-08-07 PROCEDURE — 25010000002 METHYLPREDNISOLONE PER 40 MG: Performed by: INTERNAL MEDICINE

## 2020-08-07 PROCEDURE — 80048 BASIC METABOLIC PNL TOTAL CA: CPT | Performed by: INTERNAL MEDICINE

## 2020-08-07 RX ORDER — PREDNISONE 20 MG/1
40 TABLET ORAL
Status: DISCONTINUED | OUTPATIENT
Start: 2020-08-07 | End: 2020-08-08 | Stop reason: HOSPADM

## 2020-08-07 RX ADMIN — Medication 3 MG: at 20:55

## 2020-08-07 RX ADMIN — ALLOPURINOL 100 MG: 100 TABLET ORAL at 09:53

## 2020-08-07 RX ADMIN — ACETAMINOPHEN 650 MG: 325 TABLET, FILM COATED ORAL at 15:14

## 2020-08-07 RX ADMIN — DIVALPROEX SODIUM 500 MG: 500 TABLET, EXTENDED RELEASE ORAL at 17:38

## 2020-08-07 RX ADMIN — SODIUM CHLORIDE, PRESERVATIVE FREE 10 ML: 5 INJECTION INTRAVENOUS at 20:56

## 2020-08-07 RX ADMIN — IPRATROPIUM BROMIDE AND ALBUTEROL SULFATE 3 ML: 2.5; .5 SOLUTION RESPIRATORY (INHALATION) at 07:35

## 2020-08-07 RX ADMIN — METHYLPREDNISOLONE SODIUM SUCCINATE 40 MG: 40 INJECTION, POWDER, FOR SOLUTION INTRAMUSCULAR; INTRAVENOUS at 00:06

## 2020-08-07 RX ADMIN — LOPERAMIDE HYDROCHLORIDE 4 MG: 2 CAPSULE ORAL at 06:14

## 2020-08-07 RX ADMIN — SODIUM CHLORIDE, PRESERVATIVE FREE 10 ML: 5 INJECTION INTRAVENOUS at 09:53

## 2020-08-07 RX ADMIN — LOPERAMIDE HYDROCHLORIDE 4 MG: 2 CAPSULE ORAL at 20:55

## 2020-08-07 RX ADMIN — IPRATROPIUM BROMIDE AND ALBUTEROL SULFATE 3 ML: 2.5; .5 SOLUTION RESPIRATORY (INHALATION) at 15:55

## 2020-08-07 RX ADMIN — ENOXAPARIN SODIUM 30 MG: 30 INJECTION SUBCUTANEOUS at 09:52

## 2020-08-07 RX ADMIN — POTASSIUM CHLORIDE 10 MEQ: 10 CAPSULE, COATED, EXTENDED RELEASE ORAL at 09:53

## 2020-08-07 RX ADMIN — LEVOTHYROXINE SODIUM 88 MCG: 88 TABLET ORAL at 06:14

## 2020-08-07 RX ADMIN — BUMETANIDE 1 MG: 1 TABLET ORAL at 09:53

## 2020-08-07 RX ADMIN — PREDNISONE 40 MG: 20 TABLET ORAL at 12:33

## 2020-08-07 RX ADMIN — DIVALPROEX SODIUM 250 MG: 250 TABLET, EXTENDED RELEASE ORAL at 06:14

## 2020-08-07 RX ADMIN — METOPROLOL SUCCINATE 12.5 MG: 25 TABLET, EXTENDED RELEASE ORAL at 09:53

## 2020-08-07 RX ADMIN — IPRATROPIUM BROMIDE AND ALBUTEROL SULFATE 3 ML: 2.5; .5 SOLUTION RESPIRATORY (INHALATION) at 11:03

## 2020-08-07 RX ADMIN — IPRATROPIUM BROMIDE AND ALBUTEROL SULFATE 3 ML: 2.5; .5 SOLUTION RESPIRATORY (INHALATION) at 19:41

## 2020-08-07 NOTE — PROGRESS NOTES
Continued Stay Note  [facility]     Patient Name: Josephine Wolf  MRN: [id number]  Today's Date: 8/7/2020    Admit Date: 8/4/2020    Discharge Plan     Row Name 08/07/20 1548       Plan    Plan  Home with home health; referrals pending    Plan Comments  Met with patient at bedside for follow up. Patient is requesting home health at discharge and [facility] placed referrals in Epic. Patient denies any additional needs. Elisha BAUER        Discharge Codes    No documentation.             SHANELLE Granger

## 2020-08-07 NOTE — DISCHARGE PLACEMENT REQUEST
"Raheem Roberts (80 y.o. Female)     Date of Birth Social Security Number Address Home Phone MRN    1940  [id number]    [place] [address] [phone] [id number]    Mandaeism Marital Status          Yazidism        Admission Date Admission Type Admitting Provider Attending Provider Department, Room/Bed    8/4/20 Emergency Paul Barber MD Nidadavolu, Vinay Gopal, MD [address], [address]    Discharge Date Discharge Disposition Discharge Destination                       Attending Provider:  Paul Barber MD    Allergies:  Morphine And Related    Isolation:  None   Infection:  None   Code Status:  CPR    Ht:  157.5 cm (62\")   Wt:  77.3 kg (170 lb 6.4 oz)    Admission Cmt:  None   Principal Problem:  None                Active Insurance as of 8/4/2020     Primary Coverage     Payor Plan Insurance Group Employer/Plan Group    [facility] [employer] MEDICARE REPLACEMENT      Payor Plan Address Payor Plan Phone Number Payor Plan Fax Number Effective Dates    [address] [address] [phone]  10/9/2017 - None Entered    [facility] [id number]       Subscriber Name Subscriber Birth Date Member ID       RAHEEM ROBERTS 1940 [id number]           Secondary Coverage     Payor Plan Insurance Group Employer/Plan Group    [place]      Payor Plan Address Payor Plan Phone Number Payor Plan Fax Number Effective Dates    [address] [phone]  7/3/2016 - None Entered    [facility] [id number]       Subscriber Name Subscriber Birth Date Member ID       RAHEEM ROBERTS 1940 [id number]                 Emergency Contacts      (Rel.) Home Phone Work Phone Mobile Phone    Chino Roberts (Son) [phone] -- --    James Roberts (Son) [phone] -- [phone]              "

## 2020-08-07 NOTE — PROGRESS NOTES
[facility] INPATIENT PROGRESS NOTE         [address]    2020      PATIENT IDENTIFICATION:  Name: Josephine Wolf ADMIT: 2020   : 1940  PCP: Bill Martino MD    MRN: [id number] LOS: 2 days   AGE/SEX: 80 y.o. female  ROOM: [facility]                     LOS 2    Reason for visit: Follow-up weakness and shortness of breath with respiratory failure and COPD exacerbation      SUBJECTIVE:      Noted issues with diarrhea overnight.  Imodium is starting to slow down.  Denies foul smell.  Just watery diarrhea.  Less short of breath.  Mild wheeze on auscultation.  No rhonchi.    Objective   OBJECTIVE:    Vital Sign Min/Max for last 24 hours  Temp  Min: 98.2 °F (36.8 °C)  Max: 99.2 °F (37.3 °C)   BP  Min: 125/68  Max: 164/87   Pulse  Min: 73  Max: 96   Resp  Min: 16  Max: 18   SpO2  Min: 91 %  Max: 97 %   No data recorded   Weight  Min: 77.3 kg (170 lb 6.4 oz)  Max: 77.3 kg (170 lb 6.4 oz)                         Body mass index is 31.17 kg/m².    Intake/Output Summary (Last 24 hours) at 2020 1000  Last data filed at 2020  Gross per 24 hour   Intake 250 ml   Output --   Net 250 ml         Exam:  GEN:  No distress, appears stated age  EYES:   PERRL, anicteric sclera  ENT:    External ears/nose normal, OP clear  NECK:  No adenopathy, midline trachea  LUNGS: Normal chest on inspection, palpation and diminished breath sounds with mild wheezing on auscultation  CV:  Normal S1S2, without murmur  ABD:  Non tender, non distended, no hepatosplenomegaly, +BS  EXT:  No edema, cyanosis or clubbing    Scheduled meds:      allopurinol 100 mg Oral Daily   bumetanide 1 mg Oral Daily   divalproex 250 mg Oral QAM   And      divalproex 500 mg Oral Q PM   enoxaparin 30 mg Subcutaneous Q24H   ipratropium-albuterol 3 mL Nebulization 4x Daily - RT   levothyroxine 88 mcg Oral QAM AC   melatonin 3 mg Oral Nightly   methylPREDNISolone sodium succinate 40 mg Intravenous Q12H   metoprolol  succinate XL 12.5 mg Oral Q24H   potassium chloride 10 mEq Oral Daily   sodium chloride 10 mL Intravenous Q12H     IV meds:                         Data Review:  Results from last 7 days   Lab Units 08/07/20  0458 08/06/20 0623 08/05/20  0025   SODIUM mmol/L 143 143 141   POTASSIUM mmol/L 3.7 4.0 3.8   CHLORIDE mmol/L 101 101 98   CO2 mmol/L 30.7* 29.4* 33.9*   BUN mg/dL 38* 32* 24*   CREATININE mg/dL 1.46* 1.39* 1.54*   GLUCOSE mg/dL 157* 154* 122*   CALCIUM mg/dL 9.2 9.5 9.0         Estimated Creatinine Clearance: 30.6 mL/min (A) (by C-G formula based on SCr of 1.46 mg/dL (H)).  Results from last 7 days   Lab Units 08/07/20  0458 08/06/20 0623 08/05/20  0025   WBC 10*3/mm3 7.49 7.33 3.95   HEMOGLOBIN g/dL 9.1* 9.2* 9.3*   PLATELETS 10*3/mm3 164 156 147         Results from last 7 days   Lab Units 08/05/20  0025   ALT (SGPT) U/L 6   AST (SGOT) U/L 11     Results from last 7 days   Lab Units 08/04/20  2323   PH, ARTERIAL pH units 7.346*   PO2 ART mm Hg 75.3*   PCO2, ARTERIAL mm Hg 62.4*   HCO3 ART mmol/L 34.2*     Results from last 7 days   Lab Units 08/05/20  0025   PROCALCITONIN ng/mL 0.07   LACTATE mmol/L 1.3         Chest x-ray 8/4/2020 reviewed        Microbiology reviewed  )        Active Hospital Problems    Diagnosis  POA   • COPD with acute exacerbation (CMS/[facility]) [J44.1]  Yes      Resolved Hospital Problems   No resolved problems to display.       Assessment   ASSESSMENT:  Acute on chronic hypercapnic respiratory failure  Acute metabolic encephalopathy  Chronic hypoxic respiratory failure (2 L nasal cannula)  COPD with acute exacerbation  Chronic diastolic heart failure  CKD 3  Chronic iron/B12 deficiency anemia  Hypothyroidism  Chronic pain syndrome on Neurontin   Migraine headaches  FERNANDEZ previously intolerant to PA P  Morbid obesity  Depression/bipolar disorder  Diarrhea        PLAN:  Encourage pulmonary toilet.  Bronchodilators and steroid with taper for COPD exacerbation.  Change steroids to  p.o.  BiPAP for sleep apnea.  Goal saturation 88 to 92% given CO2 retention.  Continue with diuretics.  Imodium for diarrhea.  Does not appear to be infectious.  DVT prophylaxis.        Car Amato MD  Pulmonary and Critical Care Medicine  [facility], [facility]  8/7/2020    10:00

## 2020-08-07 NOTE — PLAN OF CARE
Problem: Patient Care Overview  Goal: Plan of Care Review  Outcome: Ongoing (interventions implemented as appropriate)  Flowsheets  Taken 8/7/2020 0976  Plan of Care Reviewed With: patient  Taken 8/7/2020 163  Outcome Summary: slightly SOA on exertion; otherwise no distress; up in chair this afternoon; O2 at 3LNC; oral steroids; cont to monitor

## 2020-08-07 NOTE — PLAN OF CARE
Problem: Patient Care Overview  Goal: Plan of Care Review  Outcome: Ongoing (interventions implemented as appropriate)  Flowsheets  Taken 8/6/2020 0505 by Carolina Joel, RN  Progress: no change  Outcome Summary: Pt tolerating 3LNC. HA improved after Tylenol given. NAD. Slept throughout most of the shift.  Taken 8/7/2020 0032 by Diane Ansari, RN  Plan of Care Reviewed With: patient  Note:   Pt cont on home dose of 3Lnc, pt vss, pt c/o diarrhea cont during shift,pt given prn imodium. Pt up ad helder and tolerating well. Plan of care was to wear bipap during the night but pt refused due to multiple urgent trips to the bathroom. Will cont to monitor

## 2020-08-08 VITALS
OXYGEN SATURATION: 91 % | DIASTOLIC BLOOD PRESSURE: 70 MMHG | HEART RATE: 69 BPM | WEIGHT: 168.87 LBS | BODY MASS INDEX: 31.08 KG/M2 | SYSTOLIC BLOOD PRESSURE: 158 MMHG | TEMPERATURE: 99.2 F | RESPIRATION RATE: 18 BRPM | HEIGHT: 62 IN

## 2020-08-08 PROBLEM — J44.1 COPD WITH ACUTE EXACERBATION (HCC): Status: RESOLVED | Noted: 2018-06-24 | Resolved: 2020-08-08

## 2020-08-08 LAB
ANION GAP SERPL CALCULATED.3IONS-SCNC: 11.3 MMOL/L (ref 5–15)
BASOPHILS # BLD MANUAL: 0.07 10*3/MM3 (ref 0–0.2)
BASOPHILS NFR BLD AUTO: 1 % (ref 0–1.5)
BUN SERPL-MCNC: 37 MG/DL (ref 8–23)
BUN/CREAT SERPL: 24.7 (ref 7–25)
CALCIUM SPEC-SCNC: 8.9 MG/DL (ref 8.6–10.5)
CHLORIDE SERPL-SCNC: 101 MMOL/L (ref 98–107)
CO2 SERPL-SCNC: 32.7 MMOL/L (ref 22–29)
CREAT SERPL-MCNC: 1.5 MG/DL (ref 0.57–1)
DEPRECATED RDW RBC AUTO: 51.3 FL (ref 37–54)
ERYTHROCYTE [DISTWIDTH] IN BLOOD BY AUTOMATED COUNT: 14.2 % (ref 12.3–15.4)
GFR SERPL CREATININE-BSD FRML MDRD: 34 ML/MIN/1.73
GLUCOSE BLDC GLUCOMTR-MCNC: 105 MG/DL (ref 70–130)
GLUCOSE BLDC GLUCOMTR-MCNC: 129 MG/DL (ref 70–130)
GLUCOSE SERPL-MCNC: 90 MG/DL (ref 65–99)
HCT VFR BLD AUTO: 28 % (ref 34–46.6)
HGB BLD-MCNC: 9.4 G/DL (ref 12–15.9)
HYPOCHROMIA BLD QL: ABNORMAL
LYMPHOCYTES # BLD MANUAL: 1.66 10*3/MM3 (ref 0.7–3.1)
LYMPHOCYTES NFR BLD MANUAL: 24.8 % (ref 19.6–45.3)
LYMPHOCYTES NFR BLD MANUAL: 5 % (ref 5–12)
MCH RBC QN AUTO: 33.2 PG (ref 26.6–33)
MCHC RBC AUTO-ENTMCNC: 33.6 G/DL (ref 31.5–35.7)
MCV RBC AUTO: 98.9 FL (ref 79–97)
METAMYELOCYTES NFR BLD MANUAL: 3 % (ref 0–0)
MONOCYTES # BLD AUTO: 0.33 10*3/MM3 (ref 0.1–0.9)
MYELOCYTES NFR BLD MANUAL: 3 % (ref 0–0)
NEUTROPHILS # BLD AUTO: 4.24 10*3/MM3 (ref 1.7–7)
NEUTROPHILS NFR BLD MANUAL: 63.4 % (ref 42.7–76)
NRBC SPEC MANUAL: 1 /100 WBC (ref 0–0.2)
PLAT MORPH BLD: NORMAL
PLATELET # BLD AUTO: 165 10*3/MM3 (ref 140–450)
PMV BLD AUTO: 9.5 FL (ref 6–12)
POTASSIUM SERPL-SCNC: 3.1 MMOL/L (ref 3.5–5.2)
RBC # BLD AUTO: 2.83 10*6/MM3 (ref 3.77–5.28)
SODIUM SERPL-SCNC: 145 MMOL/L (ref 136–145)
WBC # BLD AUTO: 6.69 10*3/MM3 (ref 3.4–10.8)
WBC MORPH BLD: NORMAL

## 2020-08-08 PROCEDURE — 80048 BASIC METABOLIC PNL TOTAL CA: CPT | Performed by: INTERNAL MEDICINE

## 2020-08-08 PROCEDURE — 94799 UNLISTED PULMONARY SVC/PX: CPT

## 2020-08-08 PROCEDURE — 85007 BL SMEAR W/DIFF WBC COUNT: CPT | Performed by: INTERNAL MEDICINE

## 2020-08-08 PROCEDURE — 36415 COLL VENOUS BLD VENIPUNCTURE: CPT | Performed by: INTERNAL MEDICINE

## 2020-08-08 PROCEDURE — 63710000001 PREDNISONE PER 1 MG: Performed by: INTERNAL MEDICINE

## 2020-08-08 PROCEDURE — 25010000002 ENOXAPARIN PER 10 MG: Performed by: INTERNAL MEDICINE

## 2020-08-08 PROCEDURE — 85025 COMPLETE CBC W/AUTO DIFF WBC: CPT | Performed by: INTERNAL MEDICINE

## 2020-08-08 PROCEDURE — 82962 GLUCOSE BLOOD TEST: CPT

## 2020-08-08 RX ORDER — AZITHROMYCIN 250 MG/1
TABLET, FILM COATED ORAL
Qty: 12 TABLET | Refills: 3 | Status: ON HOLD | OUTPATIENT
Start: 2020-08-08 | End: 2021-10-18

## 2020-08-08 RX ORDER — PREDNISONE 20 MG/1
20 TABLET ORAL 2 TIMES DAILY
Qty: 6 TABLET | Refills: 0 | Status: SHIPPED | OUTPATIENT
Start: 2020-08-08 | End: 2020-08-11

## 2020-08-08 RX ORDER — BUDESONIDE AND FORMOTEROL FUMARATE DIHYDRATE 160; 4.5 UG/1; UG/1
2 AEROSOL RESPIRATORY (INHALATION) 2 TIMES DAILY
Qty: 1 INHALER | Refills: 11 | Status: SHIPPED | OUTPATIENT
Start: 2020-08-08

## 2020-08-08 RX ADMIN — IPRATROPIUM BROMIDE AND ALBUTEROL SULFATE 3 ML: 2.5; .5 SOLUTION RESPIRATORY (INHALATION) at 05:38

## 2020-08-08 RX ADMIN — DIVALPROEX SODIUM 250 MG: 250 TABLET, EXTENDED RELEASE ORAL at 08:15

## 2020-08-08 RX ADMIN — ENOXAPARIN SODIUM 30 MG: 30 INJECTION SUBCUTANEOUS at 08:15

## 2020-08-08 RX ADMIN — BUMETANIDE 1 MG: 1 TABLET ORAL at 08:15

## 2020-08-08 RX ADMIN — POTASSIUM CHLORIDE 10 MEQ: 10 CAPSULE, COATED, EXTENDED RELEASE ORAL at 08:15

## 2020-08-08 RX ADMIN — LEVOTHYROXINE SODIUM 88 MCG: 88 TABLET ORAL at 06:06

## 2020-08-08 RX ADMIN — ALLOPURINOL 100 MG: 100 TABLET ORAL at 08:15

## 2020-08-08 RX ADMIN — IPRATROPIUM BROMIDE AND ALBUTEROL SULFATE 3 ML: 2.5; .5 SOLUTION RESPIRATORY (INHALATION) at 12:42

## 2020-08-08 RX ADMIN — SODIUM CHLORIDE, PRESERVATIVE FREE 10 ML: 5 INJECTION INTRAVENOUS at 08:17

## 2020-08-08 RX ADMIN — PREDNISONE 40 MG: 20 TABLET ORAL at 08:15

## 2020-08-08 RX ADMIN — METOPROLOL SUCCINATE 12.5 MG: 25 TABLET, EXTENDED RELEASE ORAL at 08:15

## 2020-08-08 NOTE — PLAN OF CARE
Problem: Patient Care Overview  Goal: Plan of Care Review  Outcome: Ongoing (interventions implemented as appropriate)  Flowsheets (Taken 8/8/2020 0523)  Progress: no change  Plan of Care Reviewed With: patient  Outcome Summary: Slept good, on 3 L NC, VSS, no distress noted, NSR, sugar checked, will monitor     Problem: Fall Risk (Adult)  Goal: Identify Related Risk Factors and Signs and Symptoms  Outcome: Outcome(s) achieved  Flowsheets (Taken 8/8/2020 0523)  Related Risk Factors (Fall Risk): age-related changes; fatigue/slow reaction; environment unfamiliar; fear of falling  Signs and Symptoms (Fall Risk): presence of risk factors     Problem: Fall Risk (Adult)  Goal: Absence of Fall  Outcome: Ongoing (interventions implemented as appropriate)  Flowsheets (Taken 8/8/2020 0523)  Absence of Fall: making progress toward outcome     Problem: Chronic Obstructive Pulmonary Disease (Adult)  Goal: Signs and Symptoms of Listed Potential Problems Will be Absent, Minimized or Managed (Chronic Obstructive Pulmonary Disease)  Outcome: Ongoing (interventions implemented as appropriate)  Flowsheets (Taken 8/8/2020 0523)  Problems Assessed (Chronic Obstructive Pulmonary Disease (COPD)): functional deficit; respiratory compromise; situational response  Problems Present (COPD, Bronch/Emphy): situational response; respiratory compromise; functional deficit     Problem: Diarrhea (Adult)  Goal: Identify Related Risk Factors and Signs and Symptoms  Outcome: Ongoing (interventions implemented as appropriate)  Flowsheets (Taken 8/8/2020 0523)  Related Risk Factors (Diarrhea): intestinal malabsorption  Signs and Symptoms (Diarrhea): decreased appetite; increased stool frequency, amount, and fluidity     Problem: Diarrhea (Adult)  Goal: Improved/Reduced Symptoms  Outcome: Ongoing (interventions implemented as appropriate)  Flowsheets (Taken 8/8/2020 0523)  Improved/Reduced Symptoms: making progress toward outcome

## 2020-08-08 NOTE — DISCHARGE SUMMARY
[facility]    Admit date: 8/4/2020  Discharge date: 8/8/2020    Admission/discharge diagnosis:  Acute on chronic hypercapnic respiratory failure  Acute metabolic encephalopathy  Chronic hypoxic respiratory failure (2 L nasal cannula)  COPD with acute exacerbation  Chronic diastolic heart failure  CKD 3  Chronic iron/B12 deficiency anemia  Hypothyroidism  Chronic pain syndrome on Neurontin   Migraine headaches  FERNANDEZ previously intolerant to PA P  Morbid obesity  Depression/bipolar disorder  Diarrhea    HPI: reviewed as per Dr. Barber:    Patient is a 80-year-old obese white female with a past medical history significant for COPD, chronic hypoxic respiratory failure on 2 L nasal cannula, chronic kidney disease, chronic diastolic heart failure who sees Dr. Perea in the clinic who was admitted a couple of times earlier this year with mild COPD exacerbation and respiratory failure who comes to the ER complaining of increasing confusion, weakness and states that she falls asleep having a conversation.  She denies any worsening cough or sputum production.  Did have some shortness of breath but no obvious wheezing.  States that she is compliant with her inhaler regimen.  Work-up in the ER showed acute on chronic hypercapnic respiratory failure.  We have been asked admit the patient to the hospital for further management.     Patient states that she is compliant with her medications and has not taken any new sedative medications.  She does not use any long-term pain medication.  She was previously diagnosed with sleep apnea several years back and states that she could not tolerate CPAP at that point.     Hospital Course:  Mrs. Wolf improved well, she was inquired what else she could do to reduce frequency of her acute exacerbations.  She has had three requiring hospitalization this year.  She reports no trouble with asthma or allergies. She does not smoke and is not exposed to smoke at home.  She is  compliant with laba/lama/lics and pap therapy. I can't find an obvious cause.  She has some occasional gerd.  Will go ahead and start her on three times a week zithromax to reduce frequency of ae.    Activity: pre admission    Diet: pre admission    Follow up: Dr. Amato in 6 weeks in office    Greater than 30 mins spent in discharging patient       Discharge Medications      New Medications      Instructions Start Date   azithromycin 250 MG tablet  Commonly known as:  Zithromax   Take m,w,f      budesonide-formoterol 160-4.5 MCG/ACT inhaler  Commonly known as:  SYMBICORT   2 puffs, Inhalation, 2 Times Daily      predniSONE 20 MG tablet  Commonly known as:  DELTASONE   20 mg, Oral, 2 Times Daily         Continue These Medications      Instructions Start Date   acetaminophen 325 MG tablet  Commonly known as:  TYLENOL   650 mg, Oral, Every 6 Hours PRN      allopurinol 100 MG tablet  Commonly known as:  ZYLOPRIM   100 mg, Oral, Daily      bumetanide 1 MG tablet  Commonly known as:  BUMEX   1 mg, Oral, Daily      divalproex 250 MG 24 hr tablet  Commonly known as:  DEPAKOTE   250 mg, Oral, Every Morning      divalproex 500 MG 24 hr tablet  Commonly known as:  DEPAKOTE   500 mg, Oral, Every Evening      DULoxetine 60 MG capsule  Commonly known as:  CYMBALTA   60 mg, Oral, 2 Times Daily      fluticasone 50 MCG/ACT nasal spray  Commonly known as:  FLONASE   1 spray, Nasal, Daily      gabapentin 300 MG capsule  Commonly known as:  NEURONTIN   300 mg, Oral, 3 Times Daily      Incruse Ellipta 62.5 MCG/INH aerosol powder   Generic drug:  Umeclidinium Bromide   1 puff, Inhalation, Daily      ipratropium-albuterol 0.5-2.5 mg/3 ml nebulizer  Commonly known as:  DUO-NEB   3 mL, Nebulization, Every 4 Hours PRN      iron polysaccharides 150 MG capsule  Commonly known as:  NIFEREX   150 mg, Oral, Daily      levothyroxine 88 MCG tablet  Commonly known as:  SYNTHROID, LEVOTHROID   88 mcg, Oral, Every Morning Before Breakfast       melatonin 1 MG tablet   3 mg, Oral, Nightly      metoprolol succinate XL 25 MG 24 hr tablet  Commonly known as:  TOPROL-XL   12.5 mg, Oral, Every 24 Hours Scheduled      potassium chloride 10 MEQ CR capsule  Commonly known as:  MICRO-K   10 mEq, Oral, Daily      vitamin B-12 1000 MCG tablet  Commonly known as:  CYANOCOBALAMIN   1,000 mcg, Oral, Daily

## 2020-08-08 NOTE — DISCHARGE INSTR - APPOINTMENTS
Follow up with Dr. Amato in 6 weeks. Call for appointment.    Follow up with PCP in 1-2 weeks. Call for appointment.

## 2020-08-09 ENCOUNTER — READMISSION MANAGEMENT (OUTPATIENT)
Dept: CALL CENTER | Facility: HOSPITAL | Age: 80
End: 2020-08-09

## 2020-08-10 ENCOUNTER — READMISSION MANAGEMENT (OUTPATIENT)
Dept: CALL CENTER | Facility: HOSPITAL | Age: 80
End: 2020-08-10

## 2020-08-10 NOTE — OUTREACH NOTE
COPD/PN Week 1 Survey      Responses   [facility]   COVID-19 Test Status  Negative   Does the patient have one of the following disease processes/diagnoses(primary or secondary)?  COPD/Pneumonia   Is there a successful TCM telephone encounter documented?  No   Was the primary reason for admission:  COPD exacerbation   Call end time  1442   Meds reviewed with patient/caregiver?  Yes   Is the patient having any side effects they believe may be caused by any medication additions or changes?  No   Does the patient have all medications ordered at discharge?  Yes   Is the patient taking all medications as directed (includes completed medication regime)?  Yes   Does the patient have a primary care provider?   Yes   Does the patient have an appointment with their PCP or pulmonologist within 7 days of discharge?  Greater than 7 days   What is preventing the patient from scheduling follow up appointments within 7 days of discharge?  Earlier appointment not available   Nursing Interventions  Verified appointment date/time/provider   Has the patient kept scheduled appointments due by today?  N/A   What is the Home health agency?   [facility]   Has home health visited the patient within 72 hours of discharge?  Call prior to 72 hours   Pulse Ox monitoring  None   Did the patient receive a copy of their discharge instructions?  Yes   Nursing interventions  Reviewed instructions with patient   What is the patient's perception of their health status since discharge?  Improving   Nursing Interventions  Nurse provided patient education   Is the patient able to teach back COPD zones?  Yes   Nursing interventions  Education provided on various zones   Patient reports what zone on this call?  Green Zone   Green Zone  Reports doing well, Breathing without shortness of breath, Usual activity and exercise level, Usual amount of phlegm/mucus without difficulty coughing up, Sleeping well, Appetite is  good   Green Zone interventions:  Take daily medications, Use oxygen as prescribed, Avoid indoor/outdoor triggers, Continue regular exercise/diet plan   Week 1 call completed?  Yes          Abi Alcocer LPN

## 2020-08-10 NOTE — PAYOR COMM NOTE
"Raheem Roberts (80 y.o. Female)     ATTN: DISCHARGE SUMMARY FOR REVIEW, #[id number]  [facility] DEPT: LATRICIA CALDWELL RN, [phone],  [phone]        Date of Birth Social Security Number Address Home Phone MRN    1940  [id number]    [place] [address] [phone] [id number]    Jew Marital Status          Moravian        Admission Date Admission Type Admitting Provider Attending Provider Department, Room/Bed    8/4/20 Emergency Paul Barber MD  [address], [address]1    Discharge Date Discharge Disposition Discharge Destination        8/8/2020 Home or Self Care              Attending Provider:  (none)   Allergies:  Morphine And Related    Isolation:  None   Infection:  None   Code Status:  Prior    Ht:  157.5 cm (62\")   Wt:  76.6 kg (168 lb 14 oz)    Admission Cmt:  None   Principal Problem:  None                Active Insurance as of 8/4/2020     Primary Coverage     Payor Plan Insurance Group Employer/Plan Group    [facility] [employer] MEDICARE REPLACEMENT      Payor Plan Address Payor Plan Phone Number Payor Plan Fax Number Effective Dates    [address] [address] [phone]  10/9/2017 - None Entered    [facility] [address]       Subscriber Name Subscriber Birth Date Member ID       RAHEEM ROBERTS 1940 [id number]           Secondary Coverage     Payor Plan Insurance Group Employer/Plan Group    [place]      Payor Plan Address Payor Plan Phone Number Payor Plan Fax Number Effective Dates    [address] [phone]  7/3/2016 - None Entered    [facility] [id number]       Subscriber Name Subscriber Birth Date Member ID       RAHEEM ROBERTS 1940 [id number]                 Emergency Contacts      (Rel.) Home Phone Work Phone Mobile Phone    Chino Roberts (Son) [phone] -- --    James Roberts (Son) [phone] -- [phone]               Discharge " Summary      French Dolan MD at 08/08/20 1335          [facility]    Admit date: 8/4/2020  Discharge date: 8/8/2020    Admission/discharge diagnosis:  Acute on chronic hypercapnic respiratory failure  Acute metabolic encephalopathy  Chronic hypoxic respiratory failure (2 L nasal cannula)  COPD with acute exacerbation  Chronic diastolic heart failure  CKD 3  Chronic iron/B12 deficiency anemia  Hypothyroidism  Chronic pain syndrome on Neurontin   Migraine headaches  FERNANDEZ previously intolerant to PA P  Morbid obesity  Depression/bipolar disorder  Diarrhea    HPI: reviewed as per Dr. Barber:    Patient is a 80-year-old obese white female with a past medical history significant for COPD, chronic hypoxic respiratory failure on 2 L nasal cannula, chronic kidney disease, chronic diastolic heart failure who sees Dr. Perea in the clinic who was admitted a couple of times earlier this year with mild COPD exacerbation and respiratory failure who comes to the ER complaining of increasing confusion, weakness and states that she falls asleep having a conversation.  She denies any worsening cough or sputum production.  Did have some shortness of breath but no obvious wheezing.  States that she is compliant with her inhaler regimen.  Work-up in the ER showed acute on chronic hypercapnic respiratory failure.  We have been asked admit the patient to the hospital for further management.     Patient states that she is compliant with her medications and has not taken any new sedative medications.  She does not use any long-term pain medication.  She was previously diagnosed with sleep apnea several years back and states that she could not tolerate CPAP at that point.     Hospital Course:  Mrs. Wolf improved well, she was inquired what else she could do to reduce frequency of her acute exacerbations.  She has had three requiring hospitalization this year.  She reports no trouble with asthma or allergies. She  does not smoke and is not exposed to smoke at home.  She is compliant with laba/lama/lics and pap therapy. I can't find an obvious cause.  She has some occasional gerd.  Will go ahead and start her on three times a week zithromax to reduce frequency of ae.    Activity: pre admission    Diet: pre admission    Follow up: Dr. Amato in 6 weeks in office    Greater than 30 mins spent in discharging patient       Discharge Medications      New Medications      Instructions Start Date   azithromycin 250 MG tablet  Commonly known as:  Zithromax   Take m,w,f      budesonide-formoterol 160-4.5 MCG/ACT inhaler  Commonly known as:  SYMBICORT   2 puffs, Inhalation, 2 Times Daily      predniSONE 20 MG tablet  Commonly known as:  DELTASONE   20 mg, Oral, 2 Times Daily         Continue These Medications      Instructions Start Date   acetaminophen 325 MG tablet  Commonly known as:  TYLENOL   650 mg, Oral, Every 6 Hours PRN      allopurinol 100 MG tablet  Commonly known as:  ZYLOPRIM   100 mg, Oral, Daily      bumetanide 1 MG tablet  Commonly known as:  BUMEX   1 mg, Oral, Daily      divalproex 250 MG 24 hr tablet  Commonly known as:  DEPAKOTE   250 mg, Oral, Every Morning      divalproex 500 MG 24 hr tablet  Commonly known as:  DEPAKOTE   500 mg, Oral, Every Evening      DULoxetine 60 MG capsule  Commonly known as:  CYMBALTA   60 mg, Oral, 2 Times Daily      fluticasone 50 MCG/ACT nasal spray  Commonly known as:  FLONASE   1 spray, Nasal, Daily      gabapentin 300 MG capsule  Commonly known as:  NEURONTIN   300 mg, Oral, 3 Times Daily      Incruse Ellipta 62.5 MCG/INH aerosol powder   Generic drug:  Umeclidinium Bromide   1 puff, Inhalation, Daily      ipratropium-albuterol 0.5-2.5 mg/3 ml nebulizer  Commonly known as:  DUO-NEB   3 mL, Nebulization, Every 4 Hours PRN      iron polysaccharides 150 MG capsule  Commonly known as:  NIFEREX   150 mg, Oral, Daily      levothyroxine 88 MCG tablet  Commonly known as:  SYNTHROID,  LEVOTHROID   88 mcg, Oral, Every Morning Before Breakfast      melatonin 1 MG tablet   3 mg, Oral, Nightly      metoprolol succinate XL 25 MG 24 hr tablet  Commonly known as:  TOPROL-XL   12.5 mg, Oral, Every 24 Hours Scheduled      potassium chloride 10 MEQ CR capsule  Commonly known as:  MICRO-K   10 mEq, Oral, Daily      vitamin B-12 1000 MCG tablet  Commonly known as:  CYANOCOBALAMIN   1,000 mcg, Oral, Daily             Electronically signed by French Dolan MD at 08/08/20 1341       Viridiana Rodriguez RN      Case Management   Progress Notes   Signed   Date of Service:  08/08/20 1551   Creation Time:  08/10/20 0814            Signed             Show:Clear all  []Manual[x]Template[]Copied    Added by:  [x]Viridiana Rodriguez RN    []ver for details  Case Management Discharge Note        Final Note: Patient DC'd with [facility] . Notified Gwendolyn/[facility]                                Home Medical Care       Service Provider Request Status Selected Services Address Phone Number Fax Number     [facility] Selected Home Health Services [address] [address] [phone]              Transportation Services  Private: Car     Final Discharge Disposition Code: 06 - home with home health care

## 2020-08-10 NOTE — PROGRESS NOTES
Case Management Discharge Note      Final Note: Patient DC'd with [facility] . Notified Gwendolyn/[facility]            Home Medical Care      Service Provider Request Status Selected Services Address Phone Number Fax Number    [facility] Selected Home Health Services [address] [place] [id number] [phone]           Transportation Services  Private: Car    Final Discharge Disposition Code: 06 - home with home health care

## 2020-08-16 ENCOUNTER — HOSPITAL ENCOUNTER (EMERGENCY)
Facility: HOSPITAL | Age: 80
Discharge: HOME | End: 2020-08-17
Attending: EMERGENCY MEDICINE | Admitting: EMERGENCY MEDICINE

## 2020-08-16 DIAGNOSIS — J44.1 CHRONIC OBSTRUCTIVE PULMONARY DISEASE WITH ACUTE EXACERBATION (HCC): Primary | ICD-10-CM

## 2020-08-16 PROCEDURE — 99284 EMERGENCY DEPT VISIT MOD MDM: CPT

## 2020-08-17 ENCOUNTER — APPOINTMENT (OUTPATIENT)
Dept: GENERAL RADIOLOGY | Facility: HOSPITAL | Age: 80
End: 2020-08-17

## 2020-08-17 ENCOUNTER — READMISSION MANAGEMENT (OUTPATIENT)
Dept: CALL CENTER | Facility: HOSPITAL | Age: 80
End: 2020-08-17

## 2020-08-17 VITALS
DIASTOLIC BLOOD PRESSURE: 65 MMHG | RESPIRATION RATE: 20 BRPM | SYSTOLIC BLOOD PRESSURE: 136 MMHG | TEMPERATURE: 98.9 F | OXYGEN SATURATION: 93 % | HEART RATE: 74 BPM

## 2020-08-17 LAB
ALBUMIN SERPL-MCNC: 3.4 G/DL (ref 3.5–5.2)
ALBUMIN/GLOB SERPL: 1.2 G/DL
ALP SERPL-CCNC: 60 U/L (ref 39–117)
ALT SERPL W P-5'-P-CCNC: 9 U/L (ref 1–33)
ANION GAP SERPL CALCULATED.3IONS-SCNC: 11.8 MMOL/L (ref 5–15)
AST SERPL-CCNC: 12 U/L (ref 1–32)
B PARAPERT DNA SPEC QL NAA+PROBE: NOT DETECTED
B PERT DNA SPEC QL NAA+PROBE: NOT DETECTED
BASOPHILS # BLD AUTO: 0.02 10*3/MM3 (ref 0–0.2)
BASOPHILS NFR BLD AUTO: 0.3 % (ref 0–1.5)
BILIRUB SERPL-MCNC: 0.3 MG/DL (ref 0–1.2)
BUN SERPL-MCNC: 26 MG/DL (ref 8–23)
BUN/CREAT SERPL: 15 (ref 7–25)
C PNEUM DNA NPH QL NAA+NON-PROBE: NOT DETECTED
CALCIUM SPEC-SCNC: 8.9 MG/DL (ref 8.6–10.5)
CHLORIDE SERPL-SCNC: 94 MMOL/L (ref 98–107)
CO2 SERPL-SCNC: 36.2 MMOL/L (ref 22–29)
CREAT SERPL-MCNC: 1.73 MG/DL (ref 0.57–1)
D-LACTATE SERPL-SCNC: 0.7 MMOL/L (ref 0.5–2)
DEPRECATED RDW RBC AUTO: 53 FL (ref 37–54)
EOSINOPHIL # BLD AUTO: 0.11 10*3/MM3 (ref 0–0.4)
EOSINOPHIL NFR BLD AUTO: 1.5 % (ref 0.3–6.2)
ERYTHROCYTE [DISTWIDTH] IN BLOOD BY AUTOMATED COUNT: 14.6 % (ref 12.3–15.4)
FLUAV H1 2009 PAND RNA NPH QL NAA+PROBE: NOT DETECTED
FLUAV H1 HA GENE NPH QL NAA+PROBE: NOT DETECTED
FLUAV H3 RNA NPH QL NAA+PROBE: NOT DETECTED
FLUAV SUBTYP SPEC NAA+PROBE: NOT DETECTED
FLUBV RNA ISLT QL NAA+PROBE: NOT DETECTED
GFR SERPL CREATININE-BSD FRML MDRD: 28 ML/MIN/1.73
GLOBULIN UR ELPH-MCNC: 2.8 GM/DL
GLUCOSE SERPL-MCNC: 135 MG/DL (ref 65–99)
HADV DNA SPEC NAA+PROBE: NOT DETECTED
HCOV 229E RNA SPEC QL NAA+PROBE: NOT DETECTED
HCOV HKU1 RNA SPEC QL NAA+PROBE: NOT DETECTED
HCOV NL63 RNA SPEC QL NAA+PROBE: NOT DETECTED
HCOV OC43 RNA SPEC QL NAA+PROBE: NOT DETECTED
HCT VFR BLD AUTO: 27.2 % (ref 34–46.6)
HGB BLD-MCNC: 9.2 G/DL (ref 12–15.9)
HMPV RNA NPH QL NAA+NON-PROBE: NOT DETECTED
HPIV1 RNA SPEC QL NAA+PROBE: NOT DETECTED
HPIV2 RNA SPEC QL NAA+PROBE: NOT DETECTED
HPIV3 RNA NPH QL NAA+PROBE: NOT DETECTED
HPIV4 P GENE NPH QL NAA+PROBE: NOT DETECTED
IMM GRANULOCYTES # BLD AUTO: 0.31 10*3/MM3 (ref 0–0.05)
IMM GRANULOCYTES NFR BLD AUTO: 4.3 % (ref 0–0.5)
INR PPP: 1.07 (ref 0.9–1.1)
LDH SERPL-CCNC: 212 U/L (ref 135–214)
LYMPHOCYTES # BLD AUTO: 1.53 10*3/MM3 (ref 0.7–3.1)
LYMPHOCYTES NFR BLD AUTO: 21.5 % (ref 19.6–45.3)
M PNEUMO IGG SER IA-ACNC: NOT DETECTED
MCH RBC QN AUTO: 33.7 PG (ref 26.6–33)
MCHC RBC AUTO-ENTMCNC: 33.8 G/DL (ref 31.5–35.7)
MCV RBC AUTO: 99.6 FL (ref 79–97)
MONOCYTES # BLD AUTO: 1.29 10*3/MM3 (ref 0.1–0.9)
MONOCYTES NFR BLD AUTO: 18.1 % (ref 5–12)
NEUTROPHILS NFR BLD AUTO: 3.87 10*3/MM3 (ref 1.7–7)
NEUTROPHILS NFR BLD AUTO: 54.3 % (ref 42.7–76)
NRBC BLD AUTO-RTO: 0 /100 WBC (ref 0–0.2)
NT-PROBNP SERPL-MCNC: 334.5 PG/ML (ref 0–1800)
PLATELET # BLD AUTO: 145 10*3/MM3 (ref 140–450)
PMV BLD AUTO: 10.2 FL (ref 6–12)
POTASSIUM SERPL-SCNC: 3.3 MMOL/L (ref 3.5–5.2)
PROCALCITONIN SERPL-MCNC: 0.13 NG/ML (ref 0–0.25)
PROT SERPL-MCNC: 6.2 G/DL (ref 6–8.5)
PROTHROMBIN TIME: 13.8 SECONDS (ref 11.7–14.2)
RBC # BLD AUTO: 2.73 10*6/MM3 (ref 3.77–5.28)
RHINOVIRUS RNA SPEC NAA+PROBE: NOT DETECTED
RSV RNA NPH QL NAA+NON-PROBE: NOT DETECTED
SARS-COV-2 RNA PNL SPEC NAA+PROBE: NOT DETECTED
SODIUM SERPL-SCNC: 142 MMOL/L (ref 136–145)
TROPONIN T SERPL-MCNC: <0.01 NG/ML (ref 0–0.03)
WBC # BLD AUTO: 7.13 10*3/MM3 (ref 3.4–10.8)

## 2020-08-17 PROCEDURE — 85610 PROTHROMBIN TIME: CPT | Performed by: EMERGENCY MEDICINE

## 2020-08-17 PROCEDURE — 87040 BLOOD CULTURE FOR BACTERIA: CPT | Performed by: EMERGENCY MEDICINE

## 2020-08-17 PROCEDURE — 71045 X-RAY EXAM CHEST 1 VIEW: CPT

## 2020-08-17 PROCEDURE — 83880 ASSAY OF NATRIURETIC PEPTIDE: CPT | Performed by: EMERGENCY MEDICINE

## 2020-08-17 PROCEDURE — 94640 AIRWAY INHALATION TREATMENT: CPT

## 2020-08-17 PROCEDURE — 80053 COMPREHEN METABOLIC PANEL: CPT | Performed by: EMERGENCY MEDICINE

## 2020-08-17 PROCEDURE — 83615 LACTATE (LD) (LDH) ENZYME: CPT | Performed by: EMERGENCY MEDICINE

## 2020-08-17 PROCEDURE — 84484 ASSAY OF TROPONIN QUANT: CPT | Performed by: EMERGENCY MEDICINE

## 2020-08-17 PROCEDURE — 93005 ELECTROCARDIOGRAM TRACING: CPT | Performed by: EMERGENCY MEDICINE

## 2020-08-17 PROCEDURE — 93010 ELECTROCARDIOGRAM REPORT: CPT | Performed by: INTERNAL MEDICINE

## 2020-08-17 PROCEDURE — 0202U NFCT DS 22 TRGT SARS-COV-2: CPT | Performed by: EMERGENCY MEDICINE

## 2020-08-17 PROCEDURE — 85025 COMPLETE CBC W/AUTO DIFF WBC: CPT | Performed by: EMERGENCY MEDICINE

## 2020-08-17 PROCEDURE — 94799 UNLISTED PULMONARY SVC/PX: CPT

## 2020-08-17 PROCEDURE — 83605 ASSAY OF LACTIC ACID: CPT | Performed by: EMERGENCY MEDICINE

## 2020-08-17 PROCEDURE — 84145 PROCALCITONIN (PCT): CPT | Performed by: EMERGENCY MEDICINE

## 2020-08-17 RX ORDER — IPRATROPIUM BROMIDE AND ALBUTEROL SULFATE 2.5; .5 MG/3ML; MG/3ML
3 SOLUTION RESPIRATORY (INHALATION) ONCE
Status: COMPLETED | OUTPATIENT
Start: 2020-08-17 | End: 2020-08-17

## 2020-08-17 RX ORDER — SODIUM CHLORIDE 0.9 % (FLUSH) 0.9 %
10 SYRINGE (ML) INJECTION AS NEEDED
Status: DISCONTINUED | OUTPATIENT
Start: 2020-08-17 | End: 2020-08-17 | Stop reason: HOSPADM

## 2020-08-17 RX ADMIN — IPRATROPIUM BROMIDE AND ALBUTEROL SULFATE 3 ML: 2.5; .5 SOLUTION RESPIRATORY (INHALATION) at 00:39

## 2020-08-17 NOTE — ED NOTES
Pt waiting for family for d/c due to being on chronic oxygen @ 3L. Sharmila RN notified of pt d/c Rsihabh Drew RN  08/17/20 [id number]

## 2020-08-17 NOTE — DISCHARGE INSTRUCTIONS
Continue your home medications and follow-up with your pulmonologist.  Call tomorrow for an appointment.  Please return to the emergency department symptoms worsen with increasing shortness of breath or fever.

## 2020-08-17 NOTE — ED NOTES
Pt to ED from home per [facility] with reports of increased WOB, SOA, and pedal edema x1 week.  Pt has hx of CHF and was covid negative last week when she was evaluated in ED.      All triage performed with this RN wearing appropriate PPE.  Pt placed in mask upon arrival to ED.     Arianna Montana, RN  08/16/20 8205

## 2020-08-17 NOTE — ED PROVIDER NOTES
EMERGENCY DEPARTMENT ENCOUNTER    CHIEF COMPLAINT  Chief Complaint: Shortness of air  History given by: Patient  History limited by: None  Room Number: 20/20  PMD: Bill Martino MD      HPI:  Pt is a 80 y.o. female who presents complaining of shortness of air and dry cough that began today.  Patient has a history of chronic respiratory failure along with congestive heart failure.  She states she had an episode of nausea and vomiting last night without pain.  Today, she has noted increasing shortness of breath, dyspnea on exertion and increasing lethargy.  She has had a dry cough but denies sore throat, chest discomfort or leg pain.  However, she has noted swelling in her feet and ankles.  She states she used her nebulizer today with partial relief.  She denies any known exposure to COVID-19 but she was recently admitted on August 4 through the eighth for acute on chronic hypoxic respiratory failure.    Patient was placed in face mask in first look. Patient was wearing facemask when I entered the room and throughout our encounter. I wore full protective equipment throughout this patient encounter including a face mask, eye shield and gloves. Hand hygiene was performed before donning protective equipment and after removal when leaving the room.    2    PAST MEDICAL HISTORY  Active Ambulatory Problems     Diagnosis Date Noted   • Anemia 10/19/2017   • OA (osteoarthritis) of knee 11/16/2017   • Chronic respiratory failure with hypoxia (CMS/[facility]) 12/02/2017   • Cellulitis of skin 12/06/2017   • Acute kidney injury (CMS/[facility]) 12/06/2017   • Sepsis (CMS/[facility]) 12/06/2017   • Orthostatic hypotension 06/24/2018   • Disease of thyroid gland 06/24/2018   • Hypertension 06/24/2018   • Dehydration 06/24/2018   • Stage 3 chronic kidney disease (CMS/[facility]) 06/25/2018   • Closed compression fracture of L1 lumbar vertebra 06/16/2019   • Hyponatremia 06/16/2019   • Osteoporosis with pathological fracture 06/17/2019   • Acute on  chronic respiratory failure with hypoxia and hypercapnia (CMS/[facility]) 2020   • Obesity (BMI 30-39.9) 2020   • Nocturnal hypoxia 2020   • CKD (chronic kidney disease) stage 2, GFR 60-89 ml/min 2020   • Acute on chronic respiratory failure with hypoxia (CMS/[facility]) 2020     Resolved Ambulatory Problems     Diagnosis Date Noted   • COPD with acute exacerbation (CMS/[facility]) 2018     Past Medical History:   Diagnosis Date   • Acid reflux    • Arthritis    • Bipolar 1 disorder, depressed (CMS/[facility])    • Cataract    • Chronic nausea    • Chronic pain of right knee    • Continuous leakage of urine    • COPD (chronic obstructive pulmonary disease) (CMS/[facility])    • Diverticulosis    • Fibromyalgia    • Frequent episodes of bronchitis    • Hypothyroidism    • Migraines    • Neck pain    • On home oxygen therapy    • Short of breath on exertion        PAST SURGICAL HISTORY  Past Surgical History:   Procedure Laterality Date   • CEREBRAL ANEURYSM REPAIR      WITH STENT   • HYSTERECTOMY     • LAPAROSCOPIC CHOLECYSTECTOMY     • MS TOTAL KNEE ARTHROPLASTY Right 2017    Procedure: RT TOTAL KNEE ARTHROPLASTY;  Surgeon: Kashif Perez MD;  Location: [facility];  Service: Orthopedics       FAMILY HISTORY  Family History   Problem Relation Age of Onset   • Malig Hyperthermia Neg Hx        SOCIAL HISTORY  Social History     Socioeconomic History   • Marital status:      Spouse name: Not on file   • Number of children: Not on file   • Years of education: Not on file   • Highest education level: Not on file   Tobacco Use   • Smoking status: Former Smoker     Packs/day: 1.00     Years: 10.00     Pack years: 10.00     Types: Cigarettes     Start date:      Last attempt to quit: 1969     Years since quittin.6   • Smokeless tobacco: Never Used   Substance and Sexual Activity   • Alcohol use: No   • Drug use: No   • Sexual activity: Defer       ALLERGIES  Morphine and related    REVIEW  OF SYSTEMS  Review of Systems   Constitutional: Positive for activity change, chills and fatigue. Negative for fever.   HENT: Negative for sinus pressure and sore throat.    Eyes: Negative.    Respiratory: Positive for cough and shortness of breath.    Cardiovascular: Negative for chest pain.   Gastrointestinal: Positive for nausea and vomiting. Negative for abdominal pain and diarrhea.   Genitourinary: Negative for dysuria, frequency and hematuria.   Musculoskeletal: Negative for neck pain.   Skin: Negative for rash.   Allergic/Immunologic: Negative.    Neurological: Negative for weakness, numbness and headaches.   Hematological: Negative.    Psychiatric/Behavioral: Negative.    All other systems reviewed and are negative.      PHYSICAL EXAM  ED Triage Vitals [08/16/20 2355]   Temp Heart Rate Resp BP SpO2   98.9 °F (37.2 °C) 80 24 128/51 95 %      Temp src Heart Rate Source Patient Position BP Location FiO2 (%)   Tympanic Monitor Sitting Right arm --       Physical Exam   Constitutional: She appears distressed (mild).   HENT:   Head: Atraumatic.   Eyes: EOM are normal. No scleral icterus.   Neck: Normal range of motion. Neck supple. No JVD present. No tracheal deviation present. No thyromegaly present.   Cardiovascular: Normal rate, regular rhythm, normal heart sounds and intact distal pulses.   Pulmonary/Chest: Tachypnea noted. She is in respiratory distress (mild). She has decreased breath sounds. She has rhonchi in the right lower field and the left lower field. She has rales in the right lower field and the left lower field.   Musculoskeletal: She exhibits edema (1+ bilateral feet). She exhibits no tenderness.   Neurological: She is alert.   Skin: Skin is warm and dry. No erythema.   Nursing note and vitals reviewed.      LAB RESULTS  Lab Results (last 24 hours)     Procedure Component Value Units Date/Time    CBC & Differential [[id number]] Collected:  08/17/20 0026    Specimen:  Blood Updated:  08/17/20 0044     Narrative:       The following orders were created for panel order CBC & Differential.  Procedure                               Abnormality         Status                     ---------                               -----------         ------                     CBC Auto Differential[[id number]]        Abnormal            Final result                 Please view results for these tests on the individual orders.    Comprehensive Metabolic Panel [[id number]]  (Abnormal) Collected:  08/17/20 0026    Specimen:  Blood Updated:  08/17/20 0111     Glucose 135 mg/dL      BUN 26 mg/dL      Creatinine 1.73 mg/dL      Sodium 142 mmol/L      Potassium 3.3 mmol/L      Chloride 94 mmol/L      CO2 36.2 mmol/L      Calcium 8.9 mg/dL      Total Protein 6.2 g/dL      Albumin 3.40 g/dL      ALT (SGPT) 9 U/L      AST (SGOT) 12 U/L      Alkaline Phosphatase 60 U/L      Total Bilirubin 0.3 mg/dL      eGFR Non African Amer 28 mL/min/1.73      Globulin 2.8 gm/dL      A/G Ratio 1.2 g/dL      BUN/Creatinine Ratio 15.0     Anion Gap 11.8 mmol/L     Narrative:       GFR Normal >60  Chronic Kidney Disease <60  Kidney Failure <15      Lactate Dehydrogenase [[id number]]  (Normal) Collected:  08/17/20 0026    Specimen:  Blood Updated:  08/17/20 0111      U/L     Procalcitonin [[id number]]  (Normal) Collected:  08/17/20 0026    Specimen:  Blood Updated:  08/17/20 0114     Procalcitonin 0.13 ng/mL     Narrative:       As a Marker for Sepsis (Non-Neonates):   1. <0.5 ng/mL represents a low risk of severe sepsis and/or septic shock.  1. >2 ng/mL represents a high risk of severe sepsis and/or septic shock.    As a Marker for Lower Respiratory Tract Infections that require antibiotic therapy:  PCT on Admission     Antibiotic Therapy             6-12 Hrs later  > 0.5                Strongly Recommended            >0.25 - <0.5         Recommended  0.1 - 0.25           Discouraged                   Remeasure/reassess PCT  <0.1                 Strongly  "Discouraged          Remeasure/reassess PCT      As 28 day mortality risk marker: \"Change in Procalcitonin Result\" (> 80 % or <=80 %) if Day 0 (or Day 1) and Day 4 values are available. Refer to http://www.[facility]-pct-calculator.com/   Change in PCT <=80 %   A decrease of PCT levels below or equal to 80 % defines a positive change in PCT test result representing a higher risk for 28-day all-cause mortality of patients diagnosed with severe sepsis or septic shock.  Change in PCT > 80 %   A decrease of PCT levels of more than 80 % defines a negative change in PCT result representing a lower risk for 28-day all-cause mortality of patients diagnosed with severe sepsis or septic shock.                Results may be falsely decreased if patient taking Biotin.     Lactic Acid, Plasma [[id number]]  (Normal) Collected:  08/17/20 0026    Specimen:  Blood Updated:  08/17/20 0100     Lactate 0.7 mmol/L     Protime-INR [[id number]]  (Normal) Collected:  08/17/20 0026    Specimen:  Blood Updated:  08/17/20 0056     Protime 13.8 Seconds      INR 1.07    Troponin [[id number]]  (Normal) Collected:  08/17/20 0026    Specimen:  Blood Updated:  08/17/20 0111     Troponin T <0.010 ng/mL     Narrative:       Troponin T Reference Range:  <= 0.03 ng/mL-   Negative for AMI  >0.03 ng/mL-     Abnormal for myocardial necrosis.  Clinicians would have to utilize clinical acumen, EKG, Troponin and serial changes to determine if it is an Acute Myocardial Infarction or myocardial injury due to an underlying chronic condition.       Results may be falsely decreased if patient taking Biotin.      BNP [[id number]]  (Normal) Collected:  08/17/20 0026    Specimen:  Blood Updated:  08/17/20 0104     proBNP 334.5 pg/mL     Narrative:       Among patients with dyspnea, NT-proBNP is highly sensitive for the detection of acute congestive heart failure. In addition NT-proBNP of <300 pg/ml effectively rules out acute congestive heart failure with 99% negative " predictive value.    Results may be falsely decreased if patient taking Biotin.      Blood Culture - Blood, Arm, Left [[id number]] Collected:  08/17/20 0026    Specimen:  Blood from Arm, Left Updated:  08/17/20 0036    CBC Auto Differential [[id number]]  (Abnormal) Collected:  08/17/20 0026    Specimen:  Blood Updated:  08/17/20 0044     WBC 7.13 10*3/mm3      RBC 2.73 10*6/mm3      Hemoglobin 9.2 g/dL      Hematocrit 27.2 %      MCV 99.6 fL      MCH 33.7 pg      MCHC 33.8 g/dL      RDW 14.6 %      RDW-SD 53.0 fl      MPV 10.2 fL      Platelets 145 10*3/mm3      Neutrophil % 54.3 %      Lymphocyte % 21.5 %      Monocyte % 18.1 %      Eosinophil % 1.5 %      Basophil % 0.3 %      Immature Grans % 4.3 %      Neutrophils, Absolute 3.87 10*3/mm3      Lymphocytes, Absolute 1.53 10*3/mm3      Monocytes, Absolute 1.29 10*3/mm3      Eosinophils, Absolute 0.11 10*3/mm3      Basophils, Absolute 0.02 10*3/mm3      Immature Grans, Absolute 0.31 10*3/mm3      nRBC 0.0 /100 WBC     Respiratory Panel PCR w/COVID-19(SARS-CoV-2) ANISH/VALERIA/[facility]IM[facility] In-House, NP Swab in UTM/VTM, 3-4 HR TAT - Swab, Nasopharynx [[id number]]  (Normal) Collected:  08/17/20 0028    Specimen:  Swab from Nasopharynx Updated:  08/17/20 0144     ADENOVIRUS, PCR Not Detected     Coronavirus 229E Not Detected     Coronavirus HKU1 Not Detected     Coronavirus NL63 Not Detected     Coronavirus OC43 Not Detected     COVID19 Not Detected     Human Metapneumovirus Not Detected     Human Rhinovirus/Enterovirus Not Detected     Influenza A PCR Not Detected     Influenza A H1 Not Detected     Influenza A H1 2009 PCR Not Detected     Influenza A H3 Not Detected     Influenza B PCR Not Detected     Parainfluenza Virus 1 Not Detected     Parainfluenza Virus 2 Not Detected     Parainfluenza Virus 3 Not Detected     Parainfluenza Virus 4 Not Detected     RSV, PCR Not Detected     Bordetella pertussis pcr Not Detected     Bordetella parapertussis PCR Not Detected     Chlamydophila  pneumoniae PCR Not Detected     Mycoplasma pneumo by PCR Not Detected    Narrative:       Fact sheet for providers: https://docs.[employer]/wp-content/uploads/KUM8923-4418-GK4.1-EUA-Provider-Fact-Sheet-3.pdf    Fact sheet for patients: https://docs.[employer]/wp-content/uploads/IYI8709-7743-NX9.1-EUA-Patient-Fact-Sheet-1.pdf          I ordered the above labs and reviewed the results    RADIOLOGY  XR Chest AP   Final Result   No significant change since the previous study of 8/4/2020.   There is atelectasis or scar in the right lower lung and mild   atelectasis scar or infiltrate in the left lung base.       This report was finalized on 8/17/2020 1:00 AM by Dr. Oracio Barry M.D.               I ordered the above noted radiological studies. Interpreted by radiologist. Reviewed by me in PACS.       PROCEDURES  Procedures  EKG          EKG time: 007  Rhythm/Rate: Normal sinus rhythm, rate 83  P waves and SC: Normal  QRS, axis: Normal  ST and T waves: Nonspecific T wave changes    Interpreted Contemporaneously by me, independently viewed  unchanged compared to prior 08/04/2020      PROGRESS AND CONSULTS  ED Course as of Aug 17 0253   Mon Aug 17, 2020   0227 Patient has had 1 breathing treatment and she states she is breathing back to normal.  Upon reexamination, her lungs reveal occasional expiratory wheezes at the bases with mild right basilar rales.  Patient states that she does not want another breathing treatment because she is back to normal.  She is on daily prednisone and prefer not to have an increase in her prednisone.  I think it is safe to discharge patient to home.    [DE]   0228 Her viral respiratory panel is normal and she has no signs of heart failure or heart attack.    [DE]      ED Course User Index  [DE] Joe Wharton MD         MEDICAL DECISION MAKING  Results were reviewed/discussed with the patient and they were also made aware of online access. Pt also made aware that some labs,  such as cultures, will not be resulted during ER visit and follow up with PMD is necessary.     MDM       DIAGNOSIS  Final diagnoses:   Chronic obstructive pulmonary disease with acute exacerbation (CMS/HCC)       DISPOSITION  DISCHARGE    Patient discharged in stable condition.    Reviewed implications of results, diagnosis, meds, responsibility to follow up, warning signs and symptoms of possible worsening, potential complications and reasons to return to ER, including new or worsening symptoms.    Patient/Family voiced understanding of above instructions.    Discussed plan for discharge, as there is no emergent indication for admission. Patient referred to primary care provider for BP management due to today's BP. Pt/family is agreeable and understands need for follow up and repeat testing.  Pt is aware that discharge does not mean that nothing is wrong but it indicates no emergency is present that requires admission and they must continue care with follow-up as given below or physician of their choice.     FOLLOW-UP  Car Amato MD  [address] [id number] [phone]    Schedule an appointment as soon as possible for a visit            Medication List      No changes were made to your prescriptions during this visit.           Latest Documented Vital Signs:  As of 02:53  BP- 136/65 HR- 74 Temp- 98.9 °F (37.2 °C) (Tympanic) O2 sat- 93%    --  Dragon disclaimer:   Much of this encounter note is an electronic transcription/translation of spoken language to printed text.  The electronic translation of spoken language may permit erroneous, or at times, nonsensical words or phrases to be inadvertently transcribed.  Although I have reviewed the note for such areas, some may still exist.       Joe Wharton MD  08/17/20 0254

## 2020-08-17 NOTE — OUTREACH NOTE
COPD/PN Week 2 Survey      Responses   [facility] patient discharged from?  [place]   COVID-19 Test Status  Negative   Does the patient have one of the following disease processes/diagnoses(primary or secondary)?  COPD/Pneumonia   Was the primary reason for admission:  COPD exacerbation   Week 2 attempt successful?  No   Unsuccessful attempts  Attempt 1          Joycelyn Mckenzie RN

## 2020-08-19 ENCOUNTER — READMISSION MANAGEMENT (OUTPATIENT)
Dept: CALL CENTER | Facility: HOSPITAL | Age: 80
End: 2020-08-19

## 2020-08-19 NOTE — OUTREACH NOTE
COPD/PN Week 2 Survey      Responses   [facility]   COVID-19 Test Status  Negative   Does the patient have one of the following disease processes/diagnoses(primary or secondary)?  COPD/Pneumonia   Was the primary reason for admission:  COPD exacerbation   Week 2 attempt successful?  Yes   Call start time  0828   Call end time  0831   Meds reviewed with patient/caregiver?  Yes   Is the patient having any side effects they believe may be caused by any medication additions or changes?  No   Does the patient have all medications ordered at discharge?  Yes   Is the patient taking all medications as directed (includes completed medication regime)?  Yes   Does the patient have a primary care provider?   Yes   Comments regarding PCP  PATIENT STATES SHE HAS SEEN HER PCP   Has the patient kept scheduled appointments due by today?  Yes   What is the Home health agency?   [facility]   Has home health visited the patient within 72 hours of discharge?  Yes   Pulse Ox monitoring  None   Did the patient receive a copy of their discharge instructions?  Yes   Nursing interventions  Reviewed instructions with patient   What is the patient's perception of their health status since discharge?  Improving   Nursing Interventions  Nurse provided patient education   Is the patient/caregiver able to teach back the hierarchy of who to call/visit for symptoms/problems? PCP, Specialist, Home health nurse, Urgent Care, ED, 911  Yes   Is the patient able to teach back COPD zones?  Yes   Nursing interventions  Education provided on various zones   Patient reports what zone on this call?  Green Zone   Green Zone  Reports doing well, Breathing without shortness of breath, Usual activity and exercise level, Usual amount of phlegm/mucus without difficulty coughing up, Sleeping well, Appetite is good   Green Zone interventions:  Take daily medications, Use oxygen as prescribed, Continue regular exercise/diet  plan, Avoid indoor/outdoor triggers   Week 2 call completed?  Yes          Abi Alcocer LPN

## 2020-08-22 LAB — BACTERIA SPEC AEROBE CULT: NORMAL

## 2020-08-25 ENCOUNTER — READMISSION MANAGEMENT (OUTPATIENT)
Dept: CALL CENTER | Facility: HOSPITAL | Age: 80
End: 2020-08-25

## 2020-08-25 NOTE — OUTREACH NOTE
COPD/PN Week 3 Survey      Responses   [facility] patient discharged from?  [place]   COVID-19 Test Status  Negative   Does the patient have one of the following disease processes/diagnoses(primary or secondary)?  COPD/Pneumonia   Week 3 attempt successful?  Yes   Call start time  1709   Call end time  1712   Discharge diagnosis  COPD   Meds reviewed with patient/caregiver?  Yes   Is the patient taking all medications as directed (includes completed medication regime)?  Yes   Has the patient kept scheduled appointments due by today?  Yes   Pulse Ox monitoring  Intermittent   Pulse Ox device source  Patient   O2 Sat comments  90's for saturation   Psychosocial issues?  No   Comments  HH coming 1 time week   What is the patient's perception of their health status since discharge?  Improving   Is the patient able to teach back COPD zones?  Yes   Patient reports what zone on this call?  Green Zone   Green Zone  Reports doing well, Breathing without shortness of breath, Usual activity and exercise level, Usual amount of phlegm/mucus without difficulty coughing up, Sleeping well, Appetite is good   Green Zone interventions:  Take daily medications, Use oxygen as prescribed, Continue regular exercise/diet plan, Do not smoke, Avoid indoor/outdoor triggers   Week 3 call completed?  Yes   Revoked  No further contact(revokes)-requires comment   Graduated/Revoked comments  She  states she is doing fine, does ot really need the calls and HH is coming   Wrap up additional comments  HH is coming and has gone over Zones with her she is still in Green zone          Selina Curry RN

## 2020-09-21 ENCOUNTER — TRANSCRIBE ORDERS (OUTPATIENT)
Dept: ADMINISTRATIVE | Facility: HOSPITAL | Age: 80
End: 2020-09-21

## 2020-09-21 DIAGNOSIS — N63.20 LEFT BREAST LUMP: Primary | ICD-10-CM

## 2020-09-21 DIAGNOSIS — N64.4 BREAST PAIN, LEFT: ICD-10-CM

## 2020-10-02 ENCOUNTER — APPOINTMENT (OUTPATIENT)
Dept: ULTRASOUND IMAGING | Facility: HOSPITAL | Age: 80
End: 2020-10-02

## 2020-10-02 ENCOUNTER — HOSPITAL ENCOUNTER (OUTPATIENT)
Dept: MAMMOGRAPHY | Facility: HOSPITAL | Age: 80
Discharge: HOME | End: 2020-10-02
Admitting: NURSE PRACTITIONER

## 2020-10-02 DIAGNOSIS — N63.20 LEFT BREAST LUMP: ICD-10-CM

## 2020-10-02 DIAGNOSIS — N64.4 BREAST PAIN, LEFT: ICD-10-CM

## 2020-10-02 PROCEDURE — 77066 DX MAMMO INCL CAD BI: CPT

## 2020-10-02 PROCEDURE — G0279 TOMOSYNTHESIS, MAMMO: HCPCS

## 2020-10-14 ENCOUNTER — TRANSCRIBE ORDERS (OUTPATIENT)
Dept: ADMINISTRATIVE | Facility: HOSPITAL | Age: 80
End: 2020-10-14

## 2020-10-14 DIAGNOSIS — R13.10 DYSPHAGIA, UNSPECIFIED TYPE: ICD-10-CM

## 2020-10-14 DIAGNOSIS — J18.9 RECURRENT PNEUMONIA: ICD-10-CM

## 2020-10-14 DIAGNOSIS — J90 BILATERAL PLEURAL EFFUSION: ICD-10-CM

## 2020-10-14 DIAGNOSIS — J18.9 PNEUMONIA DUE TO INFECTIOUS ORGANISM, UNSPECIFIED LATERALITY, UNSPECIFIED PART OF LUNG: Primary | ICD-10-CM

## 2020-10-20 ENCOUNTER — TRANSCRIBE ORDERS (OUTPATIENT)
Dept: SLEEP MEDICINE | Facility: HOSPITAL | Age: 80
End: 2020-10-20

## 2020-10-20 DIAGNOSIS — Z01.818 OTHER SPECIFIED PRE-OPERATIVE EXAMINATION: Primary | ICD-10-CM

## 2020-10-22 RX ORDER — OLANZAPINE 5 MG/1
TABLET ORAL
Status: ON HOLD | COMMUNITY
Start: 2020-08-07 | End: 2021-10-19

## 2020-10-22 RX ORDER — HYDROCODONE BITARTRATE AND ACETAMINOPHEN 7.5; 325 MG/1; MG/1
1 TABLET ORAL EVERY 8 HOURS PRN
COMMUNITY
Start: 2020-10-01

## 2020-10-23 ENCOUNTER — OFFICE VISIT (OUTPATIENT)
Dept: NEUROLOGY | Facility: CLINIC | Age: 80
End: 2020-10-23

## 2020-10-23 VITALS
OXYGEN SATURATION: 93 % | WEIGHT: 171 LBS | SYSTOLIC BLOOD PRESSURE: 128 MMHG | BODY MASS INDEX: 31.47 KG/M2 | HEIGHT: 62 IN | DIASTOLIC BLOOD PRESSURE: 62 MMHG | HEART RATE: 104 BPM

## 2020-10-23 DIAGNOSIS — G25.2 INTENTION TREMOR: ICD-10-CM

## 2020-10-23 DIAGNOSIS — G20 PARKINSONISM, UNSPECIFIED PARKINSONISM TYPE (HCC): Primary | ICD-10-CM

## 2020-10-23 PROCEDURE — 99204 OFFICE O/P NEW MOD 45 MIN: CPT | Performed by: PSYCHIATRY & NEUROLOGY

## 2020-10-23 RX ORDER — ALBUTEROL SULFATE 2.5 MG/3ML
2.5 SOLUTION RESPIRATORY (INHALATION) EVERY 4 HOURS PRN
COMMUNITY
Start: 2020-10-02

## 2020-10-23 NOTE — PROGRESS NOTES
Chief complaint: Tremor    Patient ID: Josephine Wolf is a 80 y.o. female.    HPI: I had the pleasure of seeing your patient today.  As you may know she is a 80-year-old female with history of tremors.  She states that her tremors began a few months ago however they seem to be worsening since onset.  She says that her tremor is mostly noted at rest.  She says it started in her right leg however she also experiences the resting tremor in her right arm and left hand.  Along with the tremor at rest she does have some tremoring with movement.  She says that if she is holding something or any movement with her hands she will notice the tremor.  There is no family history of tremor or Parkinson's disease.  She denies any Parkinson symptoms or tremors prior to the onset recently.  She says her gait has been affected overall.  She says that she walks slowly and takes very small steps.  She has noted some restriction of movement in her arms and legs.  Of note she says that she has been taking Depakote and olanzapine for several months now.  She does acknowledge that her tremoring began after the addition of these medications.  She denies any history of severe head injuries.  No history of stroke or strokelike symptoms.  She does have a history of intracranial aneurysm which was stented last year.    The following portions of the patient's history were reviewed and updated as appropriate: allergies, current medications, past family history, past medical history, past social history, past surgical history and problem list.    Review of Systems   Constitutional: Positive for fatigue. Negative for activity change and appetite change.   HENT: Negative for trouble swallowing.    Eyes: Negative for photophobia, redness and visual disturbance.   Respiratory: Negative for chest tightness, shortness of breath and wheezing.    Cardiovascular: Negative for chest pain, palpitations and leg swelling.   Gastrointestinal: Negative for  abdominal pain, nausea and vomiting.   Endocrine: Negative for cold intolerance, heat intolerance and polydipsia.   Musculoskeletal: Negative for back pain, gait problem and neck pain.   Skin: Negative for color change, rash and wound.   Neurological: Positive for tremors. Negative for dizziness, seizures, syncope, facial asymmetry, speech difficulty, weakness, light-headedness, numbness and headaches.   Hematological: Negative for adenopathy. Does not bruise/bleed easily.   Psychiatric/Behavioral: Negative for agitation, behavioral problems, confusion, decreased concentration, dysphoric mood, hallucinations, self-injury, sleep disturbance and suicidal ideas. The patient is not nervous/anxious and is not hyperactive.       I have reviewed the review of systems above performed by my medical assistant.      Vitals:    10/23/20 1342   Pulse: 104   SpO2: 93%       Neurologic Exam     Mental Status   Oriented to person, place, and time.   Registration: recalls 3 of 3 objects. Follows 3 step commands.   Attention: normal. Concentration: normal.   Speech: speech is normal   Level of consciousness: alert  Knowledge: consistent with education (No deficits found.).   Normal comprehension.     Cranial Nerves     CN II   Visual fields full to confrontation.     CN III, IV, VI   Pupils are equal, round, and reactive to light.  Extraocular motions are normal.   CN III: no CN III palsy  CN VI: no CN VI palsy  Nystagmus: none   Diplopia: none    CN V   Facial sensation intact.     CN VII   Facial expression full, symmetric.     CN VIII   CN VIII normal.     CN IX, X   CN IX normal.   CN X normal.     CN XI   CN XI normal.     CN XII   CN XII normal.     Motor Exam   Muscle bulk: normal  Right arm tone: normal and cogwheel rigidity  Left arm tone: normal and cogwheel rigidity  Right leg tone: normal  Left leg tone: normal    Strength   Right neck flexion: 5/5  Left neck flexion: 5/5  Right neck extension: 5/5  Left neck extension:  5/5  Right deltoid: 5/5  Left deltoid: 5/5  Right biceps: 5/5  Left biceps: 5/5  Right triceps: 5/5  Left triceps: 5/5  Right wrist flexion: 5/5  Left wrist flexion: 5/5  Right wrist extension: 5/5  Left wrist extension: 5/5  Right interossei: 5/5  Left interossei: 5/5  Right abdominals: 5/5  Left abdominals: 5/5  Right iliopsoas: 5/5  Left iliopsoas: 5/5  Right quadriceps: 5/5  Left quadriceps: 5/5  Right hamstrin/5  Left hamstrin/5  Right glutei: 5/5  Left glutei: 5/5  Right anterior tibial: 5/5  Left anterior tibial: 5/5  Right posterior tibial: 5/5  Left posterior tibial: 5/5  Right peroneal: 5/5  Left peroneal: 5/5  Right gastroc: 5/5  Left gastroc: 5/5    Sensory Exam   Light touch normal.   Vibration normal.   Proprioception normal.   Pinprick normal.     Gait, Coordination, and Reflexes     Gait  Gait: shuffling    Coordination   Romberg: negative    Tremor   Resting tremor: present  Intention tremor: present    Reflexes   Right brachioradialis: 2+  Left brachioradialis: 2+  Right biceps: 2+  Left biceps: 2+  Right triceps: 2+  Left triceps: 2+  Right patellar: 2+  Left patellar: 2+  Right achilles: 2+  Left achilles: 2+  Right : 2+  Left : 2+Station is normal.       Physical Exam  Vitals signs reviewed.   Constitutional:       General: She is not in acute distress.     Appearance: She is well-developed.   HENT:      Head: Normocephalic and atraumatic.   Eyes:      Extraocular Movements: EOM normal.      Pupils: Pupils are equal, round, and reactive to light.   Neck:      Musculoskeletal: Normal range of motion.   Cardiovascular:      Rate and Rhythm: Normal rate and regular rhythm.      Heart sounds: Normal heart sounds.   Pulmonary:      Effort: Pulmonary effort is normal. No respiratory distress.      Breath sounds: Normal breath sounds.   Abdominal:      General: Bowel sounds are normal. There is no distension.      Palpations: Abdomen is soft.      Tenderness: There is no abdominal  tenderness.   Musculoskeletal:         General: No deformity.   Skin:     General: Skin is warm.      Findings: No rash.   Neurological:      Mental Status: She is oriented to person, place, and time.      Coordination: Romberg Test normal.      Deep Tendon Reflexes:      Reflex Scores:       Tricep reflexes are 2+ on the right side and 2+ on the left side.       Bicep reflexes are 2+ on the right side and 2+ on the left side.       Brachioradialis reflexes are 2+ on the right side and 2+ on the left side.       Patellar reflexes are 2+ on the right side and 2+ on the left side.       Achilles reflexes are 2+ on the right side and 2+ on the left side.  Psychiatric:         Speech: Speech normal.         Judgment: Judgment normal.         Procedures    Assessment/Plan: She certainly is exhibiting both parkinsonian symptoms as well as an action tremor.  The question is is this primary Parkinson's disease versus secondary parkinsonism?  She is taking olanzapine which can cause extrapyramidal symptoms and tremor.  Depakote has also been known to cause tremor.  It would be worth discussing with her primary care specialist whether a medication change is feasible for her.  She will discussed that with them and I certainly will forward this letter to primary physician.  Based on that we will move forward with further neurodiagnostic testing versus treatment.  A total of 45 minutes was spent face-to-face with the patient today.  Of that greater than 50% of this time was spent discussing signs and symptoms of tremor, patient education, plan of care and prognosis.       Diagnoses and all orders for this visit:    1. Parkinsonism, unspecified Parkinsonism type (CMS/HCC) (Primary)    2. Intention tremor           Ross Braswell II, MD

## 2020-10-28 ENCOUNTER — LAB (OUTPATIENT)
Dept: LAB | Facility: HOSPITAL | Age: 80
End: 2020-10-28

## 2020-10-28 DIAGNOSIS — Z01.818 OTHER SPECIFIED PRE-OPERATIVE EXAMINATION: ICD-10-CM

## 2020-10-28 PROCEDURE — U0004 COV-19 TEST NON-CDC HGH THRU: HCPCS | Performed by: INTERNAL MEDICINE

## 2020-10-28 PROCEDURE — C9803 HOPD COVID-19 SPEC COLLECT: HCPCS

## 2020-10-29 LAB — SARS-COV-2 RNA RESP QL NAA+PROBE: NOT DETECTED

## 2020-10-30 ENCOUNTER — HOSPITAL ENCOUNTER (OUTPATIENT)
Dept: GENERAL RADIOLOGY | Facility: HOSPITAL | Age: 80
Discharge: HOME | End: 2020-10-30
Admitting: INTERNAL MEDICINE

## 2020-10-30 ENCOUNTER — APPOINTMENT (OUTPATIENT)
Dept: CT IMAGING | Facility: HOSPITAL | Age: 80
End: 2020-10-30

## 2020-10-30 DIAGNOSIS — R13.10 DYSPHAGIA, UNSPECIFIED TYPE: ICD-10-CM

## 2020-10-30 DIAGNOSIS — J18.9 RECURRENT PNEUMONIA: ICD-10-CM

## 2020-10-30 PROCEDURE — 74230 X-RAY XM SWLNG FUNCJ C+: CPT

## 2020-10-30 PROCEDURE — A9270 NON-COVERED ITEM OR SERVICE: HCPCS | Performed by: INTERNAL MEDICINE

## 2020-10-30 PROCEDURE — 92611 MOTION FLUOROSCOPY/SWALLOW: CPT | Performed by: SPEECH-LANGUAGE PATHOLOGIST

## 2020-10-30 PROCEDURE — 63710000001 BARIUM SULFATE 98 % RECONSTITUTED SUSPENSION: Performed by: INTERNAL MEDICINE

## 2020-10-30 PROCEDURE — 63710000001 BARIUM SULFATE 40 % RECONSTITUTED SUSPENSION: Performed by: INTERNAL MEDICINE

## 2020-10-30 RX ADMIN — BARIUM SULFATE 55 ML: 0.81 POWDER, FOR SUSPENSION ORAL at 13:52

## 2020-10-30 RX ADMIN — BARIUM SULFATE 4 ML: 980 POWDER, FOR SUSPENSION ORAL at 13:53

## 2020-10-30 NOTE — MBS/VFSS/FEES
Outpatient Speech Language Pathology   Adult Swallow Initial Evaluation-VFSS  [facility]     Patient Name: Josephine Wolf  : 1940  MRN: [id number]  Today's Date: 10/30/2020         Visit Date: 10/30/2020       SPEECH-LANGUAGE PATHOLOGY EVALUTION - VFSS  Subjective: The patient was seen on this date for a VFSS(Videofluoroscopic Swallowing Study).  Patient was alert and cooperative.     Objective: Risks/benefits were reviewed with the patient and family, and consent was obtained. The study was completed with SLP present and Radiologist review. The patient was seen in lateral view(s). Textures given included thin liquid, puree consistency, mechanical soft consistency and regular consistency.  Assessment:  Patient demonstrated a mild oropharyngeal dysphagia characterized by inconsistent penetration of mixed consistency.  No significant oral or pharyngeal residue.  Inconsistent cricopharyngeal function was demonstrated, but did not impact oropharyngeal swallow safety.  Esophageal scan showed delayed clearance with backflow requiring time and repeat swallow to clear.      SLP Findings: Patient presents with mild oropharyngeal dysphagia with esophageal component.   Recommendations: Diet Textures: thin liquid, soft chopped diet, avoiding mixed consistencies food. Medications should be taken as tolerated   Recommended Strategies: Upright for PO and small bites and sips. Eat slowly.Oral care before breakfast, after all meals and PRN.        Patient Active Problem List   Diagnosis   • Anemia   • OA (osteoarthritis) of knee   • Chronic respiratory failure with hypoxia (CMS/HCC)   • Cellulitis of skin   • Acute kidney injury (CMS/HCC)   • Sepsis (CMS/HCC)   • Orthostatic hypotension   • Disease of thyroid gland   • Hypertension   • Dehydration   • Stage 3 chronic kidney disease (CMS/HCC)   • Closed compression fracture of L1 lumbar vertebra   • Hyponatremia   • Osteoporosis with pathological fracture   • Acute on  chronic respiratory failure with hypoxia and hypercapnia (CMS/HCC)   • Obesity (BMI 30-39.9)   • Nocturnal hypoxia   • CKD (chronic kidney disease) stage 2, GFR 60-89 ml/min   • Acute on chronic respiratory failure with hypoxia (CMS/HCC)        Past Medical History:   Diagnosis Date   • Acid reflux    • Acute kidney injury (CMS/HCC)    • Anemia    • Arthritis    • Bipolar 1 disorder, depressed (CMS/HCC)    • Cataract     MINDY   • Chronic nausea    • Chronic pain of right knee    • Continuous leakage of urine     USES DEPENDS   • COPD (chronic obstructive pulmonary disease) (CMS/HCC)     USES O2 2 LMP PER NC AT NIGHT   • Disease of thyroid gland     HYPOTHYROIDISM   • Diverticulosis    • Fibromyalgia     DX 1994   • Fibromyalgia, primary    • Frequent episodes of bronchitis    • HL (hearing loss)    • Hypertension    • Hypothyroidism    • Memory loss    • Migraines    • Neck pain    • On home oxygen therapy     2L NC AT NIGHT   • Orthostatic hypotension    • Short of breath on exertion    • Sleep apnea    • Stage 3 chronic kidney disease         Past Surgical History:   Procedure Laterality Date   • CEREBRAL ANEURYSM REPAIR  2000'S    WITH STENT   • HYSTERECTOMY     • LAPAROSCOPIC CHOLECYSTECTOMY     • AL TOTAL KNEE ARTHROPLASTY Right 11/16/2017    Procedure: RT TOTAL KNEE ARTHROPLASTY;  Surgeon: Kashif Perez MD;  Location: [facility];  Service: Orthopedics         Visit Dx:     ICD-10-CM ICD-9-CM   1. Dysphagia, unspecified type  R13.10 787.20   2. Recurrent pneumonia  J18.9 486           SLP Adult Swallow Evaluation     Row Name 10/30/20 1700       Rehab Evaluation    Document Type  evaluation  -SA    Subjective Information  no complaints  -SA    Patient Observations  alert;cooperative  -SA    Patient/Family/Caregiver Comments/Observations  vfss reviewed w/ son  -SA    Patient Effort  good  -SA    Comment  termors noted at rest during eval  -SA    Symptoms Noted During/After Treatment  none  -SA        General Information    Patient Profile Reviewed  yes  -SA    Pertinent History Of Current Problem  Parkinsonism, intention tremor, pna  -SA    Current Method of Nutrition  regular textures;thin liquids  -SA    Precautions/Limitations, Vision  WFL with corrective lenses;for purposes of eval  -SA    Precautions/Limitations, Hearing  WFL;for purposes of eval  -SA    Prior Level of Function-Communication  WFL  -SA    Prior Level of Function-Swallowing  no diet consistency restrictions  -SA    Plans/Goals Discussed with  patient and family;agreed upon  -SA    Barriers to Rehab  none identified  -SA    Patient's Goals for Discharge  patient did not state  -SA       Pain    Additional Documentation  Pain Scale: Numbers Pre/Post-Treatment (Group)  -SA       Pain Scale: Numbers Pre/Post-Treatment    Pretreatment Pain Rating  0/10 - no pain  -SA    Posttreatment Pain Rating  0/10 - no pain  -SA       MBS/VFSS    Utensils Used  spoon;cup;straw  -SA    Consistencies Trialed  regular textures;soft textures;mixed consistency;pureed;thin liquids  -SA       MBS/VFSS Interpretation    Oral Phase, Comment  Adequate mastication of solids with premature spillage to valleculae and pyriforms with mixed consistencies.  -SA       Initiation of Pharyngeal Swallow    Pharyngeal Phase, Comment  Inconsistent penetration of mixed consistencies.  Inconsistent cricopharyngeal functioning  -SA       Esophageal Phase    Esophageal Phase  esophageal retention with retrograde flow below PES;esophageal retention;other (see comments) cleared w/ time and repeat swallow  -SA       SLP Communication to Radiology    Summary Statement  Patient demonstrated a mild oropharyngeal dysphagia characterized by inconsistent penetration of mixed consistency.  No significant oral or pharyngeal residue.  Inconsistent cricopharyngeal function was demonstrated, but did not impact oropharyngeal swallow safety.  Esophageal scan showed delayed clearance with backflow  requiring time and repeat swallow to clear.      -SA       Clinical Impression    SLP Swallowing Diagnosis  mild  -SA    Functional Impact  no impact on function  -SA    Swallow Criteria for Skilled Therapeutic Interventions Met  no problems identified which require skilled intervention  -SA       Recommendations    Therapy Frequency (Swallow)  evaluation only  -SA    SLP Diet Recommendation  soft textures;chopped;thin liquids;mechanical soft with no mixed consistencies  -    Recommended Precautions and Strategies  upright posture during/after eating;small bites of food and sips of liquid;other (see comments) slow rate; chew well  -SA    Oral Care Recommendations  Oral Care BID/PRN  -SA    SLP Rec. for Method of Medication Administration  as tolerated  -SA      User Key  (r) = Recorded By, (t) = Taken By, (c) = Cosigned By    Initials Name Provider Type    Nurys Eduardo MS CCC-SLP Speech and Language Pathologist                        OP SLP Education     Row Name 10/30/20 1724       Education    Barriers to Learning  No barriers identified  -    Education Provided  Described results of evaluation;Patient expressed understanding of evaluation;Family/caregivers expressed understanding of evaluation  -    Assessed  Learning needs;Learning motivation  -    Learning Motivation  Strong  -    Learning Method  Explanation  -    Teaching Response  Verbalized understanding  -      User Key  (r) = Recorded By, (t) = Taken By, (c) = Cosigned By    Initials Name Effective Dates    Nurys Eduardo MS CCC-SLP 03/07/18 -               OP SLP Assessment/Plan - 10/30/20 1723        SLP Assessment    Functional Problems  Swallowing   -SA    Impact on Function: Swallowing  Risk of aspiration;Risk of pneumonia   -    Clinical Impression: Swallowing  Mild:   -SA    Prognosis  Good (comment)   -SA    Patient/caregiver participated in establishment of treatment plan and goals  Yes   -SA    Patient would benefit  from skilled therapy intervention  No   -SA       SLP Plan    Frequency  eval only   -SA      User Key  (r) = Recorded By, (t) = Taken By, (c) = Cosigned By    Initials Name Provider Type    Nurys Eduardo MS CCC-SLP Speech and Language Pathologist              SLP Outcome Measures (last 72 hours)      SLP Outcome Measures     Row Name 10/30/20 1700             SLP Outcome Measures    Outcome Measure Used?  Adult NOMS  -SA         Adult FCM Scores    FCM Chosen  Swallowing  -SA      Swallowing FCM Score  4  -SA        User Key  (r) = Recorded By, (t) = Taken By, (c) = Cosigned By    Initials Name Effective Dates    Nurys Eduardo MS CCC-SLP 03/07/18 -                Time Calculation:   SLP Start Time: 1315    Therapy Charges for Today     Code Description Service Date Service Provider Modifiers Qty    64055326860 HC ST MOTION FLUORO EVAL SWALLOW 5 10/30/2020 Nurys Gonsalez MS CCC-SLP GN 1                   Nurys Gonsalez MS CCC-MARIA DEL CARMEN  10/30/2020

## 2020-11-06 ENCOUNTER — TRANSCRIBE ORDERS (OUTPATIENT)
Dept: ADMINISTRATIVE | Facility: HOSPITAL | Age: 80
End: 2020-11-06

## 2020-11-06 DIAGNOSIS — J90 PLEURAL EFFUSION: Primary | ICD-10-CM

## 2020-12-29 ENCOUNTER — HOSPITAL ENCOUNTER (OUTPATIENT)
Dept: CT IMAGING | Facility: HOSPITAL | Age: 80
Discharge: HOME | End: 2020-12-29
Admitting: INTERNAL MEDICINE

## 2020-12-29 DIAGNOSIS — J90 PLEURAL EFFUSION: ICD-10-CM

## 2020-12-29 PROCEDURE — 71250 CT THORAX DX C-: CPT

## 2021-01-02 NOTE — PLAN OF CARE
Orders to be stepdown status. Patient moved to room 326 with chair alarm and camera to continue   Problem: Patient Care Overview  Goal: Plan of Care Review  Outcome: Ongoing (interventions implemented as appropriate)   06/18/19 0359   Coping/Psychosocial   Plan of Care Reviewed With patient   Plan of Care Review   Progress improving   OTHER   Outcome Summary VSS; Pt received PO pain med x1; Pt ambulated to the bathroom with minimal assist; pt remains on 2.5L N/C; WIll continue to monitor       Problem: Fall Risk (Adult)  Goal: Absence of Fall  Outcome: Ongoing (interventions implemented as appropriate)      Problem: Pain, Acute (Adult)  Goal: Acceptable Pain Control/Comfort Level  Outcome: Ongoing (interventions implemented as appropriate)

## 2021-01-08 ENCOUNTER — TELEPHONE (OUTPATIENT)
Dept: NEUROLOGY | Facility: CLINIC | Age: 81
End: 2021-01-08

## 2021-01-08 NOTE — TELEPHONE ENCOUNTER
Please advise. It does look like in your note in October you asked her to speak with PCP about change medication that could be causing tremor. Please advise and call pt.

## 2021-01-08 NOTE — TELEPHONE ENCOUNTER
Provider: DR. JAY SIDDIQI  Caller: PT  Relationship to Patient: SELF    Phone Number: [phone]    Reason for Call: PATIENT STATES DR. SIDDIQI HAD HER TAKING LOWER DOSES ON MEDICINE BECAUSE OF BODY JUMPING AND JERKING. SHE SAYS SHE WAS TOLD TO GO OFF TWO MEDICINES AND FINISHED 14 DAYS AGO AND NOW SHE IS EXPERIENCING AND SHE DID NOT KNOW WHERE SHE WAS AND SHE DIDN'T REMEMBER ANYTHING. SHE SAID SHE WAS IN HER RECLINER AND COULDN'T GET OUT OF IT. SHE SAID WHEN SHE COULD FINALLY GET UP SHE REMEMBER WHERE SHE WAS AND SHE IS WORRIED ABOUT WHAT IS CAUSING THIS. SHE SAID THIS HAPPENED LAST NIGHT.     PLEASE ADVISE.

## 2021-01-11 NOTE — TELEPHONE ENCOUNTER
Spoke to patient.  I would like to wait until her follow-up appointment to decide how to proceed with respect to medication change.

## 2021-01-13 ENCOUNTER — TELEPHONE (OUTPATIENT)
Dept: NEUROLOGY | Facility: CLINIC | Age: 81
End: 2021-01-13

## 2021-01-13 NOTE — TELEPHONE ENCOUNTER
Please advise. LG Alvarado PCP. Phone number is below in message. She would like to speak to regarding this patient thank you.

## 2021-01-13 NOTE — TELEPHONE ENCOUNTER
----- Message from Amy Reyes Rep sent at 1/13/2021  9:56 AM EST -----  LG Navas called wanting to speak with  because pt is not doing well off her psych meds. Pt states she cannot sleep. Elizabeth wanted to consult with  to see if she could put her back on her meds or what route he would like to take.     Please advise and call Elizabeth at  [phone]

## 2021-01-13 NOTE — TELEPHONE ENCOUNTER
----- Message from Amy Reyes Rep sent at 1/13/2021  9:56 AM EST -----  LG Navas called wanting to speak with  because pt is not doing well off her psych meds. Pt states she cannot sleep. Elizabeth wanted to consult with  to see if she could put her back on her meds or what route he would like to take.     Please advise and call Elizabeth at  [phone]

## 2021-01-18 NOTE — TELEPHONE ENCOUNTER
Spoke to the nurse practitioner.  Patient will be started back on Depakote and likely low-dose Seroquel for mood.

## 2021-02-25 RX ORDER — ONDANSETRON 4 MG/1
4 TABLET, FILM COATED ORAL EVERY 8 HOURS PRN
COMMUNITY
Start: 2020-12-03

## 2021-02-26 ENCOUNTER — OFFICE VISIT (OUTPATIENT)
Dept: NEUROLOGY | Facility: CLINIC | Age: 81
End: 2021-02-26

## 2021-02-26 VITALS
OXYGEN SATURATION: 96 % | WEIGHT: 156 LBS | SYSTOLIC BLOOD PRESSURE: 122 MMHG | HEART RATE: 69 BPM | DIASTOLIC BLOOD PRESSURE: 66 MMHG | HEIGHT: 62 IN | BODY MASS INDEX: 28.71 KG/M2

## 2021-02-26 DIAGNOSIS — G25.2 INTENTION TREMOR: Primary | ICD-10-CM

## 2021-02-26 PROCEDURE — 99213 OFFICE O/P EST LOW 20 MIN: CPT | Performed by: PSYCHIATRY & NEUROLOGY

## 2021-02-26 RX ORDER — CARBAMAZEPINE 100 MG/1
200 TABLET, EXTENDED RELEASE ORAL 2 TIMES DAILY
Status: ON HOLD | COMMUNITY
Start: 2021-02-17 | End: 2022-10-23

## 2021-02-26 RX ORDER — QUETIAPINE FUMARATE 50 MG/1
100 TABLET, FILM COATED ORAL
COMMUNITY
Start: 2021-02-16

## 2021-02-26 RX ORDER — TRAZODONE HYDROCHLORIDE 50 MG/1
50-100 TABLET ORAL
Status: ON HOLD | COMMUNITY
Start: 2021-01-14 | End: 2021-10-18

## 2021-02-26 RX ORDER — LEVOTHYROXINE SODIUM 0.1 MG/1
TABLET ORAL
Status: ON HOLD | COMMUNITY
Start: 2021-02-01 | End: 2021-10-19

## 2021-02-26 NOTE — PROGRESS NOTES
Chief Complaint   Patient presents with   • Parkinson's Disease       Patient ID: Josephine Wolf is a 80 y.o. female.    HPI: I had the pleasure of seeing your patient again today.  As you may know she is a 80-year-old female with a history of parkinsonism.  We had last clinic visit discussed possibly weaning her off of some of the medications that could be contributing to tremors.  She is no longer taking Depakote.  She tried coming off of Zyprexa however she was having a lot of trouble with sleep.  She says that when she discontinued those medications her tremor improved.  In speaking with her psychiatry team given her worsening symptoms from a psychiatric standpoint and sleep standpoint it was decided that we would start back on the Zyprexa once again.  She still does have some mild resting tremor.  She denies any significant worsening.  She does acknowledge some improvement overall.  She is exercising more.  No changes of gait.  No falls.  No changes of facial expression.  No excessive pooling of saliva or drooling.    The following portions of the patient's history were reviewed and updated as appropriate: allergies, current medications, past family history, past medical history, past social history, past surgical history and problem list.    Review of Systems   Constitutional: Negative for activity change, appetite change and fatigue.   HENT: Negative for facial swelling, hearing loss and trouble swallowing.    Eyes: Negative for photophobia, pain and visual disturbance.   Gastrointestinal: Negative for abdominal pain, nausea and vomiting.   Endocrine: Negative for cold intolerance, heat intolerance and polydipsia.   Musculoskeletal: Negative for back pain, gait problem and neck pain.   Skin: Negative for color change, rash and wound.   Allergic/Immunologic: Negative for environmental allergies, food allergies and immunocompromised state.   Neurological: Negative for dizziness, tremors, seizures, syncope,  facial asymmetry, speech difficulty, weakness, light-headedness, numbness and headaches.   Hematological: Negative for adenopathy. Does not bruise/bleed easily.   Psychiatric/Behavioral: Negative for agitation, behavioral problems, confusion, decreased concentration, dysphoric mood, hallucinations, self-injury, sleep disturbance and suicidal ideas. The patient is not nervous/anxious and is not hyperactive.       I have reviewed the review of systems above performed by my medical assistant.      Vitals:    21 1341   BP: 122/66   Pulse: 69   SpO2: 96%       Neurologic Exam     Mental Status   Oriented to person, place, and time.   Concentration: normal.   Level of consciousness: alert  Knowledge: consistent with education (No deficits found.).     Cranial Nerves     CN II   Visual fields full to confrontation.     CN III, IV, VI   Pupils are equal, round, and reactive to light.  Extraocular motions are normal.   CN III: no CN III palsy  CN VI: no CN VI palsy    CN V   Facial sensation intact.     CN VII   Facial expression full, symmetric.     CN VIII   CN VIII normal.     CN IX, X   CN IX normal.   CN X normal.     CN XI   CN XI normal.     CN XII   CN XII normal.     Motor Exam     Strength   Right neck flexion: 5/5  Left neck flexion: 5/5  Right neck extension: 5/5  Left neck extension: 5/5  Right deltoid: 5/5  Left deltoid: 5/5  Right biceps: 5/5  Left biceps: 5/5  Right triceps: 5/5  Left triceps: 5/5  Right wrist flexion: 5/5  Left wrist flexion: 5/5  Right wrist extension: 5/5  Left wrist extension: 5/5  Right interossei: 5/5  Left interossei: 5/5  Right abdominals: 5/5  Left abdominals: 5/5  Right iliopsoas: 5/5  Left iliopsoas: 5/5  Right quadriceps: 5/5  Left quadriceps: 5/5  Right hamstrin/5  Left hamstrin/5  Right glutei: 5/5  Left glutei: 5/5  Right anterior tibial: 5/5  Left anterior tibial: 5/5  Right posterior tibial: 5/5  Left posterior tibial: 5/5  Right peroneal: 5/5  Left peroneal:  5/5  Right gastroc: 5/5  Left gastroc: 5/5    Sensory Exam   Light touch normal.   Vibration normal.     Gait, Coordination, and Reflexes     Gait  Gait: normal    Tremor   Resting tremor: present    Reflexes   Right brachioradialis: 2+  Left brachioradialis: 2+  Right biceps: 2+  Left biceps: 2+  Right triceps: 2+  Left triceps: 2+  Right patellar: 2+  Left patellar: 2+  Right achilles: 2+  Left achilles: 2+  Right : 2+  Left : 2+Station is normal.       Physical Exam  Vitals signs reviewed.   Constitutional:       Appearance: She is well-developed.   HENT:      Head: Normocephalic and atraumatic.   Eyes:      Extraocular Movements: EOM normal.      Pupils: Pupils are equal, round, and reactive to light.   Cardiovascular:      Rate and Rhythm: Normal rate and regular rhythm.   Pulmonary:      Breath sounds: Normal breath sounds.   Musculoskeletal: Normal range of motion.   Skin:     General: Skin is warm.   Neurological:      Mental Status: She is oriented to person, place, and time.      Gait: Gait is intact.      Deep Tendon Reflexes:      Reflex Scores:       Tricep reflexes are 2+ on the right side and 2+ on the left side.       Bicep reflexes are 2+ on the right side and 2+ on the left side.       Brachioradialis reflexes are 2+ on the right side and 2+ on the left side.       Patellar reflexes are 2+ on the right side and 2+ on the left side.       Achilles reflexes are 2+ on the right side and 2+ on the left side.        Procedures    Assessment/Plan: She is experiencing some mild resting tremor.  However given her lack of other symptoms neurologically from a Parkinson standpoint we will forego medical management at this time.  It is still very likely that the medications play a role with respect to the Zyprexa.  We will see her back in 6 months or sooner if needed.  A total of 25 minutes was spent face-to-face with the patient today.  Of that greater than 50% of this time was spent discussing signs  and symptoms of parkinsonism, patient education, plan of care and prognosis.       Diagnoses and all orders for this visit:    1. Intention tremor (Primary)           Ross Braswell II, MD

## 2021-07-16 ENCOUNTER — APPOINTMENT (OUTPATIENT)
Dept: GENERAL RADIOLOGY | Facility: HOSPITAL | Age: 81
End: 2021-07-16

## 2021-07-16 ENCOUNTER — HOSPITAL ENCOUNTER (EMERGENCY)
Facility: HOSPITAL | Age: 81
Discharge: HOME | End: 2021-07-16
Attending: EMERGENCY MEDICINE | Admitting: EMERGENCY MEDICINE

## 2021-07-16 VITALS
OXYGEN SATURATION: 100 % | RESPIRATION RATE: 17 BRPM | BODY MASS INDEX: 27.05 KG/M2 | DIASTOLIC BLOOD PRESSURE: 86 MMHG | HEIGHT: 62 IN | HEART RATE: 63 BPM | TEMPERATURE: 98.8 F | WEIGHT: 147 LBS | SYSTOLIC BLOOD PRESSURE: 177 MMHG

## 2021-07-16 DIAGNOSIS — S76.212A STRAIN OF LEFT GROIN: Primary | ICD-10-CM

## 2021-07-16 PROCEDURE — 73552 X-RAY EXAM OF FEMUR 2/>: CPT

## 2021-07-16 PROCEDURE — 99283 EMERGENCY DEPT VISIT LOW MDM: CPT

## 2021-07-16 PROCEDURE — 73502 X-RAY EXAM HIP UNI 2-3 VIEWS: CPT

## 2021-07-16 RX ORDER — HYDROCODONE BITARTRATE AND ACETAMINOPHEN 5; 325 MG/1; MG/1
1 TABLET ORAL EVERY 6 HOURS PRN
Status: DISCONTINUED | OUTPATIENT
Start: 2021-07-16 | End: 2021-07-17 | Stop reason: HOSPADM

## 2021-07-16 RX ORDER — METHYLPREDNISOLONE 4 MG/1
TABLET ORAL
Qty: 1 EACH | Refills: 0 | Status: ON HOLD | OUTPATIENT
Start: 2021-07-16 | End: 2021-10-18

## 2021-07-16 RX ADMIN — HYDROCODONE BITARTRATE AND ACETAMINOPHEN 1 TABLET: 5; 325 TABLET ORAL at 21:21

## 2021-07-16 NOTE — ED NOTES
Pt arrives from home with L upper leg pain x1 week. Denies injury. US was done yesterday but unaware of results. Tender to touch and movement. Pt a&ox4, abc's intact, NAD noted.    Patient wearing mask during triage. RN wearing appropriate PPE during triage. Hand hygiene performed.       Jenae Prieto RN  07/16/21 4008

## 2021-07-17 NOTE — ED PROVIDER NOTES
EMERGENCY DEPARTMENT ENCOUNTER    Room Number:  35/35  Date of encounter:  7/17/2021  PCP: Bill Martino MD  Historian: Patient      HPI:  Chief Complaint: Left leg and groin pain  A complete HPI/ROS/PMH/PSH/SH/FH are unobtainable due to: Nothing    Context: Josephine Wolf is a 81 y.o. female who presents to the ED c/o groin pain is been ongoing for approximately 1 week.  Per the patient she had a venous Doppler of the left lower extremity performed yesterday by [facility].  It was read as negative.  When expressing a much pain she was having with ambulation to the home health staff they recommended she come to the emergency department for further evaluation.  Patient denies leg swelling, recent injury, fever, chills, rash associated with the pain.  She describes her pain as an 8 out of 10 on the pain scale and states worse with walking and slightly improves with nonweightbearing of the leg still.  It starts in the left mid groin and radiates distally toward the knee.  She is not on any antiplatelet or anticoagulant therapy.    Patient was last seen in this emergency department for shortness of air on 8/17/2020.  Her work-up was unremarkable and she was DC'd home with conservative measures.  She was also seen on 8/4/2020 admitted to the ICU for COPD exacerbation hypercapnia.    PAST MEDICAL HISTORY  Active Ambulatory Problems     Diagnosis Date Noted   • Anemia 10/19/2017   • OA (osteoarthritis) of knee 11/16/2017   • Chronic respiratory failure with hypoxia (CMS/[facility]) 12/02/2017   • Cellulitis of skin 12/06/2017   • Acute kidney injury (CMS/HCC) 12/06/2017   • Sepsis (CMS/[facility]) 12/06/2017   • Orthostatic hypotension 06/24/2018   • Disease of thyroid gland 06/24/2018   • Hypertension 06/24/2018   • Dehydration 06/24/2018   • Stage 3 chronic kidney disease (CMS/HCC) 06/25/2018   • Closed compression fracture of L1 lumbar vertebra 06/16/2019   • Hyponatremia 06/16/2019   • Osteoporosis with pathological  fracture 2019   • Acute on chronic respiratory failure with hypoxia and hypercapnia (CMS/[facility]) 2020   • Obesity (BMI 30-39.9) 2020   • Nocturnal hypoxia 2020   • CKD (chronic kidney disease) stage 2, GFR 60-89 ml/min 2020   • Acute on chronic respiratory failure with hypoxia (CMS/formerly [facility]) 2020     Resolved Ambulatory Problems     Diagnosis Date Noted   • COPD with acute exacerbation (CMS/[facility]) 2018     Past Medical History:   Diagnosis Date   • Acid reflux    • Arthritis    • Bipolar 1 disorder, depressed (CMS/[facility])    • Cataract    • Chronic nausea    • Chronic pain of right knee    • Continuous leakage of urine    • COPD (chronic obstructive pulmonary disease) (CMS/[facility])    • Diverticulosis    • Fibromyalgia    • Fibromyalgia, primary    • Frequent episodes of bronchitis    • HL (hearing loss)    • Hypothyroidism    • Memory loss    • Migraines    • Neck pain    • On home oxygen therapy    • Short of breath on exertion    • Sleep apnea          PAST SURGICAL HISTORY  Past Surgical History:   Procedure Laterality Date   • CEREBRAL ANEURYSM REPAIR      WITH STENT   • HYSTERECTOMY     • LAPAROSCOPIC CHOLECYSTECTOMY     • GA TOTAL KNEE ARTHROPLASTY Right 2017    Procedure: RT TOTAL KNEE ARTHROPLASTY;  Surgeon: Kashif Perez MD;  Location: [facility];  Service: Orthopedics         FAMILY HISTORY  Family History   Problem Relation Age of Onset   • Malig Hyperthermia Neg Hx          SOCIAL HISTORY  Social History     Socioeconomic History   • Marital status:      Spouse name: Not on file   • Number of children: Not on file   • Years of education: Not on file   • Highest education level: Not on file   Tobacco Use   • Smoking status: Former Smoker     Packs/day: 1.00     Years: 10.00     Pack years: 10.00     Types: Cigarettes     Start date:      Quit date:      Years since quittin.5   • Smokeless tobacco: Never Used   Vaping Use   • Vaping Use:  Never used   Substance and Sexual Activity   • Alcohol use: No     Comment: CAFFEINE NO    • Drug use: No   • Sexual activity: Defer         ALLERGIES  Morphine and related        REVIEW OF SYSTEMS  Review of Systems   Constitutional: Negative for chills and fever.   Respiratory: Negative.    Cardiovascular: Negative for chest pain, palpitations and leg swelling.   Gastrointestinal: Negative for abdominal pain.   Genitourinary: Negative for dysuria.   Musculoskeletal: Negative for back pain.        Left groin pain   Skin: Negative.    Neurological: Negative.    Psychiatric/Behavioral: The patient is nervous/anxious.         All systems reviewed and negative except for those discussed in HPI.       PHYSICAL EXAM    I have reviewed the triage vital signs and nursing notes.    ED Triage Vitals [07/16/21 1713]   Temp Heart Rate Resp BP SpO2   98.8 °F (37.1 °C) 66 17 120/75 97 %      Temp src Heart Rate Source Patient Position BP Location FiO2 (%)   -- -- -- -- --       Physical Exam  GENERAL: nontoxic, chronically ill-appearing, appears slightly uncomfortable  HENT: nares patent, face symmetric  EYES: no scleral icterus  CV: regular rhythm, regular rate no obvious murmur, palpable femoral, popliteal, DP and PT pulses, no unilateral leg swelling.  RESPIRATORY: normal effort  ABDOMEN: soft  MUSCULOSKELETAL: Soft tissue tenderness in the proximal one third of the left groin.  Increased pain with extension and flexion and lateral movement of the leg.  NEURO: alert, moves all extremities, follows commands  SKIN: warm, dry, no skin rash        LAB RESULTS  No results found for this or any previous visit (from the past 24 hour(s)).    Ordered the above labs and independently reviewed the results.        RADIOLOGY  XR Femur 2 View Left    Result Date: 7/16/2021  Patient: RAHEEM ROBERTS  Time Out: 21:59 Exam(s): FILM LEFT FEMUR EXAM:   XR Left Femur, 2 Views CLINICAL HISTORY:    Reason for exam: Left upper leg and groin pain.  TECHNIQUE:   Frontal and lateral views of the left femur. COMPARISON:   No relevant prior studies available. FINDINGS:   Bones joints:  No acute displaced fracture or dislocation.   Soft tissues:  Unremarkable. IMPRESSION:       No acute displaced fracture or dislocation.     Electronically signed by Wellington Bailey M.D. on 07-16-21 at 2159    XR Hip With or Without Pelvis 2 - 3 View Left    Result Date: 7/16/2021  Patient: RAHEEM ROBERTS  Time Out: 22:02 Exam(s): FILM HIP + PELVIS EXAM:   XR Left Hip With Pelvis When Performed, 2 or 3 Views CLINICAL HISTORY:    Reason for exam: Left hip pain. TECHNIQUE:   Two or three views of the left hip with pelvis when performed. COMPARISON:   06 16 2019. FINDINGS:   Bones joints:  No acute displaced fracture or dislocation.   Soft tissues:  Unremarkable. IMPRESSION:       No acute displaced fracture or dislocation.     Electronically signed by Wellington Bailey M.D. on 07-16-21 at 2202      I ordered the above noted radiological studies. Reviewed by me and discussed with radiologist.  See dictation for official radiology interpretation.      PROCEDURES    Procedures      MEDICATIONS GIVEN IN ER    Medications - No data to display      PROGRESS, DATA ANALYSIS, CONSULTS, AND MEDICAL DECISION MAKING    All labs have been independently reviewed by me.  All radiology studies have been reviewed by me and discussed with radiologist dictating the report.   EKG's independently viewed and interpreted by me.  Discussion below represents my analysis of pertinent findings related to patient's condition, differential diagnosis, treatment plan and final disposition.    DDx includes but is not limited to: Groin strain, pubic rami fracture, left hip fracture with referred pain, bony lesion, DVT.  DVT was ruled out on ultrasound yesterday.  I seriously doubt fractures given no definite history of trauma.  Patient is in poor health on chronic O2 and using a walker.  I suspect she is most likely just  strained her groin and reactive ambulating and performing her ADLs.  However, will obtain an x-ray of the left femur and left hip to ensure no occult fracture.  Will attempt to treat the patient's pain with Norco 5/325 mg p.o. x1.  Please see below for MDM and further course of care    ED Course as of Jul 17 0711 Fri Jul 16, 2021 2036 BP: 120/75 [RC]   2036 Temp: 98.8 °F (37.1 °C) [RC]   2036 Heart Rate: 66 [RC]   2036 Resp: 17 [RC]   2036 NC   SpO2: 97 % [RC]   2200 I reviewed the patient's left femur and left hip x-ray and there are no acute findings.  Again the clinical picture is most consistent with a groin strain.  Plan to treat the patient conservatively and have her to follow-up with her PCP for further management discuss physical therapy.    [RC]      ED Course User Index  [RC] Anastacio Maddox III, PA       Patient was placed in face mask in first look. Patient was wearing facemask when I entered the room and throughout our encounter. I wore full protective equipment throughout this patient encounter including a face mask, and gloves. Hand hygiene was performed before donning protective equipment and after removal when leaving the room.    AS OF 07:11 EDT VITALS:    BP - 177/86  HR - 63  TEMP - 98.8 °F (37.1 °C)  O2 SATS - 100%        DIAGNOSIS  Final diagnoses:   Strain of left groin         DISPOSITION  DISCHARGE    Patient discharged in stable condition.    Reviewed implications of results, diagnosis, meds, responsibility to follow up, warning signs and symptoms of possible worsening, potential complications and reasons to return to ER.    Patient/Family voiced understanding of above instructions.    Discussed plan for discharge, as there is no emergent indication for admission. Patient referred to primary care provider for BP management due to today's BP. Pt/family is agreeable and understands need for follow up and repeat testing.  Pt is aware that discharge does not mean that nothing is  wrong but it indicates no emergency is present that requires admission and they must continue care with follow-up as given below or physician of their choice.     FOLLOW-UP  Bill Martino MD  [address] [place] [address]  [phone]    Schedule an appointment as soon as possible for a visit   For further evaluation and treatment         Medication List      New Prescriptions    methylPREDNISolone 4 MG dose pack  Commonly known as: MEDROL  Take as directed on package instructions.           Where to Get Your Medications      You can get these medications from any pharmacy    Bring a paper prescription for each of these medications  · methylPREDNISolone 4 MG dose pack                Anastacio Maddox III, PA  07/17/21 0755

## 2021-07-17 NOTE — ED NOTES
Patient states that she did not bring an oxygen tank, but her and son states that they live close and will be able to make it home without her oxygen tank.     I wore full protective equipment throughout this patient encounter including a face mask, goggles, and gloves. Hand hygiene was performed before donning protective equipment and after removal when leaving the room.       Maeve Higgins RN  07/16/21 1581

## 2021-07-25 ENCOUNTER — HOSPITAL ENCOUNTER (EMERGENCY)
Facility: HOSPITAL | Age: 81
Discharge: HOME | End: 2021-07-25
Attending: EMERGENCY MEDICINE | Admitting: EMERGENCY MEDICINE

## 2021-07-25 VITALS
TEMPERATURE: 98.2 F | OXYGEN SATURATION: 99 % | DIASTOLIC BLOOD PRESSURE: 58 MMHG | SYSTOLIC BLOOD PRESSURE: 89 MMHG | BODY MASS INDEX: 31.15 KG/M2 | HEIGHT: 62 IN | RESPIRATION RATE: 16 BRPM | WEIGHT: 169.3 LBS | HEART RATE: 69 BPM

## 2021-07-25 DIAGNOSIS — I95.9 HYPOTENSION, UNSPECIFIED HYPOTENSION TYPE: Primary | ICD-10-CM

## 2021-07-25 DIAGNOSIS — N18.30 CHRONIC RENAL FAILURE, STAGE 3 (MODERATE), UNSPECIFIED WHETHER STAGE 3A OR 3B CKD (HCC): ICD-10-CM

## 2021-07-25 LAB
ALBUMIN SERPL-MCNC: 3.7 G/DL (ref 3.5–5.2)
ALBUMIN/GLOB SERPL: 1.9 G/DL
ALP SERPL-CCNC: 89 U/L (ref 39–117)
ALT SERPL W P-5'-P-CCNC: 11 U/L (ref 1–33)
ANION GAP SERPL CALCULATED.3IONS-SCNC: 8.7 MMOL/L (ref 5–15)
AST SERPL-CCNC: 13 U/L (ref 1–32)
BASOPHILS # BLD AUTO: 0.03 10*3/MM3 (ref 0–0.2)
BASOPHILS NFR BLD AUTO: 0.5 % (ref 0–1.5)
BILIRUB SERPL-MCNC: <0.2 MG/DL (ref 0–1.2)
BUN SERPL-MCNC: 26 MG/DL (ref 8–23)
BUN/CREAT SERPL: 18.3 (ref 7–25)
CALCIUM SPEC-SCNC: 8.1 MG/DL (ref 8.6–10.5)
CARBAMAZEPINE SERPL-MCNC: 4.4 MCG/ML (ref 4–12)
CHLORIDE SERPL-SCNC: 97 MMOL/L (ref 98–107)
CO2 SERPL-SCNC: 27.3 MMOL/L (ref 22–29)
CREAT SERPL-MCNC: 1.42 MG/DL (ref 0.57–1)
D-LACTATE SERPL-SCNC: 0.7 MMOL/L (ref 0.5–2)
DEPRECATED RDW RBC AUTO: 48.3 FL (ref 37–54)
EOSINOPHIL # BLD AUTO: 0.09 10*3/MM3 (ref 0–0.4)
EOSINOPHIL NFR BLD AUTO: 1.4 % (ref 0.3–6.2)
ERYTHROCYTE [DISTWIDTH] IN BLOOD BY AUTOMATED COUNT: 13.8 % (ref 12.3–15.4)
GFR SERPL CREATININE-BSD FRML MDRD: 36 ML/MIN/1.73
GLOBULIN UR ELPH-MCNC: 2 GM/DL
GLUCOSE SERPL-MCNC: 97 MG/DL (ref 65–99)
HCT VFR BLD AUTO: 28 % (ref 34–46.6)
HGB BLD-MCNC: 9.4 G/DL (ref 12–15.9)
IMM GRANULOCYTES # BLD AUTO: 0.1 10*3/MM3 (ref 0–0.05)
IMM GRANULOCYTES NFR BLD AUTO: 1.5 % (ref 0–0.5)
LYMPHOCYTES # BLD AUTO: 1.59 10*3/MM3 (ref 0.7–3.1)
LYMPHOCYTES NFR BLD AUTO: 24.4 % (ref 19.6–45.3)
MCH RBC QN AUTO: 32.5 PG (ref 26.6–33)
MCHC RBC AUTO-ENTMCNC: 33.6 G/DL (ref 31.5–35.7)
MCV RBC AUTO: 96.9 FL (ref 79–97)
MONOCYTES # BLD AUTO: 0.59 10*3/MM3 (ref 0.1–0.9)
MONOCYTES NFR BLD AUTO: 9 % (ref 5–12)
NEUTROPHILS NFR BLD AUTO: 4.12 10*3/MM3 (ref 1.7–7)
NEUTROPHILS NFR BLD AUTO: 63.2 % (ref 42.7–76)
NRBC BLD AUTO-RTO: 0.2 /100 WBC (ref 0–0.2)
NT-PROBNP SERPL-MCNC: 281.3 PG/ML (ref 0–1800)
PLATELET # BLD AUTO: 188 10*3/MM3 (ref 140–450)
PMV BLD AUTO: 9.1 FL (ref 6–12)
POTASSIUM SERPL-SCNC: 4.1 MMOL/L (ref 3.5–5.2)
PROCALCITONIN SERPL-MCNC: 0.09 NG/ML (ref 0–0.25)
PROT SERPL-MCNC: 5.7 G/DL (ref 6–8.5)
QT INTERVAL: 401 MS
RBC # BLD AUTO: 2.89 10*6/MM3 (ref 3.77–5.28)
SODIUM SERPL-SCNC: 133 MMOL/L (ref 136–145)
TROPONIN T SERPL-MCNC: <0.01 NG/ML (ref 0–0.03)
VALPROATE SERPL-MCNC: <2.8 MCG/ML (ref 50–125)
WBC # BLD AUTO: 6.52 10*3/MM3 (ref 3.4–10.8)

## 2021-07-25 PROCEDURE — 93010 ELECTROCARDIOGRAM REPORT: CPT | Performed by: INTERNAL MEDICINE

## 2021-07-25 PROCEDURE — 99284 EMERGENCY DEPT VISIT MOD MDM: CPT

## 2021-07-25 PROCEDURE — 83605 ASSAY OF LACTIC ACID: CPT | Performed by: EMERGENCY MEDICINE

## 2021-07-25 PROCEDURE — 80053 COMPREHEN METABOLIC PANEL: CPT | Performed by: EMERGENCY MEDICINE

## 2021-07-25 PROCEDURE — 85025 COMPLETE CBC W/AUTO DIFF WBC: CPT | Performed by: EMERGENCY MEDICINE

## 2021-07-25 PROCEDURE — 84145 PROCALCITONIN (PCT): CPT | Performed by: EMERGENCY MEDICINE

## 2021-07-25 PROCEDURE — 84484 ASSAY OF TROPONIN QUANT: CPT | Performed by: EMERGENCY MEDICINE

## 2021-07-25 PROCEDURE — 80156 ASSAY CARBAMAZEPINE TOTAL: CPT | Performed by: EMERGENCY MEDICINE

## 2021-07-25 PROCEDURE — 80164 ASSAY DIPROPYLACETIC ACD TOT: CPT | Performed by: EMERGENCY MEDICINE

## 2021-07-25 PROCEDURE — 93005 ELECTROCARDIOGRAM TRACING: CPT | Performed by: EMERGENCY MEDICINE

## 2021-07-25 PROCEDURE — 83880 ASSAY OF NATRIURETIC PEPTIDE: CPT | Performed by: EMERGENCY MEDICINE

## 2021-07-25 RX ORDER — SODIUM CHLORIDE 0.9 % (FLUSH) 0.9 %
10 SYRINGE (ML) INJECTION AS NEEDED
Status: DISCONTINUED | OUTPATIENT
Start: 2021-07-25 | End: 2021-07-25 | Stop reason: HOSPADM

## 2021-07-25 RX ADMIN — SODIUM CHLORIDE 1000 ML: 9 INJECTION, SOLUTION INTRAVENOUS at 01:48

## 2021-07-25 NOTE — ED NOTES
Pt ambulatory c steady gait to wheelchair, AAOx4, ABC's intact, NAD noted at this time. Pt discharged c belongings and educational packet. PPE worn by this RN c pt wearing mask during encounters.      Rishabh Swift, RN  07/25/21 2416

## 2021-07-25 NOTE — DISCHARGE INSTRUCTIONS
I would like you to withhold your dose of Bumex and metoprolol tomorrow.  Please recheck blood pressure throughout the day and do not restart these medications until systolic blood pressures are running routinely over 100.  Do not hesitate to return to the ED for increased lightheadedness, chest pain or shortness of breath.

## 2021-07-25 NOTE — ED TRIAGE NOTES
Pt to ED via [facility]. EMS reports that the pt was c/o dizziness and that the pt was hypotensive to 73/41 on arrival. Pt reports that around 2200 last night she began feeling dizzy but was normal throughout the day yesterday. Pt's BP at time of triage 108/35. EMS reports pt started Macrobid yesterday for a UTI. Pt reports she has lost 25lbs over the last 3 months.     This RN in appropriate PPE for all patient care interactions. Pt masked and redirected for proper mask use when necessary. Hand hygiene performed before and after all patient care interactions.

## 2021-07-25 NOTE — ED PROVIDER NOTES
EMERGENCY DEPARTMENT ENCOUNTER    Room Number:  22/22  Date of encounter:  7/25/2021  PCP: Bill Martino MD  Historian: Patient     I used full protective equipment while examining this patient.  This includes face mask, gloves and protective eyewear.  I washed my hands before entering the room and immediately upon leaving the room      HPI:  Chief Complaint: Hypotension  A complete HPI/ROS/PMH/PSH/SH/FH are unobtainable due to: None    Context: Josephine Wolf is a 81 y.o. female who presents to the ED c/o hypotension.  Patient states when she went to check her blood pressure tonight the systolics were in the 70s.  She states that this is abnormal and her blood pressure is usually in the 110s or 120 systolic.  Patient did feel somewhat lightheaded which she rated as moderate in intensity which is worsened with standing up.  She called EMS who gave her some IV fluids in route.  Currently she is feeling somewhat better and systolics are running just over 100.  Patient denies any chest pain or increased shortness of breath from baseline.  She denies any significant numbness weakness or difficulty with speech or comprehension.  Patient was recently diagnosed with urinary tract infection and started on Macrobid.  Dysuria has already improved since taken a couple doses of Macrobid.      MEDICAL RECORD REVIEW  I reviewed prior medical records and note the patient was hospitalized last year with COPD exacerbation.  She has chronic medical problems including chronic renal disease, congestive heart failure and COPD.    PAST MEDICAL HISTORY  Active Ambulatory Problems     Diagnosis Date Noted   • Anemia 10/19/2017   • OA (osteoarthritis) of knee 11/16/2017   • Chronic respiratory failure with hypoxia (CMS/[facility]) 12/02/2017   • Cellulitis of skin 12/06/2017   • Acute kidney injury (CMS/[facility]) 12/06/2017   • Sepsis (CMS/[facility]) 12/06/2017   • Orthostatic hypotension 06/24/2018   • Disease of thyroid gland 06/24/2018   •  Hypertension 06/24/2018   • Dehydration 06/24/2018   • Stage 3 chronic kidney disease (CMS/[facility]) 06/25/2018   • Closed compression fracture of L1 lumbar vertebra 06/16/2019   • Hyponatremia 06/16/2019   • Osteoporosis with pathological fracture 06/17/2019   • Acute on chronic respiratory failure with hypoxia and hypercapnia (CMS/[facility]) 03/12/2020   • Obesity (BMI 30-39.9) 03/12/2020   • Nocturnal hypoxia 03/13/2020   • CKD (chronic kidney disease) stage 2, GFR 60-89 ml/min 03/13/2020   • Acute on chronic respiratory failure with hypoxia (CMS/[facility]) 05/20/2020     Resolved Ambulatory Problems     Diagnosis Date Noted   • COPD with acute exacerbation (CMS/[facility]) 06/24/2018     Past Medical History:   Diagnosis Date   • Acid reflux    • Arthritis    • Bipolar 1 disorder, depressed (CMS/[facility])    • Cataract    • Chronic nausea    • Chronic pain of right knee    • Continuous leakage of urine    • COPD (chronic obstructive pulmonary disease) (CMS/[facility])    • Diverticulosis    • Fibromyalgia    • Fibromyalgia, primary    • Frequent episodes of bronchitis    • HL (hearing loss)    • Hypothyroidism    • Memory loss    • Migraines    • Neck pain    • On home oxygen therapy    • Short of breath on exertion    • Sleep apnea          PAST SURGICAL HISTORY  Past Surgical History:   Procedure Laterality Date   • CEREBRAL ANEURYSM REPAIR  2000'S    WITH STENT   • HYSTERECTOMY     • LAPAROSCOPIC CHOLECYSTECTOMY     • MI TOTAL KNEE ARTHROPLASTY Right 11/16/2017    Procedure: RT TOTAL KNEE ARTHROPLASTY;  Surgeon: Kashif Perez MD;  [facility];  Service: Orthopedics         FAMILY HISTORY  Family History   Problem Relation Age of Onset   • Malig Hyperthermia Neg Hx          SOCIAL HISTORY  Social History     Socioeconomic History   • Marital status:      Spouse name: Not on file   • Number of children: Not on file   • Years of education: Not on file   • Highest education level: Not on file   Tobacco Use   • Smoking status:  Former Smoker     Packs/day: 1.00     Years: 10.00     Pack years: 10.00     Types: Cigarettes     Start date:      Quit date:      Years since quittin.5   • Smokeless tobacco: Never Used   Vaping Use   • Vaping Use: Never used   Substance and Sexual Activity   • Alcohol use: No     Comment: CAFFEINE NO    • Drug use: No   • Sexual activity: Defer         ALLERGIES  Morphine and related       REVIEW OF SYSTEMS  Review of Systems   Constitutional: Positive for fatigue, fever and unexpected weight change (Patient reports roughly 25 pound weight loss over the last 3 months which is somewhat intentional).   HENT: Negative.  Negative for sore throat.    Eyes: Negative.    Respiratory: Positive for shortness of breath (Chronic, unchanged from baseline). Negative for cough.    Cardiovascular: Negative.  Negative for chest pain and leg swelling.   Gastrointestinal: Negative.    Genitourinary: Negative.  Negative for dysuria.   Musculoskeletal: Negative.  Negative for back pain.   Skin: Negative.  Negative for rash.   Neurological: Positive for light-headedness. Negative for headaches.   All other systems reviewed and are negative.          PHYSICAL EXAM    I have reviewed the triage vital signs and nursing notes.    ED Triage Vitals   Temp Pulse Resp BP SpO2   -- -- -- -- --      Temp src Heart Rate Source Patient Position BP Location FiO2 (%)   -- -- -- -- --       Physical Exam  GENERAL: Alert female in no obvious distress, she is well-appearing.  Triage vitals reviewed notable for blood pressure of 108/35.  Pulse 65.  Temperature 98.2.  O2 saturations 95% on 2 L nasal cannula  HENT: nares patent  EYES: no scleral icterus  CV: regular rhythm, regular rate-no murmur  RESPIRATORY: normal effort, slightly decreased breath sounds bilaterally-O2 saturations 94% on 3 L nasal cannula.  ABDOMEN: soft, nontender to palpation-no significant masses  MUSCULOSKELETAL: no deformity-no segment swelling or tenderness to  palpation  NEURO: Strength, sensation, and coordination are grossly intact.  Speech and mentation are unremarkable  SKIN: warm, dry-no unusual rashes.      LAB RESULTS  Recent Results (from the past 24 hour(s))   ECG 12 Lead    Collection Time: 07/25/21  1:29 AM   Result Value Ref Range    QT Interval 401 ms   Carbamazepine Level, Total    Collection Time: 07/25/21  2:13 AM    Specimen: Blood   Result Value Ref Range    Carbamazepine Level 4.4 4.0 - 12.0 mcg/mL   Comprehensive Metabolic Panel    Collection Time: 07/25/21  2:13 AM    Specimen: Blood   Result Value Ref Range    Glucose 97 65 - 99 mg/dL    BUN 26 (H) 8 - 23 mg/dL    Creatinine 1.42 (H) 0.57 - 1.00 mg/dL    Sodium 133 (L) 136 - 145 mmol/L    Potassium 4.1 3.5 - 5.2 mmol/L    Chloride 97 (L) 98 - 107 mmol/L    CO2 27.3 22.0 - 29.0 mmol/L    Calcium 8.1 (L) 8.6 - 10.5 mg/dL    Total Protein 5.7 (L) 6.0 - 8.5 g/dL    Albumin 3.70 3.50 - 5.20 g/dL    ALT (SGPT) 11 1 - 33 U/L    AST (SGOT) 13 1 - 32 U/L    Alkaline Phosphatase 89 39 - 117 U/L    Total Bilirubin <0.2 0.0 - 1.2 mg/dL    eGFR Non African Amer 36 (L) >60 mL/min/1.73    Globulin 2.0 gm/dL    A/G Ratio 1.9 g/dL    BUN/Creatinine Ratio 18.3 7.0 - 25.0    Anion Gap 8.7 5.0 - 15.0 mmol/L   Troponin    Collection Time: 07/25/21  2:13 AM    Specimen: Blood   Result Value Ref Range    Troponin T <0.010 0.000 - 0.030 ng/mL   BNP    Collection Time: 07/25/21  2:13 AM    Specimen: Blood   Result Value Ref Range    proBNP 281.3 0.0-1,800.0 pg/mL   Lactic Acid, Plasma    Collection Time: 07/25/21  2:13 AM    Specimen: Blood   Result Value Ref Range    Lactate 0.7 0.5 - 2.0 mmol/L   Procalcitonin    Collection Time: 07/25/21  2:13 AM    Specimen: Blood   Result Value Ref Range    Procalcitonin 0.09 0.00 - 0.25 ng/mL   Valproic Acid Level, Total    Collection Time: 07/25/21  2:13 AM    Specimen: Blood   Result Value Ref Range    Valproic Acid <2.8 (L) 50.0 - 125.0 mcg/mL   CBC Auto Differential    Collection  Time: 07/25/21  2:13 AM    Specimen: Blood   Result Value Ref Range    WBC 6.52 3.40 - 10.80 10*3/mm3    RBC 2.89 (L) 3.77 - 5.28 10*6/mm3    Hemoglobin 9.4 (L) 12.0 - 15.9 g/dL    Hematocrit 28.0 (L) 34.0 - 46.6 %    MCV 96.9 79.0 - 97.0 fL    MCH 32.5 26.6 - 33.0 pg    MCHC 33.6 31.5 - 35.7 g/dL    RDW 13.8 12.3 - 15.4 %    RDW-SD 48.3 37.0 - 54.0 fl    MPV 9.1 6.0 - 12.0 fL    Platelets 188 140 - 450 10*3/mm3    Neutrophil % 63.2 42.7 - 76.0 %    Lymphocyte % 24.4 19.6 - 45.3 %    Monocyte % 9.0 5.0 - 12.0 %    Eosinophil % 1.4 0.3 - 6.2 %    Basophil % 0.5 0.0 - 1.5 %    Immature Grans % 1.5 (H) 0.0 - 0.5 %    Neutrophils, Absolute 4.12 1.70 - 7.00 10*3/mm3    Lymphocytes, Absolute 1.59 0.70 - 3.10 10*3/mm3    Monocytes, Absolute 0.59 0.10 - 0.90 10*3/mm3    Eosinophils, Absolute 0.09 0.00 - 0.40 10*3/mm3    Basophils, Absolute 0.03 0.00 - 0.20 10*3/mm3    Immature Grans, Absolute 0.10 (H) 0.00 - 0.05 10*3/mm3    nRBC 0.2 0.0 - 0.2 /100 WBC       Ordered the above labs and independently reviewed the results.      RADIOLOGY  No Radiology Exams Resulted Within Past 24 Hours    I ordered the above noted radiological studies. Reviewed by me and discussed with radiologist.  See dictation for official radiology interpretation.      PROCEDURES  Procedures      MEDICATIONS GIVEN IN ER    Medications   sodium chloride 0.9 % flush 10 mL (has no administration in time range)   sodium chloride 0.9 % bolus 1,000 mL (0 mL Intravenous Stopped 7/25/21 0232)         PROGRESS, DATA ANALYSIS, CONSULTS, AND MEDICAL DECISION MAKING    All labs have been independently reviewed by me.  All radiology studies have been reviewed by me and discussed with radiologist dictating the report.   EKG's independently viewed and interpreted by me.  Discussion below represents my analysis of pertinent findings related to patient's condition, differential diagnosis, treatment plan and final disposition.      ED Course as of Jul 25 0300   Sun Jul 25,  2021 0131 [id number] female with multiple medical problems including COPD, CRF and CHF.  Patient presents with hypotension and dizziness.  Differential diagnosis is broad and would include cardiac problems such as acute MI and dysrhythmia.  Would also consider metabolic processes such as dehydration with acute renal failure, hepatic failure and anemia.  Would include infectious causes.  Patient does have recent diagnosis of urinary tract infection but does not look toxic in appearance and is afebrile here.    [DB]   0132 EKG          EKG time: 0129  Rhythm/Rate: Sinus 63  P waves and PA: Normal P waves and PA intervals  QRS, axis: Normal axis, normal QRS  ST and T waves: Unremarkable ST and T wave    Interpreted Contemporaneously by me, independently viewed  Not significantly changed compared to prior 8/2020      [DB]   0220 CBC is reviewed and shows chronic anemia, unchanged baseline.  White count platelet are within normal limits.  This would tend to make me lean against acute infection.    [DB]   0247 Labs are all fairly unremarkable with normal levels of procalcitonin, troponin, carbamazepine and lactic acid.  Also BNP is very low at 281.  Patient has had systolics running mostly in the upper 80s.  She is not currently symptomatic.  She does not complain of any further lightheadedness.  I did have patient stand up and walk around and she felt well and did not have symptoms of lightheadedness and was able to walk adequately on her own power.  Patient's son is at bedside and they are both comfortable with her going home.  The plan is to hold her Bumex tomorrow and not restarted until her systolic blood pressure is back up closer to its normal.  If systolic remains low I would ask her to follow-up with her primary care provider or her nephrologist, Dr. Rojas Paz.    [DB]      ED Course User Index  [DB] Chino Nieves MD       AS OF 03:00 EDT VITALS:    BP - (!) 89/58  HR - 69  TEMP - 98.2 °F (36.8 °C)  (Tympanic)  O2 SATS - 99%      DIAGNOSIS  Final diagnoses:   Hypotension, unspecified hypotension type   Chronic renal failure, stage 3 (moderate), unspecified whether stage 3a or 3b CKD (CMS/[facility])         DISPOSITION  DISCHARGE    Patient discharged in stable condition.    Reviewed implications of results, diagnosis, meds, responsibility to follow up, warning signs and symptoms of possible worsening, potential complications and reasons to return to ER, including increased lightheadedness, chest pain, shortness of breath or as needed.    Patient/Family voiced understanding of above instructions.    Discussed plan for discharge, as there is no emergent indication for admission. Patient referred to primary care provider for BP management due to today's BP. Pt/family is agreeable and understands need for follow up and repeat testing.  Pt is aware that discharge does not mean that nothing is wrong but it indicates no emergency is present that requires admission and they must continue care with follow-up as given below or physician of their choice.     FOLLOW-UP  Bill Martino MD  [address] [place], [place] [address]  [place] [id number] [phone]    In 2 days  If Not Better         Medication List      No changes were made to your prescriptions during this visit.                Chino Nieves MD  07/25/21 7746

## 2021-09-29 ENCOUNTER — OFFICE VISIT (OUTPATIENT)
Dept: NEUROLOGY | Facility: CLINIC | Age: 81
End: 2021-09-29

## 2021-09-29 VITALS
DIASTOLIC BLOOD PRESSURE: 66 MMHG | HEART RATE: 71 BPM | HEIGHT: 62 IN | OXYGEN SATURATION: 98 % | WEIGHT: 156 LBS | BODY MASS INDEX: 28.71 KG/M2 | SYSTOLIC BLOOD PRESSURE: 112 MMHG

## 2021-09-29 DIAGNOSIS — G20 PARKINSONISM, UNSPECIFIED PARKINSONISM TYPE (HCC): Primary | ICD-10-CM

## 2021-09-29 DIAGNOSIS — G25.2 INTENTION TREMOR: ICD-10-CM

## 2021-09-29 PROCEDURE — 99214 OFFICE O/P EST MOD 30 MIN: CPT | Performed by: PSYCHIATRY & NEUROLOGY

## 2021-09-29 RX ORDER — BUSPIRONE HYDROCHLORIDE 7.5 MG/1
7.5 TABLET ORAL 2 TIMES DAILY
COMMUNITY
Start: 2021-09-19 | End: 2023-03-03 | Stop reason: HOSPADM

## 2021-09-29 RX ORDER — ROPINIROLE 0.25 MG/1
0.25 TABLET, FILM COATED ORAL DAILY
Qty: 30 TABLET | Refills: 4 | Status: ON HOLD | OUTPATIENT
Start: 2021-09-29 | End: 2021-10-18

## 2021-09-29 NOTE — PROGRESS NOTES
"Chief Complaint   Patient presents with   • Parkinson's Disease       Patient ID: Josephine Wolf is a 81 y.o. female.    HPI: I had the pleasure of seeing your patient today.  As you may know she is a 81-year-old female with a history of tremor.  She has benign essential tremor.  She also has a resting tremor.  The patient and her son state that the resting tremor has progressed.  She notices it primarily in the right upper extremity however she has seen it on the left as well.  Patient says that she typically notes the resting tremor when she is sitting in her recliner watching TV.  While she is doing this the tremor will start in the right upper extremity typically.  If she focuses in on it she can stop that tremor.  She denies any changes in gait.  Her son does mention that she had an episode of sleepwalking.  She has never done that before according to the patient.  She also has been known to \"fight in her sleep\" recently.  No significant change of facial expressions.  No recent falls or head injuries.  No new onset focal weakness or numbness of her arms or legs.  No double vision or trouble swallowing.  She is taking Zyprexa.  She has been on that medication for several years for mood issues.  She is reluctant to discontinue that medicine.  She also has had some jerking movements.  Her son says that she will be sitting and suddenly have a \"jerking motion\".  It ends quickly.  It is not a repeated movement.  She has no loss of consciousness or loss of awareness.  It can happen on either side of the body in either extremity.    The following portions of the patient's history were reviewed and updated as appropriate: allergies, current medications, past family history, past medical history, past social history, past surgical history and problem list.    Review of Systems   Musculoskeletal: Negative for back pain, gait problem and neck pain.   Allergic/Immunologic: Negative for environmental allergies, food " allergies and immunocompromised state.   Neurological: Negative for dizziness, tremors, seizures, syncope, facial asymmetry, speech difficulty, weakness, light-headedness, numbness and headaches.   Hematological: Negative for adenopathy. Does not bruise/bleed easily.   Psychiatric/Behavioral: Negative for agitation, behavioral problems, confusion, decreased concentration, dysphoric mood, hallucinations, self-injury, sleep disturbance and suicidal ideas. The patient is not nervous/anxious and is not hyperactive.       I have reviewed the review of systems above performed by my medical assistant.      Vitals:    21 1402   BP: 112/66   Pulse: 71   SpO2: 98%       Neurologic Exam     Mental Status   Oriented to person, place, and time.   Concentration: normal.   Level of consciousness: alert  Knowledge: consistent with education (No deficits found.).     Cranial Nerves     CN II   Visual fields full to confrontation.     CN III, IV, VI   Pupils are equal, round, and reactive to light.  Extraocular motions are normal.   CN III: no CN III palsy  CN VI: no CN VI palsy    CN V   Facial sensation intact.     CN VII   Facial expression full, symmetric.     CN VIII   CN VIII normal.     CN IX, X   CN IX normal.   CN X normal.     CN XI   CN XI normal.     CN XII   CN XII normal.     Motor Exam     Strength   Right neck flexion: 5/5  Left neck flexion: 5/5  Right neck extension: 5/5  Left neck extension: 5/5  Right deltoid: 5/5  Left deltoid: 5/5  Right biceps: 5/5  Left biceps: 5/5  Right triceps: 5/5  Left triceps: 5/5  Right wrist flexion: 5/5  Left wrist flexion: 5/5  Right wrist extension: 5/5  Left wrist extension: 5/5  Right interossei: 5/5  Left interossei: 5/5  Right abdominals: 5/5  Left abdominals: 5/5  Right iliopsoas: 5/5  Left iliopsoas: 5/5  Right quadriceps: 5/5  Left quadriceps: 5/5  Right hamstrin/5  Left hamstrin/5  Right glutei: 5/5  Left glutei: 5/5  Right anterior tibial: 5/5  Left anterior  tibial: 5/5  Right posterior tibial: 5/5  Left posterior tibial: 5/5  Right peroneal: 5/5  Left peroneal: 5/5  Right gastroc: 5/5  Left gastroc: 5/5    Sensory Exam   Light touch normal.   Vibration normal.     Gait, Coordination, and Reflexes     Gait  Gait: normal    Tremor   Resting tremor: present  Intention tremor: present    Reflexes   Right brachioradialis: 2+  Left brachioradialis: 2+  Right biceps: 2+  Left biceps: 2+  Right triceps: 2+  Left triceps: 2+  Right patellar: 2+  Left patellar: 2+  Right achilles: 2+  Left achilles: 2+  Right : 2+  Left : 2+Station is normal.       Physical Exam  Vitals reviewed.   Constitutional:       Appearance: She is well-developed.   HENT:      Head: Normocephalic and atraumatic.   Eyes:      Extraocular Movements: EOM normal.      Pupils: Pupils are equal, round, and reactive to light.   Cardiovascular:      Rate and Rhythm: Normal rate and regular rhythm.   Pulmonary:      Breath sounds: Normal breath sounds.   Musculoskeletal:         General: Normal range of motion.   Skin:     General: Skin is warm.   Neurological:      Mental Status: She is oriented to person, place, and time.      Gait: Gait is intact.      Deep Tendon Reflexes:      Reflex Scores:       Tricep reflexes are 2+ on the right side and 2+ on the left side.       Bicep reflexes are 2+ on the right side and 2+ on the left side.       Brachioradialis reflexes are 2+ on the right side and 2+ on the left side.       Patellar reflexes are 2+ on the right side and 2+ on the left side.       Achilles reflexes are 2+ on the right side and 2+ on the left side.        Procedures    Assessment/Plan: She does not fact have both the essential tremor and a resting tremor in the right and left upper extremity.  It is mild however she has noted some progression particularly at rest.  We will try ropinirole 25 mg once daily initially likely titrating to 3 times daily.  Hopefully this will help with the what  sounds to be myoclonus.  We will see her back in 4 months or sooner if needed.  A total of 32 minutes was spent face-to-face with the patient today.  Of that greater than 50% of this time was spent discussing signs and symptoms of parkinsonism, intention tremor, patient education, plan of care and prognosis.  Diagnoses and all orders for this visit:    1. Parkinsonism, unspecified Parkinsonism type (CMS/HCC) (Primary)  -     rOPINIRole (REQUIP) 0.25 MG tablet; Take 1 tablet by mouth Daily for 30 days. Take 1 hour before bedtime.  Dispense: 30 tablet; Refill: 4    2. Intention tremor           Ross Braswell II, MD

## 2021-10-18 ENCOUNTER — APPOINTMENT (OUTPATIENT)
Dept: CT IMAGING | Facility: HOSPITAL | Age: 81
End: 2021-10-18

## 2021-10-18 ENCOUNTER — HOSPITAL ENCOUNTER (OUTPATIENT)
Facility: HOSPITAL | Age: 81
Setting detail: OBSERVATION
Discharge: HOME | End: 2021-10-19
Attending: EMERGENCY MEDICINE | Admitting: EMERGENCY MEDICINE

## 2021-10-18 DIAGNOSIS — R51.9 ACUTE NONINTRACTABLE HEADACHE, UNSPECIFIED HEADACHE TYPE: ICD-10-CM

## 2021-10-18 DIAGNOSIS — R11.2 NON-INTRACTABLE VOMITING WITH NAUSEA, UNSPECIFIED VOMITING TYPE: ICD-10-CM

## 2021-10-18 DIAGNOSIS — R10.84 GENERALIZED ABDOMINAL PAIN: Primary | ICD-10-CM

## 2021-10-18 DIAGNOSIS — R74.8 ELEVATED LIPASE: ICD-10-CM

## 2021-10-18 DIAGNOSIS — J44.9 CHRONIC OBSTRUCTIVE PULMONARY DISEASE, UNSPECIFIED COPD TYPE (HCC): ICD-10-CM

## 2021-10-18 PROBLEM — R10.9 ABDOMINAL PAIN: Status: ACTIVE | Noted: 2021-10-18

## 2021-10-18 LAB
ADV 40+41 DNA STL QL NAA+NON-PROBE: NOT DETECTED
ALBUMIN SERPL-MCNC: 4.4 G/DL (ref 3.5–5.2)
ALBUMIN/GLOB SERPL: 1.9 G/DL
ALP SERPL-CCNC: 94 U/L (ref 39–117)
ALT SERPL W P-5'-P-CCNC: 13 U/L (ref 1–33)
ANION GAP SERPL CALCULATED.3IONS-SCNC: 11.8 MMOL/L (ref 5–15)
AST SERPL-CCNC: 15 U/L (ref 1–32)
ASTRO TYP 1-8 RNA STL QL NAA+NON-PROBE: NOT DETECTED
B PARAPERT DNA SPEC QL NAA+PROBE: NOT DETECTED
B PERT DNA SPEC QL NAA+PROBE: NOT DETECTED
BACTERIA UR QL AUTO: NORMAL /HPF
BASOPHILS # BLD AUTO: 0.04 10*3/MM3 (ref 0–0.2)
BASOPHILS NFR BLD AUTO: 0.5 % (ref 0–1.5)
BILIRUB SERPL-MCNC: 0.3 MG/DL (ref 0–1.2)
BILIRUB UR QL STRIP: NEGATIVE
BUN SERPL-MCNC: 19 MG/DL (ref 8–23)
BUN/CREAT SERPL: 15.2 (ref 7–25)
C CAYETANENSIS DNA STL QL NAA+NON-PROBE: NOT DETECTED
C COLI+JEJ+UPSA DNA STL QL NAA+NON-PROBE: NOT DETECTED
C PNEUM DNA NPH QL NAA+NON-PROBE: NOT DETECTED
CALCIUM SPEC-SCNC: 9.2 MG/DL (ref 8.6–10.5)
CARBAMAZEPINE SERPL-MCNC: 5.1 MCG/ML (ref 4–12)
CHLORIDE SERPL-SCNC: 95 MMOL/L (ref 98–107)
CLARITY UR: CLEAR
CO2 SERPL-SCNC: 24.2 MMOL/L (ref 22–29)
COLOR UR: YELLOW
CREAT SERPL-MCNC: 1.25 MG/DL (ref 0.57–1)
CRYPTOSP DNA STL QL NAA+NON-PROBE: NOT DETECTED
D-LACTATE SERPL-SCNC: 0.5 MMOL/L (ref 0.5–2)
DEPRECATED RDW RBC AUTO: 49.5 FL (ref 37–54)
E HISTOLYT DNA STL QL NAA+NON-PROBE: NOT DETECTED
EAEC PAA PLAS AGGR+AATA ST NAA+NON-PRB: NOT DETECTED
EC STX1+STX2 GENES STL QL NAA+NON-PROBE: NOT DETECTED
EOSINOPHIL # BLD AUTO: 0.12 10*3/MM3 (ref 0–0.4)
EOSINOPHIL NFR BLD AUTO: 1.5 % (ref 0.3–6.2)
EPEC EAE GENE STL QL NAA+NON-PROBE: NOT DETECTED
ERYTHROCYTE [DISTWIDTH] IN BLOOD BY AUTOMATED COUNT: 13.6 % (ref 12.3–15.4)
ETEC LTA+ST1A+ST1B TOX ST NAA+NON-PROBE: NOT DETECTED
FLUAV SUBTYP SPEC NAA+PROBE: NOT DETECTED
FLUBV RNA ISLT QL NAA+PROBE: NOT DETECTED
G LAMBLIA DNA STL QL NAA+NON-PROBE: NOT DETECTED
GFR SERPL CREATININE-BSD FRML MDRD: 41 ML/MIN/1.73
GLOBULIN UR ELPH-MCNC: 2.3 GM/DL
GLUCOSE SERPL-MCNC: 109 MG/DL (ref 65–99)
GLUCOSE UR STRIP-MCNC: NEGATIVE MG/DL
HADV DNA SPEC NAA+PROBE: NOT DETECTED
HCOV 229E RNA SPEC QL NAA+PROBE: NOT DETECTED
HCOV HKU1 RNA SPEC QL NAA+PROBE: NOT DETECTED
HCOV NL63 RNA SPEC QL NAA+PROBE: NOT DETECTED
HCOV OC43 RNA SPEC QL NAA+PROBE: NOT DETECTED
HCT VFR BLD AUTO: 33 % (ref 34–46.6)
HGB BLD-MCNC: 10.6 G/DL (ref 12–15.9)
HGB UR QL STRIP.AUTO: NEGATIVE
HMPV RNA NPH QL NAA+NON-PROBE: NOT DETECTED
HOLD SPECIMEN: NORMAL
HOLD SPECIMEN: NORMAL
HPIV1 RNA SPEC QL NAA+PROBE: NOT DETECTED
HPIV2 RNA SPEC QL NAA+PROBE: NOT DETECTED
HPIV3 RNA NPH QL NAA+PROBE: NOT DETECTED
HPIV4 P GENE NPH QL NAA+PROBE: NOT DETECTED
HYALINE CASTS UR QL AUTO: NORMAL /LPF
IMM GRANULOCYTES # BLD AUTO: 0.08 10*3/MM3 (ref 0–0.05)
IMM GRANULOCYTES NFR BLD AUTO: 1 % (ref 0–0.5)
KETONES UR QL STRIP: NEGATIVE
LEUKOCYTE ESTERASE UR QL STRIP.AUTO: NEGATIVE
LIPASE SERPL-CCNC: 195 U/L (ref 13–60)
LYMPHOCYTES # BLD AUTO: 1.33 10*3/MM3 (ref 0.7–3.1)
LYMPHOCYTES NFR BLD AUTO: 16.9 % (ref 19.6–45.3)
M PNEUMO IGG SER IA-ACNC: NOT DETECTED
MCH RBC QN AUTO: 31.9 PG (ref 26.6–33)
MCHC RBC AUTO-ENTMCNC: 32.1 G/DL (ref 31.5–35.7)
MCV RBC AUTO: 99.4 FL (ref 79–97)
MONOCYTES # BLD AUTO: 0.72 10*3/MM3 (ref 0.1–0.9)
MONOCYTES NFR BLD AUTO: 9.1 % (ref 5–12)
NEUTROPHILS NFR BLD AUTO: 5.6 10*3/MM3 (ref 1.7–7)
NEUTROPHILS NFR BLD AUTO: 71 % (ref 42.7–76)
NITRITE UR QL STRIP: NEGATIVE
NOROVIRUS GI+II RNA STL QL NAA+NON-PROBE: NOT DETECTED
NRBC BLD AUTO-RTO: 0 /100 WBC (ref 0–0.2)
P SHIGELLOIDES DNA STL QL NAA+NON-PROBE: NOT DETECTED
PH UR STRIP.AUTO: 6 [PH] (ref 5–8)
PLATELET # BLD AUTO: 219 10*3/MM3 (ref 140–450)
PMV BLD AUTO: 9.3 FL (ref 6–12)
POTASSIUM SERPL-SCNC: 4.6 MMOL/L (ref 3.5–5.2)
PROT SERPL-MCNC: 6.7 G/DL (ref 6–8.5)
PROT UR QL STRIP: ABNORMAL
QT INTERVAL: 413 MS
RBC # BLD AUTO: 3.32 10*6/MM3 (ref 3.77–5.28)
RBC # UR: NORMAL /HPF
REF LAB TEST METHOD: NORMAL
RHINOVIRUS RNA SPEC NAA+PROBE: NOT DETECTED
RSV RNA NPH QL NAA+NON-PROBE: NOT DETECTED
RVA RNA STL QL NAA+NON-PROBE: NOT DETECTED
S ENT+BONG DNA STL QL NAA+NON-PROBE: NOT DETECTED
SAPO I+II+IV+V RNA STL QL NAA+NON-PROBE: NOT DETECTED
SARS-COV-2 RNA NPH QL NAA+NON-PROBE: NOT DETECTED
SHIGELLA SP+EIEC IPAH ST NAA+NON-PROBE: NOT DETECTED
SODIUM SERPL-SCNC: 131 MMOL/L (ref 136–145)
SP GR UR STRIP: 1.02 (ref 1–1.03)
SQUAMOUS #/AREA URNS HPF: NORMAL /HPF
TROPONIN T SERPL-MCNC: <0.01 NG/ML (ref 0–0.03)
UROBILINOGEN UR QL STRIP: ABNORMAL
V CHOL+PARA+VUL DNA STL QL NAA+NON-PROBE: NOT DETECTED
V CHOLERAE DNA STL QL NAA+NON-PROBE: NOT DETECTED
VALPROATE SERPL-MCNC: <2.8 MCG/ML (ref 50–125)
WBC # BLD AUTO: 7.89 10*3/MM3 (ref 3.4–10.8)
WBC UR QL AUTO: NORMAL /HPF
WHOLE BLOOD HOLD SPECIMEN: NORMAL
WHOLE BLOOD HOLD SPECIMEN: NORMAL
Y ENTEROCOL DNA STL QL NAA+NON-PROBE: NOT DETECTED

## 2021-10-18 PROCEDURE — 0202U NFCT DS 22 TRGT SARS-COV-2: CPT | Performed by: EMERGENCY MEDICINE

## 2021-10-18 PROCEDURE — 83605 ASSAY OF LACTIC ACID: CPT | Performed by: PHYSICIAN ASSISTANT

## 2021-10-18 PROCEDURE — 83690 ASSAY OF LIPASE: CPT | Performed by: PHYSICIAN ASSISTANT

## 2021-10-18 PROCEDURE — 25010000002 PROCHLORPERAZINE 10 MG/2ML SOLUTION: Performed by: EMERGENCY MEDICINE

## 2021-10-18 PROCEDURE — 93005 ELECTROCARDIOGRAM TRACING: CPT | Performed by: EMERGENCY MEDICINE

## 2021-10-18 PROCEDURE — 25010000002 DIPHENHYDRAMINE PER 50 MG: Performed by: EMERGENCY MEDICINE

## 2021-10-18 PROCEDURE — P9612 CATHETERIZE FOR URINE SPEC: HCPCS

## 2021-10-18 PROCEDURE — G0378 HOSPITAL OBSERVATION PER HR: HCPCS

## 2021-10-18 PROCEDURE — 0097U HC BIOFIRE FILMARRAY GI PANEL: CPT | Performed by: PHYSICIAN ASSISTANT

## 2021-10-18 PROCEDURE — 84484 ASSAY OF TROPONIN QUANT: CPT | Performed by: EMERGENCY MEDICINE

## 2021-10-18 PROCEDURE — 99285 EMERGENCY DEPT VISIT HI MDM: CPT

## 2021-10-18 PROCEDURE — 93010 ELECTROCARDIOGRAM REPORT: CPT | Performed by: INTERNAL MEDICINE

## 2021-10-18 PROCEDURE — 80164 ASSAY DIPROPYLACETIC ACD TOT: CPT | Performed by: PHYSICIAN ASSISTANT

## 2021-10-18 PROCEDURE — 94799 UNLISTED PULMONARY SVC/PX: CPT

## 2021-10-18 PROCEDURE — 80156 ASSAY CARBAMAZEPINE TOTAL: CPT | Performed by: PHYSICIAN ASSISTANT

## 2021-10-18 PROCEDURE — 96375 TX/PRO/DX INJ NEW DRUG ADDON: CPT

## 2021-10-18 PROCEDURE — 25010000002 ONDANSETRON PER 1 MG: Performed by: PHYSICIAN ASSISTANT

## 2021-10-18 PROCEDURE — 94640 AIRWAY INHALATION TREATMENT: CPT

## 2021-10-18 PROCEDURE — 70450 CT HEAD/BRAIN W/O DYE: CPT

## 2021-10-18 PROCEDURE — 81001 URINALYSIS AUTO W/SCOPE: CPT | Performed by: EMERGENCY MEDICINE

## 2021-10-18 PROCEDURE — 25010000002 FENTANYL CITRATE (PF) 50 MCG/ML SOLUTION: Performed by: EMERGENCY MEDICINE

## 2021-10-18 PROCEDURE — 96374 THER/PROPH/DIAG INJ IV PUSH: CPT

## 2021-10-18 PROCEDURE — 74176 CT ABD & PELVIS W/O CONTRAST: CPT

## 2021-10-18 PROCEDURE — 85025 COMPLETE CBC W/AUTO DIFF WBC: CPT | Performed by: PHYSICIAN ASSISTANT

## 2021-10-18 PROCEDURE — 80053 COMPREHEN METABOLIC PANEL: CPT | Performed by: PHYSICIAN ASSISTANT

## 2021-10-18 RX ORDER — FAMOTIDINE 40 MG/1
40 TABLET, FILM COATED ORAL DAILY
Status: DISCONTINUED | OUTPATIENT
Start: 2021-10-18 | End: 2021-10-19 | Stop reason: HOSPADM

## 2021-10-18 RX ORDER — ACETAMINOPHEN 325 MG/1
650 TABLET ORAL EVERY 4 HOURS PRN
Status: DISCONTINUED | OUTPATIENT
Start: 2021-10-18 | End: 2021-10-18 | Stop reason: SDUPTHER

## 2021-10-18 RX ORDER — FENTANYL CITRATE 50 UG/ML
50 INJECTION, SOLUTION INTRAMUSCULAR; INTRAVENOUS ONCE
Status: COMPLETED | OUTPATIENT
Start: 2021-10-18 | End: 2021-10-18

## 2021-10-18 RX ORDER — ONDANSETRON 2 MG/ML
4 INJECTION INTRAMUSCULAR; INTRAVENOUS EVERY 6 HOURS PRN
Status: DISCONTINUED | OUTPATIENT
Start: 2021-10-18 | End: 2021-10-18 | Stop reason: SDUPTHER

## 2021-10-18 RX ORDER — DIVALPROEX SODIUM 250 MG/1
250 TABLET, EXTENDED RELEASE ORAL EVERY MORNING
Status: DISCONTINUED | OUTPATIENT
Start: 2021-10-19 | End: 2021-10-19 | Stop reason: HOSPADM

## 2021-10-18 RX ORDER — ACETAMINOPHEN 160 MG/5ML
650 SOLUTION ORAL EVERY 4 HOURS PRN
Status: DISCONTINUED | OUTPATIENT
Start: 2021-10-18 | End: 2021-10-19 | Stop reason: HOSPADM

## 2021-10-18 RX ORDER — ONDANSETRON 4 MG/1
4 TABLET, FILM COATED ORAL EVERY 6 HOURS PRN
Status: DISCONTINUED | OUTPATIENT
Start: 2021-10-18 | End: 2021-10-18 | Stop reason: SDUPTHER

## 2021-10-18 RX ORDER — HYDRALAZINE HYDROCHLORIDE 20 MG/ML
10 INJECTION INTRAMUSCULAR; INTRAVENOUS EVERY 6 HOURS PRN
Status: DISCONTINUED | OUTPATIENT
Start: 2021-10-18 | End: 2021-10-19 | Stop reason: HOSPADM

## 2021-10-18 RX ORDER — OLANZAPINE 5 MG/1
5 TABLET ORAL DAILY
Status: DISCONTINUED | OUTPATIENT
Start: 2021-10-18 | End: 2021-10-19

## 2021-10-18 RX ORDER — ONDANSETRON 4 MG/1
4 TABLET, FILM COATED ORAL EVERY 6 HOURS PRN
Status: DISCONTINUED | OUTPATIENT
Start: 2021-10-18 | End: 2021-10-19 | Stop reason: HOSPADM

## 2021-10-18 RX ORDER — LEVOTHYROXINE SODIUM 0.1 MG/1
100 TABLET ORAL
Status: DISCONTINUED | OUTPATIENT
Start: 2021-10-19 | End: 2021-10-19

## 2021-10-18 RX ORDER — BUDESONIDE AND FORMOTEROL FUMARATE DIHYDRATE 160; 4.5 UG/1; UG/1
2 AEROSOL RESPIRATORY (INHALATION) 2 TIMES DAILY
Status: DISCONTINUED | OUTPATIENT
Start: 2021-10-18 | End: 2021-10-19 | Stop reason: HOSPADM

## 2021-10-18 RX ORDER — POTASSIUM CHLORIDE 750 MG/1
10 TABLET, FILM COATED, EXTENDED RELEASE ORAL DAILY
Status: DISCONTINUED | OUTPATIENT
Start: 2021-10-18 | End: 2021-10-19 | Stop reason: HOSPADM

## 2021-10-18 RX ORDER — DIVALPROEX SODIUM 500 MG/1
500 TABLET, EXTENDED RELEASE ORAL EVERY EVENING
Status: DISCONTINUED | OUTPATIENT
Start: 2021-10-18 | End: 2021-10-19

## 2021-10-18 RX ORDER — SODIUM CHLORIDE 0.9 % (FLUSH) 0.9 %
10 SYRINGE (ML) INJECTION EVERY 12 HOURS SCHEDULED
Status: DISCONTINUED | OUTPATIENT
Start: 2021-10-18 | End: 2021-10-19 | Stop reason: HOSPADM

## 2021-10-18 RX ORDER — ROPINIROLE 0.5 MG/1
0.25 TABLET, FILM COATED ORAL NIGHTLY
Status: DISCONTINUED | OUTPATIENT
Start: 2021-10-18 | End: 2021-10-19 | Stop reason: HOSPADM

## 2021-10-18 RX ORDER — NITROGLYCERIN 0.4 MG/1
0.4 TABLET SUBLINGUAL
Status: DISCONTINUED | OUTPATIENT
Start: 2021-10-18 | End: 2021-10-19 | Stop reason: HOSPADM

## 2021-10-18 RX ORDER — IRON POLYSACCHARIDE COMPLEX 150 MG
150 CAPSULE ORAL DAILY
Status: DISCONTINUED | OUTPATIENT
Start: 2021-10-18 | End: 2021-10-19 | Stop reason: HOSPADM

## 2021-10-18 RX ORDER — DULOXETIN HYDROCHLORIDE 60 MG/1
60 CAPSULE, DELAYED RELEASE ORAL 2 TIMES DAILY
Status: DISCONTINUED | OUTPATIENT
Start: 2021-10-18 | End: 2021-10-19 | Stop reason: HOSPADM

## 2021-10-18 RX ORDER — BUMETANIDE 1 MG/1
1 TABLET ORAL DAILY
Status: DISCONTINUED | OUTPATIENT
Start: 2021-10-18 | End: 2021-10-19 | Stop reason: HOSPADM

## 2021-10-18 RX ORDER — BUSPIRONE HYDROCHLORIDE 10 MG/1
10 TABLET ORAL EVERY 12 HOURS SCHEDULED
Status: DISCONTINUED | OUTPATIENT
Start: 2021-10-18 | End: 2021-10-19 | Stop reason: HOSPADM

## 2021-10-18 RX ORDER — ONDANSETRON 2 MG/ML
4 INJECTION INTRAMUSCULAR; INTRAVENOUS ONCE
Status: COMPLETED | OUTPATIENT
Start: 2021-10-18 | End: 2021-10-18

## 2021-10-18 RX ORDER — GABAPENTIN 300 MG/1
300 CAPSULE ORAL 3 TIMES DAILY
Status: DISCONTINUED | OUTPATIENT
Start: 2021-10-18 | End: 2021-10-19 | Stop reason: HOSPADM

## 2021-10-18 RX ORDER — ACETAMINOPHEN 325 MG/1
650 TABLET ORAL EVERY 6 HOURS PRN
Status: DISCONTINUED | OUTPATIENT
Start: 2021-10-18 | End: 2021-10-19 | Stop reason: HOSPADM

## 2021-10-18 RX ORDER — IPRATROPIUM BROMIDE AND ALBUTEROL SULFATE 2.5; .5 MG/3ML; MG/3ML
3 SOLUTION RESPIRATORY (INHALATION) EVERY 4 HOURS PRN
Status: DISCONTINUED | OUTPATIENT
Start: 2021-10-18 | End: 2021-10-19 | Stop reason: HOSPADM

## 2021-10-18 RX ORDER — HYDROCODONE BITARTRATE AND ACETAMINOPHEN 7.5; 325 MG/1; MG/1
1 TABLET ORAL EVERY 4 HOURS PRN
Status: DISCONTINUED | OUTPATIENT
Start: 2021-10-18 | End: 2021-10-19 | Stop reason: HOSPADM

## 2021-10-18 RX ORDER — SODIUM CHLORIDE 0.9 % (FLUSH) 0.9 %
10 SYRINGE (ML) INJECTION AS NEEDED
Status: DISCONTINUED | OUTPATIENT
Start: 2021-10-18 | End: 2021-10-19 | Stop reason: HOSPADM

## 2021-10-18 RX ORDER — ONDANSETRON 2 MG/ML
4 INJECTION INTRAMUSCULAR; INTRAVENOUS EVERY 6 HOURS PRN
Status: DISCONTINUED | OUTPATIENT
Start: 2021-10-18 | End: 2021-10-19 | Stop reason: HOSPADM

## 2021-10-18 RX ORDER — CHOLECALCIFEROL (VITAMIN D3) 125 MCG
1000 CAPSULE ORAL DAILY
Status: DISCONTINUED | OUTPATIENT
Start: 2021-10-19 | End: 2021-10-19 | Stop reason: HOSPADM

## 2021-10-18 RX ORDER — METOPROLOL SUCCINATE 25 MG/1
12.5 TABLET, EXTENDED RELEASE ORAL
Status: DISCONTINUED | OUTPATIENT
Start: 2021-10-18 | End: 2021-10-19 | Stop reason: HOSPADM

## 2021-10-18 RX ORDER — UREA 10 %
3 LOTION (ML) TOPICAL NIGHTLY
Status: DISCONTINUED | OUTPATIENT
Start: 2021-10-18 | End: 2021-10-19 | Stop reason: HOSPADM

## 2021-10-18 RX ORDER — QUETIAPINE FUMARATE 100 MG/1
100 TABLET, FILM COATED ORAL NIGHTLY
Status: DISCONTINUED | OUTPATIENT
Start: 2021-10-18 | End: 2021-10-19 | Stop reason: HOSPADM

## 2021-10-18 RX ORDER — PROCHLORPERAZINE EDISYLATE 5 MG/ML
10 INJECTION INTRAMUSCULAR; INTRAVENOUS ONCE
Status: COMPLETED | OUTPATIENT
Start: 2021-10-18 | End: 2021-10-18

## 2021-10-18 RX ORDER — TRAZODONE HYDROCHLORIDE 50 MG/1
50 TABLET ORAL NIGHTLY
Status: DISCONTINUED | OUTPATIENT
Start: 2021-10-18 | End: 2021-10-19 | Stop reason: HOSPADM

## 2021-10-18 RX ORDER — LEVOTHYROXINE SODIUM 88 UG/1
88 TABLET ORAL
Status: DISCONTINUED | OUTPATIENT
Start: 2021-10-19 | End: 2021-10-19

## 2021-10-18 RX ORDER — SODIUM CHLORIDE 9 MG/ML
100 INJECTION, SOLUTION INTRAVENOUS CONTINUOUS
Status: DISCONTINUED | OUTPATIENT
Start: 2021-10-18 | End: 2021-10-19 | Stop reason: HOSPADM

## 2021-10-18 RX ORDER — ACETAMINOPHEN 650 MG/1
650 SUPPOSITORY RECTAL EVERY 4 HOURS PRN
Status: DISCONTINUED | OUTPATIENT
Start: 2021-10-18 | End: 2021-10-19 | Stop reason: HOSPADM

## 2021-10-18 RX ORDER — CARBAMAZEPINE 100 MG/1
100 TABLET, EXTENDED RELEASE ORAL EVERY MORNING
Status: DISCONTINUED | OUTPATIENT
Start: 2021-10-19 | End: 2021-10-19 | Stop reason: HOSPADM

## 2021-10-18 RX ORDER — DIPHENHYDRAMINE HYDROCHLORIDE 50 MG/ML
25 INJECTION INTRAMUSCULAR; INTRAVENOUS ONCE
Status: COMPLETED | OUTPATIENT
Start: 2021-10-18 | End: 2021-10-18

## 2021-10-18 RX ORDER — ALLOPURINOL 100 MG/1
100 TABLET ORAL DAILY
Status: DISCONTINUED | OUTPATIENT
Start: 2021-10-18 | End: 2021-10-19 | Stop reason: HOSPADM

## 2021-10-18 RX ADMIN — FENTANYL CITRATE 50 MCG: 50 INJECTION INTRAMUSCULAR; INTRAVENOUS at 14:11

## 2021-10-18 RX ADMIN — IPRATROPIUM BROMIDE 0.5 MG: 0.5 SOLUTION RESPIRATORY (INHALATION) at 22:13

## 2021-10-18 RX ADMIN — METOPROLOL SUCCINATE 12.5 MG: 25 TABLET, EXTENDED RELEASE ORAL at 18:43

## 2021-10-18 RX ADMIN — ONDANSETRON 4 MG: 2 INJECTION INTRAMUSCULAR; INTRAVENOUS at 11:56

## 2021-10-18 RX ADMIN — SODIUM CHLORIDE 500 ML: 9 INJECTION, SOLUTION INTRAVENOUS at 11:53

## 2021-10-18 RX ADMIN — ONDANSETRON 4 MG: 2 INJECTION INTRAMUSCULAR; INTRAVENOUS at 11:26

## 2021-10-18 RX ADMIN — PROCHLORPERAZINE EDISYLATE 10 MG: 5 INJECTION INTRAMUSCULAR; INTRAVENOUS at 13:17

## 2021-10-18 RX ADMIN — SODIUM CHLORIDE 100 ML/HR: 9 INJECTION, SOLUTION INTRAVENOUS at 18:43

## 2021-10-18 RX ADMIN — SODIUM CHLORIDE, PRESERVATIVE FREE 10 ML: 5 INJECTION INTRAVENOUS at 22:29

## 2021-10-18 RX ADMIN — QUETIAPINE FUMARATE 100 MG: 100 TABLET ORAL at 22:26

## 2021-10-18 RX ADMIN — ROPINIROLE HYDROCHLORIDE 0.25 MG: 0.5 TABLET, FILM COATED ORAL at 22:26

## 2021-10-18 RX ADMIN — DIPHENHYDRAMINE HYDROCHLORIDE 25 MG: 50 INJECTION, SOLUTION INTRAMUSCULAR; INTRAVENOUS at 13:17

## 2021-10-18 RX ADMIN — SODIUM CHLORIDE, PRESERVATIVE FREE 10 ML: 5 INJECTION INTRAVENOUS at 11:26

## 2021-10-18 RX ADMIN — GABAPENTIN 300 MG: 300 CAPSULE ORAL at 22:26

## 2021-10-18 NOTE — ED TRIAGE NOTES
HA and nausea.  Woke at 0400 sweating but is afebrile.  Is on 3L O2 at baseline    Patient was placed in face mask during first look triage.  Patient was wearing a face mask throughout encounter.  I wore personal protective equipment throughout the encounter.  Hand hygiene was performed before and after patient encounter.

## 2021-10-18 NOTE — ED NOTES
"Pt reports nausea has still not improved, but not currently dry heaving, now reports to Constantin GOLDBERG she has intermittent dizziness. Pt reporting L inner thigh pain, states it is \"taking my breath away\"      Marybeth Trevizo, CARMINA  10/18/21 1237       Marybeth Trevizo RN  10/18/21 0214    "

## 2021-10-18 NOTE — ED PROVIDER NOTES
EMERGENCY DEPARTMENT ENCOUNTER    Room Number:  110/1  Date of encounter:  10/18/2021  PCP: Bill Martino MD  Historian: Patient      I used full protective equipment while examining this patient.  This includes face mask, gloves and protective eyewear.  I washed my hands before entering the room and immediately upon leaving the room      HPI:  Chief Complaint: Headache, vomiting, abdominal pain  A complete HPI/ROS/PMH/PSH/SH/FH are unobtainable due to: Nothing    Context: Josephine Wolf is a 81 y.o. female who presents to the ED c/o 2-day history of headache, abdominal pain, vomiting.  Patient states the headache started last night.  It started gradually.  Headache is better at this time.  The headache was described as throbbing, global, severe in nature.  Patient states she does not normally get headaches.  She denies any numbness or tingling to her extremities.  She denies any neck pain or fever.  Patient states her headache improved while in route to the ED hospital.  Patient did have multiple episodes of vomiting last night and this morning.  She denies any photophobia or phonophobia.    In addition patient complains of diffuse lower abdominal pain.  This abdominal pain started earlier this morning.  Again patient complains of vomiting and nausea.  Patient denies any diarrhea, dysuria.  She denies any precipitating alleviating factors.  She denies any prior similar symptoms.      Review of Medical Records  I reviewed last ER visit from 7/25/2021.  Patient seen for hypotension.    PAST MEDICAL HISTORY  Active Ambulatory Problems     Diagnosis Date Noted   • Anemia 10/19/2017   • OA (osteoarthritis) of knee 11/16/2017   • Chronic respiratory failure with hypoxia (HCC) 12/02/2017   • Cellulitis of skin 12/06/2017   • Acute kidney injury (HCC) 12/06/2017   • Sepsis ([facility]) 12/06/2017   • Orthostatic hypotension 06/24/2018   • Disease of thyroid gland 06/24/2018   • Hypertension 06/24/2018   •  Dehydration 2018   • Stage 3 chronic kidney disease (CMS/HCC) 2018   • Closed compression fracture of L1 lumbar vertebra 2019   • Hyponatremia 2019   • Osteoporosis with pathological fracture 2019   • Acute on chronic respiratory failure with hypoxia and hypercapnia ([facility]) 2020   • Obesity (BMI 30-39.9) 2020   • Nocturnal hypoxia 2020   • CKD (chronic kidney disease) stage 2, GFR 60-89 ml/min 2020   • Acute on chronic respiratory failure with hypoxia ([facility]) 2020     Resolved Ambulatory Problems     Diagnosis Date Noted   • COPD with acute exacerbation ([facility]) 2018     Past Medical History:   Diagnosis Date   • Acid reflux    • Arthritis    • Bipolar 1 disorder, depressed ([facility])    • Cataract    • Chronic nausea    • Chronic pain of right knee    • Continuous leakage of urine    • COPD (chronic obstructive pulmonary disease) ([facility])    • Diverticulosis    • Fibromyalgia    • Fibromyalgia, primary    • Frequent episodes of bronchitis    • HL (hearing loss)    • Hypothyroidism    • Memory loss    • Migraines    • Neck pain    • On home oxygen therapy    • Short of breath on exertion    • Sleep apnea          PAST SURGICAL HISTORY  Past Surgical History:   Procedure Laterality Date   • CEREBRAL ANEURYSM REPAIR      WITH STENT   • HYSTERECTOMY     • LAPAROSCOPIC CHOLECYSTECTOMY     • OK TOTAL KNEE ARTHROPLASTY Right 2017    Procedure: RT TOTAL KNEE ARTHROPLASTY;  Surgeon: Kashif Perez MD;  Location: [facility];  Service: Orthopedics         FAMILY HISTORY  Family History   Problem Relation Age of Onset   • Malig Hyperthermia Neg Hx          SOCIAL HISTORY  Social History     Socioeconomic History   • Marital status:    Tobacco Use   • Smoking status: Former Smoker     Packs/day: 1.00     Years: 10.00     Pack years: 10.00     Types: Cigarettes     Start date:      Quit date:      Years since quittin.8   • Smokeless tobacco:  Never Used   Vaping Use   • Vaping Use: Never used   Substance and Sexual Activity   • Alcohol use: No     Comment: CAFFEINE NO    • Drug use: No   • Sexual activity: Defer         ALLERGIES  Morphine and related        REVIEW OF SYSTEMS  All systems reviewed and negative except for those discussed in HPI.       PHYSICAL EXAM    I have reviewed the triage vital signs and nursing notes.    ED Triage Vitals [10/18/21 0949]   Temp Heart Rate Resp BP SpO2   98.1 °F (36.7 °C) 60 18 170/67 96 %      Temp src Heart Rate Source Patient Position BP Location FiO2 (%)   Tympanic Monitor -- -- --       Physical Exam  GENERAL: Alert, oriented, mild distress secondary to vomiting   HENT: head atraumatic, no nuchal rigidity  EYES: no scleral icterus, EOMI  CV: regular rhythm, regular rate, no murmur  RESPIRATORY: normal effort, CTA  ABDOMEN: soft, mild diffuse lower abdominal tenderness.  No guarding rebound.  Normal bowel sounds.  MUSCULOSKELETAL: no deformity, FROM, no calf swelling or tenderness  NEURO: alert, normal cerebellar function, cranial nerves II to XII grossly intact  SKIN: warm, dry        LAB RESULTS  Recent Results (from the past 24 hour(s))   Carbamazepine Level, Total    Collection Time: 10/18/21 11:25 AM    Specimen: Blood   Result Value Ref Range    Carbamazepine Level 5.1 4.0 - 12.0 mcg/mL   Valproic Acid Level, Total    Collection Time: 10/18/21 11:25 AM    Specimen: Blood   Result Value Ref Range    Valproic Acid <2.8 (L) 50.0 - 125.0 mcg/mL   Comprehensive Metabolic Panel    Collection Time: 10/18/21 11:25 AM    Specimen: Blood   Result Value Ref Range    Glucose 109 (H) 65 - 99 mg/dL    BUN 19 8 - 23 mg/dL    Creatinine 1.25 (H) 0.57 - 1.00 mg/dL    Sodium 131 (L) 136 - 145 mmol/L    Potassium 4.6 3.5 - 5.2 mmol/L    Chloride 95 (L) 98 - 107 mmol/L    CO2 24.2 22.0 - 29.0 mmol/L    Calcium 9.2 8.6 - 10.5 mg/dL    Total Protein 6.7 6.0 - 8.5 g/dL    Albumin 4.40 3.50 - 5.20 g/dL    ALT (SGPT) 13 1 - 33 U/L     AST (SGOT) 15 1 - 32 U/L    Alkaline Phosphatase 94 39 - 117 U/L    Total Bilirubin 0.3 0.0 - 1.2 mg/dL    eGFR Non African Amer 41 (L) >60 mL/min/1.73    Globulin 2.3 gm/dL    A/G Ratio 1.9 g/dL    BUN/Creatinine Ratio 15.2 7.0 - 25.0    Anion Gap 11.8 5.0 - 15.0 mmol/L   Lipase    Collection Time: 10/18/21 11:25 AM    Specimen: Blood   Result Value Ref Range    Lipase 195 (H) 13 - 60 U/L   CBC Auto Differential    Collection Time: 10/18/21 11:25 AM    Specimen: Blood   Result Value Ref Range    WBC 7.89 3.40 - 10.80 10*3/mm3    RBC 3.32 (L) 3.77 - 5.28 10*6/mm3    Hemoglobin 10.6 (L) 12.0 - 15.9 g/dL    Hematocrit 33.0 (L) 34.0 - 46.6 %    MCV 99.4 (H) 79.0 - 97.0 fL    MCH 31.9 26.6 - 33.0 pg    MCHC 32.1 31.5 - 35.7 g/dL    RDW 13.6 12.3 - 15.4 %    RDW-SD 49.5 37.0 - 54.0 fl    MPV 9.3 6.0 - 12.0 fL    Platelets 219 140 - 450 10*3/mm3    Neutrophil % 71.0 42.7 - 76.0 %    Lymphocyte % 16.9 (L) 19.6 - 45.3 %    Monocyte % 9.1 5.0 - 12.0 %    Eosinophil % 1.5 0.3 - 6.2 %    Basophil % 0.5 0.0 - 1.5 %    Immature Grans % 1.0 (H) 0.0 - 0.5 %    Neutrophils, Absolute 5.60 1.70 - 7.00 10*3/mm3    Lymphocytes, Absolute 1.33 0.70 - 3.10 10*3/mm3    Monocytes, Absolute 0.72 0.10 - 0.90 10*3/mm3    Eosinophils, Absolute 0.12 0.00 - 0.40 10*3/mm3    Basophils, Absolute 0.04 0.00 - 0.20 10*3/mm3    Immature Grans, Absolute 0.08 (H) 0.00 - 0.05 10*3/mm3    nRBC 0.0 0.0 - 0.2 /100 WBC   Green Top (Gel)    Collection Time: 10/18/21 11:25 AM   Result Value Ref Range    Extra Tube Hold for add-ons.    Lavender Top    Collection Time: 10/18/21 11:25 AM   Result Value Ref Range    Extra Tube hold for add-on    Gold Top - SST    Collection Time: 10/18/21 11:25 AM   Result Value Ref Range    Extra Tube Hold for add-ons.    Light Blue Top    Collection Time: 10/18/21 11:25 AM   Result Value Ref Range    Extra Tube hold for add-on    Troponin    Collection Time: 10/18/21 11:25 AM    Specimen: Blood   Result Value Ref Range     Troponin T <0.010 0.000 - 0.030 ng/mL   Lactic Acid, Plasma    Collection Time: 10/18/21 12:38 PM    Specimen: Blood   Result Value Ref Range    Lactate 0.5 0.5 - 2.0 mmol/L   ECG 12 Lead    Collection Time: 10/18/21 12:55 PM   Result Value Ref Range    QT Interval 413 ms   Respiratory Panel PCR w/COVID-19(SARS-CoV-2) ANISH/VALERIA/MAXIMILIAN/PAD/COR/MAD/ILSA In-House, NP Swab in UTM/VTM, 3-4 HR TAT - Swab, Nasopharynx    Collection Time: 10/18/21  1:15 PM    Specimen: Nasopharynx; Swab   Result Value Ref Range    ADENOVIRUS, PCR Not Detected Not Detected    Coronavirus 229E Not Detected Not Detected    Coronavirus HKU1 Not Detected Not Detected    Coronavirus NL63 Not Detected Not Detected    Coronavirus OC43 Not Detected Not Detected    COVID19 Not Detected Not Detected - Ref. Range    Human Metapneumovirus Not Detected Not Detected    Human Rhinovirus/Enterovirus Not Detected Not Detected    Influenza A PCR Not Detected Not Detected    Influenza B PCR Not Detected Not Detected    Parainfluenza Virus 1 Not Detected Not Detected    Parainfluenza Virus 2 Not Detected Not Detected    Parainfluenza Virus 3 Not Detected Not Detected    Parainfluenza Virus 4 Not Detected Not Detected    RSV, PCR Not Detected Not Detected    Bordetella pertussis pcr Not Detected Not Detected    Bordetella parapertussis PCR Not Detected Not Detected    Chlamydophila pneumoniae PCR Not Detected Not Detected    Mycoplasma pneumo by PCR Not Detected Not Detected   Gastrointestinal Panel, PCR - Stool, Per Rectum    Collection Time: 10/18/21  1:26 PM    Specimen: Per Rectum; Stool   Result Value Ref Range    Campylobacter Not Detected Not Detected    Plesiomonas shigelloides Not Detected Not Detected    Salmonella Not Detected Not Detected    Vibrio Not Detected Not Detected    Vibrio cholerae Not Detected Not Detected    Yersinia enterocolitica Not Detected Not Detected    Enteroaggregative E. coli (EAEC) Not Detected Not Detected    Enteropathogenic E.  coli (EPEC) Not Detected Not Detected    Enterotoxigenic E. coli (ETEC) lt/st Not Detected Not Detected    Shiga-like toxin-producing E. coli (STEC) stx1/stx2 Not Detected Not Detected    Shigella/Enteroinvasive E. coli (EIEC) Not Detected Not Detected    Cryptosporidium Not Detected Not Detected    Cyclospora cayetanensis Not Detected Not Detected    Entamoeba histolytica Not Detected Not Detected    Giardia lamblia Not Detected Not Detected    Adenovirus F40/41 Not Detected Not Detected    Astrovirus Not Detected Not Detected    Norovirus GI/GII Not Detected Not Detected    Rotavirus A Not Detected Not Detected    Sapovirus (I, II, IV or V) Not Detected Not Detected   Urinalysis With Culture If Indicated - Urine, Catheter In/Out    Collection Time: 10/18/21  1:27 PM    Specimen: Urine, Catheter In/Out   Result Value Ref Range    Color, UA Yellow Yellow, Straw    Appearance, UA Clear Clear    pH, UA 6.0 5.0 - 8.0    Specific Gravity, UA 1.019 1.005 - 1.030    Glucose, UA Negative Negative    Ketones, UA Negative Negative    Bilirubin, UA Negative Negative    Blood, UA Negative Negative    Protein,  mg/dL (2+) (A) Negative    Leuk Esterase, UA Negative Negative    Nitrite, UA Negative Negative    Urobilinogen, UA 0.2 E.U./dL 0.2 - 1.0 E.U./dL   Urinalysis, Microscopic Only - Urine, Catheter In/Out    Collection Time: 10/18/21  1:27 PM    Specimen: Urine, Catheter In/Out   Result Value Ref Range    RBC, UA 0-2 None Seen, 0-2 /HPF    WBC, UA 0-2 None Seen, 0-2 /HPF    Bacteria, UA None Seen None Seen /HPF    Squamous Epithelial Cells, UA 0-2 None Seen, 0-2 /HPF    Hyaline Casts, UA 0-2 None Seen /LPF    Methodology Automated Microscopy        Ordered the above labs and independently reviewed the results.        RADIOLOGY  CT Abdomen Pelvis Without Contrast    Result Date: 10/18/2021  CT ABDOMEN AND PELVIS WITHOUT IV CONTRAST  HISTORY: Generalized abdominal pain and vomiting  TECHNIQUE: Radiation dose reduction  techniques were utilized, including automated exposure control and exposure modulation based on body size. 3 mm images were obtained through the abdomen and pelvis without IV contrast.  COMPARISON: CT abdomen and pelvis 09/26/2018 and CT chest 12/29/2020  FINDINGS: Bibasilar atelectasis and or pleural parenchymal scarring is present. Prominent nodular opacification within the right middle lobe measuring up to 1.5 cm is grossly unchanged since 09/26/2018. Sub-6 mm pulmonary nodule within the right lower lobe is also grossly unchanged from multiple prior CTs.  There are no findings of small bowel obstruction. The appendix is unremarkable.  The liver has a lobulated appearance. The gallbladder is surgically absent. The degree of extra hepatic biliary duct dilation is grossly unchanged since 09/26/2018, with the common bile duct measuring up to approximately 1.5 cm. The main pancreatic duct is also mildly distended, measuring 0.7 cm in diameter, as before. Adrenal glands and kidneys have an unremarkable noncontrast CT appearance. There is no hydronephrosis.  No abdominopelvic adenopathy by size criteria is present. There is colonic diverticulosis. The uterus is surgically absent. No free intraperitoneal fluid or air is present. There is no suspicious lytic or blastic osseous lesion. Dextroscoliosis of the lumbar spine with resultant moderate to severe degenerative changes are present.  Burst compression deformity of L1 resulting approximately 15-20% loss of height and 2 to 3 mm of retropulsion into the central spinal canal is grossly unchanged since 12/29/2020.      1.  No noncontrast CT findings of acute intraabdominal pathology. 2.  Lobulated appearance of the liver which can be seen in the setting of cirrhosis in the appropriate clinical context and correlation with patient history is recommended. 3.  Moderate to severe degenerative changes within the lumbar spine which can be further evaluated with lumbar spine MRI  if clinically indicated. 4.  Other incidental and chronic findings as above.   This report was finalized on 10/18/2021 3:04 PM by Dr. Anand Pérez M.D.      CT Head Without Contrast    Result Date: 10/18/2021  EMERGENCY NONCONTRAST HEAD CT 10/18/2021  CLINICAL HISTORY: 81-year-old female with new onset severe headache  TECHNIQUE: Spiral CT images were obtained from the base of skull to the vertex without intravenous contrast. Images were reformatted and submitted in 3 mm thick axial CT sections with brain algorithm and 2 mm thick sagittal and coronal reconstructions were performed and submitted in brain algorithm.  COMPARISON: This is correlated to a prior noncontrast head CT from [facility] on 12/06/2017.  FINDINGS: There is very minimal low-density in the frontal periventricular white matter. There is a 4 mm rounded old lacunar type infarct at the junction of the head and body of the right caudate nucleus which is unchanged. The remainder of the brain parenchyma is normal in attenuation. The ventricles are normal in size. I see no focal mass effect and no midline shift. No extra axial fluid collections are identified. There is no evidence of acute intracranial hemorrhage. The calvarium and the skull base are normal in appearance. The paranasal sinuses, the mastoid air cells and middle ear cavities are clear. The orbits are unremarkable. There is a 15 mm long stent extending from the cavernous into the supracavernous segment of the right internal carotid artery.      1. No acute intracranial abnormality is seen with no change when compared to prior noncontrast head CT from [facility] on 12/06/2017. 2. There is minimal small vessel disease in the frontal periventricular white matter and a 4 x 4 mm old lacunar infarct at the junction of the head and body of the right caudate nucleus. There is a 15 mm long 5 mm in transverse diameter stent extending from the cavernous into the  supracavernous segment of the right internal carotid artery, all unchanged. The remainder of the head CT is normal. The etiology of the patient's headaches is not established on this exam.  Radiation dose reduction techniques were utilized, including automated exposure control and exposure modulation based on body size.  This report was finalized on 10/18/2021 4:12 PM by Dr. Kashif Acevedo M.D.        I ordered the above noted radiological studies. Reviewed by me and discussed with radiologist.  See dictation for official radiology interpretation.      MEDICATIONS GIVEN IN ER    Medications   sodium chloride 0.9 % flush 10 mL (10 mL Intravenous Given 10/18/21 1126)   nitroglycerin (NITROSTAT) SL tablet 0.4 mg (has no administration in time range)   sodium chloride 0.9 % flush 10 mL (has no administration in time range)   sodium chloride 0.9 % flush 10 mL (has no administration in time range)   sodium chloride 0.9 % flush 10 mL (has no administration in time range)   sodium chloride 0.9 % flush 10 mL (has no administration in time range)   ondansetron (ZOFRAN) tablet 4 mg (has no administration in time range)     Or   ondansetron (ZOFRAN) injection 4 mg (has no administration in time range)   sodium chloride 0.9 % infusion (100 mL/hr Intravenous New Bag 10/18/21 1843)   famotidine (PEPCID) tablet 40 mg (40 mg Oral Not Given 10/18/21 1838)   acetaminophen (TYLENOL) 160 MG/5ML solution 650 mg (has no administration in time range)     Or   acetaminophen (TYLENOL) suppository 650 mg (has no administration in time range)   acetaminophen (TYLENOL) tablet 650 mg (has no administration in time range)   allopurinol (ZYLOPRIM) tablet 100 mg (100 mg Oral Not Given 10/18/21 1835)   budesonide-formoterol (SYMBICORT) 160-4.5 MCG/ACT inhaler 2 puff (has no administration in time range)   bumetanide (BUMEX) tablet 1 mg (1 mg Oral Not Given 10/18/21 1845)   busPIRone (BUSPAR) tablet 10 mg (has no administration in time range)    carBAMazepine XR (TEGretol  XR) 12 hr tablet 100 mg (has no administration in time range)   divalproex (DEPAKOTE ER) 24 hr tablet 250 mg (has no administration in time range)   divalproex (DEPAKOTE ER) 24 hr tablet 500 mg (500 mg Oral Not Given 10/18/21 1847)   DULoxetine (CYMBALTA) DR capsule 60 mg (has no administration in time range)   gabapentin (NEURONTIN) capsule 300 mg (has no administration in time range)   HYDROcodone-acetaminophen (NORCO) 7.5-325 MG per tablet 1 tablet (has no administration in time range)   ipratropium-albuterol (DUO-NEB) nebulizer solution 3 mL (has no administration in time range)   iron polysaccharides (NIFEREX) capsule 150 mg (150 mg Oral Not Given 10/18/21 1838)   levothyroxine (SYNTHROID, LEVOTHROID) tablet 100 mcg (has no administration in time range)   levothyroxine (SYNTHROID, LEVOTHROID) tablet 88 mcg (has no administration in time range)   melatonin tablet 3 mg (has no administration in time range)   metoprolol succinate XL (TOPROL-XL) 24 hr tablet 12.5 mg (12.5 mg Oral Given 10/18/21 1843)   OLANZapine (zyPREXA) tablet 5 mg (5 mg Oral Not Given 10/18/21 1838)   potassium chloride (K-DUR,KLOR-CON) CR tablet 10 mEq (10 mEq Oral Not Given 10/18/21 1838)   QUEtiapine (SEROquel) tablet 100 mg (has no administration in time range)   rOPINIRole (REQUIP) tablet 0.25 mg (has no administration in time range)   traZODone (DESYREL) tablet 50 mg (has no administration in time range)   ipratropium (ATROVENT) nebulizer solution 0.5 mg (has no administration in time range)   vitamin B-12 (CYANOCOBALAMIN) tablet 1,000 mcg (has no administration in time range)   hydrALAZINE (APRESOLINE) injection 10 mg (has no administration in time range)   sodium chloride 0.9 % bolus 500 mL (0 mL Intravenous Stopped 10/18/21 1316)   ondansetron (ZOFRAN) injection 4 mg (4 mg Intravenous Given 10/18/21 1126)   ondansetron (ZOFRAN) injection 4 mg (4 mg Intravenous Given 10/18/21 1156)   prochlorperazine  (COMPAZINE) injection 10 mg (10 mg Intravenous Given 10/18/21 1317)   diphenhydrAMINE (BENADRYL) injection 25 mg (25 mg Intravenous Given 10/18/21 1317)   fentaNYL citrate (PF) (SUBLIMAZE) injection 50 mcg (50 mcg Intravenous Given 10/18/21 1411)         PROGRESS, DATA ANALYSIS, CONSULTS, AND MEDICAL DECISION MAKING    All labs have been independently reviewed by me.  All radiology studies have been reviewed by me and discussed with radiologist dictating the report.   EKG's independently viewed and interpreted by me.  Discussion below represents my analysis of pertinent findings related to patient's condition, differential diagnosis, treatment plan and final disposition.    I have discussed case with Dr. Nina, emergency room physician.  He has performed his own bedside examination and agrees with treatment plan.    ED Course as of 10/18/21 1850   Mon Oct 18, 2021   1106 Presents with headache and abdominal pain with vomiting.  Patient has no neuro deficits.  Differential diagnoses include but not limited to bowel obstruction, gastroenteritis, intracranial hemorrhage. [EE]   1154 WBC: 7.89 [EE]   1154 Hemoglobin(!): 10.6 [EE]   1353 Bacteria, UA: None Seen [EE]   1417 eGFR Non  Am(!): 41 [RS]   1423 Recheck of patient.  She states she feels improved.  Patient going to CT scan right now. [EE]   1441 COVID19: Not Detected [RS]   1441 Troponin T: <0.010 [RS]   1441 eGFR Non  Am(!): 41 [RS]   1506 I discussed CT findings with Dr. Acevedo.  Patient has no acute intracranial abnormalities.  Stable postsurgical changes. [EE]   1527 Recheck of patient.  She states she feels 100% back to normal.  She denies any headache, abdominal pain, vomiting.  Patient does have a mildly elevated lipase however normal-appearing pancreas on CT scan.  She denies any prior history of pancreatitis.    Given patient's age and elevated lipase, I would like to admit the patient for observation.  I discussed this with Bryanna Che,  nurse practitioner, she agrees to admit the pt.   [EE]      ED Course User Index  [EE] Constantin Courtney PA  [RS] Rick Nina MD       AS OF 18:50 EDT VITALS:    BP - 179/70  HR - 61  TEMP - 98.3 °F (36.8 °C) (Oral)  O2 SATS - 98%        DIAGNOSIS  Final diagnoses:   Generalized abdominal pain   Acute nonintractable headache, unspecified headache type   Elevated lipase   Chronic obstructive pulmonary disease, unspecified COPD type (HCC)   Non-intractable vomiting with nausea, unspecified vomiting type         DISPOSITION  Admitted           Constantin Courtney PA  10/18/21 9018

## 2021-10-18 NOTE — H&P
[facility]   HISTORY AND PHYSICAL    Patient Name: Josephine Wolf  : 1940  MRN: [id number]  Primary Care Physician:  Bill Martino MD  Date of admission: 10/18/2021    Subjective   Subjective     Chief Complaint: Headache, N/V, Abd pain    HPI:    Josephine Wolf is a 81 y.o.  female who presented to the emergency department and is now being evaluated in the observation unit with a complaint of headache that started yesterday.  States this caused her to have nausea vomiting all night last night.  States that she had additional episodes of nonbloody & nonbilious vomiting and subsequent dry heaves today.  She reports she developed diffuse upper abdominal discomfort from the vomiting today.  She reports one episode of nonbloody loose stool while being evaluated in the emergency department.  She denies any recent antibiotic usage.  She tells me she is feeling much better after the medication she was given in the emergency department (compazine 10mg, fentanyl 50mcg, benadryl 25mg)    Review of Systems       Personal History     Past Medical History:   Diagnosis Date   • Acid reflux    • Acute kidney injury (HCC)    • Anemia    • Arthritis    • Bipolar 1 disorder, depressed (Formerly [facility])    • Cataract     MINDY   • Chronic nausea    • Chronic pain of right knee    • Continuous leakage of urine     USES DEPENDS   • COPD (chronic obstructive pulmonary disease) (Formerly [facility])     USES O2 2 LMP PER NC AT NIGHT   • Disease of thyroid gland     HYPOTHYROIDISM   • Diverticulosis    • Fibromyalgia     DX    • Fibromyalgia, primary    • Frequent episodes of bronchitis    • HL (hearing loss)    • Hypertension    • Hypothyroidism    • Memory loss    • Migraines    • Neck pain    • On home oxygen therapy     2L NC AT NIGHT   • Orthostatic hypotension    • Short of breath on exertion    • Sleep apnea    • Stage 3 chronic kidney disease (HCC)        Past Surgical History:   Procedure Laterality Date   • CEREBRAL  ANEURYSM REPAIR  2000'S    WITH STENT   • HYSTERECTOMY     • LAPAROSCOPIC CHOLECYSTECTOMY     • NH TOTAL KNEE ARTHROPLASTY Right 11/16/2017    Procedure: RT TOTAL KNEE ARTHROPLASTY;  Surgeon: Kashif Perez MD;  Location: [facility];  Service: Orthopedics       Family History: family history is not on file. Otherwise pertinent FHx was reviewed and not pertinent to current issue.    Social History:  reports that she quit smoking about 52 years ago. Her smoking use included cigarettes. She started smoking about 62 years ago. She has a 10.00 pack-year smoking history. She has never used smokeless tobacco. She reports that she does not drink alcohol and does not use drugs.    Home Medications:  DULoxetine, HYDROcodone-acetaminophen, OLANZapine, QUEtiapine, Umeclidinium Bromide, acetaminophen, albuterol, allopurinol, azithromycin, budesonide-formoterol, bumetanide, busPIRone, carBAMazepine XR, divalproex, fluticasone, gabapentin, ipratropium-albuterol, iron polysaccharides, levothyroxine, melatonin, methylPREDNISolone, metoprolol succinate XL, ondansetron, potassium chloride, rOPINIRole, traZODone, and vitamin B-12    Allergies:  Allergies   Allergen Reactions   • Morphine And Related Hallucinations     CONFUSION       Objective   Objective     Vitals:   Temp:  [98.1 °F (36.7 °C)] 98.1 °F (36.7 °C)  Heart Rate:  [56-67] 61  Resp:  [16-18] 18  BP: (143-186)/(67-98) 186/77  Flow (L/min):  [3] 3  Physical Exam    Constitutional: Awake, alert, patient appears chronically ill   Eyes: PERRLA, sclerae anicteric, no conjunctival injection   HENT: NCAT, mucous membranes moist   Neck: Supple, no thyromegaly, no lymphadenopathy, trachea midline   Respiratory: Clear to auscultation bilaterally with diminished breath sounds  at the bases, nonlabored respirations, supplemental oxygen present   Cardiovascular: RRR, no murmurs, rubs, or gallops, palpable pedal pulses  bilaterally   Gastrointestinal: Positive bowel sounds, soft,  nontender, non-distended,  obese   Musculoskeletal: No bilateral ankle edema, no clubbing or cyanosis to  extremities   Psychiatric: Appropriate affect, cooperative   Neurologic: Oriented x 3, strength symmetric in all extremities, Cranial  Nerves grossly intact to confrontation, speech clear   Skin: No rashes     Result Review    Result Review:  I have personally reviewed the results from the time of this admission to 10/18/2021 15:31 EDT and agree with these findings:  [x]  Laboratory  []  Microbiology  [x]  Radiology  []  EKG/Telemetry   [x]  Cardiology/Vascular   []  Pathology  []  Old records  []  Other:  Most notable findings include: elevated lipase @ 195    Assessment/Plan   Assessment / Plan       Active Hospital Problems:  Active Hospital Problems    Diagnosis    • Abdominal pain      Plan:   Headache  -Resolved after meds in ER  -CT head negative for acute findings    Abdominal pain/Vomiting  -resolved after meds in ER  -IV NS @ 100ml/hr  -PO pepcid ordered  -GI consult order placed    DVT prophylaxis:  Mechanical DVT prophylaxis orders are present.    CODE STATUS:    Level Of Support Discussed With: Patient  Code Status: CPR  Medical Interventions (Level of Support Prior to Arrest): Full    Admission Status:  I believe this patient meets observation status.    Electronically signed by LG Tobias, 10/18/21, 3:31 PM EDT.         I have worn appropriate PPE during this patient encounter, sanitized my hands both with entering and exiting patient's room.

## 2021-10-18 NOTE — ED PROVIDER NOTES
Brief history of present illness: 11-year-old female has not felt well for about 2 days with some chills, nausea, diarrhea has developed a headache with recurrent vomiting throughout the evening.  She has been immunized against COVID-19.  She denies severe cough or dyspnea at this time.    Physical exam:   ED Triage Vitals [10/18/21 0949]   Temp Heart Rate Resp BP SpO2   98.1 °F (36.7 °C) 60 18 170/67 96 %      Temp src Heart Rate Source Patient Position BP Location FiO2 (%)   Tympanic Monitor -- -- --     Alert and oriented.  Talkative.  Somewhat diaphoretic and quite ill-appearing.  Still clutching her emesis basin.  Mildly tachypneic but no respiratory distress is appreciated.  Pink warm and perfusing.  Abdomen is soft throughout some mild suprapubic discomfort with palpation.  Moves all extremities equally but rather weakly.    MDM:    Results for orders placed or performed during the hospital encounter of 10/18/21   CBC Auto Differential    Specimen: Blood   Result Value Ref Range    WBC 7.89 3.40 - 10.80 10*3/mm3    RBC 3.32 (L) 3.77 - 5.28 10*6/mm3    Hemoglobin 10.6 (L) 12.0 - 15.9 g/dL    Hematocrit 33.0 (L) 34.0 - 46.6 %    MCV 99.4 (H) 79.0 - 97.0 fL    MCH 31.9 26.6 - 33.0 pg    MCHC 32.1 31.5 - 35.7 g/dL    RDW 13.6 12.3 - 15.4 %    RDW-SD 49.5 37.0 - 54.0 fl    MPV 9.3 6.0 - 12.0 fL    Platelets 219 140 - 450 10*3/mm3    Neutrophil % 71.0 42.7 - 76.0 %    Lymphocyte % 16.9 (L) 19.6 - 45.3 %    Monocyte % 9.1 5.0 - 12.0 %    Eosinophil % 1.5 0.3 - 6.2 %    Basophil % 0.5 0.0 - 1.5 %    Immature Grans % 1.0 (H) 0.0 - 0.5 %    Neutrophils, Absolute 5.60 1.70 - 7.00 10*3/mm3    Lymphocytes, Absolute 1.33 0.70 - 3.10 10*3/mm3    Monocytes, Absolute 0.72 0.10 - 0.90 10*3/mm3    Eosinophils, Absolute 0.12 0.00 - 0.40 10*3/mm3    Basophils, Absolute 0.04 0.00 - 0.20 10*3/mm3    Immature Grans, Absolute 0.08 (H) 0.00 - 0.05 10*3/mm3    nRBC 0.0 0.0 - 0.2 /100 WBC   ECG 12 Lead   Result Value Ref Range    QT  Interval 413 ms   Green Top (Gel)   Result Value Ref Range    Extra Tube Hold for add-ons.    Lavender Top   Result Value Ref Range    Extra Tube hold for add-on    Gold Top - SST   Result Value Ref Range    Extra Tube Hold for add-ons.    Light Blue Top   Result Value Ref Range    Extra Tube hold for add-on      EKG           EKG time: 1255  Rhythm/Rate: Sinus, 60  P waves and DE: WILL within normal limits  QRS, axis: Narrow QRS complex  ST and T waves: No STEMI; QTC within normal limits    Interpreted Contemporaneously by me, independently viewed  Comparison: No significant changes compared to 7/25/2021      Agree with plan for laboratory and radiologic evaluation while we are treating this patient's discomfort and rehydrating her.  High level concern for COVID-19 or other viral illness causing her complex of symptoms.  Maintain isolation and monitor closely for changes.    I have seen and personally evaluated this patient, discussed the case with the treating advanced practice provider, and reviewed their note. I was involved in the medical decision making during the evaluation, testing and disposition planning for this patient.     Rick Nina MD  10/18/21 1670

## 2021-10-18 NOTE — ED NOTES
Pt now reports pain to L inner thigh is similar to prior pains from pulled muscle.      Marybeth Trevizo, RN  10/18/21 8887

## 2021-10-18 NOTE — CASE MANAGEMENT/SOCIAL WORK
Discharge Planning Assessment  [facility]     Patient Name: Josephine Wolf  MRN: [id number]  Today's Date: 10/18/2021    Admit Date: 10/18/2021     Discharge Needs Assessment     Row Name 10/18/21 1602       Living Environment    Lives With alone    Current Living Arrangements home/apartment/condo    Primary Care Provided by self    Provides Primary Care For no one    Family Caregiver if Needed child(ree), adult    Family Caregiver Names James Wolf [phone] and/or Chino Wolf [phone]    Quality of Family Relationships helpful; involved; supportive    Able to Return to Prior Arrangements yes       Resource/Environmental Concerns    Resource/Environmental Concerns none    Transportation Concerns car, none       Transition Planning    Patient/Family Anticipates Transition to home    Patient/Family Anticipated Services at Transition none    Transportation Anticipated family or friend will provide; health plan transportation  Uses [employer]       Discharge Needs Assessment    Readmission Within the Last 30 Days no previous admission in last 30 days    Equipment Currently Used at Home cane, straight; nebulizer; oxygen; other (see comments); walker, rolling  Uses o2 at home, [employer] provides o2 services    Concerns to be Addressed no discharge needs identified; denies needs/concerns at this time    Anticipated Changes Related to Illness none    Equipment Needed After Discharge none    Provided Post Acute Provider List? Refused    Provided Post Acute Provider Quality & Resource List? Refused               Discharge Plan     Row Name 10/18/21 1600       Plan    Plan Face sheet verified, role of CCP explained. Denies any need for further DME. Uses [employer] for home o2 needs. Requires assistance with transportation needs. Denies any difficulty paying for medications    Provided Post Acute Provider List? Refused    Provided Post Acute Provider Quality & Resource List? Refused    Plan  Comments Pt intends to return home upon discharge, denies any needs at this time              Continued Care and Services - Admitted Since 10/18/2021    Coordination has not been started for this encounter.          Demographic Summary    No documentation.                Functional Status    No documentation.                Psychosocial    No documentation.                Abuse/Neglect    No documentation.                Legal    No documentation.                Substance Abuse    No documentation.                Patient Forms    No documentation.                   Nurys Gerard RN

## 2021-10-18 NOTE — ED NOTES
Spoke to pt and , pt deciding to wait on consenting to blood transfusion at this time. Dr nolasco aware     Spoke to lab mgr Gudelia. States they are having issues with chem machine, states some labs are running and some are not. Pt updated as to issue. Dr nolasco notified.      Marybeth Trevizo, RN  10/18/21 6093

## 2021-10-19 VITALS
OXYGEN SATURATION: 100 % | HEART RATE: 65 BPM | TEMPERATURE: 98.24 F | RESPIRATION RATE: 18 BRPM | SYSTOLIC BLOOD PRESSURE: 175 MMHG | HEIGHT: 62 IN | BODY MASS INDEX: 28.89 KG/M2 | DIASTOLIC BLOOD PRESSURE: 61 MMHG | WEIGHT: 157 LBS

## 2021-10-19 LAB
ALBUMIN SERPL-MCNC: 3.7 G/DL (ref 3.5–5.2)
ALBUMIN/GLOB SERPL: 1.9 G/DL
ALP SERPL-CCNC: 77 U/L (ref 39–117)
ALT SERPL W P-5'-P-CCNC: 8 U/L (ref 1–33)
ANION GAP SERPL CALCULATED.3IONS-SCNC: 10.2 MMOL/L (ref 5–15)
AST SERPL-CCNC: 12 U/L (ref 1–32)
BASOPHILS # BLD AUTO: 0.02 10*3/MM3 (ref 0–0.2)
BASOPHILS # BLD AUTO: 0.02 10*3/MM3 (ref 0–0.2)
BASOPHILS NFR BLD AUTO: 0.3 % (ref 0–1.5)
BASOPHILS NFR BLD AUTO: 0.4 % (ref 0–1.5)
BILIRUB SERPL-MCNC: 0.2 MG/DL (ref 0–1.2)
BUN SERPL-MCNC: 12 MG/DL (ref 8–23)
BUN/CREAT SERPL: 13.3 (ref 7–25)
CALCIUM SPEC-SCNC: 8.3 MG/DL (ref 8.6–10.5)
CHLORIDE SERPL-SCNC: 103 MMOL/L (ref 98–107)
CHOLEST SERPL-MCNC: 186 MG/DL (ref 0–200)
CO2 SERPL-SCNC: 25.8 MMOL/L (ref 22–29)
CREAT SERPL-MCNC: 0.9 MG/DL (ref 0.57–1)
DEPRECATED RDW RBC AUTO: 46.2 FL (ref 37–54)
DEPRECATED RDW RBC AUTO: 47.6 FL (ref 37–54)
EOSINOPHIL # BLD AUTO: 0.07 10*3/MM3 (ref 0–0.4)
EOSINOPHIL # BLD AUTO: 0.08 10*3/MM3 (ref 0–0.4)
EOSINOPHIL NFR BLD AUTO: 1.2 % (ref 0.3–6.2)
EOSINOPHIL NFR BLD AUTO: 1.5 % (ref 0.3–6.2)
ERYTHROCYTE [DISTWIDTH] IN BLOOD BY AUTOMATED COUNT: 13.4 % (ref 12.3–15.4)
ERYTHROCYTE [DISTWIDTH] IN BLOOD BY AUTOMATED COUNT: 13.6 % (ref 12.3–15.4)
GFR SERPL CREATININE-BSD FRML MDRD: 60 ML/MIN/1.73
GLOBULIN UR ELPH-MCNC: 2 GM/DL
GLUCOSE SERPL-MCNC: 84 MG/DL (ref 65–99)
HCT VFR BLD AUTO: 26.6 % (ref 34–46.6)
HCT VFR BLD AUTO: 27.5 % (ref 34–46.6)
HDLC SERPL-MCNC: 61 MG/DL (ref 40–60)
HGB BLD-MCNC: 8.8 G/DL (ref 12–15.9)
HGB BLD-MCNC: 9.4 G/DL (ref 12–15.9)
IMM GRANULOCYTES # BLD AUTO: 0.03 10*3/MM3 (ref 0–0.05)
IMM GRANULOCYTES # BLD AUTO: 0.03 10*3/MM3 (ref 0–0.05)
IMM GRANULOCYTES NFR BLD AUTO: 0.5 % (ref 0–0.5)
IMM GRANULOCYTES NFR BLD AUTO: 0.6 % (ref 0–0.5)
LDLC SERPL CALC-MCNC: 101 MG/DL (ref 0–100)
LDLC/HDLC SERPL: 1.6 {RATIO}
LIPASE SERPL-CCNC: 49 U/L (ref 13–60)
LYMPHOCYTES # BLD AUTO: 1.18 10*3/MM3 (ref 0.7–3.1)
LYMPHOCYTES # BLD AUTO: 1.26 10*3/MM3 (ref 0.7–3.1)
LYMPHOCYTES NFR BLD AUTO: 20.2 % (ref 19.6–45.3)
LYMPHOCYTES NFR BLD AUTO: 24 % (ref 19.6–45.3)
MCH RBC QN AUTO: 32.1 PG (ref 26.6–33)
MCH RBC QN AUTO: 32.8 PG (ref 26.6–33)
MCHC RBC AUTO-ENTMCNC: 33.1 G/DL (ref 31.5–35.7)
MCHC RBC AUTO-ENTMCNC: 34.2 G/DL (ref 31.5–35.7)
MCV RBC AUTO: 95.8 FL (ref 79–97)
MCV RBC AUTO: 97.1 FL (ref 79–97)
MONOCYTES # BLD AUTO: 0.52 10*3/MM3 (ref 0.1–0.9)
MONOCYTES # BLD AUTO: 0.57 10*3/MM3 (ref 0.1–0.9)
MONOCYTES NFR BLD AUTO: 10.9 % (ref 5–12)
MONOCYTES NFR BLD AUTO: 8.9 % (ref 5–12)
NEUTROPHILS NFR BLD AUTO: 3.29 10*3/MM3 (ref 1.7–7)
NEUTROPHILS NFR BLD AUTO: 4.01 10*3/MM3 (ref 1.7–7)
NEUTROPHILS NFR BLD AUTO: 62.6 % (ref 42.7–76)
NEUTROPHILS NFR BLD AUTO: 68.9 % (ref 42.7–76)
NRBC BLD AUTO-RTO: 0 /100 WBC (ref 0–0.2)
NRBC BLD AUTO-RTO: 0 /100 WBC (ref 0–0.2)
PLATELET # BLD AUTO: 187 10*3/MM3 (ref 140–450)
PLATELET # BLD AUTO: 189 10*3/MM3 (ref 140–450)
PMV BLD AUTO: 10.1 FL (ref 6–12)
PMV BLD AUTO: 9.5 FL (ref 6–12)
POTASSIUM SERPL-SCNC: 4.1 MMOL/L (ref 3.5–5.2)
PROT SERPL-MCNC: 5.7 G/DL (ref 6–8.5)
RBC # BLD AUTO: 2.74 10*6/MM3 (ref 3.77–5.28)
RBC # BLD AUTO: 2.87 10*6/MM3 (ref 3.77–5.28)
SODIUM SERPL-SCNC: 139 MMOL/L (ref 136–145)
TRIGL SERPL-MCNC: 138 MG/DL (ref 0–150)
TROPONIN T SERPL-MCNC: <0.01 NG/ML (ref 0–0.03)
VLDLC SERPL-MCNC: 24 MG/DL (ref 5–40)
WBC # BLD AUTO: 5.25 10*3/MM3 (ref 3.4–10.8)
WBC # BLD AUTO: 5.83 10*3/MM3 (ref 3.4–10.8)

## 2021-10-19 PROCEDURE — 80061 LIPID PANEL: CPT | Performed by: NURSE PRACTITIONER

## 2021-10-19 PROCEDURE — 85025 COMPLETE CBC W/AUTO DIFF WBC: CPT | Performed by: NURSE PRACTITIONER

## 2021-10-19 PROCEDURE — 83690 ASSAY OF LIPASE: CPT | Performed by: NURSE PRACTITIONER

## 2021-10-19 PROCEDURE — G0378 HOSPITAL OBSERVATION PER HR: HCPCS

## 2021-10-19 PROCEDURE — 84484 ASSAY OF TROPONIN QUANT: CPT | Performed by: NURSE PRACTITIONER

## 2021-10-19 PROCEDURE — 80053 COMPREHEN METABOLIC PANEL: CPT | Performed by: NURSE PRACTITIONER

## 2021-10-19 RX ORDER — LEVOTHYROXINE SODIUM 0.07 MG/1
75 TABLET ORAL DAILY
COMMUNITY

## 2021-10-19 RX ORDER — DICYCLOMINE HYDROCHLORIDE 10 MG/1
20 CAPSULE ORAL 3 TIMES DAILY PRN
Status: DISCONTINUED | OUTPATIENT
Start: 2021-10-19 | End: 2021-10-19 | Stop reason: HOSPADM

## 2021-10-19 RX ORDER — DICYCLOMINE HCL 20 MG
40 TABLET ORAL 3 TIMES DAILY
COMMUNITY
End: 2023-03-03 | Stop reason: HOSPADM

## 2021-10-19 RX ORDER — LEVOTHYROXINE SODIUM 0.07 MG/1
75 TABLET ORAL
Status: DISCONTINUED | OUTPATIENT
Start: 2021-10-20 | End: 2021-10-19 | Stop reason: HOSPADM

## 2021-10-19 RX ORDER — ROPINIROLE 0.25 MG/1
0.25 TABLET, FILM COATED ORAL NIGHTLY
COMMUNITY
End: 2022-01-11 | Stop reason: SDUPTHER

## 2021-10-19 RX ADMIN — OLANZAPINE 5 MG: 5 TABLET ORAL at 08:09

## 2021-10-19 RX ADMIN — Medication 150 MG: at 08:10

## 2021-10-19 RX ADMIN — BUSPIRONE HYDROCHLORIDE 10 MG: 10 TABLET ORAL at 08:10

## 2021-10-19 RX ADMIN — Medication 1000 MCG: at 08:09

## 2021-10-19 RX ADMIN — Medication 10 ML: at 08:12

## 2021-10-19 RX ADMIN — DULOXETINE HYDROCHLORIDE 60 MG: 60 CAPSULE, DELAYED RELEASE ORAL at 08:10

## 2021-10-19 RX ADMIN — FAMOTIDINE 40 MG: 40 TABLET, FILM COATED ORAL at 08:10

## 2021-10-19 RX ADMIN — DIVALPROEX SODIUM 250 MG: 250 TABLET, EXTENDED RELEASE ORAL at 08:09

## 2021-10-19 RX ADMIN — BUMETANIDE 1 MG: 1 TABLET ORAL at 08:10

## 2021-10-19 RX ADMIN — POTASSIUM CHLORIDE 10 MEQ: 750 TABLET, EXTENDED RELEASE ORAL at 11:06

## 2021-10-19 RX ADMIN — GABAPENTIN 300 MG: 300 CAPSULE ORAL at 11:06

## 2021-10-19 RX ADMIN — ALLOPURINOL 100 MG: 100 TABLET ORAL at 08:10

## 2021-10-19 RX ADMIN — CARBAMAZEPINE 100 MG: 100 TABLET, EXTENDED RELEASE ORAL at 08:09

## 2021-10-19 RX ADMIN — METOPROLOL SUCCINATE 12.5 MG: 25 TABLET, EXTENDED RELEASE ORAL at 11:07

## 2021-10-19 NOTE — PROGRESS NOTES
.  Hospitalist Daily Progress Note    Date of Admission: 10/18/2021   LOS: 0 days   PCP: Bill Martino MD    Chief Complaint: Headache, nausea and vomiting  Patient seen at: [facility] ED  Subjective   HPI:  81 years old white female who was evaluated in the ED for a headache and nausea and vomiting.  Patient was found to have elevated lipase and was admitted to the observation unit for further evaluation and a GI consultation.  Currently patient denies headache and reports that her nausea is much improved.  CT head and CT abdomen that was done at the ER reveals no significant acute findings.  Patient denies chest pain, dyspnea, abdominal pain, diarrhea, fever/chills, or cough.  ROS:  General: no fevers, chills  Respiratory: no cough, dyspnea  Cardiovascular: no chest pain, palpitations  Gastrointestinal: Positive for nausea  Abdomen: No abdominal pain, nausea, vomiting, or diarrhea  Neurologic: No focal weakness    Objective   Physical Exam:  I have reviewed the vital signs.  Temp:  [97.9 °F (36.6 °C)-98.3 °F (36.8 °C)] 97.9 °F (36.6 °C)  Heart Rate:  [56-67] 66  Resp:  [16-18] 16  BP: (119-186)/(59-98) 119/59  General Appearance:    Alert, cooperative, no distress  Head:    Normocephalic, atraumatic  Eyes:    Sclerae anicteric  Neck:   Supple, no mass  Lungs: Clear to auscultation bilaterally, respirations unlabored  Heart: Regular rate and rhythm, S1 and S2 normal, no murmur, rub or gallop  Abdomen:  Soft, non-tender, bowel sounds active, nondistended  Extremities: No clubbing, cyanosis, or edema to lower extremities  Pulses:  2+ and symmetric in distal lower extremities  Skin: No rashes   Neurologic: Oriented x3, Normal strength to extremities    Results Review:    I have reviewed the labs, radiology results and diagnostic studies.    Results from last 7 days   Lab Units 10/18/21  1125   WBC 10*3/mm3 7.89   HEMOGLOBIN g/dL 10.6*   HEMATOCRIT % 33.0*   PLATELETS 10*3/mm3 219     Results  from last 7 days   Lab Units 10/18/21  1125   SODIUM mmol/L 131*   POTASSIUM mmol/L 4.6   CHLORIDE mmol/L 95*   CO2 mmol/L 24.2   BUN mg/dL 19   CREATININE mg/dL 1.25*   CALCIUM mg/dL 9.2   BILIRUBIN mg/dL 0.3   ALK PHOS U/L 94   ALT (SGPT) U/L 13   AST (SGOT) U/L 15   GLUCOSE mg/dL 109*     Imaging Results (Last 24 Hours)     Procedure Component Value Units Date/Time    CT Head Without Contrast [[id number]] Collected: 10/18/21 1543     Updated: 10/18/21 1615    Narrative:      EMERGENCY NONCONTRAST HEAD CT 10/18/2021     CLINICAL HISTORY: 81-year-old female with new onset severe headache     TECHNIQUE: Spiral CT images were obtained from the base of skull to the  vertex without intravenous contrast. Images were reformatted and  submitted in 3 mm thick axial CT sections with brain algorithm and 2 mm  thick sagittal and coronal reconstructions were performed and submitted  in brain algorithm.     COMPARISON: This is correlated to a prior noncontrast head CT from  [facility] on 12/06/2017.     FINDINGS: There is very minimal low-density in the frontal  periventricular white matter. There is a 4 mm rounded old lacunar type  infarct at the junction of the head and body of the right caudate  nucleus which is unchanged. The remainder of the brain parenchyma is  normal in attenuation. The ventricles are normal in size. I see no focal  mass effect and no midline shift. No extra axial fluid collections are  identified. There is no evidence of acute intracranial hemorrhage. The  calvarium and the skull base are normal in appearance. The paranasal  sinuses, the mastoid air cells and middle ear cavities are clear. The  orbits are unremarkable. There is a 15 mm long stent extending from the  cavernous into the supracavernous segment of the right internal carotid  artery.       Impression:      1. No acute intracranial abnormality is seen with no change when  compared to prior noncontrast head CT from [facility]  on 12/06/2017.  2. There is minimal small vessel disease in the frontal periventricular  white matter and a 4 x 4 mm old lacunar infarct at the junction of the  head and body of the right caudate nucleus. There is a 15 mm long 5 mm  in transverse diameter stent extending from the cavernous into the  supracavernous segment of the right internal carotid artery, all  unchanged. The remainder of the head CT is normal. The etiology of the  patient's headaches is not established on this exam.     Radiation dose reduction techniques were utilized, including automated  exposure control and exposure modulation based on body size.     This report was finalized on 10/18/2021 4:12 PM by Dr. Kashif Acevedo M.D.       CT Abdomen Pelvis Without Contrast [[id number]] Collected: 10/18/21 1453     Updated: 10/18/21 1507    Narrative:      CT ABDOMEN AND PELVIS WITHOUT IV CONTRAST     HISTORY: Generalized abdominal pain and vomiting     TECHNIQUE: Radiation dose reduction techniques were utilized, including  automated exposure control and exposure modulation based on body size.   3 mm images were obtained through the abdomen and pelvis without IV  contrast.      COMPARISON: CT abdomen and pelvis 09/26/2018 and CT chest 12/29/2020     FINDINGS:  Bibasilar atelectasis and or pleural parenchymal scarring is present.  Prominent nodular opacification within the right middle lobe measuring  up to 1.5 cm is grossly unchanged since 09/26/2018. Sub-6 mm pulmonary  nodule within the right lower lobe is also grossly unchanged from  multiple prior CTs.     There are no findings of small bowel obstruction. The appendix is  unremarkable.     The liver has a lobulated appearance. The gallbladder is surgically  absent. The degree of extra hepatic biliary duct dilation is grossly  unchanged since 09/26/2018, with the common bile duct measuring up to  approximately 1.5 cm. The main pancreatic duct is also mildly  distended,  measuring 0.7 cm in diameter, as before. Adrenal glands and kidneys have  an unremarkable noncontrast CT appearance. There is no hydronephrosis.     No abdominopelvic adenopathy by size criteria is present. There is  colonic diverticulosis. The uterus is surgically absent. No free  intraperitoneal fluid or air is present. There is no suspicious lytic or  blastic osseous lesion. Dextroscoliosis of the lumbar spine with  resultant moderate to severe degenerative changes are present.     Burst compression deformity of L1 resulting approximately 15-20% loss of  height and 2 to 3 mm of retropulsion into the central spinal canal is  grossly unchanged since 12/29/2020.       Impression:      1.  No noncontrast CT findings of acute intraabdominal pathology.  2.  Lobulated appearance of the liver which can be seen in the setting  of cirrhosis in the appropriate clinical context and correlation with  patient history is recommended.  3.  Moderate to severe degenerative changes within the lumbar spine  which can be further evaluated with lumbar spine MRI if clinically  indicated.  4.  Other incidental and chronic findings as above.        This report was finalized on 10/18/2021 3:04 PM by Dr. Anand Pérez M.D.             I have reviewed the medications.  ---------------------------------------------------------------------------------------------  Assessment/Plan   Assessment/Problem List    Abdominal pain       Plan  CT abdomen pelvis negative for any acute finding  Elevated lipase at 195  GI consult  Repeat labs in a.m.  Antiemetic medications as needed  IVF    DVT prophylaxis:  SCDs    Discharge Planning: I expect patient to be discharged to home in 1 day.    Mecca Chiang, LG 10/19/21 01:07 EDT

## 2021-10-19 NOTE — PAYOR COMM NOTE
"Raheem Roberts (81 y.o. Female)     ATTN: REQUESTING OBSERVATION AUTHORIZATION: ID# [id number]    PLEASE REPLY TO UR DEPT: [phone],  [phone]              Date of Birth Social Security Number Address Home Phone MRN    1940  [id number]    [place] [id number] [phone] [id number]    Muslim Marital Status             Restorationist        Admission Date Admission Type Admitting Provider Attending Provider Department, Room/Bed    10/18/21 Emergency Rick Nina MD Payne, Donald W, MD [facility] OBSERVATION, 110/1    Discharge Date Discharge Disposition Discharge Destination                         Attending Provider: Chandan Swift MD    Allergies: Morphine And Related    Isolation: Enh Drop/Con   Infection: COVID (rule out) (10/18/21)   Code Status: CPR   Advance Care Planning Activity    Ht: 157.5 cm (62\")   Wt: 71.2 kg (157 lb)    Admission Cmt: None   Principal Problem: None                Active Insurance as of 10/18/2021     Primary Coverage     Payor Plan Insurance Group Employer/Plan Group    [employer] MED[place]ARE [place]PLAC[place] WELLCARE MEDICARE REPLACEMENT      Payor Plan Address Payor Plan Phone Number Payor Plan Fax Number Effective Dates    [address] [address] [phone]  10/9/2017 - None Entered    [facility] [address]       Subscriber Name Subscriber Birth Date Member ID       RAHEEM ROBERTS 1940 [id number]           Secondary Coverage     Payor Plan Insurance Group Employer/Plan Group    [place]      Payor Plan Address Payor Plan Phone Number Payor Plan Fax Number Effective Dates    [address] [phone]  7/3/2016 - None Entered    [facility] [address]       Subscriber Name Subscriber Birth Date Member ID       RAHEEM ROBERTS 1940 [id number]                 Emergency Contacts      (Rel.) Home Phone Work Phone Mobile Phone    Chino Roberts (Son) [phone] -- --    " James Wolf (Son) [phone] -- [phone]               History & Physical      HerBryanna avilaLG at 10/18/21 1531     Attestation signed by Chandan Swift MD at 10/18/21 1811    I have reviewed this documentation and agree.    MD ATTESTATION NOTE    The HEATHER and I have discussed this patient's history, physical exam, and treatment plan.  I have reviewed the documentation and personally had a face to face interaction with the patient. I affirm the documentation and agree with the treatment and plan.  The attached note describes my personal findings.    Very pleasant 81-year-old female who presented to emergency department with abdominal pain and vomiting.  Her work-up in the emergency department was significant for an elevated lipase, and her symptoms were significant enough that it was felt that she should be admitted the observation unit for further evaluation and treatment.    Currently she is sitting up in bed feeling much better.  She is actually eating some applesauce and says that her pain is mostly resolved and the nausea is much better.  Her abdomen is soft and nontender to palpation, bowel sounds are present, and remainder of her exam is unremarkable.    We await GI consult, and plan to repeat labs in the a.m. with likely discharge if continued improvement                     [facility]   HISTORY AND PHYSICAL    Patient Name: Josephine Wolf  : 1940  MRN: [id number]  Primary Care Physician:  Bill Martino MD  Date of admission: 10/18/2021    Subjective   Subjective     Chief Complaint: Headache, N/V, Abd pain    HPI:    Josephine Wolf is a 81 y.o.  female who presented to the emergency department and is now being evaluated in the observation unit with a complaint of headache that started yesterday.  States this caused her to have nausea vomiting all night last night.  States that she had additional episodes of nonbloody & nonbilious vomiting and subsequent  dry heaves today.  She reports she developed diffuse upper abdominal discomfort from the vomiting today.  She reports one episode of nonbloody loose stool while being evaluated in the emergency department.  She denies any recent antibiotic usage.  She tells me she is feeling much better after the medication she was given in the emergency department (compazine 10mg, fentanyl 50mcg, benadryl 25mg)    Review of Systems       Personal History     Past Medical History:   Diagnosis Date   • Acid reflux    • Acute kidney injury (HCC)    • Anemia    • Arthritis    • Bipolar 1 disorder, depressed ([facility])    • Cataract     MINDY   • Chronic nausea    • Chronic pain of right knee    • Continuous leakage of urine     USES DEPENDS   • COPD (chronic obstructive pulmonary disease) ([facility])     USES O2 2 LMP PER NC AT NIGHT   • Disease of thyroid gland     HYPOTHYROIDISM   • Diverticulosis    • Fibromyalgia     DX 1994   • Fibromyalgia, primary    • Frequent episodes of bronchitis    • HL (hearing loss)    • Hypertension    • Hypothyroidism    • Memory loss    • Migraines    • Neck pain    • On home oxygen therapy     2L NC AT NIGHT   • Orthostatic hypotension    • Short of breath on exertion    • Sleep apnea    • Stage 3 chronic kidney disease ([facility])        Past Surgical History:   Procedure Laterality Date   • CEREBRAL ANEURYSM REPAIR  2000'S    WITH STENT   • HYSTERECTOMY     • LAPAROSCOPIC CHOLECYSTECTOMY     • IA TOTAL KNEE ARTHROPLASTY Right 11/16/2017    Procedure: RT TOTAL KNEE ARTHROPLASTY;  Surgeon: Kashif Perez MD;  Location: [facility];  Service: Orthopedics       Family History: family history is not on file. Otherwise pertinent FHx was reviewed and not pertinent to current issue.    Social History:  reports that she quit smoking about 52 years ago. Her smoking use included cigarettes. She started smoking about 62 years ago. She has a 10.00 pack-year smoking history. She has never used smokeless tobacco. She reports  that she does not drink alcohol and does not use drugs.    Home Medications:  DULoxetine, HYDROcodone-acetaminophen, OLANZapine, QUEtiapine, Umeclidinium Bromide, acetaminophen, albuterol, allopurinol, azithromycin, budesonide-formoterol, bumetanide, busPIRone, carBAMazepine XR, divalproex, fluticasone, gabapentin, ipratropium-albuterol, iron polysaccharides, levothyroxine, melatonin, methylPREDNISolone, metoprolol succinate XL, ondansetron, potassium chloride, rOPINIRole, traZODone, and vitamin B-12    Allergies:  Allergies   Allergen Reactions   • Morphine And Related Hallucinations     CONFUSION       Objective   Objective     Vitals:   Temp:  [98.1 °F (36.7 °C)] 98.1 °F (36.7 °C)  Heart Rate:  [56-67] 61  Resp:  [16-18] 18  BP: (143-186)/(67-98) 186/77  Flow (L/min):  [3] 3  Physical Exam    Constitutional: Awake, alert, patient appears chronically ill   Eyes: PERRLA, sclerae anicteric, no conjunctival injection   HENT: NCAT, mucous membranes moist   Neck: Supple, no thyromegaly, no lymphadenopathy, trachea midline   Respiratory: Clear to auscultation bilaterally with diminished breath sounds  at the bases, nonlabored respirations, supplemental oxygen present   Cardiovascular: RRR, no murmurs, rubs, or gallops, palpable pedal pulses  bilaterally   Gastrointestinal: Positive bowel sounds, soft, nontender, non-distended,  obese   Musculoskeletal: No bilateral ankle edema, no clubbing or cyanosis to  extremities   Psychiatric: Appropriate affect, cooperative   Neurologic: Oriented x 3, strength symmetric in all extremities, Cranial  Nerves grossly intact to confrontation, speech clear   Skin: No rashes     Result Review    Result Review:  I have personally reviewed the results from the time of this admission to 10/18/2021 15:31 EDT and agree with these findings:  [x]  Laboratory  []  Microbiology  [x]  Radiology  []  EKG/Telemetry   [x]  Cardiology/Vascular   []  Pathology  []  Old records  []  Other:  Most notable  findings include: elevated lipase @ 195    Assessment/Plan   Assessment / Plan       Active Hospital Problems:  Active Hospital Problems    Diagnosis    • Abdominal pain      Plan:   Headache  -Resolved after meds in ER  -CT head negative for acute findings    Abdominal pain/Vomiting  -resolved after meds in ER  -IV NS @ 100ml/hr  -PO pepcid ordered  -GI consult order placed    DVT prophylaxis:  Mechanical DVT prophylaxis orders are present.    CODE STATUS:    Level Of Support Discussed With: Patient  Code Status: CPR  Medical Interventions (Level of Support Prior to Arrest): Full    Admission Status:  I believe this patient meets observation status.    Electronically signed by LG Tobias, 10/18/21, 3:31 PM EDT.         I have worn appropriate PPE during this patient encounter, sanitized my hands both with entering and exiting patient's room.    Electronically signed by Chandan Swift MD at 10/18/21 1811          Emergency Department Notes      Degonda, Janet, RN at 10/18/21 0948        HA and nausea.  Woke at 0400 sweating but is afebrile.  Is on 3L O2 at baseline    Patient was placed in face mask during first look triage.  Patient was wearing a face mask throughout encounter.  I wore personal protective equipment throughout the encounter.  Hand hygiene was performed before and after patient encounter.       Electronically signed by Degonda, Janet, RN at 10/18/21 0950     Constantin Courtney PA at 10/18/21 1107     Attestation signed by Rick Nina MD at 10/18/21 1913    MD Attestation Note    I supervised care provided by the midlevel provider.    The HEATHER and I have discussed this patient's history, physical exam, and treatment plan. I have reviewed the documentation and personally had a face to face interaction with the patient  I affirm the documentation and agree with the treatment and plan.   My personal findings are documented in a separate note.         For this patient encounter, I reviewed the NP  or PA documentation, treatment plan, and medical decision making. Rick Nina MD 10/18/2021 19:13 EDT                   EMERGENCY DEPARTMENT ENCOUNTER    Room Number:  110/1  Date of encounter:  10/18/2021  PCP: Bill Martino MD  Historian: Patient      I used full protective equipment while examining this patient.  This includes face mask, gloves and protective eyewear.  I washed my hands before entering the room and immediately upon leaving the room      HPI:  Chief Complaint: Headache, vomiting, abdominal pain  A complete HPI/ROS/PMH/PSH/SH/FH are unobtainable due to: Nothing    Context: Josephine Wolf is a 81 y.o. female who presents to the ED c/o 2-day history of headache, abdominal pain, vomiting.  Patient states the headache started last night.  It started gradually.  Headache is better at this time.  The headache was described as throbbing, global, severe in nature.  Patient states she does not normally get headaches.  She denies any numbness or tingling to her extremities.  She denies any neck pain or fever.  Patient states her headache improved while in route to the ED hospital.  Patient did have multiple episodes of vomiting last night and this morning.  She denies any photophobia or phonophobia.    In addition patient complains of diffuse lower abdominal pain.  This abdominal pain started earlier this morning.  Again patient complains of vomiting and nausea.  Patient denies any diarrhea, dysuria.  She denies any precipitating alleviating factors.  She denies any prior similar symptoms.      Review of Medical Records  I reviewed last ER visit from 7/25/2021.  Patient seen for hypotension.    PAST MEDICAL HISTORY  Active Ambulatory Problems     Diagnosis Date Noted   • Anemia 10/19/2017   • OA (osteoarthritis) of knee 11/16/2017   • Chronic respiratory failure with hypoxia (HCC) 12/02/2017   • Cellulitis of skin 12/06/2017   • Acute kidney injury (HCC) 12/06/2017   • Sepsis (HCC) 12/06/2017    • Orthostatic hypotension 06/24/2018   • Disease of thyroid gland 06/24/2018   • Hypertension 06/24/2018   • Dehydration 06/24/2018   • Stage 3 chronic kidney disease (CMS/HCC) 06/25/2018   • Closed compression fracture of L1 lumbar vertebra 06/16/2019   • Hyponatremia 06/16/2019   • Osteoporosis with pathological fracture 06/17/2019   • Acute on chronic respiratory failure with hypoxia and hypercapnia ([facility]) 03/12/2020   • Obesity (BMI 30-39.9) 03/12/2020   • Nocturnal hypoxia 03/13/2020   • CKD (chronic kidney disease) stage 2, GFR 60-89 ml/min 03/13/2020   • Acute on chronic respiratory failure with hypoxia ([facility]) 05/20/2020     Resolved Ambulatory Problems     Diagnosis Date Noted   • COPD with acute exacerbation ([facility]) 06/24/2018     Past Medical History:   Diagnosis Date   • Acid reflux    • Arthritis    • Bipolar 1 disorder, depressed ([facility])    • Cataract    • Chronic nausea    • Chronic pain of right knee    • Continuous leakage of urine    • COPD (chronic obstructive pulmonary disease) ([facility])    • Diverticulosis    • Fibromyalgia    • Fibromyalgia, primary    • Frequent episodes of bronchitis    • HL (hearing loss)    • Hypothyroidism    • Memory loss    • Migraines    • Neck pain    • On home oxygen therapy    • Short of breath on exertion    • Sleep apnea          PAST SURGICAL HISTORY  Past Surgical History:   Procedure Laterality Date   • CEREBRAL ANEURYSM REPAIR  2000'S    WITH STENT   • HYSTERECTOMY     • LAPAROSCOPIC CHOLECYSTECTOMY     • DE TOTAL KNEE ARTHROPLASTY Right 11/16/2017    Procedure: RT TOTAL KNEE ARTHROPLASTY;  Surgeon: Kashif Perez MD;  Location: [facility];  Service: Orthopedics         FAMILY HISTORY  Family History   Problem Relation Age of Onset   • Malig Hyperthermia Neg Hx          SOCIAL HISTORY  Social History     Socioeconomic History   • Marital status:    Tobacco Use   • Smoking status: Former Smoker     Packs/day: 1.00     Years: 10.00     Pack years: 10.00      Types: Cigarettes     Start date:      Quit date:      Years since quittin.8   • Smokeless tobacco: Never Used   Vaping Use   • Vaping Use: Never used   Substance and Sexual Activity   • Alcohol use: No     Comment: CAFFEINE NO    • Drug use: No   • Sexual activity: Defer         ALLERGIES  Morphine and related        REVIEW OF SYSTEMS  All systems reviewed and negative except for those discussed in HPI.       PHYSICAL EXAM    I have reviewed the triage vital signs and nursing notes.    ED Triage Vitals [10/18/21 0949]   Temp Heart Rate Resp BP SpO2   98.1 °F (36.7 °C) 60 18 170/67 96 %      Temp src Heart Rate Source Patient Position BP Location FiO2 (%)   Tympanic Monitor -- -- --       Physical Exam  GENERAL: Alert, oriented, mild distress secondary to vomiting   HENT: head atraumatic, no nuchal rigidity  EYES: no scleral icterus, EOMI  CV: regular rhythm, regular rate, no murmur  RESPIRATORY: normal effort, CTA  ABDOMEN: soft, mild diffuse lower abdominal tenderness.  No guarding rebound.  Normal bowel sounds.  MUSCULOSKELETAL: no deformity, FROM, no calf swelling or tenderness  NEURO: alert, normal cerebellar function, cranial nerves II to XII grossly intact  SKIN: warm, dry        LAB RESULTS  Recent Results (from the past 24 hour(s))   Carbamazepine Level, Total    Collection Time: 10/18/21 11:25 AM    Specimen: Blood   Result Value Ref Range    Carbamazepine Level 5.1 4.0 - 12.0 mcg/mL   Valproic Acid Level, Total    Collection Time: 10/18/21 11:25 AM    Specimen: Blood   Result Value Ref Range    Valproic Acid <2.8 (L) 50.0 - 125.0 mcg/mL   Comprehensive Metabolic Panel    Collection Time: 10/18/21 11:25 AM    Specimen: Blood   Result Value Ref Range    Glucose 109 (H) 65 - 99 mg/dL    BUN 19 8 - 23 mg/dL    Creatinine 1.25 (H) 0.57 - 1.00 mg/dL    Sodium 131 (L) 136 - 145 mmol/L    Potassium 4.6 3.5 - 5.2 mmol/L    Chloride 95 (L) 98 - 107 mmol/L    CO2 24.2 22.0 - 29.0 mmol/L    Calcium 9.2  8.6 - 10.5 mg/dL    Total Protein 6.7 6.0 - 8.5 g/dL    Albumin 4.40 3.50 - 5.20 g/dL    ALT (SGPT) 13 1 - 33 U/L    AST (SGOT) 15 1 - 32 U/L    Alkaline Phosphatase 94 39 - 117 U/L    Total Bilirubin 0.3 0.0 - 1.2 mg/dL    eGFR Non African Amer 41 (L) >60 mL/min/1.73    Globulin 2.3 gm/dL    A/G Ratio 1.9 g/dL    BUN/Creatinine Ratio 15.2 7.0 - 25.0    Anion Gap 11.8 5.0 - 15.0 mmol/L   Lipase    Collection Time: 10/18/21 11:25 AM    Specimen: Blood   Result Value Ref Range    Lipase 195 (H) 13 - 60 U/L   CBC Auto Differential    Collection Time: 10/18/21 11:25 AM    Specimen: Blood   Result Value Ref Range    WBC 7.89 3.40 - 10.80 10*3/mm3    RBC 3.32 (L) 3.77 - 5.28 10*6/mm3    Hemoglobin 10.6 (L) 12.0 - 15.9 g/dL    Hematocrit 33.0 (L) 34.0 - 46.6 %    MCV 99.4 (H) 79.0 - 97.0 fL    MCH 31.9 26.6 - 33.0 pg    MCHC 32.1 31.5 - 35.7 g/dL    RDW 13.6 12.3 - 15.4 %    RDW-SD 49.5 37.0 - 54.0 fl    MPV 9.3 6.0 - 12.0 fL    Platelets 219 140 - 450 10*3/mm3    Neutrophil % 71.0 42.7 - 76.0 %    Lymphocyte % 16.9 (L) 19.6 - 45.3 %    Monocyte % 9.1 5.0 - 12.0 %    Eosinophil % 1.5 0.3 - 6.2 %    Basophil % 0.5 0.0 - 1.5 %    Immature Grans % 1.0 (H) 0.0 - 0.5 %    Neutrophils, Absolute 5.60 1.70 - 7.00 10*3/mm3    Lymphocytes, Absolute 1.33 0.70 - 3.10 10*3/mm3    Monocytes, Absolute 0.72 0.10 - 0.90 10*3/mm3    Eosinophils, Absolute 0.12 0.00 - 0.40 10*3/mm3    Basophils, Absolute 0.04 0.00 - 0.20 10*3/mm3    Immature Grans, Absolute 0.08 (H) 0.00 - 0.05 10*3/mm3    nRBC 0.0 0.0 - 0.2 /100 WBC   Green Top (Gel)    Collection Time: 10/18/21 11:25 AM   Result Value Ref Range    Extra Tube Hold for add-ons.    Lavender Top    Collection Time: 10/18/21 11:25 AM   Result Value Ref Range    Extra Tube hold for add-on    Gold Top - SST    Collection Time: 10/18/21 11:25 AM   Result Value Ref Range    Extra Tube Hold for add-ons.    Light Blue Top    Collection Time: 10/18/21 11:25 AM   Result Value Ref Range    Extra Tube  hold for add-on    Troponin    Collection Time: 10/18/21 11:25 AM    Specimen: Blood   Result Value Ref Range    Troponin T <0.010 0.000 - 0.030 ng/mL   Lactic Acid, Plasma    Collection Time: 10/18/21 12:38 PM    Specimen: Blood   Result Value Ref Range    Lactate 0.5 0.5 - 2.0 mmol/L   ECG 12 Lead    Collection Time: 10/18/21 12:55 PM   Result Value Ref Range    QT Interval 413 ms   Respiratory Panel PCR w/COVID-19(SARS-CoV-2) ANISH/VALERIA/MAXIMILIAN/PAD/COR/MAD/ILSA In-House, NP Swab in UTM/VTM, 3-4 HR TAT - Swab, Nasopharynx    Collection Time: 10/18/21  1:15 PM    Specimen: Nasopharynx; Swab   Result Value Ref Range    ADENOVIRUS, PCR Not Detected Not Detected    Coronavirus 229E Not Detected Not Detected    Coronavirus HKU1 Not Detected Not Detected    Coronavirus NL63 Not Detected Not Detected    Coronavirus OC43 Not Detected Not Detected    COVID19 Not Detected Not Detected - Ref. Range    Human Metapneumovirus Not Detected Not Detected    Human Rhinovirus/Enterovirus Not Detected Not Detected    Influenza A PCR Not Detected Not Detected    Influenza B PCR Not Detected Not Detected    Parainfluenza Virus 1 Not Detected Not Detected    Parainfluenza Virus 2 Not Detected Not Detected    Parainfluenza Virus 3 Not Detected Not Detected    Parainfluenza Virus 4 Not Detected Not Detected    RSV, PCR Not Detected Not Detected    Bordetella pertussis pcr Not Detected Not Detected    Bordetella parapertussis PCR Not Detected Not Detected    Chlamydophila pneumoniae PCR Not Detected Not Detected    Mycoplasma pneumo by PCR Not Detected Not Detected   Gastrointestinal Panel, PCR - Stool, Per Rectum    Collection Time: 10/18/21  1:26 PM    Specimen: Per Rectum; Stool   Result Value Ref Range    Campylobacter Not Detected Not Detected    Plesiomonas shigelloides Not Detected Not Detected    Salmonella Not Detected Not Detected    Vibrio Not Detected Not Detected    Vibrio cholerae Not Detected Not Detected    Yersinia enterocolitica  Not Detected Not Detected    Enteroaggregative E. coli (EAEC) Not Detected Not Detected    Enteropathogenic E. coli (EPEC) Not Detected Not Detected    Enterotoxigenic E. coli (ETEC) lt/st Not Detected Not Detected    Shiga-like toxin-producing E. coli (STEC) stx1/stx2 Not Detected Not Detected    Shigella/Enteroinvasive E. coli (EIEC) Not Detected Not Detected    Cryptosporidium Not Detected Not Detected    Cyclospora cayetanensis Not Detected Not Detected    Entamoeba histolytica Not Detected Not Detected    Giardia lamblia Not Detected Not Detected    Adenovirus F40/41 Not Detected Not Detected    Astrovirus Not Detected Not Detected    Norovirus GI/GII Not Detected Not Detected    Rotavirus A Not Detected Not Detected    Sapovirus (I, II, IV or V) Not Detected Not Detected   Urinalysis With Culture If Indicated - Urine, Catheter In/Out    Collection Time: 10/18/21  1:27 PM    Specimen: Urine, Catheter In/Out   Result Value Ref Range    Color, UA Yellow Yellow, Straw    Appearance, UA Clear Clear    pH, UA 6.0 5.0 - 8.0    Specific Gravity, UA 1.019 1.005 - 1.030    Glucose, UA Negative Negative    Ketones, UA Negative Negative    Bilirubin, UA Negative Negative    Blood, UA Negative Negative    Protein,  mg/dL (2+) (A) Negative    Leuk Esterase, UA Negative Negative    Nitrite, UA Negative Negative    Urobilinogen, UA 0.2 E.U./dL 0.2 - 1.0 E.U./dL   Urinalysis, Microscopic Only - Urine, Catheter In/Out    Collection Time: 10/18/21  1:27 PM    Specimen: Urine, Catheter In/Out   Result Value Ref Range    RBC, UA 0-2 None Seen, 0-2 /HPF    WBC, UA 0-2 None Seen, 0-2 /HPF    Bacteria, UA None Seen None Seen /HPF    Squamous Epithelial Cells, UA 0-2 None Seen, 0-2 /HPF    Hyaline Casts, UA 0-2 None Seen /LPF    Methodology Automated Microscopy        Ordered the above labs and independently reviewed the results.        RADIOLOGY  CT Abdomen Pelvis Without Contrast    Result Date: 10/18/2021  CT ABDOMEN AND  PELVIS WITHOUT IV CONTRAST  HISTORY: Generalized abdominal pain and vomiting  TECHNIQUE: Radiation dose reduction techniques were utilized, including automated exposure control and exposure modulation based on body size. 3 mm images were obtained through the abdomen and pelvis without IV contrast.  COMPARISON: CT abdomen and pelvis 09/26/2018 and CT chest 12/29/2020  FINDINGS: Bibasilar atelectasis and or pleural parenchymal scarring is present. Prominent nodular opacification within the right middle lobe measuring up to 1.5 cm is grossly unchanged since 09/26/2018. Sub-6 mm pulmonary nodule within the right lower lobe is also grossly unchanged from multiple prior CTs.  There are no findings of small bowel obstruction. The appendix is unremarkable.  The liver has a lobulated appearance. The gallbladder is surgically absent. The degree of extra hepatic biliary duct dilation is grossly unchanged since 09/26/2018, with the common bile duct measuring up to approximately 1.5 cm. The main pancreatic duct is also mildly distended, measuring 0.7 cm in diameter, as before. Adrenal glands and kidneys have an unremarkable noncontrast CT appearance. There is no hydronephrosis.  No abdominopelvic adenopathy by size criteria is present. There is colonic diverticulosis. The uterus is surgically absent. No free intraperitoneal fluid or air is present. There is no suspicious lytic or blastic osseous lesion. Dextroscoliosis of the lumbar spine with resultant moderate to severe degenerative changes are present.  Burst compression deformity of L1 resulting approximately 15-20% loss of height and 2 to 3 mm of retropulsion into the central spinal canal is grossly unchanged since 12/29/2020.      1.  No noncontrast CT findings of acute intraabdominal pathology. 2.  Lobulated appearance of the liver which can be seen in the setting of cirrhosis in the appropriate clinical context and correlation with patient history is recommended. 3.   Moderate to severe degenerative changes within the lumbar spine which can be further evaluated with lumbar spine MRI if clinically indicated. 4.  Other incidental and chronic findings as above.   This report was finalized on 10/18/2021 3:04 PM by Dr. Anand Pérez M.D.      CT Head Without Contrast    Result Date: 10/18/2021  EMERGENCY NONCONTRAST HEAD CT 10/18/2021  CLINICAL HISTORY: 81-year-old female with new onset severe headache  TECHNIQUE: Spiral CT images were obtained from the base of skull to the vertex without intravenous contrast. Images were reformatted and submitted in 3 mm thick axial CT sections with brain algorithm and 2 mm thick sagittal and coronal reconstructions were performed and submitted in brain algorithm.  COMPARISON: This is correlated to a prior noncontrast head CT from [facility] on 12/06/2017.  FINDINGS: There is very minimal low-density in the frontal periventricular white matter. There is a 4 mm rounded old lacunar type infarct at the junction of the head and body of the right caudate nucleus which is unchanged. The remainder of the brain parenchyma is normal in attenuation. The ventricles are normal in size. I see no focal mass effect and no midline shift. No extra axial fluid collections are identified. There is no evidence of acute intracranial hemorrhage. The calvarium and the skull base are normal in appearance. The paranasal sinuses, the mastoid air cells and middle ear cavities are clear. The orbits are unremarkable. There is a 15 mm long stent extending from the cavernous into the supracavernous segment of the right internal carotid artery.      1. No acute intracranial abnormality is seen with no change when compared to prior noncontrast head CT from [facility] on 12/06/2017. 2. There is minimal small vessel disease in the frontal periventricular white matter and a 4 x 4 mm old lacunar infarct at the junction of the head and body of the  right caudate nucleus. There is a 15 mm long 5 mm in transverse diameter stent extending from the cavernous into the supracavernous segment of the right internal carotid artery, all unchanged. The remainder of the head CT is normal. The etiology of the patient's headaches is not established on this exam.  Radiation dose reduction techniques were utilized, including automated exposure control and exposure modulation based on body size.  This report was finalized on 10/18/2021 4:12 PM by Dr. Kashif Acevedo M.D.        I ordered the above noted radiological studies. Reviewed by me and discussed with radiologist.  See dictation for official radiology interpretation.      MEDICATIONS GIVEN IN ER    Medications   sodium chloride 0.9 % flush 10 mL (10 mL Intravenous Given 10/18/21 1126)   nitroglycerin (NITROSTAT) SL tablet 0.4 mg (has no administration in time range)   sodium chloride 0.9 % flush 10 mL (has no administration in time range)   sodium chloride 0.9 % flush 10 mL (has no administration in time range)   sodium chloride 0.9 % flush 10 mL (has no administration in time range)   sodium chloride 0.9 % flush 10 mL (has no administration in time range)   ondansetron (ZOFRAN) tablet 4 mg (has no administration in time range)     Or   ondansetron (ZOFRAN) injection 4 mg (has no administration in time range)   sodium chloride 0.9 % infusion (100 mL/hr Intravenous New Bag 10/18/21 1843)   famotidine (PEPCID) tablet 40 mg (40 mg Oral Not Given 10/18/21 1838)   acetaminophen (TYLENOL) 160 MG/5ML solution 650 mg (has no administration in time range)     Or   acetaminophen (TYLENOL) suppository 650 mg (has no administration in time range)   acetaminophen (TYLENOL) tablet 650 mg (has no administration in time range)   allopurinol (ZYLOPRIM) tablet 100 mg (100 mg Oral Not Given 10/18/21 1835)   budesonide-formoterol (SYMBICORT) 160-4.5 MCG/ACT inhaler 2 puff (has no administration in time range)   bumetanide (BUMEX) tablet 1 mg  (1 mg Oral Not Given 10/18/21 1845)   busPIRone (BUSPAR) tablet 10 mg (has no administration in time range)   carBAMazepine XR (TEGretol  XR) 12 hr tablet 100 mg (has no administration in time range)   divalproex (DEPAKOTE ER) 24 hr tablet 250 mg (has no administration in time range)   divalproex (DEPAKOTE ER) 24 hr tablet 500 mg (500 mg Oral Not Given 10/18/21 1847)   DULoxetine (CYMBALTA) DR capsule 60 mg (has no administration in time range)   gabapentin (NEURONTIN) capsule 300 mg (has no administration in time range)   HYDROcodone-acetaminophen (NORCO) 7.5-325 MG per tablet 1 tablet (has no administration in time range)   ipratropium-albuterol (DUO-NEB) nebulizer solution 3 mL (has no administration in time range)   iron polysaccharides (NIFEREX) capsule 150 mg (150 mg Oral Not Given 10/18/21 1838)   levothyroxine (SYNTHROID, LEVOTHROID) tablet 100 mcg (has no administration in time range)   levothyroxine (SYNTHROID, LEVOTHROID) tablet 88 mcg (has no administration in time range)   melatonin tablet 3 mg (has no administration in time range)   metoprolol succinate XL (TOPROL-XL) 24 hr tablet 12.5 mg (12.5 mg Oral Given 10/18/21 1843)   OLANZapine (zyPREXA) tablet 5 mg (5 mg Oral Not Given 10/18/21 1838)   potassium chloride (K-DUR,KLOR-CON) CR tablet 10 mEq (10 mEq Oral Not Given 10/18/21 1838)   QUEtiapine (SEROquel) tablet 100 mg (has no administration in time range)   rOPINIRole (REQUIP) tablet 0.25 mg (has no administration in time range)   traZODone (DESYREL) tablet 50 mg (has no administration in time range)   ipratropium (ATROVENT) nebulizer solution 0.5 mg (has no administration in time range)   vitamin B-12 (CYANOCOBALAMIN) tablet 1,000 mcg (has no administration in time range)   hydrALAZINE (APRESOLINE) injection 10 mg (has no administration in time range)   sodium chloride 0.9 % bolus 500 mL (0 mL Intravenous Stopped 10/18/21 1316)   ondansetron (ZOFRAN) injection 4 mg (4 mg Intravenous Given 10/18/21  1126)   ondansetron (ZOFRAN) injection 4 mg (4 mg Intravenous Given 10/18/21 1156)   prochlorperazine (COMPAZINE) injection 10 mg (10 mg Intravenous Given 10/18/21 1317)   diphenhydrAMINE (BENADRYL) injection 25 mg (25 mg Intravenous Given 10/18/21 1317)   fentaNYL citrate (PF) (SUBLIMAZE) injection 50 mcg (50 mcg Intravenous Given 10/18/21 1411)         PROGRESS, DATA ANALYSIS, CONSULTS, AND MEDICAL DECISION MAKING    All labs have been independently reviewed by me.  All radiology studies have been reviewed by me and discussed with radiologist dictating the report.   EKG's independently viewed and interpreted by me.  Discussion below represents my analysis of pertinent findings related to patient's condition, differential diagnosis, treatment plan and final disposition.    I have discussed case with Dr. Nina, emergency room physician.  He has performed his own bedside examination and agrees with treatment plan.    ED Course as of 10/18/21 1850   Mon Oct 18, 2021   1106 Presents with headache and abdominal pain with vomiting.  Patient has no neuro deficits.  Differential diagnoses include but not limited to bowel obstruction, gastroenteritis, intracranial hemorrhage. [EE]   1154 WBC: 7.89 [EE]   1154 Hemoglobin(!): 10.6 [EE]   1353 Bacteria, UA: None Seen [EE]   1417 eGFR Non  Am(!): 41 [RS]   1423 Recheck of patient.  She states she feels improved.  Patient going to CT scan right now. [EE]   1441 COVID19: Not Detected [RS]   1441 Troponin T: <0.010 [RS]   1441 eGFR Non  Am(!): 41 [RS]   1506 I discussed CT findings with Dr. Acevedo.  Patient has no acute intracranial abnormalities.  Stable postsurgical changes. [EE]   1527 Recheck of patient.  She states she feels 100% back to normal.  She denies any headache, abdominal pain, vomiting.  Patient does have a mildly elevated lipase however normal-appearing pancreas on CT scan.  She denies any prior history of pancreatitis.    Given patient's age and  elevated lipase, I would like to admit the patient for observation.  I discussed this with Bryanna Che, nurse practitioner, she agrees to admit the pt.   [EE]      ED Course User Index  [EE] Constantin Courtney PA  [RS] Rick Nina MD       AS OF 18:50 EDT VITALS:    BP - 179/70  HR - 61  TEMP - 98.3 °F (36.8 °C) (Oral)  O2 SATS - 98%        DIAGNOSIS  Final diagnoses:   Generalized abdominal pain   Acute nonintractable headache, unspecified headache type   Elevated lipase   Chronic obstructive pulmonary disease, unspecified COPD type (HCC)   Non-intractable vomiting with nausea, unspecified vomiting type         DISPOSITION  Admitted           Constantin Courtney PA  10/18/21 1850      Electronically signed by Rick Nina MD at 10/18/21 1913     Rick Nina MD at 10/18/21 1148          Brief history of present illness: 11-year-old female has not felt well for about 2 days with some chills, nausea, diarrhea has developed a headache with recurrent vomiting throughout the evening.  She has been immunized against COVID-19.  She denies severe cough or dyspnea at this time.    Physical exam:   ED Triage Vitals [10/18/21 0949]   Temp Heart Rate Resp BP SpO2   98.1 °F (36.7 °C) 60 18 170/67 96 %      Temp src Heart Rate Source Patient Position BP Location FiO2 (%)   Tympanic Monitor -- -- --     Alert and oriented.  Talkative.  Somewhat diaphoretic and quite ill-appearing.  Still clutching her emesis basin.  Mildly tachypneic but no respiratory distress is appreciated.  Pink warm and perfusing.  Abdomen is soft throughout some mild suprapubic discomfort with palpation.  Moves all extremities equally but rather weakly.    MDM:    Results for orders placed or performed during the hospital encounter of 10/18/21   CBC Auto Differential    Specimen: Blood   Result Value Ref Range    WBC 7.89 3.40 - 10.80 10*3/mm3    RBC 3.32 (L) 3.77 - 5.28 10*6/mm3    Hemoglobin 10.6 (L) 12.0 - 15.9 g/dL    Hematocrit 33.0 (L) 34.0 - 46.6 %     MCV 99.4 (H) 79.0 - 97.0 fL    MCH 31.9 26.6 - 33.0 pg    MCHC 32.1 31.5 - 35.7 g/dL    RDW 13.6 12.3 - 15.4 %    RDW-SD 49.5 37.0 - 54.0 fl    MPV 9.3 6.0 - 12.0 fL    Platelets 219 140 - 450 10*3/mm3    Neutrophil % 71.0 42.7 - 76.0 %    Lymphocyte % 16.9 (L) 19.6 - 45.3 %    Monocyte % 9.1 5.0 - 12.0 %    Eosinophil % 1.5 0.3 - 6.2 %    Basophil % 0.5 0.0 - 1.5 %    Immature Grans % 1.0 (H) 0.0 - 0.5 %    Neutrophils, Absolute 5.60 1.70 - 7.00 10*3/mm3    Lymphocytes, Absolute 1.33 0.70 - 3.10 10*3/mm3    Monocytes, Absolute 0.72 0.10 - 0.90 10*3/mm3    Eosinophils, Absolute 0.12 0.00 - 0.40 10*3/mm3    Basophils, Absolute 0.04 0.00 - 0.20 10*3/mm3    Immature Grans, Absolute 0.08 (H) 0.00 - 0.05 10*3/mm3    nRBC 0.0 0.0 - 0.2 /100 WBC   ECG 12 Lead   Result Value Ref Range    QT Interval 413 ms   Green Top (Gel)   Result Value Ref Range    Extra Tube Hold for add-ons.    Lavender Top   Result Value Ref Range    Extra Tube hold for add-on    Gold Top - SST   Result Value Ref Range    Extra Tube Hold for add-ons.    Light Blue Top   Result Value Ref Range    Extra Tube hold for add-on      EKG           EKG time: 1255  Rhythm/Rate: Sinus, 60  P waves and HI: WILL within normal limits  QRS, axis: Narrow QRS complex  ST and T waves: No STEMI; QTC within normal limits    Interpreted Contemporaneously by me, independently viewed  Comparison: No significant changes compared to 7/25/2021      Agree with plan for laboratory and radiologic evaluation while we are treating this patient's discomfort and rehydrating her.  High level concern for COVID-19 or other viral illness causing her complex of symptoms.  Maintain isolation and monitor closely for changes.    I have seen and personally evaluated this patient, discussed the case with the treating advanced practice provider, and reviewed their note. I was involved in the medical decision making during the evaluation, testing and disposition planning for this patient.    "  Rick Nolasco MD  10/18/21 1259      Electronically signed by Rick Nolasco MD at 10/18/21 1259     Marybeth Trevizo RN at 10/18/21 1156        Pt returned from imaging d/t vomiting.      Marybeth Trevizo RN  10/18/21 1156      Electronically signed by Marybeth Trevizo RN at 10/18/21 1156     Marybeth Trevizo RN at 10/18/21 1237        Pt reports nausea has still not improved, but not currently dry heaving, now reports to Constantin GOLDBERG she has intermittent dizziness. Pt reporting L inner thigh pain, states it is \"taking my breath away\"      Marybeth Trevizo RN  10/18/21 1237       Marybeth Trevizo RN  10/18/21 1357      Electronically signed by Marybeth Trevizo RN at 10/18/21 1357     Marybeth Trevizo RN at 10/18/21 1410        Pt now reports pain to L inner thigh is similar to prior pains from pulled muscle.      Marybeth Trevizo RN  10/18/21 1410      Electronically signed by Marybeth Trevizo RN at 10/18/21 1410     Marybeth Trevizo RN at 10/18/21 1509        Spoke to pt and , pt deciding to wait on consenting to blood transfusion at this time. Dr nolasco aware     Spoke to lab mgr Gudelia. States they are having issues with chem machine, states some labs are running and some are not. Pt updated as to issue. Dr nolasco notified.      Marybeth Trevizo RN  10/18/21 1511      Electronically signed by Marybeth Trevizo RN at 10/18/21 1511       "

## 2021-10-19 NOTE — PROGRESS NOTES
[facility]     Progress Note    Patient Name: Josephine Wolf  : 1940  MRN: [id number]  Primary Care Physician:  Bill Martino MD  Date of admission: 10/18/2021    Subjective   Subjective     Chief Complaint: Headache nausea and vomiting    HPI:  Patient Reports that she had a migraine headache onset yesterday.  She had a significant migraine.  Patient reports that she does have a history of migraines but she has not had migraines in the past several years.  There was an abrupt onset of her headache.  Subsequently due to the pain associated with her migraine she had nausea and vomiting she was also dry heaving after vomiting.  She had some associated abdominal pain which she feels was musculoskeletal due to the dry heaving.  At this time she has no complaints of abdominal pain or headache.  She has no nausea or vomiting.  She denies any GI or  symptoms.  She has had no changes in her bowel or bladder movements.  Hemoglobin yesterday was 10.2 today is 8.8 she has been receiving IV fluids via bolus and hourly.  She did report that she had an episode of some loose stool yesterday while being evaluated the emergency department she was also relatively hypertensive above her baseline which is usually the upper 150s.  She was given a migraine cocktail in the emergency department. she does take her medications regularly.  However her hypertension was relevant to her severe headache and pain.  Today she feels much better she has had no headache no nausea no vomiting.  She has no abdominal pain at this time.  She has no abdominal tenderness.  Will repeat CBC and advise follow-up.  Hemoccult was performed at the bedside but an inadequate sample was obtained.  Plans for discharge today.    Review of Systems  Review of Systems   All other systems reviewed and are negative.        Objective   Objective     Vitals:   Temp:  [97.8 °F (36.6 °C)-98.3 °F (36.8 °C)] 98.06 °F (36.7 °C)  Heart Rate:  [56-71]  66  Resp:  [16-18] 18  BP: (119-186)/(59-98) 171/82  Flow (L/min):  [3] 3  Physical Exam   Physical Exam  Vitals and nursing note reviewed.   Constitutional:       Appearance: Normal appearance. She is obese.   HENT:      Head: Normocephalic and atraumatic.      Right Ear: External ear normal.      Left Ear: External ear normal.      Nose: Nose normal.      Mouth/Throat:      Mouth: Mucous membranes are moist.      Pharynx: Oropharynx is clear.   Eyes:      Extraocular Movements: Extraocular movements intact.      Conjunctiva/sclera: Conjunctivae normal.      Pupils: Pupils are equal, round, and reactive to light.   Cardiovascular:      Rate and Rhythm: Normal rate and regular rhythm.      Pulses: Normal pulses.      Heart sounds: Normal heart sounds.   Pulmonary:      Effort: Pulmonary effort is normal.      Breath sounds: Normal breath sounds.   Abdominal:      General: Abdomen is flat. Bowel sounds are normal.      Palpations: Abdomen is soft.   Musculoskeletal:         General: Normal range of motion.      Cervical back: Normal range of motion and neck supple.   Skin:     General: Skin is warm and dry.      Capillary Refill: Capillary refill takes less than 2 seconds.   Neurological:      General: No focal deficit present.      Mental Status: She is alert.   Psychiatric:         Mood and Affect: Mood normal.         Behavior: Behavior normal.         Thought Content: Thought content normal.         Judgment: Judgment normal.         Result Review    Result Review:  I have personally reviewed the results from the time of this admission to 10/19/2021 10:57 EDT and agree with these findings:  [x]  Laboratory  [x]  Microbiology  [x]  Radiology  [x]  EKG/Telemetry   [x]  Cardiology/Vascular   []  Pathology  []  Old records  []  Other:  Most notable findings include: Negative CT negative troponin H&H is dropped from 10.2-8.8.  Lipase has improved to 49.    Assessment/Plan   Assessment / Plan     Brief Patient  Summary:  Josephine Wolf is a 81 y.o. female who presented to the ED with complaints of a significant migraine was subsequently admitted for further evaluation and medical observation.    Active Hospital Problems:  Active Hospital Problems    Diagnosis    • Abdominal pain      Plan:      Headache  -Resolved after meds in ER  -CT head negative for acute findings     Abdominal pain/Vomiting  -resolved after meds in ER  -IV NS @ 100ml/hr  -PO pepcid ordered  -GI consult order placed  Lipase initially elevated has since resolved     Anemia  We will recheck CBC-hemoglobin 9.4 likely relative to hemodilution.    DVT prophylaxis:  Mechanical DVT prophylaxis orders are present.    CODE STATUS:   Level Of Support Discussed With: Patient  Code Status: CPR  Medical Interventions (Level of Support Prior to Arrest): Full    Disposition:  I expect patient to be discharged today.  The services discharge summary    Electronically signed by LG Singh, 10/19/21, 10:57 AM EDT.

## 2021-10-19 NOTE — CASE MANAGEMENT/SOCIAL WORK
Discharge Planning Assessment  [facility]     Patient Name: Josephine Wolf  MRN: [id number]  Today's Date: 10/19/2021    Admit Date: 10/18/2021     Discharge Needs Assessment    No documentation.                Discharge Plan     Row Name 10/19/21 1156       Plan    Final Discharge Disposition Code 01 - home or self-care    Final Note D/C home via               Continued Care and Services - Admitted Since 10/18/2021    Coordination has not been started for this encounter.       Expected Discharge Date and Time     Expected Discharge Date Expected Discharge Time    Oct 19, 2021          Demographic Summary    No documentation.                Functional Status    No documentation.                Psychosocial    No documentation.                Abuse/Neglect    No documentation.                Legal    No documentation.                Substance Abuse    No documentation.                Patient Forms    No documentation.                   Nurys Gerard RN

## 2021-10-19 NOTE — PLAN OF CARE
Goal Outcome Evaluation:  Plan of Care Reviewed With: patient   Pt rested well through the night, pt was pain free and had no complaints of nausea or vomiting. Pt to follow up with GI in the AM, vitals remained stable, RN to continue to monitor.      Progress: improving

## 2021-10-19 NOTE — PROGRESS NOTES
Clinical Pharmacy Services: Medication History    Josephine Wolf is a 81 y.o. female presenting to [facility] for   Chief Complaint   Patient presents with   • Nausea   • Headache       She  has a past medical history of Acid reflux, Acute kidney injury (HCC), Anemia, Arthritis, Bipolar 1 disorder, depressed (HCC), Cataract, Chronic nausea, Chronic pain of right knee, Continuous leakage of urine, COPD (chronic obstructive pulmonary disease) ([facility]), Disease of thyroid gland, Diverticulosis, Fibromyalgia, Fibromyalgia, primary, Frequent episodes of bronchitis, HL (hearing loss), Hypertension, Hypothyroidism, Memory loss, Migraines, Neck pain, On home oxygen therapy, Orthostatic hypotension, Short of breath on exertion, Sleep apnea, and Stage 3 chronic kidney disease ([facility]).    Allergies as of 10/18/2021 - Reviewed 10/18/2021   Allergen Reaction Noted   • Morphine and related Hallucinations 07/03/2016       Medication information was obtained from: Pharmacies   Pharmacy and Phone Number: [facility] [phone]  [facility] [phone]    Prior to Admission Medications     Prescriptions Last Dose Informant Patient Reported? Taking?    albuterol (PROVENTIL) (2.5 MG/3ML) 0.083% nebulizer solution 10/17/2021 Pharmacy Yes Yes    Take 2.5 mg by nebulization Every 4 (Four) Hours As Needed.    allopurinol (ZYLOPRIM) 100 MG tablet 10/18/2021 Pharmacy Yes Yes    Take 100 mg by mouth Daily.    budesonide-formoterol (SYMBICORT) 160-4.5 MCG/ACT inhaler 10/17/2021 Pharmacy No Yes    Inhale 2 puffs 2 (Two) Times a Day.    bumetanide (BUMEX) 1 MG tablet 10/18/2021 Pharmacy No Yes    Take 1 tablet by mouth Daily.    busPIRone (BUSPAR) 7.5 MG tablet 10/17/2021 Pharmacy Yes Yes    Take 7.5 mg by mouth 2 (two) times a day.    carBAMazepine XR (TEGretol  XR) 100 MG 12 hr tablet 10/18/2021 Pharmacy Yes Yes    Take 200 mg by mouth 2 (Two) Times a Day.    dicyclomine (BENTYL) 20 MG tablet  Pharmacy Yes Yes    Take 40 mg by mouth  3 (Three) Times a Day.    divalproex (DEPAKOTE) 250 MG 24 hr tablet 10/18/2021 Self Yes Yes    Take 250 mg by mouth Every Morning.    DULoxetine (CYMBALTA) 60 MG capsule 10/18/2021 Pharmacy Yes Yes    Take 60 mg by mouth 2 (Two) Times a Day.    gabapentin (NEURONTIN) 300 MG capsule 10/18/2021 Pharmacy Yes Yes    Take 300 mg by mouth 3 (Three) Times a Day.    HYDROcodone-acetaminophen (NORCO) 7.5-325 MG per tablet 10/18/2021 Pharmacy Yes Yes    Take 1 tablet by mouth Every 8 (Eight) Hours As Needed.    ipratropium-albuterol (DUO-NEB) 0.5-2.5 mg/3 ml nebulizer 10/18/2021 Pharmacy Yes Yes    Take 3 mL by nebulization Every 4 (Four) Hours As Needed for Wheezing.    levothyroxine (SYNTHROID, LEVOTHROID) 75 MCG tablet  Pharmacy Yes Yes    Take 75 mcg by mouth Daily.    metoprolol succinate XL (TOPROL-XL) 25 MG 24 hr tablet 10/18/2021 Pharmacy No Yes    Take 0.5 tablets by mouth Daily.    ondansetron (ZOFRAN) 4 MG tablet 10/18/2021 Pharmacy Yes Yes    Take 4 mg by mouth Every 8 (Eight) Hours As Needed.    potassium chloride (MICRO-K) 10 MEQ CR capsule 10/18/2021 Pharmacy No Yes    Take 1 capsule by mouth Daily.    QUEtiapine (SEROquel) 50 MG tablet 10/17/2021 Pharmacy Yes Yes    Take 100 mg by mouth every night at bedtime.    rOPINIRole (REQUIP) 0.25 MG tablet  Pharmacy Yes Yes    Take 0.25 mg by mouth Every Night.    Umeclidinium Bromide (INCRUSE ELLIPTA) 62.5 MCG/INH aerosol powder  10/17/2021 Pharmacy Yes Yes    Inhale 1 puff Daily.    vitamin B-12 (CYANOCOBALAMIN) 1000 MCG tablet 10/17/2021 Pharmacy Yes Yes    Take 1,000 mcg by mouth Daily.            Medication notes:     This medication list is complete to the best of my knowledge as of 10/19/2021    Please call if questions.    Wayne Palencia  Medication History Technician  [phone]    10/19/2021 09:45 EDT

## 2021-10-26 ENCOUNTER — IMMUNIZATION (OUTPATIENT)
Dept: VACCINE CLINIC | Facility: HOSPITAL | Age: 81
End: 2021-10-26

## 2021-10-26 PROCEDURE — 91300 HC SARSCOV02 VAC 30MCG/0.3ML IM: CPT | Performed by: INTERNAL MEDICINE

## 2021-10-26 PROCEDURE — 0004A ADM SARSCOV2 30MCG/0.3ML BOOSTER: CPT | Performed by: INTERNAL MEDICINE

## 2021-11-08 LAB
ALBUMIN SERPL-MCNC: 4.4 G/DL (ref 3.5–5.2)
ALBUMIN/GLOB SERPL: 1.7 G/DL
ALP SERPL-CCNC: 95 U/L (ref 39–117)
ALT SERPL W P-5'-P-CCNC: 13 U/L (ref 1–33)
ANION GAP SERPL CALCULATED.3IONS-SCNC: 9.2 MMOL/L (ref 5–15)
AST SERPL-CCNC: 17 U/L (ref 1–32)
BILIRUB SERPL-MCNC: <0.2 MG/DL (ref 0–1.2)
BUN SERPL-MCNC: 26 MG/DL (ref 8–23)
BUN/CREAT SERPL: 14.4 (ref 7–25)
CALCIUM SPEC-SCNC: 8.4 MG/DL (ref 8.6–10.5)
CHLORIDE SERPL-SCNC: 101 MMOL/L (ref 98–107)
CO2 SERPL-SCNC: 27.8 MMOL/L (ref 22–29)
CREAT SERPL-MCNC: 1.8 MG/DL (ref 0.57–1)
GFR SERPL CREATININE-BSD FRML MDRD: 27 ML/MIN/1.73
GLOBULIN UR ELPH-MCNC: 2.6 GM/DL
GLUCOSE SERPL-MCNC: 92 MG/DL (ref 65–99)
POTASSIUM SERPL-SCNC: 4.7 MMOL/L (ref 3.5–5.2)
PROT SERPL-MCNC: 7 G/DL (ref 6–8.5)
SODIUM SERPL-SCNC: 138 MMOL/L (ref 136–145)

## 2021-11-08 PROCEDURE — 80053 COMPREHEN METABOLIC PANEL: CPT

## 2021-11-08 PROCEDURE — 96375 TX/PRO/DX INJ NEW DRUG ADDON: CPT

## 2021-11-08 PROCEDURE — 96374 THER/PROPH/DIAG INJ IV PUSH: CPT

## 2021-11-08 PROCEDURE — 99283 EMERGENCY DEPT VISIT LOW MDM: CPT

## 2021-11-08 PROCEDURE — 93005 ELECTROCARDIOGRAM TRACING: CPT

## 2021-11-08 PROCEDURE — 93010 ELECTROCARDIOGRAM REPORT: CPT | Performed by: INTERNAL MEDICINE

## 2021-11-08 PROCEDURE — 85025 COMPLETE CBC W/AUTO DIFF WBC: CPT

## 2021-11-08 PROCEDURE — 36415 COLL VENOUS BLD VENIPUNCTURE: CPT

## 2021-11-09 ENCOUNTER — APPOINTMENT (OUTPATIENT)
Dept: CT IMAGING | Facility: HOSPITAL | Age: 81
End: 2021-11-09

## 2021-11-09 ENCOUNTER — HOSPITAL ENCOUNTER (EMERGENCY)
Facility: HOSPITAL | Age: 81
Discharge: HOME | End: 2021-11-09
Attending: EMERGENCY MEDICINE | Admitting: EMERGENCY MEDICINE

## 2021-11-09 VITALS
OXYGEN SATURATION: 96 % | TEMPERATURE: 96.9 F | HEART RATE: 65 BPM | SYSTOLIC BLOOD PRESSURE: 108 MMHG | RESPIRATION RATE: 16 BRPM | DIASTOLIC BLOOD PRESSURE: 78 MMHG

## 2021-11-09 DIAGNOSIS — R51.9 ACUTE NONINTRACTABLE HEADACHE, UNSPECIFIED HEADACHE TYPE: Primary | ICD-10-CM

## 2021-11-09 DIAGNOSIS — I10 HYPERTENSION, UNSPECIFIED TYPE: ICD-10-CM

## 2021-11-09 LAB
BASOPHILS # BLD AUTO: 0.04 10*3/MM3 (ref 0–0.2)
BASOPHILS NFR BLD AUTO: 0.7 % (ref 0–1.5)
DEPRECATED RDW RBC AUTO: 50.7 FL (ref 37–54)
EOSINOPHIL # BLD AUTO: 0.13 10*3/MM3 (ref 0–0.4)
EOSINOPHIL NFR BLD AUTO: 2.1 % (ref 0.3–6.2)
ERYTHROCYTE [DISTWIDTH] IN BLOOD BY AUTOMATED COUNT: 13.7 % (ref 12.3–15.4)
HCT VFR BLD AUTO: 31.3 % (ref 34–46.6)
HGB BLD-MCNC: 10.4 G/DL (ref 12–15.9)
HOLD SPECIMEN: NORMAL
HOLD SPECIMEN: NORMAL
LYMPHOCYTES # BLD AUTO: 2.06 10*3/MM3 (ref 0.7–3.1)
LYMPHOCYTES NFR BLD AUTO: 33.8 % (ref 19.6–45.3)
MCH RBC QN AUTO: 32.9 PG (ref 26.6–33)
MCHC RBC AUTO-ENTMCNC: 33.2 G/DL (ref 31.5–35.7)
MCV RBC AUTO: 99.1 FL (ref 79–97)
MONOCYTES # BLD AUTO: 0.62 10*3/MM3 (ref 0.1–0.9)
MONOCYTES NFR BLD AUTO: 10.2 % (ref 5–12)
NEUTROPHILS NFR BLD AUTO: 3.21 10*3/MM3 (ref 1.7–7)
NEUTROPHILS NFR BLD AUTO: 52.5 % (ref 42.7–76)
PLATELET # BLD AUTO: 191 10*3/MM3 (ref 140–450)
PMV BLD AUTO: 9.9 FL (ref 6–12)
QT INTERVAL: 388 MS
RBC # BLD AUTO: 3.16 10*6/MM3 (ref 3.77–5.28)
WBC # BLD AUTO: 6.1 10*3/MM3 (ref 3.4–10.8)
WHOLE BLOOD HOLD SPECIMEN: NORMAL
WHOLE BLOOD HOLD SPECIMEN: NORMAL

## 2021-11-09 PROCEDURE — 70450 CT HEAD/BRAIN W/O DYE: CPT

## 2021-11-09 PROCEDURE — 96374 THER/PROPH/DIAG INJ IV PUSH: CPT

## 2021-11-09 PROCEDURE — 25010000002 KETOROLAC TROMETHAMINE PER 15 MG: Performed by: EMERGENCY MEDICINE

## 2021-11-09 PROCEDURE — 25010000002 HYDROMORPHONE PER 4 MG: Performed by: EMERGENCY MEDICINE

## 2021-11-09 PROCEDURE — 25010000002 HYDRALAZINE PER 20 MG: Performed by: EMERGENCY MEDICINE

## 2021-11-09 PROCEDURE — 25010000002 DIPHENHYDRAMINE PER 50 MG: Performed by: EMERGENCY MEDICINE

## 2021-11-09 PROCEDURE — 25010000002 ONDANSETRON PER 1 MG: Performed by: EMERGENCY MEDICINE

## 2021-11-09 PROCEDURE — 25010000002 PROCHLORPERAZINE 10 MG/2ML SOLUTION: Performed by: EMERGENCY MEDICINE

## 2021-11-09 PROCEDURE — 96375 TX/PRO/DX INJ NEW DRUG ADDON: CPT

## 2021-11-09 RX ORDER — PROCHLORPERAZINE EDISYLATE 5 MG/ML
5 INJECTION INTRAMUSCULAR; INTRAVENOUS EVERY 6 HOURS PRN
Status: DISCONTINUED | OUTPATIENT
Start: 2021-11-09 | End: 2021-11-09 | Stop reason: HOSPADM

## 2021-11-09 RX ORDER — SODIUM CHLORIDE 0.9 % (FLUSH) 0.9 %
10 SYRINGE (ML) INJECTION AS NEEDED
Status: DISCONTINUED | OUTPATIENT
Start: 2021-11-09 | End: 2021-11-09 | Stop reason: HOSPADM

## 2021-11-09 RX ORDER — ONDANSETRON 2 MG/ML
4 INJECTION INTRAMUSCULAR; INTRAVENOUS ONCE
Status: COMPLETED | OUTPATIENT
Start: 2021-11-09 | End: 2021-11-09

## 2021-11-09 RX ORDER — HYDRALAZINE HYDROCHLORIDE 20 MG/ML
10 INJECTION INTRAMUSCULAR; INTRAVENOUS ONCE
Status: COMPLETED | OUTPATIENT
Start: 2021-11-09 | End: 2021-11-09

## 2021-11-09 RX ORDER — KETOROLAC TROMETHAMINE 15 MG/ML
15 INJECTION, SOLUTION INTRAMUSCULAR; INTRAVENOUS ONCE
Status: COMPLETED | OUTPATIENT
Start: 2021-11-09 | End: 2021-11-09

## 2021-11-09 RX ORDER — DIPHENHYDRAMINE HYDROCHLORIDE 50 MG/ML
25 INJECTION INTRAMUSCULAR; INTRAVENOUS ONCE
Status: COMPLETED | OUTPATIENT
Start: 2021-11-09 | End: 2021-11-09

## 2021-11-09 RX ORDER — HYDROMORPHONE HYDROCHLORIDE 1 MG/ML
0.25 INJECTION, SOLUTION INTRAMUSCULAR; INTRAVENOUS; SUBCUTANEOUS ONCE
Status: COMPLETED | OUTPATIENT
Start: 2021-11-09 | End: 2021-11-09

## 2021-11-09 RX ADMIN — HYDRALAZINE HYDROCHLORIDE 10 MG: 20 INJECTION INTRAMUSCULAR; INTRAVENOUS at 01:56

## 2021-11-09 RX ADMIN — KETOROLAC TROMETHAMINE 15 MG: 15 INJECTION, SOLUTION INTRAMUSCULAR; INTRAVENOUS at 01:51

## 2021-11-09 RX ADMIN — HYDROMORPHONE HYDROCHLORIDE 0.25 MG: 1 INJECTION, SOLUTION INTRAMUSCULAR; INTRAVENOUS; SUBCUTANEOUS at 01:52

## 2021-11-09 RX ADMIN — DIPHENHYDRAMINE HYDROCHLORIDE 25 MG: 50 INJECTION, SOLUTION INTRAMUSCULAR; INTRAVENOUS at 03:19

## 2021-11-09 RX ADMIN — ONDANSETRON 4 MG: 2 INJECTION INTRAMUSCULAR; INTRAVENOUS at 01:49

## 2021-11-09 RX ADMIN — SODIUM CHLORIDE 500 ML: 9 INJECTION, SOLUTION INTRAVENOUS at 01:46

## 2021-11-09 RX ADMIN — PROCHLORPERAZINE EDISYLATE 5 MG: 5 INJECTION INTRAMUSCULAR; INTRAVENOUS at 03:18

## 2021-11-09 NOTE — ED TRIAGE NOTES
Pt was brought in by ems from home for elevated blood pressure x2 with headache. Reports chest burning 25mins ago.     Pt wearing mask on arrival. Staff wearing mask and goggles at time of triage.

## 2021-11-09 NOTE — ED PROVIDER NOTES
EMERGENCY DEPARTMENT ENCOUNTER    CHIEF COMPLAINT  Chief Complaint: Headache/hypertension  History given by: Patient  History limited by: None  Room Number: 13/13  PMD: Bill Martino MD      HPI:  Pt is a 81 y.o. female who presents complaining of sudden onset of a headache that began yesterday that has been steadily worsening throughout the day today.  She describes the headache as a dull and aching sensation to the the entirety of the head.  She feels as though it is mostly concentrated in the bifrontal regions of her head however.  She states that she does have some associated nausea but she denies vomiting, photophobia/phonophobia, neck pain, chest pain, or shortness of breath.    Location: diffuse head  Radiation: none  Intensity/Severity: moderate  Progression: worsening  Associated Symptoms: hypertension  Aggravating Factors: none  Alleviating Factors: none  Previous Episodes: yes, previous headaches  Treatment before arrival: tylenol without improvement    PAST MEDICAL HISTORY  Active Ambulatory Problems     Diagnosis Date Noted   • Anemia 10/19/2017   • OA (osteoarthritis) of knee 11/16/2017   • Chronic respiratory failure with hypoxia ([facility]) 12/02/2017   • Cellulitis of skin 12/06/2017   • Acute kidney injury ([facility]) 12/06/2017   • Sepsis ([facility]) 12/06/2017   • Orthostatic hypotension 06/24/2018   • Disease of thyroid gland 06/24/2018   • Hypertension 06/24/2018   • Dehydration 06/24/2018   • Stage 3 chronic kidney disease (CMS/[facility]) 06/25/2018   • Closed compression fracture of L1 lumbar vertebra 06/16/2019   • Hyponatremia 06/16/2019   • Osteoporosis with pathological fracture 06/17/2019   • Acute on chronic respiratory failure with hypoxia and hypercapnia ([facility]) 03/12/2020   • Obesity (BMI 30-39.9) 03/12/2020   • Nocturnal hypoxia 03/13/2020   • CKD (chronic kidney disease) stage 2, GFR 60-89 ml/min 03/13/2020   • Acute on chronic respiratory failure with hypoxia ([facility]) 05/20/2020   • Abdominal pain  10/18/2021     Resolved Ambulatory Problems     Diagnosis Date Noted   • COPD with acute exacerbation (HCC) 2018     Past Medical History:   Diagnosis Date   • Acid reflux    • Arthritis    • Bipolar 1 disorder, depressed (HCC)    • Cataract    • Chronic nausea    • Chronic pain of right knee    • Continuous leakage of urine    • COPD (chronic obstructive pulmonary disease) (HCC)    • Diverticulosis    • Fibromyalgia    • Fibromyalgia, primary    • Frequent episodes of bronchitis    • HL (hearing loss)    • Hypothyroidism    • Memory loss    • Migraines    • Neck pain    • On home oxygen therapy    • Short of breath on exertion    • Sleep apnea        PAST SURGICAL HISTORY  Past Surgical History:   Procedure Laterality Date   • CEREBRAL ANEURYSM REPAIR      WITH STENT   • HYSTERECTOMY     • LAPAROSCOPIC CHOLECYSTECTOMY     • [place] TOTAL KNEE ARTHROPLASTY Right 2017    Procedure: RT TOTAL KNEE ARTHROPLASTY;  Surgeon: Kashif Perez MD;  Location: [facility];  Service: Orthopedics       FAMILY HISTORY  Family History   Problem Relation Age of Onset   • Malig Hyperthermia Neg Hx        SOCIAL HISTORY  Social History     Socioeconomic History   • Marital status:    Tobacco Use   • Smoking status: Former Smoker     Packs/day: 1.00     Years: 10.00     Pack years: 10.00     Types: Cigarettes     Start date:      Quit date:      Years since quittin.8   • Smokeless tobacco: Never Used   Vaping Use   • Vaping Use: Never used   Substance and Sexual Activity   • Alcohol use: No     Comment: CAFFEINE NO    • Drug use: No   • Sexual activity: Defer       ALLERGIES  Morphine and related    REVIEW OF SYSTEMS  Review of Systems   Constitutional: Negative for fever.   HENT: Negative for sore throat.    Eyes: Negative.    Respiratory: Negative for cough and shortness of breath.    Cardiovascular: Negative for chest pain.   Gastrointestinal: Positive for nausea. Negative for abdominal pain,  diarrhea and vomiting.   Genitourinary: Negative for dysuria.   Musculoskeletal: Negative for neck pain.   Skin: Negative for rash.   Allergic/Immunologic: Negative.    Neurological: Positive for headaches. Negative for weakness and numbness.   Hematological: Negative.    Psychiatric/Behavioral: Negative.    All other systems reviewed and are negative.      PHYSICAL EXAM  ED Triage Vitals   Temp Heart Rate Resp BP SpO2   11/08/21 2246 11/08/21 2243 11/08/21 2243 11/08/21 2243 11/08/21 2243   96.9 °F (36.1 °C) 68 16 (!) 200/100 97 %      Temp src Heart Rate Source Patient Position BP Location FiO2 (%)   11/08/21 2246 11/08/21 2243 11/08/21 2338 11/08/21 2338 --   Temporal Monitor Sitting Left arm        Physical Exam  Vitals and nursing note reviewed.   Constitutional:       General: She is not in acute distress.  HENT:      Head: Normocephalic and atraumatic.   Eyes:      Pupils: Pupils are equal, round, and reactive to light.   Cardiovascular:      Rate and Rhythm: Normal rate and regular rhythm.      Heart sounds: Normal heart sounds.   Pulmonary:      Effort: Pulmonary effort is normal. No respiratory distress.      Breath sounds: Normal breath sounds.   Abdominal:      Palpations: Abdomen is soft.      Tenderness: There is no abdominal tenderness. There is no guarding or rebound.   Musculoskeletal:         General: Normal range of motion.      Cervical back: Normal range of motion and neck supple.   Skin:     General: Skin is warm and dry.      Findings: No rash.   Neurological:      Mental Status: She is alert and oriented to person, place, and time.      Sensory: Sensation is intact.   Psychiatric:         Mood and Affect: Mood and affect normal.         LAB RESULTS  Lab Results (last 24 hours)     Procedure Component Value Units Date/Time    CBC & Differential [[id number]]  (Abnormal) Collected: 11/08/21 2313    Specimen: Blood from Arm, Left Updated: 11/09/21 0242    Narrative:      The following orders were  created for panel order CBC & Differential.  Procedure                               Abnormality         Status                     ---------                               -----------         ------                     CBC Auto Differential[[id number]]        Abnormal            Final result                 Please view results for these tests on the individual orders.    Comprehensive Metabolic Panel [[id number]]  (Abnormal) Collected: 11/08/21 2313    Specimen: Blood from Arm, Left Updated: 11/08/21 2348     Glucose 92 mg/dL      BUN 26 mg/dL      Creatinine 1.80 mg/dL      Sodium 138 mmol/L      Potassium 4.7 mmol/L      Chloride 101 mmol/L      CO2 27.8 mmol/L      Calcium 8.4 mg/dL      Total Protein 7.0 g/dL      Albumin 4.40 g/dL      ALT (SGPT) 13 U/L      AST (SGOT) 17 U/L      Alkaline Phosphatase 95 U/L      Total Bilirubin <0.2 mg/dL      eGFR Non African Amer 27 mL/min/1.73      Globulin 2.6 gm/dL      A/G Ratio 1.7 g/dL      BUN/Creatinine Ratio 14.4     Anion Gap 9.2 mmol/L     Narrative:      GFR Normal >60  Chronic Kidney Disease <60  Kidney Failure <15      CBC Auto Differential [[id number]]  (Abnormal) Collected: 11/08/21 2313    Specimen: Blood from Arm, Left Updated: 11/09/21 0242     WBC 6.10 10*3/mm3      RBC 3.16 10*6/mm3      Hemoglobin 10.4 g/dL      Hematocrit 31.3 %      MCV 99.1 fL      MCH 32.9 pg      MCHC 33.2 g/dL      RDW 13.7 %      RDW-SD 50.7 fl      MPV 9.9 fL      Platelets 191 10*3/mm3      Neutrophil % 52.5 %      Lymphocyte % 33.8 %      Monocyte % 10.2 %      Eosinophil % 2.1 %      Basophil % 0.7 %      Neutrophils, Absolute 3.21 10*3/mm3      Lymphocytes, Absolute 2.06 10*3/mm3      Monocytes, Absolute 0.62 10*3/mm3      Eosinophils, Absolute 0.13 10*3/mm3      Basophils, Absolute 0.04 10*3/mm3           I ordered the above labs and reviewed the results    RADIOLOGY  CT Head Without Contrast   Final Result         Electronically signed by Cindy Hwang M.D. on 11-09-21 at  0221           I ordered the above noted radiological studies. Interpreted by radiologist.  Reviewed by me in PACS.       PROCEDURES  Procedures  EKG          EKG time: 2254  Rhythm/Rate: NSR, 62  P waves and SD: nml  QRS, axis: nml, nml   ST and T waves: nml     Interpreted Contemporaneously by me, independently viewed  unchanged compared to prior 10/18/21      PROGRESS AND CONSULTS     The patient was wearing a facemask upon entrance into the room and remained in such throughout their visit.  I was wearing PPE including a facemask, eye protection, as well as gloves at any point entering the room and throughout the visit    0255  On reevaluation, the patient is resting comfortably and states that her headache has significantly improved.  She states that it is still somewhat present but improved.  Her blood pressure has also significantly improved.  I did inform her that her labs as well as CT scan of the head are unremarkable.  We will give a bit more medication to improve her headache and she will be stable for discharge.  All questions have been answered and the patient is in agreement with the plan.      MEDICAL DECISION MAKING  Results were reviewed/discussed with the patient and they were also made aware of online access. Pt also made aware that some labs, such as cultures, will not be resulted during ER visit and follow up with PMD is necessary.     MDM  Number of Diagnoses or Management Options     Amount and/or Complexity of Data Reviewed  Clinical lab tests: ordered and reviewed  Tests in the radiology section of CPT®: ordered and reviewed  Tests in the medicine section of CPT®: ordered and reviewed  Review and summarize past medical records: yes (Upon medical records review, the patient was last seen and evaluated on October 18, 2021 secondary to generalized abdominal pain with associated nausea.)  Independent visualization of images, tracings, or specimens: yes (Unremarkable CT scan of the head)            DIAGNOSIS  Final diagnoses:   Acute nonintractable headache, unspecified headache type   Hypertension, unspecified type       DISPOSITION  DISCHARGE    Patient discharged in stable condition.    Reviewed implications of results, diagnosis, meds, responsibility to follow up, warning signs and symptoms of possible worsening, potential complications and reasons to return to ER.    Patient/Family voiced understanding of above instructions.    Discussed plan for discharge, as there is no emergent indication for admission. Patient referred to primary care provider for BP management due to today's BP. Pt/family is agreeable and understands need for follow up and repeat testing.  Pt is aware that discharge does not mean that nothing is wrong but it indicates no emergency is present that requires admission and they must continue care with follow-up as given below or physician of their choice.     FOLLOW-UP  Bill Martino MD  [address] [place] [address]  [phone]    Schedule an appointment as soon as possible for a visit            Medication List      No changes were made to your prescriptions during this visit.     '      Latest Documented Vital Signs:  As of 06:21 EST  BP- 108/78 HR- 65 Temp- 96.9 °F (36.1 °C) (Temporal) O2 sat- 96%         Brian Hayes MD  11/09/21 0621

## 2021-11-16 ENCOUNTER — HOSPITAL ENCOUNTER (EMERGENCY)
Facility: HOSPITAL | Age: 81
Discharge: HOME | End: 2021-11-16
Attending: EMERGENCY MEDICINE | Admitting: EMERGENCY MEDICINE

## 2021-11-16 VITALS
HEIGHT: 61 IN | SYSTOLIC BLOOD PRESSURE: 184 MMHG | OXYGEN SATURATION: 97 % | RESPIRATION RATE: 18 BRPM | DIASTOLIC BLOOD PRESSURE: 88 MMHG | TEMPERATURE: 96.9 F | BODY MASS INDEX: 28.7 KG/M2 | HEART RATE: 71 BPM | WEIGHT: 152 LBS

## 2021-11-16 DIAGNOSIS — R10.9 ACUTE ABDOMINAL PAIN: Primary | ICD-10-CM

## 2021-11-16 DIAGNOSIS — I10 HYPERTENSION, UNSPECIFIED TYPE: ICD-10-CM

## 2021-11-16 LAB
ALBUMIN SERPL-MCNC: 4.5 G/DL (ref 3.5–5.2)
ALBUMIN/GLOB SERPL: 1.7 G/DL
ALP SERPL-CCNC: 104 U/L (ref 39–117)
ALT SERPL W P-5'-P-CCNC: 16 U/L (ref 1–33)
ANION GAP SERPL CALCULATED.3IONS-SCNC: 10.3 MMOL/L (ref 5–15)
AST SERPL-CCNC: 22 U/L (ref 1–32)
BASOPHILS # BLD AUTO: 0.04 10*3/MM3 (ref 0–0.2)
BASOPHILS NFR BLD AUTO: 0.6 % (ref 0–1.5)
BILIRUB SERPL-MCNC: 0.2 MG/DL (ref 0–1.2)
BUN SERPL-MCNC: 18 MG/DL (ref 8–23)
BUN/CREAT SERPL: 16.2 (ref 7–25)
CALCIUM SPEC-SCNC: 9.4 MG/DL (ref 8.6–10.5)
CARBAMAZEPINE SERPL-MCNC: 6.9 MCG/ML (ref 4–12)
CHLORIDE SERPL-SCNC: 101 MMOL/L (ref 98–107)
CO2 SERPL-SCNC: 24.7 MMOL/L (ref 22–29)
CREAT SERPL-MCNC: 1.11 MG/DL (ref 0.57–1)
DEPRECATED RDW RBC AUTO: 45.2 FL (ref 37–54)
EOSINOPHIL # BLD AUTO: 0.1 10*3/MM3 (ref 0–0.4)
EOSINOPHIL NFR BLD AUTO: 1.6 % (ref 0.3–6.2)
ERYTHROCYTE [DISTWIDTH] IN BLOOD BY AUTOMATED COUNT: 13.4 % (ref 12.3–15.4)
GFR SERPL CREATININE-BSD FRML MDRD: 47 ML/MIN/1.73
GLOBULIN UR ELPH-MCNC: 2.7 GM/DL
GLUCOSE SERPL-MCNC: 114 MG/DL (ref 65–99)
HCT VFR BLD AUTO: 29.3 % (ref 34–46.6)
HGB BLD-MCNC: 10.3 G/DL (ref 12–15.9)
IMM GRANULOCYTES # BLD AUTO: 0.04 10*3/MM3 (ref 0–0.05)
IMM GRANULOCYTES NFR BLD AUTO: 0.6 % (ref 0–0.5)
LIPASE SERPL-CCNC: 79 U/L (ref 13–60)
LYMPHOCYTES # BLD AUTO: 1.24 10*3/MM3 (ref 0.7–3.1)
LYMPHOCYTES NFR BLD AUTO: 19.5 % (ref 19.6–45.3)
MCH RBC QN AUTO: 33 PG (ref 26.6–33)
MCHC RBC AUTO-ENTMCNC: 35.2 G/DL (ref 31.5–35.7)
MCV RBC AUTO: 93.9 FL (ref 79–97)
MONOCYTES # BLD AUTO: 0.5 10*3/MM3 (ref 0.1–0.9)
MONOCYTES NFR BLD AUTO: 7.8 % (ref 5–12)
NEUTROPHILS NFR BLD AUTO: 4.45 10*3/MM3 (ref 1.7–7)
NEUTROPHILS NFR BLD AUTO: 69.9 % (ref 42.7–76)
NRBC BLD AUTO-RTO: 0 /100 WBC (ref 0–0.2)
PLATELET # BLD AUTO: 211 10*3/MM3 (ref 140–450)
PMV BLD AUTO: 9.2 FL (ref 6–12)
POTASSIUM SERPL-SCNC: 4.5 MMOL/L (ref 3.5–5.2)
PROT SERPL-MCNC: 7.2 G/DL (ref 6–8.5)
RBC # BLD AUTO: 3.12 10*6/MM3 (ref 3.77–5.28)
SODIUM SERPL-SCNC: 136 MMOL/L (ref 136–145)
TROPONIN T SERPL-MCNC: <0.01 NG/ML (ref 0–0.03)
VALPROATE SERPL-MCNC: <2.8 MCG/ML (ref 50–125)
WBC # BLD AUTO: 6.37 10*3/MM3 (ref 3.4–10.8)

## 2021-11-16 PROCEDURE — 25010000002 DIPHENHYDRAMINE PER 50 MG: Performed by: EMERGENCY MEDICINE

## 2021-11-16 PROCEDURE — 80164 ASSAY DIPROPYLACETIC ACD TOT: CPT | Performed by: EMERGENCY MEDICINE

## 2021-11-16 PROCEDURE — 96375 TX/PRO/DX INJ NEW DRUG ADDON: CPT

## 2021-11-16 PROCEDURE — 93010 ELECTROCARDIOGRAM REPORT: CPT | Performed by: INTERNAL MEDICINE

## 2021-11-16 PROCEDURE — 96374 THER/PROPH/DIAG INJ IV PUSH: CPT

## 2021-11-16 PROCEDURE — 99283 EMERGENCY DEPT VISIT LOW MDM: CPT

## 2021-11-16 PROCEDURE — 83690 ASSAY OF LIPASE: CPT | Performed by: EMERGENCY MEDICINE

## 2021-11-16 PROCEDURE — 25010000002 PROCHLORPERAZINE 10 MG/2ML SOLUTION: Performed by: EMERGENCY MEDICINE

## 2021-11-16 PROCEDURE — 80053 COMPREHEN METABOLIC PANEL: CPT | Performed by: EMERGENCY MEDICINE

## 2021-11-16 PROCEDURE — 80156 ASSAY CARBAMAZEPINE TOTAL: CPT | Performed by: EMERGENCY MEDICINE

## 2021-11-16 PROCEDURE — 84484 ASSAY OF TROPONIN QUANT: CPT | Performed by: EMERGENCY MEDICINE

## 2021-11-16 PROCEDURE — 93005 ELECTROCARDIOGRAM TRACING: CPT | Performed by: EMERGENCY MEDICINE

## 2021-11-16 PROCEDURE — 85025 COMPLETE CBC W/AUTO DIFF WBC: CPT | Performed by: EMERGENCY MEDICINE

## 2021-11-16 RX ORDER — DIPHENHYDRAMINE HYDROCHLORIDE 50 MG/ML
25 INJECTION INTRAMUSCULAR; INTRAVENOUS ONCE
Status: COMPLETED | OUTPATIENT
Start: 2021-11-16 | End: 2021-11-16

## 2021-11-16 RX ORDER — SODIUM CHLORIDE 0.9 % (FLUSH) 0.9 %
10 SYRINGE (ML) INJECTION AS NEEDED
Status: DISCONTINUED | OUTPATIENT
Start: 2021-11-16 | End: 2021-11-17 | Stop reason: HOSPADM

## 2021-11-16 RX ORDER — PROCHLORPERAZINE EDISYLATE 5 MG/ML
10 INJECTION INTRAMUSCULAR; INTRAVENOUS ONCE
Status: COMPLETED | OUTPATIENT
Start: 2021-11-16 | End: 2021-11-16

## 2021-11-16 RX ORDER — HYDRALAZINE HYDROCHLORIDE 20 MG/ML
10 INJECTION INTRAMUSCULAR; INTRAVENOUS ONCE
Status: DISCONTINUED | OUTPATIENT
Start: 2021-11-16 | End: 2021-11-16

## 2021-11-16 RX ORDER — PROCHLORPERAZINE MALEATE 10 MG
10 TABLET ORAL EVERY 6 HOURS PRN
Qty: 12 TABLET | Refills: 0 | Status: SHIPPED | OUTPATIENT
Start: 2021-11-16

## 2021-11-16 RX ADMIN — PROCHLORPERAZINE EDISYLATE 10 MG: 5 INJECTION INTRAMUSCULAR; INTRAVENOUS at 21:30

## 2021-11-16 RX ADMIN — DIPHENHYDRAMINE HYDROCHLORIDE 25 MG: 50 INJECTION, SOLUTION INTRAMUSCULAR; INTRAVENOUS at 21:57

## 2021-11-17 LAB — QT INTERVAL: 387 MS

## 2021-11-17 NOTE — DISCHARGE INSTRUCTIONS
Use Compazine as needed for your nausea.  You may choose to take this with Benadryl to help it work.  Return here for nausea or vomiting that is not responsive to medication.  Return here for increased pain in your abdomen or head.  Follow-up with your primary care doctor regarding your high blood pressure.

## 2021-11-17 NOTE — ED PROVIDER NOTES
EMERGENCY DEPARTMENT ENCOUNTER    Room Number:  16/16  Date of encounter:  11/16/2021  PCP: Bill Martino MD  Patient Care Team:  Bill Martino MD as PCP - General (Internal Medicine)  Avi Aly MD as Consulting Physician (Nephrology)   Historian: Patient    HPI:  Chief Complaint: Abdominal pain, vomiting  A complete HPI/ROS/PMH/PSH/SH/FH are unobtainable due to: Nothing    Context: Josephine Wolf is a 81 y.o. female who presents to the ED c/o abdominal pain, vomiting that started today.  She reports nausea and vomiting started first.  She states her blood pressure increased at the same time.  She states she took nausea medicine but it did not help.  She states that she then developed upper abdominal pain.  She reports the pain is in her upper abdomen.  She was given Zofran by EMS without improvement.  She reports a mild headache with it as well.  She states she has had similar episodes in the past and come to this hospital.  Nothing seems to make it worse or better.  She denies diarrhea.    Prior record review: ER visit 10/18/2021 for similar presentation.  ER visit 11/9/2021 with headache.    PAST MEDICAL HISTORY  Active Ambulatory Problems     Diagnosis Date Noted   • Anemia 10/19/2017   • OA (osteoarthritis) of knee 11/16/2017   • Chronic respiratory failure with hypoxia (HCC) 12/02/2017   • Cellulitis of skin 12/06/2017   • Acute kidney injury (HCC) 12/06/2017   • Sepsis ([facility]) 12/06/2017   • Orthostatic hypotension 06/24/2018   • Disease of thyroid gland 06/24/2018   • Hypertension 06/24/2018   • Dehydration 06/24/2018   • Stage 3 chronic kidney disease (CMS/HCC) 06/25/2018   • Closed compression fracture of L1 lumbar vertebra 06/16/2019   • Hyponatremia 06/16/2019   • Osteoporosis with pathological fracture 06/17/2019   • Acute on chronic respiratory failure with hypoxia and hypercapnia (HCC) 03/12/2020   • Obesity (BMI 30-39.9) 03/12/2020   • Nocturnal hypoxia 03/13/2020   •  CKD (chronic kidney disease) stage 2, GFR 60-89 ml/min 2020   • Acute on chronic respiratory failure with hypoxia ([facility]) 2020   • Abdominal pain 10/18/2021     Resolved Ambulatory Problems     Diagnosis Date Noted   • COPD with acute exacerbation ([facility]) 2018     Past Medical History:   Diagnosis Date   • Acid reflux    • Arthritis    • Bipolar 1 disorder, depressed ([facility])    • Cataract    • Chronic nausea    • Chronic pain of right knee    • Continuous leakage of urine    • COPD (chronic obstructive pulmonary disease) ([facility])    • Diverticulosis    • Fibromyalgia    • Fibromyalgia, primary    • Frequent episodes of bronchitis    • HL (hearing loss)    • Hypothyroidism    • Memory loss    • Migraines    • Neck pain    • On home oxygen therapy    • Short of breath on exertion    • Sleep apnea        The patient has a COVID HM Topic on their chart, and they are fully vaccinated.    PAST SURGICAL HISTORY  Past Surgical History:   Procedure Laterality Date   • CEREBRAL ANEURYSM REPAIR      WITH STENT   • HYSTERECTOMY     • LAPAROSCOPIC CHOLECYSTECTOMY     • [place] TOTAL KNEE ARTHROPLASTY Right 2017    Procedure: RT TOTAL KNEE ARTHROPLASTY;  Surgeon: Kashif Preez MD;  Location: [facility];  Service: Orthopedics         FAMILY HISTORY  Family History   Problem Relation Age of Onset   • Malig Hyperthermia Neg Hx          SOCIAL HISTORY  Social History     Socioeconomic History   • Marital status:    Tobacco Use   • Smoking status: Former Smoker     Packs/day: 1.00     Years: 10.00     Pack years: 10.00     Types: Cigarettes     Start date:      Quit date:      Years since quittin.9   • Smokeless tobacco: Never Used   Vaping Use   • Vaping Use: Never used   Substance and Sexual Activity   • Alcohol use: No     Comment: CAFFEINE NO    • Drug use: No   • Sexual activity: Defer         ALLERGIES  Morphine and related        REVIEW OF SYSTEMS  Review of Systems   Positive  abdominal pain, positive nausea, positive vomiting, negative fever, negative chills, negative chest pain, negative shortness of breath  All systems reviewed and negative except for those discussed in HPI.       PHYSICAL EXAM    I have reviewed the triage vital signs and nursing notes.    ED Triage Vitals   Temp Heart Rate Resp BP SpO2   11/16/21 2027 11/16/21 2027 11/16/21 2027 11/16/21 2027 11/16/21 2027   96.9 °F (36.1 °C) 80 18 170/100 98 %      Temp src Heart Rate Source Patient Position BP Location FiO2 (%)   -- 11/16/21 2041 11/16/21 2041 11/16/21 2041 --    Monitor Lying Left arm        Physical Exam  GENERAL: Awake, alert, no acute distress  SKIN: Warm, dry  HENT: Normocephalic, atraumatic  EYES: no scleral icterus  CV: regular rhythm, regular rate  RESPIRATORY: normal effort, lungs clear  ABDOMEN: soft, mild upper abdominal tenderness diffusely, nondistended  MUSCULOSKELETAL: no deformity  NEURO: alert, moves all extremities, follows commands          LAB RESULTS  Recent Results (from the past 24 hour(s))   ECG 12 Lead    Collection Time: 11/16/21  9:12 PM   Result Value Ref Range    QT Interval 387 ms   Comprehensive Metabolic Panel    Collection Time: 11/16/21  9:57 PM    Specimen: Blood   Result Value Ref Range    Glucose 114 (H) 65 - 99 mg/dL    BUN 18 8 - 23 mg/dL    Creatinine 1.11 (H) 0.57 - 1.00 mg/dL    Sodium 136 136 - 145 mmol/L    Potassium 4.5 3.5 - 5.2 mmol/L    Chloride 101 98 - 107 mmol/L    CO2 24.7 22.0 - 29.0 mmol/L    Calcium 9.4 8.6 - 10.5 mg/dL    Total Protein 7.2 6.0 - 8.5 g/dL    Albumin 4.50 3.50 - 5.20 g/dL    ALT (SGPT) 16 1 - 33 U/L    AST (SGOT) 22 1 - 32 U/L    Alkaline Phosphatase 104 39 - 117 U/L    Total Bilirubin 0.2 0.0 - 1.2 mg/dL    eGFR Non African Amer 47 (L) >60 mL/min/1.73    Globulin 2.7 gm/dL    A/G Ratio 1.7 g/dL    BUN/Creatinine Ratio 16.2 7.0 - 25.0    Anion Gap 10.3 5.0 - 15.0 mmol/L   Lipase    Collection Time: 11/16/21  9:57 PM    Specimen: Blood   Result  Value Ref Range    Lipase 79 (H) 13 - 60 U/L   Carbamazepine Level, Total    Collection Time: 11/16/21  9:57 PM    Specimen: Blood   Result Value Ref Range    Carbamazepine Level 6.9 4.0 - 12.0 mcg/mL   Valproic Acid Level, Total    Collection Time: 11/16/21  9:57 PM    Specimen: Blood   Result Value Ref Range    Valproic Acid <2.8 (L) 50.0 - 125.0 mcg/mL   CBC Auto Differential    Collection Time: 11/16/21  9:57 PM    Specimen: Blood   Result Value Ref Range    WBC 6.37 3.40 - 10.80 10*3/mm3    RBC 3.12 (L) 3.77 - 5.28 10*6/mm3    Hemoglobin 10.3 (L) 12.0 - 15.9 g/dL    Hematocrit 29.3 (L) 34.0 - 46.6 %    MCV 93.9 79.0 - 97.0 fL    MCH 33.0 26.6 - 33.0 pg    MCHC 35.2 31.5 - 35.7 g/dL    RDW 13.4 12.3 - 15.4 %    RDW-SD 45.2 37.0 - 54.0 fl    MPV 9.2 6.0 - 12.0 fL    Platelets 211 140 - 450 10*3/mm3    Neutrophil % 69.9 42.7 - 76.0 %    Lymphocyte % 19.5 (L) 19.6 - 45.3 %    Monocyte % 7.8 5.0 - 12.0 %    Eosinophil % 1.6 0.3 - 6.2 %    Basophil % 0.6 0.0 - 1.5 %    Immature Grans % 0.6 (H) 0.0 - 0.5 %    Neutrophils, Absolute 4.45 1.70 - 7.00 10*3/mm3    Lymphocytes, Absolute 1.24 0.70 - 3.10 10*3/mm3    Monocytes, Absolute 0.50 0.10 - 0.90 10*3/mm3    Eosinophils, Absolute 0.10 0.00 - 0.40 10*3/mm3    Basophils, Absolute 0.04 0.00 - 0.20 10*3/mm3    Immature Grans, Absolute 0.04 0.00 - 0.05 10*3/mm3    nRBC 0.0 0.0 - 0.2 /100 WBC   Troponin    Collection Time: 11/16/21  9:57 PM    Specimen: Blood   Result Value Ref Range    Troponin T <0.010 0.000 - 0.030 ng/mL       Ordered the above labs and independently reviewed the results.        RADIOLOGY  No Radiology Exams Resulted Within Past 24 Hours    I ordered the above noted radiological studies. Reviewed by me and discussed with radiologist.  See dictation for official radiology interpretation.      PROCEDURES    Procedures      MEDICATIONS GIVEN IN ER    Medications   sodium chloride 0.9 % flush 10 mL (has no administration in time range)   diphenhydrAMINE  (BENADRYL) injection 25 mg (25 mg Intravenous Given 11/16/21 2157)   prochlorperazine (COMPAZINE) injection 10 mg (10 mg Intravenous Given 11/16/21 2130)         PROGRESS, DATA ANALYSIS, CONSULTS, AND MEDICAL DECISION MAKING    All labs have been independently reviewed by me.  All radiology studies have been reviewed by me and discussed with radiologist dictating the report.   EKG's independently viewed and interpreted by me.  Discussion below represents my analysis of pertinent findings related to patient's condition, differential diagnosis, treatment plan and final disposition.    Differential diagnosis includes but is not limited to abdominal migraine, hypertension, intra-abdominal infection, bowel obstruction, pancreatitis.    ED Course as of 11/16/21 2302 Tue Nov 16, 2021 2247 I reevaluated the patient.  Her pain is entirely resolved after Benadryl and Compazine.  Her nausea is completely resolved.  I suspect that she may have some element of abdominal migraine given that presents with headache and abdominal pain with nausea that resolves with Compazine and Benadryl.  Her blood pressure is running somewhat high here although with her improvement in pain and symptoms I have canceled the dose of the hydralazine I ordered. [TR]   2252 Carbamazepine Level: 6.9 [TR]   2259 Troponin T: <0.010 [TR]      ED Course User Index  [TR] Gray Iniguez MD           PPE: The patient wore a mask and I wore a N95 mask throughout the entire patient encounter.       AS OF 23:02 EST VITALS:    BP - (!) 184/88  HR - 71  TEMP - 96.9 °F (36.1 °C)  O2 SATS - 97%        DIAGNOSIS  Final diagnoses:   Acute abdominal pain   Hypertension, unspecified type         DISPOSITION  ED Disposition     ED Disposition Condition Comment    Discharge Stable                 Gray Iniguez MD  11/16/21 2302

## 2021-11-17 NOTE — ED TRIAGE NOTES
.Pt masked on arrival, staff masked    Pt from home via ems, called for llq abd pain and dry heaving, started 3:30pm today., zofran 4mg given pta

## 2021-12-21 ENCOUNTER — TELEPHONE (OUTPATIENT)
Dept: NEUROLOGY | Facility: CLINIC | Age: 81
End: 2021-12-21

## 2021-12-21 NOTE — TELEPHONE ENCOUNTER
Tried to call pt son back. Got pt instead. I told her we don't have anything sooner to move apt up. I did tell her could go ahead and  come at 12:40 and we would do our best to try her back to a room at that time. Thank you.

## 2021-12-21 NOTE — TELEPHONE ENCOUNTER
Provider: jovanny  Caller: kristy  Relationship to Patient: son  Phone Number: [phone]  Reason for Call: Jabari pt's son called in to try and see if pt's appt time could be a little bit eariler that day. When trying to see schedule was unable to. malorie advised to send encounter for prieto to see if this can be done.        Please advise.

## 2022-01-07 NOTE — PROGRESS NOTES
"   LOS: 2 days    Patient Care Team:  Bill Martino MD as PCP - General (Internal Medicine)  Avi Aly MD as Consulting Physician (Nephrology)    Chief Complaint:    Chief Complaint   Patient presents with   • Shortness of Breath     Follow-up acute kidney injury on chronic kidney disease  Subjective     Interval History:   Patient is feeling much better since admission, less short of breath, no chest pain, no nausea or vomiting, no dysuria or gross hematuria.  She apparently told the other nephrologist who saw her yesterday that she is a patient of Dr. Patterson, but he totally ignored that  And he reported in his note that he was taking ibuprofen the patient denied that vehemently.      Review of Systems:   As noted above    Objective     Vital Signs  Temp:  [97.9 °F (36.6 °C)-99.6 °F (37.6 °C)] 97.9 °F (36.6 °C)  Heart Rate:  [74-94] 80  Resp:  [16-20] 18  BP: (115-143)/(60-67) 142/67    Flowsheet Rows      First Filed Value   Admission Height  157.5 cm (62\") Documented at 03/12/2020 1001   Admission Weight  84.6 kg (186 lb 9.6 oz) Documented at 03/12/2020 1001          I/O this shift:  In: 480 [P.O.:480]  Out: -   I/O last 3 completed shifts:  In: 1475 [P.O.:480; I.V.:995]  Out: 810 [Urine:810]    Intake/Output Summary (Last 24 hours) at 3/14/2020 1647  Last data filed at 3/14/2020 1508  Gross per 24 hour   Intake 1715 ml   Output --   Net 1715 ml       Physical Exam:  General Appearance: alert, oriented x 3, no acute distress, appears to be chronically ill, obese  Skin: warm and dry  HEENT: pupils round and reactive to light, oral mucosa normal, nonicteric sclera  Neck: supple, no JVD, trachea midline  Lungs: Decreased breath sounds in the bases, unlabored breathing effort  Heart: RRR, normal S1 and S2, no S3, no rub  Abdomen: soft, non-tender,  present bowel sounds to auscultation  : no palpable bladder,  Extremities: no edema, cyanosis or clubbing  Neuro: normal speech and mental " status        Results Review:    Results from last 7 days   Lab Units 03/14/20  0558 03/13/20  0324 03/12/20  1002   SODIUM mmol/L 138 136 138   POTASSIUM mmol/L 4.5 5.0 4.4   CHLORIDE mmol/L 103 96* 97*   CO2 mmol/L 22.2 24.3 26.9   BUN mg/dL 50* 44* 38*   CREATININE mg/dL 1.98* 3.16* 3.10*   CALCIUM mg/dL 8.7 8.1* 8.2*   BILIRUBIN mg/dL  --  0.3 0.2   ALK PHOS U/L  --  97 108   ALT (SGPT) U/L  --  21 23   AST (SGOT) U/L  --  23 27   GLUCOSE mg/dL 123* 168* 105*       Estimated Creatinine Clearance: 24 mL/min (A) (by C-G formula based on SCr of 1.98 mg/dL (H)).    Results from last 7 days   Lab Units 03/14/20  0558   MAGNESIUM mg/dL 2.1   PHOSPHORUS mg/dL 3.3       Results from last 7 days   Lab Units 03/14/20  0558   URIC ACID mg/dL 9.0*       Results from last 7 days   Lab Units 03/14/20  0558 03/13/20  0324 03/12/20  1002   WBC 10*3/mm3 10.43 5.79 7.23   HEMOGLOBIN g/dL 9.3* 9.7* 9.9*   PLATELETS 10*3/mm3 194 179 193               Imaging Results (Last 24 Hours)     Procedure Component Value Units Date/Time    US Renal Bilateral [[id number]] Collected:  03/13/20 1835     Updated:  03/13/20 1839    Narrative:       US RENAL BILATERAL-     INDICATIONS: Acute kidney injury     TECHNIQUE: ULTRASOUND OF THE KIDNEYS AND URINARY BLADDER.     COMPARISON: 12/03/2017     FINDINGS:     The right kidney measures 8.8 cm centimeters, the left kidney measures  10.3 centimeters.     No renal lesion is identified. No hydronephrosis or echogenic  nephrolithiasis.     The urinary bladder is only partly filled, limiting its assessment. No  ureteral jets were observed during the exam. The urinary bladder  otherwise appears unremarkable.       Impression:       No hydronephrosis or echogenic nephrolithiasis.        This report was finalized on 3/13/2020 6:36 PM by Dr. Ross A. Alan,  M.D.             DULoxetine 60 mg Oral BID   enoxaparin 30 mg Subcutaneous Q24H   gabapentin 300 mg Oral TID   guaiFENesin 600 mg Oral Q12H   insulin  lispro 0-14 Units Subcutaneous 4x Daily With Meals & Nightly   ipratropium-albuterol 3 mL Nebulization 4x Daily - RT   [START ON 3/15/2020] levothyroxine 88 mcg Oral QAM AC   predniSONE 40 mg Oral Daily With Breakfast   sodium chloride 10 mL Intravenous Q12H          Medication Review:   Current Facility-Administered Medications   Medication Dose Route Frequency Provider Last Rate Last Dose   • acetaminophen (TYLENOL) tablet 650 mg  650 mg Oral Q6H PRN Ling Pereira MD   650 mg at 03/13/20 2005   • bisacodyl (DULCOLAX) EC tablet 5 mg  5 mg Oral Daily PRN Santiago Mendoza MD       • bisacodyl (DULCOLAX) suppository 10 mg  10 mg Rectal Daily PRN Santiago Mendoza MD       • dextrose (D50W) 25 g/ 50mL Intravenous Solution 25 g  25 g Intravenous Q15 Min PRN Santiago Mendoza MD       • dextrose (GLUTOSE) oral gel 15 g  15 g Oral Q15 Min PRN Santiago Mendoza MD       • DULoxetine (CYMBALTA) DR capsule 60 mg  60 mg Oral BID Car Amato MD       • enoxaparin (LOVENOX) syringe 30 mg  30 mg Subcutaneous Q24H Santiago Mendoza MD   30 mg at 03/13/20 1732   • gabapentin (NEURONTIN) capsule 300 mg  300 mg Oral TID Car Amato MD   300 mg at 03/14/20 1505   • glucagon (human recombinant) (GLUCAGEN DIAGNOSTIC) injection 1 mg  1 mg Subcutaneous Q15 Min PRN Santiago Mendoza MD       • guaiFENesin (MUCINEX) 12 hr tablet 600 mg  600 mg Oral Q12H Santiago Mendoza MD   600 mg at 03/14/20 0935   • insulin lispro (humaLOG) injection 0-14 Units  0-14 Units Subcutaneous 4x Daily With Meals & Nightly Santiago Mendoza MD   3 Units at 03/14/20 1300   • ipratropium-albuterol (DUO-NEB) nebulizer solution 3 mL  3 mL Nebulization Q4H PRN Santiago Mendoza MD       • ipratropium-albuterol (DUO-NEB) nebulizer solution 3 mL  3 mL Nebulization 4x Daily - RT Santiago Mendoza MD   3 mL at 03/14/20 1540   • [START ON 3/15/2020] levothyroxine (SYNTHROID, LEVOTHROID) tablet 88 mcg  88 mcg Oral Car Wilkes MD       • loperamide (IMODIUM) capsule 2 mg  2 mg Oral 4x  Daily PRN Ling Pereira MD       • Magnesium Sulfate 2 gram Bolus, followed by 8 gram infusion (total Mg dose 10 grams)- Mg less than or equal to 1mg/dL  2 g Intravenous PRN Santiago Mendoza MD        Or   • Magnesium Sulfate 2 gram / 50mL Infusion (GIVE X 3 BAGS TO EQUAL 6GM TOTAL DOSE) - Mg 1.1 - 1.5 mg/dl  2 g Intravenous PRN Santiago Mendoza MD        Or   • Magnesium Sulfate 4 gram infusion- Mg 1.6-1.9 mg/dL  4 g Intravenous PRN Santiago Mendoza MD       • nitroglycerin (NITROSTAT) SL tablet 0.4 mg  0.4 mg Sublingual Q5 Min PRN Santiago Mendoza MD       • ondansetron (ZOFRAN) tablet 4 mg  4 mg Oral Q6H PRN Santiago Mendoza MD        Or   • ondansetron (ZOFRAN) injection 4 mg  4 mg Intravenous Q6H PRN Santiago Mendoza MD       • predniSONE (DELTASONE) tablet 40 mg  40 mg Oral Daily With Breakfast Santiago Mendoza MD   40 mg at 03/14/20 0935   • sodium chloride 0.9 % flush 10 mL  10 mL Intravenous PRN Santiago Mendoza MD       • sodium chloride 0.9 % flush 10 mL  10 mL Intravenous Q12H Santiago Mendoza MD   10 mL at 03/14/20 0936   • sodium chloride 0.9 % flush 10 mL  10 mL Intravenous PRN Santiago Mendoza MD           Assessment/Plan   1.  Acute kidney injury on chronic kidney disease, improving since admission  2.  COPD  3.  Acute on chronic respiratory failure with hypoxia and hypercapnia  4.  Since shortness of air and orthopnea and PND improved since admission, and the chest x-ray started to clear and wondering if she had evidence of congestive heart failure and decreased cardiac output and with the improvement of that the renal function started to improve      Plan:  1.  Continue the same treatment  2.  Surveillance labs              Willie Bangura MD  03/14/20  16:47     show

## 2022-01-11 ENCOUNTER — OFFICE VISIT (OUTPATIENT)
Dept: NEUROLOGY | Facility: CLINIC | Age: 82
End: 2022-01-11

## 2022-01-11 VITALS
OXYGEN SATURATION: 95 % | SYSTOLIC BLOOD PRESSURE: 116 MMHG | BODY MASS INDEX: 28.7 KG/M2 | WEIGHT: 152 LBS | HEART RATE: 65 BPM | DIASTOLIC BLOOD PRESSURE: 68 MMHG | HEIGHT: 61 IN

## 2022-01-11 DIAGNOSIS — G25.0 BENIGN ESSENTIAL TREMOR: Primary | ICD-10-CM

## 2022-01-11 DIAGNOSIS — G25.3 MYOCLONUS: ICD-10-CM

## 2022-01-11 PROCEDURE — 99213 OFFICE O/P EST LOW 20 MIN: CPT | Performed by: PSYCHIATRY & NEUROLOGY

## 2022-01-11 RX ORDER — LEVOTHYROXINE SODIUM 0.1 MG/1
TABLET ORAL
Status: ON HOLD | COMMUNITY
Start: 2021-10-29 | End: 2022-09-29

## 2022-01-11 RX ORDER — AMLODIPINE BESYLATE 2.5 MG/1
2.5 TABLET ORAL DAILY
COMMUNITY
Start: 2022-01-07 | End: 2022-10-01 | Stop reason: HOSPADM

## 2022-01-11 RX ORDER — ROPINIROLE 0.25 MG/1
0.25 TABLET, FILM COATED ORAL NIGHTLY
Qty: 90 TABLET | Refills: 3 | Status: ON HOLD | OUTPATIENT
Start: 2022-01-11 | End: 2022-09-29

## 2022-01-11 RX ORDER — HYDROXYZINE HYDROCHLORIDE 25 MG/1
25 TABLET, FILM COATED ORAL 3 TIMES DAILY PRN
COMMUNITY
Start: 2021-10-29

## 2022-01-11 RX ORDER — BUSPIRONE HYDROCHLORIDE 10 MG/1
TABLET ORAL
Status: ON HOLD | COMMUNITY
Start: 2022-01-10 | End: 2022-09-29

## 2022-01-11 NOTE — PROGRESS NOTES
Chief Complaint   Patient presents with   • Parkinson's Disease       Patient ID: Josephine Wolf is a 81 y.o. female.    HPI: I had the pleasure of seeing your patient today.  As you may know she is a 81-year-old female with a history of myoclonus and essential tremor.  At her last visit we also noted a slight resting tremor.  She has not had any significant progression of symptoms in general.  She has not noted the resting tremor.  She still does have essential tremor however it has not greatly impacted her quality of life.  We did start her out on 0.25 mg nightly of Requip for the myoclonic movement.  She says that that has helped significantly.  She hardly notices it any longer.  She denies any significant changes with respect to initiating movement.  No changes in facial expression.  No stiffness or rigidity.    The following portions of the patient's history were reviewed and updated as appropriate: allergies, current medications, past family history, past medical history, past social history, past surgical history and problem list.    Review of Systems   Musculoskeletal: Negative for back pain, gait problem and neck stiffness.   Neurological: Positive for headaches. Negative for dizziness, tremors, seizures, syncope, facial asymmetry, speech difficulty, weakness, light-headedness and numbness.   Hematological: Negative for adenopathy. Does not bruise/bleed easily.   Psychiatric/Behavioral: Negative for agitation, behavioral problems, confusion, decreased concentration, dysphoric mood, hallucinations, self-injury, sleep disturbance and suicidal ideas. The patient is not nervous/anxious and is not hyperactive.       I have reviewed the review of systems above performed by my medical assistant.      Vitals:    01/11/22 1244   BP: 116/68   Pulse: 65   SpO2: 95%       Neurologic Exam     Mental Status   Oriented to person, place, and time.   Concentration: normal.   Level of consciousness: alert  Knowledge:  consistent with education (No deficits found.).     Cranial Nerves     CN II   Visual fields full to confrontation.     CN III, IV, VI   Pupils are equal, round, and reactive to light.  Extraocular motions are normal.   CN III: no CN III palsy  CN VI: no CN VI palsy    CN V   Facial sensation intact.     CN VII   Facial expression full, symmetric.     CN VIII   CN VIII normal.     CN IX, X   CN IX normal.   CN X normal.     CN XI   CN XI normal.     CN XII   CN XII normal.     Motor Exam     Strength   Right neck flexion: 5/5  Left neck flexion: 5/5  Right neck extension: 5/5  Left neck extension: 5/5  Right deltoid: 5/5  Left deltoid: 5/5  Right biceps: 5/5  Left biceps: 5/5  Right triceps: 5/5  Left triceps: 5/5  Right wrist flexion: 5/5  Left wrist flexion: 5/5  Right wrist extension: 5/5  Left wrist extension: 5/5  Right interossei: 5/5  Left interossei: 5/5  Right abdominals: 5/5  Left abdominals: 5/5  Right iliopsoas: 5/5  Left iliopsoas: 5/5  Right quadriceps: 5/5  Left quadriceps: 5/5  Right hamstrin/5  Left hamstrin/5  Right glutei: 5/5  Left glutei: 5/5  Right anterior tibial: 5/5  Left anterior tibial: 5/5  Right posterior tibial: 5/5  Left posterior tibial: 5/5  Right peroneal: 5/5  Left peroneal: 5/5  Right gastroc: 5/5  Left gastroc: 5/5    Sensory Exam   Light touch normal.   Vibration normal.     Gait, Coordination, and Reflexes     Gait  Gait: normal    Tremor   Resting tremor: Resting tremor not noticed or appreciated today.  Intention tremor: present    Reflexes   Right brachioradialis: 2+  Left brachioradialis: 2+  Right biceps: 2+  Left biceps: 2+  Right triceps: 2+  Left triceps: 2+  Right patellar: 2+  Left patellar: 2+  Right achilles: 2+  Left achilles: 2+  Right : 2+  Left : 2+Station is normal.       Physical Exam  Vitals reviewed.   Constitutional:       Appearance: She is well-developed.   HENT:      Head: Normocephalic and atraumatic.   Eyes:      Extraocular Movements:  EOM normal.      Pupils: Pupils are equal, round, and reactive to light.   Cardiovascular:      Rate and Rhythm: Normal rate and regular rhythm.   Pulmonary:      Breath sounds: Normal breath sounds.   Musculoskeletal:         General: Normal range of motion.   Skin:     General: Skin is warm.   Neurological:      Mental Status: She is oriented to person, place, and time.      Gait: Gait is intact.      Deep Tendon Reflexes:      Reflex Scores:       Tricep reflexes are 2+ on the right side and 2+ on the left side.       Bicep reflexes are 2+ on the right side and 2+ on the left side.       Brachioradialis reflexes are 2+ on the right side and 2+ on the left side.       Patellar reflexes are 2+ on the right side and 2+ on the left side.       Achilles reflexes are 2+ on the right side and 2+ on the left side.        Procedures    Assessment/Plan: We are going to continue the current dose of Requip for the symptoms of myoclonus for now.  I was unable to note any resting tremor at today's visit.  We will see her back in approximately 6 months to reassess.  No need to treat the benign essential tremor at this time.  A total of 25 minutes was spent face-to-face with the patient today.  Of that greater than 50% of this time was spent discussing signs and symptoms of myoclonus, benign essential tremor, patient education, plan of care and prognosis.       Diagnoses and all orders for this visit:    1. Benign essential tremor (Primary)    2. Myoclonus  -     rOPINIRole (REQUIP) 0.25 MG tablet; Take 1 tablet by mouth Every Night for 90 days.  Dispense: 90 tablet; Refill: 3           Ross Braswell II, MD

## 2022-01-19 ENCOUNTER — HOSPITAL ENCOUNTER (OUTPATIENT)
Dept: GENERAL RADIOLOGY | Facility: HOSPITAL | Age: 82
Discharge: HOME | End: 2022-01-19
Admitting: ANESTHESIOLOGY

## 2022-01-19 DIAGNOSIS — M25.552 LEFT HIP PAIN: ICD-10-CM

## 2022-01-19 DIAGNOSIS — M46.1 SACROILIITIS: ICD-10-CM

## 2022-01-19 PROCEDURE — 73502 X-RAY EXAM HIP UNI 2-3 VIEWS: CPT

## 2022-02-06 ENCOUNTER — APPOINTMENT (OUTPATIENT)
Dept: GENERAL RADIOLOGY | Facility: HOSPITAL | Age: 82
End: 2022-02-06

## 2022-02-06 ENCOUNTER — HOSPITAL ENCOUNTER (EMERGENCY)
Facility: HOSPITAL | Age: 82
Discharge: HOME | End: 2022-02-06
Attending: EMERGENCY MEDICINE | Admitting: EMERGENCY MEDICINE

## 2022-02-06 VITALS
DIASTOLIC BLOOD PRESSURE: 68 MMHG | BODY MASS INDEX: 29.26 KG/M2 | SYSTOLIC BLOOD PRESSURE: 132 MMHG | OXYGEN SATURATION: 99 % | HEIGHT: 62 IN | WEIGHT: 159 LBS | RESPIRATION RATE: 16 BRPM | TEMPERATURE: 96 F | HEART RATE: 62 BPM

## 2022-02-06 DIAGNOSIS — S40.021A SUPERFICIAL BRUISING OF ARM, RIGHT, INITIAL ENCOUNTER: ICD-10-CM

## 2022-02-06 DIAGNOSIS — S46.912A STRAIN OF LEFT SHOULDER, INITIAL ENCOUNTER: Primary | ICD-10-CM

## 2022-02-06 PROCEDURE — 99283 EMERGENCY DEPT VISIT LOW MDM: CPT

## 2022-02-06 PROCEDURE — 73030 X-RAY EXAM OF SHOULDER: CPT

## 2022-02-06 RX ORDER — HYDROCODONE BITARTRATE AND ACETAMINOPHEN 7.5; 325 MG/1; MG/1
1 TABLET ORAL ONCE
Status: COMPLETED | OUTPATIENT
Start: 2022-02-06 | End: 2022-02-06

## 2022-02-06 RX ADMIN — HYDROCODONE BITARTRATE AND ACETAMINOPHEN 1 TABLET: 7.5; 325 TABLET ORAL at 18:15

## 2022-02-06 NOTE — ED TRIAGE NOTES
yest while reaching for a pan her right shoulder popped and it has been hurting since    Patient was placed in face mask during first look triage.  Patient was wearing a face mask throughout encounter.  I wore personal protective equipment throughout the encounter.  Hand hygiene was performed before and after patient encounter.

## 2022-02-06 NOTE — ED NOTES
Pt stated that last night when she went to reach for something in her kitchen, she heard a pop in her right shoulder.  Patient states that she then developed a bruise to her right arm.  Pt does have a large bruise on the proximal anterior RUE. +PMS Pt c/o pain 8:10.  Pt states she has used and ice pack and taken some of her Hydrocodone for the pain with no relief.       Rita Cortez, RN  02/06/22 1543

## 2022-02-06 NOTE — ED PROVIDER NOTES
EMERGENCY DEPARTMENT ENCOUNTER    Room Number:  08/08  Date of encounter:  2/6/2022  PCP: Bill Martino Jr., MD  Historian: Patient      PPE    Patient was placed in face mask in first look. Patient was wearing facemask when I entered the room and throughout our encounter. I wore full protective equipment throughout this patient encounter including a face mask, and gloves. Hand hygiene was performed before donning protective equipment and after removal when leaving the room.        HPI:  Chief Complaint: Right shoulder pain  A complete HPI/ROS/PMH/PSH/SH/FH are unobtainable due to: Nothing    Context: Josephine Wolf is a 81 y.o. female who arrives to the ED via private vehicle.  Patient presents with c/o moderate, sharp, constant right shoulder pain since lifting a pan yesterday afternoon.  Patient states that it was not a heavy pan, and when she went to lift it she felt a pop in her right shoulder.  She does have rotator cuff issues on the right shoulder that she sees orthopedic for.   Patient also complains of bruising to her right upper arm.  Patient denies numbness or tingling in her fingers, chest pain, shortness of breath, neck pain or any other injuries.  Patient states that hydrocodone makes the symptoms better and movement worsens symptoms.          PAST MEDICAL HISTORY  Active Ambulatory Problems     Diagnosis Date Noted   • Anemia 10/19/2017   • OA (osteoarthritis) of knee 11/16/2017   • Chronic respiratory failure with hypoxia (HCC) 12/02/2017   • Cellulitis of skin 12/06/2017   • Acute kidney injury (HCC) 12/06/2017   • Sepsis ([facility]) 12/06/2017   • Orthostatic hypotension 06/24/2018   • Disease of thyroid gland 06/24/2018   • Hypertension 06/24/2018   • Dehydration 06/24/2018   • Stage 3 chronic kidney disease (CMS/HCC) 06/25/2018   • Closed compression fracture of L1 lumbar vertebra 06/16/2019   • Hyponatremia 06/16/2019   • Osteoporosis with pathological fracture 06/17/2019   • Acute on  chronic respiratory failure with hypoxia and hypercapnia ([facility]) 2020   • Obesity (BMI 30-39.9) 2020   • Nocturnal hypoxia 2020   • CKD (chronic kidney disease) stage 2, GFR 60-89 ml/min 2020   • Acute on chronic respiratory failure with hypoxia ([facility]) 2020   • Abdominal pain 10/18/2021     Resolved Ambulatory Problems     Diagnosis Date Noted   • COPD with acute exacerbation ([facility]) 2018     Past Medical History:   Diagnosis Date   • Acid reflux    • Arthritis    • Bipolar 1 disorder, depressed ([facility])    • Cataract    • Chronic nausea    • Chronic pain of right knee    • Continuous leakage of urine    • COPD (chronic obstructive pulmonary disease) ([facility])    • Diverticulosis    • Fibromyalgia    • Fibromyalgia, primary    • Frequent episodes of bronchitis    • HL (hearing loss)    • Hypothyroidism    • Memory loss    • Migraines    • Neck pain    • On home oxygen therapy    • Short of breath on exertion    • Sleep apnea          PAST SURGICAL HISTORY  Past Surgical History:   Procedure Laterality Date   • CEREBRAL ANEURYSM REPAIR      WITH STENT   • HYSTERECTOMY     • LAPAROSCOPIC CHOLECYSTECTOMY     • AL TOTAL KNEE ARTHROPLASTY Right 2017    Procedure: RT TOTAL KNEE ARTHROPLASTY;  Surgeon: Kashif Perez MD;  Location: [facility];  Service: Orthopedics         FAMILY HISTORY  Family History   Problem Relation Age of Onset   • Malig Hyperthermia Neg Hx          SOCIAL HISTORY  Social History     Socioeconomic History   • Marital status:    Tobacco Use   • Smoking status: Former Smoker     Packs/day: 1.00     Years: 10.00     Pack years: 10.00     Types: Cigarettes     Start date:      Quit date:      Years since quittin.1   • Smokeless tobacco: Never Used   Vaping Use   • Vaping Use: Never used   Substance and Sexual Activity   • Alcohol use: No     Comment: CAFFEINE NO    • Drug use: No   • Sexual activity: Defer         ALLERGIES  Morphine and  related        REVIEW OF SYSTEMS  Review of Systems     All systems reviewed and negative except for those discussed in HPI.        PHYSICAL EXAM    ED Triage Vitals   Temp Heart Rate Resp BP SpO2   02/06/22 1640 02/06/22 1640 02/06/22 1640 02/06/22 1715 02/06/22 1640   96 °F (35.6 °C) 76 16 135/69 91 %       Physical Exam  Musculoskeletal:        Arms:       Comments: Soft compartments to the right forearm, 2+ radial pulse on the right, bilateral equal handgrips, normal sensation.       GENERAL: Well appearing, nontoxic appearing, not distressed  HENT: normocephalic, atraumatic  EYES: no scleral icterus, PERRL  CV: regular rhythm, regular rate, no murmur  RESPIRATORY: normal effort, CTAB  ABDOMEN: soft   MUSCULOSKELETAL: no deformity    NEURO: alert, moves all extremities, follows commands, mental status normal/baseline  SKIN: warm, dry, no rash   Psych: Appropriate mood and affect  Nursing notes and vital signs reviewed      LAB RESULTS  No results found for this or any previous visit (from the past 24 hour(s)).    Ordered the above labs and independently reviewed the results.      RADIOLOGY  XR Shoulder 2+ View Right    Result Date: 2/6/2022  XR SHOULDER 2+ VW RIGHT-  02/06/2022  HISTORY: Right shoulder pain.  No acute bone, joint or soft tissue abnormalities are seen.  This report was finalized on 2/6/2022 6:21 PM by Dr. Oracio Barry M.D.        I ordered the above noted radiological studies and viewed the images on the PACS system.         MEDICAL RECORD REVIEW  Medical records reviewed in epic, no relevant records for this visit      PROCEDURES    Procedures        DIFFERENTIAL DIAGNOSIS  Differential Diagnosis for Upper Extremity Problem/Injury include but are not limited to the following:    Contusion of the shoulder/arm/elbow/forearm/wrist/hand/digits  Dislocation of the shoulder/elbow/wrist/digits  Sprains of the shoulder/elbow/wrist/hand/digits  Fractures (open/closed) of the shoulder, humerus,  radius, ulna, hand or digits  Laceration or abrasions        PROGRESS, DATA ANALYSIS, CONSULTS, AND MEDICAL DECISION MAKING        ED Course as of 02/06/22 1944   Sun Feb 06, 2022 1737 Discussed pertinent information from history and physical exam with patient.  Discussed differential diagnosis and plan for ED evaluation/work-up and treatment including x-ray of the right shoulder and pain control.  All questions answered.  Patient is agreeable with this plan.     [MS]   1838 I viewed the patient's right shoulder imaging in PACS.  My interpretation is no fracture dislocation.  See dictation for official radiology interpretation.     [MS]   1900 Patient updated on unremarkable x-ray of her right shoulder, no fracture or other bony abnormality noted.  Discussed with patient that this could be related to her rotator cuff and she should follow-up with her orthopedic doctor next week.  She will be given a sling to wear for comfort, she was instructed to take her home pain medication and apply ice to her left shoulder.  She verbalized understanding and is agreeable to the above plan. [MS]      ED Course User Index  [MS] Amparo Martinez APRN     Discussed plan for discharge, as there is no emergent indication for admission. Pt/family is agreeable and understands need for follow up and repeat testing.  Pt is aware that discharge does not mean that nothing is wrong but it indicates no emergency is present that requires admission and they must continue care with follow-up as given below or physician of their choice.   Patient/Family voiced understanding of above instructions.  Patient discharged in stable condition.    DIAGNOSIS  Final diagnoses:   Strain of left shoulder, initial encounter   Superficial bruising of arm, right, initial encounter       FOLLOW UP   Bill Martino Jr., MD  [facility] [address] [address]  [phone]    Call in 1 day      Your Orthopedic MD            RX      Medication List      No changes were made to your prescriptions during this visit.           MEDICATIONS GIVEN IN ED    Medications   HYDROcodone-acetaminophen (NORCO) 7.5-325 MG per tablet 1 tablet (1 tablet Oral Given 2/6/22 1815)           COURSE & MEDICAL DECISION MAKING  Any/All labs and Any/All Imaging studies that were ordered were reviewed and are noted above.  Results were reviewed/discussed with the patient and they were also made aware of online access.    Pt also made aware that some labs, such as cultures, will not be resulted during ER visit and followup with PMD is necessary.        Amparo Martinez, APRN  02/06/22 1944

## 2022-02-06 NOTE — DISCHARGE INSTRUCTIONS
Continue current home medications  Home to rest  Ice to painful areas for 20 minutes at a time, 4 times a day  Wear sling for comfort  Tylenol or Motrin as needed for mild-moderate pain-take as instructed on package  Follow up with your orthopedic MD in 3-5 days, call to schedule an appointment  Return to er for any worsening or new concerns including increased pain or swelling

## 2022-03-17 ENCOUNTER — HOSPITAL ENCOUNTER (EMERGENCY)
Facility: HOSPITAL | Age: 82
Discharge: HOME | End: 2022-03-18
Attending: EMERGENCY MEDICINE | Admitting: EMERGENCY MEDICINE

## 2022-03-17 DIAGNOSIS — R04.0 EPISTAXIS: Primary | ICD-10-CM

## 2022-03-17 DIAGNOSIS — N18.9 CHRONIC KIDNEY DISEASE, UNSPECIFIED CKD STAGE: ICD-10-CM

## 2022-03-17 LAB
ALBUMIN SERPL-MCNC: 4.2 G/DL (ref 3.5–5.2)
ALBUMIN/GLOB SERPL: 1.8 G/DL
ALP SERPL-CCNC: 92 U/L (ref 39–117)
ALT SERPL W P-5'-P-CCNC: 20 U/L (ref 1–33)
ANION GAP SERPL CALCULATED.3IONS-SCNC: 11 MMOL/L (ref 5–15)
APTT PPP: 28.6 SECONDS (ref 22.7–35.4)
AST SERPL-CCNC: 20 U/L (ref 1–32)
BASOPHILS # BLD AUTO: 0.03 10*3/MM3 (ref 0–0.2)
BASOPHILS NFR BLD AUTO: 0.4 % (ref 0–1.5)
BILIRUB SERPL-MCNC: 0.2 MG/DL (ref 0–1.2)
BUN SERPL-MCNC: 36 MG/DL (ref 8–23)
BUN/CREAT SERPL: 20.3 (ref 7–25)
CALCIUM SPEC-SCNC: 8.5 MG/DL (ref 8.6–10.5)
CHLORIDE SERPL-SCNC: 95 MMOL/L (ref 98–107)
CO2 SERPL-SCNC: 31 MMOL/L (ref 22–29)
CREAT SERPL-MCNC: 1.77 MG/DL (ref 0.57–1)
DEPRECATED RDW RBC AUTO: 46.4 FL (ref 37–54)
EGFRCR SERPLBLD CKD-EPI 2021: 28.9 ML/MIN/1.73
EOSINOPHIL # BLD AUTO: 0.08 10*3/MM3 (ref 0–0.4)
EOSINOPHIL NFR BLD AUTO: 1.1 % (ref 0.3–6.2)
ERYTHROCYTE [DISTWIDTH] IN BLOOD BY AUTOMATED COUNT: 13.1 % (ref 12.3–15.4)
GLOBULIN UR ELPH-MCNC: 2.4 GM/DL
GLUCOSE SERPL-MCNC: 111 MG/DL (ref 65–99)
HCT VFR BLD AUTO: 30.2 % (ref 34–46.6)
HGB BLD-MCNC: 10.2 G/DL (ref 12–15.9)
IMM GRANULOCYTES # BLD AUTO: 0.09 10*3/MM3 (ref 0–0.05)
IMM GRANULOCYTES NFR BLD AUTO: 1.2 % (ref 0–0.5)
INR PPP: 0.9 (ref 0.9–1.1)
LYMPHOCYTES # BLD AUTO: 1.71 10*3/MM3 (ref 0.7–3.1)
LYMPHOCYTES NFR BLD AUTO: 23.6 % (ref 19.6–45.3)
MCH RBC QN AUTO: 32.6 PG (ref 26.6–33)
MCHC RBC AUTO-ENTMCNC: 33.8 G/DL (ref 31.5–35.7)
MCV RBC AUTO: 96.5 FL (ref 79–97)
MONOCYTES # BLD AUTO: 0.81 10*3/MM3 (ref 0.1–0.9)
MONOCYTES NFR BLD AUTO: 11.2 % (ref 5–12)
NEUTROPHILS NFR BLD AUTO: 4.54 10*3/MM3 (ref 1.7–7)
NEUTROPHILS NFR BLD AUTO: 62.5 % (ref 42.7–76)
NRBC BLD AUTO-RTO: 0 /100 WBC (ref 0–0.2)
PLATELET # BLD AUTO: 231 10*3/MM3 (ref 140–450)
PMV BLD AUTO: 9 FL (ref 6–12)
POTASSIUM SERPL-SCNC: 4.7 MMOL/L (ref 3.5–5.2)
PROT SERPL-MCNC: 6.6 G/DL (ref 6–8.5)
PROTHROMBIN TIME: 12.1 SECONDS (ref 11.7–14.2)
RBC # BLD AUTO: 3.13 10*6/MM3 (ref 3.77–5.28)
SODIUM SERPL-SCNC: 137 MMOL/L (ref 136–145)
WBC NRBC COR # BLD: 7.26 10*3/MM3 (ref 3.4–10.8)

## 2022-03-17 PROCEDURE — 80053 COMPREHEN METABOLIC PANEL: CPT | Performed by: EMERGENCY MEDICINE

## 2022-03-17 PROCEDURE — 85730 THROMBOPLASTIN TIME PARTIAL: CPT | Performed by: EMERGENCY MEDICINE

## 2022-03-17 PROCEDURE — 99283 EMERGENCY DEPT VISIT LOW MDM: CPT

## 2022-03-17 PROCEDURE — 85025 COMPLETE CBC W/AUTO DIFF WBC: CPT | Performed by: EMERGENCY MEDICINE

## 2022-03-17 PROCEDURE — 85610 PROTHROMBIN TIME: CPT | Performed by: EMERGENCY MEDICINE

## 2022-03-17 PROCEDURE — 36415 COLL VENOUS BLD VENIPUNCTURE: CPT

## 2022-03-18 VITALS
HEIGHT: 62 IN | HEART RATE: 65 BPM | SYSTOLIC BLOOD PRESSURE: 148 MMHG | RESPIRATION RATE: 14 BRPM | OXYGEN SATURATION: 97 % | BODY MASS INDEX: 29.08 KG/M2 | DIASTOLIC BLOOD PRESSURE: 66 MMHG | TEMPERATURE: 96.8 F

## 2022-03-18 NOTE — ED PROVIDER NOTES
EMERGENCY DEPARTMENT ENCOUNTER    Room Number:  14/14  Date of encounter:  3/18/2022  PCP: Bill Martino Jr., MD  Historian: Patient      HPI:  Chief Complaint: Nosebleed  A complete HPI/ROS/PMH/PSH/SH/FH are unobtainable due to: None    Context: Josephine Wolf is a 81 y.o. female who presents to the ED c/o nosebleed that started approximately 2 to 3 hours ago.  Resolved on way to ER.  States she had bleeding from her nostrils anteriorly and also noted some blood dripping down the back of her throat.  At this time she is absolutely no symptoms.  No shortness of breath no chest pain.  Patient is on anticoagulation.  Patient is chronically on 3 L oxygen nasal cannula.      PAST MEDICAL HISTORY  Active Ambulatory Problems     Diagnosis Date Noted   • Anemia 10/19/2017   • OA (osteoarthritis) of knee 11/16/2017   • Chronic respiratory failure with hypoxia (HCC) 12/02/2017   • Cellulitis of skin 12/06/2017   • Acute kidney injury (HCC) 12/06/2017   • Sepsis (HCC) 12/06/2017   • Orthostatic hypotension 06/24/2018   • Disease of thyroid gland 06/24/2018   • Hypertension 06/24/2018   • Dehydration 06/24/2018   • Stage 3 chronic kidney disease (CMS/HCC) 06/25/2018   • Closed compression fracture of L1 lumbar vertebra 06/16/2019   • Hyponatremia 06/16/2019   • Osteoporosis with pathological fracture 06/17/2019   • Acute on chronic respiratory failure with hypoxia and hypercapnia (HCC) 03/12/2020   • Obesity (BMI 30-39.9) 03/12/2020   • Nocturnal hypoxia 03/13/2020   • CKD (chronic kidney disease) stage 2, GFR 60-89 ml/min 03/13/2020   • Acute on chronic respiratory failure with hypoxia (HCC) 05/20/2020   • Abdominal pain 10/18/2021     Resolved Ambulatory Problems     Diagnosis Date Noted   • COPD with acute exacerbation (HCC) 06/24/2018     Past Medical History:   Diagnosis Date   • Acid reflux    • Arthritis    • Bipolar 1 disorder, depressed (HCC)    • Cataract    • Chronic nausea    • Chronic pain of right  knee    • Continuous leakage of urine    • COPD (chronic obstructive pulmonary disease) (HCC)    • Diverticulosis    • Fibromyalgia    • Fibromyalgia, primary    • Frequent episodes of bronchitis    • HL (hearing loss)    • Hypothyroidism    • Memory loss    • Migraines    • Neck pain    • On home oxygen therapy    • Short of breath on exertion    • Sleep apnea          PAST SURGICAL HISTORY  Past Surgical History:   Procedure Laterality Date   • CEREBRAL ANEURYSM REPAIR      WITH STENT   • HYSTERECTOMY     • LAPAROSCOPIC CHOLECYSTECTOMY     • CA TOTAL KNEE ARTHROPLASTY Right 2017    Procedure: RT TOTAL KNEE ARTHROPLASTY;  Surgeon: Kashif Perez MD;  Location: [facility] OR;  Service: Orthopedics         FAMILY HISTORY  Family History   Problem Relation Age of Onset   • Malig Hyperthermia Neg Hx          SOCIAL HISTORY  Social History     Socioeconomic History   • Marital status:    Tobacco Use   • Smoking status: Former Smoker     Packs/day: 1.00     Years: 10.00     Pack years: 10.00     Types: Cigarettes     Start date:      Quit date:      Years since quittin.2   • Smokeless tobacco: Never Used   Vaping Use   • Vaping Use: Never used   Substance and Sexual Activity   • Alcohol use: No     Comment: CAFFEINE NO    • Drug use: No   • Sexual activity: Defer         ALLERGIES  Morphine and related        REVIEW OF SYSTEMS  Review of Systems     All systems reviewed and negative except for those discussed in HPI.       PHYSICAL EXAM    I have reviewed the triage vital signs and nursing notes.    ED Triage Vitals [22 2238]   Temp Heart Rate Resp BP SpO2   96.8 °F (36 °C) 71 14 146/67 94 %      Temp src Heart Rate Source Patient Position BP Location FiO2 (%)   Temporal Monitor -- -- --       Physical Exam  GENERAL: not distressed  HENT: nares patent, dried blood to right nare, no intraoral bleeding or blood dripping down back of throat.  EYES: no scleral icterus  CV: regular  rhythm, regular rate  RESPIRATORY: normal effort  ABDOMEN: soft  MUSCULOSKELETAL: no deformity  NEURO: alert, moves all extremities, follows commands  SKIN: warm, dry        LAB RESULTS  Recent Results (from the past 24 hour(s))   Comprehensive Metabolic Panel    Collection Time: 03/17/22 11:14 PM    Specimen: Blood   Result Value Ref Range    Glucose 111 (H) 65 - 99 mg/dL    BUN 36 (H) 8 - 23 mg/dL    Creatinine 1.77 (H) 0.57 - 1.00 mg/dL    Sodium 137 136 - 145 mmol/L    Potassium 4.7 3.5 - 5.2 mmol/L    Chloride 95 (L) 98 - 107 mmol/L    CO2 31.0 (H) 22.0 - 29.0 mmol/L    Calcium 8.5 (L) 8.6 - 10.5 mg/dL    Total Protein 6.6 6.0 - 8.5 g/dL    Albumin 4.20 3.50 - 5.20 g/dL    ALT (SGPT) 20 1 - 33 U/L    AST (SGOT) 20 1 - 32 U/L    Alkaline Phosphatase 92 39 - 117 U/L    Total Bilirubin 0.2 0.0 - 1.2 mg/dL    Globulin 2.4 gm/dL    A/G Ratio 1.8 g/dL    BUN/Creatinine Ratio 20.3 7.0 - 25.0    Anion Gap 11.0 5.0 - 15.0 mmol/L    eGFR 28.9 (L) >60.0 mL/min/1.73   Protime-INR    Collection Time: 03/17/22 11:14 PM    Specimen: Blood   Result Value Ref Range    Protime 12.1 11.7 - 14.2 Seconds    INR 0.90 0.90 - 1.10   aPTT    Collection Time: 03/17/22 11:14 PM    Specimen: Blood   Result Value Ref Range    PTT 28.6 22.7 - 35.4 seconds   CBC Auto Differential    Collection Time: 03/17/22 11:14 PM    Specimen: Blood   Result Value Ref Range    WBC 7.26 3.40 - 10.80 10*3/mm3    RBC 3.13 (L) 3.77 - 5.28 10*6/mm3    Hemoglobin 10.2 (L) 12.0 - 15.9 g/dL    Hematocrit 30.2 (L) 34.0 - 46.6 %    MCV 96.5 79.0 - 97.0 fL    MCH 32.6 26.6 - 33.0 pg    MCHC 33.8 31.5 - 35.7 g/dL    RDW 13.1 12.3 - 15.4 %    RDW-SD 46.4 37.0 - 54.0 fl    MPV 9.0 6.0 - 12.0 fL    Platelets 231 140 - 450 10*3/mm3    Neutrophil % 62.5 42.7 - 76.0 %    Lymphocyte % 23.6 19.6 - 45.3 %    Monocyte % 11.2 5.0 - 12.0 %    Eosinophil % 1.1 0.3 - 6.2 %    Basophil % 0.4 0.0 - 1.5 %    Immature Grans % 1.2 (H) 0.0 - 0.5 %    Neutrophils, Absolute 4.54 1.70 -  7.00 10*3/mm3    Lymphocytes, Absolute 1.71 0.70 - 3.10 10*3/mm3    Monocytes, Absolute 0.81 0.10 - 0.90 10*3/mm3    Eosinophils, Absolute 0.08 0.00 - 0.40 10*3/mm3    Basophils, Absolute 0.03 0.00 - 0.20 10*3/mm3    Immature Grans, Absolute 0.09 (H) 0.00 - 0.05 10*3/mm3    nRBC 0.0 0.0 - 0.2 /100 WBC       Ordered the above labs and independently reviewed the results.        RADIOLOGY  No Radiology Exams Resulted Within Past 24 Hours    I ordered the above noted radiological studies. Reviewed by me and discussed with radiologist.  See dictation for official radiology interpretation.      PROCEDURES    Procedures      MEDICATIONS GIVEN IN ER    Medications - No data to display      PROGRESS, DATA ANALYSIS, CONSULTS, AND MEDICAL DECISION MAKING    All labs have been independently reviewed by me.  All radiology studies have been reviewed by me and discussed with radiologist dictating the report.   EKG's independently viewed and interpreted by me.  Discussion below represents my analysis of pertinent findings related to patient's condition, differential diagnosis, treatment plan and final disposition.        ED Course as of 03/18/22 0606   Fri Mar 18, 2022   0042 Reevaluation: Patient is feeling well no recurrence of epistaxis.  Labs are reassuring.  Will discharge home. [TJ]   0043 Creatinine(!): 1.77 [TJ]   0043 Hemoglobin(!): 10.2 [TJ]   0043 Protime: 12.1 [TJ]   0043 INR: 0.90 [TJ]   0043 PTT: 28.6 [TJ]      ED Course User Index  [TJ] Juan J Espinoza MD           PPE: The patient wore a surgical mask throughout the entire patient encounter. I wore an N95.    AS OF 06:06 EDT VITALS:    BP - 148/66  HR - 65  TEMP - 96.8 °F (36 °C) (Temporal)  O2 SATS - 97%        DIAGNOSIS  Final diagnoses:   Epistaxis   Chronic kidney disease, unspecified CKD stage         DISPOSITION  Discharge           Juan J Espinoza MD  03/18/22 0606

## 2022-03-18 NOTE — ED NOTES
Pt to triage from home via [facility] [id number] with c/o bloody nose and spitting up blood.  Symptoms started 3.5 hours ago.  Bloody nose stopped just as ems was pulling in to ER.  Pt has o2 on blow-by at this time.  Pt history of PE.  Pt provided with mask in triage. Triage personnel wore appropriate PPE

## 2022-05-16 ENCOUNTER — TRANSCRIBE ORDERS (OUTPATIENT)
Dept: ADMINISTRATIVE | Facility: HOSPITAL | Age: 82
End: 2022-05-16

## 2022-05-16 ENCOUNTER — TRANSCRIBE ORDERS (OUTPATIENT)
Dept: PAIN MEDICINE | Facility: HOSPITAL | Age: 82
End: 2022-05-16

## 2022-05-16 DIAGNOSIS — M19.90 OSTEOARTHRITIS, UNSPECIFIED OSTEOARTHRITIS TYPE, UNSPECIFIED SITE: Primary | ICD-10-CM

## 2022-07-05 ENCOUNTER — TELEPHONE (OUTPATIENT)
Dept: NEUROLOGY | Facility: CLINIC | Age: 82
End: 2022-07-05

## 2022-07-05 NOTE — TELEPHONE ENCOUNTER
Caller: Josephine Wolf    Relationship: Self    Best call back number: [phone]    Who are you requesting to speak with (clinical staff, provider,  specific staff member):   DR SIDDIQI    What was the call regarding:   PT HAD APPT FOR TODAY BUT HAD TO CANCEL AS THE PT'S TRANSPORTATION DIDN'T SHOW. TRIED TO RESCHEDULE PT BUT NO FOLLOW UP APPTS WERE AVAILABLE.    PT STATED SHE NEEDS TO BE SEEN ASAP AS SHE HAS BEEN HALLUCINATING AND SHE HAS BEEN FOUND OUTSIDE OF HER ROOM WANDERING DURING ONE OF HER HALLUCINATIONS. SOMEONE CALLED 911 AND AN AMBULANCE CAME.    Do you require a callback:   YES, PLEASE TO GET HER SCHEDULED FOR FOLLOW UP.

## 2022-09-29 ENCOUNTER — APPOINTMENT (OUTPATIENT)
Dept: GENERAL RADIOLOGY | Facility: HOSPITAL | Age: 82
End: 2022-09-29

## 2022-09-29 ENCOUNTER — HOSPITAL ENCOUNTER (OUTPATIENT)
Facility: HOSPITAL | Age: 82
LOS: 2 days | Discharge: HOME | End: 2022-10-01
Attending: EMERGENCY MEDICINE | Admitting: HOSPITALIST

## 2022-09-29 DIAGNOSIS — J44.1 ACUTE EXACERBATION OF CHRONIC OBSTRUCTIVE PULMONARY DISEASE (COPD): Primary | ICD-10-CM

## 2022-09-29 DIAGNOSIS — D64.9 ANEMIA, UNSPECIFIED TYPE: ICD-10-CM

## 2022-09-29 LAB
ALBUMIN SERPL-MCNC: 4 G/DL (ref 3.5–5.2)
ALBUMIN/GLOB SERPL: 1.9 G/DL
ALP SERPL-CCNC: 80 U/L (ref 39–117)
ALT SERPL W P-5'-P-CCNC: 11 U/L (ref 1–33)
ANION GAP SERPL CALCULATED.3IONS-SCNC: 9.7 MMOL/L (ref 5–15)
AST SERPL-CCNC: 18 U/L (ref 1–32)
B PARAPERT DNA SPEC QL NAA+PROBE: NOT DETECTED
B PERT DNA SPEC QL NAA+PROBE: NOT DETECTED
BASOPHILS # BLD AUTO: 0.02 10*3/MM3 (ref 0–0.2)
BASOPHILS NFR BLD AUTO: 0.3 % (ref 0–1.5)
BILIRUB SERPL-MCNC: 0.4 MG/DL (ref 0–1.2)
BUN SERPL-MCNC: 23 MG/DL (ref 8–23)
BUN/CREAT SERPL: 16.2 (ref 7–25)
C PNEUM DNA NPH QL NAA+NON-PROBE: NOT DETECTED
CALCIUM SPEC-SCNC: 8.9 MG/DL (ref 8.6–10.5)
CARBAMAZEPINE SERPL-MCNC: <2 MCG/ML (ref 4–12)
CHLORIDE SERPL-SCNC: 101 MMOL/L (ref 98–107)
CO2 SERPL-SCNC: 29.3 MMOL/L (ref 22–29)
CREAT SERPL-MCNC: 1.42 MG/DL (ref 0.57–1)
DEPRECATED RDW RBC AUTO: 52.9 FL (ref 37–54)
EGFRCR SERPLBLD CKD-EPI 2021: 37.5 ML/MIN/1.73
EOSINOPHIL # BLD AUTO: 0.06 10*3/MM3 (ref 0–0.4)
EOSINOPHIL NFR BLD AUTO: 0.8 % (ref 0.3–6.2)
ERYTHROCYTE [DISTWIDTH] IN BLOOD BY AUTOMATED COUNT: 14.8 % (ref 12.3–15.4)
FLUAV SUBTYP SPEC NAA+PROBE: NOT DETECTED
FLUBV RNA ISLT QL NAA+PROBE: NOT DETECTED
GLOBULIN UR ELPH-MCNC: 2.1 GM/DL
GLUCOSE SERPL-MCNC: 110 MG/DL (ref 65–99)
HADV DNA SPEC NAA+PROBE: NOT DETECTED
HCOV 229E RNA SPEC QL NAA+PROBE: NOT DETECTED
HCOV HKU1 RNA SPEC QL NAA+PROBE: NOT DETECTED
HCOV NL63 RNA SPEC QL NAA+PROBE: NOT DETECTED
HCOV OC43 RNA SPEC QL NAA+PROBE: NOT DETECTED
HCT VFR BLD AUTO: 26.2 % (ref 34–46.6)
HGB BLD-MCNC: 8.4 G/DL (ref 12–15.9)
HMPV RNA NPH QL NAA+NON-PROBE: NOT DETECTED
HPIV1 RNA ISLT QL NAA+PROBE: NOT DETECTED
HPIV2 RNA SPEC QL NAA+PROBE: NOT DETECTED
HPIV3 RNA NPH QL NAA+PROBE: NOT DETECTED
HPIV4 P GENE NPH QL NAA+PROBE: NOT DETECTED
IMM GRANULOCYTES # BLD AUTO: 0.05 10*3/MM3 (ref 0–0.05)
IMM GRANULOCYTES NFR BLD AUTO: 0.7 % (ref 0–0.5)
LYMPHOCYTES # BLD AUTO: 1.19 10*3/MM3 (ref 0.7–3.1)
LYMPHOCYTES NFR BLD AUTO: 16.3 % (ref 19.6–45.3)
M PNEUMO IGG SER IA-ACNC: NOT DETECTED
MCH RBC QN AUTO: 31 PG (ref 26.6–33)
MCHC RBC AUTO-ENTMCNC: 32.1 G/DL (ref 31.5–35.7)
MCV RBC AUTO: 96.7 FL (ref 79–97)
MONOCYTES # BLD AUTO: 0.56 10*3/MM3 (ref 0.1–0.9)
MONOCYTES NFR BLD AUTO: 7.7 % (ref 5–12)
NEUTROPHILS NFR BLD AUTO: 5.41 10*3/MM3 (ref 1.7–7)
NEUTROPHILS NFR BLD AUTO: 74.2 % (ref 42.7–76)
NRBC BLD AUTO-RTO: 0 /100 WBC (ref 0–0.2)
NT-PROBNP SERPL-MCNC: 717 PG/ML (ref 0–1800)
PLATELET # BLD AUTO: 207 10*3/MM3 (ref 140–450)
PMV BLD AUTO: 9.3 FL (ref 6–12)
POTASSIUM SERPL-SCNC: 4 MMOL/L (ref 3.5–5.2)
PROT SERPL-MCNC: 6.1 G/DL (ref 6–8.5)
QT INTERVAL: 371 MS
RBC # BLD AUTO: 2.71 10*6/MM3 (ref 3.77–5.28)
RHINOVIRUS RNA SPEC NAA+PROBE: NOT DETECTED
RSV RNA NPH QL NAA+NON-PROBE: NOT DETECTED
SARS-COV-2 RNA NPH QL NAA+NON-PROBE: NOT DETECTED
SODIUM SERPL-SCNC: 140 MMOL/L (ref 136–145)
TROPONIN T SERPL-MCNC: <0.01 NG/ML (ref 0–0.03)
WBC NRBC COR # BLD: 7.29 10*3/MM3 (ref 3.4–10.8)
WHOLE BLOOD HOLD COAG: NORMAL

## 2022-09-29 PROCEDURE — 80053 COMPREHEN METABOLIC PANEL: CPT | Performed by: EMERGENCY MEDICINE

## 2022-09-29 PROCEDURE — 80156 ASSAY CARBAMAZEPINE TOTAL: CPT | Performed by: EMERGENCY MEDICINE

## 2022-09-29 PROCEDURE — A9270 NON-COVERED ITEM OR SERVICE: HCPCS | Performed by: HOSPITALIST

## 2022-09-29 PROCEDURE — G0378 HOSPITAL OBSERVATION PER HR: HCPCS

## 2022-09-29 PROCEDURE — 99284 EMERGENCY DEPT VISIT MOD MDM: CPT

## 2022-09-29 PROCEDURE — 63710000001 TRAZODONE 100 MG TABLET: Performed by: HOSPITALIST

## 2022-09-29 PROCEDURE — 25010000002 METHYLPREDNISOLONE PER 40 MG: Performed by: HOSPITALIST

## 2022-09-29 PROCEDURE — 63710000001 BUMETANIDE 1 MG TABLET: Performed by: HOSPITALIST

## 2022-09-29 PROCEDURE — 94799 UNLISTED PULMONARY SVC/PX: CPT

## 2022-09-29 PROCEDURE — 84484 ASSAY OF TROPONIN QUANT: CPT | Performed by: EMERGENCY MEDICINE

## 2022-09-29 PROCEDURE — 83880 ASSAY OF NATRIURETIC PEPTIDE: CPT | Performed by: EMERGENCY MEDICINE

## 2022-09-29 PROCEDURE — 63710000001 GABAPENTIN 300 MG CAPSULE: Performed by: HOSPITALIST

## 2022-09-29 PROCEDURE — 63710000001 CARBAMAZEPINE XR 200 MG TABLET SUSTAINED-RELEASE 12 HOUR: Performed by: HOSPITALIST

## 2022-09-29 PROCEDURE — 63710000001 GUAIFENESIN 600 MG TABLET SUSTAINED-RELEASE 12 HOUR: Performed by: HOSPITALIST

## 2022-09-29 PROCEDURE — 0202U NFCT DS 22 TRGT SARS-COV-2: CPT | Performed by: EMERGENCY MEDICINE

## 2022-09-29 PROCEDURE — 93010 ELECTROCARDIOGRAM REPORT: CPT | Performed by: INTERNAL MEDICINE

## 2022-09-29 PROCEDURE — 94640 AIRWAY INHALATION TREATMENT: CPT

## 2022-09-29 PROCEDURE — 63710000001 BUDESONIDE-FORMOTEROL 160-4.5 MCG/ACT AEROSOL 6 G INHALER: Performed by: HOSPITALIST

## 2022-09-29 PROCEDURE — 96374 THER/PROPH/DIAG INJ IV PUSH: CPT

## 2022-09-29 PROCEDURE — 36415 COLL VENOUS BLD VENIPUNCTURE: CPT

## 2022-09-29 PROCEDURE — 71045 X-RAY EXAM CHEST 1 VIEW: CPT

## 2022-09-29 PROCEDURE — 25010000002 ONDANSETRON PER 1 MG: Performed by: HOSPITALIST

## 2022-09-29 PROCEDURE — 85025 COMPLETE CBC W/AUTO DIFF WBC: CPT | Performed by: EMERGENCY MEDICINE

## 2022-09-29 PROCEDURE — 63710000001 QUETIAPINE 100 MG TABLET: Performed by: HOSPITALIST

## 2022-09-29 PROCEDURE — 93005 ELECTROCARDIOGRAM TRACING: CPT | Performed by: EMERGENCY MEDICINE

## 2022-09-29 PROCEDURE — 94761 N-INVAS EAR/PLS OXIMETRY MLT: CPT

## 2022-09-29 PROCEDURE — 94664 DEMO&/EVAL PT USE INHALER: CPT

## 2022-09-29 RX ORDER — HYDROCODONE BITARTRATE AND ACETAMINOPHEN 7.5; 325 MG/1; MG/1
1 TABLET ORAL EVERY 8 HOURS PRN
Status: DISCONTINUED | OUTPATIENT
Start: 2022-09-29 | End: 2022-10-01 | Stop reason: HOSPADM

## 2022-09-29 RX ORDER — AMLODIPINE BESYLATE 2.5 MG/1
2.5 TABLET ORAL DAILY
Status: DISCONTINUED | OUTPATIENT
Start: 2022-09-29 | End: 2022-09-29

## 2022-09-29 RX ORDER — BUMETANIDE 1 MG/1
1 TABLET ORAL DAILY
Status: DISCONTINUED | OUTPATIENT
Start: 2022-09-29 | End: 2022-09-29

## 2022-09-29 RX ORDER — METOPROLOL SUCCINATE 25 MG/1
12.5 TABLET, EXTENDED RELEASE ORAL
Status: DISCONTINUED | OUTPATIENT
Start: 2022-09-29 | End: 2022-10-01 | Stop reason: HOSPADM

## 2022-09-29 RX ORDER — BUSPIRONE HYDROCHLORIDE 15 MG/1
7.5 TABLET ORAL EVERY 8 HOURS SCHEDULED
Status: DISCONTINUED | OUTPATIENT
Start: 2022-09-29 | End: 2022-09-29

## 2022-09-29 RX ORDER — TRAZODONE HYDROCHLORIDE 100 MG/1
200 TABLET ORAL EVERY 12 HOURS SCHEDULED
Status: DISCONTINUED | OUTPATIENT
Start: 2022-09-29 | End: 2022-09-30

## 2022-09-29 RX ORDER — ONDANSETRON 2 MG/ML
4 INJECTION INTRAMUSCULAR; INTRAVENOUS EVERY 4 HOURS PRN
Status: DISCONTINUED | OUTPATIENT
Start: 2022-09-29 | End: 2022-10-01 | Stop reason: HOSPADM

## 2022-09-29 RX ORDER — BUDESONIDE AND FORMOTEROL FUMARATE DIHYDRATE 160; 4.5 UG/1; UG/1
2 AEROSOL RESPIRATORY (INHALATION) 2 TIMES DAILY
Status: DISCONTINUED | OUTPATIENT
Start: 2022-09-29 | End: 2022-10-01 | Stop reason: HOSPADM

## 2022-09-29 RX ORDER — ACETAMINOPHEN 325 MG/1
650 TABLET ORAL EVERY 6 HOURS PRN
Status: DISCONTINUED | OUTPATIENT
Start: 2022-09-29 | End: 2022-10-01 | Stop reason: HOSPADM

## 2022-09-29 RX ORDER — LEVOTHYROXINE SODIUM 0.07 MG/1
75 TABLET ORAL DAILY
Status: DISCONTINUED | OUTPATIENT
Start: 2022-09-29 | End: 2022-10-01 | Stop reason: HOSPADM

## 2022-09-29 RX ORDER — ALBUTEROL SULFATE 2.5 MG/3ML
2.5 SOLUTION RESPIRATORY (INHALATION) ONCE
Status: COMPLETED | OUTPATIENT
Start: 2022-09-29 | End: 2022-09-29

## 2022-09-29 RX ORDER — DULOXETIN HYDROCHLORIDE 60 MG/1
60 CAPSULE, DELAYED RELEASE ORAL DAILY
Status: DISCONTINUED | OUTPATIENT
Start: 2022-09-29 | End: 2022-10-01 | Stop reason: HOSPADM

## 2022-09-29 RX ORDER — METHYLPREDNISOLONE SODIUM SUCCINATE 40 MG/ML
40 INJECTION, POWDER, LYOPHILIZED, FOR SOLUTION INTRAMUSCULAR; INTRAVENOUS EVERY 12 HOURS
Status: DISCONTINUED | OUTPATIENT
Start: 2022-09-30 | End: 2022-09-30

## 2022-09-29 RX ORDER — PANTOPRAZOLE SODIUM 40 MG/1
40 TABLET, DELAYED RELEASE ORAL
Status: DISCONTINUED | OUTPATIENT
Start: 2022-09-30 | End: 2022-09-29

## 2022-09-29 RX ORDER — PANTOPRAZOLE SODIUM 40 MG/1
40 TABLET, DELAYED RELEASE ORAL
Status: DISCONTINUED | OUTPATIENT
Start: 2022-09-29 | End: 2022-10-01 | Stop reason: HOSPADM

## 2022-09-29 RX ORDER — POTASSIUM CHLORIDE 750 MG/1
10 TABLET, FILM COATED, EXTENDED RELEASE ORAL DAILY
Status: DISCONTINUED | OUTPATIENT
Start: 2022-09-29 | End: 2022-10-01 | Stop reason: HOSPADM

## 2022-09-29 RX ORDER — BUSPIRONE HYDROCHLORIDE 15 MG/1
7.5 TABLET ORAL 3 TIMES DAILY PRN
Status: DISCONTINUED | OUTPATIENT
Start: 2022-09-29 | End: 2022-10-01 | Stop reason: HOSPADM

## 2022-09-29 RX ORDER — IPRATROPIUM BROMIDE AND ALBUTEROL SULFATE 2.5; .5 MG/3ML; MG/3ML
3 SOLUTION RESPIRATORY (INHALATION)
Status: DISCONTINUED | OUTPATIENT
Start: 2022-09-29 | End: 2022-10-01 | Stop reason: HOSPADM

## 2022-09-29 RX ORDER — CHOLECALCIFEROL (VITAMIN D3) 125 MCG
1000 CAPSULE ORAL DAILY
Status: DISCONTINUED | OUTPATIENT
Start: 2022-09-29 | End: 2022-10-01 | Stop reason: HOSPADM

## 2022-09-29 RX ORDER — ALBUTEROL SULFATE 2.5 MG/3ML
2.5 SOLUTION RESPIRATORY (INHALATION) ONCE
Status: DISCONTINUED | OUTPATIENT
Start: 2022-09-29 | End: 2022-09-29

## 2022-09-29 RX ORDER — BUMETANIDE 1 MG/1
1 TABLET ORAL 2 TIMES DAILY
Status: DISCONTINUED | OUTPATIENT
Start: 2022-09-29 | End: 2022-10-01 | Stop reason: HOSPADM

## 2022-09-29 RX ORDER — CARBAMAZEPINE 200 MG/1
200 TABLET, EXTENDED RELEASE ORAL 2 TIMES DAILY
Status: DISCONTINUED | OUTPATIENT
Start: 2022-09-29 | End: 2022-10-01 | Stop reason: HOSPADM

## 2022-09-29 RX ORDER — GUAIFENESIN 600 MG/1
600 TABLET, EXTENDED RELEASE ORAL EVERY 12 HOURS SCHEDULED
Status: DISCONTINUED | OUTPATIENT
Start: 2022-09-29 | End: 2022-10-01 | Stop reason: HOSPADM

## 2022-09-29 RX ORDER — ALLOPURINOL 100 MG/1
100 TABLET ORAL DAILY
Status: DISCONTINUED | OUTPATIENT
Start: 2022-09-29 | End: 2022-10-01 | Stop reason: HOSPADM

## 2022-09-29 RX ORDER — QUETIAPINE FUMARATE 100 MG/1
100 TABLET, FILM COATED ORAL NIGHTLY
Status: DISCONTINUED | OUTPATIENT
Start: 2022-09-29 | End: 2022-10-01 | Stop reason: HOSPADM

## 2022-09-29 RX ORDER — ONDANSETRON 4 MG/1
4 TABLET, FILM COATED ORAL EVERY 6 HOURS PRN
Status: DISCONTINUED | OUTPATIENT
Start: 2022-09-29 | End: 2022-10-01 | Stop reason: HOSPADM

## 2022-09-29 RX ORDER — AMLODIPINE BESYLATE 5 MG/1
5 TABLET ORAL DAILY
Status: DISCONTINUED | OUTPATIENT
Start: 2022-09-29 | End: 2022-10-01 | Stop reason: HOSPADM

## 2022-09-29 RX ORDER — GABAPENTIN 300 MG/1
300 CAPSULE ORAL 3 TIMES DAILY
Status: DISCONTINUED | OUTPATIENT
Start: 2022-09-29 | End: 2022-10-01 | Stop reason: HOSPADM

## 2022-09-29 RX ADMIN — QUETIAPINE FUMARATE 100 MG: 100 TABLET ORAL at 20:22

## 2022-09-29 RX ADMIN — GABAPENTIN 300 MG: 300 CAPSULE ORAL at 20:22

## 2022-09-29 RX ADMIN — CARBAMAZEPINE 200 MG: 200 TABLET, EXTENDED RELEASE ORAL at 20:22

## 2022-09-29 RX ADMIN — BUDESONIDE AND FORMOTEROL FUMARATE DIHYDRATE 2 PUFF: 160; 4.5 AEROSOL RESPIRATORY (INHALATION) at 20:40

## 2022-09-29 RX ADMIN — ONDANSETRON 4 MG: 2 INJECTION INTRAMUSCULAR; INTRAVENOUS at 21:56

## 2022-09-29 RX ADMIN — TRAZODONE HYDROCHLORIDE 200 MG: 100 TABLET ORAL at 20:22

## 2022-09-29 RX ADMIN — GUAIFENESIN 600 MG: 600 TABLET, EXTENDED RELEASE ORAL at 20:22

## 2022-09-29 RX ADMIN — SODIUM CHLORIDE 2 MG/HR: 9 INJECTION, SOLUTION INTRAVENOUS at 20:23

## 2022-09-29 RX ADMIN — BUMETANIDE 1 MG: 1 TABLET ORAL at 20:22

## 2022-09-29 RX ADMIN — ALBUTEROL SULFATE 2.5 MG: 2.5 SOLUTION RESPIRATORY (INHALATION) at 13:35

## 2022-09-30 ENCOUNTER — APPOINTMENT (OUTPATIENT)
Dept: CARDIOLOGY | Facility: HOSPITAL | Age: 82
End: 2022-09-30

## 2022-09-30 LAB
ALBUMIN SERPL-MCNC: 3.9 G/DL (ref 3.5–5.2)
ALBUMIN/GLOB SERPL: 1.9 G/DL
ALP SERPL-CCNC: 77 U/L (ref 39–117)
ALT SERPL W P-5'-P-CCNC: 13 U/L (ref 1–33)
ANION GAP SERPL CALCULATED.3IONS-SCNC: 10.8 MMOL/L (ref 5–15)
AORTIC ARCH: 2.5 CM
AORTIC DIMENSIONLESS INDEX: 1 (DI)
ASCENDING AORTA: 2.7 CM
AST SERPL-CCNC: 14 U/L (ref 1–32)
BASOPHILS # BLD AUTO: 0.01 10*3/MM3 (ref 0–0.2)
BASOPHILS NFR BLD AUTO: 0.2 % (ref 0–1.5)
BH CV ECHO MEAS - ACS: 1.54 CM
BH CV ECHO MEAS - AO MAX PG: 9.4 MMHG
BH CV ECHO MEAS - AO MEAN PG: 4.4 MMHG
BH CV ECHO MEAS - AO ROOT DIAM: 3 CM
BH CV ECHO MEAS - AO V2 MAX: 153.2 CM/SEC
BH CV ECHO MEAS - AO V2 VTI: 31.9 CM
BH CV ECHO MEAS - AVA(I,D): 2.8 CM2
BH CV ECHO MEAS - EDV(CUBED): 69.8 ML
BH CV ECHO MEAS - EDV(MOD-SP2): 60 ML
BH CV ECHO MEAS - EDV(MOD-SP4): 69 ML
BH CV ECHO MEAS - EF(MOD-BP): 61.5 %
BH CV ECHO MEAS - EF(MOD-SP2): 63.3 %
BH CV ECHO MEAS - EF(MOD-SP4): 62.3 %
BH CV ECHO MEAS - EF_3D-VOL: 64 %
BH CV ECHO MEAS - ESV(CUBED): 17.9 ML
BH CV ECHO MEAS - ESV(MOD-SP2): 22 ML
BH CV ECHO MEAS - ESV(MOD-SP4): 26 ML
BH CV ECHO MEAS - FS: 36.4 %
BH CV ECHO MEAS - IVS/LVPW: 0.97 CM
BH CV ECHO MEAS - IVSD: 0.86 CM
BH CV ECHO MEAS - LAT PEAK E' VEL: 7.9 CM/SEC
BH CV ECHO MEAS - LV DIASTOLIC VOL/BSA (35-75): 39.6 CM2
BH CV ECHO MEAS - LV MASS(C)D: 110.4 GRAMS
BH CV ECHO MEAS - LV MAX PG: 9.1 MMHG
BH CV ECHO MEAS - LV MEAN PG: 4.1 MMHG
BH CV ECHO MEAS - LV SYSTOLIC VOL/BSA (12-30): 14.9 CM2
BH CV ECHO MEAS - LV V1 MAX: 151.2 CM/SEC
BH CV ECHO MEAS - LV V1 VTI: 33.6 CM
BH CV ECHO MEAS - LVIDD: 4.1 CM
BH CV ECHO MEAS - LVIDS: 2.6 CM
BH CV ECHO MEAS - LVOT AREA: 2.6 CM2
BH CV ECHO MEAS - LVOT DIAM: 1.84 CM
BH CV ECHO MEAS - LVPWD: 0.89 CM
BH CV ECHO MEAS - MED PEAK E' VEL: 5.8 CM/SEC
BH CV ECHO MEAS - MV A DUR: 0.13 SEC
BH CV ECHO MEAS - MV A MAX VEL: 131.6 CM/SEC
BH CV ECHO MEAS - MV DEC SLOPE: 388.1 CM/SEC2
BH CV ECHO MEAS - MV DEC TIME: 0.13 MSEC
BH CV ECHO MEAS - MV E MAX VEL: 100 CM/SEC
BH CV ECHO MEAS - MV E/A: 0.76
BH CV ECHO MEAS - MV MAX PG: 7.1 MMHG
BH CV ECHO MEAS - MV MEAN PG: 2.6 MMHG
BH CV ECHO MEAS - MV P1/2T: 81.9 MSEC
BH CV ECHO MEAS - MV V2 VTI: 30.2 CM
BH CV ECHO MEAS - MVA(P1/2T): 2.7 CM2
BH CV ECHO MEAS - MVA(VTI): 3 CM2
BH CV ECHO MEAS - PA ACC TIME: 0.09 SEC
BH CV ECHO MEAS - PA PR(ACCEL): 37.4 MMHG
BH CV ECHO MEAS - PA V2 MAX: 114 CM/SEC
BH CV ECHO MEAS - PULM A REVS DUR: 0.12 SEC
BH CV ECHO MEAS - PULM A REVS VEL: 29.2 CM/SEC
BH CV ECHO MEAS - PULM DIAS VEL: 36.1 CM/SEC
BH CV ECHO MEAS - PULM S/D: 1.81
BH CV ECHO MEAS - PULM SYS VEL: 65.3 CM/SEC
BH CV ECHO MEAS - QP/QS: 0.56
BH CV ECHO MEAS - RAP SYSTOLE: 3 MMHG
BH CV ECHO MEAS - RV MAX PG: 2.07 MMHG
BH CV ECHO MEAS - RV V1 MAX: 72 CM/SEC
BH CV ECHO MEAS - RV V1 VTI: 15.3 CM
BH CV ECHO MEAS - RVOT DIAM: 2.04 CM
BH CV ECHO MEAS - RVSP: 40 MMHG
BH CV ECHO MEAS - SI(MOD-SP2): 21.8 ML/M2
BH CV ECHO MEAS - SI(MOD-SP4): 24.7 ML/M2
BH CV ECHO MEAS - SV(LVOT): 89.1 ML
BH CV ECHO MEAS - SV(MOD-SP2): 38 ML
BH CV ECHO MEAS - SV(MOD-SP4): 43 ML
BH CV ECHO MEAS - SV(RVOT): 49.7 ML
BH CV ECHO MEAS - TAPSE (>1.6): 1.78 CM
BH CV ECHO MEAS - TR MAX PG: 37.5 MMHG
BH CV ECHO MEAS - TR MAX VEL: 306.2 CM/SEC
BH CV ECHO MEASUREMENTS AVERAGE E/E' RATIO: 14.6
BH CV XLRA - RV BASE: 2.8 CM
BH CV XLRA - RV LENGTH: 5.7 CM
BH CV XLRA - RV MID: 2.38 CM
BH CV XLRA - TDI S': 11.2 CM/SEC
BILIRUB SERPL-MCNC: 0.3 MG/DL (ref 0–1.2)
BUN SERPL-MCNC: 23 MG/DL (ref 8–23)
BUN/CREAT SERPL: 15.1 (ref 7–25)
CALCIUM SPEC-SCNC: 9.3 MG/DL (ref 8.6–10.5)
CHLORIDE SERPL-SCNC: 100 MMOL/L (ref 98–107)
CHOLEST SERPL-MCNC: 214 MG/DL (ref 0–200)
CO2 SERPL-SCNC: 30.2 MMOL/L (ref 22–29)
CREAT SERPL-MCNC: 1.52 MG/DL (ref 0.57–1)
DEPRECATED RDW RBC AUTO: 51.4 FL (ref 37–54)
EGFRCR SERPLBLD CKD-EPI 2021: 34.5 ML/MIN/1.73
EOSINOPHIL # BLD AUTO: 0 10*3/MM3 (ref 0–0.4)
EOSINOPHIL NFR BLD AUTO: 0 % (ref 0.3–6.2)
ERYTHROCYTE [DISTWIDTH] IN BLOOD BY AUTOMATED COUNT: 14.7 % (ref 12.3–15.4)
GLOBULIN UR ELPH-MCNC: 2.1 GM/DL
GLUCOSE SERPL-MCNC: 169 MG/DL (ref 65–99)
HBA1C MFR BLD: 5.1 % (ref 4.8–5.6)
HCT VFR BLD AUTO: 26.3 % (ref 34–46.6)
HDLC SERPL-MCNC: 93 MG/DL (ref 40–60)
HGB BLD-MCNC: 8.4 G/DL (ref 12–15.9)
IMM GRANULOCYTES # BLD AUTO: 0.07 10*3/MM3 (ref 0–0.05)
IMM GRANULOCYTES NFR BLD AUTO: 1.1 % (ref 0–0.5)
LDLC SERPL CALC-MCNC: 112 MG/DL (ref 0–100)
LDLC/HDLC SERPL: 1.2 {RATIO}
LEFT ATRIUM VOLUME INDEX: 30.2 ML/M2
LYMPHOCYTES # BLD AUTO: 0.37 10*3/MM3 (ref 0.7–3.1)
LYMPHOCYTES NFR BLD AUTO: 5.6 % (ref 19.6–45.3)
MAGNESIUM SERPL-MCNC: 1.8 MG/DL (ref 1.6–2.4)
MAXIMAL PREDICTED HEART RATE: 140 BPM
MCH RBC QN AUTO: 30.2 PG (ref 26.6–33)
MCHC RBC AUTO-ENTMCNC: 31.9 G/DL (ref 31.5–35.7)
MCV RBC AUTO: 94.6 FL (ref 79–97)
MONOCYTES # BLD AUTO: 0.17 10*3/MM3 (ref 0.1–0.9)
MONOCYTES NFR BLD AUTO: 2.6 % (ref 5–12)
NEUTROPHILS NFR BLD AUTO: 6.01 10*3/MM3 (ref 1.7–7)
NEUTROPHILS NFR BLD AUTO: 90.5 % (ref 42.7–76)
NRBC BLD AUTO-RTO: 0 /100 WBC (ref 0–0.2)
PLATELET # BLD AUTO: 204 10*3/MM3 (ref 140–450)
PMV BLD AUTO: 9 FL (ref 6–12)
POTASSIUM SERPL-SCNC: 4 MMOL/L (ref 3.5–5.2)
PROT SERPL-MCNC: 6 G/DL (ref 6–8.5)
RBC # BLD AUTO: 2.78 10*6/MM3 (ref 3.77–5.28)
SINUS: 2.7 CM
SODIUM SERPL-SCNC: 141 MMOL/L (ref 136–145)
STJ: 2.37 CM
STRESS TARGET HR: 119 BPM
TRIGL SERPL-MCNC: 49 MG/DL (ref 0–150)
TROPONIN T SERPL-MCNC: <0.01 NG/ML (ref 0–0.03)
TSH SERPL DL<=0.05 MIU/L-ACNC: 0.38 UIU/ML (ref 0.27–4.2)
VIT B12 BLD-MCNC: 343 PG/ML (ref 211–946)
VLDLC SERPL-MCNC: 9 MG/DL (ref 5–40)
WBC NRBC COR # BLD: 6.63 10*3/MM3 (ref 3.4–10.8)

## 2022-09-30 PROCEDURE — 94799 UNLISTED PULMONARY SVC/PX: CPT

## 2022-09-30 PROCEDURE — 94664 DEMO&/EVAL PT USE INHALER: CPT

## 2022-09-30 PROCEDURE — A9270 NON-COVERED ITEM OR SERVICE: HCPCS | Performed by: HOSPITALIST

## 2022-09-30 PROCEDURE — 63710000001 QUETIAPINE 100 MG TABLET: Performed by: HOSPITALIST

## 2022-09-30 PROCEDURE — 97530 THERAPEUTIC ACTIVITIES: CPT

## 2022-09-30 PROCEDURE — 63710000001 BUMETANIDE 1 MG TABLET: Performed by: HOSPITALIST

## 2022-09-30 PROCEDURE — 63710000001 POTASSIUM CHLORIDE 10 MEQ TABLET CONTROLLED-RELEASE: Performed by: HOSPITALIST

## 2022-09-30 PROCEDURE — 96375 TX/PRO/DX INJ NEW DRUG ADDON: CPT

## 2022-09-30 PROCEDURE — G0378 HOSPITAL OBSERVATION PER HR: HCPCS

## 2022-09-30 PROCEDURE — 63710000001 PANTOPRAZOLE 40 MG TABLET DELAYED-RELEASE: Performed by: HOSPITALIST

## 2022-09-30 PROCEDURE — 25010000002 METHYLPREDNISOLONE PER 40 MG: Performed by: HOSPITALIST

## 2022-09-30 PROCEDURE — A9270 NON-COVERED ITEM OR SERVICE: HCPCS | Performed by: INTERNAL MEDICINE

## 2022-09-30 PROCEDURE — 87205 SMEAR GRAM STAIN: CPT | Performed by: INTERNAL MEDICINE

## 2022-09-30 PROCEDURE — 63710000001 GABAPENTIN 300 MG CAPSULE: Performed by: HOSPITALIST

## 2022-09-30 PROCEDURE — 84443 ASSAY THYROID STIM HORMONE: CPT | Performed by: HOSPITALIST

## 2022-09-30 PROCEDURE — 63710000001 AMLODIPINE 5 MG TABLET: Performed by: HOSPITALIST

## 2022-09-30 PROCEDURE — 63710000001 AZITHROMYCIN 250 MG TABLET: Performed by: INTERNAL MEDICINE

## 2022-09-30 PROCEDURE — 63710000001 LEVOTHYROXINE 75 MCG TABLET: Performed by: HOSPITALIST

## 2022-09-30 PROCEDURE — 87070 CULTURE OTHR SPECIMN AEROBIC: CPT | Performed by: INTERNAL MEDICINE

## 2022-09-30 PROCEDURE — 63710000001 ACETAMINOPHEN 325 MG TABLET: Performed by: HOSPITALIST

## 2022-09-30 PROCEDURE — 80061 LIPID PANEL: CPT | Performed by: HOSPITALIST

## 2022-09-30 PROCEDURE — 63710000001 DULOXETINE 60 MG CAPSULE DELAYED-RELEASE PARTICLES: Performed by: HOSPITALIST

## 2022-09-30 PROCEDURE — 63710000001 GUAIFENESIN 600 MG TABLET SUSTAINED-RELEASE 12 HOUR: Performed by: HOSPITALIST

## 2022-09-30 PROCEDURE — 63710000001 METOPROLOL SUCCINATE XL 25 MG TABLET SUSTAINED-RELEASE 24 HOUR: Performed by: HOSPITALIST

## 2022-09-30 PROCEDURE — 84484 ASSAY OF TROPONIN QUANT: CPT | Performed by: HOSPITALIST

## 2022-09-30 PROCEDURE — 63710000001 CARBAMAZEPINE XR 200 MG TABLET SUSTAINED-RELEASE 12 HOUR: Performed by: HOSPITALIST

## 2022-09-30 PROCEDURE — 63710000001 ALLOPURINOL 100 MG TABLET: Performed by: HOSPITALIST

## 2022-09-30 PROCEDURE — 85025 COMPLETE CBC W/AUTO DIFF WBC: CPT | Performed by: HOSPITALIST

## 2022-09-30 PROCEDURE — 82607 VITAMIN B-12: CPT | Performed by: HOSPITALIST

## 2022-09-30 PROCEDURE — 63710000001 TRAZODONE 100 MG TABLET: Performed by: HOSPITALIST

## 2022-09-30 PROCEDURE — 80053 COMPREHEN METABOLIC PANEL: CPT | Performed by: HOSPITALIST

## 2022-09-30 PROCEDURE — 83036 HEMOGLOBIN GLYCOSYLATED A1C: CPT | Performed by: HOSPITALIST

## 2022-09-30 PROCEDURE — 83735 ASSAY OF MAGNESIUM: CPT | Performed by: NURSE PRACTITIONER

## 2022-09-30 PROCEDURE — 99214 OFFICE O/P EST MOD 30 MIN: CPT | Performed by: STUDENT IN AN ORGANIZED HEALTH CARE EDUCATION/TRAINING PROGRAM

## 2022-09-30 PROCEDURE — 93306 TTE W/DOPPLER COMPLETE: CPT | Performed by: INTERNAL MEDICINE

## 2022-09-30 PROCEDURE — 97165 OT EVAL LOW COMPLEX 30 MIN: CPT

## 2022-09-30 PROCEDURE — 63710000001 VITAMIN B-12 500 MCG TABLET: Performed by: HOSPITALIST

## 2022-09-30 PROCEDURE — 94761 N-INVAS EAR/PLS OXIMETRY MLT: CPT

## 2022-09-30 PROCEDURE — 93306 TTE W/DOPPLER COMPLETE: CPT

## 2022-09-30 RX ORDER — PREDNISONE 20 MG/1
40 TABLET ORAL
Status: DISCONTINUED | OUTPATIENT
Start: 2022-10-01 | End: 2022-10-01 | Stop reason: HOSPADM

## 2022-09-30 RX ORDER — TRAZODONE HYDROCHLORIDE 100 MG/1
200 TABLET ORAL NIGHTLY
Status: DISCONTINUED | OUTPATIENT
Start: 2022-09-30 | End: 2022-10-01 | Stop reason: HOSPADM

## 2022-09-30 RX ORDER — AZITHROMYCIN 250 MG/1
250 TABLET, FILM COATED ORAL
Status: DISCONTINUED | OUTPATIENT
Start: 2022-09-30 | End: 2022-10-01 | Stop reason: HOSPADM

## 2022-09-30 RX ADMIN — GABAPENTIN 300 MG: 300 CAPSULE ORAL at 21:36

## 2022-09-30 RX ADMIN — GUAIFENESIN 600 MG: 600 TABLET, EXTENDED RELEASE ORAL at 21:21

## 2022-09-30 RX ADMIN — IPRATROPIUM BROMIDE AND ALBUTEROL SULFATE 3 ML: .5; 3 SOLUTION RESPIRATORY (INHALATION) at 11:24

## 2022-09-30 RX ADMIN — GABAPENTIN 300 MG: 300 CAPSULE ORAL at 08:06

## 2022-09-30 RX ADMIN — AMLODIPINE BESYLATE 5 MG: 5 TABLET ORAL at 08:05

## 2022-09-30 RX ADMIN — GABAPENTIN 300 MG: 300 CAPSULE ORAL at 16:59

## 2022-09-30 RX ADMIN — BUMETANIDE 1 MG: 1 TABLET ORAL at 08:05

## 2022-09-30 RX ADMIN — ACETAMINOPHEN 650 MG: 325 TABLET, FILM COATED ORAL at 12:25

## 2022-09-30 RX ADMIN — BUMETANIDE 1 MG: 1 TABLET ORAL at 21:21

## 2022-09-30 RX ADMIN — IPRATROPIUM BROMIDE AND ALBUTEROL SULFATE 3 ML: .5; 3 SOLUTION RESPIRATORY (INHALATION) at 03:20

## 2022-09-30 RX ADMIN — LEVOTHYROXINE SODIUM 75 MCG: 0.07 TABLET ORAL at 08:05

## 2022-09-30 RX ADMIN — PANTOPRAZOLE SODIUM 40 MG: 40 TABLET, DELAYED RELEASE ORAL at 16:59

## 2022-09-30 RX ADMIN — ALLOPURINOL 100 MG: 100 TABLET ORAL at 08:05

## 2022-09-30 RX ADMIN — POTASSIUM CHLORIDE 10 MEQ: 750 TABLET, EXTENDED RELEASE ORAL at 08:06

## 2022-09-30 RX ADMIN — Medication 1000 MCG: at 08:05

## 2022-09-30 RX ADMIN — QUETIAPINE FUMARATE 100 MG: 100 TABLET ORAL at 21:21

## 2022-09-30 RX ADMIN — BUDESONIDE AND FORMOTEROL FUMARATE DIHYDRATE 2 PUFF: 160; 4.5 AEROSOL RESPIRATORY (INHALATION) at 20:19

## 2022-09-30 RX ADMIN — ACETAMINOPHEN 650 MG: 325 TABLET, FILM COATED ORAL at 18:55

## 2022-09-30 RX ADMIN — BUDESONIDE AND FORMOTEROL FUMARATE DIHYDRATE 2 PUFF: 160; 4.5 AEROSOL RESPIRATORY (INHALATION) at 08:21

## 2022-09-30 RX ADMIN — DULOXETINE HYDROCHLORIDE 60 MG: 60 CAPSULE, DELAYED RELEASE ORAL at 08:05

## 2022-09-30 RX ADMIN — METHYLPREDNISOLONE SODIUM SUCCINATE 40 MG: 40 INJECTION, POWDER, FOR SOLUTION INTRAMUSCULAR; INTRAVENOUS at 08:05

## 2022-09-30 RX ADMIN — IPRATROPIUM BROMIDE AND ALBUTEROL SULFATE 3 ML: .5; 3 SOLUTION RESPIRATORY (INHALATION) at 00:07

## 2022-09-30 RX ADMIN — IPRATROPIUM BROMIDE AND ALBUTEROL SULFATE 3 ML: .5; 3 SOLUTION RESPIRATORY (INHALATION) at 15:16

## 2022-09-30 RX ADMIN — AZITHROMYCIN 250 MG: 250 TABLET, FILM COATED ORAL at 08:05

## 2022-09-30 RX ADMIN — CARBAMAZEPINE 200 MG: 200 TABLET, EXTENDED RELEASE ORAL at 21:21

## 2022-09-30 RX ADMIN — TRAZODONE HYDROCHLORIDE 200 MG: 100 TABLET ORAL at 21:21

## 2022-09-30 RX ADMIN — GUAIFENESIN 600 MG: 600 TABLET, EXTENDED RELEASE ORAL at 08:05

## 2022-09-30 RX ADMIN — METOPROLOL SUCCINATE 12.5 MG: 25 TABLET, EXTENDED RELEASE ORAL at 08:05

## 2022-09-30 RX ADMIN — CARBAMAZEPINE 200 MG: 200 TABLET, EXTENDED RELEASE ORAL at 08:05

## 2022-09-30 RX ADMIN — IPRATROPIUM BROMIDE AND ALBUTEROL SULFATE 3 ML: .5; 3 SOLUTION RESPIRATORY (INHALATION) at 23:43

## 2022-10-01 ENCOUNTER — READMISSION MANAGEMENT (OUTPATIENT)
Dept: CALL CENTER | Facility: HOSPITAL | Age: 82
End: 2022-10-01

## 2022-10-01 VITALS
WEIGHT: 161 LBS | BODY MASS INDEX: 29.63 KG/M2 | DIASTOLIC BLOOD PRESSURE: 54 MMHG | OXYGEN SATURATION: 91 % | HEIGHT: 62 IN | TEMPERATURE: 97.7 F | HEART RATE: 69 BPM | RESPIRATION RATE: 18 BRPM | SYSTOLIC BLOOD PRESSURE: 122 MMHG

## 2022-10-01 LAB
ANION GAP SERPL CALCULATED.3IONS-SCNC: 8 MMOL/L (ref 5–15)
BASOPHILS # BLD AUTO: 0.01 10*3/MM3 (ref 0–0.2)
BASOPHILS NFR BLD AUTO: 0.1 % (ref 0–1.5)
BUN SERPL-MCNC: 35 MG/DL (ref 8–23)
BUN/CREAT SERPL: 16.3 (ref 7–25)
CALCIUM SPEC-SCNC: 8.8 MG/DL (ref 8.6–10.5)
CHLORIDE SERPL-SCNC: 96 MMOL/L (ref 98–107)
CO2 SERPL-SCNC: 35 MMOL/L (ref 22–29)
CREAT SERPL-MCNC: 2.15 MG/DL (ref 0.57–1)
DEPRECATED RDW RBC AUTO: 53.3 FL (ref 37–54)
EGFRCR SERPLBLD CKD-EPI 2021: 22.8 ML/MIN/1.73
EOSINOPHIL # BLD AUTO: 0.01 10*3/MM3 (ref 0–0.4)
EOSINOPHIL NFR BLD AUTO: 0.1 % (ref 0.3–6.2)
ERYTHROCYTE [DISTWIDTH] IN BLOOD BY AUTOMATED COUNT: 15.2 % (ref 12.3–15.4)
GLUCOSE SERPL-MCNC: 86 MG/DL (ref 65–99)
HCT VFR BLD AUTO: 25.4 % (ref 34–46.6)
HGB BLD-MCNC: 8.1 G/DL (ref 12–15.9)
IMM GRANULOCYTES # BLD AUTO: 0.08 10*3/MM3 (ref 0–0.05)
IMM GRANULOCYTES NFR BLD AUTO: 0.9 % (ref 0–0.5)
LYMPHOCYTES # BLD AUTO: 1.75 10*3/MM3 (ref 0.7–3.1)
LYMPHOCYTES NFR BLD AUTO: 19.6 % (ref 19.6–45.3)
MCH RBC QN AUTO: 30.7 PG (ref 26.6–33)
MCHC RBC AUTO-ENTMCNC: 31.9 G/DL (ref 31.5–35.7)
MCV RBC AUTO: 96.2 FL (ref 79–97)
MONOCYTES # BLD AUTO: 0.68 10*3/MM3 (ref 0.1–0.9)
MONOCYTES NFR BLD AUTO: 7.6 % (ref 5–12)
NEUTROPHILS NFR BLD AUTO: 6.41 10*3/MM3 (ref 1.7–7)
NEUTROPHILS NFR BLD AUTO: 71.7 % (ref 42.7–76)
NRBC BLD AUTO-RTO: 0.1 /100 WBC (ref 0–0.2)
PLATELET # BLD AUTO: 216 10*3/MM3 (ref 140–450)
PMV BLD AUTO: 9.3 FL (ref 6–12)
POTASSIUM SERPL-SCNC: 4.4 MMOL/L (ref 3.5–5.2)
RBC # BLD AUTO: 2.64 10*6/MM3 (ref 3.77–5.28)
SODIUM SERPL-SCNC: 139 MMOL/L (ref 136–145)
WBC NRBC COR # BLD: 8.94 10*3/MM3 (ref 3.4–10.8)

## 2022-10-01 PROCEDURE — 94664 DEMO&/EVAL PT USE INHALER: CPT

## 2022-10-01 PROCEDURE — A9270 NON-COVERED ITEM OR SERVICE: HCPCS | Performed by: HOSPITALIST

## 2022-10-01 PROCEDURE — 63710000001 PANTOPRAZOLE 40 MG TABLET DELAYED-RELEASE: Performed by: HOSPITALIST

## 2022-10-01 PROCEDURE — 63710000001 LEVOTHYROXINE 75 MCG TABLET: Performed by: HOSPITALIST

## 2022-10-01 PROCEDURE — A9270 NON-COVERED ITEM OR SERVICE: HCPCS | Performed by: INTERNAL MEDICINE

## 2022-10-01 PROCEDURE — 94799 UNLISTED PULMONARY SVC/PX: CPT

## 2022-10-01 PROCEDURE — 63710000001 GABAPENTIN 300 MG CAPSULE: Performed by: HOSPITALIST

## 2022-10-01 PROCEDURE — 63710000001 DULOXETINE 60 MG CAPSULE DELAYED-RELEASE PARTICLES: Performed by: HOSPITALIST

## 2022-10-01 PROCEDURE — 63710000001 GUAIFENESIN 600 MG TABLET SUSTAINED-RELEASE 12 HOUR: Performed by: HOSPITALIST

## 2022-10-01 PROCEDURE — 63710000001 METOPROLOL SUCCINATE XL 25 MG TABLET SUSTAINED-RELEASE 24 HOUR: Performed by: HOSPITALIST

## 2022-10-01 PROCEDURE — 63710000001 ALLOPURINOL 100 MG TABLET: Performed by: HOSPITALIST

## 2022-10-01 PROCEDURE — 63710000001 PREDNISONE PER 1 MG: Performed by: INTERNAL MEDICINE

## 2022-10-01 PROCEDURE — 94760 N-INVAS EAR/PLS OXIMETRY 1: CPT

## 2022-10-01 PROCEDURE — G0378 HOSPITAL OBSERVATION PER HR: HCPCS

## 2022-10-01 PROCEDURE — 63710000001 AMLODIPINE 5 MG TABLET: Performed by: HOSPITALIST

## 2022-10-01 PROCEDURE — 63710000001 CARBAMAZEPINE XR 200 MG TABLET SUSTAINED-RELEASE 12 HOUR: Performed by: HOSPITALIST

## 2022-10-01 PROCEDURE — 63710000001 BUMETANIDE 1 MG TABLET: Performed by: HOSPITALIST

## 2022-10-01 PROCEDURE — 63710000001 VITAMIN B-12 500 MCG TABLET: Performed by: HOSPITALIST

## 2022-10-01 PROCEDURE — 85025 COMPLETE CBC W/AUTO DIFF WBC: CPT | Performed by: HOSPITALIST

## 2022-10-01 PROCEDURE — 80048 BASIC METABOLIC PNL TOTAL CA: CPT | Performed by: HOSPITALIST

## 2022-10-01 PROCEDURE — 94761 N-INVAS EAR/PLS OXIMETRY MLT: CPT

## 2022-10-01 PROCEDURE — 63710000001 AZITHROMYCIN 250 MG TABLET: Performed by: INTERNAL MEDICINE

## 2022-10-01 PROCEDURE — 63710000001 ACETAMINOPHEN 325 MG TABLET: Performed by: HOSPITALIST

## 2022-10-01 PROCEDURE — 63710000001 POTASSIUM CHLORIDE 10 MEQ TABLET CONTROLLED-RELEASE: Performed by: HOSPITALIST

## 2022-10-01 RX ORDER — AMLODIPINE BESYLATE 5 MG/1
5 TABLET ORAL DAILY
Qty: 30 TABLET | Refills: 0 | Status: SHIPPED | OUTPATIENT
Start: 2022-10-02 | End: 2022-11-01

## 2022-10-01 RX ORDER — AZITHROMYCIN 250 MG/1
TABLET, FILM COATED ORAL
Qty: 3 TABLET | Refills: 0 | Status: SHIPPED | OUTPATIENT
Start: 2022-10-02 | End: 2022-10-02 | Stop reason: SDUPTHER

## 2022-10-01 RX ORDER — GUAIFENESIN 600 MG/1
600 TABLET, EXTENDED RELEASE ORAL EVERY 12 HOURS SCHEDULED
Qty: 60 TABLET | Refills: 0 | Status: SHIPPED | OUTPATIENT
Start: 2022-10-01 | End: 2022-10-31

## 2022-10-01 RX ORDER — PREDNISONE 20 MG/1
40 TABLET ORAL
Qty: 4 TABLET | Refills: 0 | Status: SHIPPED | OUTPATIENT
Start: 2022-10-02 | End: 2022-10-04

## 2022-10-01 RX ORDER — PANTOPRAZOLE SODIUM 40 MG/1
40 TABLET, DELAYED RELEASE ORAL
Qty: 60 TABLET | Refills: 0 | Status: SHIPPED | OUTPATIENT
Start: 2022-10-01 | End: 2022-10-31

## 2022-10-01 RX ADMIN — BUMETANIDE 1 MG: 1 TABLET ORAL at 08:10

## 2022-10-01 RX ADMIN — GABAPENTIN 300 MG: 300 CAPSULE ORAL at 08:10

## 2022-10-01 RX ADMIN — Medication 1000 MCG: at 08:10

## 2022-10-01 RX ADMIN — AMLODIPINE BESYLATE 5 MG: 5 TABLET ORAL at 08:10

## 2022-10-01 RX ADMIN — AZITHROMYCIN 250 MG: 250 TABLET, FILM COATED ORAL at 08:10

## 2022-10-01 RX ADMIN — POTASSIUM CHLORIDE 10 MEQ: 750 TABLET, EXTENDED RELEASE ORAL at 08:10

## 2022-10-01 RX ADMIN — CARBAMAZEPINE 200 MG: 200 TABLET, EXTENDED RELEASE ORAL at 08:09

## 2022-10-01 RX ADMIN — PREDNISONE 40 MG: 20 TABLET ORAL at 08:09

## 2022-10-01 RX ADMIN — DULOXETINE HYDROCHLORIDE 60 MG: 60 CAPSULE, DELAYED RELEASE ORAL at 08:09

## 2022-10-01 RX ADMIN — ALLOPURINOL 100 MG: 100 TABLET ORAL at 08:09

## 2022-10-01 RX ADMIN — ACETAMINOPHEN 650 MG: 325 TABLET, FILM COATED ORAL at 11:17

## 2022-10-01 RX ADMIN — PANTOPRAZOLE SODIUM 40 MG: 40 TABLET, DELAYED RELEASE ORAL at 08:09

## 2022-10-01 RX ADMIN — BUDESONIDE AND FORMOTEROL FUMARATE DIHYDRATE 2 PUFF: 160; 4.5 AEROSOL RESPIRATORY (INHALATION) at 10:52

## 2022-10-01 RX ADMIN — IPRATROPIUM BROMIDE AND ALBUTEROL SULFATE 3 ML: .5; 3 SOLUTION RESPIRATORY (INHALATION) at 07:37

## 2022-10-01 RX ADMIN — GUAIFENESIN 600 MG: 600 TABLET, EXTENDED RELEASE ORAL at 08:09

## 2022-10-01 RX ADMIN — LEVOTHYROXINE SODIUM 75 MCG: 0.07 TABLET ORAL at 08:09

## 2022-10-01 RX ADMIN — METOPROLOL SUCCINATE 12.5 MG: 25 TABLET, EXTENDED RELEASE ORAL at 08:09

## 2022-10-01 NOTE — DISCHARGE SUMMARY
Discharge summary    Date of admission 9/29/2022  Date of discharge 10/1/2022    Final diagnosis  Acute on chronic hypoxic respiratory failure  Acute exacerbation of COPD  Chronic diastolic congestive heart failure  SVT  Hypertension  Hypothyroidism  Chronic anemia  Anxiety disorder  Seizure disorder  Bipolar disorder  Morbidly obese  Pulmonary hypertension  Chronic kidney disease stage III  Gastroesophageal reflux disease    Discharge medications    Current Facility-Administered Medications:   •  acetaminophen (TYLENOL) tablet 650 mg, 650 mg, Oral, Q6H PRN, Stanley Fowler MD, 650 mg at 10/01/22 1117  •  allopurinol (ZYLOPRIM) tablet 100 mg, 100 mg, Oral, Daily, Stanley Fowler MD, 100 mg at 10/01/22 0809  •  amLODIPine (NORVASC) tablet 5 mg, 5 mg, Oral, Daily, Stanley Fowler MD, 5 mg at 10/01/22 0810  •  azithromycin (ZITHROMAX) tablet 250 mg, 250 mg, Oral, Q24H, French Dolan MD, 250 mg at 10/01/22 0810  •  budesonide-formoterol (SYMBICORT) 160-4.5 MCG/ACT inhaler 2 puff, 2 puff, Inhalation, BID, Stanley Fowler MD, 2 puff at 10/01/22 1052  •  bumetanide (BUMEX) tablet 1 mg, 1 mg, Oral, BID, Stanley Fowler MD, 1 mg at 10/01/22 0810  •  busPIRone (BUSPAR) tablet 7.5 mg, 7.5 mg, Oral, TID PRN, Stanley Fowler MD  •  carBAMazepine XR (TEGretol  XR) 12 hr tablet 200 mg, 200 mg, Oral, BID, Stanley Fowler MD, 200 mg at 10/01/22 0809  •  DULoxetine (CYMBALTA) DR capsule 60 mg, 60 mg, Oral, Daily, Stanley Fowler MD, 60 mg at 10/01/22 0809  •  gabapentin (NEURONTIN) capsule 300 mg, 300 mg, Oral, TID, Stanley Fowler MD, 300 mg at 10/01/22 0810  •  guaiFENesin (MUCINEX) 12 hr tablet 600 mg, 600 mg, Oral, Q12H, Stanley Fowler MD, 600 mg at 10/01/22 0809  •  HYDROcodone-acetaminophen (NORCO) 7.5-325 MG per tablet 1 tablet, 1 tablet, Oral, Q8H PRN, Stanley Fowler MD  •  ipratropium-albuterol (DUO-NEB) nebulizer solution 3 mL, 3 mL, Nebulization, Q4H - RT, Stanley Fowler MD, 3 mL at 10/01/22 0737  •  levothyroxine (SYNTHROID, LEVOTHROID)  tablet 75 mcg, 75 mcg, Oral, Daily, Stanley Fowler MD, 75 mcg at 10/01/22 0809  •  metoprolol succinate XL (TOPROL-XL) 24 hr tablet 12.5 mg, 12.5 mg, Oral, Q24H, Stanley Fowler MD, 12.5 mg at 10/01/22 0809  •  ondansetron (ZOFRAN) injection 4 mg, 4 mg, Intravenous, Q4H PRN, Stanley Fowler MD, 4 mg at 09/29/22 2156  •  ondansetron (ZOFRAN) tablet 4 mg, 4 mg, Oral, Q6H PRN, Stanley Fowler MD  •  pantoprazole (PROTONIX) EC tablet 40 mg, 40 mg, Oral, BID AC, Stanley Fowler MD, 40 mg at 10/01/22 0809  •  potassium chloride (K-DUR,KLOR-CON) ER tablet 10 mEq, 10 mEq, Oral, Daily, Stanley Fowler MD, 10 mEq at 10/01/22 0810  •  predniSONE (DELTASONE) tablet 40 mg, 40 mg, Oral, Daily With Breakfast, Paul Barber MD, 40 mg at 10/01/22 0809  •  QUEtiapine (SEROquel) tablet 100 mg, 100 mg, Oral, Nightly, Stanley Fowler MD, 100 mg at 09/30/22 2121  •  traZODone (DESYREL) tablet 200 mg, 200 mg, Oral, Nightly, Stanley Fowler MD, 200 mg at 09/30/22 2121  •  vitamin B-12 (CYANOCOBALAMIN) tablet 1,000 mcg, 1,000 mcg, Oral, Daily, Stanley Fowler MD, 1,000 mcg at 10/01/22 0810     Consults obtained  Cardiology  Pulmonary    Procedures  2D echo which showed ejection fraction 61%    Hospital course  82 white female with history of chronic hypoxic respiratory failure COPD congestive heart failure hypertension hypothyroidism admit to emergency room with increased shortness of breath and nonproductive cough.  Patient evaluated in ER found to be in acute on chronic hypoxic respiratory failure admit for management.  Patient admitted treated with supplemental oxygen nebulizer steroids diuretics and Zithromax.  Patient responded to the treatment and returned to baseline.  Patient followed by cardiology and pulmonary recommend to discharge on remaining dose of Zithromax and 1 more dose of steroids and follow-up with pulmonary as an outpatient.  At the time of discharge she is on oxygen 2 L with oxygen saturation 96%.  Patient has SVT with 7  beats and cardiology recommend no change in dose of beta-blocker at this time.    Discharge diet regular    Activity as tolerated    Medication as above    Follow-up with prime doctor in 1 week and follow with cardiology and pulmonary per the instruction and take medication as directed.    AMANDA MURO MD

## 2022-10-01 NOTE — PROGRESS NOTES
"Daily progress note    Primary care physician      Chief complaint   Doing same with no new complaints and wants to go home    History of present illness  82-year-old white female with history of chronic hypoxic respiratory failure on home oxygen nebulizer who also have COPD hypertension hypothyroidism bipolar disorder and congestive heart failure presented to [facility] emergency room with increased shortness of breath and nonproductive cough for last few days which is getting worse.  Patient denies any chest pain palpitation abdominal pain nausea vomiting diarrhea.  Patient work-up in ER revealed acute on chronic hypoxic respiratory failure admit for management.      REVIEW OF SYSTEMS  Unremarkable    PHYSICAL EXAM  Blood pressure 122/54, pulse 69, temperature 97.7 °F (36.5 °C), temperature source Oral, resp. rate 18, height 157.5 cm (62\"), weight 73 kg (161 lb), SpO2 91 %.    Constitutional:       General: She is not in acute distress.  HENT:      Head: Normocephalic and atraumatic.   Eyes:      Pupils: Pupils are equal, round, and reactive to light.   Cardiovascular:      Rate and Rhythm: Normal rate and regular rhythm.      Heart sounds: Normal heart sounds.   Pulmonary:      Effort: Pulmonary effort is normal. No respiratory distress.      Breath sounds: Decreased breath sounds present.   Abdominal:      Palpations: Abdomen is soft.      Tenderness: There is no abdominal tenderness. There is no guarding or rebound.   Musculoskeletal:         General: Normal range of motion.      Cervical back: Normal range of motion and neck supple.   Skin:     General: Skin is warm and dry.      Findings: No rash.   Neurological:      Mental Status: She is alert and oriented to person, place, and time.      Sensory: Sensation is intact.      Motor: No weakness.   Psychiatric:         Mood and Affect: Mood and affect normal.      LAB RESULTS  Lab Results (last 24 hours)     Procedure Component Value Units " Date/Time    Respiratory Culture - Sputum, Cough [[id number]] Collected: 09/30/22 1239    Specimen: Sputum from Cough Updated: 10/01/22 1037     Respiratory Culture Light growth (2+) The culture consists of normal respiratory jesus. This is a preliminary report; final report to follow.     Gram Stain Rare (1+) Epithelial cells per low power field      Moderate (3+) Mixed bacterial morphotypes seen on Gram Stain      Many (4+) WBCs per low power field    Basic Metabolic Panel [[id number]]  (Abnormal) Collected: 10/01/22 0607    Specimen: Blood Updated: 10/01/22 0716     Glucose 86 mg/dL      BUN 35 mg/dL      Creatinine 2.15 mg/dL      Sodium 139 mmol/L      Potassium 4.4 mmol/L      Chloride 96 mmol/L      CO2 35.0 mmol/L      Calcium 8.8 mg/dL      BUN/Creatinine Ratio 16.3     Anion Gap 8.0 mmol/L      eGFR 22.8 mL/min/1.73      Comment: National Kidney Foundation and American Society of Nephrology (ASN) Task Force recommended calculation based on the Chronic Kidney Disease Epidemiology Collaboration (CKD-EPI) equation refit without adjustment for race.       Narrative:      GFR Normal >60  Chronic Kidney Disease <60  Kidney Failure <15      CBC & Differential [[id number]]  (Abnormal) Collected: 10/01/22 0607    Specimen: Blood Updated: 10/01/22 0653    Narrative:      The following orders were created for panel order CBC & Differential.  Procedure                               Abnormality         Status                     ---------                               -----------         ------                     CBC Auto Differential[[id number]]        Abnormal            Final result                 Please view results for these tests on the individual orders.    CBC Auto Differential [[id number]]  (Abnormal) Collected: 10/01/22 0607    Specimen: Blood Updated: 10/01/22 0653     WBC 8.94 10*3/mm3      RBC 2.64 10*6/mm3      Hemoglobin 8.1 g/dL      Hematocrit 25.4 %      MCV 96.2 fL      MCH 30.7 pg      MCHC 31.9 g/dL       RDW 15.2 %      RDW-SD 53.3 fl      MPV 9.3 fL      Platelets 216 10*3/mm3      Neutrophil % 71.7 %      Lymphocyte % 19.6 %      Monocyte % 7.6 %      Eosinophil % 0.1 %      Basophil % 0.1 %      Immature Grans % 0.9 %      Neutrophils, Absolute 6.41 10*3/mm3      Lymphocytes, Absolute 1.75 10*3/mm3      Monocytes, Absolute 0.68 10*3/mm3      Eosinophils, Absolute 0.01 10*3/mm3      Basophils, Absolute 0.01 10*3/mm3      Immature Grans, Absolute 0.08 10*3/mm3      nRBC 0.1 /100 WBC         Imaging Results (Last 24 Hours)     ** No results found for the last 24 hours. **             ECG 12 Lead  Component   Ref Range & Units 13:29   (9/29/22) 10 mo ago   (11/16/21) 10 mo ago   (11/8/21) 11 mo ago   (10/18/21) 1 yr ago   (7/25/21)   QT Interval   ms 371  387  388  413  401    Resulting Agency  ECG  ECG  ECG  ECG  ECG             HEART RATE= 68  bpm  RR Interval= 882  ms  TN Interval= 148  ms  P Horizontal Axis= 24  deg  P Front Axis= 52  deg  QRSD Interval= 88  ms  QT Interval= 371  ms  QRS Axis= -1  deg  T Wave Axis= 23  deg  - BORDERLINE ECG -  Sinus rhythm  Atrial premature complex  Low voltage, precordial leads  Borderline T abnormalities, anterior leads  No change from previous tracing              Current Facility-Administered Medications:   •  acetaminophen (TYLENOL) tablet 650 mg, 650 mg, Oral, Q6H PRN, Stanley Fowler MD, 650 mg at 10/01/22 1117  •  allopurinol (ZYLOPRIM) tablet 100 mg, 100 mg, Oral, Daily, Stanley Fowler MD, 100 mg at 10/01/22 0809  •  amLODIPine (NORVASC) tablet 5 mg, 5 mg, Oral, Daily, Stanley Fowler MD, 5 mg at 10/01/22 0810  •  azithromycin (ZITHROMAX) tablet 250 mg, 250 mg, Oral, Q24H, French Dolan MD, 250 mg at 10/01/22 0810  •  budesonide-formoterol (SYMBICORT) 160-4.5 MCG/ACT inhaler 2 puff, 2 puff, Inhalation, BID, Stanley Fowler MD, 2 puff at 10/01/22 1052  •  bumetanide (BUMEX) tablet 1 mg, 1 mg, Oral, BID, Stanley Fowler MD, 1 mg at 10/01/22 0810  •  busPIRone  (BUSPAR) tablet 7.5 mg, 7.5 mg, Oral, TID PRN, Stanley Fowler MD  •  carBAMazepine XR (TEGretol  XR) 12 hr tablet 200 mg, 200 mg, Oral, BID, Stanley Fowler MD, 200 mg at 10/01/22 0809  •  DULoxetine (CYMBALTA) DR capsule 60 mg, 60 mg, Oral, Daily, Stanley Fowler MD, 60 mg at 10/01/22 0809  •  gabapentin (NEURONTIN) capsule 300 mg, 300 mg, Oral, TID, Stanley Fowler MD, 300 mg at 10/01/22 0810  •  guaiFENesin (MUCINEX) 12 hr tablet 600 mg, 600 mg, Oral, Q12H, Stanley Fowler MD, 600 mg at 10/01/22 0809  •  HYDROcodone-acetaminophen (NORCO) 7.5-325 MG per tablet 1 tablet, 1 tablet, Oral, Q8H PRN, Stanley Fowler MD  •  ipratropium-albuterol (DUO-NEB) nebulizer solution 3 mL, 3 mL, Nebulization, Q4H - RT, Stanley Fowler MD, 3 mL at 10/01/22 0737  •  levothyroxine (SYNTHROID, LEVOTHROID) tablet 75 mcg, 75 mcg, Oral, Daily, Stanley Fowler MD, 75 mcg at 10/01/22 0809  •  metoprolol succinate XL (TOPROL-XL) 24 hr tablet 12.5 mg, 12.5 mg, Oral, Q24H, Stanley Fowler MD, 12.5 mg at 10/01/22 0809  •  ondansetron (ZOFRAN) injection 4 mg, 4 mg, Intravenous, Q4H PRN, Stanley Fowler MD, 4 mg at 09/29/22 2156  •  ondansetron (ZOFRAN) tablet 4 mg, 4 mg, Oral, Q6H PRN, Stanley Fowler MD  •  pantoprazole (PROTONIX) EC tablet 40 mg, 40 mg, Oral, BID AC, Stanley Fowler MD, 40 mg at 10/01/22 0809  •  potassium chloride (K-DUR,KLOR-CON) ER tablet 10 mEq, 10 mEq, Oral, Daily, Stanley Fowler MD, 10 mEq at 10/01/22 0810  •  predniSONE (DELTASONE) tablet 40 mg, 40 mg, Oral, Daily With Breakfast, Paul Barber MD, 40 mg at 10/01/22 0809  •  QUEtiapine (SEROquel) tablet 100 mg, 100 mg, Oral, Nightly, Stanley Fowler MD, 100 mg at 09/30/22 2121  •  traZODone (DESYREL) tablet 200 mg, 200 mg, Oral, Nightly, Stanley Fowler MD, 200 mg at 09/30/22 2121  •  vitamin B-12 (CYANOCOBALAMIN) tablet 1,000 mcg, 1,000 mcg, Oral, Daily, Stanley Fowler MD, 1,000 mcg at 10/01/22 0810     ASSESSMENT  Acute on chronic hypoxic respiratory failure  Acute exacerbation of  COPD  Chronic diastolic congestive heart failure  SVT  Hypertension  Hypothyroidism  Chronic anemia  Anxiety disorder  Seizure disorder  Bipolar disorder  Morbidly obese  Pulmonary hypertension  Chronic kidney disease stage III  Gastroesophageal reflux disease    PLAN  Discharge home  Discharge summary dictated    AMANDA MURO MD

## 2022-10-01 NOTE — DISCHARGE INSTR - APPOINTMENTS
Please call and schedule a follow-up  cardiology appointment with your regular cardiologist for 2 weeks after discharge.

## 2022-10-01 NOTE — NURSING NOTE
Verbal and written discharge instructions given to pt and son. Son brought pt portable oxygen tank to transport pt home.

## 2022-10-01 NOTE — PROGRESS NOTES
Paul Barber MD                          [phone]            Patient ID:    Name:  Josephine Wolf    MRN:  [id number]    1940   82 y.o.  female            Patient Care Team:  Bernabe Guy MD as PCP - General (Internal Medicine)  Avi Aly MD as Consulting Physician (Nephrology)    CC/ Reason for visit: COPD exacerbation, chronic respiratory failure    Subjective: Pt seen and examined this AM. No acute overnight events noted. Doing better.  Back to baseline supplemental oxygen states that she is doing better. Wants to go home    ROS: Denies any subjective fevers, syncope or presyncopal events, new neurological deficits, nausea or vomiting currently    Objective     Vital Signs past 24hrs    BP range: BP: (115-144)/(54-80) 122/54  Pulse range: Heart Rate:  [] 69  Resp rate range: Resp:  [16-18] 18  Temp range: Temp (24hrs), Av.9 °F (36.6 °C), Min:97.7 °F (36.5 °C), Max:98.4 °F (36.9 °C)      Ventilator/Non-Invasive Ventilation Settings (From admission, onward)            None          Vent Settings                                         Device (Oxygen Therapy): nasal cannula       73 kg (161 lb); Body mass index is 29.45 kg/m².      Intake/Output Summary (Last 24 hours) at 10/1/2022 1436  Last data filed at 10/1/2022 0823  Gross per 24 hour   Intake 240 ml   Output --   Net 240 ml       PHYSICAL EXAM   Constitutional: Middle-aged pt in bed, no acute respiratory distress, + accessory muscle use  Head: - NCAT  Eyes: No pallor.  Anicteric sclerae, EOMI.  ENMT:  Mallampati 4, no oral thrush. Moist MM.   NECK: Trachea midline, No thyromegaly, no palpable cervical lymphadenopathy  Heart: RRR, no murmur. No pedal edema   Lungs: YAMINI +, distant breath sounds. no wheezes/ crackles heard    Abdomen: Soft. No tenderness, guarding or rigidity. No palpable masses  Extremities: Extremities warm and well perfused. No cyanosis/ clubbing  Neuro: Conscious,  answers appropriately, no gross focal neuro deficits  Psych: Mood and affect appropriate    PPE recommended per [facility] infectious disease Isolation protocol for the current clinical scenario (as mentioned below) was followed.     Scheduled meds:  allopurinol, 100 mg, Oral, Daily  amLODIPine, 5 mg, Oral, Daily  azithromycin, 250 mg, Oral, Q24H  budesonide-formoterol, 2 puff, Inhalation, BID  bumetanide, 1 mg, Oral, BID  carBAMazepine XR, 200 mg, Oral, BID  DULoxetine, 60 mg, Oral, Daily  gabapentin, 300 mg, Oral, TID  guaiFENesin, 600 mg, Oral, Q12H  ipratropium-albuterol, 3 mL, Nebulization, Q4H - RT  levothyroxine, 75 mcg, Oral, Daily  metoprolol succinate XL, 12.5 mg, Oral, Q24H  pantoprazole, 40 mg, Oral, BID AC  potassium chloride, 10 mEq, Oral, Daily  predniSONE, 40 mg, Oral, Daily With Breakfast  QUEtiapine, 100 mg, Oral, Nightly  traZODone, 200 mg, Oral, Nightly  vitamin B-12, 1,000 mcg, Oral, Daily        IV meds:                           Data Review:      Results from last 7 days   Lab Units 10/01/22  0607 09/30/22  0541 09/29/22  1348   SODIUM mmol/L 139 141 140   POTASSIUM mmol/L 4.4 4.0 4.0   CHLORIDE mmol/L 96* 100 101   CO2 mmol/L 35.0* 30.2* 29.3*   BUN mg/dL 35* 23 23   CREATININE mg/dL 2.15* 1.52* 1.42*   CALCIUM mg/dL 8.8 9.3 8.9   BILIRUBIN mg/dL  --  0.3 0.4   ALK PHOS U/L  --  77 80   ALT (SGPT) U/L  --  13 11   AST (SGOT) U/L  --  14 18   GLUCOSE mg/dL 86 169* 110*   WBC 10*3/mm3 8.94 6.63 7.29   HEMOGLOBIN g/dL 8.1* 8.4* 8.4*   PLATELETS 10*3/mm3 216 204 207   PROBNP pg/mL  --   --  717.0       Lab Results   Component Value Date    CALCIUM 8.8 10/01/2022    PHOS 3.7 05/23/2020       Results from last 7 days   Lab Units 09/30/22  1239   RESPCX  Light growth (2+) The culture consists of normal respiratory jesus. This is a preliminary report; final report to follow.              I have personally reviewed the results from the time of this admission to 10/1/2022 14:36 EDT and agree with  these findings:  [x]  Laboratory  [x]  Microbiology  [x]  Radiology  []  EKG/Telemetry   [x]  Cardiology/Vascular   []  Pathology  []  Old records    Assessment    Acute COPD exacerbation  Suspected viral URTI  Chronic hypoxic respiratory failure  CHRIS on CKD  FERNANDEZ not compliant with PAP  Obesity  DNR/DNI    PLAN:  Patient is doing better and seems to be getting closer to baseline.   Continue with home Symbicort plus Incruse @ home  Continue with diuretics per primary  Recommended to be compliant with PAP  Weight loss and activity recommended    Okay to be discharged from my standpoint and follow-up with Dr. Ca as scheduled.    Paul Barber MD  10/1/2022

## 2022-10-02 LAB
BACTERIA SPEC RESP CULT: NORMAL
GRAM STN SPEC: NORMAL

## 2022-10-02 RX ORDER — AZITHROMYCIN 250 MG/1
250 TABLET, FILM COATED ORAL DAILY
Qty: 3 TABLET | Refills: 0 | Status: SHIPPED | OUTPATIENT
Start: 2022-10-02 | End: 2022-10-05

## 2022-10-02 NOTE — CASE MANAGEMENT/SOCIAL WORK
Case Management Discharge Note      Final Note: Pt discharged home with [facility]    Provided Post Acute Provider List?: Yes  Post Acute Provider List: Nursing Home, Home Health  Delivered To: Patient  Method of Delivery: In person    Selected Continued Care - Discharged on 10/1/2022 Admission date: 9/29/2022 - Discharge disposition: Home or Self Care    Destination    No services have been selected for the patient.              Durable Medical Equipment    No services have been selected for the patient.              Dialysis/Infusion    No services have been selected for the patient.              Home Medical Care    No services have been selected for the patient.              Therapy    No services have been selected for the patient.              Community Resources    No services have been selected for the patient.              Community & DME    No services have been selected for the patient.                  Transportation Services  Private: Car    Final Discharge Disposition Code: 06 - home with home health care

## 2022-10-02 NOTE — OUTREACH NOTE
Prep Survey    Flowsheet Row Responses   [facility] facility patient discharged from? [place]   Is LACE score < 7 ? No   Emergency Room discharge w/ pulse ox? No   Eligibility Readm Mgmt   Discharge diagnosis Acute exacerbation of COPD   Does the patient have one of the following disease processes/diagnoses(primary or secondary)? COPD   Does the patient have Home health ordered? Yes   What is the Home health agency?  [facility]   Is there a DME ordered? No   Prep survey completed? Yes          JAYDON FLANNERY - Registered Nurse

## 2022-10-03 ENCOUNTER — READMISSION MANAGEMENT (OUTPATIENT)
Dept: CALL CENTER | Facility: HOSPITAL | Age: 82
End: 2022-10-03

## 2022-10-03 NOTE — OUTREACH NOTE
COPD/PN Week 1 Survey    Flowsheet Row Responses   [facility] patient discharged from? [place]   Does the patient have one of the following disease processes/diagnoses(primary or secondary)? COPD   Week 1 attempt successful? Yes   Call start time 1240   Call end time 1243   Discharge diagnosis Acute exacerbation of COPD   Meds reviewed with patient/caregiver? Yes   Is the patient having any side effects they believe may be caused by any medication additions or changes? No   Does the patient have all medications ordered at discharge? Yes   Is the patient taking all medications as directed (includes completed medication regime)? Yes   Does the patient have a primary care provider?  Yes   Does the patient have an appointment with their PCP or specialist within 7 days of discharge? No   What is preventing the patient from scheduling follow up appointments within 7 days of discharge? Waiting on return call   Nursing Interventions Advised patient to make appointment   Has the patient kept scheduled appointments due by today? N/A   What is the Home health agency?  [facility]   Has home health visited the patient within 72 hours of discharge? Call prior to 72 hours   Pulse Ox monitoring Intermittent   Pulse Ox device source Patient   O2 Sat comments 95% on 3LO2   O2 Sat: education provided Sat levels, Monitoring frequency   Psychosocial issues? No   Did the patient receive a copy of their discharge instructions? Yes   Nursing interventions Reviewed instructions with patient   What is the patient's perception of their health status since discharge? Improving   Nursing Interventions Nurse provided patient education   Is the patient/caregiver able to teach back the hierarchy of who to call/visit for symptoms/problems? PCP, Specialist, Home health nurse, Urgent Care, ED, 911 Yes   Is the patient able to teach back COPD zones? Yes   Nursing interventions Education provided on various zones   Patient reports  what zone on this call? Green Zone   Green Zone Reports doing well, Breathing without shortness of breath, Usual activity and exercise level, Usual amounts of cough and phlegm/mucous   Green Zone interventions: Take daily medications, Use oxygen as prescribed   Week 1 call completed? Yes          IAIN FLANNERY - Registered Nurse

## 2022-10-12 ENCOUNTER — READMISSION MANAGEMENT (OUTPATIENT)
Dept: CALL CENTER | Facility: HOSPITAL | Age: 82
End: 2022-10-12

## 2022-10-12 NOTE — OUTREACH NOTE
COPD/PN Week 2 Survey    Flowsheet Row Responses   [facility] patient discharged from? [place]   Does the patient have one of the following disease processes/diagnoses(primary or secondary)? COPD   Week 2 attempt successful? Yes   Call start time 1502   Call end time 1506   Discharge diagnosis Acute exacerbation of COPD   Is patient permission given to speak with other caregiver? Yes   List who call center can speak with [place]   Medication alerts for this patient UC gave abx.   Meds reviewed with patient/caregiver? Yes   Is the patient taking all medications as directed (includes completed medication regime)? Yes   Does the patient have a primary care provider?  Yes   Comments regarding PCP f/u with PCP 10-12-22   Has the patient kept scheduled appointments due by today? Yes   What is the Home health agency?  [facility]   Has home health visited the patient within 72 hours of discharge? Yes   DME comments Wearing O2 all the time@4L   Pulse Ox monitoring Intermittent   Pulse Ox device source Patient   O2 Sat comments 4L O2- 95%   O2 Sat: education provided Sat levels, Monitoring frequency, When to seek care   Psychosocial issues? No   Comments Pt having UTI, almost all symptoms are gone /x HA, nausea.   What is the patient's perception of their health status since discharge? Improving   Nursing Interventions Nurse provided patient education   Is the patient/caregiver able to teach back the hierarchy of who to call/visit for symptoms/problems? PCP, Specialist, Home health nurse, Urgent Care, ED, 911 Yes   Patient reports what zone on this call? Green Zone   Green Zone Reports doing well, Breathing without shortness of breath   Green Zone interventions: Take daily medications, Use oxygen as prescribed   Week 2 call completed? Yes          SRIRAM NORMAN - Registered Nurse

## 2022-10-19 ENCOUNTER — HOSPITAL ENCOUNTER (OUTPATIENT)
Facility: HOSPITAL | Age: 82
Setting detail: OBSERVATION
LOS: 1 days | Discharge: HOME | End: 2022-10-26
Attending: EMERGENCY MEDICINE | Admitting: INTERNAL MEDICINE

## 2022-10-19 ENCOUNTER — READMISSION MANAGEMENT (OUTPATIENT)
Dept: CALL CENTER | Facility: HOSPITAL | Age: 82
End: 2022-10-19

## 2022-10-19 ENCOUNTER — APPOINTMENT (OUTPATIENT)
Dept: CT IMAGING | Facility: HOSPITAL | Age: 82
End: 2022-10-19

## 2022-10-19 DIAGNOSIS — R74.8 ELEVATED LIPASE: ICD-10-CM

## 2022-10-19 DIAGNOSIS — R10.32 LLQ ABDOMINAL PAIN: ICD-10-CM

## 2022-10-19 DIAGNOSIS — R11.2 NAUSEA AND VOMITING, UNSPECIFIED VOMITING TYPE: ICD-10-CM

## 2022-10-19 DIAGNOSIS — N39.0 ACUTE UTI: Primary | ICD-10-CM

## 2022-10-19 LAB
ALBUMIN SERPL-MCNC: 4 G/DL (ref 3.5–5.2)
ALBUMIN/GLOB SERPL: 1.4 G/DL
ALP SERPL-CCNC: 90 U/L (ref 39–117)
ALT SERPL W P-5'-P-CCNC: 20 U/L (ref 1–33)
ANION GAP SERPL CALCULATED.3IONS-SCNC: 10 MMOL/L (ref 5–15)
AST SERPL-CCNC: 26 U/L (ref 1–32)
BACTERIA UR QL AUTO: ABNORMAL /HPF
BASOPHILS # BLD AUTO: 0.03 10*3/MM3 (ref 0–0.2)
BASOPHILS NFR BLD AUTO: 0.7 % (ref 0–1.5)
BILIRUB SERPL-MCNC: 0.4 MG/DL (ref 0–1.2)
BILIRUB UR QL STRIP: NEGATIVE
BUN SERPL-MCNC: 23 MG/DL (ref 8–23)
BUN/CREAT SERPL: 13.1 (ref 7–25)
CALCIUM SPEC-SCNC: 9.3 MG/DL (ref 8.6–10.5)
CHLORIDE SERPL-SCNC: 98 MMOL/L (ref 98–107)
CLARITY UR: CLEAR
CO2 SERPL-SCNC: 30 MMOL/L (ref 22–29)
COLOR UR: YELLOW
CREAT SERPL-MCNC: 1.76 MG/DL (ref 0.57–1)
D-LACTATE SERPL-SCNC: 0.8 MMOL/L (ref 0.5–2)
D-LACTATE SERPL-SCNC: 0.8 MMOL/L (ref 0.5–2)
DEPRECATED RDW RBC AUTO: 53.7 FL (ref 37–54)
EGFRCR SERPLBLD CKD-EPI 2021: 29 ML/MIN/1.73
EOSINOPHIL # BLD AUTO: 0.18 10*3/MM3 (ref 0–0.4)
EOSINOPHIL NFR BLD AUTO: 4 % (ref 0.3–6.2)
ERYTHROCYTE [DISTWIDTH] IN BLOOD BY AUTOMATED COUNT: 15.3 % (ref 12.3–15.4)
GLOBULIN UR ELPH-MCNC: 2.8 GM/DL
GLUCOSE SERPL-MCNC: 103 MG/DL (ref 65–99)
GLUCOSE UR STRIP-MCNC: NEGATIVE MG/DL
HCT VFR BLD AUTO: 26.7 % (ref 34–46.6)
HGB BLD-MCNC: 8.5 G/DL (ref 12–15.9)
HGB UR QL STRIP.AUTO: NEGATIVE
HYALINE CASTS UR QL AUTO: ABNORMAL /LPF
IMM GRANULOCYTES # BLD AUTO: 0.06 10*3/MM3 (ref 0–0.05)
IMM GRANULOCYTES NFR BLD AUTO: 1.3 % (ref 0–0.5)
KETONES UR QL STRIP: NEGATIVE
LEUKOCYTE ESTERASE UR QL STRIP.AUTO: ABNORMAL
LIPASE SERPL-CCNC: 256 U/L (ref 13–60)
LYMPHOCYTES # BLD AUTO: 1.14 10*3/MM3 (ref 0.7–3.1)
LYMPHOCYTES NFR BLD AUTO: 25.1 % (ref 19.6–45.3)
MCH RBC QN AUTO: 31.1 PG (ref 26.6–33)
MCHC RBC AUTO-ENTMCNC: 31.8 G/DL (ref 31.5–35.7)
MCV RBC AUTO: 97.8 FL (ref 79–97)
MONOCYTES # BLD AUTO: 0.4 10*3/MM3 (ref 0.1–0.9)
MONOCYTES NFR BLD AUTO: 8.8 % (ref 5–12)
NEUTROPHILS NFR BLD AUTO: 2.73 10*3/MM3 (ref 1.7–7)
NEUTROPHILS NFR BLD AUTO: 60.1 % (ref 42.7–76)
NITRITE UR QL STRIP: NEGATIVE
NRBC BLD AUTO-RTO: 0 /100 WBC (ref 0–0.2)
PH UR STRIP.AUTO: 7 [PH] (ref 5–8)
PLATELET # BLD AUTO: 203 10*3/MM3 (ref 140–450)
PMV BLD AUTO: 9.1 FL (ref 6–12)
POTASSIUM SERPL-SCNC: 4.7 MMOL/L (ref 3.5–5.2)
PROT SERPL-MCNC: 6.8 G/DL (ref 6–8.5)
PROT UR QL STRIP: NEGATIVE
QT INTERVAL: 367 MS
RBC # BLD AUTO: 2.73 10*6/MM3 (ref 3.77–5.28)
RBC # UR STRIP: ABNORMAL /HPF
REF LAB TEST METHOD: ABNORMAL
SODIUM SERPL-SCNC: 138 MMOL/L (ref 136–145)
SP GR UR STRIP: 1.01 (ref 1–1.03)
SQUAMOUS #/AREA URNS HPF: ABNORMAL /HPF
UROBILINOGEN UR QL STRIP: ABNORMAL
WBC # UR STRIP: ABNORMAL /HPF
WBC NRBC COR # BLD: 4.54 10*3/MM3 (ref 3.4–10.8)

## 2022-10-19 PROCEDURE — G0378 HOSPITAL OBSERVATION PER HR: HCPCS

## 2022-10-19 PROCEDURE — 36415 COLL VENOUS BLD VENIPUNCTURE: CPT

## 2022-10-19 PROCEDURE — 96375 TX/PRO/DX INJ NEW DRUG ADDON: CPT

## 2022-10-19 PROCEDURE — 83690 ASSAY OF LIPASE: CPT | Performed by: PHYSICIAN ASSISTANT

## 2022-10-19 PROCEDURE — 83605 ASSAY OF LACTIC ACID: CPT | Performed by: PHYSICIAN ASSISTANT

## 2022-10-19 PROCEDURE — 83605 ASSAY OF LACTIC ACID: CPT | Performed by: INTERNAL MEDICINE

## 2022-10-19 PROCEDURE — 25010000002 ONDANSETRON PER 1 MG: Performed by: PHYSICIAN ASSISTANT

## 2022-10-19 PROCEDURE — 25010000002 ONDANSETRON PER 1 MG: Performed by: EMERGENCY MEDICINE

## 2022-10-19 PROCEDURE — 94799 UNLISTED PULMONARY SVC/PX: CPT

## 2022-10-19 PROCEDURE — 96365 THER/PROPH/DIAG IV INF INIT: CPT

## 2022-10-19 PROCEDURE — 93005 ELECTROCARDIOGRAM TRACING: CPT | Performed by: PHYSICIAN ASSISTANT

## 2022-10-19 PROCEDURE — 80053 COMPREHEN METABOLIC PANEL: CPT | Performed by: PHYSICIAN ASSISTANT

## 2022-10-19 PROCEDURE — 87086 URINE CULTURE/COLONY COUNT: CPT | Performed by: PHYSICIAN ASSISTANT

## 2022-10-19 PROCEDURE — 87077 CULTURE AEROBIC IDENTIFY: CPT | Performed by: PHYSICIAN ASSISTANT

## 2022-10-19 PROCEDURE — 25010000002 DROPERIDOL PER 5 MG: Performed by: PHYSICIAN ASSISTANT

## 2022-10-19 PROCEDURE — 87186 SC STD MICRODIL/AGAR DIL: CPT | Performed by: PHYSICIAN ASSISTANT

## 2022-10-19 PROCEDURE — 74176 CT ABD & PELVIS W/O CONTRAST: CPT

## 2022-10-19 PROCEDURE — 94640 AIRWAY INHALATION TREATMENT: CPT

## 2022-10-19 PROCEDURE — 96361 HYDRATE IV INFUSION ADD-ON: CPT

## 2022-10-19 PROCEDURE — 25010000002 CEFTRIAXONE PER 250 MG: Performed by: PHYSICIAN ASSISTANT

## 2022-10-19 PROCEDURE — 93010 ELECTROCARDIOGRAM REPORT: CPT | Performed by: INTERNAL MEDICINE

## 2022-10-19 PROCEDURE — 96376 TX/PRO/DX INJ SAME DRUG ADON: CPT

## 2022-10-19 PROCEDURE — 85025 COMPLETE CBC W/AUTO DIFF WBC: CPT | Performed by: PHYSICIAN ASSISTANT

## 2022-10-19 PROCEDURE — 81001 URINALYSIS AUTO W/SCOPE: CPT | Performed by: PHYSICIAN ASSISTANT

## 2022-10-19 PROCEDURE — 99284 EMERGENCY DEPT VISIT MOD MDM: CPT

## 2022-10-19 PROCEDURE — 87040 BLOOD CULTURE FOR BACTERIA: CPT | Performed by: PHYSICIAN ASSISTANT

## 2022-10-19 RX ORDER — DROPERIDOL 2.5 MG/ML
2.5 INJECTION, SOLUTION INTRAMUSCULAR; INTRAVENOUS ONCE
Status: COMPLETED | OUTPATIENT
Start: 2022-10-19 | End: 2022-10-19

## 2022-10-19 RX ORDER — ONDANSETRON 2 MG/ML
4 INJECTION INTRAMUSCULAR; INTRAVENOUS ONCE
Status: COMPLETED | OUTPATIENT
Start: 2022-10-19 | End: 2022-10-19

## 2022-10-19 RX ORDER — CHOLECALCIFEROL (VITAMIN D3) 125 MCG
1000 CAPSULE ORAL DAILY
Status: DISCONTINUED | OUTPATIENT
Start: 2022-10-19 | End: 2022-10-26 | Stop reason: HOSPADM

## 2022-10-19 RX ORDER — NITROGLYCERIN 0.4 MG/1
0.4 TABLET SUBLINGUAL
Status: DISCONTINUED | OUTPATIENT
Start: 2022-10-19 | End: 2022-10-26 | Stop reason: HOSPADM

## 2022-10-19 RX ORDER — ACETAMINOPHEN 325 MG/1
650 TABLET ORAL EVERY 4 HOURS PRN
Status: DISCONTINUED | OUTPATIENT
Start: 2022-10-19 | End: 2022-10-26 | Stop reason: HOSPADM

## 2022-10-19 RX ORDER — ALLOPURINOL 100 MG/1
100 TABLET ORAL DAILY
Status: DISCONTINUED | OUTPATIENT
Start: 2022-10-19 | End: 2022-10-26 | Stop reason: HOSPADM

## 2022-10-19 RX ORDER — HYDROCODONE BITARTRATE AND ACETAMINOPHEN 7.5; 325 MG/1; MG/1
1 TABLET ORAL EVERY 8 HOURS PRN
Status: DISCONTINUED | OUTPATIENT
Start: 2022-10-19 | End: 2022-10-26 | Stop reason: HOSPADM

## 2022-10-19 RX ORDER — DULOXETIN HYDROCHLORIDE 60 MG/1
60 CAPSULE, DELAYED RELEASE ORAL 2 TIMES DAILY
Status: DISCONTINUED | OUTPATIENT
Start: 2022-10-19 | End: 2022-10-26 | Stop reason: HOSPADM

## 2022-10-19 RX ORDER — AMLODIPINE BESYLATE 5 MG/1
5 TABLET ORAL DAILY
Status: DISCONTINUED | OUTPATIENT
Start: 2022-10-19 | End: 2022-10-26 | Stop reason: HOSPADM

## 2022-10-19 RX ORDER — UREA 10 %
3 LOTION (ML) TOPICAL NIGHTLY PRN
Status: DISCONTINUED | OUTPATIENT
Start: 2022-10-19 | End: 2022-10-26 | Stop reason: HOSPADM

## 2022-10-19 RX ORDER — BUMETANIDE 1 MG/1
1 TABLET ORAL DAILY
Status: DISCONTINUED | OUTPATIENT
Start: 2022-10-19 | End: 2022-10-26 | Stop reason: HOSPADM

## 2022-10-19 RX ORDER — BUDESONIDE AND FORMOTEROL FUMARATE DIHYDRATE 160; 4.5 UG/1; UG/1
2 AEROSOL RESPIRATORY (INHALATION) 2 TIMES DAILY
Status: DISCONTINUED | OUTPATIENT
Start: 2022-10-19 | End: 2022-10-26 | Stop reason: HOSPADM

## 2022-10-19 RX ORDER — ALBUTEROL SULFATE 2.5 MG/3ML
2.5 SOLUTION RESPIRATORY (INHALATION) EVERY 4 HOURS PRN
Status: DISCONTINUED | OUTPATIENT
Start: 2022-10-19 | End: 2022-10-26 | Stop reason: HOSPADM

## 2022-10-19 RX ORDER — GUAIFENESIN 600 MG/1
600 TABLET, EXTENDED RELEASE ORAL EVERY 12 HOURS SCHEDULED
Status: DISCONTINUED | OUTPATIENT
Start: 2022-10-19 | End: 2022-10-26 | Stop reason: HOSPADM

## 2022-10-19 RX ORDER — TRAZODONE HYDROCHLORIDE 100 MG/1
200 TABLET ORAL NIGHTLY
Status: DISCONTINUED | OUTPATIENT
Start: 2022-10-19 | End: 2022-10-23

## 2022-10-19 RX ORDER — QUETIAPINE FUMARATE 100 MG/1
100 TABLET, FILM COATED ORAL NIGHTLY
Status: DISCONTINUED | OUTPATIENT
Start: 2022-10-19 | End: 2022-10-26 | Stop reason: HOSPADM

## 2022-10-19 RX ORDER — METOPROLOL SUCCINATE 25 MG/1
12.5 TABLET, EXTENDED RELEASE ORAL
Status: DISCONTINUED | OUTPATIENT
Start: 2022-10-19 | End: 2022-10-26 | Stop reason: HOSPADM

## 2022-10-19 RX ORDER — HYDROXYZINE HYDROCHLORIDE 25 MG/1
25 TABLET, FILM COATED ORAL 3 TIMES DAILY PRN
Status: DISCONTINUED | OUTPATIENT
Start: 2022-10-19 | End: 2022-10-26 | Stop reason: HOSPADM

## 2022-10-19 RX ORDER — PANTOPRAZOLE SODIUM 40 MG/1
40 TABLET, DELAYED RELEASE ORAL
Status: DISCONTINUED | OUTPATIENT
Start: 2022-10-20 | End: 2022-10-26 | Stop reason: HOSPADM

## 2022-10-19 RX ORDER — GABAPENTIN 300 MG/1
300 CAPSULE ORAL 3 TIMES DAILY
Status: DISCONTINUED | OUTPATIENT
Start: 2022-10-19 | End: 2022-10-26 | Stop reason: HOSPADM

## 2022-10-19 RX ORDER — BUSPIRONE HYDROCHLORIDE 15 MG/1
7.5 TABLET ORAL EVERY 12 HOURS SCHEDULED
Status: DISCONTINUED | OUTPATIENT
Start: 2022-10-19 | End: 2022-10-26 | Stop reason: HOSPADM

## 2022-10-19 RX ORDER — CARBAMAZEPINE 200 MG/1
200 TABLET, EXTENDED RELEASE ORAL 2 TIMES DAILY
Status: DISCONTINUED | OUTPATIENT
Start: 2022-10-19 | End: 2022-10-23

## 2022-10-19 RX ORDER — ONDANSETRON 2 MG/ML
4 INJECTION INTRAMUSCULAR; INTRAVENOUS EVERY 6 HOURS PRN
Status: DISCONTINUED | OUTPATIENT
Start: 2022-10-19 | End: 2022-10-26 | Stop reason: HOSPADM

## 2022-10-19 RX ORDER — ONDANSETRON 4 MG/1
4 TABLET, FILM COATED ORAL EVERY 6 HOURS PRN
Status: DISCONTINUED | OUTPATIENT
Start: 2022-10-19 | End: 2022-10-26 | Stop reason: HOSPADM

## 2022-10-19 RX ORDER — LEVOTHYROXINE SODIUM 0.07 MG/1
75 TABLET ORAL DAILY
Status: DISCONTINUED | OUTPATIENT
Start: 2022-10-19 | End: 2022-10-26 | Stop reason: HOSPADM

## 2022-10-19 RX ORDER — SODIUM CHLORIDE 0.9 % (FLUSH) 0.9 %
10 SYRINGE (ML) INJECTION AS NEEDED
Status: DISCONTINUED | OUTPATIENT
Start: 2022-10-19 | End: 2022-10-26 | Stop reason: HOSPADM

## 2022-10-19 RX ADMIN — METOPROLOL SUCCINATE 12.5 MG: 25 TABLET, EXTENDED RELEASE ORAL at 22:10

## 2022-10-19 RX ADMIN — IPRATROPIUM BROMIDE 0.5 MG: 0.5 SOLUTION RESPIRATORY (INHALATION) at 20:58

## 2022-10-19 RX ADMIN — CEFTRIAXONE SODIUM 1 G: 1 INJECTION, POWDER, FOR SOLUTION INTRAMUSCULAR; INTRAVENOUS at 14:39

## 2022-10-19 RX ADMIN — TRAZODONE HYDROCHLORIDE 200 MG: 100 TABLET ORAL at 22:09

## 2022-10-19 RX ADMIN — ONDANSETRON 4 MG: 2 INJECTION INTRAMUSCULAR; INTRAVENOUS at 11:47

## 2022-10-19 RX ADMIN — DULOXETINE HYDROCHLORIDE 60 MG: 60 CAPSULE, DELAYED RELEASE ORAL at 22:09

## 2022-10-19 RX ADMIN — CARBAMAZEPINE 200 MG: 200 TABLET, EXTENDED RELEASE ORAL at 22:10

## 2022-10-19 RX ADMIN — DROPERIDOL 2.5 MG: 2.5 INJECTION, SOLUTION INTRAMUSCULAR; INTRAVENOUS at 15:41

## 2022-10-19 RX ADMIN — QUETIAPINE FUMARATE 100 MG: 100 TABLET ORAL at 22:09

## 2022-10-19 RX ADMIN — ONDANSETRON 4 MG: 2 INJECTION INTRAMUSCULAR; INTRAVENOUS at 15:19

## 2022-10-19 RX ADMIN — GABAPENTIN 300 MG: 300 CAPSULE ORAL at 22:10

## 2022-10-19 RX ADMIN — AMLODIPINE BESYLATE 5 MG: 5 TABLET ORAL at 22:09

## 2022-10-19 RX ADMIN — SODIUM CHLORIDE, POTASSIUM CHLORIDE, SODIUM LACTATE AND CALCIUM CHLORIDE 1000 ML: 600; 310; 30; 20 INJECTION, SOLUTION INTRAVENOUS at 11:46

## 2022-10-19 RX ADMIN — BUMETANIDE 1 MG: 1 TABLET ORAL at 22:09

## 2022-10-19 RX ADMIN — GUAIFENESIN 600 MG: 600 TABLET, EXTENDED RELEASE ORAL at 22:09

## 2022-10-19 RX ADMIN — BUSPIRONE HYDROCHLORIDE 7.5 MG: 15 TABLET ORAL at 22:10

## 2022-10-19 RX ADMIN — LEVOTHYROXINE SODIUM 75 MCG: 0.07 TABLET ORAL at 22:09

## 2022-10-19 RX ADMIN — ALLOPURINOL 100 MG: 100 TABLET ORAL at 22:09

## 2022-10-19 RX ADMIN — Medication 1000 MCG: at 22:09

## 2022-10-19 NOTE — OUTREACH NOTE
COPD/PN Week 3 Survey    Flowsheet Row Responses   [facility] facility patient discharged from? [place]   Does the patient have one of the following disease processes/diagnoses(primary or secondary)? COPD   Week 3 attempt successful? No   Unsuccessful attempts Attempt 1  [Patient is in the ER]          SOBEIDA BAEZ - Registered Nurse

## 2022-10-20 ENCOUNTER — APPOINTMENT (OUTPATIENT)
Dept: GENERAL RADIOLOGY | Facility: HOSPITAL | Age: 82
End: 2022-10-20

## 2022-10-20 LAB
ANION GAP SERPL CALCULATED.3IONS-SCNC: 8.5 MMOL/L (ref 5–15)
B PARAPERT DNA SPEC QL NAA+PROBE: NOT DETECTED
B PERT DNA SPEC QL NAA+PROBE: NOT DETECTED
BUN SERPL-MCNC: 16 MG/DL (ref 8–23)
BUN/CREAT SERPL: 12.1 (ref 7–25)
C PNEUM DNA NPH QL NAA+NON-PROBE: NOT DETECTED
CALCIUM SPEC-SCNC: 8.9 MG/DL (ref 8.6–10.5)
CHLORIDE SERPL-SCNC: 99 MMOL/L (ref 98–107)
CO2 SERPL-SCNC: 32.5 MMOL/L (ref 22–29)
CREAT SERPL-MCNC: 1.32 MG/DL (ref 0.57–1)
DEPRECATED RDW RBC AUTO: 51.2 FL (ref 37–54)
EGFRCR SERPLBLD CKD-EPI 2021: 40.9 ML/MIN/1.73
ERYTHROCYTE [DISTWIDTH] IN BLOOD BY AUTOMATED COUNT: 14.9 % (ref 12.3–15.4)
FLUAV SUBTYP SPEC NAA+PROBE: NOT DETECTED
FLUBV RNA ISLT QL NAA+PROBE: NOT DETECTED
GLUCOSE SERPL-MCNC: 88 MG/DL (ref 65–99)
HADV DNA SPEC NAA+PROBE: NOT DETECTED
HCOV 229E RNA SPEC QL NAA+PROBE: NOT DETECTED
HCOV HKU1 RNA SPEC QL NAA+PROBE: NOT DETECTED
HCOV NL63 RNA SPEC QL NAA+PROBE: NOT DETECTED
HCOV OC43 RNA SPEC QL NAA+PROBE: NOT DETECTED
HCT VFR BLD AUTO: 23.6 % (ref 34–46.6)
HGB BLD-MCNC: 7.9 G/DL (ref 12–15.9)
HMPV RNA NPH QL NAA+NON-PROBE: NOT DETECTED
HPIV1 RNA ISLT QL NAA+PROBE: NOT DETECTED
HPIV2 RNA SPEC QL NAA+PROBE: NOT DETECTED
HPIV3 RNA NPH QL NAA+PROBE: NOT DETECTED
HPIV4 P GENE NPH QL NAA+PROBE: NOT DETECTED
M PNEUMO IGG SER IA-ACNC: NOT DETECTED
MCH RBC QN AUTO: 31.5 PG (ref 26.6–33)
MCHC RBC AUTO-ENTMCNC: 33.5 G/DL (ref 31.5–35.7)
MCV RBC AUTO: 94 FL (ref 79–97)
PLATELET # BLD AUTO: 187 10*3/MM3 (ref 140–450)
PMV BLD AUTO: 9 FL (ref 6–12)
POTASSIUM SERPL-SCNC: 4.2 MMOL/L (ref 3.5–5.2)
RBC # BLD AUTO: 2.51 10*6/MM3 (ref 3.77–5.28)
RHINOVIRUS RNA SPEC NAA+PROBE: DETECTED
RSV RNA NPH QL NAA+NON-PROBE: NOT DETECTED
SARS-COV-2 RNA NPH QL NAA+NON-PROBE: NOT DETECTED
SODIUM SERPL-SCNC: 140 MMOL/L (ref 136–145)
WBC NRBC COR # BLD: 4.37 10*3/MM3 (ref 3.4–10.8)

## 2022-10-20 PROCEDURE — 94799 UNLISTED PULMONARY SVC/PX: CPT

## 2022-10-20 PROCEDURE — 94761 N-INVAS EAR/PLS OXIMETRY MLT: CPT

## 2022-10-20 PROCEDURE — 0202U NFCT DS 22 TRGT SARS-COV-2: CPT | Performed by: INTERNAL MEDICINE

## 2022-10-20 PROCEDURE — 80048 BASIC METABOLIC PNL TOTAL CA: CPT | Performed by: INTERNAL MEDICINE

## 2022-10-20 PROCEDURE — 96366 THER/PROPH/DIAG IV INF ADDON: CPT

## 2022-10-20 PROCEDURE — 25010000002 CEFTRIAXONE PER 250 MG: Performed by: INTERNAL MEDICINE

## 2022-10-20 PROCEDURE — 71045 X-RAY EXAM CHEST 1 VIEW: CPT

## 2022-10-20 PROCEDURE — 85027 COMPLETE CBC AUTOMATED: CPT | Performed by: INTERNAL MEDICINE

## 2022-10-20 PROCEDURE — 94664 DEMO&/EVAL PT USE INHALER: CPT

## 2022-10-20 PROCEDURE — G0378 HOSPITAL OBSERVATION PER HR: HCPCS

## 2022-10-20 RX ADMIN — PANTOPRAZOLE SODIUM 40 MG: 40 TABLET, DELAYED RELEASE ORAL at 17:39

## 2022-10-20 RX ADMIN — ALLOPURINOL 100 MG: 100 TABLET ORAL at 08:12

## 2022-10-20 RX ADMIN — IPRATROPIUM BROMIDE 0.5 MG: 0.5 SOLUTION RESPIRATORY (INHALATION) at 07:18

## 2022-10-20 RX ADMIN — GABAPENTIN 300 MG: 300 CAPSULE ORAL at 17:39

## 2022-10-20 RX ADMIN — AMLODIPINE BESYLATE 5 MG: 5 TABLET ORAL at 08:12

## 2022-10-20 RX ADMIN — CARBAMAZEPINE 200 MG: 200 TABLET, EXTENDED RELEASE ORAL at 08:12

## 2022-10-20 RX ADMIN — BUSPIRONE HYDROCHLORIDE 7.5 MG: 15 TABLET ORAL at 20:35

## 2022-10-20 RX ADMIN — IPRATROPIUM BROMIDE 0.5 MG: 0.5 SOLUTION RESPIRATORY (INHALATION) at 11:27

## 2022-10-20 RX ADMIN — TRAZODONE HYDROCHLORIDE 200 MG: 100 TABLET ORAL at 20:35

## 2022-10-20 RX ADMIN — GABAPENTIN 300 MG: 300 CAPSULE ORAL at 20:35

## 2022-10-20 RX ADMIN — BUDESONIDE AND FORMOTEROL FUMARATE DIHYDRATE 2 PUFF: 160; 4.5 AEROSOL RESPIRATORY (INHALATION) at 07:18

## 2022-10-20 RX ADMIN — Medication 1000 MCG: at 08:12

## 2022-10-20 RX ADMIN — CARBAMAZEPINE 200 MG: 200 TABLET, EXTENDED RELEASE ORAL at 20:35

## 2022-10-20 RX ADMIN — BUMETANIDE 1 MG: 1 TABLET ORAL at 08:12

## 2022-10-20 RX ADMIN — LEVOTHYROXINE SODIUM 75 MCG: 0.07 TABLET ORAL at 08:12

## 2022-10-20 RX ADMIN — DULOXETINE HYDROCHLORIDE 60 MG: 60 CAPSULE, DELAYED RELEASE ORAL at 20:35

## 2022-10-20 RX ADMIN — IPRATROPIUM BROMIDE 0.5 MG: 0.5 SOLUTION RESPIRATORY (INHALATION) at 19:30

## 2022-10-20 RX ADMIN — GUAIFENESIN 600 MG: 600 TABLET, EXTENDED RELEASE ORAL at 08:12

## 2022-10-20 RX ADMIN — GUAIFENESIN 600 MG: 600 TABLET, EXTENDED RELEASE ORAL at 20:35

## 2022-10-20 RX ADMIN — IPRATROPIUM BROMIDE 0.5 MG: 0.5 SOLUTION RESPIRATORY (INHALATION) at 15:40

## 2022-10-20 RX ADMIN — CEFTRIAXONE SODIUM 1 G: 1 INJECTION, POWDER, FOR SOLUTION INTRAMUSCULAR; INTRAVENOUS at 13:42

## 2022-10-20 RX ADMIN — BUDESONIDE AND FORMOTEROL FUMARATE DIHYDRATE 2 PUFF: 160; 4.5 AEROSOL RESPIRATORY (INHALATION) at 19:34

## 2022-10-20 RX ADMIN — METOPROLOL SUCCINATE 12.5 MG: 25 TABLET, EXTENDED RELEASE ORAL at 08:11

## 2022-10-20 RX ADMIN — DULOXETINE HYDROCHLORIDE 60 MG: 60 CAPSULE, DELAYED RELEASE ORAL at 08:12

## 2022-10-20 RX ADMIN — QUETIAPINE FUMARATE 100 MG: 100 TABLET ORAL at 20:35

## 2022-10-20 RX ADMIN — GABAPENTIN 300 MG: 300 CAPSULE ORAL at 08:12

## 2022-10-20 RX ADMIN — PANTOPRAZOLE SODIUM 40 MG: 40 TABLET, DELAYED RELEASE ORAL at 08:12

## 2022-10-20 RX ADMIN — BUSPIRONE HYDROCHLORIDE 7.5 MG: 15 TABLET ORAL at 08:11

## 2022-10-20 NOTE — OUTREACH NOTE
COPD/PN Week 3 Survey    Flowsheet Row Responses   [facility] [place] facility patient discharged from? [place]   Does the patient have one of the following disease processes/diagnoses(primary or secondary)? COPD   Week 3 attempt successful? No   Unsuccessful attempts Attempt 1   Revoke Readmitted          CAMRON BARKER - Registered Nurse

## 2022-10-21 LAB
ANION GAP SERPL CALCULATED.3IONS-SCNC: 10 MMOL/L (ref 5–15)
BACTERIA SPEC AEROBE CULT: ABNORMAL
BUN SERPL-MCNC: 21 MG/DL (ref 8–23)
BUN/CREAT SERPL: 9.5 (ref 7–25)
CALCIUM SPEC-SCNC: 8 MG/DL (ref 8.6–10.5)
CHLORIDE SERPL-SCNC: 99 MMOL/L (ref 98–107)
CO2 SERPL-SCNC: 30 MMOL/L (ref 22–29)
CREAT SERPL-MCNC: 2.22 MG/DL (ref 0.57–1)
DEPRECATED RDW RBC AUTO: 49 FL (ref 37–54)
EGFRCR SERPLBLD CKD-EPI 2021: 21.9 ML/MIN/1.73
ERYTHROCYTE [DISTWIDTH] IN BLOOD BY AUTOMATED COUNT: 14.6 % (ref 12.3–15.4)
GLUCOSE SERPL-MCNC: 90 MG/DL (ref 65–99)
HCT VFR BLD AUTO: 22.6 % (ref 34–46.6)
HGB BLD-MCNC: 7.5 G/DL (ref 12–15.9)
MAGNESIUM SERPL-MCNC: 1.5 MG/DL (ref 1.6–2.4)
MCH RBC QN AUTO: 30.6 PG (ref 26.6–33)
MCHC RBC AUTO-ENTMCNC: 33.2 G/DL (ref 31.5–35.7)
MCV RBC AUTO: 92.2 FL (ref 79–97)
PLATELET # BLD AUTO: 165 10*3/MM3 (ref 140–450)
PMV BLD AUTO: 9.1 FL (ref 6–12)
POTASSIUM SERPL-SCNC: 3.8 MMOL/L (ref 3.5–5.2)
RBC # BLD AUTO: 2.45 10*6/MM3 (ref 3.77–5.28)
SODIUM SERPL-SCNC: 139 MMOL/L (ref 136–145)
WBC NRBC COR # BLD: 4.3 10*3/MM3 (ref 3.4–10.8)

## 2022-10-21 PROCEDURE — 85027 COMPLETE CBC AUTOMATED: CPT | Performed by: INTERNAL MEDICINE

## 2022-10-21 PROCEDURE — 94761 N-INVAS EAR/PLS OXIMETRY MLT: CPT

## 2022-10-21 PROCEDURE — 96367 TX/PROPH/DG ADDL SEQ IV INF: CPT

## 2022-10-21 PROCEDURE — 94799 UNLISTED PULMONARY SVC/PX: CPT

## 2022-10-21 PROCEDURE — 25010000002 MAGNESIUM SULFATE 2 GM/50ML SOLUTION: Performed by: INTERNAL MEDICINE

## 2022-10-21 PROCEDURE — 94664 DEMO&/EVAL PT USE INHALER: CPT

## 2022-10-21 PROCEDURE — 80048 BASIC METABOLIC PNL TOTAL CA: CPT | Performed by: INTERNAL MEDICINE

## 2022-10-21 PROCEDURE — 94760 N-INVAS EAR/PLS OXIMETRY 1: CPT

## 2022-10-21 PROCEDURE — 83735 ASSAY OF MAGNESIUM: CPT | Performed by: INTERNAL MEDICINE

## 2022-10-21 PROCEDURE — G0378 HOSPITAL OBSERVATION PER HR: HCPCS

## 2022-10-21 PROCEDURE — 25010000002 CEFTRIAXONE PER 250 MG: Performed by: INTERNAL MEDICINE

## 2022-10-21 RX ORDER — MAGNESIUM SULFATE HEPTAHYDRATE 40 MG/ML
4 INJECTION, SOLUTION INTRAVENOUS AS NEEDED
Status: DISCONTINUED | OUTPATIENT
Start: 2022-10-21 | End: 2022-10-26 | Stop reason: HOSPADM

## 2022-10-21 RX ORDER — MAGNESIUM SULFATE HEPTAHYDRATE 40 MG/ML
2 INJECTION, SOLUTION INTRAVENOUS AS NEEDED
Status: DISCONTINUED | OUTPATIENT
Start: 2022-10-21 | End: 2022-10-26 | Stop reason: HOSPADM

## 2022-10-21 RX ADMIN — AMLODIPINE BESYLATE 5 MG: 5 TABLET ORAL at 08:01

## 2022-10-21 RX ADMIN — GABAPENTIN 300 MG: 300 CAPSULE ORAL at 20:39

## 2022-10-21 RX ADMIN — IPRATROPIUM BROMIDE 0.5 MG: 0.5 SOLUTION RESPIRATORY (INHALATION) at 15:42

## 2022-10-21 RX ADMIN — CEFTRIAXONE SODIUM 1 G: 1 INJECTION, POWDER, FOR SOLUTION INTRAMUSCULAR; INTRAVENOUS at 14:39

## 2022-10-21 RX ADMIN — IPRATROPIUM BROMIDE 0.5 MG: 0.5 SOLUTION RESPIRATORY (INHALATION) at 07:21

## 2022-10-21 RX ADMIN — IPRATROPIUM BROMIDE 0.5 MG: 0.5 SOLUTION RESPIRATORY (INHALATION) at 11:44

## 2022-10-21 RX ADMIN — GABAPENTIN 300 MG: 300 CAPSULE ORAL at 18:14

## 2022-10-21 RX ADMIN — BUSPIRONE HYDROCHLORIDE 7.5 MG: 15 TABLET ORAL at 08:01

## 2022-10-21 RX ADMIN — GUAIFENESIN 600 MG: 600 TABLET, EXTENDED RELEASE ORAL at 20:33

## 2022-10-21 RX ADMIN — TRAZODONE HYDROCHLORIDE 200 MG: 100 TABLET ORAL at 20:33

## 2022-10-21 RX ADMIN — DULOXETINE HYDROCHLORIDE 60 MG: 60 CAPSULE, DELAYED RELEASE ORAL at 20:33

## 2022-10-21 RX ADMIN — PANTOPRAZOLE SODIUM 40 MG: 40 TABLET, DELAYED RELEASE ORAL at 08:01

## 2022-10-21 RX ADMIN — LEVOTHYROXINE SODIUM 75 MCG: 0.07 TABLET ORAL at 08:01

## 2022-10-21 RX ADMIN — BUDESONIDE AND FORMOTEROL FUMARATE DIHYDRATE 2 PUFF: 160; 4.5 AEROSOL RESPIRATORY (INHALATION) at 19:33

## 2022-10-21 RX ADMIN — BUMETANIDE 1 MG: 1 TABLET ORAL at 08:01

## 2022-10-21 RX ADMIN — CARBAMAZEPINE 200 MG: 200 TABLET, EXTENDED RELEASE ORAL at 20:33

## 2022-10-21 RX ADMIN — GABAPENTIN 300 MG: 300 CAPSULE ORAL at 08:02

## 2022-10-21 RX ADMIN — PANTOPRAZOLE SODIUM 40 MG: 40 TABLET, DELAYED RELEASE ORAL at 18:14

## 2022-10-21 RX ADMIN — QUETIAPINE FUMARATE 100 MG: 100 TABLET ORAL at 20:33

## 2022-10-21 RX ADMIN — IPRATROPIUM BROMIDE 0.5 MG: 0.5 SOLUTION RESPIRATORY (INHALATION) at 19:29

## 2022-10-21 RX ADMIN — BUDESONIDE AND FORMOTEROL FUMARATE DIHYDRATE 2 PUFF: 160; 4.5 AEROSOL RESPIRATORY (INHALATION) at 07:21

## 2022-10-21 RX ADMIN — CARBAMAZEPINE 200 MG: 200 TABLET, EXTENDED RELEASE ORAL at 08:01

## 2022-10-21 RX ADMIN — MAGNESIUM SULFATE HEPTAHYDRATE 2 G: 2 INJECTION, SOLUTION INTRAVENOUS at 22:45

## 2022-10-21 RX ADMIN — Medication 1000 MCG: at 08:01

## 2022-10-21 RX ADMIN — METOPROLOL SUCCINATE 12.5 MG: 25 TABLET, EXTENDED RELEASE ORAL at 08:01

## 2022-10-21 RX ADMIN — DULOXETINE HYDROCHLORIDE 60 MG: 60 CAPSULE, DELAYED RELEASE ORAL at 08:01

## 2022-10-21 RX ADMIN — GUAIFENESIN 600 MG: 600 TABLET, EXTENDED RELEASE ORAL at 08:01

## 2022-10-21 RX ADMIN — ALLOPURINOL 100 MG: 100 TABLET ORAL at 08:01

## 2022-10-21 RX ADMIN — BUSPIRONE HYDROCHLORIDE 7.5 MG: 15 TABLET ORAL at 20:33

## 2022-10-22 ENCOUNTER — APPOINTMENT (OUTPATIENT)
Dept: MRI IMAGING | Facility: HOSPITAL | Age: 82
End: 2022-10-22

## 2022-10-22 LAB
ANION GAP SERPL CALCULATED.3IONS-SCNC: 9 MMOL/L (ref 5–15)
BUN SERPL-MCNC: 24 MG/DL (ref 8–23)
BUN/CREAT SERPL: 11.7 (ref 7–25)
CALCIUM SPEC-SCNC: 8.4 MG/DL (ref 8.6–10.5)
CHLORIDE SERPL-SCNC: 99 MMOL/L (ref 98–107)
CO2 SERPL-SCNC: 29 MMOL/L (ref 22–29)
CREAT SERPL-MCNC: 2.05 MG/DL (ref 0.57–1)
DEPRECATED RDW RBC AUTO: 52 FL (ref 37–54)
EGFRCR SERPLBLD CKD-EPI 2021: 24.1 ML/MIN/1.73
ERYTHROCYTE [DISTWIDTH] IN BLOOD BY AUTOMATED COUNT: 14.9 % (ref 12.3–15.4)
GLUCOSE BLDC GLUCOMTR-MCNC: 137 MG/DL (ref 70–130)
GLUCOSE SERPL-MCNC: 97 MG/DL (ref 65–99)
HCT VFR BLD AUTO: 24.1 % (ref 34–46.6)
HGB BLD-MCNC: 7.8 G/DL (ref 12–15.9)
MAGNESIUM SERPL-MCNC: 3.4 MG/DL (ref 1.6–2.4)
MCH RBC QN AUTO: 31.2 PG (ref 26.6–33)
MCHC RBC AUTO-ENTMCNC: 32.4 G/DL (ref 31.5–35.7)
MCV RBC AUTO: 96.4 FL (ref 79–97)
PLATELET # BLD AUTO: 168 10*3/MM3 (ref 140–450)
PMV BLD AUTO: 8.9 FL (ref 6–12)
POTASSIUM SERPL-SCNC: 3.6 MMOL/L (ref 3.5–5.2)
RBC # BLD AUTO: 2.5 10*6/MM3 (ref 3.77–5.28)
SODIUM SERPL-SCNC: 137 MMOL/L (ref 136–145)
WBC NRBC COR # BLD: 4.66 10*3/MM3 (ref 3.4–10.8)

## 2022-10-22 PROCEDURE — 94761 N-INVAS EAR/PLS OXIMETRY MLT: CPT

## 2022-10-22 PROCEDURE — G0378 HOSPITAL OBSERVATION PER HR: HCPCS

## 2022-10-22 PROCEDURE — 25010000002 CEFTRIAXONE PER 250 MG: Performed by: INTERNAL MEDICINE

## 2022-10-22 PROCEDURE — 96366 THER/PROPH/DIAG IV INF ADDON: CPT

## 2022-10-22 PROCEDURE — 70551 MRI BRAIN STEM W/O DYE: CPT

## 2022-10-22 PROCEDURE — 94799 UNLISTED PULMONARY SVC/PX: CPT

## 2022-10-22 PROCEDURE — 94664 DEMO&/EVAL PT USE INHALER: CPT

## 2022-10-22 PROCEDURE — 83735 ASSAY OF MAGNESIUM: CPT | Performed by: INTERNAL MEDICINE

## 2022-10-22 PROCEDURE — 25010000002 LORAZEPAM PER 2 MG: Performed by: PSYCHIATRY & NEUROLOGY

## 2022-10-22 PROCEDURE — 82962 GLUCOSE BLOOD TEST: CPT

## 2022-10-22 PROCEDURE — 25010000002 MAGNESIUM SULFATE 2 GM/50ML SOLUTION: Performed by: INTERNAL MEDICINE

## 2022-10-22 PROCEDURE — 96375 TX/PRO/DX INJ NEW DRUG ADDON: CPT

## 2022-10-22 PROCEDURE — 94760 N-INVAS EAR/PLS OXIMETRY 1: CPT

## 2022-10-22 PROCEDURE — 80048 BASIC METABOLIC PNL TOTAL CA: CPT | Performed by: INTERNAL MEDICINE

## 2022-10-22 PROCEDURE — 85027 COMPLETE CBC AUTOMATED: CPT | Performed by: INTERNAL MEDICINE

## 2022-10-22 RX ORDER — CEFDINIR 300 MG/1
300 CAPSULE ORAL 2 TIMES DAILY
Qty: 5 CAPSULE | Refills: 0 | Status: SHIPPED | OUTPATIENT
Start: 2022-10-22 | End: 2022-10-26 | Stop reason: HOSPADM

## 2022-10-22 RX ORDER — LORAZEPAM 2 MG/ML
1 INJECTION INTRAMUSCULAR ONCE
Status: COMPLETED | OUTPATIENT
Start: 2022-10-22 | End: 2022-10-22

## 2022-10-22 RX ADMIN — CARBAMAZEPINE 200 MG: 200 TABLET, EXTENDED RELEASE ORAL at 20:41

## 2022-10-22 RX ADMIN — ALLOPURINOL 100 MG: 100 TABLET ORAL at 09:14

## 2022-10-22 RX ADMIN — QUETIAPINE FUMARATE 100 MG: 100 TABLET ORAL at 20:41

## 2022-10-22 RX ADMIN — Medication 1000 MCG: at 09:14

## 2022-10-22 RX ADMIN — LORAZEPAM 1 MG: 2 INJECTION INTRAMUSCULAR; INTRAVENOUS at 16:06

## 2022-10-22 RX ADMIN — BUDESONIDE AND FORMOTEROL FUMARATE DIHYDRATE 2 PUFF: 160; 4.5 AEROSOL RESPIRATORY (INHALATION) at 07:17

## 2022-10-22 RX ADMIN — LEVOTHYROXINE SODIUM 75 MCG: 0.07 TABLET ORAL at 09:14

## 2022-10-22 RX ADMIN — METOPROLOL SUCCINATE 12.5 MG: 25 TABLET, EXTENDED RELEASE ORAL at 09:14

## 2022-10-22 RX ADMIN — BUDESONIDE AND FORMOTEROL FUMARATE DIHYDRATE 2 PUFF: 160; 4.5 AEROSOL RESPIRATORY (INHALATION) at 19:01

## 2022-10-22 RX ADMIN — DULOXETINE HYDROCHLORIDE 60 MG: 60 CAPSULE, DELAYED RELEASE ORAL at 09:14

## 2022-10-22 RX ADMIN — GABAPENTIN 300 MG: 300 CAPSULE ORAL at 09:14

## 2022-10-22 RX ADMIN — CARBAMAZEPINE 200 MG: 200 TABLET, EXTENDED RELEASE ORAL at 09:14

## 2022-10-22 RX ADMIN — BUMETANIDE 1 MG: 1 TABLET ORAL at 09:14

## 2022-10-22 RX ADMIN — BUSPIRONE HYDROCHLORIDE 7.5 MG: 15 TABLET ORAL at 14:31

## 2022-10-22 RX ADMIN — BUSPIRONE HYDROCHLORIDE 7.5 MG: 15 TABLET ORAL at 21:54

## 2022-10-22 RX ADMIN — GUAIFENESIN 600 MG: 600 TABLET, EXTENDED RELEASE ORAL at 20:41

## 2022-10-22 RX ADMIN — GABAPENTIN 300 MG: 300 CAPSULE ORAL at 20:41

## 2022-10-22 RX ADMIN — IPRATROPIUM BROMIDE 0.5 MG: 0.5 SOLUTION RESPIRATORY (INHALATION) at 07:15

## 2022-10-22 RX ADMIN — AMLODIPINE BESYLATE 5 MG: 5 TABLET ORAL at 09:14

## 2022-10-22 RX ADMIN — GUAIFENESIN 600 MG: 600 TABLET, EXTENDED RELEASE ORAL at 09:14

## 2022-10-22 RX ADMIN — MAGNESIUM SULFATE HEPTAHYDRATE 2 G: 2 INJECTION, SOLUTION INTRAVENOUS at 02:40

## 2022-10-22 RX ADMIN — DULOXETINE HYDROCHLORIDE 60 MG: 60 CAPSULE, DELAYED RELEASE ORAL at 20:41

## 2022-10-22 RX ADMIN — PANTOPRAZOLE SODIUM 40 MG: 40 TABLET, DELAYED RELEASE ORAL at 18:33

## 2022-10-22 RX ADMIN — IPRATROPIUM BROMIDE 0.5 MG: 0.5 SOLUTION RESPIRATORY (INHALATION) at 11:38

## 2022-10-22 RX ADMIN — TRAZODONE HYDROCHLORIDE 200 MG: 100 TABLET ORAL at 21:54

## 2022-10-22 RX ADMIN — GABAPENTIN 300 MG: 300 CAPSULE ORAL at 18:34

## 2022-10-22 RX ADMIN — MAGNESIUM SULFATE HEPTAHYDRATE 2 G: 2 INJECTION, SOLUTION INTRAVENOUS at 00:54

## 2022-10-22 RX ADMIN — IPRATROPIUM BROMIDE 0.5 MG: 0.5 SOLUTION RESPIRATORY (INHALATION) at 15:31

## 2022-10-22 RX ADMIN — CEFTRIAXONE SODIUM 1 G: 1 INJECTION, POWDER, FOR SOLUTION INTRAMUSCULAR; INTRAVENOUS at 14:30

## 2022-10-22 RX ADMIN — PANTOPRAZOLE SODIUM 40 MG: 40 TABLET, DELAYED RELEASE ORAL at 09:18

## 2022-10-23 PROBLEM — G93.41 METABOLIC ENCEPHALOPATHY: Status: ACTIVE | Noted: 2022-10-23

## 2022-10-23 PROCEDURE — 25010000002 CEFTRIAXONE PER 250 MG: Performed by: INTERNAL MEDICINE

## 2022-10-23 PROCEDURE — 99214 OFFICE O/P EST MOD 30 MIN: CPT | Performed by: PSYCHIATRY & NEUROLOGY

## 2022-10-23 PROCEDURE — 94799 UNLISTED PULMONARY SVC/PX: CPT

## 2022-10-23 PROCEDURE — 94664 DEMO&/EVAL PT USE INHALER: CPT

## 2022-10-23 PROCEDURE — G0378 HOSPITAL OBSERVATION PER HR: HCPCS

## 2022-10-23 PROCEDURE — 94760 N-INVAS EAR/PLS OXIMETRY 1: CPT

## 2022-10-23 PROCEDURE — 96366 THER/PROPH/DIAG IV INF ADDON: CPT

## 2022-10-23 PROCEDURE — 94761 N-INVAS EAR/PLS OXIMETRY MLT: CPT

## 2022-10-23 RX ORDER — MELOXICAM 7.5 MG/1
7.5 TABLET ORAL DAILY PRN
COMMUNITY
End: 2023-03-03 | Stop reason: HOSPADM

## 2022-10-23 RX ORDER — ROPINIROLE 0.25 MG/1
0.25 TABLET, FILM COATED ORAL NIGHTLY
COMMUNITY
End: 2023-03-23

## 2022-10-23 RX ADMIN — IPRATROPIUM BROMIDE 0.5 MG: 0.5 SOLUTION RESPIRATORY (INHALATION) at 06:37

## 2022-10-23 RX ADMIN — DULOXETINE HYDROCHLORIDE 60 MG: 60 CAPSULE, DELAYED RELEASE ORAL at 14:49

## 2022-10-23 RX ADMIN — BUMETANIDE 1 MG: 1 TABLET ORAL at 14:48

## 2022-10-23 RX ADMIN — GABAPENTIN 300 MG: 300 CAPSULE ORAL at 18:59

## 2022-10-23 RX ADMIN — LEVOTHYROXINE SODIUM 75 MCG: 0.07 TABLET ORAL at 14:48

## 2022-10-23 RX ADMIN — IPRATROPIUM BROMIDE 0.5 MG: 0.5 SOLUTION RESPIRATORY (INHALATION) at 15:28

## 2022-10-23 RX ADMIN — BUDESONIDE AND FORMOTEROL FUMARATE DIHYDRATE 2 PUFF: 160; 4.5 AEROSOL RESPIRATORY (INHALATION) at 06:38

## 2022-10-23 RX ADMIN — CEFTRIAXONE SODIUM 1 G: 1 INJECTION, POWDER, FOR SOLUTION INTRAMUSCULAR; INTRAVENOUS at 14:48

## 2022-10-23 RX ADMIN — GUAIFENESIN 600 MG: 600 TABLET, EXTENDED RELEASE ORAL at 14:48

## 2022-10-23 RX ADMIN — METOPROLOL SUCCINATE 12.5 MG: 25 TABLET, EXTENDED RELEASE ORAL at 14:48

## 2022-10-23 RX ADMIN — BUSPIRONE HYDROCHLORIDE 7.5 MG: 15 TABLET ORAL at 20:46

## 2022-10-23 RX ADMIN — BUSPIRONE HYDROCHLORIDE 7.5 MG: 15 TABLET ORAL at 14:49

## 2022-10-23 RX ADMIN — Medication 1000 MCG: at 14:48

## 2022-10-23 RX ADMIN — QUETIAPINE FUMARATE 100 MG: 100 TABLET ORAL at 22:41

## 2022-10-23 RX ADMIN — IPRATROPIUM BROMIDE 0.5 MG: 0.5 SOLUTION RESPIRATORY (INHALATION) at 19:33

## 2022-10-23 RX ADMIN — DULOXETINE HYDROCHLORIDE 60 MG: 60 CAPSULE, DELAYED RELEASE ORAL at 20:46

## 2022-10-23 RX ADMIN — ALLOPURINOL 100 MG: 100 TABLET ORAL at 14:48

## 2022-10-23 RX ADMIN — AMLODIPINE BESYLATE 5 MG: 5 TABLET ORAL at 14:48

## 2022-10-23 RX ADMIN — GUAIFENESIN 600 MG: 600 TABLET, EXTENDED RELEASE ORAL at 20:46

## 2022-10-23 RX ADMIN — IPRATROPIUM BROMIDE 0.5 MG: 0.5 SOLUTION RESPIRATORY (INHALATION) at 10:49

## 2022-10-23 RX ADMIN — PANTOPRAZOLE SODIUM 40 MG: 40 TABLET, DELAYED RELEASE ORAL at 06:32

## 2022-10-23 RX ADMIN — GABAPENTIN 300 MG: 300 CAPSULE ORAL at 14:49

## 2022-10-23 RX ADMIN — PANTOPRAZOLE SODIUM 40 MG: 40 TABLET, DELAYED RELEASE ORAL at 19:00

## 2022-10-23 RX ADMIN — BUDESONIDE AND FORMOTEROL FUMARATE DIHYDRATE 2 PUFF: 160; 4.5 AEROSOL RESPIRATORY (INHALATION) at 19:33

## 2022-10-23 RX ADMIN — GABAPENTIN 300 MG: 300 CAPSULE ORAL at 20:46

## 2022-10-24 LAB
ALBUMIN UR-MCNC: <1.2 MG/DL
ANION GAP SERPL CALCULATED.3IONS-SCNC: 14 MMOL/L (ref 5–15)
BACTERIA SPEC AEROBE CULT: NORMAL
BACTERIA SPEC AEROBE CULT: NORMAL
BUN SERPL-MCNC: 20 MG/DL (ref 8–23)
BUN/CREAT SERPL: 14.7 (ref 7–25)
CALCIUM SPEC-SCNC: 8.7 MG/DL (ref 8.6–10.5)
CHLORIDE SERPL-SCNC: 99 MMOL/L (ref 98–107)
CO2 SERPL-SCNC: 26 MMOL/L (ref 22–29)
CREAT SERPL-MCNC: 1.36 MG/DL (ref 0.57–1)
CREAT UR-MCNC: 35.1 MG/DL
EGFRCR SERPLBLD CKD-EPI 2021: 39.5 ML/MIN/1.73
GLUCOSE SERPL-MCNC: 79 MG/DL (ref 65–99)
MICROALBUMIN/CREAT UR: NORMAL MG/G{CREAT}
NT-PROBNP SERPL-MCNC: 352 PG/ML (ref 0–1800)
POTASSIUM SERPL-SCNC: 3.6 MMOL/L (ref 3.5–5.2)
SODIUM SERPL-SCNC: 139 MMOL/L (ref 136–145)

## 2022-10-24 PROCEDURE — 82570 ASSAY OF URINE CREATININE: CPT | Performed by: HOSPITALIST

## 2022-10-24 PROCEDURE — 83880 ASSAY OF NATRIURETIC PEPTIDE: CPT | Performed by: HOSPITALIST

## 2022-10-24 PROCEDURE — 94799 UNLISTED PULMONARY SVC/PX: CPT

## 2022-10-24 PROCEDURE — 80048 BASIC METABOLIC PNL TOTAL CA: CPT | Performed by: HOSPITALIST

## 2022-10-24 PROCEDURE — 94664 DEMO&/EVAL PT USE INHALER: CPT

## 2022-10-24 PROCEDURE — 82043 UR ALBUMIN QUANTITATIVE: CPT | Performed by: HOSPITALIST

## 2022-10-24 PROCEDURE — 25010000002 CEFTRIAXONE PER 250 MG: Performed by: INTERNAL MEDICINE

## 2022-10-24 PROCEDURE — 96361 HYDRATE IV INFUSION ADD-ON: CPT

## 2022-10-24 PROCEDURE — 96366 THER/PROPH/DIAG IV INF ADDON: CPT

## 2022-10-24 PROCEDURE — G0378 HOSPITAL OBSERVATION PER HR: HCPCS

## 2022-10-24 PROCEDURE — 94761 N-INVAS EAR/PLS OXIMETRY MLT: CPT

## 2022-10-24 RX ORDER — SODIUM CHLORIDE 9 MG/ML
75 INJECTION, SOLUTION INTRAVENOUS CONTINUOUS
Status: DISCONTINUED | OUTPATIENT
Start: 2022-10-24 | End: 2022-10-25

## 2022-10-24 RX ORDER — BENZONATATE 100 MG/1
100 CAPSULE ORAL 3 TIMES DAILY PRN
Status: DISCONTINUED | OUTPATIENT
Start: 2022-10-24 | End: 2022-10-26 | Stop reason: HOSPADM

## 2022-10-24 RX ORDER — LOPERAMIDE HYDROCHLORIDE 2 MG/1
2 CAPSULE ORAL 4 TIMES DAILY PRN
Status: DISCONTINUED | OUTPATIENT
Start: 2022-10-24 | End: 2022-10-26 | Stop reason: HOSPADM

## 2022-10-24 RX ADMIN — PANTOPRAZOLE SODIUM 40 MG: 40 TABLET, DELAYED RELEASE ORAL at 18:43

## 2022-10-24 RX ADMIN — BUDESONIDE AND FORMOTEROL FUMARATE DIHYDRATE 2 PUFF: 160; 4.5 AEROSOL RESPIRATORY (INHALATION) at 19:23

## 2022-10-24 RX ADMIN — GUAIFENESIN 600 MG: 600 TABLET, EXTENDED RELEASE ORAL at 22:30

## 2022-10-24 RX ADMIN — BUMETANIDE 1 MG: 1 TABLET ORAL at 08:47

## 2022-10-24 RX ADMIN — LOPERAMIDE HYDROCHLORIDE 2 MG: 2 CAPSULE ORAL at 22:30

## 2022-10-24 RX ADMIN — GABAPENTIN 300 MG: 300 CAPSULE ORAL at 18:43

## 2022-10-24 RX ADMIN — IPRATROPIUM BROMIDE 0.5 MG: 0.5 SOLUTION RESPIRATORY (INHALATION) at 16:12

## 2022-10-24 RX ADMIN — IPRATROPIUM BROMIDE 0.5 MG: 0.5 SOLUTION RESPIRATORY (INHALATION) at 19:23

## 2022-10-24 RX ADMIN — BUSPIRONE HYDROCHLORIDE 7.5 MG: 15 TABLET ORAL at 08:48

## 2022-10-24 RX ADMIN — IPRATROPIUM BROMIDE 0.5 MG: 0.5 SOLUTION RESPIRATORY (INHALATION) at 09:18

## 2022-10-24 RX ADMIN — ALLOPURINOL 100 MG: 100 TABLET ORAL at 08:48

## 2022-10-24 RX ADMIN — LEVOTHYROXINE SODIUM 75 MCG: 0.07 TABLET ORAL at 08:47

## 2022-10-24 RX ADMIN — BUSPIRONE HYDROCHLORIDE 7.5 MG: 15 TABLET ORAL at 22:29

## 2022-10-24 RX ADMIN — Medication 1000 MCG: at 08:47

## 2022-10-24 RX ADMIN — BENZONATATE 100 MG: 100 CAPSULE ORAL at 22:30

## 2022-10-24 RX ADMIN — DULOXETINE HYDROCHLORIDE 60 MG: 60 CAPSULE, DELAYED RELEASE ORAL at 08:47

## 2022-10-24 RX ADMIN — ACETAMINOPHEN 650 MG: 325 TABLET ORAL at 11:40

## 2022-10-24 RX ADMIN — BUDESONIDE AND FORMOTEROL FUMARATE DIHYDRATE 2 PUFF: 160; 4.5 AEROSOL RESPIRATORY (INHALATION) at 09:18

## 2022-10-24 RX ADMIN — CEFTRIAXONE SODIUM 1 G: 1 INJECTION, POWDER, FOR SOLUTION INTRAMUSCULAR; INTRAVENOUS at 14:25

## 2022-10-24 RX ADMIN — BENZONATATE 100 MG: 100 CAPSULE ORAL at 14:25

## 2022-10-24 RX ADMIN — AMLODIPINE BESYLATE 5 MG: 5 TABLET ORAL at 08:48

## 2022-10-24 RX ADMIN — QUETIAPINE FUMARATE 100 MG: 100 TABLET ORAL at 22:30

## 2022-10-24 RX ADMIN — IPRATROPIUM BROMIDE 0.5 MG: 0.5 SOLUTION RESPIRATORY (INHALATION) at 12:27

## 2022-10-24 RX ADMIN — GABAPENTIN 300 MG: 300 CAPSULE ORAL at 08:48

## 2022-10-24 RX ADMIN — PANTOPRAZOLE SODIUM 40 MG: 40 TABLET, DELAYED RELEASE ORAL at 06:19

## 2022-10-24 RX ADMIN — DULOXETINE HYDROCHLORIDE 60 MG: 60 CAPSULE, DELAYED RELEASE ORAL at 22:30

## 2022-10-24 RX ADMIN — GABAPENTIN 300 MG: 300 CAPSULE ORAL at 22:30

## 2022-10-24 RX ADMIN — SODIUM CHLORIDE 75 ML/HR: 9 INJECTION, SOLUTION INTRAVENOUS at 12:43

## 2022-10-24 RX ADMIN — GUAIFENESIN 600 MG: 600 TABLET, EXTENDED RELEASE ORAL at 08:48

## 2022-10-24 RX ADMIN — METOPROLOL SUCCINATE 12.5 MG: 25 TABLET, EXTENDED RELEASE ORAL at 08:47

## 2022-10-25 LAB
ALBUMIN SERPL-MCNC: 3.1 G/DL (ref 3.5–5.2)
ANION GAP SERPL CALCULATED.3IONS-SCNC: 10.7 MMOL/L (ref 5–15)
BASOPHILS # BLD AUTO: 0.02 10*3/MM3 (ref 0–0.2)
BASOPHILS NFR BLD AUTO: 0.5 % (ref 0–1.5)
BUN SERPL-MCNC: 17 MG/DL (ref 8–23)
BUN/CREAT SERPL: 14.9 (ref 7–25)
CALCIUM SPEC-SCNC: 8.5 MG/DL (ref 8.6–10.5)
CHLORIDE SERPL-SCNC: 101 MMOL/L (ref 98–107)
CO2 SERPL-SCNC: 27.3 MMOL/L (ref 22–29)
CREAT SERPL-MCNC: 1.14 MG/DL (ref 0.57–1)
DEPRECATED RDW RBC AUTO: 51.9 FL (ref 37–54)
EGFRCR SERPLBLD CKD-EPI 2021: 48.8 ML/MIN/1.73
EOSINOPHIL # BLD AUTO: 0.11 10*3/MM3 (ref 0–0.4)
EOSINOPHIL NFR BLD AUTO: 2.8 % (ref 0.3–6.2)
ERYTHROCYTE [DISTWIDTH] IN BLOOD BY AUTOMATED COUNT: 15 % (ref 12.3–15.4)
GLUCOSE SERPL-MCNC: 80 MG/DL (ref 65–99)
HCT VFR BLD AUTO: 23.5 % (ref 34–46.6)
HGB BLD-MCNC: 7.6 G/DL (ref 12–15.9)
IMM GRANULOCYTES # BLD AUTO: 0.04 10*3/MM3 (ref 0–0.05)
IMM GRANULOCYTES NFR BLD AUTO: 1 % (ref 0–0.5)
LYMPHOCYTES # BLD AUTO: 1.13 10*3/MM3 (ref 0.7–3.1)
LYMPHOCYTES NFR BLD AUTO: 28.3 % (ref 19.6–45.3)
MCH RBC QN AUTO: 31 PG (ref 26.6–33)
MCHC RBC AUTO-ENTMCNC: 32.3 G/DL (ref 31.5–35.7)
MCV RBC AUTO: 95.9 FL (ref 79–97)
MONOCYTES # BLD AUTO: 0.5 10*3/MM3 (ref 0.1–0.9)
MONOCYTES NFR BLD AUTO: 12.5 % (ref 5–12)
NEUTROPHILS NFR BLD AUTO: 2.19 10*3/MM3 (ref 1.7–7)
NEUTROPHILS NFR BLD AUTO: 54.9 % (ref 42.7–76)
NRBC BLD AUTO-RTO: 0 /100 WBC (ref 0–0.2)
PHOSPHATE SERPL-MCNC: 2.9 MG/DL (ref 2.5–4.5)
PLATELET # BLD AUTO: 204 10*3/MM3 (ref 140–450)
PMV BLD AUTO: 9.3 FL (ref 6–12)
POTASSIUM SERPL-SCNC: 3.1 MMOL/L (ref 3.5–5.2)
RBC # BLD AUTO: 2.45 10*6/MM3 (ref 3.77–5.28)
SODIUM SERPL-SCNC: 139 MMOL/L (ref 136–145)
WBC NRBC COR # BLD: 3.99 10*3/MM3 (ref 3.4–10.8)

## 2022-10-25 PROCEDURE — G0378 HOSPITAL OBSERVATION PER HR: HCPCS

## 2022-10-25 PROCEDURE — 25010000002 ONDANSETRON PER 1 MG: Performed by: INTERNAL MEDICINE

## 2022-10-25 PROCEDURE — 94799 UNLISTED PULMONARY SVC/PX: CPT

## 2022-10-25 PROCEDURE — 94761 N-INVAS EAR/PLS OXIMETRY MLT: CPT

## 2022-10-25 PROCEDURE — 94760 N-INVAS EAR/PLS OXIMETRY 1: CPT

## 2022-10-25 PROCEDURE — 97530 THERAPEUTIC ACTIVITIES: CPT

## 2022-10-25 PROCEDURE — 85025 COMPLETE CBC W/AUTO DIFF WBC: CPT | Performed by: HOSPITALIST

## 2022-10-25 PROCEDURE — 96376 TX/PRO/DX INJ SAME DRUG ADON: CPT

## 2022-10-25 PROCEDURE — 80069 RENAL FUNCTION PANEL: CPT | Performed by: HOSPITALIST

## 2022-10-25 PROCEDURE — 96361 HYDRATE IV INFUSION ADD-ON: CPT

## 2022-10-25 PROCEDURE — 97162 PT EVAL MOD COMPLEX 30 MIN: CPT

## 2022-10-25 PROCEDURE — 94664 DEMO&/EVAL PT USE INHALER: CPT

## 2022-10-25 RX ORDER — POTASSIUM CHLORIDE 1.5 G/1.77G
40 POWDER, FOR SOLUTION ORAL AS NEEDED
Status: DISCONTINUED | OUTPATIENT
Start: 2022-10-25 | End: 2022-10-26 | Stop reason: HOSPADM

## 2022-10-25 RX ORDER — ECHINACEA PURPUREA EXTRACT 125 MG
2 TABLET ORAL AS NEEDED
Status: DISCONTINUED | OUTPATIENT
Start: 2022-10-25 | End: 2022-10-26 | Stop reason: HOSPADM

## 2022-10-25 RX ORDER — POTASSIUM CHLORIDE 750 MG/1
40 TABLET, FILM COATED, EXTENDED RELEASE ORAL AS NEEDED
Status: DISCONTINUED | OUTPATIENT
Start: 2022-10-25 | End: 2022-10-26 | Stop reason: HOSPADM

## 2022-10-25 RX ORDER — POTASSIUM CHLORIDE 7.45 MG/ML
10 INJECTION INTRAVENOUS
Status: DISCONTINUED | OUTPATIENT
Start: 2022-10-25 | End: 2022-10-26 | Stop reason: HOSPADM

## 2022-10-25 RX ADMIN — BUMETANIDE 1 MG: 1 TABLET ORAL at 08:44

## 2022-10-25 RX ADMIN — METOPROLOL SUCCINATE 12.5 MG: 25 TABLET, EXTENDED RELEASE ORAL at 08:44

## 2022-10-25 RX ADMIN — ACETAMINOPHEN 650 MG: 325 TABLET ORAL at 10:42

## 2022-10-25 RX ADMIN — LEVOTHYROXINE SODIUM 75 MCG: 0.07 TABLET ORAL at 08:45

## 2022-10-25 RX ADMIN — BUSPIRONE HYDROCHLORIDE 7.5 MG: 15 TABLET ORAL at 08:45

## 2022-10-25 RX ADMIN — BUSPIRONE HYDROCHLORIDE 7.5 MG: 15 TABLET ORAL at 20:24

## 2022-10-25 RX ADMIN — GABAPENTIN 300 MG: 300 CAPSULE ORAL at 08:44

## 2022-10-25 RX ADMIN — ALLOPURINOL 100 MG: 100 TABLET ORAL at 08:45

## 2022-10-25 RX ADMIN — GUAIFENESIN 600 MG: 600 TABLET, EXTENDED RELEASE ORAL at 20:24

## 2022-10-25 RX ADMIN — GUAIFENESIN 600 MG: 600 TABLET, EXTENDED RELEASE ORAL at 08:45

## 2022-10-25 RX ADMIN — Medication 1000 MCG: at 08:44

## 2022-10-25 RX ADMIN — BENZONATATE 100 MG: 100 CAPSULE ORAL at 20:24

## 2022-10-25 RX ADMIN — BUDESONIDE AND FORMOTEROL FUMARATE DIHYDRATE 2 PUFF: 160; 4.5 AEROSOL RESPIRATORY (INHALATION) at 08:05

## 2022-10-25 RX ADMIN — IPRATROPIUM BROMIDE 0.5 MG: 0.5 SOLUTION RESPIRATORY (INHALATION) at 08:05

## 2022-10-25 RX ADMIN — GABAPENTIN 300 MG: 300 CAPSULE ORAL at 15:47

## 2022-10-25 RX ADMIN — IPRATROPIUM BROMIDE 0.5 MG: 0.5 SOLUTION RESPIRATORY (INHALATION) at 15:31

## 2022-10-25 RX ADMIN — BUDESONIDE AND FORMOTEROL FUMARATE DIHYDRATE 2 PUFF: 160; 4.5 AEROSOL RESPIRATORY (INHALATION) at 19:07

## 2022-10-25 RX ADMIN — QUETIAPINE FUMARATE 100 MG: 100 TABLET ORAL at 20:24

## 2022-10-25 RX ADMIN — DULOXETINE HYDROCHLORIDE 60 MG: 60 CAPSULE, DELAYED RELEASE ORAL at 20:24

## 2022-10-25 RX ADMIN — BENZONATATE 100 MG: 100 CAPSULE ORAL at 10:42

## 2022-10-25 RX ADMIN — ONDANSETRON 4 MG: 2 INJECTION INTRAMUSCULAR; INTRAVENOUS at 11:33

## 2022-10-25 RX ADMIN — POTASSIUM CHLORIDE 40 MEQ: 750 TABLET, EXTENDED RELEASE ORAL at 15:47

## 2022-10-25 RX ADMIN — AMLODIPINE BESYLATE 5 MG: 5 TABLET ORAL at 08:45

## 2022-10-25 RX ADMIN — PANTOPRAZOLE SODIUM 40 MG: 40 TABLET, DELAYED RELEASE ORAL at 17:29

## 2022-10-25 RX ADMIN — PANTOPRAZOLE SODIUM 40 MG: 40 TABLET, DELAYED RELEASE ORAL at 06:21

## 2022-10-25 RX ADMIN — IPRATROPIUM BROMIDE 0.5 MG: 0.5 SOLUTION RESPIRATORY (INHALATION) at 11:42

## 2022-10-25 RX ADMIN — DULOXETINE HYDROCHLORIDE 60 MG: 60 CAPSULE, DELAYED RELEASE ORAL at 08:45

## 2022-10-25 RX ADMIN — POTASSIUM CHLORIDE 40 MEQ: 750 TABLET, EXTENDED RELEASE ORAL at 21:35

## 2022-10-25 RX ADMIN — GABAPENTIN 300 MG: 300 CAPSULE ORAL at 20:24

## 2022-10-26 ENCOUNTER — READMISSION MANAGEMENT (OUTPATIENT)
Dept: CALL CENTER | Facility: HOSPITAL | Age: 82
End: 2022-10-26

## 2022-10-26 VITALS
SYSTOLIC BLOOD PRESSURE: 134 MMHG | RESPIRATION RATE: 16 BRPM | OXYGEN SATURATION: 95 % | TEMPERATURE: 98.3 F | DIASTOLIC BLOOD PRESSURE: 66 MMHG | WEIGHT: 164.9 LBS | HEART RATE: 71 BPM | HEIGHT: 62 IN | BODY MASS INDEX: 30.35 KG/M2

## 2022-10-26 LAB
ALBUMIN SERPL-MCNC: 3.1 G/DL (ref 3.5–5.2)
ALBUMIN SERPL-MCNC: 3.6 G/DL (ref 3.5–5.2)
ANION GAP SERPL CALCULATED.3IONS-SCNC: 17 MMOL/L (ref 5–15)
ANION GAP SERPL CALCULATED.3IONS-SCNC: 8 MMOL/L (ref 5–15)
BUN SERPL-MCNC: 17 MG/DL (ref 8–23)
BUN SERPL-MCNC: 17 MG/DL (ref 8–23)
BUN/CREAT SERPL: 13.3 (ref 7–25)
BUN/CREAT SERPL: 13.9 (ref 7–25)
CALCIUM SPEC-SCNC: 8.9 MG/DL (ref 8.6–10.5)
CALCIUM SPEC-SCNC: 9.3 MG/DL (ref 8.6–10.5)
CHLORIDE SERPL-SCNC: 102 MMOL/L (ref 98–107)
CHLORIDE SERPL-SCNC: 102 MMOL/L (ref 98–107)
CO2 SERPL-SCNC: 19 MMOL/L (ref 22–29)
CO2 SERPL-SCNC: 30 MMOL/L (ref 22–29)
CREAT SERPL-MCNC: 1.22 MG/DL (ref 0.57–1)
CREAT SERPL-MCNC: 1.28 MG/DL (ref 0.57–1)
EGFRCR SERPLBLD CKD-EPI 2021: 42.4 ML/MIN/1.73
EGFRCR SERPLBLD CKD-EPI 2021: 45 ML/MIN/1.73
FERRITIN SERPL-MCNC: 22.8 NG/ML (ref 13–150)
GLUCOSE SERPL-MCNC: 70 MG/DL (ref 65–99)
GLUCOSE SERPL-MCNC: 88 MG/DL (ref 65–99)
IRON 24H UR-MRATE: 31 MCG/DL (ref 37–145)
IRON SATN MFR SERPL: 9 % (ref 20–50)
PHOSPHATE SERPL-MCNC: 2.7 MG/DL (ref 2.5–4.5)
PHOSPHATE SERPL-MCNC: 3.3 MG/DL (ref 2.5–4.5)
POTASSIUM SERPL-SCNC: 3.7 MMOL/L (ref 3.5–5.2)
POTASSIUM SERPL-SCNC: 3.8 MMOL/L (ref 3.5–5.2)
SODIUM SERPL-SCNC: 138 MMOL/L (ref 136–145)
SODIUM SERPL-SCNC: 140 MMOL/L (ref 136–145)
TIBC SERPL-MCNC: 349 MCG/DL (ref 298–536)
TRANSFERRIN SERPL-MCNC: 234 MG/DL (ref 200–360)

## 2022-10-26 PROCEDURE — 80069 RENAL FUNCTION PANEL: CPT | Performed by: HOSPITALIST

## 2022-10-26 PROCEDURE — 94799 UNLISTED PULMONARY SVC/PX: CPT

## 2022-10-26 PROCEDURE — 94664 DEMO&/EVAL PT USE INHALER: CPT

## 2022-10-26 PROCEDURE — 94761 N-INVAS EAR/PLS OXIMETRY MLT: CPT

## 2022-10-26 PROCEDURE — 83540 ASSAY OF IRON: CPT | Performed by: HOSPITALIST

## 2022-10-26 PROCEDURE — G0378 HOSPITAL OBSERVATION PER HR: HCPCS

## 2022-10-26 PROCEDURE — 84466 ASSAY OF TRANSFERRIN: CPT | Performed by: HOSPITALIST

## 2022-10-26 PROCEDURE — 82728 ASSAY OF FERRITIN: CPT | Performed by: HOSPITALIST

## 2022-10-26 RX ADMIN — GUAIFENESIN 600 MG: 600 TABLET, EXTENDED RELEASE ORAL at 08:23

## 2022-10-26 RX ADMIN — IPRATROPIUM BROMIDE 0.5 MG: 0.5 SOLUTION RESPIRATORY (INHALATION) at 10:59

## 2022-10-26 RX ADMIN — AMLODIPINE BESYLATE 5 MG: 5 TABLET ORAL at 08:23

## 2022-10-26 RX ADMIN — PANTOPRAZOLE SODIUM 40 MG: 40 TABLET, DELAYED RELEASE ORAL at 06:08

## 2022-10-26 RX ADMIN — GABAPENTIN 300 MG: 300 CAPSULE ORAL at 08:23

## 2022-10-26 RX ADMIN — BUSPIRONE HYDROCHLORIDE 7.5 MG: 15 TABLET ORAL at 08:23

## 2022-10-26 RX ADMIN — LEVOTHYROXINE SODIUM 75 MCG: 0.07 TABLET ORAL at 08:23

## 2022-10-26 RX ADMIN — BUMETANIDE 1 MG: 1 TABLET ORAL at 08:23

## 2022-10-26 RX ADMIN — LOPERAMIDE HYDROCHLORIDE 2 MG: 2 CAPSULE ORAL at 10:13

## 2022-10-26 RX ADMIN — IPRATROPIUM BROMIDE 0.5 MG: 0.5 SOLUTION RESPIRATORY (INHALATION) at 15:25

## 2022-10-26 RX ADMIN — DULOXETINE HYDROCHLORIDE 60 MG: 60 CAPSULE, DELAYED RELEASE ORAL at 08:24

## 2022-10-26 RX ADMIN — METOPROLOL SUCCINATE 12.5 MG: 25 TABLET, EXTENDED RELEASE ORAL at 08:23

## 2022-10-26 RX ADMIN — ALLOPURINOL 100 MG: 100 TABLET ORAL at 08:23

## 2022-10-26 RX ADMIN — Medication 1000 MCG: at 08:23

## 2022-10-26 RX ADMIN — IPRATROPIUM BROMIDE 0.5 MG: 0.5 SOLUTION RESPIRATORY (INHALATION) at 06:59

## 2022-10-26 RX ADMIN — GABAPENTIN 300 MG: 300 CAPSULE ORAL at 16:14

## 2022-10-26 RX ADMIN — BUDESONIDE AND FORMOTEROL FUMARATE DIHYDRATE 2 PUFF: 160; 4.5 AEROSOL RESPIRATORY (INHALATION) at 07:03

## 2022-10-27 NOTE — OUTREACH NOTE
Prep Survey    Flowsheet Row Responses   [facility] facility patient discharged from? [place]   Is LACE score < 7 ? No   Emergency Room discharge w/ pulse ox? No   Eligibility Readm Mgmt   Discharge diagnosis Acute UTI   Does the patient have one of the following disease processes/diagnoses(primary or secondary)? Other   Does the patient have Home health ordered? Yes   What is the Home health agency?  [facility]   Is there a DME ordered? No   Prep survey completed? Yes          KHADIJAH BROWNE - Registered Nurse         Was the patient seen in the last year in this department? Yes     Does patient have an active prescription for medications requested? No     Received Request Via: Pharmacy     Pharmacy is requesting generic.

## 2022-11-07 ENCOUNTER — READMISSION MANAGEMENT (OUTPATIENT)
Dept: CALL CENTER | Facility: HOSPITAL | Age: 82
End: 2022-11-07

## 2022-11-07 NOTE — OUTREACH NOTE
Medical Week 2 Survey    Flowsheet Row Responses   [facility] patient discharged from? [place]   Does the patient have one of the following disease processes/diagnoses(primary or secondary)? Other   Week 2 attempt successful? No   Unsuccessful attempts Attempt 1          JOSY NEGRETE - Licensed Nurse

## 2022-11-08 NOTE — PAYOR COMM NOTE
"[place], Raheem ROCK (82 y.o. Female)     PLEASE SEE ATTACHED FOR DC NOTICE    ID [id number]    THANK YOU  JOSY ACKERMAN RN/    [phone]  [phone]    Date of Birth   1940    Social Security Number       Address   [address] [place] [address] [address]  [facility] [address]    Home Phone   [phone]    MRN   [id number]       [place]   [facility]    Marital Status                               Admission Date   10/19/22    Admission Type   Emergency    Admitting Provider   Radha Barnes MD    Attending Provider       Department, Room/Bed   [address], N524/1       Discharge Date   10/26/2022    Discharge Disposition   Home or Self Care    Discharge Destination                               Attending Provider: (none)   Allergies: Morphine And Related, Fenofibrate    Isolation: None   Infection: Rhinovirus  (10/20/22)   Code Status: Prior    Ht: 157.5 cm (62\")   Wt: 74.8 kg (164 lb 14.5 oz)    Admission Cmt: None   Principal Problem: Acute UTI [N39.0]                 Active Insurance as of 10/19/2022     Primary Coverage     Payor Plan Insurance Group Employer/Plan Group    [employer] [place] MED[place]ARE [place]PLAC[place] WELL[employer]ARE MEDICARE REPLACEMENT      Payor Plan Address Payor Plan Phone Number Payor Plan Fax Number Effective Dates    [address] [address] [phone]  10/9/2017 - None Entered    [facility] [phone]       Subscriber Name Subscriber Birth Date Member ID       RAHEEM ROBERTS 1940 [id number]           Secondary Coverage     Payor Plan Insurance Group Employer/Plan Group    [place]      Payor Plan Address Payor Plan Phone Number Payor Plan Fax Number Effective Dates    [address] [phone]  7/3/2016 - None Entered    [facility] [place] [address]       Subscriber Name Subscriber Birth Date Member ID       RAHEEM ROBERTS 1940 [id number]                 Emergency Contacts      (Rel.) Home Phone Work Phone Mobile " Phone    Chino Wolf (Son) [phone] -- --    James Wolf (Son) [phone] -- [phone]            [place]: [facility] [id number]  Tax ID [id number]     Discharge Summary      Radha Barnes MD at 10/22/22 1317                                                                             PHYSICIAN DISCHARGE SUMMARY                                                                        [facility]    Patient Identification:  Name: Josephine Wolf  Age: 82 y.o.  Sex: female  :  1940  MRN: [id number]  Primary Care Physician: Bernabe Guy MD    Admit date: 10/19/2022  Discharge date and time:10/22/22    Discharged Condition: good    Discharge Diagnoses:  Acute UTI   rhinovirus  Acute bronchitis  Chronic hypoxic respiratory failure  Copd  ckd3    Patient Active Problem List   Diagnosis Code   • Anemia D64.9   • OA (osteoarthritis) of knee M17.9   • Chronic respiratory failure with hypoxia ([facility]) J96.11   • Cellulitis of skin L03.90   • Acute kidney injury (HCC) N17.9   • Sepsis ([facility]) A41.9   • Orthostatic hypotension I95.1   • Disease of thyroid gland E07.9   • Hypertension I10   • Dehydration E86.0   • Stage 3 chronic kidney disease (CMS/HCC) N18.30   • Closed compression fracture of L1 lumbar vertebra S32.010A   • Hyponatremia E87.1   • Osteoporosis with pathological fracture M80.00XA   • Acute on chronic respiratory failure with hypoxia and hypercapnia ([facility]) J96.21, J96.22   • Obesity (BMI 30-39.9) E66.9   • Nocturnal hypoxia G47.34   • CKD (chronic kidney disease) stage 2, GFR 60-89 ml/min N18.2   • Acute on chronic respiratory failure with hypoxia ([facility]) J96.21   • Abdominal pain R10.9   • Acute exacerbation of chronic obstructive pulmonary disease (COPD) ([facility]) J44.1   • Acute UTI N39.0       PMHX:   Past Medical History:   Diagnosis Date   • Acid reflux    • Acute kidney injury (HCC)    • Anemia    • Arthritis    • Bipolar 1 disorder, depressed ([facility])    • Cataract      MINDY   • Chronic nausea    • Chronic pain of right knee    • Continuous leakage of urine     USES DEPENDS   • COPD (chronic obstructive pulmonary disease) (HCC)     USES O2 2 LMP PER NC AT NIGHT   • Disease of thyroid gland     HYPOTHYROIDISM   • Diverticulosis    • Fibromyalgia     DX 1994   • Fibromyalgia, primary    • Frequent episodes of bronchitis    • HL (hearing loss)    • Hypertension    • Hypothyroidism    • Memory loss    • Migraines    • Neck pain    • On home oxygen therapy     2L NC AT NIGHT   • Orthostatic hypotension    • Short of breath on exertion    • Sleep apnea    • Stage 3 chronic kidney disease (HCC)      PSHX:   Past Surgical History:   Procedure Laterality Date   • CEREBRAL ANEURYSM REPAIR  2000'S    WITH STENT   • HYSTERECTOMY     • LAPAROSCOPIC CHOLECYSTECTOMY     • [place] TOTAL KNEE ARTHROPLASTY Right 11/16/2017    Procedure: RT TOTAL KNEE ARTHROPLASTY;  Surgeon: Kashif Perez MD;  Location: [facility];  Service: Orthopedics       Hospital Course: Josephine Wolf  Was admitted with a uti; she was started on antibiotics; culture grew e coli; she started having a productive cough; a respiratory panel was done and returned positive for rhinovirus; cxr was negative; she improved and is now stable for discharge; she will follow up with her pcp    Consults:     Consults     Date and Time Order Name Status Description    10/19/2022  2:02 PM MUNA (on-call MD unless specified)      9/29/2022  5:18 PM Inpatient Cardiology Consult Completed     9/29/2022  5:16 PM Inpatient Pulmonology Consult Completed         Results from last 7 days   Lab Units 10/22/22  0521   WBC 10*3/mm3 4.66   HEMOGLOBIN g/dL 7.8*   HEMATOCRIT % 24.1*   PLATELETS 10*3/mm3 168     Results from last 7 days   Lab Units 10/22/22  0521   SODIUM mmol/L 137   POTASSIUM mmol/L 3.6   CHLORIDE mmol/L 99   CO2 mmol/L 29.0   BUN mg/dL 24*   CREATININE mg/dL 2.05*   GLUCOSE mg/dL 97   CALCIUM mg/dL 8.4*     Significant Diagnostic  "Studies: Labs in chart were reviewed.  Imaging Results (All)     Procedure Component Value Units Date/Time    XR Chest 1 View [[id number]] Collected: 10/20/22 1611     Updated: 10/20/22 1616    Narrative:      XR CHEST 1 VW-     HISTORY: Female who is 82 years-old,  cough     TECHNIQUE: Frontal view of the chest     COMPARISON: 9/29/2022     FINDINGS: The heart is enlarged. Aorta is calcified. Pulmonary  vasculature is unremarkable. And minimal atelectasis or infiltrate the  bases. No pleural effusion, or pneumothorax. No acute osseous process.       Impression:      Minimal atelectasis or infiltrate at the bases.  Cardiomegaly.     This report was finalized on 10/20/2022 4:13 PM by Dr. Ross Phillips M.D.       CT Abdomen Pelvis Without Contrast [[id number]] Collected: 10/19/22 1253     Updated: 10/19/22 1349    Narrative:      CT OF THE ABDOMEN AND PELVIS WITHOUT CONTRAST 10/19/2022     HISTORY: Left lower quadrant pain.     Axial images were obtained from the lung bases to the symphysis pubis.  No intravenous or oral contrast was given.     There are some mild chronic parenchymal changes of the lung bases  similar to the 10/18/2021 study.     The gallbladder has been removed. The liver, spleen, pancreas, adrenals  and kidneys appear unremarkable.     There is mild colonic diverticulosis. Uterus has been removed. Urinary  bladder is unremarkable.       Impression:      1. Mild colonic diverticulosis.  2. Status post cholecystectomy.  3. No acute findings are seen to explain patient's reported left lower  quadrant pain.     Radiation dose reduction techniques were utilized, including automated  exposure control and exposure modulation based on body size.     This report was finalized on 10/19/2022 1:45 PM by Dr. Oracio Barry M.D.           /57 (BP Location: Right arm, Patient Position: Lying)   Pulse 105   Temp 97.3 °F (36.3 °C) (Oral)   Resp 18   Ht 157.5 cm (62\")   Wt 74.6 kg (164 lb 7.4 oz)  "  SpO2 90%   BMI 30.08 kg/m²     Discharge Exam:  General Appearance:    Alert, cooperative, no distress, AAOx3                          Head:    Normocephalic, without obvious abnormality, atraumatic                          Eyes:    PERRL, conjunctivae/corneas clear, EOM's intact, both eyes                        Throat:   Lips, tongue, gums normal; oral mucosa pink and moist                          Neck:   Supple, symmetrical, trachea midline, no JVD                        Lungs:     Clear to auscultation bilaterally, respirations unlabored                Chest Wall:    No tenderness or deformity                        Heart:    Regular rate and rhythm, S1 and S2 normal, no murmur,no rub or gallop                  Abdomen:     Soft, non-tender, bowel sounds active, no masses, no organomegaly                  Extremities:   Extremities normal, atraumatic, no cyanosis or edema, pulses palpable in all extremities                             Skin:   Skin is warm and dry,  no rashes or palpable lesions                  Neurologic:   CNII-XII intact, motor strength grossly intact, sensation grossly intact to light touch, no focal deficits noted     Disposition:  Home    Patient Instructions:      Discharge Medications      New Medications      Instructions Start Date   cefdinir 300 MG capsule  Commonly known as: OMNICEF   300 mg, Oral, 2 Times Daily         Continue These Medications      Instructions Start Date   albuterol (2.5 MG/3ML) 0.083% nebulizer solution  Commonly known as: PROVENTIL   2.5 mg, Nebulization, Every 4 Hours PRN      allopurinol 100 MG tablet  Commonly known as: ZYLOPRIM   100 mg, Oral, Daily      amLODIPine 5 MG tablet  Commonly known as: NORVASC   5 mg, Oral, Daily      budesonide-formoterol 160-4.5 MCG/ACT inhaler  Commonly known as: SYMBICORT   2 puffs, Inhalation, 2 Times Daily      bumetanide 1 MG tablet  Commonly known as: BUMEX   1 mg, Oral, Daily      busPIRone 7.5 MG tablet  Commonly  known as: BUSPAR   7.5 mg, Oral, 2 times daily      carBAMazepine  MG 12 hr tablet  Commonly known as: TEGretol  XR   200 mg, Oral, 2 Times Daily      dicyclomine 20 MG tablet  Commonly known as: BENTYL   40 mg, Oral, 3 Times Daily      DULoxetine 60 MG capsule  Commonly known as: CYMBALTA   60 mg, Oral, 2 Times Daily      gabapentin 300 MG capsule  Commonly known as: NEURONTIN   300 mg, Oral, 3 Times Daily      guaiFENesin 600 MG 12 hr tablet  Commonly known as: MUCINEX   600 mg, Oral, Every 12 Hours Scheduled      HYDROcodone-acetaminophen 7.5-325 MG per tablet  Commonly known as: NORCO   1 tablet, Oral, Every 8 Hours PRN      hydrOXYzine 25 MG tablet  Commonly known as: ATARAX   25 mg, Oral, 3 Times Daily PRN      ipratropium-albuterol 0.5-2.5 mg/3 ml nebulizer  Commonly known as: DUO-NEB   3 mL, Nebulization, Every 4 Hours PRN      levothyroxine 75 MCG tablet  Commonly known as: SYNTHROID, LEVOTHROID   75 mcg, Oral, Daily      metoprolol succinate XL 25 MG 24 hr tablet  Commonly known as: TOPROL-XL   12.5 mg, Oral, Every 24 Hours Scheduled      ondansetron 4 MG tablet  Commonly known as: ZOFRAN   4 mg, Oral, Every 8 Hours PRN      pantoprazole 40 MG EC tablet  Commonly known as: PROTONIX   40 mg, Oral, 2 Times Daily Before Meals      potassium chloride 10 MEQ CR capsule  Commonly known as: MICRO-K   10 mEq, Oral, Daily      prochlorperazine 10 MG tablet  Commonly known as: COMPAZINE   10 mg, Oral, Every 6 Hours PRN      QUEtiapine 50 MG tablet  Commonly known as: SEROquel   100 mg, Oral, Every Night at Bedtime      traZODone 100 MG tablet  Commonly known as: DESYREL   2 tablets, Oral, Nightly      Umeclidinium Bromide 62.5 MCG/INH aerosol powder   Commonly known as: INCRUSE ELLIPTA   1 puff, Inhalation, Daily      vitamin B-12 1000 MCG tablet  Commonly known as: CYANOCOBALAMIN   1,000 mcg, Oral, Daily         Stop These Medications    levoFLOXacin 250 MG tablet  Commonly known as: LEVAQUIN             Future Appointments   Date Time Provider Department Center   2022 12:30 PM [facility] 1 [facility]   2023 11:20 AM Ross Braswell II, MD [facility]      Follow-up Information     Bernabe Guy MD .    Specialty: Internal Medicine  Contact information:  [address]  [facility] [address]  [phone]                       Discharge Order (From admission, onward)     Start     Ordered    10/22/22 1316  Discharge patient  Once        Expected Discharge Date: 10/22/22    Discharge Disposition: Home or Self Care    Physician of Record for Attribution - Please select from Treatment Team: AMANDA MURO [[id number]]    Review needed by CMO to determine Physician of Record: No       Question Answer Comment   Physician of Record for Attribution - Please select from Treatment Team AMANDA MURO    Review needed by CMO to determine Physician of Record No        10/22/22 1316                   Signed:  Radha Barnes MD  10/22/2022  13:17 EDT      Electronically signed by Radha Barnes MD at 10/22/22 1319     Radha Barnes MD at 10/26/22 1620                                                                             PHYSICIAN DISCHARGE SUMMARY                                                                        [facility]    Patient Identification:  Name: Josephine Wolf  Age: 82 y.o.  Sex: female  :  1940  MRN: [id number]  Primary Care Physician: Bernabe Guy MD    Admit date: 10/19/2022  Discharge date and time:10/26/22    Discharged Condition: good    Discharge Diagnoses:  Acute UTI    Metabolic encephalopathy   rhinovirus  Acute kidney injury not poa  Hypokalemia  Copd  Chronic hypoxic respiratory failure  ckd3    Patient Active Problem List   Diagnosis Code   • Anemia D64.9   • OA (osteoarthritis) of knee M17.9   • Chronic respiratory failure with hypoxia (HCC) J96.11   • Cellulitis of skin L03.90   • Acute kidney injury  ([facility]) N17.9   • Sepsis ([facility]) A41.9   • Orthostatic hypotension I95.1   • Disease of thyroid gland E07.9   • Hypertension I10   • Dehydration E86.0   • Stage 3 chronic kidney disease (CMS/HCC) N18.30   • Closed compression fracture of L1 lumbar vertebra S32.010A   • Hyponatremia E87.1   • Osteoporosis with pathological fracture M80.00XA   • Acute on chronic respiratory failure with hypoxia and hypercapnia ([facility]) J96.21, J96.22   • Obesity (BMI 30-39.9) E66.9   • Nocturnal hypoxia G47.34   • CKD (chronic kidney disease) stage 2, GFR 60-89 ml/min N18.2   • Acute on chronic respiratory failure with hypoxia ([facility]) J96.21   • Abdominal pain R10.9   • Acute exacerbation of chronic obstructive pulmonary disease (COPD) ([facility]) J44.1   • Acute UTI N39.0   • Metabolic encephalopathy G93.41       PMHX:   Past Medical History:   Diagnosis Date   • Acid reflux    • Acute kidney injury ([facility])    • Anemia    • Arthritis    • Bipolar 1 disorder, depressed ([facility])    • Cataract     MINDY   • Chronic nausea    • Chronic pain of right knee    • Continuous leakage of urine     USES DEPENDS   • COPD (chronic obstructive pulmonary disease) ([facility])     USES O2 2 LMP PER NC AT NIGHT   • Disease of thyroid gland     HYPOTHYROIDISM   • Diverticulosis    • Fibromyalgia     DX 1994   • Fibromyalgia, primary    • Frequent episodes of bronchitis    • HL (hearing loss)    • Hypertension    • Hypothyroidism    • Memory loss    • Migraines    • Neck pain    • On home oxygen therapy     2L NC AT NIGHT   • Orthostatic hypotension    • Short of breath on exertion    • Sleep apnea    • Stage 3 chronic kidney disease ([facility])      PSHX:   Past Surgical History:   Procedure Laterality Date   • CEREBRAL ANEURYSM REPAIR  2000'S    WITH STENT   • HYSTERECTOMY     • LAPAROSCOPIC CHOLECYSTECTOMY     • [place] TOTAL KNEE ARTHROPLASTY Right 11/16/2017    Procedure: RT TOTAL KNEE ARTHROPLASTY;  Surgeon: Kashif Perez MD;  Location: [facility] OR;  Service: Orthopedics        Hospital Course: Josephine Wolf  Was admitted with a uti; she also tested positive for rhinovirus;she has copd and is on home oxygen; she was started on iv antibiotics and started feeling better; discharge was planned but she felt dizzy and had difficulty ambulating so it was held; mri brain and neuro consult were done and negative; she developed an acute kidney injury thought to be due to poor intake and was given iv fluids; potassium was low and corrected; she will be going home today and can follow up with her pcp    Consults:     Consults     Date and Time Order Name Status Description    10/23/2022 12:46 PM Inpatient Nephrology Consult Completed     10/22/2022  1:28 PM Inpatient Neurology Consult General Completed     10/19/2022  2:02 PM LIPPS (on-call MD unless specified)      9/29/2022  5:18 PM Inpatient Cardiology Consult Completed     9/29/2022  5:16 PM Inpatient Pulmonology Consult Completed         Results from last 7 days   Lab Units 10/25/22  0506   WBC 10*3/mm3 3.99   HEMOGLOBIN g/dL 7.6*   HEMATOCRIT % 23.5*   PLATELETS 10*3/mm3 204     Results from last 7 days   Lab Units 10/26/22  1327   SODIUM mmol/L 140   POTASSIUM mmol/L 3.7   CHLORIDE mmol/L 102   CO2 mmol/L 30.0*   BUN mg/dL 17   CREATININE mg/dL 1.22*   GLUCOSE mg/dL 88   CALCIUM mg/dL 9.3     Significant Diagnostic Studies: Labs in chart were reviewed.  Imaging Results (All)     Procedure Component Value Units Date/Time    MRI Brain Without Contrast [[id number]] Collected: 10/22/22 1928     Updated: 10/23/22 1116    Narrative:      STAT MRI OF THE BRAIN WITHOUT CONTRAST 10/22/2022     CLINICAL HISTORY: Dizziness for one day, complains of generalized  weakness.     TECHNIQUE: Patient was moving quite a bit and unable to hold still and  fast imaging sequences were used consisting of fast axial flair, axial  T1 fat-suppressed, T2 diffusion axial gradient echo T2 and sagittal  T1-weighted images of the head.     COMPARISON: There are no  prior MRIs of the brain for comparison. This is  correlated to a noncontrast head CT on 11/09/2021.     FINDINGS: Unfortunately this exam is significantly degraded by motion  artifact and also slightly limited by the fact that fast imaging  sequences had to be performed which are of decreased resolution. There  is a 9 x 6 mm ovoid area of FLAIR and T2 high signal in the posterior  lateral right frontal juxtacortical white matter that is faintly bright  on diffusion but not dark on the ADC maps and thus the faint high signal  in diffusion is felt to be a T2 shine through artifact and this is felt  to be a chronic infarct in the distributions of the posterior lateral  frontal branches of the right middle cerebral artery territory. There is  a 3 mm old lacunar infarct at the junction of the head and body of the  right caudate nucleus. The remainder of the brain parenchyma is normal  in signal intensity. Specifically no diffusion-weighted abnormality is  seen with no acute infarct identified. The ventricles are normal in  size. I see no mass effect and no midline shift and no extra-axial fluid  collections are identified. On the gradient echo T2 weighted images no  acute or old blood breakdown products are seen intracranially. There is  partial opacification of the ethmoid sinuses with fluid and mucosal  thickening and a small amount of fluid and mucosal thickening posterior  sphenoid sinuses and the posterior left maxillary sinus. There is a  small amount of fluid in the mastoid air cells bilaterally. Good flow  voids are demonstrated within the cerebral vessels and in the dural  venous sinuses.       Impression:      1. This exam is slightly limiting and compromised by the fact that the  patient was moving quite a bit and a moving patient protocol had to be  performed which consists of sequences that are decreased resolution and  the patient's motion caused blurring on many of the images which limits  evaluation.  2.  No acute intracranial abnormality is seen. There is a 9 x 6 mm old  posterior lateral right frontal juxtacortical infarct in the right MCA  territory. There is a tiny 3 mm old lacunar infarct in the right caudate  nucleus.  3. There is partial opacification of the ethmoid, posterior sphenoid  sinus and posterior left maxillary sinus with fluid and mucosal  thickening and a tiny amount of fluid in the mastoid air cells  bilaterally. The remainder of the MRI of the brain is normal.  Specifically no acute infarct is identified.     The results were communicated to Dr. Kelton Chacon from neurology by  telephone 10/22/2022 at 6:29 PM.     This report was finalized on 10/23/2022 11:13 AM by Dr. Kashif Acevedo M.D.       XR Chest 1 View [[id number]] Collected: 10/20/22 1611     Updated: 10/20/22 1616    Narrative:      XR CHEST 1 VW-     HISTORY: Female who is 82 years-old,  cough     TECHNIQUE: Frontal view of the chest     COMPARISON: 9/29/2022     FINDINGS: The heart is enlarged. Aorta is calcified. Pulmonary  vasculature is unremarkable. And minimal atelectasis or infiltrate the  bases. No pleural effusion, or pneumothorax. No acute osseous process.       Impression:      Minimal atelectasis or infiltrate at the bases.  Cardiomegaly.     This report was finalized on 10/20/2022 4:13 PM by Dr. Ross Phillips M.D.       CT Abdomen Pelvis Without Contrast [[id number]] Collected: 10/19/22 1253     Updated: 10/19/22 1349    Narrative:      CT OF THE ABDOMEN AND PELVIS WITHOUT CONTRAST 10/19/2022     HISTORY: Left lower quadrant pain.     Axial images were obtained from the lung bases to the symphysis pubis.  No intravenous or oral contrast was given.     There are some mild chronic parenchymal changes of the lung bases  similar to the 10/18/2021 study.     The gallbladder has been removed. The liver, spleen, pancreas, adrenals  and kidneys appear unremarkable.     There is mild colonic diverticulosis. Uterus has been  "removed. Urinary  bladder is unremarkable.       Impression:      1. Mild colonic diverticulosis.  2. Status post cholecystectomy.  3. No acute findings are seen to explain patient's reported left lower  quadrant pain.     Radiation dose reduction techniques were utilized, including automated  exposure control and exposure modulation based on body size.     This report was finalized on 10/19/2022 1:45 PM by Dr. Oracio Barry M.D.           /66 (BP Location: Right arm, Patient Position: Lying)   Pulse 71   Temp 98.3 °F (36.8 °C) (Oral)   Resp 16   Ht 157.5 cm (62\")   Wt 74.8 kg (164 lb 14.5 oz)   SpO2 95%   BMI 30.16 kg/m²     Discharge Exam:  General Appearance:    Alert, cooperative, no distress, AAOx3                          Head:    Normocephalic, without obvious abnormality, atraumatic                          Eyes:    PERRL, conjunctivae/corneas clear, EOM's intact, both eyes                        Throat:   Lips, tongue, gums normal; oral mucosa pink and moist                          Neck:   Supple, symmetrical, trachea midline, no JVD                        Lungs:     Clear to auscultation bilaterally, respirations unlabored                Chest Wall:    No tenderness or deformity                        Heart:    Regular rate and rhythm, S1 and S2 normal, no murmur,no rub or gallop                  Abdomen:     Soft, non-tender, bowel sounds active, no masses, no organomegaly                  Extremities:   Extremities normal, atraumatic, no cyanosis or edema, pulses palpable in all extremities                             Skin:   Skin is warm and dry,  no rashes or palpable lesions                  Neurologic:   CNII-XII intact, motor strength grossly intact, sensation grossly intact to light touch, no focal deficits noted     Disposition:  Home    Patient Instructions:      Discharge Medications      Continue These Medications      Instructions Start Date   albuterol (2.5 MG/3ML) 0.083% " nebulizer solution  Commonly known as: PROVENTIL   2.5 mg, Nebulization, Every 4 Hours PRN      allopurinol 100 MG tablet  Commonly known as: ZYLOPRIM   100 mg, Oral, Daily      amLODIPine 5 MG tablet  Commonly known as: NORVASC   5 mg, Oral, Daily      budesonide-formoterol 160-4.5 MCG/ACT inhaler  Commonly known as: SYMBICORT   2 puffs, Inhalation, 2 Times Daily      bumetanide 1 MG tablet  Commonly known as: BUMEX   1 mg, Oral, Daily      busPIRone 7.5 MG tablet  Commonly known as: BUSPAR   7.5 mg, Oral, 2 times daily      dicyclomine 20 MG tablet  Commonly known as: BENTYL   40 mg, Oral, 3 Times Daily      DULoxetine 60 MG capsule  Commonly known as: CYMBALTA   60 mg, Oral, 2 Times Daily      gabapentin 300 MG capsule  Commonly known as: NEURONTIN   300 mg, Oral, 3 Times Daily      guaiFENesin 600 MG 12 hr tablet  Commonly known as: MUCINEX   600 mg, Oral, Every 12 Hours Scheduled      HYDROcodone-acetaminophen 7.5-325 MG per tablet  Commonly known as: NORCO   1 tablet, Oral, Every 8 Hours PRN      hydrOXYzine 25 MG tablet  Commonly known as: ATARAX   25 mg, Oral, 3 Times Daily PRN      ipratropium-albuterol 0.5-2.5 mg/3 ml nebulizer  Commonly known as: DUO-NEB   3 mL, Nebulization, Every 4 Hours PRN      levothyroxine 75 MCG tablet  Commonly known as: SYNTHROID, LEVOTHROID   75 mcg, Oral, Daily      meloxicam 7.5 MG tablet  Commonly known as: MOBIC   7.5 mg, Oral, Daily PRN      metoprolol succinate XL 25 MG 24 hr tablet  Commonly known as: TOPROL-XL   12.5 mg, Oral, Every 24 Hours Scheduled      ondansetron 4 MG tablet  Commonly known as: ZOFRAN   4 mg, Oral, Every 8 Hours PRN      pantoprazole 40 MG EC tablet  Commonly known as: PROTONIX   40 mg, Oral, 2 Times Daily Before Meals      potassium chloride 10 MEQ CR capsule  Commonly known as: MICRO-K   10 mEq, Oral, Daily      prochlorperazine 10 MG tablet  Commonly known as: COMPAZINE   10 mg, Oral, Every 6 Hours PRN      QUEtiapine 50 MG tablet  Commonly known  as: SEROquel   100 mg, Oral, Every Night at Bedtime      rOPINIRole 0.25 MG tablet  Commonly known as: REQUIP   0.25 mg, Oral, Nightly, Take 1 hour before bedtime.      Umeclidinium Bromide 62.5 MCG/INH aerosol powder   Commonly known as: INCRUSE ELLIPTA   1 puff, Inhalation, Daily      vitamin B-12 1000 MCG tablet  Commonly known as: CYANOCOBALAMIN   1,000 mcg, Oral, Daily         Stop These Medications    levoFLOXacin 250 MG tablet  Commonly known as: LEVAQUIN            Future Appointments   Date Time Provider Department Center   11/21/2022 12:30 PM [facility]  [facility]   5/26/2023 11:20 AM Ross Braswell II, MD [facility]      Contact information for follow-up providers     Bernabe Guy MD .    Specialty: Internal Medicine  Contact information:  [address]  [facility] [address]  [phone]                   Contact information for after-discharge care     Home Medical Care     [facility] .    Service: Home Health Services  Contact information:  [address]  [facility] [address]  [phone]                           Discharge Order (From admission, onward)     Start     Ordered    10/26/22 1619  Discharge patient  Once        Expected Discharge Date: 10/26/22    Discharge Disposition: Home or Self Care    Physician of Record for Attribution - Please select from Treatment Team: AMANDA MURO [[id number]]    Review needed by CMO to determine Physician of Record: No       Question Answer Comment   Physician of Record for Attribution - Please select from Treatment Team AMANDA MURO    Review needed by CMO to determine Physician of Record No        10/26/22 1619    10/22/22 1316  Discharge patient  Once,   Status:  Canceled        Expected Discharge Date: 10/22/22    Discharge Disposition: Home or Self Care    Physician of Record for Attribution - Please select from Treatment Team: AMANDA MURO [[id number]]    Review needed by CMO to  determine Physician of Record: No       Question Answer Comment   Physician of Record for Attribution - Please select from Treatment Team AMANDA MURO    Review needed by CMO to determine Physician of Record No        10/22/22 1316                 Signed:  Radha Barnes MD  10/26/2022  16:20 EDT      Electronically signed by Radha Barnes MD at 10/26/22 4170

## 2022-11-09 ENCOUNTER — READMISSION MANAGEMENT (OUTPATIENT)
Dept: CALL CENTER | Facility: HOSPITAL | Age: 82
End: 2022-11-09

## 2022-11-09 NOTE — OUTREACH NOTE
Medical Week 2 Survey    Flowsheet Row Responses   [facility] patient discharged from? [place]   Does the patient have one of the following disease processes/diagnoses(primary or secondary)? Other   Week 2 attempt successful? Yes   Call start time 1518   Discharge diagnosis Acute UTI   Call end time 1521   Is the patient taking all medications as directed (includes completed medication regime)? Yes   Does the patient have a primary care provider?  Yes   Has the patient kept scheduled appointments due by today? Yes   What is the Home health agency?  [facility]   Has home health visited the patient within 72 hours of discharge? Yes   Psychosocial issues? No   What is the patient's perception of their health status since discharge? Improving   Is the patient/caregiver able to teach back the hierarchy of who to call/visit for symptoms/problems? PCP, Specialist, Home health nurse, Urgent Care, ED, 911 Yes   If the patient is a current smoker, are they able to teach back resources for cessation? Not a smoker   Additional teach back comments States she is doing well.  Denies any s/s of UTI.  States she followed up with PCP and her checked her for a UTI.     Week 2 Call Completed? Yes   Graduated Yes   Graduated/Revoked comments Denies questions or needs at this time.          JOSY NEGRETE - Licensed Nurse

## 2023-01-01 ENCOUNTER — APPOINTMENT (OUTPATIENT)
Dept: GENERAL RADIOLOGY | Facility: HOSPITAL | Age: 83
DRG: 190 | End: 2023-01-01
Payer: MEDICARE

## 2023-01-01 ENCOUNTER — APPOINTMENT (OUTPATIENT)
Dept: CT IMAGING | Facility: HOSPITAL | Age: 83
DRG: 190 | End: 2023-01-01
Payer: MEDICARE

## 2023-01-01 ENCOUNTER — APPOINTMENT (OUTPATIENT)
Dept: CARDIOLOGY | Facility: HOSPITAL | Age: 83
DRG: 190 | End: 2023-01-01
Payer: MEDICARE

## 2023-01-01 ENCOUNTER — TELEPHONE (OUTPATIENT)
Dept: NEUROLOGY | Facility: CLINIC | Age: 83
End: 2023-01-01

## 2023-01-01 ENCOUNTER — HOSPITAL ENCOUNTER (INPATIENT)
Facility: HOSPITAL | Age: 83
LOS: 13 days | DRG: 190 | End: 2023-10-13
Attending: EMERGENCY MEDICINE | Admitting: HOSPITALIST
Payer: MEDICARE

## 2023-01-01 VITALS
HEIGHT: 62 IN | SYSTOLIC BLOOD PRESSURE: 107 MMHG | BODY MASS INDEX: 29.81 KG/M2 | OXYGEN SATURATION: 80 % | DIASTOLIC BLOOD PRESSURE: 67 MMHG | WEIGHT: 162 LBS | TEMPERATURE: 98.8 F

## 2023-01-01 DIAGNOSIS — E83.42 HYPOMAGNESEMIA: ICD-10-CM

## 2023-01-01 DIAGNOSIS — J44.9 CHRONIC OBSTRUCTIVE PULMONARY DISEASE (COPD): ICD-10-CM

## 2023-01-01 DIAGNOSIS — J96.20 ACUTE AND CHRONIC RESPIRATORY FAILURE (ACUTE-ON-CHRONIC): ICD-10-CM

## 2023-01-01 DIAGNOSIS — I48.91 ATRIAL FIBRILLATION WITH RAPID VENTRICULAR RESPONSE: Primary | ICD-10-CM

## 2023-01-01 DIAGNOSIS — J44.1 COPD EXACERBATION: ICD-10-CM

## 2023-01-01 DIAGNOSIS — D64.9 CHRONIC ANEMIA: ICD-10-CM

## 2023-01-01 DIAGNOSIS — N18.9 CHRONIC RENAL FAILURE, UNSPECIFIED CKD STAGE: ICD-10-CM

## 2023-01-01 LAB
ALBUMIN SERPL-MCNC: 3.4 G/DL (ref 3.5–5.2)
ALBUMIN SERPL-MCNC: 3.5 G/DL (ref 3.5–5.2)
ALBUMIN SERPL-MCNC: 3.6 G/DL (ref 3.5–5.2)
ALBUMIN SERPL-MCNC: 3.7 G/DL (ref 3.5–5.2)
ALBUMIN SERPL-MCNC: 3.8 G/DL (ref 3.5–5.2)
ALBUMIN SERPL-MCNC: 4.2 G/DL (ref 3.5–5.2)
ALBUMIN/GLOB SERPL: 1.6 G/DL
ALBUMIN/GLOB SERPL: 1.8 G/DL
ALP SERPL-CCNC: 55 U/L (ref 39–117)
ALP SERPL-CCNC: 63 U/L (ref 39–117)
ALT SERPL W P-5'-P-CCNC: 20 U/L (ref 1–33)
ALT SERPL W P-5'-P-CCNC: 22 U/L (ref 1–33)
ANION GAP SERPL CALCULATED.3IONS-SCNC: 10.2 MMOL/L (ref 5–15)
ANION GAP SERPL CALCULATED.3IONS-SCNC: 10.5 MMOL/L (ref 5–15)
ANION GAP SERPL CALCULATED.3IONS-SCNC: 11.9 MMOL/L (ref 5–15)
ANION GAP SERPL CALCULATED.3IONS-SCNC: 12 MMOL/L (ref 5–15)
ANION GAP SERPL CALCULATED.3IONS-SCNC: 13 MMOL/L (ref 5–15)
ANION GAP SERPL CALCULATED.3IONS-SCNC: 13 MMOL/L (ref 5–15)
ANION GAP SERPL CALCULATED.3IONS-SCNC: 5.6 MMOL/L (ref 5–15)
ANION GAP SERPL CALCULATED.3IONS-SCNC: 7 MMOL/L (ref 5–15)
ANION GAP SERPL CALCULATED.3IONS-SCNC: 8.5 MMOL/L (ref 5–15)
ANION GAP SERPL CALCULATED.3IONS-SCNC: 9 MMOL/L (ref 5–15)
ANION GAP SERPL CALCULATED.3IONS-SCNC: 9.7 MMOL/L (ref 5–15)
ANION GAP SERPL CALCULATED.3IONS-SCNC: 9.8 MMOL/L (ref 5–15)
ARTERIAL PATENCY WRIST A: POSITIVE
ASCENDING AORTA: 3.1 CM
AST SERPL-CCNC: 18 U/L (ref 1–32)
AST SERPL-CCNC: 20 U/L (ref 1–32)
ATMOSPHERIC PRESS: 749.6 MMHG
ATMOSPHERIC PRESS: 752.6 MMHG
B PARAPERT DNA SPEC QL NAA+PROBE: NOT DETECTED
B PARAPERT DNA SPEC QL NAA+PROBE: NOT DETECTED
B PERT DNA SPEC QL NAA+PROBE: NOT DETECTED
B PERT DNA SPEC QL NAA+PROBE: NOT DETECTED
BASE EXCESS BLDA CALC-SCNC: 0.7 MMOL/L (ref 0–2)
BASE EXCESS BLDV CALC-SCNC: 4.3 MMOL/L (ref -2–2)
BASOPHILS # BLD AUTO: 0 10*3/MM3 (ref 0–0.2)
BASOPHILS # BLD AUTO: 0 10*3/MM3 (ref 0–0.2)
BASOPHILS # BLD AUTO: 0.01 10*3/MM3 (ref 0–0.2)
BASOPHILS # BLD AUTO: 0.02 10*3/MM3 (ref 0–0.2)
BASOPHILS NFR BLD AUTO: 0 % (ref 0–1.5)
BASOPHILS NFR BLD AUTO: 0 % (ref 0–1.5)
BASOPHILS NFR BLD AUTO: 0.1 % (ref 0–1.5)
BDY SITE: ABNORMAL
BH CV ECHO MEAS - ACS: 1.52 CM
BH CV ECHO MEAS - AO MAX PG: 7.8 MMHG
BH CV ECHO MEAS - AO MEAN PG: 4.1 MMHG
BH CV ECHO MEAS - AO ROOT DIAM: 3.1 CM
BH CV ECHO MEAS - AO V2 MAX: 139.6 CM/SEC
BH CV ECHO MEAS - AO V2 VTI: 26.3 CM
BH CV ECHO MEAS - AVA(I,D): 1.65 CM2
BH CV ECHO MEAS - EDV(CUBED): 72.7 ML
BH CV ECHO MEAS - EDV(MOD-SP2): 100 ML
BH CV ECHO MEAS - EDV(MOD-SP4): 71 ML
BH CV ECHO MEAS - EF(MOD-BP): 60.4 %
BH CV ECHO MEAS - EF(MOD-SP2): 58 %
BH CV ECHO MEAS - EF(MOD-SP4): 66.2 %
BH CV ECHO MEAS - ESV(CUBED): 40.1 ML
BH CV ECHO MEAS - ESV(MOD-SP2): 42 ML
BH CV ECHO MEAS - ESV(MOD-SP4): 24 ML
BH CV ECHO MEAS - FS: 18 %
BH CV ECHO MEAS - IVS/LVPW: 1.11 CM
BH CV ECHO MEAS - IVSD: 1.1 CM
BH CV ECHO MEAS - LAT PEAK E' VEL: 12.4 CM/SEC
BH CV ECHO MEAS - LV MASS(C)D: 144.5 GRAMS
BH CV ECHO MEAS - LV MAX PG: 2.8 MMHG
BH CV ECHO MEAS - LV MEAN PG: 1 MMHG
BH CV ECHO MEAS - LV V1 MAX: 84.4 CM/SEC
BH CV ECHO MEAS - LV V1 VTI: 15.7 CM
BH CV ECHO MEAS - LVIDD: 4.2 CM
BH CV ECHO MEAS - LVIDS: 3.4 CM
BH CV ECHO MEAS - LVOT AREA: 2.8 CM2
BH CV ECHO MEAS - LVOT DIAM: 1.87 CM
BH CV ECHO MEAS - LVPWD: 0.99 CM
BH CV ECHO MEAS - MED PEAK E' VEL: 9 CM/SEC
BH CV ECHO MEAS - MR MAX PG: 73.7 MMHG
BH CV ECHO MEAS - MR MAX VEL: 429.4 CM/SEC
BH CV ECHO MEAS - MR MEAN PG: 50.5 MMHG
BH CV ECHO MEAS - MR MEAN VEL: 340.5 CM/SEC
BH CV ECHO MEAS - MR VTI: 138.5 CM
BH CV ECHO MEAS - MV DEC SLOPE: 518.9 CM/SEC2
BH CV ECHO MEAS - MV DEC TIME: 0.18 SEC
BH CV ECHO MEAS - MV E MAX VEL: 103.7 CM/SEC
BH CV ECHO MEAS - MV MAX PG: 6.1 MMHG
BH CV ECHO MEAS - MV MEAN PG: 1.75 MMHG
BH CV ECHO MEAS - MV P1/2T: 78.1 MSEC
BH CV ECHO MEAS - MV V2 VTI: 24.3 CM
BH CV ECHO MEAS - MVA(P1/2T): 2.8 CM2
BH CV ECHO MEAS - MVA(VTI): 1.78 CM2
BH CV ECHO MEAS - PA V2 MAX: 103.2 CM/SEC
BH CV ECHO MEAS - PULM DIAS VEL: 36.9 CM/SEC
BH CV ECHO MEAS - PULM S/D: 0.75
BH CV ECHO MEAS - PULM SYS VEL: 27.6 CM/SEC
BH CV ECHO MEAS - QP/QS: 1.35
BH CV ECHO MEAS - RAP SYSTOLE: 3 MMHG
BH CV ECHO MEAS - RV MAX PG: 2.12 MMHG
BH CV ECHO MEAS - RV V1 MAX: 72.8 CM/SEC
BH CV ECHO MEAS - RV V1 VTI: 13.7 CM
BH CV ECHO MEAS - RVOT DIAM: 2.33 CM
BH CV ECHO MEAS - RVSP: 33 MMHG
BH CV ECHO MEAS - SV(LVOT): 43.3 ML
BH CV ECHO MEAS - SV(MOD-SP2): 58 ML
BH CV ECHO MEAS - SV(MOD-SP4): 47 ML
BH CV ECHO MEAS - SV(RVOT): 58.4 ML
BH CV ECHO MEAS - TR MAX PG: 30.4 MMHG
BH CV ECHO MEAS - TR MAX VEL: 275.5 CM/SEC
BH CV ECHO MEASUREMENTS AVERAGE E/E' RATIO: 9.69
BH CV XLRA - RV BASE: 3.2 CM
BH CV XLRA - RV LENGTH: 7 CM
BH CV XLRA - RV MID: 2.6 CM
BH CV XLRA - TDI S': 10 CM/SEC
BILIRUB SERPL-MCNC: 0.5 MG/DL (ref 0–1.2)
BILIRUB SERPL-MCNC: 0.6 MG/DL (ref 0–1.2)
BILIRUB UR QL STRIP: NEGATIVE
BUN BLDA-MCNC: 75 MG/DL
BUN SERPL-MCNC: 47 MG/DL (ref 8–23)
BUN SERPL-MCNC: 53 MG/DL (ref 8–23)
BUN SERPL-MCNC: 54 MG/DL (ref 8–23)
BUN SERPL-MCNC: 58 MG/DL (ref 8–23)
BUN SERPL-MCNC: 58 MG/DL (ref 8–23)
BUN SERPL-MCNC: 59 MG/DL (ref 8–23)
BUN SERPL-MCNC: 63 MG/DL (ref 8–23)
BUN SERPL-MCNC: 64 MG/DL (ref 8–23)
BUN SERPL-MCNC: 64 MG/DL (ref 8–23)
BUN SERPL-MCNC: 71 MG/DL (ref 8–23)
BUN SERPL-MCNC: 73 MG/DL (ref 8–23)
BUN SERPL-MCNC: 74 MG/DL (ref 8–23)
BUN SERPL-MCNC: 75 MG/DL (ref 8–23)
BUN SERPL-MCNC: 76 MG/DL (ref 8–23)
BUN/CREAT SERPL: 25.1 (ref 7–25)
BUN/CREAT SERPL: 28.7 (ref 7–25)
BUN/CREAT SERPL: 28.8 (ref 7–25)
BUN/CREAT SERPL: 29.1 (ref 7–25)
BUN/CREAT SERPL: 32.1 (ref 7–25)
BUN/CREAT SERPL: 32.9 (ref 7–25)
BUN/CREAT SERPL: 33.3 (ref 7–25)
BUN/CREAT SERPL: 33.6 (ref 7–25)
BUN/CREAT SERPL: 33.6 (ref 7–25)
BUN/CREAT SERPL: 33.9 (ref 7–25)
BUN/CREAT SERPL: 35.4 (ref 7–25)
BUN/CREAT SERPL: 35.8 (ref 7–25)
BUN/CREAT SERPL: 37.4 (ref 7–25)
BUN/CREAT SERPL: 38.2 (ref 7–25)
C PNEUM DNA NPH QL NAA+NON-PROBE: NOT DETECTED
C PNEUM DNA NPH QL NAA+NON-PROBE: NOT DETECTED
CA-I BLDA-SCNC: 1.14 MMOL/L (ref 2.2–2.55)
CALCIUM SPEC-SCNC: 8.6 MG/DL (ref 8.6–10.5)
CALCIUM SPEC-SCNC: 8.6 MG/DL (ref 8.6–10.5)
CALCIUM SPEC-SCNC: 8.8 MG/DL (ref 8.6–10.5)
CALCIUM SPEC-SCNC: 9 MG/DL (ref 8.6–10.5)
CALCIUM SPEC-SCNC: 9 MG/DL (ref 8.6–10.5)
CALCIUM SPEC-SCNC: 9.1 MG/DL (ref 8.6–10.5)
CALCIUM SPEC-SCNC: 9.2 MG/DL (ref 8.6–10.5)
CALCIUM SPEC-SCNC: 9.3 MG/DL (ref 8.6–10.5)
CALCIUM SPEC-SCNC: 9.4 MG/DL (ref 8.6–10.5)
CALCIUM SPEC-SCNC: 9.5 MG/DL (ref 8.6–10.5)
CHLORIDE BLDA-SCNC: 95 MMOL/L (ref 98–107)
CHLORIDE SERPL-SCNC: 100 MMOL/L (ref 98–107)
CHLORIDE SERPL-SCNC: 101 MMOL/L (ref 98–107)
CHLORIDE SERPL-SCNC: 101 MMOL/L (ref 98–107)
CHLORIDE SERPL-SCNC: 102 MMOL/L (ref 98–107)
CHLORIDE SERPL-SCNC: 102 MMOL/L (ref 98–107)
CHLORIDE SERPL-SCNC: 103 MMOL/L (ref 98–107)
CHLORIDE SERPL-SCNC: 103 MMOL/L (ref 98–107)
CHLORIDE SERPL-SCNC: 104 MMOL/L (ref 98–107)
CHLORIDE SERPL-SCNC: 105 MMOL/L (ref 98–107)
CHLORIDE SERPL-SCNC: 106 MMOL/L (ref 98–107)
CHLORIDE SERPL-SCNC: 107 MMOL/L (ref 98–107)
CHLORIDE SERPL-SCNC: 108 MMOL/L (ref 98–107)
CHLORIDE SERPL-SCNC: 108 MMOL/L (ref 98–107)
CHLORIDE SERPL-SCNC: 97 MMOL/L (ref 98–107)
CHOLEST SERPL-MCNC: 146 MG/DL (ref 0–200)
CLARITY UR: CLEAR
CO2 BLDA-SCNC: 29.6 MMOL/L (ref 23–27)
CO2 BLDA-SCNC: 31.2 MMOL/L (ref 23–27)
CO2 SERPL-SCNC: 22.5 MMOL/L (ref 22–29)
CO2 SERPL-SCNC: 23 MMOL/L (ref 22–29)
CO2 SERPL-SCNC: 23 MMOL/L (ref 22–29)
CO2 SERPL-SCNC: 23.5 MMOL/L (ref 22–29)
CO2 SERPL-SCNC: 24.2 MMOL/L (ref 22–29)
CO2 SERPL-SCNC: 25.8 MMOL/L (ref 22–29)
CO2 SERPL-SCNC: 26.5 MMOL/L (ref 22–29)
CO2 SERPL-SCNC: 27 MMOL/L (ref 22–29)
CO2 SERPL-SCNC: 27.3 MMOL/L (ref 22–29)
CO2 SERPL-SCNC: 27.5 MMOL/L (ref 22–29)
CO2 SERPL-SCNC: 28 MMOL/L (ref 22–29)
CO2 SERPL-SCNC: 28.1 MMOL/L (ref 22–29)
CO2 SERPL-SCNC: 29 MMOL/L (ref 22–29)
CO2 SERPL-SCNC: 31.4 MMOL/L (ref 22–29)
COLOR UR: YELLOW
CREAT BLDA-MCNC: 1.71 MG/DL (ref 0.6–130)
CREAT SERPL-MCNC: 1.55 MG/DL (ref 0.57–1)
CREAT SERPL-MCNC: 1.59 MG/DL (ref 0.57–1)
CREAT SERPL-MCNC: 1.63 MG/DL (ref 0.57–1)
CREAT SERPL-MCNC: 1.64 MG/DL (ref 0.57–1)
CREAT SERPL-MCNC: 1.74 MG/DL (ref 0.57–1)
CREAT SERPL-MCNC: 1.79 MG/DL (ref 0.57–1)
CREAT SERPL-MCNC: 1.81 MG/DL (ref 0.57–1)
CREAT SERPL-MCNC: 1.91 MG/DL (ref 0.57–1)
CREAT SERPL-MCNC: 1.99 MG/DL (ref 0.57–1)
CREAT SERPL-MCNC: 2.2 MG/DL (ref 0.57–1)
CREAT SERPL-MCNC: 2.26 MG/DL (ref 0.57–1)
CREAT SERPL-MCNC: 2.34 MG/DL (ref 0.57–1)
CREAT SERPL-MCNC: 2.47 MG/DL (ref 0.57–1)
CREAT SERPL-MCNC: 2.51 MG/DL (ref 0.57–1)
D-LACTATE SERPL-SCNC: 0.9 MMOL/L (ref 0.5–2)
D-LACTATE SERPL-SCNC: 1.3 MMOL/L (ref 0.5–2)
DEPRECATED RDW RBC AUTO: 50.6 FL (ref 37–54)
DEPRECATED RDW RBC AUTO: 51.6 FL (ref 37–54)
DEPRECATED RDW RBC AUTO: 51.8 FL (ref 37–54)
DEPRECATED RDW RBC AUTO: 52.3 FL (ref 37–54)
DEPRECATED RDW RBC AUTO: 52.5 FL (ref 37–54)
DEPRECATED RDW RBC AUTO: 52.7 FL (ref 37–54)
DEPRECATED RDW RBC AUTO: 52.7 FL (ref 37–54)
DEPRECATED RDW RBC AUTO: 53 FL (ref 37–54)
DEPRECATED RDW RBC AUTO: 53.5 FL (ref 37–54)
DEPRECATED RDW RBC AUTO: 53.8 FL (ref 37–54)
DEPRECATED RDW RBC AUTO: 54 FL (ref 37–54)
DEPRECATED RDW RBC AUTO: 54.3 FL (ref 37–54)
DEPRECATED RDW RBC AUTO: 54.6 FL (ref 37–54)
DEVICE COMMENT: ABNORMAL
DEVICE COMMENT: NORMAL
EGFRCR SERPLBLD CKD-EPI 2021: 18.8 ML/MIN/1.73
EGFRCR SERPLBLD CKD-EPI 2021: 19.2 ML/MIN/1.73
EGFRCR SERPLBLD CKD-EPI 2021: 20.4 ML/MIN/1.73
EGFRCR SERPLBLD CKD-EPI 2021: 21.3 ML/MIN/1.73
EGFRCR SERPLBLD CKD-EPI 2021: 22 ML/MIN/1.73
EGFRCR SERPLBLD CKD-EPI 2021: 24.8 ML/MIN/1.73
EGFRCR SERPLBLD CKD-EPI 2021: 26.1 ML/MIN/1.73
EGFRCR SERPLBLD CKD-EPI 2021: 27.8 ML/MIN/1.73
EGFRCR SERPLBLD CKD-EPI 2021: 28.2 ML/MIN/1.73
EGFRCR SERPLBLD CKD-EPI 2021: 29.2 ML/MIN/1.73
EGFRCR SERPLBLD CKD-EPI 2021: 31.3 ML/MIN/1.73
EGFRCR SERPLBLD CKD-EPI 2021: 31.6 ML/MIN/1.73
EGFRCR SERPLBLD CKD-EPI 2021: 32.5 ML/MIN/1.73
EGFRCR SERPLBLD CKD-EPI 2021: 33.5 ML/MIN/1.73
EOSINOPHIL # BLD AUTO: 0 10*3/MM3 (ref 0–0.4)
EOSINOPHIL # BLD AUTO: 0.01 10*3/MM3 (ref 0–0.4)
EOSINOPHIL # BLD AUTO: 0.02 10*3/MM3 (ref 0–0.4)
EOSINOPHIL # BLD AUTO: 0.03 10*3/MM3 (ref 0–0.4)
EOSINOPHIL NFR BLD AUTO: 0 % (ref 0.3–6.2)
EOSINOPHIL NFR BLD AUTO: 0.1 % (ref 0.3–6.2)
EOSINOPHIL NFR BLD AUTO: 0.2 % (ref 0.3–6.2)
EOSINOPHIL NFR BLD AUTO: 0.3 % (ref 0.3–6.2)
ERYTHROCYTE [DISTWIDTH] IN BLOOD BY AUTOMATED COUNT: 14.4 % (ref 12.3–15.4)
ERYTHROCYTE [DISTWIDTH] IN BLOOD BY AUTOMATED COUNT: 14.4 % (ref 12.3–15.4)
ERYTHROCYTE [DISTWIDTH] IN BLOOD BY AUTOMATED COUNT: 14.5 % (ref 12.3–15.4)
ERYTHROCYTE [DISTWIDTH] IN BLOOD BY AUTOMATED COUNT: 14.5 % (ref 12.3–15.4)
ERYTHROCYTE [DISTWIDTH] IN BLOOD BY AUTOMATED COUNT: 14.6 % (ref 12.3–15.4)
ERYTHROCYTE [DISTWIDTH] IN BLOOD BY AUTOMATED COUNT: 14.7 % (ref 12.3–15.4)
ERYTHROCYTE [DISTWIDTH] IN BLOOD BY AUTOMATED COUNT: 14.8 % (ref 12.3–15.4)
ERYTHROCYTE [DISTWIDTH] IN BLOOD BY AUTOMATED COUNT: 14.9 % (ref 12.3–15.4)
ERYTHROCYTE [DISTWIDTH] IN BLOOD BY AUTOMATED COUNT: 14.9 % (ref 12.3–15.4)
ERYTHROCYTE [DISTWIDTH] IN BLOOD BY AUTOMATED COUNT: 15.1 % (ref 12.3–15.4)
ERYTHROCYTE [DISTWIDTH] IN BLOOD BY AUTOMATED COUNT: 15.4 % (ref 12.3–15.4)
FLUAV SUBTYP SPEC NAA+PROBE: NOT DETECTED
FLUAV SUBTYP SPEC NAA+PROBE: NOT DETECTED
FLUBV RNA ISLT QL NAA+PROBE: NOT DETECTED
FLUBV RNA ISLT QL NAA+PROBE: NOT DETECTED
GAS FLOW AIRWAY: 3 LPM
GAS FLOW AIRWAY: 8 LPM
GEN 5 2HR TROPONIN T REFLEX: 27 NG/L
GEN 5 2HR TROPONIN T REFLEX: 29 NG/L
GEN 5 2HR TROPONIN T REFLEX: 29 NG/L
GLOBULIN UR ELPH-MCNC: 2.4 GM/DL
GLOBULIN UR ELPH-MCNC: 2.4 GM/DL
GLUCOSE BLDC GLUCOMTR-MCNC: 104 MG/DL (ref 70–130)
GLUCOSE BLDC GLUCOMTR-MCNC: 106 MG/DL (ref 70–130)
GLUCOSE BLDC GLUCOMTR-MCNC: 123 MG/DL (ref 70–130)
GLUCOSE BLDC GLUCOMTR-MCNC: 169 MG/DL (ref 70–130)
GLUCOSE BLDC GLUCOMTR-MCNC: 175 MG/DL (ref 70–130)
GLUCOSE SERPL-MCNC: 101 MG/DL (ref 65–99)
GLUCOSE SERPL-MCNC: 104 MG/DL (ref 65–99)
GLUCOSE SERPL-MCNC: 108 MG/DL (ref 65–99)
GLUCOSE SERPL-MCNC: 136 MG/DL (ref 65–99)
GLUCOSE SERPL-MCNC: 139 MG/DL (ref 65–99)
GLUCOSE SERPL-MCNC: 141 MG/DL (ref 65–99)
GLUCOSE SERPL-MCNC: 143 MG/DL (ref 65–99)
GLUCOSE SERPL-MCNC: 147 MG/DL (ref 65–99)
GLUCOSE SERPL-MCNC: 81 MG/DL (ref 65–99)
GLUCOSE SERPL-MCNC: 88 MG/DL (ref 65–99)
GLUCOSE SERPL-MCNC: 92 MG/DL (ref 65–99)
GLUCOSE SERPL-MCNC: 94 MG/DL (ref 65–99)
GLUCOSE SERPL-MCNC: 96 MG/DL (ref 65–99)
GLUCOSE SERPL-MCNC: 97 MG/DL (ref 65–99)
GLUCOSE UR STRIP-MCNC: NEGATIVE MG/DL
HADV DNA SPEC NAA+PROBE: NOT DETECTED
HADV DNA SPEC NAA+PROBE: NOT DETECTED
HBA1C MFR BLD: 5.7 % (ref 4.8–5.6)
HCO3 BLDA-SCNC: 27.8 MMOL/L (ref 22–28)
HCO3 BLDV-SCNC: 31.1 MMOL/L (ref 22–28)
HCOV 229E RNA SPEC QL NAA+PROBE: NOT DETECTED
HCOV 229E RNA SPEC QL NAA+PROBE: NOT DETECTED
HCOV HKU1 RNA SPEC QL NAA+PROBE: NOT DETECTED
HCOV HKU1 RNA SPEC QL NAA+PROBE: NOT DETECTED
HCOV NL63 RNA SPEC QL NAA+PROBE: NOT DETECTED
HCOV NL63 RNA SPEC QL NAA+PROBE: NOT DETECTED
HCOV OC43 RNA SPEC QL NAA+PROBE: NOT DETECTED
HCOV OC43 RNA SPEC QL NAA+PROBE: NOT DETECTED
HCT VFR BLD AUTO: 26.9 % (ref 34–46.6)
HCT VFR BLD AUTO: 27 % (ref 34–46.6)
HCT VFR BLD AUTO: 27.2 % (ref 34–46.6)
HCT VFR BLD AUTO: 27.8 % (ref 34–46.6)
HCT VFR BLD AUTO: 27.9 % (ref 34–46.6)
HCT VFR BLD AUTO: 28.8 % (ref 34–46.6)
HCT VFR BLD AUTO: 29 % (ref 34–46.6)
HCT VFR BLD AUTO: 29.1 % (ref 34–46.6)
HCT VFR BLD AUTO: 29.1 % (ref 34–46.6)
HCT VFR BLD AUTO: 29.4 % (ref 34–46.6)
HCT VFR BLD AUTO: 29.5 % (ref 34–46.6)
HCT VFR BLD AUTO: 29.8 % (ref 34–46.6)
HCT VFR BLD AUTO: 29.9 % (ref 34–46.6)
HDLC SERPL-MCNC: 104 MG/DL (ref 40–60)
HEMODILUTION: NO
HGB BLD-MCNC: 8.7 G/DL (ref 12–15.9)
HGB BLD-MCNC: 8.9 G/DL (ref 12–15.9)
HGB BLD-MCNC: 8.9 G/DL (ref 12–15.9)
HGB BLD-MCNC: 9 G/DL (ref 12–15.9)
HGB BLD-MCNC: 9.1 G/DL (ref 12–15.9)
HGB BLD-MCNC: 9.2 G/DL (ref 12–15.9)
HGB BLD-MCNC: 9.3 G/DL (ref 12–15.9)
HGB BLD-MCNC: 9.4 G/DL (ref 12–15.9)
HGB BLD-MCNC: 9.4 G/DL (ref 12–15.9)
HGB BLD-MCNC: 9.6 G/DL (ref 12–15.9)
HGB BLD-MCNC: 9.8 G/DL (ref 12–15.9)
HGB UR QL STRIP.AUTO: NEGATIVE
HMPV RNA NPH QL NAA+NON-PROBE: NOT DETECTED
HMPV RNA NPH QL NAA+NON-PROBE: NOT DETECTED
HPIV1 RNA ISLT QL NAA+PROBE: NOT DETECTED
HPIV1 RNA ISLT QL NAA+PROBE: NOT DETECTED
HPIV2 RNA SPEC QL NAA+PROBE: NOT DETECTED
HPIV2 RNA SPEC QL NAA+PROBE: NOT DETECTED
HPIV3 RNA NPH QL NAA+PROBE: NOT DETECTED
HPIV3 RNA NPH QL NAA+PROBE: NOT DETECTED
HPIV4 P GENE NPH QL NAA+PROBE: NOT DETECTED
HPIV4 P GENE NPH QL NAA+PROBE: NOT DETECTED
IMM GRANULOCYTES # BLD AUTO: 0.05 10*3/MM3 (ref 0–0.05)
IMM GRANULOCYTES # BLD AUTO: 0.05 10*3/MM3 (ref 0–0.05)
IMM GRANULOCYTES # BLD AUTO: 0.06 10*3/MM3 (ref 0–0.05)
IMM GRANULOCYTES # BLD AUTO: 0.06 10*3/MM3 (ref 0–0.05)
IMM GRANULOCYTES # BLD AUTO: 0.07 10*3/MM3 (ref 0–0.05)
IMM GRANULOCYTES # BLD AUTO: 0.08 10*3/MM3 (ref 0–0.05)
IMM GRANULOCYTES # BLD AUTO: 0.1 10*3/MM3 (ref 0–0.05)
IMM GRANULOCYTES # BLD AUTO: 0.13 10*3/MM3 (ref 0–0.05)
IMM GRANULOCYTES # BLD AUTO: 0.16 10*3/MM3 (ref 0–0.05)
IMM GRANULOCYTES # BLD AUTO: 0.16 10*3/MM3 (ref 0–0.05)
IMM GRANULOCYTES # BLD AUTO: 0.24 10*3/MM3 (ref 0–0.05)
IMM GRANULOCYTES NFR BLD AUTO: 0.6 % (ref 0–0.5)
IMM GRANULOCYTES NFR BLD AUTO: 0.7 % (ref 0–0.5)
IMM GRANULOCYTES NFR BLD AUTO: 0.7 % (ref 0–0.5)
IMM GRANULOCYTES NFR BLD AUTO: 0.9 % (ref 0–0.5)
IMM GRANULOCYTES NFR BLD AUTO: 1 % (ref 0–0.5)
IMM GRANULOCYTES NFR BLD AUTO: 1.1 % (ref 0–0.5)
IMM GRANULOCYTES NFR BLD AUTO: 1.4 % (ref 0–0.5)
IMM GRANULOCYTES NFR BLD AUTO: 1.8 % (ref 0–0.5)
IMM GRANULOCYTES NFR BLD AUTO: 2.1 % (ref 0–0.5)
INHALED O2 CONCENTRATION: 32 %
INR PPP: 0.96 (ref 0.9–1.1)
KETONES UR QL STRIP: NEGATIVE
LDLC SERPL CALC-MCNC: 28 MG/DL (ref 0–100)
LDLC/HDLC SERPL: 0.27 {RATIO}
LEFT ATRIUM VOLUME INDEX: 37.2 ML/M2
LEUKOCYTE ESTERASE UR QL STRIP.AUTO: NEGATIVE
LYMPHOCYTES # BLD AUTO: 0.44 10*3/MM3 (ref 0.7–3.1)
LYMPHOCYTES # BLD AUTO: 0.51 10*3/MM3 (ref 0.7–3.1)
LYMPHOCYTES # BLD AUTO: 0.54 10*3/MM3 (ref 0.7–3.1)
LYMPHOCYTES # BLD AUTO: 0.58 10*3/MM3 (ref 0.7–3.1)
LYMPHOCYTES # BLD AUTO: 0.69 10*3/MM3 (ref 0.7–3.1)
LYMPHOCYTES # BLD AUTO: 0.7 10*3/MM3 (ref 0.7–3.1)
LYMPHOCYTES # BLD AUTO: 0.98 10*3/MM3 (ref 0.7–3.1)
LYMPHOCYTES # BLD AUTO: 1.05 10*3/MM3 (ref 0.7–3.1)
LYMPHOCYTES # BLD AUTO: 1.21 10*3/MM3 (ref 0.7–3.1)
LYMPHOCYTES # BLD AUTO: 1.3 10*3/MM3 (ref 0.7–3.1)
LYMPHOCYTES # BLD AUTO: 1.3 10*3/MM3 (ref 0.7–3.1)
LYMPHOCYTES # BLD AUTO: 1.32 10*3/MM3 (ref 0.7–3.1)
LYMPHOCYTES # BLD AUTO: 1.57 10*3/MM3 (ref 0.7–3.1)
LYMPHOCYTES NFR BLD AUTO: 12.3 % (ref 19.6–45.3)
LYMPHOCYTES NFR BLD AUTO: 13 % (ref 19.6–45.3)
LYMPHOCYTES NFR BLD AUTO: 13.2 % (ref 19.6–45.3)
LYMPHOCYTES NFR BLD AUTO: 13.8 % (ref 19.6–45.3)
LYMPHOCYTES NFR BLD AUTO: 14.3 % (ref 19.6–45.3)
LYMPHOCYTES NFR BLD AUTO: 14.7 % (ref 19.6–45.3)
LYMPHOCYTES NFR BLD AUTO: 18.3 % (ref 19.6–45.3)
LYMPHOCYTES NFR BLD AUTO: 19.6 % (ref 19.6–45.3)
LYMPHOCYTES NFR BLD AUTO: 3.4 % (ref 19.6–45.3)
LYMPHOCYTES NFR BLD AUTO: 3.5 % (ref 19.6–45.3)
LYMPHOCYTES NFR BLD AUTO: 5.2 % (ref 19.6–45.3)
LYMPHOCYTES NFR BLD AUTO: 6.8 % (ref 19.6–45.3)
LYMPHOCYTES NFR BLD AUTO: 7.9 % (ref 19.6–45.3)
Lab: ABNORMAL
M PNEUMO IGG SER IA-ACNC: NOT DETECTED
M PNEUMO IGG SER IA-ACNC: NOT DETECTED
MAGNESIUM SERPL-MCNC: 1.5 MG/DL (ref 1.6–2.4)
MAGNESIUM SERPL-MCNC: 2 MG/DL (ref 1.6–2.4)
MAGNESIUM SERPL-MCNC: 2.1 MG/DL (ref 1.6–2.4)
MAGNESIUM SERPL-MCNC: 2.2 MG/DL (ref 1.6–2.4)
MCH RBC QN AUTO: 31.5 PG (ref 26.6–33)
MCH RBC QN AUTO: 31.7 PG (ref 26.6–33)
MCH RBC QN AUTO: 31.8 PG (ref 26.6–33)
MCH RBC QN AUTO: 31.9 PG (ref 26.6–33)
MCH RBC QN AUTO: 32 PG (ref 26.6–33)
MCH RBC QN AUTO: 32 PG (ref 26.6–33)
MCH RBC QN AUTO: 32.1 PG (ref 26.6–33)
MCH RBC QN AUTO: 32.1 PG (ref 26.6–33)
MCH RBC QN AUTO: 32.4 PG (ref 26.6–33)
MCH RBC QN AUTO: 32.5 PG (ref 26.6–33)
MCH RBC QN AUTO: 32.5 PG (ref 26.6–33)
MCHC RBC AUTO-ENTMCNC: 31.6 G/DL (ref 31.5–35.7)
MCHC RBC AUTO-ENTMCNC: 31.6 G/DL (ref 31.5–35.7)
MCHC RBC AUTO-ENTMCNC: 31.9 G/DL (ref 31.5–35.7)
MCHC RBC AUTO-ENTMCNC: 32.1 G/DL (ref 31.5–35.7)
MCHC RBC AUTO-ENTMCNC: 32.2 G/DL (ref 31.5–35.7)
MCHC RBC AUTO-ENTMCNC: 32.2 G/DL (ref 31.5–35.7)
MCHC RBC AUTO-ENTMCNC: 32.3 G/DL (ref 31.5–35.7)
MCHC RBC AUTO-ENTMCNC: 32.7 G/DL (ref 31.5–35.7)
MCHC RBC AUTO-ENTMCNC: 32.7 G/DL (ref 31.5–35.7)
MCHC RBC AUTO-ENTMCNC: 32.8 G/DL (ref 31.5–35.7)
MCHC RBC AUTO-ENTMCNC: 33.1 G/DL (ref 31.5–35.7)
MCV RBC AUTO: 100.7 FL (ref 79–97)
MCV RBC AUTO: 101 FL (ref 79–97)
MCV RBC AUTO: 96.9 FL (ref 79–97)
MCV RBC AUTO: 97.7 FL (ref 79–97)
MCV RBC AUTO: 98.2 FL (ref 79–97)
MCV RBC AUTO: 98.3 FL (ref 79–97)
MCV RBC AUTO: 98.9 FL (ref 79–97)
MCV RBC AUTO: 98.9 FL (ref 79–97)
MCV RBC AUTO: 99 FL (ref 79–97)
MCV RBC AUTO: 99.3 FL (ref 79–97)
MCV RBC AUTO: 99.6 FL (ref 79–97)
MCV RBC AUTO: 99.7 FL (ref 79–97)
MCV RBC AUTO: 99.7 FL (ref 79–97)
MODALITY: ABNORMAL
MODALITY: ABNORMAL
MONOCYTES # BLD AUTO: 0.14 10*3/MM3 (ref 0.1–0.9)
MONOCYTES # BLD AUTO: 0.26 10*3/MM3 (ref 0.1–0.9)
MONOCYTES # BLD AUTO: 0.3 10*3/MM3 (ref 0.1–0.9)
MONOCYTES # BLD AUTO: 0.36 10*3/MM3 (ref 0.1–0.9)
MONOCYTES # BLD AUTO: 0.5 10*3/MM3 (ref 0.1–0.9)
MONOCYTES # BLD AUTO: 0.56 10*3/MM3 (ref 0.1–0.9)
MONOCYTES # BLD AUTO: 0.64 10*3/MM3 (ref 0.1–0.9)
MONOCYTES # BLD AUTO: 0.64 10*3/MM3 (ref 0.1–0.9)
MONOCYTES # BLD AUTO: 0.83 10*3/MM3 (ref 0.1–0.9)
MONOCYTES # BLD AUTO: 0.84 10*3/MM3 (ref 0.1–0.9)
MONOCYTES # BLD AUTO: 0.86 10*3/MM3 (ref 0.1–0.9)
MONOCYTES # BLD AUTO: 0.87 10*3/MM3 (ref 0.1–0.9)
MONOCYTES # BLD AUTO: 0.88 10*3/MM3 (ref 0.1–0.9)
MONOCYTES NFR BLD AUTO: 1.9 % (ref 5–12)
MONOCYTES NFR BLD AUTO: 10.2 % (ref 5–12)
MONOCYTES NFR BLD AUTO: 10.8 % (ref 5–12)
MONOCYTES NFR BLD AUTO: 2.3 % (ref 5–12)
MONOCYTES NFR BLD AUTO: 2.8 % (ref 5–12)
MONOCYTES NFR BLD AUTO: 3.5 % (ref 5–12)
MONOCYTES NFR BLD AUTO: 5.3 % (ref 5–12)
MONOCYTES NFR BLD AUTO: 7 % (ref 5–12)
MONOCYTES NFR BLD AUTO: 8.3 % (ref 5–12)
MONOCYTES NFR BLD AUTO: 8.7 % (ref 5–12)
MONOCYTES NFR BLD AUTO: 8.8 % (ref 5–12)
MONOCYTES NFR BLD AUTO: 9.6 % (ref 5–12)
MONOCYTES NFR BLD AUTO: 9.6 % (ref 5–12)
NEUTROPHILS NFR BLD AUTO: 11.76 10*3/MM3 (ref 1.7–7)
NEUTROPHILS NFR BLD AUTO: 12.06 10*3/MM3 (ref 1.7–7)
NEUTROPHILS NFR BLD AUTO: 14.19 10*3/MM3 (ref 1.7–7)
NEUTROPHILS NFR BLD AUTO: 4.42 10*3/MM3 (ref 1.7–7)
NEUTROPHILS NFR BLD AUTO: 4.76 10*3/MM3 (ref 1.7–7)
NEUTROPHILS NFR BLD AUTO: 4.99 10*3/MM3 (ref 1.7–7)
NEUTROPHILS NFR BLD AUTO: 6.06 10*3/MM3 (ref 1.7–7)
NEUTROPHILS NFR BLD AUTO: 6.08 10*3/MM3 (ref 1.7–7)
NEUTROPHILS NFR BLD AUTO: 6.47 10*3/MM3 (ref 1.7–7)
NEUTROPHILS NFR BLD AUTO: 6.62 10*3/MM3 (ref 1.7–7)
NEUTROPHILS NFR BLD AUTO: 6.73 10*3/MM3 (ref 1.7–7)
NEUTROPHILS NFR BLD AUTO: 6.99 10*3/MM3 (ref 1.7–7)
NEUTROPHILS NFR BLD AUTO: 7.59 10*3/MM3 (ref 1.7–7)
NEUTROPHILS NFR BLD AUTO: 70.7 % (ref 42.7–76)
NEUTROPHILS NFR BLD AUTO: 70.9 % (ref 42.7–76)
NEUTROPHILS NFR BLD AUTO: 74.8 % (ref 42.7–76)
NEUTROPHILS NFR BLD AUTO: 75.3 % (ref 42.7–76)
NEUTROPHILS NFR BLD AUTO: 75.4 % (ref 42.7–76)
NEUTROPHILS NFR BLD AUTO: 76.8 % (ref 42.7–76)
NEUTROPHILS NFR BLD AUTO: 77.1 % (ref 42.7–76)
NEUTROPHILS NFR BLD AUTO: 77.8 % (ref 42.7–76)
NEUTROPHILS NFR BLD AUTO: 87.7 % (ref 42.7–76)
NEUTROPHILS NFR BLD AUTO: 89.1 % (ref 42.7–76)
NEUTROPHILS NFR BLD AUTO: 90.2 % (ref 42.7–76)
NEUTROPHILS NFR BLD AUTO: 90.6 % (ref 42.7–76)
NEUTROPHILS NFR BLD AUTO: 92.7 % (ref 42.7–76)
NITRITE UR QL STRIP: NEGATIVE
NOTIFIED WHO: ABNORMAL
NRBC BLD AUTO-RTO: 0 /100 WBC (ref 0–0.2)
NRBC BLD AUTO-RTO: 0.1 /100 WBC (ref 0–0.2)
NRBC BLD AUTO-RTO: 0.1 /100 WBC (ref 0–0.2)
NRBC BLD AUTO-RTO: 0.2 /100 WBC (ref 0–0.2)
NRBC BLD AUTO-RTO: 0.3 /100 WBC (ref 0–0.2)
NT-PROBNP SERPL-MCNC: 9483 PG/ML (ref 0–1800)
NT-PROBNP SERPL-MCNC: ABNORMAL PG/ML (ref 0–1800)
PCO2 BLDA: 57.4 MM HG (ref 35–45)
PCO2 BLDV: 56.2 MM HG (ref 41–51)
PH BLDA: 7.29 PH UNITS (ref 7.35–7.45)
PH BLDV: 7.35 PH UNITS (ref 7.31–7.41)
PH UR STRIP.AUTO: 5.5 [PH] (ref 5–8)
PHOSPHATE SERPL-MCNC: 2.4 MG/DL (ref 2.5–4.5)
PHOSPHATE SERPL-MCNC: 2.9 MG/DL (ref 2.5–4.5)
PHOSPHATE SERPL-MCNC: 3.7 MG/DL (ref 2.5–4.5)
PHOSPHATE SERPL-MCNC: 4.2 MG/DL (ref 2.5–4.5)
PHOSPHATE SERPL-MCNC: 4.4 MG/DL (ref 2.5–4.5)
PHOSPHATE SERPL-MCNC: 4.7 MG/DL (ref 2.5–4.5)
PHOSPHATE SERPL-MCNC: 5.8 MG/DL (ref 2.5–4.5)
PHOSPHATE SERPL-MCNC: 6.8 MG/DL (ref 2.5–4.5)
PLAT MORPH BLD: NORMAL
PLATELET # BLD AUTO: 150 10*3/MM3 (ref 140–450)
PLATELET # BLD AUTO: 158 10*3/MM3 (ref 140–450)
PLATELET # BLD AUTO: 158 10*3/MM3 (ref 140–450)
PLATELET # BLD AUTO: 163 10*3/MM3 (ref 140–450)
PLATELET # BLD AUTO: 164 10*3/MM3 (ref 140–450)
PLATELET # BLD AUTO: 167 10*3/MM3 (ref 140–450)
PLATELET # BLD AUTO: 169 10*3/MM3 (ref 140–450)
PLATELET # BLD AUTO: 177 10*3/MM3 (ref 140–450)
PLATELET # BLD AUTO: 178 10*3/MM3 (ref 140–450)
PLATELET # BLD AUTO: 181 10*3/MM3 (ref 140–450)
PLATELET # BLD AUTO: 184 10*3/MM3 (ref 140–450)
PLATELET # BLD AUTO: 185 10*3/MM3 (ref 140–450)
PLATELET # BLD AUTO: 202 10*3/MM3 (ref 140–450)
PMV BLD AUTO: 10.3 FL (ref 6–12)
PMV BLD AUTO: 10.3 FL (ref 6–12)
PMV BLD AUTO: 10.4 FL (ref 6–12)
PMV BLD AUTO: 10.5 FL (ref 6–12)
PMV BLD AUTO: 10.6 FL (ref 6–12)
PMV BLD AUTO: 10.6 FL (ref 6–12)
PMV BLD AUTO: 10.7 FL (ref 6–12)
PMV BLD AUTO: 10.8 FL (ref 6–12)
PMV BLD AUTO: 10.8 FL (ref 6–12)
PMV BLD AUTO: 9.9 FL (ref 6–12)
PO2 BLDA: 69.7 MM HG (ref 80–100)
PO2 BLDV: 25.1 MM HG (ref 35–45)
POTASSIUM BLDA-SCNC: 4.8 MMOL/L (ref 3.5–5.2)
POTASSIUM SERPL-SCNC: 4.3 MMOL/L (ref 3.5–5.2)
POTASSIUM SERPL-SCNC: 4.4 MMOL/L (ref 3.5–5.2)
POTASSIUM SERPL-SCNC: 4.4 MMOL/L (ref 3.5–5.2)
POTASSIUM SERPL-SCNC: 4.5 MMOL/L (ref 3.5–5.2)
POTASSIUM SERPL-SCNC: 4.6 MMOL/L (ref 3.5–5.2)
POTASSIUM SERPL-SCNC: 4.6 MMOL/L (ref 3.5–5.2)
POTASSIUM SERPL-SCNC: 4.7 MMOL/L (ref 3.5–5.2)
POTASSIUM SERPL-SCNC: 5.1 MMOL/L (ref 3.5–5.2)
POTASSIUM SERPL-SCNC: 5.5 MMOL/L (ref 3.5–5.2)
POTASSIUM SERPL-SCNC: 5.5 MMOL/L (ref 3.5–5.2)
PROCALCITONIN SERPL-MCNC: 0.12 NG/ML (ref 0–0.25)
PROT SERPL-MCNC: 6.2 G/DL (ref 6–8.5)
PROT SERPL-MCNC: 6.6 G/DL (ref 6–8.5)
PROT UR QL STRIP: NEGATIVE
PROTHROMBIN TIME: 12.9 SECONDS (ref 11.7–14.2)
QT INTERVAL: 298 MS
QT INTERVAL: 328 MS
QT INTERVAL: 333 MS
QT INTERVAL: 375 MS
QTC INTERVAL: 388 MS
QTC INTERVAL: 420 MS
QTC INTERVAL: 432 MS
QTC INTERVAL: 462 MS
RBC # BLD AUTO: 2.73 10*6/MM3 (ref 3.77–5.28)
RBC # BLD AUTO: 2.74 10*6/MM3 (ref 3.77–5.28)
RBC # BLD AUTO: 2.75 10*6/MM3 (ref 3.77–5.28)
RBC # BLD AUTO: 2.8 10*6/MM3 (ref 3.77–5.28)
RBC # BLD AUTO: 2.87 10*6/MM3 (ref 3.77–5.28)
RBC # BLD AUTO: 2.89 10*6/MM3 (ref 3.77–5.28)
RBC # BLD AUTO: 2.9 10*6/MM3 (ref 3.77–5.28)
RBC # BLD AUTO: 2.91 10*6/MM3 (ref 3.77–5.28)
RBC # BLD AUTO: 2.92 10*6/MM3 (ref 3.77–5.28)
RBC # BLD AUTO: 2.93 10*6/MM3 (ref 3.77–5.28)
RBC # BLD AUTO: 2.96 10*6/MM3 (ref 3.77–5.28)
RBC # BLD AUTO: 3.03 10*6/MM3 (ref 3.77–5.28)
RBC # BLD AUTO: 3.06 10*6/MM3 (ref 3.77–5.28)
RBC MORPH BLD: NORMAL
READ BACK: YES
RHINOVIRUS RNA SPEC NAA+PROBE: DETECTED
RHINOVIRUS RNA SPEC NAA+PROBE: NOT DETECTED
RSV RNA NPH QL NAA+NON-PROBE: NOT DETECTED
RSV RNA NPH QL NAA+NON-PROBE: NOT DETECTED
SAO2 % BLDCOA: 91.1 % (ref 92–98.5)
SAO2 % BLDCOV: 41 % (ref 45–75)
SARS-COV-2 RNA NPH QL NAA+NON-PROBE: NOT DETECTED
SARS-COV-2 RNA NPH QL NAA+NON-PROBE: NOT DETECTED
SINUS: 2.7 CM
SODIUM BLD-SCNC: 136 MMOL/L (ref 136–145)
SODIUM SERPL-SCNC: 134 MMOL/L (ref 136–145)
SODIUM SERPL-SCNC: 136 MMOL/L (ref 136–145)
SODIUM SERPL-SCNC: 137 MMOL/L (ref 136–145)
SODIUM SERPL-SCNC: 138 MMOL/L (ref 136–145)
SODIUM SERPL-SCNC: 140 MMOL/L (ref 136–145)
SODIUM SERPL-SCNC: 141 MMOL/L (ref 136–145)
SODIUM SERPL-SCNC: 141 MMOL/L (ref 136–145)
SODIUM SERPL-SCNC: 142 MMOL/L (ref 136–145)
SODIUM SERPL-SCNC: 142 MMOL/L (ref 136–145)
SODIUM SERPL-SCNC: 144 MMOL/L (ref 136–145)
SP GR UR STRIP: 1.01 (ref 1–1.03)
STJ: 2.38 CM
T4 FREE SERPL-MCNC: 1.3 NG/DL (ref 0.93–1.7)
TOTAL RATE: 20 BREATHS/MINUTE
TOTAL RATE: 22 BREATHS/MINUTE
TRIGL SERPL-MCNC: 72 MG/DL (ref 0–150)
TROPONIN T DELTA: -10 NG/L
TROPONIN T DELTA: 1 NG/L
TROPONIN T DELTA: 3 NG/L
TROPONIN T SERPL HS-MCNC: 26 NG/L
TROPONIN T SERPL HS-MCNC: 28 NG/L
TROPONIN T SERPL HS-MCNC: 37 NG/L
TSH SERPL DL<=0.05 MIU/L-ACNC: 0.05 UIU/ML (ref 0.27–4.2)
UROBILINOGEN UR QL STRIP: NORMAL
VENTILATOR MODE: ABNORMAL
VLDLC SERPL-MCNC: 14 MG/DL (ref 5–40)
WBC MORPH BLD: NORMAL
WBC NRBC COR # BLD: 12.68 10*3/MM3 (ref 3.4–10.8)
WBC NRBC COR # BLD: 13.3 10*3/MM3 (ref 3.4–10.8)
WBC NRBC COR # BLD: 15.74 10*3/MM3 (ref 3.4–10.8)
WBC NRBC COR # BLD: 5.68 10*3/MM3 (ref 3.4–10.8)
WBC NRBC COR # BLD: 6.68 10*3/MM3 (ref 3.4–10.8)
WBC NRBC COR # BLD: 6.73 10*3/MM3 (ref 3.4–10.8)
WBC NRBC COR # BLD: 7.38 10*3/MM3 (ref 3.4–10.8)
WBC NRBC COR # BLD: 7.55 10*3/MM3 (ref 3.4–10.8)
WBC NRBC COR # BLD: 8.05 10*3/MM3 (ref 3.4–10.8)
WBC NRBC COR # BLD: 8.6 10*3/MM3 (ref 3.4–10.8)
WBC NRBC COR # BLD: 8.79 10*3/MM3 (ref 3.4–10.8)
WBC NRBC COR # BLD: 9.08 10*3/MM3 (ref 3.4–10.8)
WBC NRBC COR # BLD: 9.88 10*3/MM3 (ref 3.4–10.8)

## 2023-01-01 PROCEDURE — 97530 THERAPEUTIC ACTIVITIES: CPT

## 2023-01-01 PROCEDURE — 99232 SBSQ HOSP IP/OBS MODERATE 35: CPT | Performed by: INTERNAL MEDICINE

## 2023-01-01 PROCEDURE — 80053 COMPREHEN METABOLIC PANEL: CPT | Performed by: HOSPITALIST

## 2023-01-01 PROCEDURE — 85025 COMPLETE CBC W/AUTO DIFF WBC: CPT | Performed by: HOSPITALIST

## 2023-01-01 PROCEDURE — 94664 DEMO&/EVAL PT USE INHALER: CPT

## 2023-01-01 PROCEDURE — 71045 X-RAY EXAM CHEST 1 VIEW: CPT

## 2023-01-01 PROCEDURE — 25010000002 METHYLPREDNISOLONE PER 40 MG: Performed by: INTERNAL MEDICINE

## 2023-01-01 PROCEDURE — 94799 UNLISTED PULMONARY SVC/PX: CPT

## 2023-01-01 PROCEDURE — 82948 REAGENT STRIP/BLOOD GLUCOSE: CPT

## 2023-01-01 PROCEDURE — 85025 COMPLETE CBC W/AUTO DIFF WBC: CPT | Performed by: EMERGENCY MEDICINE

## 2023-01-01 PROCEDURE — 80069 RENAL FUNCTION PANEL: CPT | Performed by: INTERNAL MEDICINE

## 2023-01-01 PROCEDURE — 83880 ASSAY OF NATRIURETIC PEPTIDE: CPT | Performed by: EMERGENCY MEDICINE

## 2023-01-01 PROCEDURE — 82803 BLOOD GASES ANY COMBINATION: CPT

## 2023-01-01 PROCEDURE — 81003 URINALYSIS AUTO W/O SCOPE: CPT | Performed by: EMERGENCY MEDICINE

## 2023-01-01 PROCEDURE — 93005 ELECTROCARDIOGRAM TRACING: CPT | Performed by: HOSPITALIST

## 2023-01-01 PROCEDURE — 84484 ASSAY OF TROPONIN QUANT: CPT | Performed by: HOSPITALIST

## 2023-01-01 PROCEDURE — 63710000001 PREDNISONE PER 1 MG: Performed by: HOSPITALIST

## 2023-01-01 PROCEDURE — 93010 ELECTROCARDIOGRAM REPORT: CPT | Performed by: INTERNAL MEDICINE

## 2023-01-01 PROCEDURE — 84443 ASSAY THYROID STIM HORMONE: CPT | Performed by: HOSPITALIST

## 2023-01-01 PROCEDURE — 97161 PT EVAL LOW COMPLEX 20 MIN: CPT

## 2023-01-01 PROCEDURE — 80053 COMPREHEN METABOLIC PANEL: CPT | Performed by: EMERGENCY MEDICINE

## 2023-01-01 PROCEDURE — 36415 COLL VENOUS BLD VENIPUNCTURE: CPT | Performed by: EMERGENCY MEDICINE

## 2023-01-01 PROCEDURE — 80048 BASIC METABOLIC PNL TOTAL CA: CPT | Performed by: HOSPITALIST

## 2023-01-01 PROCEDURE — 5A09357 ASSISTANCE WITH RESPIRATORY VENTILATION, LESS THAN 24 CONSECUTIVE HOURS, CONTINUOUS POSITIVE AIRWAY PRESSURE: ICD-10-PCS | Performed by: HOSPITALIST

## 2023-01-01 PROCEDURE — 25010000002 HYDROMORPHONE PER 4 MG: Performed by: HOSPITALIST

## 2023-01-01 PROCEDURE — 94760 N-INVAS EAR/PLS OXIMETRY 1: CPT

## 2023-01-01 PROCEDURE — 94660 CPAP INITIATION&MGMT: CPT

## 2023-01-01 PROCEDURE — 94761 N-INVAS EAR/PLS OXIMETRY MLT: CPT

## 2023-01-01 PROCEDURE — 83735 ASSAY OF MAGNESIUM: CPT | Performed by: EMERGENCY MEDICINE

## 2023-01-01 PROCEDURE — 97166 OT EVAL MOD COMPLEX 45 MIN: CPT | Performed by: OCCUPATIONAL THERAPIST

## 2023-01-01 PROCEDURE — 25010000002 MAGNESIUM SULFATE 2 GM/50ML SOLUTION: Performed by: EMERGENCY MEDICINE

## 2023-01-01 PROCEDURE — 63710000001 PREDNISONE PER 1 MG: Performed by: INTERNAL MEDICINE

## 2023-01-01 PROCEDURE — 36600 WITHDRAWAL OF ARTERIAL BLOOD: CPT

## 2023-01-01 PROCEDURE — 80061 LIPID PANEL: CPT | Performed by: HOSPITALIST

## 2023-01-01 PROCEDURE — 99222 1ST HOSP IP/OBS MODERATE 55: CPT | Performed by: STUDENT IN AN ORGANIZED HEALTH CARE EDUCATION/TRAINING PROGRAM

## 2023-01-01 PROCEDURE — 97116 GAIT TRAINING THERAPY: CPT

## 2023-01-01 PROCEDURE — 97162 PT EVAL MOD COMPLEX 30 MIN: CPT

## 2023-01-01 PROCEDURE — 94669 MECHANICAL CHEST WALL OSCILL: CPT

## 2023-01-01 PROCEDURE — 80047 BASIC METABLC PNL IONIZED CA: CPT

## 2023-01-01 PROCEDURE — 99285 EMERGENCY DEPT VISIT HI MDM: CPT

## 2023-01-01 PROCEDURE — 84145 PROCALCITONIN (PCT): CPT | Performed by: INTERNAL MEDICINE

## 2023-01-01 PROCEDURE — 84484 ASSAY OF TROPONIN QUANT: CPT | Performed by: EMERGENCY MEDICINE

## 2023-01-01 PROCEDURE — 70450 CT HEAD/BRAIN W/O DYE: CPT

## 2023-01-01 PROCEDURE — 83880 ASSAY OF NATRIURETIC PEPTIDE: CPT | Performed by: INTERNAL MEDICINE

## 2023-01-01 PROCEDURE — 0202U NFCT DS 22 TRGT SARS-COV-2: CPT | Performed by: EMERGENCY MEDICINE

## 2023-01-01 PROCEDURE — 25010000002 ONDANSETRON PER 1 MG: Performed by: HOSPITALIST

## 2023-01-01 PROCEDURE — 93306 TTE W/DOPPLER COMPLETE: CPT

## 2023-01-01 PROCEDURE — 93005 ELECTROCARDIOGRAM TRACING: CPT | Performed by: NURSE PRACTITIONER

## 2023-01-01 PROCEDURE — 87040 BLOOD CULTURE FOR BACTERIA: CPT | Performed by: INTERNAL MEDICINE

## 2023-01-01 PROCEDURE — 83735 ASSAY OF MAGNESIUM: CPT | Performed by: INTERNAL MEDICINE

## 2023-01-01 PROCEDURE — 93005 ELECTROCARDIOGRAM TRACING: CPT | Performed by: EMERGENCY MEDICINE

## 2023-01-01 PROCEDURE — 0202U NFCT DS 22 TRGT SARS-COV-2: CPT | Performed by: INTERNAL MEDICINE

## 2023-01-01 PROCEDURE — 83605 ASSAY OF LACTIC ACID: CPT

## 2023-01-01 PROCEDURE — 99232 SBSQ HOSP IP/OBS MODERATE 35: CPT | Performed by: NURSE PRACTITIONER

## 2023-01-01 PROCEDURE — 25010000002 LORAZEPAM PER 2 MG: Performed by: HOSPITALIST

## 2023-01-01 PROCEDURE — 85610 PROTHROMBIN TIME: CPT | Performed by: EMERGENCY MEDICINE

## 2023-01-01 PROCEDURE — 25510000001 PERFLUTREN (DEFINITY) 8.476 MG IN SODIUM CHLORIDE (PF) 0.9 % 10 ML INJECTION: Performed by: INTERNAL MEDICINE

## 2023-01-01 PROCEDURE — 94640 AIRWAY INHALATION TREATMENT: CPT

## 2023-01-01 PROCEDURE — 85007 BL SMEAR W/DIFF WBC COUNT: CPT | Performed by: HOSPITALIST

## 2023-01-01 PROCEDURE — 25010000002 METHYLPREDNISOLONE PER 125 MG: Performed by: EMERGENCY MEDICINE

## 2023-01-01 PROCEDURE — 97164 PT RE-EVAL EST PLAN CARE: CPT

## 2023-01-01 PROCEDURE — 93306 TTE W/DOPPLER COMPLETE: CPT | Performed by: INTERNAL MEDICINE

## 2023-01-01 PROCEDURE — 83605 ASSAY OF LACTIC ACID: CPT | Performed by: INTERNAL MEDICINE

## 2023-01-01 PROCEDURE — 83036 HEMOGLOBIN GLYCOSYLATED A1C: CPT | Performed by: HOSPITALIST

## 2023-01-01 PROCEDURE — 84439 ASSAY OF FREE THYROXINE: CPT | Performed by: HOSPITALIST

## 2023-01-01 RX ORDER — MAGNESIUM SULFATE HEPTAHYDRATE 40 MG/ML
2 INJECTION, SOLUTION INTRAVENOUS ONCE
Status: DISCONTINUED | OUTPATIENT
Start: 2023-01-01 | End: 2023-01-01

## 2023-01-01 RX ORDER — MORPHINE SULFATE 2 MG/ML
2 INJECTION, SOLUTION INTRAMUSCULAR; INTRAVENOUS
Status: DISCONTINUED | OUTPATIENT
Start: 2023-01-01 | End: 2023-10-14 | Stop reason: HOSPADM

## 2023-01-01 RX ORDER — LORAZEPAM 2 MG/ML
1 INJECTION INTRAMUSCULAR
Status: DISCONTINUED | OUTPATIENT
Start: 2023-01-01 | End: 2023-10-14 | Stop reason: HOSPADM

## 2023-01-01 RX ORDER — ONDANSETRON 2 MG/ML
4 INJECTION INTRAMUSCULAR; INTRAVENOUS EVERY 4 HOURS PRN
Status: DISCONTINUED | OUTPATIENT
Start: 2023-01-01 | End: 2023-01-01

## 2023-01-01 RX ORDER — LORAZEPAM 2 MG/ML
2 CONCENTRATE ORAL
Status: DISCONTINUED | OUTPATIENT
Start: 2023-01-01 | End: 2023-10-14 | Stop reason: HOSPADM

## 2023-01-01 RX ORDER — METHYLPREDNISOLONE SODIUM SUCCINATE 40 MG/ML
40 INJECTION, POWDER, LYOPHILIZED, FOR SOLUTION INTRAMUSCULAR; INTRAVENOUS EVERY 24 HOURS
Status: DISCONTINUED | OUTPATIENT
Start: 2023-01-01 | End: 2023-01-01

## 2023-01-01 RX ORDER — PREDNISONE 20 MG/1
20 TABLET ORAL
Status: DISCONTINUED | OUTPATIENT
Start: 2023-01-01 | End: 2023-01-01

## 2023-01-01 RX ORDER — MORPHINE SULFATE 20 MG/ML
10 SOLUTION ORAL
Status: DISCONTINUED | OUTPATIENT
Start: 2023-01-01 | End: 2023-10-14 | Stop reason: HOSPADM

## 2023-01-01 RX ORDER — NITROGLYCERIN 0.4 MG/1
0.4 TABLET SUBLINGUAL
Status: DISCONTINUED | OUTPATIENT
Start: 2023-01-01 | End: 2023-01-01

## 2023-01-01 RX ORDER — GLYCOPYRROLATE 0.2 MG/ML
0.4 INJECTION INTRAMUSCULAR; INTRAVENOUS
Status: DISCONTINUED | OUTPATIENT
Start: 2023-01-01 | End: 2023-10-14 | Stop reason: HOSPADM

## 2023-01-01 RX ORDER — POTASSIUM CHLORIDE 750 MG/1
10 CAPSULE, EXTENDED RELEASE ORAL DAILY
Status: DISCONTINUED | OUTPATIENT
Start: 2023-01-01 | End: 2023-01-01

## 2023-01-01 RX ORDER — METOPROLOL SUCCINATE 25 MG/1
25 TABLET, EXTENDED RELEASE ORAL
Status: DISCONTINUED | OUTPATIENT
Start: 2023-01-01 | End: 2023-01-01

## 2023-01-01 RX ORDER — METOPROLOL SUCCINATE 25 MG/1
50 TABLET, EXTENDED RELEASE ORAL
Status: DISCONTINUED | OUTPATIENT
Start: 2023-01-01 | End: 2023-01-01

## 2023-01-01 RX ORDER — GABAPENTIN 100 MG/1
200 CAPSULE ORAL 3 TIMES DAILY
Status: DISCONTINUED | OUTPATIENT
Start: 2023-01-01 | End: 2023-01-01

## 2023-01-01 RX ORDER — METHYLPREDNISOLONE SODIUM SUCCINATE 40 MG/ML
40 INJECTION, POWDER, LYOPHILIZED, FOR SOLUTION INTRAMUSCULAR; INTRAVENOUS EVERY 12 HOURS
Status: DISCONTINUED | OUTPATIENT
Start: 2023-01-01 | End: 2023-01-01

## 2023-01-01 RX ORDER — PROMETHAZINE HYDROCHLORIDE 12.5 MG/1
6.25 SUPPOSITORY RECTAL EVERY 4 HOURS PRN
Status: DISCONTINUED | OUTPATIENT
Start: 2023-01-01 | End: 2023-10-14 | Stop reason: HOSPADM

## 2023-01-01 RX ORDER — LORAZEPAM 2 MG/ML
1 CONCENTRATE ORAL
Status: DISCONTINUED | OUTPATIENT
Start: 2023-01-01 | End: 2023-10-14 | Stop reason: HOSPADM

## 2023-01-01 RX ORDER — SODIUM CHLORIDE FOR INHALATION 7 %
4 VIAL, NEBULIZER (ML) INHALATION
Status: DISCONTINUED | OUTPATIENT
Start: 2023-01-01 | End: 2023-01-01

## 2023-01-01 RX ORDER — ALBUTEROL SULFATE 2.5 MG/3ML
2.5 SOLUTION RESPIRATORY (INHALATION)
Status: DISCONTINUED | OUTPATIENT
Start: 2023-01-01 | End: 2023-01-01

## 2023-01-01 RX ORDER — HYDROCODONE BITARTRATE AND ACETAMINOPHEN 7.5; 325 MG/1; MG/1
1 TABLET ORAL EVERY 4 HOURS PRN
Status: DISCONTINUED | OUTPATIENT
Start: 2023-01-01 | End: 2023-01-01

## 2023-01-01 RX ORDER — LEVOTHYROXINE SODIUM 0.07 MG/1
75 TABLET ORAL DAILY
Status: DISCONTINUED | OUTPATIENT
Start: 2023-01-01 | End: 2023-01-01

## 2023-01-01 RX ORDER — ACETAMINOPHEN 325 MG/1
650 TABLET ORAL EVERY 4 HOURS PRN
Status: DISCONTINUED | OUTPATIENT
Start: 2023-01-01 | End: 2023-10-14 | Stop reason: HOSPADM

## 2023-01-01 RX ORDER — QUETIAPINE FUMARATE 50 MG/1
50 TABLET, FILM COATED ORAL NIGHTLY
Status: DISCONTINUED | OUTPATIENT
Start: 2023-01-01 | End: 2023-01-01

## 2023-01-01 RX ORDER — PREDNISONE 20 MG/1
20 TABLET ORAL ONCE
Status: DISCONTINUED | OUTPATIENT
Start: 2023-01-01 | End: 2023-01-01

## 2023-01-01 RX ORDER — QUETIAPINE FUMARATE 50 MG/1
100 TABLET, FILM COATED ORAL NIGHTLY
Status: DISCONTINUED | OUTPATIENT
Start: 2023-01-01 | End: 2023-01-01

## 2023-01-01 RX ORDER — LORAZEPAM 2 MG/ML
0.5 CONCENTRATE ORAL
Status: DISCONTINUED | OUTPATIENT
Start: 2023-01-01 | End: 2023-10-14 | Stop reason: HOSPADM

## 2023-01-01 RX ORDER — HYDROMORPHONE HYDROCHLORIDE 1 MG/ML
0.5 INJECTION, SOLUTION INTRAMUSCULAR; INTRAVENOUS; SUBCUTANEOUS
Status: DISCONTINUED | OUTPATIENT
Start: 2023-01-01 | End: 2023-10-14 | Stop reason: HOSPADM

## 2023-01-01 RX ORDER — FAMOTIDINE 20 MG/1
20 TABLET, FILM COATED ORAL 2 TIMES DAILY
COMMUNITY

## 2023-01-01 RX ORDER — BUMETANIDE 1 MG/1
1 TABLET ORAL DAILY
COMMUNITY

## 2023-01-01 RX ORDER — FAMOTIDINE 20 MG/1
20 TABLET, FILM COATED ORAL 2 TIMES DAILY
Status: DISCONTINUED | OUTPATIENT
Start: 2023-01-01 | End: 2023-01-01

## 2023-01-01 RX ORDER — GLYCOPYRROLATE 0.2 MG/ML
0.2 INJECTION INTRAMUSCULAR; INTRAVENOUS
Status: DISCONTINUED | OUTPATIENT
Start: 2023-01-01 | End: 2023-10-14 | Stop reason: HOSPADM

## 2023-01-01 RX ORDER — BUMETANIDE 0.25 MG/ML
2.5 INJECTION INTRAMUSCULAR; INTRAVENOUS ONCE
Qty: 10 ML | Refills: 0 | Status: COMPLETED | OUTPATIENT
Start: 2023-01-01 | End: 2023-01-01

## 2023-01-01 RX ORDER — AMLODIPINE BESYLATE 5 MG/1
5 TABLET ORAL DAILY
COMMUNITY

## 2023-01-01 RX ORDER — PREDNISONE 20 MG/1
40 TABLET ORAL
Status: DISCONTINUED | OUTPATIENT
Start: 2023-01-01 | End: 2023-01-01

## 2023-01-01 RX ORDER — PREDNISONE 10 MG/1
10 TABLET ORAL
Status: DISCONTINUED | OUTPATIENT
Start: 2023-01-01 | End: 2023-01-01

## 2023-01-01 RX ORDER — HALOPERIDOL 1 MG/1
1 TABLET ORAL EVERY 4 HOURS PRN
Status: DISCONTINUED | OUTPATIENT
Start: 2023-01-01 | End: 2023-10-14 | Stop reason: HOSPADM

## 2023-01-01 RX ORDER — SODIUM CHLORIDE 9 MG/ML
75 INJECTION, SOLUTION INTRAVENOUS CONTINUOUS
Status: DISCONTINUED | OUTPATIENT
Start: 2023-01-01 | End: 2023-01-01

## 2023-01-01 RX ORDER — LORAZEPAM 0.5 MG/1
0.5 TABLET ORAL DAILY PRN
COMMUNITY

## 2023-01-01 RX ORDER — PANTOPRAZOLE SODIUM 40 MG/1
40 TABLET, DELAYED RELEASE ORAL
Status: DISCONTINUED | OUTPATIENT
Start: 2023-01-01 | End: 2023-01-01

## 2023-01-01 RX ORDER — SENNOSIDES 8.6 MG
650 CAPSULE ORAL EVERY 8 HOURS PRN
COMMUNITY

## 2023-01-01 RX ORDER — MELOXICAM 7.5 MG/1
7.5 TABLET ORAL DAILY PRN
COMMUNITY

## 2023-01-01 RX ORDER — BUDESONIDE AND FORMOTEROL FUMARATE DIHYDRATE 160; 4.5 UG/1; UG/1
2 AEROSOL RESPIRATORY (INHALATION) 2 TIMES DAILY
Status: DISCONTINUED | OUTPATIENT
Start: 2023-01-01 | End: 2023-01-01

## 2023-01-01 RX ORDER — LORAZEPAM 2 MG/ML
2 INJECTION INTRAMUSCULAR
Status: DISCONTINUED | OUTPATIENT
Start: 2023-01-01 | End: 2023-10-14 | Stop reason: HOSPADM

## 2023-01-01 RX ORDER — METOPROLOL TARTRATE 50 MG/1
100 TABLET, FILM COATED ORAL EVERY 8 HOURS
Status: DISCONTINUED | OUTPATIENT
Start: 2023-01-01 | End: 2023-01-01

## 2023-01-01 RX ORDER — METOPROLOL TARTRATE 50 MG/1
50 TABLET, FILM COATED ORAL EVERY 8 HOURS
Status: DISCONTINUED | OUTPATIENT
Start: 2023-01-01 | End: 2023-01-01

## 2023-01-01 RX ORDER — ROSUVASTATIN CALCIUM 10 MG/1
10 TABLET, COATED ORAL NIGHTLY
Status: DISCONTINUED | OUTPATIENT
Start: 2023-01-01 | End: 2023-01-01

## 2023-01-01 RX ORDER — HALOPERIDOL 5 MG/ML
1 INJECTION INTRAMUSCULAR EVERY 4 HOURS PRN
Status: DISCONTINUED | OUTPATIENT
Start: 2023-01-01 | End: 2023-10-14 | Stop reason: HOSPADM

## 2023-01-01 RX ORDER — GABAPENTIN 300 MG/1
300 CAPSULE ORAL 3 TIMES DAILY
Status: DISCONTINUED | OUTPATIENT
Start: 2023-01-01 | End: 2023-01-01

## 2023-01-01 RX ORDER — SODIUM CHLORIDE 0.9 % (FLUSH) 0.9 %
10 SYRINGE (ML) INJECTION AS NEEDED
Status: DISCONTINUED | OUTPATIENT
Start: 2023-01-01 | End: 2023-01-01

## 2023-01-01 RX ORDER — ALLOPURINOL 100 MG/1
100 TABLET ORAL DAILY
Status: DISCONTINUED | OUTPATIENT
Start: 2023-01-01 | End: 2023-01-01

## 2023-01-01 RX ORDER — FUROSEMIDE 20 MG/1
40 TABLET ORAL
Status: DISCONTINUED | OUTPATIENT
Start: 2023-01-01 | End: 2023-01-01

## 2023-01-01 RX ORDER — PROMETHAZINE HYDROCHLORIDE 6.25 MG/5ML
6.25 SYRUP ORAL EVERY 4 HOURS PRN
Status: DISCONTINUED | OUTPATIENT
Start: 2023-01-01 | End: 2023-10-14 | Stop reason: HOSPADM

## 2023-01-01 RX ORDER — SIMETHICONE 80 MG
80 TABLET,CHEWABLE ORAL 4 TIMES DAILY PRN
Status: DISCONTINUED | OUTPATIENT
Start: 2023-01-01 | End: 2023-01-01

## 2023-01-01 RX ORDER — ROPINIROLE 0.5 MG/1
0.5 TABLET, FILM COATED ORAL NIGHTLY
Status: DISCONTINUED | OUTPATIENT
Start: 2023-01-01 | End: 2023-01-01

## 2023-01-01 RX ORDER — IPRATROPIUM BROMIDE AND ALBUTEROL SULFATE 2.5; .5 MG/3ML; MG/3ML
3 SOLUTION RESPIRATORY (INHALATION) EVERY 4 HOURS PRN
Status: DISCONTINUED | OUTPATIENT
Start: 2023-01-01 | End: 2023-10-14 | Stop reason: HOSPADM

## 2023-01-01 RX ORDER — METHYLPREDNISOLONE SODIUM SUCCINATE 40 MG/ML
40 INJECTION, POWDER, LYOPHILIZED, FOR SOLUTION INTRAMUSCULAR; INTRAVENOUS EVERY 8 HOURS
Status: DISCONTINUED | OUTPATIENT
Start: 2023-01-01 | End: 2023-01-01

## 2023-01-01 RX ORDER — GUAIFENESIN, PSEUDOEPHEDRINE HYDROCHLORIDE 600; 60 MG/1; MG/1
1 TABLET, EXTENDED RELEASE ORAL EVERY 12 HOURS PRN
COMMUNITY

## 2023-01-01 RX ORDER — DULOXETIN HYDROCHLORIDE 60 MG/1
60 CAPSULE, DELAYED RELEASE ORAL DAILY
Status: DISCONTINUED | OUTPATIENT
Start: 2023-01-01 | End: 2023-01-01

## 2023-01-01 RX ORDER — LORAZEPAM 2 MG/ML
0.5 INJECTION INTRAMUSCULAR
Status: DISCONTINUED | OUTPATIENT
Start: 2023-01-01 | End: 2023-10-14 | Stop reason: HOSPADM

## 2023-01-01 RX ORDER — FUROSEMIDE 20 MG/1
20 TABLET ORAL DAILY
Status: DISCONTINUED | OUTPATIENT
Start: 2023-01-01 | End: 2023-01-01

## 2023-01-01 RX ORDER — DIPHENOXYLATE HYDROCHLORIDE AND ATROPINE SULFATE 2.5; .025 MG/1; MG/1
1 TABLET ORAL
Status: DISCONTINUED | OUTPATIENT
Start: 2023-01-01 | End: 2023-10-14 | Stop reason: HOSPADM

## 2023-01-01 RX ORDER — IPRATROPIUM BROMIDE AND ALBUTEROL SULFATE 2.5; .5 MG/3ML; MG/3ML
3 SOLUTION RESPIRATORY (INHALATION) EVERY 4 HOURS PRN
Status: DISCONTINUED | OUTPATIENT
Start: 2023-01-01 | End: 2023-01-01

## 2023-01-01 RX ORDER — ACETAMINOPHEN 160 MG/5ML
650 SOLUTION ORAL EVERY 4 HOURS PRN
Status: DISCONTINUED | OUTPATIENT
Start: 2023-01-01 | End: 2023-10-14 | Stop reason: HOSPADM

## 2023-01-01 RX ORDER — MORPHINE SULFATE 4 MG/ML
4 INJECTION, SOLUTION INTRAMUSCULAR; INTRAVENOUS
Status: DISCONTINUED | OUTPATIENT
Start: 2023-01-01 | End: 2023-10-14 | Stop reason: HOSPADM

## 2023-01-01 RX ORDER — METHYLPREDNISOLONE SODIUM SUCCINATE 125 MG/2ML
125 INJECTION, POWDER, LYOPHILIZED, FOR SOLUTION INTRAMUSCULAR; INTRAVENOUS ONCE
Status: COMPLETED | OUTPATIENT
Start: 2023-01-01 | End: 2023-01-01

## 2023-01-01 RX ORDER — PROMETHAZINE HYDROCHLORIDE 25 MG/1
6.25 TABLET ORAL EVERY 4 HOURS PRN
Status: DISCONTINUED | OUTPATIENT
Start: 2023-01-01 | End: 2023-10-14 | Stop reason: HOSPADM

## 2023-01-01 RX ORDER — MELATONIN
1000 DAILY
COMMUNITY

## 2023-01-01 RX ORDER — KETOROLAC TROMETHAMINE 15 MG/ML
15 INJECTION, SOLUTION INTRAMUSCULAR; INTRAVENOUS EVERY 6 HOURS PRN
Status: DISCONTINUED | OUTPATIENT
Start: 2023-01-01 | End: 2023-10-14 | Stop reason: HOSPADM

## 2023-01-01 RX ORDER — MIDODRINE HYDROCHLORIDE 5 MG/1
5 TABLET ORAL
Status: DISCONTINUED | OUTPATIENT
Start: 2023-01-01 | End: 2023-01-01

## 2023-01-01 RX ORDER — MIDODRINE HYDROCHLORIDE 2.5 MG/1
2.5 TABLET ORAL
Status: DISCONTINUED | OUTPATIENT
Start: 2023-01-01 | End: 2023-01-01

## 2023-01-01 RX ORDER — DICYCLOMINE HYDROCHLORIDE 10 MG/1
20 CAPSULE ORAL 3 TIMES DAILY
COMMUNITY

## 2023-01-01 RX ORDER — MAGNESIUM SULFATE HEPTAHYDRATE 40 MG/ML
2 INJECTION, SOLUTION INTRAVENOUS ONCE
Status: COMPLETED | OUTPATIENT
Start: 2023-01-01 | End: 2023-01-01

## 2023-01-01 RX ORDER — DIPHENHYDRAMINE HYDROCHLORIDE AND LIDOCAINE HYDROCHLORIDE AND ALUMINUM HYDROXIDE AND MAGNESIUM HYDRO
5 KIT EVERY 4 HOURS PRN
Status: DISCONTINUED | OUTPATIENT
Start: 2023-01-01 | End: 2023-10-14 | Stop reason: HOSPADM

## 2023-01-01 RX ORDER — IPRATROPIUM BROMIDE AND ALBUTEROL SULFATE 2.5; .5 MG/3ML; MG/3ML
3 SOLUTION RESPIRATORY (INHALATION) ONCE
Status: COMPLETED | OUTPATIENT
Start: 2023-01-01 | End: 2023-01-01

## 2023-01-01 RX ORDER — AMIODARONE HYDROCHLORIDE 200 MG/1
200 TABLET ORAL
Status: DISCONTINUED | OUTPATIENT
Start: 2023-01-01 | End: 2023-01-01

## 2023-01-01 RX ORDER — GUAIFENESIN 600 MG/1
600 TABLET, EXTENDED RELEASE ORAL EVERY 12 HOURS SCHEDULED
Status: DISCONTINUED | OUTPATIENT
Start: 2023-01-01 | End: 2023-01-01

## 2023-01-01 RX ORDER — BUSPIRONE HYDROCHLORIDE 10 MG/1
10 TABLET ORAL 2 TIMES DAILY
Status: DISCONTINUED | OUTPATIENT
Start: 2023-01-01 | End: 2023-01-01

## 2023-01-01 RX ORDER — HYDROXYZINE HYDROCHLORIDE 25 MG/1
25 TABLET, FILM COATED ORAL 3 TIMES DAILY PRN
Status: DISCONTINUED | OUTPATIENT
Start: 2023-01-01 | End: 2023-01-01

## 2023-01-01 RX ORDER — ACETAMINOPHEN 650 MG/1
650 SUPPOSITORY RECTAL EVERY 4 HOURS PRN
Status: DISCONTINUED | OUTPATIENT
Start: 2023-01-01 | End: 2023-10-14 | Stop reason: HOSPADM

## 2023-01-01 RX ORDER — MORPHINE SULFATE 20 MG/ML
5 SOLUTION ORAL
Status: DISCONTINUED | OUTPATIENT
Start: 2023-01-01 | End: 2023-10-14 | Stop reason: HOSPADM

## 2023-01-01 RX ORDER — HALOPERIDOL 2 MG/ML
1 SOLUTION ORAL EVERY 4 HOURS PRN
Status: DISCONTINUED | OUTPATIENT
Start: 2023-01-01 | End: 2023-10-14 | Stop reason: HOSPADM

## 2023-01-01 RX ORDER — BUDESONIDE AND FORMOTEROL FUMARATE DIHYDRATE 160; 4.5 UG/1; UG/1
2 AEROSOL RESPIRATORY (INHALATION)
Status: DISCONTINUED | OUTPATIENT
Start: 2023-01-01 | End: 2023-01-01

## 2023-01-01 RX ADMIN — GUAIFENESIN 600 MG: 600 TABLET, EXTENDED RELEASE ORAL at 20:23

## 2023-01-01 RX ADMIN — BUSPIRONE HYDROCHLORIDE 10 MG: 10 TABLET ORAL at 21:57

## 2023-01-01 RX ADMIN — ALBUTEROL SULFATE 2.5 MG: 2.5 SOLUTION RESPIRATORY (INHALATION) at 07:57

## 2023-01-01 RX ADMIN — BUDESONIDE AND FORMOTEROL FUMARATE DIHYDRATE 2 PUFF: 160; 4.5 AEROSOL RESPIRATORY (INHALATION) at 20:40

## 2023-01-01 RX ADMIN — APIXABAN 2.5 MG: 2.5 TABLET, FILM COATED ORAL at 20:22

## 2023-01-01 RX ADMIN — ALLOPURINOL 100 MG: 100 TABLET ORAL at 08:06

## 2023-01-01 RX ADMIN — MIDODRINE HYDROCHLORIDE 5 MG: 5 TABLET ORAL at 18:03

## 2023-01-01 RX ADMIN — PANTOPRAZOLE SODIUM 40 MG: 40 TABLET, DELAYED RELEASE ORAL at 18:04

## 2023-01-01 RX ADMIN — TIOTROPIUM BROMIDE INHALATION SPRAY 2 PUFF: 3.12 SPRAY, METERED RESPIRATORY (INHALATION) at 07:24

## 2023-01-01 RX ADMIN — MIDODRINE HYDROCHLORIDE 5 MG: 5 TABLET ORAL at 17:37

## 2023-01-01 RX ADMIN — GUAIFENESIN 600 MG: 600 TABLET, EXTENDED RELEASE ORAL at 20:33

## 2023-01-01 RX ADMIN — APIXABAN 2.5 MG: 2.5 TABLET, FILM COATED ORAL at 08:06

## 2023-01-01 RX ADMIN — ROSUVASTATIN CALCIUM 10 MG: 10 TABLET, FILM COATED ORAL at 20:38

## 2023-01-01 RX ADMIN — METOPROLOL TARTRATE 100 MG: 50 TABLET, FILM COATED ORAL at 12:11

## 2023-01-01 RX ADMIN — PREDNISONE 40 MG: 20 TABLET ORAL at 12:09

## 2023-01-01 RX ADMIN — APIXABAN 2.5 MG: 2.5 TABLET, FILM COATED ORAL at 08:29

## 2023-01-01 RX ADMIN — ROSUVASTATIN CALCIUM 10 MG: 10 TABLET, FILM COATED ORAL at 20:22

## 2023-01-01 RX ADMIN — ROPINIROLE HYDROCHLORIDE 0.5 MG: 0.5 TABLET, FILM COATED ORAL at 20:41

## 2023-01-01 RX ADMIN — ALLOPURINOL 100 MG: 100 TABLET ORAL at 08:30

## 2023-01-01 RX ADMIN — PANTOPRAZOLE SODIUM 40 MG: 40 TABLET, DELAYED RELEASE ORAL at 17:43

## 2023-01-01 RX ADMIN — APIXABAN 2.5 MG: 2.5 TABLET, FILM COATED ORAL at 20:41

## 2023-01-01 RX ADMIN — DULOXETINE HYDROCHLORIDE 60 MG: 60 CAPSULE, DELAYED RELEASE ORAL at 11:38

## 2023-01-01 RX ADMIN — ROSUVASTATIN CALCIUM 10 MG: 10 TABLET, FILM COATED ORAL at 20:37

## 2023-01-01 RX ADMIN — AMIODARONE HYDROCHLORIDE 200 MG: 200 TABLET ORAL at 08:55

## 2023-01-01 RX ADMIN — APIXABAN 2.5 MG: 2.5 TABLET, FILM COATED ORAL at 21:58

## 2023-01-01 RX ADMIN — MIDODRINE HYDROCHLORIDE 5 MG: 5 TABLET ORAL at 11:32

## 2023-01-01 RX ADMIN — GABAPENTIN 200 MG: 100 CAPSULE ORAL at 08:29

## 2023-01-01 RX ADMIN — FAMOTIDINE 20 MG: 20 TABLET ORAL at 08:06

## 2023-01-01 RX ADMIN — HYDROXYZINE HYDROCHLORIDE 25 MG: 25 TABLET ORAL at 01:16

## 2023-01-01 RX ADMIN — PREDNISONE 40 MG: 20 TABLET ORAL at 11:38

## 2023-01-01 RX ADMIN — GABAPENTIN 300 MG: 300 CAPSULE ORAL at 18:05

## 2023-01-01 RX ADMIN — LORAZEPAM 0.5 MG: 2 INJECTION INTRAMUSCULAR; INTRAVENOUS at 16:26

## 2023-01-01 RX ADMIN — ALBUTEROL SULFATE 2.5 MG: 2.5 SOLUTION RESPIRATORY (INHALATION) at 19:37

## 2023-01-01 RX ADMIN — METOPROLOL TARTRATE 100 MG: 50 TABLET, FILM COATED ORAL at 05:17

## 2023-01-01 RX ADMIN — ALBUTEROL SULFATE 2.5 MG: 2.5 SOLUTION RESPIRATORY (INHALATION) at 20:39

## 2023-01-01 RX ADMIN — APIXABAN 2.5 MG: 2.5 TABLET, FILM COATED ORAL at 09:38

## 2023-01-01 RX ADMIN — ONDANSETRON 4 MG: 2 INJECTION INTRAMUSCULAR; INTRAVENOUS at 02:20

## 2023-01-01 RX ADMIN — LEVOTHYROXINE SODIUM 75 MCG: 0.07 TABLET ORAL at 08:18

## 2023-01-01 RX ADMIN — METHYLPREDNISOLONE SODIUM SUCCINATE 40 MG: 40 INJECTION, POWDER, LYOPHILIZED, FOR SOLUTION INTRAMUSCULAR; INTRAVENOUS at 08:51

## 2023-01-01 RX ADMIN — SODIUM ZIRCONIUM CYCLOSILICATE 10 G: 10 POWDER, FOR SUSPENSION ORAL at 15:34

## 2023-01-01 RX ADMIN — BUSPIRONE HYDROCHLORIDE 10 MG: 10 TABLET ORAL at 08:52

## 2023-01-01 RX ADMIN — APIXABAN 2.5 MG: 2.5 TABLET, FILM COATED ORAL at 08:30

## 2023-01-01 RX ADMIN — LEVOTHYROXINE SODIUM 75 MCG: 0.07 TABLET ORAL at 09:44

## 2023-01-01 RX ADMIN — PANTOPRAZOLE SODIUM 40 MG: 40 TABLET, DELAYED RELEASE ORAL at 05:18

## 2023-01-01 RX ADMIN — MIDODRINE HYDROCHLORIDE 5 MG: 5 TABLET ORAL at 07:05

## 2023-01-01 RX ADMIN — APIXABAN 2.5 MG: 2.5 TABLET, FILM COATED ORAL at 08:55

## 2023-01-01 RX ADMIN — PANTOPRAZOLE SODIUM 40 MG: 40 TABLET, DELAYED RELEASE ORAL at 09:52

## 2023-01-01 RX ADMIN — GABAPENTIN 200 MG: 100 CAPSULE ORAL at 08:18

## 2023-01-01 RX ADMIN — DULOXETINE HYDROCHLORIDE 60 MG: 60 CAPSULE, DELAYED RELEASE ORAL at 08:55

## 2023-01-01 RX ADMIN — MIDODRINE HYDROCHLORIDE 5 MG: 5 TABLET ORAL at 16:34

## 2023-01-01 RX ADMIN — GLYCOPYRROLATE 0.4 MG: 0.2 INJECTION INTRAMUSCULAR; INTRAVENOUS at 12:41

## 2023-01-01 RX ADMIN — PANTOPRAZOLE SODIUM 40 MG: 40 TABLET, DELAYED RELEASE ORAL at 16:26

## 2023-01-01 RX ADMIN — BUDESONIDE AND FORMOTEROL FUMARATE DIHYDRATE 2 PUFF: 160; 4.5 AEROSOL RESPIRATORY (INHALATION) at 19:39

## 2023-01-01 RX ADMIN — BUSPIRONE HYDROCHLORIDE 10 MG: 10 TABLET ORAL at 08:29

## 2023-01-01 RX ADMIN — BUDESONIDE AND FORMOTEROL FUMARATE DIHYDRATE 2 PUFF: 160; 4.5 AEROSOL RESPIRATORY (INHALATION) at 19:37

## 2023-01-01 RX ADMIN — AMIODARONE HYDROCHLORIDE 200 MG: 200 TABLET ORAL at 09:52

## 2023-01-01 RX ADMIN — GABAPENTIN 200 MG: 100 CAPSULE ORAL at 15:54

## 2023-01-01 RX ADMIN — QUETIAPINE FUMARATE 100 MG: 50 TABLET, FILM COATED ORAL at 22:34

## 2023-01-01 RX ADMIN — Medication 4 ML: at 19:52

## 2023-01-01 RX ADMIN — HYDROXYZINE HYDROCHLORIDE 25 MG: 25 TABLET ORAL at 04:05

## 2023-01-01 RX ADMIN — METOPROLOL TARTRATE 100 MG: 50 TABLET, FILM COATED ORAL at 10:42

## 2023-01-01 RX ADMIN — BUMETANIDE 2.5 MG: 0.25 INJECTION INTRAMUSCULAR; INTRAVENOUS at 10:03

## 2023-01-01 RX ADMIN — MIDODRINE HYDROCHLORIDE 5 MG: 5 TABLET ORAL at 09:52

## 2023-01-01 RX ADMIN — GLYCOPYRROLATE 0.4 MG: 0.2 INJECTION INTRAMUSCULAR; INTRAVENOUS at 16:26

## 2023-01-01 RX ADMIN — APIXABAN 2.5 MG: 2.5 TABLET, FILM COATED ORAL at 08:28

## 2023-01-01 RX ADMIN — PANTOPRAZOLE SODIUM 40 MG: 40 TABLET, DELAYED RELEASE ORAL at 16:41

## 2023-01-01 RX ADMIN — AMIODARONE HYDROCHLORIDE 200 MG: 200 TABLET ORAL at 09:44

## 2023-01-01 RX ADMIN — HYDROCODONE BITARTRATE AND ACETAMINOPHEN 1 TABLET: 7.5; 325 TABLET ORAL at 13:11

## 2023-01-01 RX ADMIN — DULOXETINE HYDROCHLORIDE 60 MG: 60 CAPSULE, DELAYED RELEASE ORAL at 08:24

## 2023-01-01 RX ADMIN — BUDESONIDE AND FORMOTEROL FUMARATE DIHYDRATE 2 PUFF: 160; 4.5 AEROSOL RESPIRATORY (INHALATION) at 23:20

## 2023-01-01 RX ADMIN — APIXABAN 2.5 MG: 2.5 TABLET, FILM COATED ORAL at 20:39

## 2023-01-01 RX ADMIN — ROPINIROLE HYDROCHLORIDE 0.5 MG: 0.5 TABLET, FILM COATED ORAL at 21:33

## 2023-01-01 RX ADMIN — PERFLUTREN 2 ML: 6.52 INJECTION, SUSPENSION INTRAVENOUS at 14:49

## 2023-01-01 RX ADMIN — TIOTROPIUM BROMIDE INHALATION SPRAY 2 PUFF: 3.12 SPRAY, METERED RESPIRATORY (INHALATION) at 06:57

## 2023-01-01 RX ADMIN — LEVOTHYROXINE SODIUM 75 MCG: 0.07 TABLET ORAL at 08:55

## 2023-01-01 RX ADMIN — ALBUTEROL SULFATE 2.5 MG: 2.5 SOLUTION RESPIRATORY (INHALATION) at 13:50

## 2023-01-01 RX ADMIN — ALBUTEROL SULFATE 2.5 MG: 2.5 SOLUTION RESPIRATORY (INHALATION) at 11:54

## 2023-01-01 RX ADMIN — ALBUTEROL SULFATE 2.5 MG: 2.5 SOLUTION RESPIRATORY (INHALATION) at 21:20

## 2023-01-01 RX ADMIN — ALBUTEROL SULFATE 2.5 MG: 2.5 SOLUTION RESPIRATORY (INHALATION) at 19:11

## 2023-01-01 RX ADMIN — GUAIFENESIN 600 MG: 600 TABLET, EXTENDED RELEASE ORAL at 21:05

## 2023-01-01 RX ADMIN — NITROGLYCERIN 0.4 MG: 0.4 TABLET SUBLINGUAL at 11:39

## 2023-01-01 RX ADMIN — ROSUVASTATIN CALCIUM 10 MG: 10 TABLET, FILM COATED ORAL at 20:41

## 2023-01-01 RX ADMIN — METOPROLOL TARTRATE 100 MG: 50 TABLET, FILM COATED ORAL at 03:33

## 2023-01-01 RX ADMIN — GABAPENTIN 200 MG: 100 CAPSULE ORAL at 20:22

## 2023-01-01 RX ADMIN — AMIODARONE HYDROCHLORIDE 200 MG: 200 TABLET ORAL at 08:30

## 2023-01-01 RX ADMIN — GABAPENTIN 200 MG: 100 CAPSULE ORAL at 21:18

## 2023-01-01 RX ADMIN — METOPROLOL TARTRATE 100 MG: 50 TABLET, FILM COATED ORAL at 14:45

## 2023-01-01 RX ADMIN — GABAPENTIN 300 MG: 300 CAPSULE ORAL at 15:47

## 2023-01-01 RX ADMIN — GABAPENTIN 200 MG: 100 CAPSULE ORAL at 16:34

## 2023-01-01 RX ADMIN — GABAPENTIN 300 MG: 300 CAPSULE ORAL at 09:52

## 2023-01-01 RX ADMIN — GUAIFENESIN 600 MG: 600 TABLET, EXTENDED RELEASE ORAL at 08:24

## 2023-01-01 RX ADMIN — ALLOPURINOL 100 MG: 100 TABLET ORAL at 08:18

## 2023-01-01 RX ADMIN — APIXABAN 2.5 MG: 2.5 TABLET, FILM COATED ORAL at 22:34

## 2023-01-01 RX ADMIN — BUDESONIDE AND FORMOTEROL FUMARATE DIHYDRATE 2 PUFF: 160; 4.5 AEROSOL RESPIRATORY (INHALATION) at 20:01

## 2023-01-01 RX ADMIN — TIOTROPIUM BROMIDE INHALATION SPRAY 2 PUFF: 3.12 SPRAY, METERED RESPIRATORY (INHALATION) at 07:41

## 2023-01-01 RX ADMIN — ALBUTEROL SULFATE 2.5 MG: 2.5 SOLUTION RESPIRATORY (INHALATION) at 12:33

## 2023-01-01 RX ADMIN — METOPROLOL TARTRATE 100 MG: 50 TABLET, FILM COATED ORAL at 09:53

## 2023-01-01 RX ADMIN — GABAPENTIN 300 MG: 300 CAPSULE ORAL at 09:44

## 2023-01-01 RX ADMIN — IPRATROPIUM BROMIDE AND ALBUTEROL SULFATE 3 ML: 2.5; .5 SOLUTION RESPIRATORY (INHALATION) at 15:13

## 2023-01-01 RX ADMIN — METHYLPREDNISOLONE SODIUM SUCCINATE 40 MG: 40 INJECTION, POWDER, LYOPHILIZED, FOR SOLUTION INTRAMUSCULAR; INTRAVENOUS at 15:34

## 2023-01-01 RX ADMIN — PANTOPRAZOLE SODIUM 40 MG: 40 TABLET, DELAYED RELEASE ORAL at 20:33

## 2023-01-01 RX ADMIN — DULOXETINE HYDROCHLORIDE 60 MG: 60 CAPSULE, DELAYED RELEASE ORAL at 09:52

## 2023-01-01 RX ADMIN — ROSUVASTATIN CALCIUM 10 MG: 10 TABLET, FILM COATED ORAL at 21:06

## 2023-01-01 RX ADMIN — METHYLPREDNISOLONE SODIUM SUCCINATE 40 MG: 40 INJECTION, POWDER, LYOPHILIZED, FOR SOLUTION INTRAMUSCULAR; INTRAVENOUS at 05:51

## 2023-01-01 RX ADMIN — BUDESONIDE AND FORMOTEROL FUMARATE DIHYDRATE 2 PUFF: 160; 4.5 AEROSOL RESPIRATORY (INHALATION) at 19:43

## 2023-01-01 RX ADMIN — LEVOTHYROXINE SODIUM 75 MCG: 0.07 TABLET ORAL at 08:24

## 2023-01-01 RX ADMIN — ALLOPURINOL 100 MG: 100 TABLET ORAL at 08:52

## 2023-01-01 RX ADMIN — GUAIFENESIN 600 MG: 600 TABLET, EXTENDED RELEASE ORAL at 08:50

## 2023-01-01 RX ADMIN — PREDNISONE 20 MG: 20 TABLET ORAL at 08:55

## 2023-01-01 RX ADMIN — BUSPIRONE HYDROCHLORIDE 10 MG: 10 TABLET ORAL at 20:22

## 2023-01-01 RX ADMIN — PANTOPRAZOLE SODIUM 40 MG: 40 TABLET, DELAYED RELEASE ORAL at 07:05

## 2023-01-01 RX ADMIN — BUSPIRONE HYDROCHLORIDE 10 MG: 10 TABLET ORAL at 20:37

## 2023-01-01 RX ADMIN — DULOXETINE HYDROCHLORIDE 60 MG: 60 CAPSULE, DELAYED RELEASE ORAL at 09:37

## 2023-01-01 RX ADMIN — GABAPENTIN 300 MG: 300 CAPSULE ORAL at 17:37

## 2023-01-01 RX ADMIN — LEVOTHYROXINE SODIUM 75 MCG: 0.07 TABLET ORAL at 08:29

## 2023-01-01 RX ADMIN — MIDODRINE HYDROCHLORIDE 5 MG: 5 TABLET ORAL at 16:41

## 2023-01-01 RX ADMIN — DULOXETINE HYDROCHLORIDE 60 MG: 60 CAPSULE, DELAYED RELEASE ORAL at 08:29

## 2023-01-01 RX ADMIN — QUETIAPINE FUMARATE 50 MG: 50 TABLET, FILM COATED ORAL at 23:38

## 2023-01-01 RX ADMIN — ALBUTEROL SULFATE 2.5 MG: 2.5 SOLUTION RESPIRATORY (INHALATION) at 23:01

## 2023-01-01 RX ADMIN — SODIUM CHLORIDE 125 ML/HR: 9 INJECTION, SOLUTION INTRAVENOUS at 12:59

## 2023-01-01 RX ADMIN — APIXABAN 2.5 MG: 2.5 TABLET, FILM COATED ORAL at 08:18

## 2023-01-01 RX ADMIN — AMIODARONE HYDROCHLORIDE 200 MG: 200 TABLET ORAL at 15:47

## 2023-01-01 RX ADMIN — ROSUVASTATIN CALCIUM 10 MG: 10 TABLET, FILM COATED ORAL at 22:34

## 2023-01-01 RX ADMIN — PREDNISONE 20 MG: 20 TABLET ORAL at 09:37

## 2023-01-01 RX ADMIN — GABAPENTIN 200 MG: 100 CAPSULE ORAL at 21:13

## 2023-01-01 RX ADMIN — ROPINIROLE HYDROCHLORIDE 0.5 MG: 0.5 TABLET, FILM COATED ORAL at 21:06

## 2023-01-01 RX ADMIN — ROPINIROLE HYDROCHLORIDE 0.5 MG: 0.5 TABLET, FILM COATED ORAL at 21:18

## 2023-01-01 RX ADMIN — APIXABAN 2.5 MG: 2.5 TABLET, FILM COATED ORAL at 21:06

## 2023-01-01 RX ADMIN — MIDODRINE HYDROCHLORIDE 5 MG: 5 TABLET ORAL at 08:00

## 2023-01-01 RX ADMIN — BUDESONIDE AND FORMOTEROL FUMARATE DIHYDRATE 2 PUFF: 160; 4.5 AEROSOL RESPIRATORY (INHALATION) at 07:06

## 2023-01-01 RX ADMIN — FUROSEMIDE 40 MG: 20 TABLET ORAL at 08:24

## 2023-01-01 RX ADMIN — BUSPIRONE HYDROCHLORIDE 10 MG: 10 TABLET ORAL at 08:06

## 2023-01-01 RX ADMIN — BUSPIRONE HYDROCHLORIDE 10 MG: 10 TABLET ORAL at 08:24

## 2023-01-01 RX ADMIN — METHYLPREDNISOLONE SODIUM SUCCINATE 125 MG: 125 INJECTION, POWDER, FOR SOLUTION INTRAMUSCULAR; INTRAVENOUS at 12:59

## 2023-01-01 RX ADMIN — GABAPENTIN 200 MG: 100 CAPSULE ORAL at 21:32

## 2023-01-01 RX ADMIN — DULOXETINE HYDROCHLORIDE 60 MG: 60 CAPSULE, DELAYED RELEASE ORAL at 08:51

## 2023-01-01 RX ADMIN — LEVOTHYROXINE SODIUM 75 MCG: 0.07 TABLET ORAL at 09:52

## 2023-01-01 RX ADMIN — ALBUTEROL SULFATE 2.5 MG: 2.5 SOLUTION RESPIRATORY (INHALATION) at 07:41

## 2023-01-01 RX ADMIN — METOPROLOL TARTRATE 100 MG: 50 TABLET, FILM COATED ORAL at 13:23

## 2023-01-01 RX ADMIN — GUAIFENESIN 600 MG: 600 TABLET, EXTENDED RELEASE ORAL at 08:29

## 2023-01-01 RX ADMIN — GUAIFENESIN 600 MG: 600 TABLET, EXTENDED RELEASE ORAL at 11:39

## 2023-01-01 RX ADMIN — METOPROLOL TARTRATE 75 MG: 25 TABLET, FILM COATED ORAL at 12:04

## 2023-01-01 RX ADMIN — METOPROLOL TARTRATE 100 MG: 50 TABLET, FILM COATED ORAL at 21:17

## 2023-01-01 RX ADMIN — PANTOPRAZOLE SODIUM 40 MG: 40 TABLET, DELAYED RELEASE ORAL at 18:06

## 2023-01-01 RX ADMIN — ALLOPURINOL 100 MG: 100 TABLET ORAL at 11:38

## 2023-01-01 RX ADMIN — PANTOPRAZOLE SODIUM 40 MG: 40 TABLET, DELAYED RELEASE ORAL at 06:51

## 2023-01-01 RX ADMIN — APIXABAN 2.5 MG: 2.5 TABLET, FILM COATED ORAL at 15:45

## 2023-01-01 RX ADMIN — AMIODARONE HYDROCHLORIDE 200 MG: 200 TABLET ORAL at 08:18

## 2023-01-01 RX ADMIN — METOPROLOL TARTRATE 75 MG: 25 TABLET, FILM COATED ORAL at 20:30

## 2023-01-01 RX ADMIN — MIDODRINE HYDROCHLORIDE 5 MG: 5 TABLET ORAL at 13:23

## 2023-01-01 RX ADMIN — FAMOTIDINE 20 MG: 20 TABLET ORAL at 08:50

## 2023-01-01 RX ADMIN — METOPROLOL TARTRATE 75 MG: 25 TABLET, FILM COATED ORAL at 04:23

## 2023-01-01 RX ADMIN — BUSPIRONE HYDROCHLORIDE 10 MG: 10 TABLET ORAL at 09:37

## 2023-01-01 RX ADMIN — Medication 4 ML: at 07:15

## 2023-01-01 RX ADMIN — BUDESONIDE AND FORMOTEROL FUMARATE DIHYDRATE 2 PUFF: 160; 4.5 AEROSOL RESPIRATORY (INHALATION) at 19:52

## 2023-01-01 RX ADMIN — APIXABAN 2.5 MG: 2.5 TABLET, FILM COATED ORAL at 21:33

## 2023-01-01 RX ADMIN — GABAPENTIN 200 MG: 100 CAPSULE ORAL at 15:34

## 2023-01-01 RX ADMIN — HYDROCODONE BITARTRATE AND ACETAMINOPHEN 1 TABLET: 7.5; 325 TABLET ORAL at 20:33

## 2023-01-01 RX ADMIN — ALBUTEROL SULFATE 2.5 MG: 2.5 SOLUTION RESPIRATORY (INHALATION) at 19:57

## 2023-01-01 RX ADMIN — BUDESONIDE AND FORMOTEROL FUMARATE DIHYDRATE 2 PUFF: 160; 4.5 AEROSOL RESPIRATORY (INHALATION) at 20:02

## 2023-01-01 RX ADMIN — GUAIFENESIN 600 MG: 600 TABLET, EXTENDED RELEASE ORAL at 22:33

## 2023-01-01 RX ADMIN — ALLOPURINOL 100 MG: 100 TABLET ORAL at 08:29

## 2023-01-01 RX ADMIN — BUDESONIDE AND FORMOTEROL FUMARATE DIHYDRATE 2 PUFF: 160; 4.5 AEROSOL RESPIRATORY (INHALATION) at 19:11

## 2023-01-01 RX ADMIN — GABAPENTIN 300 MG: 300 CAPSULE ORAL at 08:06

## 2023-01-01 RX ADMIN — PANTOPRAZOLE SODIUM 40 MG: 40 TABLET, DELAYED RELEASE ORAL at 06:10

## 2023-01-01 RX ADMIN — LEVOTHYROXINE SODIUM 75 MCG: 0.07 TABLET ORAL at 09:38

## 2023-01-01 RX ADMIN — BUSPIRONE HYDROCHLORIDE 10 MG: 10 TABLET ORAL at 09:44

## 2023-01-01 RX ADMIN — ROSUVASTATIN CALCIUM 10 MG: 10 TABLET, FILM COATED ORAL at 21:57

## 2023-01-01 RX ADMIN — HYDROCODONE BITARTRATE AND ACETAMINOPHEN 1 TABLET: 7.5; 325 TABLET ORAL at 05:15

## 2023-01-01 RX ADMIN — APIXABAN 2.5 MG: 2.5 TABLET, FILM COATED ORAL at 21:13

## 2023-01-01 RX ADMIN — GABAPENTIN 300 MG: 300 CAPSULE ORAL at 20:23

## 2023-01-01 RX ADMIN — ALBUTEROL SULFATE 2.5 MG: 2.5 SOLUTION RESPIRATORY (INHALATION) at 14:17

## 2023-01-01 RX ADMIN — GUAIFENESIN 600 MG: 600 TABLET, EXTENDED RELEASE ORAL at 09:38

## 2023-01-01 RX ADMIN — TIOTROPIUM BROMIDE INHALATION SPRAY 2 PUFF: 3.12 SPRAY, METERED RESPIRATORY (INHALATION) at 07:15

## 2023-01-01 RX ADMIN — METHYLPREDNISOLONE SODIUM SUCCINATE 40 MG: 40 INJECTION, POWDER, FOR SOLUTION INTRAMUSCULAR; INTRAVENOUS at 01:33

## 2023-01-01 RX ADMIN — GABAPENTIN 200 MG: 100 CAPSULE ORAL at 08:51

## 2023-01-01 RX ADMIN — ALBUTEROL SULFATE 2.5 MG: 2.5 SOLUTION RESPIRATORY (INHALATION) at 06:32

## 2023-01-01 RX ADMIN — APIXABAN 2.5 MG: 2.5 TABLET, FILM COATED ORAL at 20:37

## 2023-01-01 RX ADMIN — GUAIFENESIN 600 MG: 600 TABLET, EXTENDED RELEASE ORAL at 08:37

## 2023-01-01 RX ADMIN — HYDROXYZINE HYDROCHLORIDE 25 MG: 25 TABLET ORAL at 04:04

## 2023-01-01 RX ADMIN — ALBUTEROL SULFATE 2.5 MG: 2.5 SOLUTION RESPIRATORY (INHALATION) at 11:06

## 2023-01-01 RX ADMIN — ROPINIROLE HYDROCHLORIDE 0.5 MG: 0.5 TABLET, FILM COATED ORAL at 20:33

## 2023-01-01 RX ADMIN — ALBUTEROL SULFATE 2.5 MG: 2.5 SOLUTION RESPIRATORY (INHALATION) at 11:22

## 2023-01-01 RX ADMIN — MIDODRINE HYDROCHLORIDE 5 MG: 5 TABLET ORAL at 09:37

## 2023-01-01 RX ADMIN — QUETIAPINE FUMARATE 100 MG: 50 TABLET, FILM COATED ORAL at 20:42

## 2023-01-01 RX ADMIN — GABAPENTIN 300 MG: 300 CAPSULE ORAL at 21:53

## 2023-01-01 RX ADMIN — ALLOPURINOL 100 MG: 100 TABLET ORAL at 09:52

## 2023-01-01 RX ADMIN — APIXABAN 2.5 MG: 2.5 TABLET, FILM COATED ORAL at 09:52

## 2023-01-01 RX ADMIN — ALBUTEROL SULFATE 2.5 MG: 2.5 SOLUTION RESPIRATORY (INHALATION) at 07:15

## 2023-01-01 RX ADMIN — METHYLPREDNISOLONE SODIUM SUCCINATE 40 MG: 40 INJECTION, POWDER, LYOPHILIZED, FOR SOLUTION INTRAMUSCULAR; INTRAVENOUS at 16:00

## 2023-01-01 RX ADMIN — METOPROLOL TARTRATE 100 MG: 50 TABLET, FILM COATED ORAL at 11:32

## 2023-01-01 RX ADMIN — METOPROLOL TARTRATE 75 MG: 25 TABLET, FILM COATED ORAL at 12:49

## 2023-01-01 RX ADMIN — MIDODRINE HYDROCHLORIDE 2.5 MG: 2.5 TABLET ORAL at 20:38

## 2023-01-01 RX ADMIN — MIDODRINE HYDROCHLORIDE 5 MG: 5 TABLET ORAL at 08:01

## 2023-01-01 RX ADMIN — ROSUVASTATIN CALCIUM 10 MG: 10 TABLET, FILM COATED ORAL at 20:33

## 2023-01-01 RX ADMIN — BUDESONIDE AND FORMOTEROL FUMARATE DIHYDRATE 2 PUFF: 160; 4.5 AEROSOL RESPIRATORY (INHALATION) at 09:10

## 2023-01-01 RX ADMIN — ROPINIROLE HYDROCHLORIDE 0.5 MG: 0.5 TABLET, FILM COATED ORAL at 21:59

## 2023-01-01 RX ADMIN — METOPROLOL TARTRATE 100 MG: 50 TABLET, FILM COATED ORAL at 03:53

## 2023-01-01 RX ADMIN — Medication 4 ML: at 19:44

## 2023-01-01 RX ADMIN — MIDODRINE HYDROCHLORIDE 5 MG: 5 TABLET ORAL at 14:45

## 2023-01-01 RX ADMIN — BUDESONIDE AND FORMOTEROL FUMARATE DIHYDRATE 2 PUFF: 160; 4.5 AEROSOL RESPIRATORY (INHALATION) at 06:54

## 2023-01-01 RX ADMIN — GUAIFENESIN 600 MG: 600 TABLET, EXTENDED RELEASE ORAL at 21:13

## 2023-01-01 RX ADMIN — METOPROLOL TARTRATE 100 MG: 50 TABLET, FILM COATED ORAL at 20:34

## 2023-01-01 RX ADMIN — PANTOPRAZOLE SODIUM 40 MG: 40 TABLET, DELAYED RELEASE ORAL at 08:01

## 2023-01-01 RX ADMIN — ALBUTEROL SULFATE 2.5 MG: 2.5 SOLUTION RESPIRATORY (INHALATION) at 00:16

## 2023-01-01 RX ADMIN — MIDODRINE HYDROCHLORIDE 5 MG: 5 TABLET ORAL at 18:05

## 2023-01-01 RX ADMIN — ALBUTEROL SULFATE 2.5 MG: 2.5 SOLUTION RESPIRATORY (INHALATION) at 14:52

## 2023-01-01 RX ADMIN — BUSPIRONE HYDROCHLORIDE 10 MG: 10 TABLET ORAL at 20:42

## 2023-01-01 RX ADMIN — Medication 4 ML: at 07:16

## 2023-01-01 RX ADMIN — BUDESONIDE AND FORMOTEROL FUMARATE DIHYDRATE 2 PUFF: 160; 4.5 AEROSOL RESPIRATORY (INHALATION) at 07:30

## 2023-01-01 RX ADMIN — BUDESONIDE AND FORMOTEROL FUMARATE DIHYDRATE 2 PUFF: 160; 4.5 AEROSOL RESPIRATORY (INHALATION) at 07:40

## 2023-01-01 RX ADMIN — ALBUTEROL SULFATE 2.5 MG: 2.5 SOLUTION RESPIRATORY (INHALATION) at 19:45

## 2023-01-01 RX ADMIN — HYDROXYZINE HYDROCHLORIDE 25 MG: 25 TABLET ORAL at 05:38

## 2023-01-01 RX ADMIN — GUAIFENESIN 600 MG: 600 TABLET, EXTENDED RELEASE ORAL at 08:52

## 2023-01-01 RX ADMIN — Medication 4 ML: at 12:16

## 2023-01-01 RX ADMIN — PREDNISONE 40 MG: 20 TABLET ORAL at 09:44

## 2023-01-01 RX ADMIN — ALBUTEROL SULFATE 2.5 MG: 2.5 SOLUTION RESPIRATORY (INHALATION) at 11:46

## 2023-01-01 RX ADMIN — BUDESONIDE AND FORMOTEROL FUMARATE DIHYDRATE 2 PUFF: 160; 4.5 AEROSOL RESPIRATORY (INHALATION) at 06:32

## 2023-01-01 RX ADMIN — HYDROMORPHONE HYDROCHLORIDE 0.5 MG: 1 INJECTION, SOLUTION INTRAMUSCULAR; INTRAVENOUS; SUBCUTANEOUS at 16:26

## 2023-01-01 RX ADMIN — SIMETHICONE 80 MG: 80 TABLET, CHEWABLE ORAL at 01:16

## 2023-01-01 RX ADMIN — BUSPIRONE HYDROCHLORIDE 10 MG: 10 TABLET ORAL at 20:38

## 2023-01-01 RX ADMIN — MAGNESIUM SULFATE HEPTAHYDRATE 2 G: 2 INJECTION, SOLUTION INTRAVENOUS at 15:45

## 2023-01-01 RX ADMIN — METOPROLOL TARTRATE 75 MG: 25 TABLET, FILM COATED ORAL at 21:53

## 2023-01-01 RX ADMIN — BUDESONIDE AND FORMOTEROL FUMARATE DIHYDRATE 2 PUFF: 160; 4.5 AEROSOL RESPIRATORY (INHALATION) at 07:15

## 2023-01-01 RX ADMIN — BUDESONIDE AND FORMOTEROL FUMARATE DIHYDRATE 2 PUFF: 160; 4.5 AEROSOL RESPIRATORY (INHALATION) at 07:43

## 2023-01-01 RX ADMIN — BUSPIRONE HYDROCHLORIDE 10 MG: 10 TABLET ORAL at 11:38

## 2023-01-01 RX ADMIN — BUDESONIDE AND FORMOTEROL FUMARATE DIHYDRATE 2 PUFF: 160; 4.5 AEROSOL RESPIRATORY (INHALATION) at 07:24

## 2023-01-01 RX ADMIN — METOPROLOL TARTRATE 75 MG: 25 TABLET, FILM COATED ORAL at 18:35

## 2023-01-01 RX ADMIN — ALBUTEROL SULFATE 2.5 MG: 2.5 SOLUTION RESPIRATORY (INHALATION) at 19:42

## 2023-01-01 RX ADMIN — TIOTROPIUM BROMIDE INHALATION SPRAY 2 PUFF: 3.12 SPRAY, METERED RESPIRATORY (INHALATION) at 09:11

## 2023-01-01 RX ADMIN — ALBUTEROL SULFATE 2.5 MG: 2.5 SOLUTION RESPIRATORY (INHALATION) at 23:19

## 2023-01-01 RX ADMIN — METOPROLOL TARTRATE 100 MG: 50 TABLET, FILM COATED ORAL at 10:56

## 2023-01-01 RX ADMIN — QUETIAPINE FUMARATE 100 MG: 50 TABLET, FILM COATED ORAL at 20:39

## 2023-01-01 RX ADMIN — TIOTROPIUM BROMIDE INHALATION SPRAY 2 PUFF: 3.12 SPRAY, METERED RESPIRATORY (INHALATION) at 06:56

## 2023-01-01 RX ADMIN — SIMETHICONE 80 MG: 80 TABLET, CHEWABLE ORAL at 15:47

## 2023-01-01 RX ADMIN — HYDROMORPHONE HYDROCHLORIDE 0.5 MG: 1 INJECTION, SOLUTION INTRAMUSCULAR; INTRAVENOUS; SUBCUTANEOUS at 12:41

## 2023-01-01 RX ADMIN — BUSPIRONE HYDROCHLORIDE 10 MG: 10 TABLET ORAL at 21:13

## 2023-01-01 RX ADMIN — LEVOTHYROXINE SODIUM 75 MCG: 0.07 TABLET ORAL at 08:06

## 2023-01-01 RX ADMIN — DULOXETINE HYDROCHLORIDE 60 MG: 60 CAPSULE, DELAYED RELEASE ORAL at 09:44

## 2023-01-01 RX ADMIN — METOPROLOL TARTRATE 100 MG: 50 TABLET, FILM COATED ORAL at 22:17

## 2023-01-01 RX ADMIN — ROPINIROLE HYDROCHLORIDE 0.5 MG: 0.5 TABLET, FILM COATED ORAL at 20:38

## 2023-01-01 RX ADMIN — TIOTROPIUM BROMIDE INHALATION SPRAY 2 PUFF: 3.12 SPRAY, METERED RESPIRATORY (INHALATION) at 06:32

## 2023-01-01 RX ADMIN — METHYLPREDNISOLONE SODIUM SUCCINATE 40 MG: 40 INJECTION, POWDER, LYOPHILIZED, FOR SOLUTION INTRAMUSCULAR; INTRAVENOUS at 08:00

## 2023-01-01 RX ADMIN — GABAPENTIN 200 MG: 100 CAPSULE ORAL at 09:38

## 2023-01-01 RX ADMIN — ALLOPURINOL 100 MG: 100 TABLET ORAL at 09:38

## 2023-01-01 RX ADMIN — LEVOTHYROXINE SODIUM 75 MCG: 0.07 TABLET ORAL at 08:37

## 2023-01-01 RX ADMIN — ROSUVASTATIN CALCIUM 10 MG: 10 TABLET, FILM COATED ORAL at 21:13

## 2023-01-01 RX ADMIN — GUAIFENESIN 600 MG: 600 TABLET, EXTENDED RELEASE ORAL at 08:06

## 2023-01-01 RX ADMIN — GABAPENTIN 300 MG: 300 CAPSULE ORAL at 17:03

## 2023-01-01 RX ADMIN — ALBUTEROL SULFATE 2.5 MG: 2.5 SOLUTION RESPIRATORY (INHALATION) at 12:16

## 2023-01-01 RX ADMIN — GABAPENTIN 300 MG: 300 CAPSULE ORAL at 20:38

## 2023-01-01 RX ADMIN — DULOXETINE HYDROCHLORIDE 60 MG: 60 CAPSULE, DELAYED RELEASE ORAL at 08:37

## 2023-01-01 RX ADMIN — ALBUTEROL SULFATE 2.5 MG: 2.5 SOLUTION RESPIRATORY (INHALATION) at 07:24

## 2023-01-01 RX ADMIN — HYDROCODONE BITARTRATE AND ACETAMINOPHEN 1 TABLET: 7.5; 325 TABLET ORAL at 10:42

## 2023-01-01 RX ADMIN — BUSPIRONE HYDROCHLORIDE 10 MG: 10 TABLET ORAL at 21:06

## 2023-01-01 RX ADMIN — BUSPIRONE HYDROCHLORIDE 10 MG: 10 TABLET ORAL at 21:33

## 2023-01-01 RX ADMIN — GABAPENTIN 200 MG: 100 CAPSULE ORAL at 16:25

## 2023-01-01 RX ADMIN — METHYLPREDNISOLONE SODIUM SUCCINATE 40 MG: 40 INJECTION, POWDER, LYOPHILIZED, FOR SOLUTION INTRAMUSCULAR; INTRAVENOUS at 08:01

## 2023-01-01 RX ADMIN — BUSPIRONE HYDROCHLORIDE 10 MG: 10 TABLET ORAL at 20:23

## 2023-01-01 RX ADMIN — PANTOPRAZOLE SODIUM 40 MG: 40 TABLET, DELAYED RELEASE ORAL at 17:03

## 2023-01-01 RX ADMIN — GUAIFENESIN 600 MG: 600 TABLET, EXTENDED RELEASE ORAL at 09:52

## 2023-01-01 RX ADMIN — AMIODARONE HYDROCHLORIDE 200 MG: 200 TABLET ORAL at 11:38

## 2023-01-01 RX ADMIN — ROSUVASTATIN CALCIUM 10 MG: 10 TABLET, FILM COATED ORAL at 21:53

## 2023-01-01 RX ADMIN — ALBUTEROL SULFATE 2.5 MG: 2.5 SOLUTION RESPIRATORY (INHALATION) at 07:14

## 2023-01-01 RX ADMIN — PREDNISONE 40 MG: 20 TABLET ORAL at 08:06

## 2023-01-01 RX ADMIN — APIXABAN 2.5 MG: 2.5 TABLET, FILM COATED ORAL at 08:51

## 2023-01-01 RX ADMIN — QUETIAPINE FUMARATE 100 MG: 50 TABLET, FILM COATED ORAL at 22:26

## 2023-01-01 RX ADMIN — APIXABAN 2.5 MG: 2.5 TABLET, FILM COATED ORAL at 21:18

## 2023-01-01 RX ADMIN — DULOXETINE HYDROCHLORIDE 60 MG: 60 CAPSULE, DELAYED RELEASE ORAL at 08:52

## 2023-01-01 RX ADMIN — Medication 4 ML: at 07:57

## 2023-01-01 RX ADMIN — GUAIFENESIN 600 MG: 600 TABLET, EXTENDED RELEASE ORAL at 08:55

## 2023-01-01 RX ADMIN — BUSPIRONE HYDROCHLORIDE 10 MG: 10 TABLET ORAL at 08:37

## 2023-01-01 RX ADMIN — BUDESONIDE AND FORMOTEROL FUMARATE DIHYDRATE 2 PUFF: 160; 4.5 AEROSOL RESPIRATORY (INHALATION) at 06:58

## 2023-01-01 RX ADMIN — GUAIFENESIN 600 MG: 600 TABLET, EXTENDED RELEASE ORAL at 20:22

## 2023-01-01 RX ADMIN — BUSPIRONE HYDROCHLORIDE 10 MG: 10 TABLET ORAL at 22:33

## 2023-01-01 RX ADMIN — ALBUTEROL SULFATE 2.5 MG: 2.5 SOLUTION RESPIRATORY (INHALATION) at 03:17

## 2023-01-01 RX ADMIN — Medication 4 ML: at 19:37

## 2023-01-01 RX ADMIN — HYDROXYZINE HYDROCHLORIDE 25 MG: 25 TABLET ORAL at 14:45

## 2023-01-01 RX ADMIN — METOPROLOL TARTRATE 100 MG: 50 TABLET, FILM COATED ORAL at 20:22

## 2023-01-01 RX ADMIN — PANTOPRAZOLE SODIUM 40 MG: 40 TABLET, DELAYED RELEASE ORAL at 16:34

## 2023-01-01 RX ADMIN — TIOTROPIUM BROMIDE INHALATION SPRAY 2 PUFF: 3.12 SPRAY, METERED RESPIRATORY (INHALATION) at 07:30

## 2023-01-01 RX ADMIN — TIOTROPIUM BROMIDE INHALATION SPRAY 2 PUFF: 3.12 SPRAY, METERED RESPIRATORY (INHALATION) at 07:17

## 2023-01-01 RX ADMIN — AMIODARONE HYDROCHLORIDE 200 MG: 200 TABLET ORAL at 08:52

## 2023-01-01 RX ADMIN — BUSPIRONE HYDROCHLORIDE 10 MG: 10 TABLET ORAL at 08:51

## 2023-01-01 RX ADMIN — Medication 10 ML: at 08:05

## 2023-01-01 RX ADMIN — FUROSEMIDE 40 MG: 20 TABLET ORAL at 18:05

## 2023-01-01 RX ADMIN — AMIODARONE HYDROCHLORIDE 200 MG: 200 TABLET ORAL at 08:50

## 2023-01-01 RX ADMIN — FAMOTIDINE 20 MG: 20 TABLET ORAL at 22:34

## 2023-01-01 RX ADMIN — PANTOPRAZOLE SODIUM 40 MG: 40 TABLET, DELAYED RELEASE ORAL at 18:02

## 2023-01-01 RX ADMIN — METHYLPREDNISOLONE SODIUM SUCCINATE 40 MG: 40 INJECTION, POWDER, LYOPHILIZED, FOR SOLUTION INTRAMUSCULAR; INTRAVENOUS at 22:56

## 2023-01-01 RX ADMIN — QUETIAPINE FUMARATE 100 MG: 50 TABLET, FILM COATED ORAL at 21:53

## 2023-01-01 RX ADMIN — METOPROLOL TARTRATE 75 MG: 25 TABLET, FILM COATED ORAL at 12:22

## 2023-01-01 RX ADMIN — GUAIFENESIN 600 MG: 600 TABLET, EXTENDED RELEASE ORAL at 21:58

## 2023-01-01 RX ADMIN — DULOXETINE HYDROCHLORIDE 60 MG: 60 CAPSULE, DELAYED RELEASE ORAL at 08:18

## 2023-01-01 RX ADMIN — ROPINIROLE HYDROCHLORIDE 0.5 MG: 0.5 TABLET, FILM COATED ORAL at 21:53

## 2023-01-01 RX ADMIN — HYDROCODONE BITARTRATE AND ACETAMINOPHEN 1 TABLET: 7.5; 325 TABLET ORAL at 09:23

## 2023-01-01 RX ADMIN — GABAPENTIN 300 MG: 300 CAPSULE ORAL at 22:33

## 2023-01-01 RX ADMIN — METOPROLOL TARTRATE 100 MG: 50 TABLET, FILM COATED ORAL at 05:04

## 2023-01-01 RX ADMIN — ALBUTEROL SULFATE 2.5 MG: 2.5 SOLUTION RESPIRATORY (INHALATION) at 19:39

## 2023-01-01 RX ADMIN — METHYLPREDNISOLONE SODIUM SUCCINATE 40 MG: 40 INJECTION, POWDER, LYOPHILIZED, FOR SOLUTION INTRAMUSCULAR; INTRAVENOUS at 00:18

## 2023-01-01 RX ADMIN — IPRATROPIUM BROMIDE AND ALBUTEROL SULFATE 3 ML: .5; 2.5 SOLUTION RESPIRATORY (INHALATION) at 13:25

## 2023-01-01 RX ADMIN — ALBUTEROL SULFATE 2.5 MG: 2.5 SOLUTION RESPIRATORY (INHALATION) at 19:51

## 2023-01-01 RX ADMIN — MIDODRINE HYDROCHLORIDE 5 MG: 5 TABLET ORAL at 08:52

## 2023-01-01 RX ADMIN — PREDNISONE 20 MG: 20 TABLET ORAL at 09:52

## 2023-01-01 RX ADMIN — ROPINIROLE HYDROCHLORIDE 0.5 MG: 0.5 TABLET, FILM COATED ORAL at 22:33

## 2023-01-01 RX ADMIN — BUSPIRONE HYDROCHLORIDE 10 MG: 10 TABLET ORAL at 09:52

## 2023-01-01 RX ADMIN — MIDODRINE HYDROCHLORIDE 5 MG: 5 TABLET ORAL at 12:11

## 2023-01-01 RX ADMIN — GUAIFENESIN 600 MG: 600 TABLET, EXTENDED RELEASE ORAL at 21:32

## 2023-01-01 RX ADMIN — LEVOTHYROXINE SODIUM 75 MCG: 0.07 TABLET ORAL at 08:51

## 2023-01-01 RX ADMIN — IPRATROPIUM BROMIDE AND ALBUTEROL SULFATE 3 ML: 2.5; .5 SOLUTION RESPIRATORY (INHALATION) at 19:36

## 2023-01-01 RX ADMIN — GUAIFENESIN 600 MG: 600 TABLET, EXTENDED RELEASE ORAL at 20:41

## 2023-01-01 RX ADMIN — ALBUTEROL SULFATE 2.5 MG: 2.5 SOLUTION RESPIRATORY (INHALATION) at 07:40

## 2023-01-01 RX ADMIN — Medication 4 ML: at 21:20

## 2023-01-01 RX ADMIN — GABAPENTIN 300 MG: 300 CAPSULE ORAL at 08:52

## 2023-01-01 RX ADMIN — PANTOPRAZOLE SODIUM 40 MG: 40 TABLET, DELAYED RELEASE ORAL at 17:37

## 2023-01-01 RX ADMIN — LEVOTHYROXINE SODIUM 75 MCG: 0.07 TABLET ORAL at 11:38

## 2023-01-01 RX ADMIN — ALLOPURINOL 100 MG: 100 TABLET ORAL at 09:44

## 2023-01-01 RX ADMIN — ALLOPURINOL 100 MG: 100 TABLET ORAL at 08:24

## 2023-01-01 RX ADMIN — GUAIFENESIN 600 MG: 600 TABLET, EXTENDED RELEASE ORAL at 20:38

## 2023-01-01 RX ADMIN — METOPROLOL TARTRATE 100 MG: 50 TABLET, FILM COATED ORAL at 22:55

## 2023-01-01 RX ADMIN — BUDESONIDE AND FORMOTEROL FUMARATE DIHYDRATE 2 PUFF: 160; 4.5 AEROSOL RESPIRATORY (INHALATION) at 19:48

## 2023-01-01 RX ADMIN — APIXABAN 2.5 MG: 2.5 TABLET, FILM COATED ORAL at 21:53

## 2023-01-01 RX ADMIN — Medication 4 ML: at 15:49

## 2023-01-01 RX ADMIN — BUSPIRONE HYDROCHLORIDE 10 MG: 10 TABLET ORAL at 21:53

## 2023-01-01 RX ADMIN — METOPROLOL TARTRATE 75 MG: 25 TABLET, FILM COATED ORAL at 11:38

## 2023-01-01 RX ADMIN — GABAPENTIN 300 MG: 300 CAPSULE ORAL at 11:38

## 2023-01-01 RX ADMIN — TIOTROPIUM BROMIDE INHALATION SPRAY 2 PUFF: 3.12 SPRAY, METERED RESPIRATORY (INHALATION) at 06:53

## 2023-01-01 RX ADMIN — APIXABAN 2.5 MG: 2.5 TABLET, FILM COATED ORAL at 08:38

## 2023-01-01 RX ADMIN — ALBUTEROL SULFATE 2.5 MG: 2.5 SOLUTION RESPIRATORY (INHALATION) at 07:00

## 2023-01-01 RX ADMIN — ALBUTEROL SULFATE 2.5 MG: 2.5 SOLUTION RESPIRATORY (INHALATION) at 07:07

## 2023-01-01 RX ADMIN — APIXABAN 2.5 MG: 2.5 TABLET, FILM COATED ORAL at 08:50

## 2023-01-01 RX ADMIN — ONDANSETRON 4 MG: 2 INJECTION INTRAMUSCULAR; INTRAVENOUS at 13:11

## 2023-01-01 RX ADMIN — BUDESONIDE AND FORMOTEROL FUMARATE DIHYDRATE 2 PUFF: 160; 4.5 AEROSOL RESPIRATORY (INHALATION) at 19:47

## 2023-01-01 RX ADMIN — PANTOPRAZOLE SODIUM 40 MG: 40 TABLET, DELAYED RELEASE ORAL at 11:38

## 2023-01-01 RX ADMIN — TIOTROPIUM BROMIDE INHALATION SPRAY 2 PUFF: 3.12 SPRAY, METERED RESPIRATORY (INHALATION) at 07:08

## 2023-01-01 RX ADMIN — ROSUVASTATIN CALCIUM 10 MG: 10 TABLET, FILM COATED ORAL at 21:32

## 2023-01-01 RX ADMIN — METOPROLOL TARTRATE 100 MG: 50 TABLET, FILM COATED ORAL at 21:12

## 2023-01-01 RX ADMIN — MIDODRINE HYDROCHLORIDE 5 MG: 5 TABLET ORAL at 17:43

## 2023-01-01 RX ADMIN — ALBUTEROL SULFATE 2.5 MG: 2.5 SOLUTION RESPIRATORY (INHALATION) at 06:52

## 2023-01-01 RX ADMIN — PREDNISONE 20 MG: 20 TABLET ORAL at 08:50

## 2023-01-01 RX ADMIN — GABAPENTIN 300 MG: 300 CAPSULE ORAL at 20:42

## 2023-01-01 RX ADMIN — GABAPENTIN 300 MG: 300 CAPSULE ORAL at 18:02

## 2023-01-01 RX ADMIN — FAMOTIDINE 20 MG: 20 TABLET ORAL at 20:42

## 2023-01-01 RX ADMIN — GABAPENTIN 200 MG: 100 CAPSULE ORAL at 08:55

## 2023-01-01 RX ADMIN — METHYLPREDNISOLONE SODIUM SUCCINATE 40 MG: 40 INJECTION, POWDER, LYOPHILIZED, FOR SOLUTION INTRAMUSCULAR; INTRAVENOUS at 22:17

## 2023-01-01 RX ADMIN — GABAPENTIN 300 MG: 300 CAPSULE ORAL at 08:23

## 2023-01-01 RX ADMIN — BUSPIRONE HYDROCHLORIDE 10 MG: 10 TABLET ORAL at 23:38

## 2023-01-01 RX ADMIN — ALBUTEROL SULFATE 2.5 MG: 2.5 SOLUTION RESPIRATORY (INHALATION) at 09:19

## 2023-01-01 RX ADMIN — Medication 4 ML: at 19:49

## 2023-01-01 RX ADMIN — HYDROCODONE BITARTRATE AND ACETAMINOPHEN 1 TABLET: 7.5; 325 TABLET ORAL at 16:43

## 2023-01-01 RX ADMIN — BUDESONIDE AND FORMOTEROL FUMARATE DIHYDRATE 2 PUFF: 160; 4.5 AEROSOL RESPIRATORY (INHALATION) at 09:30

## 2023-01-01 RX ADMIN — AMIODARONE HYDROCHLORIDE 200 MG: 200 TABLET ORAL at 08:38

## 2023-01-01 RX ADMIN — PANTOPRAZOLE SODIUM 40 MG: 40 TABLET, DELAYED RELEASE ORAL at 09:44

## 2023-01-01 RX ADMIN — HYDROCODONE BITARTRATE AND ACETAMINOPHEN 1 TABLET: 7.5; 325 TABLET ORAL at 18:03

## 2023-01-01 RX ADMIN — SODIUM CHLORIDE 125 ML/HR: 9 INJECTION, SOLUTION INTRAVENOUS at 20:36

## 2023-01-01 RX ADMIN — APIXABAN 2.5 MG: 2.5 TABLET, FILM COATED ORAL at 11:38

## 2023-01-01 RX ADMIN — ROPINIROLE HYDROCHLORIDE 0.5 MG: 0.5 TABLET, FILM COATED ORAL at 20:37

## 2023-01-01 RX ADMIN — DULOXETINE HYDROCHLORIDE 60 MG: 60 CAPSULE, DELAYED RELEASE ORAL at 08:06

## 2023-01-01 RX ADMIN — AMIODARONE HYDROCHLORIDE 200 MG: 200 TABLET ORAL at 09:38

## 2023-01-01 RX ADMIN — BUSPIRONE HYDROCHLORIDE 10 MG: 10 TABLET ORAL at 20:33

## 2023-01-01 RX ADMIN — GABAPENTIN 200 MG: 100 CAPSULE ORAL at 08:37

## 2023-01-01 RX ADMIN — MIDODRINE HYDROCHLORIDE 5 MG: 5 TABLET ORAL at 10:56

## 2023-01-01 RX ADMIN — METOPROLOL SUCCINATE 50 MG: 25 TABLET, EXTENDED RELEASE ORAL at 18:05

## 2023-01-01 RX ADMIN — GUAIFENESIN 600 MG: 600 TABLET, EXTENDED RELEASE ORAL at 09:44

## 2023-01-01 RX ADMIN — ROSUVASTATIN CALCIUM 10 MG: 10 TABLET, FILM COATED ORAL at 20:23

## 2023-01-01 RX ADMIN — LORAZEPAM 0.5 MG: 2 INJECTION INTRAMUSCULAR; INTRAVENOUS at 12:41

## 2023-01-01 RX ADMIN — HYDROCODONE BITARTRATE AND ACETAMINOPHEN 1 TABLET: 7.5; 325 TABLET ORAL at 21:58

## 2023-01-01 RX ADMIN — ROPINIROLE HYDROCHLORIDE 0.5 MG: 0.5 TABLET, FILM COATED ORAL at 21:13

## 2023-01-01 RX ADMIN — METOPROLOL TARTRATE 100 MG: 50 TABLET, FILM COATED ORAL at 20:23

## 2023-01-01 RX ADMIN — GUAIFENESIN 600 MG: 600 TABLET, EXTENDED RELEASE ORAL at 21:17

## 2023-01-01 RX ADMIN — APIXABAN 2.5 MG: 2.5 TABLET, FILM COATED ORAL at 20:33

## 2023-01-01 RX ADMIN — QUETIAPINE FUMARATE 100 MG: 50 TABLET, FILM COATED ORAL at 20:37

## 2023-01-01 RX ADMIN — ALLOPURINOL 100 MG: 100 TABLET ORAL at 08:55

## 2023-01-01 RX ADMIN — METOPROLOL TARTRATE 100 MG: 50 TABLET, FILM COATED ORAL at 02:30

## 2023-01-01 RX ADMIN — METHYLPREDNISOLONE SODIUM SUCCINATE 40 MG: 40 INJECTION, POWDER, LYOPHILIZED, FOR SOLUTION INTRAMUSCULAR; INTRAVENOUS at 23:55

## 2023-01-01 RX ADMIN — METOPROLOL TARTRATE 75 MG: 25 TABLET, FILM COATED ORAL at 04:13

## 2023-01-01 RX ADMIN — PANTOPRAZOLE SODIUM 40 MG: 40 TABLET, DELAYED RELEASE ORAL at 08:00

## 2023-01-01 RX ADMIN — METOPROLOL TARTRATE 50 MG: 50 TABLET, FILM COATED ORAL at 12:09

## 2023-01-01 RX ADMIN — GABAPENTIN 200 MG: 100 CAPSULE ORAL at 16:41

## 2023-01-01 RX ADMIN — ALBUTEROL SULFATE 2.5 MG: 2.5 SOLUTION RESPIRATORY (INHALATION) at 07:30

## 2023-01-01 RX ADMIN — GUAIFENESIN 600 MG: 600 TABLET, EXTENDED RELEASE ORAL at 21:53

## 2023-01-01 RX ADMIN — AMIODARONE HYDROCHLORIDE 200 MG: 200 TABLET ORAL at 08:29

## 2023-01-01 RX ADMIN — GUAIFENESIN 600 MG: 600 TABLET, EXTENDED RELEASE ORAL at 08:18

## 2023-01-01 RX ADMIN — BUDESONIDE AND FORMOTEROL FUMARATE DIHYDRATE 2 PUFF: 160; 4.5 AEROSOL RESPIRATORY (INHALATION) at 19:41

## 2023-01-01 RX ADMIN — HYDROXYZINE HYDROCHLORIDE 25 MG: 25 TABLET ORAL at 20:24

## 2023-01-01 RX ADMIN — Medication 4 ML: at 11:06

## 2023-01-01 RX ADMIN — ALLOPURINOL 100 MG: 100 TABLET ORAL at 08:51

## 2023-01-01 RX ADMIN — GABAPENTIN 200 MG: 100 CAPSULE ORAL at 21:57

## 2023-01-01 RX ADMIN — ALBUTEROL SULFATE 2.5 MG: 2.5 SOLUTION RESPIRATORY (INHALATION) at 23:42

## 2023-01-01 RX ADMIN — ALBUTEROL SULFATE 2.5 MG: 2.5 SOLUTION RESPIRATORY (INHALATION) at 19:56

## 2023-01-01 RX ADMIN — ALBUTEROL SULFATE 2.5 MG: 2.5 SOLUTION RESPIRATORY (INHALATION) at 06:49

## 2023-01-01 RX ADMIN — Medication 4 ML: at 15:13

## 2023-01-01 RX ADMIN — METOPROLOL TARTRATE 75 MG: 25 TABLET, FILM COATED ORAL at 03:27

## 2023-01-01 RX ADMIN — BUSPIRONE HYDROCHLORIDE 10 MG: 10 TABLET ORAL at 08:18

## 2023-01-01 RX ADMIN — TIOTROPIUM BROMIDE INHALATION SPRAY 2 PUFF: 3.12 SPRAY, METERED RESPIRATORY (INHALATION) at 07:45

## 2023-01-01 RX ADMIN — GUAIFENESIN 600 MG: 600 TABLET, EXTENDED RELEASE ORAL at 20:37

## 2023-01-01 RX ADMIN — BUSPIRONE HYDROCHLORIDE 10 MG: 10 TABLET ORAL at 08:55

## 2023-01-01 RX ADMIN — METOPROLOL TARTRATE 100 MG: 50 TABLET, FILM COATED ORAL at 16:26

## 2023-01-01 RX ADMIN — APIXABAN 2.5 MG: 2.5 TABLET, FILM COATED ORAL at 09:44

## 2023-01-01 RX ADMIN — ROPINIROLE HYDROCHLORIDE 0.5 MG: 0.5 TABLET, FILM COATED ORAL at 20:23

## 2023-01-01 RX ADMIN — METOPROLOL SUCCINATE 50 MG: 25 TABLET, EXTENDED RELEASE ORAL at 08:24

## 2023-01-01 RX ADMIN — ALBUTEROL SULFATE 2.5 MG: 2.5 SOLUTION RESPIRATORY (INHALATION) at 15:49

## 2023-01-01 RX ADMIN — ROSUVASTATIN CALCIUM 10 MG: 10 TABLET, FILM COATED ORAL at 21:18

## 2023-01-01 RX ADMIN — METOPROLOL TARTRATE 100 MG: 50 TABLET, FILM COATED ORAL at 04:05

## 2023-01-01 RX ADMIN — APIXABAN 2.5 MG: 2.5 TABLET, FILM COATED ORAL at 20:23

## 2023-01-01 RX ADMIN — MIDODRINE HYDROCHLORIDE 5 MG: 5 TABLET ORAL at 05:19

## 2023-01-01 RX ADMIN — ALLOPURINOL 100 MG: 100 TABLET ORAL at 08:37

## 2023-01-01 RX ADMIN — METHYLPREDNISOLONE SODIUM SUCCINATE 40 MG: 40 INJECTION, POWDER, LYOPHILIZED, FOR SOLUTION INTRAMUSCULAR; INTRAVENOUS at 14:37

## 2023-01-01 RX ADMIN — SIMETHICONE 80 MG: 80 TABLET, CHEWABLE ORAL at 12:49

## 2023-01-01 RX ADMIN — ONDANSETRON 4 MG: 2 INJECTION INTRAMUSCULAR; INTRAVENOUS at 04:56

## 2023-01-01 RX ADMIN — BUDESONIDE AND FORMOTEROL FUMARATE DIHYDRATE 2 PUFF: 160; 4.5 AEROSOL RESPIRATORY (INHALATION) at 07:17

## 2023-01-01 RX ADMIN — LEVOTHYROXINE SODIUM 75 MCG: 0.07 TABLET ORAL at 08:52

## 2023-01-01 RX ADMIN — PREDNISONE 20 MG: 20 TABLET ORAL at 08:29

## 2023-01-01 RX ADMIN — GABAPENTIN 200 MG: 100 CAPSULE ORAL at 20:33

## 2023-01-01 RX ADMIN — ALBUTEROL SULFATE 2.5 MG: 2.5 SOLUTION RESPIRATORY (INHALATION) at 00:20

## 2023-01-01 RX ADMIN — PANTOPRAZOLE SODIUM 40 MG: 40 TABLET, DELAYED RELEASE ORAL at 08:51

## 2023-01-01 RX ADMIN — METOPROLOL TARTRATE 100 MG: 50 TABLET, FILM COATED ORAL at 21:06

## 2023-01-01 RX ADMIN — PANTOPRAZOLE SODIUM 40 MG: 40 TABLET, DELAYED RELEASE ORAL at 09:38

## 2023-01-01 RX ADMIN — ROPINIROLE HYDROCHLORIDE 0.5 MG: 0.5 TABLET, FILM COATED ORAL at 20:22

## 2023-01-01 RX ADMIN — QUETIAPINE FUMARATE 100 MG: 50 TABLET, FILM COATED ORAL at 20:23

## 2023-01-01 RX ADMIN — GABAPENTIN 200 MG: 100 CAPSULE ORAL at 18:03

## 2023-01-01 RX ADMIN — ONDANSETRON 4 MG: 2 INJECTION INTRAMUSCULAR; INTRAVENOUS at 01:25

## 2023-01-01 RX ADMIN — HYDROCODONE BITARTRATE AND ACETAMINOPHEN 1 TABLET: 7.5; 325 TABLET ORAL at 01:16

## 2023-01-01 RX ADMIN — GABAPENTIN 300 MG: 300 CAPSULE ORAL at 15:19

## 2023-01-23 ENCOUNTER — HOSPITAL ENCOUNTER (EMERGENCY)
Facility: HOSPITAL | Age: 83
Discharge: HOME | End: 2023-01-23
Attending: EMERGENCY MEDICINE | Admitting: EMERGENCY MEDICINE
Payer: MEDICARE

## 2023-01-23 ENCOUNTER — APPOINTMENT (OUTPATIENT)
Dept: GENERAL RADIOLOGY | Facility: HOSPITAL | Age: 83
End: 2023-01-23
Payer: MEDICARE

## 2023-01-23 VITALS
HEART RATE: 84 BPM | SYSTOLIC BLOOD PRESSURE: 128 MMHG | OXYGEN SATURATION: 97 % | RESPIRATION RATE: 18 BRPM | TEMPERATURE: 98.3 F | DIASTOLIC BLOOD PRESSURE: 64 MMHG

## 2023-01-23 DIAGNOSIS — J44.9 CHRONIC OBSTRUCTIVE PULMONARY DISEASE, UNSPECIFIED COPD TYPE: ICD-10-CM

## 2023-01-23 DIAGNOSIS — E03.9 HYPOTHYROIDISM, UNSPECIFIED TYPE: ICD-10-CM

## 2023-01-23 DIAGNOSIS — I10 HYPERTENSION, UNSPECIFIED TYPE: ICD-10-CM

## 2023-01-23 DIAGNOSIS — R11.0 NAUSEA: ICD-10-CM

## 2023-01-23 DIAGNOSIS — F41.9 ANXIETY: ICD-10-CM

## 2023-01-23 DIAGNOSIS — N17.9 AKI (ACUTE KIDNEY INJURY): ICD-10-CM

## 2023-01-23 DIAGNOSIS — R51.9 NONINTRACTABLE HEADACHE, UNSPECIFIED CHRONICITY PATTERN, UNSPECIFIED HEADACHE TYPE: Primary | ICD-10-CM

## 2023-01-23 LAB
ALBUMIN SERPL-MCNC: 3.7 G/DL (ref 3.5–5.2)
ALBUMIN/GLOB SERPL: 1.4 G/DL
ALP SERPL-CCNC: 76 U/L (ref 39–117)
ALT SERPL W P-5'-P-CCNC: 13 U/L (ref 1–33)
ANION GAP SERPL CALCULATED.3IONS-SCNC: 7.7 MMOL/L (ref 5–15)
AST SERPL-CCNC: 14 U/L (ref 1–32)
BACTERIA UR QL AUTO: ABNORMAL /HPF
BASOPHILS # BLD AUTO: 0.02 10*3/MM3 (ref 0–0.2)
BASOPHILS NFR BLD AUTO: 0.4 % (ref 0–1.5)
BILIRUB SERPL-MCNC: 0.2 MG/DL (ref 0–1.2)
BILIRUB UR QL STRIP: NEGATIVE
BUN SERPL-MCNC: 34 MG/DL (ref 8–23)
BUN/CREAT SERPL: 19.4 (ref 7–25)
CALCIUM SPEC-SCNC: 8.8 MG/DL (ref 8.6–10.5)
CHLORIDE SERPL-SCNC: 102 MMOL/L (ref 98–107)
CLARITY UR: CLEAR
CO2 SERPL-SCNC: 27.3 MMOL/L (ref 22–29)
COLOR UR: YELLOW
CREAT SERPL-MCNC: 1.75 MG/DL (ref 0.57–1)
DEPRECATED RDW RBC AUTO: 54.8 FL (ref 37–54)
EGFRCR SERPLBLD CKD-EPI 2021: 29.2 ML/MIN/1.73
EOSINOPHIL # BLD AUTO: 0.04 10*3/MM3 (ref 0–0.4)
EOSINOPHIL NFR BLD AUTO: 0.8 % (ref 0.3–6.2)
ERYTHROCYTE [DISTWIDTH] IN BLOOD BY AUTOMATED COUNT: 16.7 % (ref 12.3–15.4)
GLOBULIN UR ELPH-MCNC: 2.7 GM/DL
GLUCOSE SERPL-MCNC: 107 MG/DL (ref 65–99)
GLUCOSE UR STRIP-MCNC: NEGATIVE MG/DL
HCT VFR BLD AUTO: 25 % (ref 34–46.6)
HGB BLD-MCNC: 8.3 G/DL (ref 12–15.9)
HGB UR QL STRIP.AUTO: NEGATIVE
HYALINE CASTS UR QL AUTO: ABNORMAL /LPF
IMM GRANULOCYTES # BLD AUTO: 0.03 10*3/MM3 (ref 0–0.05)
IMM GRANULOCYTES NFR BLD AUTO: 0.6 % (ref 0–0.5)
KETONES UR QL STRIP: NEGATIVE
LEUKOCYTE ESTERASE UR QL STRIP.AUTO: ABNORMAL
LIPASE SERPL-CCNC: 62 U/L (ref 13–60)
LYMPHOCYTES # BLD AUTO: 1.41 10*3/MM3 (ref 0.7–3.1)
LYMPHOCYTES NFR BLD AUTO: 27.1 % (ref 19.6–45.3)
MAGNESIUM SERPL-MCNC: 1.9 MG/DL (ref 1.6–2.4)
MCH RBC QN AUTO: 30.1 PG (ref 26.6–33)
MCHC RBC AUTO-ENTMCNC: 33.2 G/DL (ref 31.5–35.7)
MCV RBC AUTO: 90.6 FL (ref 79–97)
MONOCYTES # BLD AUTO: 0.48 10*3/MM3 (ref 0.1–0.9)
MONOCYTES NFR BLD AUTO: 9.2 % (ref 5–12)
NEUTROPHILS NFR BLD AUTO: 3.22 10*3/MM3 (ref 1.7–7)
NEUTROPHILS NFR BLD AUTO: 61.9 % (ref 42.7–76)
NITRITE UR QL STRIP: NEGATIVE
NRBC BLD AUTO-RTO: 0 /100 WBC (ref 0–0.2)
PH UR STRIP.AUTO: 6.5 [PH] (ref 5–8)
PLATELET # BLD AUTO: 170 10*3/MM3 (ref 140–450)
PMV BLD AUTO: 9.5 FL (ref 6–12)
POTASSIUM SERPL-SCNC: 4.6 MMOL/L (ref 3.5–5.2)
PROT SERPL-MCNC: 6.4 G/DL (ref 6–8.5)
PROT UR QL STRIP: NEGATIVE
QT INTERVAL: 355 MS
RBC # BLD AUTO: 2.76 10*6/MM3 (ref 3.77–5.28)
RBC # UR STRIP: ABNORMAL /HPF
REF LAB TEST METHOD: ABNORMAL
SODIUM SERPL-SCNC: 137 MMOL/L (ref 136–145)
SP GR UR STRIP: 1.01 (ref 1–1.03)
SQUAMOUS #/AREA URNS HPF: ABNORMAL /HPF
TROPONIN T SERPL-MCNC: <0.01 NG/ML (ref 0–0.03)
UROBILINOGEN UR QL STRIP: ABNORMAL
WBC # UR STRIP: ABNORMAL /HPF
WBC NRBC COR # BLD: 5.2 10*3/MM3 (ref 3.4–10.8)

## 2023-01-23 PROCEDURE — 93010 ELECTROCARDIOGRAM REPORT: CPT | Performed by: STUDENT IN AN ORGANIZED HEALTH CARE EDUCATION/TRAINING PROGRAM

## 2023-01-23 PROCEDURE — 83735 ASSAY OF MAGNESIUM: CPT | Performed by: PHYSICIAN ASSISTANT

## 2023-01-23 PROCEDURE — 96375 TX/PRO/DX INJ NEW DRUG ADDON: CPT

## 2023-01-23 PROCEDURE — 99284 EMERGENCY DEPT VISIT MOD MDM: CPT

## 2023-01-23 PROCEDURE — 85025 COMPLETE CBC W/AUTO DIFF WBC: CPT | Performed by: PHYSICIAN ASSISTANT

## 2023-01-23 PROCEDURE — 25010000002 ONDANSETRON PER 1 MG: Performed by: PHYSICIAN ASSISTANT

## 2023-01-23 PROCEDURE — 84484 ASSAY OF TROPONIN QUANT: CPT | Performed by: PHYSICIAN ASSISTANT

## 2023-01-23 PROCEDURE — 83690 ASSAY OF LIPASE: CPT | Performed by: PHYSICIAN ASSISTANT

## 2023-01-23 PROCEDURE — 71045 X-RAY EXAM CHEST 1 VIEW: CPT

## 2023-01-23 PROCEDURE — 80053 COMPREHEN METABOLIC PANEL: CPT | Performed by: PHYSICIAN ASSISTANT

## 2023-01-23 PROCEDURE — 96374 THER/PROPH/DIAG INJ IV PUSH: CPT

## 2023-01-23 PROCEDURE — 36415 COLL VENOUS BLD VENIPUNCTURE: CPT

## 2023-01-23 PROCEDURE — 81001 URINALYSIS AUTO W/SCOPE: CPT | Performed by: PHYSICIAN ASSISTANT

## 2023-01-23 PROCEDURE — 93005 ELECTROCARDIOGRAM TRACING: CPT | Performed by: PHYSICIAN ASSISTANT

## 2023-01-23 PROCEDURE — 25010000002 LORAZEPAM PER 2 MG: Performed by: EMERGENCY MEDICINE

## 2023-01-23 RX ORDER — SODIUM CHLORIDE 0.9 % (FLUSH) 0.9 %
10 SYRINGE (ML) INJECTION AS NEEDED
Status: DISCONTINUED | OUTPATIENT
Start: 2023-01-23 | End: 2023-01-23 | Stop reason: HOSPADM

## 2023-01-23 RX ORDER — LORAZEPAM 2 MG/ML
0.5 INJECTION INTRAMUSCULAR ONCE
Status: COMPLETED | OUTPATIENT
Start: 2023-01-23 | End: 2023-01-23

## 2023-01-23 RX ORDER — ONDANSETRON 2 MG/ML
4 INJECTION INTRAMUSCULAR; INTRAVENOUS ONCE
Status: COMPLETED | OUTPATIENT
Start: 2023-01-23 | End: 2023-01-23

## 2023-01-23 RX ADMIN — ONDANSETRON 4 MG: 2 INJECTION INTRAMUSCULAR; INTRAVENOUS at 02:14

## 2023-01-23 RX ADMIN — LORAZEPAM 0.5 MG: 2 INJECTION INTRAMUSCULAR; INTRAVENOUS at 02:23

## 2023-01-23 NOTE — ED PROVIDER NOTES
MD ATTESTATION NOTE    The HEATHER and I have discussed this patient's history, physical exam, and treatment plan.    I provided a substantive portion of the care of this patient. I personally performed the physical exam, in its entirety. The attached note describes my personal findings.      Josephine Wolf is a 82 y.o. female who presents to the ED c/o feeling nauseous and sick to her stomach.  States she has been having some intermittent headache and dysuria over the past couple days.  No chest pain no shortness of breath no fever chills.      On exam:  GENERAL: not distressed  HENT: nares patent  EYES: no scleral icterus  CV: regular rhythm, regular rate  RESPIRATORY: normal effort  ABDOMEN: soft, nontender nondistended  MUSCULOSKELETAL: no deformity  NEURO: alert, moves all extremities, follows commands  SKIN: warm, dry    Labs  Recent Results (from the past 24 hour(s))   Lipase    Collection Time: 01/23/23  2:13 AM    Specimen: Blood   Result Value Ref Range    Lipase 62 (H) 13 - 60 U/L   Magnesium    Collection Time: 01/23/23  2:13 AM    Specimen: Blood   Result Value Ref Range    Magnesium 1.9 1.6 - 2.4 mg/dL   CBC Auto Differential    Collection Time: 01/23/23  2:13 AM    Specimen: Blood   Result Value Ref Range    WBC 5.20 3.40 - 10.80 10*3/mm3    RBC 2.76 (L) 3.77 - 5.28 10*6/mm3    Hemoglobin 8.3 (L) 12.0 - 15.9 g/dL    Hematocrit 25.0 (L) 34.0 - 46.6 %    MCV 90.6 79.0 - 97.0 fL    MCH 30.1 26.6 - 33.0 pg    MCHC 33.2 31.5 - 35.7 g/dL    RDW 16.7 (H) 12.3 - 15.4 %    RDW-SD 54.8 (H) 37.0 - 54.0 fl    MPV 9.5 6.0 - 12.0 fL    Platelets 170 140 - 450 10*3/mm3    Neutrophil % 61.9 42.7 - 76.0 %    Lymphocyte % 27.1 19.6 - 45.3 %    Monocyte % 9.2 5.0 - 12.0 %    Eosinophil % 0.8 0.3 - 6.2 %    Basophil % 0.4 0.0 - 1.5 %    Immature Grans % 0.6 (H) 0.0 - 0.5 %    Neutrophils, Absolute 3.22 1.70 - 7.00 10*3/mm3    Lymphocytes, Absolute 1.41 0.70 - 3.10 10*3/mm3    Monocytes, Absolute 0.48 0.10 - 0.90 10*3/mm3     Eosinophils, Absolute 0.04 0.00 - 0.40 10*3/mm3    Basophils, Absolute 0.02 0.00 - 0.20 10*3/mm3    Immature Grans, Absolute 0.03 0.00 - 0.05 10*3/mm3    nRBC 0.0 0.0 - 0.2 /100 WBC   Urinalysis With Microscopic If Indicated (No Culture) - Urine, Clean Catch    Collection Time: 01/23/23  2:24 AM    Specimen: Urine, Clean Catch   Result Value Ref Range    Color, UA Yellow Yellow, Straw    Appearance, UA Clear Clear    pH, UA 6.5 5.0 - 8.0    Specific Gravity, UA 1.012 1.005 - 1.030    Glucose, UA Negative Negative    Ketones, UA Negative Negative    Bilirubin, UA Negative Negative    Blood, UA Negative Negative    Protein, UA Negative Negative    Leuk Esterase, UA Small (1+) (A) Negative    Nitrite, UA Negative Negative    Urobilinogen, UA 0.2 E.U./dL 0.2 - 1.0 E.U./dL   Urinalysis, Microscopic Only - Urine, Clean Catch    Collection Time: 01/23/23  2:24 AM    Specimen: Urine, Clean Catch   Result Value Ref Range    RBC, UA 0-2 None Seen, 0-2 /HPF    WBC, UA 6-12 (A) None Seen, 0-2 /HPF    Bacteria, UA None Seen None Seen /HPF    Squamous Epithelial Cells, UA 0-2 None Seen, 0-2 /HPF    Hyaline Casts, UA None Seen None Seen /LPF    Methodology Automated Microscopy    Comprehensive Metabolic Panel    Collection Time: 01/23/23  3:01 AM    Specimen: Blood   Result Value Ref Range    Glucose 107 (H) 65 - 99 mg/dL    BUN 34 (H) 8 - 23 mg/dL    Creatinine 1.75 (H) 0.57 - 1.00 mg/dL    Sodium 137 136 - 145 mmol/L    Potassium 4.6 3.5 - 5.2 mmol/L    Chloride 102 98 - 107 mmol/L    CO2 27.3 22.0 - 29.0 mmol/L    Calcium 8.8 8.6 - 10.5 mg/dL    Total Protein 6.4 6.0 - 8.5 g/dL    Albumin 3.7 3.5 - 5.2 g/dL    ALT (SGPT) 13 1 - 33 U/L    AST (SGOT) 14 1 - 32 U/L    Alkaline Phosphatase 76 39 - 117 U/L    Total Bilirubin 0.2 0.0 - 1.2 mg/dL    Globulin 2.7 gm/dL    A/G Ratio 1.4 g/dL    BUN/Creatinine Ratio 19.4 7.0 - 25.0    Anion Gap 7.7 5.0 - 15.0 mmol/L    eGFR 29.2 (L) >60.0 mL/min/1.73   Troponin    Collection Time:  01/23/23  3:01 AM    Specimen: Blood   Result Value Ref Range    Troponin T <0.010 0.000 - 0.030 ng/mL   ECG 12 Lead Chest Pain    Collection Time: 01/23/23  3:21 AM   Result Value Ref Range    QT Interval 355 ms       Radiology  XR Chest 1 View    Result Date: 1/23/2023  SINGLE VIEW OF THE CHEST  HISTORY: Chest pain  COMPARISON: 10/20/2022  FINDINGS: Cardiomegaly is present. No pneumothorax is identified. There is some chronic scarring identified at the lung bases. This results in some blunting of the left costophrenic angle. No pneumothorax is seen. No definite acute infiltrates are identified.      No acute findings.  This report was finalized on 1/23/2023 2:14 AM by Dr. Melissa Camargo M.D.        Medical Decision Making:  ED Course as of 01/23/23 0645   Mon Jan 23, 2023   0330 EKG          EKG time: 3:21 AM  Rhythm/Rate: Sinus rhythm at 77 bpm  P waves and FL: Normal  QRS, axis: LVH  ST and T waves: Borderline T wave changes, no acute ST/T wave changes    Interpreted Contemporaneously by me, independently viewed  Unchanged compared to prior EKG of 10/19/2022.   [ROSE]   0343 WBC: 5.20 [ROSE]   0343 Hemoglobin(!): 8.3 [ROSE]   0343 Hematocrit(!): 25.0 [ROSE]   0343 Platelets: 170 [ROSE]   0343 Glucose(!): 107 [ROSE]   0343 BUN(!): 34 [ROSE]   0343 Creatinine(!): 1.75 [ROSE]   0343 Sodium: 137 [ROSE]   0343 Potassium: 4.6 [ROSE]   0343 Magnesium: 1.9 [ROSE]   0343 Chloride: 102 [ROSE]   0343 CO2: 27.3 [ROSE]   0343 Lipase(!): 62 [ROSE]   0344 Leukocytes, UA(!): Small (1+) [ROSE]   0344 WBC, UA(!): 6-12 [ROSE]   0344 Bacteria, UA: None Seen [ROSE]   0344 Troponin T: <0.010 [ROSE]   0504 Patient rechecked, resting comfortably in bed, no acute distress.  Discussed results, diagnosis, and need to follow-up with PCP for recheck.  Patient family expressed understanding and are comfortable with discharge home. [ROSE]      ED Course User Index  [ROSE] Kelton Sánchez, PA       PPE: Both the patient and I wore a surgical mask throughout the entire patient  encounter.     Diagnosis  Final diagnoses:   Nonintractable headache, unspecified chronicity pattern, unspecified headache type   Nausea   Anxiety   CHRIS (acute kidney injury) ([facility])   Hypertension, unspecified type   Chronic obstructive pulmonary disease, unspecified COPD type (HCC)   Hypothyroidism, unspecified type        Juan J Espinoza MD  01/23/23 0606

## 2023-01-23 NOTE — ED TRIAGE NOTES
Pt presents via EMS for c/o anxiety and nausea.  Pt received 4mg of zofran enroute and she is on O2 at 3lpm at all times.  BS is WNL and pt is now stating that she is feeling better.     This RN in appropriate PPE for all patient care interactions. Pt masked and redirected for proper mask use when necessary. Hand hygiene performed before and after all patient care interactions.

## 2023-01-23 NOTE — DISCHARGE INSTRUCTIONS
Home, rest, home medicine as prescribed, keep well-hydrated.  Follow up with PCP for recheck. Return to care with further concerns.

## 2023-01-23 NOTE — ED PROVIDER NOTES
" EMERGENCY DEPARTMENT ENCOUNTER    Room Number:  06/06  Date of encounter:  1/23/2023  PCP: Bernabe Guy MD  Patient Care Team:  Bernabe Guy MD as PCP - General (Internal Medicine)  Avi Aly MD as Consulting Physician (Nephrology)   Independent Historians: Patient and son    HPI:  Chief Complaint: Nausea  A complete HPI/ROS/PMH/PSH/SH/FH are unobtainable due to: N/A    Chronic or social conditions impacting patient care (social determinants of health): None    Context: Josephine Wolf is a 82 y.o. female with past medical history of COPD, HTN, HLD, CKD, hypothyroidism and anxiety who arrives to the ED with complaint of \"sick to my stomach\".  Patient states that she has been having a headache, nausea, abdominal pain, back pain, dysuria, frequency and multiple other complaints for the last 3 to 4 days as well as anxiety.  Patient denies any chest pain, shortness of breath, fever or chills.    Review of prior external notes (non-ED): None    Review of prior external test results outside of this encounter: None    PAST MEDICAL HISTORY  Active Ambulatory Problems     Diagnosis Date Noted   • Anemia 10/19/2017   • OA (osteoarthritis) of knee 11/16/2017   • Chronic respiratory failure with hypoxia (HCC) 12/02/2017   • Cellulitis of skin 12/06/2017   • Acute kidney injury (HCC) 12/06/2017   • Sepsis ([facility]) 12/06/2017   • Orthostatic hypotension 06/24/2018   • Disease of thyroid gland 06/24/2018   • Hypertension 06/24/2018   • Dehydration 06/24/2018   • Stage 3 chronic kidney disease (CMS/HCC) 06/25/2018   • Closed compression fracture of L1 lumbar vertebra 06/16/2019   • Hyponatremia 06/16/2019   • Osteoporosis with pathological fracture 06/17/2019   • Acute on chronic respiratory failure with hypoxia and hypercapnia (HCC) 03/12/2020   • Obesity (BMI 30-39.9) 03/12/2020   • Nocturnal hypoxia 03/13/2020   • CKD (chronic kidney disease) stage 2, GFR 60-89 ml/min 03/13/2020   • Acute on chronic " respiratory failure with hypoxia ([facility]) 2020   • Abdominal pain 10/18/2021   • Acute exacerbation of chronic obstructive pulmonary disease (COPD) ([facility]) 2022   • Acute UTI 10/19/2022   • Metabolic encephalopathy 10/23/2022     Resolved Ambulatory Problems     Diagnosis Date Noted   • COPD with acute exacerbation ([facility]) 2018     Past Medical History:   Diagnosis Date   • Acid reflux    • Arthritis    • Bipolar 1 disorder, depressed ([facility])    • Cataract    • Chronic nausea    • Chronic pain of right knee    • Continuous leakage of urine    • COPD (chronic obstructive pulmonary disease) ([facility])    • Diverticulosis    • Fibromyalgia    • Fibromyalgia, primary    • Frequent episodes of bronchitis    • HL (hearing loss)    • Hypothyroidism    • Memory loss    • Migraines    • Neck pain    • On home oxygen therapy    • Short of breath on exertion    • Sleep apnea        The patient has started, but not completed, their COVID-19 vaccination series.    PAST SURGICAL HISTORY  Past Surgical History:   Procedure Laterality Date   • CEREBRAL ANEURYSM REPAIR      WITH STENT   • HYSTERECTOMY     • LAPAROSCOPIC CHOLECYSTECTOMY     • LA TOTAL KNEE ARTHROPLASTY Right 2017    Procedure: RT TOTAL KNEE ARTHROPLASTY;  Surgeon: aKshif Perez MD;  Location: [facility];  Service: Orthopedics         FAMILY HISTORY  Family History   Problem Relation Age of Onset   • Malig Hyperthermia Neg Hx          SOCIAL HISTORY  Social History     Socioeconomic History   • Marital status:    Tobacco Use   • Smoking status: Former     Packs/day: 1.00     Years: 10.00     Pack years: 10.00     Types: Cigarettes     Start date:      Quit date:      Years since quittin.0   • Smokeless tobacco: Never   Vaping Use   • Vaping Use: Never used   Substance and Sexual Activity   • Alcohol use: No     Comment: CAFFEINE NO    • Drug use: No   • Sexual activity: Defer         ALLERGIES  Morphine and related and  Fenofibrate        REVIEW OF SYSTEMS  Review of Systems     All systems reviewed and negative except for those discussed in HPI.       PHYSICAL EXAM    I have reviewed the triage vital signs and nursing notes.    ED Triage Vitals [01/23/23 0042]   Temp Heart Rate Resp BP SpO2   97.9 °F (36.6 °C) 81 20 120/71 97 %      Temp src Heart Rate Source Patient Position BP Location FiO2 (%)   Oral Monitor Lying -- --       Physical Exam    GENERAL: alert and orient x4, anxious, not distressed  HENT: normocephalic, atraumatic, moist mucous membranes  EYES: no scleral icterus, PERRL, EOMI  CV: regular rhythm, regular rate, no murmurs, rubs, or gallops  RESPIRATORY: normal effort, CTAB  ABDOMEN: soft/nontender, no rebound or guarding  MUSCULOSKELETAL: no deformity  NEURO: alert, moves all extremities, no focal neuro deficits, follows commands  SKIN: warm, dry, no rash   Psych: Appropriate mood and affect      Nursing notes and vital signs reviewed      LAB RESULTS  Recent Results (from the past 24 hour(s))   Lipase    Collection Time: 01/23/23  2:13 AM    Specimen: Blood   Result Value Ref Range    Lipase 62 (H) 13 - 60 U/L   Magnesium    Collection Time: 01/23/23  2:13 AM    Specimen: Blood   Result Value Ref Range    Magnesium 1.9 1.6 - 2.4 mg/dL   CBC Auto Differential    Collection Time: 01/23/23  2:13 AM    Specimen: Blood   Result Value Ref Range    WBC 5.20 3.40 - 10.80 10*3/mm3    RBC 2.76 (L) 3.77 - 5.28 10*6/mm3    Hemoglobin 8.3 (L) 12.0 - 15.9 g/dL    Hematocrit 25.0 (L) 34.0 - 46.6 %    MCV 90.6 79.0 - 97.0 fL    MCH 30.1 26.6 - 33.0 pg    MCHC 33.2 31.5 - 35.7 g/dL    RDW 16.7 (H) 12.3 - 15.4 %    RDW-SD 54.8 (H) 37.0 - 54.0 fl    MPV 9.5 6.0 - 12.0 fL    Platelets 170 140 - 450 10*3/mm3    Neutrophil % 61.9 42.7 - 76.0 %    Lymphocyte % 27.1 19.6 - 45.3 %    Monocyte % 9.2 5.0 - 12.0 %    Eosinophil % 0.8 0.3 - 6.2 %    Basophil % 0.4 0.0 - 1.5 %    Immature Grans % 0.6 (H) 0.0 - 0.5 %    Neutrophils, Absolute  3.22 1.70 - 7.00 10*3/mm3    Lymphocytes, Absolute 1.41 0.70 - 3.10 10*3/mm3    Monocytes, Absolute 0.48 0.10 - 0.90 10*3/mm3    Eosinophils, Absolute 0.04 0.00 - 0.40 10*3/mm3    Basophils, Absolute 0.02 0.00 - 0.20 10*3/mm3    Immature Grans, Absolute 0.03 0.00 - 0.05 10*3/mm3    nRBC 0.0 0.0 - 0.2 /100 WBC   Urinalysis With Microscopic If Indicated (No Culture) - Urine, Clean Catch    Collection Time: 01/23/23  2:24 AM    Specimen: Urine, Clean Catch   Result Value Ref Range    Color, UA Yellow Yellow, Straw    Appearance, UA Clear Clear    pH, UA 6.5 5.0 - 8.0    Specific Gravity, UA 1.012 1.005 - 1.030    Glucose, UA Negative Negative    Ketones, UA Negative Negative    Bilirubin, UA Negative Negative    Blood, UA Negative Negative    Protein, UA Negative Negative    Leuk Esterase, UA Small (1+) (A) Negative    Nitrite, UA Negative Negative    Urobilinogen, UA 0.2 E.U./dL 0.2 - 1.0 E.U./dL   Urinalysis, Microscopic Only - Urine, Clean Catch    Collection Time: 01/23/23  2:24 AM    Specimen: Urine, Clean Catch   Result Value Ref Range    RBC, UA 0-2 None Seen, 0-2 /HPF    WBC, UA 6-12 (A) None Seen, 0-2 /HPF    Bacteria, UA None Seen None Seen /HPF    Squamous Epithelial Cells, UA 0-2 None Seen, 0-2 /HPF    Hyaline Casts, UA None Seen None Seen /LPF    Methodology Automated Microscopy    Comprehensive Metabolic Panel    Collection Time: 01/23/23  3:01 AM    Specimen: Blood   Result Value Ref Range    Glucose 107 (H) 65 - 99 mg/dL    BUN 34 (H) 8 - 23 mg/dL    Creatinine 1.75 (H) 0.57 - 1.00 mg/dL    Sodium 137 136 - 145 mmol/L    Potassium 4.6 3.5 - 5.2 mmol/L    Chloride 102 98 - 107 mmol/L    CO2 27.3 22.0 - 29.0 mmol/L    Calcium 8.8 8.6 - 10.5 mg/dL    Total Protein 6.4 6.0 - 8.5 g/dL    Albumin 3.7 3.5 - 5.2 g/dL    ALT (SGPT) 13 1 - 33 U/L    AST (SGOT) 14 1 - 32 U/L    Alkaline Phosphatase 76 39 - 117 U/L    Total Bilirubin 0.2 0.0 - 1.2 mg/dL    Globulin 2.7 gm/dL    A/G Ratio 1.4 g/dL    BUN/Creatinine  Ratio 19.4 7.0 - 25.0    Anion Gap 7.7 5.0 - 15.0 mmol/L    eGFR 29.2 (L) >60.0 mL/min/1.73   Troponin    Collection Time: 01/23/23  3:01 AM    Specimen: Blood   Result Value Ref Range    Troponin T <0.010 0.000 - 0.030 ng/mL   ECG 12 Lead Chest Pain    Collection Time: 01/23/23  3:21 AM   Result Value Ref Range    QT Interval 355 ms       Ordered the above labs and independently reviewed the results.        RADIOLOGY  XR Chest 1 View    Result Date: 1/23/2023  SINGLE VIEW OF THE CHEST  HISTORY: Chest pain  COMPARISON: 10/20/2022  FINDINGS: Cardiomegaly is present. No pneumothorax is identified. There is some chronic scarring identified at the lung bases. This results in some blunting of the left costophrenic angle. No pneumothorax is seen. No definite acute infiltrates are identified.      No acute findings.  This report was finalized on 1/23/2023 2:14 AM by Dr. Melissa Camargo M.D.        I ordered the above noted radiological studies. Reviewed by me and discussed with radiologist.  See dictation for official radiology interpretation.      PROCEDURES    Procedures      MEDICATIONS GIVEN IN ER    Medications   sodium chloride 0.9 % flush 10 mL (has no administration in time range)   ondansetron (ZOFRAN) injection 4 mg (4 mg Intravenous Given 1/23/23 0214)   LORazepam (ATIVAN) injection 0.5 mg (0.5 mg Intravenous Given 1/23/23 0223)         PROGRESS, DATA ANALYSIS, CONSULTS, AND MEDICAL DECISION MAKING    All labs have been independently reviewed by me.  All radiology studies have been reviewed by me and discussed with radiologist dictating the report.   EKG's independently viewed and interpreted by me.  Discussion below represents my analysis of pertinent findings related to patient's condition, differential diagnosis, treatment plan and final disposition.    DDx:  Includes, but is not limited to anxiety, migraine, UTI, electrolyte abnormality, dehydration    ED Course as of 01/23/23 0555   Mon Jan 23, 2023    0330 EKG          EKG time: 3:21 AM  Rhythm/Rate: Sinus rhythm at 77 bpm  P waves and AR: Normal  QRS, axis: LVH  ST and T waves: Borderline T wave changes, no acute ST/T wave changes    Interpreted Contemporaneously by me, independently viewed  Unchanged compared to prior EKG of 10/19/2022.   [ROSE]   0343 WBC: 5.20 [ROSE]   0343 Hemoglobin(!): 8.3 [ROSE]   0343 Hematocrit(!): 25.0 [ROSE]   0343 Platelets: 170 [ROSE]   0343 Glucose(!): 107 [ROSE]   0343 BUN(!): 34 [ROSE]   0343 Creatinine(!): 1.75 [ROSE]   0343 Sodium: 137 [ROSE]   0343 Potassium: 4.6 [ROSE]   0343 Magnesium: 1.9 [ROSE]   0343 Chloride: 102 [ROSE]   0343 CO2: 27.3 [ROSE]   0343 Lipase(!): 62 [ROSE]   0344 Leukocytes, UA(!): Small (1+) [ROSE]   0344 WBC, UA(!): 6-12 [ROSE]   0344 Bacteria, UA: None Seen [ROSE]   0344 Troponin T: <0.010 [ROSE]   0504 Patient rechecked, resting comfortably in bed, no acute distress.  Discussed results, diagnosis, and need to follow-up with PCP for recheck.  Patient family expressed understanding and are comfortable with discharge home. [ROSE]      ED Course User Index  [ROSE] Kelton Sánchez PA       MDM: Patient's evaluation is unremarkable including cardiac work-up except for a mild CHRIS.  Patient symptoms were improved with anxiety and nausea medicine and there is no evidence for acute or emergent process.  Have discussed results of patient's kidney function and need for follow-up with her PCP regarding possible medication side effect, specifically Bumex.  Patient family expressed understanding, agree with plan for discharge and are comfortable with discharge home.  Vital signs are stable, patient is safe for discharge home.    PPE:  The patient wore a mask and I wore a mask and all appropriate PPE throughout the entire patient encounter.      AS OF 05:55 EST VITALS:    BP - 128/64  HR - 84  TEMP - 98.3 °F (36.8 °C)  O2 SATS - 97%      DIAGNOSIS  Final diagnoses:   Nonintractable headache, unspecified chronicity pattern, unspecified headache type    Nausea   Anxiety   CHRIS (acute kidney injury) (HCC)   Hypertension, unspecified type   Chronic obstructive pulmonary disease, unspecified COPD type (HCC)   Hypothyroidism, unspecified type         DISPOSITION  DISCHARGE    Patient discharged in stable condition.    Reviewed implications of results, diagnosis, meds, responsibility to follow up, warning signs and symptoms of possible worsening, potential complications and reasons to return to ER.    Patient/Family voiced understanding of above instructions.    Discussed plan for discharge, as there is no emergent indication for admission. Patient referred to primary care provider for BP management due to today's BP. Pt/family is agreeable and understands need for follow up and repeat testing.  Pt is aware that discharge does not mean that nothing is wrong but it indicates no emergency is present that requires admission and they must continue care with follow-up as given below or physician of their choice.     FOLLOW-UP  Bernabe Guy MD  [address] [place] [address]  [phone]    Schedule an appointment as soon as possible for a visit in 2 days           Medication List      No changes were made to your prescriptions during this visit.           Note Disclaimer: At [facility], we believe that sharing information builds trust and better relationships. You are receiving this note because you recently visited [facility]. It is possible you will see health information before a provider has talked with you about it. This kind of information can be easy to misunderstand. To help you fully understand what it means for your health, we urge you to discuss this note with your provider.     Kelton Sánchez PA  01/23/23 0360

## 2023-03-01 ENCOUNTER — APPOINTMENT (OUTPATIENT)
Dept: GENERAL RADIOLOGY | Facility: HOSPITAL | Age: 83
End: 2023-03-01
Payer: MEDICARE

## 2023-03-01 ENCOUNTER — APPOINTMENT (OUTPATIENT)
Dept: CARDIOLOGY | Facility: HOSPITAL | Age: 83
End: 2023-03-01
Payer: MEDICARE

## 2023-03-01 ENCOUNTER — HOSPITAL ENCOUNTER (OUTPATIENT)
Facility: HOSPITAL | Age: 83
LOS: 2 days | Discharge: HOME | End: 2023-03-03
Attending: EMERGENCY MEDICINE | Admitting: HOSPITALIST
Payer: MEDICARE

## 2023-03-01 DIAGNOSIS — R51.9 ACUTE NONINTRACTABLE HEADACHE, UNSPECIFIED HEADACHE TYPE: ICD-10-CM

## 2023-03-01 DIAGNOSIS — J18.9 PNEUMONIA OF LEFT LOWER LOBE DUE TO INFECTIOUS ORGANISM: ICD-10-CM

## 2023-03-01 DIAGNOSIS — J44.1 COPD WITH ACUTE EXACERBATION: Primary | ICD-10-CM

## 2023-03-01 LAB
ALBUMIN SERPL-MCNC: 3.9 G/DL (ref 3.5–5.2)
ALBUMIN SERPL-MCNC: 4.2 G/DL (ref 3.5–5.2)
ALBUMIN/GLOB SERPL: 1.4 G/DL
ALBUMIN/GLOB SERPL: 1.5 G/DL
ALP SERPL-CCNC: 69 U/L (ref 39–117)
ALP SERPL-CCNC: 78 U/L (ref 39–117)
ALT SERPL W P-5'-P-CCNC: 15 U/L (ref 1–33)
ALT SERPL W P-5'-P-CCNC: 16 U/L (ref 1–33)
ANION GAP SERPL CALCULATED.3IONS-SCNC: 5.5 MMOL/L (ref 5–15)
ANION GAP SERPL CALCULATED.3IONS-SCNC: 7.6 MMOL/L (ref 5–15)
AORTIC DIMENSIONLESS INDEX: 0.6 (DI)
ASCENDING AORTA: 3 CM
AST SERPL-CCNC: 16 U/L (ref 1–32)
AST SERPL-CCNC: 17 U/L (ref 1–32)
B PARAPERT DNA SPEC QL NAA+PROBE: NOT DETECTED
B PERT DNA SPEC QL NAA+PROBE: NOT DETECTED
BASOPHILS # BLD AUTO: 0.02 10*3/MM3 (ref 0–0.2)
BASOPHILS NFR BLD AUTO: 0.4 % (ref 0–1.5)
BH CV ECHO MEAS - ACS: 1.39 CM
BH CV ECHO MEAS - AO MAX PG: 17.7 MMHG
BH CV ECHO MEAS - AO MEAN PG: 8.5 MMHG
BH CV ECHO MEAS - AO V2 MAX: 210.4 CM/SEC
BH CV ECHO MEAS - AO V2 VTI: 43.5 CM
BH CV ECHO MEAS - AVA(I,D): 1.77 CM2
BH CV ECHO MEAS - EDV(CUBED): 47.8 ML
BH CV ECHO MEAS - EDV(MOD-SP2): 73 ML
BH CV ECHO MEAS - EDV(MOD-SP4): 73 ML
BH CV ECHO MEAS - EF(MOD-BP): 67.6 %
BH CV ECHO MEAS - EF(MOD-SP2): 60.3 %
BH CV ECHO MEAS - EF(MOD-SP4): 72.6 %
BH CV ECHO MEAS - ESV(CUBED): 15.9 ML
BH CV ECHO MEAS - ESV(MOD-SP2): 29 ML
BH CV ECHO MEAS - ESV(MOD-SP4): 20 ML
BH CV ECHO MEAS - FS: 30.7 %
BH CV ECHO MEAS - IVS/LVPW: 1.02 CM
BH CV ECHO MEAS - IVSD: 1.29 CM
BH CV ECHO MEAS - LAT PEAK E' VEL: 9.4 CM/SEC
BH CV ECHO MEAS - LV DIASTOLIC VOL/BSA (35-75): 42.2 CM2
BH CV ECHO MEAS - LV MASS(C)D: 157.4 GRAMS
BH CV ECHO MEAS - LV MAX PG: 6.9 MMHG
BH CV ECHO MEAS - LV MEAN PG: 3.6 MMHG
BH CV ECHO MEAS - LV SYSTOLIC VOL/BSA (12-30): 11.5 CM2
BH CV ECHO MEAS - LV V1 MAX: 131.7 CM/SEC
BH CV ECHO MEAS - LV V1 VTI: 27 CM
BH CV ECHO MEAS - LVIDD: 3.6 CM
BH CV ECHO MEAS - LVIDS: 2.5 CM
BH CV ECHO MEAS - LVOT AREA: 2.9 CM2
BH CV ECHO MEAS - LVOT DIAM: 1.91 CM
BH CV ECHO MEAS - LVPWD: 1.27 CM
BH CV ECHO MEAS - MED PEAK E' VEL: 5.3 CM/SEC
BH CV ECHO MEAS - MV A DUR: 0.11 SEC
BH CV ECHO MEAS - MV A MAX VEL: 113.3 CM/SEC
BH CV ECHO MEAS - MV DEC SLOPE: 254.8 CM/SEC2
BH CV ECHO MEAS - MV DEC TIME: 0.25 MSEC
BH CV ECHO MEAS - MV E MAX VEL: 66.7 CM/SEC
BH CV ECHO MEAS - MV E/A: 0.59
BH CV ECHO MEAS - MV MAX PG: 6.2 MMHG
BH CV ECHO MEAS - MV MEAN PG: 1.92 MMHG
BH CV ECHO MEAS - MV P1/2T: 93.6 MSEC
BH CV ECHO MEAS - MV V2 VTI: 27.4 CM
BH CV ECHO MEAS - MVA(P1/2T): 2.35 CM2
BH CV ECHO MEAS - MVA(VTI): 2.8 CM2
BH CV ECHO MEAS - PA ACC TIME: 0.1 SEC
BH CV ECHO MEAS - PA PR(ACCEL): 35 MMHG
BH CV ECHO MEAS - PA V2 MAX: 109.9 CM/SEC
BH CV ECHO MEAS - PULM A REVS DUR: 0.12 SEC
BH CV ECHO MEAS - PULM A REVS VEL: 28.8 CM/SEC
BH CV ECHO MEAS - PULM DIAS VEL: 41.6 CM/SEC
BH CV ECHO MEAS - PULM S/D: 1.44
BH CV ECHO MEAS - PULM SYS VEL: 60.2 CM/SEC
BH CV ECHO MEAS - RAP SYSTOLE: 3 MMHG
BH CV ECHO MEAS - RV MAX PG: 2.17 MMHG
BH CV ECHO MEAS - RV V1 MAX: 73.7 CM/SEC
BH CV ECHO MEAS - RV V1 VTI: 14.1 CM
BH CV ECHO MEAS - RVSP: 25.1 MMHG
BH CV ECHO MEAS - SI(MOD-SP2): 25.4 ML/M2
BH CV ECHO MEAS - SI(MOD-SP4): 30.6 ML/M2
BH CV ECHO MEAS - SV(LVOT): 77.2 ML
BH CV ECHO MEAS - SV(MOD-SP2): 44 ML
BH CV ECHO MEAS - SV(MOD-SP4): 53 ML
BH CV ECHO MEAS - TAPSE (>1.6): 2.08 CM
BH CV ECHO MEAS - TR MAX PG: 22.1 MMHG
BH CV ECHO MEAS - TR MAX VEL: 234.8 CM/SEC
BH CV ECHO MEASUREMENTS AVERAGE E/E' RATIO: 9.07
BH CV XLRA - RV BASE: 3.4 CM
BH CV XLRA - RV LENGTH: 7.3 CM
BH CV XLRA - RV MID: 1.99 CM
BH CV XLRA - TDI S': 10.6 CM/SEC
BILIRUB SERPL-MCNC: 0.4 MG/DL (ref 0–1.2)
BILIRUB SERPL-MCNC: 0.4 MG/DL (ref 0–1.2)
BUN SERPL-MCNC: 18 MG/DL (ref 8–23)
BUN SERPL-MCNC: 21 MG/DL (ref 8–23)
BUN/CREAT SERPL: 12.7 (ref 7–25)
BUN/CREAT SERPL: 14.9 (ref 7–25)
C PNEUM DNA NPH QL NAA+NON-PROBE: NOT DETECTED
CALCIUM SPEC-SCNC: 9.4 MG/DL (ref 8.6–10.5)
CALCIUM SPEC-SCNC: 9.9 MG/DL (ref 8.6–10.5)
CHLORIDE SERPL-SCNC: 101 MMOL/L (ref 98–107)
CHLORIDE SERPL-SCNC: 98 MMOL/L (ref 98–107)
CO2 SERPL-SCNC: 28.4 MMOL/L (ref 22–29)
CO2 SERPL-SCNC: 32.5 MMOL/L (ref 22–29)
CREAT SERPL-MCNC: 1.41 MG/DL (ref 0.57–1)
CREAT SERPL-MCNC: 1.42 MG/DL (ref 0.57–1)
D-LACTATE SERPL-SCNC: 0.6 MMOL/L (ref 0.5–2)
DEPRECATED RDW RBC AUTO: 57.7 FL (ref 37–54)
EGFRCR SERPLBLD CKD-EPI 2021: 37.5 ML/MIN/1.73
EGFRCR SERPLBLD CKD-EPI 2021: 37.8 ML/MIN/1.73
EOSINOPHIL # BLD AUTO: 0.07 10*3/MM3 (ref 0–0.4)
EOSINOPHIL NFR BLD AUTO: 1.4 % (ref 0.3–6.2)
ERYTHROCYTE [DISTWIDTH] IN BLOOD BY AUTOMATED COUNT: 16.3 % (ref 12.3–15.4)
FLUAV SUBTYP SPEC NAA+PROBE: NOT DETECTED
FLUBV RNA ISLT QL NAA+PROBE: NOT DETECTED
GLOBULIN UR ELPH-MCNC: 2.6 GM/DL
GLOBULIN UR ELPH-MCNC: 2.9 GM/DL
GLUCOSE SERPL-MCNC: 106 MG/DL (ref 65–99)
GLUCOSE SERPL-MCNC: 178 MG/DL (ref 65–99)
HADV DNA SPEC NAA+PROBE: NOT DETECTED
HCOV 229E RNA SPEC QL NAA+PROBE: NOT DETECTED
HCOV HKU1 RNA SPEC QL NAA+PROBE: NOT DETECTED
HCOV NL63 RNA SPEC QL NAA+PROBE: NOT DETECTED
HCOV OC43 RNA SPEC QL NAA+PROBE: NOT DETECTED
HCT VFR BLD AUTO: 25.3 % (ref 34–46.6)
HGB BLD-MCNC: 7.9 G/DL (ref 12–15.9)
HMPV RNA NPH QL NAA+NON-PROBE: NOT DETECTED
HPIV1 RNA ISLT QL NAA+PROBE: NOT DETECTED
HPIV2 RNA SPEC QL NAA+PROBE: NOT DETECTED
HPIV3 RNA NPH QL NAA+PROBE: NOT DETECTED
HPIV4 P GENE NPH QL NAA+PROBE: NOT DETECTED
IMM GRANULOCYTES # BLD AUTO: 0.05 10*3/MM3 (ref 0–0.05)
IMM GRANULOCYTES NFR BLD AUTO: 1 % (ref 0–0.5)
IRON 24H UR-MRATE: 36 MCG/DL (ref 37–145)
IRON SATN MFR SERPL: 6 % (ref 20–50)
LEFT ATRIUM VOLUME INDEX: 45.4 ML/M2
LYMPHOCYTES # BLD AUTO: 1.42 10*3/MM3 (ref 0.7–3.1)
LYMPHOCYTES NFR BLD AUTO: 28 % (ref 19.6–45.3)
M PNEUMO IGG SER IA-ACNC: NOT DETECTED
MAXIMAL PREDICTED HEART RATE: 140 BPM
MCH RBC QN AUTO: 30.5 PG (ref 26.6–33)
MCHC RBC AUTO-ENTMCNC: 31.2 G/DL (ref 31.5–35.7)
MCV RBC AUTO: 97.7 FL (ref 79–97)
MONOCYTES # BLD AUTO: 0.51 10*3/MM3 (ref 0.1–0.9)
MONOCYTES NFR BLD AUTO: 10 % (ref 5–12)
NEUTROPHILS NFR BLD AUTO: 3.01 10*3/MM3 (ref 1.7–7)
NEUTROPHILS NFR BLD AUTO: 59.2 % (ref 42.7–76)
NRBC BLD AUTO-RTO: 0 /100 WBC (ref 0–0.2)
NT-PROBNP SERPL-MCNC: 865 PG/ML (ref 0–1800)
PLATELET # BLD AUTO: 229 10*3/MM3 (ref 140–450)
PMV BLD AUTO: 9.2 FL (ref 6–12)
POTASSIUM SERPL-SCNC: 4.8 MMOL/L (ref 3.5–5.2)
POTASSIUM SERPL-SCNC: 4.9 MMOL/L (ref 3.5–5.2)
PROCALCITONIN SERPL-MCNC: 0.07 NG/ML (ref 0–0.25)
PROT SERPL-MCNC: 6.5 G/DL (ref 6–8.5)
PROT SERPL-MCNC: 7.1 G/DL (ref 6–8.5)
QT INTERVAL: 386 MS
RBC # BLD AUTO: 2.59 10*6/MM3 (ref 3.77–5.28)
RHINOVIRUS RNA SPEC NAA+PROBE: DETECTED
RSV RNA NPH QL NAA+NON-PROBE: NOT DETECTED
SARS-COV-2 RNA NPH QL NAA+NON-PROBE: NOT DETECTED
SINUS: 3 CM
SODIUM SERPL-SCNC: 134 MMOL/L (ref 136–145)
SODIUM SERPL-SCNC: 139 MMOL/L (ref 136–145)
STRESS TARGET HR: 119 BPM
TIBC SERPL-MCNC: 580 MCG/DL (ref 298–536)
TRANSFERRIN SERPL-MCNC: 389 MG/DL (ref 200–360)
TROPONIN T SERPL HS-MCNC: 29 NG/L
WBC NRBC COR # BLD: 5.08 10*3/MM3 (ref 3.4–10.8)

## 2023-03-01 PROCEDURE — 83880 ASSAY OF NATRIURETIC PEPTIDE: CPT | Performed by: EMERGENCY MEDICINE

## 2023-03-01 PROCEDURE — 83605 ASSAY OF LACTIC ACID: CPT | Performed by: EMERGENCY MEDICINE

## 2023-03-01 PROCEDURE — 25010000002 METHYLPREDNISOLONE PER 125 MG: Performed by: EMERGENCY MEDICINE

## 2023-03-01 PROCEDURE — 96376 TX/PRO/DX INJ SAME DRUG ADON: CPT

## 2023-03-01 PROCEDURE — 0202U NFCT DS 22 TRGT SARS-COV-2: CPT | Performed by: EMERGENCY MEDICINE

## 2023-03-01 PROCEDURE — 94640 AIRWAY INHALATION TREATMENT: CPT

## 2023-03-01 PROCEDURE — 25010000002 PROCHLORPERAZINE 10 MG/2ML SOLUTION: Performed by: HOSPITALIST

## 2023-03-01 PROCEDURE — 25010000002 ONDANSETRON PER 1 MG: Performed by: HOSPITALIST

## 2023-03-01 PROCEDURE — 94664 DEMO&/EVAL PT USE INHALER: CPT

## 2023-03-01 PROCEDURE — 93306 TTE W/DOPPLER COMPLETE: CPT

## 2023-03-01 PROCEDURE — 87040 BLOOD CULTURE FOR BACTERIA: CPT | Performed by: EMERGENCY MEDICINE

## 2023-03-01 PROCEDURE — 94799 UNLISTED PULMONARY SVC/PX: CPT

## 2023-03-01 PROCEDURE — 80053 COMPREHEN METABOLIC PANEL: CPT | Performed by: HOSPITALIST

## 2023-03-01 PROCEDURE — 71045 X-RAY EXAM CHEST 1 VIEW: CPT

## 2023-03-01 PROCEDURE — 93005 ELECTROCARDIOGRAM TRACING: CPT | Performed by: EMERGENCY MEDICINE

## 2023-03-01 PROCEDURE — 84466 ASSAY OF TRANSFERRIN: CPT | Performed by: HOSPITALIST

## 2023-03-01 PROCEDURE — 80053 COMPREHEN METABOLIC PANEL: CPT | Performed by: EMERGENCY MEDICINE

## 2023-03-01 PROCEDURE — 99285 EMERGENCY DEPT VISIT HI MDM: CPT

## 2023-03-01 PROCEDURE — 83540 ASSAY OF IRON: CPT | Performed by: HOSPITALIST

## 2023-03-01 PROCEDURE — 84484 ASSAY OF TROPONIN QUANT: CPT | Performed by: EMERGENCY MEDICINE

## 2023-03-01 PROCEDURE — 94761 N-INVAS EAR/PLS OXIMETRY MLT: CPT

## 2023-03-01 PROCEDURE — 96375 TX/PRO/DX INJ NEW DRUG ADDON: CPT

## 2023-03-01 PROCEDURE — 25010000002 ONDANSETRON PER 1 MG: Performed by: EMERGENCY MEDICINE

## 2023-03-01 PROCEDURE — 25010000002 CEFTRIAXONE PER 250 MG: Performed by: EMERGENCY MEDICINE

## 2023-03-01 PROCEDURE — 84145 PROCALCITONIN (PCT): CPT | Performed by: EMERGENCY MEDICINE

## 2023-03-01 PROCEDURE — 85025 COMPLETE CBC W/AUTO DIFF WBC: CPT | Performed by: EMERGENCY MEDICINE

## 2023-03-01 PROCEDURE — 93010 ELECTROCARDIOGRAM REPORT: CPT | Performed by: INTERNAL MEDICINE

## 2023-03-01 PROCEDURE — 25010000002 ONDANSETRON PER 1 MG

## 2023-03-01 PROCEDURE — 25010000002 METHYLPREDNISOLONE PER 40 MG: Performed by: HOSPITALIST

## 2023-03-01 PROCEDURE — 96365 THER/PROPH/DIAG IV INF INIT: CPT

## 2023-03-01 PROCEDURE — 36415 COLL VENOUS BLD VENIPUNCTURE: CPT

## 2023-03-01 PROCEDURE — 93306 TTE W/DOPPLER COMPLETE: CPT | Performed by: INTERNAL MEDICINE

## 2023-03-01 RX ORDER — BUSPIRONE HYDROCHLORIDE 10 MG/1
10 TABLET ORAL 2 TIMES DAILY
COMMUNITY
Start: 2023-01-12

## 2023-03-01 RX ORDER — SODIUM CHLORIDE 0.9 % (FLUSH) 0.9 %
10 SYRINGE (ML) INJECTION AS NEEDED
Status: DISCONTINUED | OUTPATIENT
Start: 2023-03-01 | End: 2023-03-03 | Stop reason: HOSPADM

## 2023-03-01 RX ORDER — PROCHLORPERAZINE EDISYLATE 5 MG/ML
5 INJECTION INTRAMUSCULAR; INTRAVENOUS EVERY 4 HOURS PRN
Status: DISCONTINUED | OUTPATIENT
Start: 2023-03-01 | End: 2023-03-03

## 2023-03-01 RX ORDER — METOPROLOL SUCCINATE 25 MG/1
12.5 TABLET, EXTENDED RELEASE ORAL
Status: DISCONTINUED | OUTPATIENT
Start: 2023-03-01 | End: 2023-03-03 | Stop reason: HOSPADM

## 2023-03-01 RX ORDER — IPRATROPIUM BROMIDE AND ALBUTEROL SULFATE 2.5; .5 MG/3ML; MG/3ML
3 SOLUTION RESPIRATORY (INHALATION)
Status: DISCONTINUED | OUTPATIENT
Start: 2023-03-01 | End: 2023-03-01

## 2023-03-01 RX ORDER — ONDANSETRON 2 MG/ML
4 INJECTION INTRAMUSCULAR; INTRAVENOUS ONCE
Status: COMPLETED | OUTPATIENT
Start: 2023-03-01 | End: 2023-03-01

## 2023-03-01 RX ORDER — PANTOPRAZOLE SODIUM 40 MG/1
40 TABLET, DELAYED RELEASE ORAL
Status: DISCONTINUED | OUTPATIENT
Start: 2023-03-01 | End: 2023-03-01

## 2023-03-01 RX ORDER — METHYLPREDNISOLONE SODIUM SUCCINATE 125 MG/2ML
125 INJECTION, POWDER, LYOPHILIZED, FOR SOLUTION INTRAMUSCULAR; INTRAVENOUS ONCE
Status: DISCONTINUED | OUTPATIENT
Start: 2023-03-01 | End: 2023-03-01

## 2023-03-01 RX ORDER — ONDANSETRON 2 MG/ML
INJECTION INTRAMUSCULAR; INTRAVENOUS
Status: COMPLETED
Start: 2023-03-01 | End: 2023-03-01

## 2023-03-01 RX ORDER — ACETAMINOPHEN 500 MG
1000 TABLET ORAL ONCE
Status: DISCONTINUED | OUTPATIENT
Start: 2023-03-01 | End: 2023-03-01

## 2023-03-01 RX ORDER — LEVOTHYROXINE SODIUM 0.07 MG/1
75 TABLET ORAL DAILY
Status: DISCONTINUED | OUTPATIENT
Start: 2023-03-01 | End: 2023-03-03 | Stop reason: HOSPADM

## 2023-03-01 RX ORDER — ONDANSETRON 2 MG/ML
4 INJECTION INTRAMUSCULAR; INTRAVENOUS ONCE
Status: DISCONTINUED | OUTPATIENT
Start: 2023-03-01 | End: 2023-03-01

## 2023-03-01 RX ORDER — ONDANSETRON 2 MG/ML
4 INJECTION INTRAMUSCULAR; INTRAVENOUS EVERY 4 HOURS PRN
Status: DISCONTINUED | OUTPATIENT
Start: 2023-03-01 | End: 2023-03-03

## 2023-03-01 RX ORDER — NITROFURANTOIN MACROCRYSTALS 50 MG/1
1 CAPSULE ORAL EVERY 6 HOURS
COMMUNITY
Start: 2023-01-03 | End: 2023-03-03 | Stop reason: HOSPADM

## 2023-03-01 RX ORDER — ROPINIROLE 0.5 MG/1
0.25 TABLET, FILM COATED ORAL ONCE
Status: COMPLETED | OUTPATIENT
Start: 2023-03-01 | End: 2023-03-01

## 2023-03-01 RX ORDER — ALBUTEROL SULFATE 2.5 MG/3ML
2.5 SOLUTION RESPIRATORY (INHALATION) EVERY 4 HOURS PRN
Status: DISCONTINUED | OUTPATIENT
Start: 2023-03-01 | End: 2023-03-03

## 2023-03-01 RX ORDER — IPRATROPIUM BROMIDE AND ALBUTEROL SULFATE 2.5; .5 MG/3ML; MG/3ML
3 SOLUTION RESPIRATORY (INHALATION) ONCE
Status: COMPLETED | OUTPATIENT
Start: 2023-03-01 | End: 2023-03-01

## 2023-03-01 RX ORDER — BUMETANIDE 1 MG/1
1 TABLET ORAL DAILY
Status: DISCONTINUED | OUTPATIENT
Start: 2023-03-01 | End: 2023-03-03 | Stop reason: HOSPADM

## 2023-03-01 RX ORDER — BUSPIRONE HYDROCHLORIDE 10 MG/1
10 TABLET ORAL EVERY 12 HOURS SCHEDULED
Status: DISCONTINUED | OUTPATIENT
Start: 2023-03-01 | End: 2023-03-03 | Stop reason: HOSPADM

## 2023-03-01 RX ORDER — ROSUVASTATIN CALCIUM 5 MG/1
5 TABLET, COATED ORAL NIGHTLY
Status: DISCONTINUED | OUTPATIENT
Start: 2023-03-01 | End: 2023-03-03 | Stop reason: HOSPADM

## 2023-03-01 RX ORDER — CHOLECALCIFEROL (VITAMIN D3) 125 MCG
1000 CAPSULE ORAL DAILY
Status: DISCONTINUED | OUTPATIENT
Start: 2023-03-01 | End: 2023-03-02

## 2023-03-01 RX ORDER — METHYLPREDNISOLONE SODIUM SUCCINATE 125 MG/2ML
125 INJECTION, POWDER, LYOPHILIZED, FOR SOLUTION INTRAMUSCULAR; INTRAVENOUS ONCE
Status: COMPLETED | OUTPATIENT
Start: 2023-03-01 | End: 2023-03-01

## 2023-03-01 RX ORDER — POTASSIUM CHLORIDE 750 MG/1
10 TABLET, FILM COATED, EXTENDED RELEASE ORAL DAILY
Status: DISCONTINUED | OUTPATIENT
Start: 2023-03-01 | End: 2023-03-03 | Stop reason: HOSPADM

## 2023-03-01 RX ORDER — GABAPENTIN 300 MG/1
300 CAPSULE ORAL 3 TIMES DAILY
Status: DISCONTINUED | OUTPATIENT
Start: 2023-03-01 | End: 2023-03-03 | Stop reason: HOSPADM

## 2023-03-01 RX ORDER — ALLOPURINOL 100 MG/1
100 TABLET ORAL DAILY
Status: DISCONTINUED | OUTPATIENT
Start: 2023-03-01 | End: 2023-03-03 | Stop reason: HOSPADM

## 2023-03-01 RX ORDER — IPRATROPIUM BROMIDE AND ALBUTEROL SULFATE 2.5; .5 MG/3ML; MG/3ML
3 SOLUTION RESPIRATORY (INHALATION)
Status: DISCONTINUED | OUTPATIENT
Start: 2023-03-01 | End: 2023-03-03 | Stop reason: HOSPADM

## 2023-03-01 RX ORDER — GUAIFENESIN 600 MG/1
600 TABLET, EXTENDED RELEASE ORAL EVERY 12 HOURS SCHEDULED
Status: DISCONTINUED | OUTPATIENT
Start: 2023-03-01 | End: 2023-03-03 | Stop reason: HOSPADM

## 2023-03-01 RX ORDER — PANTOPRAZOLE SODIUM 40 MG/1
40 TABLET, DELAYED RELEASE ORAL
Status: DISCONTINUED | OUTPATIENT
Start: 2023-03-01 | End: 2023-03-03 | Stop reason: HOSPADM

## 2023-03-01 RX ORDER — BUDESONIDE AND FORMOTEROL FUMARATE DIHYDRATE 160; 4.5 UG/1; UG/1
2 AEROSOL RESPIRATORY (INHALATION) 2 TIMES DAILY
Status: DISCONTINUED | OUTPATIENT
Start: 2023-03-01 | End: 2023-03-03 | Stop reason: HOSPADM

## 2023-03-01 RX ORDER — HYDROCODONE BITARTRATE AND ACETAMINOPHEN 7.5; 325 MG/1; MG/1
1 TABLET ORAL EVERY 8 HOURS PRN
Status: DISCONTINUED | OUTPATIENT
Start: 2023-03-01 | End: 2023-03-03

## 2023-03-01 RX ORDER — HYDROXYZINE HYDROCHLORIDE 25 MG/1
25 TABLET, FILM COATED ORAL 3 TIMES DAILY PRN
Status: DISCONTINUED | OUTPATIENT
Start: 2023-03-01 | End: 2023-03-03

## 2023-03-01 RX ORDER — DULOXETIN HYDROCHLORIDE 60 MG/1
60 CAPSULE, DELAYED RELEASE ORAL DAILY
Status: DISCONTINUED | OUTPATIENT
Start: 2023-03-01 | End: 2023-03-03 | Stop reason: HOSPADM

## 2023-03-01 RX ORDER — IPRATROPIUM BROMIDE AND ALBUTEROL SULFATE 2.5; .5 MG/3ML; MG/3ML
3 SOLUTION RESPIRATORY (INHALATION) ONCE
Status: DISCONTINUED | OUTPATIENT
Start: 2023-03-01 | End: 2023-03-01

## 2023-03-01 RX ORDER — METHYLPREDNISOLONE SODIUM SUCCINATE 40 MG/ML
40 INJECTION, POWDER, LYOPHILIZED, FOR SOLUTION INTRAMUSCULAR; INTRAVENOUS EVERY 12 HOURS
Status: DISCONTINUED | OUTPATIENT
Start: 2023-03-01 | End: 2023-03-02

## 2023-03-01 RX ORDER — ACETAMINOPHEN 500 MG
1000 TABLET ORAL ONCE
Status: COMPLETED | OUTPATIENT
Start: 2023-03-01 | End: 2023-03-01

## 2023-03-01 RX ORDER — AMLODIPINE BESYLATE 5 MG/1
5 TABLET ORAL DAILY
Status: DISCONTINUED | OUTPATIENT
Start: 2023-03-01 | End: 2023-03-01

## 2023-03-01 RX ORDER — QUETIAPINE FUMARATE 100 MG/1
100 TABLET, FILM COATED ORAL EVERY 12 HOURS SCHEDULED
Status: DISCONTINUED | OUTPATIENT
Start: 2023-03-01 | End: 2023-03-03 | Stop reason: HOSPADM

## 2023-03-01 RX ORDER — ACETAMINOPHEN 325 MG/1
650 TABLET ORAL EVERY 4 HOURS PRN
Status: DISCONTINUED | OUTPATIENT
Start: 2023-03-01 | End: 2023-03-03

## 2023-03-01 RX ORDER — ROPINIROLE 0.5 MG/1
0.25 TABLET, FILM COATED ORAL NIGHTLY
Status: DISCONTINUED | OUTPATIENT
Start: 2023-03-01 | End: 2023-03-03 | Stop reason: HOSPADM

## 2023-03-01 RX ORDER — ROPINIROLE 0.25 MG/1
0.25 TABLET, FILM COATED ORAL ONCE
Status: DISCONTINUED | OUTPATIENT
Start: 2023-03-01 | End: 2023-03-01

## 2023-03-01 RX ORDER — AMLODIPINE BESYLATE 5 MG/1
10 TABLET ORAL DAILY
Status: DISCONTINUED | OUTPATIENT
Start: 2023-03-02 | End: 2023-03-03 | Stop reason: HOSPADM

## 2023-03-01 RX ORDER — ROSUVASTATIN CALCIUM 20 MG/1
20 TABLET, COATED ORAL DAILY
Status: DISCONTINUED | OUTPATIENT
Start: 2023-03-01 | End: 2023-03-01

## 2023-03-01 RX ORDER — AMLODIPINE BESYLATE 5 MG/1
5 TABLET ORAL DAILY
COMMUNITY
End: 2023-03-03 | Stop reason: HOSPADM

## 2023-03-01 RX ORDER — ROSUVASTATIN CALCIUM 20 MG/1
20 TABLET, COATED ORAL DAILY
COMMUNITY
Start: 2022-11-03

## 2023-03-01 RX ADMIN — ROSUVASTATIN CALCIUM 5 MG: 5 TABLET, FILM COATED ORAL at 20:29

## 2023-03-01 RX ADMIN — ONDANSETRON 4 MG: 2 INJECTION INTRAMUSCULAR; INTRAVENOUS at 07:31

## 2023-03-01 RX ADMIN — POTASSIUM CHLORIDE 10 MEQ: 750 TABLET, EXTENDED RELEASE ORAL at 11:45

## 2023-03-01 RX ADMIN — ONDANSETRON 4 MG: 2 INJECTION INTRAMUSCULAR; INTRAVENOUS at 02:25

## 2023-03-01 RX ADMIN — ACETAMINOPHEN 650 MG: 325 TABLET, FILM COATED ORAL at 08:56

## 2023-03-01 RX ADMIN — ACETAMINOPHEN 1000 MG: 500 TABLET ORAL at 01:27

## 2023-03-01 RX ADMIN — BUSPIRONE HYDROCHLORIDE 10 MG: 10 TABLET ORAL at 20:29

## 2023-03-01 RX ADMIN — IPRATROPIUM BROMIDE 0.5 MG: 0.5 SOLUTION RESPIRATORY (INHALATION) at 15:15

## 2023-03-01 RX ADMIN — ROPINIROLE HYDROCHLORIDE 0.25 MG: 0.5 TABLET, FILM COATED ORAL at 03:19

## 2023-03-01 RX ADMIN — PROCHLORPERAZINE EDISYLATE 5 MG: 5 INJECTION INTRAMUSCULAR; INTRAVENOUS at 08:56

## 2023-03-01 RX ADMIN — IPRATROPIUM BROMIDE AND ALBUTEROL SULFATE 3 ML: 2.5; .5 SOLUTION RESPIRATORY (INHALATION) at 00:48

## 2023-03-01 RX ADMIN — METHYLPREDNISOLONE SODIUM SUCCINATE 40 MG: 40 INJECTION, POWDER, FOR SOLUTION INTRAMUSCULAR; INTRAVENOUS at 07:31

## 2023-03-01 RX ADMIN — ONDANSETRON 4 MG: 2 INJECTION INTRAMUSCULAR; INTRAVENOUS at 01:38

## 2023-03-01 RX ADMIN — ALLOPURINOL 100 MG: 100 TABLET ORAL at 11:42

## 2023-03-01 RX ADMIN — ROPINIROLE HYDROCHLORIDE 0.25 MG: 0.5 TABLET, FILM COATED ORAL at 20:29

## 2023-03-01 RX ADMIN — LEVOTHYROXINE SODIUM 75 MCG: 0.07 TABLET ORAL at 11:45

## 2023-03-01 RX ADMIN — CEFTRIAXONE SODIUM 1 G: 1 INJECTION, POWDER, FOR SOLUTION INTRAMUSCULAR; INTRAVENOUS at 03:57

## 2023-03-01 RX ADMIN — GABAPENTIN 300 MG: 300 CAPSULE ORAL at 11:42

## 2023-03-01 RX ADMIN — ROSUVASTATIN CALCIUM 20 MG: 20 TABLET, FILM COATED ORAL at 11:42

## 2023-03-01 RX ADMIN — AMLODIPINE BESYLATE 5 MG: 5 TABLET ORAL at 11:42

## 2023-03-01 RX ADMIN — GABAPENTIN 300 MG: 300 CAPSULE ORAL at 18:11

## 2023-03-01 RX ADMIN — QUETIAPINE FUMARATE 100 MG: 100 TABLET ORAL at 12:43

## 2023-03-01 RX ADMIN — BUSPIRONE HYDROCHLORIDE 10 MG: 10 TABLET ORAL at 10:29

## 2023-03-01 RX ADMIN — Medication 1000 MCG: at 11:42

## 2023-03-01 RX ADMIN — PANTOPRAZOLE SODIUM 40 MG: 40 TABLET, DELAYED RELEASE ORAL at 11:45

## 2023-03-01 RX ADMIN — DULOXETINE HYDROCHLORIDE 60 MG: 60 CAPSULE, DELAYED RELEASE ORAL at 10:29

## 2023-03-01 RX ADMIN — GUAIFENESIN 600 MG: 600 TABLET, EXTENDED RELEASE ORAL at 18:11

## 2023-03-01 RX ADMIN — IPRATROPIUM BROMIDE AND ALBUTEROL SULFATE 3 ML: 2.5; .5 SOLUTION RESPIRATORY (INHALATION) at 20:22

## 2023-03-01 RX ADMIN — PANTOPRAZOLE SODIUM 40 MG: 40 TABLET, DELAYED RELEASE ORAL at 18:11

## 2023-03-01 RX ADMIN — QUETIAPINE FUMARATE 100 MG: 100 TABLET ORAL at 20:29

## 2023-03-01 RX ADMIN — ACETAMINOPHEN 650 MG: 325 TABLET, FILM COATED ORAL at 17:37

## 2023-03-01 RX ADMIN — BUMETANIDE 1 MG: 2 TABLET ORAL at 11:42

## 2023-03-01 RX ADMIN — HYDROXYZINE HYDROCHLORIDE 25 MG: 25 TABLET ORAL at 11:42

## 2023-03-01 RX ADMIN — METHYLPREDNISOLONE SODIUM SUCCINATE 125 MG: 125 INJECTION, POWDER, FOR SOLUTION INTRAMUSCULAR; INTRAVENOUS at 01:26

## 2023-03-01 RX ADMIN — METOPROLOL SUCCINATE 12.5 MG: 25 TABLET, EXTENDED RELEASE ORAL at 11:45

## 2023-03-01 RX ADMIN — HYDROCODONE BITARTRATE AND ACETAMINOPHEN 1 TABLET: 7.5; 325 TABLET ORAL at 13:55

## 2023-03-01 NOTE — ED PROVIDER NOTES
EMERGENCY DEPARTMENT ENCOUNTER    Room Number:  09/09  Date seen:  3/1/2023  PCP: Bernabe Guy MD  Historian: Patient      HPI:  Chief Complaint: Short of breath, headache  A complete HPI/ROS/PMH/PSH/SH/FH are unobtainable due to: Nothing  Context: Josephine Wolf is a 82 y.o. female who presents to the ED c/o shortness of breath and cough that started today.  She reports she has had a dry cough all day today.  She does have a history of COPD on 3 L home oxygen at all times.  She denies fever or chills.  She began having a headache about an hour ago, still currently 6 out of 10 severity.  She does get headaches often.  It is not the worst headache of her life.  It was not sudden onset.  She denies chest pain.  She denies leg swelling.            PAST MEDICAL HISTORY  Active Ambulatory Problems     Diagnosis Date Noted   • Anemia 10/19/2017   • OA (osteoarthritis) of knee 11/16/2017   • Chronic respiratory failure with hypoxia (Formerly [facility]) 12/02/2017   • Cellulitis of skin 12/06/2017   • Acute kidney injury (Formerly [facility]) 12/06/2017   • Sepsis (Formerly [facility]) 12/06/2017   • Orthostatic hypotension 06/24/2018   • Disease of thyroid gland 06/24/2018   • Hypertension 06/24/2018   • Dehydration 06/24/2018   • Stage 3 chronic kidney disease (CMS/Formerly [facility]) 06/25/2018   • Closed compression fracture of L1 lumbar vertebra 06/16/2019   • Hyponatremia 06/16/2019   • Osteoporosis with pathological fracture 06/17/2019   • Acute on chronic respiratory failure with hypoxia and hypercapnia (Formerly [facility]) 03/12/2020   • Obesity (BMI 30-39.9) 03/12/2020   • Nocturnal hypoxia 03/13/2020   • CKD (chronic kidney disease) stage 2, GFR 60-89 ml/min 03/13/2020   • Acute on chronic respiratory failure with hypoxia (Formerly [facility]) 05/20/2020   • Abdominal pain 10/18/2021   • Acute exacerbation of chronic obstructive pulmonary disease (COPD) (Formerly [facility]) 09/29/2022   • Acute UTI 10/19/2022   • Metabolic encephalopathy 10/23/2022     Resolved Ambulatory Problems     Diagnosis Date Noted    • COPD with acute exacerbation ([facility]) 2018     Past Medical History:   Diagnosis Date   • Acid reflux    • Arthritis    • Bipolar 1 disorder, depressed ([facility])    • Cataract    • Chronic nausea    • Chronic pain of right knee    • Continuous leakage of urine    • COPD (chronic obstructive pulmonary disease) ([facility])    • Diverticulosis    • Fibromyalgia    • Fibromyalgia, primary    • Frequent episodes of bronchitis    • HL (hearing loss)    • Hypothyroidism    • Memory loss    • Migraines    • Neck pain    • On home oxygen therapy    • Short of breath on exertion    • Sleep apnea          PAST SURGICAL HISTORY  Past Surgical History:   Procedure Laterality Date   • CEREBRAL ANEURYSM REPAIR      WITH STENT   • HYSTERECTOMY     • LAPAROSCOPIC CHOLECYSTECTOMY     • MO ARTHRP KNE CONDYLE&PLATU MEDIAL&LAT COMPARTMENTS Right 2017    Procedure: RT TOTAL KNEE ARTHROPLASTY;  Surgeon: Kashif Perez MD;  Location: [facility];  Service: Orthopedics         FAMILY HISTORY  Family History   Problem Relation Age of Onset   • Malig Hyperthermia Neg Hx          SOCIAL HISTORY  Social History     Socioeconomic History   • Marital status:    Tobacco Use   • Smoking status: Former     Packs/day: 1.00     Years: 10.00     Pack years: 10.00     Types: Cigarettes     Start date:      Quit date:      Years since quittin.1   • Smokeless tobacco: Never   Vaping Use   • Vaping Use: Never used   Substance and Sexual Activity   • Alcohol use: No     Comment: CAFFEINE NO    • Drug use: No   • Sexual activity: Defer         ALLERGIES  Morphine and related and Fenofibrate        REVIEW OF SYSTEMS  Review of Systems   Review of all 14 systems is negative other than stated in the HPI above.      PHYSICAL EXAM  ED Triage Vitals [23 0014]   Temp Heart Rate Resp BP SpO2   97.1 °F (36.2 °C) 67 18 137/63 93 %      Temp src Heart Rate Source Patient Position BP Location FiO2 (%)   Tympanic Monitor -- -- --        Physical Exam      GENERAL: Awake and alert, no acute distress  HENT: nares patent, oropharynx clear  EYES: no scleral icterus, pupils 3 mm reactive bilaterally  CV: regular rhythm, normal rate, no murmur, no peripheral edema  RESPIRATORY: normal effort, mild expiratory wheezing, O2 saturations 95% on 3 L nasal cannula  ABDOMEN: soft, nondistended, nontender throughout  MUSCULOSKELETAL: no deformity  NEURO: alert, moves all extremities, follows commands  PSYCH:  calm, cooperative  SKIN: warm, dry, no rash    Vital signs and nursing notes reviewed.          LAB RESULTS  Recent Results (from the past 24 hour(s))   ECG 12 Lead Dyspnea    Collection Time: 03/01/23 12:40 AM   Result Value Ref Range    QT Interval 386 ms   Comprehensive Metabolic Panel    Collection Time: 03/01/23  1:12 AM    Specimen: Blood   Result Value Ref Range    Glucose 106 (H) 65 - 99 mg/dL    BUN 21 8 - 23 mg/dL    Creatinine 1.41 (H) 0.57 - 1.00 mg/dL    Sodium 139 136 - 145 mmol/L    Potassium 4.9 3.5 - 5.2 mmol/L    Chloride 101 98 - 107 mmol/L    CO2 32.5 (H) 22.0 - 29.0 mmol/L    Calcium 9.4 8.6 - 10.5 mg/dL    Total Protein 6.5 6.0 - 8.5 g/dL    Albumin 3.9 3.5 - 5.2 g/dL    ALT (SGPT) 15 1 - 33 U/L    AST (SGOT) 16 1 - 32 U/L    Alkaline Phosphatase 69 39 - 117 U/L    Total Bilirubin 0.4 0.0 - 1.2 mg/dL    Globulin 2.6 gm/dL    A/G Ratio 1.5 g/dL    BUN/Creatinine Ratio 14.9 7.0 - 25.0    Anion Gap 5.5 5.0 - 15.0 mmol/L    eGFR 37.8 (L) >60.0 mL/min/1.73   BNP    Collection Time: 03/01/23  1:12 AM    Specimen: Blood   Result Value Ref Range    proBNP 865.0 0.0 - 1,800.0 pg/mL   Single High Sensitivity Troponin T    Collection Time: 03/01/23  1:12 AM    Specimen: Blood   Result Value Ref Range    HS Troponin T 29 (H) <10 ng/L   Procalcitonin    Collection Time: 03/01/23  1:12 AM    Specimen: Blood   Result Value Ref Range    Procalcitonin 0.07 0.00 - 0.25 ng/mL   CBC Auto Differential    Collection Time: 03/01/23  1:12 AM    Specimen:  Blood   Result Value Ref Range    WBC 5.08 3.40 - 10.80 10*3/mm3    RBC 2.59 (L) 3.77 - 5.28 10*6/mm3    Hemoglobin 7.9 (L) 12.0 - 15.9 g/dL    Hematocrit 25.3 (L) 34.0 - 46.6 %    MCV 97.7 (H) 79.0 - 97.0 fL    MCH 30.5 26.6 - 33.0 pg    MCHC 31.2 (L) 31.5 - 35.7 g/dL    RDW 16.3 (H) 12.3 - 15.4 %    RDW-SD 57.7 (H) 37.0 - 54.0 fl    MPV 9.2 6.0 - 12.0 fL    Platelets 229 140 - 450 10*3/mm3    Neutrophil % 59.2 42.7 - 76.0 %    Lymphocyte % 28.0 19.6 - 45.3 %    Monocyte % 10.0 5.0 - 12.0 %    Eosinophil % 1.4 0.3 - 6.2 %    Basophil % 0.4 0.0 - 1.5 %    Immature Grans % 1.0 (H) 0.0 - 0.5 %    Neutrophils, Absolute 3.01 1.70 - 7.00 10*3/mm3    Lymphocytes, Absolute 1.42 0.70 - 3.10 10*3/mm3    Monocytes, Absolute 0.51 0.10 - 0.90 10*3/mm3    Eosinophils, Absolute 0.07 0.00 - 0.40 10*3/mm3    Basophils, Absolute 0.02 0.00 - 0.20 10*3/mm3    Immature Grans, Absolute 0.05 0.00 - 0.05 10*3/mm3    nRBC 0.0 0.0 - 0.2 /100 WBC       Ordered the above labs and reviewed the results.        RADIOLOGY  XR Chest 1 View    Result Date: 3/1/2023  Patient: RAHEEM ROBERTS  Time Out: 00:55 Exam(s): FILM CXR 1 VIEW EXAM:   XR Chest, 1 View CLINICAL HISTORY:   soa. TECHNIQUE:   Frontal view of the chest. COMPARISON:   1 23 2023 FINDINGS:   Lungs:  Curvilinear changes noted at the left lung base are new from the previous exam.  There is similar to slightly improved subsegmental changes in the right infrahilar region.  The lungs are otherwise stable in appearance.  The pulmonary vasculature is stable without evidence for florid CHF.   Pleural space:  Unremarkable.  No pneumothorax. No large pleural effusion.   Heart:  Unremarkable.  No cardiomegaly.   Mediastinum:  The mediastinal contours are stable in appearance.  No tracheal deviation.   Bones joints:  Unremarkable. IMPRESSION:       Curvilinear changes noted at the left lung base are new from the previous examination and are presumed subsegmental atelectasis or subtle infection.   There is similar to slightly improved subsegmental changes in the right infrahilar region.  The lungs are otherwise stable in appearance.  The pulmonary vasculature is stable without evidence for florid CHF.  No pleural effusion or pneumothorax.     Electronically signed by Jorge A García MD on 03-01-23 at 0055      Ordered the above noted radiological studies. Reviewed by me in PACS.            PROCEDURES  Procedures            MEDICATIONS GIVEN IN ER  Medications   sodium chloride 0.9 % flush 10 mL (has no administration in time range)   cefTRIAXone (ROCEPHIN) 1 g in sodium chloride 0.9 % 100 mL IVPB-VTB (has no administration in time range)   ipratropium-albuterol (DUO-NEB) nebulizer solution 3 mL (3 mL Nebulization Given 3/1/23 0048)   methylPREDNISolone sodium succinate (SOLU-Medrol) injection 125 mg (125 mg Intravenous Given 3/1/23 0126)   acetaminophen (TYLENOL) tablet 1,000 mg (1,000 mg Oral Given 3/1/23 0127)   ondansetron (ZOFRAN) injection 4 mg (4 mg Intravenous Given 3/1/23 0138)                   MEDICAL DECISION MAKING, PROGRESS, and CONSULTS    All labs have been independently reviewed by me.  All radiology studies have been reviewed by me and I have also reviewed the radiology report.   EKG's independently viewed and interpreted by me.  Discussion below represents my analysis of pertinent findings related to patient's condition, differential diagnosis, treatment plan and final disposition.      Additional sources:    - External (non-ED) record review: I reviewed discharge summary from 10/26/2022 where patient was admitted for acute UTI, metabolic encephalopathy, rhinovirus infection.    - Chronic or social conditions impacting care: COPD, chronic hypoxia    - Shared decision making: Patient informed of need for admission for observation given suspected pneumonia and increased shortness of breath      Orders placed during this visit:  Orders Placed This Encounter   Procedures   • Respiratory Panel  PCR w/COVID-19(SARS-CoV-2) [facility] In-House, NP Swab in UTM/VTM, 3-4 HR TAT - Swab, Nasopharynx   • Blood Culture - Blood,   • Blood Culture - Blood,   • XR Chest 1 View   • Comprehensive Metabolic Panel   • BNP   • Single High Sensitivity Troponin T   • Procalcitonin   • CBC Auto Differential   • Lactic Acid, Plasma   • Cardiac Monitoring   • Pulse Oximetry, Continuous   • Monitor Blood Pressure   • LIPPS (on-call MD unless specified)   • ECG 12 Lead Dyspnea   • Insert Peripheral IV   • Initiate Observation Status   • CBC & Differential         Differential diagnosis:    Pneumonia  COPD exacerbation  CHF  Pulmonary embolus        Independent interpretation of labs, radiology studies, and discussions with consultants:  ED Course as of 03/01/23 0155   Wed Mar 01, 2023   0054 EKG          EKG time: 0040  Rhythm/Rate: Sinus rhythm, 65  P waves and WI: Normal  QRS, axis: Normal axis   ST and T waves: T wave inversion V3 and lead III    Interpreted Contemporaneously by me, independently viewed  Similar compared to prior 1/23/2023       [JR]   0126 Hemoglobin(!): 7.9  Stable from 1 month prior when it was 8.3 [JR]   0146 Creatinine(!): 1.41  Stable   [JR]   0147 Chest x-ray independently interpreted in PACS.  There is atelectasis or infiltrate present in the left lung base. [JR]   0148 Given patient has increased shortness of breath from baseline with concern for infiltrate in the left lung base, I have ordered empiric Rocephin.  She has no leukocytosis.  Procalcitonin and respiratory viral panel pending.  She will be admitted for further monitoring. [JR]   0155 Discussed with Dr. Fowler, who agrees to admit. [JR]      ED Course User Index  [JR] Kevin Zavaleta MD             I wore an N95 mask, face shield, and gloves during this patient encounter.  Patient also wearing a surgical mask.  Hand hygeine performed before and after seeing the patient.    DIAGNOSIS  Final diagnoses:   COPD with acute  exacerbation (HCC)   Acute nonintractable headache, unspecified headache type   Pneumonia of left lower lobe due to infectious organism         DISPOSITION  Admit            Latest Documented Vital Signs:  As of 01:55 EST  BP- 137/63 HR- 65 Temp- 97.1 °F (36.2 °C) (Tympanic) O2 sat- 91%              --    Please note that portions of this were completed with a voice recognition program.       Note Disclaimer: At [facility], we believe that sharing information builds trust and better relationships. You are receiving this note because you are receiving care at [facility] or recently visited. It is possible you will see health information before a provider has talked with you about it. This kind of information can be easy to misunderstand. To help you fully understand what it means for your health, we urge you to discuss this note with your provider.           Kevin Zavaleta MD  03/01/23 0157

## 2023-03-01 NOTE — PAYOR COMM NOTE
"Raheem Roberts (82 y.o. Female)       ATTN: REQUESTING INPATIENT AUTHORIZATION: ID# [id number]    PLEASE REPLY TO UR DEPT: [phone],  [phone]    [facility]: NPI [id number]  [facility]# [id number]      Date of Birth   1940    Social Security Number       Address   [address] [place] [address] [address]  [facility] [address]    Home Phone   [phone]    MRN   [id number]       Rastafari   [facility]    Marital Status                               Admission Date   3/1/23    Admission Type   Emergency    Admitting Provider   Stanley Fowler MD    Attending Provider   Stanley Fowler MD    Department, Room/Bed   [facility] OBSERVATION, 128/1       Discharge Date       Discharge Disposition       Discharge Destination                               Attending Provider: Stanley Fowler MD    Allergies: Morphine And Related, Fenofibrate    Isolation: Droplet   Infection: Rhinovirus  (03/01/23)   Code Status: CPR    Ht: 154.9 cm (61\")   Wt: 74 kg (163 lb 2.3 oz)    Admission Cmt: None   Principal Problem: None                Active Insurance as of 3/1/2023     Primary Coverage     Payor Plan Insurance Group Employer/Plan Group    [employer] MED[place]ARE [place]AC[place] WELLCARE MEDICARE REPLACEMENT      Payor Plan Address Payor Plan Phone Number Payor Plan Fax Number Effective Dates    [address] [address] [phone]  10/9/2017 - None Entered    [facility] [id number]       Subscriber Name Subscriber Birth Date Member ID       RAHEEM ROBERTS 1940 [id number]           Secondary Coverage     Payor Plan Insurance Group Employer/Plan Group    [place]      Payor Plan Address Payor Plan Phone Number Payor Plan Fax Number Effective Dates    [address] [phone]  7/3/2016 - None Entered    [place] [address]       Subscriber Name Subscriber Birth Date Member ID       RAHEEM ROBERTS 1940 [id number]                 Emergency Contacts      " (Rel.) Home Phone Work Phone Mobile Phone    Chino Wolf (Son) [phone] -- --    James Wolf (Son) [phone] -- [phone]            History & Physical    No notes of this type exist for this encounter.            Emergency Department Notes      Maliha Reid RN at 03/01/23 0013        Pt was brought in by ems from home for frontal headache that started approx 1hr ago. Denies any vision changes    This RN wore mask and goggles during time of contact      Electronically signed by Maliha Reid RN at 03/01/23 0014     Kevin Zavaleta MD at 03/01/23 0035           EMERGENCY DEPARTMENT ENCOUNTER    Room Number:  09/09  Date seen:  3/1/2023  PCP: Bernabe Guy MD  Historian: Patient      HPI:  Chief Complaint: Short of breath, headache  A complete HPI/ROS/PMH/PSH/SH/FH are unobtainable due to: Nothing  Context: Josephine Wolf is a 82 y.o. female who presents to the ED c/o shortness of breath and cough that started today.  She reports she has had a dry cough all day today.  She does have a history of COPD on 3 L home oxygen at all times.  She denies fever or chills.  She began having a headache about an hour ago, still currently 6 out of 10 severity.  She does get headaches often.  It is not the worst headache of her life.  It was not sudden onset.  She denies chest pain.  She denies leg swelling.            PAST MEDICAL HISTORY  Active Ambulatory Problems     Diagnosis Date Noted   • Anemia 10/19/2017   • OA (osteoarthritis) of knee 11/16/2017   • Chronic respiratory failure with hypoxia (HCC) 12/02/2017   • Cellulitis of skin 12/06/2017   • Acute kidney injury (HCC) 12/06/2017   • Sepsis ([facility]) 12/06/2017   • Orthostatic hypotension 06/24/2018   • Disease of thyroid gland 06/24/2018   • Hypertension 06/24/2018   • Dehydration 06/24/2018   • Stage 3 chronic kidney disease (CMS/HCC) 06/25/2018   • Closed compression fracture of L1 lumbar vertebra 06/16/2019   •  Hyponatremia 06/16/2019   • Osteoporosis with pathological fracture 06/17/2019   • Acute on chronic respiratory failure with hypoxia and hypercapnia ([facility]) 03/12/2020   • Obesity (BMI 30-39.9) 03/12/2020   • Nocturnal hypoxia 03/13/2020   • CKD (chronic kidney disease) stage 2, GFR 60-89 ml/min 03/13/2020   • Acute on chronic respiratory failure with hypoxia ([facility]) 05/20/2020   • Abdominal pain 10/18/2021   • Acute exacerbation of chronic obstructive pulmonary disease (COPD) ([facility]) 09/29/2022   • Acute UTI 10/19/2022   • Metabolic encephalopathy 10/23/2022     Resolved Ambulatory Problems     Diagnosis Date Noted   • COPD with acute exacerbation ([facility]) 06/24/2018     Past Medical History:   Diagnosis Date   • Acid reflux    • Arthritis    • Bipolar 1 disorder, depressed ([facility])    • Cataract    • Chronic nausea    • Chronic pain of right knee    • Continuous leakage of urine    • COPD (chronic obstructive pulmonary disease) ([facility])    • Diverticulosis    • Fibromyalgia    • Fibromyalgia, primary    • Frequent episodes of bronchitis    • HL (hearing loss)    • Hypothyroidism    • Memory loss    • Migraines    • Neck pain    • On home oxygen therapy    • Short of breath on exertion    • Sleep apnea          PAST SURGICAL HISTORY  Past Surgical History:   Procedure Laterality Date   • CEREBRAL ANEURYSM REPAIR  2000'S    WITH STENT   • HYSTERECTOMY     • LAPAROSCOPIC CHOLECYSTECTOMY     • TX ARTHRP KNE CONDYLE&PLATU MEDIAL&LAT COMPARTMENTS Right 11/16/2017    Procedure: RT TOTAL KNEE ARTHROPLASTY;  Surgeon: Kashif Perez MD;  Location: [facility];  Service: Orthopedics         FAMILY HISTORY  Family History   Problem Relation Age of Onset   • Malig Hyperthermia Neg Hx          SOCIAL HISTORY  Social History     Socioeconomic History   • Marital status:    Tobacco Use   • Smoking status: Former     Packs/day: 1.00     Years: 10.00     Pack years: 10.00     Types: Cigarettes     Start date: 1959     Quit date: 1969      Years since quittin.1   • Smokeless tobacco: Never   Vaping Use   • Vaping Use: Never used   Substance and Sexual Activity   • Alcohol use: No     Comment: CAFFEINE NO    • Drug use: No   • Sexual activity: Defer         ALLERGIES  Morphine and related and Fenofibrate        REVIEW OF SYSTEMS  Review of Systems   Review of all 14 systems is negative other than stated in the HPI above.      PHYSICAL EXAM  ED Triage Vitals [23 0014]   Temp Heart Rate Resp BP SpO2   97.1 °F (36.2 °C) 67 18 137/63 93 %      Temp src Heart Rate Source Patient Position BP Location FiO2 (%)   Tympanic Monitor -- -- --       Physical Exam      GENERAL: Awake and alert, no acute distress  HENT: nares patent, oropharynx clear  EYES: no scleral icterus, pupils 3 mm reactive bilaterally  CV: regular rhythm, normal rate, no murmur, no peripheral edema  RESPIRATORY: normal effort, mild expiratory wheezing, O2 saturations 95% on 3 L nasal cannula  ABDOMEN: soft, nondistended, nontender throughout  MUSCULOSKELETAL: no deformity  NEURO: alert, moves all extremities, follows commands  PSYCH:  calm, cooperative  SKIN: warm, dry, no rash    Vital signs and nursing notes reviewed.          LAB RESULTS  Recent Results (from the past 24 hour(s))   ECG 12 Lead Dyspnea    Collection Time: 23 12:40 AM   Result Value Ref Range    QT Interval 386 ms   Comprehensive Metabolic Panel    Collection Time: 23  1:12 AM    Specimen: Blood   Result Value Ref Range    Glucose 106 (H) 65 - 99 mg/dL    BUN 21 8 - 23 mg/dL    Creatinine 1.41 (H) 0.57 - 1.00 mg/dL    Sodium 139 136 - 145 mmol/L    Potassium 4.9 3.5 - 5.2 mmol/L    Chloride 101 98 - 107 mmol/L    CO2 32.5 (H) 22.0 - 29.0 mmol/L    Calcium 9.4 8.6 - 10.5 mg/dL    Total Protein 6.5 6.0 - 8.5 g/dL    Albumin 3.9 3.5 - 5.2 g/dL    ALT (SGPT) 15 1 - 33 U/L    AST (SGOT) 16 1 - 32 U/L    Alkaline Phosphatase 69 39 - 117 U/L    Total Bilirubin 0.4 0.0 - 1.2 mg/dL    Globulin 2.6 gm/dL     A/G Ratio 1.5 g/dL    BUN/Creatinine Ratio 14.9 7.0 - 25.0    Anion Gap 5.5 5.0 - 15.0 mmol/L    eGFR 37.8 (L) >60.0 mL/min/1.73   BNP    Collection Time: 03/01/23  1:12 AM    Specimen: Blood   Result Value Ref Range    proBNP 865.0 0.0 - 1,800.0 pg/mL   Single High Sensitivity Troponin T    Collection Time: 03/01/23  1:12 AM    Specimen: Blood   Result Value Ref Range    HS Troponin T 29 (H) <10 ng/L   Procalcitonin    Collection Time: 03/01/23  1:12 AM    Specimen: Blood   Result Value Ref Range    Procalcitonin 0.07 0.00 - 0.25 ng/mL   CBC Auto Differential    Collection Time: 03/01/23  1:12 AM    Specimen: Blood   Result Value Ref Range    WBC 5.08 3.40 - 10.80 10*3/mm3    RBC 2.59 (L) 3.77 - 5.28 10*6/mm3    Hemoglobin 7.9 (L) 12.0 - 15.9 g/dL    Hematocrit 25.3 (L) 34.0 - 46.6 %    MCV 97.7 (H) 79.0 - 97.0 fL    MCH 30.5 26.6 - 33.0 pg    MCHC 31.2 (L) 31.5 - 35.7 g/dL    RDW 16.3 (H) 12.3 - 15.4 %    RDW-SD 57.7 (H) 37.0 - 54.0 fl    MPV 9.2 6.0 - 12.0 fL    Platelets 229 140 - 450 10*3/mm3    Neutrophil % 59.2 42.7 - 76.0 %    Lymphocyte % 28.0 19.6 - 45.3 %    Monocyte % 10.0 5.0 - 12.0 %    Eosinophil % 1.4 0.3 - 6.2 %    Basophil % 0.4 0.0 - 1.5 %    Immature Grans % 1.0 (H) 0.0 - 0.5 %    Neutrophils, Absolute 3.01 1.70 - 7.00 10*3/mm3    Lymphocytes, Absolute 1.42 0.70 - 3.10 10*3/mm3    Monocytes, Absolute 0.51 0.10 - 0.90 10*3/mm3    Eosinophils, Absolute 0.07 0.00 - 0.40 10*3/mm3    Basophils, Absolute 0.02 0.00 - 0.20 10*3/mm3    Immature Grans, Absolute 0.05 0.00 - 0.05 10*3/mm3    nRBC 0.0 0.0 - 0.2 /100 WBC       Ordered the above labs and reviewed the results.        RADIOLOGY  XR Chest 1 View    Result Date: 3/1/2023  Patient: RAHEEM ROBERTS  Time Out: 00:55 Exam(s): FILM CXR 1 VIEW EXAM:   XR Chest, 1 View CLINICAL HISTORY:   soa. TECHNIQUE:   Frontal view of the chest. COMPARISON:   1 23 2023 FINDINGS:   Lungs:  Curvilinear changes noted at the left lung base are new from the previous exam.   There is similar to slightly improved subsegmental changes in the right infrahilar region.  The lungs are otherwise stable in appearance.  The pulmonary vasculature is stable without evidence for florid CHF.   Pleural space:  Unremarkable.  No pneumothorax. No large pleural effusion.   Heart:  Unremarkable.  No cardiomegaly.   Mediastinum:  The mediastinal contours are stable in appearance.  No tracheal deviation.   Bones joints:  Unremarkable. IMPRESSION:       Curvilinear changes noted at the left lung base are new from the previous examination and are presumed subsegmental atelectasis or subtle infection.  There is similar to slightly improved subsegmental changes in the right infrahilar region.  The lungs are otherwise stable in appearance.  The pulmonary vasculature is stable without evidence for florid CHF.  No pleural effusion or pneumothorax.     Electronically signed by Jorge A García MD on 03-01-23 at 0055      Ordered the above noted radiological studies. Reviewed by me in PACS.            PROCEDURES  Procedures            MEDICATIONS GIVEN IN ER  Medications   sodium chloride 0.9 % flush 10 mL (has no administration in time range)   cefTRIAXone (ROCEPHIN) 1 g in sodium chloride 0.9 % 100 mL IVPB-VTB (has no administration in time range)   ipratropium-albuterol (DUO-NEB) nebulizer solution 3 mL (3 mL Nebulization Given 3/1/23 0048)   methylPREDNISolone sodium succinate (SOLU-Medrol) injection 125 mg (125 mg Intravenous Given 3/1/23 0126)   acetaminophen (TYLENOL) tablet 1,000 mg (1,000 mg Oral Given 3/1/23 0127)   ondansetron (ZOFRAN) injection 4 mg (4 mg Intravenous Given 3/1/23 0138)                   MEDICAL DECISION MAKING, PROGRESS, and CONSULTS    All labs have been independently reviewed by me.  All radiology studies have been reviewed by me and I have also reviewed the radiology report.   EKG's independently viewed and interpreted by me.  Discussion below represents my analysis of pertinent  findings related to patient's condition, differential diagnosis, treatment plan and final disposition.      Additional sources:    - External (non-ED) record review: I reviewed discharge summary from 10/26/2022 where patient was admitted for acute UTI, metabolic encephalopathy, rhinovirus infection.    - Chronic or social conditions impacting care: COPD, chronic hypoxia    - Shared decision making: Patient informed of need for admission for observation given suspected pneumonia and increased shortness of breath      Orders placed during this visit:  Orders Placed This Encounter   Procedures   • Respiratory Panel PCR w/COVID-19(SARS-CoV-2) ANISH/VALERIA/MAXIMILIAN/PAD/COR/MAD/ILSA In-House, NP Swab in UTM/VTM, 3-4 HR TAT - Swab, Nasopharynx   • Blood Culture - Blood,   • Blood Culture - Blood,   • XR Chest 1 View   • Comprehensive Metabolic Panel   • BNP   • Single High Sensitivity Troponin T   • Procalcitonin   • CBC Auto Differential   • Lactic Acid, Plasma   • Cardiac Monitoring   • Pulse Oximetry, Continuous   • Monitor Blood Pressure   • LIPPS (on-call MD unless specified)   • ECG 12 Lead Dyspnea   • Insert Peripheral IV   • Initiate Observation Status   • CBC & Differential         Differential diagnosis:    Pneumonia  COPD exacerbation  CHF  Pulmonary embolus        Independent interpretation of labs, radiology studies, and discussions with consultants:  ED Course as of 03/01/23 0155   Wed Mar 01, 2023   0054 EKG          EKG time: 0040  Rhythm/Rate: Sinus rhythm, 65  P waves and SC: Normal  QRS, axis: Normal axis   ST and T waves: T wave inversion V3 and lead III    Interpreted Contemporaneously by me, independently viewed  Similar compared to prior 1/23/2023       [JR]   0126 Hemoglobin(!): 7.9  Stable from 1 month prior when it was 8.3 [JR]   0146 Creatinine(!): 1.41  Stable   [JR]   0147 Chest x-ray independently interpreted in PACS.  There is atelectasis or infiltrate present in the left lung base. [JR]   0148 Given  patient has increased shortness of breath from baseline with concern for infiltrate in the left lung base, I have ordered empiric Rocephin.  She has no leukocytosis.  Procalcitonin and respiratory viral panel pending.  She will be admitted for further monitoring. [JR]   0155 Discussed with Dr. Fowler, who agrees to admit. [JR]      ED Course User Index  [JR] Kevin Zavaleta MD             I wore an N95 mask, face shield, and gloves during this patient encounter.  Patient also wearing a surgical mask.  Hand hygeine performed before and after seeing the patient.    DIAGNOSIS  Final diagnoses:   COPD with acute exacerbation (HCC)   Acute nonintractable headache, unspecified headache type   Pneumonia of left lower lobe due to infectious organism         DISPOSITION  Admit            Latest Documented Vital Signs:  As of 01:55 EST  BP- 137/63 HR- 65 Temp- 97.1 °F (36.2 °C) (Tympanic) O2 sat- 91%              --    Please note that portions of this were completed with a voice recognition program.       Note Disclaimer: At [facility], we believe that sharing information builds trust and better relationships. You are receiving this note because you are receiving care at [facility] or recently visited. It is possible you will see health information before a provider has talked with you about it. This kind of information can be easy to misunderstand. To help you fully understand what it means for your health, we urge you to discuss this note with your provider.           Kevin Zavaleta MD  03/01/23 0157      Electronically signed by Kevin Zavaleta MD at 03/01/23 0157     Cortney Sanchez RN at 03/01/23 0424          .Nursing report ED to floor  [place] PRANAV Mims  82 y.o.  female    HPI :   Chief Complaint   Patient presents with   • Headache       Admitting doctor:   Stanley Fowler MD    Admitting diagnosis:   The primary encounter diagnosis was COPD with acute exacerbation (HCC). Diagnoses of Acute  "nonintractable headache, unspecified headache type and Pneumonia of left lower lobe due to infectious organism were also pertinent to this visit.    Code status:   Current Code Status       Date Active Code Status Order ID Comments User Context       Prior            Allergies:   Morphine and related and Fenofibrate    Isolation:   Enhanced Droplet/Contact     Intake and Output    Intake/Output Summary (Last 24 hours) at 3/1/2023 0424  Last data filed at 3/1/2023 0423  Gross per 24 hour   Intake 100 ml   Output --   Net 100 ml       Weight:       03/01/23  0033   Weight: 72.6 kg (160 lb)       Most recent vitals:   Vitals:    03/01/23 0033 03/01/23 0048 03/01/23 0206 03/01/23 0407   BP:   116/86 153/73   BP Location:    Right arm   Pulse:  65 75 66   Resp:  18  19   Temp:       TempSrc:       SpO2:  91% 92% 93%   Weight: 72.6 kg (160 lb)      Height: 154.9 cm (61\")          Active LDAs/IV Access:   Lines, Drains & Airways       Active LDAs       Name Placement date Placement time Site Days    Peripheral IV 03/01/23 0126 Left Antecubital 03/01/23 0126  Antecubital  less than 1                    Labs (abnormal labs have a star):   Labs Reviewed   RESPIRATORY PANEL PCR W/ COVID-19 (SARS-COV-2) ANISH/VALERIA/MAXIMILIAN/PAD/COR/MAD/ILSA IN-HOUSE, NP SWAB IN UTM/VTP, 3-4 HR TAT - Abnormal; Notable for the following components:       Result Value    Human Rhinovirus/Enterovirus Detected (*)     All other components within normal limits    Narrative:     In the setting of a positive respiratory panel with a viral infection PLUS a negative procalcitonin without other underlying concern for bacterial infection, consider observing off antibiotics or discontinuation of antibiotics and continue supportive care. If the respiratory panel is positive for atypical bacterial infection (Bordetella pertussis, Chlamydophila pneumoniae, or Mycoplasma pneumoniae), consider antibiotic de-escalation to target atypical bacterial infection. "   COMPREHENSIVE METABOLIC PANEL - Abnormal; Notable for the following components:    Glucose 106 (*)     Creatinine 1.41 (*)     CO2 32.5 (*)     eGFR 37.8 (*)     All other components within normal limits    Narrative:     GFR Normal >60  Chronic Kidney Disease <60  Kidney Failure <15    The GFR formula is only valid for adults with stable renal function between ages 18 and 70.   SINGLE HSTROPONIN T - Abnormal; Notable for the following components:    HS Troponin T 29 (*)     All other components within normal limits    Narrative:     High Sensitive Troponin T Reference Range:  <10.0 ng/L- Negative Female for AMI  <15.0 ng/L- Negative Male for AMI  >=10 - Abnormal Female indicating possible myocardial injury.  >=15 - Abnormal Male indicating possible myocardial injury.   Clinicians would have to utilize clinical acumen, EKG, Troponin, and serial changes to determine if it is an Acute Myocardial Infarction or myocardial injury due to an underlying chronic condition.        CBC WITH AUTO DIFFERENTIAL - Abnormal; Notable for the following components:    RBC 2.59 (*)     Hemoglobin 7.9 (*)     Hematocrit 25.3 (*)     MCV 97.7 (*)     MCHC 31.2 (*)     RDW 16.3 (*)     RDW-SD 57.7 (*)     Immature Grans % 1.0 (*)     All other components within normal limits   BNP (IN-HOUSE) - Normal    Narrative:     Among patients with dyspnea, NT-proBNP is highly sensitive for the detection of acute congestive heart failure. In addition NT-proBNP of <300 pg/ml effectively rules out acute congestive heart failure with 99% negative predictive value.    Results may be falsely decreased if patient taking Biotin.     PROCALCITONIN - Normal    Narrative:     As a Marker for Sepsis (Non-Neonates):    1. <0.5 ng/mL represents a low risk of severe sepsis and/or septic shock.  2. >2 ng/mL represents a high risk of severe sepsis and/or septic shock.    As a Marker for Lower Respiratory Tract Infections that require antibiotic therapy:    PCT on  "Admission    Antibiotic Therapy       6-12 Hrs later    >0.5                Strongly Recommended  >0.25 - <0.5        Recommended   0.1 - 0.25          Discouraged              Remeasure/reassess PCT  <0.1                Strongly Discouraged     Remeasure/reassess PCT    As 28 day mortality risk marker: \"Change in Procalcitonin Result\" (>80% or <=80%) if Day 0 (or Day 1) and Day 4 values are available. Refer to http://www.[email or web address]-pct-calculator.com    Change in PCT <=80%  A decrease of PCT levels below or equal to 80% defines a positive change in PCT test result representing a higher risk for 28-day all-cause mortality of patients diagnosed with severe sepsis for septic shock.    Change in PCT >80%  A decrease of PCT levels of more than 80% defines a negative change in PCT result representing a lower risk for 28-day all-cause mortality of patients diagnosed with severe sepsis or septic shock.      LACTIC ACID, PLASMA - Normal   BLOOD CULTURE   BLOOD CULTURE   CBC AND DIFFERENTIAL    Narrative:     The following orders were created for panel order CBC & Differential.  Procedure                               Abnormality         Status                     ---------                               -----------         ------                     CBC Auto Differential[[id number]]        Abnormal            Final result                 Please view results for these tests on the individual orders.       EKG:   ECG 12 Lead Dyspnea   Preliminary Result   HEART RATE= 65  bpm   RR Interval= 923  ms   OH Interval= 160  ms   P Horizontal Axis= -5  deg   P Front Axis= 28  deg   QRSD Interval= 80  ms   QT Interval= 386  ms   QRS Axis= -7  deg   T Wave Axis= 7  deg   - BORDERLINE ECG -   Sinus rhythm   Abnormal R-wave progression, early transition   LVH by voltage   Borderline T abnormalities, anterior leads   Electronically Signed By:    Date and Time of Study: 2023-03-01 00:40:31          Meds given in ED:   Medications   sodium " chloride 0.9 % flush 10 mL (has no administration in time range)   ipratropium-albuterol (DUO-NEB) nebulizer solution 3 mL (3 mL Nebulization Given 3/1/23 0048)   methylPREDNISolone sodium succinate (SOLU-Medrol) injection 125 mg (125 mg Intravenous Given 3/1/23 0126)   acetaminophen (TYLENOL) tablet 1,000 mg (1,000 mg Oral Given 3/1/23 0127)   ondansetron (ZOFRAN) injection 4 mg (4 mg Intravenous Given 3/1/23 0138)   cefTRIAXone (ROCEPHIN) 1 g in sodium chloride 0.9 % 100 mL IVPB-VTB (0 g Intravenous Stopped 3/1/23 0423)   ondansetron (ZOFRAN) injection 4 mg (4 mg Intravenous Given During Downtime 3/1/23 0225)   rOPINIRole (REQUIP) tablet 0.25 mg (0.25 mg Oral Given During Downtime 3/1/23 0319)       Imaging results:  XR Chest 1 View    Result Date: 3/1/2023  Electronically signed by Jorge A García MD on 23 at 0055     Ambulatory status:   - up ad helder    Social issues:   Social History     Socioeconomic History   • Marital status:    Tobacco Use   • Smoking status: Former     Packs/day: 1.00     Years: 10.00     Pack years: 10.00     Types: Cigarettes     Start date:      Quit date:      Years since quittin.1   • Smokeless tobacco: Never   Vaping Use   • Vaping Use: Never used   Substance and Sexual Activity   • Alcohol use: No     Comment: CAFFEINE NO    • Drug use: No   • Sexual activity: Defer       NIH Stroke Scale:         Cortney Sanchez RN  23 04:24 EST         Electronically signed by Cortney Sanchez RN at 23 0424

## 2023-03-01 NOTE — ED TRIAGE NOTES
Pt was brought in by ems from home for frontal headache that started approx 1hr ago. Denies any vision changes    This RN wore mask and goggles during time of contact

## 2023-03-01 NOTE — PLAN OF CARE
Goal Outcome Evaluation:  Plan of Care Reviewed With: patient        Progress: improving  Outcome Evaluation: New admit this am for COPD exacerbation, Resp panel + Rhinovirus. Solumedrol and duonebs in ED. VSS. O2 stable on 3L which is patient's home dose. Awaiting admit orders.

## 2023-03-01 NOTE — H&P
History and physical    Primary care physician  Dr. PLUMMER    Chief complaint  Shortness of breath    History of present illness  82-year-old white female with very complex past medical history including chronic hypoxic respiratory failure COPD hypertension hypothyroidism anxiety disorder depression restless leg syndrome and chronic kidney disease stage III presented to [facility] emergency room with shortness of breath for last several days with nonproductive cough but no fever chills chest pain abdominal pain nausea vomiting diarrhea.  Patient work-up in ER revealed acute on chronic hypoxic aspiratory failure with acute exacerbation of COPD and acute human rhinovirus bronchitis admit for management.     PAST MEDICAL HISTORY  • Acid reflux     • Arthritis     • Bipolar 1 disorder, depressed ([facility])     • Cataract     • Chronic nausea     • Chronic pain of right knee     • Continuous leakage of urine     • COPD (chronic obstructive pulmonary disease) ([facility])     • Diverticulosis     • Fibromyalgia     • Fibromyalgia, primary     • Frequent episodes of bronchitis     • HL (hearing loss)     • Hypothyroidism     • Memory loss     • Migraines     • Neck pain     • On home oxygen therapy     • Short of breath on exertion     • Sleep apnea         PAST SURGICAL HISTORY              Procedure Laterality Date   • CEREBRAL ANEURYSM REPAIR   2000'S     WITH STENT   • HYSTERECTOMY       • LAPAROSCOPIC CHOLECYSTECTOMY       • NE ARTHRP KNE CONDYLE&PLATU MEDIAL&LAT COMPARTMENTS Right 11/16/2017     Procedure: RT TOTAL KNEE ARTHROPLASTY;  Surgeon: Kashif Perez MD;  Location: [facility];  Service: Orthopedics         FAMILY HISTORY           Problem Relation Age of Onset   • Malig Hyperthermia Neg Hx        SOCIAL HISTORY                 Socioeconomic History   • Marital status:    Tobacco Use   • Smoking status: Former       Packs/day: 1.00       Years: 10.00       Pack years: 10.00       Types: Cigarettes  "      Start date:        Quit date:        Years since quittin.1   • Smokeless tobacco: Never   Vaping Use   • Vaping Use: Never used   Substance and Sexual Activity   • Alcohol use: No       Comment: CAFFEINE NO    • Drug use: No   • Sexual activity: Defer         ALLERGIES  Morphine and related and Fenofibrate  Home medications reviewed     REVIEW OF SYSTEMS  Review of all 14 systems is negative other than stated in the HPI above.     PHYSICAL EXAM  Blood pressure 180/92, pulse 82, temperature 97.6 °F (36.4 °C), temperature source Oral, resp. rate 18, height 154.9 cm (61\"), weight 74 kg (163 lb 2.3 oz), SpO2 95 %.    GENERAL: Awake and alert, no  respiratory distress but agitated  HEENT:  Unremarkable  NECK:  Supple  CV: regular rhythm, normal rate, no murmur, no peripheral edema  RESPIRATORY: normal effort, decreased breath sounds with few expiratory wheezes B  ABDOMEN: soft, nondistended, nontender throughout  MUSCULOSKELETAL: no deformity  NEURO: alert, moves all extremities, follows commands  PSYCH:  calm, cooperative  SKIN: warm, dry, no rash     LAB RESULTS  Lab Results (last 24 hours)     Procedure Component Value Units Date/Time    Comprehensive Metabolic Panel [[id number]]  (Abnormal) Collected: 23 1140    Specimen: Blood Updated: 23 1217     Glucose 178 mg/dL      BUN 18 mg/dL      Creatinine 1.42 mg/dL      Sodium 134 mmol/L      Potassium 4.8 mmol/L      Chloride 98 mmol/L      CO2 28.4 mmol/L      Calcium 9.9 mg/dL      Total Protein 7.1 g/dL      Albumin 4.2 g/dL      ALT (SGPT) 16 U/L      AST (SGOT) 17 U/L      Alkaline Phosphatase 78 U/L      Total Bilirubin 0.4 mg/dL      Globulin 2.9 gm/dL      A/G Ratio 1.4 g/dL      BUN/Creatinine Ratio 12.7     Anion Gap 7.6 mmol/L      eGFR 37.5 mL/min/1.73     Narrative:      GFR Normal >60  Chronic Kidney Disease <60  Kidney Failure <15    The GFR formula is only valid for adults with stable renal function between ages 18 and 70.    " Lactic Acid, Plasma [[id number]]  (Normal) Collected: 03/01/23 0412    Specimen: Blood Updated: 03/01/23 0413     Lactate 0.6 mmol/L     Blood Culture - Blood, Arm, Right [[id number]] Collected: 03/01/23 0340    Specimen: Blood from Arm, Right Updated: 03/01/23 0356    Blood Culture - Blood, Arm, Left [[id number]] Collected: 03/01/23 0205    Specimen: Blood from Arm, Left Updated: 03/01/23 0354    Respiratory Panel PCR w/COVID-19(SARS-CoV-2) ANISH/VALERIA/MAXIMILIAN/PAD/COR/MAD/ILSA In-House, NP Swab in UTM/VTM, 3-4 HR TAT - Swab, Nasopharynx [[id number]]  (Abnormal) Collected: 03/01/23 0059    Specimen: Swab from Nasopharynx Updated: 03/01/23 0353     ADENOVIRUS, PCR Not Detected     Coronavirus 229E Not Detected     Coronavirus HKU1 Not Detected     Coronavirus NL63 Not Detected     Coronavirus OC43 Not Detected     COVID19 Not Detected     Human Metapneumovirus Not Detected     Human Rhinovirus/Enterovirus Detected     Influenza A PCR Not Detected     Influenza B PCR Not Detected     Parainfluenza Virus 1 Not Detected     Parainfluenza Virus 2 Not Detected     Parainfluenza Virus 3 Not Detected     Parainfluenza Virus 4 Not Detected     RSV, PCR Not Detected     Bordetella pertussis pcr Not Detected     Bordetella parapertussis PCR Not Detected     Chlamydophila pneumoniae PCR Not Detected     Mycoplasma pneumo by PCR Not Detected    Narrative:      In the setting of a positive respiratory panel with a viral infection PLUS a negative procalcitonin without other underlying concern for bacterial infection, consider observing off antibiotics or discontinuation of antibiotics and continue supportive care. If the respiratory panel is positive for atypical bacterial infection (Bordetella pertussis, Chlamydophila pneumoniae, or Mycoplasma pneumoniae), consider antibiotic de-escalation to target atypical bacterial infection.    Single High Sensitivity Troponin T [[id number]]  (Abnormal) Collected: 03/01/23 0112    Specimen: Blood Updated:  "03/01/23 0151     HS Troponin T 29 ng/L     Narrative:      High Sensitive Troponin T Reference Range:  <10.0 ng/L- Negative Female for AMI  <15.0 ng/L- Negative Male for AMI  >=10 - Abnormal Female indicating possible myocardial injury.  >=15 - Abnormal Male indicating possible myocardial injury.   Clinicians would have to utilize clinical acumen, EKG, Troponin, and serial changes to determine if it is an Acute Myocardial Infarction or myocardial injury due to an underlying chronic condition.         Procalcitonin [[id number]]  (Normal) Collected: 03/01/23 0112    Specimen: Blood Updated: 03/01/23 0151     Procalcitonin 0.07 ng/mL     Narrative:      As a Marker for Sepsis (Non-Neonates):    1. <0.5 ng/mL represents a low risk of severe sepsis and/or septic shock.  2. >2 ng/mL represents a high risk of severe sepsis and/or septic shock.    As a Marker for Lower Respiratory Tract Infections that require antibiotic therapy:    PCT on Admission    Antibiotic Therapy       6-12 Hrs later    >0.5                Strongly Recommended  >0.25 - <0.5        Recommended   0.1 - 0.25          Discouraged              Remeasure/reassess PCT  <0.1                Strongly Discouraged     Remeasure/reassess PCT    As 28 day mortality risk marker: \"Change in Procalcitonin Result\" (>80% or <=80%) if Day 0 (or Day 1) and Day 4 values are available. Refer to http://www.[employer]-pct-calculator.com    Change in PCT <=80%  A decrease of PCT levels below or equal to 80% defines a positive change in PCT test result representing a higher risk for 28-day all-cause mortality of patients diagnosed with severe sepsis for septic shock.    Change in PCT >80%  A decrease of PCT levels of more than 80% defines a negative change in PCT result representing a lower risk for 28-day all-cause mortality of patients diagnosed with severe sepsis or septic shock.       BNP [[id number]]  (Normal) Collected: 03/01/23 0112    Specimen: Blood Updated: 03/01/23 0151 "     proBNP 865.0 pg/mL     Narrative:      Among patients with dyspnea, NT-proBNP is highly sensitive for the detection of acute congestive heart failure. In addition NT-proBNP of <300 pg/ml effectively rules out acute congestive heart failure with 99% negative predictive value.    Results may be falsely decreased if patient taking Biotin.      Comprehensive Metabolic Panel [[id number]]  (Abnormal) Collected: 03/01/23 0112    Specimen: Blood Updated: 03/01/23 0145     Glucose 106 mg/dL      BUN 21 mg/dL      Creatinine 1.41 mg/dL      Sodium 139 mmol/L      Potassium 4.9 mmol/L      Chloride 101 mmol/L      CO2 32.5 mmol/L      Calcium 9.4 mg/dL      Total Protein 6.5 g/dL      Albumin 3.9 g/dL      ALT (SGPT) 15 U/L      AST (SGOT) 16 U/L      Alkaline Phosphatase 69 U/L      Total Bilirubin 0.4 mg/dL      Globulin 2.6 gm/dL      A/G Ratio 1.5 g/dL      BUN/Creatinine Ratio 14.9     Anion Gap 5.5 mmol/L      eGFR 37.8 mL/min/1.73     Narrative:      GFR Normal >60  Chronic Kidney Disease <60  Kidney Failure <15    The GFR formula is only valid for adults with stable renal function between ages 18 and 70.    CBC & Differential [[id number]]  (Abnormal) Collected: 03/01/23 0112    Specimen: Blood Updated: 03/01/23 0124    Narrative:      The following orders were created for panel order CBC & Differential.  Procedure                               Abnormality         Status                     ---------                               -----------         ------                     CBC Auto Differential[[id number]]        Abnormal            Final result                 Please view results for these tests on the individual orders.    CBC Auto Differential [[id number]]  (Abnormal) Collected: 03/01/23 0112    Specimen: Blood Updated: 03/01/23 0124     WBC 5.08 10*3/mm3      RBC 2.59 10*6/mm3      Hemoglobin 7.9 g/dL      Hematocrit 25.3 %      MCV 97.7 fL      MCH 30.5 pg      MCHC 31.2 g/dL      RDW 16.3 %      RDW-SD 57.7 fl       MPV 9.2 fL      Platelets 229 10*3/mm3      Neutrophil % 59.2 %      Lymphocyte % 28.0 %      Monocyte % 10.0 %      Eosinophil % 1.4 %      Basophil % 0.4 %      Immature Grans % 1.0 %      Neutrophils, Absolute 3.01 10*3/mm3      Lymphocytes, Absolute 1.42 10*3/mm3      Monocytes, Absolute 0.51 10*3/mm3      Eosinophils, Absolute 0.07 10*3/mm3      Basophils, Absolute 0.02 10*3/mm3      Immature Grans, Absolute 0.05 10*3/mm3      nRBC 0.0 /100 WBC         Imaging Results (Last 24 Hours)     Procedure Component Value Units Date/Time    XR Chest 1 View [[id number]] Collected: 03/01/23 0056     Updated: 03/01/23 0056    Narrative:        Patient: RAHEEM ROBERTS  Time Out: 00:55  Exam(s): FILM CXR 1 VIEW     EXAM:    XR Chest, 1 View    CLINICAL HISTORY:    soa.    TECHNIQUE:    Frontal view of the chest.    COMPARISON:    1 23 2023    FINDINGS:    Lungs:  Curvilinear changes noted at the left lung base are new from   the previous exam.  There is similar to slightly improved subsegmental   changes in the right infrahilar region.  The lungs are otherwise stable   in appearance.  The pulmonary vasculature is stable without evidence for   florid CHF.    Pleural space:  Unremarkable.  No pneumothorax. No large pleural   effusion.    Heart:  Unremarkable.  No cardiomegaly.    Mediastinum:  The mediastinal contours are stable in appearance.  No   tracheal deviation.    Bones joints:  Unremarkable.    IMPRESSION:         Curvilinear changes noted at the left lung base are new from the   previous examination and are presumed subsegmental atelectasis or subtle   infection.  There is similar to slightly improved subsegmental changes in   the right infrahilar region.  The lungs are otherwise stable in   appearance.  The pulmonary vasculature is stable without evidence for   florid CHF.  No pleural effusion or pneumothorax.      Impression:          Electronically signed by Jorge A García MD on 03-01-23 at 0055           ECG 12 Lead             Component  Ref Range & Units 00:40  (3/1/23) 1 mo ago  (1/23/23) 4 mo ago  (10/19/22) 5 mo ago  (9/29/22) 1 yr ago  (11/16/21) 1 yr ago  (11/8/21) 1 yr ago  (10/18/21)   QT Interval  ms 386  355  367  371  387  388  413    Resulting Agency BH ECG BH ECG BH ECG BH ECG BH ECG BH ECG BH ECG               HEART RATE= 65  bpm  RR Interval= 923  ms  MA Interval= 160  ms  P Horizontal Axis= -5  deg  P Front Axis= 28  deg  QRSD Interval= 80  ms  QT Interval= 386  ms  QRS Axis= -7  deg  T Wave Axis= 7  deg  - BORDERLINE ECG -  Sinus rhythm  Abnormal R-wave progression, early transition  LVH by voltage  Borderline T abnormalities, anterior leads  No change from previous tracing             Current Facility-Administered Medications:   •  acetaminophen (TYLENOL) tablet 650 mg, 650 mg, Oral, Q4H PRN, Stanley Fowler MD, 650 mg at 03/01/23 0856  •  albuterol (PROVENTIL) nebulizer solution 0.083% 2.5 mg/3mL, 2.5 mg, Nebulization, Q4H PRN, Stanley Fowler MD  •  allopurinol (ZYLOPRIM) tablet 100 mg, 100 mg, Oral, Daily, Stanley Fowler MD, 100 mg at 03/01/23 1142  •  amLODIPine (NORVASC) tablet 5 mg, 5 mg, Oral, Daily, Stanley Fowler MD, 5 mg at 03/01/23 1142  •  budesonide-formoterol (SYMBICORT) 160-4.5 MCG/ACT inhaler 2 puff, 2 puff, Inhalation, BID, Stanley Fowler MD  •  bumetanide (BUMEX) tablet 1 mg, 1 mg, Oral, Daily, Stanley Fowler MD, 1 mg at 03/01/23 1142  •  busPIRone (BUSPAR) tablet 10 mg, 10 mg, Oral, Q12H, Stanley Fowler MD, 10 mg at 03/01/23 1029  •  DULoxetine (CYMBALTA) DR capsule 60 mg, 60 mg, Oral, Daily, Stanley Fowler MD, 60 mg at 03/01/23 1029  •  gabapentin (NEURONTIN) capsule 300 mg, 300 mg, Oral, TID, Stanley Folwer MD, 300 mg at 03/01/23 1142  •  HYDROcodone-acetaminophen (NORCO) 7.5-325 MG per tablet 1 tablet, 1 tablet, Oral, Q8H PRN, Stanley Fowler MD  •  hydrOXYzine (ATARAX) tablet 25 mg, 25 mg, Oral, TID PRN, Stanley Fowler MD, 25 mg at 03/01/23 1142  •  ipratropium (ATROVENT) nebulizer  solution 0.5 mg, 0.5 mg, Nebulization, 4x Daily - RT, Stanley Fowler MD  •  levothyroxine (SYNTHROID, LEVOTHROID) tablet 75 mcg, 75 mcg, Oral, Daily, Stanley Fowler MD, 75 mcg at 03/01/23 1145  •  methylPREDNISolone sodium succinate (SOLU-Medrol) injection 40 mg, 40 mg, Intravenous, Q12H, Stanley Fowler MD, 40 mg at 03/01/23 0731  •  metoprolol succinate XL (TOPROL-XL) 24 hr tablet 12.5 mg, 12.5 mg, Oral, Q24H, Stanley Fowler MD, 12.5 mg at 03/01/23 1145  •  ondansetron (ZOFRAN) injection 4 mg, 4 mg, Intravenous, Q4H PRN, Stanley Fowler MD, 4 mg at 03/01/23 0731  •  pantoprazole (PROTONIX) EC tablet 40 mg, 40 mg, Oral, Q AM, Stanley Fowler MD, 40 mg at 03/01/23 1145  •  potassium chloride (K-DUR,KLOR-CON) ER tablet 10 mEq, 10 mEq, Oral, Daily, Stanley Fowler MD, 10 mEq at 03/01/23 1145  •  prochlorperazine (COMPAZINE) injection 5 mg, 5 mg, Intravenous, Q4H PRN, Stanley Fowler MD, 5 mg at 03/01/23 0856  •  QUEtiapine (SEROquel) tablet 100 mg, 100 mg, Oral, Q12H, Stanley Fowler MD, 100 mg at 03/01/23 1243  •  rOPINIRole (REQUIP) tablet 0.25 mg, 0.25 mg, Oral, Nightly, Stanley Fowler MD  •  rosuvastatin (CRESTOR) tablet 20 mg, 20 mg, Oral, Daily, Stanley Fowler MD, 20 mg at 03/01/23 1142  •  [COMPLETED] Insert Peripheral IV, , , Once **AND** sodium chloride 0.9 % flush 10 mL, 10 mL, Intravenous, PRN, Kevin Zavaleta MD  •  vitamin B-12 (CYANOCOBALAMIN) tablet 1,000 mcg, 1,000 mcg, Oral, Daily, Stanley Fowler MD, 1,000 mcg at 03/01/23 1142     ASSESSMENT  Acute on chronic hypoxic respiratory failure  Acute exacerbation of COPD  Acute human rhinovirus bronchitis  Polypharmacy  Hypertension  Hypothyroidism  Anxiety disorder  Depression  Restless leg syndrome  Chronic anemia  Chronic kidney disease stage III  Gastroesophageal reflux disease    PLAN  Admit  Supplemental oxygen and titrate  Albuterol Symbicort and Spiriva  IV steroids  Anemia work-up  Check 2D echo  Pulmonary consult  Adjust home medications  Stress ulcer DVT  prophylaxis  Supportive care  DNR  Discussed with family and nursing staff  Follow closely further recommendation current hospital course    AMANDA MURO MD

## 2023-03-01 NOTE — ED NOTES
".Nursing report ED to floor  Josephine Wolf  82 y.o.  female    HPI :   Chief Complaint   Patient presents with    Headache       Admitting doctor:   Stanley Fowler MD    Admitting diagnosis:   The primary encounter diagnosis was COPD with acute exacerbation (HCC). Diagnoses of Acute nonintractable headache, unspecified headache type and Pneumonia of left lower lobe due to infectious organism were also pertinent to this visit.    Code status:   Current Code Status       Date Active Code Status Order ID Comments User Context       Prior            Allergies:   Morphine and related and Fenofibrate    Isolation:   Enhanced Droplet/Contact     Intake and Output    Intake/Output Summary (Last 24 hours) at 3/1/2023 0424  Last data filed at 3/1/2023 0423  Gross per 24 hour   Intake 100 ml   Output --   Net 100 ml       Weight:       03/01/23  0033   Weight: 72.6 kg (160 lb)       Most recent vitals:   Vitals:    03/01/23 0033 03/01/23 0048 03/01/23 0206 03/01/23 0407   BP:   116/86 153/73   BP Location:    Right arm   Pulse:  65 75 66   Resp:  18  19   Temp:       TempSrc:       SpO2:  91% 92% 93%   Weight: 72.6 kg (160 lb)      Height: 154.9 cm (61\")          Active LDAs/IV Access:   Lines, Drains & Airways       Active LDAs       Name Placement date Placement time Site Days    Peripheral IV 03/01/23 0126 Left Antecubital 03/01/23 0126  Antecubital  less than 1                    Labs (abnormal labs have a star):   Labs Reviewed   RESPIRATORY PANEL PCR W/ COVID-19 (SARS-COV-2) [facility]ERIA/MAXIMILIAN/PAD/COR/[facility] IN-HOUSE, NP SWAB IN UTM/VTP, 3-4 HR TAT - Abnormal; Notable for the following components:       Result Value    Human Rhinovirus/Enterovirus Detected (*)     All other components within normal limits    Narrative:     In the setting of a positive respiratory panel with a viral infection PLUS a negative procalcitonin without other underlying concern for bacterial infection, consider observing off antibiotics or " discontinuation of antibiotics and continue supportive care. If the respiratory panel is positive for atypical bacterial infection (Bordetella pertussis, Chlamydophila pneumoniae, or Mycoplasma pneumoniae), consider antibiotic de-escalation to target atypical bacterial infection.   COMPREHENSIVE METABOLIC PANEL - Abnormal; Notable for the following components:    Glucose 106 (*)     Creatinine 1.41 (*)     CO2 32.5 (*)     eGFR 37.8 (*)     All other components within normal limits    Narrative:     GFR Normal >60  Chronic Kidney Disease <60  Kidney Failure <15    The GFR formula is only valid for adults with stable renal function between ages 18 and 70.   SINGLE HSTROPONIN T - Abnormal; Notable for the following components:    HS Troponin T 29 (*)     All other components within normal limits    Narrative:     High Sensitive Troponin T Reference Range:  <10.0 ng/L- Negative Female for AMI  <15.0 ng/L- Negative Male for AMI  >=10 - Abnormal Female indicating possible myocardial injury.  >=15 - Abnormal Male indicating possible myocardial injury.   Clinicians would have to utilize clinical acumen, EKG, Troponin, and serial changes to determine if it is an Acute Myocardial Infarction or myocardial injury due to an underlying chronic condition.        CBC WITH AUTO DIFFERENTIAL - Abnormal; Notable for the following components:    RBC 2.59 (*)     Hemoglobin 7.9 (*)     Hematocrit 25.3 (*)     MCV 97.7 (*)     MCHC 31.2 (*)     RDW 16.3 (*)     RDW-SD 57.7 (*)     Immature Grans % 1.0 (*)     All other components within normal limits   BNP (IN-HOUSE) - Normal    Narrative:     Among patients with dyspnea, NT-proBNP is highly sensitive for the detection of acute congestive heart failure. In addition NT-proBNP of <300 pg/ml effectively rules out acute congestive heart failure with 99% negative predictive value.    Results may be falsely decreased if patient taking Biotin.     PROCALCITONIN - Normal    Narrative:     As a  "Marker for Sepsis (Non-Neonates):    1. <0.5 ng/mL represents a low risk of severe sepsis and/or septic shock.  2. >2 ng/mL represents a high risk of severe sepsis and/or septic shock.    As a Marker for Lower Respiratory Tract Infections that require antibiotic therapy:    PCT on Admission    Antibiotic Therapy       6-12 Hrs later    >0.5                Strongly Recommended  >0.25 - <0.5        Recommended   0.1 - 0.25          Discouraged              Remeasure/reassess PCT  <0.1                Strongly Discouraged     Remeasure/reassess PCT    As 28 day mortality risk marker: \"Change in Procalcitonin Result\" (>80% or <=80%) if Day 0 (or Day 1) and Day 4 values are available. Refer to http://www.[email or web address]calculator.com    Change in PCT <=80%  A decrease of PCT levels below or equal to 80% defines a positive change in PCT test result representing a higher risk for 28-day all-cause mortality of patients diagnosed with severe sepsis for septic shock.    Change in PCT >80%  A decrease of PCT levels of more than 80% defines a negative change in PCT result representing a lower risk for 28-day all-cause mortality of patients diagnosed with severe sepsis or septic shock.      LACTIC ACID, PLASMA - Normal   BLOOD CULTURE   BLOOD CULTURE   CBC AND DIFFERENTIAL    Narrative:     The following orders were created for panel order CBC & Differential.  Procedure                               Abnormality         Status                     ---------                               -----------         ------                     CBC Auto Differential[[id number]]        Abnormal            Final result                 Please view results for these tests on the individual orders.       EKG:   ECG 12 Lead Dyspnea   Preliminary Result   HEART RATE= 65  bpm   RR Interval= 923  ms   SD Interval= 160  ms   P Horizontal Axis= -5  deg   P Front Axis= 28  deg   QRSD Interval= 80  ms   QT Interval= 386  ms   QRS Axis= -7  deg   T Wave Axis= 7  " deg   - BORDERLINE ECG -   Sinus rhythm   Abnormal R-wave progression, early transition   LVH by voltage   Borderline T abnormalities, anterior leads   Electronically Signed By:    Date and Time of Study: 2023 00:40:31          Meds given in ED:   Medications   sodium chloride 0.9 % flush 10 mL (has no administration in time range)   ipratropium-albuterol (DUO-NEB) nebulizer solution 3 mL (3 mL Nebulization Given 3/1/23 0048)   methylPREDNISolone sodium succinate (SOLU-Medrol) injection 125 mg (125 mg Intravenous Given 3/1/23 0126)   acetaminophen (TYLENOL) tablet 1,000 mg (1,000 mg Oral Given 3/1/23 0127)   ondansetron (ZOFRAN) injection 4 mg (4 mg Intravenous Given 3/1/23 0138)   cefTRIAXone (ROCEPHIN) 1 g in sodium chloride 0.9 % 100 mL IVPB-VTB (0 g Intravenous Stopped 3/1/23 0423)   ondansetron (ZOFRAN) injection 4 mg (4 mg Intravenous Given During Downtime 3/1/23 0225)   rOPINIRole (REQUIP) tablet 0.25 mg (0.25 mg Oral Given During Downtime 3/1/23 0319)       Imaging results:  XR Chest 1 View    Result Date: 3/1/2023  Electronically signed by Jorge A García MD on 23 at 0055     Ambulatory status:   - up ad helder    Social issues:   Social History     Socioeconomic History    Marital status:    Tobacco Use    Smoking status: Former     Packs/day: 1.00     Years: 10.00     Pack years: 10.00     Types: Cigarettes     Start date:      Quit date:      Years since quittin.1    Smokeless tobacco: Never   Vaping Use    Vaping Use: Never used   Substance and Sexual Activity    Alcohol use: No     Comment: CAFFEINE NO     Drug use: No    Sexual activity: Defer       NIH Stroke Scale:         Cortney Sanchez RN  23 04:24 EST

## 2023-03-01 NOTE — CONSULTS
Referring Provider: Dr. Fowler  Reason for Consultation: COPD    Patient Care Team:  Bernabe Guy MD as PCP - General (Internal Medicine)  Avi Aly MD as Consulting Physician (Nephrology)    Chief complaint:   Shortness of breath    History of present illness:  Subjective   This is an 82-year-old female patient, lifelong non-smoker but with history of secondhand exposure to smoking.  She has COPD and follows at [facility] clinic with Dr. Ca.  She is on oxygen 3 L/minute at baseline.    Concerning COPD, she uses albuterol inhaler twice a day and nebulizer therapy 4 times a day as needed.  She has dyspnea on mild activities.    She presented to the hospital today for shortness of breath and cough.  Symptoms started about a week ago, initially with sinus congestion, body aches and fatigue.  She also reported low-grade fever.  This was followed by cough productive of yellowish phlegm and worsening shortness of breath despite the use of inhaler/nebulizer therapy.  She did test positive for rhinovirus here.  Patient reported no sick contact.  She lives alone.    Additional PMH: CKD 3B (Dr. Major Onofre).  Pulmonary HTN (RHC 8/11/2022: PCWP 14; PA 50/17-28)    Echo 9/30/2022: RVSP 35-25; LA and RA borderline increased.    Review of Systems  Constitutional: No fever or chills.   ENMT: No sinus congestion  Cardiovascular: No chest pain, palpitation or legs swelling.    Respiratory: See above  Gastrointestinal: No constipation, diarrhea or abdominal pain   Neurology: No headache, weakness, numbness or dizziness.   Musculoskeletal: No joints pain, stiffness or swelling.   Psychiatry: No depression.  Genitourinary: No dysuria or frequent urination  Endo: No weight changes. No cold or warm intolerance.  Lymphatic: No swollen glands.  Integumentary: No rash.    History  Past Medical History:   Diagnosis Date   • Acid reflux    • Acute kidney injury (HCC)    • Anemia    • Arthritis    • Bipolar 1 disorder,  depressed (HCC)    • Cataract     MINDY   • Chronic nausea    • Chronic pain of right knee    • Continuous leakage of urine     USES DEPENDS   • COPD (chronic obstructive pulmonary disease) ([facility])     USES O2 2 LMP PER NC AT NIGHT   • Disease of thyroid gland     HYPOTHYROIDISM   • Diverticulosis    • Fibromyalgia     DX    • Fibromyalgia, primary    • Frequent episodes of bronchitis    • HL (hearing loss)    • Hypertension    • Hypothyroidism    • Memory loss    • Migraines    • Neck pain    • On home oxygen therapy     2L NC AT NIGHT   • Orthostatic hypotension    • Short of breath on exertion    • Sleep apnea    • Stage 3 chronic kidney disease ([facility])    ,   Past Surgical History:   Procedure Laterality Date   • CEREBRAL ANEURYSM REPAIR      WITH STENT   • HYSTERECTOMY     • LAPAROSCOPIC CHOLECYSTECTOMY     • NH ARTHRP KNE CONDYLE&PLATU MEDIAL&LAT COMPARTMENTS Right 2017    Procedure: RT TOTAL KNEE ARTHROPLASTY;  Surgeon: Kashfi Perez MD;  Location: [facility];  Service: Orthopedics   ,   Family History   Problem Relation Age of Onset   • Malig Hyperthermia Neg Hx    ,   Social History     Socioeconomic History   • Marital status:    Tobacco Use   • Smoking status: Former     Packs/day: 1.00     Years: 10.00     Pack years: 10.00     Types: Cigarettes     Start date:      Quit date:      Years since quittin.1   • Smokeless tobacco: Never   Vaping Use   • Vaping Use: Never used   Substance and Sexual Activity   • Alcohol use: No     Comment: CAFFEINE NO    • Drug use: No   • Sexual activity: Defer     E-cigarette/Vaping   • E-cigarette/Vaping Use Never User      E-cigarette/Vaping Substances   • Nicotine No    • THC No    • CBD No    • Flavoring No      E-cigarette/Vaping Devices   • Disposable No    • Pre-filled or Refillable Cartridge No    • Refillable Tank No    • Pre-filled Pod No        ,   Medications Prior to Admission   Medication Sig Dispense Refill Last Dose   •  albuterol (PROVENTIL) (2.5 MG/3ML) 0.083% nebulizer solution Take 2.5 mg by nebulization Every 4 (Four) Hours As Needed.   2/28/2023   • allopurinol (ZYLOPRIM) 100 MG tablet Take 1 tablet by mouth Daily.   2/28/2023   • amLODIPine (NORVASC) 5 MG tablet Take 1 tablet by mouth Daily.   2/28/2023   • budesonide-formoterol (SYMBICORT) 160-4.5 MCG/ACT inhaler Inhale 2 puffs 2 (Two) Times a Day. 1 inhaler 11 2/28/2023   • bumetanide (BUMEX) 1 MG tablet Take 1 tablet by mouth Daily. 30 tablet 0 2/28/2023   • busPIRone (BUSPAR) 10 MG tablet Take 1 tablet by mouth 2 (Two) Times a Day. for anxiety      • busPIRone (BUSPAR) 7.5 MG tablet Take 1 tablet by mouth 2 (two) times a day.   2/28/2023   • DULoxetine (CYMBALTA) 60 MG capsule Take 1 capsule by mouth Daily.   2/28/2023   • gabapentin (NEURONTIN) 300 MG capsule Take 1 capsule by mouth 3 (Three) Times a Day.   2/28/2023   • HYDROcodone-acetaminophen (NORCO) 7.5-325 MG per tablet Take 1 tablet by mouth Every 8 (Eight) Hours As Needed.   2/28/2023   • hydrOXYzine (ATARAX) 25 MG tablet Take 1 tablet by mouth 3 (Three) Times a Day As Needed.   2/28/2023   • ipratropium-albuterol (DUO-NEB) 0.5-2.5 mg/3 ml nebulizer Take 3 mL by nebulization Every 4 (Four) Hours As Needed for Wheezing.   2/28/2023   • levothyroxine (SYNTHROID, LEVOTHROID) 75 MCG tablet Take 1 tablet by mouth Daily.   2/28/2023   • meloxicam (MOBIC) 7.5 MG tablet Take 1 tablet by mouth Daily As Needed for Mild Pain.   2/28/2023   • metoprolol succinate XL (TOPROL-XL) 25 MG 24 hr tablet Take 0.5 tablets by mouth Daily. 15 tablet 0 2/28/2023   • ondansetron (ZOFRAN) 4 MG tablet Take 1 tablet by mouth Every 8 (Eight) Hours As Needed.   2/28/2023   • potassium chloride (MICRO-K) 10 MEQ CR capsule Take 1 capsule by mouth Daily. 30 capsule 0 2/28/2023   • prochlorperazine (COMPAZINE) 10 MG tablet Take 1 tablet by mouth Every 6 (Six) Hours As Needed for Nausea or Vomiting. 12 tablet 0 Past Week   • QUEtiapine (SEROquel)  50 MG tablet Take 2 tablets by mouth every night at bedtime.   2/28/2023   • rOPINIRole (REQUIP) 0.25 MG tablet Take 1 tablet by mouth Every Night. Take 1 hour before bedtime.   2/28/2023   • rosuvastatin (CRESTOR) 20 MG tablet Take 1 tablet by mouth Daily.      • Umeclidinium Bromide (INCRUSE ELLIPTA) 62.5 MCG/INH aerosol powder  Inhale 1 puff Daily.   2/28/2023   • vitamin B-12 (CYANOCOBALAMIN) 1000 MCG tablet Take 1 tablet by mouth Daily.   2/28/2023   • azithromycin (ZITHROMAX) 250 MG tablet Take 2 tablets (250mg each, total 500mg on day one), then one tablet (250mg each tablet) everyday for 4 days. 6 tablet 0    • dicyclomine (BENTYL) 20 MG tablet Take 2 tablets by mouth 3 (Three) Times a Day.   Unknown   • fluticasone-salmeterol (ADVAIR HFA) 115-21 MCG/ACT inhaler Inhale 2 puffs 2 (Two) Times a Day.      • nitrofurantoin (MACRODANTIN) 50 MG capsule Take 1 capsule by mouth Every 6 (Six) Hours.      , Scheduled Meds:  allopurinol, 100 mg, Oral, Daily  [START ON 3/2/2023] amLODIPine, 10 mg, Oral, Daily  budesonide-formoterol, 2 puff, Inhalation, BID  bumetanide, 1 mg, Oral, Daily  busPIRone, 10 mg, Oral, Q12H  DULoxetine, 60 mg, Oral, Daily  gabapentin, 300 mg, Oral, TID  guaiFENesin, 600 mg, Oral, Q12H  ipratropium, 0.5 mg, Nebulization, 4x Daily - RT  levothyroxine, 75 mcg, Oral, Daily  methylPREDNISolone sodium succinate, 40 mg, Intravenous, Q12H  metoprolol succinate XL, 12.5 mg, Oral, Q24H  pantoprazole, 40 mg, Oral, BID AC  potassium chloride, 10 mEq, Oral, Daily  QUEtiapine, 100 mg, Oral, Q12H  rOPINIRole, 0.25 mg, Oral, Nightly  rosuvastatin, 5 mg, Oral, Nightly  vitamin B-12, 1,000 mcg, Oral, Daily    , Continuous Infusions:    and Allergies:  Morphine and related and Fenofibrate    Objective     Vital Signs   Temp:  [97.1 °F (36.2 °C)-98.1 °F (36.7 °C)] 97.6 °F (36.4 °C)  Heart Rate:  [65-82] 82  Resp:  [16-20] 18  BP: (116-180)/(63-92) 180/92    PPE used per hospital policy    Physical  Exam:  Constitutional: Not in acute distress.  Eyes: Injected conjunctivae, EOMI. pupils equal reactive to light.  ENMT: Ross 3. No oral thrush.  Moist tongue.  External ears normal.  Neck:  Trachea midline. No thyromegaly  Heart: RRR, no murmur  Lungs: Equal but diminished air entry throughout.  Bilateral expiratory wheezing, diffuse.  No labored breathing.  No crackles.  Abdomen: Obese. Soft. No tenderness or dullness. No HSM.  Extremities: No cyanosis, clubbing or pitting edema.  Warm extremities and well-perfused.  Neuro: Conscious, alert, oriented x3.  Strength 5/5 in arms.  Psych: Appropriate mood and affect.    Integumentary: No rash.  Normal skin turgor  Lymphatic: No palpable cervical or supraclavicular lymph nodes.      Diagnostic imaging:  I personally and independently reviewed the following images:   CXR 3/1/2023 and lung portion of CT of the abdomen October 2022:  CXR showed bandlike atelectasis in the left lower lobe.  This was present on prior CT of the abdomen.      Laboratory workup:    Results from last 7 days   Lab Units 03/01/23  1140 03/01/23  0112   SODIUM mmol/L 134* 139   POTASSIUM mmol/L 4.8 4.9   CHLORIDE mmol/L 98 101   CO2 mmol/L 28.4 32.5*   BUN mg/dL 18 21   CREATININE mg/dL 1.42* 1.41*   GLUCOSE mg/dL 178* 106*   CALCIUM mg/dL 9.9 9.4     Results from last 7 days   Lab Units 03/01/23  0112   HSTROP T ng/L 29*     Results from last 7 days   Lab Units 03/01/23  0112   WBC 10*3/mm3 5.08   HEMOGLOBIN g/dL 7.9*   HEMATOCRIT % 25.3*   PLATELETS 10*3/mm3 229         Results from last 7 days   Lab Units 03/01/23  0112   PROBNP pg/mL 865.0       Assessment   1. COPD exacerbation  2. Left lower lobe atelectasis, seems to be chronic.  Mild.  3. Acute rhinovirus bronchitis  4. Chronic hypoxic respiratory failure, on oxygen 3 L/minute baseline    · Mild pulmonary hypertension, probably group 3  · CKD stage III    Recommendations:      · Symbicort 2 puffs twice a day.  · DuoNeb 4 times a  day  · Oxygen by NC and titrate to keep SPO2 >90%  · Supportive treatment for rhinovirus infection  · Solu-Medrol 40 mg IV twice a day    Thank you for the consultation.  We will follow along.    Anat Cowan MD  03/01/23  14:39 EST

## 2023-03-02 LAB
BASOPHILS # BLD AUTO: 0.01 10*3/MM3 (ref 0–0.2)
BASOPHILS NFR BLD AUTO: 0.1 % (ref 0–1.5)
CHOLEST SERPL-MCNC: 220 MG/DL (ref 0–200)
DEPRECATED RDW RBC AUTO: 54.4 FL (ref 37–54)
EOSINOPHIL # BLD AUTO: 0 10*3/MM3 (ref 0–0.4)
EOSINOPHIL NFR BLD AUTO: 0 % (ref 0.3–6.2)
ERYTHROCYTE [DISTWIDTH] IN BLOOD BY AUTOMATED COUNT: 15.7 % (ref 12.3–15.4)
FERRITIN SERPL-MCNC: 12 NG/ML (ref 13–150)
FOLATE SERPL-MCNC: 13.4 NG/ML (ref 4.78–24.2)
HBA1C MFR BLD: 5.4 % (ref 4.8–5.6)
HCT VFR BLD AUTO: 25.3 % (ref 34–46.6)
HDLC SERPL-MCNC: 92 MG/DL (ref 40–60)
HGB BLD-MCNC: 8.4 G/DL (ref 12–15.9)
IMM GRANULOCYTES # BLD AUTO: 0.05 10*3/MM3 (ref 0–0.05)
IMM GRANULOCYTES NFR BLD AUTO: 0.7 % (ref 0–0.5)
LDLC SERPL CALC-MCNC: 115 MG/DL (ref 0–100)
LDLC/HDLC SERPL: 1.23 {RATIO}
LYMPHOCYTES # BLD AUTO: 1.02 10*3/MM3 (ref 0.7–3.1)
LYMPHOCYTES NFR BLD AUTO: 15.1 % (ref 19.6–45.3)
MCH RBC QN AUTO: 31.5 PG (ref 26.6–33)
MCHC RBC AUTO-ENTMCNC: 33.2 G/DL (ref 31.5–35.7)
MCV RBC AUTO: 94.8 FL (ref 79–97)
MONOCYTES # BLD AUTO: 0.65 10*3/MM3 (ref 0.1–0.9)
MONOCYTES NFR BLD AUTO: 9.6 % (ref 5–12)
NEUTROPHILS NFR BLD AUTO: 5.03 10*3/MM3 (ref 1.7–7)
NEUTROPHILS NFR BLD AUTO: 74.5 % (ref 42.7–76)
NRBC BLD AUTO-RTO: 0 /100 WBC (ref 0–0.2)
PLATELET # BLD AUTO: 232 10*3/MM3 (ref 140–450)
PMV BLD AUTO: 9.2 FL (ref 6–12)
RBC # BLD AUTO: 2.67 10*6/MM3 (ref 3.77–5.28)
T4 FREE SERPL-MCNC: 1.21 NG/DL (ref 0.93–1.7)
TRIGL SERPL-MCNC: 73 MG/DL (ref 0–150)
TSH SERPL DL<=0.05 MIU/L-ACNC: 0.38 UIU/ML (ref 0.27–4.2)
VIT B12 BLD-MCNC: 624 PG/ML (ref 211–946)
VLDLC SERPL-MCNC: 13 MG/DL (ref 5–40)
WBC NRBC COR # BLD: 6.76 10*3/MM3 (ref 3.4–10.8)

## 2023-03-02 PROCEDURE — 85025 COMPLETE CBC W/AUTO DIFF WBC: CPT | Performed by: HOSPITALIST

## 2023-03-02 PROCEDURE — 82728 ASSAY OF FERRITIN: CPT | Performed by: HOSPITALIST

## 2023-03-02 PROCEDURE — 82607 VITAMIN B-12: CPT | Performed by: HOSPITALIST

## 2023-03-02 PROCEDURE — 94761 N-INVAS EAR/PLS OXIMETRY MLT: CPT

## 2023-03-02 PROCEDURE — 94664 DEMO&/EVAL PT USE INHALER: CPT

## 2023-03-02 PROCEDURE — 82746 ASSAY OF FOLIC ACID SERUM: CPT | Performed by: HOSPITALIST

## 2023-03-02 PROCEDURE — 80061 LIPID PANEL: CPT | Performed by: HOSPITALIST

## 2023-03-02 PROCEDURE — 94799 UNLISTED PULMONARY SVC/PX: CPT

## 2023-03-02 PROCEDURE — 25010000002 METHYLPREDNISOLONE PER 40 MG: Performed by: HOSPITALIST

## 2023-03-02 PROCEDURE — 83036 HEMOGLOBIN GLYCOSYLATED A1C: CPT | Performed by: HOSPITALIST

## 2023-03-02 PROCEDURE — 84439 ASSAY OF FREE THYROXINE: CPT | Performed by: HOSPITALIST

## 2023-03-02 PROCEDURE — 84443 ASSAY THYROID STIM HORMONE: CPT | Performed by: HOSPITALIST

## 2023-03-02 PROCEDURE — 96376 TX/PRO/DX INJ SAME DRUG ADON: CPT

## 2023-03-02 PROCEDURE — 94760 N-INVAS EAR/PLS OXIMETRY 1: CPT

## 2023-03-02 RX ORDER — GUAIFENESIN 200 MG/10ML
200 LIQUID ORAL EVERY 4 HOURS PRN
Status: DISCONTINUED | OUTPATIENT
Start: 2023-03-02 | End: 2023-03-03

## 2023-03-02 RX ORDER — PREDNISONE 20 MG/1
40 TABLET ORAL
Status: DISCONTINUED | OUTPATIENT
Start: 2023-03-03 | End: 2023-03-03

## 2023-03-02 RX ADMIN — POTASSIUM CHLORIDE 10 MEQ: 750 TABLET, EXTENDED RELEASE ORAL at 08:29

## 2023-03-02 RX ADMIN — METHYLPREDNISOLONE SODIUM SUCCINATE 40 MG: 40 INJECTION, POWDER, FOR SOLUTION INTRAMUSCULAR; INTRAVENOUS at 12:16

## 2023-03-02 RX ADMIN — ALLOPURINOL 100 MG: 100 TABLET ORAL at 08:29

## 2023-03-02 RX ADMIN — BUSPIRONE HYDROCHLORIDE 10 MG: 10 TABLET ORAL at 20:28

## 2023-03-02 RX ADMIN — PANTOPRAZOLE SODIUM 40 MG: 40 TABLET, DELAYED RELEASE ORAL at 06:17

## 2023-03-02 RX ADMIN — ACETAMINOPHEN 650 MG: 325 TABLET, FILM COATED ORAL at 16:21

## 2023-03-02 RX ADMIN — Medication 1000 MCG: at 08:29

## 2023-03-02 RX ADMIN — METOPROLOL SUCCINATE 12.5 MG: 25 TABLET, EXTENDED RELEASE ORAL at 08:30

## 2023-03-02 RX ADMIN — DULOXETINE HYDROCHLORIDE 60 MG: 60 CAPSULE, DELAYED RELEASE ORAL at 08:29

## 2023-03-02 RX ADMIN — BUMETANIDE 1 MG: 2 TABLET ORAL at 08:29

## 2023-03-02 RX ADMIN — QUETIAPINE FUMARATE 100 MG: 100 TABLET ORAL at 08:29

## 2023-03-02 RX ADMIN — QUETIAPINE FUMARATE 100 MG: 100 TABLET ORAL at 20:28

## 2023-03-02 RX ADMIN — GABAPENTIN 300 MG: 300 CAPSULE ORAL at 20:28

## 2023-03-02 RX ADMIN — PANTOPRAZOLE SODIUM 40 MG: 40 TABLET, DELAYED RELEASE ORAL at 18:08

## 2023-03-02 RX ADMIN — GUAIFENESIN 600 MG: 600 TABLET, EXTENDED RELEASE ORAL at 20:28

## 2023-03-02 RX ADMIN — ROSUVASTATIN CALCIUM 5 MG: 5 TABLET, FILM COATED ORAL at 20:28

## 2023-03-02 RX ADMIN — IPRATROPIUM BROMIDE AND ALBUTEROL SULFATE 3 ML: 2.5; .5 SOLUTION RESPIRATORY (INHALATION) at 08:19

## 2023-03-02 RX ADMIN — BUSPIRONE HYDROCHLORIDE 10 MG: 10 TABLET ORAL at 08:29

## 2023-03-02 RX ADMIN — BUDESONIDE AND FORMOTEROL FUMARATE DIHYDRATE 2 PUFF: 160; 4.5 AEROSOL RESPIRATORY (INHALATION) at 19:44

## 2023-03-02 RX ADMIN — ACETAMINOPHEN 650 MG: 325 TABLET, FILM COATED ORAL at 20:28

## 2023-03-02 RX ADMIN — GABAPENTIN 300 MG: 300 CAPSULE ORAL at 16:21

## 2023-03-02 RX ADMIN — IPRATROPIUM BROMIDE AND ALBUTEROL SULFATE 3 ML: 2.5; .5 SOLUTION RESPIRATORY (INHALATION) at 11:38

## 2023-03-02 RX ADMIN — IPRATROPIUM BROMIDE AND ALBUTEROL SULFATE 3 ML: 2.5; .5 SOLUTION RESPIRATORY (INHALATION) at 15:38

## 2023-03-02 RX ADMIN — GABAPENTIN 300 MG: 300 CAPSULE ORAL at 08:29

## 2023-03-02 RX ADMIN — ROPINIROLE HYDROCHLORIDE 0.25 MG: 0.5 TABLET, FILM COATED ORAL at 20:28

## 2023-03-02 RX ADMIN — LEVOTHYROXINE SODIUM 75 MCG: 0.07 TABLET ORAL at 08:29

## 2023-03-02 RX ADMIN — GUAIFENESIN 200 MG: 100 SOLUTION ORAL at 22:53

## 2023-03-02 RX ADMIN — GUAIFENESIN 600 MG: 600 TABLET, EXTENDED RELEASE ORAL at 08:30

## 2023-03-02 RX ADMIN — AMLODIPINE BESYLATE 10 MG: 5 TABLET ORAL at 08:30

## 2023-03-02 NOTE — CASE MANAGEMENT/SOCIAL WORK
Continued Stay Note  [facility]     Patient Name: Josephine Wolf  MRN: [id number]  Today's Date: 3/2/2023    Admit Date: 3/1/2023    Plan: Home via family   Discharge Plan     Row Name 03/02/23 1441       Plan    Plan Home via family    Patient/Family in Agreement with Plan yes    Plan Comments CCP met with pt and son Chino [phone] at bedside; introduced self and role of CCP. Pt gave CCP permission to speak in front of son. Face sheet information and pharmacy verified. Pt lives in an apartment alone; apartment has an elevator. Pt no longer drives but is [employer]. Chino assists with driving to appointments and grocery store etc. Pt has a walker and 3L NC continuous thru [facility]. Pt does not have a living will. Pt declines meds to beds and denies trouble affording and managing her medications. Pt has used "[facility] HH and has been to [facility]. DC plan is to return home, family to transport. Ravne GREWAL/CCP               Discharge Codes    No documentation.               Expected Discharge Date and Time     Expected Discharge Date Expected Discharge Time    Mar 3, 2023             Debby Angela RN

## 2023-03-02 NOTE — TELEPHONE ENCOUNTER
Caller: Chino Wolf    Relationship: Emergency Contact    Best call back number: [phone]    What was the call regarding: PATIENT'S SON IS CALLING BECAUSE SHE IS IN THE HOSPITAL AND THEY ARE TRYING TO FIGURE OUT WHICH MEDICATIONS SHE NEEDS TO STAY ON AND WHICH THEY CAN DISCONTINUE. HE KNOWS YOU HAVE PRESCRIBED SOME FOR HER BUT IS NOT SURE WHICH ONES THOSE ARE FOR HER SHAKING. PLEASE CALL BACK.  Do you require a callback: YES

## 2023-03-02 NOTE — PLAN OF CARE
"Goal Outcome Evaluation:              Outcome Evaluation: VSS. Weaned to 0.5L. Pt refused IV steriod stating \"I do not like the way that made me feel, I lost my mind I felt crazy.\" Resting well overnight. No c/o SOA. Up ad helder to bathroom. AM labs pending.  "

## 2023-03-02 NOTE — PROGRESS NOTES
"                                              LOS: 1 day   Patient Care Team:  Bernabe Guy MD as PCP - General (Internal Medicine)  Avi Aly MD as Consulting Physician (Nephrology)    Chief Complaint:  F/up COPD exacerbation.  Abnormal CXR.    Subjective   Interval History  She denies dyspnea or cough.  She stated that she feels better..  On oxygen 0.5 L/min.    REVIEW OF SYSTEMS:     GASTROINTESTINAL: No anorexia, nausea, vomiting or diarrhea. No abdominal pain.  CONSTITUTIONAL: No fever or chills.     Ventilator/Non-Invasive Ventilation Settings (From admission, onward)    None                Physical Exam:     Vital Signs  Temp:  [98.1 °F (36.7 °C)-99.5 °F (37.5 °C)] 99.5 °F (37.5 °C)  Heart Rate:  [74-84] 74  Resp:  [16-20] 20  BP: (124-137)/(59-80) 137/80    Intake/Output Summary (Last 24 hours) at 3/2/2023 1316  Last data filed at 3/1/2023 2031  Gross per 24 hour   Intake 200 ml   Output --   Net 200 ml     Flowsheet Rows    Flowsheet Row First Filed Value   Admission Height 154.9 cm (61\") Documented at 03/01/2023 0033   Admission Weight 72.6 kg (160 lb) Documented at 03/01/2023 0033          PPE used per hospital policy    General Appearance:   Alert, cooperative, in no acute distress   ENMT:  Mallampati score 3, moist mucous membrane   Eyes:  Pupils equal and reactive to light. EOMI   Neck:   Trachea midline. No thyromegaly.   Lungs:    Diffuse bilateral expiratory wheezing.  Mild coarse breath sounds.  No crackles.  No labored breathing.    Heart:   Regular rhythm and normal rate, normal S1 and S2, no         murmur   Skin:   No rash or ecchymosis   Abdomen:    Obese. Soft. No tenderness. No HSM.   Neuro/psych:  Conscious, alert, oriented x3. Strength 5/5 in upper and lower  ext.  Appropriate mood and affect   Extremities:  No cyanosis, clubbing or edema.  Warm extremities and well-perfused          Results Review:        Results from last 7 days   Lab Units 03/01/23  1140 03/01/23  0112 "   SODIUM mmol/L 134* 139   POTASSIUM mmol/L 4.8 4.9   CHLORIDE mmol/L 98 101   CO2 mmol/L 28.4 32.5*   BUN mg/dL 18 21   CREATININE mg/dL 1.42* 1.41*   GLUCOSE mg/dL 178* 106*   CALCIUM mg/dL 9.9 9.4     Results from last 7 days   Lab Units 03/01/23  0112   HSTROP T ng/L 29*     Results from last 7 days   Lab Units 03/02/23  0316 03/01/23  0112   WBC 10*3/mm3 6.76 5.08   HEMOGLOBIN g/dL 8.4* 7.9*   HEMATOCRIT % 25.3* 25.3*   PLATELETS 10*3/mm3 232 229         Results from last 7 days   Lab Units 03/01/23  0112   PROBNP pg/mL 865.0                           I reviewed the patient's new clinical results.        Medication Review:   allopurinol, 100 mg, Oral, Daily  amLODIPine, 10 mg, Oral, Daily  budesonide-formoterol, 2 puff, Inhalation, BID  bumetanide, 1 mg, Oral, Daily  busPIRone, 10 mg, Oral, Q12H  DULoxetine, 60 mg, Oral, Daily  gabapentin, 300 mg, Oral, TID  guaiFENesin, 600 mg, Oral, Q12H  ipratropium-albuterol, 3 mL, Nebulization, 4x Daily - RT  levothyroxine, 75 mcg, Oral, Daily  methylPREDNISolone sodium succinate, 40 mg, Intravenous, Q12H  metoprolol succinate XL, 12.5 mg, Oral, Q24H  pantoprazole, 40 mg, Oral, BID AC  potassium chloride, 10 mEq, Oral, Daily  QUEtiapine, 100 mg, Oral, Q12H  rOPINIRole, 0.25 mg, Oral, Nightly  rosuvastatin, 5 mg, Oral, Nightly  vitamin B-12, 1,000 mcg, Oral, Daily           CXR 3/1/2023 and lung portion of CT of the abdomen October 2022:  CXR showed bandlike atelectasis in the left lower lobe.  This was present on prior CT of the abdomen.        Assessment     1. COPD exacerbation  2. Left lower lobe atelectasis, seems to be chronic.  Mild.  3. Acute rhinovirus bronchitis  4. Chronic hypoxic respiratory failure, on oxygen 3 L/minute baseline     • Mild pulmonary hypertension, probably group 3  • CKD stage III    Plan     · Symbicort 2 puffs twice a day. DuoNeb 4 times a day  · Oxygen by NC and titrate keep SPO2 >90%  · Incentive spirometry  · DC Solu-Medrol and start  prednisone 40 mg daily.  Apparently patient has refused IV steroid.  · Mucinex 600 mg twice a day    Can probably be discharged home tomorrow    Anat Cowan MD  03/02/23  13:16 EST          This note was dictated utilizing Dragon dictation

## 2023-03-02 NOTE — PROGRESS NOTES
"Daily progress note    Primary care physician  Dr. PLUMMER    Chief complaint  Doing better with no new complaints but refusing steroids last night but agreeable to take this morning.  Patient denies any increase shortness of breath.  Patient family at bedside.    History of present illness  82-year-old white female with very complex past medical history including chronic hypoxic respiratory failure COPD hypertension hypothyroidism anxiety disorder depression restless leg syndrome and chronic kidney disease stage III presented to [facility] emergency room with shortness of breath for last several days with nonproductive cough but no fever chills chest pain abdominal pain nausea vomiting diarrhea.  Patient work-up in ER revealed acute on chronic hypoxic aspiratory failure with acute exacerbation of COPD and acute human rhinovirus bronchitis admit for management.      REVIEW OF SYSTEMS  Review of all 14 systems is negative other than stated in the HPI above.     PHYSICAL EXAM  Blood pressure 137/80, pulse 75, temperature 99.3 °F (37.4 °C), temperature source Oral, resp. rate 20, height 154.9 cm (61\"), weight 73.9 kg (163 lb), SpO2 95 %.    GENERAL: Awake and alert, no  respiratory distress but agitated  HEENT:  Unremarkable  NECK:  Supple  CV: regular rhythm, normal rate, no murmur, no peripheral edema  RESPIRATORY: normal effort, decreased breath sounds with few expiratory wheezes B  ABDOMEN: soft, nondistended, nontender throughout  MUSCULOSKELETAL: no deformity  NEURO: alert, moves all extremities, follows commands  PSYCH:  calm, cooperative  SKIN: warm, dry, no rash     LAB RESULTS  Lab Results (last 24 hours)     Procedure Component Value Units Date/Time    Folate [[id number]]  (Normal) Collected: 03/02/23 0316    Specimen: Blood Updated: 03/02/23 0455     Folate 13.40 ng/mL     Narrative:      Results may be falsely increased if patient taking Biotin.      Vitamin B12 [[id number]]  (Normal) Collected: " 03/02/23 0316    Specimen: Blood Updated: 03/02/23 0455     Vitamin B-12 624 pg/mL     Narrative:      Results may be falsely increased if patient taking Biotin.      CBC & Differential [[id number]]  (Abnormal) Collected: 03/02/23 0316    Specimen: Blood Updated: 03/02/23 0450    Narrative:      The following orders were created for panel order CBC & Differential.  Procedure                               Abnormality         Status                     ---------                               -----------         ------                     CBC Auto Differential[[id number]]        Abnormal            Final result                 Please view results for these tests on the individual orders.    CBC Auto Differential [[id number]]  (Abnormal) Collected: 03/02/23 0316    Specimen: Blood Updated: 03/02/23 0450     WBC 6.76 10*3/mm3      RBC 2.67 10*6/mm3      Hemoglobin 8.4 g/dL      Hematocrit 25.3 %      MCV 94.8 fL      MCH 31.5 pg      MCHC 33.2 g/dL      RDW 15.7 %      RDW-SD 54.4 fl      MPV 9.2 fL      Platelets 232 10*3/mm3      Neutrophil % 74.5 %      Lymphocyte % 15.1 %      Monocyte % 9.6 %      Eosinophil % 0.0 %      Basophil % 0.1 %      Immature Grans % 0.7 %      Neutrophils, Absolute 5.03 10*3/mm3      Lymphocytes, Absolute 1.02 10*3/mm3      Monocytes, Absolute 0.65 10*3/mm3      Eosinophils, Absolute 0.00 10*3/mm3      Basophils, Absolute 0.01 10*3/mm3      Immature Grans, Absolute 0.05 10*3/mm3      nRBC 0.0 /100 WBC     TSH [[id number]]  (Normal) Collected: 03/02/23 0316    Specimen: Blood Updated: 03/02/23 0449     TSH 0.381 uIU/mL     Ferritin [[id number]]  (Abnormal) Collected: 03/02/23 0316    Specimen: Blood Updated: 03/02/23 0449     Ferritin 12.00 ng/mL     Narrative:      Results may be falsely decreased if patient taking Biotin.      Lipid Panel [[id number]]  (Abnormal) Collected: 03/02/23 0316    Specimen: Blood Updated: 03/02/23 0444     Total Cholesterol 220 mg/dL      Triglycerides 73 mg/dL       HDL Cholesterol 92 mg/dL      LDL Cholesterol  115 mg/dL      VLDL Cholesterol 13 mg/dL      LDL/HDL Ratio 1.23    Narrative:      Cholesterol Reference Ranges  (U.S. Department of Health and Human Services ATP III Classifications)    Desirable          <200 mg/dL  Borderline High    200-239 mg/dL  High Risk          >240 mg/dL      Triglyceride Reference Ranges  (U.S. Department of Health and Human Services ATP III Classifications)    Normal           <150 mg/dL  Borderline High  150-199 mg/dL  High             200-499 mg/dL  Very High        >500 mg/dL    HDL Reference Ranges  (U.S. Department of Health and Human Services ATP III Classifications)    Low     <40 mg/dl (major risk factor for CHD)  High    >60 mg/dl ('negative' risk factor for CHD)        LDL Reference Ranges  (U.S. Department of Health and Human Services ATP III Classifications)    Optimal          <100 mg/dL  Near Optimal     100-129 mg/dL  Borderline High  130-159 mg/dL  High             160-189 mg/dL  Very High        >189 mg/dL    Hemoglobin A1c [[id number]]  (Normal) Collected: 03/02/23 0316    Specimen: Blood Updated: 03/02/23 0435     Hemoglobin A1C 5.40 %     Narrative:      Hemoglobin A1C Ranges:    Increased Risk for Diabetes  5.7% to 6.4%  Diabetes                     >= 6.5%  Diabetic Goal                < 7.0%    Blood Culture - Blood, Arm, Right [[id number]]  (Normal) Collected: 03/01/23 0340    Specimen: Blood from Arm, Right Updated: 03/02/23 0400     Blood Culture No growth at 24 hours    Blood Culture - Blood, Arm, Left [[id number]]  (Normal) Collected: 03/01/23 0205    Specimen: Blood from Arm, Left Updated: 03/02/23 0400     Blood Culture No growth at 24 hours        Imaging Results (Last 24 Hours)     ** No results found for the last 24 hours. **          ECG 12 Lead             Component  Ref Range & Units 00:40  (3/1/23) 1 mo ago  (1/23/23) 4 mo ago  (10/19/22) 5 mo ago  (9/29/22) 1 yr ago  (11/16/21) 1 yr ago  (11/8/21) 1 yr  ago  (10/18/21)   QT Interval  ms 386  355  367  371  387  388  413    Resulting Agency BH ECG BH ECG BH ECG BH ECG BH ECG BH ECG BH ECG               HEART RATE= 65  bpm  RR Interval= 923  ms  FL Interval= 160  ms  P Horizontal Axis= -5  deg  P Front Axis= 28  deg  QRSD Interval= 80  ms  QT Interval= 386  ms  QRS Axis= -7  deg  T Wave Axis= 7  deg  - BORDERLINE ECG -  Sinus rhythm  Abnormal R-wave progression, early transition  LVH by voltage  Borderline T abnormalities, anterior leads  No change from previous tracing           2D echo within normal limits    Current Facility-Administered Medications:   •  acetaminophen (TYLENOL) tablet 650 mg, 650 mg, Oral, Q4H PRN, Stanley Fowler MD, 650 mg at 03/01/23 1737  •  albuterol (PROVENTIL) nebulizer solution 0.083% 2.5 mg/3mL, 2.5 mg, Nebulization, Q4H PRN, Stanley Fowler MD  •  allopurinol (ZYLOPRIM) tablet 100 mg, 100 mg, Oral, Daily, Stanley Fowler MD, 100 mg at 03/02/23 0829  •  amLODIPine (NORVASC) tablet 10 mg, 10 mg, Oral, Daily, Stanley Fowler MD, 10 mg at 03/02/23 0830  •  budesonide-formoterol (SYMBICORT) 160-4.5 MCG/ACT inhaler 2 puff, 2 puff, Inhalation, BID, Stanley Fowler MD  •  bumetanide (BUMEX) tablet 1 mg, 1 mg, Oral, Daily, Stanley Fowler MD, 1 mg at 03/02/23 0829  •  busPIRone (BUSPAR) tablet 10 mg, 10 mg, Oral, Q12H, Stanley Fowler MD, 10 mg at 03/02/23 0829  •  DULoxetine (CYMBALTA) DR capsule 60 mg, 60 mg, Oral, Daily, Stanley Fowler MD, 60 mg at 03/02/23 0829  •  gabapentin (NEURONTIN) capsule 300 mg, 300 mg, Oral, TID, Stanley Fowler MD, 300 mg at 03/02/23 0829  •  guaiFENesin (MUCINEX) 12 hr tablet 600 mg, 600 mg, Oral, Q12H, Stanley Fowler MD, 600 mg at 03/02/23 0830  •  HYDROcodone-acetaminophen (NORCO) 7.5-325 MG per tablet 1 tablet, 1 tablet, Oral, Q8H PRN, Stanley Fowler MD, 1 tablet at 03/01/23 1355  •  hydrOXYzine (ATARAX) tablet 25 mg, 25 mg, Oral, TID PRN, Stanley Fowler MD, 25 mg at 03/01/23 1142  •  ipratropium-albuterol (DUO-NEB) nebulizer  solution 3 mL, 3 mL, Nebulization, 4x Daily - RT, Anat Cowan MD, 3 mL at 03/02/23 1138  •  levothyroxine (SYNTHROID, LEVOTHROID) tablet 75 mcg, 75 mcg, Oral, Daily, Stanley Fowler MD, 75 mcg at 03/02/23 0829  •  metoprolol succinate XL (TOPROL-XL) 24 hr tablet 12.5 mg, 12.5 mg, Oral, Q24H, Stanley Fowler MD, 12.5 mg at 03/02/23 0830  •  ondansetron (ZOFRAN) injection 4 mg, 4 mg, Intravenous, Q4H PRN, Stanley Fowler MD, 4 mg at 03/01/23 0731  •  pantoprazole (PROTONIX) EC tablet 40 mg, 40 mg, Oral, BID AC, Stanley Fowler MD, 40 mg at 03/02/23 0617  •  potassium chloride (K-DUR,KLOR-CON) ER tablet 10 mEq, 10 mEq, Oral, Daily, Stanley Fowler MD, 10 mEq at 03/02/23 0829  •  [START ON 3/3/2023] predniSONE (DELTASONE) tablet 40 mg, 40 mg, Oral, Daily With Breakfast, Anat Cowan MD  •  prochlorperazine (COMPAZINE) injection 5 mg, 5 mg, Intravenous, Q4H PRN, Stanley Fowler MD, 5 mg at 03/01/23 0856  •  QUEtiapine (SEROquel) tablet 100 mg, 100 mg, Oral, Q12H, Stanley Fowler MD, 100 mg at 03/02/23 0829  •  rOPINIRole (REQUIP) tablet 0.25 mg, 0.25 mg, Oral, Nightly, Stanley Fowler MD, 0.25 mg at 03/01/23 2029  •  rosuvastatin (CRESTOR) tablet 5 mg, 5 mg, Oral, Nightly, Stanley Fowler MD, 5 mg at 03/01/23 2029  •  [COMPLETED] Insert Peripheral IV, , , Once **AND** sodium chloride 0.9 % flush 10 mL, 10 mL, Intravenous, PRN, Kevin Zavaleta MD  •  vitamin B-12 (CYANOCOBALAMIN) tablet 1,000 mcg, 1,000 mcg, Oral, Daily, Stanley Fowler MD, 1,000 mcg at 03/02/23 0829     ASSESSMENT  Acute on chronic hypoxic respiratory failure  Acute exacerbation of COPD  Acute human rhinovirus bronchitis  Polypharmacy  Hypertension  Hypothyroidism  Anxiety disorder  Depression  Restless leg syndrome  Chronic anemia  Chronic kidney disease stage III  Gastroesophageal reflux disease    PLAN  CPM  Supplemental oxygen and titrate  Albuterol Symbicort and Spiriva  Continue IV steroids  Pulmonary consult appreciated  Adjust home medications  Stress  ulcer DVT prophylaxis  Supportive care  DNR  Discussed with family and nursing staff  Follow closely further recommendation current hospital course    AMANDA MURO MD    Copied text in this note has been reviewed and is accurate as of 03/02/23

## 2023-03-03 ENCOUNTER — READMISSION MANAGEMENT (OUTPATIENT)
Dept: CALL CENTER | Facility: HOSPITAL | Age: 83
End: 2023-03-03
Payer: MEDICARE

## 2023-03-03 VITALS
OXYGEN SATURATION: 96 % | HEIGHT: 61 IN | WEIGHT: 163 LBS | RESPIRATION RATE: 18 BRPM | DIASTOLIC BLOOD PRESSURE: 58 MMHG | TEMPERATURE: 98 F | BODY MASS INDEX: 30.78 KG/M2 | HEART RATE: 77 BPM | SYSTOLIC BLOOD PRESSURE: 129 MMHG

## 2023-03-03 LAB
ANION GAP SERPL CALCULATED.3IONS-SCNC: 7.7 MMOL/L (ref 5–15)
BASOPHILS # BLD AUTO: 0 10*3/MM3 (ref 0–0.2)
BASOPHILS NFR BLD AUTO: 0 % (ref 0–1.5)
BUN SERPL-MCNC: 34 MG/DL (ref 8–23)
BUN/CREAT SERPL: 18.8 (ref 7–25)
CALCIUM SPEC-SCNC: 9.4 MG/DL (ref 8.6–10.5)
CHLORIDE SERPL-SCNC: 97 MMOL/L (ref 98–107)
CO2 SERPL-SCNC: 30.3 MMOL/L (ref 22–29)
CREAT SERPL-MCNC: 1.81 MG/DL (ref 0.57–1)
DEPRECATED RDW RBC AUTO: 54.2 FL (ref 37–54)
EGFRCR SERPLBLD CKD-EPI 2021: 28 ML/MIN/1.73
EOSINOPHIL # BLD AUTO: 0 10*3/MM3 (ref 0–0.4)
EOSINOPHIL NFR BLD AUTO: 0 % (ref 0.3–6.2)
ERYTHROCYTE [DISTWIDTH] IN BLOOD BY AUTOMATED COUNT: 15.8 % (ref 12.3–15.4)
GLUCOSE SERPL-MCNC: 115 MG/DL (ref 65–99)
HCT VFR BLD AUTO: 24 % (ref 34–46.6)
HGB BLD-MCNC: 8.1 G/DL (ref 12–15.9)
IMM GRANULOCYTES # BLD AUTO: 0.06 10*3/MM3 (ref 0–0.05)
IMM GRANULOCYTES NFR BLD AUTO: 0.9 % (ref 0–0.5)
LYMPHOCYTES # BLD AUTO: 0.8 10*3/MM3 (ref 0.7–3.1)
LYMPHOCYTES NFR BLD AUTO: 11.5 % (ref 19.6–45.3)
MCH RBC QN AUTO: 31.8 PG (ref 26.6–33)
MCHC RBC AUTO-ENTMCNC: 33.8 G/DL (ref 31.5–35.7)
MCV RBC AUTO: 94.1 FL (ref 79–97)
MONOCYTES # BLD AUTO: 0.47 10*3/MM3 (ref 0.1–0.9)
MONOCYTES NFR BLD AUTO: 6.8 % (ref 5–12)
NEUTROPHILS NFR BLD AUTO: 5.63 10*3/MM3 (ref 1.7–7)
NEUTROPHILS NFR BLD AUTO: 80.8 % (ref 42.7–76)
NRBC BLD AUTO-RTO: 0 /100 WBC (ref 0–0.2)
PLATELET # BLD AUTO: 219 10*3/MM3 (ref 140–450)
PMV BLD AUTO: 9.1 FL (ref 6–12)
POTASSIUM SERPL-SCNC: 4.1 MMOL/L (ref 3.5–5.2)
RBC # BLD AUTO: 2.55 10*6/MM3 (ref 3.77–5.28)
SODIUM SERPL-SCNC: 135 MMOL/L (ref 136–145)
WBC NRBC COR # BLD: 6.96 10*3/MM3 (ref 3.4–10.8)

## 2023-03-03 PROCEDURE — G0378 HOSPITAL OBSERVATION PER HR: HCPCS

## 2023-03-03 PROCEDURE — 94799 UNLISTED PULMONARY SVC/PX: CPT

## 2023-03-03 PROCEDURE — 97166 OT EVAL MOD COMPLEX 45 MIN: CPT

## 2023-03-03 PROCEDURE — 85025 COMPLETE CBC W/AUTO DIFF WBC: CPT | Performed by: HOSPITALIST

## 2023-03-03 PROCEDURE — 94664 DEMO&/EVAL PT USE INHALER: CPT

## 2023-03-03 PROCEDURE — 97162 PT EVAL MOD COMPLEX 30 MIN: CPT

## 2023-03-03 PROCEDURE — 80048 BASIC METABOLIC PNL TOTAL CA: CPT | Performed by: HOSPITALIST

## 2023-03-03 PROCEDURE — 94761 N-INVAS EAR/PLS OXIMETRY MLT: CPT

## 2023-03-03 PROCEDURE — 63710000001 PREDNISONE PER 1 MG: Performed by: INTERNAL MEDICINE

## 2023-03-03 RX ORDER — PANTOPRAZOLE SODIUM 40 MG/1
40 TABLET, DELAYED RELEASE ORAL
Qty: 60 TABLET | Refills: 0 | Status: SHIPPED | OUTPATIENT
Start: 2023-03-03 | End: 2023-04-02

## 2023-03-03 RX ORDER — PREDNISONE 20 MG/1
20 TABLET ORAL
Status: DISCONTINUED | OUTPATIENT
Start: 2023-03-04 | End: 2023-03-03 | Stop reason: HOSPADM

## 2023-03-03 RX ORDER — AMLODIPINE BESYLATE 10 MG/1
10 TABLET ORAL DAILY
Qty: 30 TABLET | Refills: 0 | Status: SHIPPED | OUTPATIENT
Start: 2023-03-04 | End: 2023-04-03

## 2023-03-03 RX ORDER — METHYLPREDNISOLONE 4 MG/1
TABLET ORAL
Qty: 1 EACH | Refills: 0 | Status: SHIPPED | OUTPATIENT
Start: 2023-03-03

## 2023-03-03 RX ORDER — GUAIFENESIN 600 MG/1
600 TABLET, EXTENDED RELEASE ORAL EVERY 12 HOURS SCHEDULED
Qty: 60 TABLET | Refills: 0 | Status: SHIPPED | OUTPATIENT
Start: 2023-03-03 | End: 2023-04-02

## 2023-03-03 RX ADMIN — BUDESONIDE AND FORMOTEROL FUMARATE DIHYDRATE 2 PUFF: 160; 4.5 AEROSOL RESPIRATORY (INHALATION) at 06:43

## 2023-03-03 RX ADMIN — POTASSIUM CHLORIDE 10 MEQ: 750 TABLET, EXTENDED RELEASE ORAL at 08:29

## 2023-03-03 RX ADMIN — PREDNISONE 40 MG: 20 TABLET ORAL at 08:29

## 2023-03-03 RX ADMIN — LEVOTHYROXINE SODIUM 75 MCG: 0.07 TABLET ORAL at 08:28

## 2023-03-03 RX ADMIN — GUAIFENESIN 600 MG: 600 TABLET, EXTENDED RELEASE ORAL at 08:28

## 2023-03-03 RX ADMIN — ALLOPURINOL 100 MG: 100 TABLET ORAL at 08:28

## 2023-03-03 RX ADMIN — BUSPIRONE HYDROCHLORIDE 10 MG: 10 TABLET ORAL at 08:29

## 2023-03-03 RX ADMIN — HYDROCODONE BITARTRATE AND ACETAMINOPHEN 1 TABLET: 7.5; 325 TABLET ORAL at 08:29

## 2023-03-03 RX ADMIN — METOPROLOL SUCCINATE 12.5 MG: 25 TABLET, EXTENDED RELEASE ORAL at 08:28

## 2023-03-03 RX ADMIN — QUETIAPINE FUMARATE 100 MG: 100 TABLET ORAL at 08:28

## 2023-03-03 RX ADMIN — BUMETANIDE 1 MG: 2 TABLET ORAL at 08:28

## 2023-03-03 RX ADMIN — ACETAMINOPHEN 650 MG: 325 TABLET, FILM COATED ORAL at 06:55

## 2023-03-03 RX ADMIN — AMLODIPINE BESYLATE 10 MG: 5 TABLET ORAL at 08:28

## 2023-03-03 RX ADMIN — DULOXETINE HYDROCHLORIDE 60 MG: 60 CAPSULE, DELAYED RELEASE ORAL at 08:29

## 2023-03-03 RX ADMIN — GABAPENTIN 300 MG: 300 CAPSULE ORAL at 08:29

## 2023-03-03 RX ADMIN — PANTOPRAZOLE SODIUM 40 MG: 40 TABLET, DELAYED RELEASE ORAL at 06:55

## 2023-03-03 NOTE — THERAPY EVALUATION
Patient Name: Josephine Wolf  : 1940    MRN: [id number]                              Today's Date: 3/3/2023       Admit Date: 3/1/2023    Visit Dx:     ICD-10-CM ICD-9-CM   1. COPD with acute exacerbation ([facility])  J44.1 491.21   2. Acute nonintractable headache, unspecified headache type  R51.9 784.0   3. Pneumonia of left lower lobe due to infectious organism  J18.9 486     Patient Active Problem List   Diagnosis   • Anemia   • OA (osteoarthritis) of knee   • Chronic respiratory failure with hypoxia ([facility])   • Cellulitis of skin   • Acute kidney injury (HCC)   • Sepsis ([facility])   • Orthostatic hypotension   • Disease of thyroid gland   • Hypertension   • Dehydration   • Stage 3 chronic kidney disease (CMS/HCC)   • Closed compression fracture of L1 lumbar vertebra   • Hyponatremia   • Osteoporosis with pathological fracture   • Acute on chronic respiratory failure with hypoxia and hypercapnia ([facility])   • Obesity (BMI 30-39.9)   • Nocturnal hypoxia   • CKD (chronic kidney disease) stage 2, GFR 60-89 ml/min   • Acute on chronic respiratory failure with hypoxia ([facility])   • Abdominal pain   • Acute exacerbation of chronic obstructive pulmonary disease (COPD) ([facility])   • Acute UTI   • Metabolic encephalopathy   • COPD with acute exacerbation ([facility])     Past Medical History:   Diagnosis Date   • Acid reflux    • Acute kidney injury ([facility])    • Anemia    • Arthritis    • Bipolar 1 disorder, depressed ([facility])    • Cataract     MINDY   • Chronic nausea    • Chronic pain of right knee    • Continuous leakage of urine     USES DEPENDS   • COPD (chronic obstructive pulmonary disease) ([facility])     USES O2 2 LMP PER NC AT NIGHT   • Disease of thyroid gland     HYPOTHYROIDISM   • Diverticulosis    • Fibromyalgia     DX    • Fibromyalgia, primary    • Frequent episodes of bronchitis    • HL (hearing loss)    • Hypertension    • Hypothyroidism    • Memory loss    • Migraines    • Neck pain    • On home oxygen therapy     2L NC AT NIGHT    • Orthostatic hypotension    • Short of breath on exertion    • Sleep apnea    • Stage 3 chronic kidney disease (HCC)      Past Surgical History:   Procedure Laterality Date   • CEREBRAL ANEURYSM REPAIR  2000'S    WITH STENT   • HYSTERECTOMY     • LAPAROSCOPIC CHOLECYSTECTOMY     • HI ARTHRP KNE CONDYLE&PLATU MEDIAL&LAT COMPARTMENTS Right 11/16/2017    Procedure: RT TOTAL KNEE ARTHROPLASTY;  Surgeon: Kashif Perez MD;  [facility];  Service: Orthopedics      General Information     Row Name 03/03/23 1031          Physical Therapy Time and Intention    Document Type evaluation  -EM     Mode of Treatment individual therapy;physical therapy  -EM     Row Name 03/03/23 1031          General Information    Patient Profile Reviewed yes  -EM     Prior Level of Function independent:  -EM     Existing Precautions/Restrictions oxygen therapy device and L/min  -EM     Row Name 03/03/23 1031          Living Environment    People in Home alone  -EM     Row Name 03/03/23 1031          Home Main Entrance    Number of Stairs, Main Entrance none  -EM     Row Name 03/03/23 1031          Stairs Within Home, Primary    Stairs, Within Home, Primary elevator access  -EM     Row Name 03/03/23 1031          Cognition    Orientation Status (Cognition) oriented x 4  -EM     Row Name 03/03/23 1031          Safety Issues, Functional Mobility    Impairments Affecting Function (Mobility) shortness of breath;endurance/activity tolerance  -EM           User Key  (r) = Recorded By, (t) = Taken By, (c) = Cosigned By    Initials Name Provider Type    EM Sonia Samson PT Physical Therapist               Mobility     Row Name 03/03/23 1032          Bed Mobility    Comment, (Bed Mobility) not tested, up in chair  -EM     Row Name 03/03/23 1032          Sit-Stand Transfer    Sit-Stand [place] (Transfers) supervision  -EM     Row Name 03/03/23 1032          Gait/Stairs (Locomotion)    [place] Level (Gait) standby assist   -EM     Distance in Feet (Gait) 40  -EM     Deviations/Abnormal Patterns (Gait) gait speed decreased  -EM     Comment, (Gait/Stairs) ambulated around room, able to turn, negotiate obstacles in room, no LOB, on O2  -EM           User Key  (r) = Recorded By, (t) = Taken By, (c) = Cosigned By    Initials Name Provider Type    Sonia Bautista PT Physical Therapist               Obj/Interventions     Row Name 03/03/23 1033          Range of Motion Comprehensive    General Range of Motion no range of motion deficits identified  -EM     Row Name 03/03/23 1033          Strength Comprehensive (MMT)    General Manual Muscle Testing (MMT) Assessment no strength deficits identified  -EM     Row Name 03/03/23 1033          Motor Skills    Therapeutic Exercise other (see comments)  reviewed LE exercises with patient: AP, LAQ, marching in sitting. pt states she performs at home ad helder and is very familiar with ex  -EM     Row Name 03/03/23 1033          Balance    Static Sitting Balance independent  -EM     Dynamic Sitting Balance independent  -EM     Position, Sitting Balance sitting in chair  -EM     Static Standing Balance standby assist  -EM     Dynamic Standing Balance standby assist  -EM     Row Name 03/03/23 1033          Sensory Assessment (Somatosensory)    Sensory Assessment (Somatosensory) sensation intact  -EM           User Key  (r) = Recorded By, (t) = Taken By, (c) = Cosigned By    Initials Name Provider Type    Sonia Bautista PT Physical Therapist               Goals/Plan    No documentation.                Clinical Impression     Row Name 03/03/23 1034          Pain    Pretreatment Pain Rating 0/10 - no pain  -EM     Row Name 03/03/23 1034          Plan of Care Review    Plan of Care Reviewed With patient  -EM     Outcome Evaluation Pt is 81 yo female admitted to [facility] with COPD exacerbation. PMH significant for COPD, HTN, hypothyroidism, anxiety. Patient lives alone, has home o2, no use of AD at  home, elevator access. Patient up in chair, agreeable to therapy. Pt performed sit to stand independently, ambulated around room with SBA with o2. Pt steady with ambulation, appears safe to return home, no further acute PT indicated.  -EM     Row Name 03/03/23 1034          Therapy Assessment/Plan (PT)    Patient/Family Therapy Goals Statement (PT) go home  -EM     Criteria for Skilled Interventions Met (PT) no problems identified which require skilled intervention  -EM     Therapy Frequency (PT) evaluation only  -EM     Row Name 03/03/23 1034          Vital Signs    Pre SpO2 (%) 95  -EM     O2 Delivery Pre Treatment supplemental O2  -EM     Post SpO2 (%) 94  -EM     O2 Delivery Post Treatment supplemental O2  -EM     Row Name 03/03/23 1034          Positioning and Restraints    Pre-Treatment Position sitting in chair/recliner  -EM     Post Treatment Position chair  -EM     In Chair sitting;call light within reach  -EM           User Key  (r) = Recorded By, (t) = Taken By, (c) = Cosigned By    Initials Name Provider Type    EM Sonia Samson, PT Physical Therapist               Outcome Measures     Row Name 03/03/23 1037          How much help from another person do you currently need...    Turning from your back to your side while in flat bed without using bedrails? 4  -EM     Moving from lying on back to sitting on the side of a flat bed without bedrails? 4  -EM     Moving to and from a bed to a chair (including a wheelchair)? 4  -EM     Standing up from a chair using your arms (e.g., wheelchair, bedside chair)? 4  -EM     Climbing 3-5 steps with a railing? 3  -EM     To walk in hospital room? 4  -EM     AM-PAC 6 Clicks Score (PT) 23  -EM     Highest level of mobility 7 --> Walked 25 feet or more  -EM     Row Name 03/03/23 1003          Functional Assessment    Outcome Measure Options AM-PAC 6 Clicks Daily Activity (OT)  -BS           User Key  (r) = Recorded By, (t) = Taken By, (c) = Cosigned By    Initials  Name Provider Type    EM Sonia Samson PT Physical Therapist    Gabbi Howard OT Occupational Therapist                             Physical Therapy Education     Title: PT OT SLP Therapies (In Progress)     Topic: Physical Therapy (In Progress)     Point: Mobility training (Not Started)     Learner Progress:  Not documented in this visit.          Point: Home exercise program (Done)     Learning Progress Summary           Patient Acceptance, E, VU,DU by EM at 3/3/2023 1037                   Point: Body mechanics (Not Started)     Learner Progress:  Not documented in this visit.          Point: Precautions (Not Started)     Learner Progress:  Not documented in this visit.                      User Key     Initials Effective Dates Name Provider Type Discipline    EM 06/16/21 -  Sonia Samson PT Physical Therapist PT              PT Recommendation and Plan     Plan of Care Reviewed With: patient  Outcome Evaluation: Pt is 81 yo female admitted to [facility] with COPD exacerbation. PMH significant for COPD, HTN, hypothyroidism, anxiety. Patient lives alone, has home o2, no use of AD at home, elevator access. Patient up in chair, agreeable to therapy. Pt performed sit to stand independently, ambulated around room with SBA with o2. Pt steady with ambulation, appears safe to return home, no further acute PT indicated.     Time Calculation:    PT Charges     Row Name 03/03/23 1038             Time Calculation    Start Time 0955  -EM      Stop Time 1005  -EM      Time Calculation (min) 10 min  -EM      PT Received On 03/03/23  -EM            User Key  (r) = Recorded By, (t) = Taken By, (c) = Cosigned By    Initials Name Provider Type    EM Sonia Samson PT Physical Therapist              Therapy Charges for Today     Code Description Service Date Service Provider Modifiers Qty    01994893157 HC PT EVAL MOD COMPLEXITY 1 3/3/2023 Sonia Samson, PT GP 1          PT G-Codes  Outcome Measure Options:  AM-PAC 6 Clicks Daily Activity (OT)  AM-PAC 6 Clicks Score (PT): 23  AM-PAC 6 Clicks Score (OT): 23  PT Discharge Summary  Anticipated Discharge Disposition (PT): home    Sonia Samson, PT  3/3/2023

## 2023-03-03 NOTE — PROGRESS NOTES
"                                              LOS: 2 days   Patient Care Team:  Bernabe Guy MD as PCP - General (Internal Medicine)  Avi Aly MD as Consulting Physician (Nephrology)    Chief Complaint:  F/up COPD exacerbation.  Abnormal CXR.    Subjective   Interval History  On RA.  Denies dyspnea at rest.  No cough.    REVIEW OF SYSTEMS:     GASTROINTESTINAL: No anorexia, nausea, vomiting or diarrhea. No abdominal pain.  CONSTITUTIONAL: No fever or chills.     Ventilator/Non-Invasive Ventilation Settings (From admission, onward)    None                Physical Exam:     Vital Signs  Temp:  [98 °F (36.7 °C)-99.5 °F (37.5 °C)] 98 °F (36.7 °C)  Heart Rate:  [] 77  Resp:  [18-20] 18  BP: (105-154)/(49-68) 129/58    Intake/Output Summary (Last 24 hours) at 3/3/2023 1413  Last data filed at 3/2/2023 2030  Gross per 24 hour   Intake 300 ml   Output --   Net 300 ml     Flowsheet Rows    Flowsheet Row First Filed Value   Admission Height 154.9 cm (61\") Documented at 03/01/2023 0033   Admission Weight 72.6 kg (160 lb) Documented at 03/01/2023 0033          PPE used per hospital policy    General Appearance:   Alert, cooperative, in no acute distress   ENMT:  Mallampati score 3, moist mucous membrane   Eyes:  Pupils equal and reactive to light. EOMI   Neck:   Trachea midline. No thyromegaly.   Lungs:    Equal but diminished air entry throughout.  Minimal expiratory wheezing.  No labored breathing.    Heart:   Regular rhythm and normal rate, normal S1 and S2, no         murmur   Skin:   No rash or ecchymosis   Abdomen:    Obese. Soft. No tenderness. No HSM.   Neuro/psych:  Conscious, alert, oriented x3. Strength 5/5 in upper and lower  ext.  Appropriate mood and affect   Extremities:  No cyanosis, clubbing or edema.  Warm extremities and well-perfused          Results Review:        Results from last 7 days   Lab Units 03/03/23  0340 03/01/23  1140 03/01/23  0112   SODIUM mmol/L 135* 134* 139 "   POTASSIUM mmol/L 4.1 4.8 4.9   CHLORIDE mmol/L 97* 98 101   CO2 mmol/L 30.3* 28.4 32.5*   BUN mg/dL 34* 18 21   CREATININE mg/dL 1.81* 1.42* 1.41*   GLUCOSE mg/dL 115* 178* 106*   CALCIUM mg/dL 9.4 9.9 9.4     Results from last 7 days   Lab Units 03/01/23  0112   HSTROP T ng/L 29*     Results from last 7 days   Lab Units 03/03/23  0340 03/02/23  0316 03/01/23  0112   WBC 10*3/mm3 6.96 6.76 5.08   HEMOGLOBIN g/dL 8.1* 8.4* 7.9*   HEMATOCRIT % 24.0* 25.3* 25.3*   PLATELETS 10*3/mm3 219 232 229         Results from last 7 days   Lab Units 03/01/23  0112   PROBNP pg/mL 865.0                           I reviewed the patient's new clinical results.        Medication Review:   allopurinol, 100 mg, Oral, Daily  amLODIPine, 10 mg, Oral, Daily  budesonide-formoterol, 2 puff, Inhalation, BID  bumetanide, 1 mg, Oral, Daily  busPIRone, 10 mg, Oral, Q12H  DULoxetine, 60 mg, Oral, Daily  gabapentin, 300 mg, Oral, TID  guaiFENesin, 600 mg, Oral, Q12H  ipratropium-albuterol, 3 mL, Nebulization, 4x Daily - RT  levothyroxine, 75 mcg, Oral, Daily  metoprolol succinate XL, 12.5 mg, Oral, Q24H  pantoprazole, 40 mg, Oral, BID AC  potassium chloride, 10 mEq, Oral, Daily  predniSONE, 40 mg, Oral, Daily With Breakfast  QUEtiapine, 100 mg, Oral, Q12H  rOPINIRole, 0.25 mg, Oral, Nightly  rosuvastatin, 5 mg, Oral, Nightly           CXR 3/1/2023 and lung portion of CT of the abdomen October 2022:  CXR showed bandlike atelectasis in the left lower lobe.  This was present on prior CT of the abdomen.        Assessment     1. COPD exacerbation  2. Left lower lobe atelectasis, seems to be chronic.  Mild.  3. Acute rhinovirus bronchitis  4. Chronic hypoxic respiratory failure, on oxygen 3 L/minute baseline     • Mild pulmonary hypertension, probably group 3  • CKD stage III    Plan     · Symbicort 2 puffs twice a day. DuoNeb 4 times a day  · Oxygen by NC and titrate keep SPO2 >90%  · Incentive spirometry  · Decrease prednisone to 20 mg and taper off  as outpatient  · Mucinex 600 mg twice a day    Okay to discharge.  Thank you for the consult.  We will sign off.    Anat Cowan MD  03/03/23  14:13 EST          This note was dictated utilizing Dragon dictation

## 2023-03-03 NOTE — CASE MANAGEMENT/SOCIAL WORK
Case Management Discharge Note      Final Note: Home via family, no additional CCP needs. Raven RN/CCP         Selected Continued Care - Admitted Since 3/1/2023     Destination    No services have been selected for the patient.              Durable Medical Equipment    No services have been selected for the patient.              Dialysis/Infusion    No services have been selected for the patient.              Home Medical Care    No services have been selected for the patient.              Therapy    No services have been selected for the patient.              Community Resources    No services have been selected for the patient.              Community & DME    No services have been selected for the patient.                       Final Discharge Disposition Code: 01 - home or self-care

## 2023-03-03 NOTE — DISCHARGE SUMMARY
Discharge summary    Date of admission 3/1/2023  Date of discharge 3/3/2023    Final diagnosis  Acute on chronic hypoxic respiratory failure  Acute exacerbation of COPD  Acute human rhinovirus bronchitis  Polypharmacy  Hypertension  Hypothyroidism  Anxiety disorder  Depression  Restless leg syndrome  Chronic anemia  Chronic kidney disease stage III  Gastroesophageal reflux disease    Discharge medications    Current Facility-Administered Medications:   •  allopurinol (ZYLOPRIM) tablet 100 mg, 100 mg, Oral, Daily, Stanley Fowler MD, 100 mg at 03/03/23 0828  •  amLODIPine (NORVASC) tablet 10 mg, 10 mg, Oral, Daily, Stanley Fowler MD, 10 mg at 03/03/23 0828  •  budesonide-formoterol (SYMBICORT) 160-4.5 MCG/ACT inhaler 2 puff, 2 puff, Inhalation, BID, Stanley Fowler MD, 2 puff at 03/03/23 0643  •  bumetanide (BUMEX) tablet 1 mg, 1 mg, Oral, Daily, Stanley Fowler MD, 1 mg at 03/03/23 0828  •  busPIRone (BUSPAR) tablet 10 mg, 10 mg, Oral, Q12H, Stanley Fowler MD, 10 mg at 03/03/23 0829  •  DULoxetine (CYMBALTA) DR capsule 60 mg, 60 mg, Oral, Daily, Stanley Fowler MD, 60 mg at 03/03/23 0829  •  gabapentin (NEURONTIN) capsule 300 mg, 300 mg, Oral, TID, Stanley Fowler MD, 300 mg at 03/03/23 0829  •  guaiFENesin (MUCINEX) 12 hr tablet 600 mg, 600 mg, Oral, Q12H, Stanley Fowelr MD, 600 mg at 03/03/23 0828  •  ipratropium-albuterol (DUO-NEB) nebulizer solution 3 mL, 3 mL, Nebulization, 4x Daily - RT, Anat Cowan MD, 3 mL at 03/02/23 1538  •  levothyroxine (SYNTHROID, LEVOTHROID) tablet 75 mcg, 75 mcg, Oral, Daily, Stanley Fowler MD, 75 mcg at 03/03/23 0828  •  metoprolol succinate XL (TOPROL-XL) 24 hr tablet 12.5 mg, 12.5 mg, Oral, Q24H, Stanley Fowler MD, 12.5 mg at 03/03/23 0828  •  pantoprazole (PROTONIX) EC tablet 40 mg, 40 mg, Oral, BID AC, Stanley Fowler MD, 40 mg at 03/03/23 0655  •  potassium chloride (K-DUR,KLOR-CON) ER tablet 10 mEq, 10 mEq, Oral, Daily, Stanley Fowler MD, 10 mEq at 03/03/23 0829  •  [START ON 3/4/2023]  predniSONE (DELTASONE) tablet 20 mg, 20 mg, Oral, Daily With Breakfast, Amanda Fowler MD  •  QUEtiapine (SEROquel) tablet 100 mg, 100 mg, Oral, Q12H, Amanda Fowler MD, 100 mg at 03/03/23 0828  •  rOPINIRole (REQUIP) tablet 0.25 mg, 0.25 mg, Oral, Nightly, Amanda Fowler MD, 0.25 mg at 03/02/23 2028  •  rosuvastatin (CRESTOR) tablet 5 mg, 5 mg, Oral, Nightly, Amanda Fowler MD, 5 mg at 03/02/23 2028  •  [COMPLETED] Insert Peripheral IV, , , Once **AND** sodium chloride 0.9 % flush 10 mL, 10 mL, Intravenous, PRN, Kevin Zavaleta MD     Consults obtained  Pulmonary    Procedures   2D echo within normal limit with ejection fraction 67%    Hospital course  82-year-old white female with history of chronic hypoxic respiratory failure on home oxygen COPD hypertension hypothyroidism anxiety disorder depression and chronic kidney disease stage III who takes multiple medications admitted to emergency room with increased shortness of breath with nonproductive cough.  Patient work-up in ER revealed acute on chronic hypoxic respiratory failure with acute exacerbation of COPD and also found to have positive rhinovirus bronchitis.  Patient admitted treated with supplemental oxygen nebulizer IV steroids and further evaluated by pulmonary.  Patient responded to the treatment and her steroids changed to by mouth and she will taper off slowly at home.  Patient cleared for discharge from pulmonary.    Discharge diet regular    Activity as tolerated    Medication as above    Follow-up with primary doctor in 1 week and follow-up with pulmonary per the instructions and take medication as directed.    AMANDA FOWLER MD

## 2023-03-03 NOTE — THERAPY DISCHARGE NOTE
Patient Name: Josephine Wolf  : 1940    MRN: [id number]                              Today's Date: 3/3/2023       Admit Date: 3/1/2023    Visit Dx:     ICD-10-CM ICD-9-CM   1. COPD with acute exacerbation ([facility])  J44.1 491.21   2. Acute nonintractable headache, unspecified headache type  R51.9 784.0   3. Pneumonia of left lower lobe due to infectious organism  J18.9 486     Patient Active Problem List   Diagnosis   • Anemia   • OA (osteoarthritis) of knee   • Chronic respiratory failure with hypoxia ([facility])   • Cellulitis of skin   • Acute kidney injury ([facility])   • Sepsis ([facility])   • Orthostatic hypotension   • Disease of thyroid gland   • Hypertension   • Dehydration   • Stage 3 chronic kidney disease (CMS/H[facility])   • Closed compression fracture of L1 lumbar vertebra   • Hyponatremia   • Osteoporosis with pathological fracture   • Acute on chronic respiratory failure with hypoxia and hypercapnia ([facility])   • Obesity (BMI 30-39.9)   • Nocturnal hypoxia   • CKD (chronic kidney disease) stage 2, GFR 60-89 ml/min   • Acute on chronic respiratory failure with hypoxia ([facility])   • Abdominal pain   • Acute exacerbation of chronic obstructive pulmonary disease (COPD) ([facility])   • Acute UTI   • Metabolic encephalopathy   • COPD with acute exacerbation ([facility])     Past Medical History:   Diagnosis Date   • Acid reflux    • Acute kidney injury ([facility])    • Anemia    • Arthritis    • Bipolar 1 disorder, depressed ([facility])    • Cataract     MINDY   • Chronic nausea    • Chronic pain of right knee    • Continuous leakage of urine     USES DEPENDS   • COPD (chronic obstructive pulmonary disease) ([facility])     USES O2 2 LMP PER NC AT NIGHT   • Disease of thyroid gland     HYPOTHYROIDISM   • Diverticulosis    • Fibromyalgia     DX    • Fibromyalgia, primary    • Frequent episodes of bronchitis    • HL (hearing loss)    • Hypertension    • Hypothyroidism    • Memory loss    • Migraines    • Neck pain    • On home oxygen therapy     2L NC AT NIGHT    • Orthostatic hypotension    • Short of breath on exertion    • Sleep apnea    • Stage 3 chronic kidney disease ([facility])      Past Surgical History:   Procedure Laterality Date   • CEREBRAL ANEURYSM REPAIR  2000'S    WITH STENT   • HYSTERECTOMY     • LAPAROSCOPIC CHOLECYSTECTOMY     • OR ARTHRP KNE CONDYLE&PLATU MEDIAL&LAT COMPARTMENTS Right 11/16/2017    Procedure: RT TOTAL KNEE ARTHROPLASTY;  Surgeon: Kashif Perez MD;  Location: [facility] MAIN OR;  Service: Orthopedics      General Information     Row Name 03/03/23 0944          OT Time and Intention    Document Type evaluation;discharge evaluation/summary  -BS     Mode of Treatment individual therapy;occupational therapy  -BS     Row Name 03/03/23 0944          General Information    Patient Profile Reviewed yes  -BS     Prior Level of Function independent:;ADL's;dressing;bathing;grooming;feeding;cooking  -BS     Existing Precautions/Restrictions oxygen therapy device and L/min  3LNC at home  -BS     Barriers to Rehab previous functional deficit  -BS     Row Name 03/03/23 0944          Occupational Profile    Environmental Supports and Barriers (Occupational Profile) Pt reports she has a walkin shower with seat, grab bars, commode good height, and has sister and son nearby to drive to [employer] and grocery store  -BS     Row Name 03/03/23 0944          Living Environment    People in Home alone  -BS     Row Name 03/03/23 0944          Home Main Entrance    Number of Stairs, Main Entrance none  -BS     Row Name 03/03/23 0944          Stairs Within Home, Primary    Stairs, Within Home, Primary Elevator  -BS           User Key  (r) = Recorded By, (t) = Taken By, (c) = Cosigned By    Initials Name Provider Type    BS Gabbi Nina OT Occupational Therapist                 Mobility/ADL's     Row Name 03/03/23 0949          Bed Mobility    Comment, (Bed Mobility) Pt [facility]IC upon arrival  -BS     Row Name 03/03/23 0949          Transfers    Transfers sit-stand  transfer;stand-sit transfer  -BS     Row Name 03/03/23 0949          Sit-Stand Transfer    Sit-Stand [place] (Transfers) standby assist  -BS     Comment, (Sit-Stand Transfer) Unsupported  -BS     Row Name 03/03/23 0949          Stand-Sit Transfer    Stand-Sit [place] (Transfers) standby assist  -BS     Row Name 03/03/23 0949          Activities of Daily Living    BADL Assessment/Intervention lower body dressing  -BS     Row Name 03/03/23 0949          Lower Body Dressing Assessment/Training    [place] Level (Lower Body Dressing) doff;don;socks;independent  -BS     Position (Lower Body Dressing) edge of bed sitting  -BS           User Key  (r) = Recorded By, (t) = Taken By, (c) = Cosigned By    Initials Name Provider Type    Gabbi Howard OT Occupational Therapist               Obj/Interventions     Row Name 03/03/23 0949          Sensory Assessment (Somatosensory)    Sensory Assessment (Somatosensory) UE sensation intact  -BS     Row Name 03/03/23 0949          Vision Assessment/Intervention    Visual Impairment/Limitations WFL;corrective lenses full-time  -BS     Row Name 03/03/23 0949          Range of Motion Comprehensive    General Range of Motion bilateral upper extremity ROM WFL  -BS     Row Name 03/03/23 0949          Strength Comprehensive (MMT)    General Manual Muscle Testing (MMT) Assessment no strength deficits identified  -BS     Comment, General Manual Muscle Testing (MMT) Assessment BUE grossly 4+/5  -BS     Row Name 03/03/23 0949          Balance    Balance Assessment sitting static balance;sitting dynamic balance;standing static balance;standing dynamic balance  -BS     Static Sitting Balance standby assist  -BS     Dynamic Sitting Balance standby assist  -BS     Position, Sitting Balance unsupported;sitting in chair  -BS     Static Standing Balance contact guard  -BS     Dynamic Standing Balance contact guard  -BS     Position/Device Used, Standing Balance unsupported  -BS      Balance Interventions sitting;standing;sit to stand;supported;static;dynamic;weight shifting activity;dynamic reaching;occupation based/functional task  -BS           User Key  (r) = Recorded By, (t) = Taken By, (c) = Cosigned By    Initials Name Provider Type    Gabbi Howard, OT Occupational Therapist               Goals/Plan    No documentation.                Clinical Impression     Row Name 03/03/23 0960          Pain Assessment    Pretreatment Pain Rating 0/10 - no pain  -BS     Posttreatment Pain Rating 0/10 - no pain  -BS     Row Name 03/03/23 0996          Plan of Care Review    Plan of Care Reviewed With patient  -BS     Progress no change  -BS     Outcome Evaluation Pt is a 82 y.o. female admitted with COPD acute exacerbation. Pt reports she is independent with all ADLs at baseline and has both her sister and son to assist with taking her to doctors appts and grocery. Pt participated in functional transfers with SBA and was independent for LBD tasks. Pt at baseline function and not appropriate for acute OT services. Will sign off at this time. Rec d/c home.  -BS     Row Name 03/03/23 0990          Therapy Assessment/Plan (OT)    Rehab Potential (OT) other (see comments)  -BS     Criteria for Skilled Therapeutic Interventions Met (OT) no;no problems identified which require skilled intervention  -BS     Therapy Frequency (OT) evaluation only  -BS     Row Name 03/03/23 0997          Therapy Plan Review/Discharge Plan (OT)    Anticipated Discharge Disposition (OT) home  -BS     Row Name 03/03/23 0999          Vital Signs    O2 Delivery Pre Treatment nasal cannula  3LNC  -BS     O2 Delivery Intra Treatment nasal cannula  -BS     O2 Delivery Post Treatment nasal cannula  -BS     Pre Patient Position Sitting  -BS     Intra Patient Position Standing  -BS     Post Patient Position Sitting  -BS     Row Name 03/03/23 0900          Positioning and Restraints    Pre-Treatment Position sitting in chair/recliner   -BS     Post Treatment Position chair  -BS     In Chair notified nsg;reclined;call light within reach;encouraged to call for assist;exit alarm on  -BS           User Key  (r) = Recorded By, (t) = Taken By, (c) = Cosigned By    Initials Name Provider Type    Gabbi Howard OT Occupational Therapist               Outcome Measures     Row Name 03/03/23 1003          How much help from another is currently needed...    Putting on and taking off regular lower body clothing? 4  -BS     Bathing (including washing, rinsing, and drying) 3  -BS     Toileting (which includes using toilet bed pan or urinal) 4  -BS     Putting on and taking off regular upper body clothing 4  -BS     Taking care of personal grooming (such as brushing teeth) 4  -BS     Eating meals 4  -BS     AM-PAC 6 Clicks Score (OT) 23  -BS     Row Name 03/03/23 1003          Functional Assessment    Outcome Measure Options AM-PAC 6 Clicks Daily Activity (OT)  -BS           User Key  (r) = Recorded By, (t) = Taken By, (c) = Cosigned By    Initials Name Provider Type    Gabbi Howard OT Occupational Therapist                Occupational Therapy Education     Title: PT OT SLP Therapies (In Progress)     Topic: Occupational Therapy (Done)     Point: ADL training (Done)     Description:   Instruct learner(s) on proper safety adaptation and remediation techniques during self care or transfers.   Instruct in proper use of assistive devices.              Learning Progress Summary           Patient Acceptance, E, VU by BS at 3/3/2023 1004    Comment: Reason for eval/ consult                   Point: Home exercise program (Done)     Description:   Instruct learner(s) on appropriate technique for monitoring, assisting and/or progressing therapeutic exercises/activities.              Learning Progress Summary           Patient Acceptance, E, VU by BS at 3/3/2023 1004    Comment: Reason for eval/ consult                   Point: Precautions (Done)     Description:    Instruct learner(s) on prescribed precautions during self-care and functional transfers.              Learning Progress Summary           Patient Acceptance, E, VU by BS at 3/3/2023 1004    Comment: Reason for eval/ consult                   Point: Body mechanics (Done)     Description:   Instruct learner(s) on proper positioning and spine alignment during self-care, functional mobility activities and/or exercises.              Learning Progress Summary           Patient Acceptance, E, VU by BS at 3/3/2023 1004    Comment: Reason for eval/ consult                               User Key     Initials Effective Dates Name Provider Type Discipline     11/14/22 -  Gabbi Nina OT Occupational Therapist OT              OT Recommendation and Plan  Therapy Frequency (OT): evaluation only  Plan of Care Review  Plan of Care Reviewed With: patient  Progress: no change  Outcome Evaluation: Pt is a 82 y.o. female admitted with COPD acute exacerbation. Pt reports she is independent with all ADLs at baseline and has both her sister and son to assist with taking her to doctors appts and grocery. Pt participated in functional transfers with SBA and was independent for LBD tasks. Pt at baseline function and not appropriate for acute OT services. Will sign off at this time. Rec d/c home.     Time Calculation:    Time Calculation- OT     Row Name 03/03/23 1006             Time Calculation- OT    OT Start Time 0842  -BS      OT Stop Time 0856  -BS      OT Time Calculation (min) 14 min  -BS      OT Received On 03/03/23  -BS         Untimed Charges    OT Eval/Re-eval Minutes 14  -BS         Total Minutes    Untimed Charges Total Minutes 14  -BS       Total Minutes 14  -BS            User Key  (r) = Recorded By, (t) = Taken By, (c) = Cosigned By    Initials Name Provider Type    BS Gabbi Nina OT Occupational Therapist              Therapy Charges for Today     Code Description Service Date Service Provider Modifiers Qty     [id number] HC OT EVAL MOD COMPLEXITY 3 3/3/2023 Gabbi Nina, ELISA GO 1               Gabbi Nina OT  3/3/2023

## 2023-03-03 NOTE — PLAN OF CARE
Goal Outcome Evaluation:  Plan of Care Reviewed With: patient           Outcome Evaluation: Pt is 81 yo female admitted to [facility] with COPD exacerbation. PMH significant for COPD, HTN, hypothyroidism, anxiety. Patient lives alone, has home o2, no use of AD at home, elevator access. Patient up in chair, agreeable to therapy. Pt performed sit to stand independently, ambulated around room with SBA with o2. Pt steady with ambulation, appears safe to return home, no further acute PT indicated.

## 2023-03-03 NOTE — PLAN OF CARE
Problem: COPD (Chronic Obstructive Pulmonary Disease) Comorbidity  Goal: Maintenance of COPD Symptom Control  Outcome: Ongoing, Progressing  Intervention: Maintain COPD-Symptom Control  Recent Flowsheet Documentation  Taken 3/3/2023 1419 by Jacinta Chavez RN  Medication Review/Management: medications reviewed  Taken 3/3/2023 1034 by Jacinta Chavez RN  Medication Review/Management: medications reviewed  Taken 3/3/2023 0845 by Jacinta Chavez RN  Medication Review/Management: medications reviewed     Problem: Adult Inpatient Plan of Care  Goal: Plan of Care Review  Outcome: Ongoing, Progressing  Flowsheets (Taken 3/3/2023 1618)  Progress: improving  Plan of Care Reviewed With: patient  Goal: Patient-Specific Goal (Individualized)  Outcome: Ongoing, Progressing  Goal: Absence of Hospital-Acquired Illness or Injury  Outcome: Ongoing, Progressing  Intervention: Identify and Manage Fall Risk  Recent Flowsheet Documentation  Taken 3/3/2023 1419 by Jacinta Chavez RN  Safety Promotion/Fall Prevention:   activity supervised   assistive device/personal items within reach   clutter free environment maintained   fall prevention program maintained   nonskid shoes/slippers when out of bed   safety round/check completed  Taken 3/3/2023 1034 by Jacinta Chavez RN  Safety Promotion/Fall Prevention:   activity supervised   assistive device/personal items within reach   clutter free environment maintained   fall prevention program maintained   nonskid shoes/slippers when out of bed   safety round/check completed  Taken 3/3/2023 0845 by Jacinta Chavez RN  Safety Promotion/Fall Prevention:   activity supervised   assistive device/personal items within reach   clutter free environment maintained   fall prevention program maintained   nonskid shoes/slippers when out of bed   safety round/check completed  Intervention: Prevent Skin Injury  Recent Flowsheet Documentation  Taken 3/3/2023 1419 by Jacinta Chavez RN  Body Position:  position changed independently  Taken 3/3/2023 1034 by Jacinta Chavez RN  Body Position: position changed independently  Intervention: Prevent and Manage VTE (Venous Thromboembolism) Risk  Recent Flowsheet Documentation  Taken 3/3/2023 1419 by Jacinta Chavez RN  Activity Management: up in chair  Taken 3/3/2023 1034 by Jacinta Chavez RN  Activity Management: up in chair  Taken 3/3/2023 0845 by Jacinta Chavez RN  Activity Management: up in chair  Intervention: Prevent Infection  Recent Flowsheet Documentation  Taken 3/3/2023 1419 by Jacinta Chavez RN  Infection Prevention:   visitors restricted/screened   single patient room provided  Taken 3/3/2023 1034 by Jacinta Chavez RN  Infection Prevention: visitors restricted/screened  Taken 3/3/2023 0845 by Jacinta Chavez RN  Infection Prevention: visitors restricted/screened  Goal: Optimal Comfort and Wellbeing  Outcome: Ongoing, Progressing  Intervention: Provide Person-Centered Care  Recent Flowsheet Documentation  Taken 3/3/2023 1419 by Jacinta Chavez RN  Trust Relationship/Rapport:   care explained   choices provided   emotional support provided   empathic listening provided   questions answered   questions encouraged   reassurance provided   thoughts/feelings acknowledged  Taken 3/3/2023 0845 by Jacinta Chavez RN  Trust Relationship/Rapport:   care explained   choices provided   emotional support provided   empathic listening provided   questions answered   questions encouraged   reassurance provided   thoughts/feelings acknowledged  Goal: Readiness for Transition of Care  Outcome: Ongoing, Progressing     Problem: Breathing Pattern Ineffective  Goal: Effective Breathing Pattern  Outcome: Ongoing, Progressing   Goal Outcome Evaluation:  Plan of Care Reviewed With: patient        Progress: improving

## 2023-03-03 NOTE — PAYOR COMM NOTE
"Raheem Roberts (82 y.o. Female)       ATTN: REQUESTING OBSERVATION AUTHORIZATION: ID#  [id number]    WAS INPATIENT AND CONVERTED TO OBSERVATION 03/03/23    PLEASE REPLY TO [facility] DEPT: [phone],   [phone]    [facility]:  NPI [id number] [facility]# [id number]      Date of Birth   1940    Social Security Number       Address   [address] [place] [address]  [facility] [address]    Home Phone   [phone]    MRN   [id number]       [facility]    Marital Status                               Admission Date   3/1/23  INPATIENT  03/03/23- OBSERVATION    Admission Type   Emergency    Admitting Provider   Stanley Fowler MD    Attending Provider   Stanley Fowler MD    Department, Room/Bed   [facility] 4 [facility], S407/1       Discharge Date       Discharge Disposition       Discharge Destination                               Attending Provider: Stanley Fowler MD    Allergies: Morphine And Related, Fenofibrate    Isolation: Droplet   Infection: Rhinovirus  (03/01/23)   Code Status: No CPR    Ht: 154.9 cm (61\")   Wt: 73.9 kg (163 lb)    Admission Cmt: None   Principal Problem: None                Active Insurance as of 3/1/2023     Primary Coverage     Payor Plan Insurance Group Employer/Plan Group    [facility] MEDICARE REPLACEMENT [employer] MEDICARE REPLACEMENT      Payor Plan Address Payor Plan Phone Number Payor Plan Fax Number Effective Dates    [address] [address] [phone]  10/9/2017 - None Entered    [facility] [id number]       Subscriber Name Subscriber Birth Date Member ID       RAHEEM ROBERTS 1940 [id number]           Secondary Coverage     Payor Plan Insurance Group Employer/Plan Group    [place]      Payor Plan Address Payor Plan Phone Number Payor Plan Fax Number Effective Dates    [address] [phone]  7/3/2016 - None Entered    [facility] [id number]       Subscriber Name Subscriber Birth Date Member ID       " RAHEEM ROBERTS 1940 [id number]                 Emergency Contacts      (Rel.) Home Phone Work Phone Mobile Phone    Chino Roberts (Son) [phone] -- --    James Roberts (Son) [phone] -- [phone]               History & Physical      Stanley Fowler MD at 03/01/23 0934          History and physical    Primary care physician  Dr. PLUMMER    Chief complaint  Shortness of breath    History of present illness  82-year-old white female with very complex past medical history including chronic hypoxic respiratory failure COPD hypertension hypothyroidism anxiety disorder depression restless leg syndrome and chronic kidney disease stage III presented to [facility] emergency room with shortness of breath for last several days with nonproductive cough but no fever chills chest pain abdominal pain nausea vomiting diarrhea.  Patient work-up in ER revealed acute on chronic hypoxic aspiratory failure with acute exacerbation of COPD and acute human rhinovirus bronchitis admit for management.     PAST MEDICAL HISTORY  • Acid reflux     • Arthritis     • Bipolar 1 disorder, depressed ([facility])     • Cataract     • Chronic nausea     • Chronic pain of right knee     • Continuous leakage of urine     • COPD (chronic obstructive pulmonary disease) ([facility])     • Diverticulosis     • Fibromyalgia     • Fibromyalgia, primary     • Frequent episodes of bronchitis     • HL (hearing loss)     • Hypothyroidism     • Memory loss     • Migraines     • Neck pain     • On home oxygen therapy     • Short of breath on exertion     • Sleep apnea         PAST SURGICAL HISTORY              Procedure Laterality Date   • CEREBRAL ANEURYSM REPAIR   2000'S     WITH STENT   • HYSTERECTOMY       • LAPAROSCOPIC CHOLECYSTECTOMY       • [place] ARTHRP KNE CONDYLE&PLATU MEDIAL&LAT COMPARTMENTS Right 11/16/2017     Procedure: RT TOTAL KNEE ARTHROPLASTY;  Surgeon: Kashif Perez MD;  Location: [facility] OR;  Service:  "Orthopedics         FAMILY HISTORY           Problem Relation Age of Onset   • Malig Hyperthermia Neg Hx        SOCIAL HISTORY                 Socioeconomic History   • Marital status:    Tobacco Use   • Smoking status: Former       Packs/day: 1.00       Years: 10.00       Pack years: 10.00       Types: Cigarettes       Start date:        Quit date:        Years since quittin.1   • Smokeless tobacco: Never   Vaping Use   • Vaping Use: Never used   Substance and Sexual Activity   • Alcohol use: No       Comment: CAFFEINE NO    • Drug use: No   • Sexual activity: Defer         ALLERGIES  Morphine and related and Fenofibrate  Home medications reviewed     REVIEW OF SYSTEMS  Review of all 14 systems is negative other than stated in the HPI above.     PHYSICAL EXAM  Blood pressure 180/92, pulse 82, temperature 97.6 °F (36.4 °C), temperature source Oral, resp. rate 18, height 154.9 cm (61\"), weight 74 kg (163 lb 2.3 oz), SpO2 95 %.    GENERAL: Awake and alert, no  respiratory distress but agitated  HEENT:  Unremarkable  NECK:  Supple  CV: regular rhythm, normal rate, no murmur, no peripheral edema  RESPIRATORY: normal effort, decreased breath sounds with few expiratory wheezes B  ABDOMEN: soft, nondistended, nontender throughout  MUSCULOSKELETAL: no deformity  NEURO: alert, moves all extremities, follows commands  PSYCH:  calm, cooperative  SKIN: warm, dry, no rash     LAB RESULTS  Lab Results (last 24 hours)     Procedure Component Value Units Date/Time    Comprehensive Metabolic Panel [[id number]]  (Abnormal) Collected: 23 1140    Specimen: Blood Updated: 23 1217     Glucose 178 mg/dL      BUN 18 mg/dL      Creatinine 1.42 mg/dL      Sodium 134 mmol/L      Potassium 4.8 mmol/L      Chloride 98 mmol/L      CO2 28.4 mmol/L      Calcium 9.9 mg/dL      Total Protein 7.1 g/dL      Albumin 4.2 g/dL      ALT (SGPT) 16 U/L      AST (SGOT) 17 U/L      Alkaline Phosphatase 78 U/L      Total " Bilirubin 0.4 mg/dL      Globulin 2.9 gm/dL      A/G Ratio 1.4 g/dL      BUN/Creatinine Ratio 12.7     Anion Gap 7.6 mmol/L      eGFR 37.5 mL/min/1.73     Narrative:      GFR Normal >60  Chronic Kidney Disease <60  Kidney Failure <15    The GFR formula is only valid for adults with stable renal function between ages 18 and 70.    Lactic Acid, Plasma [[id number]]  (Normal) Collected: 03/01/23 0412    Specimen: Blood Updated: 03/01/23 0413     Lactate 0.6 mmol/L     Blood Culture - Blood, Arm, Right [[id number]] Collected: 03/01/23 0340    Specimen: Blood from Arm, Right Updated: 03/01/23 0356    Blood Culture - Blood, Arm, Left [[id number]] Collected: 03/01/23 0205    Specimen: Blood from Arm, Left Updated: 03/01/23 0354    Respiratory Panel PCR w/COVID-19(SARS-CoV-2) ANISH/VALERIA/MAXIMILIAN/PAD/COR/MAD/ILSA In-House, NP Swab in UTM/VTM, 3-4 HR TAT - Swab, Nasopharynx [[id number]]  (Abnormal) Collected: 03/01/23 0059    Specimen: Swab from Nasopharynx Updated: 03/01/23 0353     ADENOVIRUS, PCR Not Detected     Coronavirus 229E Not Detected     Coronavirus HKU1 Not Detected     Coronavirus NL63 Not Detected     Coronavirus OC43 Not Detected     COVID19 Not Detected     Human Metapneumovirus Not Detected     Human Rhinovirus/Enterovirus Detected     Influenza A PCR Not Detected     Influenza B PCR Not Detected     Parainfluenza Virus 1 Not Detected     Parainfluenza Virus 2 Not Detected     Parainfluenza Virus 3 Not Detected     Parainfluenza Virus 4 Not Detected     RSV, PCR Not Detected     Bordetella pertussis pcr Not Detected     Bordetella parapertussis PCR Not Detected     Chlamydophila pneumoniae PCR Not Detected     Mycoplasma pneumo by PCR Not Detected    Narrative:      In the setting of a positive respiratory panel with a viral infection PLUS a negative procalcitonin without other underlying concern for bacterial infection, consider observing off antibiotics or discontinuation of antibiotics and continue supportive care. If  "the respiratory panel is positive for atypical bacterial infection (Bordetella pertussis, Chlamydophila pneumoniae, or Mycoplasma pneumoniae), consider antibiotic de-escalation to target atypical bacterial infection.    Single High Sensitivity Troponin T [[id number]]  (Abnormal) Collected: 03/01/23 0112    Specimen: Blood Updated: 03/01/23 0151     HS Troponin T 29 ng/L     Narrative:      High Sensitive Troponin T Reference Range:  <10.0 ng/L- Negative Female for AMI  <15.0 ng/L- Negative Male for AMI  >=10 - Abnormal Female indicating possible myocardial injury.  >=15 - Abnormal Male indicating possible myocardial injury.   Clinicians would have to utilize clinical acumen, EKG, Troponin, and serial changes to determine if it is an Acute Myocardial Infarction or myocardial injury due to an underlying chronic condition.         Procalcitonin [[id number]]  (Normal) Collected: 03/01/23 0112    Specimen: Blood Updated: 03/01/23 0151     Procalcitonin 0.07 ng/mL     Narrative:      As a Marker for Sepsis (Non-Neonates):    1. <0.5 ng/mL represents a low risk of severe sepsis and/or septic shock.  2. >2 ng/mL represents a high risk of severe sepsis and/or septic shock.    As a Marker for Lower Respiratory Tract Infections that require antibiotic therapy:    PCT on Admission    Antibiotic Therapy       6-12 Hrs later    >0.5                Strongly Recommended  >0.25 - <0.5        Recommended   0.1 - 0.25          Discouraged              Remeasure/reassess PCT  <0.1                Strongly Discouraged     Remeasure/reassess PCT    As 28 day mortality risk marker: \"Change in Procalcitonin Result\" (>80% or <=80%) if Day 0 (or Day 1) and Day 4 values are available. Refer to http://www.[employer]-pct-calculator.com    Change in PCT <=80%  A decrease of PCT levels below or equal to 80% defines a positive change in PCT test result representing a higher risk for 28-day all-cause mortality of patients diagnosed with severe sepsis for " septic shock.    Change in PCT >80%  A decrease of PCT levels of more than 80% defines a negative change in PCT result representing a lower risk for 28-day all-cause mortality of patients diagnosed with severe sepsis or septic shock.       BNP [[id number]]  (Normal) Collected: 03/01/23 0112    Specimen: Blood Updated: 03/01/23 0151     proBNP 865.0 pg/mL     Narrative:      Among patients with dyspnea, NT-proBNP is highly sensitive for the detection of acute congestive heart failure. In addition NT-proBNP of <300 pg/ml effectively rules out acute congestive heart failure with 99% negative predictive value.    Results may be falsely decreased if patient taking Biotin.      Comprehensive Metabolic Panel [[id number]]  (Abnormal) Collected: 03/01/23 0112    Specimen: Blood Updated: 03/01/23 0145     Glucose 106 mg/dL      BUN 21 mg/dL      Creatinine 1.41 mg/dL      Sodium 139 mmol/L      Potassium 4.9 mmol/L      Chloride 101 mmol/L      CO2 32.5 mmol/L      Calcium 9.4 mg/dL      Total Protein 6.5 g/dL      Albumin 3.9 g/dL      ALT (SGPT) 15 U/L      AST (SGOT) 16 U/L      Alkaline Phosphatase 69 U/L      Total Bilirubin 0.4 mg/dL      Globulin 2.6 gm/dL      A/G Ratio 1.5 g/dL      BUN/Creatinine Ratio 14.9     Anion Gap 5.5 mmol/L      eGFR 37.8 mL/min/1.73     Narrative:      GFR Normal >60  Chronic Kidney Disease <60  Kidney Failure <15    The GFR formula is only valid for adults with stable renal function between ages 18 and 70.    CBC & Differential [[id number]]  (Abnormal) Collected: 03/01/23 0112    Specimen: Blood Updated: 03/01/23 0124    Narrative:      The following orders were created for panel order CBC & Differential.  Procedure                               Abnormality         Status                     ---------                               -----------         ------                     CBC Auto Differential[[id number]]        Abnormal            Final result                 Please view results for  these tests on the individual orders.    CBC Auto Differential [[id number]]  (Abnormal) Collected: 03/01/23 0112    Specimen: Blood Updated: 03/01/23 0124     WBC 5.08 10*3/mm3      RBC 2.59 10*6/mm3      Hemoglobin 7.9 g/dL      Hematocrit 25.3 %      MCV 97.7 fL      MCH 30.5 pg      MCHC 31.2 g/dL      RDW 16.3 %      RDW-SD 57.7 fl      MPV 9.2 fL      Platelets 229 10*3/mm3      Neutrophil % 59.2 %      Lymphocyte % 28.0 %      Monocyte % 10.0 %      Eosinophil % 1.4 %      Basophil % 0.4 %      Immature Grans % 1.0 %      Neutrophils, Absolute 3.01 10*3/mm3      Lymphocytes, Absolute 1.42 10*3/mm3      Monocytes, Absolute 0.51 10*3/mm3      Eosinophils, Absolute 0.07 10*3/mm3      Basophils, Absolute 0.02 10*3/mm3      Immature Grans, Absolute 0.05 10*3/mm3      nRBC 0.0 /100 WBC         Imaging Results (Last 24 Hours)     Procedure Component Value Units Date/Time    XR Chest 1 View [[id number]] Collected: 03/01/23 0056     Updated: 03/01/23 0056    Narrative:        Patient: RAHEEM ROBERTS  Time Out: 00:55  Exam(s): FILM CXR 1 VIEW     EXAM:    XR Chest, 1 View    CLINICAL HISTORY:    soa.    TECHNIQUE:    Frontal view of the chest.    COMPARISON:    1 23 2023    FINDINGS:    Lungs:  Curvilinear changes noted at the left lung base are new from   the previous exam.  There is similar to slightly improved subsegmental   changes in the right infrahilar region.  The lungs are otherwise stable   in appearance.  The pulmonary vasculature is stable without evidence for   florid CHF.    Pleural space:  Unremarkable.  No pneumothorax. No large pleural   effusion.    Heart:  Unremarkable.  No cardiomegaly.    Mediastinum:  The mediastinal contours are stable in appearance.  No   tracheal deviation.    Bones joints:  Unremarkable.    IMPRESSION:         Curvilinear changes noted at the left lung base are new from the   previous examination and are presumed subsegmental atelectasis or subtle   infection.  There is similar  to slightly improved subsegmental changes in   the right infrahilar region.  The lungs are otherwise stable in   appearance.  The pulmonary vasculature is stable without evidence for   florid CHF.  No pleural effusion or pneumothorax.      Impression:          Electronically signed by Jorge A García MD on 03-01-23 at 0055          ECG 12 Lead             Component  Ref Range & Units 00:40  (3/1/23) 1 mo ago  (1/23/23) 4 mo ago  (10/19/22) 5 mo ago  (9/29/22) 1 yr ago  (11/16/21) 1 yr ago  (11/8/21) 1 yr ago  (10/18/21)   QT Interval  ms 386  355  367  371  387  388  413    Resulting Agency BH ECG BH ECG BH ECG BH ECG BH ECG  ECG BH ECG               HEART RATE= 65  bpm  RR Interval= 923  ms  GA Interval= 160  ms  P Horizontal Axis= -5  deg  P Front Axis= 28  deg  QRSD Interval= 80  ms  QT Interval= 386  ms  QRS Axis= -7  deg  T Wave Axis= 7  deg  - BORDERLINE ECG -  Sinus rhythm  Abnormal R-wave progression, early transition  LVH by voltage  Borderline T abnormalities, anterior leads  No change from previous tracing             Current Facility-Administered Medications:   •  acetaminophen (TYLENOL) tablet 650 mg, 650 mg, Oral, Q4H PRN, Stanley Fowler MD, 650 mg at 03/01/23 0856  •  albuterol (PROVENTIL) nebulizer solution 0.083% 2.5 mg/3mL, 2.5 mg, Nebulization, Q4H PRN, Stanley Fowler MD  •  allopurinol (ZYLOPRIM) tablet 100 mg, 100 mg, Oral, Daily, Stanley Fowler MD, 100 mg at 03/01/23 1142  •  amLODIPine (NORVASC) tablet 5 mg, 5 mg, Oral, Daily, Stanley Fowler MD, 5 mg at 03/01/23 1142  •  budesonide-formoterol (SYMBICORT) 160-4.5 MCG/ACT inhaler 2 puff, 2 puff, Inhalation, BID, Stanley Fowler MD  •  bumetanide (BUMEX) tablet 1 mg, 1 mg, Oral, Daily, Stanley Fowler MD, 1 mg at 03/01/23 1142  •  busPIRone (BUSPAR) tablet 10 mg, 10 mg, Oral, Q12H, Stanley Fowler MD, 10 mg at 03/01/23 1029  •  DULoxetine (CYMBALTA) DR capsule 60 mg, 60 mg, Oral, Daily, Stanley Fowler MD, 60 mg at 03/01/23 1029  •  gabapentin (NEURONTIN)  capsule 300 mg, 300 mg, Oral, TID, Stanley Fowler MD, 300 mg at 03/01/23 1142  •  HYDROcodone-acetaminophen (NORCO) 7.5-325 MG per tablet 1 tablet, 1 tablet, Oral, Q8H PRN, Stanley Fowler MD  •  hydrOXYzine (ATARAX) tablet 25 mg, 25 mg, Oral, TID PRN, Stanley Fowler MD, 25 mg at 03/01/23 1142  •  ipratropium (ATROVENT) nebulizer solution 0.5 mg, 0.5 mg, Nebulization, 4x Daily - RT, Stanley Fowler MD  •  levothyroxine (SYNTHROID, LEVOTHROID) tablet 75 mcg, 75 mcg, Oral, Daily, Stanley Fowler MD, 75 mcg at 03/01/23 1145  •  methylPREDNISolone sodium succinate (SOLU-Medrol) injection 40 mg, 40 mg, Intravenous, Q12H, Stanley Fowler MD, 40 mg at 03/01/23 0731  •  metoprolol succinate XL (TOPROL-XL) 24 hr tablet 12.5 mg, 12.5 mg, Oral, Q24H, Stanley Fowler MD, 12.5 mg at 03/01/23 1145  •  ondansetron (ZOFRAN) injection 4 mg, 4 mg, Intravenous, Q4H PRN, Stanley Fowler MD, 4 mg at 03/01/23 0731  •  pantoprazole (PROTONIX) EC tablet 40 mg, 40 mg, Oral, Q AM, Stanley Fowler MD, 40 mg at 03/01/23 1145  •  potassium chloride (K-DUR,KLOR-CON) ER tablet 10 mEq, 10 mEq, Oral, Daily, Stanley Fowler MD, 10 mEq at 03/01/23 1145  •  prochlorperazine (COMPAZINE) injection 5 mg, 5 mg, Intravenous, Q4H PRN, Stanley Fowler MD, 5 mg at 03/01/23 0856  •  QUEtiapine (SEROquel) tablet 100 mg, 100 mg, Oral, Q12H, Stanley Fowler MD, 100 mg at 03/01/23 1243  •  rOPINIRole (REQUIP) tablet 0.25 mg, 0.25 mg, Oral, Nightly, Stanley Fowler MD  •  rosuvastatin (CRESTOR) tablet 20 mg, 20 mg, Oral, Daily, Stanley Fowler MD, 20 mg at 03/01/23 1142  •  [COMPLETED] Insert Peripheral IV, , , Once **AND** sodium chloride 0.9 % flush 10 mL, 10 mL, Intravenous, PRN, Kevin Zavaleta MD  •  vitamin B-12 (CYANOCOBALAMIN) tablet 1,000 mcg, 1,000 mcg, Oral, Daily, Stanley Fowler MD, 1,000 mcg at 03/01/23 1142     ASSESSMENT  Acute on chronic hypoxic respiratory failure  Acute exacerbation of COPD  Acute human rhinovirus  bronchitis  Polypharmacy  Hypertension  Hypothyroidism  Anxiety disorder  Depression  Restless leg syndrome  Chronic anemia  Chronic kidney disease stage III  Gastroesophageal reflux disease    PLAN  Admit  Supplemental oxygen and titrate  Albuterol Symbicort and Spiriva  IV steroids  Anemia work-up  Check 2D echo  Pulmonary consult  Adjust home medications  Stress ulcer DVT prophylaxis  Supportive care  DNR  Discussed with family and nursing staff  Follow closely further recommendation current hospital course    AMANDA FOWLER MD            Electronically signed by Amanda Fowler MD at 03/01/23 1348          Emergency Department Notes      Maliha Reid RN at 03/01/23 0013        Pt was brought in by ems from home for frontal headache that started approx 1hr ago. Denies any vision changes    This RN wore mask and goggles during time of contact      Electronically signed by Maliha Reid RN at 03/01/23 0014     Kevin Zavaleta MD at 03/01/23 0035           EMERGENCY DEPARTMENT ENCOUNTER    Room Number:  09/09  Date seen:  3/1/2023  PCP: Bernabe Guy MD  Historian: Patient      HPI:  Chief Complaint: Short of breath, headache  A complete HPI/ROS/PMH/PSH/SH/FH are unobtainable due to: Nothing  Context: Josephine Wolf is a 82 y.o. female who presents to the ED c/o shortness of breath and cough that started today.  She reports she has had a dry cough all day today.  She does have a history of COPD on 3 L home oxygen at all times.  She denies fever or chills.  She began having a headache about an hour ago, still currently 6 out of 10 severity.  She does get headaches often.  It is not the worst headache of her life.  It was not sudden onset.  She denies chest pain.  She denies leg swelling.            PAST MEDICAL HISTORY  Active Ambulatory Problems     Diagnosis Date Noted   • Anemia 10/19/2017   • OA (osteoarthritis) of knee 11/16/2017   • Chronic respiratory failure with hypoxia (HCC)  12/02/2017   • Cellulitis of skin 12/06/2017   • Acute kidney injury (HCC) 12/06/2017   • Sepsis ([facility]) 12/06/2017   • Orthostatic hypotension 06/24/2018   • Disease of thyroid gland 06/24/2018   • Hypertension 06/24/2018   • Dehydration 06/24/2018   • Stage 3 chronic kidney disease (CMS/HCC) 06/25/2018   • Closed compression fracture of L1 lumbar vertebra 06/16/2019   • Hyponatremia 06/16/2019   • Osteoporosis with pathological fracture 06/17/2019   • Acute on chronic respiratory failure with hypoxia and hypercapnia ([facility]) 03/12/2020   • Obesity (BMI 30-39.9) 03/12/2020   • Nocturnal hypoxia 03/13/2020   • CKD (chronic kidney disease) stage 2, GFR 60-89 ml/min 03/13/2020   • Acute on chronic respiratory failure with hypoxia ([facility]) 05/20/2020   • Abdominal pain 10/18/2021   • Acute exacerbation of chronic obstructive pulmonary disease (COPD) ([facility]) 09/29/2022   • Acute UTI 10/19/2022   • Metabolic encephalopathy 10/23/2022     Resolved Ambulatory Problems     Diagnosis Date Noted   • COPD with acute exacerbation ([facility]) 06/24/2018     Past Medical History:   Diagnosis Date   • Acid reflux    • Arthritis    • Bipolar 1 disorder, depressed ([facility])    • Cataract    • Chronic nausea    • Chronic pain of right knee    • Continuous leakage of urine    • COPD (chronic obstructive pulmonary disease) ([facility])    • Diverticulosis    • Fibromyalgia    • Fibromyalgia, primary    • Frequent episodes of bronchitis    • HL (hearing loss)    • Hypothyroidism    • Memory loss    • Migraines    • Neck pain    • On home oxygen therapy    • Short of breath on exertion    • Sleep apnea          PAST SURGICAL HISTORY  Past Surgical History:   Procedure Laterality Date   • CEREBRAL ANEURYSM REPAIR  2000'S    WITH STENT   • HYSTERECTOMY     • LAPAROSCOPIC CHOLECYSTECTOMY     • [place] ARTHRP KNE CONDYLE&PLATU MEDIAL&LAT COMPARTMENTS Right 11/16/2017    Procedure: RT TOTAL KNEE ARTHROPLASTY;  Surgeon: Kashif Perez MD;  Location: [facility] OR;   Service: Orthopedics         FAMILY HISTORY  Family History   Problem Relation Age of Onset   • Malig Hyperthermia Neg Hx          SOCIAL HISTORY  Social History     Socioeconomic History   • Marital status:    Tobacco Use   • Smoking status: Former     Packs/day: 1.00     Years: 10.00     Pack years: 10.00     Types: Cigarettes     Start date:      Quit date:      Years since quittin.1   • Smokeless tobacco: Never   Vaping Use   • Vaping Use: Never used   Substance and Sexual Activity   • Alcohol use: No     Comment: CAFFEINE NO    • Drug use: No   • Sexual activity: Defer         ALLERGIES  Morphine and related and Fenofibrate        REVIEW OF SYSTEMS  Review of Systems   Review of all 14 systems is negative other than stated in the HPI above.      PHYSICAL EXAM  ED Triage Vitals [23 0014]   Temp Heart Rate Resp BP SpO2   97.1 °F (36.2 °C) 67 18 137/63 93 %      Temp src Heart Rate Source Patient Position BP Location FiO2 (%)   Tympanic Monitor -- -- --       Physical Exam      GENERAL: Awake and alert, no acute distress  HENT: nares patent, oropharynx clear  EYES: no scleral icterus, pupils 3 mm reactive bilaterally  CV: regular rhythm, normal rate, no murmur, no peripheral edema  RESPIRATORY: normal effort, mild expiratory wheezing, O2 saturations 95% on 3 L nasal cannula  ABDOMEN: soft, nondistended, nontender throughout  MUSCULOSKELETAL: no deformity  NEURO: alert, moves all extremities, follows commands  PSYCH:  calm, cooperative  SKIN: warm, dry, no rash    Vital signs and nursing notes reviewed.          LAB RESULTS  Recent Results (from the past 24 hour(s))   ECG 12 Lead Dyspnea    Collection Time: 23 12:40 AM   Result Value Ref Range    QT Interval 386 ms   Comprehensive Metabolic Panel    Collection Time: 23  1:12 AM    Specimen: Blood   Result Value Ref Range    Glucose 106 (H) 65 - 99 mg/dL    BUN 21 8 - 23 mg/dL    Creatinine 1.41 (H) 0.57 - 1.00 mg/dL    Sodium  139 136 - 145 mmol/L    Potassium 4.9 3.5 - 5.2 mmol/L    Chloride 101 98 - 107 mmol/L    CO2 32.5 (H) 22.0 - 29.0 mmol/L    Calcium 9.4 8.6 - 10.5 mg/dL    Total Protein 6.5 6.0 - 8.5 g/dL    Albumin 3.9 3.5 - 5.2 g/dL    ALT (SGPT) 15 1 - 33 U/L    AST (SGOT) 16 1 - 32 U/L    Alkaline Phosphatase 69 39 - 117 U/L    Total Bilirubin 0.4 0.0 - 1.2 mg/dL    Globulin 2.6 gm/dL    A/G Ratio 1.5 g/dL    BUN/Creatinine Ratio 14.9 7.0 - 25.0    Anion Gap 5.5 5.0 - 15.0 mmol/L    eGFR 37.8 (L) >60.0 mL/min/1.73   BNP    Collection Time: 03/01/23  1:12 AM    Specimen: Blood   Result Value Ref Range    proBNP 865.0 0.0 - 1,800.0 pg/mL   Single High Sensitivity Troponin T    Collection Time: 03/01/23  1:12 AM    Specimen: Blood   Result Value Ref Range    HS Troponin T 29 (H) <10 ng/L   Procalcitonin    Collection Time: 03/01/23  1:12 AM    Specimen: Blood   Result Value Ref Range    Procalcitonin 0.07 0.00 - 0.25 ng/mL   CBC Auto Differential    Collection Time: 03/01/23  1:12 AM    Specimen: Blood   Result Value Ref Range    WBC 5.08 3.40 - 10.80 10*3/mm3    RBC 2.59 (L) 3.77 - 5.28 10*6/mm3    Hemoglobin 7.9 (L) 12.0 - 15.9 g/dL    Hematocrit 25.3 (L) 34.0 - 46.6 %    MCV 97.7 (H) 79.0 - 97.0 fL    MCH 30.5 26.6 - 33.0 pg    MCHC 31.2 (L) 31.5 - 35.7 g/dL    RDW 16.3 (H) 12.3 - 15.4 %    RDW-SD 57.7 (H) 37.0 - 54.0 fl    MPV 9.2 6.0 - 12.0 fL    Platelets 229 140 - 450 10*3/mm3    Neutrophil % 59.2 42.7 - 76.0 %    Lymphocyte % 28.0 19.6 - 45.3 %    Monocyte % 10.0 5.0 - 12.0 %    Eosinophil % 1.4 0.3 - 6.2 %    Basophil % 0.4 0.0 - 1.5 %    Immature Grans % 1.0 (H) 0.0 - 0.5 %    Neutrophils, Absolute 3.01 1.70 - 7.00 10*3/mm3    Lymphocytes, Absolute 1.42 0.70 - 3.10 10*3/mm3    Monocytes, Absolute 0.51 0.10 - 0.90 10*3/mm3    Eosinophils, Absolute 0.07 0.00 - 0.40 10*3/mm3    Basophils, Absolute 0.02 0.00 - 0.20 10*3/mm3    Immature Grans, Absolute 0.05 0.00 - 0.05 10*3/mm3    nRBC 0.0 0.0 - 0.2 /100 WBC       Ordered  the above labs and reviewed the results.        RADIOLOGY  XR Chest 1 View    Result Date: 3/1/2023  Patient: RAHEEM ROBERTS  Time Out: 00:55 Exam(s): FILM CXR 1 VIEW EXAM:   XR Chest, 1 View CLINICAL HISTORY:   soa. TECHNIQUE:   Frontal view of the chest. COMPARISON:   1 23 2023 FINDINGS:   Lungs:  Curvilinear changes noted at the left lung base are new from the previous exam.  There is similar to slightly improved subsegmental changes in the right infrahilar region.  The lungs are otherwise stable in appearance.  The pulmonary vasculature is stable without evidence for florid CHF.   Pleural space:  Unremarkable.  No pneumothorax. No large pleural effusion.   Heart:  Unremarkable.  No cardiomegaly.   Mediastinum:  The mediastinal contours are stable in appearance.  No tracheal deviation.   Bones joints:  Unremarkable. IMPRESSION:       Curvilinear changes noted at the left lung base are new from the previous examination and are presumed subsegmental atelectasis or subtle infection.  There is similar to slightly improved subsegmental changes in the right infrahilar region.  The lungs are otherwise stable in appearance.  The pulmonary vasculature is stable without evidence for florid CHF.  No pleural effusion or pneumothorax.     Electronically signed by Jorge A García MD on 03-01-23 at 0055      Ordered the above noted radiological studies. Reviewed by me in PACS.            PROCEDURES  Procedures            MEDICATIONS GIVEN IN ER  Medications   sodium chloride 0.9 % flush 10 mL (has no administration in time range)   cefTRIAXone (ROCEPHIN) 1 g in sodium chloride 0.9 % 100 mL IVPB-VTB (has no administration in time range)   ipratropium-albuterol (DUO-NEB) nebulizer solution 3 mL (3 mL Nebulization Given 3/1/23 0048)   methylPREDNISolone sodium succinate (SOLU-Medrol) injection 125 mg (125 mg Intravenous Given 3/1/23 0126)   acetaminophen (TYLENOL) tablet 1,000 mg (1,000 mg Oral Given 3/1/23 0127)   ondansetron  (ZOFRAN) injection 4 mg (4 mg Intravenous Given 3/1/23 0138)                   MEDICAL DECISION MAKING, PROGRESS, and CONSULTS    All labs have been independently reviewed by me.  All radiology studies have been reviewed by me and I have also reviewed the radiology report.   EKG's independently viewed and interpreted by me.  Discussion below represents my analysis of pertinent findings related to patient's condition, differential diagnosis, treatment plan and final disposition.      Additional sources:    - External (non-ED) record review: I reviewed discharge summary from 10/26/2022 where patient was admitted for acute UTI, metabolic encephalopathy, rhinovirus infection.    - Chronic or social conditions impacting care: COPD, chronic hypoxia    - Shared decision making: Patient informed of need for admission for observation given suspected pneumonia and increased shortness of breath      Orders placed during this visit:  Orders Placed This Encounter   Procedures   • Respiratory Panel PCR w/COVID-19(SARS-CoV-2) [facility] In-House, NP Swab in UTM/VTM, 3-4 HR TAT - Swab, Nasopharynx   • Blood Culture - Blood,   • Blood Culture - Blood,   • XR Chest 1 View   • Comprehensive Metabolic Panel   • BNP   • Single High Sensitivity Troponin T   • Procalcitonin   • CBC Auto Differential   • Lactic Acid, Plasma   • Cardiac Monitoring   • Pulse Oximetry, Continuous   • Monitor Blood Pressure   • LIPPS (on-call MD unless specified)   • ECG 12 Lead Dyspnea   • Insert Peripheral IV   • Initiate Observation Status   • CBC & Differential         Differential diagnosis:    Pneumonia  COPD exacerbation  CHF  Pulmonary embolus        Independent interpretation of labs, radiology studies, and discussions with consultants:  ED Course as of 03/01/23 0155   Wed Mar 01, 2023   0054 EKG          EKG time: 0040  Rhythm/Rate: Sinus rhythm, 65  P waves and VT: Normal  QRS, axis: Normal axis   ST and T waves: T wave inversion V3  and lead III    Interpreted Contemporaneously by me, independently viewed  Similar compared to prior 1/23/2023       [JR]   0126 Hemoglobin(!): 7.9  Stable from 1 month prior when it was 8.3 [JR]   0146 Creatinine(!): 1.41  Stable   [JR]   0147 Chest x-ray independently interpreted in PACS.  There is atelectasis or infiltrate present in the left lung base. [JR]   0148 Given patient has increased shortness of breath from baseline with concern for infiltrate in the left lung base, I have ordered empiric Rocephin.  She has no leukocytosis.  Procalcitonin and respiratory viral panel pending.  She will be admitted for further monitoring. [JR]   0155 Discussed with Dr. Fowler, who agrees to admit. [JR]      ED Course User Index  [JR] Kevin Zavaleta MD             I wore an N95 mask, face shield, and gloves during this patient encounter.  Patient also wearing a surgical mask.  Hand hygeine performed before and after seeing the patient.    DIAGNOSIS  Final diagnoses:   COPD with acute exacerbation (HCC)   Acute nonintractable headache, unspecified headache type   Pneumonia of left lower lobe due to infectious organism         DISPOSITION  Admit            Latest Documented Vital Signs:  As of 01:55 EST  BP- 137/63 HR- 65 Temp- 97.1 °F (36.2 °C) (Tympanic) O2 sat- 91%              --    Please note that portions of this were completed with a voice recognition program.       Note Disclaimer: At [facility], we believe that sharing information builds trust and better relationships. You are receiving this note because you are receiving care at [facility] or recently visited. It is possible you will see health information before a provider has talked with you about it. This kind of information can be easy to misunderstand. To help you fully understand what it means for your health, we urge you to discuss this note with your provider.           Kevin Zavaleta MD  03/01/23 0157      Electronically signed by  "Kevin Zavaleta MD at 03/01/23 0157     Cortney Sanchez, RN at 03/01/23 0424          .Nursing report ED to floor  Josephine Wolf  82 y.o.  female    HPI :   Chief Complaint   Patient presents with   • Headache       Admitting doctor:   Stanley Fowler MD    Admitting diagnosis:   The primary encounter diagnosis was COPD with acute exacerbation (HCC). Diagnoses of Acute nonintractable headache, unspecified headache type and Pneumonia of left lower lobe due to infectious organism were also pertinent to this visit.    Code status:   Current Code Status       Date Active Code Status Order ID Comments User Context       Prior            Allergies:   Morphine and related and Fenofibrate    Isolation:   Enhanced Droplet/Contact     Intake and Output    Intake/Output Summary (Last 24 hours) at 3/1/2023 0424  Last data filed at 3/1/2023 0423  Gross per 24 hour   Intake 100 ml   Output --   Net 100 ml       Weight:       03/01/23  0033   Weight: 72.6 kg (160 lb)       Most recent vitals:   Vitals:    03/01/23 0033 03/01/23 0048 03/01/23 0206 03/01/23 0407   BP:   116/86 153/73   BP Location:    Right arm   Pulse:  65 75 66   Resp:  18  19   Temp:       TempSrc:       SpO2:  91% 92% 93%   Weight: 72.6 kg (160 lb)      Height: 154.9 cm (61\")          Active LDAs/IV Access:   Lines, Drains & Airways       Active LDAs       Name Placement date Placement time Site Days    Peripheral IV 03/01/23 0126 Left Antecubital 03/01/23  0126  Antecubital  less than 1                    Labs (abnormal labs have a star):   Labs Reviewed   RESPIRATORY PANEL PCR W/ COVID-19 (SARS-COV-2) [facility] IN-HOUSE, NP SWAB IN UTM/VTP, 3-4 HR TAT - Abnormal; Notable for the following components:       Result Value    Human Rhinovirus/Enterovirus Detected (*)     All other components within normal limits    Narrative:     In the setting of a positive respiratory panel with a viral infection PLUS a negative procalcitonin without " other underlying concern for bacterial infection, consider observing off antibiotics or discontinuation of antibiotics and continue supportive care. If the respiratory panel is positive for atypical bacterial infection (Bordetella pertussis, Chlamydophila pneumoniae, or Mycoplasma pneumoniae), consider antibiotic de-escalation to target atypical bacterial infection.   COMPREHENSIVE METABOLIC PANEL - Abnormal; Notable for the following components:    Glucose 106 (*)     Creatinine 1.41 (*)     CO2 32.5 (*)     eGFR 37.8 (*)     All other components within normal limits    Narrative:     GFR Normal >60  Chronic Kidney Disease <60  Kidney Failure <15    The GFR formula is only valid for adults with stable renal function between ages 18 and 70.   SINGLE HSTROPONIN T - Abnormal; Notable for the following components:    HS Troponin T 29 (*)     All other components within normal limits    Narrative:     High Sensitive Troponin T Reference Range:  <10.0 ng/L- Negative Female for AMI  <15.0 ng/L- Negative Male for AMI  >=10 - Abnormal Female indicating possible myocardial injury.  >=15 - Abnormal Male indicating possible myocardial injury.   Clinicians would have to utilize clinical acumen, EKG, Troponin, and serial changes to determine if it is an Acute Myocardial Infarction or myocardial injury due to an underlying chronic condition.        CBC WITH AUTO DIFFERENTIAL - Abnormal; Notable for the following components:    RBC 2.59 (*)     Hemoglobin 7.9 (*)     Hematocrit 25.3 (*)     MCV 97.7 (*)     MCHC 31.2 (*)     RDW 16.3 (*)     RDW-SD 57.7 (*)     Immature Grans % 1.0 (*)     All other components within normal limits   BNP (IN-HOUSE) - Normal    Narrative:     Among patients with dyspnea, NT-proBNP is highly sensitive for the detection of acute congestive heart failure. In addition NT-proBNP of <300 pg/ml effectively rules out acute congestive heart failure with 99% negative predictive value.    Results may be  "falsely decreased if patient taking Biotin.     PROCALCITONIN - Normal    Narrative:     As a Marker for Sepsis (Non-Neonates):    1. <0.5 ng/mL represents a low risk of severe sepsis and/or septic shock.  2. >2 ng/mL represents a high risk of severe sepsis and/or septic shock.    As a Marker for Lower Respiratory Tract Infections that require antibiotic therapy:    PCT on Admission    Antibiotic Therapy       6-12 Hrs later    >0.5                Strongly Recommended  >0.25 - <0.5        Recommended   0.1 - 0.25          Discouraged              Remeasure/reassess PCT  <0.1                Strongly Discouraged     Remeasure/reassess PCT    As 28 day mortality risk marker: \"Change in Procalcitonin Result\" (>80% or <=80%) if Day 0 (or Day 1) and Day 4 values are available. Refer to http://www.[facility]-pct-calculator.com    Change in PCT <=80%  A decrease of PCT levels below or equal to 80% defines a positive change in PCT test result representing a higher risk for 28-day all-cause mortality of patients diagnosed with severe sepsis for septic shock.    Change in PCT >80%  A decrease of PCT levels of more than 80% defines a negative change in PCT result representing a lower risk for 28-day all-cause mortality of patients diagnosed with severe sepsis or septic shock.      LACTIC ACID, PLASMA - Normal   BLOOD CULTURE   BLOOD CULTURE   CBC AND DIFFERENTIAL    Narrative:     The following orders were created for panel order CBC & Differential.  Procedure                               Abnormality         Status                     ---------                               -----------         ------                     CBC Auto Differential[[id number]]        Abnormal            Final result                 Please view results for these tests on the individual orders.       EKG:   ECG 12 Lead Dyspnea   Preliminary Result   HEART RATE= 65  bpm   RR Interval= 923  ms   MI Interval= 160  ms   P Horizontal Axis= -5  deg   P Front " Axis= 28  deg   QRSD Interval= 80  ms   QT Interval= 386  ms   QRS Axis= -7  deg   T Wave Axis= 7  deg   - BORDERLINE ECG -   Sinus rhythm   Abnormal R-wave progression, early transition   LVH by voltage   Borderline T abnormalities, anterior leads   Electronically Signed By:    Date and Time of Study: 2023 00:40:31          Meds given in ED:   Medications   sodium chloride 0.9 % flush 10 mL (has no administration in time range)   ipratropium-albuterol (DUO-NEB) nebulizer solution 3 mL (3 mL Nebulization Given 3/1/23 0048)   methylPREDNISolone sodium succinate (SOLU-Medrol) injection 125 mg (125 mg Intravenous Given 3/1/23 0126)   acetaminophen (TYLENOL) tablet 1,000 mg (1,000 mg Oral Given 3/1/23 0127)   ondansetron (ZOFRAN) injection 4 mg (4 mg Intravenous Given 3/1/23 0138)   cefTRIAXone (ROCEPHIN) 1 g in sodium chloride 0.9 % 100 mL IVPB-VTB (0 g Intravenous Stopped 3/1/23 0423)   ondansetron (ZOFRAN) injection 4 mg (4 mg Intravenous Given During Downtime 3/1/23 0225)   rOPINIRole (REQUIP) tablet 0.25 mg (0.25 mg Oral Given During Downtime 3/1/23 0319)       Imaging results:  XR Chest 1 View    Result Date: 3/1/2023  Electronically signed by Jorge A García MD on 23 at 0055     Ambulatory status:   - up ad helder    Social issues:   Social History     Socioeconomic History   • Marital status:    Tobacco Use   • Smoking status: Former     Packs/day: 1.00     Years: 10.00     Pack years: 10.00     Types: Cigarettes     Start date:      Quit date: 1969     Years since quittin.1   • Smokeless tobacco: Never   Vaping Use   • Vaping Use: Never used   Substance and Sexual Activity   • Alcohol use: No     Comment: CAFFEINE NO    • Drug use: No   • Sexual activity: Defer       NIH Stroke Scale:         Cortney Sanchez RN  23 04:24 EST         Electronically signed by Cortney Sanchez RN at 23 0424       Orders (last 24 hrs)      Start     Ordered    23 1006  OT Plan of Care Cert  / Re-Cert  Once        Comments: Occupational Therapy Plan of Care  Initial Certification  Certification Period: 3/3/2023 - 2023    Patient Name: Josephine Wolf  : 1940    (J44.1) COPD with acute exacerbation (HCC)  (primary encounter diagnosis)  (R51.9) Acute nonintractable headache, unspecified headache type  (J18.9) Pneumonia of left lower lobe due to infectious organism                Assessment  OT Assessment  Rehab Potential (OT): other (see comments)  Criteria for Skilled Therapeutic Interventions Met (OT): no, no problems identified which require skilled intervention                  Plan    OT Plan  Therapy Frequency (OT): evaluation only  Outcome Evaluation: Pt is a 82 y.o. female admitted with COPD acute exacerbation. Pt reports she is independent with all ADLs at baseline and has both her sister and son to assist with taking her to doctors appts and grocery. Pt participated in functional transfers with SBA and was independent for LBD tasks. Pt at baseline function and not appropriate for acute OT services. Will sign off at this time. Rec d/c home.      Gabbi Nina, ELISA  3/3/2023        By cosigning this order, either electronically or physically, I certify that the therapy services are furnished while this patient is under my care, the services outline above are required by this patient, and will be reviewed every 90 days.        M.D.:__________________________________________ Date: ______________    23 1006    23 0957  Initiate Observation Status  Once         23 0957    23 0800  predniSONE (DELTASONE) tablet 40 mg  Daily With Breakfast         23 1318    23 0600  CBC & Differential  Morning Draw         23 1430    23 0600  Basic Metabolic Panel  Morning Draw         23 1430    23 0600  CBC Auto Differential  PROCEDURE ONCE         23 2205    23 2238  guaifenesin (ROBITUSSIN) 100 MG/5ML liquid 200 mg  Every 4 Hours  PRN         03/02/23 2238    03/02/23 1432  PT Consult: Eval & Treat Functional Mobility Below Baseline  Once         03/02/23 1431    03/02/23 1432  OT Consult: Eval & Treat  Once         03/02/23 1431    03/02/23 1431  T4, Free  Once         03/02/23 1430    03/02/23 0900  amLODIPine (NORVASC) tablet 10 mg  Daily         03/01/23 1345    03/01/23 2100  rOPINIRole (REQUIP) tablet 0.25 mg  Nightly         03/01/23 1106    03/01/23 2100  rosuvastatin (CRESTOR) tablet 5 mg  Nightly         03/01/23 1345    03/01/23 1730  pantoprazole (PROTONIX) EC tablet 40 mg  2 Times Daily Before Meals         03/01/23 1347    03/01/23 1630  ipratropium-albuterol (DUO-NEB) nebulizer solution 3 mL  4 Times Daily - RT         03/01/23 1516    03/01/23 1445  guaiFENesin (MUCINEX) 12 hr tablet 600 mg  Every 12 Hours Scheduled         03/01/23 1348    03/01/23 1230  allopurinol (ZYLOPRIM) tablet 100 mg  Daily         03/01/23 1106    03/01/23 1230  budesonide-formoterol (SYMBICORT) 160-4.5 MCG/ACT inhaler 2 puff  2 Times Daily         03/01/23 1106    03/01/23 1230  bumetanide (BUMEX) tablet 1 mg  Daily         03/01/23 1106    03/01/23 1230  gabapentin (NEURONTIN) capsule 300 mg  3 Times Daily         03/01/23 1106    03/01/23 1230  levothyroxine (SYNTHROID, LEVOTHROID) tablet 75 mcg  Daily         03/01/23 1106    03/01/23 1230  metoprolol succinate XL (TOPROL-XL) 24 hr tablet 12.5 mg  Every 24 Hours Scheduled         03/01/23 1106    03/01/23 1230  potassium chloride (K-DUR,KLOR-CON) ER tablet 10 mEq  Daily         03/01/23 1106    03/01/23 1230  QUEtiapine (SEROquel) tablet 100 mg  Every 12 Hours Scheduled         03/01/23 1106    03/01/23 1230  vitamin B-12 (CYANOCOBALAMIN) tablet 1,000 mcg  Daily,   Status:  Discontinued         03/01/23 1106    03/01/23 1105  hydrOXYzine (ATARAX) tablet 25 mg  3 Times Daily PRN         03/01/23 1106    03/01/23 1105  HYDROcodone-acetaminophen (NORCO) 7.5-325 MG per tablet 1 tablet  Every 8 Hours  PRN         03/01/23 1106    03/01/23 1104  albuterol (PROVENTIL) nebulizer solution 0.083% 2.5 mg/3mL  Every 4 Hours PRN         03/01/23 1106    03/01/23 1100  busPIRone (BUSPAR) tablet 10 mg  Every 12 Hours Scheduled         03/01/23 0949    03/01/23 1100  DULoxetine (CYMBALTA) DR capsule 60 mg  Daily         03/01/23 0949    03/01/23 0845  prochlorperazine (COMPAZINE) injection 5 mg  Every 4 Hours PRN         03/01/23 0847    03/01/23 0824  acetaminophen (TYLENOL) tablet 650 mg  Every 4 Hours PRN         03/01/23 0824    03/01/23 0800  methylPREDNISolone sodium succinate (SOLU-Medrol) injection 40 mg  Every 12 Hours,   Status:  Discontinued         03/01/23 0703    03/01/23 0720  ondansetron (ZOFRAN) injection 4 mg  Every 4 Hours PRN         03/01/23 0720    03/01/23 0034  sodium chloride 0.9 % flush 10 mL  As Needed        See Hyperspace for full Linked Orders Report.    03/01/23 0035    01/12/23 0000  busPIRone (BUSPAR) 10 MG tablet  2 Times Daily         03/01/23 0537    01/03/23 0000  nitrofurantoin (MACRODANTIN) 50 MG capsule  Every 6 Hours         03/01/23 0537    11/03/22 0000  rosuvastatin (CRESTOR) 20 MG tablet  Daily         03/01/23 0537    --  amLODIPine (NORVASC) 5 MG tablet  Daily         03/01/23 0536    --  fluticasone-salmeterol (ADVAIR HFA) 115-21 MCG/ACT inhaler  2 Times Daily - RT         03/01/23 0538    --  SCANNED - TELEMETRY           03/01/23 0000    --  SCANNED - TELEMETRY           03/01/23 0000

## 2023-03-03 NOTE — PROGRESS NOTES
"Daily progress note    Primary care physician  Dr. PLUMMER    Chief complaint  Doing better with no new complaints and wants to go home    History of present illness  82-year-old white female with very complex past medical history including chronic hypoxic respiratory failure COPD hypertension hypothyroidism anxiety disorder depression restless leg syndrome and chronic kidney disease stage III presented to [facility] emergency room with shortness of breath for last several days with nonproductive cough but no fever chills chest pain abdominal pain nausea vomiting diarrhea.  Patient work-up in ER revealed acute on chronic hypoxic aspiratory failure with acute exacerbation of COPD and acute human rhinovirus bronchitis admit for management.      REVIEW OF SYSTEMS  Review of all 14 systems is negative other than stated in the HPI above.     PHYSICAL EXAM  Blood pressure 129/58, pulse 77, temperature 98 °F (36.7 °C), temperature source Oral, resp. rate 18, height 154.9 cm (61\"), weight 73.9 kg (163 lb), SpO2 96 %.    GENERAL: Awake and alert, no  respiratory distress but agitated  HEENT:  Unremarkable  NECK:  Supple  CV: regular rhythm, normal rate, no murmur, no peripheral edema  RESPIRATORY: normal effort, decreased breath sounds with few expiratory wheezes B  ABDOMEN: soft, nondistended, nontender throughout  MUSCULOSKELETAL: no deformity  NEURO: alert, moves all extremities, follows commands  PSYCH:  calm, cooperative  SKIN: warm, dry, no rash     LAB RESULTS  Lab Results (last 24 hours)     Procedure Component Value Units Date/Time    Basic Metabolic Panel [[id number]]  (Abnormal) Collected: 03/03/23 0340    Specimen: Blood Updated: 03/03/23 0437     Glucose 115 mg/dL      BUN 34 mg/dL      Creatinine 1.81 mg/dL      Sodium 135 mmol/L      Potassium 4.1 mmol/L      Chloride 97 mmol/L      CO2 30.3 mmol/L      Calcium 9.4 mg/dL      BUN/Creatinine Ratio 18.8     Anion Gap 7.7 mmol/L      eGFR 28.0 " mL/min/1.73     Narrative:      GFR Normal >60  Chronic Kidney Disease <60  Kidney Failure <15    The GFR formula is only valid for adults with stable renal function between ages 18 and 70.    CBC & Differential [[id number]]  (Abnormal) Collected: 03/03/23 0340    Specimen: Blood Updated: 03/03/23 0415    Narrative:      The following orders were created for panel order CBC & Differential.  Procedure                               Abnormality         Status                     ---------                               -----------         ------                     CBC Auto Differential[[id number]]        Abnormal            Final result                 Please view results for these tests on the individual orders.    CBC Auto Differential [[id number]]  (Abnormal) Collected: 03/03/23 0340    Specimen: Blood Updated: 03/03/23 0415     WBC 6.96 10*3/mm3      RBC 2.55 10*6/mm3      Hemoglobin 8.1 g/dL      Hematocrit 24.0 %      MCV 94.1 fL      MCH 31.8 pg      MCHC 33.8 g/dL      RDW 15.8 %      RDW-SD 54.2 fl      MPV 9.1 fL      Platelets 219 10*3/mm3      Neutrophil % 80.8 %      Lymphocyte % 11.5 %      Monocyte % 6.8 %      Eosinophil % 0.0 %      Basophil % 0.0 %      Immature Grans % 0.9 %      Neutrophils, Absolute 5.63 10*3/mm3      Lymphocytes, Absolute 0.80 10*3/mm3      Monocytes, Absolute 0.47 10*3/mm3      Eosinophils, Absolute 0.00 10*3/mm3      Basophils, Absolute 0.00 10*3/mm3      Immature Grans, Absolute 0.06 10*3/mm3      nRBC 0.0 /100 WBC     Blood Culture - Blood, Arm, Right [[id number]]  (Normal) Collected: 03/01/23 0340    Specimen: Blood from Arm, Right Updated: 03/03/23 0400     Blood Culture No growth at 2 days    Blood Culture - Blood, Arm, Left [[id number]]  (Normal) Collected: 03/01/23 0205    Specimen: Blood from Arm, Left Updated: 03/03/23 0400     Blood Culture No growth at 2 days    T4, Free [[id number]]  (Normal) Collected: 03/02/23 1733    Specimen: Blood Updated: 03/02/23 1818     Free T4  1.21 ng/dL     Narrative:      Results may be falsely increased if patient taking Biotin.          Imaging Results (Last 24 Hours)     ** No results found for the last 24 hours. **          ECG 12 Lead             Component  Ref Range & Units 00:40  (3/1/23) 1 mo ago  (1/23/23) 4 mo ago  (10/19/22) 5 mo ago  (9/29/22) 1 yr ago  (11/16/21) 1 yr ago  (11/8/21) 1 yr ago  (10/18/21)   QT Interval  ms 386  355  367  371  387  388  413    Resulting Agency BH ECG BH ECG BH ECG BH ECG BH ECG BH ECG BH ECG               HEART RATE= 65  bpm  RR Interval= 923  ms  WV Interval= 160  ms  P Horizontal Axis= -5  deg  P Front Axis= 28  deg  QRSD Interval= 80  ms  QT Interval= 386  ms  QRS Axis= -7  deg  T Wave Axis= 7  deg  - BORDERLINE ECG -  Sinus rhythm  Abnormal R-wave progression, early transition  LVH by voltage  Borderline T abnormalities, anterior leads  No change from previous tracing           2D echo within normal limits    Current Facility-Administered Medications:   •  acetaminophen (TYLENOL) tablet 650 mg, 650 mg, Oral, Q4H PRN, Stanley Fowler MD, 650 mg at 03/03/23 0655  •  albuterol (PROVENTIL) nebulizer solution 0.083% 2.5 mg/3mL, 2.5 mg, Nebulization, Q4H PRN, Stanley Fowler MD  •  allopurinol (ZYLOPRIM) tablet 100 mg, 100 mg, Oral, Daily, Stanley Fowler MD, 100 mg at 03/03/23 0828  •  amLODIPine (NORVASC) tablet 10 mg, 10 mg, Oral, Daily, Stanley Fowler MD, 10 mg at 03/03/23 0828  •  budesonide-formoterol (SYMBICORT) 160-4.5 MCG/ACT inhaler 2 puff, 2 puff, Inhalation, BID, Stanley Fowler MD, 2 puff at 03/03/23 0643  •  bumetanide (BUMEX) tablet 1 mg, 1 mg, Oral, Daily, Stanley Fowler MD, 1 mg at 03/03/23 0828  •  busPIRone (BUSPAR) tablet 10 mg, 10 mg, Oral, Q12H, Stanley Fowler MD, 10 mg at 03/03/23 0829  •  DULoxetine (CYMBALTA) DR capsule 60 mg, 60 mg, Oral, Daily, Stanley Fowler MD, 60 mg at 03/03/23 0829  •  gabapentin (NEURONTIN) capsule 300 mg, 300 mg, Oral, TID, Stanley Fowler MD, 300 mg at 03/03/23 0829  •   guaiFENesin (MUCINEX) 12 hr tablet 600 mg, 600 mg, Oral, Q12H, Stanley Fowler MD, 600 mg at 03/03/23 0828  •  guaifenesin (ROBITUSSIN) 100 MG/5ML liquid 200 mg, 200 mg, Oral, Q4H PRN, Stanley Fowler MD, 200 mg at 03/02/23 2253  •  HYDROcodone-acetaminophen (NORCO) 7.5-325 MG per tablet 1 tablet, 1 tablet, Oral, Q8H PRN, Stanley Fowler MD, 1 tablet at 03/03/23 0829  •  hydrOXYzine (ATARAX) tablet 25 mg, 25 mg, Oral, TID PRN, Stanley Fowler MD, 25 mg at 03/01/23 1142  •  ipratropium-albuterol (DUO-NEB) nebulizer solution 3 mL, 3 mL, Nebulization, 4x Daily - RT, Anat Cowan MD, 3 mL at 03/02/23 1538  •  levothyroxine (SYNTHROID, LEVOTHROID) tablet 75 mcg, 75 mcg, Oral, Daily, Stanley Fowler MD, 75 mcg at 03/03/23 0828  •  metoprolol succinate XL (TOPROL-XL) 24 hr tablet 12.5 mg, 12.5 mg, Oral, Q24H, Stanley Fowler MD, 12.5 mg at 03/03/23 0828  •  ondansetron (ZOFRAN) injection 4 mg, 4 mg, Intravenous, Q4H PRN, Stanley Fowler MD, 4 mg at 03/01/23 0731  •  pantoprazole (PROTONIX) EC tablet 40 mg, 40 mg, Oral, BID AC, Stanley Fowler MD, 40 mg at 03/03/23 0655  •  potassium chloride (K-DUR,KLOR-CON) ER tablet 10 mEq, 10 mEq, Oral, Daily, Stanley Fowler MD, 10 mEq at 03/03/23 0829  •  predniSONE (DELTASONE) tablet 40 mg, 40 mg, Oral, Daily With Breakfast, Anat Cowan MD, 40 mg at 03/03/23 0829  •  prochlorperazine (COMPAZINE) injection 5 mg, 5 mg, Intravenous, Q4H PRN, Stanley Fowler MD, 5 mg at 03/01/23 0856  •  QUEtiapine (SEROquel) tablet 100 mg, 100 mg, Oral, Q12H, Stanley Fowler MD, 100 mg at 03/03/23 0828  •  rOPINIRole (REQUIP) tablet 0.25 mg, 0.25 mg, Oral, Nightly, Stanley Fowler MD, 0.25 mg at 03/02/23 2028  •  rosuvastatin (CRESTOR) tablet 5 mg, 5 mg, Oral, Nightly, Stanley Fowler MD, 5 mg at 03/02/23 2028  •  [COMPLETED] Insert Peripheral IV, , , Once **AND** sodium chloride 0.9 % flush 10 mL, 10 mL, Intravenous, PRN, Kevin Zavaleta MD     ASSESSMENT  Acute on chronic hypoxic respiratory failure  Acute  exacerbation of COPD  Acute human rhinovirus bronchitis  Polypharmacy  Hypertension  Hypothyroidism  Anxiety disorder  Depression  Restless leg syndrome  Chronic anemia  Chronic kidney disease stage III  Gastroesophageal reflux disease    PLAN  Discharge home on Medrol Dosepak  Discharge summary dictated    AMANDA MURO MD    Copied text in this note has been reviewed and is accurate as of 03/03/23

## 2023-03-03 NOTE — PLAN OF CARE
Goal Outcome Evaluation:  Plan of Care Reviewed With: patient        Progress: no change  Outcome Evaluation: Pt is a 82 y.o. female admitted with COPD acute exacerbation. Pt reports she is independent with all ADLs at baseline and has both her sister and son to assist with taking her to doctors appts and grocery. Pt participated in functional transfers with SBA and was independent for LBD tasks. Pt at baseline function and not appropriate for acute OT services. Will sign off at this time. Rec d/c home.

## 2023-03-04 NOTE — OUTREACH NOTE
Prep Survey    Flowsheet Row Responses   [facility] patient discharged from? [facility]   Is LACE score < 7 ? No   Eligibility Readm Mgmt   Discharge diagnosis A/C hypoxic resp failure, Acute COPD exacerb, Acute rhinovirus bronchitis   Does the patient have one of the following disease processes/diagnoses(primary or secondary)? COPD   Does the patient have Home health ordered? No   Is there a DME ordered? No   Prep survey completed? Yes          Lenka FLANNERY - Registered Nurse

## 2023-03-06 LAB
BACTERIA SPEC AEROBE CULT: NORMAL
BACTERIA SPEC AEROBE CULT: NORMAL

## 2023-03-14 ENCOUNTER — READMISSION MANAGEMENT (OUTPATIENT)
Dept: CALL CENTER | Facility: HOSPITAL | Age: 83
End: 2023-03-14
Payer: MEDICARE

## 2023-03-14 NOTE — OUTREACH NOTE
COPD/PN Week 2 Survey    Flowsheet Row Responses   [facility] patient discharged from? [place]   Does the patient have one of the following disease processes/diagnoses(primary or secondary)? COPD   Week 2 attempt successful? Yes   Call start time 1352   Call end time 1355   Discharge diagnosis A/C hypoxic resp failure, Acute COPD exacerb, Acute rhinovirus bronchitis   Meds reviewed with patient/caregiver? Yes   Is the patient taking all medications as directed (includes completed medication regime)? Yes   Does the patient have a primary care provider?  Yes   Comments regarding PCP 3/20/23   Has the patient kept scheduled appointments due by today? N/A   Has home health visited the patient within 72 hours of discharge? N/A   Pulse Ox monitoring Intermittent   Pulse Ox device source Patient   O2 Sat comments above 90% with 3L   O2 Sat: education provided Sat levels, Monitoring frequency, When to seek care   Psychosocial issues? No   Did the patient receive a copy of their discharge instructions? Yes   Nursing interventions Reviewed instructions with patient   What is the patient's perception of their health status since discharge? Improving   Nursing Interventions Nurse provided patient education   Is the patient/caregiver able to teach back the hierarchy of who to call/visit for symptoms/problems? PCP, Specialist, Home health nurse, Urgent Care, ED, 911 Yes   Is the patient able to teach back COPD zones? Yes  [Pt aware and states she has it posted on her refrig]   Patient reports what zone on this call? Green Zone   Green Zone Reports doing well   Green Zone interventions: Take daily medications, Use oxygen as prescribed   Week 2 call completed? Yes   Wrap up additional comments Pt reports she is doing well. No needs.           BONY ARRINGTON - Registered Nurse

## 2023-03-23 RX ORDER — ROPINIROLE 0.25 MG/1
TABLET, FILM COATED ORAL
Qty: 90 TABLET | Refills: 0 | Status: SHIPPED | OUTPATIENT
Start: 2023-03-23

## 2023-05-26 ENCOUNTER — HOSPITAL ENCOUNTER (EMERGENCY)
Facility: HOSPITAL | Age: 83
Discharge: HOME | End: 2023-05-26
Attending: EMERGENCY MEDICINE
Payer: MEDICARE

## 2023-05-26 ENCOUNTER — TELEPHONE (OUTPATIENT)
Dept: NEUROLOGY | Facility: CLINIC | Age: 83
End: 2023-05-26

## 2023-05-26 ENCOUNTER — APPOINTMENT (OUTPATIENT)
Dept: GENERAL RADIOLOGY | Facility: HOSPITAL | Age: 83
End: 2023-05-26
Payer: MEDICARE

## 2023-05-26 VITALS
TEMPERATURE: 98.1 F | HEART RATE: 77 BPM | DIASTOLIC BLOOD PRESSURE: 91 MMHG | OXYGEN SATURATION: 94 % | HEIGHT: 61 IN | SYSTOLIC BLOOD PRESSURE: 189 MMHG | WEIGHT: 161 LBS | RESPIRATION RATE: 20 BRPM | BODY MASS INDEX: 30.4 KG/M2

## 2023-05-26 DIAGNOSIS — D64.9 ANEMIA, UNSPECIFIED TYPE: ICD-10-CM

## 2023-05-26 DIAGNOSIS — R07.9 CHEST PAIN, UNSPECIFIED TYPE: Primary | ICD-10-CM

## 2023-05-26 DIAGNOSIS — J96.11 CHRONIC RESPIRATORY FAILURE WITH HYPOXIA: ICD-10-CM

## 2023-05-26 DIAGNOSIS — N18.31 STAGE 3A CHRONIC KIDNEY DISEASE: ICD-10-CM

## 2023-05-26 LAB
ALBUMIN SERPL-MCNC: 4.3 G/DL (ref 3.5–5.2)
ALBUMIN/GLOB SERPL: 1.5 G/DL
ALP SERPL-CCNC: 76 U/L (ref 39–117)
ALT SERPL W P-5'-P-CCNC: 18 U/L (ref 1–33)
ANION GAP SERPL CALCULATED.3IONS-SCNC: 10 MMOL/L (ref 5–15)
AST SERPL-CCNC: 31 U/L (ref 1–32)
BACTERIA UR QL AUTO: ABNORMAL /HPF
BASOPHILS # BLD AUTO: 0.02 10*3/MM3 (ref 0–0.2)
BASOPHILS NFR BLD AUTO: 0.3 % (ref 0–1.5)
BILIRUB SERPL-MCNC: 0.4 MG/DL (ref 0–1.2)
BILIRUB UR QL STRIP: NEGATIVE
BUN SERPL-MCNC: 29 MG/DL (ref 8–23)
BUN/CREAT SERPL: 18.8 (ref 7–25)
CALCIUM SPEC-SCNC: 9.2 MG/DL (ref 8.6–10.5)
CHLORIDE SERPL-SCNC: 98 MMOL/L (ref 98–107)
CLARITY UR: CLEAR
CO2 SERPL-SCNC: 30 MMOL/L (ref 22–29)
COLOR UR: YELLOW
CREAT SERPL-MCNC: 1.54 MG/DL (ref 0.57–1)
D DIMER PPP FEU-MCNC: 0.71 MCGFEU/ML (ref 0–0.8)
DEPRECATED RDW RBC AUTO: 50.4 FL (ref 37–54)
EGFRCR SERPLBLD CKD-EPI 2021: 34 ML/MIN/1.73
EOSINOPHIL # BLD AUTO: 0.09 10*3/MM3 (ref 0–0.4)
EOSINOPHIL NFR BLD AUTO: 1.5 % (ref 0.3–6.2)
ERYTHROCYTE [DISTWIDTH] IN BLOOD BY AUTOMATED COUNT: 14.5 % (ref 12.3–15.4)
GEN 5 2HR TROPONIN T REFLEX: 38 NG/L
GLOBULIN UR ELPH-MCNC: 2.8 GM/DL
GLUCOSE SERPL-MCNC: 93 MG/DL (ref 65–99)
GLUCOSE UR STRIP-MCNC: NEGATIVE MG/DL
HCT VFR BLD AUTO: 27.2 % (ref 34–46.6)
HGB BLD-MCNC: 8.5 G/DL (ref 12–15.9)
HGB UR QL STRIP.AUTO: NEGATIVE
HYALINE CASTS UR QL AUTO: ABNORMAL /LPF
INR PPP: 0.9 (ref 0.9–1.1)
KETONES UR QL STRIP: NEGATIVE
LEUKOCYTE ESTERASE UR QL STRIP.AUTO: ABNORMAL
LYMPHOCYTES # BLD AUTO: 1.27 10*3/MM3 (ref 0.7–3.1)
LYMPHOCYTES NFR BLD AUTO: 20.7 % (ref 19.6–45.3)
MCH RBC QN AUTO: 29.7 PG (ref 26.6–33)
MCHC RBC AUTO-ENTMCNC: 31.3 G/DL (ref 31.5–35.7)
MCV RBC AUTO: 95.1 FL (ref 79–97)
MONOCYTES # BLD AUTO: 0.64 10*3/MM3 (ref 0.1–0.9)
MONOCYTES NFR BLD AUTO: 10.4 % (ref 5–12)
NEUTROPHILS NFR BLD AUTO: 4.05 10*3/MM3 (ref 1.7–7)
NEUTROPHILS NFR BLD AUTO: 66.1 % (ref 42.7–76)
NITRITE UR QL STRIP: NEGATIVE
PH UR STRIP.AUTO: 6.5 [PH] (ref 5–8)
PLATELET # BLD AUTO: 221 10*3/MM3 (ref 140–450)
PMV BLD AUTO: 8.8 FL (ref 6–12)
POTASSIUM SERPL-SCNC: 5.2 MMOL/L (ref 3.5–5.2)
PROT SERPL-MCNC: 7.1 G/DL (ref 6–8.5)
PROT UR QL STRIP: NEGATIVE
PROTHROMBIN TIME: 12.2 SECONDS (ref 11.7–14.2)
QT INTERVAL: 368 MS
RBC # BLD AUTO: 2.86 10*6/MM3 (ref 3.77–5.28)
RBC # UR STRIP: ABNORMAL /HPF
REF LAB TEST METHOD: ABNORMAL
SODIUM SERPL-SCNC: 138 MMOL/L (ref 136–145)
SP GR UR STRIP: 1.01 (ref 1–1.03)
SQUAMOUS #/AREA URNS HPF: ABNORMAL /HPF
TROPONIN T DELTA: 0 NG/L
TROPONIN T SERPL HS-MCNC: 38 NG/L
UROBILINOGEN UR QL STRIP: ABNORMAL
WBC # UR STRIP: ABNORMAL /HPF
WBC NRBC COR # BLD: 6.13 10*3/MM3 (ref 3.4–10.8)

## 2023-05-26 PROCEDURE — 85379 FIBRIN DEGRADATION QUANT: CPT | Performed by: EMERGENCY MEDICINE

## 2023-05-26 PROCEDURE — 80053 COMPREHEN METABOLIC PANEL: CPT | Performed by: EMERGENCY MEDICINE

## 2023-05-26 PROCEDURE — 81001 URINALYSIS AUTO W/SCOPE: CPT | Performed by: EMERGENCY MEDICINE

## 2023-05-26 PROCEDURE — 36415 COLL VENOUS BLD VENIPUNCTURE: CPT

## 2023-05-26 PROCEDURE — 99284 EMERGENCY DEPT VISIT MOD MDM: CPT

## 2023-05-26 PROCEDURE — 85025 COMPLETE CBC W/AUTO DIFF WBC: CPT | Performed by: EMERGENCY MEDICINE

## 2023-05-26 PROCEDURE — 71045 X-RAY EXAM CHEST 1 VIEW: CPT

## 2023-05-26 PROCEDURE — 93005 ELECTROCARDIOGRAM TRACING: CPT

## 2023-05-26 PROCEDURE — 93005 ELECTROCARDIOGRAM TRACING: CPT | Performed by: EMERGENCY MEDICINE

## 2023-05-26 PROCEDURE — 85610 PROTHROMBIN TIME: CPT | Performed by: EMERGENCY MEDICINE

## 2023-05-26 PROCEDURE — 84484 ASSAY OF TROPONIN QUANT: CPT | Performed by: EMERGENCY MEDICINE

## 2023-05-26 NOTE — ED PROVIDER NOTES
EMERGENCY DEPARTMENT ENCOUNTER    Room Number:  33/33  Date seen:  5/26/2023  PCP: Bernabe Guy MD  Historian: Patient, son at bedside      HPI:  Chief Complaint: Chest pain  A complete HPI/ROS/PMH/PSH/SH/FH are unobtainable due to:   Context: Josephine Wolf is a 82 y.o. female who presents to the ED c/o chest pain.  Patient reports onset of chest pain about 30 minutes ago while waiting outside of a doctor's office.  Chest pain is sharp in the mid chest and does not radiate.  Pain was severe at its worst and is now mild.  Pain is worsened by nothing, improved by nothing.  Denies history of similar chest pain in the past.  Patient does have chronic shortness of breath, COPD.  She is on oxygen 3 L by nasal cannula continuously.  Denies recent illness other than chest pain described above.  Patient stopped smoking 40 years ago.      MEDICAL RECORD REVIEW (non ED)  I did fairly extensive review of patient's prior medical records note that she was hospitalized in March 2023 with COPD exacerbation, polypharmacy, rhinovirus infection.  She responded to steroids and DuoNebs and was discharged with a 2-day stay.  I reviewed most recent cardiology note.  She is followed by [facility] and had a catheterization in August 2022 which showed nonobstructive coronary artery disease with mild pulmonary hypertension.    PAST MEDICAL HISTORY  Active Ambulatory Problems     Diagnosis Date Noted   • Anemia 10/19/2017   • OA (osteoarthritis) of knee 11/16/2017   • Chronic respiratory failure with hypoxia 12/02/2017   • Cellulitis of skin 12/06/2017   • Acute kidney injury 12/06/2017   • Sepsis 12/06/2017   • Orthostatic hypotension 06/24/2018   • Disease of thyroid gland 06/24/2018   • Hypertension 06/24/2018   • Dehydration 06/24/2018   • Stage 3 chronic kidney disease (CMS/HCC) 06/25/2018   • Closed compression fracture of L1 lumbar vertebra 06/16/2019   • Hyponatremia 06/16/2019   • Osteoporosis with pathological  fracture 2019   • Acute on chronic respiratory failure with hypoxia and hypercapnia 2020   • Obesity (BMI 30-39.9) 2020   • Nocturnal hypoxia 2020   • CKD (chronic kidney disease) stage 2, GFR 60-89 ml/min 2020   • Acute on chronic respiratory failure with hypoxia 2020   • Abdominal pain 10/18/2021   • Acute exacerbation of chronic obstructive pulmonary disease (COPD) 2022   • Acute UTI 10/19/2022   • Metabolic encephalopathy 10/23/2022   • COPD with acute exacerbation 2023     Resolved Ambulatory Problems     Diagnosis Date Noted   • COPD with acute exacerbation 2018     Past Medical History:   Diagnosis Date   • Acid reflux    • Arthritis    • Bipolar 1 disorder, depressed    • Cataract    • Chronic nausea    • Chronic pain of right knee    • Continuous leakage of urine    • COPD (chronic obstructive pulmonary disease)    • Diverticulosis    • Fibromyalgia    • Fibromyalgia, primary    • Frequent episodes of bronchitis    • HL (hearing loss)    • Hypothyroidism    • Memory loss    • Migraines    • Neck pain    • On home oxygen therapy    • Short of breath on exertion    • Sleep apnea          PAST SURGICAL HISTORY  Past Surgical History:   Procedure Laterality Date   • CEREBRAL ANEURYSM REPAIR      WITH STENT   • HYSTERECTOMY     • LAPAROSCOPIC CHOLECYSTECTOMY     • MD ARTHRP KNE CONDYLE&PLATU MEDIAL&LAT COMPARTMENTS Right 2017    Procedure: RT TOTAL KNEE ARTHROPLASTY;  Surgeon: Kashif Perez MD;  Location: [facility];  Service: Orthopedics         FAMILY HISTORY  Family History   Problem Relation Age of Onset   • Malig Hyperthermia Neg Hx          SOCIAL HISTORY  Social History     Socioeconomic History   • Marital status:    Tobacco Use   • Smoking status: Former     Packs/day: 1.00     Years: 10.00     Pack years: 10.00     Types: Cigarettes     Start date:      Quit date:      Years since quittin.4   • Smokeless  tobacco: Never   Vaping Use   • Vaping Use: Never used   Substance and Sexual Activity   • Alcohol use: No     Comment: CAFFEINE NO    • Drug use: No   • Sexual activity: Defer         ALLERGIES  Morphine and related and Fenofibrate        REVIEW OF SYSTEMS  Review of Systems   Constitutional: Positive for fatigue. Negative for fever.   Respiratory: Positive for cough (Chronic, unchanged from baseline) and shortness of breath (Chronic, unchanged baseline).    Cardiovascular: Positive for chest pain (As per HPI).   All other systems reviewed and are negative.           PHYSICAL EXAM  ED Triage Vitals [05/26/23 1131]   Temp Heart Rate Resp BP SpO2   98.1 °F (36.7 °C) 81 20 -- 99 %      Temp src Heart Rate Source Patient Position BP Location FiO2 (%)   Tympanic Monitor -- -- --       Physical Exam    GENERAL: Alert female in no obvious distress.  Triage vitals reviewed and are fairly benign.  O2 sats mid 90s on 3 L nasal cannula  HENT: nares patent  EYES: no scleral icterus  CV: regular rhythm, regular rate-no murmur  RESPIRATORY: normal effort, slightly decreased breath sounds bilaterally.  O2 sats mid 90s on nasal cannula oxygen  ABDOMEN: soft, obese/nontender  MUSCULOSKELETAL: Chest-reproducible tenderness palpation across the mid chest.  Upper extremities-unremarkable  Lower extremities-unremarkable without significant tenderness to palpation or swelling  NEURO: Strength sensation and coordination are grossly intact.  Speech and mentation are unremarkable  SKIN: warm, dry      Vital signs and nursing notes reviewed.          LAB RESULTS  Recent Results (from the past 24 hour(s))   ECG 12 Lead Chest Pain    Collection Time: 05/26/23 11:37 AM   Result Value Ref Range    QT Interval 368 ms   Comprehensive Metabolic Panel    Collection Time: 05/26/23 12:03 PM    Specimen: Blood   Result Value Ref Range    Glucose 93 65 - 99 mg/dL    BUN 29 (H) 8 - 23 mg/dL    Creatinine 1.54 (H) 0.57 - 1.00 mg/dL    Sodium 138 136 -  145 mmol/L    Potassium 5.2 3.5 - 5.2 mmol/L    Chloride 98 98 - 107 mmol/L    CO2 30.0 (H) 22.0 - 29.0 mmol/L    Calcium 9.2 8.6 - 10.5 mg/dL    Total Protein 7.1 6.0 - 8.5 g/dL    Albumin 4.3 3.5 - 5.2 g/dL    ALT (SGPT) 18 1 - 33 U/L    AST (SGOT) 31 1 - 32 U/L    Alkaline Phosphatase 76 39 - 117 U/L    Total Bilirubin 0.4 0.0 - 1.2 mg/dL    Globulin 2.8 gm/dL    A/G Ratio 1.5 g/dL    BUN/Creatinine Ratio 18.8 7.0 - 25.0    Anion Gap 10.0 5.0 - 15.0 mmol/L    eGFR 34.0 (L) >60.0 mL/min/1.73   Protime-INR    Collection Time: 05/26/23 12:03 PM    Specimen: Blood   Result Value Ref Range    Protime 12.2 11.7 - 14.2 Seconds    INR 0.90 0.90 - 1.10   D-dimer, Quantitative    Collection Time: 05/26/23 12:03 PM    Specimen: Blood   Result Value Ref Range    D-Dimer, Quantitative 0.71 0.00 - 0.80 MCGFEU/mL   High Sensitivity Troponin T    Collection Time: 05/26/23 12:03 PM    Specimen: Blood   Result Value Ref Range    HS Troponin T 38 (H) <10 ng/L   CBC Auto Differential    Collection Time: 05/26/23  1:27 PM    Specimen: Blood   Result Value Ref Range    WBC 6.13 3.40 - 10.80 10*3/mm3    RBC 2.86 (L) 3.77 - 5.28 10*6/mm3    Hemoglobin 8.5 (L) 12.0 - 15.9 g/dL    Hematocrit 27.2 (L) 34.0 - 46.6 %    MCV 95.1 79.0 - 97.0 fL    MCH 29.7 26.6 - 33.0 pg    MCHC 31.3 (L) 31.5 - 35.7 g/dL    RDW 14.5 12.3 - 15.4 %    RDW-SD 50.4 37.0 - 54.0 fl    MPV 8.8 6.0 - 12.0 fL    Platelets 221 140 - 450 10*3/mm3    Neutrophil % 66.1 42.7 - 76.0 %    Lymphocyte % 20.7 19.6 - 45.3 %    Monocyte % 10.4 5.0 - 12.0 %    Eosinophil % 1.5 0.3 - 6.2 %    Basophil % 0.3 0.0 - 1.5 %    Neutrophils, Absolute 4.05 1.70 - 7.00 10*3/mm3    Lymphocytes, Absolute 1.27 0.70 - 3.10 10*3/mm3    Monocytes, Absolute 0.64 0.10 - 0.90 10*3/mm3    Eosinophils, Absolute 0.09 0.00 - 0.40 10*3/mm3    Basophils, Absolute 0.02 0.00 - 0.20 10*3/mm3   High Sensitivity Troponin T 2Hr    Collection Time: 05/26/23  2:04 PM    Specimen: Blood   Result Value Ref Range     HS Troponin T 38 (H) <10 ng/L    Troponin T Delta 0 >=-4 - <+4 ng/L       Ordered the above labs and independently interpreted results. My findings will be discussed in the medical decision making section below        RADIOLOGY  XR Chest 1 View    Result Date: 5/26/2023  XR CHEST 1 VW-  HISTORY:  Chest pain.  COMPARISON:  Chest radiograph 03/01/2023  FINDINGS:  A single view of the chest was obtained. The cardiac silhouette and mediastinal and hilar contours are not significantly changed. There is calcific aortic atherosclerosis. There are low lung volumes. There is mild bilateral curvilinear atelectasis, slightly improved at the left lung base. There is no new focal consolidation. Pleural spaces are clear. The visualized osseous structures are not significantly changed.  This report was finalized on 5/26/2023 12:54 PM by Dr. Araseli Sigala M.D.        I ordered and independently reviewed the above noted radiographic studies.      I viewed images of chest x-ray which showed no acute disease per my independent interpretation.    See radiologist's dictation for official interpretation.             PROCEDURES  Procedures          MEDICATIONS GIVEN IN ER  Medications - No data to display            MEDICAL DECISION MAKING, PROGRESS, and CONSULTS    All labs have been independently reviewed by me.  All radiology studies have been reviewed by me and I have also reviewed the radiology report.   EKG's independently viewed and interpreted by me.  Discussion below represents my analysis of pertinent findings related to patient's condition, differential diagnosis, treatment plan and final disposition.      Additional sources:  - Discussed/ obtained information from independent historians: Son at bedside    - External (non-ED) record review: Please see documented above    - Chronic or social conditions impacting care: End-stage lung disease on oxygen, chronic renal disease, anemia    - Shared decision making: I discussed ED  "evaluation and treatment plan with patient who is in agreement.  Complicated patient who had abrupt onset of chest pain about 30 minutes prior to arrival.  Pain significantly improved after ED arrival.    Patient with high-sensitivity troponin with chronic elevation and no significant change suggestive of renal disease, chronic lung disease or chronic coronary disease.  Would go against acute ischemic event.  She did have cardiac catheterization in August of last year that showed mild obstructive coronary disease.    Considered and offered admission given patient's multiple comorbidities but she would much rather go home and I think that this is reasonable given work-up.  Etiology of chest pain remains unclear but do not see \"red flags\" to suggest acute coronary syndrome, dissection, pulmonary embolism or other life-threatening causes of chest pain.      Orders placed during this visit:  Orders Placed This Encounter   Procedures   • XR Chest 1 View   • Comprehensive Metabolic Panel   • Protime-INR   • D-dimer, Quantitative   • High Sensitivity Troponin T   • CBC Auto Differential   • High Sensitivity Troponin T 2Hr   • Urinalysis With Microscopic If Indicated (No Culture) - Urine, Clean Catch   • ECG 12 Lead Chest Pain   • ECG 12 Lead   • CBC & Differential           Differential diagnosis:    Please see as documented below in ED course      Independent interpretation of labs, radiology studies, and discussions with consultants:  ED Course as of 05/26/23 1502   Fri May 26, 2023   1142 EKG independently interpreted by me    Time 11:37 AM    Sinus 71 with frequent PVC  Normal P waves and HI intervals  Normal axis, normal QRS  Unremarkable ST and T wave    Not significant change when compared to 3/1/2023. [DB]   1156 ZYB-60-tpe[age]-old female with history of end-stage lung disease on continuous oxygen presents with chest pain that began about 30 minutes ago.  Pain is sharp in the mid chest and was severe at onset, mild " currently.    On exam she is relatively well-appearing with fairly benign vitals.  She does have reproducible tenderness to palpation across the mid sternum.    Assessment-etiology of chest pain is unclear.  Would consider acute coronary syndrome although would be less likely given fairly benign catheterization in August of last year.  Consider less likely causes such as aortic dissection and pulmonary embolism as patient is not anticoagulated.  She does not have significant risk factors for PE such as prior PE, travel or recent surgery.  This could be musculoskeletal as she is tender to palpate across the mid sternum.  I did offer pain medication which she refused.   Pulmonary causes would also be considered including pneumothorax or pneumonia. [DB]   1340 D-dimer is normal and using age-related criteria would go against pulmonary embolism or aortic dissection.    Serum high-sensitivity troponin is elevated at 38 which may reflect chronic renal disease or pulmonary disease.  We will get repeat 2-hour troponin to look for changing values which could represent acute coronary syndrome.  In my opinion I do not believe that this is likely acute coronary syndrome. [DB]   1341 Chest x-ray independently interpreted by me shows no obvious acute disease.    Official radiology interpretation is in agreement with this assessment [DB]   1447 Repeat high-sensitivity troponin remains 38 suggesting chronic pathology rather than acute disease.  She had fairly severe chest pain earlier and now is having none I would expect to see significant change in high-sensitivity troponin with an acute event. [DB]   1447 CBC shows normal white count but noted anemia with hemoglobin of 8.5 which is near baseline of chronic anemia. [DB]   1448 On repeat assessment patient has been relatively pain-free. [DB]   1449 Chemistries notable for elevated BUN/creatinine of 29 and 1.51 which is similar to baseline.  Represents chronic rather than acute  "renal failure [DB]   8240 Patient states that she \"thinks she has a urine infection\" and would like me to send off a urine specimen.  Gross visual inspection of the urine looks pretty clear but if she has significant signs to suggest urine infection we will go ahead and treat it. [DB]      ED Course User Index  [DB] Chino Nieves MD             I used full protective equipment while examining this patient.  This includes face mask, gloves and protective eyewear.  I washed my hands before entering the room and immediately upon leaving the room    DIAGNOSIS  Final diagnoses:   Chest pain, unspecified type   Stage 3a chronic kidney disease   Chronic respiratory failure with hypoxia   Anemia, unspecified type         DISPOSITION  DISCHARGE    Patient discharged in stable condition.    Reviewed implications of results, diagnosis, meds, responsibility to follow up, warning signs and symptoms of possible worsening, potential complications and reasons to return to ER, including increased chest pain, shortness of breath or as needed.    Patient/Family voiced understanding of above instructions.    Discussed plan for discharge, as there is no emergent indication for admission. Patient referred to primary care provider for BP management due to today's BP. Pt/family is agreeable and understands need for follow up and repeat testing.  Pt is aware that discharge does not mean that nothing is wrong but it indicates no emergency is present that requires admission and they must continue care with follow-up as given below or physician of their choice.     FOLLOW-UP  Bernabe Guy MD  [address]  [place] [address]  [phone]    In 3 days  If Not Better         Medication List      No changes were made to your prescriptions during this visit.                   Latest Documented Vital Signs:  As of 15:02 EDT  BP- (!) 203/95 HR- 77 Temp- 98.1 °F (36.7 °C) (Tympanic) O2 sat- 94%              --    Please " note that portions of this were completed with a voice recognition program.       Note Disclaimer: At [facility], we believe that sharing information builds trust and better relationships. You are receiving this note because you are receiving care at [facility] or recently visited. It is possible you will see health information before a provider has talked with you about it. This kind of information can be easy to misunderstand. To help you fully understand what it means for your health, we urge you to discuss this note with your provider.           Chino Nieves MD  05/26/23 3855

## 2023-05-26 NOTE — DISCHARGE INSTRUCTIONS
ED testing did not suggest heart attack or heart blockage.  I do not see evidence to suggest pulmonary infection, blood clots or tearing of the aorta.    Etiology of chest pain is unclear but do not see the dangerous indicators.  You did have a heart catheterization in August of last year that did not show any serious blockages.    Do not hesitate to return for increased chest pain, shortness of breath or as needed.

## 2023-05-31 ENCOUNTER — OFFICE VISIT (OUTPATIENT)
Dept: NEUROLOGY | Facility: CLINIC | Age: 83
End: 2023-05-31

## 2023-05-31 VITALS
BODY MASS INDEX: 30.42 KG/M2 | SYSTOLIC BLOOD PRESSURE: 124 MMHG | DIASTOLIC BLOOD PRESSURE: 62 MMHG | HEIGHT: 61 IN | OXYGEN SATURATION: 97 % | HEART RATE: 71 BPM

## 2023-05-31 DIAGNOSIS — G25.0 BENIGN ESSENTIAL TREMOR: ICD-10-CM

## 2023-05-31 DIAGNOSIS — G25.3 MYOCLONUS: Primary | ICD-10-CM

## 2023-05-31 PROCEDURE — 3074F SYST BP LT 130 MM HG: CPT | Performed by: PSYCHIATRY & NEUROLOGY

## 2023-05-31 PROCEDURE — 3078F DIAST BP <80 MM HG: CPT | Performed by: PSYCHIATRY & NEUROLOGY

## 2023-05-31 PROCEDURE — 99212 OFFICE O/P EST SF 10 MIN: CPT | Performed by: PSYCHIATRY & NEUROLOGY

## 2023-05-31 PROCEDURE — 1159F MED LIST DOCD IN RCRD: CPT | Performed by: PSYCHIATRY & NEUROLOGY

## 2023-05-31 PROCEDURE — 1160F RVW MEDS BY RX/DR IN RCRD: CPT | Performed by: PSYCHIATRY & NEUROLOGY

## 2023-05-31 RX ORDER — MELOXICAM 7.5 MG/1
1 TABLET ORAL DAILY PRN
COMMUNITY
Start: 2022-08-16 | End: 2023-08-16

## 2023-05-31 RX ORDER — NITROGLYCERIN 0.4 MG/1
0.4 TABLET SUBLINGUAL
COMMUNITY

## 2023-05-31 RX ORDER — ROPINIROLE 0.5 MG/1
0.5 TABLET, FILM COATED ORAL NIGHTLY
Qty: 90 TABLET | Refills: 3 | Status: SHIPPED | OUTPATIENT
Start: 2023-05-31

## 2023-05-31 RX ORDER — LORAZEPAM 0.5 MG/1
TABLET ORAL
COMMUNITY
Start: 2023-04-20

## 2023-05-31 RX ORDER — BUSPIRONE HYDROCHLORIDE 10 MG/1
1 TABLET ORAL 2 TIMES DAILY PRN
COMMUNITY
Start: 2023-01-12

## 2023-05-31 RX ORDER — NITROFURANTOIN MACROCRYSTALS 50 MG/1
50 CAPSULE ORAL 4 TIMES DAILY
COMMUNITY
Start: 2023-01-02

## 2023-05-31 NOTE — PROGRESS NOTES
Chief Complaint   Patient presents with   • Parkinson's Disease       Patient ID: Josephine Wolf is a 82 y.o. female.    HPI: I had the pleasure of seeing your patient today.  As you may know she is an 82-year-old female here for the management of benign essential tremor.  She also is here for the management of myoclonus.  We have been using the Requip for her at a dose of 0.25 mg nightly.  This has helped for management of this issue in the past however she reports since her last follow-up it has not been as helpful.  She does have myoclonus on an almost daily basis now.  It is a jerking movement of her torso arms and legs independently.  She denies any gait changes.  No recent dizziness however she did have some dizziness during a hospitalization recently.  That has resolved and was felt to be medication induced.  No resting tremor.  No shuffling gait.  No change of facial expressions.    The following portions of the patient's history were reviewed and updated as appropriate: allergies, current medications, past family history, past medical history, past social history, past surgical history and problem list.    Review of Systems   Musculoskeletal: Negative for back pain, gait problem and neck pain.   Allergic/Immunologic: Negative for environmental allergies, food allergies and immunocompromised state.   Neurological: Negative for dizziness, tremors, seizures, syncope, facial asymmetry, speech difficulty, weakness, light-headedness, numbness and headaches.   Hematological: Negative for adenopathy. Does not bruise/bleed easily.   Psychiatric/Behavioral: Negative for confusion, decreased concentration and sleep disturbance. The patient is not nervous/anxious.       I have reviewed the review of systems above performed by my medical assistant.      Vitals:    05/31/23 1320   BP: 124/62   Pulse: 71   SpO2: 97%       Neurologic Exam     Mental Status   Oriented to person, place, and time.   Concentration: normal.    Level of consciousness: alert  Knowledge: consistent with education (No deficits found.).     Cranial Nerves     CN II   Visual fields full to confrontation.     CN III, IV, VI   Pupils are equal, round, and reactive to light.  Extraocular motions are normal.   CN III: no CN III palsy  CN VI: no CN VI palsy    CN V   Facial sensation intact.     CN VII   Facial expression full, symmetric.     CN VIII   CN VIII normal.     CN IX, X   CN IX normal.   CN X normal.     CN XI   CN XI normal.     CN XII   CN XII normal.     Motor Exam     Strength   Right neck flexion: 5/5  Left neck flexion: 5/5  Right neck extension: 5/5  Left neck extension: 5/5  Right deltoid: 5/5  Left deltoid: 5/5  Right biceps: 5/5  Left biceps: 5/5  Right triceps: 5/5  Left triceps: 5/5  Right wrist flexion: 5/5  Left wrist flexion: 5/5  Right wrist extension: 5/5  Left wrist extension: 5/5  Right interossei: 5/5  Left interossei: 5/5  Right abdominals: 5/5  Left abdominals: 5/5  Right iliopsoas: 5/5  Left iliopsoas: 5/5  Right quadriceps: 5/5  Left quadriceps: 5/5  Right hamstrin/5  Left hamstrin/5  Right glutei: 5/5  Left glutei: 5/5  Right anterior tibial: 5/5  Left anterior tibial: 5/5  Right posterior tibial: 5/5  Left posterior tibial: 5/5  Right peroneal: 5/5  Left peroneal: 5/5  Right gastroc: 5/5  Left gastroc: 5/5    Sensory Exam   Light touch normal.   Vibration normal.     Gait, Coordination, and Reflexes     Gait  Gait: normal    Tremor   Intention tremor: present    Reflexes   Right brachioradialis: 2+  Left brachioradialis: 2+  Right biceps: 2+  Left biceps: 2+  Right triceps: 2+  Left triceps: 2+  Right patellar: 2+  Left patellar: 2+  Right achilles: 2+  Left achilles: 2+  Right : 2+  Left : 2+Station is normal.       Physical Exam  Vitals reviewed.   Constitutional:       Appearance: She is well-developed.   HENT:      Head: Normocephalic and atraumatic.   Eyes:      Extraocular Movements: EOM normal.       Pupils: Pupils are equal, round, and reactive to light.   Cardiovascular:      Rate and Rhythm: Normal rate and regular rhythm.   Pulmonary:      Breath sounds: Normal breath sounds.   Musculoskeletal:         General: Normal range of motion.   Skin:     General: Skin is warm.   Neurological:      Mental Status: She is oriented to person, place, and time.      Gait: Gait is intact.      Deep Tendon Reflexes:      Reflex Scores:       Tricep reflexes are 2+ on the right side and 2+ on the left side.       Bicep reflexes are 2+ on the right side and 2+ on the left side.       Brachioradialis reflexes are 2+ on the right side and 2+ on the left side.       Patellar reflexes are 2+ on the right side and 2+ on the left side.       Achilles reflexes are 2+ on the right side and 2+ on the left side.        Procedures    Assessment/Plan: Our plan is to increase the Requip 2.5 mg nightly.  She will forego medical management of benign essential tremor at this time.  We did discuss the prognosis with respect to benign essential tremor.  We will see her back in 6 months or sooner if needed. A total of 15 minutes was spent face-to-face with the patient today.  Of that greater than 50% of this time was spent discussing signs and symptoms of benign essential tremor, myoclonus, patient education, plan of care and prognosis.         Diagnoses and all orders for this visit:    1. Myoclonus (Primary)  -     rOPINIRole (REQUIP) 0.5 MG tablet; Take 1 tablet by mouth Every Night. Take 1 hour before bedtime.  Dispense: 90 tablet; Refill: 3    2. Benign essential tremor           Ross Braswell II, MD

## 2023-05-31 NOTE — LETTER
May 31, 2023       No Recipients    Patient: Josephine Wolf   YOB: 1940   Date of Visit: 5/31/2023       Dear Dr. To Recipients:    Thank you for referring Josephine Wolf to me for evaluation. Below are the relevant portions of my assessment and plan of care.    If you have questions, please do not hesitate to call me. I look forward to following Josephine along with you.         Sincerely,        Ross Braswell II, MD        CC:   No Recipients    Ross Braswell II, MD  05/31/23 1354  Signed  Chief Complaint   Patient presents with   • Parkinson's Disease       Patient ID: Josephine Wolf is a 82 y.o. female.    HPI: I had the pleasure of seeing your patient today.  As you may know she is an 82-year-old female here for the management of benign essential tremor.  She also is here for the management of myoclonus.  We have been using the Requip for her at a dose of 0.25 mg nightly.  This has helped for management of this issue in the past however she reports since her last follow-up it has not been as helpful.  She does have myoclonus on an almost daily basis now.  It is a jerking movement of her torso arms and legs independently.  She denies any gait changes.  No recent dizziness however she did have some dizziness during a hospitalization recently.  That has resolved and was felt to be medication induced.  No resting tremor.  No shuffling gait.  No change of facial expressions.    The following portions of the patient's history were reviewed and updated as appropriate: allergies, current medications, past family history, past medical history, past social history, past surgical history and problem list.    Review of Systems   Musculoskeletal: Negative for back pain, gait problem and neck pain.   Allergic/Immunologic: Negative for environmental allergies, food allergies and immunocompromised state.   Neurological: Negative for dizziness, tremors, seizures, syncope, facial asymmetry, speech  difficulty, weakness, light-headedness, numbness and headaches.   Hematological: Negative for adenopathy. Does not bruise/bleed easily.   Psychiatric/Behavioral: Negative for confusion, decreased concentration and sleep disturbance. The patient is not nervous/anxious.       I have reviewed the review of systems above performed by my medical assistant.      Vitals:    23 1320   BP: 124/62   Pulse: 71   SpO2: 97%       Neurologic Exam     Mental Status   Oriented to person, place, and time.   Concentration: normal.   Level of consciousness: alert  Knowledge: consistent with education (No deficits found.).     Cranial Nerves     CN II   Visual fields full to confrontation.     CN III, IV, VI   Pupils are equal, round, and reactive to light.  Extraocular motions are normal.   CN III: no CN III palsy  CN VI: no CN VI palsy    CN V   Facial sensation intact.     CN VII   Facial expression full, symmetric.     CN VIII   CN VIII normal.     CN IX, X   CN IX normal.   CN X normal.     CN XI   CN XI normal.     CN XII   CN XII normal.     Motor Exam     Strength   Right neck flexion: 5/5  Left neck flexion: 5/5  Right neck extension: 5/5  Left neck extension: 5/5  Right deltoid: 5/5  Left deltoid: 5/5  Right biceps: 5/5  Left biceps: 5/5  Right triceps: 5/5  Left triceps: 5/5  Right wrist flexion: 5/5  Left wrist flexion: 5/5  Right wrist extension: 5/5  Left wrist extension: 5/5  Right interossei: 5/5  Left interossei: 5/5  Right abdominals: 5/5  Left abdominals: 5/5  Right iliopsoas: 5/5  Left iliopsoas: 5/5  Right quadriceps: 5/5  Left quadriceps: 5/5  Right hamstrin/5  Left hamstrin/5  Right glutei: 5/5  Left glutei: 5/5  Right anterior tibial: 5/5  Left anterior tibial: 5/5  Right posterior tibial: 5/5  Left posterior tibial: 5/5  Right peroneal: 5/5  Left peroneal: 5/5  Right gastroc: 5/5  Left gastroc: 5/5    Sensory Exam   Light touch normal.   Vibration normal.     Gait, Coordination, and Reflexes      Gait  Gait: normal    Tremor   Intention tremor: present    Reflexes   Right brachioradialis: 2+  Left brachioradialis: 2+  Right biceps: 2+  Left biceps: 2+  Right triceps: 2+  Left triceps: 2+  Right patellar: 2+  Left patellar: 2+  Right achilles: 2+  Left achilles: 2+  Right : 2+  Left : 2+Station is normal.       Physical Exam  Vitals reviewed.   Constitutional:       Appearance: She is well-developed.   HENT:      Head: Normocephalic and atraumatic.   Eyes:      Extraocular Movements: EOM normal.      Pupils: Pupils are equal, round, and reactive to light.   Cardiovascular:      Rate and Rhythm: Normal rate and regular rhythm.   Pulmonary:      Breath sounds: Normal breath sounds.   Musculoskeletal:         General: Normal range of motion.   Skin:     General: Skin is warm.   Neurological:      Mental Status: She is oriented to person, place, and time.      Gait: Gait is intact.      Deep Tendon Reflexes:      Reflex Scores:       Tricep reflexes are 2+ on the right side and 2+ on the left side.       Bicep reflexes are 2+ on the right side and 2+ on the left side.       Brachioradialis reflexes are 2+ on the right side and 2+ on the left side.       Patellar reflexes are 2+ on the right side and 2+ on the left side.       Achilles reflexes are 2+ on the right side and 2+ on the left side.        Procedures    Assessment/Plan: Our plan is to increase the Requip 2.5 mg nightly.  She will forego medical management of benign essential tremor at this time.  We did discuss the prognosis with respect to benign essential tremor.  We will see her back in 6 months or sooner if needed. A total of 15 minutes was spent face-to-face with the patient today.  Of that greater than 50% of this time was spent discussing signs and symptoms of benign essential tremor, myoclonus, patient education, plan of care and prognosis.        Diagnoses and all orders for this visit:    1. Myoclonus (Primary)  -     rOPINIRole  (REQUIP) 0.5 MG tablet; Take 1 tablet by mouth Every Night. Take 1 hour before bedtime.  Dispense: 90 tablet; Refill: 3    2. Benign essential tremor          Ross Braswell II, MD

## 2023-07-23 ENCOUNTER — HOSPITAL ENCOUNTER (EMERGENCY)
Facility: HOSPITAL | Age: 83
Discharge: HOME | End: 2023-07-23
Attending: EMERGENCY MEDICINE | Admitting: EMERGENCY MEDICINE
Payer: MEDICARE

## 2023-07-23 ENCOUNTER — APPOINTMENT (OUTPATIENT)
Dept: GENERAL RADIOLOGY | Facility: HOSPITAL | Age: 83
End: 2023-07-23
Payer: MEDICARE

## 2023-07-23 ENCOUNTER — APPOINTMENT (OUTPATIENT)
Dept: CT IMAGING | Facility: HOSPITAL | Age: 83
End: 2023-07-23
Payer: MEDICARE

## 2023-07-23 VITALS
RESPIRATION RATE: 18 BRPM | BODY MASS INDEX: 31.15 KG/M2 | WEIGHT: 165 LBS | TEMPERATURE: 97 F | OXYGEN SATURATION: 94 % | HEIGHT: 61 IN | SYSTOLIC BLOOD PRESSURE: 115 MMHG | DIASTOLIC BLOOD PRESSURE: 79 MMHG | HEART RATE: 72 BPM

## 2023-07-23 DIAGNOSIS — R19.7 DIARRHEA, UNSPECIFIED TYPE: ICD-10-CM

## 2023-07-23 DIAGNOSIS — R11.0 NAUSEA: ICD-10-CM

## 2023-07-23 DIAGNOSIS — N18.9 CHRONIC RENAL IMPAIRMENT, UNSPECIFIED CKD STAGE: ICD-10-CM

## 2023-07-23 DIAGNOSIS — B34.9 VIRAL SYNDROME: Primary | ICD-10-CM

## 2023-07-23 DIAGNOSIS — R51.9 ACUTE NONINTRACTABLE HEADACHE, UNSPECIFIED HEADACHE TYPE: ICD-10-CM

## 2023-07-23 DIAGNOSIS — R05.1 ACUTE COUGH: ICD-10-CM

## 2023-07-23 DIAGNOSIS — Z87.09 HISTORY OF COPD: ICD-10-CM

## 2023-07-23 DIAGNOSIS — E86.0 MILD DEHYDRATION: ICD-10-CM

## 2023-07-23 LAB
ANION GAP SERPL CALCULATED.3IONS-SCNC: 9 MMOL/L (ref 5–15)
BASOPHILS # BLD AUTO: 0.02 10*3/MM3 (ref 0–0.2)
BASOPHILS NFR BLD AUTO: 0.4 % (ref 0–1.5)
BUN SERPL-MCNC: 17 MG/DL (ref 8–23)
BUN/CREAT SERPL: 10.6 (ref 7–25)
CALCIUM SPEC-SCNC: 8.9 MG/DL (ref 8.6–10.5)
CHLORIDE SERPL-SCNC: 102 MMOL/L (ref 98–107)
CO2 SERPL-SCNC: 32 MMOL/L (ref 22–29)
CREAT SERPL-MCNC: 1.61 MG/DL (ref 0.57–1)
DEPRECATED RDW RBC AUTO: 52.5 FL (ref 37–54)
EGFRCR SERPLBLD CKD-EPI 2021: 32 ML/MIN/1.73
EOSINOPHIL # BLD AUTO: 0.1 10*3/MM3 (ref 0–0.4)
EOSINOPHIL NFR BLD AUTO: 2.1 % (ref 0.3–6.2)
ERYTHROCYTE [DISTWIDTH] IN BLOOD BY AUTOMATED COUNT: 15.6 % (ref 12.3–15.4)
GLUCOSE SERPL-MCNC: 112 MG/DL (ref 65–99)
HCT VFR BLD AUTO: 28.5 % (ref 34–46.6)
HGB BLD-MCNC: 9.2 G/DL (ref 12–15.9)
IMM GRANULOCYTES # BLD AUTO: 0.03 10*3/MM3 (ref 0–0.05)
IMM GRANULOCYTES NFR BLD AUTO: 0.6 % (ref 0–0.5)
LYMPHOCYTES # BLD AUTO: 1.23 10*3/MM3 (ref 0.7–3.1)
LYMPHOCYTES NFR BLD AUTO: 25.3 % (ref 19.6–45.3)
MAGNESIUM SERPL-MCNC: 1.5 MG/DL (ref 1.6–2.4)
MCH RBC QN AUTO: 29.9 PG (ref 26.6–33)
MCHC RBC AUTO-ENTMCNC: 32.3 G/DL (ref 31.5–35.7)
MCV RBC AUTO: 92.5 FL (ref 79–97)
MONOCYTES # BLD AUTO: 0.64 10*3/MM3 (ref 0.1–0.9)
MONOCYTES NFR BLD AUTO: 13.1 % (ref 5–12)
NEUTROPHILS NFR BLD AUTO: 2.85 10*3/MM3 (ref 1.7–7)
NEUTROPHILS NFR BLD AUTO: 58.5 % (ref 42.7–76)
NRBC BLD AUTO-RTO: 0 /100 WBC (ref 0–0.2)
PLATELET # BLD AUTO: 167 10*3/MM3 (ref 140–450)
PMV BLD AUTO: 9.2 FL (ref 6–12)
POTASSIUM SERPL-SCNC: 4 MMOL/L (ref 3.5–5.2)
RBC # BLD AUTO: 3.08 10*6/MM3 (ref 3.77–5.28)
SARS-COV-2 RNA RESP QL NAA+PROBE: NOT DETECTED
SODIUM SERPL-SCNC: 143 MMOL/L (ref 136–145)
WBC NRBC COR # BLD: 4.87 10*3/MM3 (ref 3.4–10.8)

## 2023-07-23 PROCEDURE — 71045 X-RAY EXAM CHEST 1 VIEW: CPT

## 2023-07-23 PROCEDURE — 96374 THER/PROPH/DIAG INJ IV PUSH: CPT

## 2023-07-23 PROCEDURE — 99284 EMERGENCY DEPT VISIT MOD MDM: CPT

## 2023-07-23 PROCEDURE — 80048 BASIC METABOLIC PNL TOTAL CA: CPT | Performed by: EMERGENCY MEDICINE

## 2023-07-23 PROCEDURE — 85025 COMPLETE CBC W/AUTO DIFF WBC: CPT | Performed by: EMERGENCY MEDICINE

## 2023-07-23 PROCEDURE — 36415 COLL VENOUS BLD VENIPUNCTURE: CPT

## 2023-07-23 PROCEDURE — 25010000002 ONDANSETRON PER 1 MG: Performed by: EMERGENCY MEDICINE

## 2023-07-23 PROCEDURE — 87635 SARS-COV-2 COVID-19 AMP PRB: CPT | Performed by: EMERGENCY MEDICINE

## 2023-07-23 PROCEDURE — 83735 ASSAY OF MAGNESIUM: CPT | Performed by: EMERGENCY MEDICINE

## 2023-07-23 PROCEDURE — 70450 CT HEAD/BRAIN W/O DYE: CPT

## 2023-07-23 RX ORDER — HYDROCODONE BITARTRATE AND ACETAMINOPHEN 7.5; 325 MG/1; MG/1
1 TABLET ORAL ONCE
Status: COMPLETED | OUTPATIENT
Start: 2023-07-23 | End: 2023-07-23

## 2023-07-23 RX ORDER — ONDANSETRON 4 MG/1
4 TABLET, ORALLY DISINTEGRATING ORAL EVERY 8 HOURS PRN
Qty: 10 TABLET | Refills: 0 | Status: SHIPPED | OUTPATIENT
Start: 2023-07-23 | End: 2023-07-26

## 2023-07-23 RX ORDER — ONDANSETRON 2 MG/ML
4 INJECTION INTRAMUSCULAR; INTRAVENOUS ONCE
Status: COMPLETED | OUTPATIENT
Start: 2023-07-23 | End: 2023-07-23

## 2023-07-23 RX ADMIN — HYDROCODONE BITARTRATE AND ACETAMINOPHEN 1 TABLET: 7.5; 325 TABLET ORAL at 01:26

## 2023-07-23 RX ADMIN — ONDANSETRON 4 MG: 2 INJECTION INTRAMUSCULAR; INTRAVENOUS at 01:28

## 2023-07-23 RX ADMIN — SODIUM CHLORIDE 1000 ML: 9 INJECTION, SOLUTION INTRAVENOUS at 01:29

## 2023-07-23 NOTE — DISCHARGE INSTRUCTIONS
Stay well-hydrated, continue current medications, PCP follow-up next week for recheck as needed, ED return for worsening symptoms as needed

## 2023-07-23 NOTE — ED NOTES
BIBA from home, nausea, no vomiting, diarrhea today, HA, no vision changes, ambulates on her own, she is on 3L n/c at home for COPD

## 2023-07-23 NOTE — ED PROVIDER NOTES
EMERGENCY DEPARTMENT ENCOUNTER    Room Number:  22/22  PCP: Bernabe Guy MD  Historian: Patient      HPI:  Chief Complaint: Headache, nausea, cough, diarrhea  A complete HPI/ROS/PMH/PSH/SH/FH are unobtainable due to: None    Context: Josephine Wolf is a 83 y.o. female who presents to the ED via EMS from home with headache that started around 3 PM yesterday afternoon, had nausea, new onset cough without significant shortness of breath, mild diarrhea.  Has not had any vomiting.  No dysuria.  No fevers, chills.  Decreased p.o. intake due to the nausea.  Headache is unusual for her she does not typically get headaches denies any dizziness or balance issues.  Took Tylenol around 6 PM that did not help her headache.  Denies any sore throat or ear pain.      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    PAST MEDICAL HISTORY  Active Ambulatory Problems     Diagnosis Date Noted    Anemia 10/19/2017    OA (osteoarthritis) of knee 11/16/2017    Chronic respiratory failure with hypoxia 12/02/2017    Cellulitis of skin 12/06/2017    Acute kidney injury 12/06/2017    Sepsis 12/06/2017    Orthostatic hypotension 06/24/2018    Disease of thyroid gland 06/24/2018    Hypertension 06/24/2018    Dehydration 06/24/2018    Stage 3 chronic kidney disease 06/25/2018    Closed compression fracture of L1 lumbar vertebra 06/16/2019    Hyponatremia 06/16/2019    Osteoporosis with pathological fracture 06/17/2019    Acute on chronic respiratory failure with hypoxia and hypercapnia 03/12/2020    Obesity (BMI 30-39.9) 03/12/2020    Nocturnal hypoxia 03/13/2020    CKD (chronic kidney disease) stage 2, GFR 60-89 ml/min 03/13/2020    Acute on chronic respiratory failure with hypoxia  2020    Abdominal pain 10/18/2021    Acute exacerbation of chronic obstructive pulmonary disease (COPD) 2022    Acute UTI 10/19/2022    Metabolic encephalopathy 10/23/2022    COPD with acute exacerbation 2023     Resolved Ambulatory Problems     Diagnosis Date Noted    COPD with acute exacerbation 2018     Past Medical History:   Diagnosis Date    Acid reflux     Arthritis     Bipolar 1 disorder, depressed     Cataract     Chronic nausea     Chronic pain of right knee     Continuous leakage of urine     COPD (chronic obstructive pulmonary disease)     Diverticulosis     Fibromyalgia     Fibromyalgia, primary     Frequent episodes of bronchitis     HL (hearing loss)     Hypothyroidism     Memory loss     Migraines     Neck pain     On home oxygen therapy     Short of breath on exertion     Sleep apnea          PAST SURGICAL HISTORY  Past Surgical History:   Procedure Laterality Date    CEREBRAL ANEURYSM REPAIR      WITH STENT    HYSTERECTOMY      JOINT REPLACEMENT      LAPAROSCOPIC CHOLECYSTECTOMY      KS ARTHRP KNE CONDYLE&PLATU MEDIAL&LAT COMPARTMENTS Right 2017    Procedure: RT TOTAL KNEE ARTHROPLASTY;  Surgeon: Kashif Perez MD;  Location: [facility];  Service: Orthopedics         FAMILY HISTORY  Family History   Problem Relation Age of Onset    Malig Hyperthermia Neg Hx          SOCIAL HISTORY  Social History     Socioeconomic History    Marital status:    Tobacco Use    Smoking status: Former     Packs/day: 1.00     Years: 10.00     Pack years: 10.00     Types: Cigarettes     Start date:      Quit date:      Years since quittin.5    Smokeless tobacco: Never   Vaping Use    Vaping Use: Never used   Substance and Sexual Activity    Alcohol use: No     Comment: CAFFEINE NO     Drug use: No    Sexual activity: Defer         ALLERGIES  Morphine and related and Fenofibrate        REVIEW OF SYSTEMS  Review of Systems     All systems reviewed and negative  except for those discussed in HPI.       PHYSICAL EXAM    I have reviewed the triage vital signs and nursing notes.    ED Triage Vitals [07/23/23 0030]   Temp Heart Rate Resp BP SpO2   97 °F (36.1 °C) 68 18 108/48 95 %      Temp src Heart Rate Source Patient Position BP Location FiO2 (%)   -- -- -- -- --       Physical Exam  General: No acute distress, nontoxic  HEENT: Mucous membranes dry, atraumatic, EOMI  Neck: Full ROM  Pulm: Symmetric chest rise, nonlabored, lungs CTAB  Cardiovascular: Regular rate and rhythm, intact distal pulses  GI: Soft, nontender, nondistended, no rebound, no guarding, bowel sounds present  MSK: Full ROM, no deformity  Skin: Warm, dry  Neuro: Awake, alert, oriented x 4, GCS 15, no facial droop, no dysarthria aphasia, moving all extremities, no focal deficits  Psych: Calm, cooperative        LAB RESULTS  Recent Results (from the past 24 hour(s))   Basic Metabolic Panel    Collection Time: 07/23/23 12:53 AM    Specimen: Blood   Result Value Ref Range    Glucose 112 (H) 65 - 99 mg/dL    BUN 17 8 - 23 mg/dL    Creatinine 1.61 (H) 0.57 - 1.00 mg/dL    Sodium 143 136 - 145 mmol/L    Potassium 4.0 3.5 - 5.2 mmol/L    Chloride 102 98 - 107 mmol/L    CO2 32.0 (H) 22.0 - 29.0 mmol/L    Calcium 8.9 8.6 - 10.5 mg/dL    BUN/Creatinine Ratio 10.6 7.0 - 25.0    Anion Gap 9.0 5.0 - 15.0 mmol/L    eGFR 32.0 (L) >60.0 mL/min/1.73   COVID-19,[facility] IN-HOUSE CEPHEID/NED NP SWAB IN TRANSPORT MEDIA 8-12 HR TAT - Swab, Nasopharynx    Collection Time: 07/23/23 12:53 AM    Specimen: Nasopharynx; Swab   Result Value Ref Range    COVID19 Not Detected Not Detected - Ref. Range   Magnesium    Collection Time: 07/23/23 12:53 AM    Specimen: Blood   Result Value Ref Range    Magnesium 1.5 (L) 1.6 - 2.4 mg/dL   CBC Auto Differential    Collection Time: 07/23/23 12:53 AM    Specimen: Blood   Result Value Ref Range    WBC 4.87 3.40 - 10.80 10*3/mm3    RBC 3.08 (L) 3.77 - 5.28 10*6/mm3    Hemoglobin 9.2 (L) 12.0 - 15.9  g/dL    Hematocrit 28.5 (L) 34.0 - 46.6 %    MCV 92.5 79.0 - 97.0 fL    MCH 29.9 26.6 - 33.0 pg    MCHC 32.3 31.5 - 35.7 g/dL    RDW 15.6 (H) 12.3 - 15.4 %    RDW-SD 52.5 37.0 - 54.0 fl    MPV 9.2 6.0 - 12.0 fL    Platelets 167 140 - 450 10*3/mm3    Neutrophil % 58.5 42.7 - 76.0 %    Lymphocyte % 25.3 19.6 - 45.3 %    Monocyte % 13.1 (H) 5.0 - 12.0 %    Eosinophil % 2.1 0.3 - 6.2 %    Basophil % 0.4 0.0 - 1.5 %    Immature Grans % 0.6 (H) 0.0 - 0.5 %    Neutrophils, Absolute 2.85 1.70 - 7.00 10*3/mm3    Lymphocytes, Absolute 1.23 0.70 - 3.10 10*3/mm3    Monocytes, Absolute 0.64 0.10 - 0.90 10*3/mm3    Eosinophils, Absolute 0.10 0.00 - 0.40 10*3/mm3    Basophils, Absolute 0.02 0.00 - 0.20 10*3/mm3    Immature Grans, Absolute 0.03 0.00 - 0.05 10*3/mm3    nRBC 0.0 0.0 - 0.2 /100 WBC       Ordered the above labs and independently interpreted results. My findings will be discussed in the medical decision making section below        RADIOLOGY  CT Head Without Contrast    Result Date: 7/23/2023  CT HEAD WITHOUT CONTRAST  HISTORY: Acute severe headache  COMPARISON: 11/09/2021  TECHNIQUE: Axial CT imaging was obtained through the brain. No IV contrast was administered.  FINDINGS: No acute intracranial hemorrhage is seen. There is diffuse atrophy. There is periventricular and deep white matter microangiopathic change. There is no midline shift or mass effect. There is some mucosal thickening noted within the ethmoid sinuses. The patient has a right internal carotid artery stent.      No acute intracranial findings.  Radiation dose reduction techniques were utilized, including automated exposure control and exposure modulation based on body size.  This report was finalized on 7/23/2023 1:16 AM by Dr. Melissa Camargo M.D.      XR Chest 1 View    Result Date: 7/23/2023  SINGLE VIEW OF THE CHEST  HISTORY: Fall  COMPARISON: 06/29/2023  FINDINGS: There is cardiomegaly. There is calcification of the aorta. There is elevation of  the right hemidiaphragm, with associated bronchovascular crowding. There is some linear scarring noted at the left lung base. No pneumothorax or pleural effusion is seen.      No acute findings.  This report was finalized on 7/23/2023 1:04 AM by Dr. Melissa Camargo M.D.       Ordered the above noted radiological studies.  Independently interpreted by me and my independent review of findings can be found in the ED Course.  See dictation for official radiology interpretation.      PROCEDURES    Procedures      MEDICATIONS GIVEN IN ER    Medications   sodium chloride 0.9 % bolus 1,000 mL (1,000 mL Intravenous New Bag 7/23/23 0129)   ondansetron (ZOFRAN) injection 4 mg (4 mg Intravenous Given 7/23/23 0128)   HYDROcodone-acetaminophen (NORCO) 7.5-325 MG per tablet 1 tablet (1 tablet Oral Given 7/23/23 0126)         PROGRESS, DATA ANALYSIS, CONSULTS, AND MEDICAL DECISION MAKING    Please note that this section constitutes my independent interpretation of clinical data including lab results, radiology, EKG's.  This constitutes my independent professional opinion regarding differential diagnosis and management of this patient.  It may include any factors such as history from outside sources, review of external records, social determinants of health, management of medications, response to those treatments, and discussions with other providers.    Differential Diagnosis and Plan: Initial concern for viral process given the constellation of symptoms including headache, nausea, cough, diarrhea.  Other considerations for intracranial hemorrhage, pneumonia, dehydration, renal failure, electrolyte abnormalities, among others.  Plan for CT head, labs, chest x-ray, supportive care, and reevaluation with results.    Additional sources:  - Discussed/ obtained information from independent historians:       - Chronic or social conditions impacting care:      - Shared decision making:  Patient fully updated on and in agreement with the  course and plan moving forward    ED Course as of 07/23/23 0237   Sun Jul 23, 2023   0100 XR Chest 1 View  Per my independent interpretation of the chest x-ray, no evidence of pneumothorax [DC]   0122 WBC: 4.87 [DC]   0122 Hemoglobin(!): 9.2  9.3 two weeks ago [DC]   0132 Glucose(!): 112 [DC]   0132 BUN: 17 [DC]   0132 Creatinine(!): 1.61  1.36 two weeks ago [DC]   0132 Sodium: 143 [DC]   0132 Potassium: 4.0 [DC]   0132 Magnesium(!): 1.5 [DC]   0132 COVID19: Not Detected [DC]   0132 CT Head Without Contrast  Radiology report reviewed, no evidence of any intracranial hemorrhage [DC]   0132 XR Chest 1 View  Radiology report reviewed, no evidence of any acute emergent findings [DC]   0219 On reevaluation the patient is feeling better, has some mild residual nausea but the headache is much improved.  Discussed the work-up today, I think overall this is probably viral in nature given the constellation of symptoms including the headache, nausea, cough, diarrhea.  COVID is negative.  We will send in a prescription for Zofran for home use, recommend increase fluid intake, outpatient PCP follow-up as needed, ED return for worsening symptoms as needed.  She is comfortable with plan for discharge at this time and understands need for return for worsening symptoms.  All questions and concerns addressed. [DC]      ED Course User Index  [DC] Stanford Ramirez MD       Hospitalization Considered?:     Orders Placed During This Visit:  Orders Placed This Encounter   Procedures    COVID-19,[facility] IN-HOUSE CEPHEID/NED NP SWAB IN TRANSPORT MEDIA 8-12 HR TAT - Swab, Nasopharynx    CT Head Without Contrast    XR Chest 1 View    Basic Metabolic Panel    Magnesium    CBC Auto Differential    CBC & Differential       Additional orders considered but not placed:      Independent interpretation of labs, radiology studies, and discussions with consultants: See ED Course        AS OF 02:37 EDT VITALS:    BP - (!) 150/103  HR - 67  TEMP - 97 °F  (36.1 °C)  02 SATS - 93%        DIAGNOSIS  Final diagnoses:   Viral syndrome   Acute nonintractable headache, unspecified headache type   Nausea   Diarrhea, unspecified type   Acute cough   Mild dehydration   History of COPD   Chronic renal impairment, unspecified CKD stage         DISPOSITION  DISCHARGE    Patient discharged in stable condition.    Reviewed implications of results, diagnosis, meds, responsibility to follow up, warning signs and symptoms of possible worsening, potential complications and reasons to return to ER. If your blood pressure > 120/80 please follow up with your primary care provider for further management.    Patient/Family voiced understanding of above instructions.    Discussed plan for discharge, as there is no emergent indication for admission. Pt/family is agreeable and understands need for follow up and repeat testing.  Pt is aware that discharge does not mean that nothing is wrong but it indicates no emergency is present that requires admission and they must continue care with follow-up as given below or physician of their choice.     FOLLOW-UP  [facility] EMERGENCY DEPARTMENT  [address] [facility] [address] [phone]    As needed, If symptoms worsen         Medication List        New Prescriptions      ondansetron ODT 4 MG disintegrating tablet  Commonly known as: ZOFRAN-ODT  Place 1 tablet on the tongue Every 8 (Eight) Hours As Needed for Nausea or Vomiting for up to 3 days.               Where to Get Your Medications        These medications were sent to [id number] - [place], [place] - [address] AT SEC OF [facility] - [phone]  - [phone] FX  [address], [facility] [address]      Phone: [phone]   ondansetron ODT 4 MG disintegrating tablet                       --    Please note that portions of this were completed with a voice recognition program.       Note Disclaimer: At  [facility], we believe that sharing information builds trust and better relationships. You are receiving this note because you are receiving care at [facility] or recently visited. It is possible you will see health information before a provider has talked with you about it. This kind of information can be easy to misunderstand. To help you fully understand what it means for your health, we urge you to discuss this note with your provider.           Stanford Ramirez MD  07/23/23 8341

## 2023-07-23 NOTE — ED TRIAGE NOTES
STS LKW 1500 was sitting in her chair when she became dizzy and had nausea and a headache that would not go away, em gave 4 mg zofran iv and 150ml LR enroute

## 2023-07-25 ENCOUNTER — READMISSION MANAGEMENT (OUTPATIENT)
Dept: CALL CENTER | Facility: HOSPITAL | Age: 83
End: 2023-07-25
Payer: MEDICARE

## 2023-07-25 NOTE — OUTREACH NOTE
COPD/PN Week 3 Survey      Flowsheet Row Responses   [facility] patient discharged from? [facility]   Does the patient have one of the following disease processes/diagnoses(primary or secondary)? COPD   Week 3 attempt successful? Yes   Call start time 1816   Call end time 1819   Discharge diagnosis Nonsustained ventricular tachycardia artifact per cardiology   Meds reviewed with patient/caregiver? Yes   Does the patient have all medications ordered at discharge? Yes   Is the patient taking all medications as directed (includes completed medication regime)? Yes   Does the patient have a primary care provider?  Yes   Does the patient have an appointment with their PCP or specialist within 7 days of discharge? Greater than 7 days   What is preventing the patient from scheduling follow up appointments within 7 days of discharge? Earlier appointment not available   Nursing Interventions Verified appointment date/time/provider   Has the patient kept scheduled appointments due by today? N/A   Has home health visited the patient within 72 hours of discharge? N/A   Pulse Ox monitoring Intermittent   Pulse Ox device source Patient   O2 Sat comments 91-93%  on 4L O2   O2 Sat: education provided Sat levels, When to seek care   Psychosocial issues? No   Did the patient receive a copy of their discharge instructions? Yes   Nursing interventions Reviewed instructions with patient   What is the patient's perception of their health status since discharge? Improving   Is the patient/caregiver able to teach back the hierarchy of who to call/visit for symptoms/problems? PCP, Specialist, Home health nurse, Urgent Care, ED, 911 Yes   Is the patient able to teach back COPD zones? Yes   Patient reports what zone on this call? Green Zone   Green Zone Reports doing well, Breathing without shortness of breath, Usual amounts of cough and phlegm/mucous, Usual activity and exercise level, Slept well last night, Appetite is good   Green  Zone interventions: Take daily medications, Use oxygen as prescribed, Continue regular exercise/diet plan   Week 3 call completed? Yes   Graduated Yes   Is the patient interested in additional calls from an ambulatory ?  NOTE:  applies to high risk patients requiring additional follow-up. No   Would this patient benefit from a Referral to [facility] Social Work? No   Call end time 1819            Krys FLANNERY - Registered Nurse

## 2023-08-05 ENCOUNTER — HOSPITAL ENCOUNTER (EMERGENCY)
Facility: HOSPITAL | Age: 83
Discharge: HOME | End: 2023-08-05
Attending: EMERGENCY MEDICINE
Payer: MEDICARE

## 2023-08-05 ENCOUNTER — APPOINTMENT (OUTPATIENT)
Dept: GENERAL RADIOLOGY | Facility: HOSPITAL | Age: 83
End: 2023-08-05
Payer: MEDICARE

## 2023-08-05 VITALS
TEMPERATURE: 98.1 F | BODY MASS INDEX: 29.02 KG/M2 | RESPIRATION RATE: 17 BRPM | HEIGHT: 64 IN | WEIGHT: 170 LBS | HEART RATE: 74 BPM | OXYGEN SATURATION: 94 % | DIASTOLIC BLOOD PRESSURE: 89 MMHG | SYSTOLIC BLOOD PRESSURE: 157 MMHG

## 2023-08-05 DIAGNOSIS — S86.912A KNEE STRAIN, LEFT, INITIAL ENCOUNTER: Primary | ICD-10-CM

## 2023-08-05 PROCEDURE — 73560 X-RAY EXAM OF KNEE 1 OR 2: CPT

## 2023-08-05 PROCEDURE — 99283 EMERGENCY DEPT VISIT LOW MDM: CPT

## 2023-08-05 RX ORDER — HYDROCODONE BITARTRATE AND ACETAMINOPHEN 5; 325 MG/1; MG/1
1 TABLET ORAL EVERY 6 HOURS PRN
Status: DISCONTINUED | OUTPATIENT
Start: 2023-08-05 | End: 2023-08-05 | Stop reason: HOSPADM

## 2023-08-05 RX ADMIN — HYDROCODONE BITARTRATE AND ACETAMINOPHEN 1 TABLET: 5; 325 TABLET ORAL at 12:52

## 2023-08-05 NOTE — ED NOTES
"Patient presents to the ED with complaint of  left knee pain  for the following  1 day. Patient is alert, oriented x3. Patient denies  fall or injury . Nursing Assessment completed at this time.    /100   Pulse 86   Temp 98.1 øF (36.7 øC) (Oral)   Resp 18   Ht 162.6 cm (64\")   Wt 77.1 kg (170 lb)   SpO2 94%   BMI 29.18 kg/mý   General appearance: alert, appears stated age, and cooperative  Extremities: extremities normal, atraumatic, no cyanosis or edema and left knee pain       Patient to ED today with c/o left knee pain since yesterday. Patient states she was getting out of the car when she states her knee gave out. Denies falling, no known injury per patient. States she normally walks unassisted. Awake and alert, no distress.    No distress noted. Will monitor for changes. VS below    I have reviewed the patient's current vital signs as documented in the patient's EMR.   "

## 2023-08-05 NOTE — ED PROVIDER NOTES
EMERGENCY DEPARTMENT ENCOUNTER    Room Number:  21/21  PCP: Bernabe Guy MD      HPI:  Chief Complaint: Left knee pain  A complete HPI/ROS/PMH/PSH/SH/FH are unobtainable due to: None  Context: Josephine Wolf is a 83 y.o. female who presents to the ED c/o left knee pain.  States that she was walking yesterday evening when her knee gave out and she felt sudden pain.  Pain is constant to the medial left knee.  She cannot bear weight.  She does not have any hip pain.        PAST MEDICAL HISTORY  Active Ambulatory Problems     Diagnosis Date Noted    Anemia 10/19/2017    OA (osteoarthritis) of knee 11/16/2017    Chronic respiratory failure with hypoxia 12/02/2017    Cellulitis of skin 12/06/2017    Acute kidney injury 12/06/2017    Sepsis 12/06/2017    Orthostatic hypotension 06/24/2018    Disease of thyroid gland 06/24/2018    Hypertension 06/24/2018    Dehydration 06/24/2018    Stage 3 chronic kidney disease 06/25/2018    Closed compression fracture of L1 lumbar vertebra 06/16/2019    Hyponatremia 06/16/2019    Osteoporosis with pathological fracture 06/17/2019    Acute on chronic respiratory failure with hypoxia and hypercapnia 03/12/2020    Obesity (BMI 30-39.9) 03/12/2020    Nocturnal hypoxia 03/13/2020    CKD (chronic kidney disease) stage 2, GFR 60-89 ml/min 03/13/2020    Acute on chronic respiratory failure with hypoxia 05/20/2020    Abdominal pain 10/18/2021    Acute exacerbation of chronic obstructive pulmonary disease (COPD) 09/29/2022    Acute UTI 10/19/2022    Metabolic encephalopathy 10/23/2022    COPD with acute exacerbation 03/01/2023     Resolved Ambulatory Problems     Diagnosis Date Noted    COPD with acute exacerbation 06/24/2018     Past Medical History:   Diagnosis Date    Acid reflux     Arthritis     Bipolar 1 disorder, depressed     Cataract     Chronic nausea     Chronic pain of right knee     Continuous leakage of urine     COPD (chronic obstructive pulmonary disease)      Diverticulosis     Fibromyalgia     Fibromyalgia, primary     Frequent episodes of bronchitis     HL (hearing loss)     Hypothyroidism     Memory loss     Migraines     Neck pain     On home oxygen therapy     Short of breath on exertion     Sleep apnea          PAST SURGICAL HISTORY  Past Surgical History:   Procedure Laterality Date    CEREBRAL ANEURYSM REPAIR      WITH STENT    HYSTERECTOMY      JOINT REPLACEMENT      LAPAROSCOPIC CHOLECYSTECTOMY      [place] ARTHRP KNE CONDYLE&PLATU MEDIAL&LAT COMPARTMENTS Right 2017    Procedure: RT TOTAL KNEE ARTHROPLASTY;  Surgeon: Kashif Perez MD;  Location: [facility] OR;  Service: Orthopedics         FAMILY HISTORY  Family History   Problem Relation Age of Onset    Malig Hyperthermia Neg Hx          SOCIAL HISTORY  Social History     Socioeconomic History    Marital status:    Tobacco Use    Smoking status: Former     Packs/day: 1.00     Years: 10.00     Pack years: 10.00     Types: Cigarettes     Start date:      Quit date:      Years since quittin.6    Smokeless tobacco: Never   Vaping Use    Vaping Use: Never used   Substance and Sexual Activity    Alcohol use: No     Comment: CAFFEINE NO     Drug use: No    Sexual activity: Defer         ALLERGIES  Morphine and related and Fenofibrate        REVIEW OF SYSTEMS  Review of Systems     All systems reviewed and negative except for those discussed in HPI.       PHYSICAL EXAM  ED Triage Vitals [23 1208]   Temp Heart Rate Resp BP SpO2   98.1 øF (36.7 øC) 86 18 160/100 94 %      Temp src Heart Rate Source Patient Position BP Location FiO2 (%)   Oral -- -- -- --       Physical Exam      GENERAL: no acute distress  HENT: nares patent  EYES: no scleral icterus  CV: regular rhythm, normal rate, 2+ left DP pulse  RESPIRATORY: normal effort  ABDOMEN: soft, nontender  MUSCULOSKELETAL: Intact left knee extension, medial knee tenderness on the left, no effusion noted, pain with both valgus and varus  stress, stable knee ligaments  NEURO: alert, moves all extremities, follows commands  PSYCH:  calm, cooperative  SKIN: No overlying redness warmth to left knee    Vital signs and nursing notes reviewed.          LAB RESULTS  No results found for this or any previous visit (from the past 24 hour(s)).    Ordered the above labs and reviewed the results.        RADIOLOGY  XR Knee 1 or 2 View Left    Result Date: 8/5/2023  XR KNEE 1 OR 2 VW LEFT-  08/05/2023  HISTORY: Left knee pain.  There is moderate medial tibiofemoral joint space narrowing. There is some chondrocalcinosis of the lateral meniscus. Minimal hypertrophic changes are seen in the knee. No fractures are seen.      1. Mild to moderate degenerative arthritis of the left knee.  This report was finalized on 8/5/2023 1:11 PM by Dr. Oracio Barry M.D.       Ordered the above noted radiological studies. Reviewed by me in PACS.          PROCEDURES  Procedures          MEDICATIONS GIVEN IN ER  Medications   HYDROcodone-acetaminophen (NORCO) 5-325 MG per tablet 1 tablet (1 tablet Oral Given 8/5/23 1252)         MEDICAL DECISION MAKING, PROGRESS, and CONSULTS    Discussion below represents my analysis of pertinent findings related to patient's condition, differential diagnosis, treatment plan and final disposition.      Orders placed during this visit:  Orders Placed This Encounter   Procedures    XR Knee 1 or 2 View Left         Additional sources:      - External (non-ED) record review: On medical chart review, reviewed her most recent discharge summary from 7/5/2020 by Dr. Fowler.  Patient was admitted to the hospital with nonsustained V. tach felt to be artifact per cardiology.  Paroxysmal SVT that was asymptomatic.  Right lung pneumonia.              Differential diagnosis:    Fracture, dislocation, strain.  Exam is not consistent with infection or vascular issues.        Independent interpretation of labs, radiology studies, and discussions with  consultants:  ED Course as of 08/05/23 1340   Sat Aug 05, 2023   1318 X-ray of the left knee independently interpreted myself.  I see no fracture or degenerative changes noted.  No dislocation. [TD]      ED Course User Index  [TD] Juan J Barboza II, MD         Patient declines crutches as she states that she cannot use them.  She wants to use a walker which she already has available at home.  We road tested her here in the emergency department and she tolerated this well.  Thing that she is safe for discharge home.  Will use over-the-counter medications for pain control.  Follow-up with orthopedics if not improved within a week.      DIAGNOSIS  Final diagnoses:   Knee strain, left, initial encounter         DISPOSITION  DISCHARGE    FOLLOW-UP  Bernabe Guy MD  [address]  [place] [id number] [phone]    Schedule an appointment as soon as possible for a visit       Chino Mann MD  [address] [id number]  [phone]    Call in 2 days  Schedule an appointment         Medication List        Stop      famotidine 20 MG tablet  Commonly known as: PEPCID     guaiFENesin 600 MG 12 hr tablet  Commonly known as: MUCINEX                  Latest Documented Vital Signs:  As of 13:40 EDT  BP- 160/100 HR- 86 Temp- 98.1 øF (36.7 øC) (Oral) O2 sat- 94%      --    Please note that portions of this were completed with a voice recognition program.       Note Disclaimer: At [facility], we believe that sharing information builds trust and better relationships. You are receiving this note because you are receiving care at [facility] or recently visited. It is possible you will see health information before a provider has talked with you about it. This kind of information can be easy to misunderstand. To help you fully understand what it means for your health, we urge you to discuss this note with your provider.         Juan J Barboza II, MD  08/05/23 2985

## 2023-09-13 ENCOUNTER — TRANSCRIBE ORDERS (OUTPATIENT)
Dept: ADMINISTRATIVE | Facility: HOSPITAL | Age: 83
End: 2023-09-13
Payer: MEDICARE

## 2023-09-13 DIAGNOSIS — J18.9 PNEUMONIA DUE TO INFECTIOUS ORGANISM, UNSPECIFIED LATERALITY, UNSPECIFIED PART OF LUNG: Primary | ICD-10-CM

## 2023-09-30 PROBLEM — J44.1 COPD EXACERBATION: Status: ACTIVE | Noted: 2023-01-01

## 2023-09-30 NOTE — CONSULTS
Patient Identification:  Josephine Wolf  83 y.o.  female  1940  [id number]          LOS 0    Requesting physician: Dr Fowler    Reason for Consultation: Acute hypoxemia and COPD exacerbation    History of Present Illness:   83-year-old female admitted by hospitalist service with COPD exacerbation for which we have been consulted help with management.  Has had worsening shortness of breath for the past week.  Recently seen in urgent care and treated with steroid and doxycycline without improvement.  Moderate cough nonproductive.  Did have a productive cough earlier in the week.  Chronically uses 3 L supplemental oxygen at home for chronic hypoxemic respiratory failure.  Complains of fatigue, shortness of breath and lack of energy.  She feels that this is similar to previous exacerbations of COPD that she has had in the past.    Past Medical History:  Past Medical History:   Diagnosis Date    Acid reflux     Acute kidney injury     Anemia     Arthritis     Bipolar 1 disorder, depressed     Cataract     MINDY    Chronic nausea     Chronic pain of right knee     Continuous leakage of urine     USES DEPENDS    COPD (chronic obstructive pulmonary disease)     USES O2 2 LMP PER NC AT NIGHT    Disease of thyroid gland     HYPOTHYROIDISM    Diverticulosis     Fibromyalgia     DX 1994    Fibromyalgia, primary     Frequent episodes of bronchitis     HL (hearing loss)     Hypertension     Hypothyroidism     Memory loss     Migraines     Neck pain     On home oxygen therapy     2L NC AT NIGHT    Orthostatic hypotension     Short of breath on exertion     Sleep apnea     Stage 3 chronic kidney disease        Past Surgical History:  Past Surgical History:   Procedure Laterality Date    CEREBRAL ANEURYSM REPAIR  2000'S    WITH STENT    HYSTERECTOMY      JOINT REPLACEMENT      LAPAROSCOPIC CHOLECYSTECTOMY      [place] ARTHRP KNE CONDYLE&PLATU MEDIAL&LAT COMPARTMENTS Right 11/16/2017    Procedure: RT TOTAL KNEE ARTHROPLASTY;   Surgeon: Kashif Perez MD;  Location: [facility] OR;  Service: Orthopedics        Home Meds:  Medications Prior to Admission   Medication Sig Dispense Refill Last Dose    acetaminophen (TYLENOL) 650 MG 8 hr tablet Take 1 tablet by mouth Every 8 (Eight) Hours As Needed for Mild Pain.       albuterol (PROVENTIL) (2.5 MG/3ML) 0.083% nebulizer solution Take 2.5 mg by nebulization Every 4 (Four) Hours As Needed.       allopurinol (ZYLOPRIM) 100 MG tablet Take 1 tablet by mouth Daily.       amLODIPine (NORVASC) 5 MG tablet Take 1 tablet by mouth Daily.       budesonide-formoterol (SYMBICORT) 160-4.5 MCG/ACT inhaler Inhale 2 puffs 2 (Two) Times a Day. 1 inhaler 11     bumetanide (BUMEX) 1 MG tablet Take 1 tablet by mouth Daily.       busPIRone (BUSPAR) 10 MG tablet Take 1 tablet by mouth 2 (Two) Times a Day As Needed. for anxiety       Cholecalciferol 25 MCG (1000 UT) tablet Take 1 tablet by mouth Daily.       dicyclomine (BENTYL) 10 MG capsule Take 2 capsules by mouth 3 (Three) Times a Day.       DULoxetine (CYMBALTA) 60 MG capsule Take 1 capsule by mouth Daily.       famotidine (PEPCID) 20 MG tablet Take 1 tablet by mouth 2 (Two) Times a Day.       Fluticasone-Umeclidin-Vilant (Trelegy Ellipta) 100-62.5-25 MCG/ACT inhaler Inhale 1 puff Daily. 60 each 0     gabapentin (NEURONTIN) 300 MG capsule Take 1 capsule by mouth 3 (Three) Times a Day.       HYDROcodone-acetaminophen (NORCO) 7.5-325 MG per tablet Take 1 tablet by mouth Every 8 (Eight) Hours As Needed.       ipratropium-albuterol (DUO-NEB) 0.5-2.5 mg/3 ml nebulizer Take 3 mL by nebulization Every 4 (Four) Hours As Needed for Wheezing.       levothyroxine (SYNTHROID, LEVOTHROID) 75 MCG tablet Take 1 tablet by mouth Daily.       LORazepam (ATIVAN) 0.5 MG tablet Take 1 tablet by mouth Daily As Needed for Anxiety.       meloxicam (MOBIC) 7.5 MG tablet Take 1 tablet by mouth Daily As Needed.       metoprolol succinate XL (TOPROL-XL) 25 MG 24 hr tablet Take 1 tablet by  mouth Daily for 30 days. (Patient taking differently: Take 1 tablet by mouth Daily.) 30 tablet 0     potassium chloride (MICRO-K) 10 MEQ CR capsule Take 1 capsule by mouth Daily. 30 capsule 0     predniSONE (DELTASONE) 20 MG tablet Take 2 tablets by mouth Daily for 7 days. 14 tablet 0     prochlorperazine (COMPAZINE) 10 MG tablet Take 1 tablet by mouth Every 6 (Six) Hours As Needed for Nausea or Vomiting. 12 tablet 0     pseudoephedrine-guaifenesin (MUCINEX D)  MG per 12 hr tablet Take 1 tablet by mouth Every 12 (Twelve) Hours As Needed for Allergies.       QUEtiapine (SEROquel) 50 MG tablet Take 2 tablets by mouth every night at bedtime.       rOPINIRole (REQUIP) 0.5 MG tablet Take 1 tablet by mouth Every Night. Take 1 hour before bedtime. 90 tablet 3     rosuvastatin (CRESTOR) 20 MG tablet Take 1 tablet by mouth Daily for 30 days. 30 tablet 0     Umeclidinium Bromide (INCRUSE ELLIPTA) 62.5 MCG/INH aerosol powder  Inhale 1 puff Daily.            Allergies:  Allergies   Allergen Reactions    Morphine And Related Hallucinations     CONFUSION    Fenofibrate Unknown - High Severity     Elevated creatinine  Elevated creatinine         Social History:   Social History     Socioeconomic History    Marital status:    Tobacco Use    Smoking status: Former     Packs/day: 1.00     Years: 10.00     Pack years: 10.00     Types: Cigarettes     Start date:      Quit date:      Years since quittin.7    Smokeless tobacco: Never   Vaping Use    Vaping Use: Never used   Substance and Sexual Activity    Alcohol use: No     Comment: CAFFEINE NO     Drug use: No    Sexual activity: Defer       Family History:  Family History   Problem Relation Age of Onset    Malig Hyperthermia Neg Hx        Review of Systems:  Denies fevers or chills  Denies nausea or vomiting  No new vision or hearing changes  No chest pain  Cough and shortness of breath  No diarrhea, hematemesis or hematochezia, no dysuria or frequency  No  "musculoskeletal complaints  No heat or cold intolerance  No skin rashes  No dizziness or confusion.  No seizure activity  No new anxiety or depression  12 system review of systems performed and all else negative    Objective:    PHYSICAL EXAM:    /78 (BP Location: Left arm, Patient Position: Lying)   Pulse (!) 125   Temp 98.2 øF (36.8 øC) (Oral)   Resp 20   Ht 157.5 cm (62\")   Wt 73.5 kg (162 lb)   SpO2 94%   BMI 29.63 kg/mý  Body mass index is 29.63 kg/mý. 94% 73.5 kg (162 lb)    GENERAL APPEARANCE:   Well developed  Well nourished  Appears acutely ill  EYES:    PERRL                                                                           Conjunctivae normal  Sclerae nonicteric.  HENT:   Atraumatic, normocephalic  External ears and nose normal  Moist mucous membranes and no ulcers  NECK:  Thyroid not enlarged  Trachea midline   RESPIRATORY:    Mildly labored breathing   Normal breath sounds  No rales.  Moderate wheezing with cough  No dullness  CARDIOVASCULAR:    RRR  Normal S1, S2  No murmur  Lower extremity edema: none    GI:   Bowel sounds normal  Abdomen soft , nondistended, nontender  No abdominal masses  MUSCULOSKELETAL:  Normal movement of extremities  No tenderness, no deformities  No clubbing or cyanosis   Skin:    No visible rashes  No palpable nodules  Cap refill normal.  No mottling.   PSYCHIATRIC:  Speech and behavior appropriate  Normal mood and affect  Oriented to person, place and time  NEUROLOGIC:  Cranial nerves II through XII grossly intact.  Sensation intact.      Lab Review:    Results    Collected Updated Procedure Result Status    09/30/2023 1332 09/30/2023 1337 Blood Gas, Venous - [[id number]]   (Abnormal)   Venous Blood    Final result Component Value Units   pH, Venous 7.350 pH Units   pCO2, Venous 56.2 High  mm Hg   pO2, Venous 25.1 Low  mm Hg   HCO3, Venous 31.1 High  mmol/L   Base Excess, Venous 4.3 High   mmol/L   O2 Saturation, Venous 41.0 Low  %   Barometric Pressure " for Blood Gas 752.6000 mmHg   Modality Cannula    FIO2 32 %   Flow Rate 3.0000 lpm   Rate 20 Breaths/minute   Device Comment [id number]           09/30/2023 1309 09/30/2023 1428 Respiratory Panel PCR w/COVID-19(SARS-CoV-2) ANISH/VALERIA/MAXIMILIAN/PAD/COR/MAD/ILSA In-House, NP Swab in UTM/VTM, 3-4 HR TAT - Swab, Nasopharynx [[id number]]    Swab from Nasopharynx    Final result Component Value   ADENOVIRUS, PCR Not Detected   Coronavirus 229E Not Detected   Coronavirus HKU1 Not Detected   Coronavirus NL63 Not Detected   Coronavirus OC43 Not Detected   COVID19 Not Detected   Human Metapneumovirus Not Detected   Human Rhinovirus/Enterovirus Not Detected   Influenza A PCR Not Detected   Influenza B PCR Not Detected   Parainfluenza Virus 1 Not Detected   Parainfluenza Virus 2 Not Detected   Parainfluenza Virus 3 Not Detected   Parainfluenza Virus 4 Not Detected   RSV, PCR Not Detected   Bordetella pertussis pcr Not Detected   Bordetella parapertussis PCR Not Detected   Chlamydophila pneumoniae PCR Not Detected   Mycoplasma pneumo by PCR Not Detected             09/30/2023 1255 09/30/2023 1306 Urinalysis With Microscopic If Indicated (No Culture) - Urine, Clean Catch [[id number]]    Urine, Clean Catch    Final result Component Value   Color, UA Yellow   Appearance, UA Clear   pH, UA 5.5   Specific Gravity, UA 1.009   Glucose, UA Negative   Ketones, UA Negative   Bilirubin, UA Negative   Blood, UA Negative   Protein, UA Negative   Leuk Esterase, UA Negative   Nitrite, UA Negative   Urobilinogen, UA 0.2 E.U./dL          09/30/2023 1242 09/30/2023 1318 Comprehensive Metabolic Panel [[id number]]    (Abnormal)   Blood    Final result Component Value Units   Glucose 101 High  mg/dL   BUN 64 High  mg/dL   Creatinine 1.79 High  mg/dL   Sodium 138 mmol/L   Potassium 5.1 mmol/L   Chloride 97 Low  mmol/L   CO2 28.0 mmol/L   Calcium 9.3 mg/dL   Total Protein 6.6 g/dL   Albumin 4.2 g/dL   ALT (SGPT) 22 U/L   AST (SGOT) 18 U/L   Alkaline Phosphatase  63 U/L   Total Bilirubin 0.6 mg/dL   Globulin 2.4 gm/dL   A/G Ratio 1.8 g/dL   BUN/Creatinine Ratio 35.8 High     Anion Gap 13.0 mmol/L   eGFR 28.2 Low  mL/min/1.73          09/30/2023 1242 09/30/2023 1306 Protime-INR [[id number]]   Blood    Final result Component Value Units   Protime 12.9 Seconds   INR 0.96           09/30/2023 1242 09/30/2023 1316 BNP [[id number]]    (Abnormal)   Blood    Final result Component Value Units   proBNP 9,483.0 High  pg/mL          09/30/2023 1242 09/30/2023 1318 High Sensitivity Troponin T [[id number]]    (Abnormal)   Blood    Final result Component Value Units   HS Troponin T 37 High  ng/L          09/30/2023 1242 09/30/2023 1318 Magnesium [[id number]]   (Abnormal)   Blood    Final result Component Value Units   Magnesium 1.5 Low  mg/dL          09/30/2023 1242 09/30/2023 1256 CBC Auto Differential [[id number]]   (Abnormal)   Blood    Final result Component Value Units   WBC 9.08 10*3/mm3   RBC 3.06 Low  10*6/mm3   Hemoglobin 9.8 Low  g/dL   Hematocrit 29.9 Low  %   MCV 97.7 High  fL   MCH 32.0 pg   MCHC 32.8 g/dL   RDW 15.4 %   RDW-SD 54.6 High  fl   MPV 9.9 fL   Platelets 202 10*3/mm3   Neutrophil % 77.1 High  %   Lymphocyte % 14.3 Low  %   Monocyte % 7.0 %   Eosinophil % 0.1 Low  %   Basophil % 0.1 %   Immature Grans % 1.4 High  %   Neutrophils, Absolute 6.99 10*3/mm3   Lymphocytes, Absolute 1.30 10*3/mm3   Monocytes, Absolute 0.64 10*3/mm3   Eosinophils, Absolute 0.01 10*3/mm3   Basophils, Absolute 0.01 10*3/mm3   Immature Grans, Absolute 0.13 High  10*3/mm3   nRBC 0.0 /100 WBC                   Imaging reviewed  chest X-ray reviewed shows diminished lung volumes with blunting of left costophrenic angle due to scarring or small effusion.  Basilar atelectasis noted.  No acute infiltrates.       Assessment:  Acute exacerbation of COPD  Chronic hypoxemic respiratory failure  Acute on chronic hypercapnia with respiratory acidosis  Atrial fibrillation with RVR  Chronic kidney disease  III  Anxiety disorder  RLS       Recommendations:  Treat with scheduled and as needed bronchodilators nebulized in addition to home therapy of Symbicort and Spiriva.  Steroid with taper for COPD exacerbation.  Continue oxygen.  She is back down to her home oxygen use.      Thank you for allowing me to participate in the care of this patient.  I will continue to follow along with you.      Car Amato MD  [facility], [facility]  Pulmonary and Critical Care Medicine    9/30/2023  17:12 EDT

## 2023-09-30 NOTE — ED PROVIDER NOTES
EMERGENCY DEPARTMENT ENCOUNTER    Room Number:  N639/1  Date of encounter:  9/30/2023  PCP: Bernabe Guy MD  Historian: Patient and son  Relevant information and history provided by sources other than the patient will be included below and in the ED Course.  Review of pertinent past medical records may also be included in record below and ED Course.    HPI:  Chief Complaint: Shortness of breath, cough, body aches  A complete HPI/ROS/PMH/PSH/SH/FH are unobtainable due to: Not applicable  Context: Josephine Wolf is a 83 y.o. female who presents to the ED c/o the symptoms started about 5 to 6 days ago.  Was seen at the urgent care center and was started on steroids and doxycycline.  She is really had no improvement of her symptoms.  She is still having a cough.  Initially the cough was productive of thick gray-yellow phlegm.  Now it is more of a dry cough.  She has had no definitive fever or if she has had a fever its been low-grade.  Has had some chills.  She is chronically on 3 L of oxygen at all times.  She has advanced COPD.  She has been on steroids which she does not feel a significant change or improvement in her symptoms.  Denies any chest pain or palpitations.  She does complain of diffuse body aches.  She complains of no energy and being fatigued.  She is not eating or drinking much.  Has had episodes of nausea.  It is not uncommon for her to have nausea.  Denies vomiting or diarrhea.  She has had a history of COPD and feels like this could be a COPD exacerbation again.  Patient reports no history of congestive heart failure and reports no increased swelling to extremities or abdomen.      Previous Episodes: Yes with COPD exacerbation and this is persisting and lasting longer.  Current Symptoms: See above    MEDICAL HISTORY REVIEWED  Can see she was seen in urgent care center on 9/25/2023 for shortness of breath.  She is does have a history of COPD and has had recent problems with flareup of her  COPD.  She does have a history of bipolar as well.  Fibromyalgia she is chronically on 2 L of oxygen she has history of sleep apnea and chronic kidney disease.  They treated her with prednisone 40 mg daily for 5 days she did have a negative flu and COVID test.    I am reviewing the discharge summary from 7/5/2023 from Dr. Fowler.  Patient does have a history of COPD, history of nonsustained V. tach per cardiology history of paroxysmal SVT history of respiratory failure in the past.  I reviewed the patient's current medicine list.  On this admission she was consulted by cardiology, pulmonary and nephrology.  Look like she was treated for a COPD exacerbation that was the main reason why she came in patient also did have a brief episode of nonsustained V. tach likely from hypomagnesemia and cardiology was involved as mentioned above.    Review of echo from March 2023  I reviewed the echo that she had in March 2023.  Left ventricular ejection fraction was 67% she had mild concentric LVH she had grade 1 diastolic dysfunction of the left ventricle.  PAST MEDICAL HISTORY  Active Ambulatory Problems     Diagnosis Date Noted    Anemia 10/19/2017    OA (osteoarthritis) of knee 11/16/2017    Chronic respiratory failure with hypoxia 12/02/2017    Cellulitis of skin 12/06/2017    Acute kidney injury 12/06/2017    Sepsis 12/06/2017    Orthostatic hypotension 06/24/2018    Disease of thyroid gland 06/24/2018    Hypertension 06/24/2018    Dehydration 06/24/2018    Stage 3 chronic kidney disease 06/25/2018    Closed compression fracture of L1 lumbar vertebra 06/16/2019    Hyponatremia 06/16/2019    Osteoporosis with pathological fracture 06/17/2019    Acute on chronic respiratory failure with hypoxia and hypercapnia 03/12/2020    Obesity (BMI 30-39.9) 03/12/2020    Nocturnal hypoxia 03/13/2020    CKD (chronic kidney disease) stage 2, GFR 60-89 ml/min 03/13/2020    Acute on chronic respiratory failure with hypoxia 05/20/2020     Abdominal pain 10/18/2021    Acute exacerbation of chronic obstructive pulmonary disease (COPD) 2022    Acute UTI 10/19/2022    Metabolic encephalopathy 10/23/2022    COPD with acute exacerbation 2023     Resolved Ambulatory Problems     Diagnosis Date Noted    COPD with acute exacerbation 2018     Past Medical History:   Diagnosis Date    Acid reflux     Arthritis     Bipolar 1 disorder, depressed     Cataract     Chronic nausea     Chronic pain of right knee     Continuous leakage of urine     COPD (chronic obstructive pulmonary disease)     Diverticulosis     Fibromyalgia     Fibromyalgia, primary     Frequent episodes of bronchitis     HL (hearing loss)     Hypothyroidism     Memory loss     Migraines     Neck pain     On home oxygen therapy     Short of breath on exertion     Sleep apnea          PAST SURGICAL HISTORY  Past Surgical History:   Procedure Laterality Date    CEREBRAL ANEURYSM REPAIR      WITH STENT    HYSTERECTOMY      JOINT REPLACEMENT      LAPAROSCOPIC CHOLECYSTECTOMY      SD ARTHRP KNE CONDYLE&PLATU MEDIAL&LAT COMPARTMENTS Right 2017    Procedure: RT TOTAL KNEE ARTHROPLASTY;  Surgeon: Kashif Perez MD;  Location: [facility];  Service: Orthopedics         FAMILY HISTORY  Family History   Problem Relation Age of Onset    Malig Hyperthermia Neg Hx          SOCIAL HISTORY  Social History     Socioeconomic History    Marital status:    Tobacco Use    Smoking status: Former     Packs/day: 1.00     Years: 10.00     Pack years: 10.00     Types: Cigarettes     Start date:      Quit date:      Years since quittin.7    Smokeless tobacco: Never   Vaping Use    Vaping Use: Never used   Substance and Sexual Activity    Alcohol use: No     Comment: CAFFEINE NO     Drug use: No    Sexual activity: Defer         ALLERGIES  Morphine and related and Fenofibrate        REVIEW OF SYSTEMS  Review of Systems     All systems reviewed and negative except for  those discussed in HPI.       PHYSICAL EXAM    I have reviewed the triage vital signs and nursing notes.    ED Triage Vitals [09/30/23 1218]   Temp Heart Rate Resp BP SpO2   98.5 øF (36.9 øC) (!) 127 18 (!) 138/102 95 %      Temp src Heart Rate Source Patient Position BP Location FiO2 (%)   Tympanic -- -- -- --       GENERAL: This is an elderly female that is chronically ill appearing.  She does have some mild respiratory distress with some mild tachypnea.  Has an obvious cough on exam.  Has some audible wheezing.  .Vital signs on my initial evaluation patient's heart rates fluctuates from 100 to about 140.  It is a regular regular rate on the monitor and looks like it is atrial fibrillation and atrial flutter.  Blood pressure is 138/102.  She is afebrile.  Her O2 sat is 96% on the 3 L.  HENT: nares patent  Head/neck/ face are symmetric without gross deformity, signs of trauma, or swelling  EYES: no scleral icterus, no conjunctival pallor.  NECK: Supple, no meningismus  CV: Tachycardia with irregular regular rhythm.  No obvious murmur or rub.  Normal inspection to chest  RESPIRATORY: Mild respiratory distress with an obvious cough on exam.  Has some audible wheezing.  The wheezing is more prominent when she takes a deep breath and exhales.  The cough is also worse with deep breathing.  ABDOMEN: soft and nontender.  Morbidly obese  MUSCULOSKELETAL: no deformity.  No edema.  Intact distal pulses to upper and lower extremities that are equal strong and symmetric.  NEURO: alert and appropriate, moves all extremities, follows commands.  No focal motor or sensory changes  SKIN: warm, dry    Vital signs and nursing notes reviewed.        LAB RESULTS  Recent Results (from the past 24 hour(s))   POC Glucose Once    Collection Time: 09/30/23 12:24 PM    Specimen: Blood   Result Value Ref Range    Glucose 104 70 - 130 mg/dL   ECG 12 Lead Dyspnea    Collection Time: 09/30/23 12:27 PM   Result Value Ref Range    QT Interval 298  ms    QTC Interval 420 ms   Comprehensive Metabolic Panel    Collection Time: 09/30/23 12:42 PM    Specimen: Blood   Result Value Ref Range    Glucose 101 (H) 65 - 99 mg/dL    BUN 64 (H) 8 - 23 mg/dL    Creatinine 1.79 (H) 0.57 - 1.00 mg/dL    Sodium 138 136 - 145 mmol/L    Potassium 5.1 3.5 - 5.2 mmol/L    Chloride 97 (L) 98 - 107 mmol/L    CO2 28.0 22.0 - 29.0 mmol/L    Calcium 9.3 8.6 - 10.5 mg/dL    Total Protein 6.6 6.0 - 8.5 g/dL    Albumin 4.2 3.5 - 5.2 g/dL    ALT (SGPT) 22 1 - 33 U/L    AST (SGOT) 18 1 - 32 U/L    Alkaline Phosphatase 63 39 - 117 U/L    Total Bilirubin 0.6 0.0 - 1.2 mg/dL    Globulin 2.4 gm/dL    A/G Ratio 1.8 g/dL    BUN/Creatinine Ratio 35.8 (H) 7.0 - 25.0    Anion Gap 13.0 5.0 - 15.0 mmol/L    eGFR 28.2 (L) >60.0 mL/min/1.73   Protime-INR    Collection Time: 09/30/23 12:42 PM    Specimen: Blood   Result Value Ref Range    Protime 12.9 11.7 - 14.2 Seconds    INR 0.96 0.90 - 1.10   BNP    Collection Time: 09/30/23 12:42 PM    Specimen: Blood   Result Value Ref Range    proBNP 9,483.0 (H) 0.0 - 1,800.0 pg/mL   High Sensitivity Troponin T    Collection Time: 09/30/23 12:42 PM    Specimen: Blood   Result Value Ref Range    HS Troponin T 37 (H) <10 ng/L   Magnesium    Collection Time: 09/30/23 12:42 PM    Specimen: Blood   Result Value Ref Range    Magnesium 1.5 (L) 1.6 - 2.4 mg/dL   CBC Auto Differential    Collection Time: 09/30/23 12:42 PM    Specimen: Blood   Result Value Ref Range    WBC 9.08 3.40 - 10.80 10*3/mm3    RBC 3.06 (L) 3.77 - 5.28 10*6/mm3    Hemoglobin 9.8 (L) 12.0 - 15.9 g/dL    Hematocrit 29.9 (L) 34.0 - 46.6 %    MCV 97.7 (H) 79.0 - 97.0 fL    MCH 32.0 26.6 - 33.0 pg    MCHC 32.8 31.5 - 35.7 g/dL    RDW 15.4 12.3 - 15.4 %    RDW-SD 54.6 (H) 37.0 - 54.0 fl    MPV 9.9 6.0 - 12.0 fL    Platelets 202 140 - 450 10*3/mm3    Neutrophil % 77.1 (H) 42.7 - 76.0 %    Lymphocyte % 14.3 (L) 19.6 - 45.3 %    Monocyte % 7.0 5.0 - 12.0 %    Eosinophil % 0.1 (L) 0.3 - 6.2 %    Basophil %  0.1 0.0 - 1.5 %    Immature Grans % 1.4 (H) 0.0 - 0.5 %    Neutrophils, Absolute 6.99 1.70 - 7.00 10*3/mm3    Lymphocytes, Absolute 1.30 0.70 - 3.10 10*3/mm3    Monocytes, Absolute 0.64 0.10 - 0.90 10*3/mm3    Eosinophils, Absolute 0.01 0.00 - 0.40 10*3/mm3    Basophils, Absolute 0.01 0.00 - 0.20 10*3/mm3    Immature Grans, Absolute 0.13 (H) 0.00 - 0.05 10*3/mm3    nRBC 0.0 0.0 - 0.2 /100 WBC   Urinalysis With Microscopic If Indicated (No Culture) - Urine, Clean Catch    Collection Time: 09/30/23 12:55 PM    Specimen: Urine, Clean Catch   Result Value Ref Range    Color, UA Yellow Yellow, Straw    Appearance, UA Clear Clear    pH, UA 5.5 5.0 - 8.0    Specific Gravity, UA 1.009 1.005 - 1.030    Glucose, UA Negative Negative    Ketones, UA Negative Negative    Bilirubin, UA Negative Negative    Blood, UA Negative Negative    Protein, UA Negative Negative    Leuk Esterase, UA Negative Negative    Nitrite, UA Negative Negative    Urobilinogen, UA 0.2 E.U./dL 0.2 - 1.0 E.U./dL   Respiratory Panel PCR w/COVID-19(SARS-CoV-2) ANISH/VALERIA/MAXIMILIAN/PAD/COR/MAD/ILSA In-House, NP Swab in [facility], 3-4 HR TAT - Swab, Nasopharynx    Collection Time: 09/30/23  1:09 PM    Specimen: Nasopharynx; Swab   Result Value Ref Range    ADENOVIRUS, PCR Not Detected Not Detected    Coronavirus 229E Not Detected Not Detected    Coronavirus HKU1 Not Detected Not Detected    Coronavirus NL63 Not Detected Not Detected    Coronavirus OC43 Not Detected Not Detected    COVID19 Not Detected Not Detected - Ref. Range    Human Metapneumovirus Not Detected Not Detected    Human Rhinovirus/Enterovirus Not Detected Not Detected    Influenza A PCR Not Detected Not Detected    Influenza B PCR Not Detected Not Detected    Parainfluenza Virus 1 Not Detected Not Detected    Parainfluenza Virus 2 Not Detected Not Detected    Parainfluenza Virus 3 Not Detected Not Detected    Parainfluenza Virus 4 Not Detected Not Detected    RSV, PCR Not Detected Not Detected     Bordetella pertussis pcr Not Detected Not Detected    Bordetella parapertussis PCR Not Detected Not Detected    Chlamydophila pneumoniae PCR Not Detected Not Detected    Mycoplasma pneumo by PCR Not Detected Not Detected   POC Lactate    Collection Time: 09/30/23  1:32 PM    Specimen: Venous Blood   Result Value Ref Range    Lactate 1.3 0.5 - 2.0 mmol/L    Device Comment [id number]    POC Chem Panel    Collection Time: 09/30/23  1:32 PM    Specimen: Venous Blood   Result Value Ref Range    Glucose 106 70 - 130 mg/dL    Sodium 136 136 - 145 mmol/L    POC Potassium 4.8 3.5 - 5.2 mmol/L    Ionized Calcium 1.14 (L) 2.20 - 2.55 mmol/L    Chloride 95 (L) 98 - 107 mmol/L    Creatinine 1.71 0.60 - 130.00 mg/dL    BUN 75 mg/dL    CO2 Content 31.2 (H) 23 - 27 mmol/L    Device Comment [id number]    Blood Gas, Venous -    Collection Time: 09/30/23  1:32 PM    Specimen: Venous Blood   Result Value Ref Range    pH, Venous 7.350 7.310 - 7.410 pH Units    pCO2, Venous 56.2 (H) 41.0 - 51.0 mm Hg    pO2, Venous 25.1 (L) 35.0 - 45.0 mm Hg    HCO3, Venous 31.1 (H) 22.0 - 28.0 mmol/L    Base Excess, Venous 4.3 (H) -2.0 - 2.0 mmol/L    O2 Saturation, Venous 41.0 (L) 45.0 - 75.0 %    Barometric Pressure for Blood Gas 752.6000 mmHg    Modality Cannula     FIO2 32 %    Flow Rate 3.0000 lpm    Rate 20 Breaths/minute    Device Comment [id number]        Ordered the above labs and independently reviewed the results.        RADIOLOGY  XR Chest 1 View    Result Date: 9/30/2023  CHEST SINGLE VIEW  HISTORY: Shortness of air. On oxygen. Cough.  COMPARISON: AP chest 07/23/2023, CT chest 06/29/2023.  FINDINGS: Lung volumes are diminished and there is linear subsegmental atelectasis or scarring within the lung bases. There is no evidence for perihilar edema or infiltrate. There is blunting in the left costophrenic angle that appears chronic and is without change      Diminished lung volumes. Blunting left costophrenic angle due to scarring or small  effusion. Mild linear basilar atelectasis or scarring. No interval change or convincing evidence for active disease in the chest.  This report was finalized on 9/30/2023 3:04 PM by Dr. Jenaro Bear M.D.       I ordered the above noted radiological studies. Reviewed by me and discussed with radiologist.  See dictation for official radiology interpretation.      PROCEDURES    Procedures      MEDICATIONS GIVEN IN ER    Medications   sodium chloride 0.9 % flush 10 mL (has no administration in time range)   sodium chloride 0.9 % infusion (125 mL/hr Intravenous New Bag 9/30/23 1259)   ipratropium-albuterol (DUO-NEB) nebulizer solution 3 mL (has no administration in time range)   apixaban (ELIQUIS) tablet 2.5 mg (has no administration in time range)   albuterol (PROVENTIL) nebulizer solution 0.083% 2.5 mg/3mL (has no administration in time range)   allopurinol (ZYLOPRIM) tablet 100 mg (100 mg Oral Not Given 9/30/23 1700)   busPIRone (BUSPAR) tablet 10 mg (has no administration in time range)   DULoxetine (CYMBALTA) DR capsule 60 mg (60 mg Oral Not Given 9/30/23 1700)   budesonide-formoterol (SYMBICORT) 160-4.5 MCG/ACT inhaler 2 puff (has no administration in time range)     And   tiotropium (SPIRIVA RESPIMAT) 2.5 mcg/act aerosol solution inhaler (has no administration in time range)   gabapentin (NEURONTIN) capsule 300 mg (has no administration in time range)   HYDROcodone-acetaminophen (NORCO) 7.5-325 MG per tablet 1 tablet (has no administration in time range)   hydrOXYzine (ATARAX) tablet 25 mg (has no administration in time range)   levothyroxine (SYNTHROID, LEVOTHROID) tablet 75 mcg (75 mcg Oral Not Given 9/30/23 1700)   QUEtiapine (SEROquel) tablet 100 mg (has no administration in time range)   rOPINIRole (REQUIP) tablet 0.5 mg (has no administration in time range)   rosuvastatin (CRESTOR) tablet 10 mg (has no administration in time range)   Umeclidinium Bromide (INCRUSE ELLIPTA) aerosol powder  1 puff (has no  administration in time range)   furosemide (LASIX) tablet 40 mg (has no administration in time range)   metoprolol succinate XL (TOPROL-XL) 24 hr tablet 50 mg (has no administration in time range)   pantoprazole (PROTONIX) EC tablet 40 mg (has no administration in time range)   guaiFENesin (MUCINEX) 12 hr tablet 600 mg (has no administration in time range)   methylPREDNISolone sodium succinate (SOLU-Medrol) injection 40 mg (has no administration in time range)   ipratropium-albuterol (DUO-NEB) nebulizer solution 3 mL (3 mL Nebulization Given 9/30/23 1325)   methylPREDNISolone sodium succinate (SOLU-Medrol) injection 125 mg (125 mg Intravenous Given 9/30/23 1259)   magnesium sulfate 2g/50 mL (PREMIX) infusion (2 g Intravenous New Bag 9/30/23 1545)   apixaban (ELIQUIS) tablet 2.5 mg (2.5 mg Oral Given 9/30/23 1545)         All labs have been independently reviewed by me.  All radiology studies have been reviewed by me and I discussed with radiologist dictating the report when indicated below.  All EKG's independently viewed and interpreted by me.  Discussion below represents my analysis of pertinent findings related to patient's condition, differential diagnosis, treatment plan and final disposition.        PROGRESS, DATA ANALYSIS, CONSULTS, AND MEDICAL DECISION MAKING    DDx includes CHF, acute coronary syndrome, pulmonary embolism, pneumothorax, pneumonia, asthma/COPD,aspiration,  pulmonary hypertension, metabolic acidosis, deconditioning, anemia, other hematologic etiologies such as CO poisoning, anxiety.   I think this patient definitely is having a COPD exacerbation.  I know she has atrial fibrillation and her heart rate is in the low 100s.  I am getting give her a beta agonist with a DuoNeb as well as IV Solu-Medrol.  It appears as if this atrial fibrillation is new onset as I do not see any previous records of this and the patient states she is never had this.  We will get a check viral respiratory panel chest  x-ray and other lab work.  This patient will need to be admitted to the hospital.  Discussed this with the patient and the son.  All questions answered at this time.  After initial lab work comes back I am going to go ahead and anticoagulate this patient.  Her Cj Vascor is elevated.      ED Course as of 09/30/23 1736   Sat Sep 30, 2023   1242 Patient's Cj vas score is 5.  It is positive for age history of hypertension she is a female.  She has had a history of heart disease but it was a long time ago.  She has never had a stroke or TIA and she is not diabetic. [MM]   1248 My own independent rotation of the EKG that was done at 1227 reveals a rate of 119 it is atrial fibrillation with narrow complex and normal axis I do not see any definitive acute injury pattern there is diffuse some nonspecific ST and T wave changes  I compared this to the previous EKG that was done on 7/4/2023.  She was normal sinus rhythm on that EKG.  QRS complexes otherwise look fairly similar. [MM]   1501 pH, Venous: 7.350 [MM]   1501 pCO2, Venous(!): 56.2 [MM]   1501 Flow Rate: 3.0000  Patient is chronically on 3 L. [MM]   1501 Magnesium(!): 1.5 [MM]   1501 Hemoglobin(!): 9.8  Chronic anemia [MM]   1501 Creatinine(!): 1.79  Chronic renal failure [MM]   1502 I reviewed the x-ray report.  There is diminished lung volumes there is some blurring in the left costophrenic angle potential scarring or small effusion.  There is some mild basilar atelectasis.  This looks similar to previous x-ray on 7/23/2023 and 6/29/2023.  No definitive change.  Please see complete dictated report from radiologist [MM]   1504 HS Troponin T(!): 37  Likely elevated because of chronic renal failure. [MM]   1504 proBNP(!): 9,483.0  Could be related to congestive heart failure and chronic renal failure.  I do not see any obvious signs of congestive heart failure on the chest x-ray.  Continue to monitor. [MM]   1504 I did consult clinical pharmacist about the  appropriate anticoagulation on this patient.  I did speak with Kyara who is the clinical pharmacist in the emergency department. [MM]   1518 I did discuss the case with Dr. Fowler was taking care of this patient before is a hospitalist here at [facility].  Informed of the patient's presenting symptoms and results of test.  Agrees to admit the patient to the hospital. [MM]   1518 The clinical pharmacist recommends 2.5 mg of Eliquis twice daily.  I do not anticipate she is going to have any recent procedures performed. [MM]   1520 Patient states that she is doing better on reevaluation.  Her heart rate is between 101 120 and his atrial fibrillation on the monitor.  Blood pressure is little elevated.  Oxygen saturation is 97% on her chronic 3 L.  She is in no acute distress.  Informed the results of the test and my treatment plan.  All questions answered [MM]      ED Course User Index  [MM] Car Perea MD       AS OF 17:36 EDT VITALS:    BP - 128/78  HR - (!) 125  TEMP - 98.2 øF (36.8 øC) (Oral)  02 SATS - 94%    SOCIAL DETERMINANTS OF HEALTH THAT IMPACT OR LIMIT CARE (For example..Homelessness,safe discharge, inability to obtain care, follow up, or prescriptions):      DIAGNOSIS  Final diagnoses:   Atrial fibrillation with rapid ventricular response: New onset   COPD exacerbation   Hypomagnesemia   Chronic renal failure, unspecified CKD stage   Chronic anemia         DISPOSITION  I have reviewed the test results with my patient and explained the current treatment plan.  I answered all of the patient's questions.  The patient will be admitted to monitor bed at this time.  The patient is not hypotensive and is tolerating their current disease condition well enough for a monitored bed at this time.  The patient's current condition does not require intensive care treatment at this time.            DICTATED UTILIZING [employer] DICTATION    Note Disclaimer: At [facility], we believe that sharing  information builds trust and better relationships. You are receiving this note because you recently visited [facility]. It is possible you will see health information before a provider has talked with you about it. This kind of information can be easy to misunderstand. To help you fully understand what it means for your health, we urge you to discuss this note with your provider.       Car Perea MD  09/30/23 6131

## 2023-09-30 NOTE — ED NOTES
Nursing report ED to floor  Josephine Alexisfield  83 y.o.  female    HPI :   Chief Complaint   Patient presents with    Shortness of Breath       Admitting doctor:   Stanley Fowler MD    Admitting diagnosis:   The primary encounter diagnosis was Atrial fibrillation with rapid ventricular response: New onset. Diagnoses of COPD exacerbation, Hypomagnesemia, Chronic renal failure, unspecified CKD stage, and Chronic anemia were also pertinent to this visit.    Code status:   Current Code Status       Date Active Code Status Order ID Comments User Context       Prior            Allergies:   Morphine and related and Fenofibrate    Isolation:   No active isolations    Intake and Output  No intake or output data in the 24 hours ending 09/30/23 1547    Weight:       09/30/23  1223   Weight: 73.5 kg (162 lb)       Most recent vitals:   Vitals:    09/30/23 1325 09/30/23 1330 09/30/23 1401 09/30/23 1543   BP:  (!) 135/102  131/89   BP Location:    Right arm   Pulse:  (!) 131 113 115   Resp: 18 18  20   Temp:       TempSrc:       SpO2:  (!) 88% 97% 92%   Weight:       Height:           Active LDAs/IV Access:   Lines, Drains & Airways       Active LDAs       Name Placement date Placement time Site Days    Peripheral IV 09/30/23 1242 Left Antecubital 09/30/23  1242  Antecubital  less than 1    External Urinary Catheter 09/30/23  1355  --  less than 1                    Labs (abnormal labs have a star):   Labs Reviewed   COMPREHENSIVE METABOLIC PANEL - Abnormal; Notable for the following components:       Result Value    Glucose 101 (*)     BUN 64 (*)     Creatinine 1.79 (*)     Chloride 97 (*)     BUN/Creatinine Ratio 35.8 (*)     eGFR 28.2 (*)     All other components within normal limits    Narrative:     GFR Normal >60  Chronic Kidney Disease <60  Kidney Failure <15    The GFR formula is only valid for adults with stable renal function between ages 18 and 70.   BNP (IN-HOUSE) - Abnormal; Notable for the following components:     proBNP 9,483.0 (*)     All other components within normal limits    Narrative:     This assay is used as an aid in the diagnosis of individuals suspected of having heart failure. It can be used as an aid in the diagnosis of acute decompensated heart failure (ADHF) in patients presenting with signs and symptoms of ADHF to the emergency department (ED). In addition, NT-proBNP of <300 pg/mL indicates ADHF is not likely.   TROPONIN - Abnormal; Notable for the following components:    HS Troponin T 37 (*)     All other components within normal limits    Narrative:     High Sensitive Troponin T Reference Range:  <10.0 ng/L- Negative Female for AMI  <15.0 ng/L- Negative Male for AMI  >=10 - Abnormal Female indicating possible myocardial injury.  >=15 - Abnormal Male indicating possible myocardial injury.   Clinicians would have to utilize clinical acumen, EKG, Troponin, and serial changes to determine if it is an Acute Myocardial Infarction or myocardial injury due to an underlying chronic condition.        MAGNESIUM - Abnormal; Notable for the following components:    Magnesium 1.5 (*)     All other components within normal limits   CBC WITH AUTO DIFFERENTIAL - Abnormal; Notable for the following components:    RBC 3.06 (*)     Hemoglobin 9.8 (*)     Hematocrit 29.9 (*)     MCV 97.7 (*)     RDW-SD 54.6 (*)     Neutrophil % 77.1 (*)     Lymphocyte % 14.3 (*)     Eosinophil % 0.1 (*)     Immature Grans % 1.4 (*)     Immature Grans, Absolute 0.13 (*)     All other components within normal limits   BLOOD GAS, VENOUS - Abnormal; Notable for the following components:    pCO2, Venous 56.2 (*)     pO2, Venous 25.1 (*)     HCO3, Venous 31.1 (*)     Base Excess, Venous 4.3 (*)     O2 Saturation, Venous 41.0 (*)     All other components within normal limits   POC CHEM PANEL - Abnormal; Notable for the following components:    Ionized Calcium 1.14 (*)     Chloride 95 (*)     CO2 Content 31.2 (*)     All other components within normal  limits   RESPIRATORY PANEL PCR W/ COVID-19 (SARS-COV-2) [facility] IN-HOUSE, NP SWAB IN UTM/VTP, 3-4 HR TAT - Normal    Narrative:     In the setting of a positive respiratory panel with a viral infection PLUS a negative procalcitonin without other underlying concern for bacterial infection, consider observing off antibiotics or discontinuation of antibiotics and continue supportive care. If the respiratory panel is positive for atypical bacterial infection (Bordetella pertussis, Chlamydophila pneumoniae, or Mycoplasma pneumoniae), consider antibiotic de-escalation to target atypical bacterial infection.   PROTIME-INR - Normal   URINALYSIS W/ MICROSCOPIC IF INDICATED (NO CULTURE) - Normal    Narrative:     Urine microscopic not indicated.   POCT GLUCOSE FINGERSTICK - Normal   BLOOD GAS, VENOUS   HIGH SENSITIVITIY TROPONIN T 2HR   POC LACTATE   CBC AND DIFFERENTIAL    Narrative:     The following orders were created for panel order CBC & Differential.  Procedure                               Abnormality         Status                     ---------                               -----------         ------                     CBC Auto Differential[[id number]]        Abnormal            Final result                 Please view results for these tests on the individual orders.       EKG:   ECG 12 Lead Dyspnea   Preliminary Result   HEART RATE= 119  bpm   RR Interval= 504  ms   IN Interval=   ms   P Horizontal Axis=   deg   P Front Axis=   deg   QRSD Interval= 81  ms   QT Interval= 298  ms   QTcB= 420  ms   QRS Axis= 11  deg   T Wave Axis= 55  deg   - ABNORMAL ECG -   Atrial fibrillation   Abnormal R-wave progression, early transition   Borderline T abnormalities, anterior leads   Electronically Signed By:    Date and Time of Study: 2023-09-30 12:27:44          Meds given in ED:   Medications   sodium chloride 0.9 % flush 10 mL (has no administration in time range)   sodium chloride 0.9 % infusion (125  mL/hr Intravenous New Bag 23 1259)   ipratropium-albuterol (DUO-NEB) nebulizer solution 3 mL (has no administration in time range)   methylPREDNISolone sodium succinate (SOLU-Medrol) injection 40 mg (has no administration in time range)   apixaban (ELIQUIS) tablet 2.5 mg (has no administration in time range)   magnesium sulfate 2g/50 mL (PREMIX) infusion (has no administration in time range)   ipratropium-albuterol (DUO-NEB) nebulizer solution 3 mL (3 mL Nebulization Given 23 1325)   methylPREDNISolone sodium succinate (SOLU-Medrol) injection 125 mg (125 mg Intravenous Given 23 1259)   magnesium sulfate 2g/50 mL (PREMIX) infusion (2 g Intravenous New Bag 23 1545)   apixaban (ELIQUIS) tablet 2.5 mg (2.5 mg Oral Given 23 1545)       Imaging results:  XR Chest 1 View    Result Date: 2023  Diminished lung volumes. Blunting left costophrenic angle due to scarring or small effusion. Mild linear basilar atelectasis or scarring. No interval change or convincing evidence for active disease in the chest.  This report was finalized on 2023 3:04 PM by Dr. Jenaro Bear M.D.       Ambulatory status:   - Stand-by assist; continuous oxygen    Social issues:   Social History     Socioeconomic History    Marital status:    Tobacco Use    Smoking status: Former     Packs/day: 1.00     Years: 10.00     Pack years: 10.00     Types: Cigarettes     Start date:      Quit date: 1969     Years since quittin.7    Smokeless tobacco: Never   Vaping Use    Vaping Use: Never used   Substance and Sexual Activity    Alcohol use: No     Comment: CAFFEINE NO     Drug use: No    Sexual activity: Defer       NIH Stroke Scale:       Rita Cortez RN  23 15:47 EDT

## 2023-09-30 NOTE — H&P
History and physical    Primary care physician  Dr. PLUMMER    Chief complaint  Shortness of breath    History of present illness  83-year-old white female with history of chronic hypoxic respiratory failure on home oxygen COPD paroxysmal SVT nonsustained ventricular tachycardia hypertension hypothyroidism and chronic kidney disease stage III who lives by herself presented to [facility] emergency room with increased shortness of breath for last 1 week which is getting worse.  Patient also complaining of productive cough but no fever chills chest pain abdominal pain nausea vomiting diarrhea.  Patient work-up in ER revealed acute on chronic hypoxic respiratory failure with acute exacerbation of COPD and also found to be in rapid ventricular rate with history of atrial fibrillation admitted for management.  Patient is DNR per her wishes.    PAST MEDICAL HISTORY   Acid reflux      Arthritis      Bipolar 1 disorder, depressed      Cataract      Chronic nausea      Chronic pain of right knee      Continuous leakage of urine      Chronic obstructive pulmonary disease      Diverticulosis      Fibromyalgia      Fibromyalgia, primary      Frequent episodes of bronchitis      HL (hearing loss)      Hypothyroidism      Memory loss      Migraines      Neck pain      On home oxygen therapy      Short of breath on exertion      Sleep apnea        PAST SURGICAL HISTORY              Procedure Laterality Date    CEREBRAL ANEURYSM REPAIR   2000'S     WITH STENT    HYSTERECTOMY        JOINT REPLACEMENT        LAPAROSCOPIC CHOLECYSTECTOMY        ND ARTHRP KNE CONDYLE&PLATU MEDIAL&LAT COMPARTMENTS Right 11/16/2017     Procedure: RT TOTAL KNEE ARTHROPLASTY;  Surgeon: Kashif Perez MD;  Location: [facility];  Service: Orthopedics         FAMILY HISTORY           Problem Relation Age of Onset    Malig Hyperthermia Neg Hx        SOCIAL HISTORY              Socioeconomic History    Marital status:    Tobacco Use     "Smoking status: Former       Packs/day: 1.00       Years: 10.00       Pack years: 10.00       Types: Cigarettes       Start date:        Quit date:        Years since quittin.7    Smokeless tobacco: Never   Vaping Use    Vaping Use: Never used   Substance and Sexual Activity    Alcohol use: No       Comment: CAFFEINE NO     Drug use: No    Sexual activity: Defer         ALLERGIES  Morphine and related and Fenofibrate  Home medications reviewed     REVIEW OF SYSTEMS  All systems reviewed and negative except for those discussed in HPI.      PHYSICAL EXAM   Blood pressure 131/89, pulse 115, temperature 98.5 øF (36.9 øC), temperature source Tympanic, resp. rate 20, height 157.5 cm (62\"), weight 73.5 kg (162 lb), SpO2 92 %.    GENERAL: Awake and alert in no distress  HENT: nares patent  Head/neck/ face are symmetric without gross deformity, signs of trauma, or swelling  EYES: no scleral icterus, no conjunctival pallor.  NECK: Supple, no meningismus  CV: Tachycardia with irregular regular rhythm.  No obvious murmur or rub.    RESPIRATORY: Normal effort and moving air bilaterally with expiratory wheezes  ABDOMEN: soft and nontender.  Nondistended bowel sounds positive  MUSCULOSKELETAL: no deformity.  No edema.  Intact distal pulses   NEURO: alert and appropriate, moves all extremities, follows commands.  No focal deficit  SKIN: warm, dry     LAB RESULTS  Lab Results (last 24 hours)       Procedure Component Value Units Date/Time    Respiratory Panel PCR w/COVID-19(SARS-CoV-2) ANISH/VALERIA/MAXIMILIAN/PAD/COR/MAD/ILSA In-House, NP Swab in UTM/VTM, 3-4 HR TAT - Swab, Nasopharynx [[id number]]  (Normal) Collected: 23 1309    Specimen: Swab from Nasopharynx Updated: 23 1428     ADENOVIRUS, PCR Not Detected     Coronavirus 229E Not Detected     Coronavirus HKU1 Not Detected     Coronavirus NL63 Not Detected     Coronavirus OC43 Not Detected     COVID19 Not Detected     Human Metapneumovirus Not Detected     Human " Rhinovirus/Enterovirus Not Detected     Influenza A PCR Not Detected     Influenza B PCR Not Detected     Parainfluenza Virus 1 Not Detected     Parainfluenza Virus 2 Not Detected     Parainfluenza Virus 3 Not Detected     Parainfluenza Virus 4 Not Detected     RSV, PCR Not Detected     Bordetella pertussis pcr Not Detected     Bordetella parapertussis PCR Not Detected     Chlamydophila pneumoniae PCR Not Detected     Mycoplasma pneumo by PCR Not Detected    Narrative:      In the setting of a positive respiratory panel with a viral infection PLUS a negative procalcitonin without other underlying concern for bacterial infection, consider observing off antibiotics or discontinuation of antibiotics and continue supportive care. If the respiratory panel is positive for atypical bacterial infection (Bordetella pertussis, Chlamydophila pneumoniae, or Mycoplasma pneumoniae), consider antibiotic de-escalation to target atypical bacterial infection.    POC Lactate [[id number]] Collected: 09/30/23 1332    Specimen: Venous Blood Updated: 09/30/23 1337     Lactate 1.3 mmol/L      Comment: Serial Number: [id number]:  [id number]        Device Comment [id number]    POC Chem Panel [[id number]]  (Abnormal) Collected: 09/30/23 1332    Specimen: Venous Blood Updated: 09/30/23 1337     Glucose 106 mg/dL      Comment: Serial Number: [id number]:  [id number]        Sodium 136 mmol/L      POC Potassium 4.8 mmol/L      Ionized Calcium 1.14 mmol/L      Chloride 95 mmol/L      Creatinine 1.71 mg/dL      BUN 75 mg/dL      CO2 Content 31.2 mmol/L      Device Comment [id number]    Blood Gas, Venous - [[id number]]  (Abnormal) Collected: 09/30/23 1332    Specimen: Venous Blood Updated: 09/30/23 1337     pH, Venous 7.350 pH Units      pCO2, Venous 56.2 mm Hg      pO2, Venous 25.1 mm Hg      HCO3, Venous 31.1 mmol/L      Base Excess, Venous 4.3 mmol/L      Comment: Serial Number: [id number]:  [id number]        O2 Saturation, Venous 41.0 %       Barometric Pressure for Blood Gas 752.6000 mmHg      Modality Cannula     FIO2 32 %      Flow Rate 3.0000 lpm      Rate 20 Breaths/minute      Device Comment [id number]    Magnesium [[id number]]  (Abnormal) Collected: 09/30/23 1242    Specimen: Blood Updated: 09/30/23 1318     Magnesium 1.5 mg/dL     Comprehensive Metabolic Panel [[id number]]  (Abnormal) Collected: 09/30/23 1242    Specimen: Blood Updated: 09/30/23 1318     Glucose 101 mg/dL      BUN 64 mg/dL      Creatinine 1.79 mg/dL      Sodium 138 mmol/L      Potassium 5.1 mmol/L      Chloride 97 mmol/L      CO2 28.0 mmol/L      Calcium 9.3 mg/dL      Total Protein 6.6 g/dL      Albumin 4.2 g/dL      ALT (SGPT) 22 U/L      AST (SGOT) 18 U/L      Alkaline Phosphatase 63 U/L      Total Bilirubin 0.6 mg/dL      Globulin 2.4 gm/dL      A/G Ratio 1.8 g/dL      BUN/Creatinine Ratio 35.8     Anion Gap 13.0 mmol/L      eGFR 28.2 mL/min/1.73     Narrative:      GFR Normal >60  Chronic Kidney Disease <60  Kidney Failure <15    The GFR formula is only valid for adults with stable renal function between ages 18 and 70.    High Sensitivity Troponin T [[id number]]  (Abnormal) Collected: 09/30/23 1242    Specimen: Blood Updated: 09/30/23 1318     HS Troponin T 37 ng/L     Narrative:      High Sensitive Troponin T Reference Range:  <10.0 ng/L- Negative Female for AMI  <15.0 ng/L- Negative Male for AMI  >=10 - Abnormal Female indicating possible myocardial injury.  >=15 - Abnormal Male indicating possible myocardial injury.   Clinicians would have to utilize clinical acumen, EKG, Troponin, and serial changes to determine if it is an Acute Myocardial Infarction or myocardial injury due to an underlying chronic condition.         BNP [[id number]]  (Abnormal) Collected: 09/30/23 1242    Specimen: Blood Updated: 09/30/23 1316     proBNP 9,483.0 pg/mL     Narrative:      This assay is used as an aid in the diagnosis of individuals suspected of having heart failure. It can be used as an  aid in the diagnosis of acute decompensated heart failure (ADHF) in patients presenting with signs and symptoms of ADHF to the emergency department (ED). In addition, NT-proBNP of <300 pg/mL indicates ADHF is not likely.    Protime-INR [[id number]]  (Normal) Collected: 09/30/23 1242    Specimen: Blood Updated: 09/30/23 1306     Protime 12.9 Seconds      INR 0.96    Urinalysis With Microscopic If Indicated (No Culture) - Urine, Clean Catch [[id number]]  (Normal) Collected: 09/30/23 1255    Specimen: Urine, Clean Catch Updated: 09/30/23 1306     Color, UA Yellow     Appearance, UA Clear     pH, UA 5.5     Specific Gravity, UA 1.009     Glucose, UA Negative     Ketones, UA Negative     Bilirubin, UA Negative     Blood, UA Negative     Protein, UA Negative     Leuk Esterase, UA Negative     Nitrite, UA Negative     Urobilinogen, UA 0.2 E.U./dL    Narrative:      Urine microscopic not indicated.    CBC & Differential [[id number]]  (Abnormal) Collected: 09/30/23 1242    Specimen: Blood Updated: 09/30/23 1256    Narrative:      The following orders were created for panel order CBC & Differential.  Procedure                               Abnormality         Status                     ---------                               -----------         ------                     CBC Auto Differential[[id number]]        Abnormal            Final result                 Please view results for these tests on the individual orders.    CBC Auto Differential [[id number]]  (Abnormal) Collected: 09/30/23 1242    Specimen: Blood Updated: 09/30/23 1256     WBC 9.08 10*3/mm3      RBC 3.06 10*6/mm3      Hemoglobin 9.8 g/dL      Hematocrit 29.9 %      MCV 97.7 fL      MCH 32.0 pg      MCHC 32.8 g/dL      RDW 15.4 %      RDW-SD 54.6 fl      MPV 9.9 fL      Platelets 202 10*3/mm3      Neutrophil % 77.1 %      Lymphocyte % 14.3 %      Monocyte % 7.0 %      Eosinophil % 0.1 %      Basophil % 0.1 %      Immature Grans % 1.4 %      Neutrophils, Absolute  6.99 10*3/mm3      Lymphocytes, Absolute 1.30 10*3/mm3      Monocytes, Absolute 0.64 10*3/mm3      Eosinophils, Absolute 0.01 10*3/mm3      Basophils, Absolute 0.01 10*3/mm3      Immature Grans, Absolute 0.13 10*3/mm3      nRBC 0.0 /100 WBC     POC Glucose Once [[id number]]  (Normal) Collected: 09/30/23 1224    Specimen: Blood Updated: 09/30/23 1225     Glucose 104 mg/dL           Imaging Results (Last 24 Hours)       Procedure Component Value Units Date/Time    XR Chest 1 View [[id number]] Collected: 09/30/23 1340     Updated: 09/30/23 1507    Narrative:      CHEST SINGLE VIEW     HISTORY: Shortness of air. On oxygen. Cough.     COMPARISON: AP chest 07/23/2023, CT chest 06/29/2023.     FINDINGS: Lung volumes are diminished and there is linear subsegmental  atelectasis or scarring within the lung bases. There is no evidence for  perihilar edema or infiltrate. There is blunting in the left  costophrenic angle that appears chronic and is without change       Impression:      Diminished lung volumes. Blunting left costophrenic angle  due to scarring or small effusion. Mild linear basilar atelectasis or  scarring. No interval change or convincing evidence for active disease  in the chest.     This report was finalized on 9/30/2023 3:04 PM by Dr. Jenaro Bear M.D.             Results  Scan on 9/30/2023 1227 by [facility] [employer], [place]: ECG 12-LEAD         Author: -- Service: -- Author Type: --   Filed: Date of Service: Creation Time:   Status: (Other)   HEART RATE= 119  bpm  RR Interval= 504  ms  NE Interval=   ms  P Horizontal Axis=   deg  P Front Axis=   deg  QRSD Interval= 81  ms  QT Interval= 298  ms  QTcB= 420  ms  QRS Axis= 11  deg  T Wave Axis= 55  deg  - ABNORMAL ECG -  Atrial fibrillation  Abnormal R-wave progression, early transition  Borderline T abnormalities, anterior leads          Current Facility-Administered Medications:     albuterol (PROVENTIL) nebulizer solution 0.083% 2.5 mg/3mL, 2.5 mg, Nebulization,  Q6H - RT, Stanley Fowler MD    allopurinol (ZYLOPRIM) tablet 100 mg, 100 mg, Oral, Daily, Stanley Fowler MD    apixaban (ELIQUIS) tablet 2.5 mg, 2.5 mg, Oral, Q12H, Stanley Fowler MD    budesonide-formoterol (SYMBICORT) 160-4.5 MCG/ACT inhaler 2 puff, 2 puff, Inhalation, BID - RT **AND** tiotropium (SPIRIVA RESPIMAT) 2.5 mcg/act aerosol solution inhaler, 2 puff, Inhalation, Daily - RT, Stanley Fowler MD    busPIRone (BUSPAR) tablet 10 mg, 10 mg, Oral, BID, Stanley Fowler MD    DULoxetine (CYMBALTA) DR capsule 60 mg, 60 mg, Oral, Daily, Stanley Fowler MD    furosemide (LASIX) tablet 20 mg, 20 mg, Oral, Daily, Stanley Fowler MD    gabapentin (NEURONTIN) capsule 300 mg, 300 mg, Oral, TID, Stanley Fowler MD    HYDROcodone-acetaminophen (NORCO) 7.5-325 MG per tablet 1 tablet, 1 tablet, Oral, Q4H PRN, Stanley Fowler MD    hydrOXYzine (ATARAX) tablet 25 mg, 25 mg, Oral, TID PRN, Stanley Fowler MD    ipratropium-albuterol (DUO-NEB) nebulizer solution 3 mL, 3 mL, Nebulization, Q4H PRN, Stanley Fowler MD    levothyroxine (SYNTHROID, LEVOTHROID) tablet 75 mcg, 75 mcg, Oral, Daily, Stanley Fowler MD    [START ON 10/1/2023] methylPREDNISolone sodium succinate (SOLU-Medrol) injection 40 mg, 40 mg, Intravenous, Q24H, Stanley Fowler MD    metoprolol succinate XL (TOPROL-XL) 24 hr tablet 25 mg, 25 mg, Oral, Q24H, Stanley Fowler MD    potassium chloride (MICRO-K/KLOR-CON) CR capsule, 10 mEq, Oral, Daily, Stanley Fowler MD    QUEtiapine (SEROquel) tablet 100 mg, 100 mg, Oral, Nightly, Stanley Fowler MD    rOPINIRole (REQUIP) tablet 0.5 mg, 0.5 mg, Oral, Nightly, Stanley Fowler MD    rosuvastatin (CRESTOR) tablet 10 mg, 10 mg, Oral, Nightly, Stanley Fowler MD    [COMPLETED] Insert Peripheral IV, , , Once **AND** sodium chloride 0.9 % flush 10 mL, 10 mL, Intravenous, PRN, Car Perea MD    sodium chloride 0.9 % infusion, 125 mL/hr, Intravenous, Continuous, Car Perea MD, Last Rate: 125 mL/hr at 09/30/23 1259, 125 mL/hr at 09/30/23 1259     Umeclidinium Bromide (INCRUSE ELLIPTA) aerosol powder  1 puff, 1 puff, Inhalation, Daily, Stanley Fowler MD    Current Outpatient Medications:     albuterol (PROVENTIL) (2.5 MG/3ML) 0.083% nebulizer solution, Take 2.5 mg by nebulization Every 4 (Four) Hours As Needed., Disp: , Rfl:     allopurinol (ZYLOPRIM) 100 MG tablet, Take 1 tablet by mouth Daily., Disp: , Rfl:     budesonide-formoterol (SYMBICORT) 160-4.5 MCG/ACT inhaler, Inhale 2 puffs 2 (Two) Times a Day., Disp: 1 inhaler, Rfl: 11    busPIRone (BUSPAR) 10 MG tablet, Take 1 tablet by mouth 2 (Two) Times a Day. for anxiety, Disp: , Rfl:     doxycycline (MONODOX) 100 MG capsule, Take 1 capsule by mouth 2 (Two) Times a Day for 10 days., Disp: 20 capsule, Rfl: 0    DULoxetine (CYMBALTA) 60 MG capsule, Take 1 capsule by mouth Daily., Disp: , Rfl:     Fluticasone-Umeclidin-Vilant (Trelegy Ellipta) 100-62.5-25 MCG/ACT inhaler, Inhale 1 puff Daily., Disp: 60 each, Rfl: 0    furosemide (LASIX) 20 MG tablet, Take 1 tablet by mouth Daily for 30 days., Disp: 30 tablet, Rfl: 0    gabapentin (NEURONTIN) 300 MG capsule, Take 1 capsule by mouth 3 (Three) Times a Day., Disp: , Rfl:     HYDROcodone-acetaminophen (NORCO) 7.5-325 MG per tablet, Take 1 tablet by mouth Every 8 (Eight) Hours As Needed., Disp: , Rfl:     hydrOXYzine (ATARAX) 25 MG tablet, Take 1 tablet by mouth 3 (Three) Times a Day As Needed., Disp: , Rfl:     ipratropium-albuterol (DUO-NEB) 0.5-2.5 mg/3 ml nebulizer, Take 3 mL by nebulization Every 4 (Four) Hours As Needed for Wheezing., Disp: , Rfl:     levothyroxine (SYNTHROID, LEVOTHROID) 75 MCG tablet, Take 1 tablet by mouth Daily., Disp: , Rfl:     metoprolol succinate XL (TOPROL-XL) 25 MG 24 hr tablet, Take 1 tablet by mouth Daily for 30 days., Disp: 30 tablet, Rfl: 0    potassium chloride (MICRO-K) 10 MEQ CR capsule, Take 1 capsule by mouth Daily., Disp: 30 capsule, Rfl: 0    predniSONE (DELTASONE) 20 MG tablet, Take 2 tablets by mouth Daily for 7 days.,  Disp: 14 tablet, Rfl: 0    prochlorperazine (COMPAZINE) 10 MG tablet, Take 1 tablet by mouth Every 6 (Six) Hours As Needed for Nausea or Vomiting., Disp: 12 tablet, Rfl: 0    QUEtiapine (SEROquel) 50 MG tablet, Take 2 tablets by mouth every night at bedtime., Disp: , Rfl:     rOPINIRole (REQUIP) 0.5 MG tablet, Take 1 tablet by mouth Every Night. Take 1 hour before bedtime., Disp: 90 tablet, Rfl: 3    rosuvastatin (CRESTOR) 20 MG tablet, Take 1 tablet by mouth Daily for 30 days., Disp: 30 tablet, Rfl: 0    Umeclidinium Bromide (INCRUSE ELLIPTA) 62.5 MCG/INH aerosol powder , Inhale 1 puff Daily., Disp: , Rfl:      ASSESSMENT  Acute on chronic hypoxic respiratory failure  Acute exacerbation of COPD  Paroxysmal atrial fibrillation with rapid ventricular rate hypertension  Hypothyroidism  Anxiety disorder  Depression  Chronic anemia  Restless leg syndrome  Chronic kidney disease stage III    PLAN  Admit  Supplemental oxygen nebulizer  IV steroids  Control the heart rate  Serial cardiac enzymes and EKG  Cardiology consult  Pulmonary to follow patient  Adjust home medications  Stress ulcer DVT prophylaxis  Supportive care  DNR  Discussed with family nursing staff  Follow closely further recommendation according to hospital course    AMANDA MURO MD

## 2023-09-30 NOTE — PLAN OF CARE
Problem: Adult Inpatient Plan of Care  Goal: Absence of Hospital-Acquired Illness or Injury  Intervention: Identify and Manage Fall Risk  Recent Flowsheet Documentation  Taken 9/30/2023 1800 by Ce Ayoub, RN  Safety Promotion/Fall Prevention:   clutter free environment maintained   assistive device/personal items within reach   safety round/check completed   room organization consistent       Problem: Adult Inpatient Plan of Care  Goal: Absence of Hospital-Acquired Illness or Injury  Intervention: Prevent Infection  Recent Flowsheet Documentation  Taken 9/30/2023 1800 by Ce Ayoub, RN  Infection Prevention: environmental surveillance performed     Problem: Adult Inpatient Plan of Care  Goal: Optimal Comfort and Wellbeing  Outcome: Ongoing, Progressing

## 2023-09-30 NOTE — ED NOTES
Per EMS:    Patient called for SOA x 1 week.  Pt seen at an [facility] and was started on antibiotics and steroids for PNA. Pt was negative for Covid and Flu.  SpO2 88% on patients home O2 (3 lpm). Pt given duoneb and then O2 at 4 lpm to keep her SpO2 in the mid 90's.    No IV.  No Bgl.

## 2023-09-30 NOTE — ED NOTES
Nursing report ED to floor  Josephine Alexisfield  83 y.o.  female    HPI :   Chief Complaint   Patient presents with    Shortness of Breath       Admitting doctor:   Stanley Fowler MD    Admitting diagnosis:   The primary encounter diagnosis was Atrial fibrillation with rapid ventricular response: New onset. Diagnoses of COPD exacerbation, Hypomagnesemia, Chronic renal failure, unspecified CKD stage, and Chronic anemia were also pertinent to this visit.    Code status:   Current Code Status       Date Active Code Status Order ID Comments User Context       Prior            Allergies:   Morphine and related and Fenofibrate    Isolation:   No active isolations    Intake and Output  No intake or output data in the 24 hours ending 09/30/23 1546    Weight:       09/30/23  1223   Weight: 73.5 kg (162 lb)       Most recent vitals:   Vitals:    09/30/23 1325 09/30/23 1330 09/30/23 1401 09/30/23 1543   BP:  (!) 135/102  131/89   BP Location:    Right arm   Pulse:  (!) 131 113 115   Resp: 18 18  20   Temp:       TempSrc:       SpO2:  (!) 88% 97%    Weight:       Height:           Active LDAs/IV Access:   Lines, Drains & Airways       Active LDAs       Name Placement date Placement time Site Days    Peripheral IV 09/30/23 1242 Left Antecubital 09/30/23  1242  Antecubital  less than 1    External Urinary Catheter 09/30/23  1355  --  less than 1                    Labs (abnormal labs have a star):   Labs Reviewed   COMPREHENSIVE METABOLIC PANEL - Abnormal; Notable for the following components:       Result Value    Glucose 101 (*)     BUN 64 (*)     Creatinine 1.79 (*)     Chloride 97 (*)     BUN/Creatinine Ratio 35.8 (*)     eGFR 28.2 (*)     All other components within normal limits    Narrative:     GFR Normal >60  Chronic Kidney Disease <60  Kidney Failure <15    The GFR formula is only valid for adults with stable renal function between ages 18 and 70.   BNP (IN-HOUSE) - Abnormal; Notable for the following components:    proBNP  9,483.0 (*)     All other components within normal limits    Narrative:     This assay is used as an aid in the diagnosis of individuals suspected of having heart failure. It can be used as an aid in the diagnosis of acute decompensated heart failure (ADHF) in patients presenting with signs and symptoms of ADHF to the emergency department (ED). In addition, NT-proBNP of <300 pg/mL indicates ADHF is not likely.   TROPONIN - Abnormal; Notable for the following components:    HS Troponin T 37 (*)     All other components within normal limits    Narrative:     High Sensitive Troponin T Reference Range:  <10.0 ng/L- Negative Female for AMI  <15.0 ng/L- Negative Male for AMI  >=10 - Abnormal Female indicating possible myocardial injury.  >=15 - Abnormal Male indicating possible myocardial injury.   Clinicians would have to utilize clinical acumen, EKG, Troponin, and serial changes to determine if it is an Acute Myocardial Infarction or myocardial injury due to an underlying chronic condition.        MAGNESIUM - Abnormal; Notable for the following components:    Magnesium 1.5 (*)     All other components within normal limits   CBC WITH AUTO DIFFERENTIAL - Abnormal; Notable for the following components:    RBC 3.06 (*)     Hemoglobin 9.8 (*)     Hematocrit 29.9 (*)     MCV 97.7 (*)     RDW-SD 54.6 (*)     Neutrophil % 77.1 (*)     Lymphocyte % 14.3 (*)     Eosinophil % 0.1 (*)     Immature Grans % 1.4 (*)     Immature Grans, Absolute 0.13 (*)     All other components within normal limits   BLOOD GAS, VENOUS - Abnormal; Notable for the following components:    pCO2, Venous 56.2 (*)     pO2, Venous 25.1 (*)     HCO3, Venous 31.1 (*)     Base Excess, Venous 4.3 (*)     O2 Saturation, Venous 41.0 (*)     All other components within normal limits   POC CHEM PANEL - Abnormal; Notable for the following components:    Ionized Calcium 1.14 (*)     Chloride 95 (*)     CO2 Content 31.2 (*)     All other components within normal limits    RESPIRATORY PANEL PCR W/ COVID-19 (SARS-COV-2) ANISH/[facility] IN-HOUSE, NP SWAB IN UTM/VTP, 3-4 HR TAT - Normal    Narrative:     In the setting of a positive respiratory panel with a viral infection PLUS a negative procalcitonin without other underlying concern for bacterial infection, consider observing off antibiotics or discontinuation of antibiotics and continue supportive care. If the respiratory panel is positive for atypical bacterial infection (Bordetella pertussis, Chlamydophila pneumoniae, or Mycoplasma pneumoniae), consider antibiotic de-escalation to target atypical bacterial infection.   PROTIME-INR - Normal   URINALYSIS W/ MICROSCOPIC IF INDICATED (NO CULTURE) - Normal    Narrative:     Urine microscopic not indicated.   POCT GLUCOSE FINGERSTICK - Normal   BLOOD GAS, VENOUS   HIGH SENSITIVITIY TROPONIN T 2HR   POC LACTATE   CBC AND DIFFERENTIAL    Narrative:     The following orders were created for panel order CBC & Differential.  Procedure                               Abnormality         Status                     ---------                               -----------         ------                     CBC Auto Differential[[id number]]        Abnormal            Final result                 Please view results for these tests on the individual orders.       EKG:   ECG 12 Lead Dyspnea   Preliminary Result   HEART RATE= 119  bpm   RR Interval= 504  ms   OK Interval=   ms   P Horizontal Axis=   deg   P Front Axis=   deg   QRSD Interval= 81  ms   QT Interval= 298  ms   QTcB= 420  ms   QRS Axis= 11  deg   T Wave Axis= 55  deg   - ABNORMAL ECG -   Atrial fibrillation   Abnormal R-wave progression, early transition   Borderline T abnormalities, anterior leads   Electronically Signed By:    Date and Time of Study: 2023-09-30 12:27:44          Meds given in ED:   Medications   sodium chloride 0.9 % flush 10 mL (has no administration in time range)   sodium chloride 0.9 % infusion (125 mL/hr  Intravenous New Bag 23 1259)   ipratropium-albuterol (DUO-NEB) nebulizer solution 3 mL (has no administration in time range)   methylPREDNISolone sodium succinate (SOLU-Medrol) injection 40 mg (has no administration in time range)   ipratropium-albuterol (DUO-NEB) nebulizer solution 3 mL (3 mL Nebulization Given 23 1325)   methylPREDNISolone sodium succinate (SOLU-Medrol) injection 125 mg (125 mg Intravenous Given 23 1259)   magnesium sulfate 2g/50 mL (PREMIX) infusion (2 g Intravenous New Bag 23 1545)   apixaban (ELIQUIS) tablet 2.5 mg (2.5 mg Oral Given 23 1545)       Imaging results:  XR Chest 1 View    Result Date: 2023  Diminished lung volumes. Blunting left costophrenic angle due to scarring or small effusion. Mild linear basilar atelectasis or scarring. No interval change or convincing evidence for active disease in the chest.  This report was finalized on 2023 3:04 PM by Dr. Jenaro Bear M.D.       Ambulatory status:   - assist    Social issues:   Social History     Socioeconomic History    Marital status:    Tobacco Use    Smoking status: Former     Packs/day: 1.00     Years: 10.00     Pack years: 10.00     Types: Cigarettes     Start date:      Quit date:      Years since quittin.7    Smokeless tobacco: Never   Vaping Use    Vaping Use: Never used   Substance and Sexual Activity    Alcohol use: No     Comment: CAFFEINE NO     Drug use: No    Sexual activity: Defer       NIH Stroke Scale:       Nicki Benjamin RN  23 15:46 EDT

## 2023-10-01 PROBLEM — I48.91 ATRIAL FIBRILLATION WITH RVR: Status: ACTIVE | Noted: 2023-01-01

## 2023-10-01 NOTE — CONSULTS
[facility]        Patient Name: Josephine Wolf  Age/Sex: 83 y.o. female  : 1940  MRN: [id number]    Date of Admission: 2023  Date of Encounter Visit: 10/01/23  Encounter Provider: Constantin Dalton MD  Referring Provider: Stanley Fowler MD  Place of Service: [facility]  Patient Care Team:  Bernabe Guy MD as PCP - General (Internal Medicine)  Avi Aly MD as Consulting Physician (Nephrology)    Subjective:     Chief Complaint: Shortness of Breath    Reason for consult: Afib RVR    History of Present Illness:  Josephine Wolf is a 83 y.o. female who follows with [facility]. Patient was admitted with COPD exacerbation.  We have been asked to see this patient for afib RVR. She has a past medical history of significant lung disease and hypertension.     In 2022, she had a heart cath at [facility]. It showed mild nonobstructive CAD.    In 2022, I saw this patient for 7 beats of SVT. She was kept on her beta blockers and no additional testing was done. Her last ECHO showed a normal LVEF at 67%, mild concentric hypertrophy, left ventricular diastolic function consistent with (grade I) impaired relaxation, and normal RVSP.     She was last here in 2023 for pneumonia and COPD exacerbation.     She presented to the ED on 23 for shortness of breath for 1 week. She was seen at the urgent care center and started on steroids and doxycycline. She had no improvement in her symptoms. She chronically wears 3L of oxygen at all times. Upon arrival to the ED, she was found to be atrial fibrillation with a rate of 119. She is on Toprol at home but no anticoagulation.  Otherwise she is without complaint.  She does not feel any palpitations due to A-fib.  She reports her breathing is better today.    ED workup: HS troponin 37 (reflex 27), proBNP 9483, BUN 64, Creatinine 1.79, Mag 1.5, HGB 9.8    EKG showed atrial  fibrillation rate 119.  Her telemetry from this AM shows Afib with a rate of 149.        Cardiology is being asked to see for afib-RVR.       Prior Cardiac Testing:    ECHO 3/1/23    Left ventricular systolic function is normal. Calculated left ventricular EF = 67.6%    Left ventricular wall thickness is consistent with mild concentric hypertrophy.    Left ventricular diastolic function is consistent with (grade I) impaired relaxation.    Left atrial volume is mildly increased.    Estimated right ventricular systolic pressure from tricuspid regurgitation is normal (<35 mmHg).       Past Medical History:  Past Medical History:   Diagnosis Date    Acid reflux     Acute kidney injury     Anemia     Arthritis     Bipolar 1 disorder, depressed     Cataract     MINDY    Chronic nausea     Chronic pain of right knee     Continuous leakage of urine     USES DEPENDS    COPD (chronic obstructive pulmonary disease)     USES O2 2 LMP PER NC AT NIGHT    Disease of thyroid gland     HYPOTHYROIDISM    Diverticulosis     Fibromyalgia     DX 1994    Fibromyalgia, primary     Frequent episodes of bronchitis     HL (hearing loss)     Hypertension     Hypothyroidism     Memory loss     Migraines     Neck pain     On home oxygen therapy     2L NC AT NIGHT    Orthostatic hypotension     Short of breath on exertion     Sleep apnea     Stage 3 chronic kidney disease        Past Surgical History:   Procedure Laterality Date    CEREBRAL ANEURYSM REPAIR  2000'S    WITH STENT    HYSTERECTOMY      JOINT REPLACEMENT      LAPAROSCOPIC CHOLECYSTECTOMY      UT ARTHRP KNE CONDYLE&PLATU MEDIAL&LAT COMPARTMENTS Right 11/16/2017    Procedure: RT TOTAL KNEE ARTHROPLASTY;  Surgeon: Kashif Perez MD;  Location: [facility];  Service: Orthopedics       Home Medications:   Medications Prior to Admission   Medication Sig Dispense Refill Last Dose    acetaminophen (TYLENOL) 650 MG 8 hr tablet Take 1 tablet by mouth Every 8 (Eight) Hours As Needed for Mild  Pain.       albuterol (PROVENTIL) (2.5 MG/3ML) 0.083% nebulizer solution Take 2.5 mg by nebulization Every 4 (Four) Hours As Needed.       allopurinol (ZYLOPRIM) 100 MG tablet Take 1 tablet by mouth Daily.       amLODIPine (NORVASC) 5 MG tablet Take 1 tablet by mouth Daily.       budesonide-formoterol (SYMBICORT) 160-4.5 MCG/ACT inhaler Inhale 2 puffs 2 (Two) Times a Day. 1 inhaler 11     bumetanide (BUMEX) 1 MG tablet Take 1 tablet by mouth Daily.       busPIRone (BUSPAR) 10 MG tablet Take 1 tablet by mouth 2 (Two) Times a Day As Needed. for anxiety       Cholecalciferol 25 MCG (1000 UT) tablet Take 1 tablet by mouth Daily.       dicyclomine (BENTYL) 10 MG capsule Take 2 capsules by mouth 3 (Three) Times a Day.       DULoxetine (CYMBALTA) 60 MG capsule Take 1 capsule by mouth Daily.       famotidine (PEPCID) 20 MG tablet Take 1 tablet by mouth 2 (Two) Times a Day.       Fluticasone-Umeclidin-Vilant (Trelegy Ellipta) 100-62.5-25 MCG/ACT inhaler Inhale 1 puff Daily. 60 each 0     gabapentin (NEURONTIN) 300 MG capsule Take 1 capsule by mouth 3 (Three) Times a Day.       HYDROcodone-acetaminophen (NORCO) 7.5-325 MG per tablet Take 1 tablet by mouth Every 8 (Eight) Hours As Needed.       ipratropium-albuterol (DUO-NEB) 0.5-2.5 mg/3 ml nebulizer Take 3 mL by nebulization Every 4 (Four) Hours As Needed for Wheezing.       levothyroxine (SYNTHROID, LEVOTHROID) 75 MCG tablet Take 1 tablet by mouth Daily.       LORazepam (ATIVAN) 0.5 MG tablet Take 1 tablet by mouth Daily As Needed for Anxiety.       meloxicam (MOBIC) 7.5 MG tablet Take 1 tablet by mouth Daily As Needed.       metoprolol succinate XL (TOPROL-XL) 25 MG 24 hr tablet Take 1 tablet by mouth Daily for 30 days. (Patient taking differently: Take 1 tablet by mouth Daily.) 30 tablet 0     potassium chloride (MICRO-K) 10 MEQ CR capsule Take 1 capsule by mouth Daily. 30 capsule 0     predniSONE (DELTASONE) 20 MG tablet Take 2 tablets by mouth Daily for 7 days. 14  tablet 0     prochlorperazine (COMPAZINE) 10 MG tablet Take 1 tablet by mouth Every 6 (Six) Hours As Needed for Nausea or Vomiting. 12 tablet 0     pseudoephedrine-guaifenesin (MUCINEX D)  MG per 12 hr tablet Take 1 tablet by mouth Every 12 (Twelve) Hours As Needed for Allergies.       QUEtiapine (SEROquel) 50 MG tablet Take 2 tablets by mouth every night at bedtime.       rOPINIRole (REQUIP) 0.5 MG tablet Take 1 tablet by mouth Every Night. Take 1 hour before bedtime. 90 tablet 3     rosuvastatin (CRESTOR) 20 MG tablet Take 1 tablet by mouth Daily for 30 days. 30 tablet 0        Allergies:  Allergies   Allergen Reactions    Morphine And Related Hallucinations     CONFUSION    Fenofibrate Unknown - High Severity     Elevated creatinine  Elevated creatinine         Past Social History:  Social History     Socioeconomic History    Marital status:    Tobacco Use    Smoking status: Former     Packs/day: 1.00     Years: 10.00     Pack years: 10.00     Types: Cigarettes     Start date:      Quit date:      Years since quittin.7    Smokeless tobacco: Never   Vaping Use    Vaping Use: Never used   Substance and Sexual Activity    Alcohol use: No     Comment: CAFFEINE NO     Drug use: No    Sexual activity: Defer       Past Family History:  Family History   Problem Relation Age of Onset    Malig Hyperthermia Neg Hx        REVIEW OF SYSTEMS:   14 point ROS was performed and is negative except as outlined in HPI          Objective:   Temp:  [97.9 øF (36.6 øC)-98.5 øF (36.9 øC)] 97.9 øF (36.6 øC)  Heart Rate:  [109-148] 148  Resp:  [18-20] 18  BP: (123-138)/() 135/98     Intake/Output Summary (Last 24 hours) at 10/1/2023 1045  Last data filed at 2023 1600  Gross per 24 hour   Intake --   Output 400 ml   Net -400 ml     Body mass index is 29.63 kg/mý.      23  1223   Weight: 73.5 kg (162 lb)     Weight change:     General Appearance:    Alert, cooperative, in no acute distress   Head:     Normocephalic, without obvious abnormality, atraumatic   Eyes:            Lids and lashes normal, conjunctivae and sclerae normal, no   icterus, no pallor, corneas clear, PERRLA   Ears:    Ears appear intact with no abnormalities noted   Throat:   No oral lesions, no thrush, oral mucosa moist   Neck:   No adenopathy, supple, trachea midline, no thyromegaly, no   carotid bruit, no JVD   Back:     No kyphosis present, no scoliosis present, no skin lesions, erythema or scars, no tenderness to percussion or palpation, range of motion normal   Lungs:   Diminished breath sounds noted diffusely, scant expiratory wheeze but otherwise normal work of breathing on nasal cannula O2.    Heart:  Irregularly irregular,, normal S1 and S2, no murmur, no gallop, no rub, no click   Chest Wall:    No abnormalities observed   Abdomen:     Normal bowel sounds, no masses, no organomegaly, soft, nontender, nondistended, no guarding, no rebound  tenderness   Extremities:   Moves all extremities well, no edema, no cyanosis, no redness   Pulses:   Pulses palpable and equal bilaterally. Normal radial, carotid, femoral, dorsalis pedis and posterior tibial pulses bilaterally. Normal abdominal aorta   Skin:  Psychiatric:   No bleeding, bruising or rash    Alert and oriented x 3, normal mood and affect     Lab Review:   Results from last 7 days   Lab Units 10/01/23  0800 09/30/23  1332 09/30/23  1242   SODIUM mmol/L 142  --  138   POTASSIUM mmol/L 5.1  --  5.1   CHLORIDE mmol/L 102  --  97*   CO2 mmol/L 28.1  --  28.0   BUN mg/dL 59*  --  64*   CREATININE mg/dL 1.74* 1.71 1.79*   GLUCOSE mg/dL 141*  --  101*   CALCIUM mg/dL 8.8  --  9.3   AST (SGOT) U/L 20  --  18   ALT (SGPT) U/L 20  --  22     Results from last 7 days   Lab Units 09/30/23  1711 09/30/23  1242   HSTROP T ng/L 27* 37*     Results from last 7 days   Lab Units 10/01/23  0800 09/30/23  1242   WBC 10*3/mm3 7.38 9.08   HEMOGLOBIN g/dL 9.1* 9.8*   HEMATOCRIT % 27.8* 29.9*   PLATELETS  10*3/mm3 181 202     Results from last 7 days   Lab Units 09/30/23  1242   INR  0.96     Results from last 7 days   Lab Units 09/30/23  1242   MAGNESIUM mg/dL 1.5*     Results from last 7 days   Lab Units 10/01/23  0800   CHOLESTEROL mg/dL 146   TRIGLYCERIDES mg/dL 72   HDL CHOL mg/dL 104*     Results from last 7 days   Lab Units 09/30/23  1242   PROBNP pg/mL 9,483.0*     Results from last 7 days   Lab Units 10/01/23  0800   TSH uIU/mL 0.049*       Echo EF Estimated  No results found for: ECHOEFEST    EKG:   I personally viewed and interpreted the patient's EKG    9/30/23 6/29/23    Imaging:  Imaging Results (Most Recent)       Procedure Component Value Units Date/Time    XR Chest 1 View [[id number]] Collected: 09/30/23 1340     Updated: 09/30/23 1507    Narrative:      CHEST SINGLE VIEW     HISTORY: Shortness of air. On oxygen. Cough.     COMPARISON: AP chest 07/23/2023, CT chest 06/29/2023.     FINDINGS: Lung volumes are diminished and there is linear subsegmental  atelectasis or scarring within the lung bases. There is no evidence for  perihilar edema or infiltrate. There is blunting in the left  costophrenic angle that appears chronic and is without change       Impression:      Diminished lung volumes. Blunting left costophrenic angle  due to scarring or small effusion. Mild linear basilar atelectasis or  scarring. No interval change or convincing evidence for active disease  in the chest.     This report was finalized on 9/30/2023 3:04 PM by Dr. Jenaro Bear M.D.                   Assessment:     Active Hospital Problems    Diagnosis  POA    **COPD exacerbation [J44.1]  Yes    Atrial fibrillation with RVR [I48.91]  Unknown      Resolved Hospital Problems   No resolved problems to display.          Plan:     Atrial fibrillation, new onset: She previously had episodes of SVT but now appears to have sustained atrial fibrillation.  [id number] 4, will start Eliquis 2.5 twice daily age/CKD  Increase  metoprolol to 50 mg tartrate every 8 hours for rate control  I suspect her rate control will improve as her respiratory status improves  COPD exacerbation: Pulmonary following  Pulmonary hypertension, likely group 3.  Stable.  Appears euvolemic.  Hypertension: Metoprolol per above  Troponin elevation.  Likely consistent with acute myocardial injury secondary to COPD exacerbation.  No angina.  No need for additional cardiac testing for this.    Thank you for allowing me to participate in the care of Josephine Wolf. Feel free to contact me directly with any further questions or concerns.    Constantin Dalton MD  [facility]  10/01/23  06:49 EDT      Part of this note may be an electronic transcription/translation of spoken language to printed text using the Dragon Dictation System.

## 2023-10-01 NOTE — PLAN OF CARE
Goal Outcome Evaluation:  Plan of Care Reviewed With: patient        Progress: improving  Outcome Evaluation: Patient states she is feeling better and is no longer experiencing shortness of breath. Patient's HR remains elevated and in Afib, BP is stable and patient has no further complaints.

## 2023-10-01 NOTE — CONSULTS
[facility] Consult Note      Patient Name: Josephine Wolf  : 1940  MRN: [id number]  Primary Care Physician:  Bernabe Guy MD  Referring Physician: Stanley Fowler MD  Date of admission: 2023    Subjective     Reason for Consult: CHRIS on CKD 3B    HPI:   Josephine Wolf is a 83 y.o. female with CKD3b (baseline SCR 1.3-1.5) followed by Dr. Darren Onofre of our group, admitted yesterday for further evaluation of shortness of breath.  Found to have rapid atrial fibrillation.  Full PMH outlined below.  SCR on arrival 1.8, with value 1.7 today.    Progressive worsening of shortness of breath for the past 5 days  No fever, chills, or night sweats; chronic cough  Describes orthopnea for the past 5 days  No chest pain or palpitations  She believes she has gained 10 pounds over the last month, which she attributed to increased appetite   Reports good appetite while here  No urinary complaints; bowel movements fine    Review of Systems:   14 point review of systems is otherwise negative except for mentioned above on HPI    Personal History     Past Medical History:   Diagnosis Date    Acid reflux     Acute kidney injury     Anemia     Arthritis     Bipolar 1 disorder, depressed     Cataract     MINDY    Chronic nausea     Chronic pain of right knee     Continuous leakage of urine     USES DEPENDS    COPD (chronic obstructive pulmonary disease)     USES O2 2 LMP PER NC AT NIGHT    Disease of thyroid gland     HYPOTHYROIDISM    Diverticulosis     Fibromyalgia     DX     Fibromyalgia, primary     Frequent episodes of bronchitis     HL (hearing loss)     Hypertension     Hypothyroidism     Memory loss     Migraines     Neck pain     On home oxygen therapy     2L NC AT NIGHT    Orthostatic hypotension     Short of breath on exertion     Sleep apnea     Stage 3 chronic kidney disease        Past Surgical History:   Procedure Laterality Date    CEREBRAL ANEURYSM REPAIR       WITH STENT    HYSTERECTOMY      JOINT REPLACEMENT      LAPAROSCOPIC CHOLECYSTECTOMY      DE ARTHRP KNE CONDYLE&PLATU MEDIAL&LAT COMPARTMENTS Right 11/16/2017    Procedure: RT TOTAL KNEE ARTHROPLASTY;  Surgeon: Kashif Perez MD;  Location: [facility];  Service: Orthopedics       Family History: family history is not on file.    Social History:  reports that she quit smoking about 54 years ago. Her smoking use included cigarettes. She started smoking about 64 years ago. She has a 10.00 pack-year smoking history. She has never used smokeless tobacco. She reports that she does not drink alcohol and does not use drugs.    Home Medications:  Prior to Admission medications    Medication Sig Start Date End Date Taking? Authorizing Provider   acetaminophen (TYLENOL) 650 MG 8 hr tablet Take 1 tablet by mouth Every 8 (Eight) Hours As Needed for Mild Pain.   Yes Brett Macedo MD   albuterol (PROVENTIL) (2.5 MG/3ML) 0.083% nebulizer solution Take 2.5 mg by nebulization Every 4 (Four) Hours As Needed. 10/2/20  Yes Brett Macedo MD   allopurinol (ZYLOPRIM) 100 MG tablet Take 1 tablet by mouth Daily.   Yes Brett Macedo MD   amLODIPine (NORVASC) 5 MG tablet Take 1 tablet by mouth Daily.   Yes Brett Macedo MD   budesonide-formoterol (SYMBICORT) 160-4.5 MCG/ACT inhaler Inhale 2 puffs 2 (Two) Times a Day. 8/8/20  Yes French Dolan MD   bumetanide (BUMEX) 1 MG tablet Take 1 tablet by mouth Daily.   Yes Brett Macedo MD   busPIRone (BUSPAR) 10 MG tablet Take 1 tablet by mouth 2 (Two) Times a Day As Needed. for anxiety 1/12/23  Yes Brett Macedo MD   Cholecalciferol 25 MCG (1000 UT) tablet Take 1 tablet by mouth Daily.   Yes Brett Macedo MD   dicyclomine (BENTYL) 10 MG capsule Take 2 capsules by mouth 3 (Three) Times a Day.   Yes Brett Macedo MD   DULoxetine (CYMBALTA) 60 MG capsule Take 1 capsule by mouth Daily.   Yes Brett Macedo MD   famotidine  (PEPCID) 20 MG tablet Take 1 tablet by mouth 2 (Two) Times a Day.   Yes Brett Macedo MD   Fluticasone-Umeclidin-Vilant (Trelegy Ellipta) 100-62.5-25 MCG/ACT inhaler Inhale 1 puff Daily. 7/5/23  Yes Stanley Fowler MD   gabapentin (NEURONTIN) 300 MG capsule Take 1 capsule by mouth 3 (Three) Times a Day.   Yes Brett Macedo MD   HYDROcodone-acetaminophen (NORCO) 7.5-325 MG per tablet Take 1 tablet by mouth Every 8 (Eight) Hours As Needed. 10/1/20  Yes Brett Macedo MD   ipratropium-albuterol (DUO-NEB) 0.5-2.5 mg/3 ml nebulizer Take 3 mL by nebulization Every 4 (Four) Hours As Needed for Wheezing.   Yes Brett Macedo MD   levothyroxine (SYNTHROID, LEVOTHROID) 75 MCG tablet Take 1 tablet by mouth Daily.   Yes Brett Macedo MD   LORazepam (ATIVAN) 0.5 MG tablet Take 1 tablet by mouth Daily As Needed for Anxiety.   Yes Brett Macedo MD   meloxicam (MOBIC) 7.5 MG tablet Take 1 tablet by mouth Daily As Needed.   Yes Brett Macedo MD   metoprolol succinate XL (TOPROL-XL) 25 MG 24 hr tablet Take 1 tablet by mouth Daily for 30 days.  Patient taking differently: Take 1 tablet by mouth Daily. 7/6/23 9/30/23 Yes Stanley Fowler MD   potassium chloride (MICRO-K) 10 MEQ CR capsule Take 1 capsule by mouth Daily. 5/25/20  Yes Anderson Dolan MD   predniSONE (DELTASONE) 20 MG tablet Take 2 tablets by mouth Daily for 7 days. 9/25/23 10/2/23 Yes Simona Chacon DO   prochlorperazine (COMPAZINE) 10 MG tablet Take 1 tablet by mouth Every 6 (Six) Hours As Needed for Nausea or Vomiting. 11/16/21  Yes Gray Iniguez MD   pseudoephedrine-guaifenesin (MUCINEX D)  MG per 12 hr tablet Take 1 tablet by mouth Every 12 (Twelve) Hours As Needed for Allergies.   Yes Brett Macedo MD   QUEtiapine (SEROquel) 50 MG tablet Take 2 tablets by mouth every night at bedtime. 2/16/21  Yes Provider, MD Brett   rOPINIRole (REQUIP) 0.5 MG tablet Take 1 tablet by mouth Every Night.  Take 1 hour before bedtime. 5/31/23  Yes Ross Braswell II, MD   rosuvastatin (CRESTOR) 20 MG tablet Take 1 tablet by mouth Daily for 30 days. 7/5/23 9/30/23 Yes Stanley Fowler MD       Allergies:  Allergies   Allergen Reactions    Morphine And Related Hallucinations     CONFUSION    Fenofibrate Unknown - High Severity     Elevated creatinine  Elevated creatinine         Objective     Vitals:   Temp:  [97.9 øF (36.6 øC)-98.4 øF (36.9 øC)] 98.4 øF (36.9 øC)  Heart Rate:  [102-148] 102  Resp:  [18-20] 18  BP: (118-135)/(71-98) 121/75  Flow (L/min):  [3] 3  No intake or output data in the 24 hours ending 10/01/23 1945    Physical Exam:   Constitutional: Awake, alert, NAD, cushingoid, chronically ill, overweight  HEENT: Sclera anicteric, no conjunctival injection  Neck: Supple, no carotid bruit, trachea at midline, no JVD  Respiratory: Crackles, rhonchi, and wheezes bilaterally; nonlabored on 3 L/min  Cardiovascular: Irregularly irregular, tachycardic, no rub, 2/6M  Gastrointestinal: BS +, soft, nontender and nondistended  : No palpable bladder  Musculoskeletal: No significant edema, no clubbing or cyanosis  Psychiatric: Appropriate affect, cooperative, oriented fully  Neurologic: No asterixis but +tremulous, moving all limbs, normal speech   Skin: Warm and dry       Scheduled Meds:     albuterol, 2.5 mg, Nebulization, Q6H - RT  allopurinol, 100 mg, Oral, Daily  apixaban, 2.5 mg, Oral, Q12H  budesonide-formoterol, 2 puff, Inhalation, BID - RT   And  tiotropium bromide monohydrate, 2 puff, Inhalation, Daily - RT  busPIRone, 10 mg, Oral, BID  DULoxetine, 60 mg, Oral, Daily  famotidine, 20 mg, Oral, BID  gabapentin, 300 mg, Oral, TID  guaiFENesin, 600 mg, Oral, Q12H  levothyroxine, 75 mcg, Oral, Daily  metoprolol tartrate, 75 mg, Oral, Q8H  predniSONE, 40 mg, Oral, Daily With Breakfast  QUEtiapine, 100 mg, Oral, Nightly  rOPINIRole, 0.5 mg, Oral, Nightly  rosuvastatin, 10 mg, Oral, Nightly      IV Meds:   sodium  chloride, 75 mL/hr, Last Rate: 75 mL/hr (10/01/23 1414)        Results Reviewed:   I have personally reviewed the results from the time of this admission to 10/1/2023 19:45 EDT     Lab Results   Component Value Date    GLUCOSE 141 (H) 10/01/2023    CALCIUM 8.8 10/01/2023     10/01/2023    K 5.1 10/01/2023    CO2 28.1 10/01/2023     10/01/2023    BUN 59 (H) 10/01/2023    CREATININE 1.74 (H) 10/01/2023    EGFRIFAFRI  03/13/2020      Comment:      <15 Indicative of kidney failure.    EGFRIFNONA 47 (L) 11/16/2021    BCR 33.9 (H) 10/01/2023    ANIONGAP 11.9 10/01/2023      Lab Results   Component Value Date    MG 1.5 (L) 09/30/2023    PHOS 3.6 07/04/2023    ALBUMIN 3.8 10/01/2023           Assessment / Plan       COPD exacerbation    Atrial fibrillation with RVR      ASSESSMENT:  1.  CHRIS on CKD3b, nonoliguric, likely prerenal due to decreased renal perfusion in the setting of tachyarrhythmia, hypoxia, and modest hypotension.  Looks euvolemic; electrolytes stable.  Urinalysis completely bland  2.  Atrial fibrillation with RVR  3.  COPD with exacerbation  4.  Hypertension, with adequate control  5.  Anemia  6.  Osteoarthritis: Meloxicam on medication list, but she is not taking this    PLAN:  1.  Discontinue IVF since she is eating and drinking well  2.  Rate control per cardiology  3.  No objection to discharge home tomorrow barring any surprises in labs  4.  Will resume oral bumetanide 1 mg daily starting tomorrow    Thank you for involving us in the care of Josephine Wolf.  Please feel free to call with any questions.    Carlos Barraza MD  10/01/23  19:45 EDT    [facility]  [phone]      Please note that portions of this note were completed with a voice recognition program.

## 2023-10-01 NOTE — PROGRESS NOTES
[facility] INPATIENT PROGRESS NOTE         [address]    10/1/2023      PATIENT IDENTIFICATION:  Name: Josephine Wolf ADMIT: 2023   : 1940  PCP: Bernabe Guy MD    MRN: [id number] LOS: 1 days   AGE/SEX: 83 y.o. female  ROOM: [facility]                     LOS 1    Reason for visit: COPD exacerbation      SUBJECTIVE:      Completed.  Breathing much better.  Oxygen back down to home use.  No wheezing on auscultation today.  Changing to p.o. prednisone with taper.  Likely home soon.    Objective   OBJECTIVE:    Vital Sign Min/Max for last 24 hours  Temp  Min: 97.9 øF (36.6 øC)  Max: 98.5 øF (36.9 øC)   BP  Min: 123/73  Max: 138/102   Pulse  Min: 109  Max: 148   Resp  Min: 18  Max: 20   SpO2  Min: 88 %  Max: 100 %   No data recorded   Weight  Min: 73.5 kg (162 lb)  Max: 73.5 kg (162 lb)                         Body mass index is 29.63 kg/mý.    Intake/Output Summary (Last 24 hours) at 10/1/2023 1035  Last data filed at 2023 1600  Gross per 24 hour   Intake --   Output 400 ml   Net -400 ml         Exam:  GEN:  No distress, appears stated age  EYES:   PERRL, anicteric sclerae  ENT:    External ears/nose normal, OP clear  NECK:  No adenopathy, midline trachea  LUNGS: Normal chest on inspection, palpation and auscultation  CV:  Normal S1S2, without murmur  ABD:  Nontender, nondistended, no hepatosplenomegaly, +BS  EXT:  No edema.  No cyanosis or clubbing.  No mottling and normal cap refill.    Assessment     Scheduled meds:  albuterol, 2.5 mg, Nebulization, Q6H - RT  allopurinol, 100 mg, Oral, Daily  apixaban, 2.5 mg, Oral, Q12H  budesonide-formoterol, 2 puff, Inhalation, BID - RT   And  tiotropium bromide monohydrate, 2 puff, Inhalation, Daily - RT  busPIRone, 10 mg, Oral, BID  DULoxetine, 60 mg, Oral, Daily  furosemide, 40 mg, Oral, BID  gabapentin, 300 mg, Oral, TID  guaiFENesin, 600 mg, Oral, Q12H  levothyroxine, 75 mcg, Oral, Daily  metoprolol succinate XL, 50 mg, Oral,  Q24H  pantoprazole, 40 mg, Oral, Q AM  predniSONE, 40 mg, Oral, Daily With Breakfast  QUEtiapine, 100 mg, Oral, Nightly  rOPINIRole, 0.5 mg, Oral, Nightly  rosuvastatin, 10 mg, Oral, Nightly      IV meds:                      sodium chloride, 125 mL/hr, Last Rate: 125 mL/hr (09/30/23 2036)      Data Review:  Results from last 7 days   Lab Units 10/01/23  0800 09/30/23  1332 09/30/23  1242   SODIUM mmol/L 142  --  138   POTASSIUM mmol/L 5.1  --  5.1   CHLORIDE mmol/L 102  --  97*   CO2 mmol/L 28.1  --  28.0   BUN mg/dL 59*  --  64*   CREATININE mg/dL 1.74* 1.71 1.79*   GLUCOSE mg/dL 141*  --  101*   CALCIUM mg/dL 8.8  --  9.3         Estimated Creatinine Clearance: 23.8 mL/min (A) (by C-G formula based on SCr of 1.74 mg/dL (H)).  Results from last 7 days   Lab Units 10/01/23  0800 09/30/23  1242   WBC 10*3/mm3 7.38 9.08   HEMOGLOBIN g/dL 9.1* 9.8*   PLATELETS 10*3/mm3 181 202     Results from last 7 days   Lab Units 09/30/23  1242   INR  0.96     Results from last 7 days   Lab Units 10/01/23  0800 09/30/23  1242   ALT (SGPT) U/L 20 22   AST (SGOT) U/L 20 18         Results from last 7 days   Lab Units 09/30/23  1332   LACTATE mmol/L 1.3         Hemoglobin A1C   Date/Time Value Ref Range Status   10/01/2023 0800 5.70 (H) 4.80 - 5.60 % Final     Glucose   Date/Time Value Ref Range Status   09/30/2023 1332 106 70 - 130 mg/dL Final     Comment:     Serial Number: [id number]:  [id number]   09/30/2023 1224 104 70 - 130 mg/dL Final       Chest x-ray shows diminished lung volumes and small effusion with basilar atelectasis.        Microbiology reviewed  Respiratory viral panel negative                Active Hospital Problems    Diagnosis  POA    **COPD exacerbation [J44.1]  Yes      Resolved Hospital Problems   No resolved problems to display.         ASSESSMENT:  Acute exacerbation of COPD  Chronic hypoxemic respiratory failure  Acute on chronic hypercapnia with respiratory acidosis  Atrial fibrillation with RVR  Chronic  kidney disease III  Anxiety disorder  RLS       PLAN:  Making good progress pretty quickly.  Change to p.o. steroid with taper.  Oxygen back to home use.  Likely home soon.          Car Amato MD  Pulmonary and Critical Care Medicine  [facility], [facility]  10/1/2023    10:35 EDT

## 2023-10-01 NOTE — PLAN OF CARE
Goal Outcome Evaluation:  Plan of Care Reviewed With: patient        Progress: improving  Outcome Evaluation: VSS. No complaints of pan.

## 2023-10-01 NOTE — THERAPY DISCHARGE NOTE
Patient Name: Josephine Wolf  : 1940    MRN: [id number]                              Today's Date: 10/1/2023       Admit Date: 2023    Visit Dx:     ICD-10-CM ICD-9-CM   1. Atrial fibrillation with rapid ventricular response: New onset  I48.91 427.31   2. COPD exacerbation  J44.1 491.21   3. Hypomagnesemia  E83.42 275.2   4. Chronic renal failure, unspecified CKD stage  N18.9 585.9   5. Chronic anemia  D64.9 285.9     Patient Active Problem List   Diagnosis    Anemia    OA (osteoarthritis) of knee    Chronic respiratory failure with hypoxia    Cellulitis of skin    Acute kidney injury    Sepsis    Orthostatic hypotension    Disease of thyroid gland    Hypertension    Dehydration    Stage 3 chronic kidney disease    Closed compression fracture of L1 lumbar vertebra    Hyponatremia    Osteoporosis with pathological fracture    Acute on chronic respiratory failure with hypoxia and hypercapnia    Obesity (BMI 30-39.9)    Nocturnal hypoxia    CKD (chronic kidney disease) stage 2, GFR 60-89 ml/min    Acute on chronic respiratory failure with hypoxia    Abdominal pain    Acute exacerbation of chronic obstructive pulmonary disease (COPD)    Acute UTI    Metabolic encephalopathy    COPD with acute exacerbation    COPD exacerbation    Atrial fibrillation with RVR     Past Medical History:   Diagnosis Date    Acid reflux     Acute kidney injury     Anemia     Arthritis     Bipolar 1 disorder, depressed     Cataract     MINDY    Chronic nausea     Chronic pain of right knee     Continuous leakage of urine     USES DEPENDS    COPD (chronic obstructive pulmonary disease)     USES O2 2 LMP PER NC AT NIGHT    Disease of thyroid gland     HYPOTHYROIDISM    Diverticulosis     Fibromyalgia     DX     Fibromyalgia, primary     Frequent episodes of bronchitis     HL (hearing loss)     Hypertension     Hypothyroidism     Memory loss     Migraines     Neck pain     On home oxygen therapy     2L NC AT NIGHT    Orthostatic  hypotension     Short of breath on exertion     Sleep apnea     Stage 3 chronic kidney disease      Past Surgical History:   Procedure Laterality Date    CEREBRAL ANEURYSM REPAIR  2000'S    WITH STENT    HYSTERECTOMY      JOINT REPLACEMENT      LAPAROSCOPIC CHOLECYSTECTOMY      MD ARTHRP KNE CONDYLE&PLATU MEDIAL&LAT COMPARTMENTS Right 11/16/2017    Procedure: RT TOTAL KNEE ARTHROPLASTY;  Surgeon: Kashif Perez MD;  Location: [facility];  Service: Orthopedics      General Information       Row Name 10/01/23 1201          Physical Therapy Time and Intention    Document Type evaluation  -     Mode of Treatment individual therapy;physical therapy  -CC       Row Name 10/01/23 1201          General Information    Patient Profile Reviewed yes  -CC     Prior Level of Function independent:  -CC     Existing Precautions/Restrictions no known precautions/restrictions  -CC     Barriers to Rehab none identified  -CC       Row Name 10/01/23 1201          Living Environment    People in Home alone  -CC       Row Name 10/01/23 1201          Home Main Entrance    Number of Stairs, Main Entrance none  -CC       Row Name 10/01/23 1201          Cognition    Orientation Status (Cognition) oriented x 3  -CC               User Key  (r) = Recorded By, (t) = Taken By, (c) = Cosigned By      Initials Name Provider Type    CC Aiyana Cruz, PT Physical Therapist                   Mobility       Row Name 10/01/23 1201          Bed Mobility    Comment, (Bed Mobility) EOB at start/end of session  -CC       Row Name 10/01/23 1201          Sit-Stand Transfer    Sit-Stand [place] (Transfers) standby assist  -     Assistive Device (Sit-Stand Transfers) walker, front-wheeled  -CC       Row Name 10/01/23 1201          Gait/Stairs (Locomotion)    [place] Level (Gait) standby assist  -     Assistive Device (Gait) walker, front-wheeled  -     Distance in Feet (Gait) 50  -CC               User Key  (r) = Recorded By, (t) =  Taken By, (c) = Cosigned By      Initials Name Provider Type    Aiyana Simpson, MARCO Physical Therapist                   Obj/Interventions       Row Name 10/01/23 1202          Strength Comprehensive (MMT)    General Manual Muscle Testing (MMT) Assessment lower extremity strength deficits identified  -CC               User Key  (r) = Recorded By, (t) = Taken By, (c) = Cosigned By      Initials Name Provider Type    Aiyana Simpson PT Physical Therapist                   Goals/Plan    No documentation.                  Clinical Impression       Row Name 10/01/23 1202          Plan of Care Review    Outcome Evaluation Pt is a 83 y.o. female admitted to [facility] for SOB, cough, and COPDE on 9/30/2023. Pt is currently functioning at or near their reported baseline,  SBA for STS transfers, and SBA for amb with FWW for 50 ft. Pt lives in a 1 level apartment with elevator access and uses walker at baseline. Reports son available for assist at [place]. Pt denies any questions or concerns for PT at this time. Pt has no further acute skilled PT needs at this time and is appropriate for [place] home with assist once medically appropriate.  -CC       Row Name 10/01/23 1202          Therapy Assessment/Plan (PT)    Therapy Frequency (PT) evaluation only  -CC       Row Name 10/01/23 1202          Positioning and Restraints    Pre-Treatment Position in bed  -CC     Post Treatment Position bed  -CC     In Bed sitting EOB;call light within reach;encouraged to call for assist  -CC               User Key  (r) = Recorded By, (t) = Taken By, (c) = Cosigned By      Initials Name Provider Type    Aiyana Simpson, PT Physical Therapist                   Outcome Measures       Row Name 10/01/23 1207          How much help from another person do you currently need...    Turning from your back to your side while in flat bed without using bedrails? 4  -CC     Moving from lying on back to sitting on the side of a flat bed without  bedrails? 4  -CC     Moving to and from a bed to a chair (including a wheelchair)? 4  -CC     Standing up from a chair using your arms (e.g., wheelchair, bedside chair)? 4  -CC     Climbing 3-5 steps with a railing? 3  -CC     To walk in hospital room? 4  -CC     AM-PAC 6 Clicks Score (PT) 23  -CC     Highest level of mobility 7 --> Walked 25 feet or more  -CC       Row Name 10/01/23 1207          Functional Assessment    Outcome Measure Options AM-PAC 6 Clicks Basic Mobility (PT)  -CC               User Key  (r) = Recorded By, (t) = Taken By, (c) = Cosigned By      Initials Name Provider Type    Aiyana Simpson PT Physical Therapist                    PT Recommendation and Plan     Outcome Evaluation: Pt is a 83 y.o. female admitted to [facility] for SOB, cough, and COPDE on 9/30/2023. Pt is currently functioning at or near their reported baseline,  SBA for STS transfers, and SBA for amb with FWW for 50 ft. Pt lives in a 1 level apartment with elevator access and uses walker at baseline. Reports son available for assist at [place]. Pt denies any questions or concerns for PT at this time. Pt has no further acute skilled PT needs at this time and is appropriate for [place] home with assist once medically appropriate.     Time Calculation:         PT Charges       Row Name 10/01/23 1208             Time Calculation    Start Time 0858  -CC      Stop Time 0906  -CC      Time Calculation (min) 8 min  -CC      PT Received On 10/01/23  -CC         Untimed Charges    PT Eval/Re-eval Minutes 8  -CC         Total Minutes    Untimed Charges Total Minutes 8  -CC       Total Minutes 8  -CC                User Key  (r) = Recorded By, (t) = Taken By, (c) = Cosigned By      Initials Name Provider Type    Aiyana Simpson PT Physical Therapist                  Therapy Charges for Today       Code Description Service Date Service Provider Modifiers Qty    [id number] HC PT EVAL LOW COMPLEXITY 1 10/1/2023 Aiyana Cruz PT GP 1             PT G-Codes  Outcome Measure Options: AM-PAC 6 Clicks Basic Mobility (PT)  AM-PAC 6 Clicks Score (PT): 23    PT Discharge Summary  Anticipated Discharge Disposition (PT): home with assist    Aiyana Cruz PT  10/1/2023

## 2023-10-01 NOTE — PROGRESS NOTES
"Daily progress note    Primary care physician  Dr. PLUMMER    Subjective  Awake and alert and feeling much better than yesterday with no new complaints.    History of present illness  83-year-old white female with history of chronic hypoxic respiratory failure on home oxygen COPD paroxysmal SVT nonsustained ventricular tachycardia hypertension hypothyroidism and chronic kidney disease stage III who lives by herself presented to [facility] emergency room with increased shortness of breath for last 1 week which is getting worse.  Patient also complaining of productive cough but no fever chills chest pain abdominal pain nausea vomiting diarrhea.  Patient work-up in ER revealed acute on chronic hypoxic respiratory failure with acute exacerbation of COPD and also found to be in rapid ventricular rate with history of atrial fibrillation admitted for management.  Patient is DNR per her wishes.     REVIEW OF SYSTEMS  All systems reviewed and negative except for those discussed in HPI.      PHYSICAL EXAM   Blood pressure 118/71, pulse (!) 122, temperature 97.9 øF (36.6 øC), temperature source Oral, resp. rate 18, height 157.5 cm (62\"), weight 73.5 kg (162 lb), SpO2 99 %.    GENERAL: Awake and alert in no distress  HENT: nares patent  Head/neck/ face are symmetric without gross deformity, signs of trauma, or swelling  EYES: no scleral icterus, no conjunctival pallor.  NECK: Supple, no meningismus  CV: Tachycardia with irregular regular rhythm.  No obvious murmur or rub.    RESPIRATORY: Normal effort and moving air bilaterally with expiratory wheezes  ABDOMEN: soft and nontender.  Nondistended bowel sounds positive  MUSCULOSKELETAL: no deformity.  No edema.  Intact distal pulses   NEURO: alert and appropriate, moves all extremities, follows commands.  No focal deficit  SKIN: warm, dry     LAB RESULTS  Lab Results (last 24 hours)       Procedure Component Value Units Date/Time    CBC & Differential [[id number]]  " (Abnormal) Collected: 10/01/23 0800    Specimen: Blood Updated: 10/01/23 0940    Narrative:      The following orders were created for panel order CBC & Differential.  Procedure                               Abnormality         Status                     ---------                               -----------         ------                     CBC Auto Differential[[id number]]        Abnormal            Final result               Scan Slide[[id number]]                   Normal              Final result                 Please view results for these tests on the individual orders.    Scan Slide [[id number]]  (Normal) Collected: 10/01/23 0800    Specimen: Blood Updated: 10/01/23 0940     RBC Morphology Normal     WBC Morphology Normal     Platelet Morphology Normal    TSH [[id number]]  (Abnormal) Collected: 10/01/23 0800    Specimen: Blood Updated: 10/01/23 0856     TSH 0.049 uIU/mL     Comprehensive Metabolic Panel [[id number]]  (Abnormal) Collected: 10/01/23 0800    Specimen: Blood Updated: 10/01/23 0853     Glucose 141 mg/dL      BUN 59 mg/dL      Creatinine 1.74 mg/dL      Sodium 142 mmol/L      Potassium 5.1 mmol/L      Comment: Specimen hemolyzed.  Results may be affected.        Chloride 102 mmol/L      CO2 28.1 mmol/L      Calcium 8.8 mg/dL      Total Protein 6.2 g/dL      Albumin 3.8 g/dL      ALT (SGPT) 20 U/L      Comment: Specimen hemolyzed.  Results may be affected.        AST (SGOT) 20 U/L      Comment: Specimen hemolyzed.  Results may be affected.        Alkaline Phosphatase 55 U/L      Total Bilirubin 0.5 mg/dL      Globulin 2.4 gm/dL      A/G Ratio 1.6 g/dL      BUN/Creatinine Ratio 33.9     Anion Gap 11.9 mmol/L      eGFR 29.2 mL/min/1.73     Narrative:      GFR Normal >60  Chronic Kidney Disease <60  Kidney Failure <15    The GFR formula is only valid for adults with stable renal function between ages 18 and 70.    Lipid Panel [[id number]]  (Abnormal) Collected: 10/01/23 0800    Specimen: Blood Updated:  10/01/23 0850     Total Cholesterol 146 mg/dL      Triglycerides 72 mg/dL      HDL Cholesterol 104 mg/dL      LDL Cholesterol  28 mg/dL      VLDL Cholesterol 14 mg/dL      LDL/HDL Ratio 0.27    Narrative:      Cholesterol Reference Ranges  (U.S. Department of Health and Human Services ATP III Classifications)    Desirable          <200 mg/dL  Borderline High    200-239 mg/dL  High Risk          >240 mg/dL      Triglyceride Reference Ranges  (U.S. Department of Health and Human Services ATP III Classifications)    Normal           <150 mg/dL  Borderline High  150-199 mg/dL  High             200-499 mg/dL  Very High        >500 mg/dL    HDL Reference Ranges  (U.S. Department of Health and Human Services ATP III Classifications)    Low     <40 mg/dl (major risk factor for CHD)  High    >60 mg/dl ('negative' risk factor for CHD)        LDL Reference Ranges  (U.S. Department of Health and Human Services ATP III Classifications)    Optimal          <100 mg/dL  Near Optimal     100-129 mg/dL  Borderline High  130-159 mg/dL  High             160-189 mg/dL  Very High        >189 mg/dL    CBC Auto Differential [[id number]]  (Abnormal) Collected: 10/01/23 0800    Specimen: Blood Updated: 10/01/23 0849     WBC 7.38 10*3/mm3      RBC 2.87 10*6/mm3      Hemoglobin 9.1 g/dL      Hematocrit 27.8 %      MCV 96.9 fL      MCH 31.7 pg      MCHC 32.7 g/dL      RDW 15.1 %      RDW-SD 52.7 fl      MPV 10.5 fL      Platelets 181 10*3/mm3      Neutrophil % 87.7 %      Lymphocyte % 7.9 %      Monocyte % 3.5 %      Eosinophil % 0.0 %      Basophil % 0.0 %      Neutrophils, Absolute 6.47 10*3/mm3      Lymphocytes, Absolute 0.58 10*3/mm3      Monocytes, Absolute 0.26 10*3/mm3      Eosinophils, Absolute 0.00 10*3/mm3      Basophils, Absolute 0.00 10*3/mm3     Hemoglobin A1c [[id number]]  (Abnormal) Collected: 10/01/23 0800    Specimen: Blood Updated: 10/01/23 0840     Hemoglobin A1C 5.70 %     Narrative:      Hemoglobin A1C Ranges:    Increased  Risk for Diabetes  5.7% to 6.4%  Diabetes                     >= 6.5%  Diabetic Goal                < 7.0%    High Sensitivity Troponin T 2Hr [[id number]]  (Abnormal) Collected: 09/30/23 1711    Specimen: Blood Updated: 09/30/23 1743     HS Troponin T 27 ng/L      Troponin T Delta -10 ng/L     Narrative:      High Sensitive Troponin T Reference Range:  <10.0 ng/L- Negative Female for AMI  <15.0 ng/L- Negative Male for AMI  >=10 - Abnormal Female indicating possible myocardial injury.  >=15 - Abnormal Male indicating possible myocardial injury.   Clinicians would have to utilize clinical acumen, EKG, Troponin, and serial changes to determine if it is an Acute Myocardial Infarction or myocardial injury due to an underlying chronic condition.         Respiratory Panel PCR w/COVID-19(SARS-CoV-2) [facility] In-House, NP Swab in UTM/VTM, 3-4 HR TAT - Swab, Nasopharynx [[id number]]  (Normal) Collected: 09/30/23 1309    Specimen: Swab from Nasopharynx Updated: 09/30/23 1428     ADENOVIRUS, PCR Not Detected     Coronavirus 229E Not Detected     Coronavirus HKU1 Not Detected     Coronavirus NL63 Not Detected     Coronavirus OC43 Not Detected     COVID19 Not Detected     Human Metapneumovirus Not Detected     Human Rhinovirus/Enterovirus Not Detected     Influenza A PCR Not Detected     Influenza B PCR Not Detected     Parainfluenza Virus 1 Not Detected     Parainfluenza Virus 2 Not Detected     Parainfluenza Virus 3 Not Detected     Parainfluenza Virus 4 Not Detected     RSV, PCR Not Detected     Bordetella pertussis pcr Not Detected     Bordetella parapertussis PCR Not Detected     Chlamydophila pneumoniae PCR Not Detected     Mycoplasma pneumo by PCR Not Detected    Narrative:      In the setting of a positive respiratory panel with a viral infection PLUS a negative procalcitonin without other underlying concern for bacterial infection, consider observing off antibiotics or discontinuation of antibiotics and  continue supportive care. If the respiratory panel is positive for atypical bacterial infection (Bordetella pertussis, Chlamydophila pneumoniae, or Mycoplasma pneumoniae), consider antibiotic de-escalation to target atypical bacterial infection.          Imaging Results (Last 24 Hours)       Procedure Component Value Units Date/Time    XR Chest 1 View [[id number]] Collected: 09/30/23 1340     Updated: 09/30/23 1507    Narrative:      CHEST SINGLE VIEW     HISTORY: Shortness of air. On oxygen. Cough.     COMPARISON: AP chest 07/23/2023, CT chest 06/29/2023.     FINDINGS: Lung volumes are diminished and there is linear subsegmental  atelectasis or scarring within the lung bases. There is no evidence for  perihilar edema or infiltrate. There is blunting in the left  costophrenic angle that appears chronic and is without change       Impression:      Diminished lung volumes. Blunting left costophrenic angle  due to scarring or small effusion. Mild linear basilar atelectasis or  scarring. No interval change or convincing evidence for active disease  in the chest.     This report was finalized on 9/30/2023 3:04 PM by Dr. Jenaro Bear M.D.             Results  Scan on 9/30/2023 1227 by [employer] On[employer], [place]: ECG 12-LEAD         Author: -- Service: -- Author Type: --   Filed: Date of Service: Creation Time:   Status: (Other)   HEART RATE= 119  bpm  RR Interval= 504  ms  TX Interval=   ms  P Horizontal Axis=   deg  P Front Axis=   deg  QRSD Interval= 81  ms  QT Interval= 298  ms  QTcB= 420  ms  QRS Axis= 11  deg  T Wave Axis= 55  deg  - ABNORMAL ECG -  Atrial fibrillation  Abnormal R-wave progression, early transition  Borderline T abnormalities, anterior leads          Current Facility-Administered Medications:     albuterol (PROVENTIL) nebulizer solution 0.083% 2.5 mg/3mL, 2.5 mg, Nebulization, Q6H - RT, Stanley Fowler MD, 2.5 mg at 10/01/23 1146    allopurinol (ZYLOPRIM) tablet 100 mg, 100 mg, Oral, Daily, Stanley Fowler,  MD, 100 mg at 10/01/23 0824    apixaban (ELIQUIS) tablet 2.5 mg, 2.5 mg, Oral, Q12H, Stanley Fowler MD, 2.5 mg at 10/01/23 0828    budesonide-formoterol (SYMBICORT) 160-4.5 MCG/ACT inhaler 2 puff, 2 puff, Inhalation, BID - RT, 2 puff at 10/01/23 0740 **AND** tiotropium (SPIRIVA RESPIMAT) 2.5 mcg/act aerosol solution inhaler, 2 puff, Inhalation, Daily - RT, Stanley Fowler MD, 2 puff at 10/01/23 0741    busPIRone (BUSPAR) tablet 10 mg, 10 mg, Oral, BID, Stanley Fowler MD, 10 mg at 10/01/23 0824    DULoxetine (CYMBALTA) DR capsule 60 mg, 60 mg, Oral, Daily, Stanley oFwler MD, 60 mg at 10/01/23 0824    furosemide (LASIX) tablet 40 mg, 40 mg, Oral, BID, Stanley Fowler MD, 40 mg at 10/01/23 0824    gabapentin (NEURONTIN) capsule 300 mg, 300 mg, Oral, TID, Stanley Fowler MD, 300 mg at 10/01/23 0823    guaiFENesin (MUCINEX) 12 hr tablet 600 mg, 600 mg, Oral, Q12H, Stanley Fowler MD, 600 mg at 10/01/23 0824    HYDROcodone-acetaminophen (NORCO) 7.5-325 MG per tablet 1 tablet, 1 tablet, Oral, Q4H PRN, Stanley Fowler MD    hydrOXYzine (ATARAX) tablet 25 mg, 25 mg, Oral, TID PRN, Stanley Fowler MD    ipratropium-albuterol (DUO-NEB) nebulizer solution 3 mL, 3 mL, Nebulization, Q4H PRN, Stanley Fowler MD    levothyroxine (SYNTHROID, LEVOTHROID) tablet 75 mcg, 75 mcg, Oral, Daily, Stanley Fowler MD, 75 mcg at 10/01/23 0824    metoprolol tartrate (LOPRESSOR) tablet 50 mg, 50 mg, Oral, Q8H, Constantin Dalton MD, 50 mg at 10/01/23 1209    pantoprazole (PROTONIX) EC tablet 40 mg, 40 mg, Oral, Q AM, Stanley Fowler MD, 40 mg at 10/01/23 0610    predniSONE (DELTASONE) tablet 40 mg, 40 mg, Oral, Daily With Breakfast, Car Amato MD, 40 mg at 10/01/23 1209    QUEtiapine (SEROquel) tablet 100 mg, 100 mg, Oral, Nightly, Stanley Fowler MD, 100 mg at 09/30/23 2037    rOPINIRole (REQUIP) tablet 0.5 mg, 0.5 mg, Oral, Nightly, Stanley Fowler MD, 0.5 mg at 09/30/23 2037    rosuvastatin (CRESTOR) tablet 10 mg, 10 mg, Oral, Nightly, Stanley Fowler MD, 10 mg  at 09/30/23 2037    [COMPLETED] Insert Peripheral IV, , , Once **AND** sodium chloride 0.9 % flush 10 mL, 10 mL, Intravenous, PRN, Car Perea MD    sodium chloride 0.9 % infusion, 125 mL/hr, Intravenous, Continuous, Car Perea MD, Last Rate: 125 mL/hr at 09/30/23 2036, 125 mL/hr at 09/30/23 2036     ASSESSMENT  Acute on chronic hypoxic respiratory failure  Acute exacerbation of COPD  Paroxysmal atrial fibrillation with rapid ventricular rate   Hypertension  Hypothyroidism  Anxiety disorder  Depression  Chronic anemia  Restless leg syndrome  Acute kidney injury  Chronic kidney disease stage III    PLAN  CPM  Supplemental oxygen nebulizer  IVF  Continue steroids  Control the heart rate  Hold diuretics  Serial cardiac enzymes and EKG  Cardiology consult pending  Pulmonary and nephrology to follow patient  Adjust home medications  Stress ulcer DVT prophylaxis  Supportive care  PT/OT  DNR  Discussed with family nursing staff  Follow closely further recommendation according to hospital course    AMANDA MURO MD    Copied text in this note has been reviewed and is accurate as of 10/01/23

## 2023-10-01 NOTE — PLAN OF CARE
Goal Outcome Evaluation:              Outcome Evaluation: Pt is a 83 y.o. female admitted to [facility] for SOB, cough, and COPDE on 9/30/2023. Pt is currently functioning at or near their reported baseline,  SBA for STS transfers, and SBA for amb with FWW for 50 ft. Pt lives in a 1 level apartment with elevator access and uses walker at baseline. Reports son available for assist at DC. Pt denies any questions or concerns for PT at this time. Pt has no further acute skilled PT needs at this time and is appropriate for DC home with assist once medically appropriate.      Anticipated Discharge Disposition (PT): home with assist

## 2023-10-02 NOTE — PROGRESS NOTES
"Daily progress note    Primary care physician  Dr. PLUMMER    Subjective  Doing better with no new complaint except dyspepsia.    History of present illness  83-year-old white female with history of chronic hypoxic respiratory failure on home oxygen COPD paroxysmal SVT nonsustained ventricular tachycardia hypertension hypothyroidism and chronic kidney disease stage III who lives by herself presented to [facility] emergency room with increased shortness of breath for last 1 week which is getting worse.  Patient also complaining of productive cough but no fever chills chest pain abdominal pain nausea vomiting diarrhea.  Patient work-up in ER revealed acute on chronic hypoxic respiratory failure with acute exacerbation of COPD and also found to be in rapid ventricular rate with history of atrial fibrillation admitted for management.  Patient is DNR per her wishes.     REVIEW OF SYSTEMS  All systems reviewed and negative except for those discussed in HPI.      PHYSICAL EXAM   Blood pressure 133/92, pulse 111, temperature 97.3 øF (36.3 øC), temperature source Oral, resp. rate 18, height 157.5 cm (62\"), weight 73.5 kg (162 lb), SpO2 94 %.    GENERAL: Awake and alert in no distress  HENT: nares patent  Head/neck/ face are symmetric without gross deformity, signs of trauma, or swelling  EYES: no scleral icterus, no conjunctival pallor.  NECK: Supple, no meningismus  CV: Tachycardia with irregular regular rhythm.  No obvious murmur or rub.    RESPIRATORY: Normal effort and moving air bilaterally with expiratory wheezes  ABDOMEN: soft and nontender.  Nondistended bowel sounds positive  MUSCULOSKELETAL: no deformity.  No edema.  Intact distal pulses   NEURO: alert and appropriate, moves all extremities, follows commands.  No focal deficit  SKIN: warm, dry     LAB RESULTS  Lab Results (last 24 hours)       Procedure Component Value Units Date/Time    Magnesium [[id number]]  (Normal) Collected: 10/02/23 0553    " Specimen: Blood Updated: 10/02/23 1210     Magnesium 2.0 mg/dL     Renal Function Panel [[id number]]  (Abnormal) Collected: 10/02/23 0553    Specimen: Blood Updated: 10/02/23 1157     Glucose 92 mg/dL      BUN 75 mg/dL      Creatinine 2.34 mg/dL      Sodium 140 mmol/L      Potassium 4.6 mmol/L      Chloride 103 mmol/L      CO2 26.5 mmol/L      Calcium 8.8 mg/dL      Albumin 3.8 g/dL      Phosphorus 6.8 mg/dL      Anion Gap 10.5 mmol/L      BUN/Creatinine Ratio 32.1     eGFR 20.4 mL/min/1.73     Narrative:      GFR Normal >60  Chronic Kidney Disease <60  Kidney Failure <15    The GFR formula is only valid for adults with stable renal function between ages 18 and 70.    T4, Free [[id number]]  (Normal) Collected: 10/01/23 1521    Specimen: Blood from Arm, Left Updated: 10/01/23 1641     Free T4 1.30 ng/dL     Narrative:      Results may be falsely increased if patient taking Biotin.            Imaging Results (Last 24 Hours)       Procedure Component Value Units Date/Time    XR Chest 1 View [[id number]] Resulted: 10/02/23 1442     Updated: 10/02/23 1426          Results  Scan on 9/30/2023 1227 by [employer], [place]: ECG 12-LEAD         Author: -- Service: -- Author Type: --   Filed: Date of Service: Creation Time:   Status: (Other)   HEART RATE= 119  bpm  RR Interval= 504  ms  CT Interval=   ms  P Horizontal Axis=   deg  P Front Axis=   deg  QRSD Interval= 81  ms  QT Interval= 298  ms  QTcB= 420  ms  QRS Axis= 11  deg  T Wave Axis= 55  deg  - ABNORMAL ECG -  Atrial fibrillation  Abnormal R-wave progression, early transition  Borderline T abnormalities, anterior leads          Current Facility-Administered Medications:     albuterol (PROVENTIL) nebulizer solution 0.083% 2.5 mg/3mL, 2.5 mg, Nebulization, Q6H - RT, Stanley Fowler MD, 2.5 mg at 10/02/23 1154    allopurinol (ZYLOPRIM) tablet 100 mg, 100 mg, Oral, Daily, Stanley Fowler MD, 100 mg at 10/02/23 0806    amiodarone (PACERONE) tablet 200 mg, 200 mg, Oral, Q24H,  Beth Palacio, APRN    apixaban (ELIQUIS) tablet 2.5 mg, 2.5 mg, Oral, Q12H, Stanley Fowler MD, 2.5 mg at 10/02/23 0806    budesonide-formoterol (SYMBICORT) 160-4.5 MCG/ACT inhaler 2 puff, 2 puff, Inhalation, BID - RT, 2 puff at 10/02/23 0658 **AND** tiotropium (SPIRIVA RESPIMAT) 2.5 mcg/act aerosol solution inhaler, 2 puff, Inhalation, Daily - RT, Stanley Fowler MD, 2 puff at 10/02/23 0656    busPIRone (BUSPAR) tablet 10 mg, 10 mg, Oral, BID, Stanley Fowler MD, 10 mg at 10/02/23 0806    DULoxetine (CYMBALTA) DR capsule 60 mg, 60 mg, Oral, Daily, Stanley Fowler MD, 60 mg at 10/02/23 0806    famotidine (PEPCID) tablet 20 mg, 20 mg, Oral, BID, Stanley Fowler MD, 20 mg at 10/02/23 0806    gabapentin (NEURONTIN) capsule 300 mg, 300 mg, Oral, TID, Stanley Fowler MD, 300 mg at 10/02/23 0806    guaiFENesin (MUCINEX) 12 hr tablet 600 mg, 600 mg, Oral, Q12H, Stanley Fowler MD, 600 mg at 10/02/23 0806    HYDROcodone-acetaminophen (NORCO) 7.5-325 MG per tablet 1 tablet, 1 tablet, Oral, Q4H PRN, Stanley Fowler MD, 1 tablet at 10/01/23 1643    hydrOXYzine (ATARAX) tablet 25 mg, 25 mg, Oral, TID PRN, Stanley Fowler MD    ipratropium-albuterol (DUO-NEB) nebulizer solution 3 mL, 3 mL, Nebulization, Q4H PRN, Stanley Fowler MD    levothyroxine (SYNTHROID, LEVOTHROID) tablet 75 mcg, 75 mcg, Oral, Daily, Stanley Fowler MD, 75 mcg at 10/02/23 0806    metoprolol tartrate (LOPRESSOR) tablet 75 mg, 75 mg, Oral, Q8H, Stanley Fowler MD, 75 mg at 10/02/23 1222    predniSONE (DELTASONE) tablet 40 mg, 40 mg, Oral, Daily With Breakfast, Lm, Car Leonardo MD, 40 mg at 10/02/23 0806    QUEtiapine (SEROquel) tablet 100 mg, 100 mg, Oral, Nightly, Stanley Fowler MD, 100 mg at 10/01/23 2042    rOPINIRole (REQUIP) tablet 0.5 mg, 0.5 mg, Oral, Nightly, Stanley Fowler MD, 0.5 mg at 10/01/23 2041    rosuvastatin (CRESTOR) tablet 10 mg, 10 mg, Oral, Nightly, Stanley Fowler MD, 10 mg at 10/01/23 2041    simethicone (MYLICON) chewable tablet 80 mg, 80 mg,  Oral, 4x Daily PRN, Amanda Fowler MD    [COMPLETED] Insert Peripheral IV, , , Once **AND** sodium chloride 0.9 % flush 10 mL, 10 mL, Intravenous, PRN, Car Perea MD     ASSESSMENT  Acute on chronic hypoxic respiratory failure  Acute exacerbation of COPD  Paroxysmal atrial fibrillation with rapid ventricular rate   Hypertension  Hypothyroidism  Anxiety disorder  Depression  Chronic anemia  Restless leg syndrome  Acute kidney injury resolving  Chronic kidney disease stage III    PLAN  CPM  Supplemental oxygen nebulizer  Continue steroids and taper  Control the heart rate  Gas-X as needed  Continue to hold diuretics  Cardiology input appreciated  Pulmonary and nephrology to follow patient  Adjust home medications  Stress ulcer DVT prophylaxis  Supportive care  PT/OT  DNR  Discussed with family nursing staff  Discharge planning    AMANDA FOWLER MD    Copied text in this note has been reviewed and is accurate as of 10/02/23

## 2023-10-02 NOTE — PLAN OF CARE
Problem: Adult Inpatient Plan of Care  Goal: Plan of Care Review  Outcome: Ongoing, Progressing  Flowsheets  Taken 10/2/2023 0629 by Arsh Carrero RN  Progress: improving  Plan of Care Reviewed With: patient  Outcome Evaluation: Patient states she is feeling better and is no longer experiencing shortness of breath. Patient's HR remains elevated and in Afib, BP is stable and patient has no further complaints.   Goal Outcome Evaluation:  Plan of Care Reviewed With: patient        Progress: improving  Outcome Evaluation: VSS. No complaints of pan.

## 2023-10-02 NOTE — PLAN OF CARE
Problem: Adult Inpatient Plan of Care  Goal: Plan of Care Review  Flowsheets (Taken 10/2/2023 1611)  Outcome Evaluation: AOX4. 3 L nasal cannula. A fib on monitor, HR up to 120s at times. Started on amiodarone. Standby assist to BSC. Call light within reach.   Goal Outcome Evaluation:              Outcome Evaluation: AOX4. 3 L nasal cannula. A fib on monitor, HR up to 120s at times. Started on amiodarone. Standby assist to BSC. Call light within reach.

## 2023-10-02 NOTE — CONSULTS
Consult for removal of Covid rule out infection banner. Patient reviewed, Covid test negative, no reported exposures, Covid rule out removed.

## 2023-10-02 NOTE — PROGRESS NOTES
"    Patient Name: Josephine Wolf  :1940  83 y.o.      Patient Care Team:  Bernabe Guy MD as PCP - General (Internal Medicine)  Avi Aly MD as Consulting Physician (Nephrology)    Chief Complaint: Dyspnea    Interval History: She became SOA last night and gets easily SOA with ambulation.        Objective   Vital Signs  Temp:  [97.3 øF (36.3 øC)-98.4 øF (36.9 øC)] 97.3 øF (36.3 øC)  Heart Rate:  [] 111  Resp:  [17-18] 18  BP: (103-133)/(70-92) 133/92  No intake or output data in the 24 hours ending 10/02/23 1416  Flowsheet Rows      Flowsheet Row First Filed Value   Admission Height 157.5 cm (62\") Documented at 2023 1223   Admission Weight 73.5 kg (162 lb) Documented at 2023 1223            Physical Exam:   General Appearance:    Alert, cooperative, in no acute distress   Lungs:     Clear to auscultation.  Normal respiratory effort and rate.      Heart:    Irregularly irregular rhythm and tachycardia rate, normal S1 and S2, no murmurs, gallops or rubs.     Chest Wall:    No abnormalities observed   Abdomen:     Soft, nontender, positive bowel sounds.     Extremities:   no cyanosis, clubbing, trace BLE edema.  No marked joint deformities.  Adequate musculoskeletal strength.       Results Review:    Results from last 7 days   Lab Units 10/02/23  0553   SODIUM mmol/L 140   POTASSIUM mmol/L 4.6   CHLORIDE mmol/L 103   CO2 mmol/L 26.5   BUN mg/dL 75*   CREATININE mg/dL 2.34*   GLUCOSE mg/dL 92   CALCIUM mg/dL 8.8     Results from last 7 days   Lab Units 23  1711 23  1242   HSTROP T ng/L 27* 37*     Results from last 7 days   Lab Units 10/01/23  0800   WBC 10*3/mm3 7.38   HEMOGLOBIN g/dL 9.1*   HEMATOCRIT % 27.8*   PLATELETS 10*3/mm3 181     Results from last 7 days   Lab Units 23  1242   INR  0.96     Results from last 7 days   Lab Units 10/02/23  0553   MAGNESIUM mg/dL 2.0     Results from last 7 days   Lab Units 10/01/23  0800   CHOLESTEROL mg/dL 146 "   TRIGLYCERIDES mg/dL 72   HDL CHOL mg/dL 104*   LDL CHOL mg/dL 28               Medication Review:   albuterol, 2.5 mg, Nebulization, Q6H - RT  allopurinol, 100 mg, Oral, Daily  amiodarone, 200 mg, Oral, Q24H  apixaban, 2.5 mg, Oral, Q12H  budesonide-formoterol, 2 puff, Inhalation, BID - RT   And  tiotropium bromide monohydrate, 2 puff, Inhalation, Daily - RT  busPIRone, 10 mg, Oral, BID  DULoxetine, 60 mg, Oral, Daily  famotidine, 20 mg, Oral, BID  gabapentin, 300 mg, Oral, TID  guaiFENesin, 600 mg, Oral, Q12H  levothyroxine, 75 mcg, Oral, Daily  metoprolol tartrate, 75 mg, Oral, Q8H  predniSONE, 40 mg, Oral, Daily With Breakfast  QUEtiapine, 100 mg, Oral, Nightly  rOPINIRole, 0.5 mg, Oral, Nightly  rosuvastatin, 10 mg, Oral, Nightly              Assessment & Plan     Atrial fibrillation: She previously had episodes of SVT but now appears to have sustained atrial fibrillation.  [id number] 4, will start Eliquis 2.5 twice daily age/CKD  HR still fast on 75 mg metoprolol tartrate TID.   I suspect her rate control will improve as her respiratory status improves  COPD exacerbation: Pulmonary following. She is on 40 mg prednisone daily.  Pulmonary hypertension, likely group 3.  Stable.  Appears euvolemic.  Hypertension: Metoprolol per above.  Troponin elevation.  Likely consistent with acute myocardial injury secondary to COPD exacerbation.  No angina.  No need for additional cardiac testing for this.  CHRIS: creatinine up to 2.3 today. Renal is following.     She did feel more SOA last night; I will check chest x-ray.   Her HR is a little more elevated today; this could be due to steroids. Because of her renal function, I will not add digoxin but will add low dose amiodarone.     LG Montoya  [facility]  10/02/23  14:16 EDT

## 2023-10-02 NOTE — PAYOR COMM NOTE
"Raheem Roberts (83 y.o. Female)        PLEASE SEE ATTACHED FOR INPT AUTH.    PLEASE CALL   OR  [phone]    THANK YOU    KAREN GAFFNEY LPN CCP   Date of Birth   1940    Social Security Number       Address   [address] [place] [address] [address]  [facility] [address]    Home Phone   [phone]    MRN   [id number]       [facility]   [facility]    Marital Status                               Admission Date   9/30/23    Admission Type   Emergency    Admitting Provider   Stanley Fowler MD    Attending Provider   Stanley Fowler MD    Department, Room/Bed   [address], [address]/1       Discharge Date       Discharge Disposition       Discharge Destination                                 Attending Provider: Stanley Fowler MD    Allergies: Morphine And Related, Fenofibrate    Isolation: None   Infection: None   Code Status: No CPR    Ht: 157.5 cm (62\")   Wt: 73.5 kg (162 lb)    Admission Cmt: None   Principal Problem: COPD exacerbation [J44.1]                   Active Insurance as of 9/30/2023       Primary Coverage       Payor Plan Insurance Group Employer/Plan Group    [facility] MEDICARE REPLACEMENT [employer] MEDICARE REPLACEMENT        Payor Plan Address Payor Plan Phone Number Payor Plan Fax Number Effective Dates    [address] [address] [phone]  10/9/2017 - None Entered    [facility] [id number]         Subscriber Name Subscriber Birth Date Member ID       RAHEEM ROBERTS 1940 [id number]               Secondary Coverage       Payor Plan Insurance Group Employer/Plan Group    [place]        Payor Plan Address Payor Plan Phone Number Payor Plan Fax Number Effective Dates    [address] [phone]  7/3/2016 - None Entered    [place] [address]         Subscriber Name Subscriber Birth Date Member ID       RAHEEM ROBERTS 1940 [id number]                     Emergency Contacts        (Rel.) Home Phone Work Phone " Mobile Phone    Chino Wolf (Son) [phone] -- --    James Wolf (Son) [phone] -- [phone]              [place]: [facility] [id number]  Tax ID [id number]     History & Physical        Stanley Fowler MD at 09/30/23 1550          History and physical    Primary care physician  Dr. PLUMMER    Chief complaint  Shortness of breath    History of present illness  83-year-old white female with history of chronic hypoxic respiratory failure on home oxygen COPD paroxysmal SVT nonsustained ventricular tachycardia hypertension hypothyroidism and chronic kidney disease stage III who lives by herself presented to [facility] emergency room with increased shortness of breath for last 1 week which is getting worse.  Patient also complaining of productive cough but no fever chills chest pain abdominal pain nausea vomiting diarrhea.  Patient work-up in ER revealed acute on chronic hypoxic respiratory failure with acute exacerbation of COPD and also found to be in rapid ventricular rate with history of atrial fibrillation admitted for management.  Patient is DNR per her wishes.    PAST MEDICAL HISTORY   Acid reflux      Arthritis      Bipolar 1 disorder, depressed      Cataract      Chronic nausea      Chronic pain of right knee      Continuous leakage of urine      Chronic obstructive pulmonary disease      Diverticulosis      Fibromyalgia      Fibromyalgia, primary      Frequent episodes of bronchitis      HL (hearing loss)      Hypothyroidism      Memory loss      Migraines      Neck pain      On home oxygen therapy      Short of breath on exertion      Sleep apnea        PAST SURGICAL HISTORY              Procedure Laterality Date    CEREBRAL ANEURYSM REPAIR   2000'S     WITH STENT    HYSTERECTOMY        JOINT REPLACEMENT        LAPAROSCOPIC CHOLECYSTECTOMY        CT ARTHRP KNE CONDYLE&PLATU MEDIAL&LAT COMPARTMENTS Right 11/16/2017     Procedure: RT TOTAL KNEE ARTHROPLASTY;  Surgeon: Kashif Perez MD;   "Location: [facility] MAIN OR;  Service: Orthopedics         FAMILY HISTORY           Problem Relation Age of Onset    Malig Hyperthermia Neg Hx        SOCIAL HISTORY              Socioeconomic History    Marital status:    Tobacco Use    Smoking status: Former       Packs/day: 1.00       Years: 10.00       Pack years: 10.00       Types: Cigarettes       Start date:        Quit date:        Years since quittin.7    Smokeless tobacco: Never   Vaping Use    Vaping Use: Never used   Substance and Sexual Activity    Alcohol use: No       Comment: CAFFEINE NO     Drug use: No    Sexual activity: Defer         ALLERGIES  Morphine and related and Fenofibrate  Home medications reviewed     REVIEW OF SYSTEMS  All systems reviewed and negative except for those discussed in HPI.      PHYSICAL EXAM   Blood pressure 131/89, pulse 115, temperature 98.5 øF (36.9 øC), temperature source Tympanic, resp. rate 20, height 157.5 cm (62\"), weight 73.5 kg (162 lb), SpO2 92 %.    GENERAL: Awake and alert in no distress  HENT: nares patent  Head/neck/ face are symmetric without gross deformity, signs of trauma, or swelling  EYES: no scleral icterus, no conjunctival pallor.  NECK: Supple, no meningismus  CV: Tachycardia with irregular regular rhythm.  No obvious murmur or rub.    RESPIRATORY: Normal effort and moving air bilaterally with expiratory wheezes  ABDOMEN: soft and nontender.  Nondistended bowel sounds positive  MUSCULOSKELETAL: no deformity.  No edema.  Intact distal pulses   NEURO: alert and appropriate, moves all extremities, follows commands.  No focal deficit  SKIN: warm, dry     LAB RESULTS  Lab Results (last 24 hours)       Procedure Component Value Units Date/Time    Respiratory Panel PCR w/COVID-19(SARS-CoV-2) ANISH/VALERIA/MAXIMILIAN/PAD/COR/MAD/ILSA In-House, NP Swab in UTM/VTM, 3-4 HR TAT - Swab, Nasopharynx [[id number]]  (Normal) Collected: 23 1309    Specimen: Swab from Nasopharynx Updated: 23 1428     " ADENOVIRUS, PCR Not Detected     Coronavirus 229E Not Detected     Coronavirus HKU1 Not Detected     Coronavirus NL63 Not Detected     Coronavirus OC43 Not Detected     COVID19 Not Detected     Human Metapneumovirus Not Detected     Human Rhinovirus/Enterovirus Not Detected     Influenza A PCR Not Detected     Influenza B PCR Not Detected     Parainfluenza Virus 1 Not Detected     Parainfluenza Virus 2 Not Detected     Parainfluenza Virus 3 Not Detected     Parainfluenza Virus 4 Not Detected     RSV, PCR Not Detected     Bordetella pertussis pcr Not Detected     Bordetella parapertussis PCR Not Detected     Chlamydophila pneumoniae PCR Not Detected     Mycoplasma pneumo by PCR Not Detected    Narrative:      In the setting of a positive respiratory panel with a viral infection PLUS a negative procalcitonin without other underlying concern for bacterial infection, consider observing off antibiotics or discontinuation of antibiotics and continue supportive care. If the respiratory panel is positive for atypical bacterial infection (Bordetella pertussis, Chlamydophila pneumoniae, or Mycoplasma pneumoniae), consider antibiotic de-escalation to target atypical bacterial infection.    POC Lactate [[id number]] Collected: 09/30/23 1332    Specimen: Venous Blood Updated: 09/30/23 1337     Lactate 1.3 mmol/L      Comment: Serial Number: [id number]:  [id number]        Device Comment [id number]    POC Chem Panel [[id number]]  (Abnormal) Collected: 09/30/23 1332    Specimen: Venous Blood Updated: 09/30/23 1337     Glucose 106 mg/dL      Comment: Serial Number: [id number]:  [id number]        Sodium 136 mmol/L      POC Potassium 4.8 mmol/L      Ionized Calcium 1.14 mmol/L      Chloride 95 mmol/L      Creatinine 1.71 mg/dL      BUN 75 mg/dL      CO2 Content 31.2 mmol/L      Device Comment [id number]    Blood Gas, Venous - [[id number]]  (Abnormal) Collected: 09/30/23 1332    Specimen: Venous Blood Updated: 09/30/23 1337     pH, Venous  7.350 pH Units      pCO2, Venous 56.2 mm Hg      pO2, Venous 25.1 mm Hg      HCO3, Venous 31.1 mmol/L      Base Excess, Venous 4.3 mmol/L      Comment: Serial Number: [id number]:  [id number]        O2 Saturation, Venous 41.0 %      Barometric Pressure for Blood Gas 752.6000 mmHg      Modality Cannula     FIO2 32 %      Flow Rate 3.0000 lpm      Rate 20 Breaths/minute      Device Comment [id number]    Magnesium [[id number]]  (Abnormal) Collected: 09/30/23 1242    Specimen: Blood Updated: 09/30/23 1318     Magnesium 1.5 mg/dL     Comprehensive Metabolic Panel [[id number]]  (Abnormal) Collected: 09/30/23 1242    Specimen: Blood Updated: 09/30/23 1318     Glucose 101 mg/dL      BUN 64 mg/dL      Creatinine 1.79 mg/dL      Sodium 138 mmol/L      Potassium 5.1 mmol/L      Chloride 97 mmol/L      CO2 28.0 mmol/L      Calcium 9.3 mg/dL      Total Protein 6.6 g/dL      Albumin 4.2 g/dL      ALT (SGPT) 22 U/L      AST (SGOT) 18 U/L      Alkaline Phosphatase 63 U/L      Total Bilirubin 0.6 mg/dL      Globulin 2.4 gm/dL      A/G Ratio 1.8 g/dL      BUN/Creatinine Ratio 35.8     Anion Gap 13.0 mmol/L      eGFR 28.2 mL/min/1.73     Narrative:      GFR Normal >60  Chronic Kidney Disease <60  Kidney Failure <15    The GFR formula is only valid for adults with stable renal function between ages 18 and 70.    High Sensitivity Troponin T [[id number]]  (Abnormal) Collected: 09/30/23 1242    Specimen: Blood Updated: 09/30/23 1318     HS Troponin T 37 ng/L     Narrative:      High Sensitive Troponin T Reference Range:  <10.0 ng/L- Negative Female for AMI  <15.0 ng/L- Negative Male for AMI  >=10 - Abnormal Female indicating possible myocardial injury.  >=15 - Abnormal Male indicating possible myocardial injury.   Clinicians would have to utilize clinical acumen, EKG, Troponin, and serial changes to determine if it is an Acute Myocardial Infarction or myocardial injury due to an underlying chronic condition.         BNP [[id number]]   (Abnormal) Collected: 09/30/23 1242    Specimen: Blood Updated: 09/30/23 1316     proBNP 9,483.0 pg/mL     Narrative:      This assay is used as an aid in the diagnosis of individuals suspected of having heart failure. It can be used as an aid in the diagnosis of acute decompensated heart failure (ADHF) in patients presenting with signs and symptoms of ADHF to the emergency department (ED). In addition, NT-proBNP of <300 pg/mL indicates ADHF is not likely.    Protime-INR [[id number]]  (Normal) Collected: 09/30/23 1242    Specimen: Blood Updated: 09/30/23 1306     Protime 12.9 Seconds      INR 0.96    Urinalysis With Microscopic If Indicated (No Culture) - Urine, Clean Catch [[id number]]  (Normal) Collected: 09/30/23 1255    Specimen: Urine, Clean Catch Updated: 09/30/23 1306     Color, UA Yellow     Appearance, UA Clear     pH, UA 5.5     Specific Gravity, UA 1.009     Glucose, UA Negative     Ketones, UA Negative     Bilirubin, UA Negative     Blood, UA Negative     Protein, UA Negative     Leuk Esterase, UA Negative     Nitrite, UA Negative     Urobilinogen, UA 0.2 E.U./dL    Narrative:      Urine microscopic not indicated.    CBC & Differential [[id number]]  (Abnormal) Collected: 09/30/23 1242    Specimen: Blood Updated: 09/30/23 1256    Narrative:      The following orders were created for panel order CBC & Differential.  Procedure                               Abnormality         Status                     ---------                               -----------         ------                     CBC Auto Differential[[id number]]        Abnormal            Final result                 Please view results for these tests on the individual orders.    CBC Auto Differential [[id number]]  (Abnormal) Collected: 09/30/23 1242    Specimen: Blood Updated: 09/30/23 1256     WBC 9.08 10*3/mm3      RBC 3.06 10*6/mm3      Hemoglobin 9.8 g/dL      Hematocrit 29.9 %      MCV 97.7 fL      MCH 32.0 pg      MCHC 32.8 g/dL      RDW 15.4 %       RDW-SD 54.6 fl      MPV 9.9 fL      Platelets 202 10*3/mm3      Neutrophil % 77.1 %      Lymphocyte % 14.3 %      Monocyte % 7.0 %      Eosinophil % 0.1 %      Basophil % 0.1 %      Immature Grans % 1.4 %      Neutrophils, Absolute 6.99 10*3/mm3      Lymphocytes, Absolute 1.30 10*3/mm3      Monocytes, Absolute 0.64 10*3/mm3      Eosinophils, Absolute 0.01 10*3/mm3      Basophils, Absolute 0.01 10*3/mm3      Immature Grans, Absolute 0.13 10*3/mm3      nRBC 0.0 /100 WBC     POC Glucose Once [[id number]]  (Normal) Collected: 09/30/23 1224    Specimen: Blood Updated: 09/30/23 1225     Glucose 104 mg/dL           Imaging Results (Last 24 Hours)       Procedure Component Value Units Date/Time    XR Chest 1 View [[id number]] Collected: 09/30/23 1340     Updated: 09/30/23 1507    Narrative:      CHEST SINGLE VIEW     HISTORY: Shortness of air. On oxygen. Cough.     COMPARISON: AP chest 07/23/2023, CT chest 06/29/2023.     FINDINGS: Lung volumes are diminished and there is linear subsegmental  atelectasis or scarring within the lung bases. There is no evidence for  perihilar edema or infiltrate. There is blunting in the left  costophrenic angle that appears chronic and is without change       Impression:      Diminished lung volumes. Blunting left costophrenic angle  due to scarring or small effusion. Mild linear basilar atelectasis or  scarring. No interval change or convincing evidence for active disease  in the chest.     This report was finalized on 9/30/2023 3:04 PM by Dr. Jenaro Bear M.D.             Results  Scan on 9/30/2023 1227 by New Onbase, [place]: ECG 12-LEAD         Author: -- Service: -- Author Type: --   Filed: Date of Service: Creation Time:   Status: (Other)   HEART RATE= 119  bpm  RR Interval= 504  ms  IN Interval=   ms  P Horizontal Axis=   deg  P Front Axis=   deg  QRSD Interval= 81  ms  QT Interval= 298  ms  QTcB= 420  ms  QRS Axis= 11  deg  T Wave Axis= 55  deg  - ABNORMAL ECG -  Atrial  fibrillation  Abnormal R-wave progression, early transition  Borderline T abnormalities, anterior leads          Current Facility-Administered Medications:     albuterol (PROVENTIL) nebulizer solution 0.083% 2.5 mg/3mL, 2.5 mg, Nebulization, Q6H - RT, Stanley Fowler MD    allopurinol (ZYLOPRIM) tablet 100 mg, 100 mg, Oral, Daily, Stanley Fowler MD    apixaban (ELIQUIS) tablet 2.5 mg, 2.5 mg, Oral, Q12H, Stanley Fowler MD    budesonide-formoterol (SYMBICORT) 160-4.5 MCG/ACT inhaler 2 puff, 2 puff, Inhalation, BID - RT **AND** tiotropium (SPIRIVA RESPIMAT) 2.5 mcg/act aerosol solution inhaler, 2 puff, Inhalation, Daily - RT, Stanley Fowler MD    busPIRone (BUSPAR) tablet 10 mg, 10 mg, Oral, BID, Stanley Fowler MD    DULoxetine (CYMBALTA) DR capsule 60 mg, 60 mg, Oral, Daily, Stanley Fowler MD    furosemide (LASIX) tablet 20 mg, 20 mg, Oral, Daily, Stanley Fowler MD    gabapentin (NEURONTIN) capsule 300 mg, 300 mg, Oral, TID, Stanley Fowler MD    HYDROcodone-acetaminophen (NORCO) 7.5-325 MG per tablet 1 tablet, 1 tablet, Oral, Q4H PRN, Stanley Fowler MD    hydrOXYzine (ATARAX) tablet 25 mg, 25 mg, Oral, TID PRN, Stanley Fowler MD    ipratropium-albuterol (DUO-NEB) nebulizer solution 3 mL, 3 mL, Nebulization, Q4H PRN, Stanley Fowler MD    levothyroxine (SYNTHROID, LEVOTHROID) tablet 75 mcg, 75 mcg, Oral, Daily, Stanley Fowler MD    [START ON 10/1/2023] methylPREDNISolone sodium succinate (SOLU-Medrol) injection 40 mg, 40 mg, Intravenous, Q24H, Stanley Fowler MD    metoprolol succinate XL (TOPROL-XL) 24 hr tablet 25 mg, 25 mg, Oral, Q24H, Stanley Fowler MD    potassium chloride (MICRO-K/KLOR-CON) CR capsule, 10 mEq, Oral, Daily, Stanley Fowler MD    QUEtiapine (SEROquel) tablet 100 mg, 100 mg, Oral, Nightly, Stanley Fowler MD    rOPINIRole (REQUIP) tablet 0.5 mg, 0.5 mg, Oral, Nightly, Stanley Fowler MD    rosuvastatin (CRESTOR) tablet 10 mg, 10 mg, Oral, Nightly, Stanley Fowler MD    [COMPLETED] Insert Peripheral IV, , , Once **AND**  sodium chloride 0.9 % flush 10 mL, 10 mL, Intravenous, PRN, Car Perea MD    sodium chloride 0.9 % infusion, 125 mL/hr, Intravenous, Continuous, Car Perea MD, Last Rate: 125 mL/hr at 09/30/23 1259, 125 mL/hr at 09/30/23 1259    Umeclidinium Bromide (INCRUSE ELLIPTA) aerosol powder  1 puff, 1 puff, Inhalation, Daily, Stanley Fowler MD    Current Outpatient Medications:     albuterol (PROVENTIL) (2.5 MG/3ML) 0.083% nebulizer solution, Take 2.5 mg by nebulization Every 4 (Four) Hours As Needed., Disp: , Rfl:     allopurinol (ZYLOPRIM) 100 MG tablet, Take 1 tablet by mouth Daily., Disp: , Rfl:     budesonide-formoterol (SYMBICORT) 160-4.5 MCG/ACT inhaler, Inhale 2 puffs 2 (Two) Times a Day., Disp: 1 inhaler, Rfl: 11    busPIRone (BUSPAR) 10 MG tablet, Take 1 tablet by mouth 2 (Two) Times a Day. for anxiety, Disp: , Rfl:     doxycycline (MONODOX) 100 MG capsule, Take 1 capsule by mouth 2 (Two) Times a Day for 10 days., Disp: 20 capsule, Rfl: 0    DULoxetine (CYMBALTA) 60 MG capsule, Take 1 capsule by mouth Daily., Disp: , Rfl:     Fluticasone-Umeclidin-Vilant (Trelegy Ellipta) 100-62.5-25 MCG/ACT inhaler, Inhale 1 puff Daily., Disp: 60 each, Rfl: 0    furosemide (LASIX) 20 MG tablet, Take 1 tablet by mouth Daily for 30 days., Disp: 30 tablet, Rfl: 0    gabapentin (NEURONTIN) 300 MG capsule, Take 1 capsule by mouth 3 (Three) Times a Day., Disp: , Rfl:     HYDROcodone-acetaminophen (NORCO) 7.5-325 MG per tablet, Take 1 tablet by mouth Every 8 (Eight) Hours As Needed., Disp: , Rfl:     hydrOXYzine (ATARAX) 25 MG tablet, Take 1 tablet by mouth 3 (Three) Times a Day As Needed., Disp: , Rfl:     ipratropium-albuterol (DUO-NEB) 0.5-2.5 mg/3 ml nebulizer, Take 3 mL by nebulization Every 4 (Four) Hours As Needed for Wheezing., Disp: , Rfl:     levothyroxine (SYNTHROID, LEVOTHROID) 75 MCG tablet, Take 1 tablet by mouth Daily., Disp: , Rfl:     metoprolol succinate XL (TOPROL-XL) 25 MG 24 hr tablet, Take 1 tablet by mouth  Daily for 30 days., Disp: 30 tablet, Rfl: 0    potassium chloride (MICRO-K) 10 MEQ CR capsule, Take 1 capsule by mouth Daily., Disp: 30 capsule, Rfl: 0    predniSONE (DELTASONE) 20 MG tablet, Take 2 tablets by mouth Daily for 7 days., Disp: 14 tablet, Rfl: 0    prochlorperazine (COMPAZINE) 10 MG tablet, Take 1 tablet by mouth Every 6 (Six) Hours As Needed for Nausea or Vomiting., Disp: 12 tablet, Rfl: 0    QUEtiapine (SEROquel) 50 MG tablet, Take 2 tablets by mouth every night at bedtime., Disp: , Rfl:     rOPINIRole (REQUIP) 0.5 MG tablet, Take 1 tablet by mouth Every Night. Take 1 hour before bedtime., Disp: 90 tablet, Rfl: 3    rosuvastatin (CRESTOR) 20 MG tablet, Take 1 tablet by mouth Daily for 30 days., Disp: 30 tablet, Rfl: 0    Umeclidinium Bromide (INCRUSE ELLIPTA) 62.5 MCG/INH aerosol powder , Inhale 1 puff Daily., Disp: , Rfl:      ASSESSMENT  Acute on chronic hypoxic respiratory failure  Acute exacerbation of COPD  Paroxysmal atrial fibrillation with rapid ventricular rate hypertension  Hypothyroidism  Anxiety disorder  Depression  Chronic anemia  Restless leg syndrome  Chronic kidney disease stage III    PLAN  Admit  Supplemental oxygen nebulizer  IV steroids  Control the heart rate  Serial cardiac enzymes and EKG  Cardiology consult  Pulmonary to follow patient  Adjust home medications  Stress ulcer DVT prophylaxis  Supportive care  DNR  Discussed with family nursing staff  Follow closely further recommendation according to hospital course    AMANDA MURO MD         Electronically signed by Amanda Muro MD at 09/30/23 2245          Emergency Department Notes        Rita Cortez, RN at 09/30/23 1547          Nursing report ED to floor  Josephine Coronarifield  83 y.o.  female    HPI :   Chief Complaint   Patient presents with    Shortness of Breath       Admitting doctor:   Amanda Muro MD    Admitting diagnosis:   The primary encounter diagnosis was Atrial fibrillation with rapid ventricular response: New  onset. Diagnoses of COPD exacerbation, Hypomagnesemia, Chronic renal failure, unspecified CKD stage, and Chronic anemia were also pertinent to this visit.    Code status:   Current Code Status       Date Active Code Status Order ID Comments User Context       Prior            Allergies:   Morphine and related and Fenofibrate    Isolation:   No active isolations    Intake and Output  No intake or output data in the 24 hours ending 09/30/23 1547    Weight:       09/30/23  1223   Weight: 73.5 kg (162 lb)       Most recent vitals:   Vitals:    09/30/23 1325 09/30/23 1330 09/30/23 1401 09/30/23 1543   BP:  (!) 135/102  131/89   BP Location:    Right arm   Pulse:  (!) 131 113 115   Resp: 18 18  20   Temp:       TempSrc:       SpO2:  (!) 88% 97% 92%   Weight:       Height:           Active LDAs/IV Access:   Lines, Drains & Airways       Active LDAs       Name Placement date Placement time Site Days    Peripheral IV 09/30/23 1242 Left Antecubital 09/30/23  1242  Antecubital  less than 1    External Urinary Catheter 09/30/23  1355  --  less than 1                    Labs (abnormal labs have a star):   Labs Reviewed   COMPREHENSIVE METABOLIC PANEL - Abnormal; Notable for the following components:       Result Value    Glucose 101 (*)     BUN 64 (*)     Creatinine 1.79 (*)     Chloride 97 (*)     BUN/Creatinine Ratio 35.8 (*)     eGFR 28.2 (*)     All other components within normal limits    Narrative:     GFR Normal >60  Chronic Kidney Disease <60  Kidney Failure <15    The GFR formula is only valid for adults with stable renal function between ages 18 and 70.   BNP (IN-HOUSE) - Abnormal; Notable for the following components:    proBNP 9,483.0 (*)     All other components within normal limits    Narrative:     This assay is used as an aid in the diagnosis of individuals suspected of having heart failure. It can be used as an aid in the diagnosis of acute decompensated heart failure (ADHF) in patients presenting with signs and  symptoms of ADHF to the emergency department (ED). In addition, NT-proBNP of <300 pg/mL indicates ADHF is not likely.   TROPONIN - Abnormal; Notable for the following components:    HS Troponin T 37 (*)     All other components within normal limits    Narrative:     High Sensitive Troponin T Reference Range:  <10.0 ng/L- Negative Female for AMI  <15.0 ng/L- Negative Male for AMI  >=10 - Abnormal Female indicating possible myocardial injury.  >=15 - Abnormal Male indicating possible myocardial injury.   Clinicians would have to utilize clinical acumen, EKG, Troponin, and serial changes to determine if it is an Acute Myocardial Infarction or myocardial injury due to an underlying chronic condition.        MAGNESIUM - Abnormal; Notable for the following components:    Magnesium 1.5 (*)     All other components within normal limits   CBC WITH AUTO DIFFERENTIAL - Abnormal; Notable for the following components:    RBC 3.06 (*)     Hemoglobin 9.8 (*)     Hematocrit 29.9 (*)     MCV 97.7 (*)     RDW-SD 54.6 (*)     Neutrophil % 77.1 (*)     Lymphocyte % 14.3 (*)     Eosinophil % 0.1 (*)     Immature Grans % 1.4 (*)     Immature Grans, Absolute 0.13 (*)     All other components within normal limits   BLOOD GAS, VENOUS - Abnormal; Notable for the following components:    pCO2, Venous 56.2 (*)     pO2, Venous 25.1 (*)     HCO3, Venous 31.1 (*)     Base Excess, Venous 4.3 (*)     O2 Saturation, Venous 41.0 (*)     All other components within normal limits   POC CHEM PANEL - Abnormal; Notable for the following components:    Ionized Calcium 1.14 (*)     Chloride 95 (*)     CO2 Content 31.2 (*)     All other components within normal limits   RESPIRATORY PANEL PCR W/ COVID-19 (SARS-COV-2) ANISH/VALERIA/MAXIMILIAN/PAD/COR/MAD/ILSA IN-HOUSE, NP SWAB IN [facility], 3-4 HR TAT - Normal    Narrative:     In the setting of a positive respiratory panel with a viral infection PLUS a negative procalcitonin without other underlying concern for bacterial  infection, consider observing off antibiotics or discontinuation of antibiotics and continue supportive care. If the respiratory panel is positive for atypical bacterial infection (Bordetella pertussis, Chlamydophila pneumoniae, or Mycoplasma pneumoniae), consider antibiotic de-escalation to target atypical bacterial infection.   PROTIME-INR - Normal   URINALYSIS W/ MICROSCOPIC IF INDICATED (NO CULTURE) - Normal    Narrative:     Urine microscopic not indicated.   POCT GLUCOSE FINGERSTICK - Normal   BLOOD GAS, VENOUS   HIGH SENSITIVITIY TROPONIN T 2HR   POC LACTATE   CBC AND DIFFERENTIAL    Narrative:     The following orders were created for panel order CBC & Differential.  Procedure                               Abnormality         Status                     ---------                               -----------         ------                     CBC Auto Differential[[id number]]        Abnormal            Final result                 Please view results for these tests on the individual orders.       EKG:   ECG 12 Lead Dyspnea   Preliminary Result   HEART RATE= 119  bpm   RR Interval= 504  ms   NV Interval=   ms   P Horizontal Axis=   deg   P Front Axis=   deg   QRSD Interval= 81  ms   QT Interval= 298  ms   QTcB= 420  ms   QRS Axis= 11  deg   T Wave Axis= 55  deg   - ABNORMAL ECG -   Atrial fibrillation   Abnormal R-wave progression, early transition   Borderline T abnormalities, anterior leads   Electronically Signed By:    Date and Time of Study: 2023-09-30 12:27:44          Meds given in ED:   Medications   sodium chloride 0.9 % flush 10 mL (has no administration in time range)   sodium chloride 0.9 % infusion (125 mL/hr Intravenous New Bag 9/30/23 1259)   ipratropium-albuterol (DUO-NEB) nebulizer solution 3 mL (has no administration in time range)   methylPREDNISolone sodium succinate (SOLU-Medrol) injection 40 mg (has no administration in time range)   apixaban (ELIQUIS) tablet 2.5 mg (has no administration in  time range)   magnesium sulfate 2g/50 mL (PREMIX) infusion (has no administration in time range)   ipratropium-albuterol (DUO-NEB) nebulizer solution 3 mL (3 mL Nebulization Given 23 1325)   methylPREDNISolone sodium succinate (SOLU-Medrol) injection 125 mg (125 mg Intravenous Given 23 1259)   magnesium sulfate 2g/50 mL (PREMIX) infusion (2 g Intravenous New Bag 23 1545)   apixaban (ELIQUIS) tablet 2.5 mg (2.5 mg Oral Given 23 1545)       Imaging results:  XR Chest 1 View    Result Date: 2023  Diminished lung volumes. Blunting left costophrenic angle due to scarring or small effusion. Mild linear basilar atelectasis or scarring. No interval change or convincing evidence for active disease in the chest.  This report was finalized on 2023 3:04 PM by Dr. Jenaro Bear M.D.       Ambulatory status:   - Stand-by assist; continuous oxygen    Social issues:   Social History     Socioeconomic History    Marital status:    Tobacco Use    Smoking status: Former     Packs/day: 1.00     Years: 10.00     Pack years: 10.00     Types: Cigarettes     Start date:      Quit date:      Years since quittin.7    Smokeless tobacco: Never   Vaping Use    Vaping Use: Never used   Substance and Sexual Activity    Alcohol use: No     Comment: CAFFEINE NO     Drug use: No    Sexual activity: Defer       NIH Stroke Scale:       Rita Cortez RN  23 15:47 EDT          Electronically signed by Rita Cortez RN at 23 1547       Nicki Benjamin RN at 23 1546          Nursing report ED to floor  [facility]  83 y.o.  female    HPI :   Chief Complaint   Patient presents with    Shortness of Breath       Admitting doctor:   Stanley Fowler MD    Admitting diagnosis:   The primary encounter diagnosis was Atrial fibrillation with rapid ventricular response: New onset. Diagnoses of COPD exacerbation, Hypomagnesemia, Chronic renal failure, unspecified CKD stage, and  Chronic anemia were also pertinent to this visit.    Code status:   Current Code Status       Date Active Code Status Order ID Comments User Context       Prior            Allergies:   Morphine and related and Fenofibrate    Isolation:   No active isolations    Intake and Output  No intake or output data in the 24 hours ending 09/30/23 1546    Weight:       09/30/23  1223   Weight: 73.5 kg (162 lb)       Most recent vitals:   Vitals:    09/30/23 1325 09/30/23 1330 09/30/23 1401 09/30/23 1543   BP:  (!) 135/102  131/89   BP Location:    Right arm   Pulse:  (!) 131 113 115   Resp: 18 18  20   Temp:       TempSrc:       SpO2:  (!) 88% 97%    Weight:       Height:           Active LDAs/IV Access:   Lines, Drains & Airways       Active LDAs       Name Placement date Placement time Site Days    Peripheral IV 09/30/23 1242 Left Antecubital 09/30/23  1242  Antecubital  less than 1    External Urinary Catheter 09/30/23  1355  --  less than 1                    Labs (abnormal labs have a star):   Labs Reviewed   COMPREHENSIVE METABOLIC PANEL - Abnormal; Notable for the following components:       Result Value    Glucose 101 (*)     BUN 64 (*)     Creatinine 1.79 (*)     Chloride 97 (*)     BUN/Creatinine Ratio 35.8 (*)     eGFR 28.2 (*)     All other components within normal limits    Narrative:     GFR Normal >60  Chronic Kidney Disease <60  Kidney Failure <15    The GFR formula is only valid for adults with stable renal function between ages 18 and 70.   BNP (IN-HOUSE) - Abnormal; Notable for the following components:    proBNP 9,483.0 (*)     All other components within normal limits    Narrative:     This assay is used as an aid in the diagnosis of individuals suspected of having heart failure. It can be used as an aid in the diagnosis of acute decompensated heart failure (ADHF) in patients presenting with signs and symptoms of ADHF to the emergency department (ED). In addition, NT-proBNP of <300 pg/mL indicates ADHF is  not likely.   TROPONIN - Abnormal; Notable for the following components:    HS Troponin T 37 (*)     All other components within normal limits    Narrative:     High Sensitive Troponin T Reference Range:  <10.0 ng/L- Negative Female for AMI  <15.0 ng/L- Negative Male for AMI  >=10 - Abnormal Female indicating possible myocardial injury.  >=15 - Abnormal Male indicating possible myocardial injury.   Clinicians would have to utilize clinical acumen, EKG, Troponin, and serial changes to determine if it is an Acute Myocardial Infarction or myocardial injury due to an underlying chronic condition.        MAGNESIUM - Abnormal; Notable for the following components:    Magnesium 1.5 (*)     All other components within normal limits   CBC WITH AUTO DIFFERENTIAL - Abnormal; Notable for the following components:    RBC 3.06 (*)     Hemoglobin 9.8 (*)     Hematocrit 29.9 (*)     MCV 97.7 (*)     RDW-SD 54.6 (*)     Neutrophil % 77.1 (*)     Lymphocyte % 14.3 (*)     Eosinophil % 0.1 (*)     Immature Grans % 1.4 (*)     Immature Grans, Absolute 0.13 (*)     All other components within normal limits   BLOOD GAS, VENOUS - Abnormal; Notable for the following components:    pCO2, Venous 56.2 (*)     pO2, Venous 25.1 (*)     HCO3, Venous 31.1 (*)     Base Excess, Venous 4.3 (*)     O2 Saturation, Venous 41.0 (*)     All other components within normal limits   POC CHEM PANEL - Abnormal; Notable for the following components:    Ionized Calcium 1.14 (*)     Chloride 95 (*)     CO2 Content 31.2 (*)     All other components within normal limits   RESPIRATORY PANEL PCR W/ COVID-19 (SARS-COV-2) [facility]IM[facility] IN-HOUSE, NP SWAB IN [facility], 3-4 HR TAT - Normal    Narrative:     In the setting of a positive respiratory panel with a viral infection PLUS a negative procalcitonin without other underlying concern for bacterial infection, consider observing off antibiotics or discontinuation of antibiotics and continue supportive care.  If the respiratory panel is positive for atypical bacterial infection (Bordetella pertussis, Chlamydophila pneumoniae, or Mycoplasma pneumoniae), consider antibiotic de-escalation to target atypical bacterial infection.   PROTIME-INR - Normal   URINALYSIS W/ MICROSCOPIC IF INDICATED (NO CULTURE) - Normal    Narrative:     Urine microscopic not indicated.   POCT GLUCOSE FINGERSTICK - Normal   BLOOD GAS, VENOUS   HIGH SENSITIVITIY TROPONIN T 2HR   POC LACTATE   CBC AND DIFFERENTIAL    Narrative:     The following orders were created for panel order CBC & Differential.  Procedure                               Abnormality         Status                     ---------                               -----------         ------                     CBC Auto Differential[[id number]]        Abnormal            Final result                 Please view results for these tests on the individual orders.       EKG:   ECG 12 Lead Dyspnea   Preliminary Result   HEART RATE= 119  bpm   RR Interval= 504  ms   [place] Interval=   ms   P Horizontal Axis=   deg   P Front Axis=   deg   QRSD Interval= 81  ms   QT Interval= 298  ms   QTcB= 420  ms   QRS Axis= 11  deg   T Wave Axis= 55  deg   - ABNORMAL ECG -   Atrial fibrillation   Abnormal R-wave progression, early transition   Borderline T abnormalities, anterior leads   Electronically Signed By:    Date and Time of Study: 2023-09-30 12:27:44          Meds given in ED:   Medications   sodium chloride 0.9 % flush 10 mL (has no administration in time range)   sodium chloride 0.9 % infusion (125 mL/hr Intravenous New Bag 9/30/23 1259)   ipratropium-albuterol (DUO-NEB) nebulizer solution 3 mL (has no administration in time range)   methylPREDNISolone sodium succinate (SOLU-Medrol) injection 40 mg (has no administration in time range)   ipratropium-albuterol (DUO-NEB) nebulizer solution 3 mL (3 mL Nebulization Given 9/30/23 1325)   methylPREDNISolone sodium succinate (SOLU-Medrol) injection 125 mg (125  mg Intravenous Given 23 1259)   magnesium sulfate 2g/50 mL (PREMIX) infusion (2 g Intravenous New Bag 23 1545)   apixaban (ELIQUIS) tablet 2.5 mg (2.5 mg Oral Given 23 1545)       Imaging results:  XR Chest 1 View    Result Date: 2023  Diminished lung volumes. Blunting left costophrenic angle due to scarring or small effusion. Mild linear basilar atelectasis or scarring. No interval change or convincing evidence for active disease in the chest.  This report was finalized on 2023 3:04 PM by Dr. Jenaro Bear M.D.       Ambulatory status:   - assist    Social issues:   Social History     Socioeconomic History    Marital status:    Tobacco Use    Smoking status: Former     Packs/day: 1.00     Years: 10.00     Pack years: 10.00     Types: Cigarettes     Start date:      Quit date:      Years since quittin.7    Smokeless tobacco: Never   Vaping Use    Vaping Use: Never used   Substance and Sexual Activity    Alcohol use: No     Comment: CAFFEINE NO     Drug use: No    Sexual activity: Defer       NIH Stroke Scale:       Nicki Benjamin RN  23 15:46 EDT         Electronically signed by Nicki Benjamin RN at 23 1546       Car Perea MD at 23 1229           EMERGENCY DEPARTMENT ENCOUNTER    Room Number:  N639/1  Date of encounter:  2023  PCP: Bernabe Guy MD  Historian: Patient and son  Relevant information and history provided by sources other than the patient will be included below and in the ED Course.  Review of pertinent past medical records may also be included in record below and ED Course.    HPI:  Chief Complaint: Shortness of breath, cough, body aches  A complete HPI/ROS/PMH/PSH/SH/FH are unobtainable due to: Not applicable  Context: Josephine Wolf is a 83 y.o. female who presents to the ED c/o the symptoms started about 5 to 6 days ago.  Was seen at the urgent care center and was started on steroids and doxycycline.  She is  really had no improvement of her symptoms.  She is still having a cough.  Initially the cough was productive of thick gray-yellow phlegm.  Now it is more of a dry cough.  She has had no definitive fever or if she has had a fever its been low-grade.  Has had some chills.  She is chronically on 3 L of oxygen at all times.  She has advanced COPD.  She has been on steroids which she does not feel a significant change or improvement in her symptoms.  Denies any chest pain or palpitations.  She does complain of diffuse body aches.  She complains of no energy and being fatigued.  She is not eating or drinking much.  Has had episodes of nausea.  It is not uncommon for her to have nausea.  Denies vomiting or diarrhea.  She has had a history of COPD and feels like this could be a COPD exacerbation again.  Patient reports no history of congestive heart failure and reports no increased swelling to extremities or abdomen.      Previous Episodes: Yes with COPD exacerbation and this is persisting and lasting longer.  Current Symptoms: See above    MEDICAL HISTORY REVIEWED  Can see she was seen in urgent care center on 9/25/2023 for shortness of breath.  She is does have a history of COPD and has had recent problems with flareup of her COPD.  She does have a history of bipolar as well.  Fibromyalgia she is chronically on 2 L of oxygen she has history of sleep apnea and chronic kidney disease.  They treated her with prednisone 40 mg daily for 5 days she did have a negative flu and COVID test.    I am reviewing the discharge summary from 7/5/2023 from Dr. Fowler.  Patient does have a history of COPD, history of nonsustained V. tach per cardiology history of paroxysmal SVT history of respiratory failure in the past.  I reviewed the patient's current medicine list.  On this admission she was consulted by cardiology, pulmonary and nephrology.  Look like she was treated for a COPD exacerbation that was the main reason why she came in  patient also did have a brief episode of nonsustained V. tach likely from hypomagnesemia and cardiology was involved as mentioned above.    Review of echo from March 2023  I reviewed the echo that she had in March 2023.  Left ventricular ejection fraction was 67% she had mild concentric LVH she had grade 1 diastolic dysfunction of the left ventricle.  PAST MEDICAL HISTORY  Active Ambulatory Problems     Diagnosis Date Noted    Anemia 10/19/2017    OA (osteoarthritis) of knee 11/16/2017    Chronic respiratory failure with hypoxia 12/02/2017    Cellulitis of skin 12/06/2017    Acute kidney injury 12/06/2017    Sepsis 12/06/2017    Orthostatic hypotension 06/24/2018    Disease of thyroid gland 06/24/2018    Hypertension 06/24/2018    Dehydration 06/24/2018    Stage 3 chronic kidney disease 06/25/2018    Closed compression fracture of L1 lumbar vertebra 06/16/2019    Hyponatremia 06/16/2019    Osteoporosis with pathological fracture 06/17/2019    Acute on chronic respiratory failure with hypoxia and hypercapnia 03/12/2020    Obesity (BMI 30-39.9) 03/12/2020    Nocturnal hypoxia 03/13/2020    CKD (chronic kidney disease) stage 2, GFR 60-89 ml/min 03/13/2020    Acute on chronic respiratory failure with hypoxia 05/20/2020    Abdominal pain 10/18/2021    Acute exacerbation of chronic obstructive pulmonary disease (COPD) 09/29/2022    Acute UTI 10/19/2022    Metabolic encephalopathy 10/23/2022    COPD with acute exacerbation 03/01/2023     Resolved Ambulatory Problems     Diagnosis Date Noted    COPD with acute exacerbation 06/24/2018     Past Medical History:   Diagnosis Date    Acid reflux     Arthritis     Bipolar 1 disorder, depressed     Cataract     Chronic nausea     Chronic pain of right knee     Continuous leakage of urine     COPD (chronic obstructive pulmonary disease)     Diverticulosis     Fibromyalgia     Fibromyalgia, primary     Frequent episodes of bronchitis     HL (hearing loss)     Hypothyroidism      Memory loss     Migraines     Neck pain     On home oxygen therapy     Short of breath on exertion     Sleep apnea          PAST SURGICAL HISTORY  Past Surgical History:   Procedure Laterality Date    CEREBRAL ANEURYSM REPAIR  'S    WITH STENT    HYSTERECTOMY      JOINT REPLACEMENT      LAPAROSCOPIC CHOLECYSTECTOMY      HI ARTHRP KNE CONDYLE&PLATU MEDIAL&LAT COMPARTMENTS Right 2017    Procedure: RT TOTAL KNEE ARTHROPLASTY;  Surgeon: Kashif Perez MD;  Location: [facility];  Service: Orthopedics         FAMILY HISTORY  Family History   Problem Relation Age of Onset    Malig Hyperthermia Neg Hx          SOCIAL HISTORY  Social History     Socioeconomic History    Marital status:    Tobacco Use    Smoking status: Former     Packs/day: 1.00     Years: 10.00     Pack years: 10.00     Types: Cigarettes     Start date:      Quit date:      Years since quittin.7    Smokeless tobacco: Never   Vaping Use    Vaping Use: Never used   Substance and Sexual Activity    Alcohol use: No     Comment: CAFFEINE NO     Drug use: No    Sexual activity: Defer         ALLERGIES  Morphine and related and Fenofibrate        REVIEW OF SYSTEMS  Review of Systems     All systems reviewed and negative except for those discussed in HPI.       PHYSICAL EXAM    I have reviewed the triage vital signs and nursing notes.    ED Triage Vitals [23 1218]   Temp Heart Rate Resp BP SpO2   98.5 øF (36.9 øC) (!) 127 18 (!) 138/102 95 %      Temp src Heart Rate Source Patient Position BP Location FiO2 (%)   Tympanic -- -- -- --       GENERAL: This is an elderly female that is chronically ill appearing.  She does have some mild respiratory distress with some mild tachypnea.  Has an obvious cough on exam.  Has some audible wheezing.  .Vital signs on my initial evaluation patient's heart rates fluctuates from 100 to about 140.  It is a regular regular rate on the monitor and looks like it is atrial fibrillation and atrial  flutter.  Blood pressure is 138/102.  She is afebrile.  Her O2 sat is 96% on the 3 L.  HENT: nares patent  Head/neck/ face are symmetric without gross deformity, signs of trauma, or swelling  EYES: no scleral icterus, no conjunctival pallor.  NECK: Supple, no meningismus  CV: Tachycardia with irregular regular rhythm.  No obvious murmur or rub.  Normal inspection to chest  RESPIRATORY: Mild respiratory distress with an obvious cough on exam.  Has some audible wheezing.  The wheezing is more prominent when she takes a deep breath and exhales.  The cough is also worse with deep breathing.  ABDOMEN: soft and nontender.  Morbidly obese  MUSCULOSKELETAL: no deformity.  No edema.  Intact distal pulses to upper and lower extremities that are equal strong and symmetric.  NEURO: alert and appropriate, moves all extremities, follows commands.  No focal motor or sensory changes  SKIN: warm, dry    Vital signs and nursing notes reviewed.        LAB RESULTS  Recent Results (from the past 24 hour(s))   POC Glucose Once    Collection Time: 09/30/23 12:24 PM    Specimen: Blood   Result Value Ref Range    Glucose 104 70 - 130 mg/dL   ECG 12 Lead Dyspnea    Collection Time: 09/30/23 12:27 PM   Result Value Ref Range    QT Interval 298 ms    QTC Interval 420 ms   Comprehensive Metabolic Panel    Collection Time: 09/30/23 12:42 PM    Specimen: Blood   Result Value Ref Range    Glucose 101 (H) 65 - 99 mg/dL    BUN 64 (H) 8 - 23 mg/dL    Creatinine 1.79 (H) 0.57 - 1.00 mg/dL    Sodium 138 136 - 145 mmol/L    Potassium 5.1 3.5 - 5.2 mmol/L    Chloride 97 (L) 98 - 107 mmol/L    CO2 28.0 22.0 - 29.0 mmol/L    Calcium 9.3 8.6 - 10.5 mg/dL    Total Protein 6.6 6.0 - 8.5 g/dL    Albumin 4.2 3.5 - 5.2 g/dL    ALT (SGPT) 22 1 - 33 U/L    AST (SGOT) 18 1 - 32 U/L    Alkaline Phosphatase 63 39 - 117 U/L    Total Bilirubin 0.6 0.0 - 1.2 mg/dL    Globulin 2.4 gm/dL    A/G Ratio 1.8 g/dL    BUN/Creatinine Ratio 35.8 (H) 7.0 - 25.0    Anion Gap 13.0  5.0 - 15.0 mmol/L    eGFR 28.2 (L) >60.0 mL/min/1.73   Protime-INR    Collection Time: 09/30/23 12:42 PM    Specimen: Blood   Result Value Ref Range    Protime 12.9 11.7 - 14.2 Seconds    INR 0.96 0.90 - 1.10   BNP    Collection Time: 09/30/23 12:42 PM    Specimen: Blood   Result Value Ref Range    proBNP 9,483.0 (H) 0.0 - 1,800.0 pg/mL   High Sensitivity Troponin T    Collection Time: 09/30/23 12:42 PM    Specimen: Blood   Result Value Ref Range    HS Troponin T 37 (H) <10 ng/L   Magnesium    Collection Time: 09/30/23 12:42 PM    Specimen: Blood   Result Value Ref Range    Magnesium 1.5 (L) 1.6 - 2.4 mg/dL   CBC Auto Differential    Collection Time: 09/30/23 12:42 PM    Specimen: Blood   Result Value Ref Range    WBC 9.08 3.40 - 10.80 10*3/mm3    RBC 3.06 (L) 3.77 - 5.28 10*6/mm3    Hemoglobin 9.8 (L) 12.0 - 15.9 g/dL    Hematocrit 29.9 (L) 34.0 - 46.6 %    MCV 97.7 (H) 79.0 - 97.0 fL    MCH 32.0 26.6 - 33.0 pg    MCHC 32.8 31.5 - 35.7 g/dL    RDW 15.4 12.3 - 15.4 %    RDW-SD 54.6 (H) 37.0 - 54.0 fl    MPV 9.9 6.0 - 12.0 fL    Platelets 202 140 - 450 10*3/mm3    Neutrophil % 77.1 (H) 42.7 - 76.0 %    Lymphocyte % 14.3 (L) 19.6 - 45.3 %    Monocyte % 7.0 5.0 - 12.0 %    Eosinophil % 0.1 (L) 0.3 - 6.2 %    Basophil % 0.1 0.0 - 1.5 %    Immature Grans % 1.4 (H) 0.0 - 0.5 %    Neutrophils, Absolute 6.99 1.70 - 7.00 10*3/mm3    Lymphocytes, Absolute 1.30 0.70 - 3.10 10*3/mm3    Monocytes, Absolute 0.64 0.10 - 0.90 10*3/mm3    Eosinophils, Absolute 0.01 0.00 - 0.40 10*3/mm3    Basophils, Absolute 0.01 0.00 - 0.20 10*3/mm3    Immature Grans, Absolute 0.13 (H) 0.00 - 0.05 10*3/mm3    nRBC 0.0 0.0 - 0.2 /100 WBC   Urinalysis With Microscopic If Indicated (No Culture) - Urine, Clean Catch    Collection Time: 09/30/23 12:55 PM    Specimen: Urine, Clean Catch   Result Value Ref Range    Color, UA Yellow Yellow, Straw    Appearance, UA Clear Clear    pH, UA 5.5 5.0 - 8.0    Specific Gravity, UA 1.009 1.005 - 1.030    Glucose,  UA Negative Negative    Ketones, UA Negative Negative    Bilirubin, UA Negative Negative    Blood, UA Negative Negative    Protein, UA Negative Negative    Leuk Esterase, UA Negative Negative    Nitrite, UA Negative Negative    Urobilinogen, UA 0.2 E.U./dL 0.2 - 1.0 E.U./dL   Respiratory Panel PCR w/COVID-19(SARS-CoV-2) [facility]IM[facility] In-House, NP Swab in UTM/VTM, 3-4 HR TAT - Swab, Nasopharynx    Collection Time: 09/30/23  1:09 PM    Specimen: Nasopharynx; Swab   Result Value Ref Range    ADENOVIRUS, PCR Not Detected Not Detected    Coronavirus 229E Not Detected Not Detected    Coronavirus HKU1 Not Detected Not Detected    Coronavirus NL63 Not Detected Not Detected    Coronavirus OC43 Not Detected Not Detected    COVID19 Not Detected Not Detected - Ref. Range    Human Metapneumovirus Not Detected Not Detected    Human Rhinovirus/Enterovirus Not Detected Not Detected    Influenza A PCR Not Detected Not Detected    Influenza B PCR Not Detected Not Detected    Parainfluenza Virus 1 Not Detected Not Detected    Parainfluenza Virus 2 Not Detected Not Detected    Parainfluenza Virus 3 Not Detected Not Detected    Parainfluenza Virus 4 Not Detected Not Detected    RSV, PCR Not Detected Not Detected    Bordetella pertussis pcr Not Detected Not Detected    Bordetella parapertussis PCR Not Detected Not Detected    Chlamydophila pneumoniae PCR Not Detected Not Detected    Mycoplasma pneumo by PCR Not Detected Not Detected   POC Lactate    Collection Time: 09/30/23  1:32 PM    Specimen: Venous Blood   Result Value Ref Range    Lactate 1.3 0.5 - 2.0 mmol/L    Device Comment [id number]    POC Chem Panel    Collection Time: 09/30/23  1:32 PM    Specimen: Venous Blood   Result Value Ref Range    Glucose 106 70 - 130 mg/dL    Sodium 136 136 - 145 mmol/L    POC Potassium 4.8 3.5 - 5.2 mmol/L    Ionized Calcium 1.14 (L) 2.20 - 2.55 mmol/L    Chloride 95 (L) 98 - 107 mmol/L    Creatinine 1.71 0.60 - 130.00 mg/dL    BUN  75 mg/dL    CO2 Content 31.2 (H) 23 - 27 mmol/L    Device Comment [id number]    Blood Gas, Venous -    Collection Time: 09/30/23  1:32 PM    Specimen: Venous Blood   Result Value Ref Range    pH, Venous 7.350 7.310 - 7.410 pH Units    pCO2, Venous 56.2 (H) 41.0 - 51.0 mm Hg    pO2, Venous 25.1 (L) 35.0 - 45.0 mm Hg    HCO3, Venous 31.1 (H) 22.0 - 28.0 mmol/L    Base Excess, Venous 4.3 (H) -2.0 - 2.0 mmol/L    O2 Saturation, Venous 41.0 (L) 45.0 - 75.0 %    Barometric Pressure for Blood Gas 752.6000 mmHg    Modality Cannula     FIO2 32 %    Flow Rate 3.0000 lpm    Rate 20 Breaths/minute    Device Comment [id number]        Ordered the above labs and independently reviewed the results.        RADIOLOGY  XR Chest 1 View    Result Date: 9/30/2023  CHEST SINGLE VIEW  HISTORY: Shortness of air. On oxygen. Cough.  COMPARISON: AP chest 07/23/2023, CT chest 06/29/2023.  FINDINGS: Lung volumes are diminished and there is linear subsegmental atelectasis or scarring within the lung bases. There is no evidence for perihilar edema or infiltrate. There is blunting in the left costophrenic angle that appears chronic and is without change      Diminished lung volumes. Blunting left costophrenic angle due to scarring or small effusion. Mild linear basilar atelectasis or scarring. No interval change or convincing evidence for active disease in the chest.  This report was finalized on 9/30/2023 3:04 PM by Dr. Jenaro Bear M.D.       I ordered the above noted radiological studies. Reviewed by me and discussed with radiologist.  See dictation for official radiology interpretation.      PROCEDURES    Procedures      MEDICATIONS GIVEN IN ER    Medications   sodium chloride 0.9 % flush 10 mL (has no administration in time range)   sodium chloride 0.9 % infusion (125 mL/hr Intravenous New Bag 9/30/23 1259)   ipratropium-albuterol (DUO-NEB) nebulizer solution 3 mL (has no administration in time range)   apixaban (ELIQUIS) tablet 2.5 mg  (has no administration in time range)   albuterol (PROVENTIL) nebulizer solution 0.083% 2.5 mg/3mL (has no administration in time range)   allopurinol (ZYLOPRIM) tablet 100 mg (100 mg Oral Not Given 9/30/23 1700)   busPIRone (BUSPAR) tablet 10 mg (has no administration in time range)   DULoxetine (CYMBALTA) DR capsule 60 mg (60 mg Oral Not Given 9/30/23 1700)   budesonide-formoterol (SYMBICORT) 160-4.5 MCG/ACT inhaler 2 puff (has no administration in time range)     And   tiotropium (SPIRIVA RESPIMAT) 2.5 mcg/act aerosol solution inhaler (has no administration in time range)   gabapentin (NEURONTIN) capsule 300 mg (has no administration in time range)   HYDROcodone-acetaminophen (NORCO) 7.5-325 MG per tablet 1 tablet (has no administration in time range)   hydrOXYzine (ATARAX) tablet 25 mg (has no administration in time range)   levothyroxine (SYNTHROID, LEVOTHROID) tablet 75 mcg (75 mcg Oral Not Given 9/30/23 1700)   QUEtiapine (SEROquel) tablet 100 mg (has no administration in time range)   rOPINIRole (REQUIP) tablet 0.5 mg (has no administration in time range)   rosuvastatin (CRESTOR) tablet 10 mg (has no administration in time range)   Umeclidinium Bromide (INCRUSE ELLIPTA) aerosol powder  1 puff (has no administration in time range)   furosemide (LASIX) tablet 40 mg (has no administration in time range)   metoprolol succinate XL (TOPROL-XL) 24 hr tablet 50 mg (has no administration in time range)   pantoprazole (PROTONIX) EC tablet 40 mg (has no administration in time range)   guaiFENesin (MUCINEX) 12 hr tablet 600 mg (has no administration in time range)   methylPREDNISolone sodium succinate (SOLU-Medrol) injection 40 mg (has no administration in time range)   ipratropium-albuterol (DUO-NEB) nebulizer solution 3 mL (3 mL Nebulization Given 9/30/23 1325)   methylPREDNISolone sodium succinate (SOLU-Medrol) injection 125 mg (125 mg Intravenous Given 9/30/23 1259)   magnesium sulfate 2g/50 mL (PREMIX) infusion (2  g Intravenous New Bag 9/30/23 1545)   apixaban (ELIQUIS) tablet 2.5 mg (2.5 mg Oral Given 9/30/23 1545)         All labs have been independently reviewed by me.  All radiology studies have been reviewed by me and I discussed with radiologist dictating the report when indicated below.  All EKG's independently viewed and interpreted by me.  Discussion below represents my analysis of pertinent findings related to patient's condition, differential diagnosis, treatment plan and final disposition.        PROGRESS, DATA ANALYSIS, CONSULTS, AND MEDICAL DECISION MAKING    DDx includes CHF, acute coronary syndrome, pulmonary embolism, pneumothorax, pneumonia, asthma/COPD,aspiration,  pulmonary hypertension, metabolic acidosis, deconditioning, anemia, other hematologic etiologies such as CO poisoning, anxiety.   I think this patient definitely is having a COPD exacerbation.  I know she has atrial fibrillation and her heart rate is in the low 100s.  I am getting give her a beta agonist with a DuoNeb as well as IV Solu-Medrol.  It appears as if this atrial fibrillation is new onset as I do not see any previous records of this and the patient states she is never had this.  We will get a check viral respiratory panel chest x-ray and other lab work.  This patient will need to be admitted to the hospital.  Discussed this with the patient and the son.  All questions answered at this time.  After initial lab work comes back I am going to go ahead and anticoagulate this patient.  Her Cj Vascor is elevated.      ED Course as of 09/30/23 1736   Sat Sep 30, 2023   1242 Patient's Cj vas score is 5.  It is positive for age history of hypertension she is a female.  She has had a history of heart disease but it was a long time ago.  She has never had a stroke or TIA and she is not diabetic. [MM]   1248 My own independent rotation of the EKG that was done at 1227 reveals a rate of 119 it is atrial fibrillation with narrow complex and  normal axis I do not see any definitive acute injury pattern there is diffuse some nonspecific ST and T wave changes  I compared this to the previous EKG that was done on 7/4/2023.  She was normal sinus rhythm on that EKG.  QRS complexes otherwise look fairly similar. [MM]   1501 pH, Venous: 7.350 [MM]   1501 pCO2, Venous(!): 56.2 [MM]   1501 Flow Rate: 3.0000  Patient is chronically on 3 L. [MM]   1501 Magnesium(!): 1.5 [MM]   1501 Hemoglobin(!): 9.8  Chronic anemia [MM]   1501 Creatinine(!): 1.79  Chronic renal failure [MM]   1502 I reviewed the x-ray report.  There is diminished lung volumes there is some blurring in the left costophrenic angle potential scarring or small effusion.  There is some mild basilar atelectasis.  This looks similar to previous x-ray on 7/23/2023 and 6/29/2023.  No definitive change.  Please see complete dictated report from radiologist [MM]   1504 HS Troponin T(!): 37  Likely elevated because of chronic renal failure. [MM]   1504 proBNP(!): 9,483.0  Could be related to congestive heart failure and chronic renal failure.  I do not see any obvious signs of congestive heart failure on the chest x-ray.  Continue to monitor. [MM]   1504 I did consult clinical pharmacist about the appropriate anticoagulation on this patient.  I did speak with Kyara who is the clinical pharmacist in the emergency department. [MM]   1518 I did discuss the case with Dr. Fowler was taking care of this patient before is a hospitalist here at [facility].  Informed of the patient's presenting symptoms and results of test.  Agrees to admit the patient to the hospital. [MM]   1518 The clinical pharmacist recommends 2.5 mg of Eliquis twice daily.  I do not anticipate she is going to have any recent procedures performed. [MM]   1520 Patient states that she is doing better on reevaluation.  Her heart rate is between 101 120 and his atrial fibrillation on the monitor.  Blood pressure is little elevated.   Oxygen saturation is 97% on her chronic 3 L.  She is in no acute distress.  Informed the results of the test and my treatment plan.  All questions answered [MM]      ED Course User Index  [MM] Car Perea MD       AS OF 17:36 EDT VITALS:    BP - 128/78  HR - (!) 125  TEMP - 98.2 øF (36.8 øC) (Oral)  02 SATS - 94%    SOCIAL DETERMINANTS OF HEALTH THAT IMPACT OR LIMIT CARE (For example..Homelessness,safe discharge, inability to obtain care, follow up, or prescriptions):      DIAGNOSIS  Final diagnoses:   Atrial fibrillation with rapid ventricular response: New onset   COPD exacerbation   Hypomagnesemia   Chronic renal failure, unspecified CKD stage   Chronic anemia         DISPOSITION  I have reviewed the test results with my patient and explained the current treatment plan.  I answered all of the patient's questions.  The patient will be admitted to monitor bed at this time.  The patient is not hypotensive and is tolerating their current disease condition well enough for a monitored bed at this time.  The patient's current condition does not require intensive care treatment at this time.            DICTATED UTILIZING DRAGON DICTATION    Note Disclaimer: At [facility], we believe that sharing information builds trust and better relationships. You are receiving this note because you recently visited [facility]. It is possible you will see health information before a provider has talked with you about it. This kind of information can be easy to misunderstand. To help you fully understand what it means for your health, we urge you to discuss this note with your provider.       Car Perea MD  09/30/23 1736      Electronically signed by Car Perea MD at 09/30/23 1736       Rita Cortez RN at 09/30/23 1219          Per EMS:    Patient called for SOA x 1 week.  Pt seen at an [facility] and was started on antibiotics and steroids for PNA. Pt was negative for Covid and Flu.  SpO2 88% on patients home O2 (3  lpm). Pt given duoneb and then O2 at 4 lpm to keep her SpO2 in the mid 90's.    No IV.  No Bgl.      Electronically signed by Rita Cortez RN at 09/30/23 0011

## 2023-10-03 NOTE — NURSING NOTE
Patient found by nursing assistant on the bathroom floor. Patient reports she experienced an episode of dizziness and fell on the bathroom floor on her coccyx/left hip. Patient denies hitting her head. Small red area on L hip. Patient Aox4. Patient now on bed alarm, call light within reach, grippy socks on, instructed to call out whenever she had to get up. Dr. Fowler made aware, Q4 neuro checks for the next 24 hours ordered.

## 2023-10-03 NOTE — PROGRESS NOTES
"Daily progress note    Primary care physician  Dr. LPUMMER    Subjective  Events noted as patient fell this morning with no complaint whatsoever but after I have seen patient and examined nurse called me that patient is having vision problem.  Patient remained awake and alert.    History of present illness  83-year-old white female with history of chronic hypoxic respiratory failure on home oxygen COPD paroxysmal SVT nonsustained ventricular tachycardia hypertension hypothyroidism and chronic kidney disease stage III who lives by herself presented to [facility] emergency room with increased shortness of breath for last 1 week which is getting worse.  Patient also complaining of productive cough but no fever chills chest pain abdominal pain nausea vomiting diarrhea.  Patient work-up in ER revealed acute on chronic hypoxic respiratory failure with acute exacerbation of COPD and also found to be in rapid ventricular rate with history of atrial fibrillation admitted for management.  Patient is DNR per her wishes.     REVIEW OF SYSTEMS  Unremarkable except vision issues     PHYSICAL EXAM   Blood pressure 116/53, pulse 98, temperature 97.9 øF (36.6 øC), temperature source Oral, resp. rate 20, height 157.5 cm (62\"), weight 73.5 kg (162 lb), SpO2 95 %.    GENERAL: Awake and alert in no distress  HENT: nares patent  Head/neck/ face are symmetric without gross deformity, signs of trauma, or swelling  EYES: no scleral icterus, no conjunctival pallor.  NECK: Supple, no meningismus  CV: Tachycardia with irregular regular rhythm.  No obvious murmur or rub.    RESPIRATORY: Normal effort and moving air bilaterally with expiratory wheezes  ABDOMEN: soft and nontender.  Nondistended bowel sounds positive  MUSCULOSKELETAL: no deformity.  No edema.  Intact distal pulses   NEURO: alert and appropriate, moves all extremities, follows commands.  No focal deficit  SKIN: warm, dry     LAB RESULTS  Lab Results (last 24 hours)  "      Procedure Component Value Units Date/Time    POC Glucose Once [[id number]]  (Normal) Collected: 10/03/23 1248    Specimen: Blood Updated: 10/03/23 1249     Glucose 123 mg/dL     Renal Function Panel [[id number]]  (Abnormal) Collected: 10/03/23 0651    Specimen: Blood Updated: 10/03/23 0725     Glucose 94 mg/dL      BUN 76 mg/dL      Creatinine 2.26 mg/dL      Sodium 136 mmol/L      Potassium 4.4 mmol/L      Comment: Slight hemolysis detected by analyzer. Results may be affected.        Chloride 102 mmol/L      CO2 24.2 mmol/L      Calcium 8.6 mg/dL      Albumin 3.5 g/dL      Phosphorus 5.8 mg/dL      Anion Gap 9.8 mmol/L      BUN/Creatinine Ratio 33.6     eGFR 21.3 mL/min/1.73     Narrative:      GFR Normal >60  Chronic Kidney Disease <60  Kidney Failure <15    The GFR formula is only valid for adults with stable renal function between ages 18 and 70.    CBC & Differential [[id number]]  (Abnormal) Collected: 10/03/23 0651    Specimen: Blood Updated: 10/03/23 0720    Narrative:      The following orders were created for panel order CBC & Differential.  Procedure                               Abnormality         Status                     ---------                               -----------         ------                     CBC Auto Differential[[id number]]        Abnormal            Final result                 Please view results for these tests on the individual orders.    CBC Auto Differential [[id number]]  (Abnormal) Collected: 10/03/23 0651    Specimen: Blood Updated: 10/03/23 0720     WBC 6.73 10*3/mm3      RBC 2.73 10*6/mm3      Hemoglobin 8.7 g/dL      Hematocrit 27.0 %      MCV 98.9 fL      MCH 31.9 pg      MCHC 32.2 g/dL      RDW 14.6 %      RDW-SD 51.8 fl      MPV 10.3 fL      Platelets 178 10*3/mm3      Neutrophil % 70.9 %      Lymphocyte % 19.6 %      Monocyte % 8.3 %      Eosinophil % 0.1 %      Basophil % 0.1 %      Immature Grans % 1.0 %      Neutrophils, Absolute 4.76 10*3/mm3      Lymphocytes,  Absolute 1.32 10*3/mm3      Monocytes, Absolute 0.56 10*3/mm3      Eosinophils, Absolute 0.01 10*3/mm3      Basophils, Absolute 0.01 10*3/mm3      Immature Grans, Absolute 0.07 10*3/mm3      nRBC 0.3 /100 WBC           Imaging Results (Last 24 Hours)       Procedure Component Value Units Date/Time    CT Head Without Contrast [[id number]] Collected: 10/03/23 1404     Updated: 10/03/23 1415    Narrative:      CT HEAD WO CONTRAST-     INDICATIONS: Blurry vision, trauma     TECHNIQUE: Radiation dose reduction techniques were utilized, including  automated exposure control and exposure modulation based on body size.  Noncontrast head CT     COMPARISON: 7/23/2023     FINDINGS:           No acute intracranial hemorrhage, midline shift or mass effect. No acute  territorial infarct is identified.     Right ICA stent is again noted.     Ventricles, cisterns, cerebral sulci are unremarkable for patient age.     The visualized paranasal sinuses, orbits, mastoid air cells are  unremarkable.                   Impression:         No acute intracranial hemorrhage or hydrocephalus. If there is further  clinical concern, MRI could be considered for further evaluation.     This report was finalized on 10/3/2023 2:08 PM by Dr. Ross Phillips M.D on Workstation: [id number]       XR Chest 1 View [[id number]] Collected: 10/02/23 1523     Updated: 10/02/23 1543    Narrative:      CHEST SINGLE VIEW     HISTORY: Shortness of breath.     COMPARISON: Portable upright chest 09/30/2023, 07/23/2023.     FINDINGS: Heart size is mildly enlarged. Thoracic aortic vascular  calcifications are present and there appears to be enlargement of the  thoracic aorta and this is similar to the prior exam 07/23/2023. There  is mild linear subsegmental atelectasis in the left midlung. Lungs  otherwise appear clear. There is no evidence for pulmonary edema or  pleural effusion.       Impression:      Diminished lung volumes. Enlargement of the thoracic aorta  and  aortic vascular calcification similar to the prior exam. Mild linear  subsegmental atelectasis in the left midlung.     This report was finalized on 10/2/2023 3:40 PM by Dr. Jenaro Bear M.D.             Results  Scan on 9/30/2023 1227 by [employer], [place]: ECG 12-LEAD         Author: -- Service: -- Author Type: --   Filed: Date of Service: Creation Time:   Status: (Other)   HEART RATE= 119  bpm  RR Interval= 504  ms  DE Interval=   ms  P Horizontal Axis=   deg  P Front Axis=   deg  QRSD Interval= 81  ms  QT Interval= 298  ms  QTcB= 420  ms  QRS Axis= 11  deg  T Wave Axis= 55  deg  - ABNORMAL ECG -  Atrial fibrillation  Abnormal R-wave progression, early transition  Borderline T abnormalities, anterior leads          Current Facility-Administered Medications:     albuterol (PROVENTIL) nebulizer solution 0.083% 2.5 mg/3mL, 2.5 mg, Nebulization, Q6H - RT, Stanley Fowler MD, 2.5 mg at 10/03/23 1233    allopurinol (ZYLOPRIM) tablet 100 mg, 100 mg, Oral, Daily, Stanley Fowler MD, 100 mg at 10/03/23 0851    amiodarone (PACERONE) tablet 200 mg, 200 mg, Oral, Q24H, Beth Palacio APRN, 200 mg at 10/03/23 0850    apixaban (ELIQUIS) tablet 2.5 mg, 2.5 mg, Oral, Q12H, Stanley Fowler MD, 2.5 mg at 10/03/23 0850    budesonide-formoterol (SYMBICORT) 160-4.5 MCG/ACT inhaler 2 puff, 2 puff, Inhalation, BID - RT, 2 puff at 10/03/23 0654 **AND** tiotropium (SPIRIVA RESPIMAT) 2.5 mcg/act aerosol solution inhaler, 2 puff, Inhalation, Daily - RT, Stanley Fowler MD, 2 puff at 10/03/23 0653    busPIRone (BUSPAR) tablet 10 mg, 10 mg, Oral, BID, Stanley Fowler MD, 10 mg at 10/03/23 0851    DULoxetine (CYMBALTA) DR capsule 60 mg, 60 mg, Oral, Daily, Stanley Fowler MD, 60 mg at 10/03/23 0851    famotidine (PEPCID) tablet 20 mg, 20 mg, Oral, BID, Stanley Fowler MD, 20 mg at 10/03/23 0850    gabapentin (NEURONTIN) capsule 300 mg, 300 mg, Oral, TID, Stanley Fowler MD, 300 mg at 10/03/23 0852    guaiFENesin (MUCINEX) 12 hr tablet 600  mg, 600 mg, Oral, Q12H, Stanley Fowler MD, 600 mg at 10/03/23 0850    HYDROcodone-acetaminophen (NORCO) 7.5-325 MG per tablet 1 tablet, 1 tablet, Oral, Q4H PRN, Stanley Fowler MD, 1 tablet at 10/03/23 1311    hydrOXYzine (ATARAX) tablet 25 mg, 25 mg, Oral, TID PRN, Stanley Fowler MD, 25 mg at 10/03/23 0116    ipratropium-albuterol (DUO-NEB) nebulizer solution 3 mL, 3 mL, Nebulization, Q4H PRN, Stanley Fowler MD    levothyroxine (SYNTHROID, LEVOTHROID) tablet 75 mcg, 75 mcg, Oral, Daily, Stanley Fowler MD, 75 mcg at 10/03/23 0851    metoprolol tartrate (LOPRESSOR) tablet 75 mg, 75 mg, Oral, Q8H, Stanley Fowler MD, 75 mg at 10/03/23 1249    ondansetron (ZOFRAN) injection 4 mg, 4 mg, Intravenous, Q4H PRN, Stanley Fowler MD, 4 mg at 10/03/23 1311    predniSONE (DELTASONE) tablet 20 mg, 20 mg, Oral, Once, Car Amato MD    [START ON 10/4/2023] predniSONE (DELTASONE) tablet 40 mg, 40 mg, Oral, Daily With Breakfast, Car Amato MD    QUEtiapine (SEROquel) tablet 100 mg, 100 mg, Oral, Nightly, Stanley Fowler MD, 100 mg at 10/02/23 2234    rOPINIRole (REQUIP) tablet 0.5 mg, 0.5 mg, Oral, Nightly, Stanley Fowler MD, 0.5 mg at 10/02/23 2233    rosuvastatin (CRESTOR) tablet 10 mg, 10 mg, Oral, Nightly, Stanley Fowler MD, 10 mg at 10/02/23 2234    simethicone (MYLICON) chewable tablet 80 mg, 80 mg, Oral, 4x Daily PRN, Stanley Fowler MD, 80 mg at 10/03/23 1249    [COMPLETED] Insert Peripheral IV, , , Once **AND** sodium chloride 0.9 % flush 10 mL, 10 mL, Intravenous, PRN, Car Perea MD     ASSESSMENT  Acute on chronic hypoxic respiratory failure  Acute exacerbation of COPD  Paroxysmal atrial fibrillation with rapid ventricular rate   Status post fall with vision issues  Hypertension  Hypothyroidism  Anxiety disorder  Depression  Chronic anemia  Restless leg syndrome  Acute kidney injury resolving  Chronic kidney disease stage III    PLAN  CPM  Supplemental oxygen nebulizer  Continue steroids and taper  Control the  heart rate  Check CT head and neuro consult  Continue to hold diuretics  Cardiology input appreciated  Pulmonary and nephrology to follow patient  Adjust home medications  Stress ulcer DVT prophylaxis  Supportive care  PT/OT  DNR  Discussed with family nursing staff  Discharge planning    AMANDA MURO MD    Copied text in this note has been reviewed and is accurate as of 10/03/23

## 2023-10-03 NOTE — PROGRESS NOTES
[facility] Progress Note      Patient Name: Josephine Wolf  : 1940  MRN: [id number]  Primary Care Physician:  Bernabe Guy MD  Date of admission: 2023    Subjective     Interval History:   Still has dyspnea with any exertion  Experiences palpitations with activity  Appetite fair; no N/V      Review of Systems:   As noted above    Objective     Vitals:   Temp:  [97.3 øF (36.3 øC)-98.4 øF (36.9 øC)] 97.7 øF (36.5 øC)  Heart Rate:  [] 130  Resp:  [17-24] 18  BP: (103-133)/(70-92) 105/79  Flow (L/min):  [3-4] 4    Intake/Output Summary (Last 24 hours) at 10/2/2023 2002  Last data filed at 10/2/2023 1400  Gross per 24 hour   Intake 240 ml   Output --   Net 240 ml       Physical Exam:    Constitutional: Awake, alert, NAD, cushingoid, chronically ill, overweight  HEENT: Sclera anicteric, no conjunctival injection  Neck: Supple, no carotid bruit, trachea at midline, no JVD  Resp: Crackles and rhonchi bilaterally; conversational dyspnea on 4 L/min  Cardiovascular: Irregularly irregular, tachycardic, no rub, 2/6M  Gastrointestinal: BS +, soft, nontender and nondistended  : No palpable bladder  Musculoskeletal: No edema  Psychiatric: Appropriate affect, cooperative, oriented fully  Neurologic: No asterixis but +tremulous, moving all limbs, normal speech   Skin: Warm and dry     Scheduled Meds:     albuterol, 2.5 mg, Nebulization, Q6H - RT  allopurinol, 100 mg, Oral, Daily  amiodarone, 200 mg, Oral, Q24H  apixaban, 2.5 mg, Oral, Q12H  budesonide-formoterol, 2 puff, Inhalation, BID - RT   And  tiotropium bromide monohydrate, 2 puff, Inhalation, Daily - RT  busPIRone, 10 mg, Oral, BID  DULoxetine, 60 mg, Oral, Daily  famotidine, 20 mg, Oral, BID  gabapentin, 300 mg, Oral, TID  guaiFENesin, 600 mg, Oral, Q12H  levothyroxine, 75 mcg, Oral, Daily  metoprolol tartrate, 75 mg, Oral, Q8H  [START ON 10/3/2023] predniSONE, 20 mg, Oral, Daily With Breakfast  QUEtiapine, 100 mg,  Oral, Nightly  rOPINIRole, 0.5 mg, Oral, Nightly  rosuvastatin, 10 mg, Oral, Nightly      IV Meds:        Results Reviewed:   I have personally reviewed the results from the time of this admission to 10/2/2023 20:02 EDT     Results from last 7 days   Lab Units 10/02/23  0553 10/01/23  0800 09/30/23  1332 09/30/23  1242   SODIUM mmol/L 140 142  --  138   POTASSIUM mmol/L 4.6 5.1  --  5.1   CHLORIDE mmol/L 103 102  --  97*   CO2 mmol/L 26.5 28.1  --  28.0   BUN mg/dL 75* 59*  --  64*   CREATININE mg/dL 2.34* 1.74* 1.71 1.79*   CALCIUM mg/dL 8.8 8.8  --  9.3   BILIRUBIN mg/dL  --  0.5  --  0.6   ALK PHOS U/L  --  55  --  63   ALT (SGPT) U/L  --  20  --  22   AST (SGOT) U/L  --  20  --  18   GLUCOSE mg/dL 92 141*  --  101*       Estimated Creatinine Clearance: 17.7 mL/min (A) (by C-G formula based on SCr of 2.34 mg/dL (H)).    Results from last 7 days   Lab Units 10/02/23  0553 09/30/23  1242   MAGNESIUM mg/dL 2.0 1.5*   PHOSPHORUS mg/dL 6.8*  --              Results from last 7 days   Lab Units 10/01/23  0800 09/30/23  1242   WBC 10*3/mm3 7.38 9.08   HEMOGLOBIN g/dL 9.1* 9.8*   PLATELETS 10*3/mm3 181 202       Results from last 7 days   Lab Units 09/30/23  1242   INR  0.96       Assessment / Plan     ASSESSMENT:  1.  CHRIS on CKD3b, nonoliguric, worse:  likely prerenal due to decreased renal perfusion in the setting of tachyarrhythmia, hypoxia, and modest hypotension. Looks euvolemic; electrolytes stable.  Urinalysis completely bland  2.  Atrial fibrillation with RVR  3.  COPD with exacerbation  4.  Hypertension, with adequate control  5.  Anemia  6.  Osteoarthritis: Meloxicam on medication list, but she is not taking this    PLAN:  1.  FENa  2.  Aggressive pulmonary toilet and rate control  3.  Bladder scan each shift for completeness' sake    Thank you for involving us in the care of Josephine Wolf.  Please feel free to call with any questions.    Carlos Barraza MD  10/02/23  20:02 EDT    Nephrology  [facility]  [phone]    Please note that portions of this note were completed with a voice recognition program.

## 2023-10-03 NOTE — CONSULTS
Neurology Consult Note    Consult Date: 10/3/2023    Referring MD: Stanley Fowler MD    Reason for Consult I have been asked to see the patient in neurological consultation to render advice and opinion regarding vision problems    Josephine Wolf is a 83 y.o. female with past medical history of chronic hypoxic respiratory problems from COPD oxygen dependent, chronic kidney disease stage III, hypothyroidism.  Patient came into the ER with chief complaints of increased shortness of breath since last 1 week, patient was admitted on 9/30/2023.  Neurology has been consulted for bilateral vision problems.    Spoke to patient at bedside along with 2 of her sons, patient states yesterday she went to restroom and both her legs gave out and had a fall she did not lose consciousness did not hit her head.  After that she came back to her home without any headache weakness numbness speech or vision problems.  On when she was resting she had an episode of bilateral blurry vision in both eyes with some double vision which resolved transiently in 10 minutes.  Today morning she states she is back to baseline does not have any deficits , no weakness speech or vision issues or headache.  States occasionally she gets lightheaded without any vertigo or nausea vomiting.    She states she feels much better compared to when she came in but her son's think she is lethargic.    Past Medical History:   Diagnosis Date    Acid reflux     Acute kidney injury     Anemia     Arthritis     Bipolar 1 disorder, depressed     Cataract     MINDY    Chronic nausea     Chronic pain of right knee     Continuous leakage of urine     USES DEPENDS    COPD (chronic obstructive pulmonary disease)     USES O2 2 LMP PER NC AT NIGHT    Disease of thyroid gland     HYPOTHYROIDISM    Diverticulosis     Fibromyalgia     DX 1994    Fibromyalgia, primary     Frequent episodes of bronchitis     HL (hearing loss)     Hypertension     Hypothyroidism     Memory loss      "Migraines     Neck pain     On home oxygen therapy     2L NC AT NIGHT    Orthostatic hypotension     Short of breath on exertion     Sleep apnea     Stage 3 chronic kidney disease        Exam  /53   Pulse 98   Temp 97.9 øF (36.6 øC) (Oral)   Resp 20   Ht 157.5 cm (62\")   Wt 73.5 kg (162 lb)   SpO2 95%   BMI 29.63 kg/mý     General appearance: Well developed, well nourished, well groomed, alert and cooperative.   HEENT: Normocephalic.   Cardiac: Regular rate and rhythm. No murmurs.   Chest Exam: Clear to auscultation bilaterally, no wheezes, no rhonchi.  Extremities: Normal, no edema.   Skin: No rashes or birthmarks.      Higher integrative function: Oriented to time, place, person, normal speech comprehension repetition and fluency no dysarthria  CN II: Normal visual acuity bilateral all 4 quadrants  CN III IV VI: Extraocular movements are full without nystagmus. Pupils are equal, round, and reactive to light.   CN V: Sensation same on both sides of the face  CN VII: Facial movements are symmetric, no weakness.   CN XII: The tongue is midline. No atrophy or fasciculations.   Motor: Good strength 5/5 both UE and LE B/L  no pronator drift. No fasciculations, rigidity, spasticity or abnormal movements.   Sensation: Normal sensation to pin prick and light touch all four extremities   Coordination: Finger to nose test showed no dysmetria      DATA:    Lab Results   Component Value Date    GLUCOSE 94 10/03/2023    CALCIUM 8.6 10/03/2023     10/03/2023    K 4.4 10/03/2023    CO2 24.2 10/03/2023     10/03/2023    BUN 76 (H) 10/03/2023    CREATININE 2.26 (H) 10/03/2023    EGFRIFAFRI  03/13/2020      Comment:      <15 Indicative of kidney failure.    EGFRIFNONA 47 (L) 11/16/2021    BCR 33.6 (H) 10/03/2023    ANIONGAP 9.8 10/03/2023     Lab Results   Component Value Date    WBC 6.73 10/03/2023    HGB 8.7 (L) 10/03/2023    HCT 27.0 (L) 10/03/2023    MCV 98.9 (H) 10/03/2023     10/03/2023 "       Lab review:   Sodium 136  Creatinine 2.20  Phosphorus 4.7    LDL 28  A1c 5.7    WBC 8.6  Globin 9.4 and platelets 167    Imaging review:   CT head done yesterday morning showed no acute hypodensity or hyperdensity    TTE done on 10/3/2023    Left ventricular systolic function is normal. Calculated left ventricular EF = 60.4% Left ventricular wall thickness is consistent with moderate concentric hypertrophy. Left ventricular diastolic function was indeterminate.    : Left atrial volume is moderately increased. The right atrial cavity is mildly dilated.    No aortic valve regurgitation or stenosis is present. The aortic valve is abnormal in structure. There is mild calcification of the aortic valve.    Mitral annular calcification is present. Mild mitral valve regurgitation is present.    Mild tricuspid valve regurgitation is present. Estimated right ventricular systolic pressure from tricuspid regurgitation is normal (<35 mmHg). Calculated right ventricular systolic pressure from tricuspid regurgitation is 33 mmHg.    Repeat CT head pending    Diagnoses:  Acute encephalopathy    Pre-stroke MRS: 2  NIHSS: NIHSS:    Baseline  0-->Alert: keenly responsive  0-->Answers both questions correctly  0-->Performs both tasks correctly  0=normal  0=No visual loss  0=Normal symmetric movement  0-->No drift: limb holds 90 (or 45) degrees for full 10 secs  0-->No drift: limb holds 90 (or 45) degrees for full 10 secs  0-->No drift: limb holds 90 (or 45) degrees for full 10 secs  0-->No drift: limb holds 90 (or 45) degrees for full 10 secs  0=Absent  0=Normal; no sensory loss  0=No aphasia, normal  0=Normal  0=No abnormality    Total score: 0        Bilateral blurry vision, dizziness  -Patient states she had an episode of lightheaded and her legs gave out yesterday and had a fall, it is usually she gets dizzy couple of times a week states is more lightheaded than vertigo- I think this is most likely related to her blood  pressure changes we will check her orthostatics again  -Neurological examination is completely intact I do not see strongly suspect stroke  -Explained to family at bedside that we may just repeat a CT head to look for any interval changes.  -Spoke extensively to both sons at bedside and they agreed that an MRI is not necessarily needed right now left she continues to decline also based on her symptoms and exam suspicion of stroke is low      2.  Acute on chronic hypoxic respiratory failure  -Being treated as acute exacerbation of COPD  -Pulmonology following  -She is on bronchodilators and steroid topical    3.  Atrial fibrillation  -Is currently on Eliquis and amiodarone for rate control  -BNP elevated on admission  -SUZY pending  -Neurology following    4.  Hypothyroidism  -On levothyroxine at home    Neurology will sign off call us with any questions will follow peripherally the CT head results    MDM   Reviewed: Previous charts, nursing notes and vitals   Reviewed: Previous labs and CT scan    Interpretation: Labs and CT scan   Total time providing care is :30-74 minutes. This excluded time spent performing separately reportable procedures and services  Consults :Neurology/Stroke    Tex Hutchinson MD  Neuro Hospitalist /Vascular Neurology.

## 2023-10-03 NOTE — PROGRESS NOTES
[facility] Progress Note      Patient Name: Josephine Wolf  : 1940  MRN: [id number]  Primary Care Physician:  Bernabe Guy MD  Date of admission: 2023    Subjective     Interval History:   Chart review only; patient off the floor for testing  Fall this AM in bathroom, with double vision and blurriness this afternoon  UOP not recorded    Review of Systems:   As noted above    Objective     Vitals:   Temp:  [97.3 øF (36.3 øC)-97.9 øF (36.6 øC)] 97.9 øF (36.6 øC)  Heart Rate:  [] 98  Resp:  [16-24] 20  BP: (101-118)/(53-79) 116/53  Flow (L/min):  [3-4] 3    Intake/Output Summary (Last 24 hours) at 10/3/2023 1356  Last data filed at 10/3/2023 0900  Gross per 24 hour   Intake 640 ml   Output --   Net 640 ml         Physical Exam:    Unable to examine as patient is off the floor    Scheduled Meds:     albuterol, 2.5 mg, Nebulization, Q6H - RT  allopurinol, 100 mg, Oral, Daily  amiodarone, 200 mg, Oral, Q24H  apixaban, 2.5 mg, Oral, Q12H  budesonide-formoterol, 2 puff, Inhalation, BID - RT   And  tiotropium bromide monohydrate, 2 puff, Inhalation, Daily - RT  busPIRone, 10 mg, Oral, BID  DULoxetine, 60 mg, Oral, Daily  famotidine, 20 mg, Oral, BID  gabapentin, 300 mg, Oral, TID  guaiFENesin, 600 mg, Oral, Q12H  levothyroxine, 75 mcg, Oral, Daily  metoprolol tartrate, 75 mg, Oral, Q8H  predniSONE, 20 mg, Oral, Once  [START ON 10/4/2023] predniSONE, 40 mg, Oral, Daily With Breakfast  QUEtiapine, 100 mg, Oral, Nightly  rOPINIRole, 0.5 mg, Oral, Nightly  rosuvastatin, 10 mg, Oral, Nightly      IV Meds:        Results Reviewed:   I have personally reviewed the results from the time of this admission to 10/3/2023 13:56 EDT     Results from last 7 days   Lab Units 10/03/23  0651 10/02/23  0553 10/01/23  0800 23  1332 23  1242   SODIUM mmol/L 136 140 142  --  138   POTASSIUM mmol/L 4.4 4.6 5.1  --  5.1   CHLORIDE mmol/L 102 103 102  --  97*   CO2 mmol/L 24.2  26.5 28.1  --  28.0   BUN mg/dL 76* 75* 59*  --  64*   CREATININE mg/dL 2.26* 2.34* 1.74*   < > 1.79*   CALCIUM mg/dL 8.6 8.8 8.8  --  9.3   BILIRUBIN mg/dL  --   --  0.5  --  0.6   ALK PHOS U/L  --   --  55  --  63   ALT (SGPT) U/L  --   --  20  --  22   AST (SGOT) U/L  --   --  20  --  18   GLUCOSE mg/dL 94 92 141*  --  101*    < > = values in this interval not displayed.         Estimated Creatinine Clearance: 18.3 mL/min (A) (by C-G formula based on SCr of 2.26 mg/dL (H)).    Results from last 7 days   Lab Units 10/03/23  0651 10/02/23  0553 09/30/23  1242   MAGNESIUM mg/dL  --  2.0 1.5*   PHOSPHORUS mg/dL 5.8* 6.8*  --                Results from last 7 days   Lab Units 10/03/23  0651 10/01/23  0800 09/30/23  1242   WBC 10*3/mm3 6.73 7.38 9.08   HEMOGLOBIN g/dL 8.7* 9.1* 9.8*   PLATELETS 10*3/mm3 178 181 202         Results from last 7 days   Lab Units 09/30/23  1242   INR  0.96         Assessment / Plan     ASSESSMENT:  1.  CHRIS on CKD3b, nonoliguric, unchanged:  likely prerenal due to decreased renal perfusion in the setting of tachyarrhythmia, hypoxia, and modest hypotension.  Earlier urinalysis completely bland.  PVRs fine  2.  Atrial fibrillation with RVR  3.  COPD with exacerbation  4.  Hypertension, with adequate control  5.  Anemia  6.  Osteoarthritis: Meloxicam on medication list, but she is not taking this  7.  Blurry vision, dizziness, and fall in morning 10/3    PLAN:  1.  FENa pending (ordered yesterday)  2.  Aggressive pulmonary toilet and rate control  3.  Surveillance labs    Thank you for involving us in the care of Josephine Wolf.  Please feel free to call with any questions.    Carlos Barraza MD  10/03/23  13:56 EDT    [facility]  [phone]    Please note that portions of this note were completed with a voice recognition program.

## 2023-10-03 NOTE — NURSING NOTE
1230 : Patient reports acute blurry vision and double vision. All other neuro assessments WDL. Dr. Janeth kay, STAT CT of her head and neurology consult ordered. Patient then reported nausea and stomach cramping - Zophran and Norco given prn, Dr. Fowler aware.    How Severe Is Your Skin Lesion?: mild Has Your Skin Lesion Been Treated?: not been treated Is This A New Presentation, Or A Follow-Up?: Skin Lesion

## 2023-10-03 NOTE — PLAN OF CARE
Goal Outcome Evaluation:                        Problem: Adult Inpatient Plan of Care  Goal: Plan of Care Review  Outcome: Ongoing, Progressing  Goal: Patient-Specific Goal (Individualized)  Outcome: Ongoing, Progressing  Goal: Absence of Hospital-Acquired Illness or Injury  Outcome: Ongoing, Progressing  Intervention: Identify and Manage Fall Risk  Recent Flowsheet Documentation  Taken 10/3/2023 0200 by Alejandra Hernandez RN  Safety Promotion/Fall Prevention: safety round/check completed  Taken 10/3/2023 0025 by Alejandra Hernandez RN  Safety Promotion/Fall Prevention: safety round/check completed  Taken 10/2/2023 2200 by Alejandra Hernandez RN  Safety Promotion/Fall Prevention: safety round/check completed  Taken 10/2/2023 2035 by Alejandra Hernandez RN  Safety Promotion/Fall Prevention: safety round/check completed  Intervention: Prevent and Manage VTE (Venous Thromboembolism) Risk  Recent Flowsheet Documentation  Taken 10/2/2023 2035 by Alejandra Hernandez RN  Activity Management: up ad helder  VTE Prevention/Management:   bilateral   sequential compression devices off  Intervention: Prevent Infection  Recent Flowsheet Documentation  Taken 10/3/2023 0200 by Alejandra Hernandez RN  Infection Prevention:   rest/sleep promoted   single patient room provided  Goal: Optimal Comfort and Wellbeing  Outcome: Ongoing, Progressing  Intervention: Provide Person-Centered Care  Recent Flowsheet Documentation  Taken 10/2/2023 2035 by Alejandra Hernandez RN  Trust Relationship/Rapport:   care explained   questions answered   thoughts/feelings acknowledged  Goal: Readiness for Transition of Care  Outcome: Ongoing, Progressing

## 2023-10-03 NOTE — DISCHARGE PLACEMENT REQUEST
"Raheem Roberts (83 y.o. Female)       Date of Birth   1940    Social Security Number       Address   [address] [place] [address]  [facility] [address]    Home Phone   [phone]    MRN   [id number]       [facility]    Marital Status                               Admission Date   9/30/23    Admission Type   Emergency    Admitting Provider   Stanley Fowler MD    Attending Provider   Stanley Fowler MD    Department, Room/Bed   [address], [address]       Discharge Date       Discharge Disposition       Discharge Destination                                 Attending Provider: Stanley Fowler MD    Allergies: Morphine And Related, Fenofibrate    Isolation: None   Infection: None   Code Status: No CPR    Ht: 157.5 cm (62\")   Wt: 73.5 kg (162 lb)    Admission Cmt: None   Principal Problem: COPD exacerbation [J44.1]                   Active Insurance as of 9/30/2023       Primary Coverage       Payor Plan Insurance Group Employer/Plan Group    [facility] MEDICARE REPLACEMENT [employer] MEDICARE REPLACEMENT        Payor Plan Address Payor Plan Phone Number Payor Plan Fax Number Effective Dates    [address] [phone]  10/9/2017 - None Entered    [facility] [address]         Subscriber Name Subscriber Birth Date Member ID       RAHEEM ROBERTS 1940 [id number]               Secondary Coverage       Payor Plan Insurance Group Employer/Plan Group    [place]        Payor Plan Address Payor Plan Phone Number Payor Plan Fax Number Effective Dates    [address] [phone]  7/3/2016 - None Entered    [facility] [address]         Subscriber Name Subscriber Birth Date Member ID       RAHEEM ROBERTS 1940 [id number]                     Emergency Contacts        (Rel.) Home Phone Work Phone Mobile Phone    Chino Roberts (Son) [phone] -- --    James Roberts (Son) [phone] -- [phone]                "

## 2023-10-03 NOTE — PLAN OF CARE
Problem: Adult Inpatient Plan of Care  Goal: Plan of Care Review  Flowsheets (Taken 10/3/2023 1900)  Outcome Evaluation: AOX4. 3 L nasal cannula. A fib on monitor. Fall this morning, see note - bed and chair alarms now on, grippy socks and instructed to call when she needs to use the restroom. Family at bedside. Patient reports that her blurry/double vision has resolved. CT head negative, awaiting neurology to come see her. Complaining of nausea and stomach cramps, see prns and started on protonix. Call light within reach, chair alarm on.   Goal Outcome Evaluation:              Outcome Evaluation: AOX4. 3 L nasal cannula. A fib on monitor. Fall this morning, see note - bed and chair alarms now on, grippy socks and instructed to call when she needs to use the restroom. Family at bedside. Patient reports that her blurry/double vision has resolved. CT head negative, awaiting neurology to come see her. Complaining of nausea and stomach cramps, see prns and started on protonix. Call light within reach, chair alarm on.

## 2023-10-03 NOTE — PROGRESS NOTES
Discharge Planning Assessment  [facility]     Patient Name: Josephine Wolf  MRN: [id number]  Today's Date: 10/3/2023    Admit Date: 9/30/2023    Plan: Home with HH   Discharge Needs Assessment       Row Name 10/03/23 1441       Living Environment    People in Home alone    Current Living Arrangements home    Potentially Unsafe Housing Conditions none    Primary Care Provided by self    Provides Primary Care For no one    Family Caregiver if Needed child(ree), adult    Family Caregiver Names alaina Magana and James    Quality of Family Relationships helpful;involved;supportive    Able to Return to Prior Arrangements yes       Resource/Environmental Concerns    Resource/Environmental Concerns none       Transition Planning    Patient/Family Anticipates Transition to home;home with family    Patient/Family Anticipated Services at Transition home health care    Transportation Anticipated family or friend will provide       Discharge Needs Assessment    Equipment Currently Used at Home walker, rolling    Concerns to be Addressed adjustment to diagnosis/illness    Equipment Needed After Discharge none    Discharge Facility/Level of Care Needs home with home health    Provided Post Acute Provider List? Yes    Post Acute Provider List Home Health    Provided Post Acute Provider Quality & Resource List? Yes    Post Acute Provider Quality and Resource List Home Health    Delivered To Support Person    Method of Delivery In person    Patient's Choice of Community Agency(s) No preference                   Discharge Plan       Row Name 10/03/23 1443       Plan    Plan Home with HH    Plan Comments Patient in cardiology. Patients NOK/emergency contacts are her sons, both sons are in room 639 waiting on pt to return from cardio. Spoke with alaina Magana and James, verified facesheet. Patient lives alone, she is normally IADL. Patient uses a walker. Sons assist patient as needed. PCP is Dr. Guy and preferred pharmacy [facility]. Son states patient is planning to return home and sons will assist if needed. Patient has used HH in the past, son cannot recall HH name. Son is agreeable with referral to [facility]. Son will discuss dc planning with patient, CCP will also follow up with patient. Referral sent to [facility].                  Continued Care and Services - Admitted Since 9/30/2023    Coordination has not been started for this encounter.       Expected Discharge Date and Time       Expected Discharge Date Expected Discharge Time    Oct 3, 2023            Demographic Summary    No documentation.                  Functional Status       Row Name 10/03/23 1442       Functional Status    Usual Activity Tolerance moderate       Functional Status, IADL    Medications independent    Meal Preparation independent    Housekeeping assistive equipment    Laundry independent    Shopping assistive equipment and person;independent;assistive equipment                   Psychosocial    No documentation.                  Abuse/Neglect    No documentation.                  Legal    No documentation.                  Substance Abuse    No documentation.                  Patient Forms    No documentation.                     Sowmya Murry RN

## 2023-10-03 NOTE — PROGRESS NOTES
[facility]  Progress note: 10/3/2023    Patient Identification:  Name:Josephine Wolf  Age:83 y.o.  Sex: female  :  1940  MRN: [id number]           CC:  Atrial fibrillation, pulmonary hypertension, elevated troponin    Interval history:  Echo with normal systolic function with left ventricular hypertrophy, indeterminate diastolic function and no significant valvular dysfunction.  O2 requirements have increased slightly.    Vital Signs:   Temp:  [97.3 øF (36.3 øC)-97.7 øF (36.5 øC)] 97.7 øF (36.5 øC)  Heart Rate:  [] 98  Resp:  [16-24] 18  BP: (101-133)/(56-92) 101/56    Intake/Output Summary (Last 24 hours) at 10/3/2023 1058  Last data filed at 10/3/2023 0900  Gross per 24 hour   Intake 640 ml   Output --   Net 640 ml       Physical Examination:    General Appearance No acute distress   Neck Trachea midline, no adenopathy   Lungs Few rales at bases,respirations regular, even and unlabored   Heart Regular rhythm and normal rate, normal S1 and S2, no murmur, no gallop, no rub, no click   Chest wall No abnormalities observed   Abdomen Normal bowel sounds, no masses, no hepatomegaly, soft   Extremities 1+  edema, no cyanosis, no redness   Neurological Alert and oriented x 3     Lab Review:  Personally reviewed the labs, radiology imaging and other cardiac procedures.   Results from last 7 days   Lab Units 10/03/23  0651 10/02/23  0553 10/01/23  0800   SODIUM mmol/L 136   < > 142   POTASSIUM mmol/L 4.4   < > 5.1   CHLORIDE mmol/L 102   < > 102   CO2 mmol/L 24.2   < > 28.1   BUN mg/dL 76*   < > 59*   CREATININE mg/dL 2.26*   < > 1.74*   CALCIUM mg/dL 8.6   < > 8.8   BILIRUBIN mg/dL  --   --  0.5   ALK PHOS U/L  --   --  55   ALT (SGPT) U/L  --   --  20   AST (SGOT) U/L  --   --  20   GLUCOSE mg/dL 94   < > 141*    < > = values in this interval not displayed.     Results from last 7 days   Lab Units 23  1711 23  1242   HSTROP T ng/L 27* 37*     Results from last 7 days   Lab Units  10/03/23  0651 10/01/23  0800 09/30/23  1242   WBC 10*3/mm3 6.73 7.38 9.08   HEMOGLOBIN g/dL 8.7* 9.1* 9.8*   HEMATOCRIT % 27.0* 27.8* 29.9*   PLATELETS 10*3/mm3 178 181 202     Results from last 7 days   Lab Units 09/30/23  1242   INR  0.96     Medication Review:   Meds reviewed  Scheduled Meds:albuterol, 2.5 mg, Nebulization, Q6H - RT  allopurinol, 100 mg, Oral, Daily  amiodarone, 200 mg, Oral, Q24H  apixaban, 2.5 mg, Oral, Q12H  budesonide-formoterol, 2 puff, Inhalation, BID - RT   And  tiotropium bromide monohydrate, 2 puff, Inhalation, Daily - RT  busPIRone, 10 mg, Oral, BID  DULoxetine, 60 mg, Oral, Daily  famotidine, 20 mg, Oral, BID  gabapentin, 300 mg, Oral, TID  guaiFENesin, 600 mg, Oral, Q12H  levothyroxine, 75 mcg, Oral, Daily  metoprolol tartrate, 75 mg, Oral, Q8H  predniSONE, 20 mg, Oral, Daily With Breakfast  QUEtiapine, 100 mg, Oral, Nightly  rOPINIRole, 0.5 mg, Oral, Nightly  rosuvastatin, 10 mg, Oral, Nightly        I personally viewed and interpreted the patient's EKG/Telemetry data    Assessment and Plan  1.  Atrial fibrillation.  Started on Eliquis yesterday and rates seem to be improving on metoprolol and amiodarone.  Blood pressure too low to increase metoprolol further.  With hypoxia, check echocardiogram especially his proBNP was quite high on admission on 9/30/2023.  May need to add midodrine and increase metoprolol to [place] heart rate response by tomorrow  2.  COPD exacerbation.  On 4 L nasal cannula (more than yesterday).  3.  Pulmonary hypertension, group 2.  4.  Hypertension, as above  5.  Elevated troponin likely demand related non-STEMI.  Continue current regimen  6.  Acute renal injury.  Creatinine slightly improved today    Mini Padilla  10/3/760486:58 EDT  25min spent in reviewing records, discussion and examination of the patient and discussion with other members of the patient's medical team.     Dictated utilizing Dragon dictation

## 2023-10-03 NOTE — PROGRESS NOTES
[facility] INPATIENT PROGRESS NOTE         [address]    10/3/2023      PATIENT IDENTIFICATION:  Name: Josephine Wolf ADMIT: 2023   : 1940  PCP: Bernabe Guy MD    MRN: [id number] LOS: 3 days   AGE/SEX: 83 y.o. female  ROOM: [facility]                     LOS 3    Reason for visit: COPD exacerbation      SUBJECTIVE:      No new pulmonary issues overnight.  Noted patient fell this morning in the bathroom.    Objective   OBJECTIVE:    Vital Sign Min/Max for last 24 hours  Temp  Min: 97.3 øF (36.3 øC)  Max: 97.7 øF (36.5 øC)   BP  Min: 101/56  Max: 133/92   Pulse  Min: 85  Max: 130   Resp  Min: 16  Max: 24   SpO2  Min: 92 %  Max: 97 %   No data recorded   No data recorded                         Body mass index is 29.63 kg/mý.    Intake/Output Summary (Last 24 hours) at 10/3/2023 1234  Last data filed at 10/3/2023 0900  Gross per 24 hour   Intake 640 ml   Output --   Net 640 ml           Exam:  GEN:  No distress, appears stated age  EYES:   PERRL, anicteric sclerae  ENT:    External ears/nose normal, OP clear  NECK:  No adenopathy, midline trachea  LUNGS: Normal chest on inspection, palpation and moderate wheezing bilaterally on auscultation  CV:  Normal S1S2, without murmur  ABD:  Nontender, nondistended, no hepatosplenomegaly, +BS  EXT:  No edema.  No cyanosis or clubbing.  No mottling and normal cap refill.    Assessment     Scheduled meds:  albuterol, 2.5 mg, Nebulization, Q6H - RT  allopurinol, 100 mg, Oral, Daily  amiodarone, 200 mg, Oral, Q24H  apixaban, 2.5 mg, Oral, Q12H  budesonide-formoterol, 2 puff, Inhalation, BID - RT   And  tiotropium bromide monohydrate, 2 puff, Inhalation, Daily - RT  busPIRone, 10 mg, Oral, BID  DULoxetine, 60 mg, Oral, Daily  famotidine, 20 mg, Oral, BID  gabapentin, 300 mg, Oral, TID  guaiFENesin, 600 mg, Oral, Q12H  levothyroxine, 75 mcg, Oral, Daily  metoprolol tartrate, 75 mg, Oral, Q8H  predniSONE, 20 mg, Oral, Daily With  Breakfast  QUEtiapine, 100 mg, Oral, Nightly  rOPINIRole, 0.5 mg, Oral, Nightly  rosuvastatin, 10 mg, Oral, Nightly      IV meds:                           Data Review:  Results from last 7 days   Lab Units 10/03/23  0651 10/02/23  0553 10/01/23  0800 09/30/23  1332 09/30/23  1242   SODIUM mmol/L 136 140 142  --  138   POTASSIUM mmol/L 4.4 4.6 5.1  --  5.1   CHLORIDE mmol/L 102 103 102  --  97*   CO2 mmol/L 24.2 26.5 28.1  --  28.0   BUN mg/dL 76* 75* 59*  --  64*   CREATININE mg/dL 2.26* 2.34* 1.74* 1.71 1.79*   GLUCOSE mg/dL 94 92 141*  --  101*   CALCIUM mg/dL 8.6 8.8 8.8  --  9.3         Estimated Creatinine Clearance: 18.3 mL/min (A) (by C-G formula based on SCr of 2.26 mg/dL (H)).  Results from last 7 days   Lab Units 10/03/23  0651 10/01/23  0800 09/30/23  1242   WBC 10*3/mm3 6.73 7.38 9.08   HEMOGLOBIN g/dL 8.7* 9.1* 9.8*   PLATELETS 10*3/mm3 178 181 202     Results from last 7 days   Lab Units 09/30/23  1242   INR  0.96     Results from last 7 days   Lab Units 10/01/23  0800 09/30/23  1242   ALT (SGPT) U/L 20 22   AST (SGOT) U/L 20 18         Results from last 7 days   Lab Units 09/30/23  1332   LACTATE mmol/L 1.3         Hemoglobin A1C   Date/Time Value Ref Range Status   10/01/2023 0800 5.70 (H) 4.80 - 5.60 % Final     Glucose   Date/Time Value Ref Range Status   09/30/2023 1332 106 70 - 130 mg/dL Final     Comment:     Serial Number: [id number]  [id number]       Chest x-ray shows diminished lung volumes and small effusion with basilar atelectasis.        Microbiology reviewed  Respiratory viral panel negative                Active Hospital Problems    Diagnosis  POA    **COPD exacerbation [J44.1]  Yes    Atrial fibrillation with RVR [I48.91]  Unknown      Resolved Hospital Problems   No resolved problems to display.         ASSESSMENT:  Acute exacerbation of COPD  Chronic hypoxemic respiratory failure  Acute on chronic hypercapnia with respiratory acidosis  Atrial fibrillation with RVR  Chronic kidney  disease III  Anxiety disorder  RLS       PLAN:  Moderate wheezing on auscultation worse due to tapering the steroids too quickly.  Go back to 40 mg daily and recommend decrease by 10 mg every 3 days until gone.  Changed to p.o. steroid with taper.  Oxygen back to home use.  With weakness and recent fall this morning may benefit from PT.          Car Amato MD  Pulmonary and Critical Care Medicine  [facility], [facility]  10/3/2023    12:34 EDT

## 2023-10-04 NOTE — PROGRESS NOTES
"Daily progress note    Primary care physician  Dr. PLUMMER    Subjective  Awake and alert and feeling better than yesterday with no headache vision problem speech problem or focal weakness.  Patient family at bedside.    History of present illness  83-year-old white female with history of chronic hypoxic respiratory failure on home oxygen COPD paroxysmal SVT nonsustained ventricular tachycardia hypertension hypothyroidism and chronic kidney disease stage III who lives by herself presented to [facility] emergency room with increased shortness of breath for last 1 week which is getting worse.  Patient also complaining of productive cough but no fever chills chest pain abdominal pain nausea vomiting diarrhea.  Patient work-up in ER revealed acute on chronic hypoxic respiratory failure with acute exacerbation of COPD and also found to be in rapid ventricular rate with history of atrial fibrillation admitted for management.  Patient is DNR per her wishes.     REVIEW OF SYSTEMS  Unremarkable except vision issues     PHYSICAL EXAM   Blood pressure 109/60, pulse 97, temperature 98.2 øF (36.8 øC), temperature source Oral, resp. rate 18, height 157.5 cm (62\"), weight 73.5 kg (162 lb), SpO2 99 %.    GENERAL: Awake and alert in no distress  HENT: nares patent  Head/neck/ face are symmetric without gross deformity, signs of trauma, or swelling  EYES: no scleral icterus, no conjunctival pallor.  NECK: Supple, no meningismus  CV: Tachycardia with irregular regular rhythm.  No obvious murmur or rub.    RESPIRATORY: Normal effort and moving air bilaterally with expiratory wheezes  ABDOMEN: soft and nontender.  Nondistended bowel sounds positive  MUSCULOSKELETAL: no deformity.  No edema.  Intact distal pulses   NEURO: alert and appropriate, moves all extremities, follows commands.  No focal deficit  SKIN: warm, dry     LAB RESULTS  Lab Results (last 24 hours)       Procedure Component Value Units Date/Time    Renal " Function Panel [[id number]]  (Abnormal) Collected: 10/04/23 0856    Specimen: Blood Updated: 10/04/23 0955     Glucose 104 mg/dL      BUN 74 mg/dL      Creatinine 2.20 mg/dL      Sodium 136 mmol/L      Potassium 4.6 mmol/L      Comment: Slight hemolysis detected by analyzer. Results may be affected.        Chloride 103 mmol/L      CO2 22.5 mmol/L      Calcium 8.6 mg/dL      Albumin 3.5 g/dL      Phosphorus 4.7 mg/dL      Anion Gap 10.5 mmol/L      BUN/Creatinine Ratio 33.6     eGFR 22.0 mL/min/1.73     Narrative:      GFR Normal >60  Chronic Kidney Disease <60  Kidney Failure <15    The GFR formula is only valid for adults with stable renal function between ages 18 and 70.    Magnesium [[id number]]  (Normal) Collected: 10/04/23 0702    Specimen: Blood Updated: 10/04/23 0743     Magnesium 2.1 mg/dL     CBC & Differential [[id number]]  (Abnormal) Collected: 10/04/23 0702    Specimen: Blood Updated: 10/04/23 0725    Narrative:      The following orders were created for panel order CBC & Differential.  Procedure                               Abnormality         Status                     ---------                               -----------         ------                     CBC Auto Differential[[id number]]        Abnormal            Final result                 Please view results for these tests on the individual orders.    CBC Auto Differential [[id number]]  (Abnormal) Collected: 10/04/23 0702    Specimen: Blood Updated: 10/04/23 0725     WBC 8.60 10*3/mm3      RBC 2.89 10*6/mm3      Hemoglobin 9.4 g/dL      Hematocrit 29.1 %      .7 fL      MCH 32.5 pg      MCHC 32.3 g/dL      RDW 14.7 %      RDW-SD 53.0 fl      MPV 10.6 fL      Platelets 167 10*3/mm3      Neutrophil % 70.7 %      Lymphocyte % 18.3 %      Monocyte % 10.2 %      Eosinophil % 0.1 %      Basophil % 0.1 %      Immature Grans % 0.6 %      Neutrophils, Absolute 6.08 10*3/mm3      Lymphocytes, Absolute 1.57 10*3/mm3      Monocytes, Absolute 0.88 10*3/mm3       Eosinophils, Absolute 0.01 10*3/mm3      Basophils, Absolute 0.01 10*3/mm3      Immature Grans, Absolute 0.05 10*3/mm3      nRBC 0.3 /100 WBC             Narrative & Impression   CT HEAD WO CONTRAST-     INDICATIONS: Blurry vision, trauma     TECHNIQUE: Radiation dose reduction techniques were utilized, including  automated exposure control and exposure modulation based on body size.  Noncontrast head CT     COMPARISON: 7/23/2023     FINDINGS:           No acute intracranial hemorrhage, midline shift or mass effect. No acute  territorial infarct is identified.     Right ICA stent is again noted.     Ventricles, cisterns, cerebral sulci are unremarkable for patient age.     The visualized paranasal sinuses, orbits, mastoid air cells are  unremarkable.                 IMPRESSION:     No acute intracranial hemorrhage or hydrocephalus. If there is further  clinical concern, MRI could be considered for further evaluation.       Results  Scan on 9/30/2023 1227 by [employer] Onbase, [place]: ECG 12-LEAD         Author: -- Service: -- Author Type: --   Filed: Date of Service: Creation Time:   Status: (Other)   HEART RATE= 119  bpm  RR Interval= 504  ms  NM Interval=   ms  P Horizontal Axis=   deg  P Front Axis=   deg  QRSD Interval= 81  ms  QT Interval= 298  ms  QTcB= 420  ms  QRS Axis= 11  deg  T Wave Axis= 55  deg  - ABNORMAL ECG -  Atrial fibrillation  Abnormal R-wave progression, early transition  Borderline T abnormalities, anterior leads          Current Facility-Administered Medications:     albuterol (PROVENTIL) nebulizer solution 0.083% 2.5 mg/3mL, 2.5 mg, Nebulization, Q6H - RT, Stanley Fowler MD, 2.5 mg at 10/04/23 1146    allopurinol (ZYLOPRIM) tablet 100 mg, 100 mg, Oral, Daily, Stanley Fowler MD, 100 mg at 10/04/23 0944    amiodarone (PACERONE) tablet 200 mg, 200 mg, Oral, Q24H, Beth Palacio APRN, 200 mg at 10/04/23 0944    apixaban (ELIQUIS) tablet 2.5 mg, 2.5 mg, Oral, Q12H, Stanley Fowler MD, 2.5  mg at 10/04/23 0944    budesonide-formoterol (SYMBICORT) 160-4.5 MCG/ACT inhaler 2 puff, 2 puff, Inhalation, BID - RT, 2 puff at 10/04/23 0724 **AND** tiotropium (SPIRIVA RESPIMAT) 2.5 mcg/act aerosol solution inhaler, 2 puff, Inhalation, Daily - RT, Stanley Fowler MD, 2 puff at 10/04/23 0724    busPIRone (BUSPAR) tablet 10 mg, 10 mg, Oral, BID, Stanley Fowler MD, 10 mg at 10/04/23 0944    DULoxetine (CYMBALTA) DR capsule 60 mg, 60 mg, Oral, Daily, Stanley Fowler MD, 60 mg at 10/04/23 0944    gabapentin (NEURONTIN) capsule 300 mg, 300 mg, Oral, TID, Stanley Fowler MD, 300 mg at 10/04/23 0944    guaiFENesin (MUCINEX) 12 hr tablet 600 mg, 600 mg, Oral, Q12H, Stanley Fowler MD, 600 mg at 10/04/23 0944    HYDROcodone-acetaminophen (NORCO) 7.5-325 MG per tablet 1 tablet, 1 tablet, Oral, Q4H PRN, Stanley Fowler MD, 1 tablet at 10/03/23 1311    hydrOXYzine (ATARAX) tablet 25 mg, 25 mg, Oral, TID PRN, Stanley Fowler MD, 25 mg at 10/03/23 0116    ipratropium-albuterol (DUO-NEB) nebulizer solution 3 mL, 3 mL, Nebulization, Q4H PRN, Stanley Fowler MD    levothyroxine (SYNTHROID, LEVOTHROID) tablet 75 mcg, 75 mcg, Oral, Daily, Stanley Fowler MD, 75 mcg at 10/04/23 0944    metoprolol tartrate (LOPRESSOR) tablet 75 mg, 75 mg, Oral, Q8H, Stanley Fowler MD, 75 mg at 10/04/23 1204    ondansetron (ZOFRAN) injection 4 mg, 4 mg, Intravenous, Q4H PRN, Stanley Fowler MD, 4 mg at 10/03/23 1311    pantoprazole (PROTONIX) EC tablet 40 mg, 40 mg, Oral, BID AC, Stanley Fowler MD, 40 mg at 10/04/23 0944    predniSONE (DELTASONE) tablet 40 mg, 40 mg, Oral, Daily With Breakfast, Lm, Car Leonardo MD, 40 mg at 10/04/23 0944    QUEtiapine (SEROquel) tablet 100 mg, 100 mg, Oral, Nightly, Stanley Fowler MD, 100 mg at 10/03/23 2153    rOPINIRole (REQUIP) tablet 0.5 mg, 0.5 mg, Oral, Nightly, Stanley Fowler MD, 0.5 mg at 10/03/23 2153    rosuvastatin (CRESTOR) tablet 10 mg, 10 mg, Oral, Nightly, Stanley Fowler MD, 10 mg at 10/03/23 2153    simethicone (MYLICON)  chewable tablet 80 mg, 80 mg, Oral, 4x Daily PRN, Amanda Fowler MD, 80 mg at 10/03/23 1249    [COMPLETED] Insert Peripheral IV, , , Once **AND** sodium chloride 0.9 % flush 10 mL, 10 mL, Intravenous, PRN, Car Perea MD     ASSESSMENT  Acute on chronic hypoxic respiratory failure  Acute exacerbation of COPD  Paroxysmal atrial fibrillation with rapid ventricular rate   Status post fall with vision issues  Hypertension  Hypothyroidism  Anxiety disorder  Depression  Chronic anemia  Restless leg syndrome  Acute kidney injury resolving  Chronic kidney disease stage III    PLAN  CPM  Supplemental oxygen nebulizer  Continue steroids and taper  Control the heart rate  Neuro consult pending  Continue to hold diuretics  Cardiology input appreciated  Pulmonary and nephrology to follow patient  Adjust home medications  Stress ulcer DVT prophylaxis  Supportive care  PT/OT  DNR  Discussed with family nursing staff  Discharge planning    AMANDA FOWLER MD    Copied text in this note has been reviewed and is accurate as of 10/04/23

## 2023-10-04 NOTE — SIGNIFICANT NOTE
10/04/23 8788   OTHER   Discipline occupational therapist   Rehab Time/Intention   Session Not Performed other (see comments)  (RN hold, d/t pt being lethargic during PT session and just returning to bed per RN. Pt also set to have CT scan this pm. OT will f/u tomorrow 10/5.)   Therapy Assessment/Plan (PT)   Criteria for Skilled Interventions Met (PT) yes   Recommendation   OT - Next Appointment 10/05/23

## 2023-10-04 NOTE — PROGRESS NOTES
[facility] Progress Note      Patient Name: Josephine Wolf  : 1940  MRN: [id number]  Primary Care Physician:  Bernabe Guy MD  Date of admission: 2023    Subjective     Interval History:   She feels fine; breathing seems to be at baseline, she states  Appetite is good, but dislikes the food here; no N/V  UOP not recorded    Review of Systems:   As noted above    Objective     Vitals:   Temp:  [97.3 øF (36.3 øC)-97.9 øF (36.6 øC)] 97.4 øF (36.3 øC)  Heart Rate:  [] 103  Resp:  [16-20] 16  BP: ()/(44-93) 99/50  Flow (L/min):  [3] 3    Intake/Output Summary (Last 24 hours) at 10/4/2023 1237  Last data filed at 10/4/2023 0900  Gross per 24 hour   Intake 480 ml   Output --   Net 480 ml         Physical Exam:    Constitutional: Awake, alert, NAD, cushingoid, chronically ill, overweight  HEENT: Sclera anicteric, no conjunctival injection  Neck: Supple, no carotid bruit, trachea at midline, no JVD  Resp: Crackles, rhonchi, and wheezes; not labored on 3 L/min  Cardiovascular: Irregularly irregular, tachycardic, no rub, 2/6M  Gastrointestinal: BS +, soft, nontender and nondistended  : No palpable bladder  Musculoskeletal: No edema  Psychiatric: Appropriate affect, cooperative, oriented fully  Neurologic: No asterixis but +tremulous, moving all limbs, normal speech   Skin: Warm and dry     Scheduled Meds:     albuterol, 2.5 mg, Nebulization, Q6H - RT  allopurinol, 100 mg, Oral, Daily  amiodarone, 200 mg, Oral, Q24H  apixaban, 2.5 mg, Oral, Q12H  budesonide-formoterol, 2 puff, Inhalation, BID - RT   And  tiotropium bromide monohydrate, 2 puff, Inhalation, Daily - RT  busPIRone, 10 mg, Oral, BID  DULoxetine, 60 mg, Oral, Daily  gabapentin, 300 mg, Oral, TID  guaiFENesin, 600 mg, Oral, Q12H  levothyroxine, 75 mcg, Oral, Daily  metoprolol tartrate, 75 mg, Oral, Q8H  pantoprazole, 40 mg, Oral, BID AC  predniSONE, 40 mg, Oral, Daily With Breakfast  QUEtiapine, 100  mg, Oral, Nightly  rOPINIRole, 0.5 mg, Oral, Nightly  rosuvastatin, 10 mg, Oral, Nightly      IV Meds:        Results Reviewed:   I have personally reviewed the results from the time of this admission to 10/4/2023 12:37 EDT     Results from last 7 days   Lab Units 10/04/23  0856 10/03/23  0651 10/02/23  0553 10/01/23  0800 09/30/23  1332 09/30/23  1242   SODIUM mmol/L 136 136 140 142  --  138   POTASSIUM mmol/L 4.6 4.4 4.6 5.1  --  5.1   CHLORIDE mmol/L 103 102 103 102  --  97*   CO2 mmol/L 22.5 24.2 26.5 28.1  --  28.0   BUN mg/dL 74* 76* 75* 59*  --  64*   CREATININE mg/dL 2.20* 2.26* 2.34* 1.74*   < > 1.79*   CALCIUM mg/dL 8.6 8.6 8.8 8.8  --  9.3   BILIRUBIN mg/dL  --   --   --  0.5  --  0.6   ALK PHOS U/L  --   --   --  55  --  63   ALT (SGPT) U/L  --   --   --  20  --  22   AST (SGOT) U/L  --   --   --  20  --  18   GLUCOSE mg/dL 104* 94 92 141*  --  101*    < > = values in this interval not displayed.         Estimated Creatinine Clearance: 18.8 mL/min (A) (by C-G formula based on SCr of 2.2 mg/dL (H)).    Results from last 7 days   Lab Units 10/04/23  0856 10/04/23  0702 10/03/23  0651 10/02/23  0553 09/30/23  1242   MAGNESIUM mg/dL  --  2.1  --  2.0 1.5*   PHOSPHORUS mg/dL 4.7*  --  5.8* 6.8*  --                Results from last 7 days   Lab Units 10/04/23  0702 10/03/23  0651 10/01/23  0800 09/30/23  1242   WBC 10*3/mm3 8.60 6.73 7.38 9.08   HEMOGLOBIN g/dL 9.4* 8.7* 9.1* 9.8*   PLATELETS 10*3/mm3 167 178 181 202         Results from last 7 days   Lab Units 09/30/23  1242   INR  0.96         Assessment / Plan     ASSESSMENT:  1.  CHRIS on CKD3b, nonoliguric, stabilizing:  likely prerenal due to decreased renal perfusion in the setting of tachyarrhythmia, hypoxia, and modest hypotension.  Earlier urinalysis completely bland.  PVRs fine  2.  Atrial fibrillation with RVR  3.  COPD with exacerbation  4.  Hypotension  5.  Anemia  6.  Osteoarthritis: Meloxicam on medication list, but she is not taking this  7.   Blurry vision, dizziness, and fall in morning 10/3    PLAN:  1.  Home anytime from renal view; will arrange follow-up in our office  2.  Aggressive pulmonary toilet and rate control  3.  Surveillance labs    Thank you for involving us in the care of Josephine Wolf.  Please feel free to call with any questions.    Carlos Barraza MD  10/04/23  12:37 EDT    [facility]  [phone]    Please note that portions of this note were completed with a voice recognition program.

## 2023-10-04 NOTE — PLAN OF CARE
Goal Outcome Evaluation:  Plan of Care Reviewed With: patient           Outcome Evaluation: Pt seen for PT re-eval this AM, pt had an unwitnessed fall AM of 10/3 in the bathroom. She is agreeable to mobilize today upon PT arrival to room. Pt on 3L O2, required cga for STS and cga to ambulate in the hallway. She did require 1 standing rest in hallway and upon return to room required a chair to  be brought up behind pt due to increasing SOA. Cues to keep eyes open and to focus on breathing technique. PT increased O2 to 6L as O2 saturations reading 78-82%, requiring approx 3 mins seated to recover before able to get back to the chair. Notified RN of session and encouraged pt to call for assist when needing to get up for safety. Chair alarm activated. PT will continue to follow to address endurance, balance and strength deficits. Anticipate home at d/c, would benefit from increased assist and HH.      Anticipated Discharge Disposition (PT): home with assist, home with home health

## 2023-10-04 NOTE — PROGRESS NOTES
[facility] INPATIENT PROGRESS NOTE         [address]    10/4/2023      PATIENT IDENTIFICATION:  Name: Josephine Wolf ADMIT: 2023   : 1940  PCP: Bernabe Guy MD    MRN: [id number] LOS: 4 days   AGE/SEX: 83 y.o. female  ROOM: [facility]                     LOS 4    Reason for visit: COPD exacerbation      SUBJECTIVE:      No new pulmonary issues overnight.  No further falls.     Objective   OBJECTIVE:    Vital Sign Min/Max for last 24 hours  Temp  Min: 97.3 øF (36.3 øC)  Max: 97.9 øF (36.6 øC)   BP  Min: 68/44  Max: 135/93   Pulse  Min: 78  Max: 120   Resp  Min: 18  Max: 20   SpO2  Min: 90 %  Max: 99 %   No data recorded   No data recorded                         Body mass index is 29.63 kg/mý.    Intake/Output Summary (Last 24 hours) at 10/4/2023 0741  Last data filed at 10/3/2023 1700  Gross per 24 hour   Intake 640 ml   Output --   Net 640 ml           Exam:  GEN:  No distress, appears stated age  NECK:  No adenopathy, midline trachea  LUNGS: Normal chest on inspection, palpation and mild wheezing on auscultation      Assessment     Scheduled meds:  albuterol, 2.5 mg, Nebulization, Q6H - RT  allopurinol, 100 mg, Oral, Daily  amiodarone, 200 mg, Oral, Q24H  apixaban, 2.5 mg, Oral, Q12H  budesonide-formoterol, 2 puff, Inhalation, BID - RT   And  tiotropium bromide monohydrate, 2 puff, Inhalation, Daily - RT  busPIRone, 10 mg, Oral, BID  DULoxetine, 60 mg, Oral, Daily  gabapentin, 300 mg, Oral, TID  guaiFENesin, 600 mg, Oral, Q12H  levothyroxine, 75 mcg, Oral, Daily  metoprolol tartrate, 75 mg, Oral, Q8H  pantoprazole, 40 mg, Oral, BID AC  predniSONE, 40 mg, Oral, Daily With Breakfast  QUEtiapine, 100 mg, Oral, Nightly  rOPINIRole, 0.5 mg, Oral, Nightly  rosuvastatin, 10 mg, Oral, Nightly      IV meds:                           Data Review:  Results from last 7 days   Lab Units 10/03/23  0651 10/02/23  0553 10/01/23  0800 23  1332 23  1242   SODIUM mmol/L 136 140  142  --  138   POTASSIUM mmol/L 4.4 4.6 5.1  --  5.1   CHLORIDE mmol/L 102 103 102  --  97*   CO2 mmol/L 24.2 26.5 28.1  --  28.0   BUN mg/dL 76* 75* 59*  --  64*   CREATININE mg/dL 2.26* 2.34* 1.74* 1.71 1.79*   GLUCOSE mg/dL 94 92 141*  --  101*   CALCIUM mg/dL 8.6 8.8 8.8  --  9.3         Estimated Creatinine Clearance: 18.3 mL/min (A) (by C-G formula based on SCr of 2.26 mg/dL (H)).  Results from last 7 days   Lab Units 10/04/23  0702 10/03/23  0651 10/01/23  0800 09/30/23  1242   WBC 10*3/mm3 8.60 6.73 7.38 9.08   HEMOGLOBIN g/dL 9.4* 8.7* 9.1* 9.8*   PLATELETS 10*3/mm3 167 178 181 202     Results from last 7 days   Lab Units 09/30/23  1242   INR  0.96     Results from last 7 days   Lab Units 10/01/23  0800 09/30/23  1242   ALT (SGPT) U/L 20 22   AST (SGOT) U/L 20 18         Results from last 7 days   Lab Units 09/30/23  1332   LACTATE mmol/L 1.3         Hemoglobin A1C   Date/Time Value Ref Range Status   10/01/2023 0800 5.70 (H) 4.80 - 5.60 % Final     Glucose   Date/Time Value Ref Range Status   10/03/2023 1248 123 70 - 130 mg/dL Final       Chest x-ray shows diminished lung volumes and small effusion with basilar atelectasis.    CT head 10/3: no acute    2D echo 103: EF 60%.        Microbiology reviewed  Respiratory viral panel negative                Active Hospital Problems    Diagnosis  POA    **COPD exacerbation [J44.1]  Yes    Atrial fibrillation with RVR [I48.91]  Unknown      Resolved Hospital Problems   No resolved problems to display.         ASSESSMENT:  Acute exacerbation of COPD  Chronic hypoxemic respiratory failure  Acute on chronic hypercapnia with respiratory acidosis  Atrial fibrillation with RVR  CHRIS/Chronic kidney disease III  Anxiety disorder  RLS       PLAN:  Continue bronchodilator and steroid taper for AECOPD.  Fall evaluation ongoing per admitting.   Oxygen back to home use.  Will follow peripherally for pulmonary issues and all other management per admitting service.          Car  MD Lm  Pulmonary and Critical Care Medicine  [facility], [facility]  10/4/2023    07:41 EDT

## 2023-10-04 NOTE — PROGRESS NOTES
[facility]  Progress note: 10/4/2023    Patient Identification:  Name:Josephine Wolf  Age:83 y.o.  Sex: female  :  1940  MRN: [id number]           CC:  Atrial fibrillation, pulmonary hypertension, elevated troponin.    Interval history:  Echo yesterday with ejection fraction 60% indeterminate diastolic function left atrial enlargement present no significant valvular dysfunction with RVSP 33 mmHg.   Heart rate control reasonable though blood pressure at times low.  Patient has had vision changes and neurology consult pain requested.  Steroids being tapered.  O2 use back to baseline.    Vital Signs:   Temp:  [97.3 øF (36.3 øC)-98.2 øF (36.8 øC)] 98.2 øF (36.8 øC)  Heart Rate:  [] 97  Resp:  [16-20] 18  BP: ()/(44-93) 109/60    Intake/Output Summary (Last 24 hours) at 10/4/2023 1631  Last data filed at 10/4/2023 1300  Gross per 24 hour   Intake 720 ml   Output --   Net 720 ml       Physical Examination:    General Appearance No acute distress   Neck No adenopathy, supple, trachea midline, no thyromegaly, no carotid bruit, no JVD   Lungs Clear to auscultation,respirations regular, even and unlabored   Heart Irregular rhythm and normal rate, normal S1 and S2, no murmur, no gallop, no rub, no click   Chest wall No abnormalities observed   Abdomen Normal bowel sounds, no masses, no hepatomegaly, soft   Extremities N edema, no cyanosis, no redness   Neurological Alert and oriented x 3     Lab Review:  Personally reviewed the labs, radiology imaging and other cardiac procedures.   Results from last 7 days   Lab Units 10/04/23  0856 10/02/23  0553 10/01/23  0800   SODIUM mmol/L 136   < > 142   POTASSIUM mmol/L 4.6   < > 5.1   CHLORIDE mmol/L 103   < > 102   CO2 mmol/L 22.5   < > 28.1   BUN mg/dL 74*   < > 59*   CREATININE mg/dL 2.20*   < > 1.74*   CALCIUM mg/dL 8.6   < > 8.8   BILIRUBIN mg/dL  --   --  0.5   ALK PHOS U/L  --   --  55   ALT (SGPT) U/L  --   --  20   AST (SGOT) U/L  --   --  20    GLUCOSE mg/dL 104*   < > 141*    < > = values in this interval not displayed.     Results from last 7 days   Lab Units 09/30/23  1711 09/30/23  1242   HSTROP T ng/L 27* 37*     Results from last 7 days   Lab Units 10/04/23  0702 10/03/23  0651 10/01/23  0800   WBC 10*3/mm3 8.60 6.73 7.38   HEMOGLOBIN g/dL 9.4* 8.7* 9.1*   HEMATOCRIT % 29.1* 27.0* 27.8*   PLATELETS 10*3/mm3 167 178 181     Results from last 7 days   Lab Units 09/30/23  1242   INR  0.96     Medication Review:   Meds reviewed  Scheduled Meds:albuterol, 2.5 mg, Nebulization, Q6H - RT  allopurinol, 100 mg, Oral, Daily  amiodarone, 200 mg, Oral, Q24H  apixaban, 2.5 mg, Oral, Q12H  budesonide-formoterol, 2 puff, Inhalation, BID - RT   And  tiotropium bromide monohydrate, 2 puff, Inhalation, Daily - RT  busPIRone, 10 mg, Oral, BID  DULoxetine, 60 mg, Oral, Daily  gabapentin, 300 mg, Oral, TID  guaiFENesin, 600 mg, Oral, Q12H  levothyroxine, 75 mcg, Oral, Daily  metoprolol tartrate, 75 mg, Oral, Q8H  pantoprazole, 40 mg, Oral, BID AC  predniSONE, 40 mg, Oral, Daily With Breakfast  QUEtiapine, 100 mg, Oral, Nightly  rOPINIRole, 0.5 mg, Oral, Nightly  rosuvastatin, 10 mg, Oral, Nightly        I personally viewed and interpreted the patient's EKG/Telemetry data    Assessment and Plan  1.   Atrial fibrillation.  Rates controlled relatively well on amiodarone and metoprolol.  She is on Eliquis.  Blood pressure though at times low.  Add very low-dose of midodrine 2.5 mg twice daily given falls and systolic pressure at times very low yesterday and borderline low today..  2.  COPD exacerbation.  Back to baseline.  3.  Pulmonary hypertension, group 2.  4.  Hypotension, as above  5.  Elevated troponin likely demand related non-STEMI.  Continue current regimen  6.  Acute renal injury.  Creatinine slightly improved today    Mini Padilla  10/4/136009:31 EDT  35min spent in reviewing records, discussion and examination of the patient and discussion with other members of the  patient's medical team.     Dictated utilizing Dragon dictation

## 2023-10-04 NOTE — THERAPY EVALUATION
Patient Name: Josephine Wolf  : 1940    MRN: [id number]                              Today's Date: 10/4/2023       Admit Date: 2023    Visit Dx:     ICD-10-CM ICD-9-CM   1. Atrial fibrillation with rapid ventricular response: New onset  I48.91 427.31   2. COPD exacerbation  J44.1 491.21   3. Hypomagnesemia  E83.42 275.2   4. Chronic renal failure, unspecified CKD stage  N18.9 585.9   5. Chronic anemia  D64.9 285.9     Patient Active Problem List   Diagnosis    Anemia    OA (osteoarthritis) of knee    Chronic respiratory failure with hypoxia    Cellulitis of skin    Acute kidney injury    Sepsis    Orthostatic hypotension    Disease of thyroid gland    Hypertension    Dehydration    Stage 3 chronic kidney disease    Closed compression fracture of L1 lumbar vertebra    Hyponatremia    Osteoporosis with pathological fracture    Acute on chronic respiratory failure with hypoxia and hypercapnia    Obesity (BMI 30-39.9)    Nocturnal hypoxia    CKD (chronic kidney disease) stage 2, GFR 60-89 ml/min    Acute on chronic respiratory failure with hypoxia    Abdominal pain    Acute exacerbation of chronic obstructive pulmonary disease (COPD)    Acute UTI    Metabolic encephalopathy    COPD with acute exacerbation    COPD exacerbation    Atrial fibrillation with RVR     Past Medical History:   Diagnosis Date    Acid reflux     Acute kidney injury     Anemia     Arthritis     Bipolar 1 disorder, depressed     Cataract     MINDY    Chronic nausea     Chronic pain of right knee     Continuous leakage of urine     USES DEPENDS    COPD (chronic obstructive pulmonary disease)     USES O2 2 LMP PER NC AT NIGHT    Disease of thyroid gland     HYPOTHYROIDISM    Diverticulosis     Fibromyalgia     DX     Fibromyalgia, primary     Frequent episodes of bronchitis     HL (hearing loss)     Hypertension     Hypothyroidism     Memory loss     Migraines     Neck pain     On home oxygen therapy     2L NC AT NIGHT    Orthostatic  hypotension     Short of breath on exertion     Sleep apnea     Stage 3 chronic kidney disease      Past Surgical History:   Procedure Laterality Date    CEREBRAL ANEURYSM REPAIR  2000'S    WITH STENT    HYSTERECTOMY      JOINT REPLACEMENT      LAPAROSCOPIC CHOLECYSTECTOMY      NE ARTHRP KNE CONDYLE&PLATU MEDIAL&LAT COMPARTMENTS Right 11/16/2017    Procedure: RT TOTAL KNEE ARTHROPLASTY;  Surgeon: Kashif Perez MD;  Location: [facility];  Service: Orthopedics      General Information       Row Name 10/04/23 0937 10/04/23 0936       Physical Therapy Time and Intention    Document Type evaluation  -CW re-evaluation  -CW    Mode of Treatment individual therapy;physical therapy  -CW individual therapy;physical therapy  -CW      Row Name 10/04/23 0937          General Information    Patient Profile Reviewed yes  -CW     Existing Precautions/Restrictions oxygen therapy device and L/min;fall  3L at baseline, fall in hospital on 10/3  -CW       Row Name 10/04/23 0937          Living Environment    People in Home alone  -CW       Row Name 10/04/23 0937          Cognition    Orientation Status (Cognition) oriented x 3  -CW       Row Name 10/04/23 0937          Safety Issues, Functional Mobility    Safety Issues Affecting Function (Mobility) insight into deficits/self-awareness;awareness of need for assistance;judgment;impulsivity  -CW     Impairments Affecting Function (Mobility) balance;endurance/activity tolerance;shortness of breath;strength  -CW               User Key  (r) = Recorded By, (t) = Taken By, (c) = Cosigned By      Initials Name Provider Type    CW Aiyana Guzman, PT Physical Therapist                   Mobility       Row Name 10/04/23 0939          Bed Mobility    Comment, (Bed Mobility) UIC at arrival and departure  -       Row Name 10/04/23 0939          Sit-Stand Transfer    Sit-Stand [place] (Transfers) contact guard  -CW     Assistive Device (Sit-Stand Transfers) walker, front-wheeled  -CW        Row Name 10/04/23 0939          Gait/Stairs (Locomotion)    [place] Level (Gait) contact guard;minimum assist (75% patient effort);verbal cues  -CW     Assistive Device (Gait) walker, front-wheeled  -CW     Distance in Feet (Gait) 70, 40' then 10'  -CW     Deviations/Abnormal Patterns (Gait) shilpa decreased;gait speed decreased;stride length decreased  -CW     Bilateral Gait Deviations forward flexed posture  -CW     Comment, (Gait/Stairs) required standing rest after 70' due to SOA, requried chair to be brought up after the additional 40'. O2 increased to 6L and O2 sats reading 78-82%  -CW               User Key  (r) = Recorded By, (t) = Taken By, (c) = Cosigned By      Initials Name Provider Type    Aiyana Hale PT Physical Therapist                   Obj/Interventions       Row Name 10/04/23 0940          Range of Motion Comprehensive    General Range of Motion bilateral lower extremity ROM WFL  -CW       Row Name 10/04/23 0940          Strength Comprehensive (MMT)    Comment, General Manual Muscle Testing (MMT) Assessment BLE generally weak but WFL  -CW       Row Name 10/04/23 0940          Balance    Balance Assessment standing static balance;standing dynamic balance  -CW     Static Standing Balance contact guard  -CW     Dynamic Standing Balance minimal assist;contact guard  -CW     Position/Device Used, Standing Balance walker, front-wheeled  -CW     Comment, Balance min A once pt became increasingly SOA, cga for majority of ambulation  -CW               User Key  (r) = Recorded By, (t) = Taken By, (c) = Cosigned By      Initials Name Provider Type    Aiyana Hale PT Physical Therapist                   Goals/Plan       Row Name 10/04/23 0936          Bed Mobility Goal 1 (PT)    Activity/Assistive Device (Bed Mobility Goal 1, PT) bed mobility activities, all  -CW     [place] Level/Cues Needed (Bed Mobility Goal 1, PT) modified independence  -CW     Time Frame (Bed Mobility  Goal 1, PT) 2 weeks  -CW       Row Name 10/04/23 0945          Transfer Goal 1 (PT)    Activity/Assistive Device (Transfer Goal 1, PT) sit-to-stand/stand-to-sit;bed-to-chair/chair-to-bed  -CW     [place] Level/Cues Needed (Transfer Goal 1, PT) modified independence  -CW     Time Frame (Transfer Goal 1, PT) 2 weeks  -CW       Row Name 10/04/23 0945          Gait Training Goal 1 (PT)    Activity/Assistive Device (Gait Training Goal 1, PT) gait (walking locomotion);walker, rolling  -CW     [place] Level (Gait Training Goal 1, PT) modified independence  -CW     Distance (Gait Training Goal 1, PT) 100'  -CW     Time Frame (Gait Training Goal 1, PT) 2 weeks  -CW       Row Name 10/04/23 0945          Therapy Assessment/Plan (PT)    Planned Therapy Interventions (PT) balance training;bed mobility training;gait training;ROM (range of motion);strengthening;patient/family education;postural re-education;transfer training  -CW               User Key  (r) = Recorded By, (t) = Taken By, (c) = Cosigned By      Initials Name Provider Type    CW Aiyana Guzman, PT Physical Therapist                   Clinical Impression       Row Name 10/04/23 0941          Pain    Pretreatment Pain Rating 0/10 - no pain  -CW     Posttreatment Pain Rating 0/10 - no pain  -CW       Row Name 10/04/23 0941          Plan of Care Review    Plan of Care Reviewed With patient  -CW     Outcome Evaluation Pt seen for PT re-eval this AM, pt had an unwitnessed fall AM of 10/3 in the bathroom. She is agreeable to mobilize today upon PT arrival to room. Pt on 3L O2, required cga for STS and cga to ambulate in the hallway. She did require 1 standing rest in hallway and upon return to room required a chair to  be brought up behind pt due to increasing SOA. Cues to keep eyes open and to focus on breathing technique. PT increased O2 to 6L as O2 saturations reading 78-82%, requiring approx 3 mins seated to recover before able to get back to the chair.  Notified RN of session and encouraged pt to call for assist when needing to get up for safety. Chair alarm activated. PT will continue to follow to address endurance, balance and strength deficits. Anticipate home at d/c, would benefit from increased assist and HH.  -CW       Row Name 10/04/23 0941          Therapy Assessment/Plan (PT)    Rehab Potential (PT) good, to achieve stated therapy goals  -CW     Criteria for Skilled Interventions Met (PT) yes  -CW     Therapy Frequency (PT) 5 times/wk  -CW       Row Name 10/04/23 0941          Vital Signs    Pre SpO2 (%) 96  -CW     O2 Delivery Pre Treatment supplemental O2  3L  -CW     Intra SpO2 (%) 78  -CW     O2 Delivery Intra Treatment supplemental O2  6L  -CW     Post SpO2 (%) 93  -CW     O2 Delivery Post Treatment supplemental O2  3L  -CW       Row Name 10/04/23 0941          Positioning and Restraints    Pre-Treatment Position sitting in chair/recliner  -CW     Post Treatment Position chair  -CW     In Chair reclined;call light within reach;encouraged to call for assist;exit alarm on;notified nsg  -CW               User Key  (r) = Recorded By, (t) = Taken By, (c) = Cosigned By      Initials Name Provider Type    CW Aiyana Guzman, PT Physical Therapist                   Outcome Measures       Row Name 10/04/23 0946          How much help from another person do you currently need...    Turning from your back to your side while in flat bed without using bedrails? 3  -CW     Moving from lying on back to sitting on the side of a flat bed without bedrails? 3  -CW     Moving to and from a bed to a chair (including a wheelchair)? 3  -CW     Standing up from a chair using your arms (e.g., wheelchair, bedside chair)? 3  -CW     Climbing 3-5 steps with a railing? 3  -CW     To walk in hospital room? 3  -CW     AM-PAC 6 Clicks Score (PT) 18  -CW     Highest level of mobility 6 --> Walked 10 steps or more  -CW       Row Name 10/04/23 0946          Functional Assessment     Outcome Measure Options AM-PAC 6 Clicks Basic Mobility (PT)  -CW               User Key  (r) = Recorded By, (t) = Taken By, (c) = Cosigned By      Initials Name Provider Type    CW Aiyana Guzman PT Physical Therapist                                 Physical Therapy Education       Title: PT OT SLP Therapies (In Progress)       Topic: Physical Therapy (In Progress)       Point: Mobility training (Done)       Learning Progress Summary             Patient Acceptance, E, VU by CW at 10/4/2023 0946                         Point: Home exercise program (Not Started)       Learner Progress:  Not documented in this visit.              Point: Body mechanics (Not Started)       Learner Progress:  Not documented in this visit.              Point: Precautions (Not Started)       Learner Progress:  Not documented in this visit.                              User Key       Initials Effective Dates Name Provider Type Discipline     12/13/22 -  Aiyana Guzman PT Physical Therapist PT                  PT Recommendation and Plan  Planned Therapy Interventions (PT): balance training, bed mobility training, gait training, ROM (range of motion), strengthening, patient/family education, postural re-education, transfer training  Plan of Care Reviewed With: patient  Outcome Evaluation: Pt seen for PT re-eval this AM, pt had an unwitnessed fall AM of 10/3 in the bathroom. She is agreeable to mobilize today upon PT arrival to room. Pt on 3L O2, required cga for STS and cga to ambulate in the hallway. She did require 1 standing rest in hallway and upon return to room required a chair to  be brought up behind pt due to increasing SOA. Cues to keep eyes open and to focus on breathing technique. PT increased O2 to 6L as O2 saturations reading 78-82%, requiring approx 3 mins seated to recover before able to get back to the chair. Notified RN of session and encouraged pt to call for assist when needing to get up for safety. Chair alarm  activated. PT will continue to follow to address endurance, balance and strength deficits. Anticipate home at d/c, would benefit from increased assist and HH.     Time Calculation:         PT Charges       Row Name 10/04/23 0935             Time Calculation    Start Time 0909  -CW      Stop Time 0928  -CW      Time Calculation (min) 19 min  -CW      PT Received On 10/04/23  -CW      PT - Next Appointment 10/05/23  -CW      PT Goal Re-Cert Due Date 10/18/23  -CW         Time Calculation- PT    Total Timed Code Minutes- PT 12 minute(s)  -CW         Timed Charges    27786 - Gait Training Minutes  12  -CW         Total Minutes    Timed Charges Total Minutes 12  -CW       Total Minutes 12  -CW                User Key  (r) = Recorded By, (t) = Taken By, (c) = Cosigned By      Initials Name Provider Type    CW Aiyana Guzman, PT Physical Therapist                  Therapy Charges for Today       Code Description Service Date Service Provider Modifiers Qty    [id number]  GAIT TRAINING EA 15 MIN 10/4/2023 Aiyana Guzman, PT GP 1    [id number]  PT RE-EVAL ESTABLISHED PLAN 2 10/4/2023 Aiyana Guzman, PT GP 1            PT G-Codes  Outcome Measure Options: AM-PAC 6 Clicks Basic Mobility (PT)  AM-PAC 6 Clicks Score (PT): 18  PT Discharge Summary  Anticipated Discharge Disposition (PT): home with assist, home with home health    Aiyana Guzman PT  10/4/2023

## 2023-10-05 NOTE — PROGRESS NOTES
"    [facility] Progress Note      Patient Name: Josephine Wolf  : 1940  MRN: [id number]  Primary Care Physician:  Bernabe Guy MD  Date of admission: 2023    Subjective     Interval History:   Slept poorly last night  Breathing is \"back to baseline,\" and lying flat without difficulty on 3 L/min  Appetite is good, but dislikes the food here; no N/V  UOP not recorded    Review of Systems:   As noted above    Objective     Vitals:   Temp:  [97.9 øF (36.6 øC)-98.4 øF (36.9 øC)] 97.9 øF (36.6 øC)  Heart Rate:  [] 113  Resp:  [16-20] 18  BP: (109-144)/() 117/76  Flow (L/min):  [2-3] 3    Intake/Output Summary (Last 24 hours) at 10/5/2023 1135  Last data filed at 10/4/2023 1700  Gross per 24 hour   Intake 480 ml   Output --   Net 480 ml         Physical Exam:    Constitutional: Awake, alert, NAD, cushingoid, chronically ill, overweight  HEENT: Sclera anicteric, no conjunctival injection  Neck: Supple, no carotid bruit, trachea at midline, no JVD  Resp: Crackles, rhonchi, and wheezes; not labored on 3 L/min lying flat  Cardiovascular: Irregularly irregular, tachycardic, no rub, 2/6M  Gastrointestinal: BS +, soft, nontender and nondistended  : No palpable bladder  Musculoskeletal: No edema  Psychiatric: Appropriate affect, cooperative, oriented fully  Neurologic: No asterixis but +tremulous, moving all limbs, normal speech   Skin: Warm and dry     Scheduled Meds:     albuterol, 2.5 mg, Nebulization, Q6H - RT  allopurinol, 100 mg, Oral, Daily  amiodarone, 200 mg, Oral, Q24H  apixaban, 2.5 mg, Oral, Q12H  budesonide-formoterol, 2 puff, Inhalation, BID - RT   And  tiotropium bromide monohydrate, 2 puff, Inhalation, Daily - RT  busPIRone, 10 mg, Oral, BID  DULoxetine, 60 mg, Oral, Daily  gabapentin, 300 mg, Oral, TID  guaiFENesin, 600 mg, Oral, Q12H  levothyroxine, 75 mcg, Oral, Daily  metoprolol tartrate, 75 mg, Oral, Q8H  midodrine, 2.5 mg, Oral, BID " AC  pantoprazole, 40 mg, Oral, BID AC  predniSONE, 40 mg, Oral, Daily With Breakfast  QUEtiapine, 100 mg, Oral, Nightly  rOPINIRole, 0.5 mg, Oral, Nightly  rosuvastatin, 10 mg, Oral, Nightly      IV Meds:        Results Reviewed:   I have personally reviewed the results from the time of this admission to 10/5/2023 11:35 EDT     Results from last 7 days   Lab Units 10/05/23  0632 10/04/23  0856 10/03/23  0651 10/02/23  0553 10/01/23  0800 09/30/23  1332 09/30/23  1242   SODIUM mmol/L 141 136 136   < > 142  --  138   POTASSIUM mmol/L 4.3 4.6 4.4   < > 5.1  --  5.1   CHLORIDE mmol/L 105 103 102   < > 102  --  97*   CO2 mmol/L 23.0 22.5 24.2   < > 28.1  --  28.0   BUN mg/dL 73* 74* 76*   < > 59*  --  64*   CREATININE mg/dL 1.91* 2.20* 2.26*   < > 1.74*   < > 1.79*   CALCIUM mg/dL 9.0 8.6 8.6   < > 8.8  --  9.3   BILIRUBIN mg/dL  --   --   --   --  0.5  --  0.6   ALK PHOS U/L  --   --   --   --  55  --  63   ALT (SGPT) U/L  --   --   --   --  20  --  22   AST (SGOT) U/L  --   --   --   --  20  --  18   GLUCOSE mg/dL 96 104* 94   < > 141*  --  101*    < > = values in this interval not displayed.         Estimated Creatinine Clearance: 21.7 mL/min (A) (by C-G formula based on SCr of 1.91 mg/dL (H)).    Results from last 7 days   Lab Units 10/05/23  0632 10/04/23  0856 10/04/23  0702 10/03/23  0651 10/02/23  0553 10/02/23  0553 09/30/23  1242   MAGNESIUM mg/dL  --   --  2.1  --   --  2.0 1.5*   PHOSPHORUS mg/dL 4.2 4.7*  --  5.8*   < > 6.8*  --     < > = values in this interval not displayed.               Results from last 7 days   Lab Units 10/05/23  0632 10/04/23  0702 10/03/23  0651 10/01/23  0800 09/30/23  1242   WBC 10*3/mm3 8.05 8.60 6.73 7.38 9.08   HEMOGLOBIN g/dL 9.3* 9.4* 8.7* 9.1* 9.8*   PLATELETS 10*3/mm3 184 167 178 181 202         Results from last 7 days   Lab Units 09/30/23  1242   INR  0.96         Assessment / Plan     ASSESSMENT:  1.  CHRIS on CKD3b, nonoliguric, stabilizing:  likely prerenal due to  decreased renal perfusion in the setting of tachyarrhythmia, hypoxia, and modest hypotension.  Earlier urinalysis completely bland.  PVRs fine  2.  Atrial fibrillation with RVR  3.  COPD with exacerbation  4.  Hypotension  5.  Anemia  6.  Osteoarthritis: Meloxicam on medication list, but she is not taking this  7.  Blurry vision, dizziness, and fall in morning 10/3    PLAN:  1.  Home anytime from renal view; will arrange follow-up in our office  2.  Aggressive pulmonary toilet and rate control  3.  Surveillance labs    Thank you for involving us in the care of Josephine Wolf.  Please feel free to call with any questions.    Carlos Barraza MD  10/05/23  11:35 EDT    [facility]  [phone]    Please note that portions of this note were completed with a voice recognition program.

## 2023-10-05 NOTE — PROGRESS NOTES
[facility] INPATIENT PROGRESS NOTE         [address]    10/5/2023      PATIENT IDENTIFICATION:  Name: Josephine Wolf ADMIT: 2023   : 1940  PCP: Bernabe Guy MD    MRN: [id number] LOS: 5 days   AGE/SEX: 83 y.o. female  ROOM: [facility]                     LOS 5    Reason for visit: COPD exacerbation      SUBJECTIVE:      No new pulmonary issues overnight.      Objective   OBJECTIVE:    Vital Sign Min/Max for last 24 hours  Temp  Min: 97.9 øF (36.6 øC)  Max: 98.4 øF (36.9 øC)   BP  Min: 109/60  Max: 144/98   Pulse  Min: 81  Max: 108   Resp  Min: 16  Max: 20   SpO2  Min: 85 %  Max: 95 %   No data recorded   No data recorded                         Body mass index is 29.63 kg/mý.    Intake/Output Summary (Last 24 hours) at 10/5/2023 0802  Last data filed at 10/4/2023 1700  Gross per 24 hour   Intake 720 ml   Output --   Net 720 ml           Exam:  GEN:  No distress, appears stated age  NECK:  No adenopathy, midline trachea  LUNGS: Normal chest on inspection, palpation and diminished on auscultation      Assessment     Scheduled meds:  albuterol, 2.5 mg, Nebulization, Q6H - RT  allopurinol, 100 mg, Oral, Daily  amiodarone, 200 mg, Oral, Q24H  apixaban, 2.5 mg, Oral, Q12H  budesonide-formoterol, 2 puff, Inhalation, BID - RT   And  tiotropium bromide monohydrate, 2 puff, Inhalation, Daily - RT  busPIRone, 10 mg, Oral, BID  DULoxetine, 60 mg, Oral, Daily  gabapentin, 300 mg, Oral, TID  guaiFENesin, 600 mg, Oral, Q12H  levothyroxine, 75 mcg, Oral, Daily  metoprolol tartrate, 75 mg, Oral, Q8H  midodrine, 2.5 mg, Oral, BID AC  pantoprazole, 40 mg, Oral, BID AC  predniSONE, 40 mg, Oral, Daily With Breakfast  QUEtiapine, 100 mg, Oral, Nightly  rOPINIRole, 0.5 mg, Oral, Nightly  rosuvastatin, 10 mg, Oral, Nightly      IV meds:                           Data Review:  Results from last 7 days   Lab Units 10/05/23  0632 10/04/23  0856 10/03/23  0651 10/02/23  0553 10/01/23  0800   SODIUM  mmol/L 141 136 136 140 142   POTASSIUM mmol/L 4.3 4.6 4.4 4.6 5.1   CHLORIDE mmol/L 105 103 102 103 102   CO2 mmol/L 23.0 22.5 24.2 26.5 28.1   BUN mg/dL 73* 74* 76* 75* 59*   CREATININE mg/dL 1.91* 2.20* 2.26* 2.34* 1.74*   GLUCOSE mg/dL 96 104* 94 92 141*   CALCIUM mg/dL 9.0 8.6 8.6 8.8 8.8         Estimated Creatinine Clearance: 21.7 mL/min (A) (by C-G formula based on SCr of 1.91 mg/dL (H)).  Results from last 7 days   Lab Units 10/05/23  0632 10/04/23  0702 10/03/23  0651 10/01/23  0800 09/30/23  1242   WBC 10*3/mm3 8.05 8.60 6.73 7.38 9.08   HEMOGLOBIN g/dL 9.3* 9.4* 8.7* 9.1* 9.8*   PLATELETS 10*3/mm3 184 167 178 181 202     Results from last 7 days   Lab Units 09/30/23  1242   INR  0.96     Results from last 7 days   Lab Units 10/01/23  0800 09/30/23  1242   ALT (SGPT) U/L 20 22   AST (SGOT) U/L 20 18         Results from last 7 days   Lab Units 09/30/23  1332   LACTATE mmol/L 1.3         Glucose   Date/Time Value Ref Range Status   10/03/2023 1248 123 70 - 130 mg/dL Final       Chest x-ray shows diminished lung volumes and small effusion with basilar atelectasis.    CT head 10/3: no acute    2D echo 103: EF 60%.        Microbiology reviewed  Respiratory viral panel negative                Active Hospital Problems    Diagnosis  POA    **COPD exacerbation [J44.1]  Yes    Atrial fibrillation with RVR [I48.91]  Unknown      Resolved Hospital Problems   No resolved problems to display.         ASSESSMENT:  Acute exacerbation of COPD  Chronic hypoxemic respiratory failure  Acute on chronic hypercapnia with respiratory acidosis  Atrial fibrillation with RVR  CHRIS/Chronic kidney disease III  Anxiety disorder  RLS       PLAN:  Continue bronchodilator and steroid taper for AECOPD.  Oxygen back to home use.  Will follow peripherally for pulmonary issues and all other management per admitting service.          Car Amato MD  Pulmonary and Critical Care Medicine  [facility], [facility]  10/5/2023    08:02  EDT

## 2023-10-05 NOTE — THERAPY EVALUATION
Patient Name: Josephine Wolf  : 1940    MRN: [id number]                              Today's Date: 10/5/2023       Admit Date: 2023    Visit Dx:     ICD-10-CM ICD-9-CM   1. Atrial fibrillation with rapid ventricular response: New onset  I48.91 427.31   2. COPD exacerbation  J44.1 491.21   3. Hypomagnesemia  E83.42 275.2   4. Chronic renal failure, unspecified CKD stage  N18.9 585.9   5. Chronic anemia  D64.9 285.9     Patient Active Problem List   Diagnosis    Anemia    OA (osteoarthritis) of knee    Chronic respiratory failure with hypoxia    Cellulitis of skin    Acute kidney injury    Sepsis    Orthostatic hypotension    Disease of thyroid gland    Hypertension    Dehydration    Stage 3 chronic kidney disease    Closed compression fracture of L1 lumbar vertebra    Hyponatremia    Osteoporosis with pathological fracture    Acute on chronic respiratory failure with hypoxia and hypercapnia    Obesity (BMI 30-39.9)    Nocturnal hypoxia    CKD (chronic kidney disease) stage 2, GFR 60-89 ml/min    Acute on chronic respiratory failure with hypoxia    Abdominal pain    Acute exacerbation of chronic obstructive pulmonary disease (COPD)    Acute UTI    Metabolic encephalopathy    COPD with acute exacerbation    COPD exacerbation    Atrial fibrillation with RVR     Past Medical History:   Diagnosis Date    Acid reflux     Acute kidney injury     Anemia     Arthritis     Bipolar 1 disorder, depressed     Cataract     MINDY    Chronic nausea     Chronic pain of right knee     Continuous leakage of urine     USES DEPENDS    COPD (chronic obstructive pulmonary disease)     USES O2 2 LMP PER NC AT NIGHT    Disease of thyroid gland     HYPOTHYROIDISM    Diverticulosis     Fibromyalgia     DX     Fibromyalgia, primary     Frequent episodes of bronchitis     HL (hearing loss)     Hypertension     Hypothyroidism     Memory loss     Migraines     Neck pain     On home oxygen therapy     2L NC AT NIGHT    Orthostatic  hypotension     Short of breath on exertion     Sleep apnea     Stage 3 chronic kidney disease      Past Surgical History:   Procedure Laterality Date    CEREBRAL ANEURYSM REPAIR  2000'S    WITH STENT    HYSTERECTOMY      JOINT REPLACEMENT      LAPAROSCOPIC CHOLECYSTECTOMY      CT ARTHRP KNE CONDYLE&PLATU MEDIAL&LAT COMPARTMENTS Right 11/16/2017    Procedure: RT TOTAL KNEE ARTHROPLASTY;  Surgeon: Kashif Perez MD;  Location: [facility] OR;  Service: Orthopedics      General Information       Row Name 10/05/23 1514          OT Time and Intention    Document Type evaluation  -     Mode of Treatment individual therapy;occupational therapy  -       Row Name 10/05/23 1514          General Information    Patient Profile Reviewed yes  -     Prior Level of Function independent:;transfer;bed mobility;gait;all household mobility;ADL's  -     Existing Precautions/Restrictions oxygen therapy device and L/min;fall  -     Barriers to Rehab none identified  -       Row Name 10/05/23 1514          Living Environment    People in Home alone  -       Row Name 10/05/23 1514          Cognition    Orientation Status (Cognition) oriented x 4  -       Row Name 10/05/23 1514          Safety Issues, Functional Mobility    Impairments Affecting Function (Mobility) endurance/activity tolerance;strength;balance  -               User Key  (r) = Recorded By, (t) = Taken By, (c) = Cosigned By      Initials Name Provider Type     Annita Lock, OTR Occupational Therapist                     Mobility/ADL's       Row Name 10/05/23 1515          Bed Mobility    Comment, (Bed Mobility) UIC  -       Row Name 10/05/23 1515          Transfers    Transfers stand-sit transfer;sit-stand transfer  -       Row Name 10/05/23 1515          Sit-Stand Transfer    Sit-Stand [place] (Transfers) set up;standby assist;contact guard  -     Assistive Device (Sit-Stand Transfers) walker, front-wheeled  -       Row Name  10/05/23 1515          Stand-Sit Transfer    Stand-Sit [place] (Transfers) set up;contact guard  -     Assistive Device (Stand-Sit Transfers) walker, front-wheeled  -KP       Row Name 10/05/23 1515          Functional Mobility    Functional Mobility- Ind. Level set up required;contact guard assist  -KP     Functional Mobility- Device walker, front-wheeled  -KP     Functional Mobility- Safety Issues supplemental O2  -KP     Functional Mobility- Comment in room, to door way and back 3 times  -KP       Row Name 10/05/23 1515          Activities of Daily Living    BADL Assessment/Intervention lower body dressing  -       Row Name 10/05/23 1515          Lower Body Dressing Assessment/Training    [place] Level (Lower Body Dressing) lower body dressing skills;doff;don;shoes/slippers;set up;standby assist  -     Position (Lower Body Dressing) supported sitting  -               User Key  (r) = Recorded By, (t) = Taken By, (c) = Cosigned By      Initials Name Provider Type     Annita Lock, OTR Occupational Therapist                   Obj/Interventions       Row Name 10/05/23 1515          Sensory Assessment (Somatosensory)    Sensory Assessment (Somatosensory) UE sensation intact  -       Row Name 10/05/23 1515          Vision Assessment/Intervention    Visual Impairment/Limitations WFL  -       Row Name 10/05/23 1515          Range of Motion Comprehensive    General Range of Motion bilateral upper extremity ROM WNL  -     Comment, General Range of Motion B UE 8/8  -KP       Row Name 10/05/23 1515          Strength Comprehensive (MMT)    Comment, General Manual Muscle Testing (MMT) Assessment B shoulder 3+/5 B elbow 4/5  -KP       Row Name 10/05/23 1515          Motor Skills    Motor Skills coordination;functional endurance  -     Coordination WFL  -     Functional Endurance fair +  -KP       Row Name 10/05/23 1515          Balance    Static Standing Balance standby assist  -      Dynamic Standing Balance contact guard  -     Position/Device Used, Standing Balance walker, rolling  -KP     Balance Interventions sitting;standing;sit to stand;static;dynamic;supported  -               User Key  (r) = Recorded By, (t) = Taken By, (c) = Cosigned By      Initials Name Provider Type    Annita Epperson OTR Occupational Therapist                   Goals/Plan       Row Name 10/05/23 1518          Transfer Goal 1 (OT)    Activity/Assistive Device (Transfer Goal 1, OT) sit-to-stand/stand-to-sit;bed-to-chair/chair-to-bed;toilet  -KP     [place] Level/Cues Needed (Transfer Goal 1, OT) standby assist  -KP     Time Frame (Transfer Goal 1, OT) short term goal (STG);2 weeks  -KP     Progress/Outcome (Transfer Goal 1, OT) goal ongoing  -       Row Name 10/05/23 1518          Bathing Goal 1 (OT)    Activity/Device (Bathing Goal 1, OT) bathing skills, all  -KP     [place] Level/Cues Needed (Bathing Goal 1, OT) standby assist  -KP     Time Frame (Bathing Goal 1, OT) short term goal (STG);2 weeks  -KP     Progress/Outcomes (Bathing Goal 1, OT) goal ongoing  -       Row Name 10/05/23 1518          Strength Goal 1 (OT)    Strength Goal 1 (OT) incr UE to 4/5  -KP     Time Frame (Strength Goal 1, OT) short term goal (STG);2 weeks  -KP     Progress/Outcome (Strength Goal 1, OT) goal ongoing  -       Row Name 10/05/23 1518          Therapy Assessment/Plan (OT)    Planned Therapy Interventions (OT) activity tolerance training;adaptive equipment training;BADL retraining;ROM/therapeutic exercise;occupation/activity based interventions;strengthening exercise;transfer/mobility retraining;functional balance retraining  -               User Key  (r) = Recorded By, (t) = Taken By, (c) = Cosigned By      Initials Name Provider Type    Annita Epperson OTR Occupational Therapist                   Clinical Impression       Row Name 10/05/23 1512          Pain Assessment    Pretreatment Pain  Rating 0/10 - no pain  -KP     Posttreatment Pain Rating 0/10 - no pain  -KP       Row Name 10/05/23 1516          Plan of Care Review    Plan of Care Reviewed With patient  -     Progress improving  -     Outcome Evaluation pt evaluated for OT. pt admitted from home where she was independent PTA. pt this date was [facility] upon OT arrival. pt was SBA to CGA w tsf. pt walked in room and back three times to incr dyn balance and endurance. pt on O2 and did well. pt has good ROM and decr strength in UE. pt admitted w COPD exacerbation. pt was SBA w shoes. pt cont to benefit from OT to incr ADL, strength, balance, and tsf  -KP       Row Name 10/05/23 1516          Therapy Assessment/Plan (OT)    Rehab Potential (OT) good, to achieve stated therapy goals  -     Criteria for Skilled Therapeutic Interventions Met (OT) yes;meets criteria;skilled treatment is necessary  -     Therapy Frequency (OT) 5 times/wk  -       Row Name 10/05/23 1516          Therapy Plan Review/Discharge Plan (OT)    Anticipated Discharge Disposition (OT) skilled nursing facility  -       Row Name 10/05/23 1516          Positioning and Restraints    Pre-Treatment Position sitting in chair/recliner  -     Post Treatment Position chair  -KP     In Chair reclined;call light within reach;encouraged to call for assist;exit alarm on  -               User Key  (r) = Recorded By, (t) = Taken By, (c) = Cosigned By      Initials Name Provider Type    Annita Epperson, OTR Occupational Therapist                   Outcome Measures       Row Name 10/05/23 1516          How much help from another is currently needed...    Putting on and taking off regular lower body clothing? 3  -KP     Bathing (including washing, rinsing, and drying) 3  -KP     Toileting (which includes using toilet bed pan or urinal) 3  -KP     Putting on and taking off regular upper body clothing 3  -KP     Taking care of personal grooming (such as brushing teeth) 3  -KP      Eating meals 3  -     AM-PAC 6 Clicks Score (OT) 18  -       Row Name 10/05/23 1519          Functional Assessment    Outcome Measure Options AM-PAC 6 Clicks Daily Activity (OT)  -               User Key  (r) = Recorded By, (t) = Taken By, (c) = Cosigned By      Initials Name Provider Type     Annita Lock, OTR Occupational Therapist                    Occupational Therapy Education       Title: PT OT SLP Therapies (In Progress)       Topic: Occupational Therapy (Done)       Point: ADL training (Done)       Description:   Instruct learner(s) on proper safety adaptation and remediation techniques during self care or transfers.   Instruct in proper use of assistive devices.                  Learning Progress Summary             Patient Acceptance, E,TB,D, DU,VU by  at 10/5/2023 1519    Comment: ed pt on role of OT. benefit of therapy, POC w. OT. ed on safety w ADL and tsf. pt demo w A                         Point: Home exercise program (Done)       Description:   Instruct learner(s) on appropriate technique for monitoring, assisting and/or progressing therapeutic exercises/activities.                  Learning Progress Summary             Patient Acceptance, E,TB,D, DU,VU by  at 10/5/2023 1519    Comment: ed pt on role of OT. benefit of therapy, POC w. OT. ed on safety w ADL and tsf. pt demo w A                         Point: Precautions (Done)       Description:   Instruct learner(s) on prescribed precautions during self-care and functional transfers.                  Learning Progress Summary             Patient Acceptance, E,TB,D, DU,VU by  at 10/5/2023 1519    Comment: ed pt on role of OT. benefit of therapy, POC w. OT. ed on safety w ADL and tsf. pt demo w A                         Point: Body mechanics (Done)       Description:   Instruct learner(s) on proper positioning and spine alignment during self-care, functional mobility activities and/or exercises.                  Learning  Progress Summary             Patient Acceptance, E,TB,D, DU,VU by  at 10/5/2023 6837    Comment: ed pt on role of OT. benefit of therapy, POC w. OT. ed on safety w ADL and tsf. pt demo w A                                         User Key       Initials Effective Dates Name Provider Type Discipline     07/11/23 -  Annita Lock, OTR Occupational Therapist OT                  OT Recommendation and Plan  Planned Therapy Interventions (OT): activity tolerance training, adaptive equipment training, BADL retraining, ROM/therapeutic exercise, occupation/activity based interventions, strengthening exercise, transfer/mobility retraining, functional balance retraining  Therapy Frequency (OT): 5 times/wk  Plan of Care Review  Plan of Care Reviewed With: patient  Progress: improving  Outcome Evaluation: pt evaluated for OT. pt admitted from home where she was independent PTA. pt this date was [facility] upon OT arrival. pt was SBA to CGA w tsf. pt walked in room and back three times to incr dyn balance and endurance. pt on O2 and did well. pt has good ROM and decr strength in UE. pt admitted w COPD exacerbation. pt was SBA w shoes. pt cont to benefit from OT to incr ADL, strength, balance, and tsf     Time Calculation:   Evaluation Complexity (OT)  Review Occupational Profile/Medical/Therapy History Complexity: expanded/moderate complexity  Assessment, Occupational Performance/Identification of Deficit Complexity: 3-5 performance deficits  Clinical Decision Making Complexity (OT): detailed assessment/moderate complexity  Overall Complexity of Evaluation (OT): moderate complexity     Time Calculation- OT       Row Name 10/05/23 1520             Time Calculation- OT    OT Start Time 1454  -      OT Stop Time 1506  -      OT Time Calculation (min) 12 min  -      OT Received On 10/05/23  -      OT - Next Appointment 10/06/23  -      OT Goal Re-Cert Due Date 10/19/23  -         Untimed Charges    OT Eval/Re-eval  Minutes 12  -KP         Total Minutes    Untimed Charges Total Minutes 12  -KP       Total Minutes 12  -KP                User Key  (r) = Recorded By, (t) = Taken By, (c) = Cosigned By      Initials Name Provider Type    Annita Epperson OTR Occupational Therapist                  Therapy Charges for Today       Code Description Service Date Service Provider Modifiers Qty    [id number] HC OT EVAL MOD COMPLEXITY 2 10/5/2023 Annita Lock OTR GO 1                 COURTNEY Ryan  10/5/2023

## 2023-10-05 NOTE — PROGRESS NOTES
Repeat CT head appears stable.  Spoke with RN, patient remained stable with no changes.  Please call us back with any questions.  We will sign off and see again per request.    LG Quach

## 2023-10-05 NOTE — PROGRESS NOTES
Continued Stay Note  [facility]     Patient Name: Josephine Wolf  MRN: [id number]  Today's Date: 10/5/2023    Admit Date: 9/30/2023    Plan: Home with [facility]   Discharge Plan       Row Name 10/05/23 1523       Plan    Plan TBD     Plan Comments Follow up with patient and son at the bedside, plan is tbd pending progress. [facility] accepted following. Patient lives alone, sons assist but cannot provide 24/7 assistance. Patient is considering [facility], she does not have a rehab preference. She is agreeable with referrals to to check availability at local rehabs. [facility] pre-cert required for dc to SNF.  Patient has home o2 through [employer]. Sons will provide dc transportation. Epic referrals placed for SNF. Awaiting today's therapy progress note.                    Discharge Codes    No documentation.                 Expected Discharge Date and Time       Expected Discharge Date Expected Discharge Time    Oct 6, 2023               Sowmya Murry RN

## 2023-10-05 NOTE — DISCHARGE PLACEMENT REQUEST
"Raheem Roberts (83 y.o. Female)       Date of Birth   1940    Social Security Number       Address   [address] [place] [address]  [facility] [address]    Home Phone   [phone]    MRN   [id number]       [facility]    Marital Status                               Admission Date   9/30/23    Admission Type   Emergency    Admitting Provider   Stanley Fowler MD    Attending Provider   Stanley Fowler MD    Department, Room/Bed   [address], [address]       Discharge Date       Discharge Disposition       Discharge Destination                                 Attending Provider: Stanley Fowler MD    Allergies: Morphine And Related, Fenofibrate    Isolation: None   Infection: None   Code Status: No CPR    Ht: 157.5 cm (62\")   Wt: 73.5 kg (162 lb)    Admission Cmt: None   Principal Problem: COPD exacerbation [J44.1]                   Active Insurance as of 9/30/2023       Primary Coverage       Payor Plan Insurance Group Employer/Plan Group    [facility] MEDICARE REPLACEMENT [employer] MEDICARE REPLACEMENT        Payor Plan Address Payor Plan Phone Number Payor Plan Fax Number Effective Dates    [address] [phone]  10/9/2017 - None Entered    [facility] [address]         Subscriber Name Subscriber Birth Date Member ID       RAHEEM ROBERTS 1940 [id number]               Secondary Coverage       Payor Plan Insurance Group Employer/Plan Group    [place]        Payor Plan Address Payor Plan Phone Number Payor Plan Fax Number Effective Dates    [address] [phone]  7/3/2016 - None Entered    [facility] [address]         Subscriber Name Subscriber Birth Date Member ID       RAHEEM ROBERTS 1940 [id number]                     Emergency Contacts        (Rel.) Home Phone Work Phone Mobile Phone    Chino Roberts (Son) [phone] -- --    James Roberts (Son) [phone] -- [phone]                "

## 2023-10-05 NOTE — PLAN OF CARE
Goal Outcome Evaluation:  Plan of Care Reviewed With: patient        Progress: improving  Outcome Evaluation: pt evaluated for OT. pt admitted from home where she was independent PTA. pt this date was [facility] upon OT arrival. pt was SBA to CGA w tsf. pt walked in room and back three times to incr dyn balance and endurance. pt on O2 and did well. pt has good ROM and decr strength in UE. pt admitted w COPD exacerbation. pt was SBA w shoes. pt cont to benefit from OT to incr ADL, strength, balance, and tsf      Anticipated Discharge Disposition (OT): skilled nursing facility

## 2023-10-05 NOTE — PLAN OF CARE
Problem: Adult Inpatient Plan of Care  Goal: Plan of Care Review  Outcome: Ongoing, Progressing  Goal: Patient-Specific Goal (Individualized)  Outcome: Ongoing, Progressing  Goal: Absence of Hospital-Acquired Illness or Injury  Outcome: Ongoing, Progressing  Intervention: Identify and Manage Fall Risk  Recent Flowsheet Documentation  Taken 10/5/2023 0440 by Arsh Carrero RN  Safety Promotion/Fall Prevention:   safety round/check completed   room organization consistent   nonskid shoes/slippers when out of bed   fall prevention program maintained   elopement precautions   clutter free environment maintained   assistive device/personal items within reach   activity supervised  Taken 10/5/2023 0200 by Arsh Carrero RN  Safety Promotion/Fall Prevention:   safety round/check completed   room organization consistent   elopement precautions   clutter free environment maintained   assistive device/personal items within reach   activity supervised  Taken 10/5/2023 0043 by Arsh Carrero RN  Safety Promotion/Fall Prevention:   safety round/check completed   room organization consistent   nonskid shoes/slippers when out of bed   fall prevention program maintained   elopement precautions   clutter free environment maintained   assistive device/personal items within reach   activity supervised  Taken 10/4/2023 2252 by Arsh Carrero RN  Safety Promotion/Fall Prevention:   safety round/check completed   room organization consistent   nonskid shoes/slippers when out of bed   fall prevention program maintained   elopement precautions   clutter free environment maintained   assistive device/personal items within reach   activity supervised  Taken 10/4/2023 2039 by Arsh Carrero RN  Safety Promotion/Fall Prevention:   safety round/check completed   room organization consistent   nonskid shoes/slippers when out of bed   fall prevention program maintained   elopement precautions   clutter free environment  maintained   assistive device/personal items within reach   activity supervised  Intervention: Prevent Skin Injury  Recent Flowsheet Documentation  Taken 10/5/2023 0440 by Arsh Carrero RN  Body Position: position changed independently  Taken 10/5/2023 0200 by Arsh Carrero RN  Body Position: position changed independently  Taken 10/5/2023 0043 by Arsh Carrero RN  Body Position:   position changed independently   weight shifting  Taken 10/4/2023 2252 by Arsh Carrero RN  Body Position: position changed independently  Taken 10/4/2023 2039 by Arsh Carrero RN  Body Position: position changed independently  Intervention: Prevent and Manage VTE (Venous Thromboembolism) Risk  Recent Flowsheet Documentation  Taken 10/5/2023 0440 by Arsh Carrero RN  Activity Management: activity encouraged  Taken 10/5/2023 0200 by Arsh Carrero RN  Activity Management: activity encouraged  Taken 10/5/2023 0043 by Arsh Carrero RN  Activity Management: activity encouraged  Taken 10/4/2023 2252 by Arsh Carrero RN  Activity Management: activity encouraged  Taken 10/4/2023 2039 by Arsh Carrero RN  Activity Management: activity encouraged  Intervention: Prevent Infection  Recent Flowsheet Documentation  Taken 10/5/2023 0440 by Arsh Carrero RN  Infection Prevention:   single patient room provided   rest/sleep promoted  Taken 10/5/2023 0043 by Arsh Carrero RN  Infection Prevention:   single patient room provided   rest/sleep promoted  Taken 10/4/2023 2252 by Arsh Carrero RN  Infection Prevention:   single patient room provided   rest/sleep promoted  Taken 10/4/2023 2039 by Arsh Carrero RN  Infection Prevention:   single patient room provided   rest/sleep promoted  Goal: Optimal Comfort and Wellbeing  Outcome: Ongoing, Progressing  Goal: Readiness for Transition of Care  Outcome: Ongoing, Progressing   Goal Outcome Evaluation:  Plan of Care Reviewed With: patient         Progress: improving  Outcome Evaluation: VSS. No complaints of pan.

## 2023-10-05 NOTE — PROGRESS NOTES
"Daily progress note    Primary care physician  Dr. PLUMMER    Subjective  Doing same with no new issues but remains very weak and family at bedside.  Patient agreeable to go to subacute rehab.    History of present illness  83-year-old white female with history of chronic hypoxic respiratory failure on home oxygen COPD paroxysmal SVT nonsustained ventricular tachycardia hypertension hypothyroidism and chronic kidney disease stage III who lives by herself presented to [facility] emergency room with increased shortness of breath for last 1 week which is getting worse.  Patient also complaining of productive cough but no fever chills chest pain abdominal pain nausea vomiting diarrhea.  Patient work-up in ER revealed acute on chronic hypoxic respiratory failure with acute exacerbation of COPD and also found to be in rapid ventricular rate with history of atrial fibrillation admitted for management.  Patient is DNR per her wishes.     REVIEW OF SYSTEMS  Unremarkable except vision issues     PHYSICAL EXAM   Blood pressure 117/76, pulse 113, temperature 97.9 øF (36.6 øC), temperature source Oral, resp. rate 18, height 157.5 cm (62\"), weight 73.5 kg (162 lb), SpO2 (!) 87 %.    GENERAL: Awake and alert in no distress  HENT: nares patent  Head/neck/ face are symmetric without gross deformity, signs of trauma, or swelling  EYES: no scleral icterus, no conjunctival pallor.  NECK: Supple, no meningismus  CV: Tachycardia with irregular regular rhythm.  No obvious murmur or rub.    RESPIRATORY: Normal effort and moving air bilaterally with expiratory wheezes  ABDOMEN: soft and nontender.  Nondistended bowel sounds positive  MUSCULOSKELETAL: no deformity.  No edema.  Intact distal pulses   NEURO: alert and appropriate, moves all extremities, follows commands.  No focal deficit  SKIN: warm, dry     LAB RESULTS  Lab Results (last 24 hours)       Procedure Component Value Units Date/Time    Renal Function Panel [[id number]]  " (Abnormal) Collected: 10/05/23 0632    Specimen: Blood Updated: 10/05/23 0710     Glucose 96 mg/dL      BUN 73 mg/dL      Creatinine 1.91 mg/dL      Sodium 141 mmol/L      Potassium 4.3 mmol/L      Chloride 105 mmol/L      CO2 23.0 mmol/L      Calcium 9.0 mg/dL      Albumin 3.6 g/dL      Phosphorus 4.2 mg/dL      Anion Gap 13.0 mmol/L      BUN/Creatinine Ratio 38.2     eGFR 26.1 mL/min/1.73     Narrative:      GFR Normal >60  Chronic Kidney Disease <60  Kidney Failure <15    The GFR formula is only valid for adults with stable renal function between ages 18 and 70.    CBC & Differential [[id number]]  (Abnormal) Collected: 10/05/23 0632    Specimen: Blood Updated: 10/05/23 0650    Narrative:      The following orders were created for panel order CBC & Differential.  Procedure                               Abnormality         Status                     ---------                               -----------         ------                     CBC Auto Differential[[id number]]        Abnormal            Final result                 Please view results for these tests on the individual orders.    CBC Auto Differential [[id number]]  (Abnormal) Collected: 10/05/23 0632    Specimen: Blood Updated: 10/05/23 0650     WBC 8.05 10*3/mm3      RBC 2.93 10*6/mm3      Hemoglobin 9.3 g/dL      Hematocrit 29.0 %      MCV 99.0 fL      MCH 31.7 pg      MCHC 32.1 g/dL      RDW 14.9 %      RDW-SD 54.3 fl      MPV 10.4 fL      Platelets 184 10*3/mm3      Neutrophil % 75.4 %      Lymphocyte % 13.0 %      Monocyte % 10.8 %      Eosinophil % 0.0 %      Basophil % 0.1 %      Immature Grans % 0.7 %      Neutrophils, Absolute 6.06 10*3/mm3      Lymphocytes, Absolute 1.05 10*3/mm3      Monocytes, Absolute 0.87 10*3/mm3      Eosinophils, Absolute 0.00 10*3/mm3      Basophils, Absolute 0.01 10*3/mm3      Immature Grans, Absolute 0.06 10*3/mm3      nRBC 0.2 /100 WBC             Narrative & Impression   CT HEAD WO CONTRAST-     INDICATIONS: Blurry vision,  trauma     TECHNIQUE: Radiation dose reduction techniques were utilized, including  automated exposure control and exposure modulation based on body size.  Noncontrast head CT     COMPARISON: 7/23/2023     FINDINGS:           No acute intracranial hemorrhage, midline shift or mass effect. No acute  territorial infarct is identified.     Right ICA stent is again noted.     Ventricles, cisterns, cerebral sulci are unremarkable for patient age.     The visualized paranasal sinuses, orbits, mastoid air cells are  unremarkable.                 IMPRESSION:     No acute intracranial hemorrhage or hydrocephalus. If there is further  clinical concern, MRI could be considered for further evaluation.       Results  Scan on 9/30/2023 1227 by [employer], [place]: ECG 12-LEAD         Author: -- Service: -- Author Type: --   Filed: Date of Service: Creation Time:   Status: (Other)   HEART RATE= 119  bpm  RR Interval= 504  ms  ND Interval=   ms  P Horizontal Axis=   deg  P Front Axis=   deg  QRSD Interval= 81  ms  QT Interval= 298  ms  QTcB= 420  ms  QRS Axis= 11  deg  T Wave Axis= 55  deg  - ABNORMAL ECG -  Atrial fibrillation  Abnormal R-wave progression, early transition  Borderline T abnormalities, anterior leads          Current Facility-Administered Medications:     albuterol (PROVENTIL) nebulizer solution 0.083% 2.5 mg/3mL, 2.5 mg, Nebulization, Q6H - RT, Stanley Fowler MD, 2.5 mg at 10/05/23 0730    allopurinol (ZYLOPRIM) tablet 100 mg, 100 mg, Oral, Daily, Stanley Fowler MD, 100 mg at 10/05/23 1138    amiodarone (PACERONE) tablet 200 mg, 200 mg, Oral, Q24H, Beth Palacio APRN, 200 mg at 10/05/23 1138    apixaban (ELIQUIS) tablet 2.5 mg, 2.5 mg, Oral, Q12H, Stanley Fowler MD, 2.5 mg at 10/05/23 1138    budesonide-formoterol (SYMBICORT) 160-4.5 MCG/ACT inhaler 2 puff, 2 puff, Inhalation, BID - RT, 2 puff at 10/05/23 0730 **AND** tiotropium (SPIRIVA RESPIMAT) 2.5 mcg/act aerosol solution inhaler, 2 puff,  Inhalation, Daily - RT, Stanley Fowler MD, 2 puff at 10/05/23 0730    busPIRone (BUSPAR) tablet 10 mg, 10 mg, Oral, BID, Stanley Fowler MD, 10 mg at 10/05/23 1138    DULoxetine (CYMBALTA) DR capsule 60 mg, 60 mg, Oral, Daily, Stanley Fowler MD, 60 mg at 10/05/23 1138    gabapentin (NEURONTIN) capsule 300 mg, 300 mg, Oral, TID, Stanley Fowler MD, 300 mg at 10/05/23 1138    guaiFENesin (MUCINEX) 12 hr tablet 600 mg, 600 mg, Oral, Q12H, Stanley Fowler MD, 600 mg at 10/05/23 1139    HYDROcodone-acetaminophen (NORCO) 7.5-325 MG per tablet 1 tablet, 1 tablet, Oral, Q4H PRN, Stanley Fowler MD, 1 tablet at 10/03/23 1311    hydrOXYzine (ATARAX) tablet 25 mg, 25 mg, Oral, TID PRN, Stanley Fowler MD, 25 mg at 10/03/23 0116    ipratropium-albuterol (DUO-NEB) nebulizer solution 3 mL, 3 mL, Nebulization, Q4H PRN, Stanley Fowler MD    levothyroxine (SYNTHROID, LEVOTHROID) tablet 75 mcg, 75 mcg, Oral, Daily, Stanley Fowler MD, 75 mcg at 10/05/23 1138    metoprolol tartrate (LOPRESSOR) tablet 100 mg, 100 mg, Oral, Q8H, Nancy Padilla MD    midodrine (PROAMATINE) tablet 2.5 mg, 2.5 mg, Oral, BID ACRandy Mini K, MD, 2.5 mg at 10/04/23 2038    ondansetron (ZOFRAN) injection 4 mg, 4 mg, Intravenous, Q4H PRN, Stanley Fowler MD, 4 mg at 10/03/23 1311    pantoprazole (PROTONIX) EC tablet 40 mg, 40 mg, Oral, BID AC, Stanley Fowler MD, 40 mg at 10/05/23 1138    predniSONE (DELTASONE) tablet 40 mg, 40 mg, Oral, Daily With Breakfast, Car Amato MD, 40 mg at 10/05/23 1138    QUEtiapine (SEROquel) tablet 100 mg, 100 mg, Oral, Nightly, Stanley Fowler MD, 100 mg at 10/04/23 2039    rOPINIRole (REQUIP) tablet 0.5 mg, 0.5 mg, Oral, Nightly, Stanley Fowler MD, 0.5 mg at 10/04/23 2038    rosuvastatin (CRESTOR) tablet 10 mg, 10 mg, Oral, Nightly, Stanley Fowler MD, 10 mg at 10/04/23 2038    simethicone (MYLICON) chewable tablet 80 mg, 80 mg, Oral, 4x Daily PRN, Stanley Fowler MD, 80 mg at 10/03/23 1249    [COMPLETED] Insert Peripheral IV, , , Once **AND**  sodium chloride 0.9 % flush 10 mL, 10 mL, Intravenous, PRN, Car Perea MD     ASSESSMENT  Acute on chronic hypoxic respiratory failure  Acute exacerbation of COPD  Paroxysmal atrial fibrillation with rapid ventricular rate   Status post fall with vision issues  Hypertension  Hypothyroidism  Anxiety disorder  Depression  Chronic anemia  Restless leg syndrome  Acute kidney injury resolving  Chronic kidney disease stage III    PLAN  CPM  Supplemental oxygen nebulizer  Continue steroids and taper  Control the heart rate  Neuro consult appreciated  Continue to hold diuretics  Cardiology input appreciated  Pulmonary and nephrology to follow patient  Adjust home medications  Stress ulcer DVT prophylaxis  Supportive care  PT/OT  DNR  Discussed with family nursing staff  Discharge planning    AMANDA MURO MD    Copied text in this note has been reviewed and is accurate as of 10/05/23

## 2023-10-05 NOTE — PROGRESS NOTES
[facility]  Progress note: 10/5/2023    Patient Identification:  Name:Josephine Wolf  Age:83 y.o.  Sex: female  :  1940  MRN: [id number]           CC:  Atrial fibrillation    Interval history:  Slightly tachycardic blood pressure improved however.  Received 1 dose of midodrine last night and this morning    Vital Signs:   Temp:  [97.9 øF (36.6 øC)-98.4 øF (36.9 øC)] 97.9 øF (36.6 øC)  Heart Rate:  [] 113  Resp:  [18-20] 18  BP: (116-144)/() 117/76    Intake/Output Summary (Last 24 hours) at 10/5/2023 1556  Last data filed at 10/4/2023 1700  Gross per 24 hour   Intake 240 ml   Output --   Net 240 ml       Physical Examination:    General Appearance No acute distress   Neck No adenopathy, supple, trachea midline, no thyromegaly, no carotid bruit, no JVD   Lungs Clear to auscultation,respirations regular, even and unlabored   Heart Irregular rhythm with tachycardia, normal S1 and S2, no murmur, no gallop, no rub, no click   Chest wall No abnormalities observed   Abdomen Normal bowel sounds, no masses, no hepatomegaly, soft   Extremities Moves all extremities well, no edema, no cyanosis, no redness   Neurological Alert and oriented x 3     Lab Review:  Personally reviewed the labs, radiology imaging and other cardiac procedures.   Results from last 7 days   Lab Units 10/05/23  0632 10/02/23  0553 10/01/23  0800   SODIUM mmol/L 141   < > 142   POTASSIUM mmol/L 4.3   < > 5.1   CHLORIDE mmol/L 105   < > 102   CO2 mmol/L 23.0   < > 28.1   BUN mg/dL 73*   < > 59*   CREATININE mg/dL 1.91*   < > 1.74*   CALCIUM mg/dL 9.0   < > 8.8   BILIRUBIN mg/dL  --   --  0.5   ALK PHOS U/L  --   --  55   ALT (SGPT) U/L  --   --  20   AST (SGOT) U/L  --   --  20   GLUCOSE mg/dL 96   < > 141*    < > = values in this interval not displayed.     Results from last 7 days   Lab Units 23  1711 23  1242   HSTROP T ng/L 27* 37*     Results from last 7 days   Lab Units 10/05/23  0632 10/04/23  0702  10/03/23  0651   WBC 10*3/mm3 8.05 8.60 6.73   HEMOGLOBIN g/dL 9.3* 9.4* 8.7*   HEMATOCRIT % 29.0* 29.1* 27.0*   PLATELETS 10*3/mm3 184 167 178     Results from last 7 days   Lab Units 09/30/23  1242   INR  0.96     Medication Review:   Meds reviewed  Scheduled Meds:albuterol, 2.5 mg, Nebulization, Q6H - RT  allopurinol, 100 mg, Oral, Daily  amiodarone, 200 mg, Oral, Q24H  apixaban, 2.5 mg, Oral, Q12H  budesonide-formoterol, 2 puff, Inhalation, BID - RT   And  tiotropium bromide monohydrate, 2 puff, Inhalation, Daily - RT  busPIRone, 10 mg, Oral, BID  DULoxetine, 60 mg, Oral, Daily  gabapentin, 300 mg, Oral, TID  guaiFENesin, 600 mg, Oral, Q12H  levothyroxine, 75 mcg, Oral, Daily  metoprolol tartrate, 75 mg, Oral, Q8H  midodrine, 2.5 mg, Oral, BID AC  pantoprazole, 40 mg, Oral, BID AC  predniSONE, 40 mg, Oral, Daily With Breakfast  QUEtiapine, 100 mg, Oral, Nightly  rOPINIRole, 0.5 mg, Oral, Nightly  rosuvastatin, 10 mg, Oral, Nightly      I personally viewed and interpreted the patient's EKG/Telemetry data    Assessment and Plan  1.   Atrial fibrillation.  Rates controlled relatively well on amiodarone and metoprolol.  She is on Eliquis.  Rate still high at times.  Will cautiously increase metoprolol titrate to 100 mg every 8 hours.  Limited in ability to add to the AV carolee blocker therapy due to hypotension.  And limited use of digoxin in the setting of renal insufficiency.  Already on amiodarone.  2.  COPD exacerbation.  Back to baseline.  3.  Pulmonary hypertension, group 2.  4.  Hypotension, as above  5.  Elevated troponin likely demand related non-STEMI.  Continue current regimen  6.  Acute renal injury.  Creatinine slightly improved today    Mini Padilla  10/5/271699:56 EDT  35min spent in reviewing records, discussion and examination of the patient and discussion with other members of the patient's medical team.     Dictated utilizing Dragon dictation

## 2023-10-06 NOTE — PROGRESS NOTES
"Daily progress note    Primary care physician  Dr. PLUMMER    Subjective  Doing same with no new issues but remains very weak     History of present illness  83-year-old white female with history of chronic hypoxic respiratory failure on home oxygen COPD paroxysmal SVT nonsustained ventricular tachycardia hypertension hypothyroidism and chronic kidney disease stage III who lives by herself presented to [facility] emergency room with increased shortness of breath for last 1 week which is getting worse.  Patient also complaining of productive cough but no fever chills chest pain abdominal pain nausea vomiting diarrhea.  Patient work-up in ER revealed acute on chronic hypoxic respiratory failure with acute exacerbation of COPD and also found to be in rapid ventricular rate with history of atrial fibrillation admitted for management.  Patient is DNR per her wishes.     REVIEW OF SYSTEMS  Unremarkable except vision issues     PHYSICAL EXAM   Blood pressure 112/67, pulse 53, temperature 97.7 øF (36.5 øC), temperature source Oral, resp. rate 18, height 157.5 cm (62\"), weight 73.5 kg (162 lb), SpO2 93 %.    GENERAL: Awake and alert in no distress  HENT: nares patent  Head/neck/ face are symmetric without gross deformity, signs of trauma, or swelling  EYES: no scleral icterus, no conjunctival pallor.  NECK: Supple, no meningismus  CV: Tachycardia with irregular regular rhythm.  No obvious murmur or rub.    RESPIRATORY: Normal effort and moving air bilaterally with expiratory wheezes  ABDOMEN: soft and nontender.  Nondistended bowel sounds positive  MUSCULOSKELETAL: no deformity.  No edema.  Intact distal pulses   NEURO: alert and appropriate, moves all extremities, follows commands.  No focal deficit  SKIN: warm, dry     LAB RESULTS  Lab Results (last 24 hours)       Procedure Component Value Units Date/Time    Renal Function Panel [[id number]]  (Abnormal) Collected: 10/06/23 0643    Specimen: Blood Updated: " 10/06/23 0725     Glucose 108 mg/dL      BUN 58 mg/dL      Creatinine 1.55 mg/dL      Sodium 144 mmol/L      Potassium 4.3 mmol/L      Chloride 107 mmol/L      CO2 31.4 mmol/L      Calcium 9.4 mg/dL      Albumin 3.8 g/dL      Phosphorus 3.7 mg/dL      Anion Gap 5.6 mmol/L      BUN/Creatinine Ratio 37.4     eGFR 33.5 mL/min/1.73     Narrative:      GFR Normal >60  Chronic Kidney Disease <60  Kidney Failure <15    The GFR formula is only valid for adults with stable renal function between ages 18 and 70.    CBC & Differential [[id number]]  (Abnormal) Collected: 10/06/23 0643    Specimen: Blood Updated: 10/06/23 0711    Narrative:      The following orders were created for panel order CBC & Differential.  Procedure                               Abnormality         Status                     ---------                               -----------         ------                     CBC Auto Differential[[id number]]        Abnormal            Final result                 Please view results for these tests on the individual orders.    CBC Auto Differential [[id number]]  (Abnormal) Collected: 10/06/23 0643    Specimen: Blood Updated: 10/06/23 0711     WBC 5.68 10*3/mm3      RBC 2.74 10*6/mm3      Hemoglobin 8.9 g/dL      Hematocrit 26.9 %      MCV 98.2 fL      MCH 32.5 pg      MCHC 33.1 g/dL      RDW 14.8 %      RDW-SD 52.7 fl      MPV 10.8 fL      Platelets 158 10*3/mm3      Neutrophil % 77.8 %      Lymphocyte % 12.3 %      Monocyte % 8.8 %      Eosinophil % 0.0 %      Basophil % 0.0 %      Immature Grans % 1.1 %      Neutrophils, Absolute 4.42 10*3/mm3      Lymphocytes, Absolute 0.70 10*3/mm3      Monocytes, Absolute 0.50 10*3/mm3      Eosinophils, Absolute 0.00 10*3/mm3      Basophils, Absolute 0.00 10*3/mm3      Immature Grans, Absolute 0.06 10*3/mm3      nRBC 0.2 /100 WBC             Narrative & Impression   CT HEAD WO CONTRAST-     INDICATIONS: Blurry vision, trauma     TECHNIQUE: Radiation dose reduction techniques were  utilized, including  automated exposure control and exposure modulation based on body size.  Noncontrast head CT     COMPARISON: 7/23/2023     FINDINGS:           No acute intracranial hemorrhage, midline shift or mass effect. No acute  territorial infarct is identified.     Right ICA stent is again noted.     Ventricles, cisterns, cerebral sulci are unremarkable for patient age.     The visualized paranasal sinuses, orbits, mastoid air cells are  unremarkable.                 IMPRESSION:     No acute intracranial hemorrhage or hydrocephalus. If there is further  clinical concern, MRI could be considered for further evaluation.       Results  Scan on 9/30/2023 1227 by [employer], [place]: ECG 12-LEAD         Author: -- Service: -- Author Type: --   Filed: Date of Service: Creation Time:   Status: (Other)   HEART RATE= 119  bpm  RR Interval= 504  ms  AZ Interval=   ms  P Horizontal Axis=   deg  P Front Axis=   deg  QRSD Interval= 81  ms  QT Interval= 298  ms  QTcB= 420  ms  QRS Axis= 11  deg  T Wave Axis= 55  deg  - ABNORMAL ECG -  Atrial fibrillation  Abnormal R-wave progression, early transition  Borderline T abnormalities, anterior leads          Current Facility-Administered Medications:     albuterol (PROVENTIL) nebulizer solution 0.083% 2.5 mg/3mL, 2.5 mg, Nebulization, Q6H - RT, Stanley Fowler MD, 2.5 mg at 10/06/23 0632    allopurinol (ZYLOPRIM) tablet 100 mg, 100 mg, Oral, Daily, Stanley Fowler MD, 100 mg at 10/06/23 0952    amiodarone (PACERONE) tablet 200 mg, 200 mg, Oral, Q24H, Beth Palacio APRN, 200 mg at 10/06/23 0952    apixaban (ELIQUIS) tablet 2.5 mg, 2.5 mg, Oral, Q12H, Stanley Fowler MD, 2.5 mg at 10/06/23 0952    budesonide-formoterol (SYMBICORT) 160-4.5 MCG/ACT inhaler 2 puff, 2 puff, Inhalation, BID - RT, 2 puff at 10/06/23 0632 **AND** tiotropium (SPIRIVA RESPIMAT) 2.5 mcg/act aerosol solution inhaler, 2 puff, Inhalation, Daily - RT, Ahmed, Stanley, MD, 2 puff at 10/06/23 0632     busPIRone (BUSPAR) tablet 10 mg, 10 mg, Oral, BID, Stanley Fowler MD, 10 mg at 10/06/23 0952    DULoxetine (CYMBALTA) DR capsule 60 mg, 60 mg, Oral, Daily, Stanley Fowler MD, 60 mg at 10/06/23 0952    gabapentin (NEURONTIN) capsule 300 mg, 300 mg, Oral, TID, Stanley Fowler MD, 300 mg at 10/06/23 0952    guaiFENesin (MUCINEX) 12 hr tablet 600 mg, 600 mg, Oral, Q12H, Stanley Fowler MD, 600 mg at 10/06/23 0952    HYDROcodone-acetaminophen (NORCO) 7.5-325 MG per tablet 1 tablet, 1 tablet, Oral, Q4H PRN, Stanley Fowler MD, 1 tablet at 10/03/23 1311    hydrOXYzine (ATARAX) tablet 25 mg, 25 mg, Oral, TID PRN, Stanley Fowler MD, 25 mg at 10/05/23 2024    ipratropium-albuterol (DUO-NEB) nebulizer solution 3 mL, 3 mL, Nebulization, Q4H PRN, Stanley Fowler MD    levothyroxine (SYNTHROID, LEVOTHROID) tablet 75 mcg, 75 mcg, Oral, Daily, Stanley Fowler MD, 75 mcg at 10/06/23 0952    metoprolol tartrate (LOPRESSOR) tablet 100 mg, 100 mg, Oral, Q8H, Nancy Padilla MD, 100 mg at 10/06/23 0953    midodrine (PROAMATINE) tablet 5 mg, 5 mg, Oral, TID Janeth MICHAEL Aftab, MD, 5 mg at 10/06/23 0952    ondansetron (ZOFRAN) injection 4 mg, 4 mg, Intravenous, Q4H PRN, Stanley Fowler MD, 4 mg at 10/03/23 1311    pantoprazole (PROTONIX) EC tablet 40 mg, 40 mg, Oral, BID ACJaneth Aftab, MD, 40 mg at 10/06/23 0952    predniSONE (DELTASONE) tablet 20 mg, 20 mg, Oral, Daily With Breakfast, Stanley Fowler MD, 20 mg at 10/06/23 0952    QUEtiapine (SEROquel) tablet 100 mg, 100 mg, Oral, Nightly, Stanley Fowler MD, 100 mg at 10/05/23 2023    rOPINIRole (REQUIP) tablet 0.5 mg, 0.5 mg, Oral, Nightly, Stanley Fowler MD, 0.5 mg at 10/05/23 2023    rosuvastatin (CRESTOR) tablet 10 mg, 10 mg, Oral, Nightly, Stanley Fowler MD, 10 mg at 10/05/23 2023    simethicone (MYLICON) chewable tablet 80 mg, 80 mg, Oral, 4x Daily PRN, Stanley Fowler MD, 80 mg at 10/03/23 1249    [COMPLETED] Insert Peripheral IV, , , Once **AND** sodium chloride 0.9 % flush 10 mL, 10 mL, Intravenous,  Eliazar RODRIGUEZ Mark, MD     ASSESSMENT  Acute on chronic hypoxic respiratory failure  Acute exacerbation of COPD  Paroxysmal atrial fibrillation with rapid ventricular rate   Status post fall with vision issues  Hypertension  Hypothyroidism  Anxiety disorder  Depression  Chronic anemia  Restless leg syndrome  Acute kidney injury resolving  Chronic kidney disease stage III    PLAN  CPM  Supplemental oxygen nebulizer  Continue steroids and taper  Control the heart rate  Neuro consult appreciated  Continue to hold diuretics  Cardiology input appreciated  Pulmonary and nephrology to follow patient  Adjust home medications  Stress ulcer DVT prophylaxis  Supportive care  PT/OT  DNR  Discussed with family nursing staff  Subacute rehab once bed available    AMANDA MURO MD    Copied text in this note has been reviewed and is accurate as of 10/06/23

## 2023-10-06 NOTE — PROGRESS NOTES
Continued Stay Note  [facility]     Patient Name: Josephine Wolf  MRN: [id number]  Today's Date: 10/6/2023    Admit Date: 9/30/2023    Plan: [facility] PENDING [facility]-C[facility]   Discharge Plan       Row Name 10/06/23 1452       Plan    Plan [facility] PENDING PRE-C[facility]    Plan Comments List of accepting facilities given to family. Follow up with pt's son, Chino and daughter-in-law, Maisha, first choice is [facility]. Radha Hayes notified and will initiate [facility] pre-cert as soon as possible. [facility] is selected in [employer]                   Discharge Codes    No documentation.                 Expected Discharge Date and Time       Expected Discharge Date Expected Discharge Time    Oct 6, 2023               Sowmya Murry RN

## 2023-10-06 NOTE — PROGRESS NOTES
[facility]  Progress note: 10/6/2023    Patient Identification:  Name:Josephine Wolf  Age:83 y.o.  Sex: female  :  1940  MRN: [id number]           CC:  Atrial fibrillation.    Interval history:  Rate improved with increased dose of metoprolol yesterday.  Renal labs improved.  Blood pressure stable.  Relatively well dyspnea has improved.    Vital Signs:   Temp:  [97.7 øF (36.5 øC)-98.4 øF (36.9 øC)] 97.7 øF (36.5 øC)  Heart Rate:  [] 53  Resp:  [18-20] 18  BP: (101-119)/(67-95) 112/67  No intake or output data in the 24 hours ending 10/06/23 1107    Physical Examination:    General Appearance No acute distress   Neck No adenopathy, supple, trachea midline, no thyromegaly, no carotid bruit, no JVD   Lungs Clear to auscultation,respirations regular, even and unlabored   Heart Irregular rhythm and normal rate, normal S1 and S2, no murmur, no gallop, no rub, no click   Chest wall No abnormalities observed   Abdomen Normal bowel sounds, no masses, no hepatomegaly, soft   Extremities Moves all extremities well, no edema, no cyanosis, no redness   Neurological Alert and oriented x 3     Lab Review:  Personally reviewed the labs, radiology imaging and other cardiac procedures.   Results from last 7 days   Lab Units 10/06/23  0643 10/02/23  0553 10/01/23  0800   SODIUM mmol/L 144   < > 142   POTASSIUM mmol/L 4.3   < > 5.1   CHLORIDE mmol/L 107   < > 102   CO2 mmol/L 31.4*   < > 28.1   BUN mg/dL 58*   < > 59*   CREATININE mg/dL 1.55*   < > 1.74*   CALCIUM mg/dL 9.4   < > 8.8   BILIRUBIN mg/dL  --   --  0.5   ALK PHOS U/L  --   --  55   ALT (SGPT) U/L  --   --  20   AST (SGOT) U/L  --   --  20   GLUCOSE mg/dL 108*   < > 141*    < > = values in this interval not displayed.     Results from last 7 days   Lab Units 23  1711 23  1242   HSTROP T ng/L 27* 37*     Results from last 7 days   Lab Units 10/06/23  0643 10/05/23  0632 10/04/23  0702   WBC 10*3/mm3 5.68 8.05 8.60   HEMOGLOBIN g/dL  8.9* 9.3* 9.4*   HEMATOCRIT % 26.9* 29.0* 29.1*   PLATELETS 10*3/mm3 158 184 167     Results from last 7 days   Lab Units 09/30/23  1242   INR  0.96     Medication Review:   Meds reviewed  Scheduled Meds:albuterol, 2.5 mg, Nebulization, Q6H - RT  allopurinol, 100 mg, Oral, Daily  amiodarone, 200 mg, Oral, Q24H  apixaban, 2.5 mg, Oral, Q12H  budesonide-formoterol, 2 puff, Inhalation, BID - RT   And  tiotropium bromide monohydrate, 2 puff, Inhalation, Daily - RT  busPIRone, 10 mg, Oral, BID  DULoxetine, 60 mg, Oral, Daily  gabapentin, 300 mg, Oral, TID  guaiFENesin, 600 mg, Oral, Q12H  levothyroxine, 75 mcg, Oral, Daily  metoprolol tartrate, 100 mg, Oral, Q8H  midodrine, 5 mg, Oral, TID AC  pantoprazole, 40 mg, Oral, BID AC  predniSONE, 20 mg, Oral, Daily With Breakfast  QUEtiapine, 100 mg, Oral, Nightly  rOPINIRole, 0.5 mg, Oral, Nightly  rosuvastatin, 10 mg, Oral, Nightly      I personally viewed and interpreted the patient's EKG/Telemetry data    Assessment and Plan  1.   Atrial fibrillation.  Rates better controlled on the increased dose of metoprolol and blood pressure stable on midodrine.  She remains on Eliquis and amiodarone.  Stable on current regimen.  2.  COPD exacerbation.  Back to baseline.  3.  Pulmonary hypertension, group 2.  4.  Hypotension, as above  5.  Elevated troponin likely demand related non-STEMI.  Continue current regimen  6.  Acute renal injury.  Creatinine slightly improved today  7.  Fall with vision changes.  Seen by neurology no additional recommendations.  Avoid hypotension and may need to titrate this noted as an outpatient.    CV status stable.  She will follow-up in 2 weeks with [facility].    Nancy Padilla  10/6/586886:07 EDT  25min spent in reviewing records, discussion and examination of the patient and discussion with other members of the patient's medical team.     Dictated utilizing Dragon dictation

## 2023-10-06 NOTE — PLAN OF CARE
Goal Outcome Evaluation:  Plan of Care Reviewed With: patient        Progress: improving  Outcome Evaluation: Pt [facility] with family present in room and agreeable to therapy on arrival this PM. Pt on 3.5L O2 on arrival. The patient completed multiple STS in room sba then ambulated 100' sba-cga + rwx and no rest break. The patient only complained of general weakness and requested to sit at the end of ambulation but stated that she wasn't SOA. Pt OK to ambulate with nsg staff. Pt states she does not normally use AD but was encouraged to use one while recovering strength. Pt would be OK to DC home with assist and HHPT to ensure safety with RTH.      Anticipated Discharge Disposition (PT): home with assist, home with home health

## 2023-10-06 NOTE — THERAPY TREATMENT NOTE
Patient Name: Josephine Wolf  : 1940    MRN: [id number]                              Today's Date: 10/6/2023       Admit Date: 2023    Visit Dx:     ICD-10-CM ICD-9-CM   1. Atrial fibrillation with rapid ventricular response: New onset  I48.91 427.31   2. COPD exacerbation  J44.1 491.21   3. Hypomagnesemia  E83.42 275.2   4. Chronic renal failure, unspecified CKD stage  N18.9 585.9   5. Chronic anemia  D64.9 285.9     Patient Active Problem List   Diagnosis    Anemia    OA (osteoarthritis) of knee    Chronic respiratory failure with hypoxia    Cellulitis of skin    Acute kidney injury    Sepsis    Orthostatic hypotension    Disease of thyroid gland    Hypertension    Dehydration    Stage 3 chronic kidney disease    Closed compression fracture of L1 lumbar vertebra    Hyponatremia    Osteoporosis with pathological fracture    Acute on chronic respiratory failure with hypoxia and hypercapnia    Obesity (BMI 30-39.9)    Nocturnal hypoxia    CKD (chronic kidney disease) stage 2, GFR 60-89 ml/min    Acute on chronic respiratory failure with hypoxia    Abdominal pain    Acute exacerbation of chronic obstructive pulmonary disease (COPD)    Acute UTI    Metabolic encephalopathy    COPD with acute exacerbation    COPD exacerbation    Atrial fibrillation with RVR     Past Medical History:   Diagnosis Date    Acid reflux     Acute kidney injury     Anemia     Arthritis     Bipolar 1 disorder, depressed     Cataract     MINDY    Chronic nausea     Chronic pain of right knee     Continuous leakage of urine     USES DEPENDS    COPD (chronic obstructive pulmonary disease)     USES O2 2 LMP PER NC AT NIGHT    Disease of thyroid gland     HYPOTHYROIDISM    Diverticulosis     Fibromyalgia     DX     Fibromyalgia, primary     Frequent episodes of bronchitis     HL (hearing loss)     Hypertension     Hypothyroidism     Memory loss     Migraines     Neck pain     On home oxygen therapy     2L NC AT NIGHT    Orthostatic  hypotension     Short of breath on exertion     Sleep apnea     Stage 3 chronic kidney disease      Past Surgical History:   Procedure Laterality Date    CEREBRAL ANEURYSM REPAIR  2000'S    WITH STENT    HYSTERECTOMY      JOINT REPLACEMENT      LAPAROSCOPIC CHOLECYSTECTOMY      CA ARTHRP KNE CONDYLE&PLATU MEDIAL&LAT COMPARTMENTS Right 11/16/2017    Procedure: RT TOTAL KNEE ARTHROPLASTY;  Surgeon: Kashif Perez MD;  Location: [facility];  Service: Orthopedics      General Information       Row Name 10/06/23 1552          Physical Therapy Time and Intention    Document Type therapy note (daily note)  -ZB     Mode of Treatment individual therapy;physical therapy  -ZB       Row Name 10/06/23 1552          General Information    Patient Profile Reviewed yes  -ZB     Existing Precautions/Restrictions oxygen therapy device and L/min;fall  -ZB       Row Name 10/06/23 1552          Cognition    Orientation Status (Cognition) oriented x 4  -ZB               User Key  (r) = Recorded By, (t) = Taken By, (c) = Cosigned By      Initials Name Provider Type    ZB Erik Saldana, PT Physical Therapist                   Mobility       Row Name 10/06/23 1553          Bed Mobility    Comment, (Bed Mobility) UIC  -ZB       Row Name 10/06/23 1553          Sit-Stand Transfer    Sit-Stand [place] (Transfers) standby assist  -ZB     Assistive Device (Sit-Stand Transfers) walker, front-wheeled  -ZB       Row Name 10/06/23 1553          Gait/Stairs (Locomotion)    [place] Level (Gait) contact guard;standby assist  -ZB     Assistive Device (Gait) walker, front-wheeled  -ZB     Distance in Feet (Gait) 100'  -ZB     Deviations/Abnormal Patterns (Gait) shilpa decreased;gait speed decreased;stride length decreased  -ZB     Bilateral Gait Deviations forward flexed posture  -ZB     Comment, (Gait/Stairs) pt ambulated 100' w/o rest and no complaints of SOA, just general weakness near end of ambulation  -ZB               User Key  (r)  = Recorded By, (t) = Taken By, (c) = Cosigned By      Initials Name Provider Type    ZB Erik Saldana, PT Physical Therapist                   Obj/Interventions    No documentation.                  Goals/Plan    No documentation.                  Clinical Impression       Row Name 10/06/23 1554          Pain    Pretreatment Pain Rating 0/10 - no pain  -ZB     Posttreatment Pain Rating 0/10 - no pain  -ZB     Pain Intervention(s) Ambulation/increased activity;Repositioned  -ZB       Row Name 10/06/23 1559          Plan of Care Review    Plan of Care Reviewed With patient  -ZB     Progress improving  -ZB     Outcome Evaluation Pt [facility] with family present in room and agreeable to therapy on arrival this PM. Pt on 3.5L O2 on arrival. The patient completed multiple STS in room sba then ambulated 100' sba-cga + rwx and no rest break. The patient only complained of general weakness and requested to sit at the end of ambulation but stated that she wasn't SOA. Pt OK to ambulate with nsg staff. Pt states she does not normally use AD but was encouraged to use one while recovering strength. Pt would be OK to DC home with assist and HHPT to ensure safety with RTH.  -ZB       Row Name 10/06/23 1551          Therapy Assessment/Plan (PT)    Rehab Potential (PT) good, to achieve stated therapy goals  -ZB     Criteria for Skilled Interventions Met (PT) yes  -ZB     Therapy Frequency (PT) 5 times/wk  -ZB       Row Name 10/06/23 1558          Vital Signs    O2 Delivery Pre Treatment nasal cannula  3.5L  -ZB     O2 Delivery Intra Treatment nasal cannula  -ZB     O2 Delivery Post Treatment nasal cannula  -ZB     Pre Patient Position Sitting  -ZB     Intra Patient Position Standing  -ZB     Post Patient Position Sitting  -ZB       Row Name 10/06/23 1559          Positioning and Restraints    Pre-Treatment Position sitting in chair/recliner  -ZB     Post Treatment Position chair  -ZB     In Chair notified nsg;reclined;call light within  reach;encouraged to call for assist;exit alarm on;with family/caregiver  -ZB               User Key  (r) = Recorded By, (t) = Taken By, (c) = Cosigned By      Initials Name Provider Type    Erik Oates PT Physical Therapist                   Outcome Measures       Row Name 10/06/23 1558          How much help from another person do you currently need...    Turning from your back to your side while in flat bed without using bedrails? 4  -ZB     Moving from lying on back to sitting on the side of a flat bed without bedrails? 4  -ZB     Moving to and from a bed to a chair (including a wheelchair)? 4  -ZB     Standing up from a chair using your arms (e.g., wheelchair, bedside chair)? 3  -ZB     Climbing 3-5 steps with a railing? 3  -ZB     To walk in hospital room? 3  -ZB     AM-PAC 6 Clicks Score (PT) 21  -ZB     Highest level of mobility 6 --> Walked 10 steps or more  -ZB       Row Name 10/06/23 1558          Functional Assessment    Outcome Measure Options AM-PAC 6 Clicks Basic Mobility (PT)  -ZB               User Key  (r) = Recorded By, (t) = Taken By, (c) = Cosigned By      Initials Name Provider Type    Erik Oates PT Physical Therapist                                 Physical Therapy Education       Title: PT OT SLP Therapies (In Progress)       Topic: Physical Therapy (In Progress)       Point: Mobility training (Done)       Learning Progress Summary             Patient Acceptance, E, VU by MANUEL at 10/4/2023 0946                         Point: Home exercise program (Not Started)       Learner Progress:  Not documented in this visit.              Point: Body mechanics (Not Started)       Learner Progress:  Not documented in this visit.              Point: Precautions (Not Started)       Learner Progress:  Not documented in this visit.                              User Key       Initials Effective Dates Name Provider Type Discipline    MANUEL 12/13/22 -  Aiyana Guzman PT Physical Therapist PT                   PT Recommendation and Plan     Plan of Care Reviewed With: patient  Progress: improving  Outcome Evaluation: Pt [facility] with family present in room and agreeable to therapy on arrival this PM. Pt on 3.5L O2 on arrival. The patient completed multiple STS in room sba then ambulated 100' sba-cga + rwx and no rest break. The patient only complained of general weakness and requested to sit at the end of ambulation but stated that she wasn't SOA. Pt OK to ambulate with [facility]. Pt states she does not normally use AD but was encouraged to use one while recovering strength. Pt would be OK to DC home with assist and HHPT to ensure safety with RTH.     Time Calculation:         PT Charges       Row Name 10/06/23 1558             Time Calculation    Start Time 1452  -ZB      Stop Time 1508  -ZB      Time Calculation (min) 16 min  -ZB      PT Received On 10/06/23  -ZB      PT - Next Appointment 10/09/23  -ZB         Time Calculation- PT    Total Timed Code Minutes- PT 16 minute(s)  -ZB         Timed Charges    55833 - PT Therapeutic Activity Minutes 16  -ZB         Total Minutes    Timed Charges Total Minutes 16  -ZB       Total Minutes 16  -ZB                User Key  (r) = Recorded By, (t) = Taken By, (c) = Cosigned By      Initials Name Provider Type    ZB Erik Saldana, MARCO Physical Therapist                  Therapy Charges for Today       Code Description Service Date Service Provider Modifiers Qty    [id number]  PT THERAPEUTIC ACT EA 15 MIN 10/6/2023 Erik Saldana, PT GP 1            PT G-Codes  Outcome Measure Options: AM-PAC 6 Clicks Basic Mobility (PT)  AM-PAC 6 Clicks Score (PT): 21  AM-PAC 6 Clicks Score (OT): 18  PT Discharge Summary  Anticipated Discharge Disposition (PT): home with assist, home with home health    Daniel Saldana PT  10/6/2023

## 2023-10-06 NOTE — PROGRESS NOTES
[facility] INPATIENT PROGRESS NOTE         [address]    10/6/2023      PATIENT IDENTIFICATION:  Name: Josephine Wolf ADMIT: 2023   : 1940  PCP: Bernabe Guy MD    MRN: [id number] LOS: 6 days   AGE/SEX: 83 y.o. female  ROOM: [facility]                     LOS 6    Reason for visit: COPD exacerbation      SUBJECTIVE:      No new pulmonary issues overnight.      Objective   OBJECTIVE:    Vital Sign Min/Max for last 24 hours  Temp  Min: 97.7 øF (36.5 øC)  Max: 98.4 øF (36.9 øC)   BP  Min: 101/67  Max: 119/71   Pulse  Min: 53  Max: 113   Resp  Min: 18  Max: 20   SpO2  Min: 87 %  Max: 95 %   No data recorded   No data recorded                         Body mass index is 29.63 kg/mý.  No intake or output data in the 24 hours ending 10/06/23 0728          Exam:  GEN:  No distress, appears stated age  NECK:  midline trachea  LUNGS: Non-labored      Assessment     Scheduled meds:  albuterol, 2.5 mg, Nebulization, Q6H - RT  allopurinol, 100 mg, Oral, Daily  amiodarone, 200 mg, Oral, Q24H  apixaban, 2.5 mg, Oral, Q12H  budesonide-formoterol, 2 puff, Inhalation, BID - RT   And  tiotropium bromide monohydrate, 2 puff, Inhalation, Daily - RT  busPIRone, 10 mg, Oral, BID  DULoxetine, 60 mg, Oral, Daily  gabapentin, 300 mg, Oral, TID  guaiFENesin, 600 mg, Oral, Q12H  levothyroxine, 75 mcg, Oral, Daily  metoprolol tartrate, 100 mg, Oral, Q8H  midodrine, 5 mg, Oral, TID AC  pantoprazole, 40 mg, Oral, BID AC  predniSONE, 20 mg, Oral, Daily With Breakfast  QUEtiapine, 100 mg, Oral, Nightly  rOPINIRole, 0.5 mg, Oral, Nightly  rosuvastatin, 10 mg, Oral, Nightly      IV meds:                           Data Review:  Results from last 7 days   Lab Units 10/06/23  0643 10/05/23  0632 10/04/23  0856 10/03/23  0651 10/02/23  0553   SODIUM mmol/L 144 141 136 136 140   POTASSIUM mmol/L 4.3 4.3 4.6 4.4 4.6   CHLORIDE mmol/L 107 105 103 102 103   CO2 mmol/L 31.4* 23.0 22.5 24.2 26.5   BUN mg/dL 58* 73* 74*  76* 75*   CREATININE mg/dL 1.55* 1.91* 2.20* 2.26* 2.34*   GLUCOSE mg/dL 108* 96 104* 94 92   CALCIUM mg/dL 9.4 9.0 8.6 8.6 8.8         Estimated Creatinine Clearance: 26.7 mL/min (A) (by C-G formula based on SCr of 1.55 mg/dL (H)).  Results from last 7 days   Lab Units 10/06/23  0643 10/05/23  0632 10/04/23  0702 10/03/23  0651 10/01/23  0800   WBC 10*3/mm3 5.68 8.05 8.60 6.73 7.38   HEMOGLOBIN g/dL 8.9* 9.3* 9.4* 8.7* 9.1*   PLATELETS 10*3/mm3 158 184 167 178 181     Results from last 7 days   Lab Units 09/30/23  1242   INR  0.96     Results from last 7 days   Lab Units 10/01/23  0800 09/30/23  1242   ALT (SGPT) U/L 20 22   AST (SGOT) U/L 20 18         Results from last 7 days   Lab Units 09/30/23  1332   LACTATE mmol/L 1.3         Glucose   Date/Time Value Ref Range Status   10/03/2023 1248 123 70 - 130 mg/dL Final       Chest x-ray shows diminished lung volumes and small effusion with basilar atelectasis.    CT head 10/3: no acute    2D echo 103: EF 60%.        Microbiology reviewed  Respiratory viral panel negative                Active Hospital Problems    Diagnosis  POA    **COPD exacerbation [J44.1]  Yes    Atrial fibrillation with RVR [I48.91]  Unknown      Resolved Hospital Problems   No resolved problems to display.         ASSESSMENT:  Acute exacerbation of COPD  Chronic hypoxemic respiratory failure  Acute on chronic hypercapnia with respiratory acidosis  Atrial fibrillation with RVR  CHRIS/Chronic kidney disease III  Anxiety disorder  RLS       PLAN:  Continue bronchodilator and steroid taper for AECOPD.  Oxygen back to home use.  Will follow peripherally for pulmonary issues and all other management per admitting service.          Car Amato MD  Pulmonary and Critical Care Medicine  [facility]  10/6/2023    07:28 EDT

## 2023-10-06 NOTE — PROGRESS NOTES
"    [facility] Progress Note      Patient Name: Josephine Wolf  : 1940  MRN: [id number]  Primary Care Physician:  Bernabe Guy MD  Date of admission: 2023    Subjective     Interval History:   Reports having hallucinations throughout the day; \"have not slept in 3 days\"  Breathing is at baseline on 3 L/min   Appetite is good; no N/V  UOP not recorded    Review of Systems:   As noted above    Objective     Vitals:   Temp:  [97.7 øF (36.5 øC)-98.4 øF (36.9 øC)] 97.7 øF (36.5 øC)  Heart Rate:  [] 98  Resp:  [18-20] 18  BP: (101-133)/(67-99) 133/93  Flow (L/min):  [3-4] 3    Intake/Output Summary (Last 24 hours) at 10/6/2023 1901  Last data filed at 10/6/2023 1700  Gross per 24 hour   Intake 960 ml   Output --   Net 960 ml         Physical Exam:    Constitutional: Awake, alert, NAD, cushingoid, chronically ill, overweight  HEENT: Sclera anicteric, no conjunctival injection  Neck: Supple, no carotid bruit, trachea at midline, no JVD  Resp: Crackles, rhonchi, and wheezes; not labored on 3 L/min   Cardiovascular: Irregularly irregular, tachycardic, no rub, 2/6M  Gastrointestinal: BS +, soft, nontender and nondistended  : No palpable bladder  Musculoskeletal: No edema  Psychiatric: Appropriate affect, cooperative, oriented, + hallucinations  Neurologic: No asterixis but +tremulous, moving all limbs, normal speech   Skin: Warm and dry     Scheduled Meds:     albuterol, 2.5 mg, Nebulization, Q6H - RT  allopurinol, 100 mg, Oral, Daily  amiodarone, 200 mg, Oral, Q24H  apixaban, 2.5 mg, Oral, Q12H  budesonide-formoterol, 2 puff, Inhalation, BID - RT   And  tiotropium bromide monohydrate, 2 puff, Inhalation, Daily - RT  busPIRone, 10 mg, Oral, BID  DULoxetine, 60 mg, Oral, Daily  gabapentin, 300 mg, Oral, TID  guaiFENesin, 600 mg, Oral, Q12H  levothyroxine, 75 mcg, Oral, Daily  metoprolol tartrate, 100 mg, Oral, Q8H  midodrine, 5 mg, Oral, TID AC  pantoprazole, 40 mg, Oral, " BID AC  predniSONE, 20 mg, Oral, Daily With Breakfast  QUEtiapine, 100 mg, Oral, Nightly  rOPINIRole, 0.5 mg, Oral, Nightly  rosuvastatin, 10 mg, Oral, Nightly      IV Meds:        Results Reviewed:   I have personally reviewed the results from the time of this admission to 10/6/2023 19:01 EDT     Results from last 7 days   Lab Units 10/06/23  0643 10/05/23  0632 10/04/23  0856 10/02/23  0553 10/01/23  0800 09/30/23  1332 09/30/23  1242   SODIUM mmol/L 144 141 136   < > 142  --  138   POTASSIUM mmol/L 4.3 4.3 4.6   < > 5.1  --  5.1   CHLORIDE mmol/L 107 105 103   < > 102  --  97*   CO2 mmol/L 31.4* 23.0 22.5   < > 28.1  --  28.0   BUN mg/dL 58* 73* 74*   < > 59*  --  64*   CREATININE mg/dL 1.55* 1.91* 2.20*   < > 1.74*   < > 1.79*   CALCIUM mg/dL 9.4 9.0 8.6   < > 8.8  --  9.3   BILIRUBIN mg/dL  --   --   --   --  0.5  --  0.6   ALK PHOS U/L  --   --   --   --  55  --  63   ALT (SGPT) U/L  --   --   --   --  20  --  22   AST (SGOT) U/L  --   --   --   --  20  --  18   GLUCOSE mg/dL 108* 96 104*   < > 141*  --  101*    < > = values in this interval not displayed.         Estimated Creatinine Clearance: 26.7 mL/min (A) (by C-G formula based on SCr of 1.55 mg/dL (H)).    Results from last 7 days   Lab Units 10/06/23  0643 10/05/23  0632 10/04/23  0856 10/04/23  0702 10/03/23  0651 10/02/23  0553 09/30/23  1242   MAGNESIUM mg/dL  --   --   --  2.1  --  2.0 1.5*   PHOSPHORUS mg/dL 3.7 4.2 4.7*  --    < > 6.8*  --     < > = values in this interval not displayed.               Results from last 7 days   Lab Units 10/06/23  0643 10/05/23  0632 10/04/23  0702 10/03/23  0651 10/01/23  0800   WBC 10*3/mm3 5.68 8.05 8.60 6.73 7.38   HEMOGLOBIN g/dL 8.9* 9.3* 9.4* 8.7* 9.1*   PLATELETS 10*3/mm3 158 184 167 178 181         Results from last 7 days   Lab Units 09/30/23  1242   INR  0.96         Assessment / Plan     ASSESSMENT:  1.  CHRIS on CKD3b, nonoliguric, improving:  likely prerenal due to decreased renal perfusion in the  setting of tachyarrhythmia, hypoxia, and modest hypotension.  Earlier urinalysis completely bland.  PVRs fine  2.  Atrial fibrillation with RVR  3.  COPD with exacerbation  4.  Hallucinations: steroid psychosis vs sleep deprivation vs hypoxia vs  high gabapentin dose vs other  5.  Anemia  6.  Osteoarthritis: Meloxicam on medication list, but she is not taking this  7.  Blurry vision, dizziness, and fall in morning 10/3    PLAN:  1.  Reduce gabapentin dose, as high dose currently might be contributing to hallucinations  2.  Aggressive pulmonary toilet and rate control  3.  Nursing Home anytime from renal view    Thank you for involving us in the care of Josephine Wolf.  Please feel free to call with any questions.    Carlos Barraza MD  10/06/23  19:01 EDT    [facility]  [phone]    Please note that portions of this note were completed with a voice recognition program.

## 2023-10-07 NOTE — PROGRESS NOTES
"Attempted BiPAP placement and pt pulled under the nose mask off instantly. Pt stated \"I cannot have anything like that on my face\"!  Pt was placed back on 5L cannula with sats 90%  "

## 2023-10-07 NOTE — NURSING NOTE
Patient's sats dropping to low 80s and sustaining. Very hard to wake patient up (was given 100mg seroquel at bedtime) and would fall back to sleep almost immediately. Oxygen at 8L HF.   0128  Paged LG Henry called back and ordered ABGs. I paged RT and Nita w/RT came up.   pH 7.293  pCO2 57.4  pO2 69.7  HCO3 27.8  O2 sat 91.1, pulse ox reading 96% on 8LHF w/ 22 resp rate.     Paged and spoke w/LG [place] again and she orderded bipap 16/8 to be worn until pt ready to take off in morning, then 1 hr after mask off to reorder ABGs.

## 2023-10-07 NOTE — PROGRESS NOTES
"Daily progress note    Primary care physician  Dr. PLMUMER    Subjective  Sleepy lethargic and refusing BiPAP family at bedside.    History of present illness  83-year-old white female with history of chronic hypoxic respiratory failure on home oxygen COPD paroxysmal SVT nonsustained ventricular tachycardia hypertension hypothyroidism and chronic kidney disease stage III who lives by herself presented to [facility] [place] emergency room with increased shortness of breath for last 1 week which is getting worse.  Patient also complaining of productive cough but no fever chills chest pain abdominal pain nausea vomiting diarrhea.  Patient work-up in ER revealed acute on chronic hypoxic respiratory failure with acute exacerbation of COPD and also found to be in rapid ventricular rate with history of atrial fibrillation admitted for management.  Patient is DNR per her wishes.     REVIEW OF SYSTEMS   unable to obtain     PHYSICAL EXAM   Blood pressure 116/75, pulse 91, temperature 97.7 øF (36.5 øC), temperature source Oral, resp. rate 18, height 157.5 cm (62\"), weight 73.5 kg (162 lb), SpO2 91%.    GENERAL: Sleepy in no respiratory distress  HENT: nares patent  Head/neck/ face are symmetric without gross deformity, signs of trauma, or swelling  EYES: no scleral icterus, no conjunctival pallor.  NECK: Supple, no meningismus  CV: Tachycardia with irregular regular rhythm.  No obvious murmur or rub.    RESPIRATORY: Normal effort and moving air bilaterally with expiratory wheezes  ABDOMEN: soft and nontender.  Nondistended bowel sounds positive  MUSCULOSKELETAL: no deformity.  No edema.  Intact distal pulses   NEURO: alert and appropriate, moves all extremities, follows commands.  No focal deficit  SKIN: warm, dry     LAB RESULTS  Lab Results (last 24 hours)       Procedure Component Value Units Date/Time    Renal Function Panel [[id number]]  (Abnormal) Collected: 10/07/23 0830    Specimen: Blood Updated: 10/07/23 0936     " Glucose 97 mg/dL      BUN 64 mg/dL      Creatinine 1.81 mg/dL      Sodium 144 mmol/L      Potassium 4.3 mmol/L      Chloride 108 mmol/L      CO2 29.0 mmol/L      Calcium 9.1 mg/dL      Albumin 3.5 g/dL      Phosphorus 2.9 mg/dL      Anion Gap 7.0 mmol/L      BUN/Creatinine Ratio 35.4     eGFR 27.8 mL/min/1.73     Narrative:      GFR Normal >60  Chronic Kidney Disease <60  Kidney Failure <15    The GFR formula is only valid for adults with stable renal function between ages 18 and 70.    CBC & Differential [[id number]]  (Abnormal) Collected: 10/07/23 0830    Specimen: Blood Updated: 10/07/23 0849    Narrative:      The following orders were created for panel order CBC & Differential.  Procedure                               Abnormality         Status                     ---------                               -----------         ------                     CBC Auto Differential[[id number]]        Abnormal            Final result                 Please view results for these tests on the individual orders.    CBC Auto Differential [[id number]]  (Abnormal) Collected: 10/07/23 0830    Specimen: Blood Updated: 10/07/23 0849     WBC 6.68 10*3/mm3      RBC 2.80 10*6/mm3      Hemoglobin 9.0 g/dL      Hematocrit 27.9 %      MCV 99.6 fL      MCH 32.1 pg      MCHC 32.3 g/dL      RDW 14.9 %      RDW-SD 54.0 fl      MPV 10.3 fL      Platelets 158 10*3/mm3      Neutrophil % 74.8 %      Lymphocyte % 14.7 %      Monocyte % 9.6 %      Eosinophil % 0.1 %      Basophil % 0.1 %      Immature Grans % 0.7 %      Neutrophils, Absolute 4.99 10*3/mm3      Lymphocytes, Absolute 0.98 10*3/mm3      Monocytes, Absolute 0.64 10*3/mm3      Eosinophils, Absolute 0.01 10*3/mm3      Basophils, Absolute 0.01 10*3/mm3      Immature Grans, Absolute 0.05 10*3/mm3      nRBC 0.1 /100 WBC     Blood Gas, Arterial - [[id number]]  (Abnormal) Collected: 10/07/23 0159    Specimen: Arterial Blood Updated: 10/07/23 0246     Site Right Radial     Zaki's Test Positive      pH, Arterial 7.293 pH units      pCO2, Arterial 57.4 mm Hg      pO2, Arterial 69.7 mm Hg      HCO3, Arterial 27.8 mmol/L      Base Excess, Arterial 0.7 mmol/L      Comment: Serial Number: [id number]:  [id number]        O2 Saturation, Arterial 91.1 %      CO2 Content 29.6 mmol/L      Barometric Pressure for Blood Gas 749.6000 mmHg      Modality HFNC     Flow Rate 8.0000 lpm      Ventilator Mode PS     Rate 22 Breaths/minute      Notified Who Mel Zaragoza RN     Read Back Yes     Notified Time 02:02     Hemodilution No     Device Comment SpO2 96%            Narrative & Impression   CT HEAD WO CONTRAST-     INDICATIONS: Blurry vision, trauma     TECHNIQUE: Radiation dose reduction techniques were utilized, including  automated exposure control and exposure modulation based on body size.  Noncontrast head CT     COMPARISON: 7/23/2023     FINDINGS:           No acute intracranial hemorrhage, midline shift or mass effect. No acute  territorial infarct is identified.     Right ICA stent is again noted.     Ventricles, cisterns, cerebral sulci are unremarkable for patient age.     The visualized paranasal sinuses, orbits, mastoid air cells are  unremarkable.                 IMPRESSION:     No acute intracranial hemorrhage or hydrocephalus. If there is further  clinical concern, MRI could be considered for further evaluation.       Results  Scan on 9/30/2023 1227 by New Onbase, [place]: ECG 12-LEAD         Author: -- Service: -- Author Type: --   Filed: Date of Service: Creation Time:   Status: (Other)   HEART RATE= 119  bpm  RR Interval= 504  ms  MS Interval=   ms  P Horizontal Axis=   deg  P Front Axis=   deg  QRSD Interval= 81  ms  QT Interval= 298  ms  QTcB= 420  ms  QRS Axis= 11  deg  T Wave Axis= 55  deg  - ABNORMAL ECG -  Atrial fibrillation  Abnormal R-wave progression, early transition  Borderline T abnormalities, anterior leads          Current Facility-Administered Medications:     albuterol (PROVENTIL)  nebulizer solution 0.083% 2.5 mg/3mL, 2.5 mg, Nebulization, Q6H - RT, Stanley Fowler MD, 2.5 mg at 10/07/23 1350    allopurinol (ZYLOPRIM) tablet 100 mg, 100 mg, Oral, Daily, Stanley Fowler MD, 100 mg at 10/07/23 0855    amiodarone (PACERONE) tablet 200 mg, 200 mg, Oral, Q24H, Beth Palacio APRN, 200 mg at 10/07/23 0855    apixaban (ELIQUIS) tablet 2.5 mg, 2.5 mg, Oral, Q12H, Stanley Fowler MD, 2.5 mg at 10/07/23 0855    budesonide-formoterol (SYMBICORT) 160-4.5 MCG/ACT inhaler 2 puff, 2 puff, Inhalation, BID - RT, 2 puff at 10/07/23 0658 **AND** tiotropium (SPIRIVA RESPIMAT) 2.5 mcg/act aerosol solution inhaler, 2 puff, Inhalation, Daily - RT, Stanley Fowler MD, 2 puff at 10/07/23 0657    busPIRone (BUSPAR) tablet 10 mg, 10 mg, Oral, BID, Stanley Fowler MD, 10 mg at 10/07/23 0855    DULoxetine (CYMBALTA) DR capsule 60 mg, 60 mg, Oral, Daily, Stanley Fowler MD, 60 mg at 10/07/23 0855    gabapentin (NEURONTIN) capsule 200 mg, 200 mg, Oral, TID, Carlos Barraza MD, 200 mg at 10/07/23 0855    guaiFENesin (MUCINEX) 12 hr tablet 600 mg, 600 mg, Oral, Q12H, Stanley Fowler MD, 600 mg at 10/07/23 0855    HYDROcodone-acetaminophen (NORCO) 7.5-325 MG per tablet 1 tablet, 1 tablet, Oral, Q4H PRN, Stanley Fowler MD, 1 tablet at 10/03/23 1311    hydrOXYzine (ATARAX) tablet 25 mg, 25 mg, Oral, TID PRN, Stanley Fowler MD, 25 mg at 10/05/23 2024    ipratropium-albuterol (DUO-NEB) nebulizer solution 3 mL, 3 mL, Nebulization, Q4H PRN, Stanley Fowler MD    levothyroxine (SYNTHROID, LEVOTHROID) tablet 75 mcg, 75 mcg, Oral, Daily, Stanley Fowler MD, 75 mcg at 10/07/23 0855    metoprolol tartrate (LOPRESSOR) tablet 100 mg, 100 mg, Oral, Q8H, PadillaNancy MD, 100 mg at 10/07/23 1323    midodrine (PROAMATINE) tablet 5 mg, 5 mg, Oral, TID AC, Stanley Fowler MD, 5 mg at 10/07/23 1323    nitroglycerin (NITROSTAT) SL tablet 0.4 mg, 0.4 mg, Sublingual, Q5 Min PRN, Stanley Fowler MD, 0.4 mg at 10/06/23 1139    ondansetron (ZOFRAN)  injection 4 mg, 4 mg, Intravenous, Q4H PRN, Amanda Fowler MD, 4 mg at 10/03/23 1311    pantoprazole (PROTONIX) EC tablet 40 mg, 40 mg, Oral, BID AC, Amanda Fowler MD, 40 mg at 10/07/23 0705    predniSONE (DELTASONE) tablet 20 mg, 20 mg, Oral, Daily With Breakfast, Amanda Fowler MD, 20 mg at 10/07/23 0855    QUEtiapine (SEROquel) tablet 50 mg, 50 mg, Oral, Nightly, Sheri Gilmore, APRN    rOPINIRole (REQUIP) tablet 0.5 mg, 0.5 mg, Oral, Nightly, Amanda Fowler MD, 0.5 mg at 10/06/23 2106    rosuvastatin (CRESTOR) tablet 10 mg, 10 mg, Oral, Nightly, Amanda Fowler MD, 10 mg at 10/06/23 2106    simethicone (MYLICON) chewable tablet 80 mg, 80 mg, Oral, 4x Daily PRN, Amanda Fowler MD, 80 mg at 10/03/23 1249    [COMPLETED] Insert Peripheral IV, , , Once **AND** sodium chloride 0.9 % flush 10 mL, 10 mL, Intravenous, PRN, Car Perea MD     ASSESSMENT  Acute on chronic hypoxic hypercapnic respiratory failure  Acute exacerbation of COPD  Paroxysmal atrial fibrillation with rapid ventricular rate   Status post fall with vision issues  Hypertension  Hypothyroidism  Anxiety disorder  Depression  Chronic anemia  Restless leg syndrome  Acute kidney injury resolving  Chronic kidney disease stage III    PLAN  CPM  Supplemental oxygen nebulizer  Continue steroids and taper  Control the heart rate  Continue to hold diuretics  Cardiology input appreciated  Pulmonary and nephrology to follow patient  Adjust home medications  Stress ulcer DVT prophylaxis  Supportive care  PT/OT  DNR and poor prognosis  Palliative care consult  Discussed with family nursing staff  Subacute rehab once more stable and bed available    AMANDA FOWLER MD    Copied text in this note has been reviewed and is accurate as of 10/07/23

## 2023-10-07 NOTE — PROGRESS NOTES
[facility] Progress Note      Patient Name: Josephine Wolf  : 1940  MRN: [id number]  Primary Care Physician:  Bernabe Guy MD  Date of admission: 2023    Subjective     Interval History:   Issues overnight with oxygenation    Review of Systems:   As noted above    Objective     Vitals:   Temp:  [97.5 øF (36.4 øC)-97.9 øF (36.6 øC)] 97.5 øF (36.4 øC)  Heart Rate:  [] 98  Resp:  [18-20] 18  BP: ()/(49-99) 115/75  Flow (L/min):  [3-5] 4    Intake/Output Summary (Last 24 hours) at 10/7/2023 0711  Last data filed at 10/6/2023 1700  Gross per 24 hour   Intake 960 ml   Output --   Net 960 ml       Physical Exam:    Constitutional: Awake, alert, NAD, cushingoid, chronically ill, overweight  HEENT: Sclera anicteric, no conjunctival injection  Neck: Supple, no carotid bruit, trachea at midline, no JVD  Resp: Crackles, rhonchi, and wheezes; not labored on 3 L/min   Cardiovascular: Irregularly irregular, tachycardic, no rub, 2/6M  Gastrointestinal: BS +, soft, nontender and nondistended  : No palpable bladder  Musculoskeletal: No edema  Psychiatric: Appropriate affect, cooperative, oriented, + hallucinations  Neurologic: No asterixis but +tremulous, moving all limbs, normal speech   Skin: Warm and dry     Scheduled Meds:     albuterol, 2.5 mg, Nebulization, Q6H - RT  allopurinol, 100 mg, Oral, Daily  amiodarone, 200 mg, Oral, Q24H  apixaban, 2.5 mg, Oral, Q12H  budesonide-formoterol, 2 puff, Inhalation, BID - RT   And  tiotropium bromide monohydrate, 2 puff, Inhalation, Daily - RT  busPIRone, 10 mg, Oral, BID  DULoxetine, 60 mg, Oral, Daily  gabapentin, 200 mg, Oral, TID  guaiFENesin, 600 mg, Oral, Q12H  levothyroxine, 75 mcg, Oral, Daily  metoprolol tartrate, 100 mg, Oral, Q8H  midodrine, 5 mg, Oral, TID AC  pantoprazole, 40 mg, Oral, BID AC  predniSONE, 20 mg, Oral, Daily With Breakfast  QUEtiapine, 50 mg, Oral, Nightly  rOPINIRole, 0.5 mg, Oral,  Nightly  rosuvastatin, 10 mg, Oral, Nightly      IV Meds:        Results Reviewed:   I have personally reviewed the results from the time of this admission to 10/7/2023 07:11 EDT     Results from last 7 days   Lab Units 10/06/23  0643 10/05/23  0632 10/04/23  0856 10/02/23  0553 10/01/23  0800 09/30/23  1332 09/30/23  1242   SODIUM mmol/L 144 141 136   < > 142  --  138   POTASSIUM mmol/L 4.3 4.3 4.6   < > 5.1  --  5.1   CHLORIDE mmol/L 107 105 103   < > 102  --  97*   CO2 mmol/L 31.4* 23.0 22.5   < > 28.1  --  28.0   BUN mg/dL 58* 73* 74*   < > 59*  --  64*   CREATININE mg/dL 1.55* 1.91* 2.20*   < > 1.74*   < > 1.79*   CALCIUM mg/dL 9.4 9.0 8.6   < > 8.8  --  9.3   BILIRUBIN mg/dL  --   --   --   --  0.5  --  0.6   ALK PHOS U/L  --   --   --   --  55  --  63   ALT (SGPT) U/L  --   --   --   --  20  --  22   AST (SGOT) U/L  --   --   --   --  20  --  18   GLUCOSE mg/dL 108* 96 104*   < > 141*  --  101*    < > = values in this interval not displayed.       Estimated Creatinine Clearance: 26.7 mL/min (A) (by C-G formula based on SCr of 1.55 mg/dL (H)).    Results from last 7 days   Lab Units 10/06/23  0643 10/05/23  0632 10/04/23  0856 10/04/23  0702 10/03/23  0651 10/02/23  0553 09/30/23  1242   MAGNESIUM mg/dL  --   --   --  2.1  --  2.0 1.5*   PHOSPHORUS mg/dL 3.7 4.2 4.7*  --    < > 6.8*  --     < > = values in this interval not displayed.             Results from last 7 days   Lab Units 10/06/23  0643 10/05/23  0632 10/04/23  0702 10/03/23  0651 10/01/23  0800   WBC 10*3/mm3 5.68 8.05 8.60 6.73 7.38   HEMOGLOBIN g/dL 8.9* 9.3* 9.4* 8.7* 9.1*   PLATELETS 10*3/mm3 158 184 167 178 181       Results from last 7 days   Lab Units 09/30/23  1242   INR  0.96       Assessment / Plan     ASSESSMENT:  1.  CHRIS on CKD3b, nonoliguric, improving:  likely prerenal due to decreased renal perfusion in the setting of tachyarrhythmia, hypoxia, and modest hypotension.  Earlier urinalysis completely bland.  PVRs fine  2.  Atrial  fibrillation with RVR  3.  COPD with exacerbation  4.  Hallucinations: steroid psychosis vs sleep deprivation vs hypoxia vs  high gabapentin dose vs other  5.  Anemia  6.  Osteoarthritis: Meloxicam on medication list, but she is not taking this  7.  Blurry vision, dizziness, and fall in morning 10/3    PLAN:  1.  Consider decrease in Seroquel to help with overnight oxygenation  2.  Aggressive pulmonary toilet and rate control  3.  Check morning labs  4.  Nursing Home anytime from renal view    Thank you for involving us in the care of Josephine Wolf.  Please feel free to call with any questions.    Marybeth Hammond MD  10/07/23  07:11 EDT    [facility]  [phone]    Please note that portions of this note were completed with a voice recognition program.

## 2023-10-07 NOTE — PROGRESS NOTES
"                                              LOS: 7 days   Patient Care Team:  Bernabe Guy MD as PCP - General (Internal Medicine)  Avi Aly MD as Consulting Physician (Nephrology)    Chief Complaint:  F/up COPD, respiratory failure    Subjective   Interval History  I reviewed the admission note, progress notes, PMH, PSH, Family hx, social history, imagings and prior records on this admission, summarized the finding in my note and formulated a transition of care plan.      On 4 L oxygen.  Reported cough with difficulty expectorating.  No dyspnea at rest.    REVIEW OF SYSTEMS:   CARDIOVASCULAR: No chest pain, chest pressure or chest discomfort. No palpitations or edema.   GASTROINTESTINAL: No anorexia, nausea, vomiting or diarrhea. No abdominal pain.  CONSTITUTIONAL: No fever or chills.     Ventilator/Non-Invasive Ventilation Settings (From admission, onward)       Start     Ordered    10/07/23 0233  NIPPV (CPAP or BIPAP)  Until Discontinued        Question Answer Comment   Indication Sleep Apnea    Type BIPAP    IPAP 16    EPAP 8    Titrate Oxygen for SpO2 88 - 92%        10/07/23 0241                          Physical Exam:     Vital Signs  Temp:  [97.5 øF (36.4 øC)-97.9 øF (36.6 øC)] 97.9 øF (36.6 øC)  Heart Rate:  [] 91  Resp:  [18-20] 18  BP: ()/(49-82) 115/82    Intake/Output Summary (Last 24 hours) at 10/7/2023 1456  Last data filed at 10/6/2023 1700  Gross per 24 hour   Intake 360 ml   Output --   Net 360 ml     Flowsheet Rows      Flowsheet Row First Filed Value   Admission Height 157.5 cm (62\") Documented at 09/30/2023 1223   Admission Weight 73.5 kg (162 lb) Documented at 09/30/2023 1223            PPE used per hospital policy    General Appearance:   Alert, cooperative, in no acute distress   ENMT:  Mallampati score 3, moist mucous membrane   Eyes:  Pupils equal and reactive to light. EOMI   Neck:   Trachea midline. No thyromegaly.   Lungs:   Minimal bilateral expiratory " wheezing.  Coarse breath sounds with rhonchi.    Heart:   Regular rhythm and normal rate, normal S1 and S2, no         murmur   Skin:   No rash or ecchymosis   Abdomen:    Obese. Soft. No tenderness. No HSM.   Neuro/psych:  Conscious, alert, oriented x3. Strength 5/5 in upper and lower  ext.  Appropriate mood and affect   Extremities:  No cyanosis, clubbing or edema.  Warm extremities and well-perfused          Results Review:        Results from last 7 days   Lab Units 10/07/23  0830 10/06/23  0643 10/05/23  0632   SODIUM mmol/L 144 144 141   POTASSIUM mmol/L 4.3 4.3 4.3   CHLORIDE mmol/L 108* 107 105   CO2 mmol/L 29.0 31.4* 23.0   BUN mg/dL 64* 58* 73*   CREATININE mg/dL 1.81* 1.55* 1.91*   GLUCOSE mg/dL 97 108* 96   CALCIUM mg/dL 9.1 9.4 9.0     Results from last 7 days   Lab Units 10/06/23  1414 10/06/23  1149 09/30/23  1711   HSTROP T ng/L 29* 28* 27*     Results from last 7 days   Lab Units 10/07/23  0830 10/06/23  0643 10/05/23  0632   WBC 10*3/mm3 6.68 5.68 8.05   HEMOGLOBIN g/dL 9.0* 8.9* 9.3*   HEMATOCRIT % 27.9* 26.9* 29.0*   PLATELETS 10*3/mm3 158 158 184                           Results from last 7 days   Lab Units 10/07/23  0159   PH, ARTERIAL pH units 7.293*   PCO2, ARTERIAL mm Hg 57.4*   PO2 ART mm Hg 69.7*   O2 SATURATION ART % 91.1*   FLOW RATE lpm 8.0000   MODALITY  HFNC         I reviewed the patient's new clinical results.        Medication Review:   albuterol, 2.5 mg, Nebulization, Q6H - RT  allopurinol, 100 mg, Oral, Daily  amiodarone, 200 mg, Oral, Q24H  apixaban, 2.5 mg, Oral, Q12H  budesonide-formoterol, 2 puff, Inhalation, BID - RT   And  tiotropium bromide monohydrate, 2 puff, Inhalation, Daily - RT  busPIRone, 10 mg, Oral, BID  DULoxetine, 60 mg, Oral, Daily  gabapentin, 200 mg, Oral, TID  guaiFENesin, 600 mg, Oral, Q12H  levothyroxine, 75 mcg, Oral, Daily  metoprolol tartrate, 100 mg, Oral, Q8H  midodrine, 5 mg, Oral, TID AC  pantoprazole, 40 mg, Oral, BID AC  predniSONE, 20 mg, Oral,  Daily With Breakfast  QUEtiapine, 50 mg, Oral, Nightly  rOPINIRole, 0.5 mg, Oral, Nightly  rosuvastatin, 10 mg, Oral, Nightly             Diagnostic imaging:  I personally and independently reviewed the following images:  CXR 10/2/2023: Low lung volumes.  No pulmonary infiltrates but possibly fluid in the left fissure/atelectasis.    Assessment     Acute exacerbation of COPD  Chronic hypoxemic respiratory failure, on oxygen 3 L/min.  Dr. Ca  Acute on chronic hypercapnia with respiratory acidosis  Atrial fibrillation with RVR  RLS     All problems new to me    Plan       Symbicort 2 puffs twice daily and Spiriva 2 puffs daily.  Albuterol nebulizer every 6 hours.  Add hypertonic saline twice a day to improve mucus clearance  Oxygen by NC and titrate keep SPO2 >90%  Prednisone 20 mg day # 2.  Continue to titrate down gradually.      Anat Cowan MD  10/07/23  14:56 EDT        This note was dictated utilizing Mobile-XL dictation

## 2023-10-08 NOTE — PLAN OF CARE
Goal Outcome Evaluation:      VSS, 5.5L NC with sats between 88% - 92%. Patient unable to tolerate bipap this shift. Also unable to tolerate walking to bathroom. Nurse instructed her to please use bedside commode. No complaints of pain. Son at bedside.

## 2023-10-08 NOTE — PROGRESS NOTES
[facility] Progress Note      Patient Name: Josephine Wolf  : 1940  MRN: [id number]  Primary Care Physician:  Bernabe Guy MD  Date of admission: 2023    Subjective     Interval History:   No new events     Review of Systems:   As noted above    Objective     Vitals:   Temp:  [97.3 øF (36.3 øC)-97.7 øF (36.5 øC)] 97.3 øF (36.3 øC)  Heart Rate:  [] 110  Resp:  [18-24] 22  BP: (116-138)/() 138/95  Flow (L/min):  [4-6] 6  No intake or output data in the 24 hours ending 10/08/23 0802      Physical Exam:    Constitutional: Awake, alert, NAD, cushingoid, chronically ill, overweight  HEENT: Sclera anicteric, no conjunctival injection  Neck: Supple, no carotid bruit, trachea at midline, no JVD  Resp: Crackles, rhonchi, and wheezes; not labored on 3 L/min   Cardiovascular: Irregularly irregular, tachycardic, no rub, 2/6M  Gastrointestinal: BS +, soft, nontender and nondistended  : No palpable bladder  Musculoskeletal: No edema  Psychiatric: Appropriate affect, cooperative, oriented, + hallucinations  Neurologic: No asterixis but +tremulous, moving all limbs, normal speech   Skin: Warm and dry     Scheduled Meds:     albuterol, 2.5 mg, Nebulization, Q6H - RT  allopurinol, 100 mg, Oral, Daily  amiodarone, 200 mg, Oral, Q24H  apixaban, 2.5 mg, Oral, Q12H  budesonide-formoterol, 2 puff, Inhalation, BID - RT   And  tiotropium bromide monohydrate, 2 puff, Inhalation, Daily - RT  busPIRone, 10 mg, Oral, BID  DULoxetine, 60 mg, Oral, Daily  gabapentin, 200 mg, Oral, TID  guaiFENesin, 600 mg, Oral, Q12H  levothyroxine, 75 mcg, Oral, Daily  metoprolol tartrate, 100 mg, Oral, Q8H  midodrine, 5 mg, Oral, TID AC  pantoprazole, 40 mg, Oral, BID AC  predniSONE, 20 mg, Oral, Daily With Breakfast  QUEtiapine, 50 mg, Oral, Nightly  rOPINIRole, 0.5 mg, Oral, Nightly  rosuvastatin, 10 mg, Oral, Nightly      IV Meds:        Results Reviewed:   I have personally reviewed the  results from the time of this admission to 10/8/2023 08:02 EDT     Results from last 7 days   Lab Units 10/07/23  0830 10/06/23  0643 10/05/23  0632   SODIUM mmol/L 144 144 141   POTASSIUM mmol/L 4.3 4.3 4.3   CHLORIDE mmol/L 108* 107 105   CO2 mmol/L 29.0 31.4* 23.0   BUN mg/dL 64* 58* 73*   CREATININE mg/dL 1.81* 1.55* 1.91*   CALCIUM mg/dL 9.1 9.4 9.0   GLUCOSE mg/dL 97 108* 96       Estimated Creatinine Clearance: 22.9 mL/min (A) (by C-G formula based on SCr of 1.81 mg/dL (H)).    Results from last 7 days   Lab Units 10/07/23  0830 10/06/23  0643 10/05/23  0632 10/04/23  0856 10/04/23  0702 10/03/23  0651 10/02/23  0553   MAGNESIUM mg/dL  --   --   --   --  2.1  --  2.0   PHOSPHORUS mg/dL 2.9 3.7 4.2   < >  --    < > 6.8*    < > = values in this interval not displayed.             Results from last 7 days   Lab Units 10/07/23  0830 10/06/23  0643 10/05/23  0632 10/04/23  0702 10/03/23  0651   WBC 10*3/mm3 6.68 5.68 8.05 8.60 6.73   HEMOGLOBIN g/dL 9.0* 8.9* 9.3* 9.4* 8.7*   PLATELETS 10*3/mm3 158 158 184 167 178               Assessment / Plan     ASSESSMENT:  1.  CHRIS on CKD3b, nonoliguric, improving:  likely prerenal due to decreased renal perfusion in the setting of tachyarrhythmia, hypoxia, and modest hypotension.  Earlier urinalysis completely bland.  PVRs fine  2.  Atrial fibrillation with RVR  3.  COPD with exacerbation  4.  Hallucinations: steroid psychosis vs sleep deprivation vs hypoxia vs  high gabapentin dose vs other  5.  Anemia  6.  Osteoarthritis: Meloxicam on medication list, but she is not taking this  7.  Blurry vision, dizziness, and fall in morning 10/3    PLAN:  1.  Aggressive pulmonary toilet  2.  Check morning labs  3.  Nursing Home anytime from renal view    Thank you for involving us in the care of Josephine Wolf.  Please feel free to call with any questions.    Marybeth Hammond MD  10/08/23  08:02 EDT    [facility]  [phone]       Comment: Pt has a history of tinea Cruris many years ago. The areas that I see appear more consistent with irritant contact on the scrotum. I recommended he do OTC lamisil cream daily but then spot treat with the hydrocortisone. Render Risk Assessment In Note?: no Detail Level: Simple

## 2023-10-08 NOTE — PROGRESS NOTES
"                                              LOS: 8 days   Patient Care Team:  Bernabe Guy MD as PCP - General (Internal Medicine)  Avi Aly MD as Consulting Physician (Nephrology)    Chief Complaint:  F/up COPD, respiratory failure    Subjective   Interval History  On 4.5 L oxygen.  Reported improved breathing.  Cough is more productive but still relatively dry.  She did not use the hospital BiPAP last night.    REVIEW OF SYSTEMS:   CARDIOVASCULAR: No chest pain, chest pressure or chest discomfort. No palpitations or edema.   GASTROINTESTINAL: No anorexia, nausea, vomiting or diarrhea. No abdominal pain.  CONSTITUTIONAL: No fever or chills.     Ventilator/Non-Invasive Ventilation Settings (From admission, onward)       Start     Ordered    10/07/23 0233  NIPPV (CPAP or BIPAP)  Until Discontinued        Question Answer Comment   Indication Sleep Apnea    Type BIPAP    IPAP 16    EPAP 8    Titrate Oxygen for SpO2 88 - 92%        10/07/23 0241                          Physical Exam:     Vital Signs  Temp:  [97.3 øF (36.3 øC)-97.9 øF (36.6 øC)] 97.5 øF (36.4 øC)  Heart Rate:  [] 98  Resp:  [20-25] 25  BP: (118-138)/() 118/95  No intake or output data in the 24 hours ending 10/08/23 1507    Flowsheet Rows      Flowsheet Row First Filed Value   Admission Height 157.5 cm (62\") Documented at 09/30/2023 1223   Admission Weight 73.5 kg (162 lb) Documented at 09/30/2023 1223            PPE used per hospital policy    General Appearance:   Alert, cooperative, in no acute distress   ENMT:  Mallampati score 3, moist mucous membrane   Eyes:  Pupils equal and reactive to light. EOMI   Neck:   Trachea midline. No thyromegaly.   Lungs:   Bilateral rhonchi but improved.  Decreased air entry overall.  Very minimal expiratory wheezing    Heart:   Regular rhythm and normal rate, normal S1 and S2, no         murmur   Skin:   No rash or ecchymosis   Abdomen:    Obese. Soft. No tenderness. No HSM. "   Neuro/psych:  Conscious, alert, oriented x3. Strength 5/5 in upper and lower  ext.  Appropriate mood and affect   Extremities:  No cyanosis, clubbing or edema.  Warm extremities and well-perfused          Results Review:        Results from last 7 days   Lab Units 10/08/23  0837 10/07/23  0830 10/06/23  0643   SODIUM mmol/L 144 144 144   POTASSIUM mmol/L 4.5 4.3 4.3   CHLORIDE mmol/L 108* 108* 107   CO2 mmol/L 27.0 29.0 31.4*   BUN mg/dL 47* 64* 58*   CREATININE mg/dL 1.63* 1.81* 1.55*   GLUCOSE mg/dL 81 97 108*   CALCIUM mg/dL 9.1 9.1 9.4     Results from last 7 days   Lab Units 10/06/23  1414 10/06/23  1149   HSTROP T ng/L 29* 28*     Results from last 7 days   Lab Units 10/08/23  0837 10/07/23  0830 10/06/23  0643   WBC 10*3/mm3 8.79 6.68 5.68   HEMOGLOBIN g/dL 9.2* 9.0* 8.9*   HEMATOCRIT % 29.1* 27.9* 26.9*   PLATELETS 10*3/mm3 163 158 158                           Results from last 7 days   Lab Units 10/07/23  0159   PH, ARTERIAL pH units 7.293*   PCO2, ARTERIAL mm Hg 57.4*   PO2 ART mm Hg 69.7*   O2 SATURATION ART % 91.1*   FLOW RATE lpm 8.0000   MODALITY  HFNC         I reviewed the patient's new clinical results.        Medication Review:   albuterol, 2.5 mg, Nebulization, Q6H - RT  allopurinol, 100 mg, Oral, Daily  amiodarone, 200 mg, Oral, Q24H  apixaban, 2.5 mg, Oral, Q12H  budesonide-formoterol, 2 puff, Inhalation, BID - RT   And  tiotropium bromide monohydrate, 2 puff, Inhalation, Daily - RT  busPIRone, 10 mg, Oral, BID  DULoxetine, 60 mg, Oral, Daily  gabapentin, 200 mg, Oral, TID  guaiFENesin, 600 mg, Oral, Q12H  levothyroxine, 75 mcg, Oral, Daily  metoprolol tartrate, 100 mg, Oral, Q8H  midodrine, 5 mg, Oral, TID AC  pantoprazole, 40 mg, Oral, BID AC  predniSONE, 20 mg, Oral, Daily With Breakfast  QUEtiapine, 50 mg, Oral, Nightly  rOPINIRole, 0.5 mg, Oral, Nightly  rosuvastatin, 10 mg, Oral, Nightly             Diagnostic imaging:  I personally and independently reviewed the following images:  CXR  10/2/2023: Low lung volumes.  No pulmonary infiltrates but possibly fluid in the left fissure/atelectasis.    Assessment     Acute exacerbation of COPD  Chronic hypoxemic respiratory failure, on oxygen 3 L/min.  Dr. Ca  Acute on chronic hypercapnia with respiratory acidosis  Atrial fibrillation with RVR  RLS       Plan       Symbicort 2 puffs twice daily and Spiriva 2 puffs daily.  Albuterol nebulizer every 6 hours.  Hypertonic saline twice a day to improve mucus clearance  Oxygen by NC and titrate keep SPO2 >90%  Prednisone 20 mg day # 3.  Decrease to 10 mg daily.      Anat Cowan MD  10/08/23  15:07 EDT        This note was dictated utilizing Dragon dictation

## 2023-10-08 NOTE — PROGRESS NOTES
"Daily progress note    Primary care physician  Dr. PLUMMER    Subjective  Awake and alert with no complaint and refusing BiPAP    History of present illness  83-year-old white female with history of chronic hypoxic respiratory failure on home oxygen COPD paroxysmal SVT nonsustained ventricular tachycardia hypertension hypothyroidism and chronic kidney disease stage III who lives by herself presented to [facility] emergency room with increased shortness of breath for last 1 week which is getting worse.  Patient also complaining of productive cough but no fever chills chest pain abdominal pain nausea vomiting diarrhea.  Patient work-up in ER revealed acute on chronic hypoxic respiratory failure with acute exacerbation of COPD and also found to be in rapid ventricular rate with history of atrial fibrillation admitted for management.  Patient is DNR per her wishes.     REVIEW OF SYSTEMS   unable to obtain     PHYSICAL EXAM   Blood pressure 118/95, pulse 98, temperature 97.5 øF (36.4 øC), temperature source Oral, resp. rate 25, height 157.5 cm (62\"), weight 73.5 kg (162 lb), SpO2 94%.    GENERAL: Sleepy in no respiratory distress  HENT: nares patent  Head/neck/ face are symmetric without gross deformity, signs of trauma, or swelling  EYES: no scleral icterus, no conjunctival pallor.  NECK: Supple, no meningismus  CV: Tachycardia with irregular regular rhythm.  No obvious murmur or rub.    RESPIRATORY: Normal effort and moving air bilaterally with expiratory wheezes  ABDOMEN: soft and nontender.  Nondistended bowel sounds positive  MUSCULOSKELETAL: no deformity.  No edema.  Intact distal pulses   NEURO: alert and appropriate, moves all extremities, follows commands.  No focal deficit  SKIN: warm, dry     LAB RESULTS  Lab Results (last 24 hours)       Procedure Component Value Units Date/Time    Renal Function Panel [[id number]]  (Abnormal) Collected: 10/08/23 0837    Specimen: Blood Updated: 10/08/23 0959     " Glucose 81 mg/dL      BUN 47 mg/dL      Creatinine 1.63 mg/dL      Sodium 144 mmol/L      Potassium 4.5 mmol/L      Chloride 108 mmol/L      CO2 27.0 mmol/L      Calcium 9.1 mg/dL      Albumin 3.4 g/dL      Phosphorus 2.4 mg/dL      Anion Gap 9.0 mmol/L      BUN/Creatinine Ratio 28.8     eGFR 31.6 mL/min/1.73     Narrative:      GFR Normal >60  Chronic Kidney Disease <60  Kidney Failure <15    The GFR formula is only valid for adults with stable renal function between ages 18 and 70.    CBC & Differential [[id number]]  (Abnormal) Collected: 10/08/23 0837    Specimen: Blood Updated: 10/08/23 0939    Narrative:      The following orders were created for panel order CBC & Differential.  Procedure                               Abnormality         Status                     ---------                               -----------         ------                     CBC Auto Differential[[id number]]        Abnormal            Final result                 Please view results for these tests on the individual orders.    CBC Auto Differential [[id number]]  (Abnormal) Collected: 10/08/23 0837    Specimen: Blood Updated: 10/08/23 0939     WBC 8.79 10*3/mm3      RBC 2.92 10*6/mm3      Hemoglobin 9.2 g/dL      Hematocrit 29.1 %      MCV 99.7 fL      MCH 31.5 pg      MCHC 31.6 g/dL      RDW 14.5 %      RDW-SD 52.5 fl      MPV 10.6 fL      Platelets 163 10*3/mm3      Neutrophil % 75.3 %      Lymphocyte % 13.8 %      Monocyte % 9.6 %      Eosinophil % 0.3 %      Basophil % 0.1 %      Immature Grans % 0.9 %      Neutrophils, Absolute 6.62 10*3/mm3      Lymphocytes, Absolute 1.21 10*3/mm3      Monocytes, Absolute 0.84 10*3/mm3      Eosinophils, Absolute 0.03 10*3/mm3      Basophils, Absolute 0.01 10*3/mm3      Immature Grans, Absolute 0.08 10*3/mm3      nRBC 0.1 /100 WBC             Narrative & Impression   CT HEAD WO CONTRAST-     INDICATIONS: Blurry vision, trauma     TECHNIQUE: Radiation dose reduction techniques were utilized,  including  automated exposure control and exposure modulation based on body size.  Noncontrast head CT     COMPARISON: 7/23/2023     FINDINGS:           No acute intracranial hemorrhage, midline shift or mass effect. No acute  territorial infarct is identified.     Right ICA stent is again noted.     Ventricles, cisterns, cerebral sulci are unremarkable for patient age.     The visualized paranasal sinuses, orbits, mastoid air cells are  unremarkable.                 IMPRESSION:     No acute intracranial hemorrhage or hydrocephalus. If there is further  clinical concern, MRI could be considered for further evaluation.       Results  Scan on 9/30/2023 1227 by [employer], [place]: ECG 12-LEAD         Author: -- Service: -- Author Type: --   Filed: Date of Service: Creation Time:   Status: (Other)   HEART RATE= 119  bpm  RR Interval= 504  ms  IA Interval=   ms  P Horizontal Axis=   deg  P Front Axis=   deg  QRSD Interval= 81  ms  QT Interval= 298  ms  QTcB= 420  ms  QRS Axis= 11  deg  T Wave Axis= 55  deg  - ABNORMAL ECG -  Atrial fibrillation  Abnormal R-wave progression, early transition  Borderline T abnormalities, anterior leads          Current Facility-Administered Medications:     albuterol (PROVENTIL) nebulizer solution 0.083% 2.5 mg/3mL, 2.5 mg, Nebulization, Q6H - RT, Stanley Fowler MD, 2.5 mg at 10/08/23 0707    allopurinol (ZYLOPRIM) tablet 100 mg, 100 mg, Oral, Daily, Stanley Fowler MD, 100 mg at 10/08/23 0829    amiodarone (PACERONE) tablet 200 mg, 200 mg, Oral, Q24H, Beth Palacio APRN, 200 mg at 10/08/23 0829    apixaban (ELIQUIS) tablet 2.5 mg, 2.5 mg, Oral, Q12H, Stanley Fowler MD, 2.5 mg at 10/08/23 0829    budesonide-formoterol (SYMBICORT) 160-4.5 MCG/ACT inhaler 2 puff, 2 puff, Inhalation, BID - RT, 2 puff at 10/08/23 0706 **AND** tiotropium (SPIRIVA RESPIMAT) 2.5 mcg/act aerosol solution inhaler, 2 puff, Inhalation, Daily - RT, Stanley Fowler MD, 2 puff at 10/08/23 0708    busPIRone  (BUSPAR) tablet 10 mg, 10 mg, Oral, BID, Stanley Fowler MD, 10 mg at 10/08/23 0829    DULoxetine (CYMBALTA) DR capsule 60 mg, 60 mg, Oral, Daily, Stanley Fowler MD, 60 mg at 10/08/23 0829    gabapentin (NEURONTIN) capsule 200 mg, 200 mg, Oral, TID, Carlos Barraza MD, 200 mg at 10/08/23 0829    guaiFENesin (MUCINEX) 12 hr tablet 600 mg, 600 mg, Oral, Q12H, Stanley Fowler MD, 600 mg at 10/08/23 0829    HYDROcodone-acetaminophen (NORCO) 7.5-325 MG per tablet 1 tablet, 1 tablet, Oral, Q4H PRN, Stanley Fowler MD, 1 tablet at 10/08/23 1042    hydrOXYzine (ATARAX) tablet 25 mg, 25 mg, Oral, TID PRN, Stanley Fowler MD, 25 mg at 10/05/23 2024    ipratropium-albuterol (DUO-NEB) nebulizer solution 3 mL, 3 mL, Nebulization, Q4H PRN, Stanley Fowler MD    levothyroxine (SYNTHROID, LEVOTHROID) tablet 75 mcg, 75 mcg, Oral, Daily, Stanley Fowler MD, 75 mcg at 10/08/23 0829    metoprolol tartrate (LOPRESSOR) tablet 100 mg, 100 mg, Oral, Q8H, Nancy Padilla MD, 100 mg at 10/08/23 1042    midodrine (PROAMATINE) tablet 5 mg, 5 mg, Oral, TID AC, Stanley Fowler MD, 5 mg at 10/07/23 1641    nitroglycerin (NITROSTAT) SL tablet 0.4 mg, 0.4 mg, Sublingual, Q5 Min PRN, Stanley Fowler MD, 0.4 mg at 10/06/23 1139    ondansetron (ZOFRAN) injection 4 mg, 4 mg, Intravenous, Q4H PRN, Stanley Fowler MD, 4 mg at 10/03/23 1311    pantoprazole (PROTONIX) EC tablet 40 mg, 40 mg, Oral, BID AC, Stanley Fowler MD, 40 mg at 10/08/23 0651    predniSONE (DELTASONE) tablet 20 mg, 20 mg, Oral, Daily With Breakfast, Stanley Fowler MD, 20 mg at 10/08/23 0829    QUEtiapine (SEROquel) tablet 50 mg, 50 mg, Oral, Nightly, Sheri Gilmore, APRN, 50 mg at 10/07/23 2338    rOPINIRole (REQUIP) tablet 0.5 mg, 0.5 mg, Oral, Nightly, Stanley Fowler MD, 0.5 mg at 10/07/23 2118    rosuvastatin (CRESTOR) tablet 10 mg, 10 mg, Oral, Nightly, Stanley Fowler MD, 10 mg at 10/07/23 2118    simethicone (MYLICON) chewable tablet 80 mg, 80 mg, Oral, 4x Daily PRN, Stanley Fowler MD, 80 mg at  10/03/23 1249    [COMPLETED] Insert Peripheral IV, , , Once **AND** sodium chloride 0.9 % flush 10 mL, 10 mL, Intravenous, PRN, Car Perea MD    sodium chloride 7 % nebulizer solution nebulizer solution 4 mL, 4 mL, Nebulization, BID - RT, Anat Cowan MD     ASSESSMENT  Acute on chronic hypoxic hypercapnic respiratory failure  Acute exacerbation of COPD  Paroxysmal atrial fibrillation with rapid ventricular rate   Status post fall with vision issues  Hypertension  Hypothyroidism  Anxiety disorder  Depression  Chronic anemia  Restless leg syndrome  Acute kidney injury resolving  Chronic kidney disease stage III    PLAN  CPM  BiPAP as needed  Supplemental oxygen nebulizer  Prednisone tapering dose  Control the heart rate  Continue to hold diuretics  Cardiology input appreciated  Pulmonary and nephrology to follow patient  Adjust home medications  Stress ulcer DVT prophylaxis  Supportive care  PT/OT  DNR and poor prognosis  Palliative care consult  Discussed with family nursing staff  Subacute rehab once more stable and bed available    AMANDA MURO MD    Copied text in this note has been reviewed and is accurate as of 10/08/23

## 2023-10-09 NOTE — PLAN OF CARE
Pt oriented, sats better after solumedrol given up to 91% on 5L, Pt refusing Bipap at this time. Up to the chair most of the day.      Problem: Adult Inpatient Plan of Care  Goal: Plan of Care Review  Outcome: Ongoing, Progressing  Goal: Patient-Specific Goal (Individualized)  Outcome: Ongoing, Progressing  Goal: Absence of Hospital-Acquired Illness or Injury  Outcome: Ongoing, Progressing  Intervention: Identify and Manage Fall Risk  Recent Flowsheet Documentation  Taken 10/9/2023 1400 by Nelly Suarez RN  Safety Promotion/Fall Prevention:   activity supervised   assistive device/personal items within reach   clutter free environment maintained   fall prevention program maintained   nonskid shoes/slippers when out of bed   room organization consistent   safety round/check completed  Taken 10/9/2023 0930 by Nelly Suarez RN  Safety Promotion/Fall Prevention:   activity supervised   assistive device/personal items within reach   clutter free environment maintained   fall prevention program maintained   nonskid shoes/slippers when out of bed   room organization consistent   safety round/check completed  Intervention: Prevent Skin Injury  Recent Flowsheet Documentation  Taken 10/9/2023 1400 by Nelly Suarez RN  Body Position: position changed independently  Skin Protection: adhesive use limited  Taken 10/9/2023 1200 by Nelly Suarez RN  Body Position: position changed independently  Taken 10/9/2023 0930 by Nelly Suarez RN  Body Position: position changed independently  Skin Protection: adhesive use limited  Intervention: Prevent and Manage VTE (Venous Thromboembolism) Risk  Recent Flowsheet Documentation  Taken 10/9/2023 1600 by Nelly Suarez, RN  Activity Management: up in chair  Taken 10/9/2023 1400 by Nelly Suarez RN  Activity Management: up in chair  Range of Motion: active ROM (range of motion) encouraged  Taken 10/9/2023 1000 by Nelly Suarez RN  Activity Management: up in chair  Taken  10/9/2023 0930 by Nelly Suarez RN  Activity Management: activity encouraged  VTE Prevention/Management: (Eliquis) other (see comments)  Range of Motion: active ROM (range of motion) encouraged  Intervention: Prevent Infection  Recent Flowsheet Documentation  Taken 10/9/2023 1400 by Nelly Suarez RN  Infection Prevention:   rest/sleep promoted   single patient room provided  Taken 10/9/2023 0930 by Nelly Suarez RN  Infection Prevention:   rest/sleep promoted   single patient room provided  Goal: Optimal Comfort and Wellbeing  Outcome: Ongoing, Progressing  Intervention: Provide Person-Centered Care  Recent Flowsheet Documentation  Taken 10/9/2023 1400 by Nelly Suarez RN  Trust Relationship/Rapport: care explained  Taken 10/9/2023 0930 by Nelly Suarez RN  Trust Relationship/Rapport:   care explained   thoughts/feelings acknowledged   reassurance provided   questions encouraged   questions answered   emotional support provided  Goal: Readiness for Transition of Care  Outcome: Ongoing, Progressing     Problem: COPD (Chronic Obstructive Pulmonary Disease) Comorbidity  Goal: Maintenance of COPD Symptom Control  Outcome: Ongoing, Progressing  Intervention: Maintain COPD-Symptom Control  Recent Flowsheet Documentation  Taken 10/9/2023 1400 by Nelly Suarez RN  Medication Review/Management: medications reviewed  Taken 10/9/2023 0930 by Nelly Suarez RN  Medication Review/Management: medications reviewed     Problem: Skin Injury Risk Increased  Goal: Skin Health and Integrity  Outcome: Ongoing, Progressing  Intervention: Optimize Skin Protection  Recent Flowsheet Documentation  Taken 10/9/2023 1400 by Nelly Suarez RN  Pressure Reduction Techniques: frequent weight shift encouraged  Head of Bed (HOB) Positioning: [facility] elevated  Pressure Reduction Devices: pressure-redistributing mattress utilized  Skin Protection: adhesive use limited  Taken 10/9/2023 1200 by Nelly Suarez RN  Head of Bed (HOB)  Positioning: HOB elevated  Taken 10/9/2023 0930 by Nelly Suarez, RN  Pressure Reduction Techniques: frequent weight shift encouraged  Head of Bed (HOB) Positioning: HOB elevated  Pressure Reduction Devices: pressure-redistributing mattress utilized  Skin Protection: adhesive use limited     Problem: Fall Injury Risk  Goal: Absence of Fall and Fall-Related Injury  Outcome: Ongoing, Progressing  Intervention: Identify and Manage Contributors  Recent Flowsheet Documentation  Taken 10/9/2023 1400 by Nelly Suarez, RN  Medication Review/Management: medications reviewed  Self-Care Promotion:   independence encouraged   BADL personal objects within reach   BADL personal routines maintained  Taken 10/9/2023 0930 by Nelly Suarez, RN  Medication Review/Management: medications reviewed  Self-Care Promotion:   independence encouraged   BADL personal objects within reach   BADL personal routines maintained  Intervention: Promote Injury-Free Environment  Recent Flowsheet Documentation  Taken 10/9/2023 1400 by Nelly Suarez, RN  Safety Promotion/Fall Prevention:   activity supervised   assistive device/personal items within reach   clutter free environment maintained   fall prevention program maintained   nonskid shoes/slippers when out of bed   room organization consistent   safety round/check completed  Taken 10/9/2023 0930 by Nelly Suarez, RN  Safety Promotion/Fall Prevention:   activity supervised   assistive device/personal items within reach   clutter free environment maintained   fall prevention program maintained   nonskid shoes/slippers when out of bed   room organization consistent   safety round/check completed   Goal Outcome Evaluation:

## 2023-10-09 NOTE — THERAPY TREATMENT NOTE
Patient Name: Josephine Wolf  : 1940    MRN: [id number]                              Today's Date: 10/9/2023       Admit Date: 2023    Visit Dx:     ICD-10-CM ICD-9-CM   1. Atrial fibrillation with rapid ventricular response: New onset  I48.91 427.31   2. COPD exacerbation  J44.1 491.21   3. Hypomagnesemia  E83.42 275.2   4. Chronic renal failure, unspecified CKD stage  N18.9 585.9   5. Chronic anemia  D64.9 285.9     Patient Active Problem List   Diagnosis    Anemia    OA (osteoarthritis) of knee    Chronic respiratory failure with hypoxia    Cellulitis of skin    Acute kidney injury    Sepsis    Orthostatic hypotension    Disease of thyroid gland    Hypertension    Dehydration    Stage 3 chronic kidney disease    Closed compression fracture of L1 lumbar vertebra    Hyponatremia    Osteoporosis with pathological fracture    Acute on chronic respiratory failure with hypoxia and hypercapnia    Obesity (BMI 30-39.9)    Nocturnal hypoxia    CKD (chronic kidney disease) stage 2, GFR 60-89 ml/min    Acute on chronic respiratory failure with hypoxia    Abdominal pain    Acute exacerbation of chronic obstructive pulmonary disease (COPD)    Acute UTI    Metabolic encephalopathy    COPD with acute exacerbation    COPD exacerbation    Atrial fibrillation with RVR     Past Medical History:   Diagnosis Date    Acid reflux     Acute kidney injury     Anemia     Arthritis     Bipolar 1 disorder, depressed     Cataract     MINDY    Chronic nausea     Chronic pain of right knee     Continuous leakage of urine     USES DEPENDS    COPD (chronic obstructive pulmonary disease)     USES O2 2 LMP PER NC AT NIGHT    Disease of thyroid gland     HYPOTHYROIDISM    Diverticulosis     Fibromyalgia     DX     Fibromyalgia, primary     Frequent episodes of bronchitis     HL (hearing loss)     Hypertension     Hypothyroidism     Memory loss     Migraines     Neck pain     On home oxygen therapy     2L NC AT NIGHT    Orthostatic  hypotension     Short of breath on exertion     Sleep apnea     Stage 3 chronic kidney disease      Past Surgical History:   Procedure Laterality Date    CEREBRAL ANEURYSM REPAIR  2000'S    WITH STENT    HYSTERECTOMY      JOINT REPLACEMENT      LAPAROSCOPIC CHOLECYSTECTOMY      ID ARTHRP KNE CONDYLE&PLATU MEDIAL&LAT COMPARTMENTS Right 11/16/2017    Procedure: RT TOTAL KNEE ARTHROPLASTY;  Surgeon: Kashif Perez MD;  Location: [facility];  Service: Orthopedics      General Information       Row Name 10/09/23 1006          Physical Therapy Time and Intention    Document Type therapy note (daily note)  -MG     Mode of Treatment individual therapy;physical therapy  -MG       Row Name 10/09/23 1006          General Information    Patient Profile Reviewed yes  -MG     Existing Precautions/Restrictions oxygen therapy device and L/min;fall  Currently on 5L via NC  -MG     Barriers to Rehab none identified  -MG       Row Name 10/09/23 1006          Cognition    Orientation Status (Cognition) oriented x 4  -MG       Row Name 10/09/23 1006          Safety Issues, Functional Mobility    Safety Issues Affecting Function (Mobility) insight into deficits/self-awareness;safety precaution awareness;impulsivity  -MG     Impairments Affecting Function (Mobility) endurance/activity tolerance;strength;balance;shortness of breath  -MG     Comment, Safety Issues/Impairments (Mobility) Gait belt and non-skid socks donned.  -MG               User Key  (r) = Recorded By, (t) = Taken By, (c) = Cosigned By      Initials Name Provider Type    MG Celia Javier, PT Physical Therapist                   Mobility       Row Name 10/09/23 1006          Bed Mobility    Comment, (Bed Mobility) UIC and returned to chair.  -MG       Row Name 10/09/23 1006          Sit-Stand Transfer    Sit-Stand [place] (Transfers) standby assist  -MG     Assistive Device (Sit-Stand Transfers) walker, front-wheeled  -MG       Row Name 10/09/23 1006           Gait/Stairs (Locomotion)    [place] Level (Gait) contact guard;standby assist  -MG     Assistive Device (Gait) walker, front-wheeled  -MG     Distance in Feet (Gait) 60  -MG     Deviations/Abnormal Patterns (Gait) shilpa decreased;gait speed decreased;stride length decreased  -MG     Bilateral Gait Deviations forward flexed posture;heel strike decreased  -MG     Comment, (Gait/Stairs) Pt amb shorter distance this date. Primarily CGA and mildly unsteady throughout; short moment of SBA once returned to room. Pt c/o feeling weak and with SOB. Amb on 6L.  -MG               User Key  (r) = Recorded By, (t) = Taken By, (c) = Cosigned By      Initials Name Provider Type    Celia Dykes, MARCO Physical Therapist                   Obj/Interventions       Row Name 10/09/23 1008          Motor Skills    Therapeutic Exercise other (see comments)  AP, LAQ, HF x15. Repeated cues for attention to task as pt falling asleep.  -MG               User Key  (r) = Recorded By, (t) = Taken By, (c) = Cosigned By      Initials Name Provider Type    Celia Dykes, PT Physical Therapist                   Goals/Plan    No documentation.                  Clinical Impression       Row Name 10/09/23 1008          Pain    Pretreatment Pain Rating 0/10 - no pain  -MG     Posttreatment Pain Rating 0/10 - no pain  -MG     Pain Intervention(s) Medication (See MAR);Ambulation/increased activity;Repositioned;Rest  -MG       Row Name 10/09/23 1008          Plan of Care Review    Plan of Care Reviewed With patient;grandchild(ree)  -MG     Progress no change  -MG     Outcome Evaluation Pt seen for PT tx this AM and tolerated the session fairly. Pt and family reports she is feeling weaker today and is very tired. Pt performed LE ther-ex x15 with repeated cues for attention to task as pt was falling asleep. Pt stood w/ SBA and ambulated total of 60' with RW and CGA while one 6L O2 via NC. Pt satting 85% while on 5L O2 via NC prior to walk and  with PLB increased to 91%. Pt initially satting 88% on returned to room with a drop to 85% once seated and noted to be SOB. Pt was cued for PLB and quickly recoverd to 90-91%. PT will cont to follow pt to progress her mobility as tolerated.  -MG       Row Name 10/09/23 1008          Therapy Assessment/Plan (PT)    Rehab Potential (PT) good, to achieve stated therapy goals  -MG     Criteria for Skilled Interventions Met (PT) yes  -MG     Therapy Frequency (PT) 5 times/wk  -MG       Row Name 10/09/23 1008          Vital Signs    Pre SpO2 (%) 85  5L via NC, up to 91% w/ PLB  -MG     O2 Delivery Pre Treatment supplemental O2  -MG     Intra SpO2 (%) 85  6L post walk. Initially 88% and down to 85%. Quickly up to 90% w/ PLB  -MG     O2 Delivery Intra Treatment supplemental O2  -MG     Post SpO2 (%) 91  5L  -MG     O2 Delivery Post Treatment supplemental O2  -MG     Pre Patient Position Sitting  -MG     Intra Patient Position Standing  -MG     Post Patient Position Sitting  -MG       Row Name 10/09/23 1008          Positioning and Restraints    Pre-Treatment Position sitting in chair/recliner  -MG     Post Treatment Position chair  -MG     In Chair notified nsg;reclined;call light within reach;encouraged to call for assist;exit alarm on;with family/caregiver;legs elevated  -MG               User Key  (r) = Recorded By, (t) = Taken By, (c) = Cosigned By      Initials Name Provider Type    MG Celia Javier, PT Physical Therapist                   Outcome Measures       Row Name 10/09/23 1013          How much help from another person do you currently need...    Turning from your back to your side while in flat bed without using bedrails? 4  -MG     Moving from lying on back to sitting on the side of a flat bed without bedrails? 3  -MG     Moving to and from a bed to a chair (including a wheelchair)? 3  -MG     Standing up from a chair using your arms (e.g., wheelchair, bedside chair)? 3  -MG     Climbing 3-5 steps with a  railing? 3  -MG     To walk in hospital room? 3  -MG     AM-PAC 6 Clicks Score (PT) 19  -MG     Highest level of mobility 6 --> Walked 10 steps or more  -MG               User Key  (r) = Recorded By, (t) = Taken By, (c) = Cosigned By      Initials Name Provider Type    MG Celia Javier, PT Physical Therapist                                 Physical Therapy Education       Title: PT OT SLP Therapies (In Progress)       Topic: Physical Therapy (In Progress)       Point: Mobility training (Done)       Learning Progress Summary             Patient Acceptance, E,TB,D, VU,NR by MG at 10/9/2023 1013    Acceptance, E, VU by CW at 10/4/2023 0946                         Point: Home exercise program (Not Started)       Learner Progress:  Not documented in this visit.              Point: Body mechanics (Not Started)       Learner Progress:  Not documented in this visit.              Point: Precautions (Done)       Learning Progress Summary             Patient Acceptance, E,TB,D, VU,NR by  at 10/9/2023 1013                                         User Key       Initials Effective Dates Name Provider Type Discipline    MG 05/24/22 -  Celia Javier, PT Physical Therapist PT    CW 12/13/22 -  Aiyana Guzman PT Physical Therapist PT                  PT Recommendation and Plan     Plan of Care Reviewed With: patient, grandchild(ree)  Progress: no change  Outcome Evaluation: Pt seen for PT tx this AM and tolerated the session fairly. Pt and family reports she is feeling weaker today and is very tired. Pt performed LE ther-ex x15 with repeated cues for attention to task as pt was falling asleep. Pt stood w/ SBA and ambulated total of 60' with RW and CGA while one 6L O2 via NC. Pt satting 85% while on 5L O2 via NC prior to walk and with PLB increased to 91%. Pt initially satting 88% on returned to room with a drop to 85% once seated and noted to be SOB. Pt was cued for PLB and quickly recoverd to 90-91%. PT will cont to follow  pt to progress her mobility as tolerated.     Time Calculation:         PT Charges       Row Name 10/09/23 1013             Time Calculation    Start Time 0908  -MG      Stop Time 0929  -MG      Time Calculation (min) 21 min  -MG      PT Received On 10/09/23  -MG      PT - Next Appointment 10/10/23  -MG                User Key  (r) = Recorded By, (t) = Taken By, (c) = Cosigned By      Initials Name Provider Type    MG Celia Javier, PT Physical Therapist                  Therapy Charges for Today       Code Description Service Date Service Provider Modifiers Qty    [id number]  PT THERAPEUTIC ACT EA 15 MIN 10/9/2023 Celia Javier, PT GP 1            PT G-Codes  Outcome Measure Options: AM-PAC 6 Clicks Basic Mobility (PT)  AM-PAC 6 Clicks Score (PT): 19  AM-PAC 6 Clicks Score (OT): 18  PT Discharge Summary  Anticipated Discharge Disposition (PT): home with assist, home with home health    Celia Javier PT  10/9/2023

## 2023-10-09 NOTE — PROGRESS NOTES
"                                              LOS: 9 days   Patient Care Team:  Bernabe Guy MD as PCP - General (Internal Medicine)  Avi Aly MD as Consulting Physician (Nephrology)    Chief Complaint:  F/up COPD, respiratory failure    Subjective   Interval History  Slightly worsening oxygen requirement.  Currently on 5 L.  Also seems to be more somnolent and fatigued.  She is hallucinating and she is confused when I assessed her earlier this afternoon.  She did not use the PAP last night for more than 15 minutes according to her son.    REVIEW OF SYSTEMS:   Could not obtain due to confusion    Ventilator/Non-Invasive Ventilation Settings (From admission, onward)       Start     Ordered    10/07/23 0233  NIPPV (CPAP or BIPAP)  Until Discontinued        Question Answer Comment   Indication Sleep Apnea    Type BIPAP    IPAP 16    EPAP 8    Titrate Oxygen for SpO2 88 - 92%        10/07/23 0241                          Physical Exam:     Vital Signs  Temp:  [97.5 øF (36.4 øC)-97.9 øF (36.6 øC)] 97.9 øF (36.6 øC)  Heart Rate:  [] 89  Resp:  [18-25] 18  BP: (107-141)/(72-95) 128/81    Intake/Output Summary (Last 24 hours) at 10/9/2023 1218  Last data filed at 10/8/2023 1700  Gross per 24 hour   Intake 360 ml   Output --   Net 360 ml       Flowsheet Rows      Flowsheet Row First Filed Value   Admission Height 157.5 cm (62\") Documented at 09/30/2023 1223   Admission Weight 73.5 kg (162 lb) Documented at 09/30/2023 1223            PPE used per hospital policy    General Appearance:   Alert, cooperative, mild respiratory distress.   ENMT:  Mallampati score 3, moist mucous membrane   Eyes:  Pupils equal and reactive to light. EOMI   Neck:   Trachea midline. No thyromegaly.   Lungs:   Very coarse breath sounds bilaterally with wheezing.  Diminished air entry overall.    Heart:   Regular rhythm and normal rate, normal S1 and S2, no         murmur   Skin:   No rash or ecchymosis   Abdomen:    Obese. Soft. " No tenderness. No HSM.   Neuro/psych: Somnolent..  Moves all extremities to stimulus.   Extremities:  No cyanosis, clubbing or edema.  Warm extremities and well-perfused          Results Review:        Results from last 7 days   Lab Units 10/09/23  0611 10/08/23  0837 10/07/23  0830   SODIUM mmol/L 142 144 144   POTASSIUM mmol/L 4.7 4.5 4.3   CHLORIDE mmol/L 106 108* 108*   CO2 mmol/L 25.8 27.0 29.0   BUN mg/dL 53* 47* 64*   CREATININE mg/dL 1.59* 1.63* 1.81*   GLUCOSE mg/dL 88 81 97   CALCIUM mg/dL 9.1 9.1 9.1     Results from last 7 days   Lab Units 10/06/23  1414 10/06/23  1149   HSTROP T ng/L 29* 28*     Results from last 7 days   Lab Units 10/09/23  0611 10/08/23  0837 10/07/23  0830   WBC 10*3/mm3 9.88 8.79 6.68   HEMOGLOBIN g/dL 9.6* 9.2* 9.0*   HEMATOCRIT % 29.8* 29.1* 27.9*   PLATELETS 10*3/mm3 169 163 158                           Results from last 7 days   Lab Units 10/07/23  0159   PH, ARTERIAL pH units 7.293*   PCO2, ARTERIAL mm Hg 57.4*   PO2 ART mm Hg 69.7*   O2 SATURATION ART % 91.1*   FLOW RATE lpm 8.0000   MODALITY  HFNC         I reviewed the patient's new clinical results.        Medication Review:   albuterol, 2.5 mg, Nebulization, Q6H - RT  allopurinol, 100 mg, Oral, Daily  amiodarone, 200 mg, Oral, Q24H  apixaban, 2.5 mg, Oral, Q12H  budesonide-formoterol, 2 puff, Inhalation, BID - RT   And  tiotropium bromide monohydrate, 2 puff, Inhalation, Daily - RT  busPIRone, 10 mg, Oral, BID  DULoxetine, 60 mg, Oral, Daily  gabapentin, 200 mg, Oral, TID  guaiFENesin, 600 mg, Oral, Q12H  levothyroxine, 75 mcg, Oral, Daily  metoprolol tartrate, 100 mg, Oral, Q8H  midodrine, 5 mg, Oral, TID AC  pantoprazole, 40 mg, Oral, BID AC  predniSONE, 20 mg, Oral, Daily With Breakfast  QUEtiapine, 50 mg, Oral, Nightly  rOPINIRole, 0.5 mg, Oral, Nightly  rosuvastatin, 10 mg, Oral, Nightly  sodium chloride, 4 mL, Nebulization, BID - RT             Diagnostic imaging:  I personally and independently reviewed the  following images:  CXR 10/2/2023: Low lung volumes.  No pulmonary infiltrates but possibly fluid in the left fissure/atelectasis.    Assessment     Acute exacerbation of COPD  Chronic hypoxemic respiratory failure, on oxygen 3 L/min.  Dr. Ca  Acute on chronic hypercapnia with respiratory acidosis  Atrial fibrillation with RVR  RLS       Plan       Symbicort 2 puffs twice daily and Spiriva 2 puffs daily.  Albuterol nebulizer every 6 hours.  Hypertonic saline twice a day to improve mucus clearance  Oxygen by NC and titrate keep SPO2 >90%  DC prednisone and start back on Solu-Medrol 40 mg every 8 hours due to worsening wheezing.  Initiate NIPPV during the day.  She probably has worsening hypercarbia today as she appears to be some and she is also in some respiratory distress.  She will benefit from outpatient NIPPV as well due to severe hypercarbia secondary to COPD.  Bilevel is considered but ruled out due to the fact that it does not provide with short ventilation.      Anat Cowan MD  10/09/23  12:18 EDT        This note was dictated utilizing [employer] dictation

## 2023-10-09 NOTE — CONSULTS
Purpose of the visit was to evaluate for: goals of care/advanced care planning, support for patient/family, hospice referral/discussion, and pain/symptom management. Spoke with RN as well as patient, family, and HCS and discussed palliative care, goals of care, care options, [facility], and discharge options.      Assessment:  Patient is palliative care appropriate for community based services with [facility] or SNF with palliative care (list reasons): COPD, fibromyalgia, atrial fibrillation, FERNANDEZ, CKD III, HTN, and anxiety.    Recommendations/Plan: Continue current level of care and interventions. Ms. Wolf goal is medically stabilize and discharge to rehab to regain strength and function.    Other Comments: Met with Ms. Wolf and her son, James, at the bedside, her granddaughter was also present. Ms. Wolf was sitting up in the chair, she was lethargic and would drift off during our conversation. She reports being SOA, anxious, fatigued, and having a decreased appetite. She also reports she has been hallucinating, seeing people around her room intermittently. She did answer orientation questions appropriately. Ms. Wolf has a fair understanding of her medical condition, we discussed what has gone on since her admission and the current treatment plan. She states her goal is to discharge to a rehab facility and try to get home or go to long term care depending on her needs. James is in agreement with this plan. We discussed [facility] as an outpatient resource to manage COPD symptoms and she is agreeable to this consult. Also discussed [facility] and how their services might help her, she verbalized understanding but just wants a [facility] consult at this time. At 1140 I called her son/IGNACIA Magana to update him on this conversation and the patients condition, he is in agreement with the goal of recovery and rehab and a [facility] referral. He expressed concerns about Ms. Hollingsworth condition and if she will be  able to make it to rehab but would like to try. Faxing [facility] referral, we will sign off but will be available for future needs.

## 2023-10-09 NOTE — PROGRESS NOTES
"Daily progress note    Primary care physician  Dr. PLUMMER    Subjective  Awake and alert with no complaint and refusing BiPAP    History of present illness  83-year-old white female with history of chronic hypoxic respiratory failure on home oxygen COPD paroxysmal SVT nonsustained ventricular tachycardia hypertension hypothyroidism and chronic kidney disease stage III who lives by herself presented to [facility] emergency room with increased shortness of breath for last 1 week which is getting worse.  Patient also complaining of productive cough but no fever chills chest pain abdominal pain nausea vomiting diarrhea.  Patient work-up in ER revealed acute on chronic hypoxic respiratory failure with acute exacerbation of COPD and also found to be in rapid ventricular rate with history of atrial fibrillation admitted for management.  Patient is DNR per her wishes.     REVIEW OF SYSTEMS  Unable to obtain     PHYSICAL EXAM   Blood pressure 104/88, pulse 97, temperature 97.5 øF (36.4 øC), temperature source Oral, resp. rate 18, height 157.5 cm (62\"), weight 73.5 kg (162 lb), SpO2 92%.    GENERAL: Sleepy in no respiratory distress  HENT: nares patent  Head/neck/ face are symmetric without gross deformity, signs of trauma, or swelling  EYES: no scleral icterus, no conjunctival pallor.  NECK: Supple, no meningismus  CV: Tachycardia with irregular regular rhythm.  No obvious murmur or rub.    RESPIRATORY: Normal effort and moving air bilaterally with expiratory wheezes  ABDOMEN: soft and nontender.  Nondistended bowel sounds positive  MUSCULOSKELETAL: no deformity.  No edema.  Intact distal pulses   NEURO: alert and appropriate, moves all extremities, follows commands.  No focal deficit  SKIN: warm, dry     LAB RESULTS  Lab Results (last 24 hours)       Procedure Component Value Units Date/Time    Basic Metabolic Panel [[id number]]  (Abnormal) Collected: 10/09/23 0611    Specimen: Blood Updated: 10/09/23 0647     " Glucose 88 mg/dL      BUN 53 mg/dL      Creatinine 1.59 mg/dL      Sodium 142 mmol/L      Potassium 4.7 mmol/L      Comment: Slight hemolysis detected by analyzer. Results may be affected.        Chloride 106 mmol/L      CO2 25.8 mmol/L      Calcium 9.1 mg/dL      BUN/Creatinine Ratio 33.3     Anion Gap 10.2 mmol/L      eGFR 32.5 mL/min/1.73     Narrative:      GFR Normal >60  Chronic Kidney Disease <60  Kidney Failure <15    The GFR formula is only valid for adults with stable renal function between ages 18 and 70.    CBC & Differential [[id number]]  (Abnormal) Collected: 10/09/23 0611    Specimen: Blood Updated: 10/09/23 0631    Narrative:      The following orders were created for panel order CBC & Differential.  Procedure                               Abnormality         Status                     ---------                               -----------         ------                     CBC Auto Differential[[id number]]        Abnormal            Final result                 Please view results for these tests on the individual orders.    CBC Auto Differential [[id number]]  (Abnormal) Collected: 10/09/23 0611    Specimen: Blood Updated: 10/09/23 0631     WBC 9.88 10*3/mm3      RBC 3.03 10*6/mm3      Hemoglobin 9.6 g/dL      Hematocrit 29.8 %      MCV 98.3 fL      MCH 31.7 pg      MCHC 32.2 g/dL      RDW 14.6 %      RDW-SD 52.3 fl      MPV 10.7 fL      Platelets 169 10*3/mm3      Neutrophil % 76.8 %      Lymphocyte % 13.2 %      Monocyte % 8.7 %      Eosinophil % 0.2 %      Basophil % 0.1 %      Immature Grans % 1.0 %      Neutrophils, Absolute 7.59 10*3/mm3      Lymphocytes, Absolute 1.30 10*3/mm3      Monocytes, Absolute 0.86 10*3/mm3      Eosinophils, Absolute 0.02 10*3/mm3      Basophils, Absolute 0.01 10*3/mm3      Immature Grans, Absolute 0.10 10*3/mm3      nRBC 0.2 /100 WBC             Narrative & Impression   CT HEAD WO CONTRAST-     INDICATIONS: Blurry vision, trauma     TECHNIQUE: Radiation dose reduction  techniques were utilized, including  automated exposure control and exposure modulation based on body size.  Noncontrast head CT     COMPARISON: 7/23/2023     FINDINGS:           No acute intracranial hemorrhage, midline shift or mass effect. No acute  territorial infarct is identified.     Right ICA stent is again noted.     Ventricles, cisterns, cerebral sulci are unremarkable for patient age.     The visualized paranasal sinuses, orbits, mastoid air cells are  unremarkable.                 IMPRESSION:     No acute intracranial hemorrhage or hydrocephalus. If there is further  clinical concern, MRI could be considered for further evaluation.       Results  Scan on 9/30/2023 1227 by [employer], [place]: ECG 12-LEAD         Author: -- Service: -- Author Type: --   Filed: Date of Service: Creation Time:   Status: (Other)   HEART RATE= 119  bpm  RR Interval= 504  ms  TN Interval=   ms  P Horizontal Axis=   deg  P Front Axis=   deg  QRSD Interval= 81  ms  QT Interval= 298  ms  QTcB= 420  ms  QRS Axis= 11  deg  T Wave Axis= 55  deg  - ABNORMAL ECG -  Atrial fibrillation  Abnormal R-wave progression, early transition  Borderline T abnormalities, anterior leads          Current Facility-Administered Medications:     albuterol (PROVENTIL) nebulizer solution 0.083% 2.5 mg/3mL, 2.5 mg, Nebulization, Q6H - RT, Stanley Fowler MD, 2.5 mg at 10/09/23 1122    allopurinol (ZYLOPRIM) tablet 100 mg, 100 mg, Oral, Daily, Stanley Fowler MD, 100 mg at 10/09/23 0938    amiodarone (PACERONE) tablet 200 mg, 200 mg, Oral, Q24H, Beth Palacio APRN, 200 mg at 10/09/23 0938    apixaban (ELIQUIS) tablet 2.5 mg, 2.5 mg, Oral, Q12H, Stanley Fowler MD, 2.5 mg at 10/09/23 0938    budesonide-formoterol (SYMBICORT) 160-4.5 MCG/ACT inhaler 2 puff, 2 puff, Inhalation, BID - RT, 2 puff at 10/09/23 0910 **AND** tiotropium (SPIRIVA RESPIMAT) 2.5 mcg/act aerosol solution inhaler, 2 puff, Inhalation, Daily - RT, Stanley Fowler MD, 2 puff at  10/09/23 0911    busPIRone (BUSPAR) tablet 10 mg, 10 mg, Oral, BID, Stanley Fowler MD, 10 mg at 10/09/23 0937    DULoxetine (CYMBALTA) DR capsule 60 mg, 60 mg, Oral, Daily, Stanley Fowler MD, 60 mg at 10/09/23 0937    gabapentin (NEURONTIN) capsule 200 mg, 200 mg, Oral, TID, Carlos Barraza MD, 200 mg at 10/09/23 0938    guaiFENesin (MUCINEX) 12 hr tablet 600 mg, 600 mg, Oral, Q12H, Stanley Fowler MD, 600 mg at 10/09/23 0938    HYDROcodone-acetaminophen (NORCO) 7.5-325 MG per tablet 1 tablet, 1 tablet, Oral, Q4H PRN, Stanley Fowler MD, 1 tablet at 10/08/23 1042    hydrOXYzine (ATARAX) tablet 25 mg, 25 mg, Oral, TID PRN, Stanley Fowler MD, 25 mg at 10/09/23 1445    ipratropium-albuterol (DUO-NEB) nebulizer solution 3 mL, 3 mL, Nebulization, Q4H PRN, Stanley Fowler MD    levothyroxine (SYNTHROID, LEVOTHROID) tablet 75 mcg, 75 mcg, Oral, Daily, Stanley Fowler MD, 75 mcg at 10/09/23 0938    methylPREDNISolone sodium succinate (SOLU-Medrol) injection 40 mg, 40 mg, Intravenous, Q8H, Anat Cowan MD, 40 mg at 10/09/23 1437    metoprolol tartrate (LOPRESSOR) tablet 100 mg, 100 mg, Oral, Q8H, Nancy Padilla MD, 100 mg at 10/09/23 1445    midodrine (PROAMATINE) tablet 5 mg, 5 mg, Oral, TID AC, Stanley Fowler MD, 5 mg at 10/09/23 1445    nitroglycerin (NITROSTAT) SL tablet 0.4 mg, 0.4 mg, Sublingual, Q5 Min PRN, Stanley Fowler MD, 0.4 mg at 10/06/23 1139    ondansetron (ZOFRAN) injection 4 mg, 4 mg, Intravenous, Q4H PRN, Stanley Fowler MD, 4 mg at 10/09/23 0220    pantoprazole (PROTONIX) EC tablet 40 mg, 40 mg, Oral, BID AC, Stanley Fowler MD, 40 mg at 10/09/23 0938    QUEtiapine (SEROquel) tablet 50 mg, 50 mg, Oral, Nightly, Sheri Gilmore, APRN, 50 mg at 10/07/23 2338    rOPINIRole (REQUIP) tablet 0.5 mg, 0.5 mg, Oral, Nightly, Stanley Fowler MD, 0.5 mg at 10/08/23 2022    rosuvastatin (CRESTOR) tablet 10 mg, 10 mg, Oral, Nightly, Stanley Fowler MD, 10 mg at 10/08/23 2022    simethicone (MYLICON) chewable tablet 80 mg, 80 mg,  Oral, 4x Daily PRN, Amanda Fowler MD, 80 mg at 10/03/23 1249    [COMPLETED] Insert Peripheral IV, , , Once **AND** sodium chloride 0.9 % flush 10 mL, 10 mL, Intravenous, PRN, Car Perea MD    sodium chloride 7 % nebulizer solution nebulizer solution 4 mL, 4 mL, Nebulization, BID - RT, Anat Cowan MD, 4 mL at 10/09/23 0557     ASSESSMENT  Acute on chronic hypoxic hypercapnic respiratory failure  Acute exacerbation of COPD  Paroxysmal atrial fibrillation with rapid ventricular rate   Status post fall with vision issues  Hypertension  Hypothyroidism  Anxiety disorder  Depression  Chronic anemia  Restless leg syndrome  Acute kidney injury resolving  Chronic kidney disease stage III    PLAN  CPM  Continue BiPAP as needed  Supplemental oxygen nebulizer  IV steroids per pulmonary  Control the heart rate  Cardiology input appreciated  Pulmonary and nephrology to follow patient  Adjust home medications  Stress ulcer DVT prophylaxis  Supportive care  PT/OT  DNR and poor prognosis  Palliative care consult  Discussed with family nursing staff  Discharge planning    AMANDA FOWLER MD    Copied text in this note has been reviewed and is accurate as of 10/09/23

## 2023-10-09 NOTE — PROGRESS NOTES
[facility] Progress Note      Patient Name: Josephine Wolf  : 1940  MRN: [id number]  Primary Care Physician:  Bernabe Guy MD  Date of admission: 2023    Subjective     Interval History:   No new events     Review of Systems:   As noted above    Objective     Vitals:   Temp:  [97.5 øF (36.4 øC)-97.9 øF (36.6 øC)] 97.5 øF (36.4 øC)  Heart Rate:  [] 97  Resp:  [18-24] 18  BP: (104-141)/(72-88) 104/88  Flow (L/min):  [5] 5  No intake or output data in the 24 hours ending 10/09/23 1746      Physical Exam:    Constitutional: Groggy, confused, NAD, cushingoid, chronically ill  HEENT: Sclera anicteric, no conjunctival injection  Neck: Supple, no carotid bruit, trachea at midline, no JVD  Resp: Crackles, rhonchi, and wheezes; not labored on 5 L/min   Cardiovascular: Irregularly irregular, tachycardic, no rub, 2/6M  Gastrointestinal: BS +, soft, nontender and nondistended  : No palpable bladder  Musculoskeletal: No edema  Psychiatric: Confused, slightly agitated   Neurologic: +tremulous, moving all limbs, paucity of speech  Skin: Warm and dry     Scheduled Meds:     albuterol, 2.5 mg, Nebulization, Q6H - RT  allopurinol, 100 mg, Oral, Daily  amiodarone, 200 mg, Oral, Q24H  apixaban, 2.5 mg, Oral, Q12H  budesonide-formoterol, 2 puff, Inhalation, BID - RT   And  tiotropium bromide monohydrate, 2 puff, Inhalation, Daily - RT  busPIRone, 10 mg, Oral, BID  DULoxetine, 60 mg, Oral, Daily  gabapentin, 200 mg, Oral, TID  guaiFENesin, 600 mg, Oral, Q12H  levothyroxine, 75 mcg, Oral, Daily  methylPREDNISolone sodium succinate, 40 mg, Intravenous, Q8H  metoprolol tartrate, 100 mg, Oral, Q8H  midodrine, 5 mg, Oral, TID AC  pantoprazole, 40 mg, Oral, BID AC  QUEtiapine, 50 mg, Oral, Nightly  rOPINIRole, 0.5 mg, Oral, Nightly  rosuvastatin, 10 mg, Oral, Nightly  sodium chloride, 4 mL, Nebulization, BID - RT      IV Meds:        Results Reviewed:   I have personally reviewed the  results from the time of this admission to 10/9/2023 17:46 EDT     Results from last 7 days   Lab Units 10/09/23  0611 10/08/23  0837 10/07/23  0830   SODIUM mmol/L 142 144 144   POTASSIUM mmol/L 4.7 4.5 4.3   CHLORIDE mmol/L 106 108* 108*   CO2 mmol/L 25.8 27.0 29.0   BUN mg/dL 53* 47* 64*   CREATININE mg/dL 1.59* 1.63* 1.81*   CALCIUM mg/dL 9.1 9.1 9.1   GLUCOSE mg/dL 88 81 97       Estimated Creatinine Clearance: 26.1 mL/min (A) (by C-G formula based on SCr of 1.59 mg/dL (H)).    Results from last 7 days   Lab Units 10/08/23  0837 10/07/23  0830 10/06/23  0643 10/04/23  0856 10/04/23  0702   MAGNESIUM mg/dL  --   --   --   --  2.1   PHOSPHORUS mg/dL 2.4* 2.9 3.7   < >  --     < > = values in this interval not displayed.             Results from last 7 days   Lab Units 10/09/23  0611 10/08/23  0837 10/07/23  0830 10/06/23  0643 10/05/23  0632   WBC 10*3/mm3 9.88 8.79 6.68 5.68 8.05   HEMOGLOBIN g/dL 9.6* 9.2* 9.0* 8.9* 9.3*   PLATELETS 10*3/mm3 169 163 158 158 184               Assessment / Plan     ASSESSMENT:  1.  CHRIS on CKD3b, nonoliguric, improving:  likely prerenal due to decreased renal perfusion in the setting of tachyarrhythmia, hypoxia, and modest hypotension.  Potassium normal today.  Earlier urinalysis completely bland.  PVRs fine  2.  Atrial fibrillation with RVR  3.  COPD with exacerbation  4.  Hallucinations: steroid psychosis vs hypercarbia vs sleep deprivation vs hypoxia vs high gabapentin dose vs other  5.  Anemia  6.  Osteoarthritis: Meloxicam on medication list, but she is not taking this  7.  Blurry vision, dizziness, and fall in morning 10/3    PLAN:  1.  BiPAP  2.  Aggressive pulmonary toilet  3.  Palliative team reviewing goals of care with patient and family    Thank you for involving us in the care of Josephine ROCK Luciano.  Please feel free to call with any questions.    Carlos Barraza MD  10/09/23  17:46 EDT    [facility]  [phone]

## 2023-10-09 NOTE — CASE MANAGEMENT/SOCIAL WORK
Continued Stay Note  [facility]     Patient Name: Josephine Wolf  MRN: [id number]  Today's Date: 10/9/2023    Admit Date: 9/30/2023    Plan: [facility] pending pre-cert and bipap   Discharge Plan       Row Name 10/09/23 1411       Plan    Plan [facility] pending pre-cert and bipap    Patient/Family in Agreement with Plan yes    Plan Comments Per Dr. Fowler, pt ready for discharge, and will need bipap, pt walked 60 ft with PT today but became SOA, with desaturation and needed breaks. Pt family requesting SNF at [place], pt will need pre-cert. Spoke with Dr. Cowan, he states pt is not ready for d/c, oxygen requirements have increased and she is wheezing, he states she will need bipap at d/c and he will enter orders. Spoke with Radha/Willy regarding bed at [facility], she states they started pre-cert this morning, updated her of pt states and she states they can take bipap as long as it's not on a vent setting, Dr. Cowan has entered orders for her to review. CCP will continue to follow - Sharmila FARRAR                   Discharge Codes    No documentation.                 Expected Discharge Date and Time       Expected Discharge Date Expected Discharge Time    Oct 10, 2023               Sharmila Linares RN

## 2023-10-09 NOTE — PLAN OF CARE
Problem: Adult Inpatient Plan of Care  Goal: Absence of Hospital-Acquired Illness or Injury  Intervention: Identify and Manage Fall Risk  Recent Flowsheet Documentation  Taken 10/9/2023 0651 by Arsh Carrero RN  Safety Promotion/Fall Prevention:   safety round/check completed   room organization consistent   nonskid shoes/slippers when out of bed   fall prevention program maintained   elopement precautions   clutter free environment maintained   assistive device/personal items within reach   activity supervised  Taken 10/9/2023 0430 by Arsh Carrero RN  Safety Promotion/Fall Prevention:   safety round/check completed   room organization consistent   nonskid shoes/slippers when out of bed   fall prevention program maintained   elopement precautions   clutter free environment maintained   assistive device/personal items within reach   activity supervised  Taken 10/9/2023 0225 by Arsh Carrero RN  Safety Promotion/Fall Prevention:   safety round/check completed   room organization consistent   nonskid shoes/slippers when out of bed   fall prevention program maintained   elopement precautions   assistive device/personal items within reach   activity supervised   clutter free environment maintained  Taken 10/8/2023 2210 by Arsh Carrero RN  Safety Promotion/Fall Prevention:   safety round/check completed   room organization consistent   nonskid shoes/slippers when out of bed   fall prevention program maintained   elopement precautions   clutter free environment maintained   assistive device/personal items within reach   activity supervised  Taken 10/8/2023 2016 by Arsh Carrero RN  Safety Promotion/Fall Prevention:   safety round/check completed   room organization consistent   nonskid shoes/slippers when out of bed   elopement precautions   clutter free environment maintained   fall prevention program maintained   assistive device/personal items within reach   activity  supervised  Intervention: Prevent Skin Injury  Recent Flowsheet Documentation  Taken 10/9/2023 0651 by Arsh Carrero RN  Body Position: position changed independently  Taken 10/9/2023 0430 by Arsh Carrero RN  Body Position: position changed independently  Taken 10/9/2023 0225 by Arsh Carrero RN  Body Position: position changed independently  Taken 10/8/2023 2210 by Arsh Carrero RN  Body Position:   position changed independently   weight shifting  Taken 10/8/2023 2016 by Arsh Carrero RN  Body Position:   position changed independently   weight shifting  Intervention: Prevent and Manage VTE (Venous Thromboembolism) Risk  Recent Flowsheet Documentation  Taken 10/9/2023 0651 by Arsh Carrero RN  Activity Management: activity encouraged  Taken 10/9/2023 0430 by Arsh Carrero RN  Activity Management: activity encouraged  Taken 10/9/2023 0225 by Arsh Carrero RN  Activity Management: activity encouraged  Taken 10/8/2023 2210 by Arsh Carrero RN  Activity Management: activity encouraged  Taken 10/8/2023 2016 by Arsh Carrero RN  Activity Management: up in chair  Intervention: Prevent Infection  Recent Flowsheet Documentation  Taken 10/9/2023 0651 by Arsh Carrero RN  Infection Prevention:   single patient room provided   rest/sleep promoted  Taken 10/9/2023 0430 by Arsh Carrero RN  Infection Prevention:   single patient room provided   rest/sleep promoted  Taken 10/9/2023 0225 by Arsh Carrero RN  Infection Prevention:   single patient room provided   rest/sleep promoted  Taken 10/8/2023 2210 by Arsh Carrero RN  Infection Prevention:   single patient room provided   rest/sleep promoted  Taken 10/8/2023 2016 by Arsh Carrero RN  Infection Prevention:   single patient room provided   rest/sleep promoted   Goal Outcome Evaluation:  Plan of Care Reviewed With: patient        Progress: improving  Outcome Evaluation: VSS. No complaints of pan.

## 2023-10-09 NOTE — PLAN OF CARE
Goal Outcome Evaluation:  Plan of Care Reviewed With: patient, grandchild(ree)        Progress: no change  Outcome Evaluation: Pt seen for PT tx this AM and tolerated the session fairly. Pt and family reports she is feeling weaker today and is very tired. Pt performed LE ther-ex x15 with repeated cues for attention to task as pt was falling asleep. Pt stood w/ SBA and ambulated total of 60' with RW and CGA while one 6L O2 via NC. Pt satting 85% while on 5L O2 via NC prior to walk and with PLB increased to 91%. Pt initially satting 88% on returned to room with a drop to 85% once seated and noted to be SOB. Pt was cued for PLB and quickly recoverd to 90-91%. PT will cont to follow pt to progress her mobility as tolerated.      Anticipated Discharge Disposition (PT): home with assist, home with home health

## 2023-10-10 NOTE — PLAN OF CARE
Problem: Adult Inpatient Plan of Care  Goal: Plan of Care Review  Flowsheets  Taken 10/10/2023 0635 by Arsh Carrero RN  Plan of Care Reviewed With: patient  Taken 10/9/2023 1008 by Celia Javier, PT  Outcome Evaluation: Pt seen for PT tx this AM and tolerated the session fairly. Pt and family reports she is feeling weaker today and is very tired. Pt performed LE ther-ex x15 with repeated cues for attention to task as pt was falling asleep. Pt stood w/ SBA and ambulated total of 60' with RW and CGA while one 6L O2 via NC. Pt satting 85% while on 5L O2 via NC prior to walk and with PLB increased to 91%. Pt initially satting 88% on returned to room with a drop to 85% once seated and noted to be SOB. Pt was cued for PLB and quickly recoverd to 90-91%. PT will cont to follow pt to progress her mobility as tolerated.  Goal: Absence of Hospital-Acquired Illness or Injury  Intervention: Identify and Manage Fall Risk  Recent Flowsheet Documentation  Taken 10/10/2023 0633 by Arsh Carrero RN  Safety Promotion/Fall Prevention:   safety round/check completed   room organization consistent   nonskid shoes/slippers when out of bed   fall prevention program maintained   elopement precautions   clutter free environment maintained   assistive device/personal items within reach   activity supervised  Taken 10/10/2023 0405 by Arsh Carrero, RN  Safety Promotion/Fall Prevention:   safety round/check completed   room organization consistent   nonskid shoes/slippers when out of bed   fall prevention program maintained   elopement precautions   clutter free environment maintained   activity supervised   assistive device/personal items within reach  Taken 10/10/2023 0249 by Arsh Carrero, RN  Safety Promotion/Fall Prevention:   safety round/check completed   room organization consistent   nonskid shoes/slippers when out of bed   clutter free environment maintained   assistive device/personal items within reach    activity supervised   elopement precautions   fall prevention program maintained  Taken 10/10/2023 0047 by Arsh Carrero RN  Safety Promotion/Fall Prevention:   safety round/check completed   room organization consistent   nonskid shoes/slippers when out of bed   clutter free environment maintained   elopement precautions   fall prevention program maintained   activity supervised   assistive device/personal items within reach  Taken 10/9/2023 2243 by Arsh Carrero RN  Safety Promotion/Fall Prevention:   safety round/check completed   room organization consistent   nonskid shoes/slippers when out of bed   fall prevention program maintained   assistive device/personal items within reach   activity supervised   clutter free environment maintained   elopement precautions  Taken 10/9/2023 2033 by Arsh Carrero RN  Safety Promotion/Fall Prevention:   safety round/check completed   room organization consistent   nonskid shoes/slippers when out of bed   elopement precautions   clutter free environment maintained   assistive device/personal items within reach   activity supervised   fall prevention program maintained  Intervention: Prevent Skin Injury  Recent Flowsheet Documentation  Taken 10/10/2023 0633 by Arsh Carrero RN  Body Position: position changed independently  Taken 10/10/2023 0405 by Arsh Carrero RN  Body Position: position changed independently  Taken 10/10/2023 0249 by Arsh Carrero RN  Body Position: position changed independently  Taken 10/10/2023 0047 by Arsh Carrero RN  Body Position: position changed independently  Taken 10/9/2023 2243 by Arsh Carrero RN  Body Position: position changed independently  Taken 10/9/2023 2033 by Arsh Carrero RN  Body Position: position changed independently  Intervention: Prevent and Manage VTE (Venous Thromboembolism) Risk  Recent Flowsheet Documentation  Taken 10/10/2023 0633 by Arsh Carrero RN  Activity Management:  activity encouraged  Taken 10/10/2023 0405 by Arsh Carrero RN  Activity Management: up in chair  Taken 10/10/2023 0249 by Arsh Carrero RN  Activity Management: activity encouraged  Taken 10/10/2023 0047 by Arsh Carrero RN  Activity Management: activity encouraged  Taken 10/9/2023 2243 by Arsh Carrero RN  Activity Management: activity encouraged  Taken 10/9/2023 2033 by Arsh Carrero RN  Activity Management: activity encouraged  Intervention: Prevent Infection  Recent Flowsheet Documentation  Taken 10/10/2023 0633 by Arsh Carrero RN  Infection Prevention:   single patient room provided   rest/sleep promoted  Taken 10/10/2023 0405 by Arsh Carrero RN  Infection Prevention:   single patient room provided   rest/sleep promoted  Taken 10/10/2023 0249 by Arsh Carrero RN  Infection Prevention:   single patient room provided   rest/sleep promoted  Taken 10/10/2023 0047 by Arsh Carrero RN  Infection Prevention:   rest/sleep promoted   single patient room provided  Taken 10/9/2023 2243 by Arsh Carrero RN  Infection Prevention:   single patient room provided   rest/sleep promoted  Taken 10/9/2023 2033 by Arsh Carrero RN  Infection Prevention:   single patient room provided   rest/sleep promoted

## 2023-10-10 NOTE — PROGRESS NOTES
"Daily progress note    Primary care physician  Dr. PLUMMER    Subjective  Doing same on oxygen 8 L nasal cannula and using BiPAP periodically.  Patient family at bedside.    History of present illness  83-year-old white female with history of chronic hypoxic respiratory failure on home oxygen COPD paroxysmal SVT nonsustained ventricular tachycardia hypertension hypothyroidism and chronic kidney disease stage III who lives by herself presented to [facility] emergency room with increased shortness of breath for last 1 week which is getting worse.  Patient also complaining of productive cough but no fever chills chest pain abdominal pain nausea vomiting diarrhea.  Patient work-up in ER revealed acute on chronic hypoxic respiratory failure with acute exacerbation of COPD and also found to be in rapid ventricular rate with history of atrial fibrillation admitted for management.  Patient is DNR per her wishes.     REVIEW OF SYSTEMS  Unable to obtain     PHYSICAL EXAM   Blood pressure (!) 116/108, pulse 106, temperature 98.4 øF (36.9 øC), temperature source Oral, resp. rate 20, height 157.5 cm (62\"), weight 73.5 kg (162 lb), SpO2 (!) 86%.    GENERAL: Sleepy in no respiratory distress  HENT: nares patent  Head/neck/ face are symmetric without gross deformity, signs of trauma, or swelling  EYES: no scleral icterus, no conjunctival pallor.  NECK: Supple, no meningismus  CV: Tachycardia with irregular regular rhythm.  No obvious murmur or rub.    RESPIRATORY: Normal effort and moving air bilaterally with expiratory wheezes  ABDOMEN: soft and nontender.  Nondistended bowel sounds positive  MUSCULOSKELETAL: no deformity.  No edema.  Intact distal pulses   NEURO: alert and appropriate, moves all extremities, follows commands.  No focal deficit  SKIN: warm, dry     LAB RESULTS  Lab Results (last 24 hours)       Procedure Component Value Units Date/Time    Renal Function Panel [[id number]]  (Abnormal) Collected: 10/10/23 " 0634    Specimen: Blood Updated: 10/10/23 0716     Glucose 136 mg/dL      BUN 54 mg/dL      Creatinine 1.64 mg/dL      Sodium 141 mmol/L      Potassium 5.1 mmol/L      Chloride 104 mmol/L      CO2 27.3 mmol/L      Calcium 9.0 mg/dL      Albumin 3.7 g/dL      Phosphorus 4.4 mg/dL      Anion Gap 9.7 mmol/L      BUN/Creatinine Ratio 32.9     eGFR 31.3 mL/min/1.73     Narrative:      GFR Normal >60  Chronic Kidney Disease <60  Kidney Failure <15    The GFR formula is only valid for adults with stable renal function between ages 18 and 70.    Magnesium [[id number]]  (Normal) Collected: 10/10/23 0634    Specimen: Blood Updated: 10/10/23 0714     Magnesium 2.2 mg/dL     CBC & Differential [[id number]]  (Abnormal) Collected: 10/10/23 0634    Specimen: Blood Updated: 10/10/23 0653    Narrative:      The following orders were created for panel order CBC & Differential.  Procedure                               Abnormality         Status                     ---------                               -----------         ------                     CBC Auto Differential[[id number]]        Abnormal            Final result                 Please view results for these tests on the individual orders.    CBC Auto Differential [[id number]]  (Abnormal) Collected: 10/10/23 0634    Specimen: Blood Updated: 10/10/23 0653     WBC 7.55 10*3/mm3      RBC 2.75 10*6/mm3      Hemoglobin 8.9 g/dL      Hematocrit 27.2 %      MCV 98.9 fL      MCH 32.4 pg      MCHC 32.7 g/dL      RDW 14.4 %      RDW-SD 51.6 fl      MPV 10.8 fL      Platelets 150 10*3/mm3      Neutrophil % 89.1 %      Lymphocyte % 6.8 %      Monocyte % 1.9 %      Eosinophil % 0.0 %      Basophil % 0.1 %      Immature Grans % 2.1 %      Neutrophils, Absolute 6.73 10*3/mm3      Lymphocytes, Absolute 0.51 10*3/mm3      Monocytes, Absolute 0.14 10*3/mm3      Eosinophils, Absolute 0.00 10*3/mm3      Basophils, Absolute 0.01 10*3/mm3      Immature Grans, Absolute 0.16 10*3/mm3      nRBC 0.3 /100  WBC             Narrative & Impression   CT HEAD WO CONTRAST-     INDICATIONS: Blurry vision, trauma     TECHNIQUE: Radiation dose reduction techniques were utilized, including  automated exposure control and exposure modulation based on body size.  Noncontrast head CT     COMPARISON: 7/23/2023     FINDINGS:           No acute intracranial hemorrhage, midline shift or mass effect. No acute  territorial infarct is identified.     Right ICA stent is again noted.     Ventricles, cisterns, cerebral sulci are unremarkable for patient age.     The visualized paranasal sinuses, orbits, mastoid air cells are  unremarkable.                 IMPRESSION:     No acute intracranial hemorrhage or hydrocephalus. If there is further  clinical concern, MRI could be considered for further evaluation.       Results  Scan on 9/30/2023 1227 by [employer] [facility], [place]: ECG 12-LEAD         Author: -- Service: -- Author Type: --   Filed: Date of Service: Creation Time:   Status: (Other)   HEART RATE= 119  bpm  RR Interval= 504  ms  KS Interval=   ms  P Horizontal Axis=   deg  P Front Axis=   deg  QRSD Interval= 81  ms  QT Interval= 298  ms  QTcB= 420  ms  QRS Axis= 11  deg  T Wave Axis= 55  deg  - ABNORMAL ECG -  Atrial fibrillation  Abnormal R-wave progression, early transition  Borderline T abnormalities, anterior leads          Current Facility-Administered Medications:     albuterol (PROVENTIL) nebulizer solution 0.083% 2.5 mg/3mL, 2.5 mg, Nebulization, Q6H - RT, Stanley Fowler MD, 2.5 mg at 10/10/23 0741    allopurinol (ZYLOPRIM) tablet 100 mg, 100 mg, Oral, Daily, Stanley Fowler MD, 100 mg at 10/10/23 0837    amiodarone (PACERONE) tablet 200 mg, 200 mg, Oral, Q24H, Beth Palacio APRN, 200 mg at 10/10/23 0838    apixaban (ELIQUIS) tablet 2.5 mg, 2.5 mg, Oral, Q12H, Stanley Fowler MD, 2.5 mg at 10/10/23 0838    budesonide-formoterol (SYMBICORT) 160-4.5 MCG/ACT inhaler 2 puff, 2 puff, Inhalation, BID - RT, 2 puff at 10/10/23 0743  **AND** tiotropium (SPIRIVA RESPIMAT) 2.5 mcg/act aerosol solution inhaler, 2 puff, Inhalation, Daily - RT, Stanley Fowler MD, 2 puff at 10/10/23 0745    busPIRone (BUSPAR) tablet 10 mg, 10 mg, Oral, BID, Stanley Fowler MD, 10 mg at 10/10/23 0837    DULoxetine (CYMBALTA) DR capsule 60 mg, 60 mg, Oral, Daily, Stanley Fowler MD, 60 mg at 10/10/23 0837    gabapentin (NEURONTIN) capsule 200 mg, 200 mg, Oral, TID, Carlos Barraza MD, 200 mg at 10/10/23 0837    guaiFENesin (MUCINEX) 12 hr tablet 600 mg, 600 mg, Oral, Q12H, Stanley Fowler MD, 600 mg at 10/10/23 0837    HYDROcodone-acetaminophen (NORCO) 7.5-325 MG per tablet 1 tablet, 1 tablet, Oral, Q4H PRN, Stanley Fowler MD, 1 tablet at 10/09/23 2033    hydrOXYzine (ATARAX) tablet 25 mg, 25 mg, Oral, TID PRN, Stanley Fowler MD, 25 mg at 10/10/23 0405    ipratropium-albuterol (DUO-NEB) nebulizer solution 3 mL, 3 mL, Nebulization, Q4H PRN, Stanley Fowler MD    levothyroxine (SYNTHROID, LEVOTHROID) tablet 75 mcg, 75 mcg, Oral, Daily, Stanley Fowler MD, 75 mcg at 10/10/23 0837    methylPREDNISolone sodium succinate (SOLU-Medrol) injection 40 mg, 40 mg, Intravenous, Q8H, Anat Cowan MD, 40 mg at 10/10/23 0800    metoprolol tartrate (LOPRESSOR) tablet 100 mg, 100 mg, Oral, Q8H, Nancy Padilla MD, 100 mg at 10/10/23 1056    midodrine (PROAMATINE) tablet 5 mg, 5 mg, Oral, TID AC, Stanley Fowler MD, 5 mg at 10/10/23 1056    nitroglycerin (NITROSTAT) SL tablet 0.4 mg, 0.4 mg, Sublingual, Q5 Min PRN, Stanley Fowler MD, 0.4 mg at 10/06/23 1139    ondansetron (ZOFRAN) injection 4 mg, 4 mg, Intravenous, Q4H PRN, Stanley Fowler MD, 4 mg at 10/10/23 0125    pantoprazole (PROTONIX) EC tablet 40 mg, 40 mg, Oral, BID AC, Stanley Fowler MD, 40 mg at 10/10/23 0800    QUEtiapine (SEROquel) tablet 50 mg, 50 mg, Oral, Nightly, Sheri Gilmore, APRN, 50 mg at 10/07/23 2338    rOPINIRole (REQUIP) tablet 0.5 mg, 0.5 mg, Oral, Nightly, Stanley Fowler MD, 0.5 mg at 10/09/23 2033    rosuvastatin  (CRESTOR) tablet 10 mg, 10 mg, Oral, Nightly, Amanda Fowler MD, 10 mg at 10/09/23 2033    simethicone (MYLICON) chewable tablet 80 mg, 80 mg, Oral, 4x Daily PRN, Amanda Fowler MD, 80 mg at 10/03/23 1249    [COMPLETED] Insert Peripheral IV, , , Once **AND** sodium chloride 0.9 % flush 10 mL, 10 mL, Intravenous, PRN, Car Perea MD    sodium chloride 7 % nebulizer solution nebulizer solution 4 mL, 4 mL, Nebulization, BID - RT, Anat Cowan MD, 4 mL at 10/09/23 1949     ASSESSMENT  Acute on chronic hypoxic hypercapnic respiratory failure  Acute exacerbation of COPD  Paroxysmal atrial fibrillation with rapid ventricular rate   Status post fall with vision issues  Hypertension  Hypothyroidism  Anxiety disorder  Depression  Chronic anemia  Restless leg syndrome  Acute kidney injury resolving  Chronic kidney disease stage III    PLAN  CPM  Continue BiPAP as needed  Supplemental oxygen nebulizer  IV steroids per pulmonary  Control the heart rate  Cardiology input appreciated  Pulmonary and nephrology to follow patient  Adjust home medications  Stress ulcer DVT prophylaxis  Supportive care  PT/OT  DNR and poor prognosis  Palliative care consult  Discussed with family nursing staff  Discharge planning    AMANDA FOWLER MD    Copied text in this note has been reviewed and is accurate as of 10/10/23

## 2023-10-10 NOTE — PROGRESS NOTES
[facility] Progress Note      Patient Name: Josephine Wolf  : 1940  MRN: [id number]  Primary Care Physician:  Bernabe Guy MD  Date of admission: 2023    Subjective     Interval History:   Use BiPAP for 4.5 hours last night and for another hour short while ago  Talking on the phone with sister  Appetite mediocre    Review of Systems:   As noted above    Objective     Vitals:   Temp:  [97.5 øF (36.4 øC)-99.1 øF (37.3 øC)] 99.1 øF (37.3 øC)  Heart Rate:  [] 96  Resp:  [18-20] 20  BP: (104-169)/() 150/105  Flow (L/min):  [5-8] 8    Intake/Output Summary (Last 24 hours) at 10/10/2023 1128  Last data filed at 10/9/2023 1700  Gross per 24 hour   Intake 240 ml   Output --   Net 240 ml         Physical Exam:    Constitutional: Awake, communicative, NAD, cushingoid, chronically ill  HEENT: Sclera anicteric, no conjunctival injection  Neck: Supple, no carotid bruit, trachea at midline, no JVD  Resp: Crackles, rhonchi, and wheezes; not labored on 8 L/min   Cardiovascular: Irregularly irregular, tachycardic, no rub, 2/6M  Gastrointestinal: BS +, soft, nontender and nondistended  : No palpable bladder  Musculoskeletal: No edema  Psychiatric: Confused, slightly agitated   Neurologic: +tremulous, moving all limbs; myoclonic jerking  Skin: Warm and dry; bruises all limbs    Scheduled Meds:     albuterol, 2.5 mg, Nebulization, Q6H - RT  allopurinol, 100 mg, Oral, Daily  amiodarone, 200 mg, Oral, Q24H  apixaban, 2.5 mg, Oral, Q12H  budesonide-formoterol, 2 puff, Inhalation, BID - RT   And  tiotropium bromide monohydrate, 2 puff, Inhalation, Daily - RT  busPIRone, 10 mg, Oral, BID  DULoxetine, 60 mg, Oral, Daily  gabapentin, 200 mg, Oral, TID  guaiFENesin, 600 mg, Oral, Q12H  levothyroxine, 75 mcg, Oral, Daily  methylPREDNISolone sodium succinate, 40 mg, Intravenous, Q8H  metoprolol tartrate, 100 mg, Oral, Q8H  midodrine, 5 mg, Oral, TID AC  pantoprazole, 40 mg, Oral,  BID AC  QUEtiapine, 50 mg, Oral, Nightly  rOPINIRole, 0.5 mg, Oral, Nightly  rosuvastatin, 10 mg, Oral, Nightly  sodium chloride, 4 mL, Nebulization, BID - RT      IV Meds:        Results Reviewed:   I have personally reviewed the results from the time of this admission to 10/10/2023 11:28 EDT     Results from last 7 days   Lab Units 10/10/23  0634 10/09/23  0611 10/08/23  0837   SODIUM mmol/L 141 142 144   POTASSIUM mmol/L 5.1 4.7 4.5   CHLORIDE mmol/L 104 106 108*   CO2 mmol/L 27.3 25.8 27.0   BUN mg/dL 54* 53* 47*   CREATININE mg/dL 1.64* 1.59* 1.63*   CALCIUM mg/dL 9.0 9.1 9.1   GLUCOSE mg/dL 136* 88 81       Estimated Creatinine Clearance: 25.3 mL/min (A) (by C-G formula based on SCr of 1.64 mg/dL (H)).    Results from last 7 days   Lab Units 10/10/23  0634 10/08/23  0837 10/07/23  0830 10/04/23  0856 10/04/23  0702   MAGNESIUM mg/dL 2.2  --   --   --  2.1   PHOSPHORUS mg/dL 4.4 2.4* 2.9   < >  --     < > = values in this interval not displayed.             Results from last 7 days   Lab Units 10/10/23  0634 10/09/23  0611 10/08/23  0837 10/07/23  0830 10/06/23  0643   WBC 10*3/mm3 7.55 9.88 8.79 6.68 5.68   HEMOGLOBIN g/dL 8.9* 9.6* 9.2* 9.0* 8.9*   PLATELETS 10*3/mm3 150 169 163 158 158               Assessment / Plan     ASSESSMENT:  1.  CHRIS on CKD3b, nonoliguric, stable:  likely prerenal due to decreased renal perfusion in the setting of tachyarrhythmia, hypoxia, and modest hypotension.  Electrolytes acceptable.  Earlier urinalysis completely bland.  PVRs fine  2.  Atrial fibrillation with RVR  3.  COPD with exacerbation  4.  Encephalopathy: steroid psychosis vs hypercarbia vs sleep deprivation vs hypoxia vs high gabapentin dose vs other  5.  Anemia  6.  Osteoarthritis: Meloxicam on medication list, but she is not taking this  7.  Blurry vision, dizziness, and fall in morning 10/3    PLAN:  1.  Midodrine to support blood pressure  2.  BiPAP  3.  Palliative team reviewing goals of care with patient and  family  4.  Discussed with son at bedside  5.  Decrease frequency of labs given overall stability in renal function    Thank you for involving us in the care of Josephine Wolf.  Please feel free to call with any questions.    Carlos Barraza MD  10/10/23  11:28 EDT    [facility]  [phone]

## 2023-10-10 NOTE — PLAN OF CARE
Goal Outcome Evaluation:  Plan of Care Reviewed With: patient, Patient is sitting up in the bedside chair, no acutedistress noted, no c/o voiced. Will continue to monitor        Progress: improving

## 2023-10-10 NOTE — SIGNIFICANT NOTE
10/10/23 1506   OTHER   Discipline occupational therapist   Rehab Time/Intention   Session Not Performed other (see comments)  (Pt declined to see OT d/t being too weak and tired after 2 attempts. Pt was observed sitting EOB w/ son present during pm attempt. OT ed pt on importance of OOB act and encouraged pt to continue using BSC w/ Nsg present. OT will f/u tomorrow.)   Therapy Assessment/Plan (PT)   Criteria for Skilled Interventions Met (PT) yes;skilled treatment is necessary   Recommendation   OT - Next Appointment 10/11/23

## 2023-10-10 NOTE — PROGRESS NOTES
"                                              LOS: 10 days   Patient Care Team:  Bernabe Guy MD as PCP - General (Internal Medicine)  Avi Aly MD as Consulting Physician (Nephrology)    Chief Complaint:  F/up COPD, respiratory failure    Subjective   Interval History  Worsening oxygen saturation.  Requiring 8 L oxygen today.  Continues to have cough and its more productive but still thick and difficult to expectorate.  Used NIPPV last night for about 4 and half hours.    REVIEW OF SYSTEMS:   Constitutional: No fever or chills.  Gastrointestinal: No nausea, vomiting or diarrhea.    Ventilator/Non-Invasive Ventilation Settings (From admission, onward)       Start     Ordered    10/07/23 0233  NIPPV (CPAP or BIPAP)  Until Discontinued        Question Answer Comment   Indication Sleep Apnea    Type BIPAP    IPAP 16    EPAP 8    Titrate Oxygen for SpO2 88 - 92%        10/07/23 0241                          Physical Exam:     Vital Signs  Temp:  [97.7 øF (36.5 øC)-99.1 øF (37.3 øC)] 98.4 øF (36.9 øC)  Heart Rate:  [] 95  Resp:  [18-20] 18  BP: (116-169)/() 116/108    Intake/Output Summary (Last 24 hours) at 10/10/2023 1457  Last data filed at 10/10/2023 1300  Gross per 24 hour   Intake 480 ml   Output --   Net 480 ml       Flowsheet Rows      Flowsheet Row First Filed Value   Admission Height 157.5 cm (62\") Documented at 09/30/2023 1223   Admission Weight 73.5 kg (162 lb) Documented at 09/30/2023 1223            PPE used per hospital policy    General Appearance:   Alert, cooperative, mild respiratory distress.   ENMT:  Mallampati score 3, moist mucous membrane   Eyes:  Pupils equal and reactive to light. EOMI   Neck:   Trachea midline. No thyromegaly.   Lungs:   Very coarse breath sounds bilaterally.  Diminished air entry overall.  Diffuse rhonchi.  Minimal expiratory wheezing.    Heart:   Regular rhythm and normal rate, normal S1 and S2, no         murmur   Skin:   No rash or ecchymosis "   Abdomen:    Obese. Soft. No tenderness. No HSM.   Neuro/psych: Somnolent..  Moves all extremities to stimulus.   Extremities:  No cyanosis, clubbing or edema.  Warm extremities and well-perfused          Results Review:        Results from last 7 days   Lab Units 10/10/23  0634 10/09/23  0611 10/08/23  0837   SODIUM mmol/L 141 142 144   POTASSIUM mmol/L 5.1 4.7 4.5   CHLORIDE mmol/L 104 106 108*   CO2 mmol/L 27.3 25.8 27.0   BUN mg/dL 54* 53* 47*   CREATININE mg/dL 1.64* 1.59* 1.63*   GLUCOSE mg/dL 136* 88 81   CALCIUM mg/dL 9.0 9.1 9.1     Results from last 7 days   Lab Units 10/06/23  1414 10/06/23  1149   HSTROP T ng/L 29* 28*     Results from last 7 days   Lab Units 10/10/23  0634 10/09/23  0611 10/08/23  0837   WBC 10*3/mm3 7.55 9.88 8.79   HEMOGLOBIN g/dL 8.9* 9.6* 9.2*   HEMATOCRIT % 27.2* 29.8* 29.1*   PLATELETS 10*3/mm3 150 169 163                           Results from last 7 days   Lab Units 10/07/23  0159   PH, ARTERIAL pH units 7.293*   PCO2, ARTERIAL mm Hg 57.4*   PO2 ART mm Hg 69.7*   O2 SATURATION ART % 91.1*   FLOW RATE lpm 8.0000   MODALITY  HFNC         I reviewed the patient's new clinical results.        Medication Review:   albuterol, 2.5 mg, Nebulization, Q6H - RT  allopurinol, 100 mg, Oral, Daily  amiodarone, 200 mg, Oral, Q24H  apixaban, 2.5 mg, Oral, Q12H  budesonide-formoterol, 2 puff, Inhalation, BID - RT   And  tiotropium bromide monohydrate, 2 puff, Inhalation, Daily - RT  busPIRone, 10 mg, Oral, BID  DULoxetine, 60 mg, Oral, Daily  gabapentin, 200 mg, Oral, TID  guaiFENesin, 600 mg, Oral, Q12H  levothyroxine, 75 mcg, Oral, Daily  methylPREDNISolone sodium succinate, 40 mg, Intravenous, Q8H  metoprolol tartrate, 100 mg, Oral, Q8H  midodrine, 5 mg, Oral, TID AC  pantoprazole, 40 mg, Oral, BID AC  QUEtiapine, 50 mg, Oral, Nightly  rOPINIRole, 0.5 mg, Oral, Nightly  rosuvastatin, 10 mg, Oral, Nightly  sodium chloride, 4 mL, Nebulization, BID - RT             Diagnostic imaging:  I  personally and independently reviewed the following images:  CXR 10/2/2023: Low lung volumes.  No pulmonary infiltrates but possibly fluid in the left fissure/atelectasis.    Assessment     Acute exacerbation of COPD  Chronic hypoxemic respiratory failure, on oxygen 3 L/min.  Dr. Ca  Acute on chronic hypercapnia with respiratory acidosis  Atrial fibrillation with RVR  RLS       Plan       Symbicort 2 puffs twice daily and Spiriva 2 puffs daily.  Albuterol nebulizer every 6 hours.  Oxygen by NC and titrate keep SPO2 >90%  Continue Solu-Medrol 40 mg every 8 hours.  Increase hypertonic saline to 4 times a day and add flutter and CPT to improve mucus clearance.  Initiate NIPPV at night and as needed during the day..   She would benefit from outpatient NIPPV as well due to severe hypercarbia secondary to COPD.  Bilevel is considered but ruled out due to the fact that it does not provide with short ventilation.  Outpatient NIPPV ordered.        Anat Cowan MD  10/10/23  14:57 EDT        This note was dictated utilizing [employer] dictation

## 2023-10-10 NOTE — PROGRESS NOTES
Attempted to place pt on NIV for the night. Son at bedside did not want pt to be woke up. Explained to son that pt should be wearing NIV with sleep. Offered to place pt on NIV and son declined. Son stated that he wanted her to sleep and did not want NIV placed on the pt. Pt was left on 5lpm NC. Discussed situation with RN.

## 2023-10-10 NOTE — NURSING NOTE
Pt. Oxygen is sustaining in the mid 80's. Pt. Refuses to wear BIPAP. Son at the bedside. Multiple attempts have been made to educated on the pro's of wearing the BIPAP to patient. Finger sensors have been changed multiple time in attempted to get a good reading and all affirm O2 continuing to remain between 82-86%. Spoke with Dr. Fowler about the decreasing O2 and was ordered to document the refusal.

## 2023-10-11 NOTE — PLAN OF CARE
Goal Outcome Evaluation:  Plan of Care Reviewed With: patient, Patient is sitting in chair at the bedside, no acute distress noted, no c/o voiced. Will continue to monitor        Progress: no change

## 2023-10-11 NOTE — SIGNIFICANT NOTE
10/11/23 1634   OTHER   Discipline physical therapy assistant   Rehab Time/Intention   Session Not Performed other (see comments)  (RN requested to hold today as pt's sats were dropping and now on bi-pap; PT will f/u tomorrow)   Recommendation   PT - Next Appointment 10/12/23

## 2023-10-11 NOTE — PROGRESS NOTES
"Daily progress note    Primary care physician  Dr. PLUMMER    Subjective  Events noted as patient required high flow oxygen but remained awake and alert on BiPAP at the time of examination and family at bedside.    History of present illness  83-year-old white female with history of chronic hypoxic respiratory failure on home oxygen COPD paroxysmal SVT nonsustained ventricular tachycardia hypertension hypothyroidism and chronic kidney disease stage III who lives by herself presented to [facility] [place] emergency room with increased shortness of breath for last 1 week which is getting worse.  Patient also complaining of productive cough but no fever chills chest pain abdominal pain nausea vomiting diarrhea.  Patient work-up in ER revealed acute on chronic hypoxic respiratory failure with acute exacerbation of COPD and also found to be in rapid ventricular rate with history of atrial fibrillation admitted for management.  Patient is DNR per her wishes.     REVIEW OF SYSTEMS  Unable to obtain     PHYSICAL EXAM   Blood pressure (!) 164/139, pulse 118, temperature 98.4 øF (36.9 øC), temperature source Oral, resp. rate 18, height 157.5 cm (62\"), weight 73.5 kg (162 lb), SpO2 (!) 78%.    GENERAL: Sleepy in no respiratory distress  HENT: nares patent  Head/neck/ face are symmetric without gross deformity, signs of trauma, or swelling  EYES: no scleral icterus, no conjunctival pallor.  NECK: Supple, no meningismus  CV: Tachycardia with irregular regular rhythm.  No obvious murmur or rub.    RESPIRATORY: Normal effort and moving air bilaterally with expiratory wheezes  ABDOMEN: soft and nontender.  Nondistended bowel sounds positive  MUSCULOSKELETAL: no deformity.  No edema.  Intact distal pulses   NEURO: alert and appropriate, moves all extremities, follows commands.  No focal deficit  SKIN: warm, dry     LAB RESULTS  Lab Results (last 24 hours)       Procedure Component Value Units Date/Time    Blood Culture - Blood, " Hand, Right [[id number]] Collected: 10/11/23 0849    Specimen: Blood from Hand, Right Updated: 10/11/23 0922    Lactic Acid, Plasma [[id number]]  (Normal) Collected: 10/11/23 0656    Specimen: Blood Updated: 10/11/23 0748     Lactate 0.9 mmol/L     High Sensitivity Troponin T 2Hr [[id number]]  (Abnormal) Collected: 10/11/23 0656    Specimen: Blood Updated: 10/11/23 0745     HS Troponin T 29 ng/L      Troponin T Delta 3 ng/L     Narrative:      High Sensitive Troponin T Reference Range:  <10.0 ng/L- Negative Female for AMI  <15.0 ng/L- Negative Male for AMI  >=10 - Abnormal Female indicating possible myocardial injury.  >=15 - Abnormal Male indicating possible myocardial injury.   Clinicians would have to utilize clinical acumen, EKG, Troponin, and serial changes to determine if it is an Acute Myocardial Infarction or myocardial injury due to an underlying chronic condition.         Respiratory Panel PCR w/COVID-19(SARS-CoV-2) ANISH/VALERIA/MAXIMILIAN/PAD/COR/MAD/ILSA In-House, NP Swab in [facility], 3-4 HR TAT - Swab, Nasopharynx [[id number]]  (Abnormal) Collected: 10/11/23 0521    Specimen: Swab from Nasopharynx Updated: 10/11/23 0717     ADENOVIRUS, PCR Not Detected     Coronavirus 229E Not Detected     Coronavirus HKU1 Not Detected     Coronavirus NL63 Not Detected     Coronavirus OC43 Not Detected     COVID19 Not Detected     Human Metapneumovirus Not Detected     Human Rhinovirus/Enterovirus Detected     Influenza A PCR Not Detected     Influenza B PCR Not Detected     Parainfluenza Virus 1 Not Detected     Parainfluenza Virus 2 Not Detected     Parainfluenza Virus 3 Not Detected     Parainfluenza Virus 4 Not Detected     RSV, PCR Not Detected     Bordetella pertussis pcr Not Detected     Bordetella parapertussis PCR Not Detected     Chlamydophila pneumoniae PCR Not Detected     Mycoplasma pneumo by PCR Not Detected    Narrative:      In the setting of a positive respiratory panel with a viral infection PLUS a negative  "procalcitonin without other underlying concern for bacterial infection, consider observing off antibiotics or discontinuation of antibiotics and continue supportive care. If the respiratory panel is positive for atypical bacterial infection (Bordetella pertussis, Chlamydophila pneumoniae, or Mycoplasma pneumoniae), consider antibiotic de-escalation to target atypical bacterial infection.    Blood Culture - Blood, Hand, Right [[id number]] Collected: 10/11/23 0656    Specimen: Blood from Hand, Right Updated: 10/11/23 0711    Procalcitonin [[id number]]  (Normal) Collected: 10/11/23 0350    Specimen: Blood Updated: 10/11/23 0505     Procalcitonin 0.12 ng/mL     Narrative:      As a Marker for Sepsis (Non-Neonates):    1. <0.5 ng/mL represents a low risk of severe sepsis and/or septic shock.  2. >2 ng/mL represents a high risk of severe sepsis and/or septic shock.    As a Marker for Lower Respiratory Tract Infections that require antibiotic therapy:    PCT on Admission    Antibiotic Therapy       6-12 Hrs later    >0.5                Strongly Recommended  >0.25 - <0.5        Recommended   0.1 - 0.25          Discouraged              Remeasure/reassess PCT  <0.1                Strongly Discouraged     Remeasure/reassess PCT    As 28 day mortality risk marker: \"Change in Procalcitonin Result\" (>80% or <=80%) if Day 0 (or Day 1) and Day 4 values are available. Refer to http://www.[employer]-pct-calculator.com    Change in PCT <=80%  A decrease of PCT levels below or equal to 80% defines a positive change in PCT test result representing a higher risk for 28-day all-cause mortality of patients diagnosed with severe sepsis for septic shock.    Change in PCT >80%  A decrease of PCT levels of more than 80% defines a negative change in PCT result representing a lower risk for 28-day all-cause mortality of patients diagnosed with severe sepsis or septic shock.       BNP [[id number]]  (Abnormal) Collected: 10/11/23 0350    Specimen: Blood " Updated: 10/11/23 0501     proBNP 13,487.0 pg/mL     Narrative:      This assay is used as an aid in the diagnosis of individuals suspected of having heart failure. It can be used as an aid in the diagnosis of acute decompensated heart failure (ADHF) in patients presenting with signs and symptoms of ADHF to the emergency department (ED). In addition, NT-proBNP of <300 pg/mL indicates ADHF is not likely.    Age Range Result Interpretation  NT-proBNP Concentration (pg/mL:      <50             Positive            >450                   Gray                 300-450                    Negative             <300    50-75           Positive            >900                  Gray                300-900                  Negative            <300      >75             Positive            >1800                  Gray                300-1800                  Negative            <300    Basic Metabolic Panel [[id number]]  (Abnormal) Collected: 10/11/23 0350    Specimen: Blood Updated: 10/11/23 0423     Glucose 147 mg/dL      BUN 58 mg/dL      Creatinine 1.99 mg/dL      Sodium 137 mmol/L      Potassium 5.5 mmol/L      Comment: Slight hemolysis detected by analyzer. Results may be affected.        Chloride 101 mmol/L      CO2 27.5 mmol/L      Calcium 9.5 mg/dL      BUN/Creatinine Ratio 29.1     Anion Gap 8.5 mmol/L      eGFR 24.8 mL/min/1.73     Narrative:      GFR Normal >60  Chronic Kidney Disease <60  Kidney Failure <15    The GFR formula is only valid for adults with stable renal function between ages 18 and 70.    High Sensitivity Troponin T [[id number]]  (Abnormal) Collected: 10/11/23 0350    Specimen: Blood Updated: 10/11/23 0423     HS Troponin T 26 ng/L     Narrative:      High Sensitive Troponin T Reference Range:  <10.0 ng/L- Negative Female for AMI  <15.0 ng/L- Negative Male for AMI  >=10 - Abnormal Female indicating possible myocardial injury.  >=15 - Abnormal Male indicating possible myocardial injury.   Clinicians would  have to utilize clinical acumen, EKG, Troponin, and serial changes to determine if it is an Acute Myocardial Infarction or myocardial injury due to an underlying chronic condition.         CBC & Differential [[id number]]  (Abnormal) Collected: 10/11/23 0350    Specimen: Blood Updated: 10/11/23 0407    Narrative:      The following orders were created for panel order CBC & Differential.  Procedure                               Abnormality         Status                     ---------                               -----------         ------                     CBC Auto Differential[[id number]]        Abnormal            Final result                 Please view results for these tests on the individual orders.    CBC Auto Differential [[id number]]  (Abnormal) Collected: 10/11/23 0350    Specimen: Blood Updated: 10/11/23 0407     WBC 13.30 10*3/mm3      RBC 2.90 10*6/mm3      Hemoglobin 9.3 g/dL      Hematocrit 28.8 %      MCV 99.3 fL      MCH 32.1 pg      MCHC 32.3 g/dL      RDW 14.6 %      RDW-SD 53.5 fl      MPV 10.7 fL      Platelets 185 10*3/mm3      Neutrophil % 90.6 %      Lymphocyte % 5.2 %      Monocyte % 2.3 %      Eosinophil % 0.0 %      Basophil % 0.1 %      Immature Grans % 1.8 %      Neutrophils, Absolute 12.06 10*3/mm3      Lymphocytes, Absolute 0.69 10*3/mm3      Monocytes, Absolute 0.30 10*3/mm3      Eosinophils, Absolute 0.00 10*3/mm3      Basophils, Absolute 0.01 10*3/mm3      Immature Grans, Absolute 0.24 10*3/mm3      nRBC 0.3 /100 WBC     POC Glucose Once [[id number]]  (Abnormal) Collected: 10/11/23 0345    Specimen: Blood Updated: 10/11/23 0345     Glucose 169 mg/dL             Narrative & Impression   CT HEAD WO CONTRAST-     INDICATIONS: Blurry vision, trauma     TECHNIQUE: Radiation dose reduction techniques were utilized, including  automated exposure control and exposure modulation based on body size.  Noncontrast head CT     COMPARISON: 7/23/2023     FINDINGS:           No acute intracranial  hemorrhage, midline shift or mass effect. No acute  territorial infarct is identified.     Right ICA stent is again noted.     Ventricles, cisterns, cerebral sulci are unremarkable for patient age.     The visualized paranasal sinuses, orbits, mastoid air cells are  unremarkable.                 IMPRESSION:     No acute intracranial hemorrhage or hydrocephalus. If there is further  clinical concern, MRI could be considered for further evaluation.       Results  Scan on 9/30/2023 1227 by [employer] On[employer], [place]: ECG 12-LEAD         Author: -- Service: -- Author Type: --   Filed: Date of Service: Creation Time:   Status: (Other)   HEART RATE= 119  bpm  RR Interval= 504  ms  WY Interval=   ms  P Horizontal Axis=   deg  P Front Axis=   deg  QRSD Interval= 81  ms  QT Interval= 298  ms  QTcB= 420  ms  QRS Axis= 11  deg  T Wave Axis= 55  deg  - ABNORMAL ECG -  Atrial fibrillation  Abnormal R-wave progression, early transition  Borderline T abnormalities, anterior leads          Current Facility-Administered Medications:     albuterol (PROVENTIL) nebulizer solution 0.083% 2.5 mg/3mL, 2.5 mg, Nebulization, Q6H - RT, Stanley Fowler MD, 2.5 mg at 10/11/23 1216    allopurinol (ZYLOPRIM) tablet 100 mg, 100 mg, Oral, Daily, Stanley Fowler MD, 100 mg at 10/11/23 0818    amiodarone (PACERONE) tablet 200 mg, 200 mg, Oral, Q24H, Beth aPlacio, LG, 200 mg at 10/11/23 0818    apixaban (ELIQUIS) tablet 2.5 mg, 2.5 mg, Oral, Q12H, Stanley Fowler MD, 2.5 mg at 10/11/23 0818    budesonide-formoterol (SYMBICORT) 160-4.5 MCG/ACT inhaler 2 puff, 2 puff, Inhalation, BID - RT, 2 puff at 10/11/23 0715 **AND** tiotropium (SPIRIVA RESPIMAT) 2.5 mcg/act aerosol solution inhaler, 2 puff, Inhalation, Daily - RT, Stanley Fowler MD, 2 puff at 10/11/23 0715    busPIRone (BUSPAR) tablet 10 mg, 10 mg, Oral, BID, Stanley Fowler MD, 10 mg at 10/11/23 0818    cefTRIAXone (ROCEPHIN) 2,000 mg in sodium chloride 0.9 % 100 mL IVPB-VTB, 2,000 mg,  Intravenous, Q24H, Daniel Squires Jr., MD, Last Rate: 200 mL/hr at 10/11/23 0601, Currently Infusing at 10/11/23 0601    DULoxetine (CYMBALTA) DR capsule 60 mg, 60 mg, Oral, Daily, Stanley Fowler MD, 60 mg at 10/11/23 0818    gabapentin (NEURONTIN) capsule 200 mg, 200 mg, Oral, TID, Carlos Barraza MD, 200 mg at 10/11/23 0818    guaiFENesin (MUCINEX) 12 hr tablet 600 mg, 600 mg, Oral, Q12H, Stanley Fowler MD, 600 mg at 10/11/23 0818    HYDROcodone-acetaminophen (NORCO) 7.5-325 MG per tablet 1 tablet, 1 tablet, Oral, Q4H PRN, Stanley Fowler MD, 1 tablet at 10/11/23 0515    hydrOXYzine (ATARAX) tablet 25 mg, 25 mg, Oral, TID PRN, Stanley Fowler MD, 25 mg at 10/11/23 0404    ipratropium-albuterol (DUO-NEB) nebulizer solution 3 mL, 3 mL, Nebulization, Q4H PRN, Stanley Fowler MD    levothyroxine (SYNTHROID, LEVOTHROID) tablet 75 mcg, 75 mcg, Oral, Daily, Stanley Fowler MD, 75 mcg at 10/11/23 0818    methylPREDNISolone sodium succinate (SOLU-Medrol) injection 40 mg, 40 mg, Intravenous, Q8H, Anat Cowan MD, 40 mg at 10/11/23 0551    metoprolol tartrate (LOPRESSOR) tablet 100 mg, 100 mg, Oral, Q8H, Nancy Padilla MD, 100 mg at 10/11/23 1211    midodrine (PROAMATINE) tablet 5 mg, 5 mg, Oral, TID AC, Stanley Fowler MD, 5 mg at 10/11/23 1211    nitroglycerin (NITROSTAT) SL tablet 0.4 mg, 0.4 mg, Sublingual, Q5 Min PRN, Stanley Fowler MD, 0.4 mg at 10/06/23 1139    ondansetron (ZOFRAN) injection 4 mg, 4 mg, Intravenous, Q4H PRN, Stanley Fowler MD, 4 mg at 10/10/23 0125    pantoprazole (PROTONIX) EC tablet 40 mg, 40 mg, Oral, BID AC, Stanley Fowler MD, 40 mg at 10/11/23 0518    QUEtiapine (SEROquel) tablet 50 mg, 50 mg, Oral, Nightly, Sheri Gilmore, APRN, 50 mg at 10/07/23 2338    rOPINIRole (REQUIP) tablet 0.5 mg, 0.5 mg, Oral, Nightly, Stanley Fowler MD, 0.5 mg at 10/10/23 2133    rosuvastatin (CRESTOR) tablet 10 mg, 10 mg, Oral, Nightly, Stanley Fowler MD, 10 mg at 10/10/23 2132    simethicone (MYLICON) chewable tablet 80  mg, 80 mg, Oral, 4x Daily PRN, Amanda Fowler MD, 80 mg at 10/03/23 1249    [COMPLETED] Insert Peripheral IV, , , Once **AND** sodium chloride 0.9 % flush 10 mL, 10 mL, Intravenous, PRN, Car Perea MD    sodium chloride 7 % nebulizer solution nebulizer solution 4 mL, 4 mL, Nebulization, 4x Daily - RT, Anat Cowan MD, 4 mL at 10/11/23 1216     ASSESSMENT  Acute on chronic hypoxic hypercapnic respiratory failure  Acute exacerbation of COPD  Paroxysmal atrial fibrillation with rapid ventricular rate   Acute rhinovirus infection  Hypertension  Hypothyroidism  Anxiety disorder  Depression  Chronic anemia  Restless leg syndrome  Acute kidney injury resolving  Chronic kidney disease stage III    PLAN  CPM  Continue BiPAP as needed  Supplemental oxygen nebulizer  Continue IV steroids per pulmonary  Discontinue antibiotics  Control the heart rate  Cardiology input appreciated  Pulmonary and nephrology to follow patient  Adjust home medications  Stress ulcer DVT prophylaxis  Supportive care  PT/OT  DNR and poor prognosis  Palliative care consult pending  Discussed with family nursing staff  Discharge planning    AMANDA FOWLER MD    Copied text in this note has been reviewed and is accurate as of 10/11/23

## 2023-10-11 NOTE — PROGRESS NOTES
"Nutrition Services    Patient Name:  Josephine Wolf  YOB: 1940  MRN: [id number]  Admit Date:  9/30/2023    Assessment Date:  10/11/23    NUTRITION SCREENING      Reason for Encounter LOS   Diagnosis/Problem COPD exacerbation       PO Diet Diet: Regular/House Diet; Texture: Regular Texture (IDDSI 7); Fluid Consistency: Thin (IDDSI 0)   Supplements N/a   PO Intake % 50-75%       Medications MAR reviewed by RD   Labs  Listed below, reviewed   Physical Findings Alert, oriented, tooth/teeth missing, on NPPV/NIV   GI Function Nausea, last BM: 10/9   Skin Status Skin intact        Height  Weight  BMI  Weight Trend     Height: 157.5 cm (62\")  Weight: 73.5 kg (162 lb) (09/30/23 1223)  Body mass index is 29.63 kg/mý.  Stable       Nutrition Problem (PES) No nutrition diagnosis at this time.       Intervention/Plan Will continue to encourage and monitor PO intake   Palliative care pending     RD to follow up per protocol.     Results from last 7 days   Lab Units 10/11/23  0350 10/10/23  0634 10/09/23  0611   SODIUM mmol/L 137 141 142   POTASSIUM mmol/L 5.5* 5.1 4.7   CHLORIDE mmol/L 101 104 106   CO2 mmol/L 27.5 27.3 25.8   BUN mg/dL 58* 54* 53*   CREATININE mg/dL 1.99* 1.64* 1.59*   CALCIUM mg/dL 9.5 9.0 9.1   GLUCOSE mg/dL 147* 136* 88     Results from last 7 days   Lab Units 10/11/23  0350 10/10/23  0634   MAGNESIUM mg/dL  --  2.2   PHOSPHORUS mg/dL  --  4.4   HEMOGLOBIN g/dL 9.3* 8.9*   HEMATOCRIT % 28.8* 27.2*     Lab Results   Component Value Date    HGBA1C 5.70 (H) 10/01/2023         Electronically signed by:  Susana Sanchez, Dietitian Intern   10/11/23 13:34 EDT  "

## 2023-10-11 NOTE — CODE DOCUMENTATION
Chest pain improved when placing pt back on bipap-SOB improved as well, chest pain now a 1/10 -0/10, will give something for anxiety that is ordered PRN, pt agreed to be place back on bipap mask  EKG done-unchanged from previous

## 2023-10-11 NOTE — PROGRESS NOTES
[facility] Progress Note      Patient Name: Josephine Wolf  : 1940  MRN: [id number]  Primary Care Physician:  Bernabe Guy MD  Date of admission: 2023    Subjective     Interval History:   On BiPAP for roughly 4.5 hours again last night, and on it as well this morning  Respiratory distress and chest pain last night; diuretics given   Less responsive today    Review of Systems:   As noted above    Objective     Vitals:   Temp:  [97 øF (36.1 øC)-99.9 øF (37.7 øC)] 98.2 øF (36.8 øC)  Heart Rate:  [] 68  Resp:  [16-24] 18  BP: (108-151)/() 138/99  Flow (L/min):  [8-12] 12    Intake/Output Summary (Last 24 hours) at 10/11/2023 1050  Last data filed at 10/11/2023 0733  Gross per 24 hour   Intake 720 ml   Output --   Net 720 ml         Physical Exam:    Constitutional: On BiPAP; poorly responsive; myoclonic jerking  HEENT: Sclera anicteric, no conjunctival injection  Neck: Supple, no carotid bruit, trachea at midline, no JVD  Resp: Crackles, rhonchi, and wheezes; tachypneic on 12 L/min   Cardiovascular: Irregularly irregular, no rub, 2/6M  Gastrointestinal: BS +, soft, nontender and nondistended  : No palpable bladder  Musculoskeletal: No edema  Psychiatric: Somnolent  Neurologic: +tremulous, moving all limbs; myoclonic jerking  Skin: Warm and dry; bruises all limbs    Scheduled Meds:     albuterol, 2.5 mg, Nebulization, Q6H - RT  allopurinol, 100 mg, Oral, Daily  amiodarone, 200 mg, Oral, Q24H  apixaban, 2.5 mg, Oral, Q12H  budesonide-formoterol, 2 puff, Inhalation, BID - RT   And  tiotropium bromide monohydrate, 2 puff, Inhalation, Daily - RT  busPIRone, 10 mg, Oral, BID  cefTRIAXone, 2,000 mg, Intravenous, Q24H  DULoxetine, 60 mg, Oral, Daily  gabapentin, 200 mg, Oral, TID  guaiFENesin, 600 mg, Oral, Q12H  levothyroxine, 75 mcg, Oral, Daily  methylPREDNISolone sodium succinate, 40 mg, Intravenous, Q8H  metoprolol tartrate, 100 mg, Oral, Q8H  midodrine,  5 mg, Oral, TID AC  pantoprazole, 40 mg, Oral, BID AC  QUEtiapine, 50 mg, Oral, Nightly  rOPINIRole, 0.5 mg, Oral, Nightly  rosuvastatin, 10 mg, Oral, Nightly  sodium chloride, 4 mL, Nebulization, 4x Daily - RT      IV Meds:        Results Reviewed:   I have personally reviewed the results from the time of this admission to 10/11/2023 10:50 EDT     Results from last 7 days   Lab Units 10/11/23  0350 10/10/23  0634 10/09/23  0611   SODIUM mmol/L 137 141 142   POTASSIUM mmol/L 5.5* 5.1 4.7   CHLORIDE mmol/L 101 104 106   CO2 mmol/L 27.5 27.3 25.8   BUN mg/dL 58* 54* 53*   CREATININE mg/dL 1.99* 1.64* 1.59*   CALCIUM mg/dL 9.5 9.0 9.1   GLUCOSE mg/dL 147* 136* 88       Estimated Creatinine Clearance: 20.8 mL/min (A) (by C-G formula based on SCr of 1.99 mg/dL (H)).    Results from last 7 days   Lab Units 10/10/23  0634 10/08/23  0837 10/07/23  0830   MAGNESIUM mg/dL 2.2  --   --    PHOSPHORUS mg/dL 4.4 2.4* 2.9             Results from last 7 days   Lab Units 10/11/23  0350 10/10/23  0634 10/09/23  0611 10/08/23  0837 10/07/23  0830   WBC 10*3/mm3 13.30* 7.55 9.88 8.79 6.68   HEMOGLOBIN g/dL 9.3* 8.9* 9.6* 9.2* 9.0*   PLATELETS 10*3/mm3 185 150 169 163 158               Assessment / Plan     ASSESSMENT:  1.  CHRIS on CKD3b, nonoliguric, worsening:  likely prerenal due to decreased renal perfusion in the setting of tachyarrhythmia, hypoxia, and modest hypotension.  Mild hyperkalemia.  Volume excess by imaging.  Earlier urinalysis completely bland.  PVRs fine  2.  Atrial fibrillation with RVR  3.  COPD with exacerbation: worsening hypoxia  4.  Encephalopathy: steroid psychosis vs hypercarbia vs sleep deprivation vs hypoxia vs high gabapentin dose vs other  5.  Anemia  6.  Osteoarthritis: Meloxicam on medication list, but she is not taking this  7.  Blurry vision, dizziness, and fall in morning 10/3    PLAN:  1.  Recommend palliative care  2.  BiPAP  3.  Discussed with family at bedside       Thank you for involving us in  the care of Josephine Wolf.  Please feel free to call with any questions.    Carlos Barraza MD  10/11/23  10:50 EDT    [facility]  [phone]

## 2023-10-11 NOTE — CONSULTS
Consult for removal of Covid rule out infection banner. Patient reviewed, Covid test negative, no reported exposures, Covid rule out infection banner removed. Patient did test positive for Rhinovirus and will require droplet precautions, RN notified.

## 2023-10-11 NOTE — PROGRESS NOTES
Pulmonary/critical care   called for worsening oxygen saturations and had been refusing to use her PAP machine they put her on her Pap machine and 60% FiO2 and her saturations are in the mid 90s but chest x-ray shows bilateral pulmonary edema/infiltrates I think there are small effusions this is a marked change from her film a couple days prior.  She reports cough with gray sputum.  She has not had any fevers or chills no chest pain she has had a lot of heartburn and indigestion but not had any aspiration that she is aware of no choking or coughing with any of these episodes.  Records she has COPD is in with an exacerbation she has chronic respiratory failure on 3 L O2 at home she is having problems with acute kidney injury on chronic kidney disease stage IIIb and she has been battling some encephalopathy possibly multifactorial.  Chest exam she has coarse rales bilaterally I do not hear any wheezing heart rate seems to be pretty regular and in the 90s.  Abdomen is soft and nontender she is not using accessory muscles on BiPAP  She has had some additional labs come back this morning her creatinine is up further at 1.99 from 1.64 yesterday potassium is little high at 5.5 her white count is up a little at 13,300 hemoglobin is 9.3 which is stable to improved from yesterday.  High-sensitivity troponin was 26 it looks like she is been running 28 and 29 a couple days prior.  EKG is clearly A-fib.  Assessment :  acute on chronic hypoxic respiratory failure is better with noninvasive ventilation we will continue that for now while we address causes  Bilateral pulmonary infiltrates/edema she probably even has some small pleural effusions I think this is most likely CHF although I cannot rule out a pneumonia she does have the increased white count.  I Kirsten check a proBNP and procalcitonin this may help direct us a little bit.  The problem is her creatinine is worsening blood pressures not that great I am hesitant to give her  a big dose of diuretic but may be forced to.  While evaluating I am also going to go ahead and start some empiric antibiotics she is so fragile I do not think we have a lot of room to operate within.  If we find this is heart failure no evidence of infection we can always stop antibiotics.  Acute kidney injury on chronic kidney disease stage IIIb renal following worsening creatinine  COPD with  exacerbation on steroids and bronchodilators  Gastroesophageal reflux disease      Addendum: proBNP comes back markedly elevated 13,000 is even high for renal dysfunction and the procalcitonin is normal, this is most likely heart failure I am going to give her a big dose of Bumex and see if can back to any improvement.  I will go ahead and stop the Rocephin after today's dose.    Critical care time 38 minutes

## 2023-10-11 NOTE — CODE DOCUMENTATION
"Patient Name:  Josephine Wolf  YOB: 1940  MRN:  [id number]  Admit Date:  9/30/2023    Visit Diagnoses:     ICD-10-CM ICD-9-CM   1. Atrial fibrillation with rapid ventricular response: New onset  I48.91 427.31   2. COPD exacerbation  J44.1 491.21   3. Hypomagnesemia  E83.42 275.2   4. Chronic renal failure, unspecified CKD stage  N18.9 585.9   5. Chronic anemia  D64.9 285.9   6. Chronic obstructive pulmonary disease (COPD)  J44.9 496   7. Acute and chronic respiratory failure (acute-on-chronic)  J96.20 518.84       Reason For Rapid:   Chest pain  RN Communicated With:  Rapid team, patient, primary RN, will update Pulm MD    Rapid Outcome:  Remain on unit  Communication From Rapid Team:   VSS, pt states that her chest pain is almost gone now that she is back on bipap, getting CXRAY now, CTM and call back with any questions, update pulm and let them know of CXRAY results    Most Recent Vital Signs  Temp:  [97 øF (36.1 øC)-99.9 øF (37.7 øC)] 97 øF (36.1 øC)  Heart Rate:  [] 102  Resp:  [16-24] 24  BP: (108-151)/() 108/92  SpO2:  [79 %-96 %] 95 %  on  Flow (L/min):  [8-12] 12;   Device (Oxygen Therapy): NPPV/NIV    Labs:      Glucose   Date/Time Value Ref Range Status   10/11/2023 0345 169 (H) 70 - 130 mg/dL Final     No results found for: \"SITE\", \"ALLENTEST\", \"PHART\", \"SGE8KTZ\", \"PO2ART\", \"UKR1OCI\", \"BASEEXCESS\", \"C6ACKPTY\", \"HGBBG\", \"HCTABG\", \"OXYHEMOGLOBI\", \"METHHGBN\", \"CARBOXYHGB\", \"CO2CT\", \"BAROMETRIC\", \"MODALITY\", \"FIO2\"  Results from last 7 days   Lab Units 10/10/23  0634 10/09/23  0611 10/08/23  0837 10/07/23  0830   WBC 10*3/mm3 7.55 9.88 8.79 6.68   HEMOGLOBIN g/dL 8.9* 9.6* 9.2* 9.0*   PLATELETS 10*3/mm3 150 169 163 158     Results from last 7 days   Lab Units 10/10/23  0634 10/09/23  0611 10/08/23  0837 10/07/23  0830   SODIUM mmol/L 141 142 144 144   POTASSIUM mmol/L 5.1 4.7 4.5 4.3   CHLORIDE mmol/L 104 106 108* 108*   CO2 mmol/L 27.3 25.8 27.0 29.0   BUN mg/dL 54* 53* 47* 64* " "  CREATININE mg/dL 1.64* 1.59* 1.63* 1.81*   GLUCOSE mg/dL 136* 88 81 97   ALBUMIN g/dL 3.7  --  3.4* 3.5   Estimated Creatinine Clearance: 25.3 mL/min (A) (by C-G formula based on SCr of 1.64 mg/dL (H)).  Results from last 7 days   Lab Units 10/06/23  1414 10/06/23  1149   HSTROP T ng/L 29* 28*         Results from last 7 days   Lab Units 10/07/23  0159   PH, ARTERIAL pH units 7.293*   PO2 ART mm Hg 69.7*   PCO2, ARTERIAL mm Hg 57.4*   HCO3 ART mmol/L 27.8   O2 SATURATION ART % 91.1*   MODALITY  HFNC   No results found for: \"STREPPNEUAG\", \"LEGANTIGENUR\"        NIH Stroke Scale:                                                         Please refer to full rapid documentation on summary page under Index / Code Timeline  "

## 2023-10-11 NOTE — PLAN OF CARE
Goal Outcome Evaluation:  Plan of Care Reviewed With: patient, patient is resting comfortably in bed, no acute distress noted, no c/o voiced. Will continue to monitor        Progress: improving

## 2023-10-12 NOTE — CONSULTS
"Purpose of the visit was to evaluate for: goals of care/advanced care planning, support for patient/family, hospice referral/discussion, pain/symptom management, withdrawal of interventions, transfer to comfort care bed/unit, and comfort care. Spoke with RN as well as family and discussed palliative care, goals of care, care options, resuscitation status, [facility] scattered bed status, and discharge options.      Assessment:  Patient is palliative care appropriate given: COPD, acute on chromic respiratory failure with increasing oxygen needs, fibromyalgia, atrial fibrillation, FERNANDEZ, CKD III, HTN, and anxiety.   At the time of assessment patient was sitting upright in chair, eating breakfast and visiting with family.   PPS: 40% representing limited mobility/mainly in bed, extensive disease and unable to do most activity, self care provided mainly with assistance, reduced PO intake, drowsy with intermittent confusion.    Recommendations/Plan: No change to current treatment plan; continue to provide medical support until the patient or her sons request palliative care.    Follow-up palliative care consult noted.   I spoke with this patient's sons Chino and James privately at their request. They shared that the patient is intermittently confused, with anxiety and periodic hallucinations. They are both knowledgeable about the patient's fragile medical condition, treatment options and expected prognosis. They voiced their understanding that the patient's lung disease is irreversible and is worsening with increasing cardiac and renal complications. They understand that all treatment options on offer are supportive but not likely to promote a meaningful recovery, and that her increasing oxygen demands and reliance on BiPap signal a poor prognosis. They both shared that during a recent period of lucidity that patient was able to relay to them that she is aware of her condition, and that \"when the time comes, she " "wants to be made comfortable\". We discussed inpatient palliative care as a next step and went over the details of the transition. They fully understand how to proceed with palliative care when they/the patient are ready.   Their goal at this time is to continue to provide all supportive medical intervention to promote wellness. They shared that while they recognize that the patient is actively declining, neither she nor they are ready to transition to end-of-life care. She is in fact sitting upright in her chair, eating breakfast and visiting with family. They stated that they will know when the time is right and they will express their wish to make the transition to the patient's clinical staff at that time.  Spiritual care consult entered per family request for prayer and support.  I advised them of our availability to answer questions and provide support at any time.     Palliative Care will sign off; please re consult if further support is requested by family.     Mary Grace Aiken RN       "

## 2023-10-12 NOTE — PROGRESS NOTES
[facility] Progress Note      Patient Name: Josephine Wolf  : 1940  MRN: [id number]  Primary Care Physician:  Bernabe Guy MD  Date of admission: 2023    Subjective     Interval History:   Sitting in a chair; used BiPAP some last night  Still requiring significant O2 support  Not eating much    Review of Systems:   As noted above    Objective     Vitals:   Temp:  [97.5 øF (36.4 øC)-98.4 øF (36.9 øC)] 97.5 øF (36.4 øC)  Heart Rate:  [] 91  Resp:  [18-25] 19  BP: (127-164)/() 127/111  Flow (L/min):  [12] 12    Intake/Output Summary (Last 24 hours) at 10/12/2023 1124  Last data filed at 10/11/2023 1300  Gross per 24 hour   Intake 120 ml   Output --   Net 120 ml         Physical Exam:    Constitutional: Sitting in chair; conversant but a bit confused; myoclonic jerking  HEENT: Sclera anicteric, no conjunctival injection  Neck: Supple, no carotid bruit, trachea at midline, no JVD  Resp: Crackles, rhonchi, and wheezes; tachypneic on 12 L/min   Cardiovascular: Irregularly irregular, tachycardic, no rub, 2/6M  Gastrointestinal: BS +, soft, nontender and nondistended  : No palpable bladder  Musculoskeletal: No edema  Psychiatric: Somnolent  Neurologic: +tremulous, moving all limbs; myoclonic jerking  Skin: Warm and dry; bruises all limbs    Scheduled Meds:     albuterol, 2.5 mg, Nebulization, Q6H - RT  allopurinol, 100 mg, Oral, Daily  amiodarone, 200 mg, Oral, Q24H  apixaban, 2.5 mg, Oral, Q12H  budesonide-formoterol, 2 puff, Inhalation, BID - RT   And  tiotropium bromide monohydrate, 2 puff, Inhalation, Daily - RT  busPIRone, 10 mg, Oral, BID  DULoxetine, 60 mg, Oral, Daily  gabapentin, 200 mg, Oral, TID  guaiFENesin, 600 mg, Oral, Q12H  levothyroxine, 75 mcg, Oral, Daily  methylPREDNISolone sodium succinate, 40 mg, Intravenous, Q8H  metoprolol tartrate, 100 mg, Oral, Q8H  midodrine, 5 mg, Oral, TID AC  pantoprazole, 40 mg, Oral, BID AC  QUEtiapine, 50 mg,  Oral, Nightly  rOPINIRole, 0.5 mg, Oral, Nightly  rosuvastatin, 10 mg, Oral, Nightly  sodium chloride, 4 mL, Nebulization, 4x Daily - RT      IV Meds:        Results Reviewed:   I have personally reviewed the results from the time of this admission to 10/12/2023 11:24 EDT     Results from last 7 days   Lab Units 10/12/23  0630 10/11/23  0350 10/10/23  0634   SODIUM mmol/L 134* 137 141   POTASSIUM mmol/L 5.5* 5.5* 5.1   CHLORIDE mmol/L 100 101 104   CO2 mmol/L 23.5 27.5 27.3   BUN mg/dL 63* 58* 54*   CREATININE mg/dL 2.51* 1.99* 1.64*   CALCIUM mg/dL 9.2 9.5 9.0   GLUCOSE mg/dL 139* 147* 136*       Estimated Creatinine Clearance: 16.5 mL/min (A) (by C-G formula based on SCr of 2.51 mg/dL (H)).    Results from last 7 days   Lab Units 10/10/23  0634 10/08/23  0837 10/07/23  0830   MAGNESIUM mg/dL 2.2  --   --    PHOSPHORUS mg/dL 4.4 2.4* 2.9             Results from last 7 days   Lab Units 10/12/23  0851 10/11/23  0350 10/10/23  0634 10/09/23  0611 10/08/23  0837   WBC 10*3/mm3 12.68* 13.30* 7.55 9.88 8.79   HEMOGLOBIN g/dL 9.3* 9.3* 8.9* 9.6* 9.2*   PLATELETS 10*3/mm3 177 185 150 169 163               Assessment / Plan     ASSESSMENT:  1.  CHRIS on CKD3b, nonoliguric, worsening:  likely prerenal due to decreased renal perfusion in the setting of tachyarrhythmia, hypoxia, and modest hypotension.  Mild hyperkalemia; suspect she has a significant respiratory acidosis due to hypercarbia.  Volume excess by imaging.  Earlier urinalysis completely bland.  PVRs fine  2.  Atrial fibrillation with RVR  3.  COPD with exacerbation: worsening hypoxia  4.  Encephalopathy: steroid psychosis vs hypercarbia vs sleep deprivation vs hypoxia vs high gabapentin dose vs other  5.  Anemia  6.  Osteoarthritis: Meloxicam on medication list, but she is not taking this  7.  Blurry vision, dizziness, and fall in morning 10/3    PLAN:  1.  Recommend palliative care  2.  BiPAP  3.  Discussed with son at bedside       Thank you for involving us in  the care of Josephine Wolf.  Please feel free to call with any questions.    Carlos Barraza MD  10/12/23  11:24 EDT    [facility]  [phone]

## 2023-10-12 NOTE — PROGRESS NOTES
"                                              LOS: 12 days   Patient Care Team:  Bernabe Guy MD as PCP - General (Internal Medicine)  Avi Aly MD as Consulting Physician (Nephrology)    Chief Complaint:  F/up COPD, respiratory failure    Subjective   Interval History  *When she was sitting at the bedside commode.  She seems to be jittery.  She continues to require a lot of oxygen.  Currently on 13 L.    She sounds terrible on exam.  She stated that she used BiPAP last night.    REVIEW OF SYSTEMS:   Constitutional: No fever or chills.  Gastrointestinal: No nausea, vomiting or diarrhea.  Neuro: Feels weak all over.    Ventilator/Non-Invasive Ventilation Settings (From admission, onward)       Start     Ordered    10/07/23 0233  NIPPV (CPAP or BIPAP)  Until Discontinued        Question Answer Comment   Indication Sleep Apnea    Type BIPAP    IPAP 16    EPAP 8    Titrate Oxygen for SpO2 88 - 92%        10/07/23 0241                          Physical Exam:     Vital Signs  Temp:  [97.5 øF (36.4 øC)-98.6 øF (37 øC)] 98.6 øF (37 øC)  Heart Rate:  [] 92  Resp:  [18-22] 18  BP: (127-143)/() 134/76  No intake or output data in the 24 hours ending 10/12/23 1647      Flowsheet Rows      Flowsheet Row First Filed Value   Admission Height 157.5 cm (62\") Documented at 09/30/2023 1223   Admission Weight 73.5 kg (162 lb) Documented at 09/30/2023 1223            PPE used per hospital policy    General Appearance:   Alert, cooperative, mild respiratory distress.   ENMT:  Mallampati score 3, moist mucous membrane   Eyes:  Pupils equal and reactive to light. EOMI   Neck:   Trachea midline. No thyromegaly.   Lungs:   Diffuse coarse breath sounds with crackles and rhonchi.  Minimal expiratory wheezing.    Heart:   Regular rhythm and normal rate, normal S1 and S2, no         murmur   Skin:   No rash or ecchymosis   Abdomen:    Obese. Soft. No tenderness. No HSM.   Neuro/psych: Somnolent..  Moves all " extremities to stimulus.   Extremities:  No cyanosis, clubbing but edema.  Warm extremities and well-perfused          Results Review:        Results from last 7 days   Lab Units 10/12/23  0630 10/11/23  0350 10/10/23  0634   SODIUM mmol/L 134* 137 141   POTASSIUM mmol/L 5.5* 5.5* 5.1   CHLORIDE mmol/L 100 101 104   CO2 mmol/L 23.5 27.5 27.3   BUN mg/dL 63* 58* 54*   CREATININE mg/dL 2.51* 1.99* 1.64*   GLUCOSE mg/dL 139* 147* 136*   CALCIUM mg/dL 9.2 9.5 9.0     Results from last 7 days   Lab Units 10/11/23  0656 10/11/23  0350 10/06/23  1414   HSTROP T ng/L 29* 26* 29*     Results from last 7 days   Lab Units 10/12/23  0851 10/11/23  0350 10/10/23  0634   WBC 10*3/mm3 12.68* 13.30* 7.55   HEMOGLOBIN g/dL 9.3* 9.3* 8.9*   HEMATOCRIT % 29.4* 28.8* 27.2*   PLATELETS 10*3/mm3 177 185 150         Results from last 7 days   Lab Units 10/11/23  0350   PROBNP pg/mL 13,487.0*                   Results from last 7 days   Lab Units 10/07/23  0159   PH, ARTERIAL pH units 7.293*   PCO2, ARTERIAL mm Hg 57.4*   PO2 ART mm Hg 69.7*   O2 SATURATION ART % 91.1*   FLOW RATE lpm 8.0000   MODALITY  HFNC         I reviewed the patient's new clinical results.        Medication Review:   albuterol, 2.5 mg, Nebulization, Q6H - RT  allopurinol, 100 mg, Oral, Daily  amiodarone, 200 mg, Oral, Q24H  apixaban, 2.5 mg, Oral, Q12H  budesonide-formoterol, 2 puff, Inhalation, BID - RT   And  tiotropium bromide monohydrate, 2 puff, Inhalation, Daily - RT  busPIRone, 10 mg, Oral, BID  DULoxetine, 60 mg, Oral, Daily  gabapentin, 200 mg, Oral, TID  guaiFENesin, 600 mg, Oral, Q12H  levothyroxine, 75 mcg, Oral, Daily  methylPREDNISolone sodium succinate, 40 mg, Intravenous, Q8H  metoprolol tartrate, 100 mg, Oral, Q8H  midodrine, 5 mg, Oral, TID AC  pantoprazole, 40 mg, Oral, BID AC  QUEtiapine, 50 mg, Oral, Nightly  rOPINIRole, 0.5 mg, Oral, Nightly  rosuvastatin, 10 mg, Oral, Nightly  sodium chloride, 4 mL, Nebulization, 4x Daily - RT              Diagnostic imaging:  I personally and independently reviewed the following images:  CXR 10/2/2023: Low lung volumes.  No pulmonary infiltrates but possibly fluid in the left fissure/atelectasis.    CXR 10/11/2023: Increased pulmonary opacities/infiltrates and pleural effusion/volume overload.    Assessment     Acute exacerbation of COPD  Chronic hypoxemic respiratory failure, on oxygen 3 L/min.  Dr. Ca  Acute on chronic hypercapnia with respiratory acidosis  Atrial fibrillation with RVR  RLS       Plan       Symbicort 2 puffs twice daily and Spiriva 2 puffs daily.  Mucinex 600 mg twice daily  Albuterol nebulizer every 6 hours.  Oxygen by NC and titrate keep SPO2 >90%  Continue Solu-Medrol 40 mg every 8 hours.  Hypertonic saline and CPT 4 times daily  Avoid narcotics as possible.  Seroquel 50 mg nightly.  PRN diuresis per nephrology.  Midodrine 3 times daily for hypotension  NIPPV as needed.  Agree with transitioning to palliative care but family would like to continue nebulizer therapy and NIPPV as needed.  I will change the order.        Anat Cowan MD  10/12/23  16:47 EDT        This note was dictated utilizing [employer] dictation

## 2023-10-12 NOTE — SIGNIFICANT NOTE
10/12/23 1414   OTHER   Discipline physical therapy assistant   Rehab Time/Intention   Session Not Performed other (see comments)  (pt transferring to [facility], though plan is to continue therapies for the moment; RN agreed to hold today, pt about to transfer soon and still on bipap; has been sitting up in chair today; PT will f/u tomorrow)   Recommendation   PT - Next Appointment 10/13/23

## 2023-10-12 NOTE — PLAN OF CARE
Goal Outcome Evaluation:  Plan of Care Reviewed With: patient           Outcome Evaluation: Pt feeling weak and on increased O2 requirements. 13L Hi-Flow with sats of 92%. Pt is drowsy, intermittently using Bi-pap. Pt is able to transfer to bedside toilet with 2 person assist. Son is at bedside. No complaints at this time.

## 2023-10-12 NOTE — PROGRESS NOTES
"Daily progress note    Primary care physician  Dr. PULMMER    Subjective  Awake and alert and comfortably sitting in chair at and using BiPAP and family at bedside.    History of present illness  83-year-old white female with history of chronic hypoxic respiratory failure on home oxygen COPD paroxysmal SVT nonsustained ventricular tachycardia hypertension hypothyroidism and chronic kidney disease stage III who lives by herself presented to [facility] emergency room with increased shortness of breath for last 1 week which is getting worse.  Patient also complaining of productive cough but no fever chills chest pain abdominal pain nausea vomiting diarrhea.  Patient work-up in ER revealed acute on chronic hypoxic respiratory failure with acute exacerbation of COPD and also found to be in rapid ventricular rate with history of atrial fibrillation admitted for management.  Patient is DNR per her wishes.     REVIEW OF SYSTEMS  Unable to obtain     PHYSICAL EXAM   Blood pressure (!) 127/111, pulse 91, temperature 97.5 øF (36.4 øC), temperature source Oral, resp. rate 19, height 157.5 cm (62\"), weight 73.5 kg (162 lb), SpO2 92%.    GENERAL: Sleepy in no respiratory distress  HENT: nares patent  Head/neck/ face are symmetric without gross deformity, signs of trauma, or swelling  EYES: no scleral icterus, no conjunctival pallor.  NECK: Supple, no meningismus  CV: Tachycardia with irregular regular rhythm.  No obvious murmur or rub.    RESPIRATORY: Normal effort and moving air bilaterally with expiratory wheezes  ABDOMEN: soft and nontender.  Nondistended bowel sounds positive  MUSCULOSKELETAL: no deformity.  No edema.  Intact distal pulses   NEURO: alert and appropriate, moves all extremities, follows commands.  No focal deficit  SKIN: warm, dry     LAB RESULTS  Lab Results (last 24 hours)       Procedure Component Value Units Date/Time    Blood Culture - Blood, Hand, Right [[id number]]  (Normal) Collected: 10/11/23 " 0849    Specimen: Blood from Hand, Right Updated: 10/12/23 0930     Blood Culture No growth at 24 hours    Narrative:      Less than seven (7) mL's of blood was collected.  Insufficient quantity may yield false negative results.    CBC & Differential [[id number]]  (Abnormal) Collected: 10/12/23 0851    Specimen: Blood Updated: 10/12/23 0912    Narrative:      The following orders were created for panel order CBC & Differential.  Procedure                               Abnormality         Status                     ---------                               -----------         ------                     CBC Auto Differential[[id number]]        Abnormal            Final result                 Please view results for these tests on the individual orders.    CBC Auto Differential [[id number]]  (Abnormal) Collected: 10/12/23 0851    Specimen: Blood Updated: 10/12/23 0912     WBC 12.68 10*3/mm3      RBC 2.91 10*6/mm3      Hemoglobin 9.3 g/dL      Hematocrit 29.4 %      .0 fL      MCH 32.0 pg      MCHC 31.6 g/dL      RDW 14.5 %      RDW-SD 53.8 fl      MPV 10.7 fL      Platelets 177 10*3/mm3      Neutrophil % 92.7 %      Lymphocyte % 3.5 %      Monocyte % 2.8 %      Eosinophil % 0.0 %      Basophil % 0.1 %      Neutrophils, Absolute 11.76 10*3/mm3      Lymphocytes, Absolute 0.44 10*3/mm3      Monocytes, Absolute 0.36 10*3/mm3      Eosinophils, Absolute 0.00 10*3/mm3      Basophils, Absolute 0.01 10*3/mm3     Basic Metabolic Panel [[id number]]  (Abnormal) Collected: 10/12/23 0630    Specimen: Blood Updated: 10/12/23 0727     Glucose 139 mg/dL      BUN 63 mg/dL      Creatinine 2.51 mg/dL      Sodium 134 mmol/L      Potassium 5.5 mmol/L      Comment: Slight hemolysis detected by analyzer. Results may be affected.        Chloride 100 mmol/L      CO2 23.5 mmol/L      Calcium 9.2 mg/dL      BUN/Creatinine Ratio 25.1     Anion Gap 10.5 mmol/L      eGFR 18.8 mL/min/1.73     Narrative:      GFR Normal >60  Chronic Kidney Disease  <60  Kidney Failure <15    The GFR formula is only valid for adults with stable renal function between ages 18 and 70.    Blood Culture - Blood, Hand, Right [[id number]]  (Normal) Collected: 10/11/23 0656    Specimen: Blood from Hand, Right Updated: 10/12/23 0715     Blood Culture No growth at 24 hours    POC Glucose Once [[id number]]  (Abnormal) Collected: 10/11/23 2136    Specimen: Blood Updated: 10/11/23 2138     Glucose 175 mg/dL             Narrative & Impression   CT HEAD WO CONTRAST-     INDICATIONS: Blurry vision, trauma     TECHNIQUE: Radiation dose reduction techniques were utilized, including  automated exposure control and exposure modulation based on body size.  Noncontrast head CT     COMPARISON: 7/23/2023     FINDINGS:           No acute intracranial hemorrhage, midline shift or mass effect. No acute  territorial infarct is identified.     Right ICA stent is again noted.     Ventricles, cisterns, cerebral sulci are unremarkable for patient age.     The visualized paranasal sinuses, orbits, mastoid air cells are  unremarkable.                 IMPRESSION:     No acute intracranial hemorrhage or hydrocephalus. If there is further  clinical concern, MRI could be considered for further evaluation.       Results  Scan on 9/30/2023 1227 by [employer] On[employer], [place]: ECG 12-LEAD         Author: -- Service: -- Author Type: --   Filed: Date of Service: Creation Time:   Status: (Other)   HEART RATE= 119  bpm  RR Interval= 504  ms  VA Interval=   ms  P Horizontal Axis=   deg  P Front Axis=   deg  QRSD Interval= 81  ms  QT Interval= 298  ms  QTcB= 420  ms  QRS Axis= 11  deg  T Wave Axis= 55  deg  - ABNORMAL ECG -  Atrial fibrillation  Abnormal R-wave progression, early transition  Borderline T abnormalities, anterior leads          Current Facility-Administered Medications:     albuterol (PROVENTIL) nebulizer solution 0.083% 2.5 mg/3mL, 2.5 mg, Nebulization, Q6H - RT, Stanley Fowler MD, 2.5 mg at 10/12/23 1101     allopurinol (ZYLOPRIM) tablet 100 mg, 100 mg, Oral, Daily, Stanley Fowler MD, 100 mg at 10/12/23 0830    amiodarone (PACERONE) tablet 200 mg, 200 mg, Oral, Q24H, Beth Palacio, APRMARIANGEL, 200 mg at 10/12/23 0830    apixaban (ELIQUIS) tablet 2.5 mg, 2.5 mg, Oral, Q12H, Stanley Fowler MD, 2.5 mg at 10/12/23 0830    budesonide-formoterol (SYMBICORT) 160-4.5 MCG/ACT inhaler 2 puff, 2 puff, Inhalation, BID - RT, 2 puff at 10/12/23 0717 **AND** tiotropium (SPIRIVA RESPIMAT) 2.5 mcg/act aerosol solution inhaler, 2 puff, Inhalation, Daily - RT, Stanley Fowler MD, 2 puff at 10/12/23 0717    busPIRone (BUSPAR) tablet 10 mg, 10 mg, Oral, BID, Stanley Fowler MD, 10 mg at 10/12/23 0829    DULoxetine (CYMBALTA) DR capsule 60 mg, 60 mg, Oral, Daily, Stanley Fowler MD, 60 mg at 10/12/23 0829    gabapentin (NEURONTIN) capsule 200 mg, 200 mg, Oral, TID, Carlos Barraza MD, 200 mg at 10/12/23 0829    guaiFENesin (MUCINEX) 12 hr tablet 600 mg, 600 mg, Oral, Q12H, Stanley Fowler MD, 600 mg at 10/12/23 0829    HYDROcodone-acetaminophen (NORCO) 7.5-325 MG per tablet 1 tablet, 1 tablet, Oral, Q4H PRN, Stanley Fowler MD, 1 tablet at 10/12/23 0923    hydrOXYzine (ATARAX) tablet 25 mg, 25 mg, Oral, TID PRN, Stanley Fowler MD, 25 mg at 10/11/23 0404    ipratropium-albuterol (DUO-NEB) nebulizer solution 3 mL, 3 mL, Nebulization, Q4H PRN, Stanley Fowler MD, 3 mL at 10/11/23 1936    levothyroxine (SYNTHROID, LEVOTHROID) tablet 75 mcg, 75 mcg, Oral, Daily, Stanley Fowler MD, 75 mcg at 10/12/23 0829    methylPREDNISolone sodium succinate (SOLU-Medrol) injection 40 mg, 40 mg, Intravenous, Q8H, Anat Cowan MD, 40 mg at 10/12/23 0801    metoprolol tartrate (LOPRESSOR) tablet 100 mg, 100 mg, Oral, Q8H, Nancy Padilla MD, 100 mg at 10/12/23 1132    midodrine (PROAMATINE) tablet 5 mg, 5 mg, Oral, TID AC, Stanley Fowler MD, 5 mg at 10/12/23 1132    nitroglycerin (NITROSTAT) SL tablet 0.4 mg, 0.4 mg, Sublingual, Q5 Min PRN, Stanley Fowler MD, 0.4 mg  at 10/06/23 1139    ondansetron (ZOFRAN) injection 4 mg, 4 mg, Intravenous, Q4H PRN, Amanda Fowler MD, 4 mg at 10/10/23 0125    pantoprazole (PROTONIX) EC tablet 40 mg, 40 mg, Oral, BID AC, Amanda Fowler MD, 40 mg at 10/12/23 0801    QUEtiapine (SEROquel) tablet 50 mg, 50 mg, Oral, Nightly, Sheri Gilmore APRN, 50 mg at 10/07/23 2338    rOPINIRole (REQUIP) tablet 0.5 mg, 0.5 mg, Oral, Nightly, Amanda Fowler MD, 0.5 mg at 10/11/23 2113    rosuvastatin (CRESTOR) tablet 10 mg, 10 mg, Oral, Nightly, Amanda Fowler MD, 10 mg at 10/11/23 2113    simethicone (MYLICON) chewable tablet 80 mg, 80 mg, Oral, 4x Daily PRN, Amanda Fowler MD, 80 mg at 10/03/23 1249    [COMPLETED] Insert Peripheral IV, , , Once **AND** sodium chloride 0.9 % flush 10 mL, 10 mL, Intravenous, PRN, Car Perea MD, 10 mL at 10/12/23 0805    sodium chloride 7 % nebulizer solution nebulizer solution 4 mL, 4 mL, Nebulization, 4x Daily - RT, Anat Cowan MD, 4 mL at 10/12/23 1106     ASSESSMENT  Acute on chronic hypoxic hypercapnic respiratory failure  Acute exacerbation of COPD  Paroxysmal atrial fibrillation with rapid ventricular rate   Acute rhinovirus infection  Hypertension  Hypothyroidism  Anxiety disorder  Depression  Chronic anemia  Restless leg syndrome  Acute kidney injury resolving  Chronic kidney disease stage III    PLAN  CPM  Continue BiPAP as needed  Supplemental oxygen nebulizer  Continue IV steroids per pulmonary  Discontinue antibiotics  Control the heart rate  Cardiology input appreciated  Pulmonary and nephrology to follow patient  Adjust home medications  Stress ulcer DVT prophylaxis  Supportive care  PT/OT  DNR and poor prognosis  Palliative care input appreciated  Discussed with family nursing staff  Patient and family agreeable to transfer to palliative care unit and continue with current orders at this time and make her comfortable if she decline    AMANDA FOWLER MD    Copied text in this note has been reviewed and is accurate  as of 10/12/23

## 2023-10-12 NOTE — CONSULTS
Pt was engaged in conversation and indicated importance of stone in her life. Presence of family in room indicated strong system of support. Pastoral Care office will attempt to follow up in the coming days.

## 2023-10-12 NOTE — PLAN OF CARE
Goal Outcome Evaluation:  Plan of Care Reviewed With: patient   AAOx3 enjoying conversations with her children visiting in no acute distress. VSS. Tolerates minimal activity such as transfer from bed to chair. Tolerates  lunch with NC @ 12L/min. C/o pain once during this shift.   Orders to discontinue cardiac monitor and transfer to [facility] today.   1700: assisted by transport; transferred to [facility] with family at bedside in no acute distress.

## 2023-10-12 NOTE — NURSING NOTE
Patient and family refusing bed alarm while patient sits on side of bed to eat. Notified charge nurse. Educated patient and family member on importance of fall prevention.

## 2023-10-13 NOTE — PLAN OF CARE
Patient changed to comfort care today and started to receive comfort medications. Indwelling catheter placed today. Patient resting in bed without signs or symptoms of pain or distress. Will continue to monitor.        Goal Outcome Evaluation:     Problem: Adult Inpatient Plan of Care  Goal: Plan of Care Review  Outcome: Ongoing, Progressing  Goal: Patient-Specific Goal (Individualized)  Outcome: Ongoing, Progressing  Goal: Absence of Hospital-Acquired Illness or Injury  Outcome: Ongoing, Progressing  Intervention: Identify and Manage Fall Risk  Recent Flowsheet Documentation  Taken 10/13/2023 1400 by Sherley Mina, RN  Safety Promotion/Fall Prevention:   clutter free environment maintained   assistive device/personal items within reach   fall prevention program maintained   lighting adjusted   nonskid shoes/slippers when out of bed   room organization consistent   safety round/check completed  Taken 10/13/2023 1200 by Sherley Mina, RN  Safety Promotion/Fall Prevention:   assistive device/personal items within reach   clutter free environment maintained   fall prevention program maintained   lighting adjusted   nonskid shoes/slippers when out of bed   room organization consistent   safety round/check completed  Taken 10/13/2023 1000 by Sherley Mina, RN  Safety Promotion/Fall Prevention:   assistive device/personal items within reach   clutter free environment maintained   fall prevention program maintained   lighting adjusted   nonskid shoes/slippers when out of bed   safety round/check completed   room organization consistent  Taken 10/13/2023 0800 by Sherley Mina, RN  Safety Promotion/Fall Prevention:   assistive device/personal items within reach   clutter free environment maintained   fall prevention program maintained   lighting adjusted   nonskid shoes/slippers when out of bed   room organization consistent   safety round/check completed  Intervention: Prevent Skin Injury  Recent Flowsheet  Documentation  Taken 10/13/2023 1400 by Sherley Mina RN  Body Position:   left   side-lying   turned  Taken 10/13/2023 1200 by Sherley Mina RN  Body Position:   position changed independently   30 degrees  Taken 10/13/2023 1000 by Sherley Mina RN  Body Position:   side-lying   right  Taken 10/13/2023 0800 by Sherley Mina RN  Body Position: 30 degrees  Intervention: Prevent Infection  Recent Flowsheet Documentation  Taken 10/13/2023 0800 by Sherley Mina RN  Infection Prevention:   environmental surveillance performed   equipment surfaces disinfected   hand hygiene promoted   rest/sleep promoted   single patient room provided  Goal: Optimal Comfort and Wellbeing  Outcome: Ongoing, Progressing  Intervention: Monitor Pain and Promote Comfort  Recent Flowsheet Documentation  Taken 10/13/2023 1200 by Sherley Mina RN  Pain Management Interventions:   see MAR   premedicated for activity  Intervention: Provide Person-Centered Care  Recent Flowsheet Documentation  Taken 10/13/2023 0800 by Sherley Mina RN  Trust Relationship/Rapport:   care explained   choices provided   emotional support provided   empathic listening provided   reassurance provided   questions answered   questions encouraged   thoughts/feelings acknowledged  Goal: Readiness for Transition of Care  Outcome: Ongoing, Progressing     Problem: COPD (Chronic Obstructive Pulmonary Disease) Comorbidity  Goal: Maintenance of COPD Symptom Control  Outcome: Ongoing, Progressing  Intervention: Maintain COPD-Symptom Control  Recent Flowsheet Documentation  Taken 10/13/2023 1400 by Sherley Mina RN  Medication Review/Management: medications reviewed  Taken 10/13/2023 1200 by Sherley Mina RN  Medication Review/Management: medications reviewed  Taken 10/13/2023 1000 by Sherley Mina RN  Medication Review/Management: medications reviewed  Taken 10/13/2023 0800 by Sherley Mina RN  Medication Review/Management: medications  reviewed     Problem: Skin Injury Risk Increased  Goal: Skin Health and Integrity  Outcome: Ongoing, Progressing  Intervention: Optimize Skin Protection  Recent Flowsheet Documentation  Taken 10/13/2023 1400 by Sherley Mina RN  Head of Bed (HOB) Positioning: HOB elevated  Taken 10/13/2023 1200 by Sherley Mina RN  Head of Bed (HOB) Positioning: HOB at 30-45 degrees  Taken 10/13/2023 1000 by Sherley Mina RN  Head of Bed (HOB) Positioning: HOB at 20-30 degrees  Taken 10/13/2023 0800 by Sherley Mina RN  Head of Bed (HOB) Positioning: HOB at 20-30 degrees     Problem: Fall Injury Risk  Goal: Absence of Fall and Fall-Related Injury  Outcome: Ongoing, Progressing  Intervention: Identify and Manage Contributors  Recent Flowsheet Documentation  Taken 10/13/2023 1400 by Sherley Mina RN  Medication Review/Management: medications reviewed  Taken 10/13/2023 1200 by Sherley Mina RN  Medication Review/Management: medications reviewed  Taken 10/13/2023 1000 by Sherley Mina RN  Medication Review/Management: medications reviewed  Taken 10/13/2023 0800 by Sherley Mina RN  Medication Review/Management: medications reviewed  Intervention: Promote Injury-Free Environment  Recent Flowsheet Documentation  Taken 10/13/2023 1400 by Sherley Mina RN  Safety Promotion/Fall Prevention:   clutter free environment maintained   assistive device/personal items within reach   fall prevention program maintained   lighting adjusted   nonskid shoes/slippers when out of bed   room organization consistent   safety round/check completed  Taken 10/13/2023 1200 by Sherley Mina RN  Safety Promotion/Fall Prevention:   assistive device/personal items within reach   clutter free environment maintained   fall prevention program maintained   lighting adjusted   nonskid shoes/slippers when out of bed   room organization consistent   safety round/check completed  Taken 10/13/2023 1000 by Sherley Mina RN  Safety  Promotion/Fall Prevention:   assistive device/personal items within reach   clutter free environment maintained   fall prevention program maintained   lighting adjusted   nonskid shoes/slippers when out of bed   safety round/check completed   room organization consistent  Taken 10/13/2023 0800 by Sherley Mina, RN  Safety Promotion/Fall Prevention:   assistive device/personal items within reach   clutter free environment maintained   fall prevention program maintained   lighting adjusted   nonskid shoes/slippers when out of bed   room organization consistent   safety round/check completed     Problem: Palliative Care  Goal: Enhanced Quality of Life  Outcome: Ongoing, Progressing  Intervention: Maximize Comfort  Recent Flowsheet Documentation  Taken 10/13/2023 1200 by Sherley Mina, RN  Pain Management Interventions:   see MAR   premedicated for activity

## 2023-10-13 NOTE — PLAN OF CARE
Goal Outcome Evaluation:  Pt was transferred to [facility] yesterday for supportive care. Pt removing Bipap mask early in the evening. Discussed hi elyssa NC with family and RT. Pt expressed increased comfort with this. Breathing treatments continued.  Family agreeable to continue current medications for now, as long as pt can tolerate. Pills given whole. PRN norco and zofran given x1 for pain and nausea. Bladder scan completed twice and was unremarkable each time. Purewick in place with little output. Large BM overnight. Son at bedside overnight and expressed that he thought patient already had palliative order set in place. This RN informed him that it was not and encouraged him to discuss his wishes/goals of care with his other sibling and provider this AM. Emotional support provided.  Care continuing.

## 2023-10-13 NOTE — PROGRESS NOTES
Discharge Planning Assessment  [facility]     Patient Name: Josephine Wolf  MRN: [id number]  Today's Date: 10/13/2023    Admit Date: 9/30/2023    Plan: [facility] pending pre-cert and bipap   Discharge Needs Assessment    No documentation.                  Discharge Plan       Row Name 10/13/23 1420       Plan    Plan Comments The patient transferred to [facility] from [address] [place] [address] on 10/12/23. The patient is palliative per Dr. Fowler today. No [facility] evaluation at this time. CCP will continue to follow for any needs that may arise. PATRICE Camara RN, CCP.                  Continued Care and Services - Admitted Since 9/30/2023       Destination Coordination complete.      Service Provider Request Status Selected Services Address Phone Fax Patient Preferred    [facility] RE[facility]IL[facility]ATION  Selected Skilled Nursing [address] [place] [id number] [phone] --    [facility] AND REHAB Accepted N/A [address] [facility] [id number] [phone] --    [facility] Accepted N/A [address] [facility] [id number] [phone] --    [facility]ed N/A [facility] [facility] [id number] [phone] --    [facility] Accepted N/A [address] [facility] [id number] [phone] --    [facility] Pending - Request Sent N/A [address] [facility] [id number] [phone] --    [facility] Pending - Request Sent N/A [address] [facility] [address] [phone] --    [facility] Pending - Request Sent N/A [address] [facility] [facility] [id number] [phone] --    [facility] Pending - Request Sent N/A [address] [facility] [address] [phone] --    [facility] Pending - Request Sent N/A [address]  [facility] [address] [phone] --    [facility] Pending - Request Sent N/A [address] [facility] [id number] [phone] --    [facility] Pending - Request Sent N/A [address] [facility] [address] [phone] --    [facility] P[facility] - Request Sent N/A [address] [facility] [address] [phone] --    [facility] - Request Sent N/A [address] [facility] [id number] [phone] --    [facility] Pending - Request Sent N/A [address] [facility] [address] [phone] --    [facility] Declined  Out of network N/A [address] [facility] [address] [phone] --    [facility] Declined  Patient Insurance Not Accepted N/A [address] [facility] [id number] [phone] --              Home Medical Care Coordination complete.      Service Provider Request Status Selected Services Address Phone Fax Patient Preferred     [facility]  Selected Home Health Services [address] [facility] [address] [address] [phone] --                  Expected Discharge Date and Time       Expected Discharge Date Expected Discharge Time    Oct 16, 2023            Demographic Summary    No documentation.                  Functional Status    No documentation.                  Psychosocial    No documentation.                  Abuse/Neglect    No documentation.                  Legal    No documentation.                  Substance Abuse    No documentation.                  Patient Forms    No documentation.                     Arelis Camara, RN

## 2023-10-13 NOTE — PROGRESS NOTES
"Daily progress note    Primary care physician  Dr. PLUMMER    Subjective  Awake alert and comfortable and family at bedside.    History of present illness  83-year-old white female with history of chronic hypoxic respiratory failure on home oxygen COPD paroxysmal SVT nonsustained ventricular tachycardia hypertension hypothyroidism and chronic kidney disease stage III who lives by herself presented to [facility] emergency room with increased shortness of breath for last 1 week which is getting worse.  Patient also complaining of productive cough but no fever chills chest pain abdominal pain nausea vomiting diarrhea.  Patient work-up in ER revealed acute on chronic hypoxic respiratory failure with acute exacerbation of COPD and also found to be in rapid ventricular rate with history of atrial fibrillation admitted for management.  Patient is DNR per her wishes.     REVIEW OF SYSTEMS  Unable to obtain     PHYSICAL EXAM   Blood pressure 149/97, pulse 84, temperature 98.4 øF (36.9 øC), temperature source Oral, resp. rate 18, height 157.5 cm (62\"), weight 73.5 kg (162 lb), SpO2 90%.    GENERAL: Sleepy in no respiratory distress  HENT: nares patent  Head/neck/ face are symmetric without gross deformity, signs of trauma, or swelling  EYES: no scleral icterus, no conjunctival pallor.  NECK: Supple, no meningismus  CV: Tachycardia with irregular regular rhythm.  No obvious murmur or rub.    RESPIRATORY: Normal effort and moving air bilaterally with expiratory wheezes  ABDOMEN: soft and nontender.  Nondistended bowel sounds positive  MUSCULOSKELETAL: no deformity.  No edema.  Intact distal pulses   NEURO: alert and appropriate, moves all extremities, follows commands.  No focal deficit  SKIN: warm, dry     LAB RESULTS  Lab Results (last 24 hours)       Procedure Component Value Units Date/Time    Blood Culture - Blood, Hand, Right [[id number]]  (Normal) Collected: 10/11/23 0849    Specimen: Blood from Hand, Right " Updated: 10/13/23 0930     Blood Culture No growth at 2 days    Narrative:      Less than seven (7) mL's of blood was collected.  Insufficient quantity may yield false negative results.    Blood Culture - Blood, Hand, Right [[id number]]  (Normal) Collected: 10/11/23 0656    Specimen: Blood from Hand, Right Updated: 10/13/23 0715     Blood Culture No growth at 2 days    Basic Metabolic Panel [[id number]]  (Abnormal) Collected: 10/13/23 0608    Specimen: Blood Updated: 10/13/23 0647     Glucose 143 mg/dL      BUN 71 mg/dL      Creatinine 2.47 mg/dL      Sodium 136 mmol/L      Potassium 4.4 mmol/L      Chloride 101 mmol/L      CO2 23.0 mmol/L      Calcium 8.8 mg/dL      BUN/Creatinine Ratio 28.7     Anion Gap 12.0 mmol/L      eGFR 19.2 mL/min/1.73     Narrative:      GFR Normal >60  Chronic Kidney Disease <60  Kidney Failure <15    The GFR formula is only valid for adults with stable renal function between ages 18 and 70.    CBC & Differential [[id number]]  (Abnormal) Collected: 10/13/23 0608    Specimen: Blood Updated: 10/13/23 0628    Narrative:      The following orders were created for panel order CBC & Differential.  Procedure                               Abnormality         Status                     ---------                               -----------         ------                     CBC Auto Differential[[id number]]        Abnormal            Final result                 Please view results for these tests on the individual orders.    CBC Auto Differential [[id number]]  (Abnormal) Collected: 10/13/23 0608    Specimen: Blood Updated: 10/13/23 0628     WBC 15.74 10*3/mm3      RBC 2.96 10*6/mm3      Hemoglobin 9.4 g/dL      Hematocrit 29.5 %      MCV 99.7 fL      MCH 31.8 pg      MCHC 31.9 g/dL      RDW 14.4 %      RDW-SD 50.6 fl      MPV 10.7 fL      Platelets 164 10*3/mm3      Neutrophil % 90.2 %      Lymphocyte % 3.4 %      Monocyte % 5.3 %      Eosinophil % 0.0 %      Basophil % 0.1 %      Immature Grans % 1.0 %       Neutrophils, Absolute 14.19 10*3/mm3      Lymphocytes, Absolute 0.54 10*3/mm3      Monocytes, Absolute 0.83 10*3/mm3      Eosinophils, Absolute 0.00 10*3/mm3      Basophils, Absolute 0.02 10*3/mm3      Immature Grans, Absolute 0.16 10*3/mm3      nRBC 0.2 /100 WBC             Narrative & Impression   CT HEAD WO CONTRAST-     INDICATIONS: Blurry vision, trauma     TECHNIQUE: Radiation dose reduction techniques were utilized, including  automated exposure control and exposure modulation based on body size.  Noncontrast head CT     COMPARISON: 7/23/2023     FINDINGS:           No acute intracranial hemorrhage, midline shift or mass effect. No acute  territorial infarct is identified.     Right ICA stent is again noted.     Ventricles, cisterns, cerebral sulci are unremarkable for patient age.     The visualized paranasal sinuses, orbits, mastoid air cells are  unremarkable.                 IMPRESSION:     No acute intracranial hemorrhage or hydrocephalus. If there is further  clinical concern, MRI could be considered for further evaluation.       Results  Scan on 9/30/2023 1227 by [employer], [place]: ECG 12-LEAD         Author: -- Service: -- Author Type: --   Filed: Date of Service: Creation Time:   Status: (Other)   HEART RATE= 119  bpm  RR Interval= 504  ms  NH Interval=   ms  P Horizontal Axis=   deg  P Front Axis=   deg  QRSD Interval= 81  ms  QT Interval= 298  ms  QTcB= 420  ms  QRS Axis= 11  deg  T Wave Axis= 55  deg  - ABNORMAL ECG -  Atrial fibrillation  Abnormal R-wave progression, early transition  Borderline T abnormalities, anterior leads          Current Facility-Administered Medications:     acetaminophen (TYLENOL) tablet 650 mg, 650 mg, Oral, Q4H PRN **OR** acetaminophen (TYLENOL) 160 MG/5ML oral solution 650 mg, 650 mg, Oral, Q4H PRN **OR** acetaminophen (TYLENOL) suppository 650 mg, 650 mg, Rectal, Q4H PRN, Stanley Fowler MD    diphenoxylate-atropine (LOMOTIL) 2.5-0.025 MG per tablet 1 tablet, 1  tablet, Oral, Q2H PRN, Stanley Fowler MD    First Mouthwash (Magic Mouthwash) 5 mL, 5 mL, Swish & Spit, Q4H PRN, Stanley Fowler MD    Glycerin-Hypromellose- (ARTIFICIAL TEARS) 0.2-0.2-1 % ophthalmic solution solution 1 drop, 1 drop, Both Eyes, Q30 Min PRN, Stanley Fowler MD    glycopyrrolate (ROBINUL) injection 0.2 mg, 0.2 mg, Intravenous, Q2H PRN **OR** glycopyrrolate (ROBINUL) injection 0.2 mg, 0.2 mg, Subcutaneous, Q2H PRN **OR** glycopyrrolate (ROBINUL) injection 0.4 mg, 0.4 mg, Intravenous, Q2H PRN **OR** glycopyrrolate (ROBINUL) injection 0.4 mg, 0.4 mg, Subcutaneous, Q2H PRN, Stanley Fowler MD    haloperidol (HALDOL) tablet 1 mg, 1 mg, Oral, Q4H PRN **OR** haloperidol (HALDOL) 2 MG/ML solution 1 mg, 1 mg, Oral, Q4H PRN **OR** haloperidol lactate (HALDOL) injection 1 mg, 1 mg, Subcutaneous, Q4H PRN, Stanley Fowler MD    HYDROmorphone (DILAUDID) injection 0.5 mg, 0.5 mg, Intravenous, Q1H PRN **OR** morphine injection 2 mg, 2 mg, Intravenous, Q1H PRN **OR** morphine concentrated solution 5 mg, 5 mg, Sublingual, Q1H PRN **OR** ketorolac (TORADOL) injection 15 mg, 15 mg, Intravenous, Q6H PRN, Stanley Fowler MD    HYDROmorphone (DILAUDID) injection 1 mg, 1 mg, Intravenous, Q1H PRN **OR** Morphine sulfate (PF) injection 4 mg, 4 mg, Intravenous, Q1H PRN **OR** morphine concentrated solution 10 mg, 10 mg, Sublingual, Q1H PRN, Stanley Fowler MD    ipratropium-albuterol (DUO-NEB) nebulizer solution 3 mL, 3 mL, Nebulization, Q4H PRN, Stanley Fowler MD    LORazepam (ATIVAN) injection 0.5 mg, 0.5 mg, Intravenous, Q1H PRN **OR** LORazepam (ATIVAN) injection 0.5 mg, 0.5 mg, Subcutaneous, Q1H PRN **OR** LORazepam (ATIVAN) 2 MG/ML concentrated solution 0.5 mg, 0.5 mg, Sublingual, Q1H PRN, Stanley Fowler MD    LORazepam (ATIVAN) injection 1 mg, 1 mg, Intravenous, Q1H PRN **OR** LORazepam (ATIVAN) injection 1 mg, 1 mg, Subcutaneous, Q1H PRN **OR** LORazepam (ATIVAN) 2 MG/ML concentrated solution 1 mg, 1 mg, Sublingual, Q1H PRN,  Amanda Fowler MD    LORazepam (ATIVAN) injection 2 mg, 2 mg, Intravenous, Q1H PRN **OR** LORazepam (ATIVAN) injection 2 mg, 2 mg, Subcutaneous, Q1H PRN **OR** LORazepam (ATIVAN) 2 MG/ML concentrated solution 2 mg, 2 mg, Sublingual, Q1H PRN, Amanda Fowler MD    promethazine (PHENERGAN) tablet 6.25 mg, 6.25 mg, Oral, Q4H PRN **OR** promethazine (PHENERGAN) 6.25 MG/5ML syrup 6.25 mg, 6.25 mg, Oral, Q4H PRN **OR** promethazine (PHENERGAN) suppository 6.25 mg, 6.25 mg, Rectal, Q4H PRN, Amanda Fowler MD     ASSESSMENT  Acute on chronic hypoxic hypercapnic respiratory failure  Acute exacerbation of COPD  Paroxysmal atrial fibrillation with rapid ventricular rate   Acute rhinovirus infection  Hypertension  Hypothyroidism  Anxiety disorder  Depression  Chronic anemia  Restless leg syndrome  Acute kidney injury resolving  Chronic kidney disease stage III    PLAN  Long discussion with patient family and they decided to withdraw the care and keep patient comfortable with routine palliative care orders.  Patient family wants to continue with breathing treatment as needed.  I agree with their decision and will comply.    AMANDA FOWLER MD    Copied text in this note has been reviewed and is accurate as of 10/13/23

## 2023-10-14 NOTE — PROGRESS NOTES
Case Management Discharge Note      Final Note: Patient .    Provided Post Acute Provider List?: Yes  Post Acute Provider List: Home Health  Provided Post Acute Provider Quality & Resource List?: Yes  Post Acute Provider Quality and Resource List: Home Health  Delivered To: Support Person  Method of Delivery: In person    Selected Continued Care - Discharged on 10/13/2023 Admission date: 2023 - Discharge disposition:       Destination Coordination complete.      Service Provider Selected Services Address Phone Fax Patient Preferred    [facility] [address] [place] [address] [phone] --                Home Medical Care Coordination complete.      Service Provider Selected Services Address Phone Fax Patient Preferred    [facility] [address] [facility] [address] [phone] --                       Final Discharge Disposition Code: 20 -

## 2023-10-14 NOTE — DISCHARGE SUMMARY
Discharge summary    Date of admission 2023  Date of death 10/13/2023    Discussion  83-year-old white female with history of chronic hypoxic respiratory failure COPD SVT nonsustained ventricular tachycardia hypertension hypothyroidism and chronic kidney disease who was well-known to our service and has had multiple admission for acute on chronic hypoxic respiratory failure admitted for management with complaint of shortness of breath and work-up revealed acute exacerbation of COPD and also found to be rapid ventricular rate with history of SVT.  Patient admitted treated with supplemental oxygen nebulizer IV steroids and followed by pulmonary cardiology nephrology and neurology.  Patient initially improved and then declined quickly required BiPAP.  Patient was DNR and despite aggressive care she continued decline and unable to tolerate and does not want to wear BiPAP for prolonged time.  After given ample time with poor prognosis family decided to withdraw the care and keep her comfortable and let nature take over.  Patient transferred to palliative care unit and remains comfortable and  peacefully and her body released with family.    Cause of death cardiopulmonary failure    AMANDA MURO MD

## 2023-10-15 NOTE — PAYOR COMM NOTE
"Raheem Roberts (Dcsd. Female)          DC SUMMARY FOR [id number]          Date of Birth   1940    Social Security Number       Address   [address] [place] [address]  [facility] [address]    Home Phone   [phone]    MRN   [id number]       [facility]    Marital Status                               Admission Date   23    Admission Type   Emergency    Admitting Provider   Stanley Fowler MD    Attending Provider       Department, Room/Bed   [address], P479/1       Discharge Date   10/13/2023    Discharge Disposition       Discharge Destination                                 Attending Provider: (none)   Allergies: Morphine And Related, Fenofibrate    Isolation: None   Infection: Rhinovirus  (10/11/23)   Code Status: Prior    Ht: 157.5 cm (62\")   Wt: 73.5 kg (162 lb)    Admission Cmt: None   Principal Problem: COPD exacerbation [J44.1]                   Active Insurance as of 2023       Primary Coverage       Payor Plan Insurance Group Employer/Plan Group    [facility] [employer]LL[employer]ARE MEDICARE REPLACEMENT        Payor Plan Address Payor Plan Phone Number Payor Plan Fax Number Effective Dates    [address] [address] [phone]  10/9/2017 - None Entered    [facility] [address]         Subscriber Name Subscriber Birth Date Member ID       RAHEEM ROBERTS 1940 [id number]               Secondary Coverage       Payor Plan Insurance Group Employer/Plan Group    [place]        Payor Plan Address Payor Plan Phone Number Payor Plan Fax Number Effective Dates    [address] [phone]  7/3/2016 - None Entered    [facility] [address]         Subscriber Name Subscriber Birth Date Member ID       RAHEEM ROBERTS 1940 [id number]                     Emergency Contacts        (Rel.) Home Phone Work Phone Mobile Phone    Chino Roberts (Son) [phone] -- --    James Roberts (Son) " [phone] -- [phone]                 Discharge Summary        Amanda Fowler MD at 10/13/23 4504          Discharge summary    Date of admission 2023  Date of death 10/13/2023    Discussion  83-year-old white female with history of chronic hypoxic respiratory failure COPD SVT nonsustained ventricular tachycardia hypertension hypothyroidism and chronic kidney disease who was well-known to our service and has had multiple admission for acute on chronic hypoxic respiratory failure admitted for management with complaint of shortness of breath and work-up revealed acute exacerbation of COPD and also found to be rapid ventricular rate with history of SVT.  Patient admitted treated with supplemental oxygen nebulizer IV steroids and followed by pulmonary cardiology nephrology and neurology.  Patient initially improved and then declined quickly required BiPAP.  Patient was DNR and despite aggressive care she continued decline and unable to tolerate and does not want to wear BiPAP for prolonged time.  After given ample time with poor prognosis family decided to withdraw the care and keep her comfortable and let nature take over.  Patient transferred to palliative care unit and remains comfortable and  peacefully and her body released with family.    Cause of death cardiopulmonary failure    AMANDA FOWLER MD    Electronically signed by Amanda Fowler MD at 10/14/23 8186

## 2023-10-16 LAB
BACTERIA SPEC AEROBE CULT: NORMAL
BACTERIA SPEC AEROBE CULT: NORMAL

## 2023-10-16 NOTE — PAYOR COMM NOTE
"Raheem Roberts (Dcsd. Female)     PLEASE SEE ATTACHED FOR DC NOTICE    REF #   [id number]     THANK YOU  JOSY ACKERMAN RN/ DEPT  [facility]   [phone]  [phone]        Date of Birth   1940    Social Security Number       Address   [address] [place] [address] [address]  [place] [address]    Home Phone   [phone]    MRN   [id number]       Muslim   [facility]    Marital Status                               Admission Date   23    Admission Type   Emergency    Admitting Provider   Stanley Fowler MD    Attending Provider       Department, Room/Bed   [facility] [address], P479/1       Discharge Date   10/13/2023    Discharge Disposition       Discharge Destination                                 Attending Provider: (none)   Allergies: Morphine And Related, Fenofibrate    Isolation: None   Infection: Rhinovirus  (10/11/23)   Code Status: Prior    Ht: 157.5 cm (62\")   Wt: 73.5 kg (162 lb)    Admission Cmt: None   Principal Problem: COPD exacerbation [J44.1]                   Active Insurance as of 2023       Primary Coverage       Payor Plan Insurance Group Employer/Plan Group    [employer] [place] MEDICARE REPLACEMENT WELL[employer]ARE MEDICARE REPLACEMENT        Payor Plan Address Payor Plan Phone Number Payor Plan Fax Number Effective Dates    [address] [address] [phone]  10/9/2017 - None Entered    [facility] [address]         Subscriber Name Subscriber Birth Date Member ID       RAHEEM ROBERTS 1940 [id number]               Secondary Coverage       Payor Plan Insurance Group Employer/Plan Group    [place]        Payor Plan Address Payor Plan Phone Number Payor Plan Fax Number Effective Dates    [address] [phone]  7/3/2016 - None Entered    [facility] [place] [address]         Subscriber Name Subscriber Birth Date Member ID       ALEJANDRARAHEEM PRANAV 1940 [id number]                     Emergency Contacts       Contact " Person (Rel.) Home Phone Work Phone Mobile Phone    Chino Wolf (Son) [phone] -- --    James Wolf (Son) [phone] -- [phone]              [place]: [employer] [id number]  Tax ID [id number]     Discharge Summary        Amanda Fowler MD at 10/13/23 1193          Discharge summary    Date of admission 2023  Date of death 10/13/2023    Discussion  83-year-old white female with history of chronic hypoxic respiratory failure COPD SVT nonsustained ventricular tachycardia hypertension hypothyroidism and chronic kidney disease who was well-known to our service and has had multiple admission for acute on chronic hypoxic respiratory failure admitted for management with complaint of shortness of breath and work-up revealed acute exacerbation of COPD and also found to be rapid ventricular rate with history of SVT.  Patient admitted treated with supplemental oxygen nebulizer IV steroids and followed by pulmonary cardiology nephrology and neurology.  Patient initially improved and then declined quickly required BiPAP.  Patient was DNR and despite aggressive care she continued decline and unable to tolerate and does not want to wear BiPAP for prolonged time.  After given ample time with poor prognosis family decided to withdraw the care and keep her comfortable and let nature take over.  Patient transferred to palliative care unit and remains comfortable and  peacefully and her body released with family.    Cause of death cardiopulmonary failure    AMANDA FOWLER MD    Electronically signed by Amanda Fowler MD at 10/14/23 0734
